# Patient Record
Sex: FEMALE | Race: WHITE | NOT HISPANIC OR LATINO | Employment: FULL TIME | URBAN - METROPOLITAN AREA
[De-identification: names, ages, dates, MRNs, and addresses within clinical notes are randomized per-mention and may not be internally consistent; named-entity substitution may affect disease eponyms.]

---

## 2017-01-30 ENCOUNTER — OFFICE VISIT (OUTPATIENT)
Dept: INTERNAL MEDICINE | Facility: CLINIC | Age: 37
End: 2017-01-30
Payer: COMMERCIAL

## 2017-01-30 VITALS
HEART RATE: 132 BPM | DIASTOLIC BLOOD PRESSURE: 74 MMHG | RESPIRATION RATE: 20 BRPM | SYSTOLIC BLOOD PRESSURE: 102 MMHG | HEIGHT: 62 IN | OXYGEN SATURATION: 99 % | BODY MASS INDEX: 18.22 KG/M2 | WEIGHT: 99 LBS

## 2017-01-30 DIAGNOSIS — G47.00 INSOMNIA, UNSPECIFIED TYPE: ICD-10-CM

## 2017-01-30 DIAGNOSIS — Z71.6 TOBACCO ABUSE COUNSELING: ICD-10-CM

## 2017-01-30 DIAGNOSIS — Q85.89 PEUTZ-JEGHERS SYNDROME: ICD-10-CM

## 2017-01-30 DIAGNOSIS — F41.1 GAD (GENERALIZED ANXIETY DISORDER): Primary | ICD-10-CM

## 2017-01-30 DIAGNOSIS — Z72.0 TOBACCO ABUSE: ICD-10-CM

## 2017-01-30 PROCEDURE — 99999 PR PBB SHADOW E&M-EST. PATIENT-LVL III: CPT | Mod: PBBFAC,,, | Performed by: NURSE PRACTITIONER

## 2017-01-30 PROCEDURE — 99214 OFFICE O/P EST MOD 30 MIN: CPT | Mod: S$GLB,,, | Performed by: NURSE PRACTITIONER

## 2017-01-30 RX ORDER — CLONAZEPAM 1 MG/1
1 TABLET ORAL 2 TIMES DAILY PRN
Qty: 60 TABLET | Refills: 5 | Status: SHIPPED | OUTPATIENT
Start: 2017-01-30 | End: 2017-08-07 | Stop reason: SDUPTHER

## 2017-01-30 RX ORDER — TRAZODONE HYDROCHLORIDE 100 MG/1
100 TABLET ORAL NIGHTLY
Qty: 30 TABLET | Refills: 5 | Status: SHIPPED | OUTPATIENT
Start: 2017-01-30 | End: 2017-03-13 | Stop reason: SDUPTHER

## 2017-01-30 RX ORDER — VENLAFAXINE HYDROCHLORIDE 75 MG/1
75 CAPSULE, EXTENDED RELEASE ORAL DAILY
COMMUNITY
End: 2017-01-30 | Stop reason: SDUPTHER

## 2017-01-30 RX ORDER — VENLAFAXINE HYDROCHLORIDE 75 MG/1
75 CAPSULE, EXTENDED RELEASE ORAL DAILY
Qty: 30 CAPSULE | Refills: 5 | Status: SHIPPED | OUTPATIENT
Start: 2017-01-30 | End: 2017-03-13 | Stop reason: SDUPTHER

## 2017-01-30 RX ORDER — NORETHINDRONE AND ETHINYL ESTRADIOL 1 MG-35MCG
1 KIT ORAL DAILY
Refills: 7 | COMMUNITY
Start: 2016-12-24 | End: 2017-04-18

## 2017-01-30 NOTE — PATIENT INSTRUCTIONS
Anxiety Reaction  Anxiety is the feeling we all get when we think something bad might happen. It is a normal response to stress and usually causes only a mild reaction. When anxiety becomes more severe, it can interfere with daily life. In some cases, you may not even be aware of what it is youre anxious about. There may also be a genetic link or it may be a learned behavior in the home.  Both psychological and physical triggers cause stress reaction. It's often a response to fear or emotional stress, real or imagined. This stress may come from home, family, work, or social relationships.  During an anxiety reaction, you may feel:  · Helpless  · Nervous  · Depressed  · Irritable  Your body may show signs of anxiety in many ways. You may experience:  · Dry mouth  · Shakiness  · Dizziness  · Weakness  · Trouble breathing  · Breathing fast (hyperventilating)  · Chest pressure  · Sweating  · Headache  · Nausea  · Diarrhea  · Tiredness  · Inability to sleep  · Sexual problems  Home care  · Try to locate the sources of stress in your life. They may not be obvious. These may include:  ¨ Daily hassles of life (traffic jams, missed appointments, car troubles, etc.)  ¨ Major life changes, both good (new baby, job promotion) and bad (loss of job, loss of loved one)  ¨ Overload: feeling that you have too many responsibilities and can't take care of all of them at once  ¨ Feeling helpless, feeling that your problems are beyond what youre able to solve  · Notice how your body reacts to stress. Learn to listen to your body signals. This will help you take action before the stress becomes severe.  · When you can, do something about the source of your stress. (Avoid hassles, limit the amount of change that happens in your life at one time and take a break when you feel overloaded).  · Unfortunately, many stressful situations can't be avoided. It is necessary to learn how to better manage stress. There are many proven methods  that will reduce your anxiety. These include simple things like exercise, good nutrition and adequate rest. Also, there are certain techniques that are helpful:  ¨ Relaxation  ¨ Breathing exercises  ¨ Visualization  ¨ Biofeedback  ¨ Meditation  For more information about this, consult your doctor or go to a local bookstore and review the many books and tapes available on this subject.  Follow-up care  If you feel that your anxiety is not responding to self-help measures, contact your doctor or make an appointment with a counselor. You may need short-term psychological counseling and temporary medicine to help you manage stress.  Call 911  Call your healthcare provider right away if any of these occur:  · Trouble breathing  · Confusion  · Drowsiness or trouble wakening  · Fainting or loss of consciousness  · Rapid heart rate  · Seizure  · New chest pain that becomes more severe, lasts longer, or spreads into your shoulder, arm, neck, jaw, or back  When to seek medical advice  Call your healthcare provider right away if any of these occur:  · Your symptoms get worse  · Severe headache not relieved by rest and mild pain reliever  © 8335-9795 [employer]. [address] [place] [address], [place] [address]. All rights reserved. This information is not intended as a substitute for professional medical care. Always follow your healthcare professional's instructions.

## 2017-01-30 NOTE — MR AVS SNAPSHOT
[facility]  [address]  [facility] [address]  Phone: [phone]  Fax: [phone]                  Staci Hodge   2017 8:00 AM   Office Visit    Description:  Female : 1980   Provider:  Shilpi Clayton NP   Department:  [facility]           Reason for Visit     Follow-up           Diagnoses this Visit        Comments    MARY LOU (generalized anxiety disorder)    -  Primary     Insomnia, unspecified type         Peutz-Jeghers syndrome         Tobacco abuse         Tobacco abuse counseling                To Do List           Goals (5 Years of Data)     None      Follow-Up and Disposition     Return in about 6 months (around 2017).       These Medications        Disp Refills Start End    venlafaxine (EFFEXOR-XR) 75 MG 24 hr capsule 30 capsule 5 2017     Take 1 capsule (75 mg total) by mouth once daily. - Oral    Pharmacy: [facility]/pharmacy #[id number] - [place], [place] - [address] Ph #: [phone]       trazodone (DESYREL) 100 MG tablet 30 tablet 5 2017    Take 1 tablet (100 mg total) by mouth every evening. - Oral    Pharmacy: [facility]/pharmacy #[id number] - [place], [place] - [address] Ph #: [phone]       clonazePAM (KLONOPIN) 1 MG tablet 60 tablet 5 2017    Take 1 tablet (1 mg total) by mouth 2 (two) times daily as needed for Anxiety. - Oral    Pharmacy: [facility]/pharmacy #[id number] - [place], [place] - [address] Ph #: [phone]         [facility]     [facility] -  Assistance  Registered nurses in the [facility] provide clinical advisement, health education, appointment booking, and other advisory services.  Call for this free service at [phone].             Medications           Message regarding Medications     Verify the changes and/or additions to your medication regime listed below are the same as discussed with your clinician today.  If any of these changes or  "additions are incorrect, please notify your healthcare provider.        START taking these NEW medications        Refills    venlafaxine (EFFEXOR-XR) 75 MG 24 hr capsule 5    Sig: Take 1 capsule (75 mg total) by mouth once daily.    Class: Normal    Route: Oral    trazodone (DESYREL) 100 MG tablet 5    Sig: Take 1 tablet (100 mg total) by mouth every evening.    Class: Normal    Route: Oral      STOP taking these medications     hydrOXYzine pamoate (VISTARIL) 25 MG Cap TAKE 1 CAPSULE (25 MG TOTAL) BY MOUTH NIGHTLY AS NEEDED (INSOMNIA).    quetiapine (SEROQUEL) 400 MG tablet Take 1 tablet (400 mg total) by mouth every evening.    fluvoxaMINE (LUVOX) 100 MG tablet Take 1 tablet (100 mg total) by mouth every evening.           Verify that the below list of medications is an accurate representation of the medications you are currently taking.  If none reported, the list may be blank. If incorrect, please contact your healthcare provider. Carry this list with you in case of emergency.           Current Medications     clonazePAM (KLONOPIN) 1 MG tablet Take 1 tablet (1 mg total) by mouth 2 (two) times daily as needed for Anxiety.    CYCLAFEM 1/35, 28, 1-35 mg-mcg per tablet Take 1 tablet by mouth once daily.    venlafaxine (EFFEXOR-XR) 75 MG 24 hr capsule Take 1 capsule (75 mg total) by mouth once daily.    trazodone (DESYREL) 100 MG tablet Take 1 tablet (100 mg total) by mouth every evening.           Clinical Reference Information           Vital Signs - Last Recorded  Most recent update: 1/30/2017  8:25 AM by Hiral Pride MA    BP Pulse Resp Ht Wt LMP    102/74 (!) 132 20 5' 2" (1.575 m) 44.9 kg (99 lb) 01/23/2017    SpO2 BMI             99% 18.11 kg/m2         Blood Pressure          Most Recent Value    BP  102/74      Allergies as of 1/30/2017     No Known Allergies      Immunizations Administered on Date of Encounter - 1/30/2017     None      Instructions      Anxiety Reaction  Anxiety is the feeling we all " get when we think something bad might happen. It is a normal response to stress and usually causes only a mild reaction. When anxiety becomes more severe, it can interfere with daily life. In some cases, you may not even be aware of what it is youre anxious about. There may also be a genetic link or it may be a learned behavior in the home.  Both psychological and physical triggers cause stress reaction. It's often a response to fear or emotional stress, real or imagined. This stress may come from home, family, work, or social relationships.  During an anxiety reaction, you may feel:  · Helpless  · Nervous  · Depressed  · Irritable  Your body may show signs of anxiety in many ways. You may experience:  · Dry mouth  · Shakiness  · Dizziness  · Weakness  · Trouble breathing  · Breathing fast (hyperventilating)  · Chest pressure  · Sweating  · Headache  · Nausea  · Diarrhea  · Tiredness  · Inability to sleep  · Sexual problems  Home care  · Try to locate the sources of stress in your life. They may not be obvious. These may include:  ¨ Daily hassles of life (traffic jams, missed appointments, car troubles, etc.)  ¨ Major life changes, both good (new baby, job promotion) and bad (loss of job, loss of loved one)  ¨ Overload: feeling that you have too many responsibilities and can't take care of all of them at once  ¨ Feeling helpless, feeling that your problems are beyond what youre able to solve  · Notice how your body reacts to stress. Learn to listen to your body signals. This will help you take action before the stress becomes severe.  · When you can, do something about the source of your stress. (Avoid hassles, limit the amount of change that happens in your life at one time and take a break when you feel overloaded).  · Unfortunately, many stressful situations can't be avoided. It is necessary to learn how to better manage stress. There are many proven methods that will reduce your anxiety. These include simple  things like exercise, good nutrition and adequate rest. Also, there are certain techniques that are helpful:  ¨ Relaxation  ¨ Breathing exercises  ¨ Visualization  ¨ Biofeedback  ¨ Meditation  For more information about this, consult your doctor or go to a local bookstore and review the many books and tapes available on this subject.  Follow-up care  If you feel that your anxiety is not responding to self-help measures, contact your doctor or make an appointment with a counselor. You may need short-term psychological counseling and temporary medicine to help you manage stress.  Call 911  Call your healthcare provider right away if any of these occur:  · Trouble breathing  · Confusion  · Drowsiness or trouble wakening  · Fainting or loss of consciousness  · Rapid heart rate  · Seizure  · New chest pain that becomes more severe, lasts longer, or spreads into your shoulder, arm, neck, jaw, or back  When to seek medical advice  Call your healthcare provider right away if any of these occur:  · Your symptoms get worse  · Severe headache not relieved by rest and mild pain reliever  © 8471-8429 [employer]. [address] [place] [address]. All rights reserved. This information is not intended as a substitute for professional medical care. Always follow your healthcare professional's instructions.             Smoking Cessation     If you would like to quit smoking:   You may be eligible for free services if you are a [place] resident and started smoking cigarettes before September 1, 1988.  Call the Smoking Cessation Trust (SCT) toll free at (930) 206-5529 or [phone] 018-9233.   Call 1-800-QUIT-NOW if you do not meet the above criteria.

## 2017-01-30 NOTE — PROGRESS NOTES
Subjective:       Patient ID: Staci Hodge is a 36 y.o. female.    Chief Complaint: Follow-up (check up with refills)    Patient is known, to me and presents with   Chief Complaint   Patient presents with    Follow-up     check up with refills   .  Denies chest pain and shortness of breath.  Patient presents with check up and refills. She was seeing mental health but became too expensive. She is stable right now with anxiety and has no suicidal or homicidal ideations  HPI  Review of Systems   Constitutional: Negative for activity change, appetite change, fatigue, fever and unexpected weight change.   HENT: Negative for congestion, ear discharge, ear pain, hearing loss, postnasal drip and tinnitus.    Eyes: Negative for photophobia, pain and visual disturbance.   Respiratory: Negative for cough, shortness of breath, wheezing and stridor.    Cardiovascular: Negative for chest pain, palpitations and leg swelling.   Gastrointestinal: Negative for abdominal distention.   Genitourinary: Negative for difficulty urinating, dysuria, frequency, hematuria and urgency.   Musculoskeletal: Negative for arthralgias, back pain, gait problem, joint swelling and neck pain.   Skin: Negative.    Neurological: Negative for dizziness, seizures, syncope, weakness, light-headedness, numbness and headaches.   Hematological: Negative for adenopathy. Does not bruise/bleed easily.   Psychiatric/Behavioral: Negative for behavioral problems, confusion, hallucinations, sleep disturbance and suicidal ideas. The patient is not nervous/anxious.        Objective:      Physical Exam   Constitutional: She is oriented to person, place, and time. She appears well-developed and well-nourished. No distress.   HENT:   Head: Normocephalic and atraumatic.   Right Ear: External ear normal.   Left Ear: External ear normal.   Mouth/Throat: Oropharynx is clear and moist. No oropharyngeal exudate.   Eyes: Conjunctivae and EOM are normal. Pupils are  "equal, round, and reactive to light. Right eye exhibits no discharge. Left eye exhibits no discharge.   Neck: Normal range of motion. Neck supple. No JVD present. No thyromegaly present.   Cardiovascular: Normal rate, regular rhythm, normal heart sounds and intact distal pulses.  Exam reveals no gallop and no friction rub.    No murmur heard.  Pulmonary/Chest: Effort normal and breath sounds normal. No stridor. No respiratory distress. She has no wheezes. She has no rales. She exhibits no tenderness.   Abdominal: Soft. Bowel sounds are normal. She exhibits no distension and no mass. There is no tenderness. There is no rebound.   Musculoskeletal: Normal range of motion. She exhibits no edema or tenderness.   Lymphadenopathy:     She has no cervical adenopathy.   Neurological: She is alert and oriented to person, place, and time. She has normal reflexes. She displays normal reflexes. No cranial nerve deficit. She exhibits normal muscle tone. Coordination normal.   Skin: Skin is warm and dry. No rash noted. No erythema. No pallor.   Psychiatric: She has a normal mood and affect. Her behavior is normal. Judgment and thought content normal.   Negative suicidal or homicidal ideation       Assessment:       1. MARY LOU (generalized anxiety disorder)    2. Insomnia, unspecified type    3. Peutz-Jeghers syndrome    4. Tobacco abuse    5. Tobacco abuse counseling        Plan:   Staci was seen today for follow-up.    Diagnoses and all orders for this visit:    MARY LOU (generalized anxiety disorder)  -     venlafaxine (EFFEXOR-XR) 75 MG 24 hr capsule; Take 1 capsule (75 mg total) by mouth once daily.  -     clonazePAM (KLONOPIN) 1 MG tablet; Take 1 tablet (1 mg total) by mouth 2 (two) times daily as needed for Anxiety.    Insomnia, unspecified type  -     trazodone (DESYREL) 100 MG tablet; Take 1 tablet (100 mg total) by mouth every evening.    Peutz-Jeghers syndrome    Tobacco abuse    Tobacco abuse counseling    "This note will not " "be shared with the patient."  Continue with plan of care  Not ready to quit smoking   RTC as scheduled  "

## 2017-01-31 ENCOUNTER — PATIENT MESSAGE (OUTPATIENT)
Dept: INTERNAL MEDICINE | Facility: CLINIC | Age: 37
End: 2017-01-31

## 2017-01-31 ENCOUNTER — TELEPHONE (OUTPATIENT)
Dept: INTERNAL MEDICINE | Facility: CLINIC | Age: 37
End: 2017-01-31

## 2017-01-31 DIAGNOSIS — G44.309 HEADACHES DUE TO OLD HEAD INJURY: ICD-10-CM

## 2017-01-31 DIAGNOSIS — R53.83 FATIGUE, UNSPECIFIED TYPE: Primary | ICD-10-CM

## 2017-01-31 DIAGNOSIS — S09.90XS HEADACHES DUE TO OLD HEAD INJURY: ICD-10-CM

## 2017-01-31 DIAGNOSIS — R51.9 WORSENING HEADACHES: ICD-10-CM

## 2017-01-31 DIAGNOSIS — Z13.220 SCREENING FOR LIPOID DISORDERS: ICD-10-CM

## 2017-02-01 ENCOUNTER — OFFICE VISIT (OUTPATIENT)
Dept: NEUROLOGY | Facility: CLINIC | Age: 37
End: 2017-02-01
Payer: COMMERCIAL

## 2017-02-01 VITALS
SYSTOLIC BLOOD PRESSURE: 110 MMHG | HEIGHT: 62 IN | HEART RATE: 116 BPM | DIASTOLIC BLOOD PRESSURE: 78 MMHG | RESPIRATION RATE: 14 BRPM | WEIGHT: 109.56 LBS | BODY MASS INDEX: 20.16 KG/M2

## 2017-02-01 DIAGNOSIS — R00.2 PALPITATIONS: ICD-10-CM

## 2017-02-01 DIAGNOSIS — R42 DIZZINESS: ICD-10-CM

## 2017-02-01 DIAGNOSIS — G44.85 STABBING HEADACHE: ICD-10-CM

## 2017-02-01 DIAGNOSIS — R20.0 BILATERAL HAND NUMBNESS: ICD-10-CM

## 2017-02-01 DIAGNOSIS — R00.0 TACHYCARDIA: ICD-10-CM

## 2017-02-01 PROCEDURE — 99204 OFFICE O/P NEW MOD 45 MIN: CPT | Mod: S$GLB,,, | Performed by: NURSE PRACTITIONER

## 2017-02-01 PROCEDURE — 99999 PR PBB SHADOW E&M-EST. PATIENT-LVL III: CPT | Mod: PBBFAC,,, | Performed by: NURSE PRACTITIONER

## 2017-02-01 NOTE — MR AVS SNAPSHOT
[facility]  [address] [facility] [address]  Phone: [phone]  Fax: [phone]                  Staci Hodge   2017 2:20 PM   Office Visit    Description:  Female : 1980   Provider:  Camille Emerson NP   Department:  [facility]           Reason for Visit     Dizziness     Numbness           Diagnoses this Visit        Comments    Dizziness         Stabbing headache         Palpitations         Tachycardia         Bilateral hand numbness                To Do List           Future Appointments        Provider Department Dept Phone    2017 8:15 AM Shilpi Clayton NP [facility] [phone]      Goals (5 Years of Data)     None      [facility]     [facility] -  Assistance  Registered nurses in the [facility] Center provide clinical advisement, health education, appointment booking, and other advisory services.  Call for this free service at [phone].             Medications           Message regarding Medications     Verify the changes and/or additions to your medication regime listed below are the same as discussed with your clinician today.  If any of these changes or additions are incorrect, please notify your healthcare provider.             Verify that the below list of medications is an accurate representation of the medications you are currently taking.  If none reported, the list may be blank. If incorrect, please contact your healthcare provider. Carry this list with you in case of emergency.           Current Medications     clonazePAM (KLONOPIN) 1 MG tablet Take 1 tablet (1 mg total) by mouth 2 (two) times daily as needed for Anxiety.    CYCLAFEM 1/35, 28, 1-35 mg-mcg per tablet Take 1 tablet by mouth once daily.    trazodone (DESYREL) 100 MG tablet Take 1 tablet (100 mg total) by mouth every evening.    venlafaxine (EFFEXOR-XR) 75 MG 24 hr capsule Take 1 capsule  "(75 mg total) by mouth once daily.           Clinical Reference Information           Vital Signs - Last Recorded  Most recent update: 2/1/2017  2:28 PM by Pam Syed MA    BP Pulse Resp Ht Wt LMP    110/78 (BP Location: Left arm, Patient Position: Sitting, BP Method: Manual) (!) 116 14 5' 2" (1.575 m) 49.7 kg (109 lb 9.1 oz) 01/23/2017    BMI                20.04 kg/m2          Blood Pressure          Most Recent Value    BP  110/78      Allergies as of 2/1/2017     No Known Allergies      Immunizations Administered on Date of Encounter - 2/1/2017     None      Orders Placed During Today's Visit     Future Labs/Procedures Expected by Expires    MRI Brain W WO Contrast  2/1/2017 2/1/2018    EMG - 2 Extremities  As directed 2/1/2018    Holter monitor - 24 hour  As directed 2/1/2018    Nerve conduction test  As directed 2/1/2018      Smoking Cessation     If you would like to quit smoking:   You may be eligible for free services if you are a [place] resident and started smoking cigarettes before September 1, 1988.  Call the Smoking Cessation Trust (SCT) toll free at (285) 989-7857 or [phone].   Call 2-776-QUIT-NOW if you do not meet the above criteria.            "

## 2017-02-01 NOTE — PROGRESS NOTES
Subjective:      Chief Complaint:  Dizziness and numbness    History of Present Illness  Staci Hodge is a 36 y.o. female, who presents for evaluation of dizziness and paresthesias to her hands bilaterally. She has a history of PTSD, panic disorder with agoraphobia, insomnia, colon resection, due to small bowel obstruction, and tobacco us.     He dizziness began in 5-6 months ago. This begins with a shocking pain to her head, followed by dizziness. It initially resolved after sitting down. It occurs once per week.     She began to experience bilateral hand numbness last week, which has progressed in severity. She has been dropping items.     She experienced three episodes of sleep paralysis this past week, but denies having this happen before. She is tearful when discussing this. She is awake, but is unable to move or speak.     She is under a great deal of stress lately. She does admit to having palpitations. She last saw Dr. Christiano Emerson in 10/2016. She was placed on Seroquel, which she couldn't tolerate. She then went to [facility], who prescribed Effexor and Trazodone; however, this has been ineffective for her anxiety/panic issues as well.     I have reviewed all of this patient's past medical and surgical histories as well as family and social histories and active allergies and medications as documented in the electronic medical record.    Review of Systems  Review of Systems   Constitutional: Positive for fatigue. Negative for appetite change.   HENT: Positive for tinnitus.    Eyes: Negative for photophobia and visual disturbance.   Respiratory: Negative for shortness of breath.    Cardiovascular: Positive for palpitations.   Gastrointestinal: Positive for nausea.   Neurological: Positive for dizziness, tremors, numbness and headaches. Negative for speech difficulty.   Hematological: Does not bruise/bleed easily.   Psychiatric/Behavioral: Positive for agitation, dysphoric mood and  sleep disturbance. Negative for suicidal ideas. The patient is nervous/anxious.        Objective:       Vitals:    02/01/17 1425   BP: 110/78   Pulse: (!) 116   Resp: 14     Exam:  Gen Appearance, well developed/nourished in no apparent distress  CV: 2+ distal pulses with no edema or swelling  Neuro:  MS: Awake, alert, oriented to place, person, time, situation. Sustains attention. Recent/remote memory intact, Language is full to spontaneous speech/repetition/naming/comprehension. Fund of Knowledge is full  CN: Optic discs are flat with normal vasculature, PERRL, right nystamus. Normal facial sensation and strength, Hearing symmetric, Tongue and Palate are midline and strong. Shoulder Shrug symmetric and strong.  Motor: Normal bulk, tone, no abnormal movements. 5/5 strength bilateral upper/lower extremities with 2+ reflexes and bilateral plantar response  Sensory: symmetric to light touch, pain, temp, and vibration/proprioception. Romberg negative; + left Tinel's and Phalen's sign.   Cerebellar: Finger-nose,Heal-shin, Rapid alternating movements intact  Gait: Normal stance, no ataxia    Imaging: no prior imaging of brain  Labs: CBC, CMP, Lipid Panel, TSH, B12, Vitamin D ordered per PCP; she will complete tomorrow.     Assessment:   Staci Hodge is a 36 y.o. female, who presents for evaluation of dizziness and paresthesias to her hands bilaterally. She has a history of PTSD, panic disorder with agoraphobia, insomnia, colon resection, due to small bowel obstruction, and tobacco us.     She has multiple complaints, which may be related to her severe anxiety; however, I will rule out other causes first.   Plan:   I recommend:     1. MRI Brain with and without to assess for structural abnormalities.   2. Review labs per PCP once complete, specifically her TSH.   3. EMG/NCS BUE to assess for neuropathic complaints.   4. 24 hour Holter monitor to further assess ongoing tachycardia, noted at last several office  visits within [facility].   5. Consider sleep study to assess for sleep disorders, after workup complete.     Follow up in 3 weeks.

## 2017-02-03 ENCOUNTER — HOSPITAL ENCOUNTER (OUTPATIENT)
Dept: RADIOLOGY | Facility: HOSPITAL | Age: 37
Discharge: HOME | End: 2017-02-03
Attending: NURSE PRACTITIONER
Payer: COMMERCIAL

## 2017-02-03 DIAGNOSIS — G44.85 STABBING HEADACHE: ICD-10-CM

## 2017-02-03 DIAGNOSIS — R20.0 BILATERAL HAND NUMBNESS: ICD-10-CM

## 2017-02-03 DIAGNOSIS — R42 DIZZINESS: ICD-10-CM

## 2017-02-03 PROCEDURE — A9585 GADOBUTROL INJECTION: HCPCS | Performed by: NURSE PRACTITIONER

## 2017-02-03 PROCEDURE — 70553 MRI BRAIN STEM W/O & W/DYE: CPT | Mod: TC

## 2017-02-03 PROCEDURE — 25500020 PHARM REV CODE 255: Performed by: NURSE PRACTITIONER

## 2017-02-03 PROCEDURE — 70553 MRI BRAIN STEM W/O & W/DYE: CPT | Mod: 26,,, | Performed by: RADIOLOGY

## 2017-02-03 RX ORDER — GADOBUTROL 604.72 MG/ML
4 INJECTION INTRAVENOUS
Status: COMPLETED | OUTPATIENT
Start: 2017-02-03 | End: 2017-02-03

## 2017-02-03 RX ADMIN — GADOBUTROL 4 ML: 604.72 INJECTION INTRAVENOUS at 11:02

## 2017-02-06 ENCOUNTER — PATIENT MESSAGE (OUTPATIENT)
Dept: INTERNAL MEDICINE | Facility: CLINIC | Age: 37
End: 2017-02-06

## 2017-02-07 NOTE — TELEPHONE ENCOUNTER
I have a question about CBC W/ AUTO DIFFERENTIAL resulted on 2/3/17 at 11:05 AM.   Just curious about the results saying the Gran# and Gran% being out of range.  Is there something I need to do to improve this?  Thanks                  I have a question about VITAMIN D resulted on 2/3/17 at 6:01 PM.     Just curious about the results saying vitamin D is 16 and it showed vitamin D insufficiency.  Is there something I need to do to improve this?  Thanks

## 2017-02-09 ENCOUNTER — TELEPHONE (OUTPATIENT)
Dept: INTERNAL MEDICINE | Facility: CLINIC | Age: 37
End: 2017-02-09

## 2017-02-09 DIAGNOSIS — E55.9 VITAMIN D DEFICIENCY: Primary | ICD-10-CM

## 2017-02-09 RX ORDER — ERGOCALCIFEROL 1.25 MG/1
50000 CAPSULE ORAL
Qty: 4 CAPSULE | Refills: 2 | Status: SHIPPED | OUTPATIENT
Start: 2017-02-09 | End: 2017-03-13 | Stop reason: SDUPTHER

## 2017-02-10 ENCOUNTER — PROCEDURE VISIT (OUTPATIENT)
Dept: NEUROLOGY | Facility: CLINIC | Age: 37
End: 2017-02-10
Payer: COMMERCIAL

## 2017-02-10 DIAGNOSIS — G56.03 BILATERAL CARPAL TUNNEL SYNDROME: Primary | ICD-10-CM

## 2017-02-10 DIAGNOSIS — R20.0 BILATERAL HAND NUMBNESS: ICD-10-CM

## 2017-02-10 PROCEDURE — 95911 NRV CNDJ TEST 9-10 STUDIES: CPT | Mod: S$GLB,,, | Performed by: PSYCHIATRY & NEUROLOGY

## 2017-02-10 PROCEDURE — 95886 MUSC TEST DONE W/N TEST COMP: CPT | Mod: S$GLB,,, | Performed by: PSYCHIATRY & NEUROLOGY

## 2017-02-10 NOTE — PROCEDURES
EMG - 2 Extremities  Date/Time: 2/10/2017 3:50 PM  Performed by: ROSALINDA OROURKE  Authorized by: STARR EMERSON     Nerve conduction test  Date/Time: 2/10/2017 3:50 PM  Performed by: ROSALINDA OROURKE  Authorized by: STARR EMERSON       REPORT OF EMG and NERVE CONDUCTION STUDY    Name: Staci Hodge  Date of Study:  02/10/17  Referring Provider: HELENA Emerson  Test Performed by:  MD Sharad  Full Values Attached  Informed Consent Scanned.   No anesthesia used.   Amount of Blood Loss: none. The patient tolerated this procedure well.       Informed consent was obtained prior to performing this study. Two patient identifiers were confirmed with the patient prior to performing this study. A time out to determine correct patient and and agreement on procedure performed was conducted prior to the concentric needle examination.    Reason for the study:  Numb hands      Findings:   Nerve conduction studies of the bilateral median (motor and sensory)nerves, bilateral ulnar (motor and sensory) nerves, bilateral radial sensory nerveswere performed.  Median motor distal latencies were borderline. Median palm to wrist distal latencies were prolonged to 2.9ms on the right and 3.1ms on the left.   Otherwise, amplitudes, distal latencies, conduction velocities, and F-waves were normal.  EMG of selected muscles of the bilateral arms and  bilateral cervical paraspinal muscles were performed as indicated on the attached sheets.  Insertional activity was normal without fasciculation or fibrillation. There was normal size, durastion, recruitment,  and phasia of motor units.      Impression:  Abnormal Study secondary to the Presence of:    Mild Bilateral Carpal Tunnel Syndrome.     Rosalinda Orourke M.D. [facility].     A copy of this EMG/NCS will be sent to Starr Emerson. The patient plans to try nightly wrist brace for CTS and follow up with you as scheduled.

## 2017-03-02 ENCOUNTER — OFFICE VISIT (OUTPATIENT)
Dept: NEUROLOGY | Facility: CLINIC | Age: 37
End: 2017-03-02
Payer: COMMERCIAL

## 2017-03-02 VITALS
HEIGHT: 62 IN | HEART RATE: 126 BPM | DIASTOLIC BLOOD PRESSURE: 70 MMHG | BODY MASS INDEX: 19.1 KG/M2 | SYSTOLIC BLOOD PRESSURE: 118 MMHG | WEIGHT: 103.81 LBS | RESPIRATION RATE: 14 BRPM

## 2017-03-02 DIAGNOSIS — R42 DIZZINESS: ICD-10-CM

## 2017-03-02 DIAGNOSIS — F40.10 SOCIAL ANXIETY DISORDER: ICD-10-CM

## 2017-03-02 DIAGNOSIS — G44.209 TENSION HEADACHE: Primary | ICD-10-CM

## 2017-03-02 DIAGNOSIS — R00.0 TACHYCARDIA: ICD-10-CM

## 2017-03-02 DIAGNOSIS — G47.00 INSOMNIA, UNSPECIFIED TYPE: ICD-10-CM

## 2017-03-02 DIAGNOSIS — G56.01 CARPAL TUNNEL SYNDROME ON RIGHT: ICD-10-CM

## 2017-03-02 PROCEDURE — 99214 OFFICE O/P EST MOD 30 MIN: CPT | Mod: S$GLB,,, | Performed by: NURSE PRACTITIONER

## 2017-03-02 PROCEDURE — 99999 PR PBB SHADOW E&M-EST. PATIENT-LVL III: CPT | Mod: PBBFAC,,, | Performed by: NURSE PRACTITIONER

## 2017-03-02 RX ORDER — PROPRANOLOL HYDROCHLORIDE 10 MG/1
10 TABLET ORAL 2 TIMES DAILY
Qty: 60 TABLET | Refills: 3 | Status: SHIPPED | OUTPATIENT
Start: 2017-03-02 | End: 2017-03-30

## 2017-03-02 RX ORDER — PROCHLORPERAZINE MALEATE 10 MG
10 TABLET ORAL 2 TIMES DAILY PRN
Qty: 20 TABLET | Refills: 0 | Status: SHIPPED | OUTPATIENT
Start: 2017-03-02 | End: 2017-04-17

## 2017-03-02 NOTE — PROGRESS NOTES
Subjective:      Chief Complaint:  Dizziness and numbness    History of Present Illness  Staci Hodge is a 36 y.o. female, who presents for evaluation of dizziness and paresthesias to her hands bilaterally. She has a history of PTSD, panic disorder with agoraphobia, insomnia, colon resection, due to small bowel obstruction, and tobacco us.     She completed an MRI Brain, labs, and EMG BUE since her last visit.     She presents today with new complaints of daily headaches located to the frontal area, which began one month ago. Her headaches are mild to moderate in nature. Her headaches appear to be triggered by anxiety. She has tried Ibuprofen, which was ineffective. She denies any visual disturbances, photophobia, phonophobia, autonomic complaints, focal weakness, paresthesias, neck pain, or nausea.      She denies any further episodes of dizziness, which were preceded by a shocking pain to her head.     She continues with numbness to her hands, R>L, and is dropping items.     She denies any further episodes of sleep paralysis. She does not sleep well at night, secondary to anxiety.     She has not yet returned to see Dr. Christiano Emerson, who she last saw in 10/2016.  She was previously placed on Seroquel, which she couldn't tolerate. She then went to [facility], who prescribed Effexor and Trazodone; however, this has been ineffective for her anxiety/panic issues as well.     I have reviewed all of this patient's past medical and surgical histories as well as family and social histories and active allergies and medications as documented in the electronic medical record.    Review of Systems  Review of Systems   Constitutional: Positive for fatigue. Negative for appetite change.   HENT: Negative for tinnitus.    Eyes: Negative for photophobia and visual disturbance.   Respiratory: Negative for shortness of breath.    Cardiovascular: Positive for palpitations.   Gastrointestinal: Negative for  nausea.   Neurological: Positive for tremors, numbness and headaches. Negative for dizziness and speech difficulty.   Hematological: Does not bruise/bleed easily.   Psychiatric/Behavioral: Positive for sleep disturbance. Negative for agitation, dysphoric mood and suicidal ideas. The patient is nervous/anxious.        Objective:       Vitals:    03/02/17 0913   BP: 118/70   Pulse: (!) 126   Resp: 14     Exam:  Gen Appearance, well developed/nourished in no apparent distress  CV: 2+ distal pulses with no edema or swelling  Neuro:  MS: Awake, alert, oriented to place, person, time, situation. Sustains attention. Recent/remote memory intact, Language is full to spontaneous speech/repetition/naming/comprehension. Fund of Knowledge is full  CN: Optic discs are flat with normal vasculature, PERRL, right nystamus. Normal facial sensation and strength, Hearing symmetric, Tongue and Palate are midline and strong. Shoulder Shrug symmetric and strong.  Motor: Normal bulk, tone, no abnormal movements. 5/5 strength bilateral upper/lower extremities with 2+ reflexes and bilateral plantar response  Sensory: symmetric to light touch, pain, temp, and vibration/proprioception. Romberg negative; + left Tinel's and Phalen's sign.   Cerebellar: Finger-nose,Heal-shin, Rapid alternating movements intact  Gait: Normal stance, no ataxia    Imaging:   MRI Brain with and without 2/3/2017: normal    24 hour Holter monitor: not completed    EMG BUE 2/10/2017:   Impression: Abnormal Study secondary to the Presence of:   Mild Bilateral Carpal Tunnel Syndrome.     Labs: CBC, CMP, Lipid Panel, TSH, B12, Vitamin D ordered per PCP; Vitamin D low; labs also show fasting hyperglycemia.    Assessment:   Staci Hodge is a 36 y.o. female, who presents for evaluation of dizziness and paresthesias to her hands bilaterally. She has a history of PTSD, panic disorder with agoraphobia, insomnia, colon resection, due to small bowel obstruction, and  tobacco us.     Her MRI Brain was unrevealing for structural lesions, and her episodes of dizziness have resolved. She presents today with daily headaches, which seem to be tension related, as well as ongoing tachycardia, brought on by anxiety.  Her fasting hyperglycemia could be related to increased anxiety, and her low Vitamin D level could explain her fatigue.   Plan:   I recommend:    1. Order a right CT injection, given her ongoing right CTS complaints.   2. Start Propranolol 10 mg bid for headache prevention, as well as her tachycardia. This may also have a positive effect on her social anxiety, which is likely contributing to the majority of her complaints.   3. Start prn Compazine to abort headaches.   4. Re-assess insomnia at next visit.     Follow up in 4 weeks.

## 2017-03-02 NOTE — PATIENT INSTRUCTIONS
Propranolol is prescribed to PREVENT headaches. To be taken daily.   Prochlorperazine is prescribed to get rid of headaches. Take only as needed.

## 2017-03-02 NOTE — MR AVS SNAPSHOT
[facility]  [address] [facility] [address]  Phone: [phone]  Fax: [phone]                  Staci Hodge   3/2/2017 9:00 AM   Office Visit    Description:  Female : 1980   Provider:  Camille Emerson NP   Department:  [facility]           Reason for Visit     Neurologic Problem           Diagnoses this Visit        Comments    Tachycardia    -  Primary     Tension headache         Carpal tunnel syndrome on right                To Do List           Future Appointments        Provider Department Dept Phone    2017 8:15 AM Shilpi Clayton NP [facility] [phone]      Goals (5 Years of Data)     None      Follow-Up and Disposition     Return in about 4 weeks (around 3/30/2017).       These Medications        Disp Refills Start End    propranolol (INDERAL) 10 MG tablet 60 tablet 3 3/2/2017 3/2/2018    Take 1 tablet (10 mg total) by mouth 2 (two) times daily. Take 1 each night x 1 week, then increase to twice daily afterwards. - Oral    Pharmacy: [facility]/pharmacy #[id number] - [place], [place] - [address] [address] Ph #: [phone]       prochlorperazine (COMPAZINE) 10 MG tablet 20 tablet 0 3/2/2017     Take 1 tablet (10 mg total) by mouth 2 (two) times daily as needed (take as needed to get rid of headache). - Oral    Pharmacy: [facility]/pharmacy #[id number] - [place], [place] - [address] [address] Ph #: [phone]         [facility]     [facility] On Call Nurse Care Line -  Assistance  Registered nurses in the [facility] provide clinical advisement, health education, appointment booking, and other advisory services.  Call for this free service at [phone].             Medications           Message regarding Medications     Verify the changes and/or additions to your medication regime listed below are the same as discussed with your clinician today.  If any of these changes or additions are incorrect, please notify your  healthcare provider.        START taking these NEW medications        Refills    propranolol (INDERAL) 10 MG tablet 3    Sig: Take 1 tablet (10 mg total) by mouth 2 (two) times daily. Take 1 each night x 1 week, then increase to twice daily afterwards.    Class: Normal    Route: Oral    prochlorperazine (COMPAZINE) 10 MG tablet 0    Sig: Take 1 tablet (10 mg total) by mouth 2 (two) times daily as needed (take as needed to get rid of headache).    Class: Normal    Route: Oral           Verify that the below list of medications is an accurate representation of the medications you are currently taking.  If none reported, the list may be blank. If incorrect, please contact your healthcare provider. Carry this list with you in case of emergency.           Current Medications     clonazePAM (KLONOPIN) 1 MG tablet Take 1 tablet (1 mg total) by mouth 2 (two) times daily as needed for Anxiety.    CYCLAFEM 1/35, 28, 1-35 mg-mcg per tablet Take 1 tablet by mouth once daily.    ergocalciferol (ERGOCALCIFEROL) 50,000 unit Cap Take 1 capsule (50,000 Units total) by mouth every 7 days.    trazodone (DESYREL) 100 MG tablet Take 1 tablet (100 mg total) by mouth every evening.    venlafaxine (EFFEXOR-XR) 75 MG 24 hr capsule Take 1 capsule (75 mg total) by mouth once daily.    prochlorperazine (COMPAZINE) 10 MG tablet Take 1 tablet (10 mg total) by mouth 2 (two) times daily as needed (take as needed to get rid of headache).    propranolol (INDERAL) 10 MG tablet Take 1 tablet (10 mg total) by mouth 2 (two) times daily. Take 1 each night x 1 week, then increase to twice daily afterwards.           Clinical Reference Information           Your Vitals Were     BP                   118/70 (BP Location: Left arm, Patient Position: Sitting, BP Method: Manual)           Blood Pressure          Most Recent Value    BP  118/70      Allergies as of 3/2/2017     No Known Allergies      Immunizations Administered on Date of Encounter - 3/2/2017      None      Orders Placed During Today's Visit     Future Labs/Procedures Expected by Expires    Carpal Tunnel  As directed 3/2/2018      Instructions    Propranolol is prescribed to PREVENT headaches. To be taken daily.   Prochlorperazine is prescribed to get rid of headaches. Take only as needed.        Smoking Cessation     If you would like to quit smoking:   You may be eligible for free services if you are a [place] resident and started smoking cigarettes before September 1, 1988.  Call the Smoking [employer]essation [employer] ([facility]) toll free at [phone] or [phone].   Call 6-663-QUIT-NOW if you do not meet the above criteria.            Language Assistance Services     ATTENTION: Language assistance services are available, free of charge. Please call [phone].      ATENCIÓN: Si sara fernandes, tiene a weaver disposición servicios gratuitos de asistencia lingüística. Llame al [phone].     CHÚ Ý: N?u b?n nói Ti?ng Vi?t, có các d?ch v? h? tr? ngôn ng? mi?n phí dành cho b?n. G?i s? [phone].         [facility] complies with applicable Federal civil rights laws and does not discriminate on the basis of race, color, national origin, age, disability, or sex.

## 2017-03-07 RX ORDER — NORETHINDRONE AND ETHINYL ESTRADIOL 1 MG-35MCG
KIT ORAL
Qty: 28 TABLET | Refills: 5 | OUTPATIENT
Start: 2017-03-07

## 2017-03-09 ENCOUNTER — PROCEDURE VISIT (OUTPATIENT)
Dept: NEUROLOGY | Facility: CLINIC | Age: 37
End: 2017-03-09
Payer: COMMERCIAL

## 2017-03-09 DIAGNOSIS — G56.01 CARPAL TUNNEL SYNDROME ON RIGHT: ICD-10-CM

## 2017-03-09 PROCEDURE — 20526 THER INJECTION CARP TUNNEL: CPT | Mod: S$GLB,,, | Performed by: NURSE PRACTITIONER

## 2017-03-09 RX ORDER — BETAMETHASONE SODIUM PHOSPHATE AND BETAMETHASONE ACETATE 3; 3 MG/ML; MG/ML
1.5 INJECTION, SUSPENSION INTRA-ARTICULAR; INTRALESIONAL; INTRAMUSCULAR; SOFT TISSUE
Status: DISCONTINUED | OUTPATIENT
Start: 2017-03-09 | End: 2018-07-24 | Stop reason: HOSPADM

## 2017-03-09 NOTE — PROCEDURES
Procedures Carpal Tunnel Injection    Informed consent was obtained from the patient. The volar aspect of the right wrist was prepared with Chloraprep.  The palmaris longus tendon was identified and marked and so was the distal wrist crease.  A 27 gauge ½ inch needle was used to enter the carpal tunnel, approximately 1/2 inch proximal to the distal wrist crease and ulnar to the palmaris longus tendon.  Paresthesias were elicited in the median nerve distribution. The needle was withdrawn and reinserted slightly laterally without elicitation of further paresthesias.  The needle was advanced through the carpal tunnel without any difficulty. 0.25 mL of betamethasone was injected after aspiration. The medications flowed freely without any difficulty.     After the procedure, the patient clenched and unclenched the fingers of both hands for a period of two minutes to distribute the medication. There were no complications throughout the procedure     POST PROCEDURE INSTRUCTIONS: The patient has been asked to report to us any redness, swelling, inflammation, or fevers. The patient has been asked to restrict the use of the * extremity for the next 24 hours.

## 2017-03-13 DIAGNOSIS — E55.9 VITAMIN D DEFICIENCY: ICD-10-CM

## 2017-03-13 DIAGNOSIS — G47.00 INSOMNIA, UNSPECIFIED TYPE: ICD-10-CM

## 2017-03-13 DIAGNOSIS — F41.1 GAD (GENERALIZED ANXIETY DISORDER): ICD-10-CM

## 2017-03-13 RX ORDER — ERGOCALCIFEROL 1.25 MG/1
50000 CAPSULE ORAL
Qty: 12 CAPSULE | Refills: 1 | Status: SHIPPED | OUTPATIENT
Start: 2017-03-13 | End: 2017-08-07

## 2017-03-13 RX ORDER — TRAZODONE HYDROCHLORIDE 100 MG/1
100 TABLET ORAL NIGHTLY
Qty: 90 TABLET | Refills: 1 | Status: SHIPPED | OUTPATIENT
Start: 2017-03-13 | End: 2017-11-07 | Stop reason: SDUPTHER

## 2017-03-13 RX ORDER — VENLAFAXINE HYDROCHLORIDE 75 MG/1
75 CAPSULE, EXTENDED RELEASE ORAL DAILY
Qty: 90 CAPSULE | Refills: 1 | Status: SHIPPED | OUTPATIENT
Start: 2017-03-13 | End: 2017-08-07

## 2017-03-13 NOTE — TELEPHONE ENCOUNTER
Requested Prescriptions     Pending Prescriptions Disp Refills    ergocalciferol (ERGOCALCIFEROL) 50,000 unit Cap 12 capsule 1     Sig: Take 1 capsule (50,000 Units total) by mouth every 7 days.    venlafaxine (EFFEXOR-XR) 75 MG 24 hr capsule 90 capsule 1     Sig: Take 1 capsule (75 mg total) by mouth once daily.    trazodone (DESYREL) 100 MG tablet 90 tablet 1     Sig: Take 1 tablet (100 mg total) by mouth every evening.     [facility]

## 2017-03-14 RX ORDER — NORETHINDRONE AND ETHINYL ESTRADIOL 1 MG-35MCG
KIT ORAL
Qty: 28 TABLET | Refills: 5 | OUTPATIENT
Start: 2017-03-14

## 2017-03-30 ENCOUNTER — OFFICE VISIT (OUTPATIENT)
Dept: NEUROLOGY | Facility: CLINIC | Age: 37
End: 2017-03-30
Payer: COMMERCIAL

## 2017-03-30 VITALS
DIASTOLIC BLOOD PRESSURE: 84 MMHG | HEART RATE: 124 BPM | BODY MASS INDEX: 18.58 KG/M2 | SYSTOLIC BLOOD PRESSURE: 112 MMHG | WEIGHT: 101 LBS | HEIGHT: 62 IN | RESPIRATION RATE: 18 BRPM

## 2017-03-30 DIAGNOSIS — F40.10 SOCIAL ANXIETY DISORDER: ICD-10-CM

## 2017-03-30 DIAGNOSIS — G56.01 RIGHT CARPAL TUNNEL SYNDROME: ICD-10-CM

## 2017-03-30 DIAGNOSIS — G43.009 MIGRAINE WITHOUT AURA AND WITHOUT STATUS MIGRAINOSUS, NOT INTRACTABLE: Primary | ICD-10-CM

## 2017-03-30 PROCEDURE — 99999 PR PBB SHADOW E&M-EST. PATIENT-LVL III: CPT | Mod: PBBFAC,,, | Performed by: NURSE PRACTITIONER

## 2017-03-30 PROCEDURE — 99214 OFFICE O/P EST MOD 30 MIN: CPT | Mod: S$GLB,,, | Performed by: NURSE PRACTITIONER

## 2017-03-30 RX ORDER — GABAPENTIN 100 MG/1
100 CAPSULE ORAL 3 TIMES DAILY
Qty: 90 CAPSULE | Refills: 5 | Status: SHIPPED | OUTPATIENT
Start: 2017-03-30 | End: 2017-08-07

## 2017-03-30 RX ORDER — DICLOFENAC SODIUM 50 MG/1
50 TABLET, DELAYED RELEASE ORAL 2 TIMES DAILY PRN
Qty: 20 TABLET | Refills: 0 | Status: SHIPPED | OUTPATIENT
Start: 2017-03-30 | End: 2017-08-07

## 2017-03-30 RX ORDER — DICLOFENAC SODIUM 50 MG/1
50 TABLET, DELAYED RELEASE ORAL 2 TIMES DAILY
Qty: 20 TABLET | Refills: 0 | Status: SHIPPED | OUTPATIENT
Start: 2017-03-30 | End: 2017-03-30 | Stop reason: SDUPTHER

## 2017-03-30 NOTE — PROGRESS NOTES
Subjective:      Chief Complaint:  Dizziness and numbness    History of Present Illness  Staci Hodge is a 36 y.o. female, who presents for evaluation of dizziness and paresthesias to her hands bilaterally. She has a history of PTSD, panic disorder with agoraphobia, insomnia, colon resection, due to small bowel obstruction, and tobacco use.     She had a right carpal tunnel injection since her last visit, which was overall ineffective for her right CTS. She continues to drop items with her right hand.    She was also placed on Propranolol at her last visit for headache prevention. She continues with frequent frontal headaches, which were not aborted with Compazine. Her headaches are mild to moderate in nature. Her headaches appear to be triggered by anxiety. She has also tried Ibuprofen, which was ineffective. She denies any visual disturbances, photophobia, phonophobia, autonomic complaints, focal weakness, paresthesias, neck pain, or nausea.      She continues to deny any further episodes of dizziness, which were preceded by a shocking pain to her head.     She continues to deny any further episodes of sleep paralysis. She does not sleep well at night, secondary to anxiety.     She has not yet returned to see Dr. Christiano Emerson, who she last saw in 10/2016.  She was previously placed on Seroquel, which she couldn't tolerate. She then went to [facility], who prescribed Effexor and Trazodone; however, this has been ineffective for her anxiety/panic issues as well.     I have reviewed all of this patient's past medical and surgical histories as well as family and social histories and active allergies and medications as documented in the electronic medical record.    Review of Systems  Review of Systems   Constitutional: Positive for fatigue. Negative for appetite change.   HENT: Negative for tinnitus.    Eyes: Negative for photophobia and visual disturbance.   Respiratory: Negative for  shortness of breath.    Cardiovascular: Positive for palpitations.   Gastrointestinal: Negative for nausea.   Neurological: Positive for tremors, numbness and headaches. Negative for dizziness and speech difficulty.   Hematological: Does not bruise/bleed easily.   Psychiatric/Behavioral: Positive for sleep disturbance. Negative for agitation, dysphoric mood and suicidal ideas. The patient is nervous/anxious.        Objective:       Vitals:    03/30/17 1447   BP: 112/84   Pulse: (!) 124   Resp: 18     Exam:  Gen Appearance, well developed/nourished in no apparent distress  CV: 2+ distal pulses with no edema or swelling  Neuro:  MS: Awake, alert, oriented to place, person, time, situation. Sustains attention. Recent/remote memory intact, Language is full to spontaneous speech/repetition/naming/comprehension. Fund of Knowledge is full  CN: Optic discs are flat with normal vasculature, PERRL, right nystamus. Normal facial sensation and strength, Hearing symmetric, Tongue and Palate are midline and strong. Shoulder Shrug symmetric and strong.  Motor: Normal bulk, tone, no abnormal movements. 5/5 strength bilateral upper/lower extremities with 2+ reflexes and bilateral plantar response  Sensory: symmetric to light touch, pain, temp, and vibration/proprioception. Romberg negative; + left Tinel's and Phalen's sign.   Cerebellar: Finger-nose,Heal-shin, Rapid alternating movements intact  Gait: Normal stance, no ataxia    Imaging:   MRI Brain with and without 2/3/2017: normal    24 hour Holter monitor: not completed    EMG BUE 2/10/2017:   Impression: Abnormal Study secondary to the Presence of:   Mild Bilateral Carpal Tunnel Syndrome.     Labs: CBC, CMP, Lipid Panel, TSH, B12, Vitamin D ordered per PCP; Vitamin D low; labs also show fasting hyperglycemia.    Assessment:   Staci Hodge is a 36 y.o. female, who presents for evaluation of dizziness and paresthesias to her hands bilaterally. She has a history of PTSD,  panic disorder with agoraphobia, insomnia, colon resection, due to small bowel obstruction, and tobacco us.     She continues with frequent headaches, which are likely related to her severe anxiety.   Plan:   I recommend:    1. Refer to Dr. Larry Patel for consideration of a right CTR, as she has been unresponsive to CT injection and bracing.   2. Wean Propranolol, as it has been overall ineffective for her headaches, as well as her tachycardia, and social anxiety.   3. Start Gabapentin 100 mg tid for headache prevention. This may have a positive effect on her anxiety. Will titrate as tolerated if needed.   4. Start Diclofenac 50 mg to abort headaches.   5. Encouraged to follow back up with Dr. Christiano Emerson for her ongoing anxiety.     Instructed to call with effects of Gabapentin in 2 weeks.     Follow up in 4 months.

## 2017-03-30 NOTE — PATIENT INSTRUCTIONS
Wean Propranolol by taking 1 at night x 1 week, then stop.   Gabapentin was prescribed to prevent headaches.   Stop Compazine, as it is ineffective, and use Diclofenac to abort headaches; take only as needed.   Please call or send message on patient portal in two weeks to update on effects of Gabapentin.

## 2017-03-30 NOTE — MR AVS SNAPSHOT
[facility]  [address] [facility] [address]  Phone: [phone]  Fax: [phone]                  Staci Hodge   3/30/2017 2:40 PM   Office Visit    Description:  Female : 1980   Provider:  Camille Emerson NP   Department:  [facility]           Reason for Visit     Headache           Diagnoses this Visit        Comments    Migraine without aura and without status migrainosus, not intractable    -  Primary     Right carpal tunnel syndrome                To Do List           Future Appointments        Provider Department Dept Phone    2017 8:15 AM Shilpi Clayton NP [facility] [phone]      Goals (5 Years of Data)     None      Follow-Up and Disposition     Return in about 4 months (around 2017).       These Medications        Disp Refills Start End    gabapentin (NEURONTIN) 100 MG capsule 90 capsule 5 3/30/2017 3/30/2018    Take 1 capsule (100 mg total) by mouth 3 (three) times daily. - Oral    Pharmacy: [facility]/pharmacy #[id number] - [place], [place] - [address] [facility] Ph #: [phone]       diclofenac (VOLTAREN) 50 MG EC tablet 20 tablet 0 3/30/2017     Take 1 tablet (50 mg total) by mouth 2 (two) times daily as needed (take as needed for headache). - Oral    Pharmacy: [facility]/pharmacy #[id number] - [place], [place] - [address] [facility] Ph #: [phone]         [facility]     [facility] -  Assistance  Unless otherwise directed by your provider, please contact [facility], our nurse care line that is available for  assistance.     Registered nurses in the [facility] provide: appointment scheduling, clinical advisement, health education, and other advisory services.  Call: [phone] (toll free)               Medications           Message regarding Medications     Verify the changes and/or additions to your medication regime listed below are the same as discussed with your  clinician today.  If any of these changes or additions are incorrect, please notify your healthcare provider.        START taking these NEW medications        Refills    gabapentin (NEURONTIN) 100 MG capsule 5    Sig: Take 1 capsule (100 mg total) by mouth 3 (three) times daily.    Class: Normal    Route: Oral    diclofenac (VOLTAREN) 50 MG EC tablet 0    Sig: Take 1 tablet (50 mg total) by mouth 2 (two) times daily as needed (take as needed for headache).    Class: Normal    Route: Oral      STOP taking these medications     propranolol (INDERAL) 10 MG tablet Take 1 tablet (10 mg total) by mouth 2 (two) times daily. Take 1 each night x 1 week, then increase to twice daily afterwards.           Verify that the below list of medications is an accurate representation of the medications you are currently taking.  If none reported, the list may be blank. If incorrect, please contact your healthcare provider. Carry this list with you in case of emergency.           Current Medications     clonazePAM (KLONOPIN) 1 MG tablet Take 1 tablet (1 mg total) by mouth 2 (two) times daily as needed for Anxiety.    CYCLAFEM 1/35, 28, 1-35 mg-mcg per tablet Take 1 tablet by mouth once daily.    ergocalciferol (ERGOCALCIFEROL) 50,000 unit Cap Take 1 capsule (50,000 Units total) by mouth every 7 days.    prochlorperazine (COMPAZINE) 10 MG tablet Take 1 tablet (10 mg total) by mouth 2 (two) times daily as needed (take as needed to get rid of headache).    trazodone (DESYREL) 100 MG tablet Take 1 tablet (100 mg total) by mouth every evening.    venlafaxine (EFFEXOR-XR) 75 MG 24 hr capsule Take 1 capsule (75 mg total) by mouth once daily.    diclofenac (VOLTAREN) 50 MG EC tablet Take 1 tablet (50 mg total) by mouth 2 (two) times daily as needed (take as needed for headache).    gabapentin (NEURONTIN) 100 MG capsule Take 1 capsule (100 mg total) by mouth 3 (three) times daily.           Clinical Reference Information           Your Vitals  "Were     BP Pulse Resp Height Weight BMI    112/84 (BP Location: Left arm, Patient Position: Sitting, BP Method: Manual) 124 18 5' 2" (1.575 m) 45.8 kg (100 lb 15.5 oz) 18.47 kg/m2      Blood Pressure          Most Recent Value    BP  112/84      Allergies as of 3/30/2017     No Known Allergies      Immunizations Administered on Date of Encounter - 3/30/2017     None      Orders Placed During Today's Visit      Normal Orders This Visit    Ambulatory consult to Orthopedics       Instructions    Wean Propranolol by taking 1 at night x 1 week, then stop.   Gabapentin was prescribed to prevent headaches.   Stop Compazine, as it is ineffective, and use Diclofenac to abort headaches; take only as needed.   Please call or send message on patient portal in two weeks to update on effects of Gabapentin.        Smoking Cessation     If you would like to quit smoking:   You may be eligible for free services if you are a [place] resident and started smoking cigarettes before September 1, 1988.  Call the Smoking Cessation Trust ([facility]) toll free at (589) 554-3420 or (412) 488-1384.   Call 1-800-QUIT-NOW if you do not meet the above criteria.   Contact us via email: [email or web address]   View our website for more information: [email or web address]        Language Assistance Services     ATTENTION: Language assistance services are available, free of charge. Please call [phone].      ATENCIÓN: Si habla español, tiene a weaver disposición servicios gratuitos de asistencia lingüística. Llame al [phone].     CHÚ Ý: N?u b?n nói Ti?ng Vi?t, có các d?ch v? h? tr? ngôn ng? mi?n phí dành cho b?n. G?i s? [phone].         [facility] complies with applicable Federal civil rights laws and does not discriminate on the basis of race, color, national origin, age, disability, or sex.        "

## 2017-04-12 ENCOUNTER — HOSPITAL ENCOUNTER (OUTPATIENT)
Dept: RADIOLOGY | Facility: HOSPITAL | Age: 37
Discharge: HOME | End: 2017-04-12
Attending: ORTHOPAEDIC SURGERY
Payer: COMMERCIAL

## 2017-04-12 DIAGNOSIS — M25.541 PAIN IN JOINT OF RIGHT HAND: ICD-10-CM

## 2017-04-12 PROCEDURE — 73130 X-RAY EXAM OF HAND: CPT | Mod: 26,RT,, | Performed by: RADIOLOGY

## 2017-04-12 PROCEDURE — 73130 X-RAY EXAM OF HAND: CPT | Mod: TC,RT

## 2017-04-13 ENCOUNTER — ANESTHESIA EVENT (OUTPATIENT)
Dept: SURGERY | Facility: HOSPITAL | Age: 37
End: 2017-04-13
Payer: COMMERCIAL

## 2017-04-17 ENCOUNTER — HOSPITAL ENCOUNTER (OUTPATIENT)
Dept: PULMONOLOGY | Facility: HOSPITAL | Age: 37
Discharge: HOME | End: 2017-04-17
Attending: ORTHOPAEDIC SURGERY
Payer: COMMERCIAL

## 2017-04-17 ENCOUNTER — HOSPITAL ENCOUNTER (OUTPATIENT)
Dept: PREADMISSION TESTING | Facility: HOSPITAL | Age: 37
Discharge: HOME | End: 2017-04-17
Attending: ORTHOPAEDIC SURGERY
Payer: COMMERCIAL

## 2017-04-17 ENCOUNTER — HOSPITAL ENCOUNTER (OUTPATIENT)
Dept: RADIOLOGY | Facility: HOSPITAL | Age: 37
Discharge: HOME | End: 2017-04-17
Attending: ORTHOPAEDIC SURGERY
Payer: COMMERCIAL

## 2017-04-17 VITALS — HEIGHT: 62 IN | WEIGHT: 100 LBS | BODY MASS INDEX: 18.4 KG/M2

## 2017-04-17 DIAGNOSIS — Z01.811 PRE-OP CHEST EXAM: ICD-10-CM

## 2017-04-17 DIAGNOSIS — Z01.818 PRE-OP TESTING: ICD-10-CM

## 2017-04-17 DIAGNOSIS — G56.01 RIGHT CARPAL TUNNEL SYNDROME: Primary | ICD-10-CM

## 2017-04-17 PROCEDURE — 93010 ELECTROCARDIOGRAM REPORT: CPT | Mod: ,,, | Performed by: INTERNAL MEDICINE

## 2017-04-17 PROCEDURE — 93005 ELECTROCARDIOGRAM TRACING: CPT

## 2017-04-17 PROCEDURE — 71010 XR CHEST 1 VIEW PRE-OP: CPT | Mod: TC

## 2017-04-17 PROCEDURE — 71010 XR CHEST 1 VIEW PRE-OP: CPT | Mod: 26,,, | Performed by: RADIOLOGY

## 2017-04-17 RX ORDER — SODIUM CHLORIDE, SODIUM LACTATE, POTASSIUM CHLORIDE, CALCIUM CHLORIDE 600; 310; 30; 20 MG/100ML; MG/100ML; MG/100ML; MG/100ML
INJECTION, SOLUTION INTRAVENOUS CONTINUOUS
Status: CANCELLED | OUTPATIENT
Start: 2017-04-17

## 2017-04-17 NOTE — DISCHARGE INSTRUCTIONS
[facility]  Pre Admit Instructions    Day and Date of Procedure: Wednesday 4-      · Call your doctor if you become ill before your surgery  · Someone will call you between 1 p.m. And 5 p.m.the workday before the procedure to give you an arrival time       - Before 7 a.m. Enter through Emergency Room       - 7 a.m. To 5 p.m. Enter through Patient Registration Main Lobby  · You must have a responsible  to bring you home    Do NOT eat or drink anything   past midnight before your procedure day    Please    · Do not wear makeup, jewelry, nail polish or body piercings  · Bring containers/solution for contacts, dentures, bridges - these and hearing aids will be removed before your procedure  · Do not bring cash, jewelry or valuables the day of your procedure   · No smoking at least 24 hours before your procedure  · Wear clothing that is comfortable and easy to take off and put on  · Do NOT shave for at least 5 days before your surgery    Review skin preparation handout before using.                 Information about your stay (Please Review)    Before Surgery  1. Cafeteria Meals: 7am to 10am; 11am to 1:30 pm; Dinner/Supper must may be ordered between 11:00 am and 4 pm from the [facility] Cafe After [employer] Menu. Food will be available to  between 5 pm and 6 pm. The kitchen phone extension is [phone].  2. Your doctor may order and review labs, x-rays, ECG or other tests as a pre-surgery workup and will call you if there is need for follow up.  3. No smoking inside or outside the hospital on hospital grounds.  4. Wear clothing that is easy to take off and put on.  The hospital will provide you with a gown.  5. You may bring robe, slippers, nightwear, and toiletries (toothbrush, toothpaste, makeup).  6. If your doctor orders a Fleets Enema or other prep, follow package and/or doctors orders.  7. Brush your teeth and rinse your mouth the morning of surgery, but dont swallow the water.  8. The nurse  will ask questions and check your condition.  The doctor may cleo your surgical site.  9. Compression boots may be put on your calves to reduce the risk of blood clots.  10. The doctor may order medicine to help you relax before surgery.  After Surgery  1. The nurse will check your temperature, breathing, blood pressure, heart rate, IV site, and surgery site.  2. A diet will be ordered-most start with ice chips and then advance slowly to other foods.  3. If you have IV fluids the IV pump will beep to let the nurse know that she needs to check it.  4. You may have a urinary catheter and staff may measure your oral intake and urine output.  5. Pain medication may be ordered by the doctor after surgery.  If you have a pain management device tell your caretakers not to press the button because of OVERDOSE RISK.  6. When the nurse or doctor tells you it is okay to get out of bed, ask for help until you are stable.  7. The nurse may ask you to turn, cough, and deep breathe to prevent lung problems.  You can use a pillow to hold your incision when you deep breathe or cough to reduce pain.  8. The nurse will give you discharge instructions--incision care, symptoms to report to your doctor, and your follow-up appointment when you are discharged.  You cannot drive yourself home.  Goal for Discharge from One Day Surgery  · Control pain with an oral medication  · Walk without feeling dizzy or weak  · Tolerate liquids well  · Urinate without difficulty    Things you can do to  Reduce the Risk of Infections or Complications  Wash Hands and use Waterless Hand Sanitizers  · Wash hands frequently with soap and warm water for at least 15 seconds.   · Use hand sanitizers (alcohol based) often at home and in public if hands are not visibly soiled  Take Antibiotic Exactly as Prescribed  · Do not stop antibiotics too soon; you risk developing infection resistant to antibiotics  · Take your antibiotic even if you are feeling better and  even if they upset your stomach  · Call the doctor if you cant tolerate the antibiotic or you have an allergic reaction  Stay Healthy  · Take medicines as prescribed by your doctor  · Keep your diabetes under control - diet and medication  · Get enough rest, exercise and eat a healthy diet  Keep the Wound Clean and Dry  · Wash hands before and after taking care of the incision (cut)  · Wash hands when you remove a dressing, before you touch/apply a new dressing  · Shower and clean incision with antibacterial soap and rinse well if the doctor approves  · Allow the cut to dry completely before putting on a clean dressing  · Do not touch the part of the bandage that will cover the incision  · Do not use ointments unless your doctor tells you to-can promote bacterial growth  · If ordered, put ointment directly on the dressing-do not touch the end of the tube  · Do not scrub, remove scabs, or leave a damp dressing on the incision  · Do not use peroxide or alcohol to clean the incision unless the doctor tells you to   · Do not let children, pets or anyone else contaminate the incision  Stop Smoking To Prevent Infection  · Stop smoking-Centers for Disease Control recommends 30 days before surgery  · Smokers get more infections after surgery-studies have shown 6 times the risk  · Smokers have more scarring and heal slower-open wounds get infected easier  Prevent Respiratory complications  · Stop smoking  · Turn, cough, and deep breathe even if you have some pain when you do so.  · Splint your incision with a pillow when you cough/deep breath, to help control pain.  · Do not lie in one position for long periods of time.   Prevent Blood Clots  · When you wake move your legs, flex your feet, rotate your ankles, wiggle your toes  · Get up when the doctor says its ok.  Dangle your feet from the side of the bed  · Report symptoms-leg pain, redness/swelling, warm to touch; fever; shortness of breath, chest pain, severe upper  back pain.

## 2017-04-17 NOTE — IP AVS SNAPSHOT
[address] [address]  Phone: [phone]           Patient Discharge Instructions   Our goal is to set you up for success. This packet includes information on your condition, medications, and your home care.  It will help you care for yourself to prevent having to return to the hospital.     Please ask your nurse if you have any questions.      There are many details to remember when preparing to leave the hospital. Here is what you will need to do:    1. Take your medicine. If you are prescribed medications, review your Medication List on the following pages. You may have new medications to  at the pharmacy and others that you'll need to stop taking. Review the instructions for how and when to take your medications. Talk with your doctor or nurses if you are unsure of what to do.     2. Go to your follow-up appointments. Specific follow-up information is listed in the following pages. Your may be contacted by a nurse or clinical provider about future appointments. Be sure we have all of the phone numbers to reach you. Please contact your provider's office if you are unable to make an appointment.     3. Watch for warning signs. Your doctor or nurse will give you detailed warning signs to watch for and when to call for assistance. These instructions may also include educational information about your condition. If you experience any of warning signs to your health, call your doctor.               ** Verify the list of medication(s) below is accurate and up to date. Carry this with you in case of emergency. If your medications have changed, please notify your healthcare provider.             Medication List      TAKE these medications        Additional Info                      clonazePAM 1 MG tablet   Commonly known as:  KLONOPIN   Quantity:  60 tablet   Refills:  5   Dose:  1 mg    Instructions:  Take 1 tablet (1 mg total) by mouth 2 (two) times daily as  needed for Anxiety.     Begin Date    AM    Noon    PM    Bedtime       CYCLAFEM 1/35 (28) 1-35 mg-mcg per tablet   Refills:  7   Dose:  1 tablet   Generic drug:  norethindrone-ethinyl estradiol    Instructions:  Take 1 tablet by mouth once daily.     Begin Date    AM    Noon    PM    Bedtime       diclofenac 50 MG EC tablet   Commonly known as:  VOLTAREN   Quantity:  20 tablet   Refills:  0   Dose:  50 mg    Instructions:  Take 1 tablet (50 mg total) by mouth 2 (two) times daily as needed (take as needed for headache).     Begin Date    AM    Noon    PM    Bedtime       ergocalciferol 50,000 unit Cap   Commonly known as:  ERGOCALCIFEROL   Quantity:  12 capsule   Refills:  1   Dose:  23054 Units    Instructions:  Take 1 capsule (50,000 Units total) by mouth every 7 days.     Begin Date    AM    Noon    PM    Bedtime       gabapentin 100 MG capsule   Commonly known as:  NEURONTIN   Quantity:  90 capsule   Refills:  5   Dose:  100 mg    Instructions:  Take 1 capsule (100 mg total) by mouth 3 (three) times daily.     Begin Date    AM    Noon    PM    Bedtime       trazodone 100 MG tablet   Commonly known as:  DESYREL   Quantity:  90 tablet   Refills:  1   Dose:  100 mg    Instructions:  Take 1 tablet (100 mg total) by mouth every evening.     Begin Date    AM    Noon    PM    Bedtime       venlafaxine 75 MG 24 hr capsule   Commonly known as:  EFFEXOR-XR   Quantity:  90 capsule   Refills:  1   Dose:  75 mg    Instructions:  Take 1 capsule (75 mg total) by mouth once daily.     Begin Date    AM    Noon    PM    Bedtime                  Please bring to all follow up appointments:    1. A copy of your discharge instructions.  2. All medicines you are currently taking in their original bottles.  3. Identification and insurance card.    Please arrive 15 minutes ahead of scheduled appointment time.    Please call 24 hours in advance if you must reschedule your appointment and/or time.        Your Scheduled Appointments      Apr 18, 2017  9:15 AM CDT   Established Patient Visit with Simeon Peterson MD   [place] [facility] ([facility])    [address] [facility] [address]   [phone]            Jul 31, 2017  8:15 AM CDT   Established Patient Visit with Shilpi Clayton NP   [place] - [facility] Medicine ([facility])    [address]  [facility] [address]   [phone]            Aug 01, 2017  3:20 PM CDT   Established Patient Visit with Camille Emerson NP   [place] [facility] ([facility])    [address] [facility] [address]   [phone]              Your Future Surgeries/Procedures     Apr 19, 2017   Surgery with Larry Patel MD   [facility] ([facility])    [address] [place] [address]   [phone]                  Discharge Instructions       [facility]  Pre Admit Instructions    Day and Date of Procedure: Wednesday 4-      · Call your doctor if you become ill before your surgery  · Someone will call you between 1 p.m. And 5 p.m.the workday before the procedure to give you an arrival time       - Before 7 a.m. Enter through Emergency Room       - 7 a.m. To 5 p.m. Enter through Patient Registration Main Lobby  · You must have a responsible  to bring you home    Do NOT eat or drink anything   past midnight before your procedure day    Please    · Do not wear makeup, jewelry, nail polish or body piercings  · Bring containers/solution for contacts, dentures, bridges - these and hearing aids will be removed before your procedure  · Do not bring cash, jewelry or valuables the day of your procedure   · No smoking at least 24 hours before your procedure  · Wear clothing that is comfortable and easy to take off and put on  · Do NOT shave for at least 5 days before your surgery    Review skin preparation handout before using.                 Information about your stay (Please  Review)    Before Surgery  1. Cafeteria Meals: 7am to 10am; 11am to 1:30 pm; Dinner/Supper must may be ordered between 11:00 am and 4 pm from the [facility] After [facility] Menu. Food will be available to  between 5 pm and 6 pm. The kitchen phone extension is [phone].  2. Your doctor may order and review labs, x-rays, ECG or other tests as a pre-surgery workup and will call you if there is need for follow up.  3. No smoking inside or outside the hospital on hospital grounds.  4. Wear clothing that is easy to take off and put on.  The hospital will provide you with a gown.  5. You may bring robe, slippers, nightwear, and toiletries (toothbrush, toothpaste, makeup).  6. If your doctor orders a Fleets Enema or other prep, follow package and/or doctors orders.  7. Brush your teeth and rinse your mouth the morning of surgery, but dont swallow the water.  8. The nurse will ask questions and check your condition.  The doctor may cleo your surgical site.  9. Compression boots may be put on your calves to reduce the risk of blood clots.  10. The doctor may order medicine to help you relax before surgery.  After Surgery  1. The nurse will check your temperature, breathing, blood pressure, heart rate, IV site, and surgery site.  2. A diet will be ordered-most start with ice chips and then advance slowly to other foods.  3. If you have IV fluids the IV pump will beep to let the nurse know that she needs to check it.  4. You may have a urinary catheter and staff may measure your oral intake and urine output.  5. Pain medication may be ordered by the doctor after surgery.  If you have a pain management device tell your caretakers not to press the button because of OVERDOSE RISK.  6. When the nurse or doctor tells you it is okay to get out of bed, ask for help until you are stable.  7. The nurse may ask you to turn, cough, and deep breathe to prevent lung problems.  You can use a pillow to hold your incision when you deep breathe  or cough to reduce pain.  8. The nurse will give you discharge instructions--incision care, symptoms to report to your doctor, and your follow-up appointment when you are discharged.  You cannot drive yourself home.  Goal for Discharge from One Day Surgery  · Control pain with an oral medication  · Walk without feeling dizzy or weak  · Tolerate liquids well  · Urinate without difficulty    Things you can do to  Reduce the Risk of Infections or Complications  Wash Hands and use Waterless Hand Sanitizers  · Wash hands frequently with soap and warm water for at least 15 seconds.   · Use hand sanitizers (alcohol based) often at home and in public if hands are not visibly soiled  Take Antibiotic Exactly as Prescribed  · Do not stop antibiotics too soon; you risk developing infection resistant to antibiotics  · Take your antibiotic even if you are feeling better and even if they upset your stomach  · Call the doctor if you cant tolerate the antibiotic or you have an allergic reaction  Stay Healthy  · Take medicines as prescribed by your doctor  · Keep your diabetes under control - diet and medication  · Get enough rest, exercise and eat a healthy diet  Keep the Wound Clean and Dry  · Wash hands before and after taking care of the incision (cut)  · Wash hands when you remove a dressing, before you touch/apply a new dressing  · Shower and clean incision with antibacterial soap and rinse well if the doctor approves  · Allow the cut to dry completely before putting on a clean dressing  · Do not touch the part of the bandage that will cover the incision  · Do not use ointments unless your doctor tells you to-can promote bacterial growth  · If ordered, put ointment directly on the dressing-do not touch the end of the tube  · Do not scrub, remove scabs, or leave a damp dressing on the incision  · Do not use peroxide or alcohol to clean the incision unless the doctor tells you to   · Do not let children, pets or anyone else  contaminate the incision  Stop Smoking To Prevent Infection  · Stop smoking-Centers for Disease Control recommends 30 days before surgery  · Smokers get more infections after surgery-studies have shown 6 times the risk  · Smokers have more scarring and heal slower-open wounds get infected easier  Prevent Respiratory complications  · Stop smoking  · Turn, cough, and deep breathe even if you have some pain when you do so.  · Splint your incision with a pillow when you cough/deep breath, to help control pain.  · Do not lie in one position for long periods of time.   Prevent Blood Clots  · When you wake move your legs, flex your feet, rotate your ankles, wiggle your toes  · Get up when the doctor says its ok.  Dangle your feet from the side of the bed  · Report symptoms-leg pain, redness/swelling, warm to touch; fever; shortness of breath, chest pain, severe upper back pain.        Admission Information     Date & Time Provider Department CSN    4/17/2017  1:00 PM Larry Patel MD [facility] [id number]      Care Providers     Provider Role Specialty Primary office phone    Larry Patel MD Attending Provider Orthopedic Surgery [phone]      Your Vitals Were     Last Period                   04/03/2017 (Exact Date)           Recent Lab Values     No lab values to display.      Allergies as of 4/17/2017     No Known Allergies      [facility]     [facility] Nurse Care Line - 24/7 Assistance  Unless otherwise directed by your provider, please contact [facility], our nurse care line that is available for 24/7 assistance.     Registered nurses in the [facility] provide clinical advisement, health education, appointment booking, and other advisory services.  Call for this free service at [phone].        Advance Directives     An advance directive is a document which, in the event you are no longer able to make decisions for yourself, tells your healthcare team  what kind of treatment you do or do not want to receive, or who you would like to make those decisions for you.  If you do not currently have an advance directive, [facility] encourages you to create one.  For more information call:  [phone] ([phone]),  or log on to [email or web address].        Smoking Cessation     If you would like to quit smoking:   You may be eligible for free services if you are a [place] resident and started smoking cigarettes before September 1, 1988.  Call the Smoking Cessation Trust (SCT) toll free at (344) 085-1214 or (407) 319-1695.   Call 4-808-QUIT-NOW if you do not meet the above criteria.   Contact us via email: [email or web address]   View our website for more information: [email or web address]        Language Assistance Services     ATTENTION: Language assistance services are available, free of charge. Please call [phone].      ATENCIÓN: Si habla español, tiene a weaver disposición servicios gratuitos de asistencia lingüística. Llame al [phone].     CHÚ Ý: N?u b?n nói Ti?ng Vi?t, có các d?ch v? h? tr? ngôn ng? mi?n phí dành cho b?n. G?i s? [phone].         [facility] complies with applicable Federal civil rights laws and does not discriminate on the basis of race, color, national origin, age, disability, or sex.

## 2017-04-18 ENCOUNTER — OFFICE VISIT (OUTPATIENT)
Dept: OBSTETRICS AND GYNECOLOGY | Facility: CLINIC | Age: 37
End: 2017-04-18
Payer: COMMERCIAL

## 2017-04-18 VITALS
BODY MASS INDEX: 18.03 KG/M2 | SYSTOLIC BLOOD PRESSURE: 100 MMHG | DIASTOLIC BLOOD PRESSURE: 70 MMHG | HEART RATE: 90 BPM | WEIGHT: 98 LBS | RESPIRATION RATE: 17 BRPM | HEIGHT: 62 IN

## 2017-04-18 DIAGNOSIS — Z01.411 ENCOUNTER FOR GYNECOLOGICAL EXAMINATION WITH ABNORMAL FINDING: Primary | ICD-10-CM

## 2017-04-18 DIAGNOSIS — N92.1 BREAKTHROUGH BLEEDING: ICD-10-CM

## 2017-04-18 PROCEDURE — 99395 PREV VISIT EST AGE 18-39: CPT | Mod: S$GLB,,, | Performed by: OBSTETRICS & GYNECOLOGY

## 2017-04-18 PROCEDURE — 99999 PR PBB SHADOW E&M-EST. PATIENT-LVL III: CPT | Mod: PBBFAC,,, | Performed by: OBSTETRICS & GYNECOLOGY

## 2017-04-18 NOTE — PROGRESS NOTES
Subjective:       Patient ID: Staci Hodge is a 36 y.o. female.    Chief Complaint:  Well Woman      History of Present Illness  Patient presents for annual exam.  She is also complaining of breakthrough bleeding on her current birth control pills.  Patient states that for the last 6 months she will sometimes have a menstrual cycle but last 2 weeks.  Patient states she takes her OCPs as directed.  Patient was counseled on treatment options and she is considering.    Menstrual History:  OB History      Para Term  AB TAB SAB Ectopic Multiple Living    1    1  1            Menarche age:   Patient's last menstrual period was 2017 (exact date).         Review of Systems  Review of Systems   Constitutional: Negative for activity change, appetite change, chills, diaphoresis, fatigue, fever and unexpected weight change.   HENT: Negative for congestion, dental problem, drooling, ear discharge, ear pain, facial swelling, hearing loss, mouth sores, nosebleeds, postnasal drip, rhinorrhea, sinus pressure, sneezing, sore throat, tinnitus, trouble swallowing and voice change.    Eyes: Negative for photophobia, pain, discharge, redness, itching and visual disturbance.   Respiratory: Negative for apnea, cough, choking, chest tightness, shortness of breath, wheezing and stridor.    Cardiovascular: Negative for chest pain, palpitations and leg swelling.   Gastrointestinal: Negative for abdominal distention, abdominal pain, anal bleeding, blood in stool, constipation, diarrhea, nausea, rectal pain and vomiting.   Endocrine: Negative for cold intolerance, heat intolerance, polydipsia, polyphagia and polyuria.   Genitourinary: Negative for decreased urine volume, difficulty urinating, dyspareunia, dysuria, enuresis, flank pain, frequency, genital sores, hematuria, menstrual problem, pelvic pain, urgency, vaginal bleeding, vaginal discharge and vaginal pain.   Musculoskeletal: Negative for arthralgias, back  pain, gait problem, joint swelling, myalgias, neck pain and neck stiffness.   Skin: Negative for color change, pallor, rash and wound.   Allergic/Immunologic: Negative for environmental allergies, food allergies and immunocompromised state.   Neurological: Positive for dizziness and headaches. Negative for tremors, seizures, syncope, facial asymmetry, speech difficulty, weakness, light-headedness and numbness.   Hematological: Negative for adenopathy. Does not bruise/bleed easily.   Psychiatric/Behavioral: Positive for sleep disturbance. Negative for agitation, behavioral problems, confusion, decreased concentration, dysphoric mood, hallucinations, self-injury and suicidal ideas. The patient is nervous/anxious. The patient is not hyperactive.            Objective:    Physical Exam   Constitutional: She is oriented to person, place, and time. She appears well-developed and well-nourished.   Neck: No thyromegaly present.   Cardiovascular: Normal rate and regular rhythm.    Pulmonary/Chest: Effort normal and breath sounds normal. Right breast exhibits no inverted nipple, no mass, no nipple discharge, no skin change and no tenderness. Left breast exhibits no inverted nipple, no mass, no nipple discharge, no skin change and no tenderness. Breasts are symmetrical.   Abdominal: Soft. Bowel sounds are normal. She exhibits no mass. There is no tenderness. Hernia confirmed negative in the right inguinal area and confirmed negative in the left inguinal area.   Genitourinary: Vagina normal and uterus normal. Rectal exam shows no external hemorrhoid. No breast tenderness or discharge. Uterus is not enlarged and not tender. Cervix exhibits no motion tenderness, no discharge and no friability. Right adnexum displays no mass, no tenderness and no fullness. Left adnexum displays no mass, no tenderness and no fullness. No tenderness in the vagina. No foreign body in the vagina. No vaginal discharge found.   Musculoskeletal: Normal  range of motion.   Lymphadenopathy:        Right: No inguinal adenopathy present.        Left: No inguinal adenopathy present.   Neurological: She is alert and oriented to person, place, and time. She has normal reflexes.   Skin: Skin is dry.   Psychiatric: She has a normal mood and affect. Her behavior is normal. Judgment and thought content normal.   Nursing note and vitals reviewed.        Assessment:      No diagnosis found.           Plan:        There are no diagnoses linked to this encounter.

## 2017-04-18 NOTE — MR AVS SNAPSHOT
[place] - [facility]/ [facility]  [address] [facility] [address]  Phone: [phone]                  Staci Hodge   2017 9:45 AM   Office Visit    Description:  Female : 1980   Provider:  Simeon Peterson MD   Department:  [place] [facility]           Reason for Visit     Well Woman           Diagnoses this Visit        Comments    Encounter for gynecological examination with abnormal finding    -  Primary     Breakthrough bleeding                To Do List           Future Appointments        Provider Department Dept Phone    2017 8:15 AM Shilpi Clayton NP [place] - Internal Medicine [phone]    2017 3:20 PM Camille Emerson NP [place] [facility] - Neurology [phone]      Your Future Surgeries/Procedures     2017   Surgery with Larry Patel MD   [facility] ([facility])    [address] [address] [phone]              Goals (5 Years of Data)     None      Follow-Up and Disposition     Return in about 1 year (around 2018).       These Medications        Disp Refills Start End    norethindrone-mestranol (NECON 1/50, 28,) 1-50 mg-mcg Tab 28 each 12 2017     Take 1 tablet by mouth once daily. - Oral    Pharmacy: [facility]/pharmacy #[id number] - [place], [place] - [address] [facility] Ph #: [phone]         [facility]     [facility] Nurse Care Line -  Assistance  Unless otherwise directed by your provider, please contact [facility], our nurse care line that is available for  assistance.     Registered nurses in the [facility] provide: appointment scheduling, clinical advisement, health education, and other advisory services.  Call: [phone] (toll free)               Medications           Message regarding Medications     Verify the changes and/or additions to your medication regime listed below are the same as discussed with your clinician today.  If any of  "these changes or additions are incorrect, please notify your healthcare provider.        START taking these NEW medications        Refills    norethindrone-mestranol (NECON 1/50, 28,) 1-50 mg-mcg Tab 12    Sig: Take 1 tablet by mouth once daily.    Class: Normal    Route: Oral      STOP taking these medications     CYCLAFEM 1/35, 28, 1-35 mg-mcg per tablet Take 1 tablet by mouth once daily.           Verify that the below list of medications is an accurate representation of the medications you are currently taking.  If none reported, the list may be blank. If incorrect, please contact your healthcare provider. Carry this list with you in case of emergency.           Current Medications     clonazePAM (KLONOPIN) 1 MG tablet Take 1 tablet (1 mg total) by mouth 2 (two) times daily as needed for Anxiety.    diclofenac (VOLTAREN) 50 MG EC tablet Take 1 tablet (50 mg total) by mouth 2 (two) times daily as needed (take as needed for headache).    ergocalciferol (ERGOCALCIFEROL) 50,000 unit Cap Take 1 capsule (50,000 Units total) by mouth every 7 days.    gabapentin (NEURONTIN) 100 MG capsule Take 1 capsule (100 mg total) by mouth 3 (three) times daily.    norethindrone-mestranol (NECON 1/50, 28,) 1-50 mg-mcg Tab Take 1 tablet by mouth once daily.    trazodone (DESYREL) 100 MG tablet Take 1 tablet (100 mg total) by mouth every evening.    venlafaxine (EFFEXOR-XR) 75 MG 24 hr capsule Take 1 capsule (75 mg total) by mouth once daily.           Clinical Reference Information           Your Vitals Were     BP Pulse Resp Height Weight Last Period    100/70 90 17 5' 2" (1.575 m) 44.5 kg (98 lb) 04/03/2017 (Exact Date)    BMI                17.92 kg/m2          Blood Pressure          Most Recent Value    BP  100/70      Allergies as of 4/18/2017     No Known Allergies      Immunizations Administered on Date of Encounter - 4/18/2017     None      Smoking Cessation     If you would like to quit smoking:   You may be eligible for " free services if you are a [place] resident and started smoking cigarettes before September 1, 1988.  Call the Smoking Cessation Trust ([facility]) toll free at (855) 184-4843 or (849) 327-3543.   Call 1-800-QUIT-NOW if you do not meet the above criteria.   Contact us via email: [email or web address]   View our website for more information: [email or web address]        Language Assistance Services     ATTENTION: Language assistance services are available, free of charge. Please call [phone].      ATENCIÓN: Si habla español, tiene a weaver disposición servicios gratuitos de asistencia lingüística. Llame al [phone].     CHÚ Ý: N?u b?n nói Ti?ng Vi?t, có các d?ch v? h? tr? ngôn ng? mi?n phí dành cho b?n. G?i s? [phone].         [place] [facility] complies with applicable Federal civil rights laws and does not discriminate on the basis of race, color, national origin, age, disability, or sex.

## 2017-04-19 ENCOUNTER — HOSPITAL ENCOUNTER (OUTPATIENT)
Facility: HOSPITAL | Age: 37
Discharge: HOME | End: 2017-04-19
Attending: ORTHOPAEDIC SURGERY | Admitting: ORTHOPAEDIC SURGERY
Payer: COMMERCIAL

## 2017-04-19 ENCOUNTER — ANESTHESIA (OUTPATIENT)
Dept: SURGERY | Facility: HOSPITAL | Age: 37
End: 2017-04-19
Payer: COMMERCIAL

## 2017-04-19 DIAGNOSIS — G56.01 RIGHT CARPAL TUNNEL SYNDROME: ICD-10-CM

## 2017-04-19 LAB — B-HCG UR QL: NEGATIVE

## 2017-04-19 PROCEDURE — 27200120 HC KIT IV START (RUSH ONLY): Performed by: NURSE ANESTHETIST, CERTIFIED REGISTERED

## 2017-04-19 PROCEDURE — 25000003 PHARM REV CODE 250

## 2017-04-19 PROCEDURE — 37000008 HC ANESTHESIA 1ST 15 MINUTES: Performed by: ORTHOPAEDIC SURGERY

## 2017-04-19 PROCEDURE — 36000706: Performed by: ORTHOPAEDIC SURGERY

## 2017-04-19 PROCEDURE — 63600175 PHARM REV CODE 636 W HCPCS: Performed by: NURSE ANESTHETIST, CERTIFIED REGISTERED

## 2017-04-19 PROCEDURE — 71000033 HC RECOVERY, INTIAL HOUR: Performed by: ORTHOPAEDIC SURGERY

## 2017-04-19 PROCEDURE — 01710 ANES PX NRV MUSC UPR A&E NOS: CPT | Mod: P2,,QZ | Performed by: NURSE ANESTHETIST, CERTIFIED REGISTERED

## 2017-04-19 PROCEDURE — 37000009 HC ANESTHESIA EA ADD 15 MINS: Performed by: ORTHOPAEDIC SURGERY

## 2017-04-19 PROCEDURE — 25000003 PHARM REV CODE 250: Performed by: NURSE ANESTHETIST, CERTIFIED REGISTERED

## 2017-04-19 PROCEDURE — 36000707: Performed by: ORTHOPAEDIC SURGERY

## 2017-04-19 PROCEDURE — 25000003 PHARM REV CODE 250: Performed by: ORTHOPAEDIC SURGERY

## 2017-04-19 PROCEDURE — 81025 URINE PREGNANCY TEST: CPT

## 2017-04-19 PROCEDURE — 27000494 *HC OXYGEN SET UP (STAH ONLY): Performed by: NURSE ANESTHETIST, CERTIFIED REGISTERED

## 2017-04-19 PROCEDURE — 27000495 *HC ANESTHESIA START UP SUPPLY KIT (STAH ONLY): Performed by: NURSE ANESTHETIST, CERTIFIED REGISTERED

## 2017-04-19 RX ORDER — ONDANSETRON 8 MG/1
8 TABLET, ORALLY DISINTEGRATING ORAL EVERY 8 HOURS PRN
Status: DISCONTINUED | OUTPATIENT
Start: 2017-04-19 | End: 2017-04-19 | Stop reason: HOSPADM

## 2017-04-19 RX ORDER — PHENYLEPHRINE HYDROCHLORIDE 10 MG/ML
INJECTION INTRAVENOUS
Status: DISCONTINUED | OUTPATIENT
Start: 2017-04-19 | End: 2017-04-19

## 2017-04-19 RX ORDER — SODIUM CHLORIDE, SODIUM LACTATE, POTASSIUM CHLORIDE, CALCIUM CHLORIDE 600; 310; 30; 20 MG/100ML; MG/100ML; MG/100ML; MG/100ML
INJECTION, SOLUTION INTRAVENOUS CONTINUOUS
Status: DISCONTINUED | OUTPATIENT
Start: 2017-04-19 | End: 2017-04-19 | Stop reason: HOSPADM

## 2017-04-19 RX ORDER — HYDROCODONE BITARTRATE AND ACETAMINOPHEN 5; 325 MG/1; MG/1
1 TABLET ORAL EVERY 4 HOURS PRN
Status: DISCONTINUED | OUTPATIENT
Start: 2017-04-19 | End: 2017-04-19 | Stop reason: HOSPADM

## 2017-04-19 RX ORDER — FENTANYL CITRATE 50 UG/ML
INJECTION, SOLUTION INTRAMUSCULAR; INTRAVENOUS
Status: DISCONTINUED | OUTPATIENT
Start: 2017-04-19 | End: 2017-04-19

## 2017-04-19 RX ORDER — MIDAZOLAM HYDROCHLORIDE 1 MG/ML
INJECTION INTRAMUSCULAR; INTRAVENOUS
Status: DISCONTINUED | OUTPATIENT
Start: 2017-04-19 | End: 2017-04-19

## 2017-04-19 RX ORDER — CLINDAMYCIN PHOSPHATE 900 MG/50ML
900 INJECTION, SOLUTION INTRAVENOUS ONCE
Status: DISCONTINUED | OUTPATIENT
Start: 2017-04-19 | End: 2017-04-19 | Stop reason: HOSPADM

## 2017-04-19 RX ORDER — LIDOCAINE HYDROCHLORIDE 20 MG/ML
INJECTION, SOLUTION EPIDURAL; INFILTRATION; INTRACAUDAL; PERINEURAL
Status: DISCONTINUED | OUTPATIENT
Start: 2017-04-19 | End: 2017-04-19

## 2017-04-19 RX ORDER — PROPOFOL 10 MG/ML
VIAL (ML) INTRAVENOUS
Status: DISCONTINUED | OUTPATIENT
Start: 2017-04-19 | End: 2017-04-19

## 2017-04-19 RX ORDER — ONDANSETRON HYDROCHLORIDE 2 MG/ML
INJECTION, SOLUTION INTRAMUSCULAR; INTRAVENOUS
Status: DISCONTINUED | OUTPATIENT
Start: 2017-04-19 | End: 2017-04-19

## 2017-04-19 RX ADMIN — HYDROCODONE BITARTRATE AND ACETAMINOPHEN 1 TABLET: 5; 325 TABLET ORAL at 04:04

## 2017-04-19 RX ADMIN — LIDOCAINE HYDROCHLORIDE 60 MG: 20 INJECTION, SOLUTION EPIDURAL; INFILTRATION; INTRACAUDAL; PERINEURAL at 01:04

## 2017-04-19 RX ADMIN — FENTANYL CITRATE 50 MCG: 50 INJECTION, SOLUTION INTRAMUSCULAR; INTRAVENOUS at 02:04

## 2017-04-19 RX ADMIN — FENTANYL CITRATE 100 MCG: 50 INJECTION, SOLUTION INTRAMUSCULAR; INTRAVENOUS at 01:04

## 2017-04-19 RX ADMIN — ONDANSETRON 8 MG: 8 TABLET, ORALLY DISINTEGRATING ORAL at 05:04

## 2017-04-19 RX ADMIN — ONDANSETRON 4 MG: 2 INJECTION, SOLUTION INTRAMUSCULAR; INTRAVENOUS at 01:04

## 2017-04-19 RX ADMIN — SODIUM CHLORIDE, SODIUM LACTATE, POTASSIUM CHLORIDE, AND CALCIUM CHLORIDE: .6; .31; .03; .02 INJECTION, SOLUTION INTRAVENOUS at 01:04

## 2017-04-19 RX ADMIN — PHENYLEPHRINE HYDROCHLORIDE 100 MCG: 10 INJECTION INTRAVENOUS at 01:04

## 2017-04-19 RX ADMIN — PROPOFOL 120 MG: 10 INJECTION, EMULSION INTRAVENOUS at 01:04

## 2017-04-19 RX ADMIN — PHENYLEPHRINE HYDROCHLORIDE 100 MCG: 10 INJECTION INTRAVENOUS at 02:04

## 2017-04-19 RX ADMIN — MIDAZOLAM HYDROCHLORIDE 2 MG: 1 INJECTION, SOLUTION INTRAMUSCULAR; INTRAVENOUS at 01:04

## 2017-04-19 NOTE — TRANSFER OF CARE
"Anesthesia Transfer of Care Note    Patient: Staci Hodge    Procedure(s) Performed: Procedure(s) (LRB):  RELEASE-CARPAL TUNNEL (Right)  RELEASE-NERVE-ULNAR (Right)    Patient location: PACU    Anesthesia Type: general    Transport from OR: Transported from OR on room air with adequate spontaneous ventilation    Post pain: adequate analgesia    Post assessment: no apparent anesthetic complications and tolerated procedure well    Post vital signs: stable    Level of consciousness: sedated    Nausea/Vomiting: no nausea/vomiting    Complications: none          Last vitals:   Visit Vitals    /62 (BP Location: Left arm, Patient Position: Lying, BP Method: Automatic)    Pulse 97    Temp 36 °C (96.8 °F) (Tympanic)    Resp 16    Ht 5' 2" (1.575 m)    Wt 45.4 kg (100 lb)    LMP 04/03/2017 (Exact Date)    SpO2 98%    Breastfeeding No    BMI 18.29 kg/m2     "

## 2017-04-19 NOTE — IP AVS SNAPSHOT
[address] [address]  Phone: [phone]           Patient Discharge Instructions   Our goal is to set you up for success. This packet includes information on your condition, medications, and your home care.  It will help you care for yourself to prevent having to return to the hospital.     Please ask your nurse if you have any questions.      There are many details to remember when preparing to leave the hospital. Here is what you will need to do:    1. Take your medicine. If you are prescribed medications, review your Medication List on the following pages. You may have new medications to  at the pharmacy and others that you'll need to stop taking. Review the instructions for how and when to take your medications. Talk with your doctor or nurses if you are unsure of what to do.     2. Go to your follow-up appointments. Specific follow-up information is listed in the following pages. Your may be contacted by a nurse or clinical provider about future appointments. Be sure we have all of the phone numbers to reach you. Please contact your provider's office if you are unable to make an appointment.     3. Watch for warning signs. Your doctor or nurse will give you detailed warning signs to watch for and when to call for assistance. These instructions may also include educational information about your condition. If you experience any of warning signs to your health, call your doctor.               ** Verify the list of medication(s) below is accurate and up to date. Carry this with you in case of emergency. If your medications have changed, please notify your healthcare provider.             Medication List      CHANGE how you take these medications        Additional Info                      trazodone 100 MG tablet   Commonly known as:  DESYREL   Quantity:  90 tablet   Refills:  1   Dose:  100 mg   What changed:    - how much to take  - additional instructions     Instructions:  Take 1 tablet (100 mg total) by mouth every evening.     Begin Date    AM    Noon    PM    Bedtime         CONTINUE taking these medications        Additional Info                      clonazePAM 1 MG tablet   Commonly known as:  KLONOPIN   Quantity:  60 tablet   Refills:  5   Dose:  1 mg    Instructions:  Take 1 tablet (1 mg total) by mouth 2 (two) times daily as needed for Anxiety.     Begin Date    AM    Noon    PM    Bedtime       diclofenac 50 MG EC tablet   Commonly known as:  VOLTAREN   Quantity:  20 tablet   Refills:  0   Dose:  50 mg    Instructions:  Take 1 tablet (50 mg total) by mouth 2 (two) times daily as needed (take as needed for headache).     Begin Date    AM    Noon    PM    Bedtime       ergocalciferol 50,000 unit Cap   Commonly known as:  ERGOCALCIFEROL   Quantity:  12 capsule   Refills:  1   Dose:  40724 Units    Instructions:  Take 1 capsule (50,000 Units total) by mouth every 7 days.     Begin Date    AM    Noon    PM    Bedtime       gabapentin 100 MG capsule   Commonly known as:  NEURONTIN   Quantity:  90 capsule   Refills:  5   Dose:  100 mg    Instructions:  Take 1 capsule (100 mg total) by mouth 3 (three) times daily.     Begin Date    AM    Noon    PM    Bedtime       norethindrone-mestranol 1-50 mg-mcg Tab   Commonly known as:  NECON 1/50 (28)   Quantity:  28 each   Refills:  12   Dose:  1 tablet    Instructions:  Take 1 tablet by mouth once daily.     Begin Date    AM    Noon    PM    Bedtime       venlafaxine 75 MG 24 hr capsule   Commonly known as:  EFFEXOR-XR   Quantity:  90 capsule   Refills:  1   Dose:  75 mg    Instructions:  Take 1 capsule (75 mg total) by mouth once daily.     Begin Date    AM    Noon    PM    Bedtime                  Please bring to all follow up appointments:    1. A copy of your discharge instructions.  2. All medicines you are currently taking in their original bottles.  3. Identification and insurance card.    Please arrive 15 minutes  ahead of scheduled appointment time.    Please call 24 hours in advance if you must reschedule your appointment and/or time.        Your Scheduled Appointments     Jul 31, 2017  8:15 AM CDT   Established Patient Visit with Shilpi Clayton NP   [place] - Internal Medicine ([facility])    [address]  [facility] [address]   [phone]            Aug 01, 2017  3:20 PM CDT   Established Patient Visit with Camille Emerson NP   [place] [facility] - Neurology ([facility])    [address] [facility] [id number] [phone]              Follow-up Information     Follow up with Larry Patel MD On 4/26/2017.    Specialty:  Orthopedic Surgery    Why:  For suture removal at 3:40 PM    Contact information:    [address] [place] [address] ORTHOPEDICS  [facility] [address]  [phone]          Discharge Instructions     Future Orders    Call MD for:  difficulty breathing, headache or visual disturbances     Call MD for:  hives     Call MD for:  persistent dizziness or light-headedness     Call MD for:  persistent nausea and vomiting     Call MD for:  redness, tenderness, or signs of infection (pain, swelling, redness, odor or green/yellow discharge around incision site)     Call MD for:  severe uncontrolled pain     Call MD for:  temperature >100.4     Diet general     Questions:    Total calories:      Fat restriction, if any:      Protein restriction, if any:      Na restriction, if any:      Fluid restriction:      Additional restrictions:      Leave dressing on - Keep it clean, dry, and intact until clinic visit     Non weight bearing       Discharge References/Attachments     CARPAL TUNNEL SYNDROME, UNDERSTANDING (ENGLISH)        Admission Information     Date & Time Provider Department CSN    4/19/2017 12:41 PM Larry Patel MD [facility] [id number]      Care Providers     Provider Role Specialty Primary office phone    Larry Patel MD Attending Provider  "Orthopedic Surgery [phone]    Larry Patel MD Surgeon  Orthopedic Surgery [phone]      Your Vitals Were     BP Pulse Temp Resp Height Weight    101/68 (BP Location: Left arm, Patient Position: Lying, BP Method: Automatic) 104 96.9 °F (36.1 °C) (Tympanic) 16 5' 2" (1.575 m) 45.4 kg (100 lb)    Last Period SpO2 BMI          04/03/2017 (Exact Date) 99% 18.29 kg/m2        Recent Lab Values     No lab values to display.      Allergies as of 4/19/2017     No Known Allergies      [facility]     [facility] Nurse Care Line - 24/7 Assistance  Unless otherwise directed by your provider, please contact [facility], our nurse care line that is available for 24/7 assistance.     Registered nurses in the [facility] provide clinical advisement, health education, appointment booking, and other advisory services.  Call for this free service at [phone].        Advance Directives     An advance directive is a document which, in the event you are no longer able to make decisions for yourself, tells your healthcare team what kind of treatment you do or do not want to receive, or who you would like to make those decisions for you.  If you do not currently have an advance directive, [facility] encourages you to create one.  For more information call:  [phone] ([phone]), [phone] ([phone]),  or log on to [email or web address].        Smoking Cessation     If you would like to quit smoking:   You may be eligible for free services if you are a [place] resident and started smoking cigarettes before September 1, 1988.  Call the Smoking Cessation Trust (SCT) toll free at (225) 419-6543 or [phone] 069-4895.   Call 8-698-QUIT-NOW if you do not meet the above criteria.   Contact us via email: [email or web address]   View our website for more information: [email or web address]        Language Assistance Services     ATTENTION: Language assistance services are available, " free of charge. Please call [phone].      ATENCIÓN: Si habla español, tiene a weaver disposición servicios gratuitos de asistencia lingüística. Llame al [phone].     CHÚ Ý: N?u b?n nói Ti?ng Vi?t, có các d?ch v? h? tr? ngôn ng? mi?n phí dành cho b?n. G?i s? [phone].         [facility] complies with applicable Federal civil rights laws and does not discriminate on the basis of race, color, national origin, age, disability, or sex.

## 2017-04-19 NOTE — ANESTHESIA POSTPROCEDURE EVALUATION
"Anesthesia Post Evaluation    Patient: Staci Hodge    Procedure(s) Performed: Procedure(s) (LRB):  RELEASE-CARPAL TUNNEL (Right)  RELEASE-NERVE-ULNAR (Right)    Final Anesthesia Type: general  Patient location during evaluation: PACU  Patient participation: Yes- Able to Participate  Level of consciousness: awake and alert, oriented and awake  Post-procedure vital signs: reviewed and stable  Pain management: adequate  Airway patency: patent  PONV status at discharge: No PONV  Anesthetic complications: no      Cardiovascular status: blood pressure returned to baseline  Respiratory status: unassisted, spontaneous ventilation and room air  Hydration status: euvolemic  Follow-up not needed.  Comments: [facility]        Visit Vitals    /79 (BP Location: Left arm, Patient Position: Lying, BP Method: Automatic)    Pulse (!) 112    Temp 36 °C (96.8 °F) (Tympanic)    Resp 16    Ht 5' 2" (1.575 m)    Wt 45.4 kg (100 lb)    LMP 04/03/2017 (Exact Date)    SpO2 98%    Breastfeeding No    BMI 18.29 kg/m2       Pain/Bran Score: Pain Assessment Performed: Yes (4/19/2017  2:45 PM)  Presence of Pain: denies (4/19/2017  2:45 PM)  Bran Score: 10 (4/19/2017  2:45 PM)      "

## 2017-04-19 NOTE — PLAN OF CARE
Problem: Patient Care Overview  Goal: Plan of Care Review  Outcome: Ongoing (interventions implemented as appropriate)  Vitals stable, afebrile, no signs of distress. Pt complains of slight pain to right arm but states she does not need any medication for it at this time. Pt has eaten and drank fluids without any nausea. Pt has ambulated, and just waiting for pt to void and she will then meet criteria to be discharged. Pt given discharge instructions and is aware of follow up appointments. Ace wraps to right arm with and no drainage noted. Pt agrees with plan of care, questions answered.

## 2017-04-19 NOTE — ANESTHESIA PREPROCEDURE EVALUATION
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 female     Patient Active Problem List   Diagnosis    Small bowel obstruction    History of abnormal Pap smear    LLQ pain    Peutz-Jeghers syndrome    Weight loss    Leukocytosis    Tobacco abuse    Insomnia    Mood disorder    MARY LOU (generalized anxiety disorder)    Panic disorder with agoraphobia    PTSD (post-traumatic stress disorder)    Social anxiety disorder    Palpitations    Tachycardia    Dizziness    Bilateral hand numbness    Stabbing headache    Tension headache    Right carpal tunnel syndrome    Migraine without aura       Past Surgical History:   Procedure Laterality Date    ADENOIDECTOMY      APPENDECTOMY      BOWEL RESECTION  10/17/14    COLONOSCOPY      intestinal polpy      SMALL INTESTINE SURGERY      3 surgeries     TONSILLECTOMY      UPPER GASTROINTESTINAL ENDOSCOPY         Social History     Social History    Marital status: Single     Spouse name: N/A    Number of children: 0    Years of education: N/A     Occupational History    Not on file.     Social History Main Topics    Smoking status: Current Every Day Smoker     Packs/day: 1.00     Years: 17.00     Types: Cigarettes     Start date: 4/17/2000    Smokeless tobacco: Never Used      Comment: told about smoking cessation clinic and give brochure    Alcohol use 3.5 oz/week     7 Standard drinks or equivalent per week      Comment: Socially-once a twice a month at the most-2 ot 3 drinks    Drug use: No    Sexual activity: Yes     Partners: Male     Birth control/ protection: None      Comment: single-in a  [id number]-     Other Topics Concern    Patient Feels They Ought To Cut Down On Drinking/Drug Use No    Patient Annoyed By Others Criticizing Their Drinking/Drug Use No    Patient Has Felt Bad Or Guilty About Drinking/Drug Use No    Patient Has Had A Drink/Used Drugs As An Eye Opener In The Am No     Social History Narrative    Had one previous miscarriage in 2005    Never     currently in a relationship         Vital Signs Range (Last 24H):        CBC:   Recent Labs      04/17/17   1537   WBC  8.85   RBC  4.79   HGB  14.2   HCT  41.5   PLT  343   MCV  87   MCH  29.6   MCHC  34.2       CMP:   Recent Labs      04/17/17   1537   NA  142   K  3.5   CL  102   CO2  30*   BUN  7   CREATININE  0.8   GLU  77   CALCIUM  9.6   ALBUMIN  4.4   PROT  7.2   ALKPHOS  106   ALT  19   AST  16   BILITOT  0.5         Diagnostic Studies:    CT abd, pelvis:   Technique: 5 mm CT images of the abdomen and pelvis were obtained with contrast.    Results: The lung bases are clear. The base of the heart appears normal. The aorta is of normal caliber and tapers appropriately. No radiopaque gallstones are seen. No intrahepatic or extrahepatic biliary dilatation is identified. The liver, spleen,  pancreas, adrenal glands and kidneys appeared normal. The urinary bladder is well distended and within normal limits.    The uterus is retroverted. A vaginal tampon is noted. Nabothian cysts are seen. The adnexa appear normal.    The stomach is partially distended with oral contrast material and there is gas and oral contrast noted throughout the small bowel as well as gas within the distal esophagus. 3.7-cm dilated central small bowel is noted with a small bowel feces sign to   the level of the anastomosis in the left abdomen. Zone of transition is noted at the left bowel anastomosis right and periumbilical anastomosis of small bowel show nonspecific dilation without definitive obstruction. Mild fecal material and gas are   noted in  the colon. No free air or free fluid is seen.    The skeletal structures are intact.    Imaging findings suggestive for a high-grade small bowel obstruction of the left anastomosis. More than one stricture and be present on the left both at the anastomotic site and at the adjacent small bowel    Partial closed-loop character of bowel obstruction.    Additional periumbilical and right surgical anastomosis without definitive strictures in this location.      EKG: None      2D Echo: None       Anesthesia Evaluation    I have reviewed the Patient Summary Reports.    I have reviewed the Nursing Notes.   I have reviewed the Medications.     Review of Systems  Anesthesia Hx:  History of prior surgery of interest to airway management or planning: Previous anesthesia: General Denies Family Hx of Anesthesia complications.   Denies Personal Hx of Anesthesia complications.   Social:  Smoker, Alcohol Use Smokes 1ppd  Occasional alcohol use     Hematology/Oncology:     Oncology Normal     Cardiovascular:   Exercise tolerance: good Hypertension, well controlled    Pulmonary:  Pulmonary Normal    Renal/:  Renal/ Normal     Hepatic/GI:   Chronic SBO, previous abdominal laparotomy X2 for this problem per patient.   Neurological:   Neuromuscular Disease, Headaches    Endocrine:  Endocrine Normal    Psych:   Psychiatric History          Physical Exam  General:  Well nourished    Airway/Jaw/Neck:  Airway Findings: Mouth Opening: Normal Tongue: Normal  General Airway Assessment: Adult  Mallampati: I  TM Distance: 4 - 6 cm  Jaw/Neck Findings:     Neck ROM: Normal ROM      Dental:  Dental Findings: In tact   Chest/Lungs:  Chest/Lungs Findings: Clear to auscultation, Normal Respiratory Rate     Heart/Vascular:  Heart Findings: Rate: Normal  Rhythm: Regular Rhythm  Sounds: Normal     Abdomen:  Abdomen Findings:  Tenderness     Musculoskeletal:  Musculoskeletal Findings: Normal   Skin:  Skin Findings: Normal    Mental Status:  Mental  Status Findings:  Cooperative, Alert and Oriented         Anesthesia Plan  Type of Anesthesia, risks & benefits discussed:  Anesthesia Type:  general  Patient's Preference:   Intra-op Monitoring Plan:   Intra-op Monitoring Plan Comments:   Post Op Pain Control Plan:   Post Op Pain Control Plan Comments:   Induction:   IV  Beta Blocker:  Patient is not currently on a Beta-Blocker (No further documentation required).       Informed Consent: Patient understands risks and agrees with Anesthesia plan.  Questions answered. Anesthesia consent signed with patient.  ASA Score: 2     Day of Surgery Review of History & Physical: I have interviewed and examined the patient. I have reviewed the patient's H&P dated: 4/19/17. There are no significant changes.  H&P update referred to the surgeon.         Ready For Surgery From Anesthesia Perspective.

## 2017-04-19 NOTE — PROGRESS NOTES
Spoke with Dr. Styles the on call Dr. Barbie Skinner, and he states that there is no reason that the pt should not be able to go home even though she did not urinate. Nausea has resolved and pt is ready to be discharged. Dr. Styles gives the ok for discharge. Discharge instructions given. Pt states agreement.

## 2017-04-19 NOTE — NURSING
Pt transferred from OR. Vitals stable. ACE bandage to right arm; neuro checks wnl. Pt denies pain. Bedside report received from Aldo HER RN. Call laura within reach. Family at bedside.

## 2017-04-19 NOTE — BRIEF OP NOTE
[facility]  Brief Operative Note     SUMMARY     Surgery Date: 4/19/2017     Surgeon(s) and Role:     * Larry Patel MD - Primary      Assisting Surgeon: Link Callaway MD    Pre-op Diagnosis:  Carpal tunnel syndrome, right [G56.01]  Cubital tunnel syndrome on right [G56.21]    Post-op Diagnosis:  Post-Op Diagnosis Codes:     * Carpal tunnel syndrome, right [G56.01]     * Cubital tunnel syndrome on right [G56.21]    Procedure(s) (LRB):  RELEASE-CARPAL TUNNEL (Right)  Cubital tunnel release (Right)    Anesthesia: General/MAC    Description of the findings of the procedure: see dictation    Findings/Key Components: see dictation    Estimated Blood Loss: 5 mL         Specimens:   Specimen     None          Discharge Note    SUMMARY     Admit Date: 4/19/2017    Discharge Date and Time:  04/19/2017 2:23 PM    Hospital Course (synopsis of major diagnoses, care, treatment, and services provided during the course of the hospital stay): uneventful    Final Diagnosis: Post-Op Diagnosis Codes:     * Carpal tunnel syndrome, right [G56.01]     * Cubital tunnel syndrome on right [G56.21]    Disposition: Home or Self Care    Follow Up/Patient Instructions:     Medications:  Reconciled Home Medications:   Current Discharge Medication List      CONTINUE these medications which have NOT CHANGED    Details   clonazePAM (KLONOPIN) 1 MG tablet Take 1 tablet (1 mg total) by mouth 2 (two) times daily as needed for Anxiety.  Qty: 60 tablet, Refills: 5    Associated Diagnoses: MARY LOU (generalized anxiety disorder)      ergocalciferol (ERGOCALCIFEROL) 50,000 unit Cap Take 1 capsule (50,000 Units total) by mouth every 7 days.  Qty: 12 capsule, Refills: 1    Associated Diagnoses: Vitamin D deficiency      gabapentin (NEURONTIN) 100 MG capsule Take 1 capsule (100 mg total) by mouth 3 (three) times daily.  Qty: 90 capsule, Refills: 5    Associated Diagnoses: Migraine without aura and without status migrainosus, not  intractable      trazodone (DESYREL) 100 MG tablet Take 1 tablet (100 mg total) by mouth every evening.  Qty: 90 tablet, Refills: 1    Associated Diagnoses: Insomnia, unspecified type      venlafaxine (EFFEXOR-XR) 75 MG 24 hr capsule Take 1 capsule (75 mg total) by mouth once daily.  Qty: 90 capsule, Refills: 1    Associated Diagnoses: MARY LOU (generalized anxiety disorder)      diclofenac (VOLTAREN) 50 MG EC tablet Take 1 tablet (50 mg total) by mouth 2 (two) times daily as needed (take as needed for headache).  Qty: 20 tablet, Refills: 0    Associated Diagnoses: Migraine without aura and without status migrainosus, not intractable      norethindrone-mestranol (NECON 1/50, 28,) 1-50 mg-mcg Tab Take 1 tablet by mouth once daily.  Qty: 28 each, Refills: 12             Discharge Procedure Orders  Diet general     Call MD for:  temperature >100.4     Call MD for:  persistent nausea and vomiting     Call MD for:  severe uncontrolled pain     Call MD for:  difficulty breathing, headache or visual disturbances     Call MD for:  redness, tenderness, or signs of infection (pain, swelling, redness, odor or green/yellow discharge around incision site)     Call MD for:  hives     Call MD for:  persistent dizziness or light-headedness     Non weight bearing     Leave dressing on - Keep it clean, dry, and intact until clinic visit       Follow-up Information     Follow up with Larry Patel MD In 1 week.    Specialty:  Orthopedic Surgery    Why:  For suture removal    Contact information:    [address] [place] [address] ORTHOPEDICS  [facility] [address] [phone]

## 2017-04-19 NOTE — PROGRESS NOTES
Spoke with ZAHIDA Ang to let him know that UPT is in Lab but not resulted and that the surgery consent is not signed by pt or DR. They state they will take care of it.

## 2017-04-21 VITALS
OXYGEN SATURATION: 100 % | WEIGHT: 100 LBS | RESPIRATION RATE: 18 BRPM | SYSTOLIC BLOOD PRESSURE: 101 MMHG | HEIGHT: 62 IN | HEART RATE: 85 BPM | TEMPERATURE: 97 F | BODY MASS INDEX: 18.4 KG/M2 | DIASTOLIC BLOOD PRESSURE: 63 MMHG

## 2017-08-07 ENCOUNTER — OFFICE VISIT (OUTPATIENT)
Dept: PSYCHIATRY | Facility: CLINIC | Age: 37
End: 2017-08-07
Payer: COMMERCIAL

## 2017-08-07 VITALS
HEART RATE: 80 BPM | DIASTOLIC BLOOD PRESSURE: 78 MMHG | BODY MASS INDEX: 18.64 KG/M2 | SYSTOLIC BLOOD PRESSURE: 122 MMHG | HEIGHT: 61 IN | RESPIRATION RATE: 18 BRPM | WEIGHT: 98.75 LBS

## 2017-08-07 DIAGNOSIS — F40.01 PANIC DISORDER WITH AGORAPHOBIA: ICD-10-CM

## 2017-08-07 DIAGNOSIS — F43.10 PTSD (POST-TRAUMATIC STRESS DISORDER): ICD-10-CM

## 2017-08-07 DIAGNOSIS — F39 MOOD DISORDER: Primary | ICD-10-CM

## 2017-08-07 DIAGNOSIS — F40.10 SOCIAL ANXIETY DISORDER: ICD-10-CM

## 2017-08-07 DIAGNOSIS — F41.1 GAD (GENERALIZED ANXIETY DISORDER): ICD-10-CM

## 2017-08-07 PROCEDURE — 90833 PSYTX W PT W E/M 30 MIN: CPT | Mod: S$GLB,,, | Performed by: PSYCHIATRY & NEUROLOGY

## 2017-08-07 PROCEDURE — 3008F BODY MASS INDEX DOCD: CPT | Mod: S$GLB,,, | Performed by: PSYCHIATRY & NEUROLOGY

## 2017-08-07 PROCEDURE — 99214 OFFICE O/P EST MOD 30 MIN: CPT | Mod: S$GLB,,, | Performed by: PSYCHIATRY & NEUROLOGY

## 2017-08-07 PROCEDURE — 99999 PR PBB SHADOW E&M-EST. PATIENT-LVL III: CPT | Mod: PBBFAC,,, | Performed by: PSYCHIATRY & NEUROLOGY

## 2017-08-07 RX ORDER — OLANZAPINE 10 MG/1
10 TABLET ORAL NIGHTLY
Qty: 30 TABLET | Refills: 1 | Status: SHIPPED | OUTPATIENT
Start: 2017-08-07 | End: 2017-10-01 | Stop reason: SDUPTHER

## 2017-08-07 RX ORDER — CLONAZEPAM 1 MG/1
1 TABLET ORAL 2 TIMES DAILY PRN
Qty: 60 TABLET | Refills: 1 | Status: SHIPPED | OUTPATIENT
Start: 2017-08-07 | End: 2017-09-01 | Stop reason: SDUPTHER
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
## 2017-08-11 ENCOUNTER — PATIENT MESSAGE (OUTPATIENT)
Dept: OBSTETRICS AND GYNECOLOGY | Facility: CLINIC | Age: 37
End: 2017-08-11

## 2017-08-28 ENCOUNTER — OFFICE VISIT (OUTPATIENT)
Dept: PSYCHIATRY | Facility: CLINIC | Age: 37
End: 2017-08-28
Payer: COMMERCIAL

## 2017-08-28 VITALS
SYSTOLIC BLOOD PRESSURE: 114 MMHG | DIASTOLIC BLOOD PRESSURE: 88 MMHG | WEIGHT: 105.25 LBS | BODY MASS INDEX: 19.37 KG/M2 | HEIGHT: 62 IN | RESPIRATION RATE: 22 BRPM | HEART RATE: 104 BPM

## 2017-08-28 DIAGNOSIS — F40.01 PANIC DISORDER WITH AGORAPHOBIA: ICD-10-CM

## 2017-08-28 DIAGNOSIS — F43.10 PTSD (POST-TRAUMATIC STRESS DISORDER): ICD-10-CM

## 2017-08-28 DIAGNOSIS — F40.10 SOCIAL ANXIETY DISORDER: ICD-10-CM

## 2017-08-28 DIAGNOSIS — F39 MOOD DISORDER: Primary | ICD-10-CM

## 2017-08-28 DIAGNOSIS — F41.1 GAD (GENERALIZED ANXIETY DISORDER): ICD-10-CM

## 2017-08-28 PROCEDURE — 90833 PSYTX W PT W E/M 30 MIN: CPT | Mod: S$GLB,,, | Performed by: PSYCHIATRY & NEUROLOGY

## 2017-08-28 PROCEDURE — 99999 PR PBB SHADOW E&M-EST. PATIENT-LVL III: CPT | Mod: PBBFAC,,, | Performed by: PSYCHIATRY & NEUROLOGY

## 2017-08-28 PROCEDURE — 99214 OFFICE O/P EST MOD 30 MIN: CPT | Mod: S$GLB,,, | Performed by: PSYCHIATRY & NEUROLOGY

## 2017-08-28 PROCEDURE — 3008F BODY MASS INDEX DOCD: CPT | Mod: S$GLB,,, | Performed by: PSYCHIATRY & NEUROLOGY
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
## 2017-09-01 DIAGNOSIS — F41.1 GAD (GENERALIZED ANXIETY DISORDER): ICD-10-CM

## 2017-09-01 RX ORDER — CLONAZEPAM 1 MG/1
TABLET ORAL
Qty: 60 TABLET | Refills: 2 | Status: SHIPPED | OUTPATIENT
Start: 2017-09-01 | End: 2017-09-11 | Stop reason: SDUPTHER

## 2017-09-08 ENCOUNTER — OFFICE VISIT (OUTPATIENT)
Dept: INTERNAL MEDICINE | Facility: CLINIC | Age: 37
End: 2017-09-08
Payer: COMMERCIAL

## 2017-09-08 VITALS
OXYGEN SATURATION: 98 % | WEIGHT: 102.5 LBS | DIASTOLIC BLOOD PRESSURE: 80 MMHG | BODY MASS INDEX: 18.86 KG/M2 | HEART RATE: 121 BPM | SYSTOLIC BLOOD PRESSURE: 110 MMHG | RESPIRATION RATE: 14 BRPM | HEIGHT: 62 IN

## 2017-09-08 DIAGNOSIS — N12 PYELONEPHRITIS: Primary | ICD-10-CM

## 2017-09-08 LAB
BILIRUB SERPL-MCNC: NORMAL MG/DL
BLOOD URINE, POC: NORMAL
COLOR, POC UA: YELLOW
GLUCOSE UR QL STRIP: NORMAL
KETONES UR QL STRIP: NORMAL
LEUKOCYTE ESTERASE URINE, POC: NORMAL
NITRITE, POC UA: NORMAL
PH, POC UA: 5
PROTEIN, POC: NORMAL
SPECIFIC GRAVITY, POC UA: 1.01
UROBILINOGEN, POC UA: NORMAL

## 2017-09-08 PROCEDURE — 99999 PR PBB SHADOW E&M-EST. PATIENT-LVL III: CPT | Mod: PBBFAC,,, | Performed by: INTERNAL MEDICINE

## 2017-09-08 PROCEDURE — 3008F BODY MASS INDEX DOCD: CPT | Mod: S$GLB,,, | Performed by: INTERNAL MEDICINE

## 2017-09-08 PROCEDURE — 81002 URINALYSIS NONAUTO W/O SCOPE: CPT | Mod: S$GLB,,, | Performed by: INTERNAL MEDICINE

## 2017-09-08 PROCEDURE — 99213 OFFICE O/P EST LOW 20 MIN: CPT | Mod: 25,S$GLB,, | Performed by: INTERNAL MEDICINE

## 2017-09-08 NOTE — PROGRESS NOTES
Subjective:       Patient ID: Staci Hodge is a 37 y.o. female.    Chief Complaint: Abdominal Pain (ER FOLLOW UP)    Staci Hodge is a 37 y.o. female  Here for a recent visit to ER  At [facility] .  She was sent home with pain meds; she is taking cipro.    WBC 17 K     CT scan : suggestive of pyelonephritis     She reports no fevers ; no chills.  She reports no frequency of urination ; no dysuria     Pain in abdomen is better        Abdominal Pain   Pertinent negatives include no dysuria, fever, frequency or hematuria.     Review of Systems   Constitutional: Negative for chills and fever.   Cardiovascular: Positive for palpitations.   Gastrointestinal: Positive for abdominal pain.        Pain is better    Genitourinary: Positive for flank pain and urgency. Negative for difficulty urinating, dyspareunia, dysuria, enuresis, frequency, hematuria, menstrual problem and pelvic pain.   Psychiatric/Behavioral: The patient is nervous/anxious.        Objective:      Physical Exam   Constitutional: She is oriented to person, place, and time. She appears well-developed and well-nourished.   HENT:   Head: Normocephalic and atraumatic.   Cardiovascular: Normal rate, regular rhythm and normal heart sounds.    Pulmonary/Chest: Effort normal and breath sounds normal.   Abdominal: Soft. Normal appearance and bowel sounds are normal. There is no tenderness.   Musculoskeletal: She exhibits no edema.   Neurological: She is alert and oriented to person, place, and time.   Skin: Skin is warm and dry.   Psychiatric: She has a normal mood and affect. Her behavior is normal. Judgment and thought content normal.   Nursing note and vitals reviewed.      Assessment:       1. Pyelonephritis        Plan:   Staci was seen today for abdominal pain.    Diagnoses and all orders for this visit:    Pyelonephritis  -     POCT urinalysis, dipstick or tablet reag  -     CBC auto differential; Future  -     Urinalysis;  Future    responding well with cipro  Check labs on Monday .    See JUS jeffries on Tuesday for follow up

## 2017-09-11 DIAGNOSIS — F41.1 GAD (GENERALIZED ANXIETY DISORDER): ICD-10-CM

## 2017-09-12 RX ORDER — CLONAZEPAM 1 MG/1
TABLET ORAL
Qty: 60 TABLET | Refills: 2 | Status: SHIPPED | OUTPATIENT
Start: 2017-09-12 | End: 2017-11-07 | Stop reason: SDUPTHER

## 2017-09-21 ENCOUNTER — OFFICE VISIT (OUTPATIENT)
Dept: PSYCHIATRY | Facility: CLINIC | Age: 37
End: 2017-09-21
Payer: COMMERCIAL

## 2017-09-21 VITALS
DIASTOLIC BLOOD PRESSURE: 78 MMHG | SYSTOLIC BLOOD PRESSURE: 121 MMHG | RESPIRATION RATE: 18 BRPM | HEART RATE: 79 BPM | HEIGHT: 62 IN | WEIGHT: 102.38 LBS | BODY MASS INDEX: 18.84 KG/M2

## 2017-09-21 DIAGNOSIS — F40.01 PANIC DISORDER WITH AGORAPHOBIA: ICD-10-CM

## 2017-09-21 DIAGNOSIS — F40.10 SOCIAL ANXIETY DISORDER: ICD-10-CM

## 2017-09-21 DIAGNOSIS — F41.1 GAD (GENERALIZED ANXIETY DISORDER): ICD-10-CM

## 2017-09-21 DIAGNOSIS — F39 MOOD DISORDER: Primary | ICD-10-CM

## 2017-09-21 DIAGNOSIS — F43.10 PTSD (POST-TRAUMATIC STRESS DISORDER): ICD-10-CM

## 2017-09-21 PROCEDURE — 3008F BODY MASS INDEX DOCD: CPT | Mod: S$GLB,,, | Performed by: PSYCHIATRY & NEUROLOGY

## 2017-09-21 PROCEDURE — 99214 OFFICE O/P EST MOD 30 MIN: CPT | Mod: S$GLB,,, | Performed by: PSYCHIATRY & NEUROLOGY

## 2017-09-21 PROCEDURE — 90833 PSYTX W PT W E/M 30 MIN: CPT | Mod: S$GLB,,, | Performed by: PSYCHIATRY & NEUROLOGY

## 2017-09-21 PROCEDURE — 99999 PR PBB SHADOW E&M-EST. PATIENT-LVL III: CPT | Mod: PBBFAC,,, | Performed by: PSYCHIATRY & NEUROLOGY

## 2017-09-21 RX ORDER — FLUOXETINE 10 MG/1
10 CAPSULE ORAL DAILY
Qty: 30 CAPSULE | Refills: 1 | Status: SHIPPED | OUTPATIENT
Start: 2017-09-21 | End: 2017-11-07 | Stop reason: SDUPTHER
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
## 2017-10-02 RX ORDER — OLANZAPINE 10 MG/1
10 TABLET ORAL NIGHTLY
Qty: 30 TABLET | Refills: 1 | Status: SHIPPED | OUTPATIENT
Start: 2017-10-02 | End: 2017-11-07 | Stop reason: SDUPTHER

## 2017-10-17 ENCOUNTER — LAB VISIT (OUTPATIENT)
Dept: LAB | Facility: HOSPITAL | Age: 37
End: 2017-10-17
Attending: NURSE PRACTITIONER
Payer: COMMERCIAL

## 2017-10-17 DIAGNOSIS — E55.9 VITAMIN D DEFICIENCY: ICD-10-CM

## 2017-10-17 DIAGNOSIS — N12 PYELONEPHRITIS: ICD-10-CM

## 2017-10-17 LAB
BACTERIA #/AREA URNS HPF: NORMAL /HPF
BILIRUB UR QL STRIP: NEGATIVE
CLARITY UR: CLEAR
COLOR UR: YELLOW
GLUCOSE UR QL STRIP: ABNORMAL
HGB UR QL STRIP: NEGATIVE
KETONES UR QL STRIP: NEGATIVE
LEUKOCYTE ESTERASE UR QL STRIP: NEGATIVE
MICROSCOPIC COMMENT: NORMAL
NITRITE UR QL STRIP: NEGATIVE
PH UR STRIP: 7 [PH] (ref 5–8)
PROT UR QL STRIP: NEGATIVE
SP GR UR STRIP: 1.02 (ref 1–1.03)
SQUAMOUS #/AREA URNS HPF: 12 /HPF
URN SPEC COLLECT METH UR: ABNORMAL
UROBILINOGEN UR STRIP-ACNC: NEGATIVE EU/DL
WBC #/AREA URNS HPF: 2 /HPF (ref 0–5)
YEAST URNS QL MICRO: NORMAL

## 2017-10-17 PROCEDURE — 81000 URINALYSIS NONAUTO W/SCOPE: CPT

## 2017-10-17 PROCEDURE — 82306 VITAMIN D 25 HYDROXY: CPT

## 2017-10-17 PROCEDURE — 36415 COLL VENOUS BLD VENIPUNCTURE: CPT

## 2017-10-18 LAB — 25(OH)D3+25(OH)D2 SERPL-MCNC: 25 NG/ML

## 2017-10-20 ENCOUNTER — TELEPHONE (OUTPATIENT)
Dept: INTERNAL MEDICINE | Facility: CLINIC | Age: 37
End: 2017-10-20

## 2017-10-20 NOTE — TELEPHONE ENCOUNTER
Patient states that she would like to follow up with Shilpi. She will make an appt for next week. Please review lab results. Thanks.

## 2017-10-20 NOTE — TELEPHONE ENCOUNTER
"Reviewed U/A results with patient. She is concerned about the presence of "Squam Epithel" in her urine and also 3+ glucose. Please review and advise. Thanks.   "

## 2017-10-20 NOTE — TELEPHONE ENCOUNTER
----- Message from Rylie Ayala sent at 10/20/2017 12:12 PM CDT -----  Contact: Pt  Staci Hodge  MRN: [id number]  : 1980  PCP: Shilpi Clayton  Home Phone      [phone]  Work Phone      Not on file.  Mobile          [phone]      MESSAGE:  Says she got her urinalysis results online but they're out of the normal ranges and is a little concerned. Wants a nurse to call to explain the results this afternoon if possible. Please advise.    PHONE:  [phone] ([phone])

## 2017-10-23 NOTE — TELEPHONE ENCOUNTER
NP urine shows  3 + glucose; needs to get checked for diabetes with blood tests;   Ask her to see JUS Clayton and follow up on these labs; and get blood work

## 2017-10-24 ENCOUNTER — OFFICE VISIT (OUTPATIENT)
Dept: INTERNAL MEDICINE | Facility: CLINIC | Age: 37
End: 2017-10-24
Payer: COMMERCIAL

## 2017-10-24 VITALS
HEIGHT: 62 IN | SYSTOLIC BLOOD PRESSURE: 104 MMHG | HEART RATE: 112 BPM | WEIGHT: 102 LBS | RESPIRATION RATE: 18 BRPM | OXYGEN SATURATION: 99 % | BODY MASS INDEX: 18.77 KG/M2 | DIASTOLIC BLOOD PRESSURE: 64 MMHG

## 2017-10-24 DIAGNOSIS — Z83.3 FAMILY HISTORY OF DIABETES MELLITUS: ICD-10-CM

## 2017-10-24 DIAGNOSIS — R81 GLUCOSURIA: Primary | ICD-10-CM

## 2017-10-24 DIAGNOSIS — M25.512 ACUTE PAIN OF LEFT SHOULDER: ICD-10-CM

## 2017-10-24 PROCEDURE — 99999 PR PBB SHADOW E&M-EST. PATIENT-LVL III: CPT | Mod: PBBFAC,,, | Performed by: NURSE PRACTITIONER

## 2017-10-24 PROCEDURE — 99214 OFFICE O/P EST MOD 30 MIN: CPT | Mod: S$GLB,,, | Performed by: NURSE PRACTITIONER

## 2017-10-24 PROCEDURE — 96372 THER/PROPH/DIAG INJ SC/IM: CPT | Mod: S$GLB,,, | Performed by: NURSE PRACTITIONER

## 2017-10-24 RX ORDER — CYCLOBENZAPRINE HCL 10 MG
10 TABLET ORAL NIGHTLY PRN
Qty: 30 TABLET | Refills: 0 | Status: SHIPPED | OUTPATIENT
Start: 2017-10-24 | End: 2017-12-21 | Stop reason: SDUPTHER

## 2017-10-24 RX ORDER — PROPRANOLOL HYDROCHLORIDE 10 MG/1
TABLET ORAL
Refills: 1 | COMMUNITY
Start: 2017-10-02 | End: 2017-11-05 | Stop reason: SDUPTHER

## 2017-10-24 RX ORDER — KETOROLAC TROMETHAMINE 30 MG/ML
60 INJECTION, SOLUTION INTRAMUSCULAR; INTRAVENOUS
Status: COMPLETED | OUTPATIENT
Start: 2017-10-24 | End: 2017-10-24

## 2017-10-24 RX ORDER — NAPROXEN SODIUM 550 MG/1
550 TABLET ORAL 2 TIMES DAILY WITH MEALS
Qty: 60 TABLET | Refills: 0 | Status: SHIPPED | OUTPATIENT
Start: 2017-10-24 | End: 2017-11-16 | Stop reason: SDUPTHER

## 2017-10-24 RX ADMIN — KETOROLAC TROMETHAMINE 60 MG: 30 INJECTION, SOLUTION INTRAMUSCULAR; INTRAVENOUS at 04:10

## 2017-10-24 NOTE — PATIENT INSTRUCTIONS
Shoulder Pain with Uncertain Cause  Shoulder pain can have many causes. Pain often comes from the structures that surround the shoulder joint. These are the joint capsule, ligaments, tendons, muscles, and bursa. Pain can also come from cartilage in the joint. Cartilage can become worn out or injured. Its important to know whats causing your pain so the healthcare provider can use the correct treatment. But sometimes its difficult to find the exact cause of shoulder pain. You may need to see a specialist (orthopedist). You may also need special tests such as a CT scan or MRI. The provider may need to use special tools to look inside the joint (arthroscopy).  Shoulder pain can be treated with a sling or a device that keeps your shoulder from moving. You can take an anti-inflammatory medicine such as ibuprofen to ease pain. You may need to do special shoulder exercises. Follow up with a specialist if the pain is severe or doesnt go away after a few weeks.  Home care  Follow these tips when caring for yourself at home:  · If a sling was given to you, leave it in place for the time advised by your healthcare provider. If you arent sure how long to wear it, ask for advice. If the sling becomes loose, adjust it so that your forearm is level with the ground. Your shoulder should feel well supported.  · Put an ice pack on the injured area for 20 minutes every 1 to 2 hours the first day. You can make your own ice pack by putting ice cubes in a plastic bag. Wrap the bag in a thin towel. Continue with ice packs 3 to 4 times a day for the next 2 days. Then use the pack as needed to ease pain and swelling.  · You may use acetaminophen or ibuprofen to control pain, unless another pain medicine was prescribed. If you have chronic liver or kidney disease, talk with your healthcare provider before using these medicines. Also talk with your provider if youve ever had a stomach ulcer or GI bleeding.  · Shoulder pain may seem  worse at night, when there is less to distract you from the pain. If you sleep on your side, try to keep weight off your painful shoulder. Propping pillows behind you may stop you from rolling over onto that shoulder during sleep.   · Shoulder and elbow joints can become stiff if left in a sling for too long. You should start range of motion exercises about 7 to 10 days after the injury. Talk with your provider to find out what type of exercises to do and how soon to start.  · You can take the sling off to shower or bathe.  Follow-up care  Follow up with your healthcare provider if you dont start to get better in the next 5 days.  When to seek medical advice  Call your healthcare provider right away if any of these occur:  · Pain or swelling gets worse or continues for more than a few days  · Your hand or fingers become cold, blue, numb, or tingly  · Large amount of bruising on your shoulder or upper arm  · Difficulty moving your hand or fingers  · Weakness in your hand or fingers  · Your shoulder becomes stiff  · It feels like your shoulder is popping out  · You are less able to do your daily activities  Date Last Reviewed: 10/1/2016  © 2727-8569 [employer]. [address] [place] [address], [place] [address]. All rights reserved. This information is not intended as a substitute for professional medical care. Always follow your healthcare professional's instructions.

## 2017-10-24 NOTE — PROGRESS NOTES
Subjective:       Patient ID: Staci Hodge is a 37 y.o. female.    Chief Complaint: Follow-up (discuss urinalysis - glucose)    Patient is known, to me and presents with   Chief Complaint   Patient presents with    Follow-up     discuss urinalysis - glucose   .  Denies chest pain and shortness of breath.  Patient presents with follow up to discuss glucose in urine. Had uti a month ago and was treated with cipro. She does have a family history of diabetes. Also having left shoulder pain with some limitations in rom Taking motrin with no relief     HPI  Review of Systems   Constitutional: Negative for activity change, appetite change, fatigue, fever and unexpected weight change.   HENT: Negative for congestion, ear discharge, ear pain, hearing loss, postnasal drip and tinnitus.    Eyes: Negative for photophobia, pain and visual disturbance.   Respiratory: Negative for cough, shortness of breath, wheezing and stridor.    Cardiovascular: Negative for chest pain, palpitations and leg swelling.   Gastrointestinal: Negative for abdominal distention.   Genitourinary: Negative for difficulty urinating, dysuria, frequency, hematuria and urgency.   Musculoskeletal: Positive for neck pain. Negative for arthralgias, back pain, gait problem and joint swelling.   Skin: Negative.    Neurological: Negative for dizziness, seizures, syncope, weakness, light-headedness, numbness and headaches.   Hematological: Negative for adenopathy. Does not bruise/bleed easily.   Psychiatric/Behavioral: Negative for behavioral problems, confusion, hallucinations, sleep disturbance and suicidal ideas. The patient is not nervous/anxious.        Objective:      Physical Exam   Constitutional: She is oriented to person, place, and time. She appears well-developed and well-nourished. No distress.   HENT:   Head: Normocephalic and atraumatic.   Right Ear: External ear normal.   Left Ear: External ear normal.   Mouth/Throat: Oropharynx is clear  and moist. No oropharyngeal exudate.   Eyes: Conjunctivae and EOM are normal. Pupils are equal, round, and reactive to light. Right eye exhibits no discharge. Left eye exhibits no discharge.   Neck: Normal range of motion. Neck supple. No JVD present. No thyromegaly present.   Cardiovascular: Normal rate, regular rhythm, normal heart sounds and intact distal pulses.  Exam reveals no gallop and no friction rub.    No murmur heard.  Pulmonary/Chest: Effort normal and breath sounds normal. No stridor. No respiratory distress. She has no wheezes. She has no rales. She exhibits no tenderness.   Abdominal: Soft. Bowel sounds are normal. She exhibits no distension and no mass. There is no tenderness. There is no rebound.   Musculoskeletal: She exhibits tenderness. She exhibits no edema.        Left shoulder: She exhibits decreased range of motion, tenderness and spasm.        Arms:  Tenderness palpated to left shoulder region with limitations to cervical spine region   Lymphadenopathy:     She has no cervical adenopathy.   Neurological: She is alert and oriented to person, place, and time. She has normal reflexes. She displays normal reflexes. No cranial nerve deficit. She exhibits normal muscle tone. Coordination normal.   Skin: Skin is warm and dry. Capillary refill takes less than 2 seconds. No rash noted. No erythema. No pallor.   Psychiatric: She has a normal mood and affect. Her behavior is normal. Judgment and thought content normal.       Assessment:       1. Glucosuria    2. Family history of diabetes mellitus    3. Acute pain of left shoulder        Plan:   Staci was seen today for follow-up and shoulder pain.    Diagnoses and all orders for this visit:    Glucosuria  -     Microalbumin/creatinine urine ratio; Future  -     Hemoglobin A1c; Future  -     Comprehensive metabolic panel; Future    Family history of diabetes mellitus  -     Microalbumin/creatinine urine ratio; Future  -     Hemoglobin A1c; Future  -  "    Comprehensive metabolic panel; Future    Acute pain of left shoulder  -     cyclobenzaprine (FLEXERIL) 10 MG tablet; Take 1 tablet (10 mg total) by mouth nightly as needed.  -     naproxen sodium (ANAPROX) 550 MG tablet; Take 1 tablet (550 mg total) by mouth 2 (two) times daily with meals.  -     ketorolac injection 60 mg; Inject 60 mg into the muscle one time.    "This note will not be shared with the patient."  Will run further testing for diabetes  Also will treat for pain to left shoulder  RTC in one week  "

## 2017-10-27 ENCOUNTER — LAB VISIT (OUTPATIENT)
Dept: LAB | Facility: HOSPITAL | Age: 37
End: 2017-10-27
Attending: NURSE PRACTITIONER
Payer: COMMERCIAL

## 2017-10-27 DIAGNOSIS — R81 GLUCOSURIA: ICD-10-CM

## 2017-10-27 DIAGNOSIS — Z83.3 FAMILY HISTORY OF DIABETES MELLITUS: ICD-10-CM

## 2017-10-27 LAB
ALBUMIN SERPL BCP-MCNC: 3.7 G/DL
ALP SERPL-CCNC: 74 U/L
ALT SERPL W/O P-5'-P-CCNC: 14 U/L
ANION GAP SERPL CALC-SCNC: 8 MMOL/L
AST SERPL-CCNC: 16 U/L
BILIRUB SERPL-MCNC: 0.6 MG/DL
BUN SERPL-MCNC: 11 MG/DL
CALCIUM SERPL-MCNC: 8.9 MG/DL
CHLORIDE SERPL-SCNC: 106 MMOL/L
CO2 SERPL-SCNC: 27 MMOL/L
CREAT SERPL-MCNC: 0.8 MG/DL
CREAT UR-MCNC: 308 MG/DL
EST. GFR  (AFRICAN AMERICAN): >60 ML/MIN/1.73 M^2
EST. GFR  (NON AFRICAN AMERICAN): >60 ML/MIN/1.73 M^2
ESTIMATED AVG GLUCOSE: 94 MG/DL
GLUCOSE SERPL-MCNC: 82 MG/DL
HBA1C MFR BLD HPLC: 4.9 %
MICROALBUMIN UR DL<=1MG/L-MCNC: 288 UG/ML
MICROALBUMIN/CREATININE RATIO: 93.5 UG/MG
POTASSIUM SERPL-SCNC: 3.7 MMOL/L
PROT SERPL-MCNC: 7 G/DL
SODIUM SERPL-SCNC: 141 MMOL/L

## 2017-10-27 PROCEDURE — 80053 COMPREHEN METABOLIC PANEL: CPT

## 2017-10-27 PROCEDURE — 83036 HEMOGLOBIN GLYCOSYLATED A1C: CPT

## 2017-10-27 PROCEDURE — 36415 COLL VENOUS BLD VENIPUNCTURE: CPT

## 2017-10-27 PROCEDURE — 82570 ASSAY OF URINE CREATININE: CPT

## 2017-10-30 ENCOUNTER — OFFICE VISIT (OUTPATIENT)
Dept: INTERNAL MEDICINE | Facility: CLINIC | Age: 37
End: 2017-10-30
Payer: COMMERCIAL

## 2017-10-30 ENCOUNTER — HOSPITAL ENCOUNTER (OUTPATIENT)
Dept: RADIOLOGY | Facility: HOSPITAL | Age: 37
Discharge: HOME | End: 2017-10-30
Attending: NURSE PRACTITIONER
Payer: COMMERCIAL

## 2017-10-30 VITALS
HEART RATE: 118 BPM | BODY MASS INDEX: 18.25 KG/M2 | WEIGHT: 99.19 LBS | HEIGHT: 62 IN | DIASTOLIC BLOOD PRESSURE: 84 MMHG | OXYGEN SATURATION: 98 % | SYSTOLIC BLOOD PRESSURE: 118 MMHG | RESPIRATION RATE: 18 BRPM

## 2017-10-30 DIAGNOSIS — R80.9 MICROALBUMINURIA: Primary | ICD-10-CM

## 2017-10-30 DIAGNOSIS — R80.9 MICROALBUMINURIA: ICD-10-CM

## 2017-10-30 PROCEDURE — 99214 OFFICE O/P EST MOD 30 MIN: CPT | Mod: S$GLB,,, | Performed by: NURSE PRACTITIONER

## 2017-10-30 PROCEDURE — 76770 US EXAM ABDO BACK WALL COMP: CPT | Mod: 26,,, | Performed by: RADIOLOGY

## 2017-10-30 PROCEDURE — 99999 PR PBB SHADOW E&M-EST. PATIENT-LVL IV: CPT | Mod: PBBFAC,,, | Performed by: NURSE PRACTITIONER

## 2017-10-30 PROCEDURE — 76770 US EXAM ABDO BACK WALL COMP: CPT | Mod: TC

## 2017-10-30 NOTE — PROGRESS NOTES
Subjective:       Patient ID: Staci Hodge is a 37 y.o. female.    Chief Complaint: Follow-up (x 1 wk- results)    Patient is known, to me and presents with   Chief Complaint   Patient presents with    Follow-up     x 1 wk- results   .  Denies chest pain and shortness of breath.  Patient with lab results today  HPI  Review of Systems   Constitutional: Negative for activity change, appetite change, fatigue, fever and unexpected weight change.   HENT: Negative for congestion, ear discharge, ear pain, hearing loss, postnasal drip and tinnitus.    Eyes: Negative for photophobia, pain and visual disturbance.   Respiratory: Negative for cough, shortness of breath, wheezing and stridor.    Cardiovascular: Negative for chest pain, palpitations and leg swelling.   Gastrointestinal: Negative for abdominal distention.   Genitourinary: Negative for difficulty urinating, dysuria, frequency, hematuria and urgency.   Musculoskeletal: Negative for arthralgias, back pain, gait problem, joint swelling and neck pain.   Skin: Negative.    Neurological: Negative for dizziness, seizures, syncope, weakness, light-headedness, numbness and headaches.   Hematological: Negative for adenopathy. Does not bruise/bleed easily.   Psychiatric/Behavioral: Negative for behavioral problems, confusion, hallucinations, sleep disturbance and suicidal ideas. The patient is not nervous/anxious.        Objective:      Physical Exam   Constitutional: She is oriented to person, place, and time. She appears well-developed and well-nourished. No distress.   HENT:   Head: Normocephalic and atraumatic.   Right Ear: External ear normal.   Left Ear: External ear normal.   Mouth/Throat: Oropharynx is clear and moist. No oropharyngeal exudate.   Eyes: Conjunctivae and EOM are normal. Pupils are equal, round, and reactive to light. Right eye exhibits no discharge. Left eye exhibits no discharge.   Neck: Normal range of motion. Neck supple. No JVD present. No  "thyromegaly present.   Cardiovascular: Normal rate, regular rhythm, normal heart sounds and intact distal pulses.  Exam reveals no gallop and no friction rub.    No murmur heard.  Pulmonary/Chest: Effort normal and breath sounds normal. No stridor. No respiratory distress. She has no wheezes. She has no rales. She exhibits no tenderness.   Abdominal: Soft. Bowel sounds are normal. She exhibits no distension and no mass. There is no tenderness. There is no rebound.   Musculoskeletal: Normal range of motion. She exhibits no edema or tenderness.   Lymphadenopathy:     She has no cervical adenopathy.   Neurological: She is alert and oriented to person, place, and time. She has normal reflexes. She displays normal reflexes. No cranial nerve deficit. She exhibits normal muscle tone. Coordination normal.   Skin: Skin is warm and dry. Capillary refill takes less than 2 seconds. No rash noted. No erythema. No pallor.   Psychiatric: She has a normal mood and affect. Her behavior is normal. Judgment and thought content normal.       Assessment:       1. Microalbuminuria        Plan:   Staci was seen today for follow-up.    Diagnoses and all orders for this visit:    Microalbuminuria  -     US Kidney Only; Future    "This note will not be shared with the patient."  Due to hx of protein in urine and microalbinuria will do ultrasound  Otherwise her kidney functions are normal  RTC as scheduled   "

## 2017-10-30 NOTE — PATIENT INSTRUCTIONS
Protein in the Urine (Proteinuria)  The kidney filters waste products and unwanted substances from the blood. These waste products end up in the urine. Protein is an important part of the blood and is not filtered out. Normally, there is no protein in the urine.  Proteinuria occurs when some of the normal protein in the bloodstream ends up in the urine. Protein in the urine will show up on a standard urine test.   Protein in the urine can be a sign of serious disease or a harmless temporary condition. For example, heavy exercise or fever can cause temporary proteinuria. This is normal and will go away if the urine test is repeated.  If urine tests continue to show protein in the urine, chronic kidney disease may be present. Common causes of kidney disease include diabetes, high blood pressure, and conditions that cause inflammation in the kidneys.  Protein in the blood helps keep fluids from leaking out of the blood vessels and into the surrounding tissues. Mild or temporary proteinuria doesn't cause any symptoms. However, if too much protein is lost from the body, there may be swelling as fluid leaks from the blood vessels. Swelling may appear in legs, feet, and ankles or elsewhere such as lower back, face and eyelids.  If proteinuria persists on repeat urine testing, more tests will be needed to figure out the cause. If the cause is still unclear, you may be told to see a kidney specialist.  Home care  No special home care is needed until the cause of your proteinuria is known.  Follow-up care  Follow up with your doctor, or as advised.  Call 911   Call 911 or get immediate medical care if any of the following occur:   · Severe weakness, dizziness, fainting, drowsiness, or confusion  · Chest pain or shortness of breath   When to seek medical advice  Call your healthcare provider right away if any of these occur:   · Nausea or vomiting  · Unexpected weight gain or swelling in the legs, ankles, or around the  eyes  · Dark colored urine  · Decreased or absent urine output  Date Last Reviewed: 6/1/2016  © 2283-1070 [employer]. [address] [place] [address], [place] [address]. All rights reserved. This information is not intended as a substitute for professional medical care. Always follow your healthcare professional's instructions.

## 2017-10-31 ENCOUNTER — PATIENT MESSAGE (OUTPATIENT)
Dept: INTERNAL MEDICINE | Facility: CLINIC | Age: 37
End: 2017-10-31

## 2017-10-31 DIAGNOSIS — R80.9 MICROALBUMINURIA: Primary | ICD-10-CM

## 2017-11-01 NOTE — TELEPHONE ENCOUNTER
Pt notified stating unsure of any at [facility] checked with specialty clinic and no known dr so will call [facility] to get set up

## 2017-11-07 ENCOUNTER — OFFICE VISIT (OUTPATIENT)
Dept: PSYCHIATRY | Facility: CLINIC | Age: 37
End: 2017-11-07
Payer: COMMERCIAL

## 2017-11-07 VITALS
DIASTOLIC BLOOD PRESSURE: 71 MMHG | HEART RATE: 79 BPM | BODY MASS INDEX: 18.97 KG/M2 | WEIGHT: 103.06 LBS | SYSTOLIC BLOOD PRESSURE: 125 MMHG | HEIGHT: 62 IN | RESPIRATION RATE: 18 BRPM

## 2017-11-07 DIAGNOSIS — F40.01 PANIC DISORDER WITH AGORAPHOBIA: ICD-10-CM

## 2017-11-07 DIAGNOSIS — F41.1 GAD (GENERALIZED ANXIETY DISORDER): ICD-10-CM

## 2017-11-07 DIAGNOSIS — F40.10 SOCIAL ANXIETY DISORDER: ICD-10-CM

## 2017-11-07 DIAGNOSIS — F39 MOOD DISORDER: Primary | ICD-10-CM

## 2017-11-07 DIAGNOSIS — G47.00 INSOMNIA, UNSPECIFIED TYPE: ICD-10-CM

## 2017-11-07 DIAGNOSIS — F43.10 PTSD (POST-TRAUMATIC STRESS DISORDER): ICD-10-CM

## 2017-11-07 PROCEDURE — 90836 PSYTX W PT W E/M 45 MIN: CPT | Mod: S$GLB,,, | Performed by: PSYCHIATRY & NEUROLOGY

## 2017-11-07 PROCEDURE — 99999 PR PBB SHADOW E&M-EST. PATIENT-LVL III: CPT | Mod: PBBFAC,,, | Performed by: PSYCHIATRY & NEUROLOGY

## 2017-11-07 PROCEDURE — 99214 OFFICE O/P EST MOD 30 MIN: CPT | Mod: S$GLB,,, | Performed by: PSYCHIATRY & NEUROLOGY

## 2017-11-07 RX ORDER — CLONAZEPAM 1 MG/1
1 TABLET ORAL 2 TIMES DAILY
Qty: 60 TABLET | Refills: 1 | Status: SHIPPED | OUTPATIENT
Start: 2017-11-07 | End: 2017-12-27 | Stop reason: SDUPTHER

## 2017-11-07 RX ORDER — FLUOXETINE HYDROCHLORIDE 20 MG/1
20 CAPSULE ORAL DAILY
Qty: 30 CAPSULE | Refills: 1 | Status: SHIPPED | OUTPATIENT
Start: 2017-11-07 | End: 2017-11-09 | Stop reason: SDUPTHER

## 2017-11-07 RX ORDER — OLANZAPINE 20 MG/1
20 TABLET ORAL NIGHTLY
Qty: 30 TABLET | Refills: 1 | Status: SHIPPED | OUTPATIENT
Start: 2017-11-07 | End: 2018-01-11 | Stop reason: SDUPTHER

## 2017-11-07 RX ORDER — CYCLOBENZAPRINE HCL 10 MG
10 TABLET ORAL NIGHTLY PRN
Refills: 0 | COMMUNITY
Start: 2017-10-24 | End: 2017-11-22 | Stop reason: ALTCHOICE

## 2017-11-07 RX ORDER — PROPRANOLOL HYDROCHLORIDE 10 MG/1
TABLET ORAL
Qty: 60 TABLET | Refills: 1 | Status: SHIPPED | OUTPATIENT
Start: 2017-11-07 | End: 2017-11-15 | Stop reason: SDUPTHER

## 2017-11-07 RX ORDER — TRAZODONE HYDROCHLORIDE 100 MG/1
100 TABLET ORAL NIGHTLY
Qty: 90 TABLET | Refills: 1 | Status: SHIPPED | OUTPATIENT
Start: 2017-11-07 | End: 2018-01-11 | Stop reason: SDUPTHER
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
## 2017-11-09 ENCOUNTER — TELEPHONE (OUTPATIENT)
Dept: INTERNAL MEDICINE | Facility: CLINIC | Age: 37
End: 2017-11-09

## 2017-11-09 DIAGNOSIS — M25.512 ACUTE PAIN OF LEFT SHOULDER: ICD-10-CM

## 2017-11-09 RX ORDER — FLUOXETINE HYDROCHLORIDE 20 MG/1
20 CAPSULE ORAL DAILY
Qty: 30 CAPSULE | Refills: 1 | Status: SHIPPED | OUTPATIENT
Start: 2017-11-09 | End: 2017-11-10 | Stop reason: SDUPTHER

## 2017-11-10 RX ORDER — FLUOXETINE HYDROCHLORIDE 20 MG/1
20 CAPSULE ORAL DAILY
Qty: 90 CAPSULE | Refills: 0 | Status: SHIPPED | OUTPATIENT
Start: 2017-11-10 | End: 2017-12-11 | Stop reason: SDUPTHER

## 2017-11-15 ENCOUNTER — OFFICE VISIT (OUTPATIENT)
Dept: NEUROLOGY | Facility: CLINIC | Age: 37
End: 2017-11-15
Payer: COMMERCIAL

## 2017-11-15 ENCOUNTER — TELEPHONE (OUTPATIENT)
Dept: INTERNAL MEDICINE | Facility: CLINIC | Age: 37
End: 2017-11-15

## 2017-11-15 VITALS
WEIGHT: 101.06 LBS | BODY MASS INDEX: 18.6 KG/M2 | HEIGHT: 62 IN | HEART RATE: 100 BPM | SYSTOLIC BLOOD PRESSURE: 132 MMHG | RESPIRATION RATE: 18 BRPM | DIASTOLIC BLOOD PRESSURE: 88 MMHG

## 2017-11-15 DIAGNOSIS — G43.009 MIGRAINE WITHOUT AURA AND WITHOUT STATUS MIGRAINOSUS, NOT INTRACTABLE: Primary | ICD-10-CM

## 2017-11-15 DIAGNOSIS — R00.0 TACHYCARDIA: ICD-10-CM

## 2017-11-15 DIAGNOSIS — R20.0 RIGHT ARM NUMBNESS: ICD-10-CM

## 2017-11-15 DIAGNOSIS — M54.2 NECK PAIN: ICD-10-CM

## 2017-11-15 DIAGNOSIS — G56.02 LEFT CARPAL TUNNEL SYNDROME: ICD-10-CM

## 2017-11-15 DIAGNOSIS — R20.0 ARM NUMBNESS LEFT: ICD-10-CM

## 2017-11-15 PROBLEM — R42 DIZZINESS: Status: RESOLVED | Noted: 2017-02-01 | Resolved: 2017-11-15

## 2017-11-15 PROCEDURE — 99999 PR PBB SHADOW E&M-EST. PATIENT-LVL III: CPT | Mod: PBBFAC,,, | Performed by: NURSE PRACTITIONER

## 2017-11-15 PROCEDURE — 99214 OFFICE O/P EST MOD 30 MIN: CPT | Mod: S$GLB,,, | Performed by: NURSE PRACTITIONER

## 2017-11-15 RX ORDER — TOPIRAMATE 25 MG/1
50 TABLET ORAL 2 TIMES DAILY
Qty: 120 TABLET | Refills: 11 | Status: SHIPPED | OUTPATIENT
Start: 2017-11-15 | End: 2017-12-11

## 2017-11-15 RX ORDER — DICLOFENAC SODIUM 50 MG/1
50 TABLET, DELAYED RELEASE ORAL 2 TIMES DAILY PRN
Qty: 20 TABLET | Refills: 5 | Status: SHIPPED | OUTPATIENT
Start: 2017-11-15 | End: 2017-12-13

## 2017-11-15 RX ORDER — PROPRANOLOL HYDROCHLORIDE 10 MG/1
10 TABLET ORAL 2 TIMES DAILY
Qty: 60 TABLET | Refills: 1 | Status: SHIPPED | OUTPATIENT
Start: 2017-11-15 | End: 2018-02-21 | Stop reason: SDUPTHER

## 2017-11-15 RX ORDER — DICLOFENAC SODIUM 50 MG/1
50 TABLET, DELAYED RELEASE ORAL 2 TIMES DAILY
Qty: 20 TABLET | Refills: 5 | Status: SHIPPED | OUTPATIENT
Start: 2017-11-15 | End: 2017-11-15 | Stop reason: CLARIF

## 2017-11-15 NOTE — LETTER
November 15, 2017      Colby Peterson MD  [address] [place] [address] [address] [place] [id number]           [facility]  [address] [facility] [address]  Phone: [phone]  Fax: [phone]          Patient: Staci Hodge   MR Number: [id number]   YOB: 1980   Date of Visit: 11/15/2017       Dear Dr. Colby Peterson:    Thank you for referring Staci Hodge to me for evaluation. Attached you will find relevant portions of my assessment and plan of care.    If you have questions, please do not hesitate to call me. I look forward to following Staci Hodge along with you.    Sincerely,    Camille Emerson, NP    Enclosure  CC:  No Recipients    If you would like to receive this communication electronically, please contact [email or web address] or [phone] to request more information on [employer] access.    For providers and/or their staff who would like to refer a patient to [facility], please contact us through our one-stop-shop provider referral line, [facility] , at [phone].    If you feel you have received this communication in error or would no longer like to receive these types of communications, please e-mail [email or web address]

## 2017-11-15 NOTE — PROGRESS NOTES
Subjective:      Chief Complaint:  Dizziness and numbness    History of Present Illness  Staci Hodge is a 37 y.o. female, with chronic headaches and bilateral CTS, and prior complaints of dizziness. She has a history of PTSD, panic disorder with agoraphobia, insomnia, colon resection, due to small bowel obstruction, and tobacco use.     She presents today to reestablish care. She was lost to follow up, due to multiple personal stressors/events this year.     She is S/P right CTR; however, she continues to drop items, and experience numbness and pain to both her right and left hand. She also complains of neck pain, which radiates into her BUE.     Gabapentin was initiated for headache prevention at her last visit, which was ineffective. She continued on Propranolol, given it's positive effect on her tachycardia. She continues with frequent frontal headaches. Her headaches are mild to moderate in nature. Her headaches appear to be triggered by anxiety. She has also tried Ibuprofen, which was ineffective. She denies any visual disturbances, photophobia, phonophobia, autonomic complaints, focal weakness, paresthesias, neck pain, or nausea.     She continues to see Dr. Emerson for her anxiety/panic disorder, and has seen some improvement with adjustments to her medications recently.     She denies any episodes of dizziness at this time, which were previously preceded by a shocking pain to her head.     I have reviewed all of this patient's past medical and surgical histories as well as family and social histories and active allergies and medications as documented in the electronic medical record.    Review of Systems  Review of Systems   Constitutional: Positive for fatigue. Negative for appetite change.   HENT: Negative for tinnitus.    Eyes: Negative for photophobia and visual disturbance.   Respiratory: Negative for shortness of breath.    Cardiovascular: Positive for palpitations.   Gastrointestinal:  Negative for nausea.   Musculoskeletal: Positive for neck pain.   Neurological: Positive for numbness and headaches. Negative for dizziness, tremors and speech difficulty.   Hematological: Does not bruise/bleed easily.   Psychiatric/Behavioral: Negative for agitation, dysphoric mood, sleep disturbance and suicidal ideas. The patient is nervous/anxious.        Objective:       Vitals:    11/15/17 0908   BP: 132/88   Pulse: 100   Resp: 18     Exam:  Gen Appearance, well developed/nourished in no apparent distress  CV: 2+ distal pulses with no edema or swelling  Neuro:  MS: Awake, alert, oriented to place, person, time, situation. Sustains attention. Recent/remote memory intact, Language is full to spontaneous speech/repetition/naming/comprehension. Fund of Knowledge is full  CN: Optic discs are flat with normal vasculature, PERRL, right nystamus. Normal facial sensation and strength, Hearing symmetric, Tongue and Palate are midline and strong. Shoulder Shrug symmetric and strong.  Motor: Normal bulk, tone, no abnormal movements. 5/5 strength bilateral upper/lower extremities with 2+ reflexes and bilateral plantar response  Sensory: symmetric to light touch, pain, temp, and vibration/proprioception. Romberg negative; + left Tinel's and Phalen's sign.   Cerebellar: Finger-nose,Heal-shin, Rapid alternating movements intact  Gait: Normal stance, no ataxia    Imaging:   MRI Brain with and without 2/3/2017: normal    24 hour Holter monitor: not completed    EMG BUE 2/10/2017:   Impression: Abnormal Study secondary to the Presence of:   Mild Bilateral Carpal Tunnel Syndrome.     Labs: CBC, CMP, Lipid Panel, TSH, B12, Vitamin D ordered per PCP; Vitamin D low; labs also show fasting hyperglycemia.    Assessment:   Staci Hodge is a 37 y.o. female, with chronic headaches and bilateral CTS, and prior complaints of dizziness. She has a history of PTSD, panic disorder with agoraphobia, insomnia, colon resection, due to  small bowel obstruction, and tobacco use.   Plan:   I recommend:  1. MRI C-spine to evaluate for structural abnormalities,given her ongoing right hand complaints after CTR, as well as her LUE complaints. Prior EMG was unrevealing for cervical radicular disease; however, she did not have cervical complaints at the time.   2. Left CT injection for left CTS complaints; however, her paresthesias could be cervical in nature.   3. Continue Propranolol for headaches and for tachycardia. Gabapentin was ineffective.   4. Start Topamax 25 mg bid for headache prevention, which may have a positive effect on her anxiety.   5. Restart prn Diclofenac to abort headaches.     FU 4 weeks.

## 2017-11-16 ENCOUNTER — PATIENT MESSAGE (OUTPATIENT)
Dept: NEUROLOGY | Facility: CLINIC | Age: 37
End: 2017-11-16

## 2017-11-16 RX ORDER — NAPROXEN SODIUM 550 MG/1
550 TABLET ORAL 2 TIMES DAILY WITH MEALS
Qty: 60 TABLET | Refills: 5 | Status: SHIPPED | OUTPATIENT
Start: 2017-11-16 | End: 2017-11-29

## 2017-11-20 ENCOUNTER — PATIENT MESSAGE (OUTPATIENT)
Dept: NEUROLOGY | Facility: CLINIC | Age: 37
End: 2017-11-20

## 2017-11-20 RX ORDER — FLUOXETINE 10 MG/1
10 CAPSULE ORAL DAILY
Qty: 30 CAPSULE | Refills: 1 | Status: SHIPPED | OUTPATIENT
Start: 2017-11-20 | End: 2017-11-29 | Stop reason: DRUGHIGH

## 2017-11-21 ENCOUNTER — PATIENT MESSAGE (OUTPATIENT)
Dept: NEUROLOGY | Facility: CLINIC | Age: 37
End: 2017-11-21

## 2017-11-22 ENCOUNTER — HOSPITAL ENCOUNTER (OUTPATIENT)
Dept: RADIOLOGY | Facility: HOSPITAL | Age: 37
Discharge: HOME | End: 2017-11-22
Attending: NURSE PRACTITIONER
Payer: COMMERCIAL

## 2017-11-22 ENCOUNTER — PROCEDURE VISIT (OUTPATIENT)
Dept: NEUROLOGY | Facility: CLINIC | Age: 37
End: 2017-11-22
Payer: COMMERCIAL

## 2017-11-22 DIAGNOSIS — M54.2 NECK PAIN: ICD-10-CM

## 2017-11-22 DIAGNOSIS — G56.02 LEFT CARPAL TUNNEL SYNDROME: Primary | ICD-10-CM

## 2017-11-22 DIAGNOSIS — R20.0 RIGHT ARM NUMBNESS: ICD-10-CM

## 2017-11-22 DIAGNOSIS — R20.0 ARM NUMBNESS LEFT: ICD-10-CM

## 2017-11-22 DIAGNOSIS — M62.838 CERVICAL PARASPINAL MUSCLE SPASM: ICD-10-CM

## 2017-11-22 PROCEDURE — 72141 MRI NECK SPINE W/O DYE: CPT | Mod: TC

## 2017-11-22 PROCEDURE — 72141 MRI NECK SPINE W/O DYE: CPT | Mod: 26,,, | Performed by: RADIOLOGY

## 2017-11-22 PROCEDURE — 20526 THER INJECTION CARP TUNNEL: CPT | Mod: S$GLB,,, | Performed by: NURSE PRACTITIONER

## 2017-11-22 RX ORDER — BETAMETHASONE SODIUM PHOSPHATE AND BETAMETHASONE ACETATE 3; 3 MG/ML; MG/ML
1.5 INJECTION, SUSPENSION INTRA-ARTICULAR; INTRALESIONAL; INTRAMUSCULAR; SOFT TISSUE
Status: DISCONTINUED | OUTPATIENT
Start: 2017-11-22 | End: 2018-07-24 | Stop reason: HOSPADM

## 2017-11-22 RX ORDER — TIZANIDINE 2 MG/1
4 TABLET ORAL EVERY 6 HOURS PRN
Qty: 60 TABLET | Refills: 3 | Status: SHIPPED | OUTPATIENT
Start: 2017-11-22 | End: 2018-01-27 | Stop reason: SDUPTHER

## 2017-11-22 NOTE — PROCEDURES
Procedures Carpal Tunnel Injection    Informed consent was obtained from the patient. The volar aspect of the left wrist was prepared with Chloraprep.  The palmaris longus tendon was identified and marked and so was the distal wrist crease.  A 27 gauge ½ inch needle was used to enter the carpal tunnel, approximately 1/2 inch proximal to the distal wrist crease and ulnar to the palmaris longus tendon.  Paresthesias were elicited in the median nerve distribution. The needle was withdrawn and reinserted slightly laterally without elicitation of further paresthesias.  The needle was advanced through the carpal tunnel without any difficulty. 0.25 mL of betamethasone was injected after aspiration. The medications flowed freely without any difficulty.     After the procedure, the patient clenched and unclenched the fingers of both hands for a period of two minutes to distribute the medication. There were no complications throughout the procedure     POST PROCEDURE INSTRUCTIONS: The patient has been asked to report to us any redness, swelling, inflammation, or fevers. The patient has been asked to restrict the use of the * extremity for the next 24 hours.       Patient reported that Flexeril was ineffective for her cervical complaints. MRI results were reviewed, which indicated cervical spasm. Flexeril was changed to Tizanidine.

## 2017-11-29 ENCOUNTER — OFFICE VISIT (OUTPATIENT)
Dept: NEPHROLOGY | Facility: CLINIC | Age: 37
End: 2017-11-29
Payer: COMMERCIAL

## 2017-11-29 VITALS
HEIGHT: 62 IN | SYSTOLIC BLOOD PRESSURE: 112 MMHG | BODY MASS INDEX: 18.01 KG/M2 | DIASTOLIC BLOOD PRESSURE: 82 MMHG | WEIGHT: 97.88 LBS

## 2017-11-29 DIAGNOSIS — Z87.448 HISTORY OF PYELONEPHRITIS: ICD-10-CM

## 2017-11-29 DIAGNOSIS — R80.9 MICROALBUMINURIA: Primary | ICD-10-CM

## 2017-11-29 DIAGNOSIS — R81 GLUCOSURIA WITH NORMAL SERUM GLUCOSE: ICD-10-CM

## 2017-11-29 PROCEDURE — 99999 PR PBB SHADOW E&M-EST. PATIENT-LVL IV: CPT | Mod: PBBFAC,,, | Performed by: INTERNAL MEDICINE

## 2017-11-29 PROCEDURE — 99204 OFFICE O/P NEW MOD 45 MIN: CPT | Mod: S$GLB,,, | Performed by: INTERNAL MEDICINE

## 2017-11-29 NOTE — PROGRESS NOTES
Subjective:       Patient ID: Staci Hodge is a 37 y.o. White female who presents for new evaluation of renal function.  HPI   37 y.o. female, with chronic headaches and bilateral CTS, and prior complaints of dizziness. She has a history of PTSD, panic disorder with agoraphobia, insomnia,   with peutz- jeghers syndrome diagnosed at age 12. She has had 3 small bowel sx with resection due to SBO.last ,  +tobacco use  2017 bilateral pyelonephritis seen at [facility] ED and treated as outpatient. She had been having  flank pain for about a month.   She has not been seen by a urologist  Past h/o tachycardia and obesity with significant wt loss unexplained several years ago.   She has elevated wbc 11-14 K  Seen in past by hematology 2015   10/2017 microalbuminuria and glucosuria noted after pyelonephritis with serum glucose of 82 and HbgA1c 4.9  11/15/2017 On topiramax for headaches 50 mg bid    No h/o  nephrolithasis  + NSAIDs at most 1-2 times a week but not in the last year. She has not taken diclofenac prescribed.    FH father Dm  M and Sister niko delacruz, Mother  colon and breat cancer     Serum crt 0.8, electrolytes wnl 10/2017 UA +3 glucose and U microalbumin 288 U microalb/crt 98 U pH 7.0 occassional bacteria, no rbc or blood, 2 wbc onmicroscopioc 10/2017 historically she has had trace protein in 2017 ct abd/pelvis: Patchy areas of diminished enhancement in the parenchyma of the kidneys bilaterally suggestive of pyelonephritis.  10/2017 renal US:  Right kidney: The right kidney measures 9.1 x 3.6 x 4.9 cm.  No evidence of cortical thinning.  No loss of corticomedullary distinction.  There is adequate perfusion.  The resistive index is 0.60.  No evidence of mass.  No evidence of hydronephrosis.  Left kidney: The left kidney measures 11.2 x 4.2 x 4.5 cm.  No evidence of cortical thinning.  No loss of corticomedullary distinction.  There is adequate perfusion.  The resistive index is  "0.59.  No evidence of mass.  No evidence of hydronephrosis.    Review of Systems   Constitutional: Negative for appetite change, chills, fatigue, fever and unexpected weight change.   HENT: Negative for congestion, facial swelling, hearing loss, nosebleeds and trouble swallowing.    Eyes: Negative for pain, discharge, redness and visual disturbance.   Respiratory: Negative for cough, chest tightness, shortness of breath and wheezing.    Cardiovascular: Negative for chest pain, palpitations and leg swelling.   Gastrointestinal: Negative for abdominal pain, constipation, diarrhea, nausea and vomiting.   Endocrine: Negative for cold intolerance, heat intolerance and polydipsia.   Genitourinary: Negative for decreased urine volume, difficulty urinating, dysuria, flank pain, hematuria and urgency.   Musculoskeletal: Negative for arthralgias, back pain, joint swelling and myalgias.   Skin: Negative for color change, pallor, rash and wound.   Neurological: Negative for dizziness, tremors, seizures, syncope, speech difficulty, weakness and headaches.   Hematological: Negative for adenopathy. Does not bruise/bleed easily.   Psychiatric/Behavioral: Negative for agitation, behavioral problems, dysphoric mood, self-injury and sleep disturbance.       Objective:     Blood pressure 112/82, height 5' 2" (1.575 m), weight 44.4 kg (97 lb 14.2 oz).      Physical Exam   Constitutional: She is oriented to person, place, and time. She appears well-developed and well-nourished. No distress.   HENT:   Head: Normocephalic and atraumatic.   Mouth/Throat: No oropharyngeal exudate.   Eyes: Conjunctivae and EOM are normal. Pupils are equal, round, and reactive to light. Right eye exhibits no discharge. Left eye exhibits no discharge. No scleral icterus.   Neck: Normal range of motion. Neck supple. No JVD present. No tracheal deviation present. No thyromegaly present.   Cardiovascular: Normal rate, regular rhythm and intact distal pulses.  Exam " reveals no gallop.    No murmur heard.  Pulmonary/Chest: Effort normal and breath sounds normal. No stridor. No respiratory distress. She has no wheezes. She has no rales. She exhibits no tenderness.   Abdominal: Soft. Bowel sounds are normal. She exhibits no distension and no mass. There is no tenderness. There is no rebound and no guarding. No hernia.   Musculoskeletal: She exhibits no edema or tenderness.   Lymphadenopathy:     She has no cervical adenopathy.   Neurological: She is alert and oriented to person, place, and time. She exhibits normal muscle tone.   Skin: Skin is warm and dry. No rash noted. She is not diaphoretic. No erythema.   Psychiatric: She has a normal mood and affect. Judgment and thought content normal.   Nursing note and vitals reviewed.      Assessment:       1. Microalbuminuria    2. Glucosuria with normal serum glucose    3. History of pyelonephritis        Plan:     37 y.o. female, with chronic headaches and bilateral CTS, and prior complaints of dizziness. She has a history of PTSD, panic disorder with agoraphobia, insomnia,   with peutz- jeghers syndrome diagnosed at age 12. She has had 3 small bowel sx with resection due to SBO.last 2014,  +tobacco use  9/2017 bilateral pyelonephritis seen at [facility] ED and treated as outpatient. She had been having  flank pain for about a month.   She has not been seen by a urologist yet.  One month after acute pyelonephritis patient was noted to have glucosuria and microalbuminuria  assymetric kidney size now on renal us R 9.1 cm and L 11.2 cm    Pyelonephritis: asymetric kidneys unclear if this is congenital, related to scarring from infection, and or to some extent technique dependent reading.   Considering the severity of infection would suggest urology evaluation to further assess for urologic abnormality.  To my knowledge there is no association of Peutz-jeghers with renal abnormalities or cancer, but I will need to complete a more extensive  literature search.    Proteinuria and glucosuria most likely residual of pyelonephritis and inflammation of renal tubules. Will continue to evaluate urine serially for resolution. If not will then consider further evaluation.  Topriamax was initiated after the abnormal urines.   Topiramax is known to act on the proximal tubules of the kidney and can cause a Fanconi's like syndrome with proteinuria, glycosuria and phosphaturia and RTA   Will monitor electrolytes and urine studies serially. If it appears that the topiramax is contributing to urinary findings, may need to consider other medication in the future

## 2017-11-29 NOTE — LETTER
December 4, 2017      Shilpi Clayton, MARGARETTE  [address]  [facility] [id number] [place] [address] Spec. - Nephrology  [address] [facility] [place] [address]  Phone: [phone]          Patient: Staci Hodge   MR Number: [id number]   YOB: 1980   Date of Visit: 11/29/2017       Dear Shilpi Clayton:    Thank you for referring Staci Hodge to me for evaluation. Attached you will find relevant portions of my assessment and plan of care.    If you have questions, please do not hesitate to call me. I look forward to following Staci Hodge along with you.    Sincerely,    Libby Roberts MD    Enclosure  CC:  No Recipients    If you would like to receive this communication electronically, please contact [email or web address] or [phone] to request more information on [facility] access.    For providers and/or their staff who would like to refer a patient to [facility], please contact us through our one-stop-shop provider referral line, [facility], at [phone].    If you feel you have received this communication in error or would no longer like to receive these types of communications, please e-mail [email or web address]

## 2017-11-30 ENCOUNTER — PATIENT MESSAGE (OUTPATIENT)
Dept: NEUROLOGY | Facility: CLINIC | Age: 37
End: 2017-11-30

## 2017-12-04 RX ORDER — OLANZAPINE 10 MG/1
10 TABLET ORAL NIGHTLY
Qty: 30 TABLET | Refills: 1 | Status: SHIPPED | OUTPATIENT
Start: 2017-12-04 | End: 2017-12-11

## 2017-12-05 ENCOUNTER — TELEPHONE (OUTPATIENT)
Dept: NEUROLOGY | Facility: CLINIC | Age: 37
End: 2017-12-05

## 2017-12-05 ENCOUNTER — TELEPHONE (OUTPATIENT)
Dept: PAIN MEDICINE | Facility: CLINIC | Age: 37
End: 2017-12-05

## 2017-12-05 DIAGNOSIS — M54.2 NECK PAIN: Primary | ICD-10-CM

## 2017-12-06 ENCOUNTER — PATIENT MESSAGE (OUTPATIENT)
Dept: INTERNAL MEDICINE | Facility: CLINIC | Age: 37
End: 2017-12-06

## 2017-12-07 ENCOUNTER — PATIENT MESSAGE (OUTPATIENT)
Dept: INTERNAL MEDICINE | Facility: CLINIC | Age: 37
End: 2017-12-07

## 2017-12-07 RX ORDER — PREGABALIN 75 MG/1
75 CAPSULE ORAL 2 TIMES DAILY
Qty: 60 CAPSULE | Refills: 2 | Status: SHIPPED | OUTPATIENT
Start: 2017-12-07 | End: 2017-12-13 | Stop reason: SDUPTHER

## 2017-12-11 ENCOUNTER — OFFICE VISIT (OUTPATIENT)
Dept: PSYCHIATRY | Facility: CLINIC | Age: 37
End: 2017-12-11
Payer: COMMERCIAL

## 2017-12-11 VITALS
SYSTOLIC BLOOD PRESSURE: 122 MMHG | DIASTOLIC BLOOD PRESSURE: 82 MMHG | WEIGHT: 101.44 LBS | BODY MASS INDEX: 18.67 KG/M2 | HEART RATE: 101 BPM | HEIGHT: 62 IN | RESPIRATION RATE: 19 BRPM

## 2017-12-11 DIAGNOSIS — F40.01 PANIC DISORDER WITH AGORAPHOBIA: ICD-10-CM

## 2017-12-11 DIAGNOSIS — F39 MOOD DISORDER: Primary | ICD-10-CM

## 2017-12-11 DIAGNOSIS — F41.1 GAD (GENERALIZED ANXIETY DISORDER): ICD-10-CM

## 2017-12-11 DIAGNOSIS — F43.10 PTSD (POST-TRAUMATIC STRESS DISORDER): ICD-10-CM

## 2017-12-11 DIAGNOSIS — F40.10 SOCIAL ANXIETY DISORDER: ICD-10-CM

## 2017-12-11 PROCEDURE — 99999 PR PBB SHADOW E&M-EST. PATIENT-LVL III: CPT | Mod: PBBFAC,,, | Performed by: PSYCHIATRY & NEUROLOGY

## 2017-12-11 PROCEDURE — 99213 OFFICE O/P EST LOW 20 MIN: CPT | Mod: S$GLB,,, | Performed by: PSYCHIATRY & NEUROLOGY

## 2017-12-11 PROCEDURE — 90838 PSYTX W PT W E/M 60 MIN: CPT | Mod: S$GLB,,, | Performed by: PSYCHIATRY & NEUROLOGY

## 2017-12-11 RX ORDER — FLUOXETINE HYDROCHLORIDE 40 MG/1
40 CAPSULE ORAL DAILY
Qty: 90 CAPSULE | Refills: 0 | Status: SHIPPED | OUTPATIENT
Start: 2017-12-11 | End: 2018-01-11 | Stop reason: SDUPTHER
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
## 2017-12-13 ENCOUNTER — OFFICE VISIT (OUTPATIENT)
Dept: NEUROLOGY | Facility: CLINIC | Age: 37
End: 2017-12-13
Payer: COMMERCIAL

## 2017-12-13 VITALS
HEIGHT: 62 IN | BODY MASS INDEX: 18.41 KG/M2 | DIASTOLIC BLOOD PRESSURE: 86 MMHG | HEART RATE: 120 BPM | SYSTOLIC BLOOD PRESSURE: 104 MMHG | WEIGHT: 100.06 LBS | RESPIRATION RATE: 18 BRPM

## 2017-12-13 DIAGNOSIS — R20.0 LEFT ARM NUMBNESS: ICD-10-CM

## 2017-12-13 DIAGNOSIS — G43.009 MIGRAINE WITHOUT AURA AND WITHOUT STATUS MIGRAINOSUS, NOT INTRACTABLE: Primary | ICD-10-CM

## 2017-12-13 DIAGNOSIS — M54.2 NECK PAIN: ICD-10-CM

## 2017-12-13 DIAGNOSIS — F41.1 GAD (GENERALIZED ANXIETY DISORDER): ICD-10-CM

## 2017-12-13 DIAGNOSIS — R00.0 TACHYCARDIA: ICD-10-CM

## 2017-12-13 DIAGNOSIS — M62.838 CERVICAL PARASPINAL MUSCLE SPASM: ICD-10-CM

## 2017-12-13 DIAGNOSIS — G56.02 LEFT CARPAL TUNNEL SYNDROME: ICD-10-CM

## 2017-12-13 PROCEDURE — 99999 PR PBB SHADOW E&M-EST. PATIENT-LVL IV: CPT | Mod: PBBFAC,,, | Performed by: NURSE PRACTITIONER

## 2017-12-13 PROCEDURE — 99214 OFFICE O/P EST MOD 30 MIN: CPT | Mod: S$GLB,,, | Performed by: NURSE PRACTITIONER

## 2017-12-13 RX ORDER — TOPIRAMATE 25 MG/1
50 TABLET ORAL 2 TIMES DAILY
Qty: 120 TABLET | Refills: 11
Start: 2017-12-13 | End: 2018-01-02 | Stop reason: SDUPTHER

## 2017-12-13 RX ORDER — PREGABALIN 100 MG/1
100 CAPSULE ORAL 2 TIMES DAILY
Qty: 60 CAPSULE | Refills: 2 | Status: SHIPPED | OUTPATIENT
Start: 2017-12-13 | End: 2018-02-12

## 2017-12-13 NOTE — PROGRESS NOTES
Subjective:      Chief Complaint:  Dizziness and numbness    History of Present Illness  Staci Hodge is a 37 y.o. female, with chronic headaches and bilateral CTS, and prior complaints of dizziness. She has a history of PTSD, panic disorder with agoraphobia, insomnia, colon resection, due to small bowel obstruction, and tobacco use.     She presents today for a follow up visit. She completed an MRI C-spine and had a left CT injection since her last visit. Her MRI C-spine demonstrated mild degenerative changes with foraminal narrowing at C5/6 and C6/7.     Her left CT injection was ineffective. She continues with numbness to her left hand. She also continues with paresthesias to her right hand after her right CTR per Dr. Patel. She continue to drop items with both hands.     She was referred to Dr. Chapin for her ongoing neck pain, and has an appointment in January. She continues with burning pain to her left trapezius, which radiates into her LUE. She has difficulty holding a phone at work, and is trying to get approval to use a headset at work to help with this. Her PCP initiated Lyrica 75 mg bid last week, which has been ineffective thus far.     She was placed on Topamax at her last visit, which was helpful at reducing her headaches, which now occur once per week, and are milder in nature; it has also had a modest effect on her anxiety. She also continues on Propranolol for her tachycardia. Diclofenac was discontinued per Nephrology, as she has proteinuria, which is being followed.     She continues to see Dr. Emerson for her anxiety/panic disorder.    She denies any episodes of dizziness at this time, which were previously preceded by a shocking pain to her head.     I have reviewed all of this patient's past medical and surgical histories as well as family and social histories and active allergies and medications as documented in the electronic medical record.    Review of Systems  Review of Systems    Constitutional: Positive for fatigue. Negative for appetite change.   HENT: Negative for tinnitus.    Eyes: Negative for photophobia and visual disturbance.   Respiratory: Negative for shortness of breath.    Cardiovascular: Positive for palpitations.   Gastrointestinal: Negative for nausea.   Musculoskeletal: Positive for neck pain.   Neurological: Positive for numbness and headaches. Negative for dizziness, tremors and speech difficulty.   Hematological: Does not bruise/bleed easily.   Psychiatric/Behavioral: Negative for agitation, dysphoric mood, sleep disturbance and suicidal ideas. The patient is nervous/anxious.        Objective:       Vitals:    12/13/17 0836   BP: 104/86   Pulse: (!) 120   Resp: 18     Exam:  Gen Appearance, well developed/nourished in no apparent distress  CV: 2+ distal pulses with no edema or swelling  Neuro:  MS: Awake, alert, oriented to place, person, time, situation. Sustains attention. Recent/remote memory intact, Language is full to spontaneous speech/repetition/naming/comprehension. Fund of Knowledge is full  CN: Optic discs are flat with normal vasculature, PERRL, right nystamus. Normal facial sensation and strength, Hearing symmetric, Tongue and Palate are midline and strong. Shoulder Shrug symmetric and strong.  Motor: Normal bulk, tone, no abnormal movements. 5/5 strength bilateral upper/lower extremities with 2+ reflexes and bilateral plantar response  Sensory: symmetric to light touch, pain, temp, and vibration/proprioception. Romberg negative; + left Tinel's and Phalen's sign.   Cerebellar: Finger-nose,Heal-shin, Rapid alternating movements intact  Gait: Normal stance, no ataxia    Imaging:   MRI C-spine 12/2017:  Mild degenerative changes of the cervical spine; neural foraminal narrowing at the C5-6 and C6-7 levels.  No central canal stenosis.    MRI Brain with and without 2/3/2017: normal    24 hour Holter monitor: not completed    EMG BUE 2/10/2017:   Impression:  Abnormal Study secondary to the Presence of:   Mild Bilateral Carpal Tunnel Syndrome.     Labs: CBC, CMP, Lipid Panel, TSH, B12, Vitamin D ordered per PCP; Vitamin D low; labs also show fasting hyperglycemia.    Assessment:   Staci Hodge is a 37 y.o. female, with chronic headaches and bilateral CTS, and prior complaints of dizziness. She has a history of PTSD, panic disorder with agoraphobia, insomnia, colon resection, due to small bowel obstruction, and tobacco use.   Plan:   I recommend:  1. Continue Topamax for headache prevention, as well as Propranolol, which helps headaches somewhat, in addition to her tachycardia.   2. EMG BUE repeat to differentiate cervical radiculopathy from ongoing or increased neuropathy affecting the LUE, as well as the RUE. Refer back to Dr. Patel if appropriate. Left CT injection was ineffective.   3. She was referred to Dr. Chapin; I will reschedule this appointment to occur after her repeat EMG.   4. Diclofenac, prescribed to abort headaches, was discontinued per Nephrology, given her proteinuria.   5. Increase Lyrica to 100 mg bid for her cervical and LUE complaints.   6. She may continue to work; however, given her cervical and neuropathic type complaints, she is unable to lift, push/pull more than 10 pounds until her next follow up visit. Form filled out for work.      2 months.

## 2017-12-21 DIAGNOSIS — M25.512 ACUTE PAIN OF LEFT SHOULDER: ICD-10-CM

## 2017-12-21 RX ORDER — CYCLOBENZAPRINE HCL 10 MG
10 TABLET ORAL NIGHTLY PRN
Qty: 30 TABLET | Refills: 0 | Status: SHIPPED | OUTPATIENT
Start: 2017-12-21 | End: 2017-12-31

## 2017-12-27 DIAGNOSIS — F41.1 GAD (GENERALIZED ANXIETY DISORDER): ICD-10-CM

## 2017-12-28 RX ORDER — CLONAZEPAM 1 MG/1
TABLET ORAL
Qty: 60 TABLET | Refills: 1 | Status: SHIPPED | OUTPATIENT
Start: 2017-12-28 | End: 2018-03-14 | Stop reason: SDUPTHER

## 2017-12-29 ENCOUNTER — PATIENT MESSAGE (OUTPATIENT)
Dept: INTERNAL MEDICINE | Facility: CLINIC | Age: 37
End: 2017-12-29

## 2017-12-29 ENCOUNTER — PATIENT MESSAGE (OUTPATIENT)
Dept: NEUROLOGY | Facility: CLINIC | Age: 37
End: 2017-12-29

## 2017-12-29 NOTE — TELEPHONE ENCOUNTER
Spoke to Staci. She is having severe burning pain. She is using the cream and new meds but is very miserable with the pain.

## 2018-01-02 ENCOUNTER — PATIENT MESSAGE (OUTPATIENT)
Dept: NEUROLOGY | Facility: CLINIC | Age: 38
End: 2018-01-02

## 2018-01-02 ENCOUNTER — PATIENT MESSAGE (OUTPATIENT)
Dept: INTERNAL MEDICINE | Facility: CLINIC | Age: 38
End: 2018-01-02

## 2018-01-02 DIAGNOSIS — G43.009 MIGRAINE WITHOUT AURA AND WITHOUT STATUS MIGRAINOSUS, NOT INTRACTABLE: ICD-10-CM

## 2018-01-02 RX ORDER — CLONAZEPAM 1 MG/1
TABLET ORAL
Qty: 60 TABLET | Refills: 2 | Status: SHIPPED | OUTPATIENT
Start: 2018-01-02 | End: 2018-02-12 | Stop reason: SDUPTHER

## 2018-01-02 RX ORDER — METHYLPREDNISOLONE 4 MG/1
TABLET ORAL
Qty: 1 PACKAGE | Refills: 0 | Status: SHIPPED | OUTPATIENT
Start: 2018-01-02 | End: 2018-01-23

## 2018-01-03 ENCOUNTER — TELEPHONE (OUTPATIENT)
Dept: NEUROLOGY | Facility: CLINIC | Age: 38
End: 2018-01-03

## 2018-01-03 ENCOUNTER — PATIENT MESSAGE (OUTPATIENT)
Dept: INTERNAL MEDICINE | Facility: CLINIC | Age: 38
End: 2018-01-03

## 2018-01-03 RX ORDER — TOPIRAMATE 25 MG/1
50 TABLET ORAL 2 TIMES DAILY
Qty: 120 TABLET | Refills: 11
Start: 2018-01-03 | End: 2018-12-03 | Stop reason: SDUPTHER

## 2018-01-08 ENCOUNTER — OFFICE VISIT (OUTPATIENT)
Dept: PAIN MEDICINE | Facility: CLINIC | Age: 38
End: 2018-01-08
Payer: COMMERCIAL

## 2018-01-08 ENCOUNTER — PATIENT MESSAGE (OUTPATIENT)
Dept: NEUROLOGY | Facility: CLINIC | Age: 38
End: 2018-01-08

## 2018-01-08 VITALS
BODY MASS INDEX: 18.67 KG/M2 | HEART RATE: 123 BPM | SYSTOLIC BLOOD PRESSURE: 136 MMHG | DIASTOLIC BLOOD PRESSURE: 94 MMHG | WEIGHT: 102.06 LBS

## 2018-01-08 DIAGNOSIS — M79.18 MYOFASCIAL MUSCLE PAIN: ICD-10-CM

## 2018-01-08 DIAGNOSIS — M54.12 LEFT CERVICAL RADICULOPATHY: Primary | ICD-10-CM

## 2018-01-08 PROCEDURE — 99999 PR PBB SHADOW E&M-EST. PATIENT-LVL IV: CPT | Mod: PBBFAC,,, | Performed by: ANESTHESIOLOGY

## 2018-01-08 PROCEDURE — 99204 OFFICE O/P NEW MOD 45 MIN: CPT | Mod: S$GLB,,, | Performed by: ANESTHESIOLOGY

## 2018-01-08 NOTE — PROGRESS NOTES
Chronic Pain - New Consult    Referring Physician: Camille Emerson NP    Chief Complaint:   Chief Complaint   Patient presents with    Neck Pain        SUBJECTIVE:    Staci Hodge presents to the clinic for the evaluation of neck pain pain. The pain started beginning of October ago and symptoms have been worsening.The pain is located in the neck area and radiates to the left arm.  She has some numbness over left arm . The pain is described as numbing and shooting and is rated as 3/10. The pain is rated with a score of  3/10 on the BEST day and a score of 8/10 on the WORST day.  Symptoms interfere with daily activity, sleeping and work. The pain is exacerbated by Touching and typing.  The pain is mitigated by nothing. She reports spending 8 hours per day reclining. The patient reports 1 hour of uninterrupted sleep per night.    She has psychiatric history of anxiety, bipolar disorder and under psychiatry care   She has CTS on right side as well as right  ulnar nerve transposition     EMG UEs: 2/2017:     Impression:  Abnormal Study secondary to the Presence of:    Mild Bilateral Carpal Tunnel Syndrome  She is scheduled to have another EMG this month     Patient denies night fever/night sweats, urinary incontinence, bowel incontinence, significant weight loss, significant motor weakness and loss of sensations.    Physical Therapy/Home Exercise: no      Pain Disability Index Review:  Last 3 PDI Scores 1/8/2018   Pain Disability Index (PDI) 52       Pain Medications:    - Opioids: none   - Adjuvant Medications: Lyrica ( Pregabalin) and Topamax (Topiramate)  - Anti-Coagulants: none   - Others: See Medication List      report:  Reviewed and consistent with medication use as prescribed.    Pain Procedures: None     Imaging:   MRI Cervical Spine Without Contrast   Technique: Sagittal T1, sagittal T2, sagittal STIR, axial gradient and axial T2 images of the cervical spine were obtained without  contrast.    Results: The incidentally visualized soft tissues structures of the neck are within normal limits.    The craniocervical junction is intact.  There is no evidence for a Chiari mount formation.  The spinal cord is normal in signal without cord edema or myelomalacia.    There is straightening of the normal cervical lordosis.  Vertebral body heights are maintained.  There is disc desiccation throughout the cervical spine with mild disc space narrowing.  The marrow signal is normal without evidence for a marrow replacement process, infection or tumor.    At C2-3, no disk herniation, central canal stenosis or neural foraminal narrowing.    At C3-4, there is a posterior disc osteophyte complex and bilateral uncovertebral spurring without central canal stenosis or neural foraminal narrowing.    At C4-5, there is a posterior disc osteophyte complex without central canal stenosis or neural foraminal narrowing.    At C5-6, there is a posterior disc osteophyte complex with bilateral uncovertebral spurring and mild facet arthropathy contribute to mild bilateral neural foraminal narrowing.    At C6-7, there is a posterior disc osteophyte complex and bilateral uncovertebral spurring and facet arthropathy contributing to mild bilateral neural foraminal narrowing.    At C7-T1, there is bilateral uncovertebral spurring without central canal stenosis or neural foraminal narrowing.    Incidentally noted on the left at T1-2 is a left perineural cyst.   Impression         Mild degenerative changes of the cervical spine intravenous tube neural foraminal narrowing at the C5-6 and C6-7 levels.  No central canal stenosis.      Electronically signed by: Radha Mancini MD  Date: 11/22/17  Time: 13:28          Past Medical History:   Diagnosis Date    Abnormal Pap smear     Anxiety     Colon polyp     Depression     Headache     History of psychiatric hospitalization     age 20 for SI    Hx of psychiatric care      Hypertension     Peutz-Jeghers syndrome     Psychiatric problem     Right carpal tunnel syndrome 3/9/2017    Self-harming behavior     Suicide attempt     Therapy      Past Surgical History:   Procedure Laterality Date    ADENOIDECTOMY      APPENDECTOMY      BOWEL RESECTION  10/17/14    CARPAL TUNNEL RELEASE Bilateral     COLONOSCOPY      intestinal polpy      SMALL INTESTINE SURGERY      3 surgeries     TONSILLECTOMY      UPPER GASTROINTESTINAL ENDOSCOPY       Social History     Social History    Marital status: Single     Spouse name: N/A    Number of children: 0    Years of education: N/A     Occupational History    Not on file.     Social History Main Topics    Smoking status: Current Every Day Smoker     Packs/day: 1.00     Years: 17.00     Types: Cigarettes     Start date: 4/17/2000    Smokeless tobacco: Never Used      Comment: told about smoking cessation clinic and give brochure    Alcohol use 3.5 oz/week     7 Standard drinks or equivalent per week      Comment: Socially-once a twice a month at the most-2 ot 3 drinks    Drug use: No    Sexual activity: Yes     Partners: Male     Birth control/ protection: None      Comment: single-in a [id number]-     Other Topics Concern    Patient Feels They Ought To Cut Down On Drinking/Drug Use No    Patient Annoyed By Others Criticizing Their Drinking/Drug Use No    Patient Has Felt Bad Or Guilty About Drinking/Drug Use No    Patient Has Had A Drink/Used Drugs As An Eye Opener In The [place] No     Social History Narrative    Had one previous miscarriage in 2005    Never     currently in a relationship     Family History   Problem Relation Age of Onset    Breast cancer Mother     Colon cancer Mother      peutz-jeghers syndrome    Hypertension Father     Colon polyps Sister      peutz-jeghers syndrome    Ovarian cancer Neg Hx     Suicide Neg Hx     ADD / ADHD Neg Hx     Alcohol abuse Neg Hx     Anxiety disorder Neg Hx      Bipolar disorder Neg Hx     Dementia Neg Hx     Depression Neg Hx     OCD Neg Hx     Drug abuse Neg Hx     Paranoid behavior Neg Hx     Physical abuse Neg Hx     Schizophrenia Neg Hx     Seizures Neg Hx     Self injury Neg Hx     Sexual abuse Neg Hx        Review of patient's allergies indicates:  No Known Allergies    Current Outpatient Prescriptions   Medication Sig    clonazePAM (KLONOPIN) 1 MG tablet TAKE 1 TABLET TWICE DAILY AS NEEDED FOR ANXIETY    clonazePAM (KLONOPIN) 1 MG tablet TAKE 1 TABLET BY MOUTH TWICE A DAY AS NEEDED FOR ANXIETY    FLUoxetine (PROZAC) 40 MG capsule Take 1 capsule (40 mg total) by mouth once daily.    methylPREDNISolone (MEDROL DOSEPACK) 4 mg tablet use as directed    norethindrone-mestranol (NECON 1/50, 28,) 1-50 mg-mcg Tab Take 1 tablet by mouth once daily.    OLANZapine (ZYPREXA) 20 MG tablet Take 1 tablet (20 mg total) by mouth every evening.    pregabalin (LYRICA) 100 MG capsule Take 1 capsule (100 mg total) by mouth 2 (two) times daily.    propranolol (INDERAL) 10 MG tablet Take 1 tablet (10 mg total) by mouth 2 (two) times daily.    topiramate (TOPAMAX) 25 MG tablet Take 2 tablets (50 mg total) by mouth 2 (two) times daily.    traZODone (DESYREL) 100 MG tablet Take 1 tablet (100 mg total) by mouth every evening.     Current Facility-Administered Medications   Medication    betamethasone acetate-betamethasone sodium phosphate injection 1.5 mg    betamethasone acetate-betamethasone sodium phosphate injection 1.5 mg       REVIEW OF SYSTEMS:    GENERAL:  No weight loss, malaise or fevers.  HEENT:  +headaches.  NECK:  + neck pain, see HPI   RESPIRATORY:  Negative for cough, wheezing or shortness of breath.  CARDIOVASCULAR:  Negative for chest pain, leg swelling or palpitations.  GI:  Negative for abdominal discomfort, blood in stools or black stools or change in bowel habits.  MUSCULOSKELETAL:  See HPI.  SKIN:  Negative for lesions, rash, and  itching.  PSYCH:  + anxiety + bipolar disorder , denies SI  HEMATOLOGY/LYMPHOLOGY:  Negative for prolonged bleeding, bruising easily or swollen nodes.  NEURO:  See HPI, s, syncope, paralysis, seizures or tremors.  All other reviewed and negative other than HPI.    OBJECTIVE:    BP (!) 136/94   Pulse (!) 123   Wt 46.3 kg (102 lb 1.2 oz)   BMI 18.67 kg/m²     PHYSICAL EXAMINATION:    General appearance: Well appearing, in no acute distress, alert and oriented x3.  Psych:  Mood and affect appropriate.  Skin: Skin color, texture, turgor normal, no rashes or lesions, in both upper and lower body.  Head/face:  Normocephalic, atraumatic. No palpable lymph nodes.  Neck: +  pain to palpation over the left cervical paraspinous muscles and C spine . +  pain with neck  Extension  Cor: RRR  Pulm: CTA  Back: Straight leg raising in the sitting and supine positions is negative to radicular pain. No pain to palpation over the spine or costovertebral angles. Normal range of motion without pain reproduction.  Extremities: Peripheral joint ROM is full and pain free without obvious instability or laxity in all four extremities. No deformities, edema, or skin discoloration. Good capillary refill.  Musculoskeletal: Shoulder, hip, sacroiliac and knee provocative maneuvers are negative. Bilateral upper and lower extremity strength is normal and symmetric.  No atrophy or tone abnormalities are noted.  Neuro: Bilateral upper and lower extremity coordination and muscle stretch reflexes are physiologic and symmetric.  Plantar response are downgoing. No loss of sensation is noted.  Gait: normal.    ASSESSMENT: 37 y.o. year old female with left sided neck and left arm pain , consistent with possible let cervical radiculopathy. She has    She has psychiatric history of anxiety, bipolar disorder and under psychiatry care   She has CTS on right side as well as right  ulnar nerve transposition     EMG UEs: 2/2017:     Impression:  Abnormal Study  secondary to the Presence of:    Mild Bilateral Carpal Tunnel Syndrome  She is scheduled to have another EMG this month     1. Left cervical radiculopathy    2. Myofascial muscle pain          PLAN:     - I have stressed the importance of physical activity and a home exercise plan to help with pain and improve health.  - Referral to Physical therapy for PT  -SF C7-T1 BOY to help with left cervical radicular pain   - RTC 3 weeks after LENA  - Counseled patient regarding the importance of constant sleeping habits and physical therapy.    The above plan and management options were discussed at length with patient. Patient is in agreement with the above and verbalized understanding. It will be communicated with the referring physician via electronic record, fax, or mail.    John Chapin  01/08/2018

## 2018-01-08 NOTE — LETTER
January 8, 2018      Camille Emerson, NP  [address] [facility] [address]           [facility]  [address]  [facility] [address]  Phone: [phone]          Patient: Staci Hodge   MR Number: [id number]   YOB: 1980   Date of Visit: 1/8/2018       Dear Camille Emerson:    Thank you for referring Staci Hodge to me for evaluation. Attached you will find relevant portions of my assessment and plan of care.    If you have questions, please do not hesitate to call me. I look forward to following Staci Hodge along with you.    Sincerely,    John Chapin MD    Enclosure  CC:  No Recipients    If you would like to receive this communication electronically, please contact [email or web address] or [phone] to request more information on [employer] access.    For providers and/or their staff who would like to refer a patient to [facility], please contact us through our one-stop-shop provider referral line, [facility], at [phone].    If you feel you have received this communication in error or would no longer like to receive these types of communications, please e-mail [email or web address]

## 2018-01-09 ENCOUNTER — PATIENT MESSAGE (OUTPATIENT)
Dept: PAIN MEDICINE | Facility: CLINIC | Age: 38
End: 2018-01-09

## 2018-01-10 ENCOUNTER — TELEPHONE (OUTPATIENT)
Dept: NEUROLOGY | Facility: CLINIC | Age: 38
End: 2018-01-10

## 2018-01-10 ENCOUNTER — TELEPHONE (OUTPATIENT)
Dept: PAIN MEDICINE | Facility: CLINIC | Age: 38
End: 2018-01-10

## 2018-01-10 ENCOUNTER — PATIENT MESSAGE (OUTPATIENT)
Dept: NEUROLOGY | Facility: CLINIC | Age: 38
End: 2018-01-10

## 2018-01-10 DIAGNOSIS — G43.009 MIGRAINE WITHOUT AURA AND WITHOUT STATUS MIGRAINOSUS, NOT INTRACTABLE: Primary | ICD-10-CM

## 2018-01-10 DIAGNOSIS — M54.12 CERVICAL RADICULOPATHY: Primary | ICD-10-CM

## 2018-01-10 RX ORDER — BUTALBITAL, ACETAMINOPHEN AND CAFFEINE 50; 325; 40 MG/1; MG/1; MG/1
1 TABLET ORAL EVERY 6 HOURS PRN
Qty: 20 TABLET | Refills: 1 | Status: SHIPPED | OUTPATIENT
Start: 2018-01-10 | End: 2018-01-27 | Stop reason: SDUPTHER

## 2018-01-10 NOTE — TELEPHONE ENCOUNTER
Please let patient know that I sent in a prescription of Fioricet to abort her headaches; however, she must limit use to 2 days per week to prevent medication rebound headaches.

## 2018-01-10 NOTE — TELEPHONE ENCOUNTER
Received fax from Tammy stating pt would like to have her procedure done at [facility].  Left message for pt to call back

## 2018-01-10 NOTE — TELEPHONE ENCOUNTER
Pt given instructions prior to procedure. Pt is not taking any blood thinner at this time. Pt verbalized understanding. F/u appt schedule with Yelitza 3 weeks after procedure.   Pt will pick instructions and appt letter up on her break    Case request is order and scheduled

## 2018-01-11 ENCOUNTER — OFFICE VISIT (OUTPATIENT)
Dept: PSYCHIATRY | Facility: CLINIC | Age: 38
End: 2018-01-11
Payer: COMMERCIAL

## 2018-01-11 VITALS
RESPIRATION RATE: 21 BRPM | DIASTOLIC BLOOD PRESSURE: 86 MMHG | WEIGHT: 99.63 LBS | HEIGHT: 62 IN | BODY MASS INDEX: 18.33 KG/M2 | HEART RATE: 105 BPM | SYSTOLIC BLOOD PRESSURE: 123 MMHG

## 2018-01-11 DIAGNOSIS — R00.0 TACHYCARDIA: ICD-10-CM

## 2018-01-11 DIAGNOSIS — G43.009 MIGRAINE WITHOUT AURA AND WITHOUT STATUS MIGRAINOSUS, NOT INTRACTABLE: ICD-10-CM

## 2018-01-11 DIAGNOSIS — F39 MOOD DISORDER: Primary | ICD-10-CM

## 2018-01-11 DIAGNOSIS — F40.10 SOCIAL ANXIETY DISORDER: ICD-10-CM

## 2018-01-11 DIAGNOSIS — F40.01 PANIC DISORDER WITH AGORAPHOBIA: ICD-10-CM

## 2018-01-11 DIAGNOSIS — G47.00 INSOMNIA, UNSPECIFIED TYPE: ICD-10-CM

## 2018-01-11 DIAGNOSIS — F43.10 PTSD (POST-TRAUMATIC STRESS DISORDER): ICD-10-CM

## 2018-01-11 DIAGNOSIS — F41.1 GAD (GENERALIZED ANXIETY DISORDER): ICD-10-CM

## 2018-01-11 PROCEDURE — 90836 PSYTX W PT W E/M 45 MIN: CPT | Mod: S$GLB,,, | Performed by: PSYCHIATRY & NEUROLOGY

## 2018-01-11 PROCEDURE — 99999 PR PBB SHADOW E&M-EST. PATIENT-LVL III: CPT | Mod: PBBFAC,,, | Performed by: PSYCHIATRY & NEUROLOGY

## 2018-01-11 PROCEDURE — 99214 OFFICE O/P EST MOD 30 MIN: CPT | Mod: S$GLB,,, | Performed by: PSYCHIATRY & NEUROLOGY

## 2018-01-11 RX ORDER — FLUOXETINE HYDROCHLORIDE 20 MG/1
CAPSULE ORAL
Qty: 30 CAPSULE | Refills: 0 | Status: SHIPPED | OUTPATIENT
Start: 2018-01-11 | End: 2018-01-18 | Stop reason: CLARIF

## 2018-01-11 RX ORDER — OLANZAPINE 20 MG/1
20 TABLET ORAL NIGHTLY
Qty: 90 TABLET | Refills: 0 | Status: SHIPPED | OUTPATIENT
Start: 2018-01-11 | End: 2018-04-10 | Stop reason: SDUPTHER

## 2018-01-11 RX ORDER — FLUOXETINE HYDROCHLORIDE 40 MG/1
CAPSULE ORAL
Qty: 90 CAPSULE | Refills: 0 | Status: SHIPPED | OUTPATIENT
Start: 2018-01-11 | End: 2018-02-12 | Stop reason: SDUPTHER

## 2018-01-11 RX ORDER — TRAZODONE HYDROCHLORIDE 100 MG/1
100 TABLET ORAL NIGHTLY
Qty: 90 TABLET | Refills: 0 | Status: SHIPPED | OUTPATIENT
Start: 2018-01-11 | End: 2018-03-14 | Stop reason: SDUPTHER
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
## 2018-01-18 ENCOUNTER — PROCEDURE VISIT (OUTPATIENT)
Dept: NEUROLOGY | Facility: CLINIC | Age: 38
End: 2018-01-18
Payer: COMMERCIAL

## 2018-01-18 DIAGNOSIS — M54.2 NECK PAIN: ICD-10-CM

## 2018-01-18 DIAGNOSIS — G56.03 BILATERAL CARPAL TUNNEL SYNDROME: ICD-10-CM

## 2018-01-18 DIAGNOSIS — M62.838 CERVICAL PARASPINAL MUSCLE SPASM: ICD-10-CM

## 2018-01-18 DIAGNOSIS — M79.10 MYALGIA: ICD-10-CM

## 2018-01-18 DIAGNOSIS — M54.12 CERVICAL RADICULAR PAIN: ICD-10-CM

## 2018-01-18 DIAGNOSIS — M25.541 ARTHRALGIA OF BOTH HANDS: Primary | ICD-10-CM

## 2018-01-18 DIAGNOSIS — R20.0 LEFT ARM NUMBNESS: ICD-10-CM

## 2018-01-18 DIAGNOSIS — M25.542 ARTHRALGIA OF BOTH HANDS: Primary | ICD-10-CM

## 2018-01-18 PROCEDURE — 95911 NRV CNDJ TEST 9-10 STUDIES: CPT | Mod: S$GLB,,, | Performed by: PSYCHIATRY & NEUROLOGY

## 2018-01-18 PROCEDURE — 95886 MUSC TEST DONE W/N TEST COMP: CPT | Mod: S$GLB,,, | Performed by: PSYCHIATRY & NEUROLOGY

## 2018-01-18 NOTE — PROCEDURES
EMG W/ ULTRASOUND AND NERVE CONDUCTION TEST 2 Extremities  Date/Time: 1/18/2018 10:50 AM  Performed by: ROSALINDA CONTE  Authorized by: STARR GHOSH       REPORT OF EMG and NERVE CONDUCTION STUDY     Name: Staci Hodge  Date of Study:  1/18/18  Referring Provider: HELENA Ghosh  Test Performed by:  MD Sharad  Full Values Attached  Informed Consent Scanned.   No anesthesia used.   Amount of Blood Loss: none. The patient tolerated this procedure well.         Informed consent was obtained prior to performing this study. Two patient identifiers were confirmed with the patient prior to performing this study. A time out to determine correct patient and and agreement on procedure performed was conducted prior to the concentric needle examination.     Reason for the study:  Numb hands, pain in the neck and arms, swelling of the hands.  Patient is s/p Right CTR and right ulnar nerve release in 4/2017.         Findings:  This study is compared to the 02/10/17 EMG/NCS.  Nerve conduction studies of the bilateral median (motor and sensory)nerves, bilateral ulnar (motor and sensory) nerves, and bilateral radial sensory nerves were performed.  Median motor distal latencies was prolonged to 4.7ms on the left, but was normal on the right.  Median palm to wrist distal latencies were prolonged to 2.6ms on the right (improved) and 3.3ms on the left.   Otherwise, amplitudes, distal latencies, conduction velocities, and F-waves were normal.  EMG of selected muscles of the bilateral arms and  bilateral cervical paraspinal muscles were performed as indicated on the attached sheets.  Insertional activity was normal without fasciculation or fibrillation. There was normal size, durastion, recruitment,  and phasia of motor units.       Impression:  Abnormal Study secondary to the Presence of (when compared to 02/10/17 EMG/NCS):    1. Improved Right Carpal Tunnel Syndrome findings post recent right CTR.   2. Slight worsening  of the Left Carpal Tunnel Syndrome findings  3. No electrophysiological evidence of Cervical Radiculopathy found     Aniabl Orourke M.D. [facility].     Recommendations (discussed at this visit):  1. Patient reports pain in the joints, multiple focal myalgia, and swelling of the hands. Check ESR, RA factor, CK, and ALEX today. No swelling noted on today's exam. Refer to rheumatology for another opinion about her pain (may fit a myofacial pain syndrome)  2. She has injection with Dr Chapin tomorrow to try to help her cervical radicular pain otherwise.    RTC with HELENA Emerson as planned  CC: HELENA Emerson.

## 2018-01-19 ENCOUNTER — HOSPITAL ENCOUNTER (OUTPATIENT)
Facility: HOSPITAL | Age: 38
Discharge: HOME | End: 2018-01-19
Attending: ANESTHESIOLOGY | Admitting: ANESTHESIOLOGY
Payer: COMMERCIAL

## 2018-01-19 ENCOUNTER — SURGERY (OUTPATIENT)
Age: 38
End: 2018-01-19

## 2018-01-19 ENCOUNTER — HOSPITAL ENCOUNTER (OUTPATIENT)
Dept: RADIOLOGY | Facility: HOSPITAL | Age: 38
Discharge: HOME | End: 2018-01-19
Attending: ANESTHESIOLOGY | Admitting: ANESTHESIOLOGY
Payer: COMMERCIAL

## 2018-01-19 DIAGNOSIS — M54.12 CERVICAL RADICULOPATHY: ICD-10-CM

## 2018-01-19 LAB — B-HCG UR QL: NEGATIVE

## 2018-01-19 PROCEDURE — 25000003 PHARM REV CODE 250: Performed by: ANESTHESIOLOGY

## 2018-01-19 PROCEDURE — 25500020 PHARM REV CODE 255: Performed by: ANESTHESIOLOGY

## 2018-01-19 PROCEDURE — 99152 MOD SED SAME PHYS/QHP 5/>YRS: CPT | Mod: ,,, | Performed by: ANESTHESIOLOGY

## 2018-01-19 PROCEDURE — 62321 NJX INTERLAMINAR CRV/THRC: CPT | Mod: ,,, | Performed by: ANESTHESIOLOGY

## 2018-01-19 PROCEDURE — 62321 NJX INTERLAMINAR CRV/THRC: CPT | Performed by: ANESTHESIOLOGY

## 2018-01-19 PROCEDURE — 81025 URINE PREGNANCY TEST: CPT

## 2018-01-19 PROCEDURE — 63600175 PHARM REV CODE 636 W HCPCS: Performed by: ANESTHESIOLOGY

## 2018-01-19 RX ORDER — MIDAZOLAM HYDROCHLORIDE 1 MG/ML
INJECTION INTRAMUSCULAR; INTRAVENOUS
Status: DISCONTINUED | OUTPATIENT
Start: 2018-01-19 | End: 2018-01-19 | Stop reason: HOSPADM

## 2018-01-19 RX ORDER — LIDOCAINE HYDROCHLORIDE 5 MG/ML
INJECTION, SOLUTION INFILTRATION; INTRAVENOUS
Status: DISCONTINUED | OUTPATIENT
Start: 2018-01-19 | End: 2018-01-19 | Stop reason: HOSPADM

## 2018-01-19 RX ORDER — LIDOCAINE HYDROCHLORIDE 10 MG/ML
INJECTION, SOLUTION EPIDURAL; INFILTRATION; INTRACAUDAL; PERINEURAL
Status: DISCONTINUED | OUTPATIENT
Start: 2018-01-19 | End: 2018-01-19 | Stop reason: HOSPADM

## 2018-01-19 RX ORDER — DEXAMETHASONE SODIUM PHOSPHATE 4 MG/ML
INJECTION, SOLUTION INTRA-ARTICULAR; INTRALESIONAL; INTRAMUSCULAR; INTRAVENOUS; SOFT TISSUE
Status: DISCONTINUED | OUTPATIENT
Start: 2018-01-19 | End: 2018-01-19 | Stop reason: HOSPADM

## 2018-01-19 RX ORDER — SODIUM CHLORIDE, SODIUM LACTATE, POTASSIUM CHLORIDE, CALCIUM CHLORIDE 600; 310; 30; 20 MG/100ML; MG/100ML; MG/100ML; MG/100ML
INJECTION, SOLUTION INTRAVENOUS CONTINUOUS
Status: DISCONTINUED | OUTPATIENT
Start: 2018-01-19 | End: 2018-01-19 | Stop reason: HOSPADM

## 2018-01-19 RX ORDER — FENTANYL CITRATE 50 UG/ML
INJECTION, SOLUTION INTRAMUSCULAR; INTRAVENOUS
Status: DISCONTINUED | OUTPATIENT
Start: 2018-01-19 | End: 2018-01-19 | Stop reason: HOSPADM

## 2018-01-19 RX ADMIN — MIDAZOLAM HYDROCHLORIDE 2 MG: 1 INJECTION, SOLUTION INTRAMUSCULAR; INTRAVENOUS at 11:01

## 2018-01-19 RX ADMIN — FENTANYL CITRATE 100 MCG: 50 INJECTION, SOLUTION INTRAMUSCULAR; INTRAVENOUS at 11:01

## 2018-01-19 RX ADMIN — LIDOCAINE HYDROCHLORIDE 5 ML: 5 INJECTION, SOLUTION INFILTRATION; INTRAVENOUS at 11:01

## 2018-01-19 RX ADMIN — IOHEXOL 5 ML: 300 INJECTION, SOLUTION INTRAVENOUS at 11:01

## 2018-01-19 RX ADMIN — DEXAMETHASONE SODIUM PHOSPHATE 10 MG: 4 INJECTION, SOLUTION INTRAMUSCULAR; INTRAVENOUS at 11:01

## 2018-01-19 RX ADMIN — SODIUM CHLORIDE, SODIUM LACTATE, POTASSIUM CHLORIDE, AND CALCIUM CHLORIDE: .6; .31; .03; .02 INJECTION, SOLUTION INTRAVENOUS at 11:01

## 2018-01-19 RX ADMIN — LIDOCAINE HYDROCHLORIDE 5 ML: 10 INJECTION, SOLUTION EPIDURAL; INFILTRATION; INTRACAUDAL; PERINEURAL at 11:01

## 2018-01-19 NOTE — DISCHARGE INSTRUCTIONS
DIET: You may resume your normal diet today.    BATHING: You may resume your normal bathing.          You may shower, no hot water directly on site for 24 hours.    DRESSING: You may remove your bandage today.    ACTIVITY LEVEL: You may resume your normal activities 24 hours after your  procedure.    If you have received sedation or an anesthetic, you may feel sleepy                                                                          for several hours. Rest until you are more awake. Gradually resume your normal activities tomorrow.    If you have received sedation or an anesthetic, do not drive or operate heavy machinery for at least 24 hours.    MEDICATION: You may resume your normal medications today.    You will receive instructions for any pain prescriptions. Pain medications should be taken only as directed.    SPECIAL INSTRUCTIONS: No heat to the injection site for 24 hours including: bath or shower, heating pad, moist heat, hot tubs.    Use ice pack to injection site for any pain or discomfort. Apply ice pack to 20 minutes then remove for 20 minutes before re-applying to site.    WHEN TO CALL DOCTOR: Redness or swelling around injection site    Fever of 101F    Drainage (pus) from the injection site    For any continuous bleeding (some dried blood over the incision is normal).    FOLLOW UP: Follow up phone call will be made by office.    FOR EMERGENCIES: If any unusual problems or difficulties occur during clinic hours, call [phone] or [phone].

## 2018-01-19 NOTE — INTERVAL H&P NOTE
The patient has been examined and the H&P has been reviewed:        I concur with the findings and no changes have occurred since H&P was written.        Patient cleared for Anesthesia: Conscious Sedation        Anesthesia/Surgery risks, benefits and alternative options discussed and understood by patient/family.      Active Hospital Problems    Diagnosis  POA    Cervical radiculopathy [M54.12]  Yes      Resolved Hospital Problems    Diagnosis Date Resolved POA   No resolved problems to display.

## 2018-01-19 NOTE — OP NOTE
"Patient Name: Staci Hodge  MRN: [id number]    INFORMED CONSENT: The procedure, risks, benefits and options were discussed with patient. There are no contraindications to the procedure. The patient expressed understanding and agreed to proceed. The personnel performing the procedure was discussed. I verify that I personally obtained Staci's consent prior to the start of the procedure and the signed consent can be found on the patient's chart.    PROCEDURE: C7-T1 CERVICAL EPIDURAL STEROID INJECTION    Procedure Date: 1/19/2018  Anesthesia:  systemic  Pre Procedure diagnosis:   Cervical radiculopathy   Post-Procedure diagnosis: Same    Sedation: Yes - Fentanyl 100 mcg and Midazolam 2 mg    DESCRIPTION OF PROCEDURE: The patient was brought to the procedure room. IV access was obtained prior to the procedure. The patient was positioned prone on the fluoroscopy table. Continuous hemodynamic monitoring was initiated including blood pressure, EKG, and pulse oximetry. The area of the cervical spine was prepped with chlorhexidine three times and draped in a sterile fashion. Skin anesthesia was achieved using 5 mL of Lidocaine 1% over the respective injection site. The C7-T1 interspace was visualized under fluoroscopic imaging. An 18 gauge 3 1/2" Tuohy needle was slowly inserted and advanced using loss of resistance to saline with AP, oblique and lateral fluoroscopic imaging for needle guidance. Negative aspiration for blood or CSF was confirmed. Epidural contrast spread was confirmed using 5mL of Omnipaque 300 contrast. Spread of the contrast in the cervical epidural space was noted . 5 mL of lidocaine 0.5% and 10 mg Decadron was injected. The needle was removed and bleeding was nil. A sterile dressing was applied. No specimens collected. The patient was taken back to the recovery room for further observation.     Blood Loss: Nill  Specimen: None    Pre Procedure Pain Level: 8/10  Post-Procedure Pain Level: " 0/10        Discharge Diagnosis: Same as Pre and Post Procedure  Condition on Discharge: Stable.  Diet on Discharge: Same as before.  Activity: as per instruction sheet.  Discharge to: Home with a responsible adult.  Follow up: as per Discharge instructions

## 2018-01-19 NOTE — H&P (VIEW-ONLY)
Chronic Pain - New Consult    Referring Physician: Camille Emerson NP    Chief Complaint:   Chief Complaint   Patient presents with    Neck Pain        SUBJECTIVE:    Staci Hodge presents to the clinic for the evaluation of neck pain pain. The pain started beginning of October ago and symptoms have been worsening.The pain is located in the neck area and radiates to the left arm.  She has some numbness over left arm . The pain is described as numbing and shooting and is rated as 3/10. The pain is rated with a score of  3/10 on the BEST day and a score of 8/10 on the WORST day.  Symptoms interfere with daily activity, sleeping and work. The pain is exacerbated by Touching and typing.  The pain is mitigated by nothing. She reports spending 8 hours per day reclining. The patient reports 1 hour of uninterrupted sleep per night.    She has psychiatric history of anxiety, bipolar disorder and under psychiatry care   She has CTS on right side as well as right  ulnar nerve transposition     EMG UEs: 2/2017:     Impression:  Abnormal Study secondary to the Presence of:    Mild Bilateral Carpal Tunnel Syndrome  She is scheduled to have another EMG this month     Patient denies night fever/night sweats, urinary incontinence, bowel incontinence, significant weight loss, significant motor weakness and loss of sensations.    Physical Therapy/Home Exercise: no      Pain Disability Index Review:  Last 3 PDI Scores 1/8/2018   Pain Disability Index (PDI) 52       Pain Medications:    - Opioids: none   - Adjuvant Medications: Lyrica ( Pregabalin) and Topamax (Topiramate)  - Anti-Coagulants: none   - Others: See Medication List      report:  Reviewed and consistent with medication use as prescribed.    Pain Procedures: None     Imaging:   MRI Cervical Spine Without Contrast   Technique: Sagittal T1, sagittal T2, sagittal STIR, axial gradient and axial T2 images of the cervical spine were obtained without  contrast.    Results: The incidentally visualized soft tissues structures of the neck are within normal limits.    The craniocervical junction is intact.  There is no evidence for a Chiari mount formation.  The spinal cord is normal in signal without cord edema or myelomalacia.    There is straightening of the normal cervical lordosis.  Vertebral body heights are maintained.  There is disc desiccation throughout the cervical spine with mild disc space narrowing.  The marrow signal is normal without evidence for a marrow replacement process, infection or tumor.    At C2-3, no disk herniation, central canal stenosis or neural foraminal narrowing.    At C3-4, there is a posterior disc osteophyte complex and bilateral uncovertebral spurring without central canal stenosis or neural foraminal narrowing.    At C4-5, there is a posterior disc osteophyte complex without central canal stenosis or neural foraminal narrowing.    At C5-6, there is a posterior disc osteophyte complex with bilateral uncovertebral spurring and mild facet arthropathy contribute to mild bilateral neural foraminal narrowing.    At C6-7, there is a posterior disc osteophyte complex and bilateral uncovertebral spurring and facet arthropathy contributing to mild bilateral neural foraminal narrowing.    At C7-T1, there is bilateral uncovertebral spurring without central canal stenosis or neural foraminal narrowing.    Incidentally noted on the left at T1-2 is a left perineural cyst.   Impression         Mild degenerative changes of the cervical spine intravenous tube neural foraminal narrowing at the C5-6 and C6-7 levels.  No central canal stenosis.      Electronically signed by: Radha Mancini MD  Date: 11/22/17  Time: 13:28          Past Medical History:   Diagnosis Date    Abnormal Pap smear     Anxiety     Colon polyp     Depression     Headache     History of psychiatric hospitalization     age 20 for SI    Hx of psychiatric care      Hypertension     Peutz-Jeghers syndrome     Psychiatric problem     Right carpal tunnel syndrome 3/9/2017    Self-harming behavior     Suicide attempt     Therapy      Past Surgical History:   Procedure Laterality Date    ADENOIDECTOMY      APPENDECTOMY      BOWEL RESECTION  10/17/14    CARPAL TUNNEL RELEASE Bilateral     COLONOSCOPY      intestinal polpy      SMALL INTESTINE SURGERY      3 surgeries     TONSILLECTOMY      UPPER GASTROINTESTINAL ENDOSCOPY       Social History     Social History    Marital status: Single     Spouse name: N/A    Number of children: 0    Years of education: N/A     Occupational History    Not on file.     Social History Main Topics    Smoking status: Current Every Day Smoker     Packs/day: 1.00     Years: 17.00     Types: Cigarettes     Start date: 4/17/2000    Smokeless tobacco: Never Used      Comment: told about smoking cessation clinic and give brochure    Alcohol use 3.5 oz/week     7 Standard drinks or equivalent per week      Comment: Socially-once a twice a month at the most-2 ot 3 drinks    Drug use: No    Sexual activity: Yes     Partners: Male     Birth control/ protection: None      Comment: single-in a yyicozvfpegn-     Other Topics Concern    Patient Feels They Ought To Cut Down On Drinking/Drug Use No    Patient Annoyed By Others Criticizing Their Drinking/Drug Use No    Patient Has Felt Bad Or Guilty About Drinking/Drug Use No    Patient Has Had A Drink/Used Drugs As An Eye Opener In The [place] No     Social History Narrative    Had one previous miscarriage in 2005    Never     currently in a relationship     Family History   Problem Relation Age of Onset    Breast cancer Mother     Colon cancer Mother      peutz-jeghers syndrome    Hypertension Father     Colon polyps Sister      peutz-jeghers syndrome    Ovarian cancer Neg Hx     Suicide Neg Hx     ADD / ADHD Neg Hx     Alcohol abuse Neg Hx     Anxiety disorder Neg Hx      Bipolar disorder Neg Hx     Dementia Neg Hx     Depression Neg Hx     OCD Neg Hx     Drug abuse Neg Hx     Paranoid behavior Neg Hx     Physical abuse Neg Hx     Schizophrenia Neg Hx     Seizures Neg Hx     Self injury Neg Hx     Sexual abuse Neg Hx        Review of patient's allergies indicates:  No Known Allergies    Current Outpatient Prescriptions   Medication Sig    clonazePAM (KLONOPIN) 1 MG tablet TAKE 1 TABLET TWICE DAILY AS NEEDED FOR ANXIETY    clonazePAM (KLONOPIN) 1 MG tablet TAKE 1 TABLET BY MOUTH TWICE A DAY AS NEEDED FOR ANXIETY    FLUoxetine (PROZAC) 40 MG capsule Take 1 capsule (40 mg total) by mouth once daily.    methylPREDNISolone (MEDROL DOSEPACK) 4 mg tablet use as directed    norethindrone-mestranol (NECON 1/50, 28,) 1-50 mg-mcg Tab Take 1 tablet by mouth once daily.    OLANZapine (ZYPREXA) 20 MG tablet Take 1 tablet (20 mg total) by mouth every evening.    pregabalin (LYRICA) 100 MG capsule Take 1 capsule (100 mg total) by mouth 2 (two) times daily.    propranolol (INDERAL) 10 MG tablet Take 1 tablet (10 mg total) by mouth 2 (two) times daily.    topiramate (TOPAMAX) 25 MG tablet Take 2 tablets (50 mg total) by mouth 2 (two) times daily.    traZODone (DESYREL) 100 MG tablet Take 1 tablet (100 mg total) by mouth every evening.     Current Facility-Administered Medications   Medication    betamethasone acetate-betamethasone sodium phosphate injection 1.5 mg    betamethasone acetate-betamethasone sodium phosphate injection 1.5 mg       REVIEW OF SYSTEMS:    GENERAL:  No weight loss, malaise or fevers.  HEENT:  +headaches.  NECK:  + neck pain, see HPI   RESPIRATORY:  Negative for cough, wheezing or shortness of breath.  CARDIOVASCULAR:  Negative for chest pain, leg swelling or palpitations.  GI:  Negative for abdominal discomfort, blood in stools or black stools or change in bowel habits.  MUSCULOSKELETAL:  See HPI.  SKIN:  Negative for lesions, rash, and  itching.  PSYCH:  + anxiety + bipolar disorder , denies SI  HEMATOLOGY/LYMPHOLOGY:  Negative for prolonged bleeding, bruising easily or swollen nodes.  NEURO:  See HPI, s, syncope, paralysis, seizures or tremors.  All other reviewed and negative other than HPI.    OBJECTIVE:    BP (!) 136/94   Pulse (!) 123   Wt 46.3 kg (102 lb 1.2 oz)   BMI 18.67 kg/m²     PHYSICAL EXAMINATION:    General appearance: Well appearing, in no acute distress, alert and oriented x3.  Psych:  Mood and affect appropriate.  Skin: Skin color, texture, turgor normal, no rashes or lesions, in both upper and lower body.  Head/face:  Normocephalic, atraumatic. No palpable lymph nodes.  Neck: +  pain to palpation over the left cervical paraspinous muscles and C spine . +  pain with neck  Extension  Cor: RRR  Pulm: CTA  Back: Straight leg raising in the sitting and supine positions is negative to radicular pain. No pain to palpation over the spine or costovertebral angles. Normal range of motion without pain reproduction.  Extremities: Peripheral joint ROM is full and pain free without obvious instability or laxity in all four extremities. No deformities, edema, or skin discoloration. Good capillary refill.  Musculoskeletal: Shoulder, hip, sacroiliac and knee provocative maneuvers are negative. Bilateral upper and lower extremity strength is normal and symmetric.  No atrophy or tone abnormalities are noted.  Neuro: Bilateral upper and lower extremity coordination and muscle stretch reflexes are physiologic and symmetric.  Plantar response are downgoing. No loss of sensation is noted.  Gait: normal.    ASSESSMENT: 37 y.o. year old female with left sided neck and left arm pain , consistent with possible let cervical radiculopathy. She has    She has psychiatric history of anxiety, bipolar disorder and under psychiatry care   She has CTS on right side as well as right  ulnar nerve transposition     EMG UEs: 2/2017:     Impression:  Abnormal Study  secondary to the Presence of:    Mild Bilateral Carpal Tunnel Syndrome  She is scheduled to have another EMG this month     1. Left cervical radiculopathy    2. Myofascial muscle pain          PLAN:     - I have stressed the importance of physical activity and a home exercise plan to help with pain and improve health.  - Referral to Physical therapy for PT  -SF C7-T1 BOY to help with left cervical radicular pain   - RTC 3 weeks after LENA  - Counseled patient regarding the importance of constant sleeping habits and physical therapy.    The above plan and management options were discussed at length with patient. Patient is in agreement with the above and verbalized understanding. It will be communicated with the referring physician via electronic record, fax, or mail.    John Chapin  01/08/2018

## 2018-01-21 ENCOUNTER — PATIENT MESSAGE (OUTPATIENT)
Dept: OBSTETRICS AND GYNECOLOGY | Facility: CLINIC | Age: 38
End: 2018-01-21

## 2018-01-22 ENCOUNTER — TELEPHONE (OUTPATIENT)
Dept: OBSTETRICS AND GYNECOLOGY | Facility: CLINIC | Age: 38
End: 2018-01-22

## 2018-01-22 NOTE — TELEPHONE ENCOUNTER
Patient states Necon is discontinued per [facility] pharmacy. Patient requesting similar OCP to be sent to her pharmacy. Please advise. Last annual with Dr. Peterson 4/17.

## 2018-01-22 NOTE — TELEPHONE ENCOUNTER
----- Message from Latrice Jonas MA sent at 1/22/2018 12:39 PM CST -----  Contact: self  Staci Hodge  MRN: [id number]  Home Phone      [phone]  Work Phone      Not on file.  Mobile          [phone]    Patient Care Team:  Shilpi Clayton NP as PCP - General (Family Medicine)  OB? No  What phone number can you be reached at?  [phone] / [phone]  Message:   Stated current birth control she is taking is discontinued and the pharmacies can no longer get.  Would like to know if something else can be called in.    Pharmacy:  [facility] [place]

## 2018-01-23 ENCOUNTER — LAB VISIT (OUTPATIENT)
Dept: LAB | Facility: HOSPITAL | Age: 38
End: 2018-01-23
Attending: INTERNAL MEDICINE
Payer: COMMERCIAL

## 2018-01-23 DIAGNOSIS — R81 GLUCOSURIA WITH NORMAL SERUM GLUCOSE: ICD-10-CM

## 2018-01-23 DIAGNOSIS — M25.542 ARTHRALGIA OF BOTH HANDS: ICD-10-CM

## 2018-01-23 DIAGNOSIS — R80.9 MICROALBUMINURIA: ICD-10-CM

## 2018-01-23 DIAGNOSIS — Z87.448 HISTORY OF PYELONEPHRITIS: ICD-10-CM

## 2018-01-23 DIAGNOSIS — M79.10 MYALGIA: ICD-10-CM

## 2018-01-23 DIAGNOSIS — M25.541 ARTHRALGIA OF BOTH HANDS: ICD-10-CM

## 2018-01-23 LAB
ALBUMIN SERPL BCP-MCNC: 4.7 G/DL
ALP SERPL-CCNC: 94 U/L
ALT SERPL W/O P-5'-P-CCNC: 21 U/L
ANION GAP SERPL CALC-SCNC: 11 MMOL/L
AST SERPL-CCNC: 19 U/L
BILIRUB SERPL-MCNC: 0.5 MG/DL
BILIRUB UR QL STRIP: NEGATIVE
BUN SERPL-MCNC: 9 MG/DL
CALCIUM SERPL-MCNC: 9.5 MG/DL
CHLORIDE SERPL-SCNC: 100 MMOL/L
CK SERPL-CCNC: 81 U/L
CLARITY UR: ABNORMAL
CO2 SERPL-SCNC: 29 MMOL/L
COLOR UR: YELLOW
CREAT SERPL-MCNC: 0.8 MG/DL
CREAT UR-MCNC: 153.6 MG/DL
CREAT UR-MCNC: 153.6 MG/DL
ERYTHROCYTE [SEDIMENTATION RATE] IN BLOOD BY WESTERGREN METHOD: 0 MM/HR
EST. GFR  (AFRICAN AMERICAN): >60 ML/MIN/1.73 M^2
EST. GFR  (NON AFRICAN AMERICAN): >60 ML/MIN/1.73 M^2
GLUCOSE SERPL-MCNC: 94 MG/DL
GLUCOSE UR QL STRIP: NEGATIVE
HGB UR QL STRIP: NEGATIVE
KETONES UR QL STRIP: NEGATIVE
LEUKOCYTE ESTERASE UR QL STRIP: NEGATIVE
MICROALBUMIN UR DL<=1MG/L-MCNC: 20 UG/ML
MICROALBUMIN/CREATININE RATIO: 13 UG/MG
MICROSCOPIC COMMENT: NORMAL
NITRITE UR QL STRIP: NEGATIVE
PH UR STRIP: 7 [PH] (ref 5–8)
PHOSPHATE SERPL-MCNC: 3.3 MG/DL
POTASSIUM SERPL-SCNC: 3.8 MMOL/L
PROT SERPL-MCNC: 7.7 G/DL
PROT UR QL STRIP: NEGATIVE
PROT UR-MCNC: 13 MG/DL
PROT/CREAT RATIO, UR: 0.08
SODIUM SERPL-SCNC: 140 MMOL/L
SP GR UR STRIP: 1.02 (ref 1–1.03)
URN SPEC COLLECT METH UR: ABNORMAL
UROBILINOGEN UR STRIP-ACNC: NEGATIVE EU/DL

## 2018-01-23 PROCEDURE — 86431 RHEUMATOID FACTOR QUANT: CPT

## 2018-01-23 PROCEDURE — 81000 URINALYSIS NONAUTO W/SCOPE: CPT

## 2018-01-23 PROCEDURE — 86038 ANTINUCLEAR ANTIBODIES: CPT

## 2018-01-23 PROCEDURE — 82550 ASSAY OF CK (CPK): CPT

## 2018-01-23 PROCEDURE — 83520 IMMUNOASSAY QUANT NOS NONAB: CPT | Mod: 59

## 2018-01-23 PROCEDURE — 82570 ASSAY OF URINE CREATININE: CPT

## 2018-01-23 PROCEDURE — 84100 ASSAY OF PHOSPHORUS: CPT

## 2018-01-23 PROCEDURE — 80053 COMPREHEN METABOLIC PANEL: CPT

## 2018-01-23 PROCEDURE — 85651 RBC SED RATE NONAUTOMATED: CPT

## 2018-01-23 PROCEDURE — 36415 COLL VENOUS BLD VENIPUNCTURE: CPT

## 2018-01-23 PROCEDURE — 82043 UR ALBUMIN QUANTITATIVE: CPT

## 2018-01-23 NOTE — TELEPHONE ENCOUNTER
Please notify patient that Rx for Ogestrol sent to her pharmacy.  This is the most equivalent to the Necon that she had been prescribed.

## 2018-01-24 ENCOUNTER — TELEPHONE (OUTPATIENT)
Dept: NEPHROLOGY | Facility: CLINIC | Age: 38
End: 2018-01-24

## 2018-01-24 LAB
ANA SER QL IF: NORMAL
KAPPA LC SER QL IA: 1.21 MG/DL
KAPPA LC/LAMBDA SER IA: 0.91
LAMBDA LC SER QL IA: 1.33 MG/DL
RHEUMATOID FACT SERPL-ACNC: <10 IU/ML

## 2018-01-24 NOTE — TELEPHONE ENCOUNTER
Called patient to notify her that her labs look good and there were no abnormalities no answer / left message .

## 2018-01-26 ENCOUNTER — PATIENT MESSAGE (OUTPATIENT)
Dept: PAIN MEDICINE | Facility: CLINIC | Age: 38
End: 2018-01-26

## 2018-01-26 ENCOUNTER — INITIAL CONSULT (OUTPATIENT)
Dept: RHEUMATOLOGY | Facility: CLINIC | Age: 38
End: 2018-01-26
Payer: COMMERCIAL

## 2018-01-26 VITALS
BODY MASS INDEX: 18.35 KG/M2 | DIASTOLIC BLOOD PRESSURE: 87 MMHG | HEART RATE: 110 BPM | SYSTOLIC BLOOD PRESSURE: 123 MMHG | WEIGHT: 99.69 LBS | HEIGHT: 62 IN

## 2018-01-26 DIAGNOSIS — M79.7 FIBROMYALGIA: Primary | ICD-10-CM

## 2018-01-26 PROCEDURE — 99999 PR PBB SHADOW E&M-EST. PATIENT-LVL IV: CPT | Mod: PBBFAC,,, | Performed by: INTERNAL MEDICINE

## 2018-01-26 PROCEDURE — 99204 OFFICE O/P NEW MOD 45 MIN: CPT | Mod: S$GLB,,, | Performed by: INTERNAL MEDICINE

## 2018-01-26 RX ORDER — NAPROXEN SODIUM 550 MG/1
TABLET ORAL
Refills: 0 | COMMUNITY
Start: 2017-10-24 | End: 2018-02-12

## 2018-01-26 RX ORDER — PREGABALIN 50 MG/1
50 CAPSULE ORAL 3 TIMES DAILY
Qty: 90 CAPSULE | Refills: 6 | Status: SHIPPED | OUTPATIENT
Start: 2018-01-26 | End: 2018-05-21

## 2018-01-26 RX ORDER — TIZANIDINE 2 MG/1
TABLET ORAL
Refills: 3 | COMMUNITY
Start: 2017-11-22 | End: 2018-07-18 | Stop reason: SDUPTHER

## 2018-01-26 RX ORDER — DICLOFENAC SODIUM 50 MG/1
TABLET, DELAYED RELEASE ORAL
Refills: 5 | COMMUNITY
Start: 2017-11-29 | End: 2018-02-14

## 2018-01-26 RX ORDER — METHYLPREDNISOLONE 4 MG/1
TABLET ORAL
Refills: 0 | COMMUNITY
Start: 2018-01-02 | End: 2018-02-12

## 2018-01-26 NOTE — PROGRESS NOTES
Chief Complaint   Patient presents with    Disease Management       Patient was referred by Dr.Jamie Orourke    History of presenting illness    Patient is a pleasant 37 year old female who comes with the following presentation    Feb 2017 she noticed that she had b/l paresthesias and pain in her fingers,she was diagnosed to have carpal tunnel syndrome,she had the surgery done and she had no relief on the right,she didn't go for the surgery on her left hand.  She then went back to have a repeat EMG,to see if the surgery had helped her,EMG reads that there is an improvement on the right hand and worsening of the left hand  She is not sure why her symptoms have not changed  She had right arm ulnar nerve release as well    Neck hurts and swells lately  She cant bend  Pain goes down the arms and she cannot hold up her neck at times  She drops things easily,she feels weak in her arms and hands     She had C spine MRI : only shows mild degenerative arthritis with no stenosis or compression  She saw pain management,got injections and that didn't help  EMG doesn't say she has cervical radiculopathy    Aches and pains in the back/legs but the rest of her joints are fine    Medications : she takes prn tizanidine,trazodone  She takes klonipin,prozaac,olanzapine for her psychiatric issues    Past history : Peutz-jegher's syndrome needing multiple bowel surgeries,weight loss,anxiety and depression and bipolar and PTSD    Family history : no autoimmune problems    Social history : smokes 3 to 4 cigarettes daily,drinks rarely    Review of Systems   Constitutional: Negative for activity change, appetite change, chills, diaphoresis, fatigue, fever and unexpected weight change.   HENT: Negative for congestion, dental problem, drooling, ear discharge, ear pain, facial swelling, hearing loss, mouth sores, nosebleeds, postnasal drip, rhinorrhea, sinus pain, sinus pressure, sneezing, sore throat, tinnitus, trouble swallowing and  voice change.    Eyes: Negative for photophobia, pain, discharge, redness, itching and visual disturbance.   Respiratory: Negative for apnea, cough, choking, chest tightness, shortness of breath, wheezing and stridor.    Cardiovascular: Negative for chest pain, palpitations and leg swelling.   Gastrointestinal: Negative for abdominal distention, abdominal pain, anal bleeding, blood in stool, constipation, diarrhea, nausea, rectal pain and vomiting.   Endocrine: Negative for cold intolerance, heat intolerance, polydipsia, polyphagia and polyuria.   Genitourinary: Negative for decreased urine volume, difficulty urinating, dysuria, enuresis, flank pain, frequency, genital sores, hematuria and urgency.   Musculoskeletal: Positive for arthralgias. Negative for back pain, gait problem, joint swelling, myalgias, neck pain and neck stiffness.   Skin: Negative for color change, pallor, rash and wound.   Allergic/Immunologic: Negative for environmental allergies, food allergies and immunocompromised state.   Neurological: Positive for weakness and numbness. Negative for dizziness, tremors, seizures, syncope, facial asymmetry, speech difficulty, light-headedness and headaches.   Hematological: Negative for adenopathy. Does not bruise/bleed easily.   Psychiatric/Behavioral: Negative for agitation, behavioral problems, confusion, decreased concentration, dysphoric mood, hallucinations, self-injury, sleep disturbance and suicidal ideas. The patient is not nervous/anxious and is not hyperactive.         Physical Exam     DAVIS-28 tender joint count: 0  DAVIS-28 swollen joint count: 0     She is hyperalgesic  Multiple tender spots    Physical Exam   Constitutional: She is oriented to person, place, and time and well-developed, well-nourished, and in no distress. No distress.   HENT:   Head: Normocephalic.   Mouth/Throat: Oropharynx is clear and moist.   Eyes: Conjunctivae are normal. Pupils are equal, round, and reactive to light.  Right eye exhibits no discharge. Left eye exhibits no discharge. No scleral icterus.   Neck: Normal range of motion. No thyromegaly present.   Cardiovascular: Normal rate, regular rhythm, normal heart sounds and intact distal pulses.    Pulmonary/Chest: Effort normal and breath sounds normal. No stridor.   Abdominal: Soft. Bowel sounds are normal.   Lymphadenopathy:     She has no cervical adenopathy.   Neurological: She is alert and oriented to person, place, and time.   Skin: Skin is warm. No rash noted. She is not diaphoretic.     Psychiatric: Affect and judgment normal.   Musculoskeletal: Normal range of motion.       Labs and imaging : normal ck,RF,ALEX,ESR,CP,UA,phos,CMP  Vit d deficiency being replaced  Normal thyroid     Assessment     37 year old female comes in with the chief complaint of neck pain,arm pain,hand pain with paresthesias in the hands    On examining her,I feel that her pain is out of proportion to what we see objectively on the MRI C spine and EMG/NCS,she is very tender in multiple sites  She has anxiety/depression,she has migraine headaches,has significant stress etc    Would suspect a pain syndrome exaggerating her symptoms      1. Fibromyalgia        Reviewed labs/xrays  Reviewed medications    New problem     Plan    -suggested better pain management : she is willing to try lyrica again,may be 50 mg tid and then titrate if needed  Told her we can work with her and titrate slowly  -tizanidine can be taken more frequently to see if that helps with the neck pain  She didn't like gabapentin or flexeril  -water therapy    Would continue to follow with pain management     Staci was seen today for disease management.    Diagnoses and all orders for this visit:    Fibromyalgia  -     Ambulatory Referral to Physical/Occupational Therapy    Other orders  -     pregabalin (LYRICA) 50 MG capsule; Take 1 capsule (50 mg total) by mouth 3 (three) times daily.      rtc in 3 months

## 2018-01-26 NOTE — LETTER
January 26, 2018      Anibal Orourke MD  [address] [address]           [facility] - Rheumatology  [address]  [place] [address]  Phone: [phone]  Fax: [phone]          Patient: Staci Hodge   MR Number: [id number]   YOB: 1980   Date of Visit: 1/26/2018       Dear Dr. Anibal Orourke:    Thank you for referring Staci Hodge to me for evaluation. Attached you will find relevant portions of my assessment and plan of care.    If you have questions, please do not hesitate to call me. I look forward to following Staci Hodge along with you.    Sincerely,    Brian Guzman MD    Enclosure  CC:  No Recipients    If you would like to receive this communication electronically, please contact [email or web address] or [phone] to request more information on [employer] access.    For providers and/or their staff who would like to refer a patient to [facility], please contact us through our one-stop-shop provider referral line, [facility], at [phone].    If you feel you have received this communication in error or would no longer like to receive these types of communications, please e-mail [email or web address]

## 2018-01-27 DIAGNOSIS — G43.009 MIGRAINE WITHOUT AURA AND WITHOUT STATUS MIGRAINOSUS, NOT INTRACTABLE: ICD-10-CM

## 2018-01-27 DIAGNOSIS — M62.838 CERVICAL PARASPINAL MUSCLE SPASM: ICD-10-CM

## 2018-01-29 VITALS
TEMPERATURE: 97 F | RESPIRATION RATE: 18 BRPM | WEIGHT: 98 LBS | OXYGEN SATURATION: 100 % | DIASTOLIC BLOOD PRESSURE: 68 MMHG | BODY MASS INDEX: 17.92 KG/M2 | HEART RATE: 80 BPM | SYSTOLIC BLOOD PRESSURE: 116 MMHG

## 2018-01-29 NOTE — TELEPHONE ENCOUNTER
Appt made with Grey Man NP  to discuss worsening pain. C7-T1 CERVICAL EPIDURAL STEROID INJECTION performed on 1/19/18 with zero percent relief. Pt voiced understanding.

## 2018-01-30 RX ORDER — BUTALBITAL, ACETAMINOPHEN AND CAFFEINE 50; 325; 40 MG/1; MG/1; MG/1
TABLET ORAL
Qty: 20 TABLET | Refills: 1 | Status: SHIPPED | OUTPATIENT
Start: 2018-01-30 | End: 2018-03-24 | Stop reason: SDUPTHER

## 2018-01-30 RX ORDER — TIZANIDINE 2 MG/1
4 TABLET ORAL EVERY 6 HOURS PRN
Qty: 60 TABLET | Refills: 3 | Status: SHIPPED | OUTPATIENT
Start: 2018-01-30 | End: 2018-03-24 | Stop reason: SDUPTHER

## 2018-02-01 NOTE — PROGRESS NOTES
Chronic patient Established Note (Follow up visit)      SUBJECTIVE:    Staci Hodge presents to the clinic for a 2 wk follow-up appointment for neck pain and left arm pain.  She is S/P C7-T1 CERVICAL EPIDURAL STEROID INJECTION on 1/19/18 with 0% relief. She states she visited rheumatology/ Dr. Moser Friday, she was dx with fibromyalgia,  she was started on Lyrica 50 mg TID, she was switched from flexeril to Zanaflex she is taking it twice a day which is an increase. She states she can't afford physical therapy, so she has been doing exercises at home. MRI does show mild facet arthropathy contribute to mild bilateral neural foraminal narrowing at multiple levels. She presents in a lot of pain,and crying. Recent EMG reviewed and discussed, I will s/f bilateral Cervical MBB @ C 3,4,5 to help with neck pain. Discussed in detail that ijection is diagnostic and that it may not help her pain, pt stated she understood.    Since the last visit, Staci Hodge states the pain has been worsening. Current pain intensity is 10/10.          She has psychiatric history of anxiety, bipolar disorder and under psychiatry care         She has CTS on right side as well as right  ulnar nerve transposition      EMG UEs: 2/2017:     Impression:  Abnormal Study secondary to the Presence of:    Mild Bilateral Carpal Tunnel Syndrome  Latest  EMG was done on 1/18/18 day before her injections.         Pain Disability Index Review:  Last 3 PDI Scores 2/2/2018 1/8/2018   Pain Disability Index (PDI) 59 52       Pain Medications:     - Opioids: none   - Adjuvant Medications: Lyrica ( Pregabalin) and Topamax (Topiramate)  - Anti-Coagulants: none   - Others: See Medication List     Opioid Contract: no     report:  Reviewed and consistent with medication use as prescribed.    Pain Procedures: 1/19/18 C7-T1 CERVICAL EPIDURAL STEROID INJECTION    Physical Therapy/Home Exercise: yes    Imaging: MRI Cervical Spine Without  Contrast   Technique: Sagittal T1, sagittal T2, sagittal STIR, axial gradient and axial T2 images of the cervical spine were obtained without contrast.    Results: The incidentally visualized soft tissues structures of the neck are within normal limits.    The craniocervical junction is intact.  There is no evidence for a Chiari mount formation.  The spinal cord is normal in signal without cord edema or myelomalacia.    There is straightening of the normal cervical lordosis.  Vertebral body heights are maintained.  There is disc desiccation throughout the cervical spine with mild disc space narrowing.  The marrow signal is normal without evidence for a marrow replacement process, infection or tumor.    At C2-3, no disk herniation, central canal stenosis or neural foraminal narrowing.    At C3-4, there is a posterior disc osteophyte complex and bilateral uncovertebral spurring without central canal stenosis or neural foraminal narrowing.    At C4-5, there is a posterior disc osteophyte complex without central canal stenosis or neural foraminal narrowing.    At C5-6, there is a posterior disc osteophyte complex with bilateral uncovertebral spurring and mild facet arthropathy contribute to mild bilateral neural foraminal narrowing.    At C6-7, there is a posterior disc osteophyte complex and bilateral uncovertebral spurring and facet arthropathy contributing to mild bilateral neural foraminal narrowing.    At C7-T1, there is bilateral uncovertebral spurring without central canal stenosis or neural foraminal narrowing.    Incidentally noted on the left at T1-2 is a left perineural cyst.   Impression          Mild degenerative changes of the cervical spine intravenous tube neural foraminal narrowing at the C5-6 and C6-7 levels.  No central canal stenosis.      Electronically signed by: Radha Mancini MD  Date: 11/22/17  Time: 13:28           Allergies: Review of patient's allergies indicates:  No Known  Allergies    Current Medications:   Current Outpatient Prescriptions   Medication Sig Dispense Refill    butalbital-acetaminophen-caffeine -40 mg (FIORICET, ESGIC) -40 mg per tablet TAKE 1 TABLET BY MOUTH EVERY 6 HOURS AS NEEDED FOR HEADACHES LIMIT USE TO 2 DAYS PER WEEK 20 tablet 1    clonazePAM (KLONOPIN) 1 MG tablet TAKE 1 TABLET TWICE DAILY AS NEEDED FOR ANXIETY 60 tablet 1    clonazePAM (KLONOPIN) 1 MG tablet TAKE 1 TABLET BY MOUTH TWICE A DAY AS NEEDED FOR ANXIETY 60 tablet 2    diclofenac (VOLTAREN) 50 MG EC tablet TAKE 1 TABLET (50 MG TOTAL) BY MOUTH 2 (TWO) TIMES DAILY.  5    FLUoxetine (PROZAC) 40 MG capsule Take one 20 mg cap with one 40 mg cap for a total of 60 mg daily 90 capsule 0    methylPREDNISolone (MEDROL DOSEPACK) 4 mg tablet TAKE 6 TABLETS ON DAY 1 AS DIRECTED ON PACKAGE AND DECREASE BY 1 TAB EACH DAY FOR A TOTAL OF 6 DAYS  0    NECON 1/50, 28, 1-50 mg-mcg Tab Take 1 tablet by mouth once daily.  5    norgestrel-ethinyl estradiol (OGESTROL) 0.5-50 mg-mcg per tablet Take 1 tablet by mouth once daily. 28 tablet 2    OLANZapine (ZYPREXA) 20 MG tablet Take 1 tablet (20 mg total) by mouth every evening. 90 tablet 0    pregabalin (LYRICA) 100 MG capsule Take 1 capsule (100 mg total) by mouth 2 (two) times daily. 60 capsule 2    pregabalin (LYRICA) 50 MG capsule Take 1 capsule (50 mg total) by mouth 3 (three) times daily. 90 capsule 6    propranolol (INDERAL) 10 MG tablet Take 1 tablet (10 mg total) by mouth 2 (two) times daily. 60 tablet 1    tiZANidine (ZANAFLEX) 2 MG tablet TAKE 2 TABLETS (4 MG TOTAL) BY MOUTH EVERY 6 (SIX) HOURS AS NEEDED (NECK TENSION).  3    tiZANidine (ZANAFLEX) 2 MG tablet TAKE 2 TABLETS (4 MG TOTAL) BY MOUTH EVERY 6 (SIX) HOURS AS NEEDED (NECK TENSION). 60 tablet 3    topiramate (TOPAMAX) 25 MG tablet Take 2 tablets (50 mg total) by mouth 2 (two) times daily. 120 tablet 11    traZODone (DESYREL) 100 MG tablet Take 1 tablet (100 mg total) by mouth  every evening. 90 tablet 0    naproxen sodium (ANAPROX) 550 MG tablet TAKE 1 TABLET (550 MG TOTAL) BY MOUTH 2 (TWO) TIMES DAILY WITH MEALS.  0     Current Facility-Administered Medications   Medication Dose Route Frequency Provider Last Rate Last Dose    betamethasone acetate-betamethasone sodium phosphate injection 1.5 mg  1.5 mg Intra-articular 1 time in Clinic/HOD Camille Emerson NP        betamethasone acetate-betamethasone sodium phosphate injection 1.5 mg  1.5 mg Intra Carpal Tunnel 1 time in Clinic/HOD Camille Emerson NP           REVIEW OF SYSTEMS:    GENERAL:  No weight loss, malaise or fevers.  HEENT:  +headaches.  NECK:  + neck pain, see HPI   RESPIRATORY:  Negative for cough, wheezing or shortness of breath.  CARDIOVASCULAR:  Negative for chest pain, leg swelling or palpitations.  GI:  Negative for abdominal discomfort, blood in stools or black stools or change in bowel habits.  MUSCULOSKELETAL:  See HPI.  SKIN:  Negative for lesions, rash, and itching.  PSYCH:  + anxiety + bipolar disorder , denies SI  HEMATOLOGY/LYMPHOLOGY:  Negative for prolonged bleeding, bruising easily or swollen nodes.  NEURO:  See HPI, s, syncope, paralysis, seizures or tremors.  All other reviewed and negative other than HPI.    Past Medical History:  Past Medical History:   Diagnosis Date    Abnormal Pap smear     Anxiety     Colon polyp     Depression     Headache     History of psychiatric hospitalization     age 20 for SI    Hx of psychiatric care     Hypertension     Peutz-Jeghers syndrome     Psychiatric problem     Right carpal tunnel syndrome 3/9/2017    Self-harming behavior     Suicide attempt     Therapy        Past Surgical History:  Past Surgical History:   Procedure Laterality Date    ADENOIDECTOMY      APPENDECTOMY      BOWEL RESECTION  10/17/14    CARPAL TUNNEL RELEASE Bilateral     COLONOSCOPY      intestinal polpy      SMALL INTESTINE SURGERY      3 surgeries     TONSILLECTOMY       UPPER GASTROINTESTINAL ENDOSCOPY         Family History:  Family History   Problem Relation Age of Onset    Breast cancer Mother     Colon cancer Mother      peutz-jeghers syndrome    Hypertension Father     Colon polyps Sister      peutz-jeghers syndrome    Ovarian cancer Neg Hx     Suicide Neg Hx     ADD / ADHD Neg Hx     Alcohol abuse Neg Hx     Anxiety disorder Neg Hx     Bipolar disorder Neg Hx     Dementia Neg Hx     Depression Neg Hx     OCD Neg Hx     Drug abuse Neg Hx     Paranoid behavior Neg Hx     Physical abuse Neg Hx     Schizophrenia Neg Hx     Seizures Neg Hx     Self injury Neg Hx     Sexual abuse Neg Hx        Social History:  Social History     Social History    Marital status: Single     Spouse name: N/A    Number of children: 0    Years of education: N/A     Social History Main Topics    Smoking status: Current Every Day Smoker     Packs/day: 1.00     Years: 17.00     Types: Cigarettes     Start date: 4/17/2000    Smokeless tobacco: Never Used      Comment: told about smoking cessation clinic and give brochure    Alcohol use 3.5 oz/week     7 Standard drinks or equivalent per week      Comment: Socially-once a twice a month at the most-2 ot 3 drinks    Drug use: No    Sexual activity: Yes     Partners: Male     Birth control/ protection: None      Comment: single-in a [id number]-     Other Topics Concern    Patient Feels They Ought To Cut Down On Drinking/Drug Use No    Patient Annoyed By Others Criticizing Their Drinking/Drug Use No    Patient Has Felt Bad Or Guilty About Drinking/Drug Use No    Patient Has Had A Drink/Used Drugs As An Eye Opener In The [place] No     Social History Narrative    Had one previous miscarriage in 2005    Never     currently in a relationship       OBJECTIVE:    /80   Pulse 76   Wt 44.4 kg (97 lb 14.2 oz)   BMI 17.90 kg/m²     PHYSICAL EXAMINATION:    General appearance: Well appearing, in no acute distress,  alert and oriented x3.  Psych:  Mood and affect appropriate.  Skin: Skin color, texture, turgor normal, no rashes or lesions, in both upper and lower body.  Head/face:  Normocephalic, atraumatic. No palpable lymph nodes.  Neck: +  pain to palpation over the left cervical paraspinous muscles and C spine . +  pain with neck  Extension  Cor: RRR  Pulm: CTA  Back: Straight leg raising in the sitting and supine positions is negative to radicular pain. No pain to palpation over the spine or costovertebral angles. Normal range of motion without pain reproduction.  Extremities: Peripheral joint ROM is full and pain free without obvious instability or laxity in all four extremities. No deformities, edema, or skin discoloration. Good capillary refill.  Musculoskeletal: Shoulder, hip, sacroiliac and knee provocative maneuvers are negative. Bilateral upper and lower extremity strength is normal and symmetric.  No atrophy or tone abnormalities are noted.  Neuro: Bilateral upper and lower extremity coordination and muscle stretch reflexes are physiologic and symmetric.  Plantar response are downgoing. No loss of sensation is noted.  Gait: normal.      ASSESSMENT: 37 y.o. year old female with left sided neck and left arm pain , consistent with possible let cervical radiculopathy. She has    She has psychiatric history of anxiety, bipolar disorder and under psychiatry care   She has CTS on right side as well as right  ulnar nerve transposition      Diagnosis:    1. Facet hypertrophy of cervical region     2. Facet arthropathy, cervical     3. Left cervical radiculopathy     4. Myofascial muscle pain     5. Fibromyalgia           PLAN:     - I have stressed the importance of physical activity and a home exercise plan to help with pain and improve health.  - Referral to Physical therapy for Lumbar stabilization, core strengthening, and a home exercise program.  - Patient can continue with medications for now since they are providing  benefits, using them appropriately, and without side effects.  - Previous imaging was reviewed and discussed with the patient today.   - Schedule for a Diagnostic/Therapeutic Medial branch block Bilaterally @  C3,4, and 5 to help with neck pain and progress with a home exercise program.  - I have explained the risks, benefits, and alternatives of the procedure in detail. The patient voices understanding and all questions have been answered. The patient agrees to proceed as planned. Written Consent obtained.   - Counseled patient regarding the importance of activity modification, constant sleeping habits and physical therapy.  -RTC 2-3 weeks following procedure.  -The above plan and management options were discussed at length with patient. Patient is in agreement with the above and verbalized understanding. Dr. Chapin   was consulted on this patient  and agrees with this plan.        ANTONELLA Lai    02/02/2018

## 2018-02-02 ENCOUNTER — OFFICE VISIT (OUTPATIENT)
Dept: PAIN MEDICINE | Facility: CLINIC | Age: 38
End: 2018-02-02
Payer: COMMERCIAL

## 2018-02-02 VITALS
HEART RATE: 76 BPM | WEIGHT: 97.88 LBS | DIASTOLIC BLOOD PRESSURE: 80 MMHG | SYSTOLIC BLOOD PRESSURE: 114 MMHG | BODY MASS INDEX: 17.9 KG/M2

## 2018-02-02 DIAGNOSIS — M79.18 MYOFASCIAL MUSCLE PAIN: ICD-10-CM

## 2018-02-02 DIAGNOSIS — M47.812 FACET HYPERTROPHY OF CERVICAL REGION: Primary | ICD-10-CM

## 2018-02-02 DIAGNOSIS — M47.812 FACET ARTHROPATHY, CERVICAL: ICD-10-CM

## 2018-02-02 DIAGNOSIS — M54.12 LEFT CERVICAL RADICULOPATHY: ICD-10-CM

## 2018-02-02 DIAGNOSIS — M79.7 FIBROMYALGIA: ICD-10-CM

## 2018-02-02 PROCEDURE — 3008F BODY MASS INDEX DOCD: CPT | Mod: S$GLB,,, | Performed by: NURSE PRACTITIONER

## 2018-02-02 PROCEDURE — 99999 PR PBB SHADOW E&M-EST. PATIENT-LVL III: CPT | Mod: PBBFAC,,, | Performed by: NURSE PRACTITIONER

## 2018-02-02 PROCEDURE — 99213 OFFICE O/P EST LOW 20 MIN: CPT | Mod: S$GLB,,, | Performed by: NURSE PRACTITIONER

## 2018-02-09 ENCOUNTER — HOSPITAL ENCOUNTER (OUTPATIENT)
Facility: HOSPITAL | Age: 38
Discharge: HOME | End: 2018-02-09
Attending: ANESTHESIOLOGY | Admitting: ANESTHESIOLOGY
Payer: COMMERCIAL

## 2018-02-09 ENCOUNTER — SURGERY (OUTPATIENT)
Age: 38
End: 2018-02-09

## 2018-02-09 ENCOUNTER — HOSPITAL ENCOUNTER (OUTPATIENT)
Dept: RADIOLOGY | Facility: HOSPITAL | Age: 38
Discharge: HOME | End: 2018-02-09
Attending: NURSE PRACTITIONER | Admitting: ANESTHESIOLOGY
Payer: COMMERCIAL

## 2018-02-09 VITALS
DIASTOLIC BLOOD PRESSURE: 62 MMHG | RESPIRATION RATE: 18 BRPM | OXYGEN SATURATION: 100 % | SYSTOLIC BLOOD PRESSURE: 120 MMHG | HEART RATE: 85 BPM | TEMPERATURE: 97 F

## 2018-02-09 DIAGNOSIS — M47.812 FACET ARTHROPATHY, CERVICAL: ICD-10-CM

## 2018-02-09 DIAGNOSIS — M47.812 CERVICAL SPONDYLOSIS: ICD-10-CM

## 2018-02-09 PROCEDURE — 64491 INJ PARAVERT F JNT C/T 2 LEV: CPT | Mod: 50 | Performed by: ANESTHESIOLOGY

## 2018-02-09 PROCEDURE — 64490 INJ PARAVERT F JNT C/T 1 LEV: CPT | Mod: 50 | Performed by: ANESTHESIOLOGY

## 2018-02-09 PROCEDURE — 25000003 PHARM REV CODE 250: Performed by: ANESTHESIOLOGY

## 2018-02-09 PROCEDURE — 64490 INJ PARAVERT F JNT C/T 1 LEV: CPT | Mod: 50,,, | Performed by: ANESTHESIOLOGY

## 2018-02-09 PROCEDURE — 63600175 PHARM REV CODE 636 W HCPCS: Performed by: ANESTHESIOLOGY

## 2018-02-09 PROCEDURE — 64491 INJ PARAVERT F JNT C/T 2 LEV: CPT | Mod: 50,,, | Performed by: ANESTHESIOLOGY

## 2018-02-09 PROCEDURE — 99152 MOD SED SAME PHYS/QHP 5/>YRS: CPT | Mod: ,,, | Performed by: ANESTHESIOLOGY

## 2018-02-09 RX ORDER — MIDAZOLAM HYDROCHLORIDE 1 MG/ML
INJECTION INTRAMUSCULAR; INTRAVENOUS
Status: DISCONTINUED | OUTPATIENT
Start: 2018-02-09 | End: 2018-02-09 | Stop reason: HOSPADM

## 2018-02-09 RX ORDER — TRIAMCINOLONE ACETONIDE 40 MG/ML
INJECTION, SUSPENSION INTRA-ARTICULAR; INTRAMUSCULAR
Status: DISCONTINUED | OUTPATIENT
Start: 2018-02-09 | End: 2018-02-09 | Stop reason: HOSPADM

## 2018-02-09 RX ORDER — LIDOCAINE HYDROCHLORIDE 10 MG/ML
INJECTION, SOLUTION EPIDURAL; INFILTRATION; INTRACAUDAL; PERINEURAL
Status: DISCONTINUED | OUTPATIENT
Start: 2018-02-09 | End: 2018-02-09 | Stop reason: HOSPADM

## 2018-02-09 RX ORDER — BUPIVACAINE HYDROCHLORIDE 2.5 MG/ML
INJECTION, SOLUTION EPIDURAL; INFILTRATION; INTRACAUDAL
Status: DISCONTINUED | OUTPATIENT
Start: 2018-02-09 | End: 2018-02-09 | Stop reason: HOSPADM

## 2018-02-09 RX ORDER — SODIUM CHLORIDE, SODIUM LACTATE, POTASSIUM CHLORIDE, CALCIUM CHLORIDE 600; 310; 30; 20 MG/100ML; MG/100ML; MG/100ML; MG/100ML
INJECTION, SOLUTION INTRAVENOUS CONTINUOUS
Status: DISCONTINUED | OUTPATIENT
Start: 2018-02-09 | End: 2018-02-09 | Stop reason: HOSPADM

## 2018-02-09 RX ORDER — FENTANYL CITRATE 50 UG/ML
INJECTION, SOLUTION INTRAMUSCULAR; INTRAVENOUS
Status: DISCONTINUED | OUTPATIENT
Start: 2018-02-09 | End: 2018-02-09 | Stop reason: HOSPADM

## 2018-02-09 RX ADMIN — TRIAMCINOLONE ACETONIDE 40 MG: 40 INJECTION, SUSPENSION INTRA-ARTICULAR; INTRAMUSCULAR at 11:02

## 2018-02-09 RX ADMIN — MIDAZOLAM HYDROCHLORIDE 2 MG: 1 INJECTION, SOLUTION INTRAMUSCULAR; INTRAVENOUS at 11:02

## 2018-02-09 RX ADMIN — LIDOCAINE HYDROCHLORIDE 5 ML: 10 INJECTION, SOLUTION EPIDURAL; INFILTRATION; INTRACAUDAL; PERINEURAL at 11:02

## 2018-02-09 RX ADMIN — FENTANYL CITRATE 100 MCG: 50 INJECTION, SOLUTION INTRAMUSCULAR; INTRAVENOUS at 11:02

## 2018-02-09 RX ADMIN — BUPIVACAINE HYDROCHLORIDE 5 ML: 2.5 INJECTION, SOLUTION EPIDURAL; INFILTRATION; INTRACAUDAL; PERINEURAL at 11:02

## 2018-02-09 NOTE — INTERVAL H&P NOTE
The patient has been examined and the H&P has been reviewed:        I concur with the findings and no changes have occurred since H&P was written.        Patient cleared for Anesthesia: Conscious Sedation        Anesthesia/Surgery risks, benefits and alternative options discussed and understood by patient/family.      Active Hospital Problems    Diagnosis  POA    Cervical spondylosis [M47.812]  Yes      Resolved Hospital Problems    Diagnosis Date Resolved POA   No resolved problems to display.

## 2018-02-09 NOTE — DISCHARGE INSTRUCTIONS
DIET: You may resume your normal diet today.    BATHING: You may resume your normal bathing.          You may shower, no hot water directly on site for 24 hours.    DRESSING: You may remove your bandage today.    ACTIVITY LEVEL: You may resume your normal activities 24 hours after your  procedure.    If you have received sedation or an anesthetic, you may feel sleepy                                                                          for several hours. Rest until you are more awake. Gradually resume your normal activities tomorrow.    If you have received sedation or an anesthetic, do not drive or operate heavy machinery for at least 24 hours.    MEDICATION: You may resume your normal medications today.    You will receive instructions for any pain prescriptions. Pain medications should be taken only as directed.    SPECIAL INSTRUCTIONS: No heat to the injection site for 24 hours including: bath or shower, heating pad, moist heat, hot tubs.    Use ice pack to injection site for any pain or discomfort. Apply ice pack to 20 minutes then remove for 20 minutes before re-applying to site.    WHEN TO CALL DOCTOR: Redness or swelling around injection site    Fever of 101F    Drainage (pus) from the injection site    For any continuous bleeding (some dried blood over the incision is normal).    FOLLOW UP: Follow up phone call will be made by office.    FOR EMERGENCIES: If any unusual problems or difficulties occur during clinic hours, call [phone] or [phone].

## 2018-02-09 NOTE — OP NOTE
"Patient Name: Staci Hodge  MRN: [id number]    INFORMED CONSENT: The procedure, risks, benefits and options were discussed with patient. There are no contraindications to the procedure. The patient expressed understanding and agreed to proceed. The personnel performing the procedure was discussed. I verify that I personally obtained Staci's consent prior to the start of the procedure and the signed consent can be found on the patient's chart.    PROCEDURE: BILATERAL C3,4,5  CERVICAL FACET MEDIAL BRANCH NERVE BLOCK (Prone)    Procedure Date: 2/9/2018  Anesthesia:  systemic  Pre Procedure diagnosis: cervical spondylosis  Post-Procedure diagnosis: Same    Sedation: Yes - Fentanyl 100 mcg and Midazolam 2 mg    DESCRIPTION OF PROCEDURE: The patient was brought to the procedure room.  IV access was obtained prior to the procedure.  The patient was positioned prone on the fluoroscopy table.  Continuous hemodynamic monitoring was initiated including blood pressure, EKG, and pulse oximetry.  The skin overlying the cervical spine was prepped with chlorhexidine three times and draped into a sterile field.  Fluoroscopy was used to identify the location of the   C3 to C5 medial branch nerves.  Skin anesthesia was achieved using 5 cc of Lidocaine 1% over the injection sites. A 22 gauge, 3 1/2" spinal needle was slowly inserted at each level using AP, lateral and oblique fluoroscopic imaging. Final needle position was at the midpoint of the waist of the articular pillars. Negative aspiration for blood or CSF was confirmed. A combination of 5ml bupivacaine 0.25%and 40 mg of Kenalog was injected.  The needles were removed and bleeding was nil. A sterile dressing was applied.No specimens collected. The patient was brought to recovery in stable condition. Staci was taken back to the recovery room for further observation.     Blood Loss: Nill  Specimen: None    Pre Procedure Pain Level: 10/10  Post-Procedure Pain Level: " 5/10        Discharge Diagnosis: Same as Pre and Post Procedure  Condition on Discharge: Stable.  Diet on Discharge: Same as before.  Activity: as per instruction sheet.  Discharge to: Home with a responsible adult.  Follow up: as per Discharge instructions

## 2018-02-09 NOTE — H&P (VIEW-ONLY)
Chronic patient Established Note (Follow up visit)      SUBJECTIVE:    Staci Hodge presents to the clinic for a 2 wk follow-up appointment for neck pain and left arm pain.  She is S/P C7-T1 CERVICAL EPIDURAL STEROID INJECTION on 1/19/18 with 0% relief. She states she visited rheumatology/ Dr. Moser Friday, she was dx with fibromyalgia,  she was started on Lyrica 50 mg TID, she was switched from flexeril to Zanaflex she is taking it twice a day which is an increase. She states she can't afford physical therapy, so she has been doing exercises at home. MRI does show mild facet arthropathy contribute to mild bilateral neural foraminal narrowing at multiple levels. She presents in a lot of pain,and crying. Recent EMG reviewed and discussed, I will s/f bilateral Cervical MBB @ C 3,4,5 to help with neck pain. Discussed in detail that ijection is diagnostic and that it may not help her pain, pt stated she understood.    Since the last visit, Staci Hodge states the pain has been worsening. Current pain intensity is 10/10.          She has psychiatric history of anxiety, bipolar disorder and under psychiatry care         She has CTS on right side as well as right  ulnar nerve transposition      EMG UEs: 2/2017:     Impression:  Abnormal Study secondary to the Presence of:    Mild Bilateral Carpal Tunnel Syndrome  Latest  EMG was done on 1/18/18 day before her injections.         Pain Disability Index Review:  Last 3 PDI Scores 2/2/2018 1/8/2018   Pain Disability Index (PDI) 59 52       Pain Medications:     - Opioids: none   - Adjuvant Medications: Lyrica ( Pregabalin) and Topamax (Topiramate)  - Anti-Coagulants: none   - Others: See Medication List     Opioid Contract: no     report:  Reviewed and consistent with medication use as prescribed.    Pain Procedures: 1/19/18 C7-T1 CERVICAL EPIDURAL STEROID INJECTION    Physical Therapy/Home Exercise: yes    Imaging: MRI Cervical Spine Without  Contrast   Technique: Sagittal T1, sagittal T2, sagittal STIR, axial gradient and axial T2 images of the cervical spine were obtained without contrast.    Results: The incidentally visualized soft tissues structures of the neck are within normal limits.    The craniocervical junction is intact.  There is no evidence for a Chiari mount formation.  The spinal cord is normal in signal without cord edema or myelomalacia.    There is straightening of the normal cervical lordosis.  Vertebral body heights are maintained.  There is disc desiccation throughout the cervical spine with mild disc space narrowing.  The marrow signal is normal without evidence for a marrow replacement process, infection or tumor.    At C2-3, no disk herniation, central canal stenosis or neural foraminal narrowing.    At C3-4, there is a posterior disc osteophyte complex and bilateral uncovertebral spurring without central canal stenosis or neural foraminal narrowing.    At C4-5, there is a posterior disc osteophyte complex without central canal stenosis or neural foraminal narrowing.    At C5-6, there is a posterior disc osteophyte complex with bilateral uncovertebral spurring and mild facet arthropathy contribute to mild bilateral neural foraminal narrowing.    At C6-7, there is a posterior disc osteophyte complex and bilateral uncovertebral spurring and facet arthropathy contributing to mild bilateral neural foraminal narrowing.    At C7-T1, there is bilateral uncovertebral spurring without central canal stenosis or neural foraminal narrowing.    Incidentally noted on the left at T1-2 is a left perineural cyst.   Impression          Mild degenerative changes of the cervical spine intravenous tube neural foraminal narrowing at the C5-6 and C6-7 levels.  No central canal stenosis.      Electronically signed by: Radha Mancini MD  Date: 11/22/17  Time: 13:28           Allergies: Review of patient's allergies indicates:  No Known  Allergies    Current Medications:   Current Outpatient Prescriptions   Medication Sig Dispense Refill    butalbital-acetaminophen-caffeine -40 mg (FIORICET, ESGIC) -40 mg per tablet TAKE 1 TABLET BY MOUTH EVERY 6 HOURS AS NEEDED FOR HEADACHES LIMIT USE TO 2 DAYS PER WEEK 20 tablet 1    clonazePAM (KLONOPIN) 1 MG tablet TAKE 1 TABLET TWICE DAILY AS NEEDED FOR ANXIETY 60 tablet 1    clonazePAM (KLONOPIN) 1 MG tablet TAKE 1 TABLET BY MOUTH TWICE A DAY AS NEEDED FOR ANXIETY 60 tablet 2    diclofenac (VOLTAREN) 50 MG EC tablet TAKE 1 TABLET (50 MG TOTAL) BY MOUTH 2 (TWO) TIMES DAILY.  5    FLUoxetine (PROZAC) 40 MG capsule Take one 20 mg cap with one 40 mg cap for a total of 60 mg daily 90 capsule 0    methylPREDNISolone (MEDROL DOSEPACK) 4 mg tablet TAKE 6 TABLETS ON DAY 1 AS DIRECTED ON PACKAGE AND DECREASE BY 1 TAB EACH DAY FOR A TOTAL OF 6 DAYS  0    NECON 1/50, 28, 1-50 mg-mcg Tab Take 1 tablet by mouth once daily.  5    norgestrel-ethinyl estradiol (OGESTROL) 0.5-50 mg-mcg per tablet Take 1 tablet by mouth once daily. 28 tablet 2    OLANZapine (ZYPREXA) 20 MG tablet Take 1 tablet (20 mg total) by mouth every evening. 90 tablet 0    pregabalin (LYRICA) 100 MG capsule Take 1 capsule (100 mg total) by mouth 2 (two) times daily. 60 capsule 2    pregabalin (LYRICA) 50 MG capsule Take 1 capsule (50 mg total) by mouth 3 (three) times daily. 90 capsule 6    propranolol (INDERAL) 10 MG tablet Take 1 tablet (10 mg total) by mouth 2 (two) times daily. 60 tablet 1    tiZANidine (ZANAFLEX) 2 MG tablet TAKE 2 TABLETS (4 MG TOTAL) BY MOUTH EVERY 6 (SIX) HOURS AS NEEDED (NECK TENSION).  3    tiZANidine (ZANAFLEX) 2 MG tablet TAKE 2 TABLETS (4 MG TOTAL) BY MOUTH EVERY 6 (SIX) HOURS AS NEEDED (NECK TENSION). 60 tablet 3    topiramate (TOPAMAX) 25 MG tablet Take 2 tablets (50 mg total) by mouth 2 (two) times daily. 120 tablet 11    traZODone (DESYREL) 100 MG tablet Take 1 tablet (100 mg total) by mouth  every evening. 90 tablet 0    naproxen sodium (ANAPROX) 550 MG tablet TAKE 1 TABLET (550 MG TOTAL) BY MOUTH 2 (TWO) TIMES DAILY WITH MEALS.  0     Current Facility-Administered Medications   Medication Dose Route Frequency Provider Last Rate Last Dose    betamethasone acetate-betamethasone sodium phosphate injection 1.5 mg  1.5 mg Intra-articular 1 time in Clinic/HOD Camille Emerson NP        betamethasone acetate-betamethasone sodium phosphate injection 1.5 mg  1.5 mg Intra Carpal Tunnel 1 time in Clinic/HOD Camille Emerson NP           REVIEW OF SYSTEMS:    GENERAL:  No weight loss, malaise or fevers.  HEENT:  +headaches.  NECK:  + neck pain, see HPI   RESPIRATORY:  Negative for cough, wheezing or shortness of breath.  CARDIOVASCULAR:  Negative for chest pain, leg swelling or palpitations.  GI:  Negative for abdominal discomfort, blood in stools or black stools or change in bowel habits.  MUSCULOSKELETAL:  See HPI.  SKIN:  Negative for lesions, rash, and itching.  PSYCH:  + anxiety + bipolar disorder , denies SI  HEMATOLOGY/LYMPHOLOGY:  Negative for prolonged bleeding, bruising easily or swollen nodes.  NEURO:  See HPI, s, syncope, paralysis, seizures or tremors.  All other reviewed and negative other than HPI.    Past Medical History:  Past Medical History:   Diagnosis Date    Abnormal Pap smear     Anxiety     Colon polyp     Depression     Headache     History of psychiatric hospitalization     age 20 for SI    Hx of psychiatric care     Hypertension     Peutz-Jeghers syndrome     Psychiatric problem     Right carpal tunnel syndrome 3/9/2017    Self-harming behavior     Suicide attempt     Therapy        Past Surgical History:  Past Surgical History:   Procedure Laterality Date    ADENOIDECTOMY      APPENDECTOMY      BOWEL RESECTION  10/17/14    CARPAL TUNNEL RELEASE Bilateral     COLONOSCOPY      intestinal polpy      SMALL INTESTINE SURGERY      3 surgeries     TONSILLECTOMY       UPPER GASTROINTESTINAL ENDOSCOPY         Family History:  Family History   Problem Relation Age of Onset    Breast cancer Mother     Colon cancer Mother      peutz-jeghers syndrome    Hypertension Father     Colon polyps Sister      peutz-jeghers syndrome    Ovarian cancer Neg Hx     Suicide Neg Hx     ADD / ADHD Neg Hx     Alcohol abuse Neg Hx     Anxiety disorder Neg Hx     Bipolar disorder Neg Hx     Dementia Neg Hx     Depression Neg Hx     OCD Neg Hx     Drug abuse Neg Hx     Paranoid behavior Neg Hx     Physical abuse Neg Hx     Schizophrenia Neg Hx     Seizures Neg Hx     Self injury Neg Hx     Sexual abuse Neg Hx        Social History:  Social History     Social History    Marital status: Single     Spouse name: N/A    Number of children: 0    Years of education: N/A     Social History Main Topics    Smoking status: Current Every Day Smoker     Packs/day: 1.00     Years: 17.00     Types: Cigarettes     Start date: 4/17/2000    Smokeless tobacco: Never Used      Comment: told about smoking cessation clinic and give brochure    Alcohol use 3.5 oz/week     7 Standard drinks or equivalent per week      Comment: Socially-once a twice a month at the most-2 ot 3 drinks    Drug use: No    Sexual activity: Yes     Partners: Male     Birth control/ protection: None      Comment: single-in a pcmvhcpdhyod-     Other Topics Concern    Patient Feels They Ought To Cut Down On Drinking/Drug Use No    Patient Annoyed By Others Criticizing Their Drinking/Drug Use No    Patient Has Felt Bad Or Guilty About Drinking/Drug Use No    Patient Has Had A Drink/Used Drugs As An Eye Opener In The [place] No     Social History Narrative    Had one previous miscarriage in 2005    Never     currently in a relationship       OBJECTIVE:    /80   Pulse 76   Wt 44.4 kg (97 lb 14.2 oz)   BMI 17.90 kg/m²     PHYSICAL EXAMINATION:    General appearance: Well appearing, in no acute distress,  alert and oriented x3.  Psych:  Mood and affect appropriate.  Skin: Skin color, texture, turgor normal, no rashes or lesions, in both upper and lower body.  Head/face:  Normocephalic, atraumatic. No palpable lymph nodes.  Neck: +  pain to palpation over the left cervical paraspinous muscles and C spine . +  pain with neck  Extension  Cor: RRR  Pulm: CTA  Back: Straight leg raising in the sitting and supine positions is negative to radicular pain. No pain to palpation over the spine or costovertebral angles. Normal range of motion without pain reproduction.  Extremities: Peripheral joint ROM is full and pain free without obvious instability or laxity in all four extremities. No deformities, edema, or skin discoloration. Good capillary refill.  Musculoskeletal: Shoulder, hip, sacroiliac and knee provocative maneuvers are negative. Bilateral upper and lower extremity strength is normal and symmetric.  No atrophy or tone abnormalities are noted.  Neuro: Bilateral upper and lower extremity coordination and muscle stretch reflexes are physiologic and symmetric.  Plantar response are downgoing. No loss of sensation is noted.  Gait: normal.      ASSESSMENT: 37 y.o. year old female with left sided neck and left arm pain , consistent with possible let cervical radiculopathy. She has    She has psychiatric history of anxiety, bipolar disorder and under psychiatry care   She has CTS on right side as well as right  ulnar nerve transposition      Diagnosis:    1. Facet hypertrophy of cervical region     2. Facet arthropathy, cervical     3. Left cervical radiculopathy     4. Myofascial muscle pain     5. Fibromyalgia           PLAN:     - I have stressed the importance of physical activity and a home exercise plan to help with pain and improve health.  - Referral to Physical therapy for Lumbar stabilization, core strengthening, and a home exercise program.  - Patient can continue with medications for now since they are providing  benefits, using them appropriately, and without side effects.  - Previous imaging was reviewed and discussed with the patient today.   - Schedule for a Diagnostic/Therapeutic Medial branch block Bilaterally @  C3,4, and 5 to help with neck pain and progress with a home exercise program.  - I have explained the risks, benefits, and alternatives of the procedure in detail. The patient voices understanding and all questions have been answered. The patient agrees to proceed as planned. Written Consent obtained.   - Counseled patient regarding the importance of activity modification, constant sleeping habits and physical therapy.  -RTC 2-3 weeks following procedure.  -The above plan and management options were discussed at length with patient. Patient is in agreement with the above and verbalized understanding. Dr. Chapin   was consulted on this patient  and agrees with this plan.        ANTONELLA Lai    02/02/2018

## 2018-02-12 ENCOUNTER — OFFICE VISIT (OUTPATIENT)
Dept: PSYCHIATRY | Facility: CLINIC | Age: 38
End: 2018-02-12
Payer: COMMERCIAL

## 2018-02-12 VITALS
HEART RATE: 79 BPM | HEIGHT: 62 IN | WEIGHT: 95.81 LBS | BODY MASS INDEX: 17.63 KG/M2 | DIASTOLIC BLOOD PRESSURE: 78 MMHG | RESPIRATION RATE: 18 BRPM | SYSTOLIC BLOOD PRESSURE: 102 MMHG

## 2018-02-12 DIAGNOSIS — F40.10 SOCIAL ANXIETY DISORDER: ICD-10-CM

## 2018-02-12 DIAGNOSIS — F40.01 PANIC DISORDER WITH AGORAPHOBIA: ICD-10-CM

## 2018-02-12 DIAGNOSIS — F42.8 OTHER OBSESSIVE-COMPULSIVE DISORDERS: ICD-10-CM

## 2018-02-12 DIAGNOSIS — F41.1 GAD (GENERALIZED ANXIETY DISORDER): ICD-10-CM

## 2018-02-12 DIAGNOSIS — F43.10 PTSD (POST-TRAUMATIC STRESS DISORDER): ICD-10-CM

## 2018-02-12 DIAGNOSIS — F39 MOOD DISORDER: Primary | ICD-10-CM

## 2018-02-12 PROBLEM — F42.9 OCD (OBSESSIVE COMPULSIVE DISORDER): Status: ACTIVE | Noted: 2018-02-12

## 2018-02-12 PROCEDURE — 3008F BODY MASS INDEX DOCD: CPT | Mod: S$GLB,,, | Performed by: PSYCHIATRY & NEUROLOGY

## 2018-02-12 PROCEDURE — 99999 PR PBB SHADOW E&M-EST. PATIENT-LVL III: CPT | Mod: PBBFAC,,, | Performed by: PSYCHIATRY & NEUROLOGY

## 2018-02-12 PROCEDURE — 90833 PSYTX W PT W E/M 30 MIN: CPT | Mod: S$GLB,,, | Performed by: PSYCHIATRY & NEUROLOGY

## 2018-02-12 PROCEDURE — 99214 OFFICE O/P EST MOD 30 MIN: CPT | Mod: S$GLB,,, | Performed by: PSYCHIATRY & NEUROLOGY

## 2018-02-12 RX ORDER — FLUOXETINE HYDROCHLORIDE 40 MG/1
80 CAPSULE ORAL DAILY
Qty: 180 CAPSULE | Refills: 0 | Status: SHIPPED | OUTPATIENT
Start: 2018-02-12 | End: 2018-04-10 | Stop reason: SDUPTHER
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
## 2018-02-14 ENCOUNTER — OFFICE VISIT (OUTPATIENT)
Dept: NEUROLOGY | Facility: CLINIC | Age: 38
End: 2018-02-14
Payer: COMMERCIAL

## 2018-02-14 VITALS
HEIGHT: 62 IN | HEART RATE: 122 BPM | SYSTOLIC BLOOD PRESSURE: 112 MMHG | BODY MASS INDEX: 17.4 KG/M2 | DIASTOLIC BLOOD PRESSURE: 84 MMHG | RESPIRATION RATE: 14 BRPM | WEIGHT: 94.56 LBS

## 2018-02-14 DIAGNOSIS — M54.2 NECK PAIN: ICD-10-CM

## 2018-02-14 DIAGNOSIS — G43.009 MIGRAINE WITHOUT AURA AND WITHOUT STATUS MIGRAINOSUS, NOT INTRACTABLE: ICD-10-CM

## 2018-02-14 DIAGNOSIS — G56.02 LEFT CARPAL TUNNEL SYNDROME: ICD-10-CM

## 2018-02-14 DIAGNOSIS — R00.0 TACHYCARDIA: ICD-10-CM

## 2018-02-14 DIAGNOSIS — F41.1 GAD (GENERALIZED ANXIETY DISORDER): ICD-10-CM

## 2018-02-14 DIAGNOSIS — M47.812 OSTEOARTHRITIS OF CERVICAL SPINE, UNSPECIFIED SPINAL OSTEOARTHRITIS COMPLICATION STATUS: ICD-10-CM

## 2018-02-14 DIAGNOSIS — R00.2 PALPITATIONS: Primary | ICD-10-CM

## 2018-02-14 PROBLEM — M54.12 LEFT CERVICAL RADICULOPATHY: Status: RESOLVED | Noted: 2018-01-08 | Resolved: 2018-02-14

## 2018-02-14 PROBLEM — M54.12 CERVICAL RADICULOPATHY: Status: RESOLVED | Noted: 2018-01-19 | Resolved: 2018-02-14

## 2018-02-14 PROCEDURE — 99999 PR PBB SHADOW E&M-EST. PATIENT-LVL IV: CPT | Mod: PBBFAC,,, | Performed by: NURSE PRACTITIONER

## 2018-02-14 PROCEDURE — 99214 OFFICE O/P EST MOD 30 MIN: CPT | Mod: S$GLB,,, | Performed by: NURSE PRACTITIONER

## 2018-02-14 PROCEDURE — 3008F BODY MASS INDEX DOCD: CPT | Mod: S$GLB,,, | Performed by: NURSE PRACTITIONER

## 2018-02-14 NOTE — PROGRESS NOTES
Subjective:      Chief Complaint:  Dizziness and numbness    History of Present Illness  Staci Hodge is a 37 y.o. female, with chronic headaches and bilateral CTS, and prior complaints of dizziness. She has a history of PTSD, panic disorder with agoraphobia, insomnia, colon resection, due to small bowel obstruction, and tobacco use.     She presents today for a follow up visit. She recently had cervical injections with Dr. Chapin, which were overall ineffective.     Headaches are overall managed with Topamax; however, she continues with episode headaches, which occur with increased stress. She was prescribed Fioricet, which is helpful.     She was diagnosed with Fibromyalgia per Rheumatology. Her Lyrica was changed from 100 mg bid to 50 mg tid, which has not had an effect on her.     She continues with bilateral hand pain.    She continues to see Dr. Emerson for her anxiety/panic disorder.    She denies any episodes of dizziness at this time, which were previously preceded by a shocking pain to her head.     I have reviewed all of this patient's past medical and surgical histories as well as family and social histories and active allergies and medications as documented in the electronic medical record.    Review of Systems  Review of Systems   Constitutional: Positive for fatigue. Negative for appetite change.   HENT: Negative for tinnitus.    Eyes: Negative for photophobia and visual disturbance.   Respiratory: Negative for shortness of breath.    Cardiovascular: Positive for palpitations.   Gastrointestinal: Negative for nausea.   Musculoskeletal: Positive for neck pain.   Neurological: Positive for numbness and headaches. Negative for dizziness, tremors and speech difficulty.   Hematological: Does not bruise/bleed easily.   Psychiatric/Behavioral: Negative for agitation, dysphoric mood, sleep disturbance and suicidal ideas. The patient is nervous/anxious.        Objective:       Vitals:    02/14/18  0935   BP: 112/84   Pulse: (!) 122   Resp: 14     Exam:  Gen Appearance, well developed/nourished in no apparent distress  CV: 2+ distal pulses with no edema or swelling  Neuro:  MS: Awake, alert, oriented to place, person, time, situation. Sustains attention. Recent/remote memory intact, Language is full to spontaneous speech/repetition/naming/comprehension. Fund of Knowledge is full  CN: Optic discs are flat with normal vasculature, PERRL, right nystamus. Normal facial sensation and strength, Hearing symmetric, Tongue and Palate are midline and strong. Shoulder Shrug symmetric and strong.  Motor: Normal bulk, tone, no abnormal movements. 5/5 strength bilateral upper/lower extremities with 2+ reflexes and bilateral plantar response  Sensory: symmetric to light touch, pain, temp, and vibration/proprioception. Romberg negative; + left Tinel's and Phalen's sign.   Cerebellar: Finger-nose,Heal-shin, Rapid alternating movements intact  Gait: Normal stance, no ataxia    Imaging:   MRI C-spine 12/2017:  Mild degenerative changes of the cervical spine; neural foraminal narrowing at the C5-6 and C6-7 levels.  No central canal stenosis.    MRI Brain with and without 2/3/2017: normal    24 hour Holter monitor: not completed    EMG BUE 2/10/2017:   Impression: Abnormal Study secondary to the Presence of:   Mild Bilateral Carpal Tunnel Syndrome.     EMG BUE 1/18/2018:  1. Improved right CTS findings post right CTR.   2. Slight worsening of the CTS findings.   3. No EP evidence of cervical radiculopathy found.     Labs: CBC, CMP, Lipid Panel, TSH, B12, Vitamin D ordered per PCP; Vitamin D low; labs also show fasting hyperglycemia.    Assessment:   Staci Hodge is a 37 y.o. female, with neck pain without radiculopathy, chronic headaches and bilateral CTS, and prior complaints of dizziness. She has a history of PTSD, panic disorder with agoraphobia, insomnia, colon resection, due to small bowel obstruction, and tobacco  use.   Plan:   I recommend:  1. Continue Topamax for headache prevention and Fioricet as an abortive agent. Diclofenac, prescribed to abort headaches, was discontinued per Nephrology, given her proteinuria.   2. She was originally prescribed Propranolol for headache prevention, which was ineffective; however, this was continued for her tachycardia, which was believed to be anxiety related; however, tachycardia is persistent.   3. Refer to Cardiology for further evaluation of tachycardia, as she continues with tachycardia in the 120's.   4. She may follow up with Dr. Chapin regarding her cervical complaints, as she failed to respond to two different types of injections, and did not have cervical radiculopathy on EMG; however, her complaints are most consistent with fibromyalgia at this point. Unresponsive to Lyrica thus far, which was recently adjusted per Rheumatology, who diagnosed her with FM.   5. She may continue to work; however, given her cervical and neuropathic type complaints, she is unable to lift, push/pull more than 10 pounds unless she proceeds with a left CTR, given the worsening of her left CTS.     FU 4 months

## 2018-02-14 NOTE — LETTER
February 14, 2018    Staci Hodge  [address] [place] [id number]             [facility]  [address] [facility] [address]  Phone: [phone]  Fax: [phone] To Whom it May Concern,     Staci Hodge completed a repeat EMG of the upper extremities, which demonstrated worsening of her left carpal tunnel syndrome. She is not to lift greater than 10 pounds or push greater than 10 pounds unless this is surgically revised; therefore these restrictions are to remain in place from this point forward.     Sincerely,        Camille Emerson, NP

## 2018-02-21 DIAGNOSIS — R00.0 TACHYCARDIA: ICD-10-CM

## 2018-02-21 DIAGNOSIS — G43.009 MIGRAINE WITHOUT AURA AND WITHOUT STATUS MIGRAINOSUS, NOT INTRACTABLE: ICD-10-CM

## 2018-02-22 ENCOUNTER — TELEPHONE (OUTPATIENT)
Dept: OBSTETRICS AND GYNECOLOGY | Facility: CLINIC | Age: 38
End: 2018-02-22

## 2018-02-22 RX ORDER — PROPRANOLOL HYDROCHLORIDE 10 MG/1
10 TABLET ORAL 2 TIMES DAILY
Qty: 60 TABLET | Refills: 1 | Status: SHIPPED | OUTPATIENT
Start: 2018-02-22 | End: 2018-04-15 | Stop reason: SDUPTHER

## 2018-02-22 NOTE — PROGRESS NOTES
Chronic patient Established Note (Follow up visit)      SUBJECTIVE:    Staci Hodge presents to the clinic for a 2 wk follow-up appointment for neck and left arm pain. She is S/P BILATERAL C3,4,5 CERVICAL FACET MEDIAL BRANCH NERVE BLOCK (Prone) on 2/9/18 with 0% relief. Discussed that she will need to start physical therapy however she states she can't afford it at this time. She is s/p C7-T1 Cervical LENA with no relief.  She take Lyrica and muscle relaxer that give her minimal relief.  Since the last visit, Staci Hodge states the pain has been persistant. Current pain intensity is 5/10. Discussed that I will refer to neurosurgery for further assessment considering she has had x2 injections with no relief.  She is visibly crying and does so every time I see her, she is very talkative and anxious about loosing her job with [employer] whom she has been working for 14 years. She states she is depressed with no suicidal ideations.  She has psychiatric history of anxiety, bipolar disorder and under psychiatry care   Pain Disability Index Review:  Last 3 PDI Scores 2/23/2018 2/2/2018 1/8/2018   Pain Disability Index (PDI) 50 59 52       Pain Medications:     - Opioids: none   - Adjuvant Medications: Lyrica ( Pregabalin) and Topamax (Topiramate)  - Anti-Coagulants: none   - Others: See Medication List      Opioid Contract: no      report:  Reviewed and consistent with medication use as prescribed.     Pain Procedures: 2/9/18 BILATERAL C3,4,5 CERVICAL FACET MEDIAL BRANCH NERVE BLOCK (Prone) 0% relief  1/19/18 C7-T1 CERVICAL EPIDURAL STEROID INJECTION     Physical Therapy/Home Exercise: yes     Imaging: MRI Cervical Spine Without Contrast   Technique: Sagittal T1, sagittal T2, sagittal STIR, axial gradient and axial T2 images of the cervical spine were obtained without contrast.    Results: The incidentally visualized soft tissues structures of the neck are within normal limits.    The craniocervical  junction is intact.  There is no evidence for a Chiari mount formation.  The spinal cord is normal in signal without cord edema or myelomalacia.    There is straightening of the normal cervical lordosis.  Vertebral body heights are maintained.  There is disc desiccation throughout the cervical spine with mild disc space narrowing.  The marrow signal is normal without evidence for a marrow replacement process, infection or tumor.    At C2-3, no disk herniation, central canal stenosis or neural foraminal narrowing.    At C3-4, there is a posterior disc osteophyte complex and bilateral uncovertebral spurring without central canal stenosis or neural foraminal narrowing.    At C4-5, there is a posterior disc osteophyte complex without central canal stenosis or neural foraminal narrowing.    At C5-6, there is a posterior disc osteophyte complex with bilateral uncovertebral spurring and mild facet arthropathy contribute to mild bilateral neural foraminal narrowing.    At C6-7, there is a posterior disc osteophyte complex and bilateral uncovertebral spurring and facet arthropathy contributing to mild bilateral neural foraminal narrowing.    At C7-T1, there is bilateral uncovertebral spurring without central canal stenosis or neural foraminal narrowing.    Incidentally noted on the left at T1-2 is a left perineural cyst.   Impression          Mild degenerative changes of the cervical spine intravenous tube neural foraminal narrowing at the C5-6 and C6-7 levels.  No central canal stenosis.      Electronically signed by: Radha Mancini MD  Date: 11/22/17  Time: 13:28        Allergies: Review of patient's allergies indicates:  No Known Allergies    Current Medications:   Current Outpatient Prescriptions   Medication Sig Dispense Refill    butalbital-acetaminophen-caffeine -40 mg (FIORICET, ESGIC) -40 mg per tablet TAKE 1 TABLET BY MOUTH EVERY 6 HOURS AS NEEDED FOR HEADACHES LIMIT USE TO 2 DAYS PER WEEK 20 tablet 1     clonazePAM (KLONOPIN) 1 MG tablet TAKE 1 TABLET TWICE DAILY AS NEEDED FOR ANXIETY 60 tablet 1    FLUoxetine (PROZAC) 40 MG capsule Take 2 capsules (80 mg total) by mouth once daily. 180 capsule 0    norgestrel-ethinyl estradiol (OGESTROL) 0.5-50 mg-mcg per tablet Take 1 tablet by mouth once daily. 28 tablet 2    OLANZapine (ZYPREXA) 20 MG tablet Take 1 tablet (20 mg total) by mouth every evening. 90 tablet 0    pregabalin (LYRICA) 50 MG capsule Take 1 capsule (50 mg total) by mouth 3 (three) times daily. 90 capsule 6    propranolol (INDERAL) 10 MG tablet TAKE 1 TABLET (10 MG TOTAL) BY MOUTH 2 (TWO) TIMES DAILY. 60 tablet 1    tiZANidine (ZANAFLEX) 2 MG tablet TAKE 2 TABLETS (4 MG TOTAL) BY MOUTH EVERY 6 (SIX) HOURS AS NEEDED (NECK TENSION).  3    topiramate (TOPAMAX) 25 MG tablet Take 2 tablets (50 mg total) by mouth 2 (two) times daily. 120 tablet 11    traZODone (DESYREL) 100 MG tablet Take 1 tablet (100 mg total) by mouth every evening. 90 tablet 0     Current Facility-Administered Medications   Medication Dose Route Frequency Provider Last Rate Last Dose    betamethasone acetate-betamethasone sodium phosphate injection 1.5 mg  1.5 mg Intra-articular 1 time in Clinic/SOLA Emerson NP        betamethasone acetate-betamethasone sodium phosphate injection 1.5 mg  1.5 mg Intra Carpal Tunnel 1 time in Clinic/SOLA Emerson NP           REVIEW OF SYSTEMS:    GENERAL:  No weight loss, malaise or fevers.  HEENT:  +headaches.  NECK:  + neck pain, see HPI   RESPIRATORY:  Negative for cough, wheezing or shortness of breath.  CARDIOVASCULAR:  Negative for chest pain, leg swelling or palpitations.  GI:  Negative for abdominal discomfort, blood in stools or black stools or change in bowel habits.  MUSCULOSKELETAL:  See HPI.  SKIN:  Negative for lesions, rash, and itching.  PSYCH:  + anxiety + bipolar disorder , denies SI  HEMATOLOGY/LYMPHOLOGY:  Negative for prolonged bleeding, bruising easily  or swollen nodes.  NEURO:  See HPI, s, syncope, paralysis, seizures or tremors.  All other reviewed and negative other than HPI.     Past Medical History:  Past Medical History:   Diagnosis Date    Abnormal Pap smear     Anxiety     Cervical spondylosis 2/9/2018    Colon polyp     Depression     Headache     History of psychiatric hospitalization     age 20 for SI    Hx of psychiatric care     Hypertension     Peutz-Jeghers syndrome     Psychiatric problem     Right carpal tunnel syndrome 3/9/2017    Self-harming behavior     Suicide attempt     Therapy        Past Surgical History:  Past Surgical History:   Procedure Laterality Date    ADENOIDECTOMY      APPENDECTOMY      BOWEL RESECTION  10/17/14    CARPAL TUNNEL RELEASE Bilateral     COLONOSCOPY      intestinal polpy      SMALL INTESTINE SURGERY      3 surgeries     TONSILLECTOMY      UPPER GASTROINTESTINAL ENDOSCOPY         Family History:  Family History   Problem Relation Age of Onset    Breast cancer Mother     Colon cancer Mother      peutz-jeghers syndrome    Hypertension Father     Colon polyps Sister      peutz-jeghers syndrome    Ovarian cancer Neg Hx     Suicide Neg Hx     ADD / ADHD Neg Hx     Alcohol abuse Neg Hx     Anxiety disorder Neg Hx     Bipolar disorder Neg Hx     Dementia Neg Hx     Depression Neg Hx     OCD Neg Hx     Drug abuse Neg Hx     Paranoid behavior Neg Hx     Physical abuse Neg Hx     Schizophrenia Neg Hx     Seizures Neg Hx     Self injury Neg Hx     Sexual abuse Neg Hx        Social History:  Social History     Social History    Marital status: Single     Spouse name: N/A    Number of children: 0    Years of education: N/A     Social History Main Topics    Smoking status: Current Every Day Smoker     Packs/day: 1.00     Years: 17.00     Types: Cigarettes     Start date: 4/17/2000    Smokeless tobacco: Never Used      Comment: told about smoking cessation clinic and give brochure     Alcohol use 3.5 oz/week     7 Standard drinks or equivalent per week      Comment: Socially-once a twice a month at the most-2 ot 3 drinks    Drug use: No    Sexual activity: Yes     Partners: Male     Birth control/ protection: None      Comment: single-in a [id number]-     Other Topics Concern    Patient Feels They Ought To Cut Down On Drinking/Drug Use No    Patient Annoyed By Others Criticizing Their Drinking/Drug Use No    Patient Has Felt Bad Or Guilty About Drinking/Drug Use No    Patient Has Had A Drink/Used Drugs As An Eye Opener In The [place] No     Social History Narrative    Had one previous miscarriage in 2005    Never     currently in a relationship       OBJECTIVE:    /74   Pulse 76   Wt 42.5 kg (93 lb 11.1 oz)   BMI 17.14 kg/m²     PHYSICAL EXAMINATION:    General appearance: Well appearing, in no acute distress, alert and oriented x3.  Psych:  Mood and affect appropriate.  Skin: Skin color, texture, turgor normal, no rashes or lesions, in both upper and lower body.  Head/face:  Normocephalic, atraumatic. No palpable lymph nodes.  Neck: +  pain to palpation over the left cervical paraspinous muscles and C spine . +  pain with neck  Extension  Cor: RRR  Pulm: CTA  Back: Straight leg raising in the sitting and supine positions is negative to radicular pain. No pain to palpation over the spine or costovertebral angles. Normal range of motion without pain reproduction.  Extremities: Peripheral joint ROM is full and pain free without obvious instability or laxity in all four extremities. No deformities, edema, or skin discoloration. Good capillary refill.  Musculoskeletal: Shoulder, hip, sacroiliac and knee provocative maneuvers are negative. Bilateral upper and lower extremity strength is normal and symmetric.  No atrophy or tone abnormalities are noted.  Neuro: Bilateral upper and lower extremity coordination and muscle stretch reflexes are physiologic and symmetric.  Plantar  response are downgoing. No loss of sensation is noted.  Gait: normal.      ASSESSMENT: 37 y.o. year old female with left sided neck and left arm pain , consistent with possible let cervical radiculopathy. She has    She has psychiatric history of anxiety, bipolar disorder and under psychiatry care   She has CTS on right side as well as right  ulnar nerve transposition       Diagnosis:    1. Cervical radiculopathy  Ambulatory Referral to Neurosurgery   2. Facet hypertrophy of cervical region     3. Facet arthropathy, cervical     4. Left cervical radiculopathy  Ambulatory Referral to Neurosurgery   5. Myofascial muscle pain     6. Fibromyalgia           PLAN:     - I have counseled the patient on importance of smoking cessation and specifically poor outcomes related to spine and spine surgery.  - I have stressed the importance of physical activity and a home exercise plan to help with pain and improve health.  - Referral to Physical therapy for Lumbar stabilization, core strengthening, and a home exercise program.  - Patient can continue with medications for now since they are providing benefits, using them appropriately, and without side effects.  - Counseled patient regarding the importance of activity modification, constant sleeping habits and physical therapy.  -Discussed that I would refer to neurosurgery  -RTC as needed for returning or new pain  -The above plan and management options were discussed at length with patient. Patient is in agreement with the above and verbalized understanding. Dr. Chapin   was consulted on this patient  and agrees with this plan.       ANTONELLA Lai      02/23/2018

## 2018-02-22 NOTE — TELEPHONE ENCOUNTER
----- Message from Snow Lyle sent at 2/22/2018 11:50 AM CST -----  Pt would like to discuss a private matter with the nurse. Pt can be called at the hospital in xray dept [phone].

## 2018-02-23 ENCOUNTER — OFFICE VISIT (OUTPATIENT)
Dept: PAIN MEDICINE | Facility: CLINIC | Age: 38
End: 2018-02-23
Payer: COMMERCIAL

## 2018-02-23 VITALS
BODY MASS INDEX: 17.14 KG/M2 | HEART RATE: 76 BPM | DIASTOLIC BLOOD PRESSURE: 74 MMHG | SYSTOLIC BLOOD PRESSURE: 108 MMHG | WEIGHT: 93.69 LBS

## 2018-02-23 DIAGNOSIS — M54.12 LEFT CERVICAL RADICULOPATHY: ICD-10-CM

## 2018-02-23 DIAGNOSIS — M47.812 FACET ARTHROPATHY, CERVICAL: ICD-10-CM

## 2018-02-23 DIAGNOSIS — M47.812 FACET HYPERTROPHY OF CERVICAL REGION: ICD-10-CM

## 2018-02-23 DIAGNOSIS — M79.7 FIBROMYALGIA: ICD-10-CM

## 2018-02-23 DIAGNOSIS — M79.18 MYOFASCIAL MUSCLE PAIN: ICD-10-CM

## 2018-02-23 DIAGNOSIS — M54.12 CERVICAL RADICULOPATHY: Primary | ICD-10-CM

## 2018-02-23 PROCEDURE — 3008F BODY MASS INDEX DOCD: CPT | Mod: S$GLB,,, | Performed by: NURSE PRACTITIONER

## 2018-02-23 PROCEDURE — 99999 PR PBB SHADOW E&M-EST. PATIENT-LVL III: CPT | Mod: PBBFAC,,, | Performed by: NURSE PRACTITIONER

## 2018-02-23 PROCEDURE — 99213 OFFICE O/P EST LOW 20 MIN: CPT | Mod: S$GLB,,, | Performed by: NURSE PRACTITIONER

## 2018-02-26 ENCOUNTER — TELEPHONE (OUTPATIENT)
Dept: OBSTETRICS AND GYNECOLOGY | Facility: CLINIC | Age: 38
End: 2018-02-26

## 2018-02-26 ENCOUNTER — OFFICE VISIT (OUTPATIENT)
Dept: OBSTETRICS AND GYNECOLOGY | Facility: CLINIC | Age: 38
End: 2018-02-26
Payer: COMMERCIAL

## 2018-02-26 VITALS
BODY MASS INDEX: 17.48 KG/M2 | WEIGHT: 95 LBS | DIASTOLIC BLOOD PRESSURE: 67 MMHG | HEIGHT: 62 IN | RESPIRATION RATE: 13 BRPM | HEART RATE: 80 BPM | SYSTOLIC BLOOD PRESSURE: 118 MMHG

## 2018-02-26 DIAGNOSIS — N92.0 MENORRHAGIA WITH REGULAR CYCLE: Primary | ICD-10-CM

## 2018-02-26 PROCEDURE — 99999 PR PBB SHADOW E&M-EST. PATIENT-LVL IV: CPT | Mod: PBBFAC,,, | Performed by: OBSTETRICS & GYNECOLOGY

## 2018-02-26 PROCEDURE — 99213 OFFICE O/P EST LOW 20 MIN: CPT | Mod: S$GLB,,, | Performed by: OBSTETRICS & GYNECOLOGY

## 2018-02-26 PROCEDURE — 3008F BODY MASS INDEX DOCD: CPT | Mod: S$GLB,,, | Performed by: OBSTETRICS & GYNECOLOGY

## 2018-02-26 RX ORDER — DICLOFENAC SODIUM 10 MG/G
1 GEL TOPICAL DAILY
COMMUNITY
Start: 2017-12-13 | End: 2018-06-05

## 2018-02-26 RX ORDER — DICLOFENAC SODIUM 50 MG/1
1 TABLET, DELAYED RELEASE ORAL DAILY
COMMUNITY
Start: 2018-02-21 | End: 2018-06-05

## 2018-02-26 RX ORDER — LIDOCAINE AND PRILOCAINE 25; 25 MG/G; MG/G
1 CREAM TOPICAL DAILY
COMMUNITY
Start: 2017-12-13 | End: 2018-07-03

## 2018-02-26 NOTE — PROGRESS NOTES
Subjective:       Patient ID: Staci Hodge is a 37 y.o. female.    Chief Complaint:  Menorrhagia; Metrorrhagia; and Dysmenorrhea      History of Present Illness  Patient presents complaining of continued irregular bleeding.  Patient is been on birth control for irregular menstrual cycles and recently had an increasing her dose.  Patient states she continues to have irregular bleeding and this cycle has lasted for 2 weeks.  This problem is been going on for 6-8 months at least.  Patient would consider pregnancy in the future.  Therefore when discussing options patient would like to do a D&C only.    Menstrual History:  OB History      Para Term  AB Living    1       1      SAB TAB Ectopic Multiple Live Births    1                 Menarche age:   Patient's last menstrual period was 2018 (approximate).         Review of Systems  Review of Systems   Constitutional: Positive for appetite change, fatigue and unexpected weight change. Negative for activity change, chills, diaphoresis and fever.   HENT: Negative for congestion, dental problem, drooling, ear discharge, ear pain, facial swelling, hearing loss, mouth sores, nosebleeds, postnasal drip, rhinorrhea, sinus pressure, sneezing, sore throat, tinnitus, trouble swallowing and voice change.    Eyes: Negative for photophobia, pain, discharge, redness, itching and visual disturbance.   Respiratory: Negative for apnea, cough, choking, chest tightness, shortness of breath, wheezing and stridor.    Cardiovascular: Negative for chest pain, palpitations and leg swelling.   Gastrointestinal: Negative for abdominal distention, abdominal pain, anal bleeding, blood in stool, constipation, diarrhea, nausea, rectal pain and vomiting.   Endocrine: Positive for polydipsia and polyphagia. Negative for cold intolerance, heat intolerance and polyuria.   Genitourinary: Positive for menstrual problem, pelvic pain and vaginal bleeding. Negative for decreased  urine volume, difficulty urinating, dyspareunia, dysuria, enuresis, flank pain, frequency, genital sores, hematuria, urgency, vaginal discharge and vaginal pain.   Musculoskeletal: Positive for arthralgias, myalgias, neck pain and neck stiffness. Negative for back pain, gait problem and joint swelling.   Skin: Negative for color change, pallor, rash and wound.   Allergic/Immunologic: Negative for environmental allergies, food allergies and immunocompromised state.   Neurological: Positive for tremors, weakness, light-headedness, numbness and headaches. Negative for dizziness, seizures, syncope, facial asymmetry and speech difficulty.   Hematological: Negative for adenopathy. Does not bruise/bleed easily.   Psychiatric/Behavioral: Positive for agitation, decreased concentration and sleep disturbance. Negative for behavioral problems, confusion, dysphoric mood, hallucinations, self-injury and suicidal ideas. The patient is nervous/anxious. The patient is not hyperactive.            Objective:    Physical Exam   Genitourinary: Rectal exam shows no external hemorrhoid. No labial fusion. There is no rash, tenderness, lesion or injury on the right labia. There is no rash, tenderness, lesion or injury on the left labia. Uterus is not deviated, not enlarged, not fixed and not tender. Cervix exhibits no motion tenderness, no discharge and no friability. Right adnexum displays no mass, no tenderness and no fullness. Left adnexum displays no mass, no tenderness and no fullness. No erythema, tenderness or bleeding in the vagina. No foreign body in the vagina. No signs of injury around the vagina. No vaginal discharge found.   Nursing note and vitals reviewed.        Assessment:        1. Menorrhagia with regular cycle               Plan:        Staci was seen today for menorrhagia, metrorrhagia and dysmenorrhea.    Diagnoses and all orders for this visit:    Menorrhagia with regular cycle  -     Case Request Operating Room:  DILATION-CURETTAGE OF UTERUS (D AND C)

## 2018-03-02 ENCOUNTER — HOSPITAL ENCOUNTER (OUTPATIENT)
Dept: PREADMISSION TESTING | Facility: HOSPITAL | Age: 38
Discharge: HOME | End: 2018-03-02
Attending: OBSTETRICS & GYNECOLOGY
Payer: COMMERCIAL

## 2018-03-02 ENCOUNTER — OFFICE VISIT (OUTPATIENT)
Dept: OBSTETRICS AND GYNECOLOGY | Facility: CLINIC | Age: 38
End: 2018-03-02
Payer: COMMERCIAL

## 2018-03-02 ENCOUNTER — ANESTHESIA EVENT (OUTPATIENT)
Dept: SURGERY | Facility: HOSPITAL | Age: 38
End: 2018-03-02
Payer: COMMERCIAL

## 2018-03-02 VITALS
BODY MASS INDEX: 17.3 KG/M2 | RESPIRATION RATE: 15 BRPM | SYSTOLIC BLOOD PRESSURE: 116 MMHG | DIASTOLIC BLOOD PRESSURE: 68 MMHG | HEIGHT: 62 IN | WEIGHT: 94 LBS | HEART RATE: 73 BPM

## 2018-03-02 DIAGNOSIS — Z01.818 PREOP TESTING: ICD-10-CM

## 2018-03-02 DIAGNOSIS — N92.0 MENORRHAGIA WITH REGULAR CYCLE: Primary | ICD-10-CM

## 2018-03-02 PROCEDURE — 99999 PR PBB SHADOW E&M-EST. PATIENT-LVL III: CPT | Mod: PBBFAC,,, | Performed by: OBSTETRICS & GYNECOLOGY

## 2018-03-02 PROCEDURE — 99499 UNLISTED E&M SERVICE: CPT | Mod: S$GLB,,, | Performed by: OBSTETRICS & GYNECOLOGY

## 2018-03-02 RX ORDER — SODIUM CHLORIDE, SODIUM LACTATE, POTASSIUM CHLORIDE, CALCIUM CHLORIDE 600; 310; 30; 20 MG/100ML; MG/100ML; MG/100ML; MG/100ML
INJECTION, SOLUTION INTRAVENOUS CONTINUOUS
Status: CANCELLED | OUTPATIENT
Start: 2018-03-02

## 2018-03-02 NOTE — PROGRESS NOTES
Subjective:       Patient ID: Staci Hodge is a 37 y.o. female.    Chief Complaint:  Pre-op Exam      History of Present Illness  Patient presents for preop for D&C.Patient  complaining of continued irregular bleeding.  Patient is been on birth control for irregular menstrual cycles and recently had an increasing her dose.  Patient states she continues to have irregular bleeding and this cycle has lasted for 2 weeks.  This problem is been going on for 6-8 months at least.  Patient would consider pregnancy in the future.   patient would like to do a D&C only.  Informed consents obtained       Menstrual History:  OB History      Para Term  AB Living    1       1      SAB TAB Ectopic Multiple Live Births    1                 Menarche age:   Patient's last menstrual period was 2018 (approximate).         Review of Systems  Review of Systems   Constitutional: Negative for activity change, appetite change, chills, diaphoresis, fatigue, fever and unexpected weight change.   HENT: Negative for congestion, dental problem, drooling, ear discharge, ear pain, facial swelling, hearing loss, mouth sores, nosebleeds, postnasal drip, rhinorrhea, sinus pressure, sneezing, sore throat, tinnitus, trouble swallowing and voice change.    Eyes: Negative for photophobia, pain, discharge, redness, itching and visual disturbance.   Respiratory: Negative for apnea, cough, choking, chest tightness, shortness of breath, wheezing and stridor.    Cardiovascular: Negative for chest pain, palpitations and leg swelling.   Gastrointestinal: Negative for abdominal distention, abdominal pain, anal bleeding, blood in stool, constipation, diarrhea, nausea, rectal pain and vomiting.   Endocrine: Negative for cold intolerance, heat intolerance, polydipsia, polyphagia and polyuria.   Genitourinary: Positive for menstrual problem. Negative for decreased urine volume, difficulty urinating, dyspareunia, dysuria, enuresis, flank  pain, frequency, genital sores, hematuria, pelvic pain, urgency, vaginal bleeding, vaginal discharge and vaginal pain.   Musculoskeletal: Negative for arthralgias, back pain, gait problem, joint swelling, myalgias, neck pain and neck stiffness.   Skin: Negative for color change, pallor, rash and wound.   Allergic/Immunologic: Negative for environmental allergies, food allergies and immunocompromised state.   Neurological: Negative for dizziness, tremors, seizures, syncope, facial asymmetry, speech difficulty, weakness, light-headedness, numbness and headaches.   Hematological: Negative for adenopathy. Does not bruise/bleed easily.   Psychiatric/Behavioral: Positive for agitation, confusion, decreased concentration, dysphoric mood and sleep disturbance. Negative for behavioral problems, hallucinations, self-injury and suicidal ideas. The patient is nervous/anxious and is hyperactive.            Objective:    Physical Exam   Constitutional: She is oriented to person, place, and time. She appears well-developed and well-nourished.   Neck: No thyromegaly present.   Cardiovascular: Normal rate and regular rhythm.    Pulmonary/Chest: Effort normal and breath sounds normal.   Abdominal: Soft. Bowel sounds are normal. She exhibits no mass. There is no tenderness. Hernia confirmed negative in the right inguinal area and confirmed negative in the left inguinal area.   Genitourinary: Vagina normal and uterus normal. Rectal exam shows no external hemorrhoid. No breast tenderness or discharge. Uterus is not enlarged and not tender. Cervix exhibits no motion tenderness, no discharge and no friability. Right adnexum displays no mass, no tenderness and no fullness. Left adnexum displays no mass, no tenderness and no fullness. No tenderness in the vagina. No foreign body in the vagina. No vaginal discharge found.   Musculoskeletal: Normal range of motion.   Lymphadenopathy:        Right: No inguinal adenopathy present.        Left:  No inguinal adenopathy present.   Neurological: She is alert and oriented to person, place, and time. She has normal reflexes.   Skin: Skin is dry.   Psychiatric: She has a normal mood and affect. Her behavior is normal. Judgment and thought content normal.   Nursing note and vitals reviewed.        Assessment:        1. Menorrhagia with regular cycle    2. Preop testing               Plan:     proceed with D&C on March 5, 2018    Staci was seen today for pre-op exam.    Diagnoses and all orders for this visit:    Menorrhagia with regular cycle  -     CBC auto differential; Future  -     Pregnancy, urine rapid; Future  -     Type & Screen; Future    Preop testing  -     CBC auto differential; Future  -     Pregnancy, urine rapid; Future  -     Type & Screen; Future

## 2018-03-02 NOTE — DISCHARGE INSTRUCTIONS
[facility]  Pre Admit Instructions    Day and Date of Procedure: Monday, 03/05/2018. Please arrive at 1:30pm      · Call your doctor if you become ill before your surgery  · Someone will call you between 1 p.m. And 5 p.m.the workday before the procedure to give you an arrival time       - Before 7 a.m. Enter through Emergency Room       - 7 a.m. To 5 p.m. Enter through Patient Registration Main Lobby  · You must have a responsible  to bring you home    Do NOT eat or drink anything   past midnight before your procedure day    Please    · Do not wear makeup, jewelry, nail polish or body piercings  · Bring containers/solution for contacts, dentures, bridges - these and hearing aids will be removed before your procedure  · Do not bring cash, jewelry or valuables the day of your procedure   · No smoking at least 24 hours before your procedure  · Wear clothing that is comfortable and easy to take off and put on  · Do NOT shave for at least 5 days before your surgery    Review skin preparation handout before using. Shower with Hibiclens the Night before the procedure. Bring remaining Hibiclens with you the morning of surgery.                Information about your stay (Please Review)    Before Surgery  1. Cafeteria Meals: 7am to 10am; 11am to 1:30 pm; Dinner/Supper must may be ordered between 11:00 am and 4 pm from the [facility] [place] [facility] [employer]. Food will be available to  between 5 pm and 6 pm. The kitchen phone extension is [phone].  2. Your doctor may order and review labs, x-rays, ECG or other tests as a pre-surgery workup and will call you if there is need for follow up.  3. No smoking inside or outside the hospital on hospital grounds.  4. Wear clothing that is easy to take off and put on.  The hospital will provide you with a gown.  5. You may bring robe, slippers, nightwear, and toiletries (toothbrush, toothpaste, makeup).  6. If your doctor orders a Fleets Enema or other prep, follow  package and/or doctors orders.  7. Brush your teeth and rinse your mouth the morning of surgery, but dont swallow the water.  8. The nurse will ask questions and check your condition.  The doctor may cleo your surgical site.  9. Compression boots may be put on your calves to reduce the risk of blood clots.  10. The doctor may order medicine to help you relax before surgery.  After Surgery  1. The nurse will check your temperature, breathing, blood pressure, heart rate, IV site, and surgery site.  2. A diet will be ordered-most start with ice chips and then advance slowly to other foods.  3. If you have IV fluids the IV pump will beep to let the nurse know that she needs to check it.  4. You may have a urinary catheter and staff may measure your oral intake and urine output.  5. Pain medication may be ordered by the doctor after surgery.  If you have a pain management device tell your caretakers not to press the button because of OVERDOSE RISK.  6. When the nurse or doctor tells you it is okay to get out of bed, ask for help until you are stable.  7. The nurse may ask you to turn, cough, and deep breathe to prevent lung problems.  You can use a pillow to hold your incision when you deep breathe or cough to reduce pain.  8. The nurse will give you discharge instructions--incision care, symptoms to report to your doctor, and your follow-up appointment when you are discharged.  You cannot drive yourself home.  Goal for Discharge from One Day Surgery  · Control pain with an oral medication  · Walk without feeling dizzy or weak  · Tolerate liquids well  · Urinate without difficulty    Things you can do to  Reduce the Risk of Infections or Complications  Wash Hands and use Waterless Hand Sanitizers  · Wash hands frequently with soap and warm water for at least 15 seconds.   · Use hand sanitizers (alcohol based) often at home and in public if hands are not visibly soiled  Take Antibiotic Exactly as Prescribed  · Do not  stop antibiotics too soon; you risk developing infection resistant to antibiotics  · Take your antibiotic even if you are feeling better and even if they upset your stomach  · Call the doctor if you cant tolerate the antibiotic or you have an allergic reaction  Stay Healthy  · Take medicines as prescribed by your doctor  · Keep your diabetes under control - diet and medication  · Get enough rest, exercise and eat a healthy diet  Keep the Wound Clean and Dry  · Wash hands before and after taking care of the incision (cut)  · Wash hands when you remove a dressing, before you touch/apply a new dressing  · Shower and clean incision with antibacterial soap and rinse well if the doctor approves  · Allow the cut to dry completely before putting on a clean dressing  · Do not touch the part of the bandage that will cover the incision  · Do not use ointments unless your doctor tells you to-can promote bacterial growth  · If ordered, put ointment directly on the dressing-do not touch the end of the tube  · Do not scrub, remove scabs, or leave a damp dressing on the incision  · Do not use peroxide or alcohol to clean the incision unless the doctor tells you to   · Do not let children, pets or anyone else contaminate the incision  Stop Smoking To Prevent Infection  · Stop smoking-Centers for Disease Control recommends 30 days before surgery  · Smokers get more infections after surgery-studies have shown 6 times the risk  · Smokers have more scarring and heal slower-open wounds get infected easier  Prevent Respiratory complications  · Stop smoking  · Turn, cough, and deep breathe even if you have some pain when you do so.  · Splint your incision with a pillow when you cough/deep breath, to help control pain.  · Do not lie in one position for long periods of time.   Prevent Blood Clots  · When you wake move your legs, flex your feet, rotate your ankles, wiggle your toes  · Get up when the doctor says its ok.  Dangle your feet  from the side of the bed  · Report symptoms-leg pain, redness/swelling, warm to touch; fever; shortness of breath, chest pain, severe upper back pain.

## 2018-03-05 ENCOUNTER — HOSPITAL ENCOUNTER (OUTPATIENT)
Facility: HOSPITAL | Age: 38
Discharge: HOME | End: 2018-03-05
Attending: OBSTETRICS & GYNECOLOGY | Admitting: OBSTETRICS & GYNECOLOGY
Payer: COMMERCIAL

## 2018-03-05 ENCOUNTER — SURGERY (OUTPATIENT)
Age: 38
End: 2018-03-05

## 2018-03-05 ENCOUNTER — ANESTHESIA (OUTPATIENT)
Dept: SURGERY | Facility: HOSPITAL | Age: 38
End: 2018-03-05
Payer: COMMERCIAL

## 2018-03-05 DIAGNOSIS — N92.0 MENORRHAGIA WITH REGULAR CYCLE: Primary | ICD-10-CM

## 2018-03-05 LAB — POCT GLUCOSE: 76 MG/DL (ref 70–110)

## 2018-03-05 PROCEDURE — 25000003 PHARM REV CODE 250: Performed by: OBSTETRICS & GYNECOLOGY

## 2018-03-05 PROCEDURE — 37000009 HC ANESTHESIA EA ADD 15 MINS: Performed by: OBSTETRICS & GYNECOLOGY

## 2018-03-05 PROCEDURE — 37000008 HC ANESTHESIA 1ST 15 MINUTES: Performed by: OBSTETRICS & GYNECOLOGY

## 2018-03-05 PROCEDURE — 63600175 PHARM REV CODE 636 W HCPCS: Performed by: NURSE ANESTHETIST, CERTIFIED REGISTERED

## 2018-03-05 PROCEDURE — 25000003 PHARM REV CODE 250: Performed by: NURSE ANESTHETIST, CERTIFIED REGISTERED

## 2018-03-05 PROCEDURE — 71000033 HC RECOVERY, INTIAL HOUR: Performed by: OBSTETRICS & GYNECOLOGY

## 2018-03-05 PROCEDURE — 00940 ANES VAGINAL PX NOS: CPT | Mod: P2,QZ | Performed by: NURSE ANESTHETIST, CERTIFIED REGISTERED

## 2018-03-05 PROCEDURE — 88305 TISSUE EXAM BY PATHOLOGIST: CPT | Performed by: PATHOLOGY

## 2018-03-05 PROCEDURE — 88305 TISSUE EXAM BY PATHOLOGIST: CPT | Mod: 26,,, | Performed by: PATHOLOGY

## 2018-03-05 PROCEDURE — 58120 DILATION AND CURETTAGE: CPT | Mod: ,,, | Performed by: OBSTETRICS & GYNECOLOGY

## 2018-03-05 PROCEDURE — 36000705 HC OR TIME LEV I EA ADD 15 MIN: Performed by: OBSTETRICS & GYNECOLOGY

## 2018-03-05 PROCEDURE — 36000704 HC OR TIME LEV I 1ST 15 MIN: Performed by: OBSTETRICS & GYNECOLOGY

## 2018-03-05 RX ORDER — ONDANSETRON HYDROCHLORIDE 2 MG/ML
INJECTION, SOLUTION INTRAMUSCULAR; INTRAVENOUS
Status: DISCONTINUED | OUTPATIENT
Start: 2018-03-05 | End: 2018-03-05

## 2018-03-05 RX ORDER — SODIUM CHLORIDE 0.9 % (FLUSH) 0.9 %
3 SYRINGE (ML) INJECTION EVERY 8 HOURS
Status: DISCONTINUED | OUTPATIENT
Start: 2018-03-05 | End: 2018-03-05 | Stop reason: HOSPADM

## 2018-03-05 RX ORDER — FENTANYL CITRATE 50 UG/ML
INJECTION, SOLUTION INTRAMUSCULAR; INTRAVENOUS
Status: DISCONTINUED | OUTPATIENT
Start: 2018-03-05 | End: 2018-03-05

## 2018-03-05 RX ORDER — OXYCODONE AND ACETAMINOPHEN 5; 325 MG/1; MG/1
1 TABLET ORAL EVERY 4 HOURS PRN
Status: DISCONTINUED | OUTPATIENT
Start: 2018-03-05 | End: 2018-03-05 | Stop reason: HOSPADM

## 2018-03-05 RX ORDER — PROPOFOL 10 MG/ML
VIAL (ML) INTRAVENOUS
Status: DISCONTINUED | OUTPATIENT
Start: 2018-03-05 | End: 2018-03-05

## 2018-03-05 RX ORDER — DIPHENHYDRAMINE HYDROCHLORIDE 50 MG/ML
25 INJECTION INTRAMUSCULAR; INTRAVENOUS EVERY 6 HOURS PRN
Status: DISCONTINUED | OUTPATIENT
Start: 2018-03-05 | End: 2018-03-05 | Stop reason: HOSPADM

## 2018-03-05 RX ORDER — ONDANSETRON 2 MG/ML
4 INJECTION INTRAMUSCULAR; INTRAVENOUS DAILY PRN
Status: DISCONTINUED | OUTPATIENT
Start: 2018-03-05 | End: 2018-03-05 | Stop reason: HOSPADM

## 2018-03-05 RX ORDER — MIDAZOLAM HYDROCHLORIDE 1 MG/ML
INJECTION INTRAMUSCULAR; INTRAVENOUS
Status: DISCONTINUED | OUTPATIENT
Start: 2018-03-05 | End: 2018-03-05

## 2018-03-05 RX ORDER — LIDOCAINE HYDROCHLORIDE 20 MG/ML
INJECTION, SOLUTION EPIDURAL; INFILTRATION; INTRACAUDAL; PERINEURAL
Status: DISCONTINUED | OUTPATIENT
Start: 2018-03-05 | End: 2018-03-05

## 2018-03-05 RX ORDER — ACETAMINOPHEN 10 MG/ML
INJECTION, SOLUTION INTRAVENOUS
Status: DISCONTINUED | OUTPATIENT
Start: 2018-03-05 | End: 2018-03-05

## 2018-03-05 RX ORDER — SODIUM CHLORIDE, SODIUM LACTATE, POTASSIUM CHLORIDE, CALCIUM CHLORIDE 600; 310; 30; 20 MG/100ML; MG/100ML; MG/100ML; MG/100ML
INJECTION, SOLUTION INTRAVENOUS CONTINUOUS
Status: DISCONTINUED | OUTPATIENT
Start: 2018-03-05 | End: 2018-03-05 | Stop reason: HOSPADM

## 2018-03-05 RX ORDER — KETOROLAC TROMETHAMINE 15 MG/ML
15 INJECTION, SOLUTION INTRAMUSCULAR; INTRAVENOUS EVERY 8 HOURS PRN
Status: DISCONTINUED | OUTPATIENT
Start: 2018-03-06 | End: 2018-03-05 | Stop reason: HOSPADM

## 2018-03-05 RX ORDER — OXYCODONE AND ACETAMINOPHEN 5; 325 MG/1; MG/1
1 TABLET ORAL EVERY 4 HOURS PRN
Qty: 10 TABLET | Refills: 0 | Status: SHIPPED | OUTPATIENT
Start: 2018-03-05 | End: 2018-03-16

## 2018-03-05 RX ORDER — SODIUM CHLORIDE 0.9 % (FLUSH) 0.9 %
3 SYRINGE (ML) INJECTION
Status: DISCONTINUED | OUTPATIENT
Start: 2018-03-05 | End: 2018-03-05 | Stop reason: HOSPADM

## 2018-03-05 RX ORDER — SODIUM CHLORIDE, SODIUM LACTATE, POTASSIUM CHLORIDE, CALCIUM CHLORIDE 600; 310; 30; 20 MG/100ML; MG/100ML; MG/100ML; MG/100ML
INJECTION, SOLUTION INTRAVENOUS CONTINUOUS PRN
Status: DISCONTINUED | OUTPATIENT
Start: 2018-03-05 | End: 2018-03-05

## 2018-03-05 RX ORDER — KETOROLAC TROMETHAMINE 30 MG/ML
INJECTION, SOLUTION INTRAMUSCULAR; INTRAVENOUS
Status: DISCONTINUED | OUTPATIENT
Start: 2018-03-05 | End: 2018-03-05

## 2018-03-05 RX ADMIN — OXYCODONE AND ACETAMINOPHEN 1 TABLET: 5; 325 TABLET ORAL at 06:03

## 2018-03-05 RX ADMIN — ONDANSETRON 4 MG: 2 INJECTION, SOLUTION INTRAMUSCULAR; INTRAVENOUS at 03:03

## 2018-03-05 RX ADMIN — FENTANYL CITRATE 50 MCG: 50 INJECTION, SOLUTION INTRAMUSCULAR; INTRAVENOUS at 03:03

## 2018-03-05 RX ADMIN — ACETAMINOPHEN 1000 MG: 10 INJECTION, SOLUTION INTRAVENOUS at 03:03

## 2018-03-05 RX ADMIN — PROPOFOL 30 MG: 10 INJECTION, EMULSION INTRAVENOUS at 04:03

## 2018-03-05 RX ADMIN — MIDAZOLAM HYDROCHLORIDE 4 MG: 1 INJECTION, SOLUTION INTRAMUSCULAR; INTRAVENOUS at 03:03

## 2018-03-05 RX ADMIN — MIDAZOLAM HYDROCHLORIDE 2 MG: 1 INJECTION, SOLUTION INTRAMUSCULAR; INTRAVENOUS at 03:03

## 2018-03-05 RX ADMIN — KETOROLAC TROMETHAMINE 30 MG: 30 INJECTION, SOLUTION INTRAMUSCULAR; INTRAVENOUS at 04:03

## 2018-03-05 RX ADMIN — PROPOFOL 50 MG: 10 INJECTION, EMULSION INTRAVENOUS at 03:03

## 2018-03-05 RX ADMIN — LIDOCAINE HYDROCHLORIDE 80 MG: 20 INJECTION, SOLUTION EPIDURAL; INFILTRATION; INTRACAUDAL; PERINEURAL at 03:03

## 2018-03-05 RX ADMIN — SODIUM CHLORIDE, SODIUM LACTATE, POTASSIUM CHLORIDE, AND CALCIUM CHLORIDE: .6; .31; .03; .02 INJECTION, SOLUTION INTRAVENOUS at 03:03

## 2018-03-05 NOTE — NURSING TRANSFER
Nursing Transfer Note      3/5/2018     Transfer To: OR    Transfer via bed    Transported by [employer]    Chart send with patient: Yes

## 2018-03-05 NOTE — ANESTHESIA POSTPROCEDURE EVALUATION
"Anesthesia Post Evaluation    Patient: Staci Hodge    Procedure(s) Performed: Procedure(s) (LRB):  DILATION-CURETTAGE OF UTERUS (D AND C) (N/A)    Final Anesthesia Type: general  Patient location during evaluation: PACU  Patient participation: Yes- Able to Participate  Level of consciousness: awake and alert, oriented and awake  Post-procedure vital signs: reviewed and stable  Pain management: adequate  Airway patency: patent  PONV status at discharge: No PONV  Anesthetic complications: no      Cardiovascular status: blood pressure returned to baseline  Respiratory status: unassisted, spontaneous ventilation and room air  Hydration status: euvolemic  Follow-up not needed.  Comments: [facility]        Visit Vitals  /71 (BP Location: Left arm, Patient Position: Lying)   Pulse 93   Temp 35.8 °C (96.4 °F) (Tympanic)   Resp 18   Ht 5' 2" (1.575 m)   Wt 42.6 kg (94 lb)   LMP 02/12/2018 (Approximate)   SpO2 97%   Breastfeeding? No   BMI 17.19 kg/m²       Pain/Bran Score: Pain Assessment Performed: Yes (3/5/2018  4:30 PM)  Presence of Pain: complains of pain/discomfort (3/5/2018  4:30 PM)  Bran Score: 10 (3/5/2018  4:30 PM)      "

## 2018-03-05 NOTE — OP NOTE
Preop diagnosis  Menorrhagia with regular cycle [N92.0]    Postop diagnosis Same    Procedure D&C     Surgeon Simeon Peterson M.D.    Asst. None    Estimated blood loss 5cc    Anesthesia MAC    Date 3/5/2018    Procedure in detail    After the appropriate informed consents were obtained and lab work found to be within normal limits the patient was taken to the operating room.  She was placed under MAC sedation and prepped and draped in the usual sterile fashion.  She was placed in to the universal stirrups.  A graves speculum was inserted inside the vagina for visualization of the cervix which was grasped in the anterior portion.  Next the uterus was sounded to 7 Centimeters . Next   a sharp curet was passed into the uterus and all surfaces of the uterus were scraped.  A specimen was obtained and sent to pathology.     Once the procedure was completed all instruments were removed from the vagina.  The patient was taken out of the universal stirrups.  She was awakened from anesthesia.  She was sent to recovery room in stable condition.    Discharge summary    Once patient has met criteria she will go to [facility] where her recovery will continue.  When she is fully awake, urinating on own and tolerating liquids she be allowed to be discharged home.  She'll be instructed to take Aleve for pain to call with any problems and to follow up in our office in  Week.    Discharge Note      SUMMARY     Admit Date: 3/5/2018    Attending Physician: Simeon Peterson MD     Discharge Physician: Simeon Peterson MD    Discharge Date: 3/5/2018     Reason for Admission: D&C    Final Diagnoses:   Principal Problem: Menorrhagia with regular cycle   Secondary Diagnoses:   Active Hospital Problems    Diagnosis  POA    *Menorrhagia with regular cycle [N92.0]  Yes      Resolved Hospital Problems    Diagnosis Date Resolved POA   No resolved problems to display.       Disposition: Home or Self Care    Procedures Performed: Procedure(s)  (LRB):  DILATION-CURETTAGE OF UTERUS (D AND C) (N/A)    Hospital Course: Patient presented and underwent D&C which wasn't complicated.  Once patient is fully awake and urinating on her own and tolerating liquids she will be discharged home.    Consults: none    Discharged Condition: good    Patient Instructions:   Current Discharge Medication List      CONTINUE these medications which have NOT CHANGED    Details   butalbital-acetaminophen-caffeine -40 mg (FIORICET, ESGIC) -40 mg per tablet TAKE 1 TABLET BY MOUTH EVERY 6 HOURS AS NEEDED FOR HEADACHES LIMIT USE TO 2 DAYS PER WEEK  Qty: 20 tablet, Refills: 1    Associated Diagnoses: Migraine without aura and without status migrainosus, not intractable      clonazePAM (KLONOPIN) 1 MG tablet TAKE 1 TABLET TWICE DAILY AS NEEDED FOR ANXIETY  Qty: 60 tablet, Refills: 1    Comments: Not to exceed 4 additional fills before 02/03/2018  Associated Diagnoses: MARY LOU (generalized anxiety disorder)      diclofenac (VOLTAREN) 50 MG EC tablet Take 1 tablet by mouth once daily.      diclofenac sodium 1 % Gel 1 g once daily.      FLUoxetine (PROZAC) 40 MG capsule Take 2 capsules (80 mg total) by mouth once daily.  Qty: 180 capsule, Refills: 0      lidocaine-prilocaine (EMLA) cream 1 application once daily.      norgestrel-ethinyl estradiol (OGESTROL) 0.5-50 mg-mcg per tablet Take 1 tablet by mouth once daily.  Qty: 28 tablet, Refills: 2      OLANZapine (ZYPREXA) 20 MG tablet Take 1 tablet (20 mg total) by mouth every evening.  Qty: 90 tablet, Refills: 0      pregabalin (LYRICA) 50 MG capsule Take 1 capsule (50 mg total) by mouth 3 (three) times daily.  Qty: 90 capsule, Refills: 6      propranolol (INDERAL) 10 MG tablet TAKE 1 TABLET (10 MG TOTAL) BY MOUTH 2 (TWO) TIMES DAILY.  Qty: 60 tablet, Refills: 1    Associated Diagnoses: Migraine without aura and without status migrainosus, not intractable; Tachycardia      tiZANidine (ZANAFLEX) 2 MG tablet TAKE 2 TABLETS (4 MG TOTAL)  BY MOUTH EVERY 6 (SIX) HOURS AS NEEDED (NECK TENSION).  Refills: 3      topiramate (TOPAMAX) 25 MG tablet Take 2 tablets (50 mg total) by mouth 2 (two) times daily.  Qty: 120 tablet, Refills: 11    Associated Diagnoses: Migraine without aura and without status migrainosus, not intractable      traZODone (DESYREL) 100 MG tablet Take 1 tablet (100 mg total) by mouth every evening.  Qty: 90 tablet, Refills: 0    Associated Diagnoses: Insomnia, unspecified type             Medications:  Reconciled Home Medications:   Current Discharge Medication List      CONTINUE these medications which have NOT CHANGED    Details   butalbital-acetaminophen-caffeine -40 mg (FIORICET, ESGIC) -40 mg per tablet TAKE 1 TABLET BY MOUTH EVERY 6 HOURS AS NEEDED FOR HEADACHES LIMIT USE TO 2 DAYS PER WEEK  Qty: 20 tablet, Refills: 1    Associated Diagnoses: Migraine without aura and without status migrainosus, not intractable      clonazePAM (KLONOPIN) 1 MG tablet TAKE 1 TABLET TWICE DAILY AS NEEDED FOR ANXIETY  Qty: 60 tablet, Refills: 1    Comments: Not to exceed 4 additional fills before 02/03/2018  Associated Diagnoses: MARY LOU (generalized anxiety disorder)      diclofenac (VOLTAREN) 50 MG EC tablet Take 1 tablet by mouth once daily.      diclofenac sodium 1 % Gel 1 g once daily.      FLUoxetine (PROZAC) 40 MG capsule Take 2 capsules (80 mg total) by mouth once daily.  Qty: 180 capsule, Refills: 0      lidocaine-prilocaine (EMLA) cream 1 application once daily.      norgestrel-ethinyl estradiol (OGESTROL) 0.5-50 mg-mcg per tablet Take 1 tablet by mouth once daily.  Qty: 28 tablet, Refills: 2      OLANZapine (ZYPREXA) 20 MG tablet Take 1 tablet (20 mg total) by mouth every evening.  Qty: 90 tablet, Refills: 0      pregabalin (LYRICA) 50 MG capsule Take 1 capsule (50 mg total) by mouth 3 (three) times daily.  Qty: 90 capsule, Refills: 6      propranolol (INDERAL) 10 MG tablet TAKE 1 TABLET (10 MG TOTAL) BY MOUTH 2 (TWO) TIMES  DAILY.  Qty: 60 tablet, Refills: 1    Associated Diagnoses: Migraine without aura and without status migrainosus, not intractable; Tachycardia      tiZANidine (ZANAFLEX) 2 MG tablet TAKE 2 TABLETS (4 MG TOTAL) BY MOUTH EVERY 6 (SIX) HOURS AS NEEDED (NECK TENSION).  Refills: 3      topiramate (TOPAMAX) 25 MG tablet Take 2 tablets (50 mg total) by mouth 2 (two) times daily.  Qty: 120 tablet, Refills: 11    Associated Diagnoses: Migraine without aura and without status migrainosus, not intractable      traZODone (DESYREL) 100 MG tablet Take 1 tablet (100 mg total) by mouth every evening.  Qty: 90 tablet, Refills: 0    Associated Diagnoses: Insomnia, unspecified type             Discharge Procedure Orders (must include Diet, Follow-up, Activity)  No discharge procedures on file.     Follow-up Information     Shilpi Clayton NP.    Specialty:  Family Medicine  Why:  outpatient services  Contact information:  [address] [facility] [address] [phone]             Simeon Peterson MD In 2 weeks.    Specialties:  Obstetrics, Obstetrics and Gynecology  Why:  post op follow up  Contact information:  [address] [facility] [address] [phone]

## 2018-03-05 NOTE — ANESTHESIA PREPROCEDURE EVALUATION
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 female     Patient Active Problem List   Diagnosis    Small bowel obstruction    History of abnormal Pap smear    LLQ pain    Peutz-Jeghers syndrome    Weight loss    Leukocytosis    Tobacco abuse    Insomnia    Mood disorder    MARY LOU (generalized anxiety disorder)    Panic disorder with agoraphobia    PTSD (post-traumatic stress disorder)    Social anxiety disorder    Palpitations    Tachycardia    Arm numbness left    Stabbing headache    Tension headache    Right carpal tunnel syndrome    Migraine without aura    Microalbuminuria    Neck pain    Right arm numbness    Left carpal tunnel syndrome    Cervical paraspinal muscle spasm    Glucosuria with normal serum glucose    History of pyelonephritis    Left arm numbness    Myofascial muscle pain    Cervical spondylosis    OCD (obsessive compulsive disorder)    Menorrhagia with regular cycle       Past Surgical History:   Procedure Laterality Date    ADENOIDECTOMY      APPENDECTOMY      BOWEL RESECTION  10/17/14    CARPAL TUNNEL RELEASE Bilateral     CARPAL TUNNEL RELEASE      COLONOSCOPY      intestinal polpy      SMALL INTESTINE SURGERY      3 surgeries     TONSILLECTOMY      UPPER GASTROINTESTINAL ENDOSCOPY         Social History     Social History    Marital status: Single     Spouse name: N/A    Number of children: 0    Years of education: N/A     Occupational History    Not on file.     Social History Main Topics    Smoking status: Current Every Day Smoker     Packs/day: 0.50     Years: 17.00     Types: Cigarettes     Start date:  4/17/2000    Smokeless tobacco: Never Used      Comment: told about smoking cessation clinic and give brochure    Alcohol use No    Drug use: No    Sexual activity: Not Currently     Partners: Male     Birth control/ protection: None      Comment: single     Other Topics Concern    Patient Feels They Ought To Cut Down On Drinking/Drug Use No    Patient Annoyed By Others Criticizing Their Drinking/Drug Use No    Patient Has Felt Bad Or Guilty About Drinking/Drug Use No    Patient Has Had A Drink/Used Drugs As An Eye Opener In The [place] No     Social History Narrative    Had one previous miscarriage in 2005    Never     currently in a relationship         Vital Signs Range (Last 24H):        CBC:   No results for input(s): WBC, RBC, HGB, HCT, PLT, MCV, MCH, MCHC in the last 72 hours.    CMP:   No results for input(s): NA, K, CL, CO2, BUN, CREATININE, GLU, MG, PHOS, CALCIUM, ALBUMIN, PROT, ALKPHOS, ALT, AST, BILITOT in the last 72 hours.      Diagnostic Studies:    CT abd, pelvis:   Technique: 5 mm CT images of the abdomen and pelvis were obtained with contrast.    Results: The lung bases are clear. The base of the heart appears normal. The aorta is of normal caliber and tapers appropriately. No radiopaque gallstones are seen. No intrahepatic or extrahepatic biliary dilatation is identified. The liver, spleen,  pancreas, adrenal glands and kidneys appeared normal. The urinary bladder is well distended and within normal limits.    The uterus is retroverted. A vaginal tampon is noted. Nabothian cysts are seen. The adnexa appear normal.    The stomach is partially distended with oral contrast material and there is gas and oral contrast noted throughout the small bowel as well as gas within the distal esophagus. 3.7-cm dilated central small bowel is noted with a small bowel feces sign to   the level of the anastomosis in the left abdomen. Zone of transition is noted at the left bowel anastomosis right and  periumbilical anastomosis of small bowel show nonspecific dilation without definitive obstruction. Mild fecal material and gas are   noted in the colon. No free air or free fluid is seen.    The skeletal structures are intact.    Imaging findings suggestive for a high-grade small bowel obstruction of the left anastomosis. More than one stricture and be present on the left both at the anastomotic site and at the adjacent small bowel    Partial closed-loop character of bowel obstruction.    Additional periumbilical and right surgical anastomosis without definitive strictures in this location.      EKG: None      2D Echo: None       Pre-op Assessment    I have reviewed the Patient Summary Reports.     I have reviewed the Nursing Notes.   I have reviewed the Medications.     Review of Systems  Anesthesia Hx:  History of prior surgery of interest to airway management or planning: Previous anesthesia: General Denies Family Hx of Anesthesia complications.   Denies Personal Hx of Anesthesia complications.   Social:  Smoker, Alcohol Use Smokes 1ppd  Occasional alcohol use     Hematology/Oncology:     Oncology Normal     Cardiovascular:   Exercise tolerance: good Hypertension, well controlled    Pulmonary:  Pulmonary Normal    Renal/:  Renal/ Normal     Hepatic/GI:   Chronic SBO, previous abdominal laparotomy X2 for this problem per patient.   Neurological:   Neuromuscular Disease, Headaches    Endocrine:  Endocrine Normal    Psych:   Psychiatric History          Physical Exam  General:  Well nourished    Airway/Jaw/Neck:  Airway Findings: Mouth Opening: Normal Tongue: Normal  General Airway Assessment: Adult  Mallampati: I  TM Distance: 4 - 6 cm  Jaw/Neck Findings:     Neck ROM: Normal ROM      Dental:  Dental Findings: In tact   Chest/Lungs:  Chest/Lungs Findings: Clear to auscultation, Normal Respiratory Rate     Heart/Vascular:  Heart Findings: Rate: Normal  Rhythm: Regular Rhythm  Sounds: Normal     Abdomen:  Abdomen  Findings:  Tenderness     Musculoskeletal:  Musculoskeletal Findings: Normal   Skin:  Skin Findings: Normal    Mental Status:  Mental Status Findings:  Cooperative, Alert and Oriented         Anesthesia Plan  Type of Anesthesia, risks & benefits discussed:  Anesthesia Type:  general  Patient's Preference:   Intra-op Monitoring Plan:   Intra-op Monitoring Plan Comments:   Post Op Pain Control Plan:   Post Op Pain Control Plan Comments:   Induction:   IV  Beta Blocker:  Patient is not currently on a Beta-Blocker (No further documentation required).       Informed Consent: Patient understands risks and agrees with Anesthesia plan.  Questions answered. Anesthesia consent signed with patient.  ASA Score: 2     Day of Surgery Review of History & Physical: I have interviewed and examined the patient. I have reviewed the patient's H&P dated: 3/5/18. There are no significant changes.  H&P update referred to the surgeon.         Ready For Surgery From Anesthesia Perspective.

## 2018-03-05 NOTE — TRANSFER OF CARE
"Anesthesia Transfer of Care Note    Patient: Staci Hodge    Procedure(s) Performed: Procedure(s) (LRB):  DILATION-CURETTAGE OF UTERUS (D AND C) (N/A)    Patient location: PACU    Anesthesia Type: general    Transport from OR: Transported from OR on room air with adequate spontaneous ventilation    Post pain: adequate analgesia    Post assessment: no apparent anesthetic complications and tolerated procedure well    Post vital signs: stable    Level of consciousness: awake, alert and oriented    Nausea/Vomiting: no nausea/vomiting    Complications: none          Last vitals:   Visit Vitals  /71 (BP Location: Left arm, Patient Position: Lying)   Pulse 93   Temp 35.8 °C (96.4 °F) (Tympanic)   Resp 18   Ht 5' 2" (1.575 m)   Wt 42.6 kg (94 lb)   LMP 02/12/2018 (Approximate)   SpO2 97%   Breastfeeding? No   BMI 17.19 kg/m²     "

## 2018-03-05 NOTE — INTERVAL H&P NOTE
The patient has been examined and the H&P has been reviewed:        I concur with the findings and no changes have occurred since H&P was written.        Patient cleared for Anesthesia: Choice     Past Medical History:  Past Medical History:   Diagnosis Date    Abnormal Pap smear of cervix age 25    no treatement    Anxiety     Cervical spondylosis 2/9/2018    Colon polyp     Depression     Headache     History of psychiatric hospitalization     age 20 for SI    Hx of psychiatric care     Hypertension     Peutz-Jeghers syndrome     Psychiatric problem     Right carpal tunnel syndrome 3/9/2017    Self-harming behavior     Suicide attempt     Therapy        Past Surgical History:   Procedure Laterality Date    ADENOIDECTOMY      APPENDECTOMY      BOWEL RESECTION  10/17/14    CARPAL TUNNEL RELEASE Bilateral     CARPAL TUNNEL RELEASE      COLONOSCOPY      intestinal polpy      SMALL INTESTINE SURGERY      3 surgeries     TONSILLECTOMY      UPPER GASTROINTESTINAL ENDOSCOPY         Social History   Substance Use Topics    Smoking status: Current Every Day Smoker     Packs/day: 0.50     Years: 17.00     Types: Cigarettes     Start date: 4/17/2000    Smokeless tobacco: Never Used      Comment: told about smoking cessation clinic and give brochure    Alcohol use No       Family History   Problem Relation Age of Onset    Colon cancer Mother 33     peutz-jeghers syndrome    Hypertension Father     Colon polyps Sister      peutz-jeghers syndrome    Ovarian cancer Neg Hx     Breast cancer Neg Hx        Current Facility-Administered Medications for the 3/5/18 encounter (Hospital Encounter)   Medication Dose Route Frequency Provider Last Rate Last Dose    betamethasone acetate-betamethasone sodium phosphate injection 1.5 mg  1.5 mg Intra-articular 1 time in Clinic/HOD Camille Emerson, MARGARETTE        betamethasone acetate-betamethasone sodium phosphate injection 1.5 mg  1.5 mg Intra Carpal Tunnel 1  time in Clinic/HOD Camille Emerson NP         No outpatient prescriptions have been marked as taking for the 3/5/18 encounter (Hospital Encounter).       Review of patient's allergies indicates:  No Known Allergies              Anesthesia/Surgery risks, benefits and alternative options discussed and understood by patient/family.      Active Hospital Problems    Diagnosis  POA    Menorrhagia with regular cycle [N92.0]  Yes      Resolved Hospital Problems    Diagnosis Date Resolved POA   No resolved problems to display.

## 2018-03-06 ENCOUNTER — TELEPHONE (OUTPATIENT)
Dept: OBSTETRICS AND GYNECOLOGY | Facility: CLINIC | Age: 38
End: 2018-03-06

## 2018-03-06 NOTE — PLAN OF CARE
Problem: Patient Care Overview  Goal: Plan of Care Review  Outcome: Ongoing (interventions implemented as appropriate)  Pt ambulated with assist to void without difficulty. Tolerated regular diet. Tolerated PO pain meds. Pt & family aware of plan of care. No questions or concerns at this time. Ok to d/c per MD order.

## 2018-03-06 NOTE — TELEPHONE ENCOUNTER
Post op D&C requesting to know when she can return to work. Patient states she does not feel up to going into to work tomorrow. Patient feels this is too soon. Please advise. Denies complaints at present.

## 2018-03-06 NOTE — TELEPHONE ENCOUNTER
----- Message from Latrice Jonas MA sent at 3/6/2018  2:29 PM CST -----  Contact: SELF  Staci Hodge  MRN: [id number]  Home Phone      [phone]  Work Phone      Not on file.  Mobile          [phone]    Patient Care Team:  Shilpi Clayton NP as PCP - General (Family Medicine)  OB? No  What phone number can you be reached at?  [phone]  Message:   Has questions regarding D&C that was done yesterday.

## 2018-03-07 ENCOUNTER — PATIENT MESSAGE (OUTPATIENT)
Dept: OBSTETRICS AND GYNECOLOGY | Facility: CLINIC | Age: 38
End: 2018-03-07

## 2018-03-13 NOTE — ADDENDUM NOTE
Addendum  created 03/13/18 0821 by Gunnar Allan CRNA    Visit Navigator SmartForm Flowsheet section accepted

## 2018-03-14 ENCOUNTER — OFFICE VISIT (OUTPATIENT)
Dept: PSYCHIATRY | Facility: CLINIC | Age: 38
End: 2018-03-14
Payer: COMMERCIAL

## 2018-03-14 ENCOUNTER — PATIENT MESSAGE (OUTPATIENT)
Dept: OBSTETRICS AND GYNECOLOGY | Facility: CLINIC | Age: 38
End: 2018-03-14

## 2018-03-14 VITALS
TEMPERATURE: 96 F | WEIGHT: 94 LBS | DIASTOLIC BLOOD PRESSURE: 76 MMHG | OXYGEN SATURATION: 97 % | RESPIRATION RATE: 18 BRPM | BODY MASS INDEX: 17.3 KG/M2 | HEIGHT: 62 IN | HEART RATE: 90 BPM | SYSTOLIC BLOOD PRESSURE: 108 MMHG

## 2018-03-14 VITALS
BODY MASS INDEX: 17.3 KG/M2 | RESPIRATION RATE: 17 BRPM | HEIGHT: 62 IN | HEART RATE: 83 BPM | SYSTOLIC BLOOD PRESSURE: 127 MMHG | WEIGHT: 94 LBS | DIASTOLIC BLOOD PRESSURE: 77 MMHG

## 2018-03-14 DIAGNOSIS — F41.1 GAD (GENERALIZED ANXIETY DISORDER): ICD-10-CM

## 2018-03-14 DIAGNOSIS — F39 MOOD DISORDER: Primary | ICD-10-CM

## 2018-03-14 DIAGNOSIS — F40.10 SOCIAL ANXIETY DISORDER: ICD-10-CM

## 2018-03-14 DIAGNOSIS — F43.10 PTSD (POST-TRAUMATIC STRESS DISORDER): ICD-10-CM

## 2018-03-14 DIAGNOSIS — G47.00 INSOMNIA, UNSPECIFIED TYPE: ICD-10-CM

## 2018-03-14 DIAGNOSIS — F40.01 PANIC DISORDER WITH AGORAPHOBIA: ICD-10-CM

## 2018-03-14 DIAGNOSIS — F42.8 OTHER OBSESSIVE-COMPULSIVE DISORDERS: ICD-10-CM

## 2018-03-14 PROCEDURE — 90833 PSYTX W PT W E/M 30 MIN: CPT | Mod: S$GLB,,, | Performed by: PSYCHIATRY & NEUROLOGY

## 2018-03-14 PROCEDURE — 99213 OFFICE O/P EST LOW 20 MIN: CPT | Mod: S$GLB,,, | Performed by: PSYCHIATRY & NEUROLOGY

## 2018-03-14 PROCEDURE — 99999 PR PBB SHADOW E&M-EST. PATIENT-LVL III: CPT | Mod: PBBFAC,,, | Performed by: PSYCHIATRY & NEUROLOGY

## 2018-03-14 RX ORDER — TRAZODONE HYDROCHLORIDE 100 MG/1
150 TABLET ORAL NIGHTLY
Qty: 45 TABLET | Refills: 0 | Status: SHIPPED | OUTPATIENT
Start: 2018-03-14 | End: 2018-04-10 | Stop reason: SDUPTHER

## 2018-03-14 RX ORDER — CLONAZEPAM 1 MG/1
1 TABLET ORAL 3 TIMES DAILY
Qty: 90 TABLET | Refills: 0 | Status: SHIPPED | OUTPATIENT
Start: 2018-03-14 | End: 2018-04-10 | Stop reason: SDUPTHER
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
## 2018-03-16 RX ORDER — HYDROCODONE BITARTRATE AND ACETAMINOPHEN 5; 325 MG/1; MG/1
1 TABLET ORAL EVERY 6 HOURS PRN
Qty: 20 TABLET | Refills: 0 | Status: SHIPPED | OUTPATIENT
Start: 2018-03-16 | End: 2018-04-04

## 2018-03-19 ENCOUNTER — OFFICE VISIT (OUTPATIENT)
Dept: OBSTETRICS AND GYNECOLOGY | Facility: CLINIC | Age: 38
End: 2018-03-19
Payer: COMMERCIAL

## 2018-03-19 VITALS
HEART RATE: 70 BPM | WEIGHT: 98 LBS | DIASTOLIC BLOOD PRESSURE: 64 MMHG | SYSTOLIC BLOOD PRESSURE: 96 MMHG | RESPIRATION RATE: 13 BRPM | BODY MASS INDEX: 17.92 KG/M2

## 2018-03-19 DIAGNOSIS — R10.32 LEFT LOWER QUADRANT PAIN: ICD-10-CM

## 2018-03-19 DIAGNOSIS — Z09 POSTOPERATIVE FOLLOW-UP: Primary | ICD-10-CM

## 2018-03-19 DIAGNOSIS — N93.9 VAGINAL BLEEDING: ICD-10-CM

## 2018-03-19 PROCEDURE — 99024 POSTOP FOLLOW-UP VISIT: CPT | Mod: S$GLB,,, | Performed by: OBSTETRICS & GYNECOLOGY

## 2018-03-19 PROCEDURE — 99999 PR PBB SHADOW E&M-EST. PATIENT-LVL III: CPT | Mod: PBBFAC,,, | Performed by: OBSTETRICS & GYNECOLOGY

## 2018-03-19 NOTE — PROGRESS NOTES
Subjective:       Patient ID: Staci Hodge is a 37 y.o. female.    Chief Complaint:  Post-op Evaluation (2w po)      History of Present Illness  Patient presents for postoperative follow-up.  Patient had D&C 2 weeks ago.  Patient is still having vaginal bleeding along with some pelvic pain.  Patient states that her pain is bilateral but appears to be more on the left than right.  Pathology report was negative.    Menstrual History:  OB History      Para Term  AB Living    1       1      SAB TAB Ectopic Multiple Live Births    1                 Menarche age:   No LMP recorded. Patient is not currently having periods (Reason: Other).         Review of Systems  Review of Systems   Constitutional: Positive for appetite change and fatigue. Negative for activity change, chills, diaphoresis, fever and unexpected weight change.   HENT: Negative for congestion, dental problem, drooling, ear discharge, ear pain, facial swelling, hearing loss, mouth sores, nosebleeds, postnasal drip, rhinorrhea, sinus pressure, sneezing, sore throat, tinnitus, trouble swallowing and voice change.    Eyes: Positive for photophobia. Negative for pain, discharge, redness, itching and visual disturbance.   Respiratory: Negative for apnea, cough, choking, chest tightness, shortness of breath, wheezing and stridor.    Cardiovascular: Negative for chest pain, palpitations and leg swelling.   Gastrointestinal: Negative for abdominal distention, abdominal pain, anal bleeding, blood in stool, constipation, diarrhea, nausea, rectal pain and vomiting.   Endocrine: Negative for cold intolerance, heat intolerance, polydipsia, polyphagia and polyuria.   Genitourinary: Positive for frequency, menstrual problem, pelvic pain, urgency, vaginal bleeding and vaginal pain. Negative for decreased urine volume, difficulty urinating, dyspareunia, dysuria, enuresis, flank pain, genital sores, hematuria and vaginal discharge.   Musculoskeletal:  Positive for myalgias and neck pain. Negative for arthralgias, back pain, gait problem, joint swelling and neck stiffness.   Skin: Negative for color change, pallor, rash and wound.   Allergic/Immunologic: Negative for environmental allergies, food allergies and immunocompromised state.   Neurological: Positive for headaches. Negative for dizziness, tremors, seizures, syncope, facial asymmetry, speech difficulty, weakness, light-headedness and numbness.   Hematological: Negative for adenopathy. Does not bruise/bleed easily.   Psychiatric/Behavioral: Positive for agitation. Negative for behavioral problems, confusion, decreased concentration, dysphoric mood, hallucinations, self-injury, sleep disturbance and suicidal ideas. The patient is nervous/anxious and is hyperactive.            Objective:    Physical Exam   Genitourinary: Rectal exam shows no external hemorrhoid. No labial fusion. There is no rash, tenderness, lesion or injury on the right labia. There is no rash, tenderness, lesion or injury on the left labia. Uterus is not deviated, not enlarged, not fixed and not tender. Cervix exhibits no motion tenderness, no discharge and no friability. Right adnexum displays no mass, no tenderness and no fullness. Left adnexum displays tenderness. Left adnexum displays no mass and no fullness. No erythema or tenderness in the vagina. Bleeding:  Mild to moderate. No foreign body in the vagina. No signs of injury around the vagina. No vaginal discharge found.   Nursing note and vitals reviewed.        Assessment:        1. Postoperative follow-up    2. Vaginal bleeding    3. Left lower quadrant pain               Plan:        Staci was seen today for post-op evaluation.    Diagnoses and all orders for this visit:    Postoperative follow-up    Vaginal bleeding  -     norgestrel-ethinyl estradiol (OGESTROL) 0.5-50 mg-mcg per tablet; 3 day for 3 days and then 2 a day for 3 days and then 1 a day until the pack is  gone    Left lower quadrant pain  -     US OB/GYN Procedure ([facility]) - Extended List; Future

## 2018-03-22 ENCOUNTER — PROCEDURE VISIT (OUTPATIENT)
Dept: OBSTETRICS AND GYNECOLOGY | Facility: CLINIC | Age: 38
End: 2018-03-22
Payer: COMMERCIAL

## 2018-03-22 ENCOUNTER — HOSPITAL ENCOUNTER (EMERGENCY)
Facility: HOSPITAL | Age: 38
Discharge: HOME | End: 2018-03-22
Payer: COMMERCIAL

## 2018-03-22 VITALS
RESPIRATION RATE: 17 BRPM | HEART RATE: 101 BPM | TEMPERATURE: 98 F | SYSTOLIC BLOOD PRESSURE: 114 MMHG | OXYGEN SATURATION: 100 % | DIASTOLIC BLOOD PRESSURE: 71 MMHG

## 2018-03-22 DIAGNOSIS — R10.9 ABDOMINAL PAIN: ICD-10-CM

## 2018-03-22 DIAGNOSIS — K52.9 GASTROENTERITIS: ICD-10-CM

## 2018-03-22 DIAGNOSIS — R11.2 NON-INTRACTABLE VOMITING WITH NAUSEA, UNSPECIFIED VOMITING TYPE: Primary | ICD-10-CM

## 2018-03-22 DIAGNOSIS — R10.32 LEFT LOWER QUADRANT PAIN: ICD-10-CM

## 2018-03-22 DIAGNOSIS — R42 DIZZINESS: ICD-10-CM

## 2018-03-22 LAB
ALBUMIN SERPL BCP-MCNC: 4.5 G/DL
ALP SERPL-CCNC: 86 U/L
ALT SERPL W/O P-5'-P-CCNC: 29 U/L
ANION GAP SERPL CALC-SCNC: 9 MMOL/L
AST SERPL-CCNC: 22 U/L
BASOPHILS # BLD AUTO: 0.04 K/UL
BASOPHILS NFR BLD: 0.5 %
BILIRUB SERPL-MCNC: 0.4 MG/DL
BILIRUB UR QL STRIP: NEGATIVE
BUN SERPL-MCNC: 11 MG/DL
CALCIUM SERPL-MCNC: 9.7 MG/DL
CHLORIDE SERPL-SCNC: 103 MMOL/L
CLARITY UR: ABNORMAL
CO2 SERPL-SCNC: 28 MMOL/L
COLOR UR: YELLOW
CREAT SERPL-MCNC: 0.8 MG/DL
DIFFERENTIAL METHOD: NORMAL
EOSINOPHIL # BLD AUTO: 0.2 K/UL
EOSINOPHIL NFR BLD: 2.4 %
ERYTHROCYTE [DISTWIDTH] IN BLOOD BY AUTOMATED COUNT: 12 %
EST. GFR  (AFRICAN AMERICAN): >60 ML/MIN/1.73 M^2
EST. GFR  (NON AFRICAN AMERICAN): >60 ML/MIN/1.73 M^2
GLUCOSE SERPL-MCNC: 54 MG/DL
GLUCOSE UR QL STRIP: ABNORMAL
HCT VFR BLD AUTO: 39.1 %
HGB BLD-MCNC: 13.3 G/DL
HGB UR QL STRIP: NEGATIVE
KETONES UR QL STRIP: NEGATIVE
LEUKOCYTE ESTERASE UR QL STRIP: NEGATIVE
LIPASE SERPL-CCNC: 22 U/L
LYMPHOCYTES # BLD AUTO: 2.4 K/UL
LYMPHOCYTES NFR BLD: 32.8 %
MCH RBC QN AUTO: 29.6 PG
MCHC RBC AUTO-ENTMCNC: 34 G/DL
MCV RBC AUTO: 87 FL
MICROSCOPIC COMMENT: NORMAL
MONOCYTES # BLD AUTO: 0.6 K/UL
MONOCYTES NFR BLD: 8 %
NEUTROPHILS # BLD AUTO: 4.2 K/UL
NEUTROPHILS NFR BLD: 56.3 %
NITRITE UR QL STRIP: NEGATIVE
PH UR STRIP: 8 [PH] (ref 5–8)
PLATELET # BLD AUTO: 194 K/UL
PMV BLD AUTO: 10.9 FL
POCT GLUCOSE: 162 MG/DL (ref 70–110)
POTASSIUM SERPL-SCNC: 3.3 MMOL/L
PROT SERPL-MCNC: 7 G/DL
PROT UR QL STRIP: ABNORMAL
RBC # BLD AUTO: 4.5 M/UL
SODIUM SERPL-SCNC: 140 MMOL/L
SP GR UR STRIP: 1.02 (ref 1–1.03)
URN SPEC COLLECT METH UR: ABNORMAL
UROBILINOGEN UR STRIP-ACNC: NEGATIVE EU/DL
WBC # BLD AUTO: 7.41 K/UL

## 2018-03-22 PROCEDURE — 36415 COLL VENOUS BLD VENIPUNCTURE: CPT

## 2018-03-22 PROCEDURE — 83690 ASSAY OF LIPASE: CPT

## 2018-03-22 PROCEDURE — 85025 COMPLETE CBC W/AUTO DIFF WBC: CPT

## 2018-03-22 PROCEDURE — 63600175 PHARM REV CODE 636 W HCPCS: Performed by: NURSE PRACTITIONER

## 2018-03-22 PROCEDURE — 99284 EMERGENCY DEPT VISIT MOD MDM: CPT | Mod: 25

## 2018-03-22 PROCEDURE — 25000003 PHARM REV CODE 250: Performed by: NURSE PRACTITIONER

## 2018-03-22 PROCEDURE — 81000 URINALYSIS NONAUTO W/SCOPE: CPT

## 2018-03-22 PROCEDURE — 76830 TRANSVAGINAL US NON-OB: CPT | Mod: S$GLB,,, | Performed by: OBSTETRICS & GYNECOLOGY

## 2018-03-22 PROCEDURE — 96366 THER/PROPH/DIAG IV INF ADDON: CPT

## 2018-03-22 PROCEDURE — 96365 THER/PROPH/DIAG IV INF INIT: CPT

## 2018-03-22 PROCEDURE — 96367 TX/PROPH/DG ADDL SEQ IV INF: CPT

## 2018-03-22 PROCEDURE — 80053 COMPREHEN METABOLIC PANEL: CPT

## 2018-03-22 PROCEDURE — 82962 GLUCOSE BLOOD TEST: CPT

## 2018-03-22 RX ORDER — ONDANSETRON 4 MG/1
4 TABLET, ORALLY DISINTEGRATING ORAL EVERY 8 HOURS PRN
Qty: 12 TABLET | Refills: 0 | Status: SHIPPED | OUTPATIENT
Start: 2018-03-22 | End: 2018-03-25

## 2018-03-22 RX ORDER — POTASSIUM CHLORIDE 7.45 MG/ML
10 INJECTION INTRAVENOUS
Status: COMPLETED | OUTPATIENT
Start: 2018-03-22 | End: 2018-03-22

## 2018-03-22 RX ADMIN — SODIUM CHLORIDE 1000 ML: 0.9 INJECTION, SOLUTION INTRAVENOUS at 11:03

## 2018-03-22 RX ADMIN — PROMETHAZINE HYDROCHLORIDE 12.5 MG: 25 INJECTION INTRAMUSCULAR; INTRAVENOUS at 11:03

## 2018-03-22 RX ADMIN — POTASSIUM CHLORIDE 10 MEQ: 10 INJECTION, SOLUTION INTRAVENOUS at 01:03

## 2018-03-22 RX ADMIN — DEXTROSE MONOHYDRATE 12.5 G: 25 INJECTION, SOLUTION INTRAVENOUS at 11:03

## 2018-03-22 RX ADMIN — POTASSIUM CHLORIDE 10 MEQ: 10 INJECTION, SOLUTION INTRAVENOUS at 11:03

## 2018-03-22 NOTE — ED NOTES
Discharge instruction/escript instruction given.   Patient verbalized understanding.  Discharge home in stable condition.  Ambulated out of ER with steady gait.  No acute distress.   Workexcuse given.

## 2018-03-22 NOTE — ED PROVIDER NOTES
Encounter Date: 3/22/2018       History     Chief Complaint   Patient presents with    Nausea    Emesis     Patient 37 y.o/f report vomiting onset 09:00 AM.     Post-op Problem     Patient had D&C two weeks ago vaginal bleeding onset after procedure.      The history is provided by the patient.   GI Problem   Illness onset: Abdominal cramping since D/C 2 weeks ago with intermittent N/V. Vomiting againg today with symptoms of dehydration/weakness. The problem has been gradually worsening. The abdominal pain is generalized. The abdominal pain does not radiate. The severity of the abdominal pain is 8/10 (constant). The abdominal pain is relieved by nothing.   The other symptoms of the illness include nausea, vomiting and vaginal bleeding. The other symptoms of the illness do not include fever, fatigue, shortness of breath, diarrhea or dysuria.   Onset: intermittent over the last 2 weeks s/p d/c but worse today after eating breakfast.   Onset: over the last 2 weeks s/p d/c. Vaginal bleeding is improving (bleeding has stopped today) since it began.   Onset: Had a few episodes over the last 2 weeks, but more significant today (2-5 times today in the last 2 hours) The emesis contains bilious material.   Symptoms associated with the illness do not include chills, constipation, urgency, hematuria, frequency or back pain.   Medical history significant for peutz-jeghers syndrome and bowel resection x3.      Review of patient's allergies indicates:  No Known Allergies  Past Medical History:   Diagnosis Date    Abnormal Pap smear of cervix age 25    no treatement    Anxiety     Cervical spondylosis 2/9/2018    Colon polyp     Depression     Headache     History of psychiatric hospitalization     age 20 for SI    Hx of psychiatric care     Hypertension     Peutz-Jeghers syndrome     Psychiatric problem     Right carpal tunnel syndrome 3/9/2017    Self-harming behavior     Suicide attempt     Therapy      Past  Surgical History:   Procedure Laterality Date    ADENOIDECTOMY      APPENDECTOMY      BOWEL RESECTION  10/17/14    CARPAL TUNNEL RELEASE Bilateral     CARPAL TUNNEL RELEASE      COLONOSCOPY      DILATION AND CURETTAGE OF UTERUS      intestinal polpy      SMALL INTESTINE SURGERY      3 surgeries     TONSILLECTOMY      UPPER GASTROINTESTINAL ENDOSCOPY       Family History   Problem Relation Age of Onset    Colon cancer Mother 33     peutz-jeghers syndrome    Hypertension Father     Colon polyps Sister      peutz-jeghers syndrome    Ovarian cancer Neg Hx     Breast cancer Neg Hx      Social History   Substance Use Topics    Smoking status: Current Every Day Smoker     Packs/day: 0.50     Years: 17.00     Types: Cigarettes     Start date: 4/17/2000    Smokeless tobacco: Never Used      Comment: told about smoking cessation clinic and give brochure    Alcohol use No     Review of Systems   Constitutional: Negative for chills, fatigue and fever.   HENT: Negative for congestion, dental problem, ear pain, rhinorrhea, sore throat and trouble swallowing.    Eyes: Negative for pain, discharge, redness and visual disturbance.   Respiratory: Negative for cough, chest tightness, shortness of breath and wheezing.    Cardiovascular: Negative for chest pain, palpitations and leg swelling.   Gastrointestinal: Positive for abdominal pain, nausea and vomiting. Negative for constipation and diarrhea.   Genitourinary: Positive for vaginal bleeding. Negative for difficulty urinating, dysuria, flank pain, frequency, hematuria and urgency.   Musculoskeletal: Negative for arthralgias, back pain and myalgias.   Skin: Negative for color change, pallor and rash.   Neurological: Positive for dizziness (new onset today, intermittent) and weakness (new onset today). Negative for seizures and headaches.   Psychiatric/Behavioral: Negative.        Physical Exam     Initial Vitals [03/22/18 1029]   BP Pulse Resp Temp SpO2   110/80  (!) 111 18 96.8 °F (36 °C) --      MAP       90         Physical Exam    Nursing note and vitals reviewed.  Constitutional: No distress.   HENT:   Head: Normocephalic and atraumatic.   Right Ear: External ear normal.   Left Ear: External ear normal.   Nose: Nose normal.   Mouth/Throat: Oropharynx is clear and moist.   Eyes: Conjunctivae, EOM and lids are normal. Pupils are equal, round, and reactive to light.   Neck: Normal range of motion. Neck supple.   Cardiovascular: Regular rhythm, S1 normal, S2 normal and intact distal pulses. Tachycardia present.    Pulmonary/Chest: Effort normal and breath sounds normal. No respiratory distress. She has no wheezes. She has no rhonchi.   Abdominal: Soft. Bowel sounds are normal. She exhibits no distension. There is generalized tenderness. There is no rigidity.   Musculoskeletal: Normal range of motion.   Lymphadenopathy:     She has no cervical adenopathy.   Neurological: She is alert and oriented to person, place, and time. She has normal strength.   Skin: Skin is warm and dry. Capillary refill takes less than 2 seconds. No rash noted.   Psychiatric: She has a normal mood and affect. Her speech is normal and behavior is normal.         ED Course   Procedures  Labs Reviewed   URINALYSIS - Abnormal; Notable for the following:        Result Value    Appearance, UA Cloudy (*)     Protein, UA Trace (*)     Glucose, UA 2+ (*)     All other components within normal limits   COMPREHENSIVE METABOLIC PANEL - Abnormal; Notable for the following:     Potassium 3.3 (*)     Glucose 54 (*)     All other components within normal limits   POCT GLUCOSE - Abnormal; Notable for the following:     POCT Glucose 162 (*)     All other components within normal limits   CBC W/ AUTO DIFFERENTIAL   LIPASE   URINALYSIS MICROSCOPIC          X-Rays:   Independently Interpreted Readings:   Other Readings:  X-ray abdomen reviewed with MD. Moderate colonic stool retention on the left.  There is no evidence  for bowel structure and free air surgical material is seen in the pelvis.  No abnormal masses or calcifications.  There is a levoscoliotic curvature of the lumbar spine.  The skeletal structures are intact.     Medications   sodium chloride 0.9% bolus 1,000 mL (0 mLs Intravenous Stopped 3/22/18 1205)   promethazine (PHENERGAN) 12.5 mg in dextrose 5 % 50 mL IVPB (0 mg Intravenous Stopped 3/22/18 1121)   dextrose 50% injection 12.5 g (12.5 g Intravenous Given 3/22/18 1125)   potassium chloride 10 mEq in 100 mL IVPB (0 mEq Intravenous Stopped 3/22/18 1406)   potassium chloride 10 mEq in 100 mL IVPB (0 mEq Intravenous Stopped 3/22/18 1230)        OB/GYN clinic called for US results from this AM. US tech reports that there were no significant findings. Patient with stable H/H and vaginal bleeding stopped this AM.      Patient able to eat and drink without nausea and vomiting here in ER. Reports that nausea has improved and that abdominal cramping has improved.      Plan of care discussed extensively with Dr. Todd.              Clinical Impression:   The primary encounter diagnosis was Non-intractable vomiting with nausea, unspecified vomiting type. Diagnoses of Abdominal pain, Dizziness, and Gastroenteritis were also pertinent to this visit.    Disposition:   Disposition: Discharged  Condition: Stable     The patient acknowledges that close follow up with medical provider is required. Instructed to follow up with PCP and GYN within 2 days. Patient was given specific return precautions. The patient agrees to comply with all instruction and directions given in the ER.     New Prescriptions    ONDANSETRON (ZOFRAN-ODT) 4 MG TBDL    Take 1 tablet (4 mg total) by mouth every 8 (eight) hours as needed (nausea).                           Renee Adams NP  03/22/18 8348

## 2018-03-23 ENCOUNTER — PATIENT MESSAGE (OUTPATIENT)
Dept: OBSTETRICS AND GYNECOLOGY | Facility: CLINIC | Age: 38
End: 2018-03-23

## 2018-03-23 ENCOUNTER — PATIENT MESSAGE (OUTPATIENT)
Dept: INTERNAL MEDICINE | Facility: CLINIC | Age: 38
End: 2018-03-23

## 2018-03-24 DIAGNOSIS — G43.009 MIGRAINE WITHOUT AURA AND WITHOUT STATUS MIGRAINOSUS, NOT INTRACTABLE: ICD-10-CM

## 2018-03-24 DIAGNOSIS — M62.838 CERVICAL PARASPINAL MUSCLE SPASM: ICD-10-CM

## 2018-03-26 ENCOUNTER — PATIENT MESSAGE (OUTPATIENT)
Dept: INTERNAL MEDICINE | Facility: CLINIC | Age: 38
End: 2018-03-26

## 2018-03-27 ENCOUNTER — PATIENT MESSAGE (OUTPATIENT)
Dept: INTERNAL MEDICINE | Facility: CLINIC | Age: 38
End: 2018-03-27

## 2018-03-27 ENCOUNTER — TELEPHONE (OUTPATIENT)
Dept: INTERNAL MEDICINE | Facility: CLINIC | Age: 38
End: 2018-03-27

## 2018-03-27 ENCOUNTER — OFFICE VISIT (OUTPATIENT)
Dept: FAMILY MEDICINE | Facility: CLINIC | Age: 38
End: 2018-03-27
Payer: COMMERCIAL

## 2018-03-27 VITALS
WEIGHT: 95.19 LBS | SYSTOLIC BLOOD PRESSURE: 110 MMHG | HEIGHT: 62 IN | BODY MASS INDEX: 17.52 KG/M2 | DIASTOLIC BLOOD PRESSURE: 80 MMHG | HEART RATE: 100 BPM

## 2018-03-27 DIAGNOSIS — R73.01 ELEVATED FASTING GLUCOSE: Primary | ICD-10-CM

## 2018-03-27 LAB
ESTIMATED AVG GLUCOSE: 97 MG/DL
GLUCOSE SERPL-MCNC: 63 MG/DL (ref 70–110)
HBA1C MFR BLD HPLC: 5 %

## 2018-03-27 PROCEDURE — 87077 CULTURE AEROBIC IDENTIFY: CPT

## 2018-03-27 PROCEDURE — 87186 SC STD MICRODIL/AGAR DIL: CPT

## 2018-03-27 PROCEDURE — 87086 URINE CULTURE/COLONY COUNT: CPT

## 2018-03-27 PROCEDURE — 83036 HEMOGLOBIN GLYCOSYLATED A1C: CPT

## 2018-03-27 PROCEDURE — 87088 URINE BACTERIA CULTURE: CPT

## 2018-03-27 PROCEDURE — 84681 ASSAY OF C-PEPTIDE: CPT

## 2018-03-27 PROCEDURE — 82948 REAGENT STRIP/BLOOD GLUCOSE: CPT | Mod: S$GLB,,, | Performed by: FAMILY MEDICINE

## 2018-03-27 PROCEDURE — 99214 OFFICE O/P EST MOD 30 MIN: CPT | Mod: S$GLB,,, | Performed by: FAMILY MEDICINE

## 2018-03-27 PROCEDURE — 99999 PR PBB SHADOW E&M-EST. PATIENT-LVL III: CPT | Mod: PBBFAC,,, | Performed by: FAMILY MEDICINE

## 2018-03-27 PROCEDURE — 36415 COLL VENOUS BLD VENIPUNCTURE: CPT | Mod: S$GLB,,, | Performed by: FAMILY MEDICINE

## 2018-03-27 RX ORDER — BUTALBITAL, ACETAMINOPHEN AND CAFFEINE 50; 325; 40 MG/1; MG/1; MG/1
TABLET ORAL
Qty: 20 TABLET | Refills: 1 | Status: SHIPPED | OUTPATIENT
Start: 2018-03-27 | End: 2018-04-10 | Stop reason: SDUPTHER

## 2018-03-27 RX ORDER — TIZANIDINE 2 MG/1
4 TABLET ORAL EVERY 6 HOURS PRN
Qty: 60 TABLET | Refills: 3 | Status: SHIPPED | OUTPATIENT
Start: 2018-03-27 | End: 2018-04-04 | Stop reason: SDUPTHER

## 2018-03-27 NOTE — TELEPHONE ENCOUNTER
Pt is currently at the hospital doing the health screening through work and a nurse called to report her BS was 256 stating she hasn't been feeling well and in and out the ER so reported this to Monserrat who reported since she is at the hospital and we are booked with no appts for today to get checked out there through ER to further run BW and will F/U with her next week

## 2018-03-27 NOTE — PROGRESS NOTES
Subjective:       Patient ID: Staci Hodge is a 37 y.o. female.    Chief Complaint: Hyperglycemia    HPI  37 year old female comes in for evaluation of hyperglycemia that was noted on her physical this morning. She says that she had an episode of hypoglycemia last week after vomiting. She had a blood sugar of 54 in the ER. Now, today, she was found to have a blood sugar > 250 on screening exam. She says she has a hx of peutz-jeghers and is concerned about her pancreas. She also notes a hx of proteinuria that she is concerned about. She is very thin but denies any dramatic weight loss. She also c/o increased urinary frequency but no other changes.    PMH, PSH, ALLERGIES, SH, FH reviewed in nurse's notes above  Medications reconciled in the nurse's notes      Review of Systems   Constitutional: Positive for fatigue. Negative for chills and fever.   HENT: Negative for congestion, ear pain, postnasal drip, rhinorrhea, sore throat and trouble swallowing.    Eyes: Negative for redness and itching.   Respiratory: Negative for cough, shortness of breath and wheezing.    Cardiovascular: Negative for chest pain and palpitations.   Gastrointestinal: Negative for abdominal pain, diarrhea, nausea and vomiting.   Endocrine: Positive for polydipsia and polyuria.   Genitourinary: Positive for frequency. Negative for dysuria.   Skin: Negative for rash.   Neurological: Negative for weakness and headaches.       Objective:      Physical Exam   Constitutional: She is oriented to person, place, and time. She appears well-developed. No distress.   Very thin, emaciated appearing   HENT:   Head: Normocephalic and atraumatic.   Eyes: Conjunctivae are normal. Pupils are equal, round, and reactive to light.   Neck: Normal range of motion. Neck supple. No thyromegaly present.   Cardiovascular: Normal rate, regular rhythm, normal heart sounds and intact distal pulses.    Pulmonary/Chest: Effort normal and breath sounds normal. No  respiratory distress. She has no wheezes.   Abdominal: Soft. Bowel sounds are normal. There is no tenderness.   Musculoskeletal: Normal range of motion. She exhibits no edema.   Lymphadenopathy:     She has no cervical adenopathy.   Neurological: She is alert and oriented to person, place, and time.   Skin: Skin is warm and dry. No rash noted.   Psychiatric: She has a normal mood and affect. Her behavior is normal.   Nursing note and vitals reviewed.       Assessment/Plan:       Problem List Items Addressed This Visit     None      Visit Diagnoses     Elevated fasting glucose    -  Primary    Relevant Orders    POCT Glucose (Completed)    Hemoglobin A1c (Completed)    C-peptide (Completed)    Urine culture      Will confirm labs and check for dm.    Concern for pancreatic malfunction. Needs to return to gi and possibly endocrine.    Urine okay.    Will send for culture.    RTC if condition acutely worsens or any other concerns, otherwise RTC as scheduled

## 2018-03-28 LAB — C PEPTIDE SERPL-MCNC: 2.34 NG/ML

## 2018-03-31 LAB — BACTERIA UR CULT: NORMAL

## 2018-04-02 ENCOUNTER — PATIENT MESSAGE (OUTPATIENT)
Dept: INTERNAL MEDICINE | Facility: CLINIC | Age: 38
End: 2018-04-02

## 2018-04-03 ENCOUNTER — TELEPHONE (OUTPATIENT)
Dept: FAMILY MEDICINE | Facility: CLINIC | Age: 38
End: 2018-04-03

## 2018-04-03 RX ORDER — NITROFURANTOIN 25; 75 MG/1; MG/1
100 CAPSULE ORAL 2 TIMES DAILY
Qty: 14 CAPSULE | Refills: 0 | Status: SHIPPED | OUTPATIENT
Start: 2018-04-03 | End: 2018-05-21

## 2018-04-03 NOTE — TELEPHONE ENCOUNTER
Please let ashish know her urine culture did come back positive.  I sent in abx for her.  She should take and complete tehse.  mh

## 2018-04-04 ENCOUNTER — OFFICE VISIT (OUTPATIENT)
Dept: OBSTETRICS AND GYNECOLOGY | Facility: CLINIC | Age: 38
End: 2018-04-04
Payer: COMMERCIAL

## 2018-04-04 VITALS
WEIGHT: 95 LBS | BODY MASS INDEX: 17.38 KG/M2 | HEART RATE: 70 BPM | SYSTOLIC BLOOD PRESSURE: 118 MMHG | RESPIRATION RATE: 13 BRPM | DIASTOLIC BLOOD PRESSURE: 67 MMHG

## 2018-04-04 DIAGNOSIS — R10.32 LEFT LOWER QUADRANT PAIN: Primary | ICD-10-CM

## 2018-04-04 PROCEDURE — 99213 OFFICE O/P EST LOW 20 MIN: CPT | Mod: S$GLB,,, | Performed by: OBSTETRICS & GYNECOLOGY

## 2018-04-04 PROCEDURE — 99999 PR PBB SHADOW E&M-EST. PATIENT-LVL III: CPT | Mod: PBBFAC,,, | Performed by: OBSTETRICS & GYNECOLOGY

## 2018-04-04 NOTE — TELEPHONE ENCOUNTER
Our attempts to reach you by telephone have been unsuccessful. Please call our office at  [phone] to review your most recent lab and/or test results.LVM

## 2018-04-04 NOTE — PROGRESS NOTES
Subjective:       Patient ID: Staci Hodge is a 37 y.o. female.    Chief Complaint:  Post-op Evaluation (1 month d&C)      History of Present Illness  Patient presents for follow-up after having had a D&C she had increased vaginal bleeding along with pelvic pain.  Patient had an ultrasound performed which was noncontributory.  Patient was asked to follow-up to evaluate her pain and bleeding.  She was recently diagnosed with urinary tract infection and started on antibiotics.  Patient states that pain is much better.  She was given high-dose birth control pills.  Patient states she is no longer bleeding.    Menstrual History:  OB History      Para Term  AB Living    1       1      SAB TAB Ectopic Multiple Live Births    1                 Menarche age:   No LMP recorded. Patient is not currently having periods (Reason: Other).         Review of Systems  Review of Systems   Constitutional: Negative for activity change, appetite change, chills, diaphoresis, fatigue, fever and unexpected weight change.   HENT: Negative for congestion, dental problem, drooling, ear discharge, ear pain, facial swelling, hearing loss, mouth sores, nosebleeds, postnasal drip, rhinorrhea, sinus pressure, sneezing, sore throat, tinnitus, trouble swallowing and voice change.    Eyes: Negative for photophobia, pain, discharge, redness, itching and visual disturbance.   Respiratory: Negative for apnea, cough, choking, chest tightness, shortness of breath, wheezing and stridor.    Cardiovascular: Negative for chest pain, palpitations and leg swelling.   Gastrointestinal: Negative for abdominal distention, abdominal pain, anal bleeding, blood in stool, constipation, diarrhea, nausea, rectal pain and vomiting.   Endocrine: Negative for cold intolerance, heat intolerance, polydipsia, polyphagia and polyuria.   Genitourinary: Positive for frequency, menstrual problem, pelvic pain and urgency. Negative for decreased urine volume,  difficulty urinating, dyspareunia, dysuria, enuresis, flank pain, genital sores, hematuria, vaginal bleeding, vaginal discharge and vaginal pain.   Musculoskeletal: Positive for neck pain. Negative for arthralgias, back pain, gait problem, joint swelling, myalgias and neck stiffness.   Skin: Negative for color change, pallor, rash and wound.   Allergic/Immunologic: Negative for environmental allergies, food allergies and immunocompromised state.   Neurological: Positive for tremors, numbness and headaches. Negative for dizziness, seizures, syncope, facial asymmetry, speech difficulty, weakness and light-headedness.   Hematological: Negative for adenopathy. Does not bruise/bleed easily.   Psychiatric/Behavioral: Negative for agitation, behavioral problems, confusion, decreased concentration, dysphoric mood, hallucinations, self-injury, sleep disturbance and suicidal ideas. The patient is not nervous/anxious and is not hyperactive.            Objective:    Physical Exam   Nursing note and vitals reviewed.        Assessment:        1. Left lower quadrant pain               Plan:        Staci was seen today for post-op evaluation.    Diagnoses and all orders for this visit:    Left lower quadrant pain

## 2018-04-05 ENCOUNTER — TELEPHONE (OUTPATIENT)
Dept: FAMILY MEDICINE | Facility: CLINIC | Age: 38
End: 2018-04-05

## 2018-04-05 NOTE — TELEPHONE ENCOUNTER
. Informed pt of recommendations. Verbalized understanding. Informed pt that  Rx was sent to pharmacy. Verbalized Understanding.

## 2018-04-09 ENCOUNTER — OFFICE VISIT (OUTPATIENT)
Dept: NEPHROLOGY | Facility: CLINIC | Age: 38
End: 2018-04-09
Payer: COMMERCIAL

## 2018-04-09 VITALS
BODY MASS INDEX: 17.85 KG/M2 | HEIGHT: 62 IN | OXYGEN SATURATION: 99 % | SYSTOLIC BLOOD PRESSURE: 120 MMHG | WEIGHT: 97 LBS | HEART RATE: 125 BPM | DIASTOLIC BLOOD PRESSURE: 88 MMHG

## 2018-04-09 DIAGNOSIS — Z87.448 HISTORY OF PYELONEPHRITIS: ICD-10-CM

## 2018-04-09 DIAGNOSIS — R80.9 MICROALBUMINURIA: ICD-10-CM

## 2018-04-09 DIAGNOSIS — R81 GLUCOSURIA WITH NORMAL SERUM GLUCOSE: Primary | ICD-10-CM

## 2018-04-09 DIAGNOSIS — E87.6 HYPOKALEMIA: ICD-10-CM

## 2018-04-09 PROCEDURE — 99999 PR PBB SHADOW E&M-EST. PATIENT-LVL III: CPT | Mod: PBBFAC,,, | Performed by: INTERNAL MEDICINE

## 2018-04-09 PROCEDURE — 99213 OFFICE O/P EST LOW 20 MIN: CPT | Mod: S$GLB,,, | Performed by: INTERNAL MEDICINE

## 2018-04-09 NOTE — PATIENT INSTRUCTIONS
Labs and mandi in 2 weeks after completing antibiotic     urology referral in [place] Dr. Schulte    She may see me in [place]

## 2018-04-09 NOTE — PROGRESS NOTES
Subjective:       Patient ID: Staci Hodge is a 37 y.o. White female who presents for new evaluation of renal function.  Interval Hx:   never saw urologist   currently being treated fro UTI enterococcus, she was seen in ED 3/22 for vomiting clean UA at that time, + hypokalemia, hypoglycemia, and glucosuria   She feels as if she does not completely empty   HPI   37 y.o. female, with chronic headaches and bilateral CTS, and prior complaints of dizziness. She has a history of PTSD, panic disorder with agoraphobia, insomnia,   with peutz- jeghers syndrome diagnosed at age 12. She has had 3 small bowel sx with resection due to SBO.last ,  +tobacco use  2017 bilateral pyelonephritis seen at [facility] ED and treated as outpatient. She had been having  flank pain for about a month.   She has not been seen by a urologist  Past h/o tachycardia and obesity with significant wt loss unexplained several years ago.   She has elevated wbc 11-14 K  Seen in past by hematology 2015   10/2017 microalbuminuria and glucosuria noted after pyelonephritis with serum glucose of 82 and HbgA1c 4.9  11/15/2017 On topiramax for headaches 50 mg bid    No h/o  nephrolithasis  + NSAIDs at most 1-2 times a week but not in the last year. She has not taken diclofenac prescribed.    FH father Dm  M and Sister niko delacruz, Mother  colon and breat cancer     Serum crt 0.8, electrolytes wnl 10/2017 UA +3 glucose and U microalbumin 288 U microalb/crt 98 U pH 7.0 occassional bacteria, no rbc or blood, 2 wbc onmicroscopioc 10/2017 historically she has had trace protein in 2017 ct abd/pelvis: Patchy areas of diminished enhancement in the parenchyma of the kidneys bilaterally suggestive of pyelonephritis.  10/2017 renal US:  Right kidney: The right kidney measures 9.1 x 3.6 x 4.9 cm.  No evidence of cortical thinning.  No loss of corticomedullary distinction.  There is adequate perfusion.  The resistive index is 0.60.  No  evidence of mass.  No evidence of hydronephrosis.  Left kidney: The left kidney measures 11.2 x 4.2 x 4.5 cm.  No evidence of cortical thinning.  No loss of corticomedullary distinction.  There is adequate perfusion.  The resistive index is 0.59.  No evidence of mass.  No evidence of hydronephrosis.    Review of Systems   Constitutional: Negative for appetite change, chills, fatigue, fever and unexpected weight change.   HENT: Negative for congestion, facial swelling, hearing loss, nosebleeds and trouble swallowing.    Eyes: Negative for pain, discharge, redness and visual disturbance.   Respiratory: Negative for cough, chest tightness, shortness of breath and wheezing.    Cardiovascular: Negative for chest pain, palpitations and leg swelling.   Gastrointestinal: Negative for abdominal pain, constipation, diarrhea, nausea and vomiting.   Endocrine: Negative for cold intolerance, heat intolerance and polydipsia.   Genitourinary: Negative for decreased urine volume, difficulty urinating, dysuria, flank pain, hematuria and urgency.   Musculoskeletal: Negative for arthralgias, back pain, joint swelling and myalgias.   Skin: Negative for color change, pallor, rash and wound.   Neurological: Negative for dizziness, tremors, seizures, syncope, speech difficulty, weakness and headaches.   Hematological: Negative for adenopathy. Does not bruise/bleed easily.   Psychiatric/Behavioral: Negative for agitation, behavioral problems, dysphoric mood, self-injury and sleep disturbance.       Objective:     There were no vitals taken for this visit.      Physical Exam   Constitutional: She is oriented to person, place, and time. She appears well-developed and well-nourished. No distress.   HENT:   Head: Normocephalic and atraumatic.   Mouth/Throat: No oropharyngeal exudate.   Eyes: Conjunctivae and EOM are normal. Pupils are equal, round, and reactive to light. Right eye exhibits no discharge. Left eye exhibits no discharge. No  scleral icterus.   Neck: Normal range of motion. Neck supple. No JVD present. No tracheal deviation present. No thyromegaly present.   Cardiovascular: Normal rate, regular rhythm and intact distal pulses.  Exam reveals no gallop.    No murmur heard.  Pulmonary/Chest: Effort normal and breath sounds normal. No stridor. No respiratory distress. She has no wheezes. She has no rales. She exhibits no tenderness.   Abdominal: Soft. Bowel sounds are normal. She exhibits no distension and no mass. There is no tenderness. There is no rebound and no guarding. No hernia.   Musculoskeletal: She exhibits no edema or tenderness.   Lymphadenopathy:     She has no cervical adenopathy.   Neurological: She is alert and oriented to person, place, and time. She exhibits normal muscle tone.   Skin: Skin is warm and dry. No rash noted. She is not diaphoretic. No erythema.   Psychiatric: She has a normal mood and affect. Judgment and thought content normal.   Nursing note and vitals reviewed.      Assessment:       1. Glucosuria with normal serum glucose    2. History of pyelonephritis    3. Hypokalemia    4. Microalbuminuria        Plan:     37 y.o. female, with chronic headaches and bilateral CTS, and prior complaints of dizziness. She has a history of PTSD, panic disorder with agoraphobia, insomnia,   with peutz- jeghers syndrome diagnosed at age 12. She has had 3 small bowel sx with resection due to SBO.last 2014,  +tobacco use  9/2017 bilateral pyelonephritis seen at [facility] ED and treated as outpatient. She had been having  flank pain for about a month.   She has not been seen by a urologist yet.  One month after acute pyelonephritis patient was noted to have glucosuria and microalbuminuria  assymetric kidney size now on renal us R 9.1 cm and L 11.2 cm    UTI: enterococcus now on macrobid  Pyelonephritis: asymetric kidneys unclear if this is congenital, related to scarring from infection, and or to some extent technique dependent  reading.   Considering the severity of infection would suggest urology evaluation to further assess for urologic abnormality.  To my knowledge there is no association of Peutz-jeghers with renal abnormalities or cancer, but I will need to complete a more extensive literature search.  I have stressed that she needs to see a urologist    glucosuria , hypokalemia with elevated urine pH suggestive of proximal RTA as may be seen with topamax,   given that labs were completed at time of vomiting would repeat in 1-2 weeks after treating for uti  And add a   Serum phosphorous    Topriamax was initiated after the abnormal urines. But given the above urine abnormalities and hypokalemia,  And if abnormalities persist on repeat labs today, at this time would suggest stopping topamax and considering other therapies for HA   Topiramax is known to act on the proximal tubules of the kidney and can cause a Fanconi's like syndrome with proteinuria, glycosuria and phosphaturia and RTA

## 2018-04-10 ENCOUNTER — PATIENT MESSAGE (OUTPATIENT)
Dept: PSYCHIATRY | Facility: CLINIC | Age: 38
End: 2018-04-10

## 2018-04-10 DIAGNOSIS — G47.00 INSOMNIA, UNSPECIFIED TYPE: ICD-10-CM

## 2018-04-10 DIAGNOSIS — F41.1 GAD (GENERALIZED ANXIETY DISORDER): ICD-10-CM

## 2018-04-10 DIAGNOSIS — G43.009 MIGRAINE WITHOUT AURA AND WITHOUT STATUS MIGRAINOSUS, NOT INTRACTABLE: ICD-10-CM

## 2018-04-10 RX ORDER — OLANZAPINE 20 MG/1
20 TABLET ORAL NIGHTLY
Qty: 90 TABLET | Refills: 0 | Status: SHIPPED | OUTPATIENT
Start: 2018-04-10 | End: 2018-05-21 | Stop reason: SDUPTHER

## 2018-04-10 RX ORDER — FLUOXETINE HYDROCHLORIDE 40 MG/1
80 CAPSULE ORAL DAILY
Qty: 180 CAPSULE | Refills: 0 | Status: SHIPPED | OUTPATIENT
Start: 2018-04-10 | End: 2018-05-21 | Stop reason: SDUPTHER

## 2018-04-10 RX ORDER — BUTALBITAL, ACETAMINOPHEN AND CAFFEINE 50; 325; 40 MG/1; MG/1; MG/1
TABLET ORAL
Qty: 20 TABLET | Refills: 1 | Status: SHIPPED | OUTPATIENT
Start: 2018-04-10 | End: 2018-06-11 | Stop reason: SDUPTHER

## 2018-04-10 RX ORDER — CLONAZEPAM 1 MG/1
1 TABLET ORAL 3 TIMES DAILY
Qty: 90 TABLET | Refills: 2 | Status: SHIPPED | OUTPATIENT
Start: 2018-04-10 | End: 2018-05-21 | Stop reason: SDUPTHER

## 2018-04-10 RX ORDER — TRAZODONE HYDROCHLORIDE 100 MG/1
150 TABLET ORAL NIGHTLY
Qty: 45 TABLET | Refills: 2 | Status: SHIPPED | OUTPATIENT
Start: 2018-04-10 | End: 2018-05-21 | Stop reason: SDUPTHER

## 2018-04-12 DIAGNOSIS — N93.9 VAGINAL BLEEDING: ICD-10-CM

## 2018-04-15 DIAGNOSIS — R00.0 TACHYCARDIA: ICD-10-CM

## 2018-04-15 DIAGNOSIS — G43.009 MIGRAINE WITHOUT AURA AND WITHOUT STATUS MIGRAINOSUS, NOT INTRACTABLE: ICD-10-CM

## 2018-04-17 RX ORDER — PROPRANOLOL HYDROCHLORIDE 10 MG/1
10 TABLET ORAL 2 TIMES DAILY
Qty: 60 TABLET | Refills: 1 | Status: SHIPPED | OUTPATIENT
Start: 2018-04-17 | End: 2018-06-11 | Stop reason: SDUPTHER

## 2018-04-18 ENCOUNTER — PATIENT MESSAGE (OUTPATIENT)
Dept: OBSTETRICS AND GYNECOLOGY | Facility: CLINIC | Age: 38
End: 2018-04-18

## 2018-04-18 DIAGNOSIS — N93.9 VAGINAL BLEEDING: ICD-10-CM

## 2018-04-18 NOTE — TELEPHONE ENCOUNTER
Staci desire refill of OCP. Annual exam past due. Patient states she has no insurance currently. Please advise.   Patient Active Problem List   Diagnosis    Small bowel obstruction    History of abnormal Pap smear    LLQ pain    Peutz-Jeghers syndrome    Weight loss    Leukocytosis    Tobacco abuse    Insomnia    Mood disorder    MARY LOU (generalized anxiety disorder)    Panic disorder with agoraphobia    PTSD (post-traumatic stress disorder)    Social anxiety disorder    Palpitations    Tachycardia    Arm numbness left    Stabbing headache    Tension headache    Right carpal tunnel syndrome    Migraine without aura    Microalbuminuria    Neck pain    Right arm numbness    Left carpal tunnel syndrome    Cervical paraspinal muscle spasm    Glucosuria with normal serum glucose    History of pyelonephritis    Left arm numbness    Myofascial muscle pain    Cervical spondylosis    OCD (obsessive compulsive disorder)    Menorrhagia with regular cycle    Hypokalemia     Prior to Admission medications    Medication Sig Start Date End Date Taking? Authorizing Provider   butalbital-acetaminophen-caffeine -40 mg (FIORICET, ESGIC) -40 mg per tablet TAKE 1 TABLET BY MOUTH EVERY 6 HOURS AS NEEDED FOR HEADACHES LIMIT USE TO 2 DAYS PER WEEK 4/10/18   Camille Emerson, NP   clonazePAM (KLONOPIN) 1 MG tablet Take 1 tablet (1 mg total) by mouth 3 (three) times daily. 4/10/18   Christiano Emerson MD   diclofenac (VOLTAREN) 50 MG EC tablet Take 1 tablet by mouth once daily. 2/21/18   Historical Provider, MD   diclofenac sodium 1 % Gel 1 g once daily. 12/13/17   Historical Provider, MD   FLUoxetine (PROZAC) 40 MG capsule Take 2 capsules (80 mg total) by mouth once daily. 4/10/18   Christiano Emerson MD   lidocaine-prilocaine (EMLA) cream 1 application once daily. 12/13/17   Historical Provider, MD   nitrofurantoin, macrocrystal-monohydrate, (MACROBID) 100 MG capsule Take 1 capsule (100  mg total) by mouth 2 (two) times daily. 4/3/18   Carmen Stewart MD   norgestrel-ethinyl estradiol (OGESTROL) 0.5-50 mg-mcg per tablet 3 day for 3 days and then 2 a day for 3 days and then 1 a day until the pack is gone 3/19/18   Simeon Peterson MD   OLANZapine (ZYPREXA) 20 MG tablet Take 1 tablet (20 mg total) by mouth every evening. 4/10/18 4/10/19  Christiano Emerson MD   pregabalin (LYRICA) 50 MG capsule Take 1 capsule (50 mg total) by mouth 3 (three) times daily. 1/26/18 3/27/18  Brian Guzman MD   propranolol (INDERAL) 10 MG tablet TAKE 1 TABLET (10 MG TOTAL) BY MOUTH 2 (TWO) TIMES DAILY. 4/17/18   Camille Emerson NP   tiZANidine (ZANAFLEX) 2 MG tablet TAKE 2 TABLETS (4 MG TOTAL) BY MOUTH EVERY 6 (SIX) HOURS AS NEEDED (NECK TENSION). 11/22/17   Historical Provider, MD   topiramate (TOPAMAX) 25 MG tablet Take 2 tablets (50 mg total) by mouth 2 (two) times daily. 1/3/18 1/3/19  Camille Emerson NP   traZODone (DESYREL) 100 MG tablet Take 1.5 tablets (150 mg total) by mouth every evening. 4/10/18 4/10/19  Christiano Emerson MD

## 2018-04-19 NOTE — TELEPHONE ENCOUNTER
Spoke with pharmacist Gurdeep OCP changed to take one pill once daily with 2 refills per v/o from Dr. Peterson. This will allow patient time to schedule annual exam with [facility]. Left message for patient to contact office.

## 2018-05-03 ENCOUNTER — PATIENT MESSAGE (OUTPATIENT)
Dept: PSYCHIATRY | Facility: CLINIC | Age: 38
End: 2018-05-03

## 2018-05-21 ENCOUNTER — OFFICE VISIT (OUTPATIENT)
Dept: PSYCHIATRY | Facility: CLINIC | Age: 38
End: 2018-05-21
Payer: MEDICAID

## 2018-05-21 VITALS
HEIGHT: 62 IN | WEIGHT: 97.75 LBS | DIASTOLIC BLOOD PRESSURE: 91 MMHG | SYSTOLIC BLOOD PRESSURE: 126 MMHG | RESPIRATION RATE: 22 BRPM | BODY MASS INDEX: 17.99 KG/M2 | HEART RATE: 126 BPM

## 2018-05-21 DIAGNOSIS — F41.1 GAD (GENERALIZED ANXIETY DISORDER): ICD-10-CM

## 2018-05-21 DIAGNOSIS — F40.10 SOCIAL ANXIETY DISORDER: ICD-10-CM

## 2018-05-21 DIAGNOSIS — G47.00 INSOMNIA, UNSPECIFIED TYPE: ICD-10-CM

## 2018-05-21 DIAGNOSIS — F39 MOOD DISORDER: Primary | ICD-10-CM

## 2018-05-21 DIAGNOSIS — F43.10 PTSD (POST-TRAUMATIC STRESS DISORDER): ICD-10-CM

## 2018-05-21 DIAGNOSIS — F42.8 OTHER OBSESSIVE-COMPULSIVE DISORDERS: ICD-10-CM

## 2018-05-21 DIAGNOSIS — F40.01 PANIC DISORDER WITH AGORAPHOBIA: ICD-10-CM

## 2018-05-21 PROCEDURE — 90836 PSYTX W PT W E/M 45 MIN: CPT | Mod: PBBFAC | Performed by: PSYCHIATRY & NEUROLOGY

## 2018-05-21 PROCEDURE — 90836 PSYTX W PT W E/M 45 MIN: CPT | Mod: AF,HB,S$PBB, | Performed by: PSYCHIATRY & NEUROLOGY

## 2018-05-21 PROCEDURE — 99213 OFFICE O/P EST LOW 20 MIN: CPT | Mod: AF,HB,S$PBB, | Performed by: PSYCHIATRY & NEUROLOGY

## 2018-05-21 PROCEDURE — 99999 PR PBB SHADOW E&M-EST. PATIENT-LVL III: CPT | Mod: PBBFAC,,, | Performed by: PSYCHIATRY & NEUROLOGY

## 2018-05-21 PROCEDURE — 99213 OFFICE O/P EST LOW 20 MIN: CPT | Mod: PBBFAC | Performed by: PSYCHIATRY & NEUROLOGY

## 2018-05-21 RX ORDER — TRAZODONE HYDROCHLORIDE 100 MG/1
200 TABLET ORAL NIGHTLY
Qty: 180 TABLET | Refills: 0 | Status: SHIPPED | OUTPATIENT
Start: 2018-05-21 | End: 2018-06-21 | Stop reason: SDUPTHER

## 2018-05-21 RX ORDER — CLONAZEPAM 1 MG/1
1 TABLET ORAL 3 TIMES DAILY
Qty: 90 TABLET | Refills: 2 | Status: SHIPPED | OUTPATIENT
Start: 2018-05-21 | End: 2018-06-21 | Stop reason: SDUPTHER

## 2018-05-21 RX ORDER — OLANZAPINE 20 MG/1
20 TABLET ORAL NIGHTLY
Qty: 90 TABLET | Refills: 0 | Status: SHIPPED | OUTPATIENT
Start: 2018-05-21 | End: 2018-06-21 | Stop reason: SDUPTHER

## 2018-05-21 RX ORDER — FLUOXETINE HYDROCHLORIDE 40 MG/1
80 CAPSULE ORAL DAILY
Qty: 180 CAPSULE | Refills: 0 | Status: SHIPPED | OUTPATIENT
Start: 2018-05-21 | End: 2018-06-21 | Stop reason: SDUPTHER
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
## 2018-05-25 ENCOUNTER — PATIENT MESSAGE (OUTPATIENT)
Dept: INTERNAL MEDICINE | Facility: CLINIC | Age: 38
End: 2018-05-25

## 2018-06-05 PROBLEM — R59.0 INGUINAL LYMPHADENOPATHY: Status: ACTIVE | Noted: 2018-06-05

## 2018-06-11 DIAGNOSIS — G43.009 MIGRAINE WITHOUT AURA AND WITHOUT STATUS MIGRAINOSUS, NOT INTRACTABLE: ICD-10-CM

## 2018-06-11 DIAGNOSIS — R00.0 TACHYCARDIA: ICD-10-CM

## 2018-06-11 PROBLEM — D05.12 DUCTAL CARCINOMA IN SITU (DCIS) OF LEFT BREAST: Status: ACTIVE | Noted: 2018-06-11

## 2018-06-12 RX ORDER — PROPRANOLOL HYDROCHLORIDE 10 MG/1
10 TABLET ORAL 2 TIMES DAILY
Qty: 60 TABLET | Refills: 5 | Status: SHIPPED | OUTPATIENT
Start: 2018-06-12 | End: 2018-12-03 | Stop reason: SDUPTHER

## 2018-06-12 RX ORDER — BUTALBITAL, ACETAMINOPHEN AND CAFFEINE 50; 325; 40 MG/1; MG/1; MG/1
1 TABLET ORAL EVERY 6 HOURS PRN
Qty: 10 TABLET | Refills: 4 | Status: SHIPPED | OUTPATIENT
Start: 2018-06-12 | End: 2018-10-04 | Stop reason: SDUPTHER

## 2018-06-18 RX ORDER — FLUOXETINE HYDROCHLORIDE 40 MG/1
80 CAPSULE ORAL DAILY
Qty: 180 CAPSULE | Refills: 0 | Status: SHIPPED | OUTPATIENT
Start: 2018-06-18 | End: 2018-06-21 | Stop reason: SDUPTHER

## 2018-06-20 ENCOUNTER — OFFICE VISIT (OUTPATIENT)
Dept: PLASTIC SURGERY | Facility: CLINIC | Age: 38
End: 2018-06-20
Payer: MEDICAID

## 2018-06-20 ENCOUNTER — HOSPITAL ENCOUNTER (OUTPATIENT)
Dept: RADIOLOGY | Facility: HOSPITAL | Age: 38
Discharge: HOME | End: 2018-06-20
Attending: STUDENT IN AN ORGANIZED HEALTH CARE EDUCATION/TRAINING PROGRAM
Payer: MEDICAID

## 2018-06-20 VITALS
TEMPERATURE: 99 F | HEART RATE: 119 BPM | WEIGHT: 104.06 LBS | SYSTOLIC BLOOD PRESSURE: 120 MMHG | DIASTOLIC BLOOD PRESSURE: 80 MMHG | HEIGHT: 62 IN | BODY MASS INDEX: 19.15 KG/M2

## 2018-06-20 DIAGNOSIS — Z98.890 S/P BREAST RECONSTRUCTION: Primary | ICD-10-CM

## 2018-06-20 DIAGNOSIS — G43.009 MIGRAINE WITHOUT AURA AND WITHOUT STATUS MIGRAINOSUS, NOT INTRACTABLE: ICD-10-CM

## 2018-06-20 DIAGNOSIS — D05.12 DUCTAL CARCINOMA IN SITU (DCIS) OF LEFT BREAST: ICD-10-CM

## 2018-06-20 DIAGNOSIS — Z85.3 HISTORY OF LEFT BREAST CANCER: ICD-10-CM

## 2018-06-20 PROCEDURE — 25500020 PHARM REV CODE 255: Performed by: STUDENT IN AN ORGANIZED HEALTH CARE EDUCATION/TRAINING PROGRAM

## 2018-06-20 PROCEDURE — 99999 PR PBB SHADOW E&M-EST. PATIENT-LVL III: CPT | Mod: PBBFAC,,, | Performed by: SURGERY

## 2018-06-20 PROCEDURE — 0159T MRI BREAST BILATERAL W W/O CONTRAST: CPT | Mod: 26,,, | Performed by: RADIOLOGY

## 2018-06-20 PROCEDURE — 99213 OFFICE O/P EST LOW 20 MIN: CPT | Mod: PBBFAC,PO | Performed by: SURGERY

## 2018-06-20 PROCEDURE — A9577 INJ MULTIHANCE: HCPCS | Performed by: STUDENT IN AN ORGANIZED HEALTH CARE EDUCATION/TRAINING PROGRAM

## 2018-06-20 PROCEDURE — 0159T MRI BREAST BILATERAL W W/O CONTRAST: CPT | Mod: TC

## 2018-06-20 PROCEDURE — 99203 OFFICE O/P NEW LOW 30 MIN: CPT | Mod: S$PBB,,, | Performed by: SURGERY

## 2018-06-20 PROCEDURE — 77059 MRI BREAST BILATERAL W W/O CONTRAST: CPT | Mod: 26,,, | Performed by: RADIOLOGY

## 2018-06-20 RX ORDER — PREGABALIN 50 MG/1
50 CAPSULE ORAL 3 TIMES DAILY
COMMUNITY
Start: 2018-06-19 | End: 2018-07-03 | Stop reason: DRUGHIGH

## 2018-06-20 RX ADMIN — GADOBENATE DIMEGLUMINE 10 ML: 529 INJECTION, SOLUTION INTRAVENOUS at 06:06

## 2018-06-20 NOTE — LETTER
[facility]  [address]  [facility] [address]  Phone: [phone]  Fax: [phone] June 25, 2018      Sofia Kendrick MD  [address]  [facility] [address]    Patient: Staci Hodge   MR Number: [id number]   YOB: 1980   Date of Visit: 6/20/2018     Dear Ms. Kendrick:    Thank you for referring Staci Hodge to me for evaluation. Below are the relevant portions of my assessment and plan of care.    HPI: Ms. Hodge is a 37-year-old woman who presents in consultation for breast reconstruction.  She was recently diagnosed with L breast DCIS and has seen breast surgery to discuss surgical options.  Because of her small size, a lumpectomy is most likely not a possibility.  She has not decided if she wants a bilateral mastectomy or unilateral.  Of note, she admits to being a very anxious person at baseline and is extremely nervous upon our examination.     A/P: S/P above  We had a lengthy discussion with her about implant-based reconstruction, both TE and DTI as well as using Alloderm.  She is not a candidate for autologous tissue reconstruction given her frame. We explained that there is a 40% chance of needing another operation even with DTI reconstruction. We explained that we would make this decision intra-op based on her mastectomy flaps. She stated that she understood, and we would be happy to speak with her again prior to surgery should she wish.    If you have questions, please do not hesitate to call me. I look forward to following Staci along with you.    Sincerely,    Duane Bello MD  Section Of Plastic Surgery  [facility]    NATY/regino  CC  Shilpi Clayton, MARGARETTE

## 2018-06-20 NOTE — PROGRESS NOTES
CC: breast reconstruction    HPI: Ms. Hodge is a 38yo woman who presents in consultation for breast reconstruction.  She was recently diagnosed with L breast DCIS and has seen breast surgery to discuss surgical options.  Because of her small size, a lumpectomy is most likely not a possibility.  She has not decided if she wants a bilateral mastectomy or unilateral.  Of note, she admits to being a very anxious person at baseline and is extremely nervous upon our examination.      Past Medical History:   Diagnosis Date    Abnormal Pap smear of cervix age 25    no treatement    Anxiety     Cervical spondylosis 2/9/2018    Colon polyp     Depression     Headache     History of psychiatric hospitalization     age 20 for SI    Hx of psychiatric care     Hypertension     Peutz-Jeghers syndrome     Psychiatric problem     Right carpal tunnel syndrome 3/9/2017    Self-harming behavior     Suicide attempt     Therapy      Meds:   fioricet  Klonopin  prozac  zanaflex  zyprexa  lyrica    PSHx:   Multiple bowel surgeries/resections    Allergies: NKDA    Social Hx: smokes about 1ppd, denies illicit drugs, occasional EtOH    ROS: negative except per HPI    Vitals:    06/20/18 1437   BP: 120/80   Pulse: (!) 119   Temp: 98.7 °F (37.1 °C)     NAD, appears anxious  Non-labored breathing  L breast no palpable masses, grade 1 ptosis  R breast no palpable masses, grade 1 ptosis    A/P: s/p above  We had a lengthy discussion with her about implant-based reconstruction, both TE and DTI as well as using alloderm.  She is not a candidate for autologous tissue reconstruction given her frame.  We explained that there is a 40% chance of needing another operation even with DTI reconstruction  We explained that we would make this decision intra-op based on her mastectomy flaps  She stated that she understood, and we would be happy to speak with her again prior to surgery should she wish

## 2018-06-21 ENCOUNTER — OFFICE VISIT (OUTPATIENT)
Dept: PSYCHIATRY | Facility: CLINIC | Age: 38
End: 2018-06-21
Payer: MEDICAID

## 2018-06-21 VITALS
BODY MASS INDEX: 19.01 KG/M2 | SYSTOLIC BLOOD PRESSURE: 111 MMHG | RESPIRATION RATE: 24 BRPM | HEIGHT: 62 IN | WEIGHT: 103.31 LBS | DIASTOLIC BLOOD PRESSURE: 88 MMHG | HEART RATE: 121 BPM

## 2018-06-21 DIAGNOSIS — F43.10 PTSD (POST-TRAUMATIC STRESS DISORDER): ICD-10-CM

## 2018-06-21 DIAGNOSIS — F42.8 OTHER OBSESSIVE-COMPULSIVE DISORDERS: ICD-10-CM

## 2018-06-21 DIAGNOSIS — F40.01 PANIC DISORDER WITH AGORAPHOBIA: ICD-10-CM

## 2018-06-21 DIAGNOSIS — G47.00 INSOMNIA, UNSPECIFIED TYPE: ICD-10-CM

## 2018-06-21 DIAGNOSIS — F41.1 GAD (GENERALIZED ANXIETY DISORDER): ICD-10-CM

## 2018-06-21 DIAGNOSIS — F40.10 SOCIAL ANXIETY DISORDER: ICD-10-CM

## 2018-06-21 DIAGNOSIS — F39 MOOD DISORDER: Primary | ICD-10-CM

## 2018-06-21 PROCEDURE — 99213 OFFICE O/P EST LOW 20 MIN: CPT | Mod: AF,HB,S$PBB, | Performed by: PSYCHIATRY & NEUROLOGY

## 2018-06-21 PROCEDURE — 90836 PSYTX W PT W E/M 45 MIN: CPT | Mod: AF,HB,S$PBB, | Performed by: PSYCHIATRY & NEUROLOGY

## 2018-06-21 PROCEDURE — 99213 OFFICE O/P EST LOW 20 MIN: CPT | Mod: PBBFAC | Performed by: PSYCHIATRY & NEUROLOGY

## 2018-06-21 PROCEDURE — 90836 PSYTX W PT W E/M 45 MIN: CPT | Mod: PBBFAC | Performed by: PSYCHIATRY & NEUROLOGY

## 2018-06-21 PROCEDURE — 99999 PR PBB SHADOW E&M-EST. PATIENT-LVL III: CPT | Mod: PBBFAC,,, | Performed by: PSYCHIATRY & NEUROLOGY

## 2018-06-21 RX ORDER — TRAZODONE HYDROCHLORIDE 100 MG/1
300 TABLET ORAL NIGHTLY
Qty: 270 TABLET | Refills: 0 | Status: SHIPPED | OUTPATIENT
Start: 2018-06-21 | End: 2018-07-18 | Stop reason: SDUPTHER

## 2018-06-21 RX ORDER — BUTALBITAL, ACETAMINOPHEN AND CAFFEINE 50; 325; 40 MG/1; MG/1; MG/1
TABLET ORAL
Qty: 20 TABLET | Refills: 1 | OUTPATIENT
Start: 2018-06-21

## 2018-06-21 RX ORDER — CLONAZEPAM 1 MG/1
1 TABLET ORAL 3 TIMES DAILY
Qty: 90 TABLET | Refills: 2 | Status: SHIPPED | OUTPATIENT
Start: 2018-06-21 | End: 2018-07-14 | Stop reason: SDUPTHER

## 2018-06-21 RX ORDER — OLANZAPINE 20 MG/1
20 TABLET ORAL NIGHTLY
Qty: 90 TABLET | Refills: 0 | Status: SHIPPED | OUTPATIENT
Start: 2018-06-21 | End: 2018-10-23 | Stop reason: SDUPTHER

## 2018-06-21 RX ORDER — FLUOXETINE HYDROCHLORIDE 40 MG/1
80 CAPSULE ORAL DAILY
Qty: 180 CAPSULE | Refills: 0 | Status: SHIPPED | OUTPATIENT
Start: 2018-06-21 | End: 2018-09-26
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
## 2018-06-25 ENCOUNTER — PATIENT MESSAGE (OUTPATIENT)
Dept: RADIOLOGY | Facility: HOSPITAL | Age: 38
End: 2018-06-25

## 2018-06-25 ENCOUNTER — TELEPHONE (OUTPATIENT)
Dept: RADIOLOGY | Facility: HOSPITAL | Age: 38
End: 2018-06-25

## 2018-06-25 NOTE — TELEPHONE ENCOUNTER
Spoke with patient. Reviewed breast biopsy procedure and reviewed instructions for breast biopsy. Patient expressed understanding and all questions were answered. Provided patient with my phone number to call for any further concerns or questions.   Patient scheduled breast biopsy at the [facility] on 6/28/18.

## 2018-06-25 NOTE — TELEPHONE ENCOUNTER
Called patient in reference to her mammogram and scheduling a breast biopsy at the breast center . No answer. Left the patient a message with my direct phone number.

## 2018-06-28 ENCOUNTER — HOSPITAL ENCOUNTER (OUTPATIENT)
Dept: RADIOLOGY | Facility: HOSPITAL | Age: 38
Discharge: HOME | End: 2018-06-28
Attending: SURGERY
Payer: MEDICAID

## 2018-06-28 DIAGNOSIS — G43.009 MIGRAINE WITHOUT AURA AND WITHOUT STATUS MIGRAINOSUS, NOT INTRACTABLE: ICD-10-CM

## 2018-06-28 DIAGNOSIS — R92.8 ABNORMAL MAMMOGRAM: ICD-10-CM

## 2018-06-28 PROCEDURE — 77065 DX MAMMO INCL CAD UNI: CPT | Mod: 26,,, | Performed by: RADIOLOGY

## 2018-06-28 PROCEDURE — 77065 DX MAMMO INCL CAD UNI: CPT | Mod: TC,PO,RT

## 2018-06-28 PROCEDURE — 77061 BREAST TOMOSYNTHESIS UNI: CPT | Mod: 26,,, | Performed by: RADIOLOGY

## 2018-06-28 PROCEDURE — 88305 TISSUE EXAM BY PATHOLOGIST: CPT | Mod: 26,,, | Performed by: PATHOLOGY

## 2018-06-28 PROCEDURE — 27201044 MAMMO BREAST STEREOTACTIC BREAST BIOPSY RIGHT: Mod: PO

## 2018-06-28 PROCEDURE — 77061 BREAST TOMOSYNTHESIS UNI: CPT | Mod: TC,PO

## 2018-06-28 PROCEDURE — 88305 TISSUE EXAM BY PATHOLOGIST: CPT | Performed by: PATHOLOGY

## 2018-06-28 PROCEDURE — 19081 BX BREAST 1ST LESION STRTCTC: CPT | Mod: TC,PO,RT

## 2018-06-28 PROCEDURE — 77065 DX MAMMO INCL CAD UNI: CPT | Mod: TC,PO

## 2018-06-28 PROCEDURE — 19081 BX BREAST 1ST LESION STRTCTC: CPT | Mod: RT,,, | Performed by: RADIOLOGY

## 2018-07-02 ENCOUNTER — TELEPHONE (OUTPATIENT)
Dept: SURGERY | Facility: CLINIC | Age: 38
End: 2018-07-02

## 2018-07-02 NOTE — TELEPHONE ENCOUNTER
Called patient back, explained that we can set her up with Dr. Kendrick at her [facility] clinic next Monday. Patient is agreeable with this. Scheduled for 2:45, reviewed location and appt information. Patient verbalized understanding.

## 2018-07-02 NOTE — TELEPHONE ENCOUNTER
Patient returned my call, we discussed her biopsy results. Explained that biopsy showed fibrocystic changes,  no atypia/benign, all questions answered, pt advised to follow up in 6 months with repeat imaging per core biopsy protocol, advised case will be reviewed in the weekly core conference and pt will be notified if there are any changes. Patient verbalized understanding of all information.    Patient would like to know the next steps for the known cancer in her contralateral breast. She states that she saw Dr. Kendrick at [facility] initially, due to her lack of insurance. However, she is now insured and is willing to come to [place] for appts if that means getting in sooner to discuss. Told patient that I would reach out to Dr. Kendrick and see what she would like to do next. Told patient I would call her later today with an update.

## 2018-07-02 NOTE — TELEPHONE ENCOUNTER
Called patient with the results of her breast biopsy from 6/28. Left voicemail with my callback number requesting return call at earliest convenience

## 2018-07-03 ENCOUNTER — HOSPITAL ENCOUNTER (OUTPATIENT)
Dept: RADIOLOGY | Facility: HOSPITAL | Age: 38
Discharge: HOME | End: 2018-07-03
Attending: STUDENT IN AN ORGANIZED HEALTH CARE EDUCATION/TRAINING PROGRAM
Payer: MEDICAID

## 2018-07-03 ENCOUNTER — OFFICE VISIT (OUTPATIENT)
Dept: NEUROLOGY | Facility: CLINIC | Age: 38
End: 2018-07-03
Payer: MEDICAID

## 2018-07-03 VITALS
RESPIRATION RATE: 14 BRPM | HEART RATE: 102 BPM | HEIGHT: 62 IN | SYSTOLIC BLOOD PRESSURE: 102 MMHG | WEIGHT: 95.44 LBS | BODY MASS INDEX: 17.56 KG/M2 | DIASTOLIC BLOOD PRESSURE: 74 MMHG

## 2018-07-03 DIAGNOSIS — F41.1 GAD (GENERALIZED ANXIETY DISORDER): ICD-10-CM

## 2018-07-03 DIAGNOSIS — M79.18 MYOFASCIAL MUSCLE PAIN: Primary | ICD-10-CM

## 2018-07-03 DIAGNOSIS — G44.209 TENSION HEADACHE: ICD-10-CM

## 2018-07-03 DIAGNOSIS — M54.2 NECK PAIN: ICD-10-CM

## 2018-07-03 DIAGNOSIS — R00.0 TACHYCARDIA: ICD-10-CM

## 2018-07-03 DIAGNOSIS — G43.009 MIGRAINE WITHOUT AURA AND WITHOUT STATUS MIGRAINOSUS, NOT INTRACTABLE: ICD-10-CM

## 2018-07-03 DIAGNOSIS — R59.0 INGUINAL LYMPHADENOPATHY: ICD-10-CM

## 2018-07-03 PROCEDURE — 99999 PR PBB SHADOW E&M-EST. PATIENT-LVL IV: CPT | Mod: PBBFAC,,, | Performed by: NURSE PRACTITIONER

## 2018-07-03 PROCEDURE — 99214 OFFICE O/P EST MOD 30 MIN: CPT | Mod: S$PBB,,, | Performed by: NURSE PRACTITIONER

## 2018-07-03 PROCEDURE — 99214 OFFICE O/P EST MOD 30 MIN: CPT | Mod: PBBFAC,25 | Performed by: NURSE PRACTITIONER

## 2018-07-03 PROCEDURE — 76882 US LMTD JT/FCL EVL NVASC XTR: CPT | Mod: TC

## 2018-07-03 PROCEDURE — 76857 US EXAM PELVIC LIMITED: CPT | Mod: 26,,, | Performed by: RADIOLOGY

## 2018-07-03 RX ORDER — BUTALBITAL, ACETAMINOPHEN AND CAFFEINE 50; 325; 40 MG/1; MG/1; MG/1
TABLET ORAL
Qty: 20 TABLET | Refills: 1 | Status: SHIPPED | OUTPATIENT
Start: 2018-07-03 | End: 2018-07-03 | Stop reason: SDUPTHER

## 2018-07-03 RX ORDER — PREGABALIN 100 MG/1
100 CAPSULE ORAL 2 TIMES DAILY
Qty: 60 CAPSULE | Refills: 6 | Status: SHIPPED | OUTPATIENT
Start: 2018-07-03 | End: 2019-06-24

## 2018-07-03 NOTE — PROGRESS NOTES
Subjective:      Chief Complaint:  Dizziness and numbness    History of Present Illness  Staci Hodge is a 37 y.o. female, with chronic headaches and bilateral CTS, and prior complaints of dizziness. She has a history of PTSD, panic disorder with agoraphobia, insomnia, colon resection, due to small bowel obstruction, and tobacco use.     She presents today for a follow up visit. She was diagnosed with breast cancer since her last visit with me. She is planning a bilateral mastectomy.     HR much improved today. She was not able to see Cardiology for her tachycardia; however, anxiety is less with increased Klonopin Rx per Dr. Emerson.     Neck pain is worse lately. She saw pain management again for this, who offered to refer her to Neurosurgery, which she did not proceed with as she lost her insurance at the time. She continues with diffuse musculoskeletal complaints.     She restarted Lyrica from another provider, which has been overall ineffective. She took Lyrica 100 mg bid prior, but does not recall effect of this.     Headaches are worse lately. She continues on Topamax. She takes Fioricet to abort her headaches, which is helpful. She limits this to once per week.     She continues with bilateral hand pain and CT complaints.     She continues to see Dr. Emerson for her anxiety/panic disorder.    I have reviewed all of this patient's past medical and surgical histories as well as family and social histories and active allergies and medications as documented in the electronic medical record.    Review of Systems  Review of Systems   Constitutional: Positive for fatigue. Negative for appetite change.   HENT: Negative for tinnitus.    Eyes: Negative for photophobia and visual disturbance.   Respiratory: Negative for shortness of breath.    Cardiovascular: Positive for palpitations.   Gastrointestinal: Negative for nausea.   Musculoskeletal: Positive for arthralgias, back pain, myalgias and neck pain.  Negative for neck stiffness.   Neurological: Positive for numbness and headaches. Negative for dizziness, tremors, facial asymmetry, speech difficulty, weakness and light-headedness.   Hematological: Does not bruise/bleed easily.   Psychiatric/Behavioral: Negative for agitation, dysphoric mood, sleep disturbance and suicidal ideas. The patient is nervous/anxious.        Objective:       Vitals:    07/03/18 1335   BP: 102/74   Pulse: 102   Resp: 14     Exam:  Gen Appearance, well developed/nourished in no apparent distress  CV: 2+ distal pulses with no edema or swelling  Neuro:  MS: Awake, alert, oriented to place, person, time, situation. Sustains attention. Recent/remote memory intact, Language is full to spontaneous speech/repetition/naming/comprehension. Fund of Knowledge is full  CN: Optic discs are flat with normal vasculature, PERRL, right nystamus. Normal facial sensation and strength, Hearing symmetric, Tongue and Palate are midline and strong. Shoulder Shrug symmetric and strong.  Motor: Normal bulk, tone, no abnormal movements. 5/5 strength bilateral upper/lower extremities with 2+ reflexes and bilateral plantar response  Sensory: symmetric to light touch, pain, temp, and vibration/proprioception. Romberg negative; + left Tinel's and Phalen's sign.   Cerebellar: Finger-nose,Heal-shin, Rapid alternating movements intact  Gait: Normal stance, no ataxia    Imaging:   MRI C-spine 12/2017:  Mild degenerative changes of the cervical spine; neural foraminal narrowing at the C5-6 and C6-7 levels.  No central canal stenosis.    MRI Brain with and without 2/3/2017: normal    24 hour Holter monitor: not completed    EMG BUE 2/10/2017:   Impression: Abnormal Study secondary to the Presence of:   Mild Bilateral Carpal Tunnel Syndrome.     EMG BUE 1/18/2018:  1. Improved right CTS findings post right CTR.   2. Slight worsening of the CTS findings.   3. No EP evidence of cervical radiculopathy found.     Labs: CBC, CMP,  "Lipid Panel, TSH, B12, Vitamin D ordered per PCP; Vitamin D low; labs also show fasting hyperglycemia.    Assessment:   Staci Hodge is a 37 y.o. female, with neck pain without radiculopathy, chronic headaches and bilateral CTS, and prior complaints of dizziness. She has a history of PTSD, panic disorder with agoraphobia, insomnia, colon resection, due to small bowel obstruction, and tobacco use.   Plan:   I recommend:  1. Increase Lyrica back to 100 mg bid for fibromyalgia complaints. This may also help her headaches, which are worse lately, likely related to stress, from the recent loss of her job and recent diagnosis of breast cancer.   2. Overall, her MSK complaints and ongoing paresthesias most likely represent fibromyalgia. Complaints are historically and presently worse with stress.   3. Neurosurgery per pain management not needed. Nothing surgical on her MRI C-spine-mild changes only. She failed to respond to two different types of injections, and did not have cervical radiculopathy on EMG.  4. Continue Topamax for headache prevention and Fioricet as an abortive agent. Diclofenac, prescribed to abort headaches, was discontinued per Nephrology, given her proteinuria. 32. She was originally prescribed Propranolol for headache prevention, which was ineffective; however, this was continued for her tachycardia, which was believed to be anxiety related; however, tachycardia is improved. Hold on referral to Cardiology for tachycardia, as this is improved.   5. Lost her job, due to her inability to lift, push, or pull, given her BUE complaints and neck pain.   6. Mood issues followed by Dr. ANGELICA Emerson.     FU 6 weeks.     "This note will not be shared with the patient".                   "

## 2018-07-11 ENCOUNTER — OFFICE VISIT (OUTPATIENT)
Dept: SURGERY | Facility: CLINIC | Age: 38
End: 2018-07-11
Payer: MEDICAID

## 2018-07-11 VITALS
HEART RATE: 94 BPM | TEMPERATURE: 98 F | DIASTOLIC BLOOD PRESSURE: 71 MMHG | WEIGHT: 97 LBS | HEIGHT: 62 IN | SYSTOLIC BLOOD PRESSURE: 103 MMHG | BODY MASS INDEX: 17.85 KG/M2

## 2018-07-11 DIAGNOSIS — D05.12 DUCTAL CARCINOMA IN SITU (DCIS) OF LEFT BREAST: Primary | ICD-10-CM

## 2018-07-11 DIAGNOSIS — D05.12 DUCTAL CARCINOMA IN SITU OF LEFT BREAST: Primary | ICD-10-CM

## 2018-07-11 PROCEDURE — 99205 OFFICE O/P NEW HI 60 MIN: CPT | Mod: S$PBB,,, | Performed by: SURGERY

## 2018-07-11 PROCEDURE — 99999 PR PBB SHADOW E&M-EST. PATIENT-LVL III: CPT | Mod: PBBFAC,,, | Performed by: SURGERY

## 2018-07-11 PROCEDURE — 99213 OFFICE O/P EST LOW 20 MIN: CPT | Mod: PBBFAC,PO | Performed by: SURGERY

## 2018-07-11 NOTE — PROGRESS NOTES
New Breast Cancer  History and Physical  Department of Surgery - [facility]    REFERRING PROVIDER: [facility]    CHIEF COMPLAINT: left breast cancer    Subjective:     Interval History:  Ms. Hodge returns to discuss treatment options. There has been no changes since her last visit.     HPI:   Staci Hodge is a 37 y.o. premenopausal female with multiple co-morbidities including Peutz-Jeghers syndrome, multiple bowel resections for obstructions, cervical spondylosis, OCD/PTSD/anxiety, migraines referred for evaluation of recently diagnosed carcinoma of the left breast. The patient was initially referred for surgical evaluation of a breast lump on self exam. Follow-up dMMG on 5/24/18 showed calcifications in the upper outer quadrant of the left breast (BIRADS-4) along with a 7mm cyst (BIRADS-3). Ultrasound done on 5/24/18 showed 11 mm x 4 mm x 14 mm heterogeneous calcifications in a mass seen in the upper outer quadrant of the left breast.   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     Patient does routinely do self breast exams.  Patient has noted a change on breast exam.  Patient denies nipple discharge. Patient denies to previous breast biopsy. Patient denies a personal history of breast cancer.    Findings at that time were the following:   Tumor size:  1.4cm   Tumor grade: II   Estrogen Receptor: N/A  Progesterone Receptor: N/A  Her-2 zuleika: N/A  Lymph node status: Clinically negative    Interval history:  Patient presents back to clinic today to follow up biopsy results from most recent right breast biopsy which was benign and to go over further surgical planning. She also had ultrasound of inguinal lymphadenopathy which showed multiple enlarged lymph nodes without abnormality. She states she is feeling well, had some questions regarding recovery with surgery. Has seen Dr. Bello and is planning on bilateral mastectomy with implant  reconstruction. She has genetic counseling set up for 18.    GYN History:  Age of menarche was 12.  Patient denies hormonal therapy. Patient is .     FAMILY History:  Paternal Aunt - breast in 40s  Mother - esophageal ca    Past Medical History:   Diagnosis Date    Abnormal Pap smear of cervix age 25    no treatement    Anxiety     Cancer 2018    breast cancer    Cervical spondylosis 2018    Colon polyp     Depression     Headache     History of psychiatric hospitalization     age 20 for SI    Hx of psychiatric care     Hypertension     Peutz-Jeghers syndrome     Psychiatric problem     Right carpal tunnel syndrome 3/9/2017    Self-harming behavior     Suicide attempt     Therapy      Past Surgical History:   Procedure Laterality Date    ADENOIDECTOMY      APPENDECTOMY      BOWEL RESECTION  10/17/14    BREAST BIOPSY Left 2018    CARPAL TUNNEL RELEASE Bilateral     CARPAL TUNNEL RELEASE      COLONOSCOPY      DILATION AND CURETTAGE OF UTERUS      intestinal polpy      SMALL INTESTINE SURGERY      3 surgeries     TONSILLECTOMY      UPPER GASTROINTESTINAL ENDOSCOPY       Current Outpatient Prescriptions on File Prior to Visit   Medication Sig Dispense Refill    butalbital-acetaminophen-caffeine -40 mg (FIORICET, ESGIC) -40 mg per tablet Take 1 tablet by mouth every 6 (six) hours as needed for Headaches. Limit use to 2 days per week. 10 tablet 4    clonazePAM (KLONOPIN) 1 MG tablet Take 1 tablet (1 mg total) by mouth 3 (three) times daily. 90 tablet 2    FLUoxetine (PROZAC) 40 MG capsule Take 2 capsules (80 mg total) by mouth once daily. 180 capsule 0    norgestrel-ethinyl estradiol (OGESTROL) 0.5-50 mg-mcg per tablet 3 day for 3 days and then 2 a day for 3 days and then 1 a day until the pack is gone (Patient taking differently: Take 1 tablet by mouth once daily. ) 28 tablet 0    OLANZapine (ZYPREXA) 20 MG tablet Take 1 tablet (20 mg total) by mouth  every evening. 90 tablet 0    pregabalin (LYRICA) 100 MG capsule Take 1 capsule (100 mg total) by mouth 2 (two) times daily. 60 capsule 6    propranolol (INDERAL) 10 MG tablet Take 1 tablet (10 mg total) by mouth 2 (two) times daily. 60 tablet 5    tiZANidine (ZANAFLEX) 2 MG tablet TAKE 2 TABLETS (4 MG TOTAL) BY MOUTH EVERY 6 (SIX) HOURS AS NEEDED (NECK TENSION).  3    topiramate (TOPAMAX) 25 MG tablet Take 2 tablets (50 mg total) by mouth 2 (two) times daily. 120 tablet 11    traZODone (DESYREL) 100 MG tablet Take 3 tablets (300 mg total) by mouth every evening. 270 tablet 0     Current Facility-Administered Medications on File Prior to Visit   Medication Dose Route Frequency Provider Last Rate Last Dose    betamethasone acetate-betamethasone sodium phosphate injection 1.5 mg  1.5 mg Intra-articular 1 time in Clinic/[facility] Emerson NP        betamethasone acetate-betamethasone sodium phosphate injection 1.5 mg  1.5 mg Intra Carpal Tunnel 1 time in Clinic/[facility] Emerson NP         Social History     Social History    Marital status: Single     Spouse name: N/A    Number of children: 0    Years of education: N/A     Occupational History    Not on file.     Social History Main Topics    Smoking status: Current Every Day Smoker     Packs/day: 0.50     Years: 17.00     Types: Cigarettes     Start date: 4/17/2000    Smokeless tobacco: Never Used      Comment: told about smoking cessation clinic and give brochure    Alcohol use No    Drug use: No    Sexual activity: Not Currently     Partners: Male     Birth control/ protection: OCP      Comment: single     Other Topics Concern    Patient Feels They Ought To Cut Down On Drinking/Drug Use No    Patient Annoyed By Others Criticizing Their Drinking/Drug Use No    Patient Has Felt Bad Or Guilty About Drinking/Drug Use No    Patient Has Had A Drink/Used Drugs As An Eye Opener In [place] No     Social History Narrative    Had one previous  "miscarriage in 2005    Never     currently in a relationship     Family History   Problem Relation Age of Onset    Colon cancer Mother 33        peutz-jeghers syndrome    Hypertension Father     Colon polyps Sister         peutz-jeghers syndrome    Breast cancer Paternal Aunt     Breast cancer Paternal Grandmother     Ovarian cancer Neg Hx         Review of Systems  Review of Systems   Constitutional: Negative.    HENT: Negative.    Respiratory: Negative.    Cardiovascular: Negative.    Gastrointestinal: Negative.    Musculoskeletal: Negative.    Skin: Negative.    Neurological: Negative.    Psychiatric/Behavioral: Negative.         Objective:   PHYSICAL EXAM:  /71 (BP Location: Right arm, Patient Position: Sitting, BP Method: Small (Automatic))   Pulse 94   Temp 97.5 °F (36.4 °C) (Oral)   Ht 5' 2" (1.575 m)   Wt 44 kg (97 lb)   LMP 06/18/2018 (Approximate)   BMI 17.74 kg/m²     Physical Exam   Pulmonary/Chest: Right breast exhibits no inverted nipple, no mass, no nipple discharge, no skin change and no tenderness. Left breast exhibits mass. Left breast exhibits no inverted nipple, no nipple discharge, no skin change and no tenderness.         Radiology review: Images personally reviewed by me in the clinic.     dMMG & U/S 5/24/18:    Left  Mammo Digital Diagnostic Bilat with CAD  There are multiple similar dystrophic, coarse heterogeneous and fine pleomorphic calcifications in a regional distribution seen in the upper outer quadrant of the left breast.      US Breast Bilateral Limited  There are 11 mm x 4 mm x 14 mm heterogeneous calcifications in a mass seen in the upper outer quadrant of the left breast. The calcifications correlate with a palpable mass reported by the patient.      There is a 6 mm x 3 mm x 7 mm oval complicated cyst with circumscribed margins seen in the upper outer quadrant of the left breast.      Right  Mammo Digital Diagnostic Bilat with CAD  There are round and " coarse heterogeneous calcifications seen in the lower central region of the right breast in the middle depth.      There is an intramammary lymph node seen in the outer central region of the right breast in the middle depth.      US Breast Bilateral Limited  There is a 5 mm x 3 mm x 6 mm intramammary lymph node with circumscribed margins seen in the right breast at 9 o'clock. The lymph node correlates with a palpable mass reported by the patient.       Finding not seen on this modality: calcifications.      Impression:  Left  Calcifications: Left breast calcifications at the upper outer position (multiple findings). Assessment: 4 - Suspicious finding. Biopsy is recommended.   Cyst: Left breast 6 mm x 3 mm x 7 mm cyst at the upper outer position. Assessment: 3 - Probably benign.      Right  There is no mammographic or sonographic evidence of malignancy.     BI-RADS Category:   Overall: 4 - Suspicious    MRI:  Right  There is a 3 mm focus seen in the right breast at 7 o'clock, 6 cm from the nipple. This may correlate with small group of mammographic calcifications.      Left  There is a 161 mm x 58 mm x 48 mm clumped, non-mass enhancement in a segmental distribution seen in the upper outer quadrant of the left breast, 1.5 cm from the nipple and 0.5 cm from the chest wall. The non-mass enhancement correlates with a prior mammogram finding.       No suspicious axillary or internal mammary lymph nodes are visualized.      Impression:  1. Extensive area of clumped non mass enhancement in a segmental distribution involving the entire upper outer quadrant of the left breast and extending to approximately the 5 o'clock axis. This measures approximately 16 cm in greatest AP extent and corresponds to large area of abnormal calcifications seen on mammogram. It is consistent with known biopsy proven malignancy. If additional tissue sampling is clinically indicated to confirm extent of disease, additional biopsy could be  performed using stereotactic guidance in the anterior breast.      2. Focus of enhancement in the right breast at 7 o'clock may correspond to additional group of calcifications is seen in the right breast on recent diagnostic mammogram.  Although the MRI finding is not suspicious in isolation, due to the mammographic appearance of the calcifications, stereotactic biopsy is recommended.            BI-RADS Category:   Overall: 4 - Suspicious     Recommendation:  Biopsy is recommended.       PATH:    ER: Positive (strong, 90% of cells)  NH: Positive (strong, 80% of cells  Immunostains performed with appropriate positive and negative controls.    -Intermediate grade ductal carcinoma in-situ (Grade II/III), cribriform, apocrine, papillary  -Diffuse microcalcifications present  -Indefinite for invasion  -Breast biomarkers are pending.    Assessment:      Staci Hodge is a 37 y.o. premenopausal female with DCIS of the left breast.      Plan:   MRI showed expected extensive area of calcifications on the left and suspicious appearing area on the right, this right was biopsied and found to be benign. Ultrasound of inguinal lymphadenopathy was negative, no further intervention necessary. Plan will be for bilateral mastectomy with left sentinel lymph node biopsy and implant reconstruction with Dr. Bello.     The patient, in consultation with her family, has elected to proceed with bilateral total mastectomy and left sentinel lymph node biopsy. The operative risks of bleeding, infection, recurrence, scarring, and anesthetic complications and the possibility of requiring further surgery were all noted and informed consent obtained.    We went over all of the details of the surgery at length.  We had a very long discussion about her higher than average risk for complications due to recent smoking history and still sometimes smoking 1 cigarette per day and her thin body habitus worrying me for nutritional sustainability.  I  adamantly encouraged her to eat healthy and refrain completely from smoking from now until surgery and certainly in the immediate post-operative period.  I emphasized this importance to her family as well who agreed to be supportive in this.    Surgery scheduled for 7/23/18. Follow-up in clinic roughly 14 days after surgery.    60 minutes were spent on this encounter, 45 minutes of face to face counseling and 15 on chart review and coordination of care with Dr. Bello.

## 2018-07-12 DIAGNOSIS — D05.12 DUCTAL CARCINOMA IN SITU (DCIS) OF LEFT BREAST: Primary | ICD-10-CM

## 2018-07-14 DIAGNOSIS — M62.838 CERVICAL PARASPINAL MUSCLE SPASM: ICD-10-CM

## 2018-07-14 DIAGNOSIS — F41.1 GAD (GENERALIZED ANXIETY DISORDER): ICD-10-CM

## 2018-07-14 RX ORDER — NITROFURANTOIN 25; 75 MG/1; MG/1
100 CAPSULE ORAL 2 TIMES DAILY
Qty: 14 CAPSULE | Refills: 0 | OUTPATIENT
Start: 2018-07-14

## 2018-07-16 RX ORDER — CLONAZEPAM 1 MG/1
TABLET ORAL
Qty: 60 TABLET | Refills: 2 | Status: ON HOLD | OUTPATIENT
Start: 2018-07-16 | End: 2018-07-25 | Stop reason: HOSPADM

## 2018-07-17 RX ORDER — TIZANIDINE 2 MG/1
4 TABLET ORAL EVERY 6 HOURS PRN
Qty: 60 TABLET | Refills: 3 | Status: SHIPPED | OUTPATIENT
Start: 2018-07-17 | End: 2018-07-28

## 2018-07-18 ENCOUNTER — OFFICE VISIT (OUTPATIENT)
Dept: PSYCHIATRY | Facility: CLINIC | Age: 38
End: 2018-07-18
Payer: MEDICAID

## 2018-07-18 VITALS
BODY MASS INDEX: 17.79 KG/M2 | DIASTOLIC BLOOD PRESSURE: 60 MMHG | SYSTOLIC BLOOD PRESSURE: 103 MMHG | HEART RATE: 113 BPM | RESPIRATION RATE: 19 BRPM | HEIGHT: 62 IN | WEIGHT: 96.69 LBS

## 2018-07-18 DIAGNOSIS — F40.10 SOCIAL ANXIETY DISORDER: ICD-10-CM

## 2018-07-18 DIAGNOSIS — Z72.0 TOBACCO ABUSE: Primary | ICD-10-CM

## 2018-07-18 DIAGNOSIS — G47.00 INSOMNIA, UNSPECIFIED TYPE: ICD-10-CM

## 2018-07-18 DIAGNOSIS — F43.10 PTSD (POST-TRAUMATIC STRESS DISORDER): ICD-10-CM

## 2018-07-18 DIAGNOSIS — F41.1 GAD (GENERALIZED ANXIETY DISORDER): ICD-10-CM

## 2018-07-18 DIAGNOSIS — D05.12 DUCTAL CARCINOMA IN SITU (DCIS) OF LEFT BREAST: Primary | ICD-10-CM

## 2018-07-18 DIAGNOSIS — F40.01 PANIC DISORDER WITH AGORAPHOBIA: ICD-10-CM

## 2018-07-18 DIAGNOSIS — F39 MOOD DISORDER: ICD-10-CM

## 2018-07-18 DIAGNOSIS — F42.8 OTHER OBSESSIVE-COMPULSIVE DISORDERS: ICD-10-CM

## 2018-07-18 PROCEDURE — 99214 OFFICE O/P EST MOD 30 MIN: CPT | Mod: AF,HB,S$PBB, | Performed by: PSYCHIATRY & NEUROLOGY

## 2018-07-18 PROCEDURE — 99999 PR PBB SHADOW E&M-EST. PATIENT-LVL III: CPT | Mod: PBBFAC,,, | Performed by: PSYCHIATRY & NEUROLOGY

## 2018-07-18 PROCEDURE — 99213 OFFICE O/P EST LOW 20 MIN: CPT | Mod: PBBFAC | Performed by: PSYCHIATRY & NEUROLOGY

## 2018-07-18 PROCEDURE — 90833 PSYTX W PT W E/M 30 MIN: CPT | Mod: AF,HB,S$PBB, | Performed by: PSYCHIATRY & NEUROLOGY

## 2018-07-18 PROCEDURE — 90833 PSYTX W PT W E/M 30 MIN: CPT | Mod: PBBFAC | Performed by: PSYCHIATRY & NEUROLOGY

## 2018-07-18 RX ORDER — TRAZODONE HYDROCHLORIDE 100 MG/1
400 TABLET ORAL NIGHTLY
Qty: 120 TABLET | Refills: 0 | Status: SHIPPED | OUTPATIENT
Start: 2018-07-18 | End: 2018-08-28 | Stop reason: ALTCHOICE
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
## 2018-07-20 ENCOUNTER — ANESTHESIA EVENT (OUTPATIENT)
Dept: SURGERY | Facility: HOSPITAL | Age: 38
End: 2018-07-20
Payer: MEDICAID

## 2018-07-20 ENCOUNTER — TELEPHONE (OUTPATIENT)
Dept: SURGERY | Facility: CLINIC | Age: 38
End: 2018-07-20

## 2018-07-23 ENCOUNTER — ANESTHESIA (OUTPATIENT)
Dept: SURGERY | Facility: HOSPITAL | Age: 38
End: 2018-07-23
Payer: MEDICAID

## 2018-07-23 ENCOUNTER — SURGERY (OUTPATIENT)
Age: 38
End: 2018-07-23

## 2018-07-23 ENCOUNTER — HOSPITAL ENCOUNTER (OUTPATIENT)
Dept: RADIOLOGY | Facility: HOSPITAL | Age: 38
Discharge: HOME | End: 2018-07-23
Attending: SURGERY | Admitting: SURGERY
Payer: MEDICAID

## 2018-07-23 ENCOUNTER — HOSPITAL ENCOUNTER (OUTPATIENT)
Facility: HOSPITAL | Age: 38
Discharge: HOME | End: 2018-07-25
Attending: SURGERY | Admitting: SURGERY
Payer: MEDICAID

## 2018-07-23 DIAGNOSIS — D05.12 DUCTAL CARCINOMA IN SITU (DCIS) OF LEFT BREAST: ICD-10-CM

## 2018-07-23 DIAGNOSIS — D05.10 DCIS (DUCTAL CARCINOMA IN SITU): Primary | ICD-10-CM

## 2018-07-23 LAB
B-HCG UR QL: NEGATIVE
CTP QC/QA: YES

## 2018-07-23 PROCEDURE — 88305 TISSUE EXAM BY PATHOLOGIST: CPT | Mod: 26,,, | Performed by: PATHOLOGY

## 2018-07-23 PROCEDURE — 27000221 HC OXYGEN, UP TO 24 HOURS

## 2018-07-23 PROCEDURE — 63600175 PHARM REV CODE 636 W HCPCS: Performed by: STUDENT IN AN ORGANIZED HEALTH CARE EDUCATION/TRAINING PROGRAM

## 2018-07-23 PROCEDURE — 88331 PATH CONSLTJ SURG 1 BLK 1SPC: CPT | Performed by: PATHOLOGY

## 2018-07-23 PROCEDURE — 64520 N BLOCK LUMBAR/THORACIC: CPT | Performed by: ANESTHESIOLOGY

## 2018-07-23 PROCEDURE — 64461 PVB THORACIC SINGLE INJ SITE: CPT | Mod: 50,59,, | Performed by: ANESTHESIOLOGY

## 2018-07-23 PROCEDURE — 88331 PATH CONSLTJ SURG 1 BLK 1SPC: CPT | Mod: 26,,, | Performed by: PATHOLOGY

## 2018-07-23 PROCEDURE — 63600175 PHARM REV CODE 636 W HCPCS: Performed by: SURGERY

## 2018-07-23 PROCEDURE — 63600175 PHARM REV CODE 636 W HCPCS: Performed by: ANESTHESIOLOGY

## 2018-07-23 PROCEDURE — S0077 INJECTION, CLINDAMYCIN PHOSP: HCPCS | Performed by: SURGERY

## 2018-07-23 PROCEDURE — 27201423 OPTIME MED/SURG SUP & DEVICES STERILE SUPPLY: Performed by: SURGERY

## 2018-07-23 PROCEDURE — 25000003 PHARM REV CODE 250: Performed by: STUDENT IN AN ORGANIZED HEALTH CARE EDUCATION/TRAINING PROGRAM

## 2018-07-23 PROCEDURE — 27100025 HC TUBING, SET FLUID WARMER: Performed by: NURSE ANESTHETIST, CERTIFIED REGISTERED

## 2018-07-23 PROCEDURE — 88342 IMHCHEM/IMCYTCHM 1ST ANTB: CPT | Mod: 26,,, | Performed by: PATHOLOGY

## 2018-07-23 PROCEDURE — D9220A PRA ANESTHESIA: Mod: CRNA,,, | Performed by: NURSE ANESTHETIST, CERTIFIED REGISTERED

## 2018-07-23 PROCEDURE — 63600175 PHARM REV CODE 636 W HCPCS: Performed by: NURSE ANESTHETIST, CERTIFIED REGISTERED

## 2018-07-23 PROCEDURE — D9220A PRA ANESTHESIA: Mod: ANES,,, | Performed by: ANESTHESIOLOGY

## 2018-07-23 PROCEDURE — C1729 CATH, DRAINAGE: HCPCS | Performed by: SURGERY

## 2018-07-23 PROCEDURE — C1789 PROSTHESIS, BREAST, IMP: HCPCS | Performed by: SURGERY

## 2018-07-23 PROCEDURE — 00400 ANES INTEGUMENTARY SYS NOS: CPT | Performed by: SURGERY

## 2018-07-23 PROCEDURE — 88307 TISSUE EXAM BY PATHOLOGIST: CPT | Mod: 26,,, | Performed by: PATHOLOGY

## 2018-07-23 PROCEDURE — 88341 IMHCHEM/IMCYTCHM EA ADD ANTB: CPT | Mod: 26,,, | Performed by: PATHOLOGY

## 2018-07-23 PROCEDURE — 25000003 PHARM REV CODE 250: Performed by: ANESTHESIOLOGY

## 2018-07-23 PROCEDURE — 37000009 HC ANESTHESIA EA ADD 15 MINS: Performed by: SURGERY

## 2018-07-23 PROCEDURE — 15777 ACELLULAR DERM MATRIX IMPLT: CPT | Mod: 50,,, | Performed by: SURGERY

## 2018-07-23 PROCEDURE — 88342 IMHCHEM/IMCYTCHM 1ST ANTB: CPT | Performed by: PATHOLOGY

## 2018-07-23 PROCEDURE — 19340 INSJ BREAST IMPLT SM D MAST: CPT | Mod: 50,,, | Performed by: SURGERY

## 2018-07-23 PROCEDURE — 36000706: Performed by: SURGERY

## 2018-07-23 PROCEDURE — A9520 TC99 TILMANOCEPT DIAG 0.5MCI: HCPCS

## 2018-07-23 PROCEDURE — S0028 INJECTION, FAMOTIDINE, 20 MG: HCPCS | Performed by: NURSE ANESTHETIST, CERTIFIED REGISTERED

## 2018-07-23 PROCEDURE — 37000008 HC ANESTHESIA 1ST 15 MINUTES: Performed by: SURGERY

## 2018-07-23 PROCEDURE — 19303 MAST SIMPLE COMPLETE: CPT | Mod: 50,,, | Performed by: SURGERY

## 2018-07-23 PROCEDURE — 71000039 HC RECOVERY, EACH ADD'L HOUR: Performed by: SURGERY

## 2018-07-23 PROCEDURE — 88305 TISSUE EXAM BY PATHOLOGIST: CPT | Performed by: PATHOLOGY

## 2018-07-23 PROCEDURE — 38525 BIOPSY/REMOVAL LYMPH NODES: CPT | Mod: 51,LT,, | Performed by: SURGERY

## 2018-07-23 PROCEDURE — 25000003 PHARM REV CODE 250: Performed by: SURGERY

## 2018-07-23 PROCEDURE — 71000033 HC RECOVERY, INTIAL HOUR: Performed by: SURGERY

## 2018-07-23 PROCEDURE — 25000003 PHARM REV CODE 250: Performed by: NURSE ANESTHETIST, CERTIFIED REGISTERED

## 2018-07-23 PROCEDURE — P9045 ALBUMIN (HUMAN), 5%, 250 ML: HCPCS | Mod: JG | Performed by: NURSE ANESTHETIST, CERTIFIED REGISTERED

## 2018-07-23 PROCEDURE — 36000707: Performed by: SURGERY

## 2018-07-23 DEVICE — IMPLANTABLE DEVICE: Type: IMPLANTABLE DEVICE | Site: BREAST | Status: FUNCTIONAL

## 2018-07-23 DEVICE — GRAFT PERF 9.6X19.3 THCK TISS: Type: IMPLANTABLE DEVICE | Site: BREAST | Status: FUNCTIONAL

## 2018-07-23 RX ORDER — SODIUM CHLORIDE 0.9 % (FLUSH) 0.9 %
3 SYRINGE (ML) INJECTION
Status: DISCONTINUED | OUTPATIENT
Start: 2018-07-23 | End: 2018-07-23

## 2018-07-23 RX ORDER — HYDROCODONE BITARTRATE AND ACETAMINOPHEN 5; 325 MG/1; MG/1
1 TABLET ORAL EVERY 4 HOURS PRN
Status: DISCONTINUED | OUTPATIENT
Start: 2018-07-23 | End: 2018-07-23

## 2018-07-23 RX ORDER — EPHEDRINE SULFATE 50 MG/ML
INJECTION, SOLUTION INTRAVENOUS
Status: DISCONTINUED | OUTPATIENT
Start: 2018-07-23 | End: 2018-07-23

## 2018-07-23 RX ORDER — ROCURONIUM BROMIDE 10 MG/ML
INJECTION, SOLUTION INTRAVENOUS
Status: DISCONTINUED | OUTPATIENT
Start: 2018-07-23 | End: 2018-07-23

## 2018-07-23 RX ORDER — BACITRACIN 50000 [IU]/1
INJECTION, POWDER, FOR SOLUTION INTRAMUSCULAR
Status: DISCONTINUED | OUTPATIENT
Start: 2018-07-23 | End: 2018-07-23

## 2018-07-23 RX ORDER — PHENYLEPHRINE HYDROCHLORIDE 10 MG/ML
INJECTION INTRAVENOUS
Status: DISCONTINUED | OUTPATIENT
Start: 2018-07-23 | End: 2018-07-23

## 2018-07-23 RX ORDER — FLUOXETINE HYDROCHLORIDE 20 MG/1
80 CAPSULE ORAL DAILY
Status: DISCONTINUED | OUTPATIENT
Start: 2018-07-23 | End: 2018-07-25 | Stop reason: HOSPADM

## 2018-07-23 RX ORDER — FAMOTIDINE 10 MG/ML
INJECTION INTRAVENOUS
Status: DISCONTINUED | OUTPATIENT
Start: 2018-07-23 | End: 2018-07-23

## 2018-07-23 RX ORDER — OLANZAPINE 10 MG/1
20 TABLET ORAL NIGHTLY
Status: DISCONTINUED | OUTPATIENT
Start: 2018-07-23 | End: 2018-07-25 | Stop reason: HOSPADM

## 2018-07-23 RX ORDER — TRAZODONE HYDROCHLORIDE 100 MG/1
400 TABLET ORAL NIGHTLY
Status: DISCONTINUED | OUTPATIENT
Start: 2018-07-23 | End: 2018-07-25

## 2018-07-23 RX ORDER — HEPARIN SODIUM 5000 [USP'U]/ML
5000 INJECTION, SOLUTION INTRAVENOUS; SUBCUTANEOUS ONCE
Status: COMPLETED | OUTPATIENT
Start: 2018-07-23 | End: 2018-07-23

## 2018-07-23 RX ORDER — FENTANYL CITRATE 50 UG/ML
25 INJECTION, SOLUTION INTRAMUSCULAR; INTRAVENOUS EVERY 5 MIN PRN
Status: COMPLETED | OUTPATIENT
Start: 2018-07-23 | End: 2018-07-23

## 2018-07-23 RX ORDER — ALBUMIN HUMAN 50 G/1000ML
SOLUTION INTRAVENOUS CONTINUOUS PRN
Status: DISCONTINUED | OUTPATIENT
Start: 2018-07-23 | End: 2018-07-23

## 2018-07-23 RX ORDER — DIPHENHYDRAMINE HYDROCHLORIDE 50 MG/ML
25 INJECTION INTRAMUSCULAR; INTRAVENOUS EVERY 4 HOURS PRN
Status: DISCONTINUED | OUTPATIENT
Start: 2018-07-23 | End: 2018-07-25

## 2018-07-23 RX ORDER — ONDANSETRON 8 MG/1
8 TABLET, ORALLY DISINTEGRATING ORAL EVERY 8 HOURS PRN
Status: DISCONTINUED | OUTPATIENT
Start: 2018-07-23 | End: 2018-07-25 | Stop reason: HOSPADM

## 2018-07-23 RX ORDER — IBUPROFEN 600 MG/1
600 TABLET ORAL EVERY 6 HOURS PRN
Status: DISCONTINUED | OUTPATIENT
Start: 2018-07-23 | End: 2018-07-23

## 2018-07-23 RX ORDER — ACETAMINOPHEN 325 MG/1
650 TABLET ORAL EVERY 8 HOURS PRN
Status: DISCONTINUED | OUTPATIENT
Start: 2018-07-23 | End: 2018-07-25 | Stop reason: HOSPADM

## 2018-07-23 RX ORDER — BUTALBITAL, ACETAMINOPHEN AND CAFFEINE 50; 325; 40 MG/1; MG/1; MG/1
1 TABLET ORAL EVERY 6 HOURS PRN
Status: DISCONTINUED | OUTPATIENT
Start: 2018-07-23 | End: 2018-07-25 | Stop reason: HOSPADM

## 2018-07-23 RX ORDER — GLYCOPYRROLATE 0.2 MG/ML
INJECTION INTRAMUSCULAR; INTRAVENOUS
Status: DISCONTINUED | OUTPATIENT
Start: 2018-07-23 | End: 2018-07-23

## 2018-07-23 RX ORDER — CYCLOBENZAPRINE HCL 5 MG
5 TABLET ORAL 3 TIMES DAILY PRN
Status: DISCONTINUED | OUTPATIENT
Start: 2018-07-23 | End: 2018-07-24

## 2018-07-23 RX ORDER — NEOSTIGMINE METHYLSULFATE 1 MG/ML
INJECTION, SOLUTION INTRAVENOUS
Status: DISCONTINUED | OUTPATIENT
Start: 2018-07-23 | End: 2018-07-23

## 2018-07-23 RX ORDER — PROPOFOL 10 MG/ML
VIAL (ML) INTRAVENOUS
Status: DISCONTINUED | OUTPATIENT
Start: 2018-07-23 | End: 2018-07-23

## 2018-07-23 RX ORDER — TOPIRAMATE 25 MG/1
50 TABLET ORAL 2 TIMES DAILY
Status: DISCONTINUED | OUTPATIENT
Start: 2018-07-23 | End: 2018-07-25 | Stop reason: HOSPADM

## 2018-07-23 RX ORDER — FENTANYL CITRATE 50 UG/ML
INJECTION, SOLUTION INTRAMUSCULAR; INTRAVENOUS
Status: DISCONTINUED | OUTPATIENT
Start: 2018-07-23 | End: 2018-07-23

## 2018-07-23 RX ORDER — DEXTROSE MONOHYDRATE, SODIUM CHLORIDE, AND POTASSIUM CHLORIDE 50; 1.49; 4.5 G/1000ML; G/1000ML; G/1000ML
INJECTION, SOLUTION INTRAVENOUS CONTINUOUS
Status: DISCONTINUED | OUTPATIENT
Start: 2018-07-23 | End: 2018-07-23

## 2018-07-23 RX ORDER — ACETAMINOPHEN 10 MG/ML
INJECTION, SOLUTION INTRAVENOUS
Status: DISCONTINUED | OUTPATIENT
Start: 2018-07-23 | End: 2018-07-23

## 2018-07-23 RX ORDER — ACETAMINOPHEN 10 MG/ML
1000 INJECTION, SOLUTION INTRAVENOUS EVERY 8 HOURS
Status: COMPLETED | OUTPATIENT
Start: 2018-07-23 | End: 2018-07-24

## 2018-07-23 RX ORDER — HYDROCODONE BITARTRATE AND ACETAMINOPHEN 10; 325 MG/1; MG/1
1 TABLET ORAL EVERY 4 HOURS PRN
Status: DISCONTINUED | OUTPATIENT
Start: 2018-07-23 | End: 2018-07-23

## 2018-07-23 RX ORDER — SODIUM CHLORIDE 9 MG/ML
INJECTION, SOLUTION INTRAVENOUS CONTINUOUS
Status: DISCONTINUED | OUTPATIENT
Start: 2018-07-23 | End: 2018-07-23

## 2018-07-23 RX ORDER — DEXAMETHASONE SODIUM PHOSPHATE 4 MG/ML
INJECTION, SOLUTION INTRA-ARTICULAR; INTRALESIONAL; INTRAMUSCULAR; INTRAVENOUS; SOFT TISSUE
Status: DISCONTINUED | OUTPATIENT
Start: 2018-07-23 | End: 2018-07-23

## 2018-07-23 RX ORDER — SODIUM CHLORIDE AND POTASSIUM CHLORIDE 150; 450 MG/100ML; MG/100ML
INJECTION, SOLUTION INTRAVENOUS CONTINUOUS
Status: DISCONTINUED | OUTPATIENT
Start: 2018-07-23 | End: 2018-07-24

## 2018-07-23 RX ORDER — RAMELTEON 8 MG/1
8 TABLET ORAL NIGHTLY PRN
Status: DISCONTINUED | OUTPATIENT
Start: 2018-07-23 | End: 2018-07-25

## 2018-07-23 RX ORDER — MIDAZOLAM HYDROCHLORIDE 1 MG/ML
0.5 INJECTION INTRAMUSCULAR; INTRAVENOUS
Status: DISCONTINUED | OUTPATIENT
Start: 2018-07-23 | End: 2018-07-23

## 2018-07-23 RX ORDER — PROMETHAZINE HYDROCHLORIDE 12.5 MG/1
12.5 TABLET ORAL EVERY 6 HOURS PRN
Status: DISCONTINUED | OUTPATIENT
Start: 2018-07-23 | End: 2018-07-25 | Stop reason: HOSPADM

## 2018-07-23 RX ORDER — LIDOCAINE HCL/PF 100 MG/5ML
SYRINGE (ML) INTRAVENOUS
Status: DISCONTINUED | OUTPATIENT
Start: 2018-07-23 | End: 2018-07-23

## 2018-07-23 RX ORDER — SODIUM CHLORIDE 0.9 % (FLUSH) 0.9 %
3 SYRINGE (ML) INJECTION
Status: DISCONTINUED | OUTPATIENT
Start: 2018-07-23 | End: 2018-07-25 | Stop reason: HOSPADM

## 2018-07-23 RX ORDER — CIPROFLOXACIN 2 MG/ML
INJECTION, SOLUTION INTRAVENOUS CONTINUOUS PRN
Status: DISCONTINUED | OUTPATIENT
Start: 2018-07-23 | End: 2018-07-23

## 2018-07-23 RX ORDER — OXYCODONE HYDROCHLORIDE 5 MG/1
10 TABLET ORAL EVERY 4 HOURS PRN
Status: DISCONTINUED | OUTPATIENT
Start: 2018-07-23 | End: 2018-07-24

## 2018-07-23 RX ORDER — FENTANYL CITRATE 50 UG/ML
25 INJECTION, SOLUTION INTRAMUSCULAR; INTRAVENOUS EVERY 5 MIN PRN
Status: DISCONTINUED | OUTPATIENT
Start: 2018-07-23 | End: 2018-07-23

## 2018-07-23 RX ORDER — ONDANSETRON 2 MG/ML
INJECTION INTRAMUSCULAR; INTRAVENOUS
Status: DISCONTINUED | OUTPATIENT
Start: 2018-07-23 | End: 2018-07-23

## 2018-07-23 RX ORDER — OXYCODONE HYDROCHLORIDE 5 MG/1
5 TABLET ORAL EVERY 4 HOURS PRN
Status: DISCONTINUED | OUTPATIENT
Start: 2018-07-23 | End: 2018-07-24

## 2018-07-23 RX ORDER — PREGABALIN 50 MG/1
100 CAPSULE ORAL 2 TIMES DAILY
Status: DISCONTINUED | OUTPATIENT
Start: 2018-07-23 | End: 2018-07-25 | Stop reason: HOSPADM

## 2018-07-23 RX ORDER — SODIUM CHLORIDE AND POTASSIUM CHLORIDE 150; 450 MG/100ML; MG/100ML
INJECTION, SOLUTION INTRAVENOUS CONTINUOUS
Status: DISCONTINUED | OUTPATIENT
Start: 2018-07-23 | End: 2018-07-23

## 2018-07-23 RX ORDER — HYDROMORPHONE HYDROCHLORIDE 1 MG/ML
0.2 INJECTION, SOLUTION INTRAMUSCULAR; INTRAVENOUS; SUBCUTANEOUS ONCE
Status: COMPLETED | OUTPATIENT
Start: 2018-07-23 | End: 2018-07-23

## 2018-07-23 RX ORDER — CEFAZOLIN SODIUM 1 G/3ML
2 INJECTION, POWDER, FOR SOLUTION INTRAMUSCULAR; INTRAVENOUS
Status: COMPLETED | OUTPATIENT
Start: 2018-07-23 | End: 2018-07-23

## 2018-07-23 RX ORDER — FENTANYL CITRATE 50 UG/ML
25 INJECTION, SOLUTION INTRAMUSCULAR; INTRAVENOUS
Status: DISCONTINUED | OUTPATIENT
Start: 2018-07-23 | End: 2018-07-23

## 2018-07-23 RX ORDER — PROMETHAZINE HYDROCHLORIDE 25 MG/ML
INJECTION, SOLUTION INTRAMUSCULAR; INTRAVENOUS
Status: DISPENSED
Start: 2018-07-23 | End: 2018-07-24

## 2018-07-23 RX ORDER — MIDAZOLAM HYDROCHLORIDE 1 MG/ML
INJECTION, SOLUTION INTRAMUSCULAR; INTRAVENOUS
Status: DISCONTINUED | OUTPATIENT
Start: 2018-07-23 | End: 2018-07-23

## 2018-07-23 RX ORDER — KETAMINE HYDROCHLORIDE 10 MG/ML
INJECTION, SOLUTION INTRAMUSCULAR; INTRAVENOUS
Status: DISCONTINUED | OUTPATIENT
Start: 2018-07-23 | End: 2018-07-23

## 2018-07-23 RX ADMIN — ROCURONIUM BROMIDE 10 MG: 10 INJECTION, SOLUTION INTRAVENOUS at 11:07

## 2018-07-23 RX ADMIN — PHENYLEPHRINE HYDROCHLORIDE 100 MCG: 10 INJECTION INTRAVENOUS at 08:07

## 2018-07-23 RX ADMIN — OXYCODONE HYDROCHLORIDE 10 MG: 5 TABLET ORAL at 09:07

## 2018-07-23 RX ADMIN — HYDROCODONE BITARTRATE AND ACETAMINOPHEN 1 TABLET: 10; 325 TABLET ORAL at 03:07

## 2018-07-23 RX ADMIN — ROCURONIUM BROMIDE 10 MG: 10 INJECTION, SOLUTION INTRAVENOUS at 09:07

## 2018-07-23 RX ADMIN — ONDANSETRON 8 MG: 8 TABLET, ORALLY DISINTEGRATING ORAL at 09:07

## 2018-07-23 RX ADMIN — POTASSIUM CHLORIDE AND SODIUM CHLORIDE: 450; 150 INJECTION, SOLUTION INTRAVENOUS at 03:07

## 2018-07-23 RX ADMIN — SODIUM CHLORIDE, SODIUM GLUCONATE, SODIUM ACETATE, POTASSIUM CHLORIDE, MAGNESIUM CHLORIDE, SODIUM PHOSPHATE, DIBASIC, AND POTASSIUM PHOSPHATE: .53; .5; .37; .037; .03; .012; .00082 INJECTION, SOLUTION INTRAVENOUS at 12:07

## 2018-07-23 RX ADMIN — MIDAZOLAM HYDROCHLORIDE 2 MG: 1 INJECTION, SOLUTION INTRAMUSCULAR; INTRAVENOUS at 07:07

## 2018-07-23 RX ADMIN — GLYCOPYRROLATE 0.6 MG: 0.2 INJECTION, SOLUTION INTRAMUSCULAR; INTRAVENOUS at 01:07

## 2018-07-23 RX ADMIN — ACETAMINOPHEN 660 MG: 10 INJECTION, SOLUTION INTRAVENOUS at 01:07

## 2018-07-23 RX ADMIN — TOPIRAMATE 50 MG: 25 TABLET, FILM COATED ORAL at 09:07

## 2018-07-23 RX ADMIN — ALBUMIN (HUMAN): 12.5 SOLUTION INTRAVENOUS at 08:07

## 2018-07-23 RX ADMIN — BACITRACIN 50000 UNITS: 50000 INJECTION, POWDER, LYOPHILIZED, FOR SOLUTION INTRAMUSCULAR at 08:07

## 2018-07-23 RX ADMIN — FENTANYL CITRATE 25 MCG: 50 INJECTION INTRAMUSCULAR; INTRAVENOUS at 04:07

## 2018-07-23 RX ADMIN — PHENYLEPHRINE HYDROCHLORIDE 100 MCG: 10 INJECTION INTRAVENOUS at 09:07

## 2018-07-23 RX ADMIN — LIDOCAINE HYDROCHLORIDE 75 MG: 20 INJECTION, SOLUTION INTRAVENOUS at 07:07

## 2018-07-23 RX ADMIN — PHENYLEPHRINE HYDROCHLORIDE 200 MCG: 10 INJECTION INTRAVENOUS at 09:07

## 2018-07-23 RX ADMIN — ROCURONIUM BROMIDE 40 MG: 10 INJECTION, SOLUTION INTRAVENOUS at 07:07

## 2018-07-23 RX ADMIN — PREGABALIN 100 MG: 50 CAPSULE ORAL at 09:07

## 2018-07-23 RX ADMIN — NEOSTIGMINE METHYLSULFATE 5 MG: 1 INJECTION INTRAVENOUS at 01:07

## 2018-07-23 RX ADMIN — EPHEDRINE SULFATE 10 MG: 50 INJECTION, SOLUTION INTRAMUSCULAR; INTRAVENOUS; SUBCUTANEOUS at 08:07

## 2018-07-23 RX ADMIN — EPHEDRINE SULFATE 10 MG: 50 INJECTION, SOLUTION INTRAMUSCULAR; INTRAVENOUS; SUBCUTANEOUS at 09:07

## 2018-07-23 RX ADMIN — FENTANYL CITRATE 50 MCG: 50 INJECTION INTRAMUSCULAR; INTRAVENOUS at 07:07

## 2018-07-23 RX ADMIN — PROMETHAZINE HYDROCHLORIDE 6.25 MG: 25 INJECTION INTRAMUSCULAR; INTRAVENOUS at 03:07

## 2018-07-23 RX ADMIN — CEFAZOLIN 2 G: 330 INJECTION, POWDER, FOR SOLUTION INTRAMUSCULAR; INTRAVENOUS at 08:07

## 2018-07-23 RX ADMIN — FENTANYL CITRATE 50 MCG: 50 INJECTION, SOLUTION INTRAMUSCULAR; INTRAVENOUS at 01:07

## 2018-07-23 RX ADMIN — CEFAZOLIN 2 G: 330 INJECTION, POWDER, FOR SOLUTION INTRAMUSCULAR; INTRAVENOUS at 12:07

## 2018-07-23 RX ADMIN — SODIUM CHLORIDE: 0.9 INJECTION, SOLUTION INTRAVENOUS at 06:07

## 2018-07-23 RX ADMIN — CLINDAMYCIN PHOSPHATE 900 MG: 150 INJECTION, SOLUTION, CONCENTRATE INTRAVENOUS at 08:07

## 2018-07-23 RX ADMIN — FENTANYL CITRATE 25 MCG: 50 INJECTION INTRAMUSCULAR; INTRAVENOUS at 03:07

## 2018-07-23 RX ADMIN — EPHEDRINE SULFATE 10 MG: 50 INJECTION, SOLUTION INTRAMUSCULAR; INTRAVENOUS; SUBCUTANEOUS at 10:07

## 2018-07-23 RX ADMIN — ROCURONIUM BROMIDE 10 MG: 10 INJECTION, SOLUTION INTRAVENOUS at 08:07

## 2018-07-23 RX ADMIN — SODIUM CHLORIDE 0.25 MCG/KG/MIN: 9 INJECTION, SOLUTION INTRAVENOUS at 10:07

## 2018-07-23 RX ADMIN — HYDROMORPHONE HYDROCHLORIDE 0.2 MG: 1 INJECTION, SOLUTION INTRAMUSCULAR; INTRAVENOUS; SUBCUTANEOUS at 05:07

## 2018-07-23 RX ADMIN — MIDAZOLAM HYDROCHLORIDE 1 MG: 1 INJECTION, SOLUTION INTRAMUSCULAR; INTRAVENOUS at 07:07

## 2018-07-23 RX ADMIN — FENTANYL CITRATE 50 MCG: 50 INJECTION, SOLUTION INTRAMUSCULAR; INTRAVENOUS at 11:07

## 2018-07-23 RX ADMIN — ACETAMINOPHEN 1000 MG: 10 INJECTION, SOLUTION INTRAVENOUS at 09:07

## 2018-07-23 RX ADMIN — SODIUM CHLORIDE, SODIUM GLUCONATE, SODIUM ACETATE, POTASSIUM CHLORIDE, MAGNESIUM CHLORIDE, SODIUM PHOSPHATE, DIBASIC, AND POTASSIUM PHOSPHATE: .53; .5; .37; .037; .03; .012; .00082 INJECTION, SOLUTION INTRAVENOUS at 08:07

## 2018-07-23 RX ADMIN — KETAMINE HYDROCHLORIDE 25 MG: 10 INJECTION, SOLUTION INTRAMUSCULAR; INTRAVENOUS at 08:07

## 2018-07-23 RX ADMIN — ONDANSETRON 4 MG: 2 INJECTION INTRAMUSCULAR; INTRAVENOUS at 08:07

## 2018-07-23 RX ADMIN — PROPOFOL 200 MG: 10 INJECTION, EMULSION INTRAVENOUS at 07:07

## 2018-07-23 RX ADMIN — FAMOTIDINE 20 MG: 10 INJECTION, SOLUTION INTRAVENOUS at 08:07

## 2018-07-23 RX ADMIN — ROCURONIUM BROMIDE 20 MG: 10 INJECTION, SOLUTION INTRAVENOUS at 11:07

## 2018-07-23 RX ADMIN — PHENYLEPHRINE HYDROCHLORIDE 200 MCG: 10 INJECTION INTRAVENOUS at 10:07

## 2018-07-23 RX ADMIN — ONDANSETRON 4 MG: 2 INJECTION INTRAMUSCULAR; INTRAVENOUS at 01:07

## 2018-07-23 RX ADMIN — FENTANYL CITRATE 50 MCG: 50 INJECTION, SOLUTION INTRAMUSCULAR; INTRAVENOUS at 02:07

## 2018-07-23 RX ADMIN — OLANZAPINE 20 MG: 10 TABLET, FILM COATED ORAL at 11:07

## 2018-07-23 RX ADMIN — CIPROFLOXACIN 400 MG: 2 INJECTION, SOLUTION INTRAVENOUS at 08:07

## 2018-07-23 RX ADMIN — HEPARIN SODIUM 5000 UNITS: 5000 INJECTION, SOLUTION INTRAVENOUS; SUBCUTANEOUS at 07:07

## 2018-07-23 RX ADMIN — SODIUM CHLORIDE, SODIUM GLUCONATE, SODIUM ACETATE, POTASSIUM CHLORIDE, MAGNESIUM CHLORIDE, SODIUM PHOSPHATE, DIBASIC, AND POTASSIUM PHOSPHATE: .53; .5; .37; .037; .03; .012; .00082 INJECTION, SOLUTION INTRAVENOUS at 10:07

## 2018-07-23 RX ADMIN — FENTANYL CITRATE 50 MCG: 50 INJECTION, SOLUTION INTRAMUSCULAR; INTRAVENOUS at 12:07

## 2018-07-23 RX ADMIN — DEXAMETHASONE SODIUM PHOSPHATE 8 MG: 4 INJECTION, SOLUTION INTRAMUSCULAR; INTRAVENOUS at 08:07

## 2018-07-23 NOTE — NURSING TRANSFER
Nursing Transfer Note      7/23/2018     Transfer To: 523a from pacu 19    Transfer via stretcher    Transfer with none    Transported by pacu pct    Medicines sent: need prozac po. Pharmacy did not send to pacu. 5th floor rn aware    Chart send with patient: Yes    Notified: father and boyfriend    Patient reassessed at: 7/23/18 1600    Upon arrival to floor: patient oriented to room, call bell in reach and bed in lowest position. Pt sent up with home wound vac boxes x2.

## 2018-07-23 NOTE — OP NOTE
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 Bello)    SURGEON: Surgeon(s) and Role:  Panel 1:         * Sofia Kendrick MD - Primary     * Antonino Jon MD - Resident - Assisting    Panel 2:     * Duane Bello MD - Primary    ANESTHESIA: General     OPERATIVE FINDINGS: Thin mastectomy flaps although no indication of ischemia at completion of mastectomy.  3 sentinel nodes sent for frozen with negative findings on pathology    INDICATION FOR PROCEDURE: This patient presents with a history of DCIS of the left breast    PROCEDURE IN DETAIL:  Staci Hodge is a 37 y.o. female brought to the operating room for definitive surgery of DCIS of the left breast.  The patient has elected to undergo bilateral simple mastectomy with sentinel lymph node biopsy for osiel assessment. The patient was informed of the possible risks and complications of the procedure, including but not limited to anesthetic risks, bleeding, infection, and need for additional surgery.  The patient concurred with the proposed plan, and has given informed consent.  The site of surgery was properly noted/marked in the preoperative holding area.     The patient was then brought to the operating room and placed in the supine position with both upper extremities extended.  regional and general anesthesia was administered. Perioperative antibiotics were administered consisting of Ancef and a time out was performed confirming the patient, site, and procedure.  The patient's left breast was injected with technetium to facilitate sentinel lymph node identification.  The bilateral chest and axilla was then prepped and draped in the usual sterile fashion.    We first turned our attention to the right prophylactic breast where a lateral radial snowcone incision was extended laterally to incorporate the nipple areolar complex.  The incision was made with a 10-blade and deepened through the subcutaneous tissues with Bovie electrocautery.  Skin flaps were raised to the clavicle superiorly, to the lateral border of the sternum medially, to the inframammary fold inferiorly, and to the anterior border of the latissimus dorsi muscle laterally. The breast tissue was sharply excised off the chest wall taking care to incorporate the pectoralis fascia while leaving the serratus fascia behind.  The resulting mastectomy specimen was marked using a short stitch superiorly and a long stitch laterally.  The breast was sent to pathology for permanent evaluation.   The operative field was irrigated with normal saline and all bleeding points were secured with Bovie electrocautery.    Lap was placed in the cavity to be addressed by the plastic surgery service.      We then turned our attention to the left breast where a lateral snowcone incision was fashioned to incorporate the nipple areolar complex.  The incision was made with a 10-blade and extended through the subcutaneous tissues with Bovie electrocautery.  Skin flaps were raised to the clavicle superiorly.  We then  turned our attention to the left axilla.   The gamma probe was used to identify an area of increased radioactivity within the lower axilla. The clavipectoral sheath was sharply incised to reveal the level I axillary lymph nodes. The probe was used to identify a single node with increased radioactivity.  This node was brought into the operative field and carefully dissected free of the surrounding lymphovascular structures.  The highest ex vivo count of the node was 3400.  The node was then sent to pathology for frozen section  evaluation, labeled as sentinel node #1.  A total of 3 axillary sentinel nodes and 0 axillary non-sentinel nodes were identified, excised and submitted to pathology.  Bed counts were obtained to confirm that the 10% rule had not been violated.   The wound was irrigated with normal saline, and all bleeding points were secured with Bovie electrocautery.     We then proceeded to raise the remainder of the flaps to the lateral border of the sternum medially, to the inframammary fold inferiorly, and to the anterior border of the latissimus dorsi muscle laterally. The breast tissue was sharply excised off the chest wall taking care to incorporate the pectoralis fascia while leaving the serratus fascia behind.  The resulting mastectomy specimen was marked using a short stitch superiorly and long stitch laterally.  The breast was sent to pathology for permanent evaluation.      Frozen section osiel evaluation revealed no evidence of metastatic disease.  Therefore, the operative field was irrigated with normal saline and all bleeding points were secured with Bovie electrocautery.  The patient was then turned over to be reconstructed and closed by the plastic surgery service.      Dermabond was applied. A post surgical bra was placed on the patient. At the end of the operation, all sponge, instrument, and needle counts x 2 were correct.    ESTIMATED BLOOD LOSS: less than 50 mL    COMPLICATIONS: none    DISPOSITION: PACU - hemodynamically stable.    ATTESTATION:   I was present and scrubbed for the entire procedure.

## 2018-07-23 NOTE — ANESTHESIA PREPROCEDURE EVALUATION
07/23/2018  Staci Hodge is a 37 y.o., female.    Anesthesia Evaluation    I have reviewed the Patient Summary Reports.     I have reviewed the Medications.     Review of Systems  Anesthesia Hx:  History of prior surgery of interest to airway management or planning:  Denies Personal Hx of Anesthesia complications.   Social:  Smoker    Cardiovascular:   Hypertension    Hepatic/GI:   Peutz Jeghers   Musculoskeletal:   Arthritis   Spine Disorders: cervical    Neurological:   Headaches    Psych:   Psychiatric History depression          Physical Exam  General:  Well nourished    Airway/Jaw/Neck:  Airway Findings: Mouth Opening: Normal Tongue: Normal  General Airway Assessment: Adult  Mallampati: III  Improves to II with phonation.  TM Distance: Normal, at least 6 cm  Jaw/Neck Findings:  Neck ROM: Normal ROM       Chest/Lungs:  Chest/Lungs Findings: Normal Respiratory Rate     Heart/Vascular:  Heart Findings: Rate: Normal        Mental Status:  Mental Status Findings:  Alert and Oriented         Anesthesia Plan  Type of Anesthesia, risks & benefits discussed:  Anesthesia Type:  general  Patient's Preference: General   Intra-op Monitoring Plan: standard ASA monitors  Intra-op Monitoring Plan Comments:   Post Op Pain Control Plan: IV/PO Opioids PRN, multimodal analgesia and peripheral nerve block  Post Op Pain Control Plan Comments:   Induction:    Beta Blocker:  Patient is not currently on a Beta-Blocker (No further documentation required).       Informed Consent: Patient understands risks and agrees with Anesthesia plan.  Questions answered. Anesthesia consent signed with patient.  ASA Score: 3     Day of Surgery Review of History & Physical:    H&P update referred to the surgeon.     Anesthesia Plan Notes: NPO confirmed.   History of high anesthetic requirements per patient.           Ready For Surgery From  Anesthesia Perspective.

## 2018-07-23 NOTE — ANESTHESIA POSTPROCEDURE EVALUATION
"Anesthesia Post Evaluation    Patient: Staci Hodge    Procedure(s) Performed: Procedure(s) (LRB):  MASTECTOMY 23 hr stay (Bilateral)  INJECTION, FOR SENTINEL NODE IDENTIFICATION (Left)  BIOPSY, LYMPH NODE, SENTINEL (Left)  INSERTION, TISSUE EXPANDER, BREAST (Bilateral)    Final Anesthesia Type: general  Patient location during evaluation: PACU  Patient participation: Yes- Able to Participate  Level of consciousness: awake and alert  Post-procedure vital signs: reviewed and stable  Pain management: adequate  Airway patency: patent  PONV status at discharge: nausea (controlled)  Anesthetic complications: no      Cardiovascular status: blood pressure returned to baseline  Respiratory status: unassisted  Hydration status: euvolemic  Follow-up not needed.        Visit Vitals  /78 (BP Location: Right arm, Patient Position: Lying)   Pulse 98   Temp 37.5 °C (99.5 °F) (Temporal)   Resp 19   Ht 5' 2" (1.575 m)   Wt 44 kg (97 lb)   LMP 07/16/2018   SpO2 98%   Breastfeeding? No   BMI 17.74 kg/m²       Pain/Bran Score: Pain Assessment Performed: Yes (7/23/2018  3:10 PM)  Presence of Pain: complains of pain/discomfort (7/23/2018  3:53 PM)  Pain Rating Prior to Med Admin: 7 (7/23/2018  3:53 PM)  Bran Score: 9 (7/23/2018  3:53 PM)      "

## 2018-07-23 NOTE — PLAN OF CARE
"Vss. sats 97% on room air.  Pt states "pain is tolerable."  Pt responds to verbal stimuli, answers questions appropriately.  Father and boyfriend updated by pacu rn and given new room number 523a.  samia breast with black foam wound vac drsg with trans.film intact and both y'd to one cannister at 125mmhg sxn wound vac.  Right breast with jpx2, to bulb sxn, sang drainage noted.  Left breast with jpx2 to bulb sxn, sang drainage noted. Bulb charged to aparna x4. ble scds on/teds.  Pt due to void, fernandez discontinued by OR rn prior to arrival in pacu.  [facility] admit 5th floor rn aware pt due to void.  1/2 ns with 20meqkcl infusing at 75ml/hr iv per md order. pacu rn clarified with resident about ivf.  See flowsheet for full assessment. Pt transferred with home wound vac boxes to 5th floor from pacu.   "

## 2018-07-23 NOTE — H&P (VIEW-ONLY)
New Breast Cancer  History and Physical  Department of Surgery - [facility]    REFERRING PROVIDER: [facility]    CHIEF COMPLAINT: left breast cancer    Subjective:     Interval History:  Ms. Hodge returns to discuss treatment options. There has been no changes since her last visit.     HPI:   Staci Hodge is a 37 y.o. premenopausal female with multiple co-morbidities including Peutz-Jeghers syndrome, multiple bowel resections for obstructions, cervical spondylosis, OCD/PTSD/anxiety, migraines referred for evaluation of recently diagnosed carcinoma of the left breast. The patient was initially referred for surgical evaluation of a breast lump on self exam. Follow-up dMMG on 5/24/18 showed calcifications in the upper outer quadrant of the left breast (BIRADS-4) along with a 7mm cyst (BIRADS-3). Ultrasound done on 5/24/18 showed 11 mm x 4 mm x 14 mm heterogeneous calcifications in a mass seen in the upper outer quadrant of the left breast.   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     Patient does routinely do self breast exams.  Patient has noted a change on breast exam.  Patient denies nipple discharge. Patient denies to previous breast biopsy. Patient denies a personal history of breast cancer.    Findings at that time were the following:   Tumor size:  1.4cm   Tumor grade: II   Estrogen Receptor: N/A  Progesterone Receptor: N/A  Her-2 zuleika: N/A  Lymph node status: Clinically negative    Interval history:  Patient presents back to clinic today to follow up biopsy results from most recent right breast biopsy which was benign and to go over further surgical planning. She also had ultrasound of inguinal lymphadenopathy which showed multiple enlarged lymph nodes without abnormality. She states she is feeling well, had some questions regarding recovery with surgery. Has seen Dr. Bello and is planning on bilateral mastectomy with implant  reconstruction. She has genetic counseling set up for 18.    GYN History:  Age of menarche was 12.  Patient denies hormonal therapy. Patient is .     FAMILY History:  Paternal Aunt - breast in 40s  Mother - esophageal ca    Past Medical History:   Diagnosis Date    Abnormal Pap smear of cervix age 25    no treatement    Anxiety     Cancer 2018    breast cancer    Cervical spondylosis 2018    Colon polyp     Depression     Headache     History of psychiatric hospitalization     age 20 for SI    Hx of psychiatric care     Hypertension     Peutz-Jeghers syndrome     Psychiatric problem     Right carpal tunnel syndrome 3/9/2017    Self-harming behavior     Suicide attempt     Therapy      Past Surgical History:   Procedure Laterality Date    ADENOIDECTOMY      APPENDECTOMY      BOWEL RESECTION  10/17/14    BREAST BIOPSY Left 2018    CARPAL TUNNEL RELEASE Bilateral     CARPAL TUNNEL RELEASE      COLONOSCOPY      DILATION AND CURETTAGE OF UTERUS      intestinal polpy      SMALL INTESTINE SURGERY      3 surgeries     TONSILLECTOMY      UPPER GASTROINTESTINAL ENDOSCOPY       Current Outpatient Prescriptions on File Prior to Visit   Medication Sig Dispense Refill    butalbital-acetaminophen-caffeine -40 mg (FIORICET, ESGIC) -40 mg per tablet Take 1 tablet by mouth every 6 (six) hours as needed for Headaches. Limit use to 2 days per week. 10 tablet 4    clonazePAM (KLONOPIN) 1 MG tablet Take 1 tablet (1 mg total) by mouth 3 (three) times daily. 90 tablet 2    FLUoxetine (PROZAC) 40 MG capsule Take 2 capsules (80 mg total) by mouth once daily. 180 capsule 0    norgestrel-ethinyl estradiol (OGESTROL) 0.5-50 mg-mcg per tablet 3 day for 3 days and then 2 a day for 3 days and then 1 a day until the pack is gone (Patient taking differently: Take 1 tablet by mouth once daily. ) 28 tablet 0    OLANZapine (ZYPREXA) 20 MG tablet Take 1 tablet (20 mg total) by mouth  every evening. 90 tablet 0    pregabalin (LYRICA) 100 MG capsule Take 1 capsule (100 mg total) by mouth 2 (two) times daily. 60 capsule 6    propranolol (INDERAL) 10 MG tablet Take 1 tablet (10 mg total) by mouth 2 (two) times daily. 60 tablet 5    tiZANidine (ZANAFLEX) 2 MG tablet TAKE 2 TABLETS (4 MG TOTAL) BY MOUTH EVERY 6 (SIX) HOURS AS NEEDED (NECK TENSION).  3    topiramate (TOPAMAX) 25 MG tablet Take 2 tablets (50 mg total) by mouth 2 (two) times daily. 120 tablet 11    traZODone (DESYREL) 100 MG tablet Take 3 tablets (300 mg total) by mouth every evening. 270 tablet 0     Current Facility-Administered Medications on File Prior to Visit   Medication Dose Route Frequency Provider Last Rate Last Dose    betamethasone acetate-betamethasone sodium phosphate injection 1.5 mg  1.5 mg Intra-articular 1 time in Clinic/SOLA Emerson NP        betamethasone acetate-betamethasone sodium phosphate injection 1.5 mg  1.5 mg Intra Carpal Tunnel 1 time in Clinic/[facility] Emerson NP         Social History     Social History    Marital status: Single     Spouse name: N/A    Number of children: 0    Years of education: N/A     Occupational History    Not on file.     Social History Main Topics    Smoking status: Current Every Day Smoker     Packs/day: 0.50     Years: 17.00     Types: Cigarettes     Start date: 4/17/2000    Smokeless tobacco: Never Used      Comment: told about smoking cessation clinic and give brochure    Alcohol use No    Drug use: No    Sexual activity: Not Currently     Partners: Male     Birth control/ protection: OCP      Comment: single     Other Topics Concern    Patient Feels They Ought To Cut Down On Drinking/Drug Use No    Patient Annoyed By Others Criticizing Their Drinking/Drug Use No    Patient Has Felt Bad Or Guilty About Drinking/Drug Use No    Patient Has Had A Drink/Used Drugs As An Eye Opener In [place] No     Social History Narrative    Had one previous  "miscarriage in 2005    Never     currently in a relationship     Family History   Problem Relation Age of Onset    Colon cancer Mother 33        peutz-jeghers syndrome    Hypertension Father     Colon polyps Sister         peutz-jeghers syndrome    Breast cancer Paternal Aunt     Breast cancer Paternal Grandmother     Ovarian cancer Neg Hx         Review of Systems  Review of Systems   Constitutional: Negative.    HENT: Negative.    Respiratory: Negative.    Cardiovascular: Negative.    Gastrointestinal: Negative.    Musculoskeletal: Negative.    Skin: Negative.    Neurological: Negative.    Psychiatric/Behavioral: Negative.         Objective:   PHYSICAL EXAM:  /71 (BP Location: Right arm, Patient Position: Sitting, BP Method: Small (Automatic))   Pulse 94   Temp 97.5 °F (36.4 °C) (Oral)   Ht 5' 2" (1.575 m)   Wt 44 kg (97 lb)   LMP 06/18/2018 (Approximate)   BMI 17.74 kg/m²     Physical Exam   Pulmonary/Chest: Right breast exhibits no inverted nipple, no mass, no nipple discharge, no skin change and no tenderness. Left breast exhibits mass. Left breast exhibits no inverted nipple, no nipple discharge, no skin change and no tenderness.         Radiology review: Images personally reviewed by me in the clinic.     dMMG & U/S 5/24/18:    Left  Mammo Digital Diagnostic Bilat with CAD  There are multiple similar dystrophic, coarse heterogeneous and fine pleomorphic calcifications in a regional distribution seen in the upper outer quadrant of the left breast.      US Breast Bilateral Limited  There are 11 mm x 4 mm x 14 mm heterogeneous calcifications in a mass seen in the upper outer quadrant of the left breast. The calcifications correlate with a palpable mass reported by the patient.      There is a 6 mm x 3 mm x 7 mm oval complicated cyst with circumscribed margins seen in the upper outer quadrant of the left breast.      Right  Mammo Digital Diagnostic Bilat with CAD  There are round and " coarse heterogeneous calcifications seen in the lower central region of the right breast in the middle depth.      There is an intramammary lymph node seen in the outer central region of the right breast in the middle depth.      US Breast Bilateral Limited  There is a 5 mm x 3 mm x 6 mm intramammary lymph node with circumscribed margins seen in the right breast at 9 o'clock. The lymph node correlates with a palpable mass reported by the patient.       Finding not seen on this modality: calcifications.      Impression:  Left  Calcifications: Left breast calcifications at the upper outer position (multiple findings). Assessment: 4 - Suspicious finding. Biopsy is recommended.   Cyst: Left breast 6 mm x 3 mm x 7 mm cyst at the upper outer position. Assessment: 3 - Probably benign.      Right  There is no mammographic or sonographic evidence of malignancy.     BI-RADS Category:   Overall: 4 - Suspicious    MRI:  Right  There is a 3 mm focus seen in the right breast at 7 o'clock, 6 cm from the nipple. This may correlate with small group of mammographic calcifications.      Left  There is a 161 mm x 58 mm x 48 mm clumped, non-mass enhancement in a segmental distribution seen in the upper outer quadrant of the left breast, 1.5 cm from the nipple and 0.5 cm from the chest wall. The non-mass enhancement correlates with a prior mammogram finding.       No suspicious axillary or internal mammary lymph nodes are visualized.      Impression:  1. Extensive area of clumped non mass enhancement in a segmental distribution involving the entire upper outer quadrant of the left breast and extending to approximately the 5 o'clock axis. This measures approximately 16 cm in greatest AP extent and corresponds to large area of abnormal calcifications seen on mammogram. It is consistent with known biopsy proven malignancy. If additional tissue sampling is clinically indicated to confirm extent of disease, additional biopsy could be  performed using stereotactic guidance in the anterior breast.      2. Focus of enhancement in the right breast at 7 o'clock may correspond to additional group of calcifications is seen in the right breast on recent diagnostic mammogram.  Although the MRI finding is not suspicious in isolation, due to the mammographic appearance of the calcifications, stereotactic biopsy is recommended.            BI-RADS Category:   Overall: 4 - Suspicious     Recommendation:  Biopsy is recommended.       PATH:    ER: Positive (strong, 90% of cells)  ND: Positive (strong, 80% of cells  Immunostains performed with appropriate positive and negative controls.    -Intermediate grade ductal carcinoma in-situ (Grade II/III), cribriform, apocrine, papillary  -Diffuse microcalcifications present  -Indefinite for invasion  -Breast biomarkers are pending.    Assessment:      Staci Hodge is a 37 y.o. premenopausal female with DCIS of the left breast.      Plan:   MRI showed expected extensive area of calcifications on the left and suspicious appearing area on the right, this right was biopsied and found to be benign. Ultrasound of inguinal lymphadenopathy was negative, no further intervention necessary. Plan will be for bilateral mastectomy with left sentinel lymph node biopsy and implant reconstruction with Dr. Bello.     The patient, in consultation with her family, has elected to proceed with bilateral total mastectomy and left sentinel lymph node biopsy. The operative risks of bleeding, infection, recurrence, scarring, and anesthetic complications and the possibility of requiring further surgery were all noted and informed consent obtained.    We went over all of the details of the surgery at length.  We had a very long discussion about her higher than average risk for complications due to recent smoking history and still sometimes smoking 1 cigarette per day and her thin body habitus worrying me for nutritional sustainability.  I  adamantly encouraged her to eat healthy and refrain completely from smoking from now until surgery and certainly in the immediate post-operative period.  I emphasized this importance to her family as well who agreed to be supportive in this.    Surgery scheduled for 7/23/18. Follow-up in clinic roughly 14 days after surgery.    60 minutes were spent on this encounter, 45 minutes of face to face counseling and 15 on chart review and coordination of care with Dr. Bello.

## 2018-07-23 NOTE — ANESTHESIA PROCEDURE NOTES
Paravertebral Single Injection Block(s)    Patient location during procedure: pre-op   Block not for primary anesthetic.  Reason for block: at surgeon's request and post-op pain management   Post-op Pain Location: bilateral chest pain  Start time: 7/23/2018 7:25 AM  Timeout: 7/23/2018 7:25 AM   End time: 7/23/2018 7:35 AM  Staffing  Anesthesiologist: GHAZALA HANNA  Resident/CRNA: JESSICA ESPINOSA  Performed: resident/CRNA   Preanesthetic Checklist  Completed: patient identified, site marked, surgical consent, pre-op evaluation, timeout performed, IV checked, risks and benefits discussed and monitors and equipment checked  Peripheral Block  Patient position: sitting  Prep: ChloraPrep  Patient monitoring: heart rate, cardiac monitor, continuous pulse ox, continuous capnometry and frequent blood pressure checks  Block type: paravertebral - thoracic  Laterality: bilateral  Injection technique: single shot  Needle  Needle type: Tuohy   Needle gauge: 17 G  Needle length: 3.5 in  Needle localization: anatomical landmarks     Assessment  Injection assessment: negative aspiration and negative parasthesia  Paresthesia pain: none  Heart rate change: no  Slow fractionated injection: yes  Medications:  Bolus administered: 30 mL of 0.5 bupivacaine  Epinephrine added: 3.75 mcg/mL (1/300,000)  Additional Notes  T3R os at 2.5 cm  T3L os at 2 cm  VSS.  [facility] RN monitoring vitals throughout procedure.  Patient tolerated procedure well.

## 2018-07-23 NOTE — BRIEF OP NOTE
Certification of Assistant at Surgery       Surgery Date: 7/23/2018     Participating Surgeons:  Surgeon(s) and Role:  Panel 1:     * Sofia Kendrick MD - Primary     * Sofia Kendrick MD - Primary     * Antonino Jon MD - Resident - Assisting    Panel 2:     * Duane Bello MD - Primary    Procedures:  Procedure(s) (LRB):  MASTECTOMY 23 hr stay (Bilateral)  INJECTION, FOR SENTINEL NODE IDENTIFICATION (Left)  BIOPSY, LYMPH NODE, SENTINEL (Left)  INSERTION, TISSUE EXPANDER, BREAST (Bilateral)    Assistant Surgeon's Certification of Necessity:  I understand that section 1842 (b) (6) (d) of the Social Security Act generally prohibits Medicare Part B reasonable charge payment for the services of assistants at surgery in teaching hospitals when qualified residents are available to furnish such services. I certify that the services for which payment is claimed were medically necessary, and that no qualified resident was available to perform the services. I further understand that these services are subject to post-payment review by the Medicare carrier.      LINDSAY Peralta    07/23/2018  1:58 PM

## 2018-07-23 NOTE — INTERVAL H&P NOTE
The patient has been examined and the H&P has been reviewed:    I concur with the findings and no changes have occurred since H&P was written.    Anesthesia/Surgery risks, benefits and alternative options discussed and understood by patient/family.          Active Hospital Problems    Diagnosis  POA    DCIS (ductal carcinoma in situ) [D05.10]  Yes      Resolved Hospital Problems    Diagnosis Date Resolved POA   No resolved problems to display.

## 2018-07-23 NOTE — BRIEF OP NOTE
[facility]  Brief Operative Note    SUMMARY     Surgery Date: 7/23/2018     Surgeon(s) and Role:  Panel 1:        * Sofia Kendrick MD - Primary     * Antonino Jon MD - Resident - Assisting    Panel 2:     * Duane Bello MD - Primary        Pre-op Diagnosis:  Ductal carcinoma in situ of left breast [D05.12]    Post-op Diagnosis:  Post-Op Diagnosis Codes:     * Ductal carcinoma in situ of left breast [D05.12]    Procedure(s) (LRB):  MASTECTOMY 23 hr stay (Bilateral)  INJECTION, FOR SENTINEL NODE IDENTIFICATION (Left)  BIOPSY, LYMPH NODE, SENTINEL (Left)  INSERTION, TISSUE EXPANDER, BREAST (Bilateral)    Anesthesia: General    Description of Procedure: Bilateral mastectomy with Left sentinel lymph node biopsy.  Reconstruction per Plastic Surgery Service    Description of the findings of the procedure: Bilateral mastectomy with three hot nodes sent for frozen sections and were benign.    Estimated Blood Loss: 15 ml         Specimens: Left breast.  Right breast.  3 Arnold lymph nodes.  One anterior Margin from Left Superior flap.  Specimen (12h ago through future)    None

## 2018-07-23 NOTE — TRANSFER OF CARE
"Anesthesia Transfer of Care Note    Patient: Staci Hodge    Procedure(s) Performed: Procedure(s) (LRB):  MASTECTOMY 23 hr stay (Bilateral)  INJECTION, FOR SENTINEL NODE IDENTIFICATION (Left)  BIOPSY, LYMPH NODE, SENTINEL (Left)  INSERTION, TISSUE EXPANDER, BREAST (Bilateral)    Patient location: PACU    Anesthesia Type: general    Transport from OR: Transported from OR on 6-10 L/min O2 by face mask with adequate spontaneous ventilation    Post assessment: no apparent anesthetic complications    Post vital signs: stable    Level of consciousness: sedated    Nausea/Vomiting: no nausea/vomiting    Complications: none    Transfer of care protocol was followed      Last vitals:   Visit Vitals  /71 (BP Location: Right arm, Patient Position: Lying)   Pulse 90   Temp 37.6 °C (99.7 °F) (Temporal)   Resp 14   Ht 5' 2" (1.575 m)   Wt 44 kg (97 lb)   LMP 07/16/2018   SpO2 100%   Breastfeeding? No   BMI 17.74 kg/m²     "

## 2018-07-24 PROCEDURE — 63600175 PHARM REV CODE 636 W HCPCS: Performed by: STUDENT IN AN ORGANIZED HEALTH CARE EDUCATION/TRAINING PROGRAM

## 2018-07-24 PROCEDURE — 25000003 PHARM REV CODE 250: Performed by: STUDENT IN AN ORGANIZED HEALTH CARE EDUCATION/TRAINING PROGRAM

## 2018-07-24 PROCEDURE — 25000003 PHARM REV CODE 250: Performed by: SURGERY

## 2018-07-24 RX ORDER — TIZANIDINE 4 MG/1
4 TABLET ORAL EVERY 6 HOURS PRN
Status: DISCONTINUED | OUTPATIENT
Start: 2018-07-24 | End: 2018-07-25 | Stop reason: HOSPADM

## 2018-07-24 RX ORDER — OXYCODONE HYDROCHLORIDE 5 MG/1
5 TABLET ORAL EVERY 4 HOURS PRN
Status: DISCONTINUED | OUTPATIENT
Start: 2018-07-24 | End: 2018-07-25

## 2018-07-24 RX ORDER — OXYCODONE HYDROCHLORIDE 5 MG/1
10 TABLET ORAL EVERY 4 HOURS PRN
Status: DISCONTINUED | OUTPATIENT
Start: 2018-07-24 | End: 2018-07-24

## 2018-07-24 RX ORDER — CEPHALEXIN 500 MG/1
500 CAPSULE ORAL EVERY 6 HOURS
Qty: 40 CAPSULE | Refills: 0 | Status: SHIPPED | OUTPATIENT
Start: 2018-07-24 | End: 2018-07-25

## 2018-07-24 RX ORDER — CLONAZEPAM 1 MG/1
1 TABLET ORAL 3 TIMES DAILY PRN
Status: DISCONTINUED | OUTPATIENT
Start: 2018-07-24 | End: 2018-07-25

## 2018-07-24 RX ORDER — OXYCODONE HYDROCHLORIDE 5 MG/1
10 TABLET ORAL EVERY 4 HOURS PRN
Status: DISCONTINUED | OUTPATIENT
Start: 2018-07-24 | End: 2018-07-25

## 2018-07-24 RX ORDER — AMOXICILLIN 250 MG
2 CAPSULE ORAL DAILY
COMMUNITY
Start: 2018-07-24 | End: 2018-08-28

## 2018-07-24 RX ORDER — CLONAZEPAM 1 MG/1
1 TABLET ORAL 3 TIMES DAILY
Status: DISCONTINUED | OUTPATIENT
Start: 2018-07-24 | End: 2018-07-24

## 2018-07-24 RX ORDER — OXYCODONE HYDROCHLORIDE 5 MG/1
15 TABLET ORAL EVERY 4 HOURS PRN
Status: DISCONTINUED | OUTPATIENT
Start: 2018-07-24 | End: 2018-07-24

## 2018-07-24 RX ORDER — CEPHALEXIN 500 MG/1
500 CAPSULE ORAL EVERY 6 HOURS
Status: DISCONTINUED | OUTPATIENT
Start: 2018-07-24 | End: 2018-07-25 | Stop reason: HOSPADM

## 2018-07-24 RX ORDER — OXYCODONE HYDROCHLORIDE 15 MG/1
15 TABLET ORAL EVERY 6 HOURS PRN
Qty: 40 TABLET | Refills: 0 | Status: SHIPPED | OUTPATIENT
Start: 2018-07-24 | End: 2018-07-25 | Stop reason: HOSPADM

## 2018-07-24 RX ADMIN — OXYCODONE HYDROCHLORIDE 10 MG: 5 TABLET ORAL at 10:07

## 2018-07-24 RX ADMIN — OXYCODONE HYDROCHLORIDE 15 MG: 5 TABLET ORAL at 12:07

## 2018-07-24 RX ADMIN — ACETAMINOPHEN 1000 MG: 10 INJECTION, SOLUTION INTRAVENOUS at 06:07

## 2018-07-24 RX ADMIN — POTASSIUM CHLORIDE AND SODIUM CHLORIDE: 450; 150 INJECTION, SOLUTION INTRAVENOUS at 08:07

## 2018-07-24 RX ADMIN — OLANZAPINE 20 MG: 10 TABLET, FILM COATED ORAL at 10:07

## 2018-07-24 RX ADMIN — FLUOXETINE 80 MG: 20 CAPSULE ORAL at 08:07

## 2018-07-24 RX ADMIN — TOPIRAMATE 50 MG: 25 TABLET, FILM COATED ORAL at 08:07

## 2018-07-24 RX ADMIN — OXYCODONE HYDROCHLORIDE 10 MG: 5 TABLET ORAL at 04:07

## 2018-07-24 RX ADMIN — OXYCODONE HYDROCHLORIDE 5 MG: 5 TABLET ORAL at 05:07

## 2018-07-24 RX ADMIN — OXYCODONE HYDROCHLORIDE 15 MG: 5 TABLET ORAL at 08:07

## 2018-07-24 RX ADMIN — TOPIRAMATE 50 MG: 25 TABLET, FILM COATED ORAL at 10:07

## 2018-07-24 RX ADMIN — CEPHALEXIN 500 MG: 500 CAPSULE ORAL at 05:07

## 2018-07-24 RX ADMIN — PREGABALIN 100 MG: 50 CAPSULE ORAL at 08:07

## 2018-07-24 RX ADMIN — PREGABALIN 100 MG: 50 CAPSULE ORAL at 10:07

## 2018-07-24 RX ADMIN — CEPHALEXIN 500 MG: 500 CAPSULE ORAL at 12:07

## 2018-07-24 RX ADMIN — CLONAZEPAM 1 MG: 1 TABLET ORAL at 07:07

## 2018-07-24 NOTE — ASSESSMENT & PLAN NOTE
38 y/o F s/p Bilateral Mastectomy POD 1  -Restart Klonopin  -If pt's pain is better controlled, she can go home today  -no signs of infxn/hematoma

## 2018-07-24 NOTE — OP NOTE
DATE OF PROCEDURE:  07/24/2018     PREOPERATIVE DIAGNOSIS:  Breast cancer.    POSTOPERATIVE DIAGNOSIS:  Breast cancer.    PROCEDURES PERFORMED:  1.  Immediate bilateral breast reconstruction using implants.  2.  Placement of AlloDerm, right breast.  3.  Placement of AlloDerm, left breast.    SURGEON:  Duane Bello M.D., FACS    ANESTHESIA:  General.    SPECIMENS:  None.    COMPLICATIONS:  None.    BLOOD LOSS:  Approximately 150 mL.    DESCRIPTION OF PROCEDURE:  After completion of the mastectomy, I entered the   room.  Due to the position of the tumor, the mastectomy flaps were very, very   thin.  Being that this is a smoking patient with thin mastectomy flaps, I   elected to place either a tissue expander or an implant up underneath the   muscle.  Prior to dividing the muscle, temporary sizers were placed and a 340 mL   implant would give a good cosmetic result without any tension on the skin.  The   pectoralis major was then divided from its insertion.  A subpectoral pocket was   then created AlloDerm was then sutured to the inframammary fold using   interrupted followed by a running 2-0 PDS suture.  The temporary sizer was then   placed and the AlloDerm was contoured around the implant, especially laterally   to ensure a very tight pocket.  Next, a similar procedure was performed on the   opposite side.  Drains were then placed, one under the implant and one in the   subcutaneous space.  A similar drain placement was performed on the opposite   side.  Next, the chest was then reprepped and redraped.  Gloves were changed.    The pockets were irrigated with triple antibiotic solution.  Two 340 mL smooth   SXC implants were opened on the back table.  They were soaked in antibiotic   solution, placed in the pockets.  The AlloDerm was then run to the muscle using   running 2-0 Vicryl and the skin was closed using interrupted 3-0 Monocryl   followed by running 4-0 Monocryl subcuticular sutures.  Similar  closures from   the opposite side.  There were no complications.      /nessa [id number] blank(s)        CRB/[facility]  dd: 07/24/2018 16:02:25 (CDT)  td: 07/24/2018 16:36:59 (CDT)  Doc ID   #[id number]  Job ID #[id number]    CC:

## 2018-07-24 NOTE — HOSPITAL COURSE
Ms. Hodge is a 38 y/o F with who presented to [facility] for elective bilateral mastectomy with left axillary lymph node biopsy for treatment of unilateral DCIS.  On POD 0 she complained of significant pain and her PRN narcotic medicine was increased.  On POD 1 she became more lethargic and had not ambulated.  Her PRN pain medication was decreased and her home medication Klonopin was held.  On POD2 am, she continued to be lethargic, three doses of flumazenil and two doses of narcan were given and patients' lethargy resolved.  She ambulated and reported decreased pain in both breasts.  Her drains produced small volume serosanginous fluid.  She was discharged POD2 pm.  Plastic Surgery is sending her home with drains.

## 2018-07-24 NOTE — SUBJECTIVE & OBJECTIVE
Interval History: Pt stated she was anxious overnight.  She also had trouble voiding after surgery per checkout, but current bladder scans show less than 50 ml post void residuals.    Medications:  Continuous Infusions:   0.45 % NaCl with KCl 20 mEq 75 mL/hr at 07/23/18 1536     Scheduled Meds:   clonazePAM  1 mg Oral TID    FLUoxetine  80 mg Oral Daily    OLANZapine  20 mg Oral QHS    pregabalin  100 mg Oral BID    topiramate  50 mg Oral BID    traZODone  400 mg Oral QHS     PRN Meds:acetaminophen, butalbital-acetaminophen-caffeine -40 mg, cyclobenzaprine, diphenhydrAMINE, ondansetron, oxyCODONE, oxyCODONE, promethazine, ramelteon, sodium chloride 0.9%, tiZANidine     Review of patient's allergies indicates:  No Known Allergies  Objective:     Vital Signs (Most Recent):  Temp: 96.1 °F (35.6 °C) (07/24/18 0555)  Pulse: 84 (07/24/18 0555)  Resp: 18 (07/24/18 0555)  BP: 122/75 (07/24/18 0555)  SpO2: 100 % (07/24/18 0555) Vital Signs (24h Range):  Temp:  [96.1 °F (35.6 °C)-100.2 °F (37.9 °C)] 96.1 °F (35.6 °C)  Pulse:  [] 84  Resp:  [14-20] 18  SpO2:  [97 %-100 %] 100 %  BP: (108-177)/(69-93) 122/75     Weight: 44 kg (97 lb)  Body mass index is 17.74 kg/m².    Intake/Output - Last 3 Shifts       07/22 0700 - 07/23 0659 07/23 0700 - 07/24 0659    P.O.  770    I.V. (mL/kg)  3725 (84.7)    Total Intake(mL/kg)  4495 (102.2)    Urine (mL/kg/hr)  840 (0.8)    Drains  250 (0.2)    Other  0 (0)    Total Output   1090    Net   +3405          Urine Occurrence  1 x          Physical Exam   Constitutional: She is oriented to person, place, and time. She appears well-nourished.   HENT:   Head: Normocephalic and atraumatic.   Neck: Normal range of motion. Neck supple.   Cardiovascular: Normal rate and regular rhythm.    Pulmonary/Chest: Effort normal.   Pt has implants placed in bilateral breasts with drains in place.  HELEN bulbs demonstrate serosanguinous material.   Abdominal: Soft. She exhibits no distension.  There is no tenderness.   Musculoskeletal: Normal range of motion.   Neurological: She is alert and oriented to person, place, and time.   Skin: Skin is warm and dry.   Psychiatric:   Extremely anxious       Significant Labs:none  Significant Diagnostics:  I have viewed all significant imaging.

## 2018-07-24 NOTE — PROGRESS NOTES
Progress Note  Plastic Surgery    Admit Date: 7/23/2018  Post-operative Day: 1 Day Post-Op  Hospital Day: 2    SUBJECTIVE:   POD1 from bilat mastectomies with subpectoral tissue expanders.   Having post operative pain, but otherwise no acute events    Follow-up For: Procedure(s) (LRB):  MASTECTOMY 23 hr stay (Bilateral)  INJECTION, FOR SENTINEL NODE IDENTIFICATION (Left)  BIOPSY, LYMPH NODE, SENTINEL (Left)  INSERTION, TISSUE EXPANDER, BREAST (Bilateral)    Scheduled Meds:   clonazePAM  1 mg Oral TID    FLUoxetine  80 mg Oral Daily    OLANZapine  20 mg Oral QHS    pregabalin  100 mg Oral BID    topiramate  50 mg Oral BID    traZODone  400 mg Oral QHS     Continuous Infusions:   0.45 % NaCl with KCl 20 mEq 75 mL/hr at 07/24/18 0854     PRN Meds:acetaminophen, butalbital-acetaminophen-caffeine -40 mg, cyclobenzaprine, diphenhydrAMINE, ondansetron, oxyCODONE, oxyCODONE, promethazine, ramelteon, sodium chloride 0.9%, tiZANidine    Review of patient's allergies indicates:  No Known Allergies    Review of Systems  Constitutional: no fever or chills  Integument/Breast: no rash or pruritis    OBJECTIVE:     Vital Signs (Most Recent)  Temp: 98.3 °F (36.8 °C) (07/24/18 1110)  Pulse: 96 (07/24/18 1110)  Resp: 16 (07/24/18 1110)  BP: 116/65 (07/24/18 1110)  SpO2: 98 % (07/24/18 1110)    Vital Signs Range (Last 24H):  Temp:  [96.1 °F (35.6 °C)-100.2 °F (37.9 °C)]   Pulse:  []   Resp:  [14-20]   BP: (108-126)/(64-83)   SpO2:  [97 %-100 %]     I & O (Last 24H):  Intake/Output Summary (Last 24 hours) at 07/24/18 1115  Last data filed at 07/24/18 0455   Gross per 24 hour   Intake             2245 ml   Output              475 ml   Net             1770 ml   L drains 215cc  R drains 125cc  Physical Exam:  NAD, arousable from sleeping  prevena incisional vac in place with good seal  Drains with SS fluid    ASSESSMENT/PLAN:   37F POD1 bilat mastectomies with immediate reconstruction with tissue expanders. Post op  pain  -home meds for fibromylagia: lyrica and tizanidine  -keflex, cont on DC  -ok DC from our standpoint  -drain care, needs instructions from nursing upon DC  -portable vacs are at bedside. We can change them from the hospital vac when she gets discharged, today or tomorrow. Please contact our team when DC'd.

## 2018-07-24 NOTE — PROGRESS NOTES
[facility]  General Surgery  Progress Note    Subjective:     History of Present Illness:  No notes on file    Post-Op Info:  Procedure(s) (LRB):  MASTECTOMY 23 hr stay (Bilateral)  INJECTION, FOR SENTINEL NODE IDENTIFICATION (Left)  BIOPSY, LYMPH NODE, SENTINEL (Left)  INSERTION, TISSUE EXPANDER, BREAST (Bilateral)   1 Day Post-Op     Interval History: Pt stated she was anxious overnight.  She also had trouble voiding after surgery per checkout, but current bladder scans show less than 50 ml post void residuals.    Medications:  Continuous Infusions:   0.45 % NaCl with KCl 20 mEq 75 mL/hr at 07/23/18 1536     Scheduled Meds:   clonazePAM  1 mg Oral TID    FLUoxetine  80 mg Oral Daily    OLANZapine  20 mg Oral QHS    pregabalin  100 mg Oral BID    topiramate  50 mg Oral BID    traZODone  400 mg Oral QHS     PRN Meds:acetaminophen, butalbital-acetaminophen-caffeine -40 mg, cyclobenzaprine, diphenhydrAMINE, ondansetron, oxyCODONE, oxyCODONE, promethazine, ramelteon, sodium chloride 0.9%, tiZANidine     Review of patient's allergies indicates:  No Known Allergies  Objective:     Vital Signs (Most Recent):  Temp: 96.1 °F (35.6 °C) (07/24/18 0555)  Pulse: 84 (07/24/18 0555)  Resp: 18 (07/24/18 0555)  BP: 122/75 (07/24/18 0555)  SpO2: 100 % (07/24/18 0555) Vital Signs (24h Range):  Temp:  [96.1 °F (35.6 °C)-100.2 °F (37.9 °C)] 96.1 °F (35.6 °C)  Pulse:  [] 84  Resp:  [14-20] 18  SpO2:  [97 %-100 %] 100 %  BP: (108-177)/(69-93) 122/75     Weight: 44 kg (97 lb)  Body mass index is 17.74 kg/m².    Intake/Output - Last 3 Shifts       07/22 0700 - 07/23 0659 07/23 0700 - 07/24 0659    P.O.  770    I.V. (mL/kg)  3725 (84.7)    Total Intake(mL/kg)  4495 (102.2)    Urine (mL/kg/hr)  840 (0.8)    Drains  250 (0.2)    Other  0 (0)    Total Output   1090    Net   +3405          Urine Occurrence  1 x          Physical Exam   Constitutional: She is oriented to person, place, and time. She appears  well-nourished.   HENT:   Head: Normocephalic and atraumatic.   Neck: Normal range of motion. Neck supple.   Cardiovascular: Normal rate and regular rhythm.    Pulmonary/Chest: Effort normal.   Pt has implants placed in bilateral breasts with drains in place.  HELEN bulbs demonstrate serosanguinous material.   Abdominal: Soft. She exhibits no distension. There is no tenderness.   Musculoskeletal: Normal range of motion.   Neurological: She is alert and oriented to person, place, and time.   Skin: Skin is warm and dry.   Psychiatric:   Extremely anxious       Significant Labs:none  Significant Diagnostics:  I have viewed all significant imaging.    Assessment/Plan:     * DCIS (ductal carcinoma in situ)    38 y/o F s/p Bilateral Mastectomy POD 1  -Restart Klonopin  -If pt's pain is better controlled, she can go home today  -no signs of infxn/hematoma              Antonino Jon MD  General Surgery  [facility]

## 2018-07-24 NOTE — PLAN OF CARE
Problem: Patient Care Overview  Goal: Plan of Care Review  Outcome: Ongoing (interventions implemented as appropriate)  Pt verbalizes understanding of ongoing care

## 2018-07-24 NOTE — NURSING
Pt in bed awake, resp even and unlabored. HELEN drains intact, will monitor output. Safety measures in place

## 2018-07-25 VITALS
TEMPERATURE: 98 F | HEIGHT: 62 IN | RESPIRATION RATE: 16 BRPM | OXYGEN SATURATION: 98 % | HEART RATE: 95 BPM | SYSTOLIC BLOOD PRESSURE: 129 MMHG | DIASTOLIC BLOOD PRESSURE: 69 MMHG | WEIGHT: 97 LBS | BODY MASS INDEX: 17.85 KG/M2

## 2018-07-25 LAB
ALLENS TEST: ABNORMAL
ANION GAP SERPL CALC-SCNC: 7 MMOL/L
ANION GAP SERPL CALC-SCNC: 9 MMOL/L
BASOPHILS # BLD AUTO: 0.05 K/UL
BASOPHILS NFR BLD: 0.4 %
BUN SERPL-MCNC: 7 MG/DL
BUN SERPL-MCNC: 7 MG/DL
CALCIUM SERPL-MCNC: 9.2 MG/DL
CALCIUM SERPL-MCNC: 9.3 MG/DL
CHLORIDE SERPL-SCNC: 109 MMOL/L
CHLORIDE SERPL-SCNC: 111 MMOL/L
CO2 SERPL-SCNC: 22 MMOL/L
CO2 SERPL-SCNC: 22 MMOL/L
CREAT SERPL-MCNC: 0.7 MG/DL
CREAT SERPL-MCNC: 0.7 MG/DL
DELSYS: ABNORMAL
DIFFERENTIAL METHOD: ABNORMAL
EOSINOPHIL # BLD AUTO: 0.1 K/UL
EOSINOPHIL NFR BLD: 1.1 %
ERYTHROCYTE [DISTWIDTH] IN BLOOD BY AUTOMATED COUNT: 12.1 %
EST. GFR  (AFRICAN AMERICAN): >60 ML/MIN/1.73 M^2
EST. GFR  (AFRICAN AMERICAN): >60 ML/MIN/1.73 M^2
EST. GFR  (NON AFRICAN AMERICAN): >60 ML/MIN/1.73 M^2
EST. GFR  (NON AFRICAN AMERICAN): >60 ML/MIN/1.73 M^2
GLUCOSE SERPL-MCNC: 96 MG/DL
GLUCOSE SERPL-MCNC: 98 MG/DL
HCO3 UR-SCNC: 23.8 MMOL/L (ref 24–28)
HCT VFR BLD AUTO: 40.8 %
HGB BLD-MCNC: 13.5 G/DL
IMM GRANULOCYTES # BLD AUTO: 0.05 K/UL
IMM GRANULOCYTES NFR BLD AUTO: 0.4 %
LYMPHOCYTES # BLD AUTO: 2.5 K/UL
LYMPHOCYTES NFR BLD: 19.3 %
MAGNESIUM SERPL-MCNC: 2.1 MG/DL
MCH RBC QN AUTO: 30.1 PG
MCHC RBC AUTO-ENTMCNC: 33.1 G/DL
MCV RBC AUTO: 91 FL
MODE: ABNORMAL
MONOCYTES # BLD AUTO: 1.1 K/UL
MONOCYTES NFR BLD: 8.2 %
NEUTROPHILS # BLD AUTO: 9.3 K/UL
NEUTROPHILS NFR BLD: 70.6 %
NRBC BLD-RTO: 0 /100 WBC
PCO2 BLDA: 39.5 MMHG (ref 35–45)
PH SMN: 7.39 [PH] (ref 7.35–7.45)
PHOSPHATE SERPL-MCNC: 2.8 MG/DL
PLATELET # BLD AUTO: 208 K/UL
PMV BLD AUTO: 10.3 FL
PO2 BLDA: 95 MMHG (ref 80–100)
POC BE: -1 MMOL/L
POC SATURATED O2: 97 % (ref 95–100)
POC TCO2: 25 MMOL/L (ref 23–27)
POTASSIUM SERPL-SCNC: 4 MMOL/L
POTASSIUM SERPL-SCNC: 4.3 MMOL/L
RBC # BLD AUTO: 4.48 M/UL
SAMPLE: ABNORMAL
SITE: ABNORMAL
SODIUM SERPL-SCNC: 140 MMOL/L
SODIUM SERPL-SCNC: 140 MMOL/L
WBC # BLD AUTO: 13.14 K/UL

## 2018-07-25 PROCEDURE — 84100 ASSAY OF PHOSPHORUS: CPT

## 2018-07-25 PROCEDURE — 85025 COMPLETE CBC W/AUTO DIFF WBC: CPT

## 2018-07-25 PROCEDURE — 83735 ASSAY OF MAGNESIUM: CPT

## 2018-07-25 PROCEDURE — 82803 BLOOD GASES ANY COMBINATION: CPT

## 2018-07-25 PROCEDURE — 25000003 PHARM REV CODE 250: Performed by: STUDENT IN AN ORGANIZED HEALTH CARE EDUCATION/TRAINING PROGRAM

## 2018-07-25 PROCEDURE — 36600 WITHDRAWAL OF ARTERIAL BLOOD: CPT

## 2018-07-25 PROCEDURE — 63600175 PHARM REV CODE 636 W HCPCS: Performed by: SURGERY

## 2018-07-25 PROCEDURE — 80048 BASIC METABOLIC PNL TOTAL CA: CPT

## 2018-07-25 PROCEDURE — 25000003 PHARM REV CODE 250: Performed by: SURGERY

## 2018-07-25 PROCEDURE — 80048 BASIC METABOLIC PNL TOTAL CA: CPT | Mod: 91

## 2018-07-25 PROCEDURE — 36415 COLL VENOUS BLD VENIPUNCTURE: CPT

## 2018-07-25 RX ORDER — CEPHALEXIN 500 MG/1
500 CAPSULE ORAL EVERY 6 HOURS
Qty: 40 CAPSULE | Refills: 0 | Status: SHIPPED | OUTPATIENT
Start: 2018-07-25 | End: 2018-08-04

## 2018-07-25 RX ORDER — NALOXONE HCL 0.4 MG/ML
0.4 VIAL (ML) INJECTION ONCE
Status: COMPLETED | OUTPATIENT
Start: 2018-07-25 | End: 2018-07-25

## 2018-07-25 RX ORDER — FLUMAZENIL 0.1 MG/ML
0.2 INJECTION INTRAVENOUS ONCE
Status: COMPLETED | OUTPATIENT
Start: 2018-07-25 | End: 2018-07-25

## 2018-07-25 RX ORDER — FLUMAZENIL 0.1 MG/ML
0.3 INJECTION INTRAVENOUS ONCE
Status: COMPLETED | OUTPATIENT
Start: 2018-07-25 | End: 2018-07-25

## 2018-07-25 RX ORDER — NALOXONE HCL 0.4 MG/ML
0.4 VIAL (ML) INJECTION
Status: DISCONTINUED | OUTPATIENT
Start: 2018-07-25 | End: 2018-07-25 | Stop reason: HOSPADM

## 2018-07-25 RX ORDER — FLUMAZENIL 0.1 MG/ML
INJECTION INTRAVENOUS
Status: DISCONTINUED
Start: 2018-07-25 | End: 2018-07-25 | Stop reason: HOSPADM

## 2018-07-25 RX ORDER — FLUMAZENIL 0.1 MG/ML
0.2 INJECTION INTRAVENOUS ONCE
Status: DISCONTINUED | OUTPATIENT
Start: 2018-07-25 | End: 2018-07-25 | Stop reason: HOSPADM

## 2018-07-25 RX ORDER — NALOXONE HCL 0.4 MG/ML
VIAL (ML) INJECTION
Status: DISCONTINUED
Start: 2018-07-25 | End: 2018-07-25 | Stop reason: HOSPADM

## 2018-07-25 RX ORDER — OXYCODONE AND ACETAMINOPHEN 5; 325 MG/1; MG/1
1 TABLET ORAL EVERY 4 HOURS PRN
Qty: 30 TABLET | Refills: 0 | Status: SHIPPED | OUTPATIENT
Start: 2018-07-25 | End: 2018-07-25

## 2018-07-25 RX ORDER — OXYCODONE AND ACETAMINOPHEN 5; 325 MG/1; MG/1
1 TABLET ORAL EVERY 4 HOURS PRN
Qty: 30 TABLET | Refills: 0 | Status: SHIPPED | OUTPATIENT
Start: 2018-07-25 | End: 2018-08-28

## 2018-07-25 RX ADMIN — NALOXONE HYDROCHLORIDE 0.4 MG: 0.4 INJECTION, SOLUTION INTRAMUSCULAR; INTRAVENOUS; SUBCUTANEOUS at 11:07

## 2018-07-25 RX ADMIN — CEPHALEXIN 500 MG: 500 CAPSULE ORAL at 12:07

## 2018-07-25 RX ADMIN — CEPHALEXIN 500 MG: 500 CAPSULE ORAL at 06:07

## 2018-07-25 RX ADMIN — FLUMAZENIL 0.3 MG: 0.1 INJECTION, SOLUTION INTRAVENOUS at 10:07

## 2018-07-25 RX ADMIN — CEPHALEXIN 500 MG: 500 CAPSULE ORAL at 05:07

## 2018-07-25 RX ADMIN — FLUMAZENIL 0.2 MG: 0.1 INJECTION, SOLUTION INTRAVENOUS at 09:07

## 2018-07-25 NOTE — PLAN OF CARE
Problem: Patient Care Overview  Goal: Plan of Care Review  Pt and family verbalize understanding of on going care

## 2018-07-25 NOTE — PROGRESS NOTES
Patient feeling much better this afternoon. She is awake, alert and oriented. Discussed not taking her home klonopin while she is on narcotic medication due to the interactions. She voiced understanding at this and stated that she didn't want to take it anymore. She was able to ambulate to the bathroom and back today without feeling dizzy. Tolerating PO intake. States she feels strong and well enough to go home.

## 2018-07-25 NOTE — NURSING
Pt in bed asleep, lethargic and hard to arouse. Dr Jon was at the bedside , orders were written to arouse pt. All po meds were held due to lethargy.

## 2018-07-25 NOTE — NURSING
Patient attempted to ambulate twice , only able to tolerate ambulating to the bathroom once due to unsteady gait and dizziness. P

## 2018-07-25 NOTE — SUBJECTIVE & OBJECTIVE
Interval History: No acute events overnight. States she got very dizzy when she stood up to go to the bathroom but vitals were stable during this. Still complaining of pain but drowsy. Oriented to person, place and time.    Medications:  Continuous Infusions:    Scheduled Meds:   cephALEXin  500 mg Oral Q6H    FLUoxetine  80 mg Oral Daily    OLANZapine  20 mg Oral QHS    pregabalin  100 mg Oral BID    topiramate  50 mg Oral BID    traZODone  400 mg Oral QHS     PRN Meds:acetaminophen, butalbital-acetaminophen-caffeine -40 mg, clonazePAM, diphenhydrAMINE, ondansetron, oxyCODONE, oxyCODONE, promethazine, ramelteon, sodium chloride 0.9%, tiZANidine     Review of patient's allergies indicates:  No Known Allergies  Objective:     Vital Signs (Most Recent):  Temp: 98.8 °F (37.1 °C) (07/25/18 0404)  Pulse: 80 (07/25/18 0404)  Resp: 16 (07/25/18 0404)  BP: (!) 112/53 (07/25/18 0404)  SpO2: 99 % (07/25/18 0404) Vital Signs (24h Range):  Temp:  [97.7 °F (36.5 °C)-98.9 °F (37.2 °C)] 98.8 °F (37.1 °C)  Pulse:  [] 80  Resp:  [16-18] 16  SpO2:  [94 %-100 %] 99 %  BP: (111-140)/(53-73) 112/53     Weight: 44 kg (97 lb)  Body mass index is 17.74 kg/m².    Intake/Output - Last 3 Shifts       07/23 0700 - 07/24 0659 07/24 0700 - 07/25 0659    P.O. 770 720    I.V. (mL/kg) 3725 (84.7) 541.2 (12.3)    Total Intake(mL/kg) 4495 (102.2) 1261.2 (28.7)    Urine (mL/kg/hr) 840 (0.8)     Drains 340 (0.3) 185 (0.2)    Other 0 (0) 0 (0)    Total Output 1180 185    Net +3315 +1076.2          Urine Occurrence 1 x 6 x          Physical Exam   Constitutional: She is oriented to person, place, and time. She appears well-nourished.   HENT:   Head: Normocephalic and atraumatic.   Neck: Normal range of motion. Neck supple.   Cardiovascular: Normal rate and regular rhythm.    Pulmonary/Chest: Effort normal.   Pt has implants placed in bilateral breasts with drains in place.  HELEN bulbs demonstrate serosanguinous material.   Abdominal:  Soft. She exhibits no distension. There is no tenderness.   Musculoskeletal: Normal range of motion.   Neurological: She is alert and oriented to person, place, and time.   Skin: Skin is warm and dry.   Psychiatric:   Extremely anxious       Significant Labs:none  Significant Diagnostics:  I have viewed all significant imaging.

## 2018-07-25 NOTE — NURSING
Pt ambulated in the [address], gait was steady . Pt stated that she felt fine just a little tired. No unsteady gait noted.

## 2018-07-25 NOTE — ASSESSMENT & PLAN NOTE
38 y/o F s/p Bilateral Mastectomy POD 2  - Home meds  - Will order labs this morning  - Must ambulate  - Decreased pain meds  - Likely home today

## 2018-07-25 NOTE — DISCHARGE SUMMARY
[facility]  General Surgery  Discharge Summary      Patient Name: Staci Hodge  MRN: [id number]  Admission Date: 7/23/2018  Hospital Length of Stay: 0 days  Discharge Date and Time:  07/25/2018 5:34 PM  Attending Physician: Sofia Kendrick MD   Discharging Provider: Antonino Jon MD  Primary Care Provider: Shilpi Clayton NP    HPI:   No notes on file    Procedure(s) (LRB):  MASTECTOMY 23 hr stay (Bilateral)  INJECTION, FOR SENTINEL NODE IDENTIFICATION (Left)  BIOPSY, LYMPH NODE, SENTINEL (Left)  INSERTION, TISSUE EXPANDER, BREAST (Bilateral)      Indwelling Lines/Drains at time of discharge:   Lines/Drains/Airways     Drain                 Closed/Suction Drain 07/23/18 1216 Right Breast Bulb 15 Fr. 2 days         Closed/Suction Drain 07/23/18 1217 Left Breast Bulb 15 Fr. 2 days         Closed/Suction Drain 07/23/18 1217 Left Breast Bulb 15 Fr. 2 days         Closed/Suction Drain 07/23/18 1217 Right Breast Bulb 15 Fr. 2 days          Pressure Ulcer                 Negative Pressure Wound Therapy  2 days         Negative Pressure Wound Therapy  2 days              Hospital Course: Ms. Hodge is a 38 y/o F with who presented to [facility] for elective bilateral mastectomy with left axillary lymph node biopsy for treatment of unilateral DCIS.  On POD 0 she complained of significant pain and her PRN narcotic medicine was increased.  On POD 1 she became more lethargic and had not ambulated.  Her PRN pain medication was decreased and her home medication Klonopin was held.  On POD2 am, she continued to be lethargic, three doses of flumazenil and two doses of narcan were given and patients' lethargy resolved.  She ambulated and reported decreased pain in both breasts.  Her drains produced small volume serosanginous fluid.  She was discharged POD2 pm.  Plastic Surgery is sending her home with drains.    Consults:     Significant Diagnostic Studies: Labs:   BMP:   Recent Labs  Lab 07/25/18  0637  07/25/18  1358   GLU 96 98    140   K 4.3 4.0    111*   CO2 22* 22*   BUN 7 7   CREATININE 0.7 0.7   CALCIUM 9.2 9.3   MG 2.1  --     and CBC   Recent Labs  Lab 07/25/18  0637   WBC 13.14*   HGB 13.5   HCT 40.8          Pending Diagnostic Studies:     None        Final Active Diagnoses:    Diagnosis Date Noted POA    PRINCIPAL PROBLEM:  DCIS (ductal carcinoma in situ) [D05.10] 07/23/2018 Yes      Problems Resolved During this Admission:    Diagnosis Date Noted Date Resolved POA      Discharged Condition: good    Disposition: Home or Self Care    Follow Up:  Follow-up Information     Sofia Kendrick MD In 2 weeks.    Specialties:  General Surgery, Breast Surgery  Why:  For wound re-check/Office to arrange & call you w/Appt.   Contact information:  [address]  [facility] [address]  [phone]             Duane Bello MD.    Specialty:  Plastic Surgery  Why:  For wound re-check/As directed/Office to call you w/Appt.   Contact information:  [address]  [facility] [address]  [phone]                 Patient Instructions:     Diet Adult Regular     Notify your health care provider if you experience any of the following:  temperature >100.4     Notify your health care provider if you experience any of the following:  persistent nausea and vomiting or diarrhea     Notify your health care provider if you experience any of the following:  redness, tenderness, or signs of infection (pain, swelling, redness, odor or green/yellow discharge around incision site)     Notify your health care provider if you experience any of the following:  severe persistent headache     Notify your health care provider if you experience any of the following:  persistent dizziness, light-headedness, or visual disturbances     Notify your health care provider if you experience any of the following:  increased confusion or weakness     Leave dressing on - Keep it clean, dry, and intact until clinic  visit     Activity as tolerated     Weight bearing restrictions (specify):   Order Comments: Don't lift more than 10 lbs until post-operative visit       Medications:  Reconciled Home Medications:      Medication List      START taking these medications    cephALEXin 500 MG capsule  Commonly known as:  KEFLEX  Take 1 capsule (500 mg total) by mouth every 6 (six) hours. for 10 days     oxyCODONE-acetaminophen 5-325 mg per tablet  Commonly known as:  PERCOCET  Take 1 tablet by mouth every 4 (four) hours as needed for Pain.     senna-docusate 8.6-50 mg 8.6-50 mg per tablet  Commonly known as:  SENNA WITH DOCUSATE SODIUM  Take 2 tablets by mouth once daily.        CHANGE how you take these medications    clonazePAM 1 MG tablet  Commonly known as:  KLONOPIN  TAKE 1 TABLET BY MOUTH 2 TIMES A DAY AS NEEDED FOR ANXIETY  What changed:  See the new instructions.        CONTINUE taking these medications    butalbital-acetaminophen-caffeine -40 mg -40 mg per tablet  Commonly known as:  FIORICET, ESGIC  Take 1 tablet by mouth every 6 (six) hours as needed for Headaches. Limit use to 2 days per week.     FLUoxetine 40 MG capsule  Commonly known as:  PROZAC  Take 2 capsules (80 mg total) by mouth once daily.     OLANZapine 20 MG tablet  Commonly known as:  ZyPREXA  Take 1 tablet (20 mg total) by mouth every evening.     pregabalin 100 MG capsule  Commonly known as:  LYRICA  Take 1 capsule (100 mg total) by mouth 2 (two) times daily.     propranolol 10 MG tablet  Commonly known as:  INDERAL  Take 1 tablet (10 mg total) by mouth 2 (two) times daily.     tiZANidine 2 MG tablet  Commonly known as:  ZANAFLEX  TAKE 2 TABLETS (4 MG TOTAL) BY MOUTH EVERY 6 (SIX) HOURS AS NEEDED (NECK TENSION).     topiramate 25 MG tablet  Commonly known as:  TOPAMAX  Take 2 tablets (50 mg total) by mouth 2 (two) times daily.     traZODone 100 MG tablet  Commonly known as:  DESYREL  Take 4 tablets (400 mg total) by mouth every evening.           Note, pt was advised to stop taking Klonopin by MD.    Time spent on the discharge of patient:   minutes    Antonino Jon MD  General Surgery  [facility]

## 2018-07-25 NOTE — CARE UPDATE
Called to patient's room by staff nurse and Dr. Jon. Narcan 0.4mg given for lethargy. Flumazenil 02mg given IV. Patient aroused almost immediately after flumazenil. Patient denies taking any of home medications. Addition 0.2mf Flumazenil ordered and given. Will continuue to monitor for s/s of distress.

## 2018-07-26 NOTE — NURSING
Telemetry called to inform them that the  Pt in [address] is being discharged and the telementry box was removed.

## 2018-07-26 NOTE — NURSING
Pt is being discharged home per md orders. Being discharged with 4 APARNA drains and portable wound vac to bilateral nipples.  Pt is being sent home with verbal and hand written instructions of the aparna drain . Written discharge instructions given to patient and family at  The bedside, verbalizing understanding. Also sent home with supplies to measure the output from her drains.

## 2018-07-28 ENCOUNTER — HOSPITAL ENCOUNTER (EMERGENCY)
Facility: HOSPITAL | Age: 38
Discharge: HOME | End: 2018-07-28
Attending: EMERGENCY MEDICINE
Payer: MEDICAID

## 2018-07-28 ENCOUNTER — NURSE TRIAGE (OUTPATIENT)
Dept: ADMINISTRATIVE | Facility: CLINIC | Age: 38
End: 2018-07-28

## 2018-07-28 VITALS
RESPIRATION RATE: 15 BRPM | BODY MASS INDEX: 17.85 KG/M2 | OXYGEN SATURATION: 99 % | HEART RATE: 90 BPM | TEMPERATURE: 98 F | WEIGHT: 97 LBS | HEIGHT: 62 IN | SYSTOLIC BLOOD PRESSURE: 128 MMHG | DIASTOLIC BLOOD PRESSURE: 68 MMHG

## 2018-07-28 DIAGNOSIS — G89.18 ACUTE POST-OPERATIVE PAIN: Primary | ICD-10-CM

## 2018-07-28 PROCEDURE — 99282 EMERGENCY DEPT VISIT SF MDM: CPT | Mod: ,,, | Performed by: PHYSICIAN ASSISTANT

## 2018-07-28 PROCEDURE — 99283 EMERGENCY DEPT VISIT LOW MDM: CPT

## 2018-07-28 NOTE — ED TRIAGE NOTES
Pt had samia mastectomy July 23,2018 , was discharged on July 25,2018 and went home with wound vac to both breasts.  Pt states she is concerned about the sound of the left  wound vac machine and feels like she had more pressure in right  breast than in left  breast

## 2018-07-28 NOTE — ED NOTES
Pt identifiers Staci Hodge were checked and are  correct  LOC: The patient is awake, alert, aware of environment with an appropriate affect. Oriented x3, speaking appropriately  APPEARANCE: Pt rates pain to right breast a 6/10 , in no acute distress, pt is clean and well groomed, clothing properly fastened  SKIN: Skin warm, dry and intact, normal skin turgor, moist mucus membranes JPdrain to right breast and to left breast compressed draining serosanguineous fluid  RESPIRATORY: Airway is open and patent, respirations are spontaneous, even and unlabored, normal effort and rate Breath sounds clear samia to all lung fields on auscultation  CARDIAC: Radial pulses strong and reg Tachycardia noted with heart rate 114 bpm, no peripheral edema noted, capillary refill < 3 seconds, bilateral radial pulses 2+  ABDOMEN: Soft, nontender, nondistended.  NEUROLOGIC: facial expression is symmetrical, patient moving all extremities spontaneously, normal sensation in all extremities when touched with a finger.  Follows all commands appropriately  MUSCULOSKELETAL: No obvious deformities.

## 2018-07-28 NOTE — ED PROVIDER NOTES
"Encounter Date: 7/28/2018       History     Chief Complaint   Patient presents with    Post-op Problem     bilateral mastectomy on 23rd. wound vac is "struggling really bad", seems clogged, pain around tube insertion site     37-year-old female the 4 days postop bilateral mastectomy and reconstruction with breast implants for ductal carcinoma in situ found in the left breast presents for evaluation of breast pain and concern for wound VAC dysfunction.  Patient reports pain and pressure to the breast R>L. Pt is concerned that one of her wound vac machines is now making a different sound. She has noticed less drainage from the Right side. Pt called [facility] on call and was told to present to the ED. Pt denies fevers, chills, or other complaits.           Review of patient's allergies indicates:  No Known Allergies  Past Medical History:   Diagnosis Date    Abnormal Pap smear of cervix age 25    no treatement    Anxiety     Cancer 07/11/2018    breast cancer    Cervical spondylosis 2/9/2018    Colon polyp     Depression     Headache     History of psychiatric hospitalization     age 20 for SI    Hx of psychiatric care     Hypertension     Peutz-Jeghers syndrome     Psychiatric problem     Right carpal tunnel syndrome 3/9/2017    Self-harming behavior     Suicide attempt     Therapy      Past Surgical History:   Procedure Laterality Date    ADENOIDECTOMY      APPENDECTOMY      BILATERAL MASTECTOMY Bilateral 7/23/2018    Procedure: MASTECTOMY 23 hr stay;  Surgeon: Sofia Kendrick MD;  Location: [facility] OR [address] [place] [address];  Service: General;  Laterality: Bilateral;    BOWEL RESECTION  10/17/14    BREAST BIOPSY Left 06/04/2018    CARPAL TUNNEL RELEASE Bilateral     CARPAL TUNNEL RELEASE      COLONOSCOPY      DILATION AND CURETTAGE OF UTERUS      INJECTION FOR SENTINEL NODE IDENTIFICATION Left 7/23/2018    Procedure: INJECTION, FOR SENTINEL NODE IDENTIFICATION;  Surgeon: Sofia Kendrick MD;  Location: [facility] OR " 2ND FLR;  Service: General;  Laterality: Left;    INSERTION OF BREAST TISSUE EXPANDER Bilateral 7/23/2018    Procedure: INSERTION, TISSUE EXPANDER, BREAST;  Surgeon: Duane Bello MD;  Location: [facility] OR 2ND FLR;  Service: Plastics;  Laterality: Bilateral;    intestinal polpy      SENTINEL LYMPH NODE BIOPSY Left 7/23/2018    Procedure: BIOPSY, LYMPH NODE, SENTINEL;  Surgeon: Sofia Kenrdick MD;  [facility];  Service: General;  Laterality: Left;    SMALL INTESTINE SURGERY      3 surgeries     TONSILLECTOMY      UPPER GASTROINTESTINAL ENDOSCOPY       Family History   Problem Relation Age of Onset    Colon cancer Mother 33        peutz-jeghers syndrome    Hypertension Father     Colon polyps Sister         peutz-jeghers syndrome    Breast cancer Paternal Aunt     Breast cancer Paternal Grandmother     Ovarian cancer Neg Hx      Social History   Substance Use Topics    Smoking status: Current Every Day Smoker     Packs/day: 0.50     Years: 17.00     Types: Cigarettes     Start date: 4/17/2000    Smokeless tobacco: Never Used      Comment: told about smoking cessation clinic and give brochure    Alcohol use No     Review of Systems   Constitutional: Negative for chills and fever.   HENT: Negative for sore throat.    Respiratory: Negative for shortness of breath.    Cardiovascular: Negative for chest pain.   Gastrointestinal: Negative for nausea.   Genitourinary: Negative for dysuria.   Musculoskeletal: Negative for back pain.        +breast pain R>L   Skin: Negative for rash.   Neurological: Negative for weakness.   Hematological: Does not bruise/bleed easily.       Physical Exam     Initial Vitals [07/28/18 1643]   BP Pulse Resp Temp SpO2   (!) 183/109 (!) 124 18 97.5 °F (36.4 °C) 99 %      MAP       --         Physical Exam    Nursing note and vitals reviewed.  Constitutional: She appears well-developed and well-nourished. She is not diaphoretic.  Non-toxic appearance. She does not  appear ill. No distress.   HENT:   Head: Normocephalic and atraumatic.   Neck: Neck supple.   Cardiovascular: Normal rate and regular rhythm. Exam reveals no gallop and no friction rub.    No murmur heard.  Pulmonary/Chest: Effort normal and breath sounds normal. No accessory muscle usage. No tachypnea. No respiratory distress. She has no decreased breath sounds. She has no wheezes. She has no rhonchi. She has no rales.   Wound VAC 2 bilateral breasts.  No significant swelling, ecchymosis, erythema.  Serosanguineous drainage noted into drainage grenade.    Abdominal: She exhibits no distension.   Musculoskeletal: Normal range of motion.   Neurological: She is alert.   Skin: Skin is warm and dry. No rash noted. No pallor.   Psychiatric: She has a normal mood and affect. Her behavior is normal.         ED Course   Procedures  Labs Reviewed - No data to display       Imaging Results    None          Medical Decision Making:   History:   Old Medical Records: I decided to obtain old medical records.  Differential Diagnosis:   My differential diagnosis includes but is not limited to:  Postoperative pain, wound VAC malfunction, postop infection        APC / Resident Notes:   37-year-old female 4 days post off bilateral mastectomy and breast reconstruction surgery presents with pain and concerns for improper wound drainage. Patient initially tachycardic and hypertensive.  She appears anxious, but in no acute distress. I discussed this patient with Plastic surgery.  They emergently evaluated patient in the ED.  No further workup recommended.  Patient is stable for discharge. Follow up as scheduled with Dr. Bello. Lena for discharge. I have reviewed the patient's records and discussed this case with my supervising physician.           Attending Attestation:     Physician Attestation Statement for NP/PA:   I discussed this assessment and plan of this patient with the NP/PA, but I did not personally examine the patient. The  face to face encounter was performed by the NP/PA.                     Clinical Impression:   The encounter diagnosis was Acute post-operative pain.      Disposition:   Disposition: Discharged  Condition: Stable                        Melissa Cobb PA-C  07/28/18 [id number]       Daryl Landa MD  08/01/18 0112

## 2018-07-28 NOTE — TELEPHONE ENCOUNTER
Right breast pressure  Clot noted in left tubing  Left sided suction not suctioning as much    Reason for Disposition   Sounds like a serious complication to the triager    Protocols used: ST POST-OP INCISION SYMPTOMS-A-AH    Pt calling with concerns of post op symptoms after double mastectomy.  Pt is having right breast pressure.  The wound vac tubing has a clot and the left side is not suctioning properly like before.  Called and discussed with On Call Snicki ORNELAS; MD recommended Pt go to the ED for further evaluation.  Pt called and notified.  Pt verbalized understanding.

## 2018-07-29 NOTE — CONSULTS
Consult Note  Plastic Surgery    Consult Requested By: ER  Reason for Consult: wound vac issue    SUBJECTIVE:     History of Present Illness:  Patient is a 37 y.o. female presents s/p bilateral mastectomy and immediate subpectoral implant reconstruction on 7-23-18.  She was subsequently discharged home from the hospital 2 days later.  She has been doing well at home without fevers/chills, but she noticed this evening that her left incisional wound vac sounded different than her other wound vac, so she came into the ER to get checked out.  She notes that her right breast is causing her more pain than her left breast, but she denies erythema.       Scheduled Meds:  Continuous Infusions:  PRN Meds:    Review of patient's allergies indicates:  No Known Allergies    Past Medical History:   Diagnosis Date    Abnormal Pap smear of cervix age 25    no treatement    Anxiety     Cancer 07/11/2018    breast cancer    Cervical spondylosis 2/9/2018    Colon polyp     Depression     Headache     History of psychiatric hospitalization     age 20 for SI    Hx of psychiatric care     Hypertension     Peutz-Jeghers syndrome     Psychiatric problem     Right carpal tunnel syndrome 3/9/2017    Self-harming behavior     Suicide attempt     Therapy      Past Surgical History:   Procedure Laterality Date    ADENOIDECTOMY      APPENDECTOMY      BILATERAL MASTECTOMY Bilateral 7/23/2018    Procedure: MASTECTOMY 23 hr stay;  Surgeon: Sofia Kendrick MD;  Location: [facility] OR [address] [place] [address];  Service: General;  Laterality: Bilateral;    BOWEL RESECTION  10/17/14    BREAST BIOPSY Left 06/04/2018    CARPAL TUNNEL RELEASE Bilateral     CARPAL TUNNEL RELEASE      COLONOSCOPY      DILATION AND CURETTAGE OF UTERUS      INJECTION FOR SENTINEL NODE IDENTIFICATION Left 7/23/2018    Procedure: INJECTION, FOR SENTINEL NODE IDENTIFICATION;  Surgeon: Sofia Kendrick MD;  Location: [facility] OR [address] [place] [address];  Service: General;  Laterality: Left;     INSERTION OF BREAST TISSUE EXPANDER Bilateral 7/23/2018    Procedure: INSERTION, TISSUE EXPANDER, BREAST;  Surgeon: Duane Bello MD;  [facility];  Service: Plastics;  Laterality: Bilateral;    intestinal polpy      SENTINEL LYMPH NODE BIOPSY Left 7/23/2018    Procedure: BIOPSY, LYMPH NODE, SENTINEL;  Surgeon: Sofia Kendrick MD;  Location: [facility] OR 2ND FLR;  Service: General;  Laterality: Left;    SMALL INTESTINE SURGERY      3 surgeries     TONSILLECTOMY      UPPER GASTROINTESTINAL ENDOSCOPY       Family History   Problem Relation Age of Onset    Colon cancer Mother 33        peutz-jeghers syndrome    Hypertension Father     Colon polyps Sister         peutz-jeghers syndrome    Breast cancer Paternal Aunt     Breast cancer Paternal Grandmother     Ovarian cancer Neg Hx      Social History   Substance Use Topics    Smoking status: Current Every Day Smoker     Packs/day: 0.50     Years: 17.00     Types: Cigarettes     Start date: 4/17/2000    Smokeless tobacco: Never Used      Comment: told about smoking cessation clinic and give brochure    Alcohol use No        Review of Systems:  Negative except per HPI    OBJECTIVE:     Vital Signs (Most Recent)  Temp: 98 °F (36.7 °C) (07/28/18 1749)  Pulse: 94 (07/28/18 1752)  Resp: 13 (07/28/18 1752)  BP: 123/68 (07/28/18 1749)  SpO2: 99 % (07/28/18 1752)    Vital Signs Range (Last 24H):  Temp:  [97.5 °F (36.4 °C)-98 °F (36.7 °C)]   Pulse:  []   Resp:  [13-19]   BP: (123-183)/()   SpO2:  [98 %-100 %]     Physical Exam:  NAD  Non-labored breathing  RRR  Right breast implant in place without erythema, incisional wound vac in place with good suction, no hematoma/seroma  left breast implant in place without erythema, incisional wound vac in place with good suction, no hematoma/seroma  No alarms on either wound vac machine      ASSESSMENT/PLAN:     36yo woman s/p bilateral mastectomies and immediate subpectoral implant reconstruction  with functional incisional wound vacs    I took time to explain how the wound vac functions and the alarm lights and signals that would alarm should there be an issue with the wound vac    She can f/u with Dr. Bello on wed as previously scheduled    Dagoberto Nicholson MD  Plastic Surgery Fellow

## 2018-07-29 NOTE — DISCHARGE INSTRUCTIONS
Future Appointments  Date Time Provider Department Center   8/1/2018 8:45 AM Duane Bello MD [facility] PLASTIC [facility]   8/8/2018 10:30 AM Sofia Kendrick MD [facility] BRSTSUR [facility]   8/28/2018 11:30 AM Christiano Emerson MD [facility] PSYCH [place] [facility]   8/28/2018 1:15 PM Camille Emerson NP [facility] NEURO [facility]

## 2018-08-01 ENCOUNTER — OFFICE VISIT (OUTPATIENT)
Dept: PLASTIC SURGERY | Facility: CLINIC | Age: 38
End: 2018-08-01
Payer: MEDICAID

## 2018-08-01 ENCOUNTER — OFFICE VISIT (OUTPATIENT)
Dept: SURGERY | Facility: CLINIC | Age: 38
End: 2018-08-01
Payer: MEDICAID

## 2018-08-01 VITALS
BODY MASS INDEX: 17.85 KG/M2 | DIASTOLIC BLOOD PRESSURE: 82 MMHG | WEIGHT: 97 LBS | HEIGHT: 62 IN | HEART RATE: 120 BPM | SYSTOLIC BLOOD PRESSURE: 130 MMHG | TEMPERATURE: 98 F

## 2018-08-01 DIAGNOSIS — D05.12 DUCTAL CARCINOMA IN SITU (DCIS) OF LEFT BREAST: Primary | ICD-10-CM

## 2018-08-01 DIAGNOSIS — Z09 SURGERY FOLLOW-UP EXAMINATION: Primary | ICD-10-CM

## 2018-08-01 PROCEDURE — 99024 POSTOP FOLLOW-UP VISIT: CPT | Mod: ,,, | Performed by: SURGERY

## 2018-08-01 PROCEDURE — 99211 OFF/OP EST MAY X REQ PHY/QHP: CPT | Mod: PBBFAC,27,PO | Performed by: SURGERY

## 2018-08-01 PROCEDURE — 99999 PR PBB SHADOW E&M-EST. PATIENT-LVL III: CPT | Mod: PBBFAC,,, | Performed by: SURGERY

## 2018-08-01 PROCEDURE — 99999 PR PBB SHADOW E&M-EST. PATIENT-LVL I: CPT | Mod: PBBFAC,,, | Performed by: SURGERY

## 2018-08-01 PROCEDURE — 99213 OFFICE O/P EST LOW 20 MIN: CPT | Mod: PBBFAC,PO | Performed by: SURGERY

## 2018-08-01 NOTE — PROGRESS NOTES
[facility]  General Surgery  History & Physical    Patient Name: Staci Hodge  MRN: [id number]  Admission Date: (Not on file)  Attending Physician:  Duane Bello MD  Primary Care Provider: Shilpi Clayton NP    Patient information was obtained from patient and past medical records.     Subjective:     History of Present Illness:  Patient is a 37 y.o. female with history of breast cancer, underwent bilateral mastectomy with tissue expander reconstruction on 7/23/18. Was seen in the ED 7/28/18 due to concerns for R breast pain > left, thought wound vac might not be working. Dr. Nicholson reassured patient that incisional vacs was working. Incisional vacs draining approximately 10-25 cc per vac.    Current Outpatient Prescriptions on File Prior to Visit   Medication Sig    butalbital-acetaminophen-caffeine -40 mg (FIORICET, ESGIC) -40 mg per tablet Take 1 tablet by mouth every 6 (six) hours as needed for Headaches. Limit use to 2 days per week.    cephALEXin (KEFLEX) 500 MG capsule Take 1 capsule (500 mg total) by mouth every 6 (six) hours. for 10 days    cephALEXin (KEFLEX) 500 MG capsule Take 1 capsule (500 mg total) by mouth every 6 (six) hours. for 10 days    FLUoxetine (PROZAC) 40 MG capsule Take 2 capsules (80 mg total) by mouth once daily.    OLANZapine (ZYPREXA) 20 MG tablet Take 1 tablet (20 mg total) by mouth every evening.    oxyCODONE-acetaminophen (PERCOCET) 5-325 mg per tablet Take 1 tablet by mouth every 4 (four) hours as needed for Pain.    pregabalin (LYRICA) 100 MG capsule Take 1 capsule (100 mg total) by mouth 2 (two) times daily.    propranolol (INDERAL) 10 MG tablet Take 1 tablet (10 mg total) by mouth 2 (two) times daily.    senna-docusate 8.6-50 mg (SENNA WITH DOCUSATE SODIUM) 8.6-50 mg per tablet Take 2 tablets by mouth once daily.    topiramate (TOPAMAX) 25 MG tablet Take 2 tablets (50 mg total) by mouth 2 (two) times daily.    traZODone  (DESYREL) 100 MG tablet Take 4 tablets (400 mg total) by mouth every evening.     No current facility-administered medications on file prior to visit.        Review of patient's allergies indicates:  No Known Allergies    Past Medical History:   Diagnosis Date    Abnormal Pap smear of cervix age 25    no treatement    Anxiety     Cancer 07/11/2018    breast cancer    Cervical spondylosis 2/9/2018    Colon polyp     Depression     Headache     History of psychiatric hospitalization     age 20 for SI    Hx of psychiatric care     Hypertension     Peutz-Jeghers syndrome     Psychiatric problem     Right carpal tunnel syndrome 3/9/2017    Self-harming behavior     Suicide attempt     Therapy      Past Surgical History:   Procedure Laterality Date    ADENOIDECTOMY      APPENDECTOMY      BILATERAL MASTECTOMY Bilateral 7/23/2018    Procedure: MASTECTOMY 23 hr stay;  Surgeon: Sofia Kendrick MD;  Location: [facility] OR [address] [place] [address];  Service: General;  Laterality: Bilateral;    BOWEL RESECTION  10/17/14    BREAST BIOPSY Left 06/04/2018    CARPAL TUNNEL RELEASE Bilateral     CARPAL TUNNEL RELEASE      COLONOSCOPY      DILATION AND CURETTAGE OF UTERUS      INJECTION FOR SENTINEL NODE IDENTIFICATION Left 7/23/2018    Procedure: INJECTION, FOR SENTINEL NODE IDENTIFICATION;  Surgeon: Sofia Kendrick MD;  Location: [address];  Service: General;  Laterality: Left;    INSERTION OF BREAST TISSUE EXPANDER Bilateral 7/23/2018    Procedure: INSERTION, TISSUE EXPANDER, BREAST;  Surgeon: Duane Bello MD;  Location: [address];  Service: Plastics;  Laterality: Bilateral;    intestinal polpy      SENTINEL LYMPH NODE BIOPSY Left 7/23/2018    Procedure: BIOPSY, LYMPH NODE, SENTINEL;  Surgeon: Sofia Kendrick MD;  Location: [address];  Service: General;  Laterality: Left;    SMALL INTESTINE SURGERY      3 surgeries     TONSILLECTOMY      UPPER GASTROINTESTINAL ENDOSCOPY       Family History      Problem Relation (Age of Onset)    Breast cancer Paternal Aunt, Paternal Grandmother    Colon cancer Mother (33)    Colon polyps Sister    Hypertension Father        Social History Main Topics    Smoking status: Current Every Day Smoker     Packs/day: 0.50     Years: 17.00     Types: Cigarettes     Start date: 4/17/2000    Smokeless tobacco: Never Used      Comment: told about smoking cessation clinic and give brochure    Alcohol use No    Drug use: No    Sexual activity: Not Currently     Partners: Male     Birth control/ protection: OCP      Comment: single     Review of Systems   See HPI; positive for pain R>L    Objective:     Vital Signs (Most Recent):  Temp: 98.4 °F (36.9 °C) (08/01/18 0901)  Pulse: (!) 120 (08/01/18 0901)  BP: 130/82 (08/01/18 0901) Vital Signs (24h Range):  [unfilled]     Weight: 44 kg (97 lb)  Body mass index is 17.74 kg/m².    Physical Exam  Incisional vacs bilaterally; HELEN drains (2 in each breast), serosangineous with some clots    Assessment/Plan:   36 yo F s/p bilateral mastectomy with tissue expanders on 7/23/18, healing well post-op.   - removed wound vac and superior drains in clinic  - patient should wear a support bra;   - RTC 1 week; likely remove remaining HELEN drains then  - restrict movement of arms around breasts    There are no hospital problems to display for this patient.    VTE Risk Mitigation     None          Floyd Kaur MD  General Surgery  [facility] [place]

## 2018-08-03 ENCOUNTER — PATIENT MESSAGE (OUTPATIENT)
Dept: SURGERY | Facility: CLINIC | Age: 38
End: 2018-08-03

## 2018-08-06 NOTE — PROGRESS NOTES
[facility]       Post-Op        REFERRING PHYSICIAN:  Shilpi Clayton NP    MEDICAL ONCOLOGIST:    pending  RADIATION ONCOLOGIST:   pending    DIAGNOSIS:    This is a 37 y.o. 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    TREATMENT SUMMARY:  The patient is status post bilateral skin sparing mastectomy and sentinel node biopsy on 7/23/2018 with recon by Dr. Bello.  Final pathology showed DCIS, grade 1 multicentric, largest area 1.7cm, 1mm from anterior margin    INTERVAL HISTORY:   Staci Hodge comes in for a post-op check.  She denies fever, chills, chest pain or shortness of breath.  Her pain is well controlled.      MEDICATIONS:  Current Outpatient Prescriptions   Medication Sig Dispense Refill    butalbital-acetaminophen-caffeine -40 mg (FIORICET, ESGIC) -40 mg per tablet Take 1 tablet by mouth every 6 (six) hours as needed for Headaches. Limit use to 2 days per week. 10 tablet 4    FLUoxetine (PROZAC) 40 MG capsule Take 2 capsules (80 mg total) by mouth once daily. 180 capsule 0    OLANZapine (ZYPREXA) 20 MG tablet Take 1 tablet (20 mg total) by mouth every evening. 90 tablet 0    oxyCODONE-acetaminophen (PERCOCET) 5-325 mg per tablet Take 1 tablet by mouth every 4 (four) hours as needed for Pain. 30 tablet 0    pregabalin (LYRICA) 100 MG capsule Take 1 capsule (100 mg total) by mouth 2 (two) times daily. 60 capsule 6    propranolol (INDERAL) 10 MG tablet Take 1 tablet (10 mg total) by mouth 2 (two) times daily. 60 tablet 5    senna-docusate 8.6-50 mg (SENNA WITH DOCUSATE SODIUM) 8.6-50 mg per tablet Take 2 tablets by mouth once daily.      topiramate (TOPAMAX) 25 MG tablet Take 2 tablets (50 mg total) by mouth 2 (two) times daily. 120 tablet 11    traZODone (DESYREL) 100 MG tablet Take 4 tablets (400 mg total) by mouth every evening. 120 tablet 0     No current facility-administered medications for this visit.        ALLERGIES:   Review  of patient's allergies indicates:  No Known Allergies    PHYSICAL EXAMINATION:   General:  This is a well appearing female with appropriate speech, affect and gait.     Breast:  Incisions clean, dry, and intact    PATH:  FINAL PATHOLOGIC DIAGNOSIS  1. BREAST (RIGHT MASTECTOMY): FIBROCYSTIC CHANGES; NO EVIDENCE OF ATYPIA OR MALIGNANCY  IN SECTIONS OF BREAST, SKIN, OR NIPPLE.  2. BREAST (LEFT MASTECTOMY): SMALL FOCI OF RESIDUAL LOW-GRADE DUCTAL CARCINOMA IN SITU  WITH MICROCALCIFICATIONS INVOLVING MULTIPLE AREAS OF BREAST INCLUDING UPPER OUTER  QUADRANT, NIPPLE, AND PREVIOUS BIOPSY SITE IN LOWER BREAST; ANTERIOR AND POSTERIOR  RESECTION MARGINS FREE OF MALIGNANCY.  Note: Hormone receptor assay results on previous needle biopsy tumor ([id number])) were reported by Dr. TISHA Lane as ER and PgR positive.  3. LYMPH NODE (LEFT SENTINEL LYMPH NODE, CLINICAL): NO EVIDENCE OF MALIGNANCY.  Note: All lymph node tissue was examined at three levels with routine (H&E) stains and with three epithelial  immunostains (AE1/AE3, Cam5.2, WSK). Positive and negative controls reacted appropriately.  4. LYMPH NODE (LEFT SENTINEL LYMPH NODE, CLINICAL): NO EVIDENCE OF MALIGNANCY.  Note: All lymph node tissue was examined at three levels with routine (H&E) stains and with three epithelial  immunostains (AE1/AE3, Cam5.2, WSK). Positive and negative controls reacted appropriately.  5. LYMPH NODE (LEFT SENTINEL LYMPH NODE, CLINICAL): NO EVIDENCE OF MALIGNANCY.  6. FIBROADIPOSE TISSUE: NO EVIDENCE OF MALIGNANCY.  SURGICAL PATHOLOGY CANCER CASE SUMMARY  DCIS OF BREAST  Procedure: complete mastectomy  Lymph node sampling: sentinel lymph nodes x 3  Specimen laterality: left  Tumor sites: inferior, UOQ, and nipple  Specimen size: 142g  Size of DCIS: largest area 1.7 cm UOQ  Histologic type: low-grade, cribriform, apocrine  Nuclear grade: low-grade  Necrosis: absent  Margins: 0.4 cm from posterior, 0.1 cm from anterior  Lymph nodes: 3  Stage: 0  [pTis pN0(i-)sn]    IMPRESSION:   The patient has had an uneventful postoperative course.    PLAN:   1. return in 4 months for a follow up office visit and breast exam  2. Will present at tumor board, but likely to see med onc at [facility]  3. The patient is advised in continued exam of the breast chest wall and to report to this office sooner should she note any areas of abnormality or concern.   4.  She has been instructed to meet with med onc for discussion of adjuvant treatment recommendations

## 2018-08-07 ENCOUNTER — TUMOR BOARD CONFERENCE (OUTPATIENT)
Dept: SURGERY | Facility: CLINIC | Age: 38
End: 2018-08-07

## 2018-08-07 ENCOUNTER — PATIENT MESSAGE (OUTPATIENT)
Dept: HEMATOLOGY/ONCOLOGY | Facility: CLINIC | Age: 38
End: 2018-08-07

## 2018-08-07 NOTE — PROGRESS NOTES
Interdisciplinary Breast Cancer Conference    Staci Hodge    Female    Date Presented to Tumor Board: 08/07/18    HOSPTIAL/CLINIC PRESENTING: [facility] [address]    TUMOR SITE: LEFT    TUMOR SITE: UOQ    Presenter: Yuliya Sears Kyle Hoffman (on behalf of Sofia Kendrick MD)    Reason For Consultation: Initial Presentation    Specialties Present: Medical Oncology;Radiation Oncology;Surgical Oncology;Pathology;Navigation;Research;Radiology    Patient Status: a current patient    Treatment to Date: Surgical Intervention(s) (bilateral mastectomy wtih implant reconstruction, SLNB)    Clinical Trial Eligibility: None available    Estrogen Receptor Status: Positive    Progesterone Status: Positive    Her2/WENDY Status: Not Performed (N/A - DCIS)    Cancer Staging  Ductal carcinoma in situ (DCIS) of left breast  Staging form: Breast, AJCC 8th Edition  - Pathologic stage from 8/5/2018: Stage 0 (pTis (DCIS)(3), pN0(sn), cM0, G1, ER: Positive, MI: Positive, HER2: Not Assessed) - Unsigned      RECOMMENDED PLAN: Additional Observation   Grade 1 DCIS, margin close to skin but still negative. Close observation for recurrence, but no need for radiation.     No need for hormone therapy per med/onc    UNSTAGEABLE: No         PRESENTATION AT CANCER CONFERENCE: [facility]

## 2018-08-08 ENCOUNTER — OFFICE VISIT (OUTPATIENT)
Dept: PLASTIC SURGERY | Facility: CLINIC | Age: 38
End: 2018-08-08
Payer: MEDICAID

## 2018-08-08 VITALS
BODY MASS INDEX: 19.88 KG/M2 | HEIGHT: 62 IN | HEART RATE: 104 BPM | TEMPERATURE: 98 F | WEIGHT: 108 LBS | SYSTOLIC BLOOD PRESSURE: 124 MMHG | DIASTOLIC BLOOD PRESSURE: 86 MMHG

## 2018-08-08 DIAGNOSIS — Z09 SURGERY FOLLOW-UP EXAMINATION: Primary | ICD-10-CM

## 2018-08-08 PROCEDURE — 99213 OFFICE O/P EST LOW 20 MIN: CPT | Mod: PBBFAC,PO | Performed by: SURGERY

## 2018-08-08 PROCEDURE — 99999 PR PBB SHADOW E&M-EST. PATIENT-LVL III: CPT | Mod: PBBFAC,,, | Performed by: SURGERY

## 2018-08-08 PROCEDURE — 99024 POSTOP FOLLOW-UP VISIT: CPT | Mod: ,,, | Performed by: SURGERY

## 2018-08-08 NOTE — PROGRESS NOTES
37f s/p direct to implant reconstruction on 7/23. She was last seen in clinic 1 week ago, when 1 drain was removed on each side.   In the last few days, the drains have produced aprox 15-20cc each on both sides.   She complains of pain that got worse 1 day after her clinic appt last week.     On exam, skin is viable, suture line intact, drains with serous fluid    -drains to remain in place  -f/u 1 week  -OTC for pain control

## 2018-08-11 DIAGNOSIS — G43.009 MIGRAINE WITHOUT AURA AND WITHOUT STATUS MIGRAINOSUS, NOT INTRACTABLE: ICD-10-CM

## 2018-08-13 RX ORDER — CLONAZEPAM 1 MG/1
TABLET ORAL
Qty: 90 TABLET | Refills: 2 | Status: SHIPPED | OUTPATIENT
Start: 2018-08-13 | End: 2018-10-08 | Stop reason: SDUPTHER

## 2018-08-14 RX ORDER — BUTALBITAL, ACETAMINOPHEN AND CAFFEINE 50; 325; 40 MG/1; MG/1; MG/1
TABLET ORAL
Qty: 20 TABLET | Refills: 1 | Status: SHIPPED | OUTPATIENT
Start: 2018-08-14 | End: 2018-08-28 | Stop reason: SDUPTHER

## 2018-08-15 ENCOUNTER — OFFICE VISIT (OUTPATIENT)
Dept: PLASTIC SURGERY | Facility: CLINIC | Age: 38
End: 2018-08-15
Payer: MEDICAID

## 2018-08-15 VITALS
TEMPERATURE: 98 F | HEIGHT: 62 IN | HEART RATE: 104 BPM | SYSTOLIC BLOOD PRESSURE: 119 MMHG | DIASTOLIC BLOOD PRESSURE: 76 MMHG | WEIGHT: 106.06 LBS | BODY MASS INDEX: 19.52 KG/M2

## 2018-08-15 DIAGNOSIS — Z09 SURGERY FOLLOW-UP EXAMINATION: Primary | ICD-10-CM

## 2018-08-15 PROCEDURE — 99024 POSTOP FOLLOW-UP VISIT: CPT | Mod: ,,, | Performed by: SURGERY

## 2018-08-15 PROCEDURE — 99999 PR PBB SHADOW E&M-EST. PATIENT-LVL III: CPT | Mod: PBBFAC,,, | Performed by: SURGERY

## 2018-08-15 PROCEDURE — 99213 OFFICE O/P EST LOW 20 MIN: CPT | Mod: PBBFAC,PO | Performed by: SURGERY

## 2018-08-15 NOTE — PROGRESS NOTES
37F s/p bilat direct to implant breast reconstruction, seen last week, but drains left in place. Her pain is much improved, and is doing well overall    On exam, drains have minimal serous fluid.   Drain records have 10-15cc's/day the last few days.   No evidence of seroma, incision is well healed    -f/u next week  -supportive bra  -OTC pain meds as needed  -drains removed today

## 2018-08-22 ENCOUNTER — OFFICE VISIT (OUTPATIENT)
Dept: PLASTIC SURGERY | Facility: CLINIC | Age: 38
End: 2018-08-22
Payer: MEDICAID

## 2018-08-22 VITALS
SYSTOLIC BLOOD PRESSURE: 120 MMHG | TEMPERATURE: 98 F | BODY MASS INDEX: 19.25 KG/M2 | RESPIRATION RATE: 12 BRPM | HEIGHT: 62 IN | DIASTOLIC BLOOD PRESSURE: 85 MMHG | WEIGHT: 104.63 LBS | HEART RATE: 122 BPM

## 2018-08-22 DIAGNOSIS — Z09 SURGERY FOLLOW-UP EXAMINATION: Primary | ICD-10-CM

## 2018-08-22 PROCEDURE — 99213 OFFICE O/P EST LOW 20 MIN: CPT | Mod: PBBFAC,PO | Performed by: SURGERY

## 2018-08-22 PROCEDURE — 99999 PR PBB SHADOW E&M-EST. PATIENT-LVL III: CPT | Mod: PBBFAC,,, | Performed by: SURGERY

## 2018-08-22 PROCEDURE — 99024 POSTOP FOLLOW-UP VISIT: CPT | Mod: ,,, | Performed by: SURGERY

## 2018-08-22 NOTE — PROGRESS NOTES
Staci Hodge presents to Plastic Surgery Clinic after having direct implant   reconstruction.  She has done well.  All drains are out.  No sign of infection.    She has been gaining weight, but she has absolutely no subcutaneous fat.  She   does need to have a left implant lowered approximately 1.5 cm.  We can easily do   this.  We will arrange that for last week in September or early October.      NATY/MARGARET  dd: 08/22/2018 16:45:47 (CDT)  td: 08/22/2018 23:25:52 (CDT)  Doc ID   #[id number]  Job ID #[id number]    CC:

## 2018-08-28 ENCOUNTER — OFFICE VISIT (OUTPATIENT)
Dept: PSYCHIATRY | Facility: CLINIC | Age: 38
End: 2018-08-28
Payer: MEDICAID

## 2018-08-28 VITALS
SYSTOLIC BLOOD PRESSURE: 130 MMHG | DIASTOLIC BLOOD PRESSURE: 80 MMHG | BODY MASS INDEX: 19.07 KG/M2 | WEIGHT: 103.63 LBS | HEART RATE: 94 BPM | HEIGHT: 62 IN | RESPIRATION RATE: 18 BRPM

## 2018-08-28 DIAGNOSIS — Z72.0 TOBACCO ABUSE: ICD-10-CM

## 2018-08-28 DIAGNOSIS — G47.00 INSOMNIA, UNSPECIFIED TYPE: ICD-10-CM

## 2018-08-28 DIAGNOSIS — F41.1 GAD (GENERALIZED ANXIETY DISORDER): ICD-10-CM

## 2018-08-28 DIAGNOSIS — F40.10 SOCIAL ANXIETY DISORDER: ICD-10-CM

## 2018-08-28 DIAGNOSIS — F40.01 PANIC DISORDER WITH AGORAPHOBIA: ICD-10-CM

## 2018-08-28 DIAGNOSIS — G43.009 MIGRAINE WITHOUT AURA AND WITHOUT STATUS MIGRAINOSUS, NOT INTRACTABLE: Primary | ICD-10-CM

## 2018-08-28 DIAGNOSIS — F39 MOOD DISORDER: ICD-10-CM

## 2018-08-28 DIAGNOSIS — F43.10 PTSD (POST-TRAUMATIC STRESS DISORDER): ICD-10-CM

## 2018-08-28 PROCEDURE — 99214 OFFICE O/P EST MOD 30 MIN: CPT | Mod: S$GLB,,, | Performed by: PSYCHIATRY & NEUROLOGY

## 2018-08-28 PROCEDURE — 99213 OFFICE O/P EST LOW 20 MIN: CPT | Mod: PBBFAC | Performed by: PSYCHIATRY & NEUROLOGY

## 2018-08-28 PROCEDURE — 90836 PSYTX W PT W E/M 45 MIN: CPT | Mod: S$GLB,,, | Performed by: PSYCHIATRY & NEUROLOGY

## 2018-08-28 PROCEDURE — 99999 PR PBB SHADOW E&M-EST. PATIENT-LVL III: CPT | Mod: PBBFAC,,, | Performed by: PSYCHIATRY & NEUROLOGY

## 2018-08-28 RX ORDER — AMITRIPTYLINE HYDROCHLORIDE 50 MG/1
TABLET, FILM COATED ORAL
Qty: 30 TABLET | Refills: 1 | Status: SHIPPED | OUTPATIENT
Start: 2018-08-28 | End: 2018-10-04 | Stop reason: SDUPTHER
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
## 2018-09-12 ENCOUNTER — OFFICE VISIT (OUTPATIENT)
Dept: PLASTIC SURGERY | Facility: CLINIC | Age: 38
End: 2018-09-12
Payer: MEDICAID

## 2018-09-12 VITALS
SYSTOLIC BLOOD PRESSURE: 116 MMHG | DIASTOLIC BLOOD PRESSURE: 80 MMHG | HEART RATE: 102 BPM | HEIGHT: 62 IN | WEIGHT: 103.5 LBS | BODY MASS INDEX: 19.05 KG/M2 | TEMPERATURE: 98 F

## 2018-09-12 DIAGNOSIS — Z09 SURGERY FOLLOW-UP EXAMINATION: Primary | ICD-10-CM

## 2018-09-12 PROCEDURE — 99999 PR PBB SHADOW E&M-EST. PATIENT-LVL III: CPT | Mod: PBBFAC,,, | Performed by: SURGERY

## 2018-09-12 PROCEDURE — 99024 POSTOP FOLLOW-UP VISIT: CPT | Mod: ,,, | Performed by: SURGERY

## 2018-09-12 PROCEDURE — 99213 OFFICE O/P EST LOW 20 MIN: CPT | Mod: PBBFAC,PO | Performed by: SURGERY

## 2018-09-12 NOTE — PROGRESS NOTES
"HPI:  Staci Hodge is a 37f s/p direct to implant reconstruction on 7/23. She was last seen in clinic on 8/23, at which time all drains were noted to be out and no signs of infection were noted. She noted she had been gaining weight, but on exam still had no subcutaneous fat. It was noted that she would need to have her left implant lowered approximately 1.5 cm.    Since last being seen, she notes nothing new has arisen. She would still like to discuss having her left implant lowered, but is unsure if she would like to proceed at this point in time as she does not understand the details of the procedure. She notes her breasts hurt all the time (3/10 intensity), and would be more interested in having her implant lowered if "it could help her pain". Otherwise no further complaints or concerns.    Physical Exam:  General: alert, oriented, in no apparent distress.  Breast: Bilateral incisions well healed without erythema or drainage. Edges well approximated.   Neuro: A&Ox4      Plan:  -Will plan to have left implant lowered (approximately 1.5 cm) and bilateral breast fat grafts in mid-November  -Plan to see in clinic in 6 weeks for follow-up, pre-operative surgery planning  -Discussed with patient importance of gaining weight for fat grafts over past few months      Isha Kaur MD  Plastic Surgery, PGYI [facility]      "

## 2018-09-13 ENCOUNTER — PATIENT MESSAGE (OUTPATIENT)
Dept: OBSTETRICS AND GYNECOLOGY | Facility: CLINIC | Age: 38
End: 2018-09-13

## 2018-09-19 RX ORDER — FLUOXETINE HYDROCHLORIDE 40 MG/1
80 CAPSULE ORAL DAILY
Qty: 180 CAPSULE | Refills: 0 | Status: SHIPPED | OUTPATIENT
Start: 2018-09-19 | End: 2019-01-30 | Stop reason: SDUPTHER

## 2018-09-26 PROBLEM — N92.1 MENORRHAGIA WITH IRREGULAR CYCLE: Status: ACTIVE | Noted: 2018-09-26

## 2018-10-03 ENCOUNTER — PATIENT MESSAGE (OUTPATIENT)
Dept: OBSTETRICS AND GYNECOLOGY | Facility: CLINIC | Age: 38
End: 2018-10-03

## 2018-10-03 ENCOUNTER — TELEPHONE (OUTPATIENT)
Dept: PLASTIC SURGERY | Facility: CLINIC | Age: 38
End: 2018-10-03

## 2018-10-03 ENCOUNTER — OFFICE VISIT (OUTPATIENT)
Dept: PLASTIC SURGERY | Facility: CLINIC | Age: 38
End: 2018-10-03
Payer: MEDICAID

## 2018-10-03 VITALS
HEART RATE: 119 BPM | BODY MASS INDEX: 20.24 KG/M2 | HEIGHT: 62 IN | SYSTOLIC BLOOD PRESSURE: 140 MMHG | TEMPERATURE: 98 F | RESPIRATION RATE: 16 BRPM | DIASTOLIC BLOOD PRESSURE: 87 MMHG | WEIGHT: 110 LBS

## 2018-10-03 DIAGNOSIS — Z09 SURGERY FOLLOW-UP EXAMINATION: Primary | ICD-10-CM

## 2018-10-03 PROCEDURE — 99213 OFFICE O/P EST LOW 20 MIN: CPT | Mod: PBBFAC | Performed by: PHYSICIAN ASSISTANT

## 2018-10-03 PROCEDURE — 99024 POSTOP FOLLOW-UP VISIT: CPT | Mod: ,,, | Performed by: PHYSICIAN ASSISTANT

## 2018-10-03 PROCEDURE — 99999 PR PBB SHADOW E&M-EST. PATIENT-LVL III: CPT | Mod: PBBFAC,,, | Performed by: PHYSICIAN ASSISTANT

## 2018-10-03 NOTE — TELEPHONE ENCOUNTER
MD notified, Called pt to inform she could be seen today at 2nd flr clinic at [facility] with verbal understanding. Understands there could be over 1 hr wait to be seen, travelling from [place], [place]. Connection lost, called back and LVM on phone to repeat info including contact number and location to be seen. DANIEL, MARIELLE

## 2018-10-04 DIAGNOSIS — G43.009 MIGRAINE WITHOUT AURA AND WITHOUT STATUS MIGRAINOSUS, NOT INTRACTABLE: ICD-10-CM

## 2018-10-04 RX ORDER — BUTALBITAL, ACETAMINOPHEN AND CAFFEINE 50; 325; 40 MG/1; MG/1; MG/1
1 TABLET ORAL EVERY 6 HOURS PRN
Qty: 10 TABLET | Refills: 4 | Status: SHIPPED | OUTPATIENT
Start: 2018-10-04 | End: 2020-05-21 | Stop reason: SDUPTHER

## 2018-10-04 RX ORDER — AMITRIPTYLINE HYDROCHLORIDE 50 MG/1
TABLET, FILM COATED ORAL
Qty: 30 TABLET | Refills: 1 | Status: SHIPPED | OUTPATIENT
Start: 2018-10-04 | End: 2019-01-30 | Stop reason: ALTCHOICE

## 2018-10-05 NOTE — PROGRESS NOTES
Staci Hodge presents to Plastic Surgery Clinic on 10/3/2018 for a follow up visit status post B breast reconstruction with direct implant placement on 07/23/2018. She is here today with complaint of B breast pain which she describes as intermittent. She was seen and evaluated by myself and Dr. Duane Bello       Review of patient's allergies indicates:  No Known Allergies  Current Outpatient Medications on File Prior to Visit   Medication Sig Dispense Refill    clonazePAM (KLONOPIN) 1 MG tablet TAKE 1 TABLET BY MOUTH THREE TIMES A DAY 90 tablet 2    FLUoxetine (PROZAC) 40 MG capsule TAKE 2 CAPSULES (80 MG TOTAL) BY MOUTH ONCE DAILY. 180 capsule 0    OGESTREL, 28, 0.5-50 mg-mcg per tablet TAKE 1 TABLET BY MOUTH EVERY DAY 28 tablet 2    OLANZapine (ZYPREXA) 20 MG tablet Take 1 tablet (20 mg total) by mouth every evening. 90 tablet 0    pregabalin (LYRICA) 100 MG capsule Take 1 capsule (100 mg total) by mouth 2 (two) times daily. 60 capsule 6    propranolol (INDERAL) 10 MG tablet Take 1 tablet (10 mg total) by mouth 2 (two) times daily. 60 tablet 5    topiramate (TOPAMAX) 25 MG tablet Take 2 tablets (50 mg total) by mouth 2 (two) times daily. 120 tablet 11    traZODone (DESYREL) 100 MG tablet TAKE THREE TABLETS BY MOUTH ONCE A DAY IN THE EVENING  0     No current facility-administered medications on file prior to visit.      Patient Active Problem List   Diagnosis    Small bowel obstruction    History of abnormal Pap smear    LLQ pain    Peutz-Jeghers syndrome    Weight loss    Leukocytosis    Tobacco abuse    Insomnia    Mood disorder    MRAY LOU (generalized anxiety disorder)    Panic disorder with agoraphobia    PTSD (post-traumatic stress disorder)    Social anxiety disorder    Palpitations    Tachycardia    Arm numbness left    Stabbing headache    Tension headache    Right carpal tunnel syndrome    Migraine without aura    Microalbuminuria    Neck pain    Right arm  numbness    Left carpal tunnel syndrome    Cervical paraspinal muscle spasm    Glucosuria with normal serum glucose    History of pyelonephritis    Left arm numbness    Myofascial muscle pain    Cervical spondylosis    OCD (obsessive compulsive disorder)    Menorrhagia with regular cycle    Hypokalemia    Inguinal lymphadenopathy    Ductal carcinoma in situ (DCIS) of left breast    DCIS (ductal carcinoma in situ)    Menorrhagia with irregular cycle     Past Surgical History:   Procedure Laterality Date    ADENOIDECTOMY      APPENDECTOMY      BILATERAL MASTECTOMY Bilateral 7/23/2018    Procedure: MASTECTOMY 23 hr stay;  Surgeon: Sofia Kendrick MD;  Location: [facility] OR [address] [place] [address];  Service: General;  Laterality: Bilateral;    BIOPSY, LYMPH NODE, SENTINEL Left 7/23/2018    Performed by Sofia Kendrick MD at [facility] OR [address] [place] [address]    BLOCK-NERVE-MEDIAL BRANCH-CERVICAL (C3,4,5) Bilateral 2/9/2018    Per[facility] at [facility] OR    BOWEL RESECTION  10/17/14    BREAST BIOPSY Left 06/04/2018    CARPAL TUNNEL RELEASE Bilateral     CARPAL TUNNEL RELEASE      COLONOSCOPY      DILATION AND CURETTAGE OF UTERUS      DILATION-CURETTAGE OF UTERUS (D AND C) N/A 3/5/2018    Performed by Simeon Peterson MD at [facility] OR    ENTEROSCOPY N/A 3/18/2015    Performed by Chandan Dillon MD at [facility] ENDO (2ND FLR)    INJECTION FOR SENTINEL NODE IDENTIFICATION Left 7/23/2018    Procedure: INJECTION, FOR SENTINEL NODE IDENTIFICATION;  Surgeon: Sofia Kendrick MD;  Location: [facility] OR [address] [place] [address];  Service: General;  Laterality: Left;    INJECTION, FOR SENTINEL NODE IDENTIFICATION Left 7/23/2018    [facility]    INJECTION-STEROID-EPIDURAL-CERVICAL (C7-T1) N/A 1/19/2018    Performed by John Chapin [facility] at [facility] OR    INSERTION OF BREAST TISSUE EXPANDER Bilateral 7/23/2018    Procedure: INSERTION, TISSUE EXPANDER, BREAST;  Surgeon: Duane Bello MD;  Location: [facility] OR [facility]  FLR;  Service: Plastics;  Laterality: Bilateral;    INSERTION, TISSUE EXPANDER, BREAST Bilateral 7/23/2018    Performed by [facility]ello [facility] at [facility] OR 2ND FLR    intestinal polpy      LAPAROTOMY-EXPLORATORY N/A 10/17/2014    Performed by Joshua Goldberg, MD at [facility] OR 2ND FLR    MASTECTOMY 23 hr stay Bilateral 7/23/2018    Performed by Sofia Kendrick MD at [facility] OR 2ND FLR    RELEASE-CARPAL TUNNEL Right 4/19/2017    [facility]    RELEASE-CUBITAL TUNNEL Right 4/19/2017    [facility] at [facility] OR    RESECTION - SMALL BOWEL N/A 10/17/2014    Performed by Joshua Goldberg, MD at [facility] OR 2ND FLR    SENTINEL LYMPH NODE BIOPSY Left 7/23/2018    Procedure: BIOPSY, LYMPH NODE, SENTINEL;  Surgeon: Sofia Kendrick MD;  Location: [facility] OR [address] [place] [address];  Service: General;  Laterality: Left;    SMALL INTESTINE SURGERY      3 surgeries     TONSILLECTOMY      ULTRASOUND-ENDOSCOPIC-UPPER N/A 3/18/2015    Performed by Chandan Dillon MD at [facility] ENDO (2ND FLR)    UPPER GASTROINTESTINAL ENDOSCOPY       PHYSICAL EXAMINATION  Vitals:    10/03/18 1628   BP: (!) 140/87   Pulse: (!) 119   Resp: 16   Temp: 98.3 °F (36.8 °C)     WD WN NAD  VSS  Normal resp effort  R breast - incision healed, no erythema/drainage  L breast - incision healed, no erythema/drainage    ASSESSMENT/PLAN  38 y.o. F s/p b breast recon  - Doing well, no acute issues  - Breast are normal on examination, no erythema.   - Recommend she alternate OTC Tylenol and NSAID  - RTC x 6 weeks    All questions were answered. The patient was advised to call the clinic with any questions or concerns prior to their next visit.

## 2018-10-08 RX ORDER — CLONAZEPAM 1 MG/1
1 TABLET ORAL 3 TIMES DAILY
Qty: 90 TABLET | Refills: 2 | Status: SHIPPED | OUTPATIENT
Start: 2018-10-08 | End: 2019-01-23 | Stop reason: SDUPTHER

## 2018-10-11 DIAGNOSIS — G43.009 MIGRAINE WITHOUT AURA AND WITHOUT STATUS MIGRAINOSUS, NOT INTRACTABLE: ICD-10-CM

## 2018-10-11 RX ORDER — BUTALBITAL, ACETAMINOPHEN AND CAFFEINE 50; 325; 40 MG/1; MG/1; MG/1
TABLET ORAL
Qty: 20 TABLET | Refills: 1 | OUTPATIENT
Start: 2018-10-11

## 2018-10-12 DIAGNOSIS — Z85.3 PERSONAL HISTORY OF BREAST CANCER: Primary | ICD-10-CM

## 2018-10-23 ENCOUNTER — OFFICE VISIT (OUTPATIENT)
Dept: INTERNAL MEDICINE | Facility: CLINIC | Age: 38
End: 2018-10-23
Payer: MEDICAID

## 2018-10-23 VITALS
SYSTOLIC BLOOD PRESSURE: 116 MMHG | RESPIRATION RATE: 20 BRPM | BODY MASS INDEX: 21.35 KG/M2 | HEART RATE: 101 BPM | OXYGEN SATURATION: 100 % | WEIGHT: 116 LBS | HEIGHT: 62 IN | DIASTOLIC BLOOD PRESSURE: 80 MMHG

## 2018-10-23 DIAGNOSIS — F39 MOOD DISORDER: ICD-10-CM

## 2018-10-23 DIAGNOSIS — Z23 NEEDS FLU SHOT: ICD-10-CM

## 2018-10-23 DIAGNOSIS — R31.9 URINARY TRACT INFECTION WITH HEMATURIA, SITE UNSPECIFIED: Primary | ICD-10-CM

## 2018-10-23 DIAGNOSIS — N39.0 URINARY TRACT INFECTION WITH HEMATURIA, SITE UNSPECIFIED: Primary | ICD-10-CM

## 2018-10-23 LAB
BILIRUB SERPL-MCNC: ABNORMAL MG/DL
BLOOD URINE, POC: ABNORMAL
COLOR, POC UA: CLEAR
GLUCOSE UR QL STRIP: NORMAL
KETONES UR QL STRIP: ABNORMAL
LEUKOCYTE ESTERASE URINE, POC: ABNORMAL
NITRITE, POC UA: ABNORMAL
PH, POC UA: 6
PROTEIN, POC: ABNORMAL
SPECIFIC GRAVITY, POC UA: 1.02
UROBILINOGEN, POC UA: NORMAL

## 2018-10-23 PROCEDURE — 99214 OFFICE O/P EST MOD 30 MIN: CPT | Mod: PBBFAC,PN,25 | Performed by: NURSE PRACTITIONER

## 2018-10-23 PROCEDURE — 81002 URINALYSIS NONAUTO W/O SCOPE: CPT | Mod: PBBFAC,PN | Performed by: NURSE PRACTITIONER

## 2018-10-23 PROCEDURE — 90471 IMMUNIZATION ADMIN: CPT | Mod: PBBFAC,PN

## 2018-10-23 PROCEDURE — 99999 PR PBB SHADOW E&M-EST. PATIENT-LVL IV: CPT | Mod: PBBFAC,,, | Performed by: NURSE PRACTITIONER

## 2018-10-23 PROCEDURE — 99214 OFFICE O/P EST MOD 30 MIN: CPT | Mod: S$PBB,,, | Performed by: NURSE PRACTITIONER

## 2018-10-23 PROCEDURE — 87086 URINE CULTURE/COLONY COUNT: CPT

## 2018-10-23 RX ORDER — OLANZAPINE 20 MG/1
20 TABLET ORAL NIGHTLY
Qty: 90 TABLET | Refills: 0 | Status: SHIPPED | OUTPATIENT
Start: 2018-10-23 | End: 2019-01-30 | Stop reason: ALTCHOICE

## 2018-10-23 RX ORDER — TIZANIDINE 2 MG/1
TABLET ORAL
Refills: 3 | COMMUNITY
Start: 2018-09-04 | End: 2019-01-30

## 2018-10-23 RX ORDER — PREGABALIN 50 MG/1
50 CAPSULE ORAL 3 TIMES DAILY
Refills: 5 | COMMUNITY
Start: 2018-08-11 | End: 2018-10-23 | Stop reason: SDUPTHER

## 2018-10-23 RX ORDER — CIPROFLOXACIN 500 MG/1
500 TABLET ORAL 2 TIMES DAILY
Qty: 10 TABLET | Refills: 0 | Status: SHIPPED | OUTPATIENT
Start: 2018-10-23 | End: 2018-10-28

## 2018-10-23 NOTE — PATIENT INSTRUCTIONS

## 2018-10-23 NOTE — PROGRESS NOTES
Subjective:       Patient ID: Staci Hodge is a 38 y.o. female.    Chief Complaint: Abdominal Pain (on R. side - few months ) and Flu Vaccine    Patient is known, to me and presents with   Chief Complaint   Patient presents with    Abdominal Pain     on R. side - few months     Flu Vaccine   .  Denies chest pain and shortness of breath.  Patient presents with possible UTI and also needs to have flu shot. Needs to have refills on zyprexa and mood is better.   HPI  Review of Systems   Constitutional: Negative for activity change, appetite change, fatigue, fever and unexpected weight change.   HENT: Negative for congestion, ear discharge, ear pain, hearing loss, postnasal drip and tinnitus.    Eyes: Negative for photophobia, pain and visual disturbance.   Respiratory: Negative for cough, shortness of breath, wheezing and stridor.    Cardiovascular: Negative for chest pain, palpitations and leg swelling.   Gastrointestinal: Positive for abdominal pain. Negative for abdominal distention, constipation, diarrhea, nausea and vomiting.   Genitourinary: Positive for frequency. Negative for difficulty urinating, dysuria, hematuria and urgency.   Musculoskeletal: Negative for arthralgias, back pain, gait problem, joint swelling, myalgias and neck pain.   Skin: Negative.    Neurological: Positive for headaches. Negative for dizziness, seizures, syncope, weakness, light-headedness and numbness.   Hematological: Negative for adenopathy. Does not bruise/bleed easily.   Psychiatric/Behavioral: Negative for behavioral problems, confusion, hallucinations, sleep disturbance and suicidal ideas. The patient is not nervous/anxious.        Objective:      Physical Exam   Constitutional: She is oriented to person, place, and time. She appears well-developed and well-nourished. No distress.   HENT:   Head: Normocephalic and atraumatic.   Right Ear: External ear normal.   Left Ear: External ear normal.   Mouth/Throat: Oropharynx is  clear and moist. No oropharyngeal exudate.   Eyes: Conjunctivae and EOM are normal. Pupils are equal, round, and reactive to light. Right eye exhibits no discharge. Left eye exhibits no discharge.   Neck: Normal range of motion. Neck supple. No JVD present. No thyromegaly present.   Cardiovascular: Normal rate, regular rhythm, normal heart sounds and intact distal pulses. Exam reveals no gallop and no friction rub.   No murmur heard.  Pulmonary/Chest: Effort normal and breath sounds normal. No stridor. No respiratory distress. She has no wheezes. She has no rales. She exhibits no tenderness.   Abdominal: Soft. Bowel sounds are normal. She exhibits no distension and no mass. There is no tenderness. There is no rebound.   Genitourinary:   Genitourinary Comments: See dip   Musculoskeletal: Normal range of motion. She exhibits no edema or tenderness.   Lymphadenopathy:     She has no cervical adenopathy.   Neurological: She is alert and oriented to person, place, and time. She has normal reflexes. She displays normal reflexes. No cranial nerve deficit. She exhibits normal muscle tone. Coordination normal.   Skin: Skin is warm and dry. Capillary refill takes less than 2 seconds. No rash noted. No erythema. No pallor.   Psychiatric: She has a normal mood and affect. Her behavior is normal. Judgment and thought content normal.   Negative SI/HI       Assessment:       1. Urinary tract infection with hematuria, site unspecified    2. Needs flu shot    3. Mood disorder        Plan:   Staci was seen today for abdominal pain and flu vaccine.    Diagnoses and all orders for this visit:    Urinary tract infection with hematuria, site unspecified  -     POCT URINE DIPSTICK WITHOUT MICROSCOPE  -     Urine culture; Future  -     ciprofloxacin HCl (CIPRO) 500 MG tablet; Take 1 tablet (500 mg total) by mouth 2 (two) times daily. for 5 days    Needs flu shot  -     Influenza - Quadrivalent (3 years & older) (PF)    Mood disorder  -    "  OLANZapine (ZYPREXA) 20 MG tablet; Take 1 tablet (20 mg total) by mouth every evening.    "This note will not be shared with the patient."  Refills given on zyprexa and will try to see her psy doc soon  Keep hydrated  rtc as scheduled   "

## 2018-10-24 LAB
BACTERIA UR CULT: NORMAL
BACTERIA UR CULT: NORMAL

## 2018-10-30 ENCOUNTER — TELEPHONE (OUTPATIENT)
Dept: INTERNAL MEDICINE | Facility: CLINIC | Age: 38
End: 2018-10-30

## 2018-10-30 NOTE — TELEPHONE ENCOUNTER
----- Message from Tamica Sánchez RN sent at 10/30/2018 10:59 AM CDT -----  Pt. Seen by you recently// she is scheduled for creation or revision, inframmary fold left breast.  Transfer, fat tissue bilateral breast by Dr. Bello 11/12/18  Request medical clearance.        TATUM Sánchez RN BC  Pre-op anesthesia

## 2018-10-31 ENCOUNTER — ANESTHESIA EVENT (OUTPATIENT)
Dept: SURGERY | Facility: HOSPITAL | Age: 38
End: 2018-10-31
Payer: MEDICAID

## 2018-10-31 ENCOUNTER — TELEPHONE (OUTPATIENT)
Dept: PREADMISSION TESTING | Facility: HOSPITAL | Age: 38
End: 2018-10-31

## 2018-10-31 DIAGNOSIS — Z01.818 PREOP TESTING: Primary | ICD-10-CM

## 2018-10-31 NOTE — ANESTHESIA PREPROCEDURE EVALUATION
Anesthesia Assessment: Preoperative EQUATION     Planned Procedure: Procedure(s) (LRB):  CREATION OR REVISION, INFRAMAMMARY FOLD LEFT BREAST (Left)  TRANSFER, FAT TISSUE BILATERAL BREAST (Bilateral)  Requested Anesthesia Type:General  Surgeon: Duane Bello MD  Service: Plastics  Known or anticipated Date of Surgery:11/12/2018     Surgeon notes: reviewed     Electronic QUestionnaire Assessment completed via nurse interview with patient.         No aq           Triage considerations:   Pt. States was difficult to wake after surgery in July, not reflected in note        Previous anesthesia records:GETA  7/23/18      REMOVED]      Airway - Non-Surgical 07/23/18 0753 Endotracheal Tube   Airway Present Prior to Hospital Arrival?: No Placement Date: 07/23/18 Placement Time: 0753 Method of Intubation: Direct laryngoscopy Inserted by: CRNA Airway Device: Endotracheal Tube Mask Ventilation: Easy Intubated: Postinduction Blade: Paula #2 Airway Device Size: 7.0 Style: Cuffed Cuff Inflation: Minimal occlusive pressure Placement Verified By: Auscultation;Capnometry;ETT Condensation Grade: Grade I Complicating Factors: None Findings Post-Intubation: Positive EtCO2;Atraumatic/Condition of teeth unchanged;Bilateral breath sounds Depth of Insertion (cm): 21 Secured at: Lips Complications: None Breath Sounds: Equal Bilateral Insertion attempts (enter comment if more than 2 attempts): 1 Removal Date: 07/23/18 Removal Time: 1353   Airway Placement Date: 07/23/18 Placement Time: 0753 Method of Intubation: Direct laryngoscopy Inserted by: CRNA Airway Device: Endotracheal Tube Airway Device Size: 7.0 Style: Cuffed Depth of Insertion (cm): 21 Placement Verified By: Auscultation;Capnometry;ETT Condensation Breath Sounds: Equal Bilateral Insertion attempts (enter comment if more than 2 attempts): 1 Removal Date: 07/23/18 Removal Time: 1353         Last PCP note: within 1 month , within [facility]     Subspecialty notes: Gastroenterology, Hematology/Oncology, Nephrology, Neurology, Rheumatology, Pain Management, plastic surgery     Other important co-morbidities:   Hx migraine headache  Cervical spondylosis  Mood disorder  MARY LOU  PTSD  OCD  Hx palpitations  Hx Tachycardia  Hx of breast ca.  Hx small bowel obstruction  Smoker   neuropathy        Tests already available:  No recent tests.                              EKG 12-lead   Order: [id number]   Status:  Edited Result - FINAL   Visible to patient:  Yes (Patient Portal) Next appt:  11/06/2018 at 01:20 PM in Hematology and Oncology (Yuan Macedo MD) Dx:  Tachycardia      Narrative   Performed by: GEMUSE   Test Reason : R00.0    Vent. Rate : 123 BPM     Atrial Rate : 123 BPM     P-R Int : 114 ms          QRS Dur : 060 ms      QT Int : 292 ms       P-R-T Axes : 057 -15 025 degrees     QTc Int : 418 ms    Sinus tachycardia  Leftward axis  When compared with ECG of 17-APR-2017 15:48,  No significant change was found  Confirmed by Ilir Moya MD ([id number]) on 9/7/2017 9:52:51 AM  Also confirmed by Ilir Moya MD ([id number])  on 9/7/2017 2:45:31 PM    Referred By: SARA STEELE           Confirmed By:Ilir Moya MD      Specimen Collected: 09/06/17 20:47 Last Resulted: 09/07/17 14:45             Instructions given. (See in Nurse's note)     Optimization:  Anesthesia Preop Clinic Assessment  Indicated    Medical Opinion Indicated                          Plan:    Testing:  Hemoglobin   Pre-anesthesia  visit                                        Visit focus: none                           Consultation:Patient's PCP for a statement of optimization    Navigation: Tests Scheduled.                         Consults scheduled.                        Results will be tracked by Preop Clinic                                                                                                              10/31/2018    Pre-operative evaluation for CREATION OR REVISION,  INFRAMAMMARY FOLD LEFT BREAST (Left), TRANSFER, FAT TISSUE BILATERAL BREAST (Bilateral)    Staci Hodge is a 38 y.o. female with a pmhx of breast cancer(s/p bilateral mastectomy), peutz heghers(s/p partial small bowl resection in 2014), and migraine. She is presenting for procedure noted above.     Patient Active Problem List   Diagnosis    Small bowel obstruction    History of abnormal Pap smear    LLQ pain    Peutz-Jeghers syndrome    Weight loss    Leukocytosis    Tobacco abuse    Insomnia    Mood disorder    AMRY LOU (generalized anxiety disorder)    Panic disorder with agoraphobia    PTSD (post-traumatic stress disorder)    Social anxiety disorder    Palpitations    Tachycardia    Arm numbness left    Stabbing headache    Tension headache    Right carpal tunnel syndrome    Migraine without aura    Microalbuminuria    Neck pain    Right arm numbness    Left carpal tunnel syndrome    Cervical paraspinal muscle spasm    Glucosuria with normal serum glucose    History of pyelonephritis    Left arm numbness    Myofascial muscle pain    Cervical spondylosis    OCD (obsessive compulsive disorder)    Menorrhagia with regular cycle    Hypokalemia    Inguinal lymphadenopathy    Ductal carcinoma in situ (DCIS) of left breast    DCIS (ductal carcinoma in situ)    Menorrhagia with irregular cycle    S/P breast reconstruction         Past Surgical History:   Procedure Laterality Date    ADENOIDECTOMY      APPENDECTOMY      BILATERAL MASTECTOMY Bilateral 7/23/2018    Procedure: MASTECTOMY 23 hr stay;  Surgeon: Sofia Kendrick MD;  Location: [facility] OR [address] [place] [address];  Service: General;  Laterality: Bilateral;    BIOPSY, LYMPH NODE, SENTINEL Left 7/23/2018    Performed by Sofia Kendrick MD at [facility] OR [address] [place] [address]    BLOCK-NERVE-MEDIAL BRANCH-CERVICAL (C3,4,5) Bilateral 2/9/2018    [facility] at [facility] OR    BOWEL RESECTION  10/17/14    BREAST BIOPSY Left 06/04/2018    CARPAL  TUNNEL RELEASE Bilateral     CARPAL TUNNEL RELEASE      COLONOSCOPY      DILATION AND CURETTAGE OF UTERUS      DILATION-CURETTAGE OF UTERUS (D AND C) N/A 3/5/2018    Performed by Simeon Peterson [facility] at [facility] OR    ENTEROSCOPY N/A 3/18/2015    Performed by Chandan Dillon MD at [facility] ENDO (2ND FLR)    INJECTION FOR SENTINEL NODE IDENTIFICATION Left 7/23/2018    Procedure: INJECTION, FOR SENTINEL NODE IDENTIFICATION;  Surgeon: Sofia Kendrick MD;  Location: [facility] OR [address] [place] [address];  Service: General;  Laterality: Left;    INJECTION, FOR SENTINEL NODE IDENTIFICATION Left 7/23/2018    Performed by Sofia Kendrick MD at [facility] OR 2ND FLR    INJECTION-STEROID-EPIDURAL-CERVICAL (C7-T1) N/A 1/19/2018    Performed by John Chapin MD at [facility] OR    INSERTION OF BREAST TISSUE EXPANDER Bilateral 7/23/2018    Procedure: INSERTION, TISSUE EXPANDER, BREAST;  Surgeon: Duane Bello MD;  Location: [facility] OR [address] [place] [address];  Service: Plastics;  Laterality: Bilateral;    INSERTION, TISSUE EXPANDER, BREAST Bilateral 7/23/2018    Performed by Duane Bello MD at [facility] OR 2ND FLR    intestinal polpy      LAPAROTOMY-EXPLORATORY N/A 10/17/2014    Performed by Joshua Goldberg, MD at [facility] OR 2ND FLR    MASTECTOMY 23 hr stay Bilateral 7/23/2018    Performed by Sofia Kendrick MD at [facility] OR 2ND FLR    RELEASE-CARPAL TUNNEL Right 4/19/2017    Performed by Larry Patel MD at [facility] OR    RELEASE-CUBITAL TUNNEL Right 4/19/2017    Performed by Larry Patel MD at [facility]    RESECTION - SMALL BOWEL N/A 10/17/2014    Performed by Joshua Goldberg, MD at [facility] OR 2ND FLR    SENTINEL LYMPH NODE BIOPSY Left 7/23/2018    Procedure: BIOPSY, LYMPH NODE, SENTINEL;  Surgeon: Sofia Kendrick MD;  Location: [facility] OR [address] [place] [address];  Service: General;  Laterality: Left;    SMALL INTESTINE SURGERY      3 surgeries     TONSILLECTOMY      ULTRASOUND-ENDOSCOPIC-UPPER N/A 3/18/2015    Performed by Chandan Dillon MD at [facility] ENDO (2ND FLR)     UPPER GASTROINTESTINAL ENDOSCOPY           Vital Signs Range (Last 24H):  Temp:  [37.2 °C (98.9 °F)]   Pulse:  [110]   Resp:  [16]   BP: (121)/(79)   SpO2:  [100 %]       CBC:     Recent Labs   Lab 11/01/18  1308   HGB 13.6       CMP: No results for input(s): NA, K, CL, CO2, BUN, CREATININE, GLU, MG, PHOS, CALCIUM, ALBUMIN, PROT, ALKPHOS, ALT, AST, BILITOT in the last 720 hours.    INR:  No results for input(s): PT, INR, PROTIME, APTT in the last 720 hours.      EKG:   Sinus tachycardia  Leftward axis  When compared with ECG of 17-APR-2017 15:48,  No significant change was found  Confirmed by Ilir Moya MD ([id number]) on 9/7/2017 9:52:51 AM  Also confirmed by Ilir Moya MD ([id number]) on 9/7/2017 2:45:31 PM    2D Echo:      Anesthesia Evaluation         Review of Systems  Anesthesia Hx:  States had difficulty waking up ( not reflected in note) History of prior surgery of interest to airway management or planning: Previous anesthesia: General samia mastectomy 7/23/18 with general anesthesia.   Social:  Former Smoker, Social Alcohol Use    Hematology/Oncology:  Hematology Normal      Oncology Comments: Breast cancer    EENT/Dental:EENT/Dental Normal   Cardiovascular:   Exercise tolerance: good Hypertension    Pulmonary:  Pulmonary Normal    Renal/:   Past hx pyelonephritis   Hepatic/GI:   Hx small bowel obstruction   Musculoskeletal:   Arthritis  Neck pain   Neurological:   Neuromuscular Disease, Headaches Hx migraines  Kx Cervical Spondylosis Dementia:   hx cervical spondylosis.    Endocrine:  Endocrine Normal    Dermatological:  Skin Normal    Psych:   Psychiatric History anxiety depression Mood disorder Generalized anxiety disorder  PTSD  Panic disorder  OCD         Physical Exam  General:  Well nourished    Airway/Jaw/Neck:  Airway Findings: Mouth Opening: Normal Tongue: Normal  General Airway Assessment: Adult  Mallampati: III  Improves to II with phonation.  TM Distance: Normal, at least 6 cm  Jaw/Neck Findings:   Neck ROM: Normal ROM       Chest/Lungs:  Chest/Lungs Findings: Normal Respiratory Rate     Heart/Vascular:  Heart Findings: Rate: Normal        Mental Status:  Mental Status Findings:  Alert and Oriented         Anesthesia Plan  Type of Anesthesia, risks & benefits discussed:  Anesthesia Type:  general  Patient's Preference: General  Intra-op Monitoring Plan: standard ASA monitors  Intra-op Monitoring Plan Comments:   Post Op Pain Control Plan:   Post Op Pain Control Plan Comments: Per primary service  Induction:   IV  Beta Blocker:  Patient is not currently on a Beta-Blocker (No further documentation required).       Informed Consent: Patient understands risks and agrees with Anesthesia plan.  Questions answered. Anesthesia consent signed with patient.  ASA Score: 2     Day of Surgery Review of History & Physical:    H&P update referred to the surgeon.         Ready For Surgery From Anesthesia Perspective.

## 2018-10-31 NOTE — PRE-PROCEDURE INSTRUCTIONS
Cleared for surgery             Electronically signed by Shilpi Clayton NP at 10/30/2018  3:40 PM  Electronically signed by Shilpi Clatyon NP at 10/30/2018  3:40 PM

## 2018-10-31 NOTE — PRE-PROCEDURE INSTRUCTIONS
Reviewed  meds with pt.// states had issue with difficulty waking after surgery in July believed r/t her routine meds

## 2018-10-31 NOTE — PRE ADMISSION SCREENING
Anesthesia Assessment: Preoperative EQUATION    Planned Procedure: Procedure(s) (LRB):  CREATION OR REVISION, INFRAMAMMARY FOLD LEFT BREAST (Left)  TRANSFER, FAT TISSUE BILATERAL BREAST (Bilateral)  Requested Anesthesia Type:General  Surgeon: Duane Bello MD  Service: Plastics  Known or anticipated Date of Surgery:11/12/2018    Surgeon notes: reviewed    Electronic QUestionnaire Assessment completed via nurse interview with patient.        No aq        Triage considerations:   Pt. States was difficult to wake after surgery in July, not reflected in note      Previous anesthesia records:GETA  7/23/18  REMOVED]      Airway - Non-Surgical 07/23/18 0753 Endotracheal Tube   Airway Present Prior to Hospital Arrival?: No Placement Date: 07/23/18 Placement Time: 0753 Method of Intubation: Direct laryngoscopy Inserted by: CRNA Airway Device: Endotracheal Tube Mask Ventilation: Easy Intubated: Postinduction Blade: Paula #2 Airway Device Size: 7.0 Style: Cuffed Cuff Inflation: Minimal occlusive pressure Placement Verified By: Auscultation;Capnometry;ETT Condensation Grade: Grade I Complicating Factors: None Findings Post-Intubation: Positive EtCO2;Atraumatic/Condition of teeth unchanged;Bilateral breath sounds Depth of Insertion (cm): 21 Secured at: Lips Complications: None Breath Sounds: Equal Bilateral Insertion attempts (enter comment if more than 2 attempts): 1 Removal Date: 07/23/18 Removal Time: 1353   Airway Placement Date: 07/23/18 Placement Time: 0753 Method of Intubation: Direct laryngoscopy Inserted by: CRNA Airway Device: Endotracheal Tube Airway Device Size: 7.0 Style: Cuffed Depth of Insertion (cm): 21 Placement Verified By: Auscultation;Capnometry;ETT Condensation Breath Sounds: Equal Bilateral Insertion attempts (enter comment if more than 2 attempts): 1 Removal Date: 07/23/18 Removal Time: 1353       Last PCP note: within 1 month , within [facility]    Subspecialty notes:  Gastroenterology, Hematology/Oncology, Nephrology, Neurology, Rheumatology, Pain Management, plastic surgery    Other important co-morbidities:   Hx migraine headache  Cervical spondylosis  Mood disorder  MARY LOU  PTSD  OCD  Hx palpitations  Hx Tachycardia  Hx of breast ca.  Hx small bowel obstruction  Smoker   neuropathy       Tests already available:  No recent tests.            Instructions given. (See in Nurse's note)    Optimization:  Anesthesia Preop Clinic Assessment  Indicated    Medical Opinion Indicated     Plan:    Testing:  Hemoglobin   Pre-anesthesia  visit       Visit focus: none     Consultation:Patient's PCP for a statement of optimization    Navigation: Tests Scheduled.              Consults scheduled.             Results will be tracked by Preop Clinic.

## 2018-11-01 ENCOUNTER — LAB VISIT (OUTPATIENT)
Dept: LAB | Facility: HOSPITAL | Age: 38
End: 2018-11-01
Attending: ANESTHESIOLOGY
Payer: MEDICAID

## 2018-11-01 DIAGNOSIS — Z01.818 PREOP TESTING: ICD-10-CM

## 2018-11-01 LAB — HGB BLD-MCNC: 13.6 G/DL

## 2018-11-01 PROCEDURE — 85018 HEMOGLOBIN: CPT

## 2018-11-01 PROCEDURE — 36415 COLL VENOUS BLD VENIPUNCTURE: CPT

## 2018-11-07 ENCOUNTER — OFFICE VISIT (OUTPATIENT)
Dept: PLASTIC SURGERY | Facility: CLINIC | Age: 38
End: 2018-11-07
Payer: MEDICAID

## 2018-11-07 ENCOUNTER — TELEPHONE (OUTPATIENT)
Dept: PLASTIC SURGERY | Facility: CLINIC | Age: 38
End: 2018-11-07

## 2018-11-07 VITALS
TEMPERATURE: 98 F | WEIGHT: 121.69 LBS | DIASTOLIC BLOOD PRESSURE: 79 MMHG | BODY MASS INDEX: 22.39 KG/M2 | SYSTOLIC BLOOD PRESSURE: 117 MMHG | HEIGHT: 62 IN | HEART RATE: 97 BPM

## 2018-11-07 DIAGNOSIS — Z41.1 ENCOUNTER FOR COSMETIC PROCEDURE: Primary | ICD-10-CM

## 2018-11-07 PROCEDURE — 99213 OFFICE O/P EST LOW 20 MIN: CPT | Mod: PBBFAC,PO | Performed by: SURGERY

## 2018-11-07 PROCEDURE — 99999 PR PBB SHADOW E&M-EST. PATIENT-LVL III: CPT | Mod: PBBFAC,,, | Performed by: SURGERY

## 2018-11-07 PROCEDURE — 99212 OFFICE O/P EST SF 10 MIN: CPT | Mod: S$PBB,,, | Performed by: SURGERY

## 2018-11-07 NOTE — TELEPHONE ENCOUNTER
Instructed caller to have pt request medical records from Medical Records Release at [facility] - fax number provided. MARIELLE SANCHEZ    ----- Message from Renate Carson sent at 11/7/2018  2:10 PM CST -----  Contact:  Social Security Disability [phone]  Needs Advice    Reason for call:  needs all Medical Records on this Pt.         Communication Preference: Social Security Disability [phone] or [phone] X [phone]    Additional Information:  Please fax medical records over to [phone]      Thank You

## 2018-11-07 NOTE — PROGRESS NOTES
History & Physical  Plastic Surgery Clinic    SUBJECTIVE:     Chief complaint: preop    History of Present Illness:  Patient is a 38 y.o. female presents for preop evaluation.  She is well known to us and underwent direct implant reconstruction on 7/23.  Since then she has noted that the left implant sits higher than the right and several areas of contour abnormalities.  When she was last seen it was noted that she had more intermittent pain on the left vs the right and continues to have this, however it is not debilitating and can maintain her normal life.      Denies fevers, chills, nausea or emesis.  Has not had adjuvant radiation      Past Medical History:  Past Medical History:   Diagnosis Date    Abnormal Pap smear of cervix age 25    no treatement    Anxiety     Breast cancer     Cancer 07/11/2018    breast cancer    Cervical spondylosis 2/9/2018    Colon polyp     Depression     General anesthetics causing adverse effect in therapeutic use     Headache     History of psychiatric hospitalization     age 20 for SI    Hx of psychiatric care     Hypertension     Peutz-Jeghers syndrome     Psychiatric problem     Right carpal tunnel syndrome 3/9/2017    Self-harming behavior     Suicide attempt     Therapy        Past Surgical History:  Past Surgical History:   Procedure Laterality Date    ADENOIDECTOMY      APPENDECTOMY      BILATERAL MASTECTOMY Bilateral 7/23/2018    Procedure: MASTECTOMY 23 hr stay;  Surgeon: Sofia Kendrick MD;  Location: [facility] OR [address] [place] [address];  Service: General;  Laterality: Bilateral;    BIOPSY, LYMPH NODE, SENTINEL Left 7/23/2018    Performed by Sofia Kendrick MD at [facility] OR [address] [place] [address]    BLOCK-NERVE-MEDIAL BRANCH-CERVICAL (C3,4,5) Bilateral 2/9/2018    [facility]formed by [facility] at [facility]    BOWEL RESECTION  10/17/14    BREAST BIOPSY Left 06/04/2018    CARPAL TUNNEL RELEASE Bilateral     CARPAL TUNNEL RELEASE      COLONOSCOPY      DILATION AND CURETTAGE OF UTERUS       DILATION-CURETTAGE OF UTERUS (D AND C) N/A 3/5/2018    Performed by [facility] at [facility] OR    ENTEROSCOPY N/A 3/18/2015    Performed by Chandan [facility] at [facility] (2ND FLR)    INJECTION FOR SENTINEL NODE IDENTIFICATION Left 7/23/2018    Procedure: INJECTION, FOR SENTINEL NODE IDENTIFICATION;  Surgeon: Sofia Kendrick MD;  Location: [facility] OR [address] [place] [address];  Service: General;  Laterality: Left;    INJECTION, FOR SENTINEL NODE IDENTIFICATION Left 7/23/2018    Performed by Sofia Kendrick MD at [facility] OR 2ND FLR    INJECTION-STEROID-EPIDURAL-CERVICAL (C7-T1) N/A 1/19/2018    Performed by John [facility] at [facility] OR    INSERTION OF BREAST TISSUE EXPANDER Bilateral 7/23/2018    Procedure: INSERTION, TISSUE EXPANDER, BREAST;  Surgeon: Duane Bello MD;  Location: [facility] OR [address] [place] [address];  Service: Plastics;  Laterality: Bilateral;    INSERTION, TISSUE EXPANDER, BREAST Bilateral 7/23/2018    Performed by Duane Bello MD at [facility] OR 2ND FLR    intestinal polpy      LAPAROTOMY-EXPLORATORY N/A 10/17/2014    [facility]    MASTECTOMY 23 hr stay Bilateral 7/23/2018    Per[facility]    RELEASE-CARPAL TUNNEL Right 4/19/2017    Performed by Larry Patel MD at [facility] OR    RELEASE-CUBITAL TUNNEL Right 4/19/2017    Performed by Larry Patel [facility] at [facility] OR    RESECTION - SMALL BOWEL N/A 10/17/2014    Performed by Joshua Goldberg[facility]    SENTINEL LYMPH NODE BIOPSY Left 7/23/2018    Procedure: BIOPSY, LYMPH NODE, SENTINEL;  Surgeon: Sofia Kendrick MD;  Location: [facility] OR [address] [place] [address];  Service: General;  Laterality: Left;    SMALL INTESTINE SURGERY      3 surgeries     TONSILLECTOMY      ULTRASOUND-ENDOSCOPIC-UPPER N/A 3/18/2015    Performed by Chandan Dillon MD at [facility] ENDO (2ND FLR)    UPPER GASTROINTESTINAL ENDOSCOPY         Family History:  Family History   Problem Relation Age of Onset     Colon cancer Mother 33        peutz-jeghers syndrome    Hypertension Father     Colon polyps Sister         peutz-jeghers syndrome    Breast cancer Paternal Aunt     Breast cancer Paternal Grandmother     Ovarian cancer Neg Hx        Social History:  Social History     Socioeconomic History    Marital status: Single     Spouse name: None    Number of children: 0    Years of education: None    Highest education level: None   Social Needs    Financial resource strain: None    Food insecurity - worry: None    Food insecurity - inability: None    Transportation needs - medical: None    Transportation needs - non-medical: None   Occupational History    None   Tobacco Use    Smoking status: Former Smoker     Packs/day: 0.50     Years: 17.00     Pack years: 8.50     Types: Cigarettes     Start date: 2000     Last attempt to quit: 2018     Years since quittin.2    Smokeless tobacco: Never Used    Tobacco comment: told about smoking cessation clinic and give brochure   Substance and Sexual Activity    Alcohol use: No     Alcohol/week: 3.5 oz     Types: 7 Standard drinks or equivalent per week    Drug use: No    Sexual activity: Not Currently     Partners: Male     Birth control/protection: OCP     Comment: single   Other Topics Concern    Patient feels they ought to cut down on drinking/drug use No    Patient annoyed by others criticizing their drinking/drug use No    Patient has felt bad or guilty about drinking/drug use No    Patient has had a drink/used drugs as an eye opener in the AM No   Social History Narrative    Had one previous miscarriage in     Never     currently in a relationship       Medications:  Current Outpatient Medications   Medication Sig Dispense Refill    amitriptyline (ELAVIL) 50 MG tablet Take 1/2 tab (25 mg) nightly for 2 weeks, then 1 tab (50 mg) nightly thereafter 30 tablet 1    butalbital-acetaminophen-caffeine -40 mg (FIORICET, ESGIC)  "-40 mg per tablet Take 1 tablet by mouth every 6 (six) hours as needed for Headaches. Limit use to 2 days per week. 10 tablet 4    clonazePAM (KLONOPIN) 1 MG tablet Take 1 tablet (1 mg total) by mouth 3 (three) times daily. 90 tablet 2    FLUoxetine (PROZAC) 40 MG capsule TAKE 2 CAPSULES (80 MG TOTAL) BY MOUTH ONCE DAILY. 180 capsule 0    OLANZapine (ZYPREXA) 20 MG tablet Take 1 tablet (20 mg total) by mouth every evening. 90 tablet 0    pregabalin (LYRICA) 100 MG capsule Take 1 capsule (100 mg total) by mouth 2 (two) times daily. 60 capsule 6    propranolol (INDERAL) 10 MG tablet Take 1 tablet (10 mg total) by mouth 2 (two) times daily. 60 tablet 5    tiZANidine (ZANAFLEX) 2 MG tablet TAKE 2 TABLETS (4 MG TOTAL) BY MOUTH EVERY 6 (SIX) HOURS AS NEEDED (NECK TENSION).  3    topiramate (TOPAMAX) 25 MG tablet Take 2 tablets (50 mg total) by mouth 2 (two) times daily. 120 tablet 11     No current facility-administered medications for this visit.        Allergies:  Review of patient's allergies indicates:  No Known Allergies    Review of Systems:  Negative except for HPI.      OBJECTIVE:     /79   Pulse 97   Temp 98.2 °F (36.8 °C)   Ht 5' 2" (1.575 m)   Wt 55.2 kg (121 lb 11.1 oz)   BMI 22.26 kg/m²     Physical Exam:  Constitutional: Oriented to person, place, and time. Appears well-developed and well-nourished.   Bilateral breast with implants in place and incisions are well healed.  Left breast IMF is apprx 1.5 cm higher than right.  There is also a noted contour abnormality on bilateral upper poles.          ASSESSMENT/PLAN:     38 y.o. with bilateral implant breast reconstruction.  She has gained enough weight to be able to proceed with fat gafting to bilateral upper poles of the breast.  Will also lower the left implant by apprx 1.5 cm with or without an ADM sling.  Risks benefits and alternatives of the procedure were explained to the patient. Specific risks including but not limited to " bleeding, infection, asymmetry, skin necrosis, skin loss, wound healing problems, seroma, hematoma, need for drain placement, scarring, pain, and PE/DVT. Patient understands these risks and wishes to proceed with surgery.     TISHA Carrillo MD, FACS  Plastic Surgery Fellow

## 2018-11-09 ENCOUNTER — TELEPHONE (OUTPATIENT)
Dept: PLASTIC SURGERY | Facility: CLINIC | Age: 38
End: 2018-11-09

## 2018-11-09 NOTE — TELEPHONE ENCOUNTER
Left VM pt regarding surgery, pt advised to arrive to [facility] at 0930 for 1130 surgery, pt verbalized understanding, pre-op education reinforced, all questions answered at this time, pt given reassurance

## 2018-11-09 NOTE — TELEPHONE ENCOUNTER
Spoke with pt regarding surgery, pt advised to arrive to [facility] at 0930 for 1130 surgery, pt verbalized understanding, pre-op education reinforced, all questions answered at this time, pt given reassurance

## 2018-11-12 ENCOUNTER — HOSPITAL ENCOUNTER (OUTPATIENT)
Facility: HOSPITAL | Age: 38
Discharge: HOME | End: 2018-11-12
Attending: SURGERY | Admitting: SURGERY
Payer: MEDICAID

## 2018-11-12 ENCOUNTER — ANESTHESIA (OUTPATIENT)
Dept: SURGERY | Facility: HOSPITAL | Age: 38
End: 2018-11-12
Payer: MEDICAID

## 2018-11-12 VITALS
TEMPERATURE: 98 F | BODY MASS INDEX: 21.53 KG/M2 | OXYGEN SATURATION: 100 % | RESPIRATION RATE: 15 BRPM | SYSTOLIC BLOOD PRESSURE: 124 MMHG | WEIGHT: 117 LBS | DIASTOLIC BLOOD PRESSURE: 87 MMHG | HEART RATE: 90 BPM | HEIGHT: 62 IN

## 2018-11-12 DIAGNOSIS — Z98.890 S/P BREAST RECONSTRUCTION: Primary | ICD-10-CM

## 2018-11-12 LAB
B-HCG UR QL: NEGATIVE
CTP QC/QA: YES

## 2018-11-12 PROCEDURE — 25000003 PHARM REV CODE 250: Performed by: SURGERY

## 2018-11-12 PROCEDURE — 71000015 HC POSTOP RECOV 1ST HR: Performed by: SURGERY

## 2018-11-12 PROCEDURE — 00402 ANES INTEG SYS RCNSTV BREAST: CPT | Performed by: SURGERY

## 2018-11-12 PROCEDURE — 71000039 HC RECOVERY, EACH ADD'L HOUR: Performed by: SURGERY

## 2018-11-12 PROCEDURE — 36000706: Performed by: SURGERY

## 2018-11-12 PROCEDURE — S0077 INJECTION, CLINDAMYCIN PHOSP: HCPCS | Performed by: SURGERY

## 2018-11-12 PROCEDURE — 81025 URINE PREGNANCY TEST: CPT | Performed by: SURGERY

## 2018-11-12 PROCEDURE — 63600175 PHARM REV CODE 636 W HCPCS: Performed by: NURSE ANESTHETIST, CERTIFIED REGISTERED

## 2018-11-12 PROCEDURE — D9220A PRA ANESTHESIA: Mod: ANES,,, | Performed by: ANESTHESIOLOGY

## 2018-11-12 PROCEDURE — 20926 PR REMV TISSUE FOR GRAFT OTHR: CPT | Mod: 51,RT,, | Performed by: SURGERY

## 2018-11-12 PROCEDURE — 63600175 PHARM REV CODE 636 W HCPCS: Performed by: STUDENT IN AN ORGANIZED HEALTH CARE EDUCATION/TRAINING PROGRAM

## 2018-11-12 PROCEDURE — 37000009 HC ANESTHESIA EA ADD 15 MINS: Performed by: SURGERY

## 2018-11-12 PROCEDURE — 71000033 HC RECOVERY, INTIAL HOUR: Performed by: SURGERY

## 2018-11-12 PROCEDURE — 27000221 HC OXYGEN, UP TO 24 HOURS

## 2018-11-12 PROCEDURE — 19380 REVJ RECONSTRUCTED BREAST: CPT | Mod: LT,,, | Performed by: SURGERY

## 2018-11-12 PROCEDURE — 25000003 PHARM REV CODE 250: Performed by: NURSE ANESTHETIST, CERTIFIED REGISTERED

## 2018-11-12 PROCEDURE — 63600175 PHARM REV CODE 636 W HCPCS: Performed by: ANESTHESIOLOGY

## 2018-11-12 PROCEDURE — 36000707: Performed by: SURGERY

## 2018-11-12 PROCEDURE — 25000003 PHARM REV CODE 250: Performed by: STUDENT IN AN ORGANIZED HEALTH CARE EDUCATION/TRAINING PROGRAM

## 2018-11-12 PROCEDURE — D9220A PRA ANESTHESIA: Mod: CRNA,,, | Performed by: NURSE ANESTHETIST, CERTIFIED REGISTERED

## 2018-11-12 PROCEDURE — 63600175 PHARM REV CODE 636 W HCPCS: Performed by: SURGERY

## 2018-11-12 PROCEDURE — 37000008 HC ANESTHESIA 1ST 15 MINUTES: Performed by: SURGERY

## 2018-11-12 RX ORDER — CEFAZOLIN SODIUM 1 G/3ML
2 INJECTION, POWDER, FOR SOLUTION INTRAMUSCULAR; INTRAVENOUS
Status: COMPLETED | OUTPATIENT
Start: 2018-11-12 | End: 2018-11-12

## 2018-11-12 RX ORDER — ONDANSETRON 2 MG/ML
4 INJECTION INTRAMUSCULAR; INTRAVENOUS ONCE
Status: COMPLETED | OUTPATIENT
Start: 2018-11-12 | End: 2018-11-12

## 2018-11-12 RX ORDER — LIDOCAINE HCL/PF 100 MG/5ML
SYRINGE (ML) INTRAVENOUS
Status: DISCONTINUED | OUTPATIENT
Start: 2018-11-12 | End: 2018-11-12

## 2018-11-12 RX ORDER — HYDROMORPHONE HYDROCHLORIDE 1 MG/ML
1 INJECTION, SOLUTION INTRAMUSCULAR; INTRAVENOUS; SUBCUTANEOUS EVERY 4 HOURS PRN
Status: DISCONTINUED | OUTPATIENT
Start: 2018-11-12 | End: 2018-11-12 | Stop reason: HOSPADM

## 2018-11-12 RX ORDER — GLYCOPYRROLATE 0.2 MG/ML
INJECTION INTRAMUSCULAR; INTRAVENOUS
Status: DISCONTINUED | OUTPATIENT
Start: 2018-11-12 | End: 2018-11-12

## 2018-11-12 RX ORDER — ONDANSETRON 2 MG/ML
INJECTION INTRAMUSCULAR; INTRAVENOUS
Status: DISCONTINUED
Start: 2018-11-12 | End: 2018-11-12 | Stop reason: HOSPADM

## 2018-11-12 RX ORDER — CLINDAMYCIN HYDROCHLORIDE 300 MG/1
300 CAPSULE ORAL EVERY 6 HOURS
Qty: 40 CAPSULE | Refills: 0 | Status: SHIPPED | OUTPATIENT
Start: 2018-11-12 | End: 2018-11-22

## 2018-11-12 RX ORDER — SODIUM CHLORIDE 0.9 % (FLUSH) 0.9 %
5 SYRINGE (ML) INJECTION
Status: DISCONTINUED | OUTPATIENT
Start: 2018-11-12 | End: 2018-11-12 | Stop reason: HOSPADM

## 2018-11-12 RX ORDER — SODIUM CHLORIDE 0.9 % (FLUSH) 0.9 %
3 SYRINGE (ML) INJECTION
Status: DISCONTINUED | OUTPATIENT
Start: 2018-11-12 | End: 2018-11-12 | Stop reason: HOSPADM

## 2018-11-12 RX ORDER — ONDANSETRON 2 MG/ML
INJECTION INTRAMUSCULAR; INTRAVENOUS
Status: DISCONTINUED | OUTPATIENT
Start: 2018-11-12 | End: 2018-11-12

## 2018-11-12 RX ORDER — HYDROCODONE BITARTRATE AND ACETAMINOPHEN 5; 325 MG/1; MG/1
TABLET ORAL
Status: DISCONTINUED
Start: 2018-11-12 | End: 2018-11-12 | Stop reason: HOSPADM

## 2018-11-12 RX ORDER — INDOMETHACIN 25 MG/1
CAPSULE ORAL
Status: DISCONTINUED | OUTPATIENT
Start: 2018-11-12 | End: 2018-11-12 | Stop reason: HOSPADM

## 2018-11-12 RX ORDER — CLINDAMYCIN PHOSPHATE 150 MG/ML
INJECTION, SOLUTION INTRAVENOUS
Status: DISCONTINUED | OUTPATIENT
Start: 2018-11-12 | End: 2018-11-12 | Stop reason: HOSPADM

## 2018-11-12 RX ORDER — MUPIROCIN 20 MG/G
OINTMENT TOPICAL
Status: DISCONTINUED | OUTPATIENT
Start: 2018-11-12 | End: 2018-11-12 | Stop reason: HOSPADM

## 2018-11-12 RX ORDER — PHENYLEPHRINE HYDROCHLORIDE 10 MG/ML
INJECTION INTRAVENOUS
Status: DISCONTINUED | OUTPATIENT
Start: 2018-11-12 | End: 2018-11-12

## 2018-11-12 RX ORDER — ROCURONIUM BROMIDE 10 MG/ML
INJECTION, SOLUTION INTRAVENOUS
Status: DISCONTINUED | OUTPATIENT
Start: 2018-11-12 | End: 2018-11-12

## 2018-11-12 RX ORDER — EPHEDRINE SULFATE 50 MG/ML
INJECTION, SOLUTION INTRAVENOUS
Status: DISCONTINUED | OUTPATIENT
Start: 2018-11-12 | End: 2018-11-12

## 2018-11-12 RX ORDER — HYDROMORPHONE HYDROCHLORIDE 1 MG/ML
0.2 INJECTION, SOLUTION INTRAMUSCULAR; INTRAVENOUS; SUBCUTANEOUS EVERY 5 MIN PRN
Status: DISCONTINUED | OUTPATIENT
Start: 2018-11-12 | End: 2018-11-12 | Stop reason: HOSPADM

## 2018-11-12 RX ORDER — SODIUM CHLORIDE 9 MG/ML
INJECTION, SOLUTION INTRAVENOUS CONTINUOUS PRN
Status: DISCONTINUED | OUTPATIENT
Start: 2018-11-12 | End: 2018-11-12

## 2018-11-12 RX ORDER — EPINEPHRINE 1 MG/ML
INJECTION INTRAMUSCULAR; INTRAVENOUS; SUBCUTANEOUS
Status: DISCONTINUED | OUTPATIENT
Start: 2018-11-12 | End: 2018-11-12 | Stop reason: HOSPADM

## 2018-11-12 RX ORDER — HYDROCODONE BITARTRATE AND ACETAMINOPHEN 5; 325 MG/1; MG/1
1 TABLET ORAL EVERY 6 HOURS PRN
Qty: 21 TABLET | Refills: 0 | Status: SHIPPED | OUTPATIENT
Start: 2018-11-12 | End: 2019-01-30

## 2018-11-12 RX ORDER — TRIAMCINOLONE ACETONIDE 40 MG/ML
INJECTION, SUSPENSION INTRA-ARTICULAR; INTRAMUSCULAR
Status: DISCONTINUED | OUTPATIENT
Start: 2018-11-12 | End: 2018-11-12 | Stop reason: HOSPADM

## 2018-11-12 RX ORDER — MIDAZOLAM HYDROCHLORIDE 1 MG/ML
INJECTION, SOLUTION INTRAMUSCULAR; INTRAVENOUS
Status: DISCONTINUED | OUTPATIENT
Start: 2018-11-12 | End: 2018-11-12

## 2018-11-12 RX ORDER — PROPOFOL 10 MG/ML
VIAL (ML) INTRAVENOUS
Status: DISCONTINUED | OUTPATIENT
Start: 2018-11-12 | End: 2018-11-12

## 2018-11-12 RX ORDER — ACETAMINOPHEN 10 MG/ML
INJECTION, SOLUTION INTRAVENOUS
Status: DISCONTINUED | OUTPATIENT
Start: 2018-11-12 | End: 2018-11-12

## 2018-11-12 RX ORDER — HYDROCODONE BITARTRATE AND ACETAMINOPHEN 5; 325 MG/1; MG/1
1 TABLET ORAL EVERY 4 HOURS PRN
Status: DISCONTINUED | OUTPATIENT
Start: 2018-11-12 | End: 2018-11-12 | Stop reason: HOSPADM

## 2018-11-12 RX ORDER — HEPARIN SODIUM 5000 [USP'U]/ML
5000 INJECTION, SOLUTION INTRAVENOUS; SUBCUTANEOUS ONCE
Status: COMPLETED | OUTPATIENT
Start: 2018-11-12 | End: 2018-11-12

## 2018-11-12 RX ORDER — ONDANSETRON 8 MG/1
8 TABLET, ORALLY DISINTEGRATING ORAL EVERY 8 HOURS PRN
Status: DISCONTINUED | OUTPATIENT
Start: 2018-11-12 | End: 2018-11-12 | Stop reason: HOSPADM

## 2018-11-12 RX ORDER — HYDROMORPHONE HYDROCHLORIDE 1 MG/ML
INJECTION, SOLUTION INTRAMUSCULAR; INTRAVENOUS; SUBCUTANEOUS
Status: DISCONTINUED
Start: 2018-11-12 | End: 2018-11-12 | Stop reason: HOSPADM

## 2018-11-12 RX ORDER — CIPROFLOXACIN 2 MG/ML
INJECTION, SOLUTION INTRAVENOUS CONTINUOUS PRN
Status: COMPLETED | OUTPATIENT
Start: 2018-11-12 | End: 2018-11-12

## 2018-11-12 RX ORDER — FENTANYL CITRATE 50 UG/ML
INJECTION, SOLUTION INTRAMUSCULAR; INTRAVENOUS
Status: DISCONTINUED | OUTPATIENT
Start: 2018-11-12 | End: 2018-11-12

## 2018-11-12 RX ORDER — LIDOCAINE HYDROCHLORIDE 10 MG/ML
1 INJECTION, SOLUTION EPIDURAL; INFILTRATION; INTRACAUDAL; PERINEURAL ONCE
Status: COMPLETED | OUTPATIENT
Start: 2018-11-12 | End: 2018-11-12

## 2018-11-12 RX ADMIN — PHENYLEPHRINE HYDROCHLORIDE 0.25 MCG/KG/MIN: 10 INJECTION INTRAVENOUS at 12:11

## 2018-11-12 RX ADMIN — FENTANYL CITRATE 50 MCG: 50 INJECTION, SOLUTION INTRAMUSCULAR; INTRAVENOUS at 10:11

## 2018-11-12 RX ADMIN — ROCURONIUM BROMIDE 10 MG: 10 INJECTION, SOLUTION INTRAVENOUS at 11:11

## 2018-11-12 RX ADMIN — GLYCOPYRROLATE 0.2 MG: 0.2 INJECTION, SOLUTION INTRAMUSCULAR; INTRAVENOUS at 01:11

## 2018-11-12 RX ADMIN — ROCURONIUM BROMIDE 30 MG: 10 INJECTION, SOLUTION INTRAVENOUS at 10:11

## 2018-11-12 RX ADMIN — PROPOFOL 100 MG: 10 INJECTION, EMULSION INTRAVENOUS at 10:11

## 2018-11-12 RX ADMIN — MIDAZOLAM HYDROCHLORIDE 1 MG: 1 INJECTION, SOLUTION INTRAMUSCULAR; INTRAVENOUS at 10:11

## 2018-11-12 RX ADMIN — PHENYLEPHRINE HYDROCHLORIDE 100 MCG: 10 INJECTION INTRAVENOUS at 11:11

## 2018-11-12 RX ADMIN — EPHEDRINE SULFATE 10 MG: 50 INJECTION, SOLUTION INTRAMUSCULAR; INTRAVENOUS; SUBCUTANEOUS at 11:11

## 2018-11-12 RX ADMIN — PROPOFOL 50 MG: 10 INJECTION, EMULSION INTRAVENOUS at 10:11

## 2018-11-12 RX ADMIN — PHENYLEPHRINE HYDROCHLORIDE 100 MCG: 10 INJECTION INTRAVENOUS at 12:11

## 2018-11-12 RX ADMIN — EPHEDRINE SULFATE 10 MG: 50 INJECTION, SOLUTION INTRAMUSCULAR; INTRAVENOUS; SUBCUTANEOUS at 12:11

## 2018-11-12 RX ADMIN — SUGAMMADEX 110 MG: 100 INJECTION, SOLUTION INTRAVENOUS at 01:11

## 2018-11-12 RX ADMIN — Medication 0.2 MG: at 02:11

## 2018-11-12 RX ADMIN — ONDANSETRON 4 MG: 2 INJECTION INTRAMUSCULAR; INTRAVENOUS at 12:11

## 2018-11-12 RX ADMIN — MUPIROCIN: 20 OINTMENT TOPICAL at 10:11

## 2018-11-12 RX ADMIN — HYDROCODONE BITARTRATE AND ACETAMINOPHEN 1 TABLET: 5; 325 TABLET ORAL at 01:11

## 2018-11-12 RX ADMIN — LIDOCAINE HYDROCHLORIDE 10 MG: 10 INJECTION, SOLUTION EPIDURAL; INFILTRATION; INTRACAUDAL; PERINEURAL at 10:11

## 2018-11-12 RX ADMIN — SODIUM CHLORIDE, SODIUM GLUCONATE, SODIUM ACETATE, POTASSIUM CHLORIDE, MAGNESIUM CHLORIDE, SODIUM PHOSPHATE, DIBASIC, AND POTASSIUM PHOSPHATE: .53; .5; .37; .037; .03; .012; .00082 INJECTION, SOLUTION INTRAVENOUS at 11:11

## 2018-11-12 RX ADMIN — HEPARIN SODIUM 5000 UNITS: 5000 INJECTION, SOLUTION INTRAVENOUS; SUBCUTANEOUS at 10:11

## 2018-11-12 RX ADMIN — SODIUM CHLORIDE: 0.9 INJECTION, SOLUTION INTRAVENOUS at 10:11

## 2018-11-12 RX ADMIN — CEFAZOLIN 2 G: 330 INJECTION, POWDER, FOR SOLUTION INTRAMUSCULAR; INTRAVENOUS at 10:11

## 2018-11-12 RX ADMIN — ROCURONIUM BROMIDE 20 MG: 10 INJECTION, SOLUTION INTRAVENOUS at 10:11

## 2018-11-12 RX ADMIN — FENTANYL CITRATE 50 MCG: 50 INJECTION, SOLUTION INTRAMUSCULAR; INTRAVENOUS at 11:11

## 2018-11-12 RX ADMIN — PHENYLEPHRINE HYDROCHLORIDE 100 MCG: 10 INJECTION INTRAVENOUS at 01:11

## 2018-11-12 RX ADMIN — ONDANSETRON 4 MG: 2 INJECTION INTRAMUSCULAR; INTRAVENOUS at 02:11

## 2018-11-12 RX ADMIN — ACETAMINOPHEN 1000 MG: 10 INJECTION, SOLUTION INTRAVENOUS at 11:11

## 2018-11-12 RX ADMIN — ROCURONIUM BROMIDE 10 MG: 10 INJECTION, SOLUTION INTRAVENOUS at 12:11

## 2018-11-12 RX ADMIN — MIDAZOLAM HYDROCHLORIDE 2 MG: 1 INJECTION, SOLUTION INTRAMUSCULAR; INTRAVENOUS at 10:11

## 2018-11-12 RX ADMIN — LIDOCAINE HYDROCHLORIDE 55 MG: 20 INJECTION, SOLUTION INTRAVENOUS at 10:11

## 2018-11-12 NOTE — PLAN OF CARE
Patient tolerated procedure/anesthesia well, vss, no distress noted or reported. Pain controlled with prn pain medicine, going home with script for oxycodone. Explained to patient when next dose is available. Nausea relieved, tolerates PO.  Discharge instructions reviewed with patient, verbalized understanding. Consents with chart

## 2018-11-12 NOTE — TRANSFER OF CARE
"Anesthesia Transfer of Care Note    Patient: Staci Hodge    Procedure(s) Performed: Procedure(s) (LRB):  CREATION OR REVISION, INFRAMAMMARY FOLD LEFT BREAST (Left)  TRANSFER, FAT TISSUE BILATERAL BREAST (Bilateral)    Patient location: PACU    Anesthesia Type: general    Transport from OR: Transported from OR on 6-10 L/min O2 by face mask with adequate spontaneous ventilation    Post pain: adequate analgesia    Post assessment: no apparent anesthetic complications and tolerated procedure well    Post vital signs: stable    Level of consciousness: sedated    Nausea/Vomiting: no nausea/vomiting    Complications: none    Transfer of care protocol was followed      Last vitals:   Visit Vitals  /63   Pulse 100   Temp 36.3 °C (97.3 °F) (Temporal)   Resp 18   Ht 5' 2" (1.575 m)   Wt 53.1 kg (117 lb)   LMP  (Within Weeks)   SpO2 100%   Breastfeeding? No   BMI 21.40 kg/m²     "

## 2018-11-12 NOTE — H&P
History & Physical  Plastic Surgery Clinic     SUBJECTIVE:      Chief complaint: preop     History of Present Illness:  Patient is a 38 y.o. female presents for preop evaluation.  She is well known to us and underwent direct implant reconstruction on 7/23.  Since then she has noted that the left implant sits higher than the right and several areas of contour abnormalities.  When she was last seen it was noted that she had more intermittent pain on the left vs the right and continues to have this, however it is not debilitating and can maintain her normal life.       Denies fevers, chills, nausea or emesis.  Has not had adjuvant radiation        Past Medical History:       Past Medical History:   Diagnosis Date    Abnormal Pap smear of cervix age 25     no treatement    Anxiety      Breast cancer      Cancer 07/11/2018     breast cancer    Cervical spondylosis 2/9/2018    Colon polyp      Depression      General anesthetics causing adverse effect in therapeutic use      Headache      History of psychiatric hospitalization       age 20 for SI    Hx of psychiatric care      Hypertension      Peutz-Jeghers syndrome      Psychiatric problem      Right carpal tunnel syndrome 3/9/2017    Self-harming behavior      Suicide attempt      Therapy           Past Surgical History:        Past Surgical History:   Procedure Laterality Date    ADENOIDECTOMY        APPENDECTOMY        BILATERAL MASTECTOMY Bilateral 7/23/2018     Procedure: MASTECTOMY 23 hr stay;  Surgeon: Sofia Kendrick MD;  Location: [facility] OR [address] [place] [address];  Service: General;  Laterality: Bilateral;    BIOPSY, LYMPH NODE, SENTINEL Left 7/23/2018     Performed by Sofia Kendrick MD at [facility] OR [address] [place] [address]    BLOCK-NERVE-MEDIAL BRANCH-CERVICAL (C3,4,5) Bilateral 2/9/2018     [facility]    BOWEL RESECTION   10/17/14    BREAST BIOPSY Left 06/04/2018    CARPAL TUNNEL RELEASE Bilateral      CARPAL TUNNEL RELEASE         COLONOSCOPY        DILATION AND CURETTAGE OF UTERUS        DILATION-CURETTAGE OF UTERUS (D AND C) N/A 3/5/2018     Performed by Simeon Peterson MD at [facility] OR    ENTEROSCOPY N/A 3/18/2015     Performed by Chandan Dillon MD at [facility] ENDO (2ND FLR)    INJECTION FOR SENTINEL NODE IDENTIFICATION Left 7/23/2018     Procedure: INJECTION, FOR SENTINEL NODE IDENTIFICATION;  Surgeon: Sofia Kendrick MD;  Location: [facility] OR [address] [place] [address];  Service: General;  Laterality: Left;    INJECTION, FOR SENTINEL NODE IDENTIFICATION Left 7/23/2018     Performed by Sofia Kendrick MD at [facility] OR 2ND FLR    INJECTION-STEROID-EPIDURAL-CERVICAL (C7-T1) N/A 1/19/2018     Performed by John Chapin MD at [facility] OR    INSERTION OF BREAST TISSUE EXPANDER Bilateral 7/23/2018     Procedure: INSERTION, TISSUE EXPANDER, BREAST;  Surgeon: Duane Bello MD;  Location: [facility] OR [address] [place] [address];  Service: Plastics;  Laterality: Bilateral;    INSERTION, TISSUE EXPANDER, BREAST Bilateral 7/23/2018     Performed by Duane Bello MD at [facility] OR 2ND FLR    intestinal polpy        LAPAROTOMY-EXPLORATORY N/A 10/17/2014     Performed by Joshua Goldberg, MD at [facility] OR 2ND FLR    MASTECTOMY 23 hr stay Bilateral 7/23/2018     Performed by Sofia Kendrick MD at [facility] OR 2ND FLR    RELEASE-CARPAL TUNNEL Right 4/19/2017     Performed by Larry Patel MD at [facility] OR    RELEASE-CUBITAL TUNNEL Right 4/19/2017     Performed by Larry Patel MD at [facility] OR    RESECTION - SMALL BOWEL N/A 10/17/2014     Performed by Joshua Goldberg, MD at [facility] OR 2ND FLR    SENTINEL LYMPH NODE BIOPSY Left 7/23/2018     Procedure: BIOPSY, LYMPH NODE, SENTINEL;  Surgeon: Sofia Kendrick MD;  Location: [facility] OR [address] [place] [address];  Service: General;  Laterality: Left;    SMALL INTESTINE SURGERY         3 surgeries     TONSILLECTOMY        ULTRASOUND-ENDOSCOPIC-UPPER N/A 3/18/2015     Performed by Chandan Dillon MD at [facility] END[facility] (2ND FLR)    UPPER GASTROINTESTINAL ENDOSCOPY              Family History:        Family History   Problem Relation Age of Onset    Colon cancer Mother 33         peutz-jeghers syndrome    Hypertension Father      Colon polyps Sister           peutz-jeghers syndrome    Breast cancer Paternal Aunt      Breast cancer Paternal Grandmother      Ovarian cancer Neg Hx           Social History:  Social History            Socioeconomic History    Marital status: Single       Spouse name: None    Number of children: 0    Years of education: None    Highest education level: None   Social Needs    Financial resource strain: None    Food insecurity - worry: None    Food insecurity - inability: None    Transportation needs - medical: None    Transportation needs - non-medical: None   Occupational History    None   Tobacco Use    Smoking status: Former Smoker       Packs/day: 0.50       Years: 17.00       Pack years: 8.50       Types: Cigarettes       Start date: 2000       Last attempt to quit: 2018       Years since quittin.2    Smokeless tobacco: Never Used    Tobacco comment: told about smoking cessation clinic and give brochure   Substance and Sexual Activity    Alcohol use: No       Alcohol/week: 3.5 oz       Types: 7 Standard drinks or equivalent per week    Drug use: No    Sexual activity: Not Currently       Partners: Male       Birth control/protection: OCP       Comment: single   Other Topics Concern    Patient feels they ought to cut down on drinking/drug use No    Patient annoyed by others criticizing their drinking/drug use No    Patient has felt bad or guilty about drinking/drug use No    Patient has had a drink/used drugs as an eye opener in the AM No   Social History Narrative     Had one previous miscarriage in      Never      currently in a relationship         Medications:  Current Medications          Current Outpatient Medications   Medication Sig Dispense Refill    amitriptyline (ELAVIL) 50 MG tablet  "Take 1/2 tab (25 mg) nightly for 2 weeks, then 1 tab (50 mg) nightly thereafter 30 tablet 1    butalbital-acetaminophen-caffeine -40 mg (FIORICET, ESGIC) -40 mg per tablet Take 1 tablet by mouth every 6 (six) hours as needed for Headaches. Limit use to 2 days per week. 10 tablet 4    clonazePAM (KLONOPIN) 1 MG tablet Take 1 tablet (1 mg total) by mouth 3 (three) times daily. 90 tablet 2    FLUoxetine (PROZAC) 40 MG capsule TAKE 2 CAPSULES (80 MG TOTAL) BY MOUTH ONCE DAILY. 180 capsule 0    OLANZapine (ZYPREXA) 20 MG tablet Take 1 tablet (20 mg total) by mouth every evening. 90 tablet 0    pregabalin (LYRICA) 100 MG capsule Take 1 capsule (100 mg total) by mouth 2 (two) times daily. 60 capsule 6    propranolol (INDERAL) 10 MG tablet Take 1 tablet (10 mg total) by mouth 2 (two) times daily. 60 tablet 5    tiZANidine (ZANAFLEX) 2 MG tablet TAKE 2 TABLETS (4 MG TOTAL) BY MOUTH EVERY 6 (SIX) HOURS AS NEEDED (NECK TENSION).   3    topiramate (TOPAMAX) 25 MG tablet Take 2 tablets (50 mg total) by mouth 2 (two) times daily. 120 tablet 11      No current facility-administered medications for this visit.             Allergies:  Review of patient's allergies indicates:  No Known Allergies     Review of Systems:  Negative except for HPI.        OBJECTIVE:      /79   Pulse 97   Temp 98.2 °F (36.8 °C)   Ht 5' 2" (1.575 m)   Wt 55.2 kg (121 lb 11.1 oz)   BMI 22.26 kg/m²      Physical Exam:  Constitutional: Oriented to person, place, and time. Appears well-developed and well-nourished.   Bilateral breast with implants in place and incisions are well healed.  Left breast IMF is apprx 1.5 cm higher than right.  There is also a noted contour abnormality on bilateral upper poles.             ASSESSMENT/PLAN:      38 y.o. with bilateral implant breast reconstruction.  She has gained enough weight to be able to proceed with fat gafting to bilateral upper poles of the breast.  Will also lower the left " implant by apprx 1.5 cm with or without an ADM sling.  Risks benefits and alternatives of the procedure were explained to the patient. Specific risks including but not limited to bleeding, infection, asymmetry, skin necrosis, skin loss, wound healing problems, seroma, hematoma, need for drain placement, scarring, pain, and PE/DVT. Patient understands these risks and wishes to proceed with surgery.      TISHA Carrillo MD, FACS  Plastic Surgery Fellow

## 2018-11-12 NOTE — ANESTHESIA POSTPROCEDURE EVALUATION
"Anesthesia Post Evaluation    Patient: Staci Hodge    Procedure(s) Performed: Procedure(s) (LRB):  CREATION OR REVISION, INFRAMAMMARY FOLD LEFT BREAST (Left)  TRANSFER, FAT TISSUE BILATERAL BREAST (Bilateral)    Final Anesthesia Type: general  Patient location during evaluation: PACU  Patient participation: Yes- Able to Participate  Level of consciousness: awake and alert  Post-procedure vital signs: reviewed and stable  Pain management: adequate  Airway patency: patent  PONV status at discharge: No PONV  Anesthetic complications: no      Cardiovascular status: blood pressure returned to baseline and hemodynamically stable  Respiratory status: unassisted, spontaneous ventilation and room air  Hydration status: euvolemic  Follow-up not needed.        Visit Vitals  /87   Pulse 90   Temp 36.4 °C (97.5 °F) (Temporal)   Resp 15   Ht 5' 2" (1.575 m)   Wt 53.1 kg (117 lb)   LMP  (Within Weeks)   SpO2 100%   Breastfeeding? No   BMI 21.40 kg/m²       Pain/Bran Score: Pain Assessment Performed: Yes (11/12/2018  2:32 PM)  Presence of Pain: denies (11/12/2018  2:32 PM)  Pain Rating Prior to Med Admin: 6 (11/12/2018  2:40 PM)  Pain Rating Post Med Admin: 4 (11/12/2018  3:00 PM)  Bran Score: 9 (11/12/2018  2:32 PM)        "

## 2018-11-12 NOTE — BRIEF OP NOTE
[facility]  Brief Operative Note     SUMMARY     Surgery Date: 11/12/2018     Surgeon(s) and Role:     * Duane Bello MD - Primary     * Kostas Carrillo MD - Resident - Assisting     * Joe Hurt MD - Resident - Assisting        Pre-op Diagnosis:  Personal history of breast cancer [Z85.3]    Post-op Diagnosis:  Post-Op Diagnosis Codes:     * Personal history of breast cancer [Z85.3]    Procedure(s) (LRB):  CREATION OR REVISION, INFRAMAMMARY FOLD LEFT BREAST (Left)  TRANSFER, FAT TISSUE BILATERAL BREAST (Bilateral)    Anesthesia: General    Description of the findings of the procedure: bilateral fat transfer and left inframammary fold reconstruction    Findings/Key Components: fat harvested from abdomen and bilateral thighs. Inframammary fold reconstructed.     Estimated Blood Loss: * No values recorded between 11/12/2018 11:11 AM and 11/12/2018  1:38 PM *         Specimens:   Specimen (12h ago, onward)    None          Discharge Note    SUMMARY     Admit Date: 11/12/2018    Discharge Date and Time:  11/12/2018 1:46 PM    Hospital Course (synopsis of major diagnoses, care, treatment, and services provided during the course of the hospital stay): patient tolerated the operation very well and had a benign operative course. She was advanced on a surgically progressive diet and was ultimately discharged home with 1 week follow up.      Final Diagnosis: Post-Op Diagnosis Codes:     * Personal history of breast cancer [Z85.3]    Disposition: Home or Self Care    Follow Up/Patient Instructions:     Medications:  Reconciled Home Medications:      Medication List      START taking these medications    clindamycin 300 MG capsule  Commonly known as:  CLEOCIN  Take 1 capsule (300 mg total) by mouth every 6 (six) hours. for 10 days     HYDROcodone-acetaminophen 5-325 mg per tablet  Commonly known as:  NORCO  Take 1 tablet by mouth every 6 (six) hours as needed for Pain.        CONTINUE taking these  medications    amitriptyline 50 MG tablet  Commonly known as:  ELAVIL  Take 1/2 tab (25 mg) nightly for 2 weeks, then 1 tab (50 mg) nightly thereafter     butalbital-acetaminophen-caffeine -40 mg -40 mg per tablet  Commonly known as:  FIORICET, ESGIC  Take 1 tablet by mouth every 6 (six) hours as needed for Headaches. Limit use to 2 days per week.     clonazePAM 1 MG tablet  Commonly known as:  KLONOPIN  Take 1 tablet (1 mg total) by mouth 3 (three) times daily.     FLUoxetine 40 MG capsule  Commonly known as:  PROZAC  TAKE 2 CAPSULES (80 MG TOTAL) BY MOUTH ONCE DAILY.     OLANZapine 20 MG tablet  Commonly known as:  ZyPREXA  Take 1 tablet (20 mg total) by mouth every evening.     pregabalin 100 MG capsule  Commonly known as:  LYRICA  Take 1 capsule (100 mg total) by mouth 2 (two) times daily.     propranolol 10 MG tablet  Commonly known as:  INDERAL  Take 1 tablet (10 mg total) by mouth 2 (two) times daily.     tiZANidine 2 MG tablet  Commonly known as:  ZANAFLEX  TAKE 2 TABLETS (4 MG TOTAL) BY MOUTH EVERY 6 (SIX) HOURS AS NEEDED (NECK TENSION).     topiramate 25 MG tablet  Commonly known as:  TOPAMAX  Take 2 tablets (50 mg total) by mouth 2 (two) times daily.          Discharge Procedure Orders   Diet general     Lifting restrictions   Order Comments: Do not lift more than 10 pounds for 2 weeks.     Call MD for:  extreme fatigue     Call MD for:  persistent dizziness or light-headedness     Call MD for:  hives     Call MD for:  redness, tenderness, or signs of infection (pain, swelling, redness, odor or green/yellow discharge around incision site)     Call MD for:  difficulty breathing, headache or visual disturbances     Call MD for:  severe uncontrolled pain     Call MD for:  persistent nausea and vomiting     Call MD for:  temperature >100.4     Wound care routine (specify)   Order Comments: Please try and wear your girdle as often as possible. This will help with swelling. You can have a shower in  48 hours. At that time, it is okay to let warm water with soap rinse over your wounds. You have tape over your wound on your left breast. This will fall off on its own. Please do not soak in any bathtubs, spas, or any large bodies of water.     Activity as tolerated

## 2018-11-12 NOTE — DISCHARGE INSTRUCTIONS
You can shower after 48 hours. You can let warm soapy water rinse over your wounds. Do not scrub your wounds. Pat dry. Do no soak in bathtubs or spas. Do not fully submerge your wounds underneath water. Try and wear your girdle as often as possible. Please take your abx as directed and follow up with Dr. Bello in 1 week.         Home care  · Take your medication exactly as directed.  · Keep an ice pack on your chest to relieve discomfort and to avoid extra swelling. Put the ice pack on for 20 minutes. Then leave it off for 20 minutes. Repeat as often as necessary.  · Shower as necessary, starting 2 days after surgery. Gently wash your incision site. Pat the incision dry. Dont apply lotions, oils, or creams.  · Do not submerge your incision in a tub bath until it is completely closed. Doing so may introduce bacteria and cause an infection.  · You will have a dressing over your incisions. Be sure to ask your healthcare provider how to care for your dressing. Your stitches may dissolve on their own. Or, they may be removed at a follow-up appointment. If you have small, white adhesive strips at your incision sites, do not remove them. They will come off on their own.  Activity  · Dont raise your arms above breast level until your health care provider says its OK.  · Dont lift, push, or pull anything heavier than 10 pounds for at least14 days.  · Sleep on your back. Use pillows to keep the upper part of your body elevated.  · Dont drive until your healthcare provider says its OK.  Follow-up  Make a follow-up appointment as directed by our staff.  When to call your healthcare provider  Call your healthcare provider right away if you have any of the following:  · Trouble breathing, sudden shortness of breath or gradual shortness of breath that gets worse  · Bleeding or drainage through the special bra or bandage  · Pain that is not relieved by medication  · More soreness, swelling, or bruising on 1 breast than the  other  · Redness in the breasts or warmth to the touch  · Any rapid swelling in 1 area or breast  · Sudden chest pain  · Fever of 100.4°F (38°C) or higher, or chills  · Increasing pain with or without activity   Date Last Reviewed: 4/20/2015  © 9825-0576 [employer]. [address] [place] [address]. All rights reserved. This information is not intended as a substitute for professional medical care. Always follow your healthcare professional's instructions.      Anesthesia: General Anesthesia     You are watched continuously during your procedure by your anesthesia provider.     Youre due to have surgery. During surgery, youll be given medicine called anesthesia or anesthetic. This will keep you comfortable and pain-free. Your anesthesia provider will use general anesthesia.  What is general anesthesia?  General anesthesia puts you into a state like deep sleep. It goes into the bloodstream (IV anesthetics), into the lungs (gas anesthetics), or both. You feel nothing during the procedure. You will not remember it. During the procedure, the anesthesia provider monitors you continuously. He or she checks your heart rate and rhythm, blood pressure, breathing, and blood oxygen.  · IV anesthetics. IV anesthetics are given through an IV line in your arm. Theyre often given first. This is so you are asleep before a gas anesthetic is started. Some kinds of IV anesthetics relieve pain. Others relax you. Your doctor will decide which kind is best in your case.  · Gas anesthetics. Gas anesthetics are breathed into the lungs. They are often used to keep you asleep. They can be given through a facemask or a tube placed in your larynx or trachea (breathing tube).  ¨ If you have a facemask, your anesthesia provider will most likely place it over your nose and mouth while youre still awake. Youll breathe oxygen through the mask as your IV anesthetic is started. Gas anesthetic may be added through the  mask.  ¨ If you have a tube in the larynx or trachea, it will be inserted into your throat after youre asleep.  Anesthesia tools and medicines  You will likely have:  · IV anesthetics. These are put into an IV line into your bloodstream.  · Gas anesthetics. You breathe these anesthetics into your lungs, where they pass into your bloodstream.  · Pulse oximeter. This is a small clip that is attached to the end of your finger. This measures your blood oxygen level.  · Electrocardiography leads (electrodes). These are small sticky pads that are placed on your chest. They record your heart rate and rhythm.  · Blood pressure cuff. This reads your blood pressure.  Risks and possible complications  General anesthesia has some risks. These include:  · Breathing problems  · Nausea and vomiting  · Sore throat or hoarseness (usually temporary)  · Allergic reaction to the anesthetic  · Irregular heartbeat (rare)  · Cardiac arrest (rare)   Anesthesia safety  · Follow all instructions you are given for how long not to eat or drink before your procedure.  · Be sure your doctor knows what medicines and drugs you take. This includes over-the-counter medicines, herbs, supplements, alcohol or other drugs. You will be asked when those were last taken.  · Have an adult family member or friend drive you home after the procedure.  · For the first 24 hours after your surgery:  ¨ Do not drive or use heavy equipment.  ¨ Do not make important decisions or sign legal documents. If important decisions or signing legal documents is necessary during the first 24 hours after surgery, have a trusted family member or spouse act on your behalf.  ¨ Avoid alcohol.  ¨ Have a responsible adult stay with you. He or she can watch for problems and help keep you safe.  Date Last Reviewed: 12/1/2016  © 2322-6365 [employer]. [address] [place] [address], [place] [address]. All rights reserved. This information is not intended as a substitute for  professional medical care. Always follow your healthcare professional's instructions.

## 2018-11-13 ENCOUNTER — PATIENT MESSAGE (OUTPATIENT)
Dept: INTERNAL MEDICINE | Facility: CLINIC | Age: 38
End: 2018-11-13

## 2018-11-14 NOTE — OP NOTE
DATE OF PROCEDURE:  11/12/2018    PREOPERATIVE DIAGNOSIS:  History of breast cancer.    POSTOPERATIVE DIAGNOSIS:  History of breast cancer.    OPERATIVE PROCEDURES PERFORMED:  1.  Extensive capsulotomy of the left breast.  2.  Re-creation of the left inframammary fold.  3.  Fat grafting to the right breast.  4.  Fat grafting to the left breast.    SURGEON:  Duane Bello M.D., [facility]    ANESTHESIA:  General.    DESCRIPTION OF PROCEDURE:  The patient was evaluated in the preoperative holding   area.  Markings for the procedure were made.  The inframammary fold on the left   side needed to come down approximately 1.5 cm.  The patient also needed to have   fat grafting in the superior pole of both breasts.  The patient was then taken   to the Operating Room, placed in the supine position after adequate general   endotracheal anesthesia, was prepped and draped in the normal sterile fashion.    On the left side, the previous mastectomy incision was then opened.  An   extracapsular dissection then proceeded for approximately 2 cm.  The capsule was   then opened.  An extensive open capsulotomy was performed using a crosshatch   type of  capsulotomy.  This lowered the inframammary fold to the proper level to   ensure no implant slippage.  The fold was then re-created using running 2-0 PDS   suture.  Next, the implant which was soaking in triple antibiotic solution, the   pocket was irrigated with triple antibiotic solution.  The implant was then   placed back in.  The capsule was closed using a running 2-0 Vicryl, the skin   using interrupted 3-0 Monocryl and running 4-0 Monocryl subcuticular suture.    Next, tumescent fluid was instilled into the abdomen and both medial thighs.    Liposuction then proceeded, which was connected to the [employer] wash system.  The   fat was then washed and loaded into syringes.  Small stab incisions were then   made.  The fat was then dispersed in the upper pole of both breasts.  Stab    incisions were closed using 6-0 nylon.  There were no complications.  Blood loss   was approximately 50 mL.  There were no specimens sent.  The patient was rolled   to Recovery Room in stable condition.      CRB/HN  dd: 11/13/2018 16:30:11 (CST)  td: 11/13/2018 18:13:33 (CST)  Doc ID   #[id number]  Job ID #[id number]    CC:

## 2018-11-21 ENCOUNTER — PATIENT MESSAGE (OUTPATIENT)
Dept: PLASTIC SURGERY | Facility: CLINIC | Age: 38
End: 2018-11-21

## 2018-11-21 ENCOUNTER — OFFICE VISIT (OUTPATIENT)
Dept: PLASTIC SURGERY | Facility: CLINIC | Age: 38
End: 2018-11-21
Payer: MEDICAID

## 2018-11-21 ENCOUNTER — TELEPHONE (OUTPATIENT)
Dept: PLASTIC SURGERY | Facility: CLINIC | Age: 38
End: 2018-11-21

## 2018-11-21 VITALS
WEIGHT: 120.56 LBS | BODY MASS INDEX: 22.76 KG/M2 | HEIGHT: 61 IN | TEMPERATURE: 98 F | HEART RATE: 118 BPM | DIASTOLIC BLOOD PRESSURE: 79 MMHG | SYSTOLIC BLOOD PRESSURE: 113 MMHG

## 2018-11-21 DIAGNOSIS — Z09 SURGERY FOLLOW-UP EXAMINATION: Primary | ICD-10-CM

## 2018-11-21 PROCEDURE — 99213 OFFICE O/P EST LOW 20 MIN: CPT | Mod: PBBFAC,PO | Performed by: SURGERY

## 2018-11-21 PROCEDURE — 99024 POSTOP FOLLOW-UP VISIT: CPT | Mod: ,,, | Performed by: SURGERY

## 2018-11-21 PROCEDURE — 99999 PR PBB SHADOW E&M-EST. PATIENT-LVL III: CPT | Mod: PBBFAC,,, | Performed by: SURGERY

## 2018-11-21 NOTE — PROGRESS NOTES
History & Physical  Plastic Surgery Clinic    SUBJECTIVE:     Chief complaint: 1 week follow up     History of Present Illness:  Patient is a 38 y.o. female 1 week s/p bilateral fat grafting to upper poles of breast reconstruction and lowering of the left inframammary fold.  Denies fevers, chills, nausea, vomiting.  Complains of bruising at liposuction sites.  Otherwise doing well.      Past Medical History:  Past Medical History:   Diagnosis Date    Abnormal Pap smear of cervix age 25    no treatement    Anxiety     Breast cancer     Cancer 07/11/2018    breast cancer    Cervical spondylosis 2/9/2018    Colon polyp     Depression     General anesthetics causing adverse effect in therapeutic use     Headache     History of psychiatric hospitalization     age 20 for SI    Hx of psychiatric care     Hypertension     Peutz-Jeghers syndrome     Psychiatric problem     Right carpal tunnel syndrome 3/9/2017    Self-harming behavior     Suicide attempt     Therapy        Past Surgical History:  Past Surgical History:   Procedure Laterality Date    ADENOIDECTOMY      APPENDECTOMY      BILATERAL MASTECTOMY Bilateral 7/23/2018    Procedure: MASTECTOMY 23 hr stay;  Surgeon: Sofia Kendrick MD;  Location: [facility] OR [address] [place] [address];  Service: General;  Laterality: Bilateral;    BIOPSY, LYMPH NODE, SENTINEL Left 7/23/2018    [facility]    BLOCK-NERVE-MEDIAL BRANCH-CERVICAL (C3,4,5) Bilateral 2/9/2018    [facility] by [facility] at [facility] OR    BOWEL RESECTION  10/17/14    BREAST BIOPSY Left 06/04/2018    CARPAL TUNNEL RELEASE Bilateral     CARPAL TUNNEL RELEASE      COLONOSCOPY      CREATION OR REVISION, INFRAMAMMARY FOLD LEFT BREAST Left 11/12/2018    Performed by Duane Bello [facility] at [facility]    DILATION AND CURETTAGE OF UTERUS      DILATION-CURETTAGE OF UTERUS (D AND C) N/A 3/5/2018    [facility] by Simeon Peterson [facility] at [facility] OR    ENTEROSCOPY  N/A 3/18/2015    Performed by Chandan Dillon MD at [facility] ENDO (2ND FLR)    FAT TRANSFER Bilateral 11/12/2018    Procedure: TRANSFER, FAT TISSUE BILATERAL BREAST;  Surgeon: Duane Bello MD;  Location: [facility] OR [address] [place] [address];  Service: Plastics;  Laterality: Bilateral;    INJECTION FOR SENTINEL NODE IDENTIFICATION Left 7/23/2018    Procedure: INJECTION, FOR SENTINEL NODE IDENTIFICATION;  Surgeon: Sofia Kendrick MD;  Location: [facility] OR [address] [place] [address];  Service: General;  Laterality: Left;    INJECTION, FOR SENTINEL NODE IDENTIFICATION Left 7/23/2018    Performed by Sofia Kendrick MD at [facility] OR 2ND FLR    INJECTION-STEROID-EPIDURAL-CERVICAL (C7-T1) N/A 1/19/2018    Performed by [facility] at [facility] OR    INSERTION OF BREAST TISSUE EXPANDER Bilateral 7/23/2018    Procedure: INSERTION, TISSUE EXPANDER, BREAST;  Surgeon: Duane Bello MD;  Location: [facility] OR [address] [place] [address];  Service: Plastics;  Laterality: Bilateral;    INSERTION, TISSUE EXPANDER, BREAST Bilateral 7/23/2018    Performed by Duane Bello MD at [facility] OR 2ND [facility]    intestinal polpy      LAPAROTOMY-EXPLORATORY N/A 10/17/2014    Performed by Joshua Goldberg, MD at [facility] OR 2ND FLR    MASTECTOMY 23 hr stay Bilateral 7/23/2018    Performed by Sofia [facility]rick MD at [facility] OR 2ND [facility]LR    RELEASE-CARPAL TUNNEL Right 4/19/2017    Performed by Larry Patel [facility] at [facility] OR    RELEASE-CUBITAL TUNNEL Right 4/19/2017    Performed by Larry Patel [facility] at [facility] OR    RESECTION - SMALL BOWEL N/A 10/17/2014    Performed by Joshua Goldberg, [facility] at [facility] OR 2ND [facility]LR    SENTINEL LYMPH NODE BIOPSY Left 7/23/2018    Procedure: BIOPSY, LYMPH NODE, SENTINEL;  Surgeon: Sofia Kendrick MD;  Location: [facility] OR [address] [place] [address];  Service: General;  Laterality: Left;    SMALL INTESTINE SURGERY      3 surgeries     TONSILLECTOMY      TRANSFER, FAT TISSUE BILATERAL BREAST Bilateral 11/12/2018    Performed by [facility]ane [facility]ello MD at [facility] OR [address] [place] [address]     ULTRASOUND-ENDOSCOPIC-UPPER N/A 3/18/2015    [facility] (2[facility])    UPPER GASTROINTESTINAL ENDOSCOPY         Family History:  Family History   Problem Relation Age of Onset    Colon cancer Mother 33        peutz-jeghers syndrome    Hypertension Father     Colon polyps Sister         peutz-jeghers syndrome    Breast cancer Paternal Aunt     Breast cancer Paternal Grandmother     Ovarian cancer Neg Hx        Social History:  Social History     Socioeconomic History    Marital status: Single     Spouse name: None    Number of children: 0    Years of education: None    Highest education level: None   Social Needs    Financial resource strain: None    Food insecurity - worry: None    Food insecurity - inability: None    Transportation needs - medical: None    Transportation needs - non-medical: None   Occupational History    None   Tobacco Use    Smoking status: Former Smoker     Packs/day: 0.50     Years: 17.00     Pack years: 8.50     Types: Cigarettes     Start date: 2000     Last attempt to quit: 2018     Years since quittin.3    Smokeless tobacco: Never Used    Tobacco comment: told about smoking cessation clinic and give brochure   Substance and Sexual Activity    Alcohol use: No     Alcohol/week: 3.5 oz     Types: 7 Standard drinks or equivalent per week    Drug use: No    Sexual activity: Not Currently     Partners: Male     Birth control/protection: OCP     Comment: single   Other Topics Concern    Patient feels they ought to cut down on drinking/drug use No    Patient annoyed by others criticizing their drinking/drug use No    Patient has felt bad or guilty about drinking/drug use No    Patient has had a drink/used drugs as an eye opener in the AM No   Social History Narrative    Had one previous miscarriage in     Never     currently in a relationship       Medications:  Current Outpatient Medications   Medication Sig  "Dispense Refill    amitriptyline (ELAVIL) 50 MG tablet Take 1/2 tab (25 mg) nightly for 2 weeks, then 1 tab (50 mg) nightly thereafter 30 tablet 1    butalbital-acetaminophen-caffeine -40 mg (FIORICET, ESGIC) -40 mg per tablet Take 1 tablet by mouth every 6 (six) hours as needed for Headaches. Limit use to 2 days per week. 10 tablet 4    clindamycin (CLEOCIN) 300 MG capsule Take 1 capsule (300 mg total) by mouth every 6 (six) hours. for 10 days 40 capsule 0    clonazePAM (KLONOPIN) 1 MG tablet Take 1 tablet (1 mg total) by mouth 3 (three) times daily. 90 tablet 2    FLUoxetine (PROZAC) 40 MG capsule TAKE 2 CAPSULES (80 MG TOTAL) BY MOUTH ONCE DAILY. 180 capsule 0    HYDROcodone-acetaminophen (NORCO) 5-325 mg per tablet Take 1 tablet by mouth every 6 (six) hours as needed for Pain. 21 tablet 0    OLANZapine (ZYPREXA) 20 MG tablet Take 1 tablet (20 mg total) by mouth every evening. 90 tablet 0    pregabalin (LYRICA) 100 MG capsule Take 1 capsule (100 mg total) by mouth 2 (two) times daily. 60 capsule 6    propranolol (INDERAL) 10 MG tablet Take 1 tablet (10 mg total) by mouth 2 (two) times daily. 60 tablet 5    tiZANidine (ZANAFLEX) 2 MG tablet TAKE 2 TABLETS (4 MG TOTAL) BY MOUTH EVERY 6 (SIX) HOURS AS NEEDED (NECK TENSION).  3    topiramate (TOPAMAX) 25 MG tablet Take 2 tablets (50 mg total) by mouth 2 (two) times daily. 120 tablet 11     No current facility-administered medications for this visit.        Allergies:  Review of patient's allergies indicates:  No Known Allergies    Review of Systems:  Negative except for HPI.      OBJECTIVE:     /79   Pulse (!) 118   Temp 97.8 °F (36.6 °C)   Ht 5' 1" (1.549 m)   Wt 54.7 kg (120 lb 9.5 oz)   BMI 22.79 kg/m²     Physical Exam:  Constitutional: Oriented to person, place, and time. Appears well-developed and well-nourished.   Right breast upper pole fat grafting with good slope no evidence of infection.  Left breast upper pole fat " grafting with good slope.  IMF at same level as right side. Skin memory relaxing.  No evidence of infection  Abdominal and thigh  Liposuction sites with appropriate ecchymosis and no evidence of infection    ASSESSMENT/PLAN:     38 y.o. female as above, overall doing well  --continue to wear garment.  -- sutures from liposuction sites removed.    --follow up in 2 weeks    TISHA Carrillo MD, [facility]  Plastic Surgery Fellow  .

## 2018-12-03 DIAGNOSIS — G43.009 MIGRAINE WITHOUT AURA AND WITHOUT STATUS MIGRAINOSUS, NOT INTRACTABLE: ICD-10-CM

## 2018-12-03 DIAGNOSIS — R00.0 TACHYCARDIA: ICD-10-CM

## 2018-12-03 RX ORDER — BUTALBITAL, ACETAMINOPHEN AND CAFFEINE 50; 325; 40 MG/1; MG/1; MG/1
1 TABLET ORAL EVERY 6 HOURS PRN
Qty: 10 TABLET | Refills: 0 | Status: CANCELLED | OUTPATIENT
Start: 2018-12-03

## 2018-12-05 ENCOUNTER — OFFICE VISIT (OUTPATIENT)
Dept: PLASTIC SURGERY | Facility: CLINIC | Age: 38
End: 2018-12-05
Payer: MEDICAID

## 2018-12-05 VITALS
WEIGHT: 128.75 LBS | BODY MASS INDEX: 24.31 KG/M2 | DIASTOLIC BLOOD PRESSURE: 80 MMHG | SYSTOLIC BLOOD PRESSURE: 106 MMHG | HEIGHT: 61 IN | HEART RATE: 108 BPM

## 2018-12-05 DIAGNOSIS — Z09 SURGERY FOLLOW-UP EXAMINATION: Primary | ICD-10-CM

## 2018-12-05 PROCEDURE — 99999 PR PBB SHADOW E&M-EST. PATIENT-LVL III: CPT | Mod: PBBFAC,,, | Performed by: PHYSICIAN ASSISTANT

## 2018-12-05 PROCEDURE — 99213 OFFICE O/P EST LOW 20 MIN: CPT | Mod: PBBFAC,PO | Performed by: PHYSICIAN ASSISTANT

## 2018-12-05 PROCEDURE — 99024 POSTOP FOLLOW-UP VISIT: CPT | Mod: ,,, | Performed by: PHYSICIAN ASSISTANT

## 2018-12-05 RX ORDER — PROPRANOLOL HYDROCHLORIDE 10 MG/1
10 TABLET ORAL 2 TIMES DAILY
Qty: 60 TABLET | Refills: 0 | Status: SHIPPED | OUTPATIENT
Start: 2018-12-05 | End: 2019-06-24

## 2018-12-05 RX ORDER — TOPIRAMATE 25 MG/1
50 TABLET ORAL 2 TIMES DAILY
Qty: 120 TABLET | Refills: 0 | Status: SHIPPED | OUTPATIENT
Start: 2018-12-05 | End: 2019-06-24

## 2018-12-05 NOTE — PROGRESS NOTES
Staci [facility] for a follow up visit s/p second stage breast reconstruction on 11/12/2018. She is doing well today with no issues since her last visit. She denies fever, chills, nausea, vomiting or other systemic signs of infection    Review of patient's allergies indicates:  No Known Allergies  Current Outpatient Medications on File Prior to Visit   Medication Sig Dispense Refill    amitriptyline (ELAVIL) 50 MG tablet Take 1/2 tab (25 mg) nightly for 2 weeks, then 1 tab (50 mg) nightly thereafter 30 tablet 1    butalbital-acetaminophen-caffeine -40 mg (FIORICET, ESGIC) -40 mg per tablet Take 1 tablet by mouth every 6 (six) hours as needed for Headaches. Limit use to 2 days per week. 10 tablet 4    clonazePAM (KLONOPIN) 1 MG tablet Take 1 tablet (1 mg total) by mouth 3 (three) times daily. 90 tablet 2    FLUoxetine (PROZAC) 40 MG capsule TAKE 2 CAPSULES (80 MG TOTAL) BY MOUTH ONCE DAILY. 180 capsule 0    HYDROcodone-acetaminophen (NORCO) 5-325 mg per tablet Take 1 tablet by mouth every 6 (six) hours as needed for Pain. 21 tablet 0    OLANZapine (ZYPREXA) 20 MG tablet Take 1 tablet (20 mg total) by mouth every evening. 90 tablet 0    pregabalin (LYRICA) 100 MG capsule Take 1 capsule (100 mg total) by mouth 2 (two) times daily. 60 capsule 6    propranolol (INDERAL) 10 MG tablet Take 1 tablet (10 mg total) by mouth 2 (two) times daily. 60 tablet 0    tiZANidine (ZANAFLEX) 2 MG tablet TAKE 2 TABLETS (4 MG TOTAL) BY MOUTH EVERY 6 (SIX) HOURS AS NEEDED (NECK TENSION).  3    topiramate (TOPAMAX) 25 MG tablet Take 2 tablets (50 mg total) by mouth 2 (two) times daily. 120 tablet 0     No current facility-administered medications on file prior to visit.      Patient Active Problem List   Diagnosis    Small bowel obstruction    History of abnormal Pap smear    LLQ pain    Peutz-Jeghers syndrome    Weight loss    Leukocytosis    Tobacco abuse     Insomnia    Mood disorder    MARY LOU (generalized anxiety disorder)    Panic disorder with agoraphobia    PTSD (post-traumatic stress disorder)    Social anxiety disorder    Palpitations    Tachycardia    Arm numbness left    Stabbing headache    Tension headache    Right carpal tunnel syndrome    Migraine without aura    Microalbuminuria    Neck pain    Right arm numbness    Left carpal tunnel syndrome    Cervical paraspinal muscle spasm    Glucosuria with normal serum glucose    History of pyelonephritis    Left arm numbness    Myofascial muscle pain    Cervical spondylosis    OCD (obsessive compulsive disorder)    Menorrhagia with regular cycle    Hypokalemia    Inguinal lymphadenopathy    Ductal carcinoma in situ (DCIS) of left breast    DCIS (ductal carcinoma in situ)    Menorrhagia with irregular cycle    S/P breast reconstruction     Past Surgical History:   Procedure Laterality Date    ADENOIDECTOMY      APPENDECTOMY      BILATERAL MASTECTOMY Bilateral 7/23/2018    Procedure: MASTECTOMY 23 hr stay;  Surgeon: Sofia Kendrick MD;  Location: [facility] OR [address] [place] [address];  Service: General;  Laterality: Bilateral;    BIOPSY, LYMPH NODE, SENTINEL Left 7/23/2018    Performed by Sofia Kendrick MD at [facility] OR 2ND FLR    BLOCK-NERVE-MEDIAL BRANCH-CERVICAL (C3,4,5) Bilateral 2/9/2018    Performed by John Chapin [facility] at [facility] OR    BOWEL RESECTION  10/17/14    BREAST BIOPSY Left 06/04/2018    CARPAL TUNNEL RELEASE Bilateral     CARPAL TUNNEL RELEASE      COLONOSCOPY      CREATION OR REVISION, INFRAMAMMARY FOLD LEFT BREAST Left 11/12/2018    Performed by Duane Bello MD at [facility] OR 2ND [facility]LR    DILATION AND CURETTAGE OF UTERUS      DILATION-CURETTAGE OF UTERUS (D AND C) N/A 3/5/2018    Performed by Simeon Peterson MD at [facility] OR    ENTEROSCOPY N/A 3/18/2015    Performed by Chandan Dillon MD at [facility] ENDO (2ND FLR)    FAT TRANSFER Bilateral 11/12/2018    Procedure: TRANSFER, FAT  TISSUE BILATERAL BREAST;  Surgeon: Duane Bello MD;  Location: [facility] OR [address] [place] [address];  Service: Plastics;  Laterality: Bilateral;    INJECTION FOR SENTINEL NODE IDENTIFICATION Left 7/23/2018    Procedure: INJECTION, FOR SENTINEL NODE IDENTIFICATION;  Surgeon: Sofia Kendrick MD;  Location: [facility] OR [address] [place] [address];  Service: General;  Laterality: Left;    INJECTION, FOR SENTINEL NODE IDENTIFICATION Left 7/23/2018    [facility]    INJECTION-STEROID-EPIDURAL-CERVICAL (C7-T1) N/A 1/19/2018    Performed by [facility] at [facility] OR    INSERTION OF BREAST TISSUE EXPANDER Bilateral 7/23/2018    Procedure: INSERTION, TISSUE EXPANDER, BREAST;  Surgeon: Duane Bello MD;  Location: [facility] OR [address] [place] [address];  Service: Plastics;  Laterality: Bilateral;    INSERTION, TISSUE EXPANDER, BREAST Bilateral 7/23/2018    Performed by [facility] Blelo [facility] at [facility]    intestinal polpy      LAPAROTOMY-EXPLORATORY N/A 10/17/2014    Performed by Joshua Goldberg, MD at [facility]    MASTECTOMY 23 hr stay Bilateral 7/23/2018    Performed by Sofai Kendrick MD at [facility]    RELEASE-CARPAL TUNNEL Right 4/19/2017    Performed by Larry [facility]el [facility] at [facility] OR    RELEASE-CUBITAL TUNNEL Right 4/19/2017    Performed by Larry [facility]el MD at [facility] OR    RESECTION - SMALL BOWEL N/A 10/17/2014    Performed by Joshua Goldberg, MD at [facility]    SENTINEL LYMPH NODE BIOPSY Left 7/23/2018    Procedure: BIOPSY, LYMPH NODE, SENTINEL;  Surgeon: Sofia Kendrick MD;  Location: [facility] OR [address] [place] [address];  Service: General;  Laterality: Left;    SMALL INTESTINE SURGERY      3 surgeries     TONSILLECTOMY      TRANSFER, FAT TISSUE BILATERAL BREAST Bilateral 11/12/2018    Performed by [facility]ello [facility] at [facility]    ULTRASOUND-ENDOSCOPIC-UPPER N/A 3/18/2015    Performed by Chandan Dillon MD at [facility] ENDO ([address] [place] [address])    UPPER GASTROINTESTINAL ENDOSCOPY       Social  History     Socioeconomic History    Marital status: Single     Spouse name: Not on file    Number of children: 0    Years of education: Not on file    Highest education level: Not on file   Social Needs    Financial resource strain: Not on file    Food insecurity - worry: Not on file    Food insecurity - inability: Not on file    Transportation needs - medical: Not on file    Transportation needs - non-medical: Not on file   Occupational History    Not on file   Tobacco Use    Smoking status: Former Smoker     Packs/day: 0.50     Years: 17.00     Pack years: 8.50     Types: Cigarettes     Start date: 2000     Last attempt to quit: 2018     Years since quittin.3    Smokeless tobacco: Never Used    Tobacco comment: told about smoking cessation clinic and give brochure   Substance and Sexual Activity    Alcohol use: Yes     Alcohol/week: 3.5 oz     Types: 7 Standard drinks or equivalent per week     Comment: OCCASIONAL     Drug use: No    Sexual activity: Yes     Partners: Male     Comment: single   Other Topics Concern    Patient feels they ought to cut down on drinking/drug use No    Patient annoyed by others criticizing their drinking/drug use No    Patient has felt bad or guilty about drinking/drug use No    Patient has had a drink/used drugs as an eye opener in the AM No   Social History Narrative    Had one previous miscarriage in     Never     currently in a relationship     DATE OF PROCEDURE:  2018     PREOPERATIVE DIAGNOSIS:  History of breast cancer.     POSTOPERATIVE DIAGNOSIS:  History of breast cancer.     OPERATIVE PROCEDURES PERFORMED:  1.  Extensive capsulotomy of the left breast.  2.  Re-creation of the left inframammary fold.  3.  Fat grafting to the right breast.  4.  Fat grafting to the left breast.     SURGEON:  Duane Bello M.D., FACS     ANESTHESIA:  General.      ROS - mild discomfort of L breast    PHYSICAL EXAMINATION  Vitals:     12/05/18 1147   BP: 106/80   Pulse: 108     WD WN NAD  VSS  R breast - incision healed, no erythema/draiange  L breast - incision CDI no erythema/draiange    ASSESSMENT/PLAN  38 y.o. F s/p L breast surgery  - Doing well, no issues.   - IMF symmetric, skin of L breast inferior pole is stretching and implant is settling nicely.   - All Monocryl sutures were removed  - RTC x 6 weeks, appointment scheduled    All questions were answered. The patient was advised to call the clinic with any questions or concerns prior to their next visit.

## 2018-12-30 ENCOUNTER — PATIENT MESSAGE (OUTPATIENT)
Dept: OBSTETRICS AND GYNECOLOGY | Facility: CLINIC | Age: 38
End: 2018-12-30

## 2019-01-02 ENCOUNTER — OFFICE VISIT (OUTPATIENT)
Dept: PLASTIC SURGERY | Facility: CLINIC | Age: 39
End: 2019-01-02
Payer: MEDICAID

## 2019-01-02 VITALS
DIASTOLIC BLOOD PRESSURE: 81 MMHG | SYSTOLIC BLOOD PRESSURE: 113 MMHG | WEIGHT: 121.81 LBS | BODY MASS INDEX: 23.01 KG/M2 | HEART RATE: 110 BPM | TEMPERATURE: 98 F

## 2019-01-02 DIAGNOSIS — Z09 SURGERY FOLLOW-UP EXAMINATION: Primary | ICD-10-CM

## 2019-01-02 PROCEDURE — 99999 PR PBB SHADOW E&M-EST. PATIENT-LVL III: ICD-10-PCS | Mod: PBBFAC,,, | Performed by: PHYSICIAN ASSISTANT

## 2019-01-02 PROCEDURE — 99213 OFFICE O/P EST LOW 20 MIN: CPT | Mod: PBBFAC,PO | Performed by: PHYSICIAN ASSISTANT

## 2019-01-02 PROCEDURE — 99999 PR PBB SHADOW E&M-EST. PATIENT-LVL III: CPT | Mod: PBBFAC,,, | Performed by: PHYSICIAN ASSISTANT

## 2019-01-02 PROCEDURE — 99024 POSTOP FOLLOW-UP VISIT: CPT | Mod: ,,, | Performed by: PHYSICIAN ASSISTANT

## 2019-01-02 PROCEDURE — 99024 PR POST-OP FOLLOW-UP VISIT: ICD-10-PCS | Mod: ,,, | Performed by: PHYSICIAN ASSISTANT

## 2019-01-02 NOTE — PROGRESS NOTES
Subjective:      Staci Hodge is a 38 y.o. year old female who [facility] on 01/02/2019 for follow up visit status post second stage breast reconstruction. Denies fever, chills, nausea, vomiting, or other systemic signs of infection. She was seen and evaluated by myself and Dr. Duane Bello      Vitals:    01/02/19 1141   BP: 113/81   Pulse: 110   Temp: 98 °F (36.7 °C)        Review of patient's allergies indicates:  No Known Allergies    Current Outpatient Medications on File Prior to Visit   Medication Sig Dispense Refill    amitriptyline (ELAVIL) 50 MG tablet Take 1/2 tab (25 mg) nightly for 2 weeks, then 1 tab (50 mg) nightly thereafter 30 tablet 1    butalbital-acetaminophen-caffeine -40 mg (FIORICET, ESGIC) -40 mg per tablet Take 1 tablet by mouth every 6 (six) hours as needed for Headaches. Limit use to 2 days per week. 10 tablet 4    clonazePAM (KLONOPIN) 1 MG tablet Take 1 tablet (1 mg total) by mouth 3 (three) times daily. 90 tablet 2    FLUoxetine (PROZAC) 40 MG capsule TAKE 2 CAPSULES (80 MG TOTAL) BY MOUTH ONCE DAILY. 180 capsule 0    OLANZapine (ZYPREXA) 20 MG tablet Take 1 tablet (20 mg total) by mouth every evening. 90 tablet 0    propranolol (INDERAL) 10 MG tablet Take 1 tablet (10 mg total) by mouth 2 (two) times daily. 60 tablet 0    tiZANidine (ZANAFLEX) 2 MG tablet TAKE 2 TABLETS (4 MG TOTAL) BY MOUTH EVERY 6 (SIX) HOURS AS NEEDED (NECK TENSION).  3    topiramate (TOPAMAX) 25 MG tablet Take 2 tablets (50 mg total) by mouth 2 (two) times daily. 120 tablet 0    HYDROcodone-acetaminophen (NORCO) 5-325 mg per tablet Take 1 tablet by mouth every 6 (six) hours as needed for Pain. 21 tablet 0    pregabalin (LYRICA) 100 MG capsule Take 1 capsule (100 mg total) by mouth 2 (two) times daily. 60 capsule 6     No current facility-administered medications on file prior to visit.        Patient Active Problem List   Diagnosis    Small bowel  obstruction    History of abnormal Pap smear    LLQ pain    Peutz-Jeghers syndrome    Weight loss    Leukocytosis    Tobacco abuse    Insomnia    Mood disorder    MARY LOU (generalized anxiety disorder)    Panic disorder with agoraphobia    PTSD (post-traumatic stress disorder)    Social anxiety disorder    Palpitations    Tachycardia    Arm numbness left    Stabbing headache    Tension headache    Right carpal tunnel syndrome    Migraine without aura    Microalbuminuria    Neck pain    Right arm numbness    Left carpal tunnel syndrome    Cervical paraspinal muscle spasm    Glucosuria with normal serum glucose    History of pyelonephritis    Left arm numbness    Myofascial muscle pain    Cervical spondylosis    OCD (obsessive compulsive disorder)    Menorrhagia with regular cycle    Hypokalemia    Inguinal lymphadenopathy    Ductal carcinoma in situ (DCIS) of left breast    DCIS (ductal carcinoma in situ)    Menorrhagia with irregular cycle    S/P breast reconstruction       [unfilled]    Social History     Socioeconomic History    Marital status: Single     Spouse name: Not on file    Number of children: 0    Years of education: Not on file    Highest education level: Not on file   Social Needs    Financial resource strain: Not on file    Food insecurity - worry: Not on file    Food insecurity - inability: Not on file    Transportation needs - medical: Not on file    Transportation needs - non-medical: Not on file   Occupational History    Not on file   Tobacco Use    Smoking status: Former Smoker     Packs/day: 0.50     Years: 17.00     Pack years: 8.50     Types: Cigarettes     Start date: 2000     Last attempt to quit: 2018     Years since quittin.4    Smokeless tobacco: Never Used    Tobacco comment: told about smoking cessation clinic and give brochure   Substance and Sexual Activity    Alcohol use: Yes     Alcohol/week: 3.5 oz     Types: 7 Standard  drinks or equivalent per week     Comment: OCCASIONAL     Drug use: No    Sexual activity: Yes     Partners: Male     Comment: single   Other Topics Concern    Patient feels they ought to cut down on drinking/drug use No    Patient annoyed by others criticizing their drinking/drug use No    Patient has felt bad or guilty about drinking/drug use No    Patient has had a drink/used drugs as an eye opener in the AM No   Social History Narrative    Had one previous miscarriage in 2005    Never     currently in a relationship           Review of Systems: negative    Objective:     DATE OF PROCEDURE:  11/12/2018     PREOPERATIVE DIAGNOSIS:  History of breast cancer.     POSTOPERATIVE DIAGNOSIS:  History of breast cancer.     OPERATIVE PROCEDURES PERFORMED:  1.  Extensive capsulotomy of the left breast.  2.  Re-creation of the left inframammary fold.  3.  Fat grafting to the right breast.  4.  Fat grafting to the left breast.     SURGEON:  Duane Bello M.D., FACS     ANESTHESIA:  General    Physical Exam:  Vitals:    01/02/19 1141   BP: 113/81   Pulse: 110   Temp: 98 °F (36.7 °C)       WD WN NAD  VSS  Normal resp effort  R breast - incision healed, no erythema/drainage, surgically absent nipple  L breast -  incision healed, no erythema/drainage, surgically absent nipple      Assessment:       1. Surgery follow-up examination        Plan:   38 y.o. female status post second stage breast reconstruction  - Doing well, no issues  - Breast are soft and symmetric. Small dent along L inferior breast improving, may need fat grafting in the future.   - Will RTC x 2 months.     All questions were answered. The patient was advised to call the clinic with any questions or concerns prior to their next visit.

## 2019-01-03 ENCOUNTER — PATIENT MESSAGE (OUTPATIENT)
Dept: SURGERY | Facility: CLINIC | Age: 39
End: 2019-01-03

## 2019-01-15 ENCOUNTER — PATIENT MESSAGE (OUTPATIENT)
Dept: OBSTETRICS AND GYNECOLOGY | Facility: CLINIC | Age: 39
End: 2019-01-15

## 2019-01-23 RX ORDER — CLONAZEPAM 1 MG/1
1 TABLET ORAL 3 TIMES DAILY
Qty: 90 TABLET | Refills: 0 | Status: SHIPPED | OUTPATIENT
Start: 2019-01-23 | End: 2019-01-25 | Stop reason: SDUPTHER

## 2019-01-28 ENCOUNTER — PATIENT MESSAGE (OUTPATIENT)
Dept: SURGERY | Facility: CLINIC | Age: 39
End: 2019-01-28

## 2019-01-28 ENCOUNTER — PATIENT MESSAGE (OUTPATIENT)
Dept: OBSTETRICS AND GYNECOLOGY | Facility: CLINIC | Age: 39
End: 2019-01-28

## 2019-01-28 RX ORDER — CLONAZEPAM 1 MG/1
TABLET ORAL
Qty: 90 TABLET | Refills: 0 | Status: SHIPPED | OUTPATIENT
Start: 2019-01-28 | End: 2019-01-30 | Stop reason: SDUPTHER

## 2019-01-30 ENCOUNTER — OFFICE VISIT (OUTPATIENT)
Dept: PSYCHIATRY | Facility: CLINIC | Age: 39
End: 2019-01-30
Payer: MEDICAID

## 2019-01-30 VITALS
HEART RATE: 74 BPM | RESPIRATION RATE: 18 BRPM | HEIGHT: 62 IN | DIASTOLIC BLOOD PRESSURE: 76 MMHG | BODY MASS INDEX: 22.76 KG/M2 | WEIGHT: 123.69 LBS | SYSTOLIC BLOOD PRESSURE: 122 MMHG

## 2019-01-30 DIAGNOSIS — F40.10 SOCIAL ANXIETY DISORDER: ICD-10-CM

## 2019-01-30 DIAGNOSIS — F40.01 PANIC DISORDER WITH AGORAPHOBIA: ICD-10-CM

## 2019-01-30 DIAGNOSIS — F43.10 PTSD (POST-TRAUMATIC STRESS DISORDER): ICD-10-CM

## 2019-01-30 DIAGNOSIS — F42.8 OTHER OBSESSIVE-COMPULSIVE DISORDERS: ICD-10-CM

## 2019-01-30 DIAGNOSIS — F39 MOOD DISORDER: Primary | ICD-10-CM

## 2019-01-30 DIAGNOSIS — F41.1 GAD (GENERALIZED ANXIETY DISORDER): ICD-10-CM

## 2019-01-30 PROCEDURE — 99214 OFFICE O/P EST MOD 30 MIN: CPT | Mod: AF,HB,S$PBB, | Performed by: PSYCHIATRY & NEUROLOGY

## 2019-01-30 PROCEDURE — 90833 PSYTX W PT W E/M 30 MIN: CPT | Mod: PBBFAC | Performed by: PSYCHIATRY & NEUROLOGY

## 2019-01-30 PROCEDURE — 90833 PSYTX W PT W E/M 30 MIN: CPT | Mod: AF,HB,S$PBB, | Performed by: PSYCHIATRY & NEUROLOGY

## 2019-01-30 PROCEDURE — 99213 OFFICE O/P EST LOW 20 MIN: CPT | Mod: PBBFAC | Performed by: PSYCHIATRY & NEUROLOGY

## 2019-01-30 PROCEDURE — 99999 PR PBB SHADOW E&M-EST. PATIENT-LVL III: CPT | Mod: PBBFAC,,, | Performed by: PSYCHIATRY & NEUROLOGY

## 2019-01-30 PROCEDURE — 90833 PR PSYCHOTHERAPY W/PATIENT W/E&M, 30 MIN (ADD ON): ICD-10-PCS | Mod: AF,HB,S$PBB, | Performed by: PSYCHIATRY & NEUROLOGY

## 2019-01-30 PROCEDURE — 99214 PR OFFICE/OUTPT VISIT, EST, LEVL IV, 30-39 MIN: ICD-10-PCS | Mod: AF,HB,S$PBB, | Performed by: PSYCHIATRY & NEUROLOGY

## 2019-01-30 PROCEDURE — 99999 PR PBB SHADOW E&M-EST. PATIENT-LVL III: ICD-10-PCS | Mod: PBBFAC,,, | Performed by: PSYCHIATRY & NEUROLOGY

## 2019-01-30 RX ORDER — FLUOXETINE HYDROCHLORIDE 40 MG/1
80 CAPSULE ORAL DAILY
Qty: 180 CAPSULE | Refills: 0 | Status: SHIPPED | OUTPATIENT
Start: 2019-01-30 | End: 2019-05-22 | Stop reason: SDUPTHER

## 2019-01-30 RX ORDER — CLONAZEPAM 1 MG/1
1 TABLET ORAL 3 TIMES DAILY
Qty: 90 TABLET | Refills: 2 | Status: SHIPPED | OUTPATIENT
Start: 2019-01-30 | End: 2019-05-20 | Stop reason: SDUPTHER
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
## 2019-02-07 PROBLEM — Z32.00 POSSIBLE PREGNANCY: Status: ACTIVE | Noted: 2019-02-07

## 2019-02-21 ENCOUNTER — HOSPITAL ENCOUNTER (OUTPATIENT)
Dept: RADIOLOGY | Facility: HOSPITAL | Age: 39
Discharge: HOME | End: 2019-02-21
Attending: STUDENT IN AN ORGANIZED HEALTH CARE EDUCATION/TRAINING PROGRAM
Payer: MEDICAID

## 2019-02-21 DIAGNOSIS — Z32.01 POSITIVE PREGNANCY TEST: ICD-10-CM

## 2019-02-21 PROCEDURE — 76830 TRANSVAGINAL US NON-OB: CPT | Mod: TC

## 2019-02-21 PROCEDURE — 76830 TRANSVAGINAL US NON-OB: CPT | Mod: 26,,, | Performed by: RADIOLOGY

## 2019-02-21 PROCEDURE — 76830 US TRANSVAGINAL NON OB: ICD-10-PCS | Mod: 26,,, | Performed by: RADIOLOGY

## 2019-02-22 PROBLEM — Z01.818 PRE-OP EVALUATION: Status: ACTIVE | Noted: 2019-02-22

## 2019-02-25 PROBLEM — Z98.890 S/P D&C (STATUS POST DILATION AND CURETTAGE): Status: ACTIVE | Noted: 2019-02-25

## 2019-02-26 PROBLEM — O00.90 ECTOPIC PREGNANCY: Status: ACTIVE | Noted: 2019-02-26

## 2019-03-01 ENCOUNTER — LAB VISIT (OUTPATIENT)
Dept: LAB | Facility: HOSPITAL | Age: 39
End: 2019-03-01
Attending: STUDENT IN AN ORGANIZED HEALTH CARE EDUCATION/TRAINING PROGRAM
Payer: MEDICAID

## 2019-03-01 DIAGNOSIS — O00.90 ECTOPIC PREGNANCY, UNSPECIFIED LOCATION, UNSPECIFIED WHETHER INTRAUTERINE PREGNANCY PRESENT: ICD-10-CM

## 2019-03-01 LAB — HCG INTACT+B SERPL-ACNC: 128 MIU/ML

## 2019-03-01 PROCEDURE — 84702 CHORIONIC GONADOTROPIN TEST: CPT

## 2019-03-01 PROCEDURE — 36415 COLL VENOUS BLD VENIPUNCTURE: CPT

## 2019-03-04 ENCOUNTER — LAB VISIT (OUTPATIENT)
Dept: LAB | Facility: HOSPITAL | Age: 39
End: 2019-03-04
Attending: STUDENT IN AN ORGANIZED HEALTH CARE EDUCATION/TRAINING PROGRAM
Payer: MEDICAID

## 2019-03-04 DIAGNOSIS — O00.90 ECTOPIC PREGNANCY, UNSPECIFIED LOCATION, UNSPECIFIED WHETHER INTRAUTERINE PREGNANCY PRESENT: ICD-10-CM

## 2019-03-04 LAB — HCG INTACT+B SERPL-ACNC: 163 MIU/ML

## 2019-03-04 PROCEDURE — 36415 COLL VENOUS BLD VENIPUNCTURE: CPT

## 2019-03-04 PROCEDURE — 84702 CHORIONIC GONADOTROPIN TEST: CPT

## 2019-03-09 ENCOUNTER — LAB VISIT (OUTPATIENT)
Dept: LAB | Facility: HOSPITAL | Age: 39
End: 2019-03-09
Attending: STUDENT IN AN ORGANIZED HEALTH CARE EDUCATION/TRAINING PROGRAM
Payer: MEDICAID

## 2019-03-09 DIAGNOSIS — O36.80X0 PREGNANCY OF UNKNOWN ANATOMIC LOCATION: ICD-10-CM

## 2019-03-09 LAB — HCG INTACT+B SERPL-ACNC: 77 MIU/ML

## 2019-03-09 PROCEDURE — 36415 COLL VENOUS BLD VENIPUNCTURE: CPT

## 2019-03-09 PROCEDURE — 84702 CHORIONIC GONADOTROPIN TEST: CPT

## 2019-03-12 ENCOUNTER — LAB VISIT (OUTPATIENT)
Dept: LAB | Facility: HOSPITAL | Age: 39
End: 2019-03-12
Attending: STUDENT IN AN ORGANIZED HEALTH CARE EDUCATION/TRAINING PROGRAM
Payer: MEDICAID

## 2019-03-12 DIAGNOSIS — O36.80X0 PREGNANCY OF UNKNOWN ANATOMIC LOCATION: ICD-10-CM

## 2019-03-12 LAB — HCG INTACT+B SERPL-ACNC: 32 MIU/ML

## 2019-03-12 PROCEDURE — 36415 COLL VENOUS BLD VENIPUNCTURE: CPT

## 2019-03-12 PROCEDURE — 84702 CHORIONIC GONADOTROPIN TEST: CPT

## 2019-03-13 ENCOUNTER — OFFICE VISIT (OUTPATIENT)
Dept: PLASTIC SURGERY | Facility: CLINIC | Age: 39
End: 2019-03-13
Payer: MEDICAID

## 2019-03-13 VITALS
DIASTOLIC BLOOD PRESSURE: 86 MMHG | SYSTOLIC BLOOD PRESSURE: 125 MMHG | BODY MASS INDEX: 23.96 KG/M2 | TEMPERATURE: 98 F | WEIGHT: 130.19 LBS | HEIGHT: 62 IN | HEART RATE: 82 BPM

## 2019-03-13 DIAGNOSIS — Z98.890 S/P BREAST RECONSTRUCTION: Primary | ICD-10-CM

## 2019-03-13 PROCEDURE — 99024 PR POST-OP FOLLOW-UP VISIT: ICD-10-PCS | Mod: ,,, | Performed by: PHYSICIAN ASSISTANT

## 2019-03-13 PROCEDURE — 99024 POSTOP FOLLOW-UP VISIT: CPT | Mod: ,,, | Performed by: PHYSICIAN ASSISTANT

## 2019-03-13 PROCEDURE — 99999 PR PBB SHADOW E&M-EST. PATIENT-LVL III: CPT | Mod: PBBFAC,,, | Performed by: PHYSICIAN ASSISTANT

## 2019-03-13 PROCEDURE — 99999 PR PBB SHADOW E&M-EST. PATIENT-LVL III: ICD-10-PCS | Mod: PBBFAC,,, | Performed by: PHYSICIAN ASSISTANT

## 2019-03-13 PROCEDURE — 99213 OFFICE O/P EST LOW 20 MIN: CPT | Mod: PBBFAC,PO | Performed by: PHYSICIAN ASSISTANT

## 2019-03-13 NOTE — PROGRESS NOTES
Subjective:      Staci Hodge is a 38 y.o. year old female who presents to [facility] on 03/13/2019 for follow up visit status post second stage breast reconstruction. Denies fever, chills, nausea, vomiting, or other systemic signs of infection. She was seen and evaluated by myself and Dr. Duane Bello      Vitals:    03/13/19 1145   BP: 125/86   Pulse: 82   Temp: 98.3 °F (36.8 °C)        Review of patient's allergies indicates:  No Known Allergies    Current Outpatient Medications on File Prior to Visit   Medication Sig Dispense Refill    butalbital-acetaminophen-caffeine -40 mg (FIORICET, ESGIC) -40 mg per tablet Take 1 tablet by mouth every 6 (six) hours as needed for Headaches. Limit use to 2 days per week. 10 tablet 4    clonazePAM (KLONOPIN) 1 MG tablet Take 1 tablet (1 mg total) by mouth 3 (three) times daily. 90 tablet 2    FLUoxetine 40 MG capsule Take 2 capsules (80 mg total) by mouth once daily. 180 capsule 0    ibuprofen (ADVIL,MOTRIN) 800 MG tablet Take 1 tablet (800 mg total) by mouth every 8 (eight) hours as needed for Pain. 30 tablet 0    propranolol (INDERAL) 10 MG tablet Take 1 tablet (10 mg total) by mouth 2 (two) times daily. 60 tablet 0    HYDROcodone-acetaminophen (NORCO) 5-325 mg per tablet Take 1 tablet by mouth every 4 (four) hours as needed for Pain. 14 tablet 0    pregabalin (LYRICA) 100 MG capsule Take 1 capsule (100 mg total) by mouth 2 (two) times daily. 60 capsule 6    topiramate (TOPAMAX) 25 MG tablet Take 2 tablets (50 mg total) by mouth 2 (two) times daily. 120 tablet 0     No current facility-administered medications on file prior to visit.        Patient Active Problem List   Diagnosis    History of abnormal Pap smear    LLQ pain    Peutz-Jeghers syndrome    Weight loss    Leukocytosis    Tobacco abuse    Insomnia    Mood disorder    MARY LOU (generalized anxiety disorder)    Panic disorder with agoraphobia    PTSD  (post-traumatic stress disorder)    Social anxiety disorder    Palpitations    Tachycardia    Arm numbness left    Stabbing headache    Tension headache    Right carpal tunnel syndrome    Migraine without aura    Microalbuminuria    Neck pain    Right arm numbness    Left carpal tunnel syndrome    Cervical paraspinal muscle spasm    Glucosuria with normal serum glucose    History of pyelonephritis    Left arm numbness    Myofascial muscle pain    Cervical spondylosis    OCD (obsessive compulsive disorder)    Menorrhagia with regular cycle    Hypokalemia    Inguinal lymphadenopathy    Ductal carcinoma in situ (DCIS) of left breast    DCIS (ductal carcinoma in situ)    Menorrhagia with irregular cycle    S/P breast reconstruction    Possible pregnancy    Pre-op evaluation    S/P D&C (status post dilation and curettage)    Ectopic pregnancy       [unfilled]    Social History     Socioeconomic History    Marital status: Single     Spouse name: Not on file    Number of children: 0    Years of education: Not on file    Highest education level: Not on file   Social Needs    Financial resource strain: Not on file    Food insecurity - worry: Not on file    Food insecurity - inability: Not on file    Transportation needs - medical: Not on file    Transportation needs - non-medical: Not on file   Occupational History    Not on file   Tobacco Use    Smoking status: Former Smoker     Packs/day: 0.50     Years: 17.00     Pack years: 8.50     Types: Cigarettes     Start date: 2000     Last attempt to quit: 2018     Years since quittin.6    Smokeless tobacco: Never Used    Tobacco comment: told about smoking cessation clinic and give brochure   Substance and Sexual Activity    Alcohol use: Yes     Alcohol/week: 3.5 oz     Types: 7 Standard drinks or equivalent per week     Comment: occasionally    Drug use: No    Sexual activity: Not Currently     Partners: Male   Other Topics  Concern    Patient feels they ought to cut down on drinking/drug use No    Patient annoyed by others criticizing their drinking/drug use No    Patient has felt bad or guilty about drinking/drug use No    Patient has had a drink/used drugs as an eye opener in the AM No   Social History Narrative    Had one previous miscarriage in 2005    Never     currently in a relationship           Review of Systems: negative    Objective:     DATE OF PROCEDURE:  11/12/2018     PREOPERATIVE DIAGNOSIS:  History of breast cancer.     POSTOPERATIVE DIAGNOSIS:  History of breast cancer.     OPERATIVE PROCEDURES PERFORMED:  1.  Extensive capsulotomy of the left breast.  2.  Re-creation of the left inframammary fold.  3.  Fat grafting to the right breast.  4.  Fat grafting to the left breast.     SURGEON:  Duane Bello M.D., [facility]     ANESTHESIA:  General    Physical Exam:  Vitals:    03/13/19 1145   BP: 125/86   Pulse: 82   Temp: 98.3 °F (36.8 °C)       WD WN NAD  VSS  Normal resp effort  R breast - incision healed, no erythema/drainage, surgically absent nipple  L breast -  incision healed, no erythema/drainage, surgically absent nipple, contracture of L medial breast      Assessment:       1. S/P breast reconstruction        Plan:   38 y.o. female status post second stage breast reconstruction  - Doing well, no issues  - Breast are soft and symmetric. Small dent along L inferior breast improving but will need open capsulotomy.  - Office staff to call and coordinate surgery date once insurance auth obtained.       All questions were answered. The patient was advised to call the clinic with any questions or concerns prior to their next visit.

## 2019-03-19 ENCOUNTER — LAB VISIT (OUTPATIENT)
Dept: LAB | Facility: HOSPITAL | Age: 39
End: 2019-03-19
Attending: STUDENT IN AN ORGANIZED HEALTH CARE EDUCATION/TRAINING PROGRAM
Payer: MEDICAID

## 2019-03-19 ENCOUNTER — PATIENT MESSAGE (OUTPATIENT)
Dept: OBSTETRICS AND GYNECOLOGY | Facility: CLINIC | Age: 39
End: 2019-03-19

## 2019-03-19 DIAGNOSIS — O00.90 ECTOPIC PREGNANCY, UNSPECIFIED LOCATION, UNSPECIFIED WHETHER INTRAUTERINE PREGNANCY PRESENT: ICD-10-CM

## 2019-03-19 LAB — HCG INTACT+B SERPL-ACNC: 1.3 MIU/ML

## 2019-03-19 PROCEDURE — 36415 COLL VENOUS BLD VENIPUNCTURE: CPT

## 2019-03-19 PROCEDURE — 84702 CHORIONIC GONADOTROPIN TEST: CPT

## 2019-03-22 DIAGNOSIS — Z85.3 PERSONAL HISTORY OF BREAST CANCER: Primary | ICD-10-CM

## 2019-04-09 ENCOUNTER — ANESTHESIA EVENT (OUTPATIENT)
Dept: SURGERY | Facility: HOSPITAL | Age: 39
End: 2019-04-09
Payer: MEDICAID

## 2019-04-09 NOTE — PRE ADMISSION SCREENING
Anesthesia Assessment: Preoperative EQUATION    Planned Procedure: Procedure(s) (LRB):  CAPSULOTOMY, BREAST LEFT (Left)  Requested Anesthesia Type:General  Surgeon: Duane Bello MD  Service: Plastics  Known or anticipated Date of Surgery:4/25/2019    Surgeon notes: reviewed    Electronic QUestionnaire Assessment completed via nurse interview with patient.        No AQ      Triage considerations:     The patient has no apparent active cardiac condition (No unstable coronary Syndrome such as severe unstable angina or recent [<1 month] myocardial infarction, decompensated CHF, severe valvular   disease or significant arrhythmia)    Previous anesthesia records:GETA, LMA General, MAC and Complications noted-HX of postop confusion day after mastectomy.  D & C:  02/25/19 Placement Time: 0716 Inserted by: CRNA Airway Device: LMA Mask Ventilation: Easy Intubated: Postinduction Style: Cuffed Cuff Inflation: Minimal occlusive pressure Placement Verified By: Auscultation;Capnometry Complicating Factors: None Findings Post-Intubation: Positive EtCO2;Bilateral breath sounds;Atraumatic/Condition of teeth unchanged Complications: None Breath Sounds: Equal Bilateral Insertion attempts (enter comment if more than 2 attempts): 1   Airway/Jaw/Neck:  Airway Findings: Mouth Opening: Normal Tongue: Normal  General Airway Assessment: Adult  Mallampati: II  TM Distance: Normal, at least 6 cm         Dental:  Dental Findings: In tact            Last PCP note: 3-6 months ago , within [facility]   Subspecialty notes: Psychiatry, GYN    Other important co-morbidities: HX breast cancer     Tests already available:  Available tests,  within 3 months . 3/6/19 CBC, CMP. 9/2017 EKG.            Instructions given. (See in Nurse's note)    Optimization:  Anesthesia Preop Clinic Assessment  Indicated-not required for this procedure      Plan:    Testing:  none     Patient  has previously scheduled Medical Appointment: 4/16  surgeon    Navigation:                Straight Line to surgery.               No tests, anesthesia preop clinic visit, or consult required.

## 2019-04-09 NOTE — ANESTHESIA PREPROCEDURE EVALUATION
Colleen Soriano, RN   Registered Nurse      Pre Admission Screening   Signed                             []Hide copied text    []Hover for details      Anesthesia Assessment: Preoperative EQUATION     Planned Procedure: Procedure(s) (LRB):  CAPSULOTOMY, BREAST LEFT (Left)  Requested Anesthesia Type:General  Surgeon: Duane Bello MD  Service: Plastics  Known or anticipated Date of Surgery:4/25/2019     Surgeon notes: reviewed     Electronic QUestionnaire Assessment completed via nurse interview with patient.         No AQ        Triage considerations:      The patient has no apparent active cardiac condition (No unstable coronary Syndrome such as severe unstable angina or recent [<1 month] myocardial infarction, decompensated CHF, severe valvular   disease or significant arrhythmia)     Previous anesthesia records:GETA, LMA General, MAC and Complications noted-HX of postop confusion day after mastectomy.  D & C:  02/25/19 Placement Time: 0716 Inserted by: CRNA Airway Device: LMA Mask Ventilation: Easy Intubated: Postinduction Style: Cuffed Cuff Inflation: Minimal occlusive pressure Placement Verified By: Auscultation;Capnometry Complicating Factors: None Findings Post-Intubation: Positive EtCO2;Bilateral breath sounds;Atraumatic/Condition of teeth unchanged Complications: None Breath Sounds: Equal Bilateral Insertion attempts (enter comment if more than 2 attempts): 1   Airway/Jaw/Neck:  Airway Findings: Mouth Opening: Normal Tongue: Normal  General Airway Assessment: Adult  Mallampati: II  TM Distance: Normal, at least 6 cm         Dental:  Dental Findings: In tact              Last PCP note: 3-6 months ago , within [facility]   Subspecialty notes: Psychiatry, GYN     Other important co-morbidities: HX breast cancer     Tests already available:  Available tests,  within 3 months . 3/6/19 CBC, CMP. 9/2017 EKG.                            Instructions given. (See in Nurse's note)     Optimization:   Anesthesia Preop Clinic Assessment  Indicated-not required for this procedure                Plan:    Testing:  none                           Patient  has previously scheduled Medical Appointment: 4/16 surgeon     Navigation:                          Straight Line to surgery.                          No tests, anesthesia preop clinic visit, or consult required.                                                          Electronically signed by Colleen Soriano RN at 4/9/2019  2:29 PM       Pre-admit on 4/25/2019            Detailed Report                                                                                                                     04/09/2019  Staci Hodge is a 38 y.o., female.    Anesthesia Evaluation         Review of Systems  Anesthesia Hx:  Hx of Anesthetic complications History of prior surgery of interest to airway management or planning: Previous anesthesia: General 2/25/19 D & C with general anesthesia.  Procedure performed at an [facility] Facility. Airway issues documented on chart review include laryngeal mask airway used  Denies Family Hx of Anesthesia complications.  Personal Hx of Anesthesia complications (postop confusion day after mastectomy)   Social:  Former Smoker    Hematology/Oncology:  Hematology Normal       -- Cancer in past history:  Breast surgery    EENT/Dental:EENT/Dental Normal   Cardiovascular:    Denies Angina. occ palpitations-takes propanolol if needed-has not used in months Functional Capacity good / => 4 METS   Denies Hypertension.    Pulmonary:  Pulmonary Normal  Denies Shortness of breath.  Denies Recent URI.    Renal/:  Renal/ Normal     Hepatic/GI:  Bowel Conditions: (Pentz-Jeghers syndrome)    Musculoskeletal:   Arthritis     Neurological:  Neurology Normal    Endocrine:  Endocrine Normal    Psych:   depression             Anesthesia Plan  Type of Anesthesia, risks & benefits discussed:  Anesthesia Type:  general  Patient's Preference:   Intra-op  Monitoring Plan: standard ASA monitors  Intra-op Monitoring Plan Comments:   Post Op Pain Control Plan: IV/PO Opioids PRN  Post Op Pain Control Plan Comments:   Induction:   IV  Beta Blocker:  Patient is not currently on a Beta-Blocker (No further documentation required).       Informed Consent: Patient understands risks and agrees with Anesthesia plan.  Questions answered. Anesthesia consent signed with patient.  ASA Score: 1     Day of Surgery Review of History & Physical:    H&P update referred to the surgeon.         Ready For Surgery From Anesthesia Perspective.

## 2019-04-11 ENCOUNTER — TELEPHONE (OUTPATIENT)
Dept: PLASTIC SURGERY | Facility: CLINIC | Age: 39
End: 2019-04-11

## 2019-04-11 NOTE — TELEPHONE ENCOUNTER
Pre-op instructions reviewed with pt. for surgery scheduled 4/25/2019. Questions answered. Pt verbalized understanding.

## 2019-04-15 NOTE — PROGRESS NOTES
[facility] [place]       Post-Op        REFERRING PHYSICIAN:  Shilpi Clayton NP    MEDICAL ONCOLOGIST:    Christopher  RADIATION ONCOLOGIST:   none    DIAGNOSIS:    This is a 38 y.o. 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    TREATMENT SUMMARY:  The patient is status post bilateral skin sparing mastectomy and 7/23/2018 with recon by Dr. Bello. Had further reconstruction with plastic surgery on 11/12/2018. As per plastics will eventually need open capsulotomy-awaiting insurance approval. Final pathology showed DCIS, grade 1 multicentric, largest area 1.7cm, 1mm from anterior marginsentinel node biopsy.    INTERVAL HISTORY:   Staci Hodge comes in for a  Surveillance check.  She denies fever, chills, chest pain or shortness of breath.  Her pain is well controlled.  No new breast concerns. Planning for upcoming revision.    MEDICATIONS:  Current Outpatient Medications   Medication Sig Dispense Refill    butalbital-acetaminophen-caffeine -40 mg (FIORICET, ESGIC) -40 mg per tablet Take 1 tablet by mouth every 6 (six) hours as needed for Headaches. Limit use to 2 days per week. 10 tablet 4    clonazePAM (KLONOPIN) 1 MG tablet Take 1 tablet (1 mg total) by mouth 3 (three) times daily. 90 tablet 2    FLUoxetine 40 MG capsule Take 2 capsules (80 mg total) by mouth once daily. 180 capsule 0    pregabalin (LYRICA) 100 MG capsule Take 1 capsule (100 mg total) by mouth 2 (two) times daily. 60 capsule 6    propranolol (INDERAL) 10 MG tablet Take 1 tablet (10 mg total) by mouth 2 (two) times daily. 60 tablet 0    topiramate (TOPAMAX) 25 MG tablet Take 2 tablets (50 mg total) by mouth 2 (two) times daily. 120 tablet 0     No current facility-administered medications for this visit.        ALLERGIES:   Review of patient's allergies indicates:  No Known Allergies    PHYSICAL EXAMINATION:   General:  This is a well appearing female with appropriate speech, affect and  gait.     Breast:  Incisions clean, dry, and intact.  No masses, well healed.  No LAD, asymmetry present.    PATH:  FINAL PATHOLOGIC DIAGNOSIS  1. BREAST (RIGHT MASTECTOMY): FIBROCYSTIC CHANGES; NO EVIDENCE OF ATYPIA OR MALIGNANCY  IN SECTIONS OF BREAST, SKIN, OR NIPPLE.  2. BREAST (LEFT MASTECTOMY): SMALL FOCI OF RESIDUAL LOW-GRADE DUCTAL CARCINOMA IN SITU  WITH MICROCALCIFICATIONS INVOLVING MULTIPLE AREAS OF BREAST INCLUDING UPPER OUTER  QUADRANT, NIPPLE, AND PREVIOUS BIOPSY SITE IN LOWER BREAST; ANTERIOR AND POSTERIOR  RESECTION MARGINS FREE OF MALIGNANCY.  Note: Hormone receptor assay results on previous needle biopsy tumor ([id number])) were reported by Dr. TISHA Lane as ER and PgR positive.  3. LYMPH NODE (LEFT SENTINEL LYMPH NODE, CLINICAL): NO EVIDENCE OF MALIGNANCY.  Note: All lymph node tissue was examined at three levels with routine (H&E) stains and with three epithelial  immunostains (AE1/AE3, Cam5.2, WSK). Positive and negative controls reacted appropriately.  4. LYMPH NODE (LEFT SENTINEL LYMPH NODE, CLINICAL): NO EVIDENCE OF MALIGNANCY.  Note: All lymph node tissue was examined at three levels with routine (H&E) stains and with three epithelial  immunostains (AE1/AE3, Cam5.2, WSK). Positive and negative controls reacted appropriately.  5. LYMPH NODE (LEFT SENTINEL LYMPH NODE, CLINICAL): NO EVIDENCE OF MALIGNANCY.  6. FIBROADIPOSE TISSUE: NO EVIDENCE OF MALIGNANCY.  SURGICAL PATHOLOGY CANCER CASE SUMMARY  DCIS OF BREAST  Procedure: complete mastectomy  Lymph node sampling: sentinel lymph nodes x 3  Specimen laterality: left  Tumor sites: inferior, UOQ, and nipple  Specimen size: 142g  Size of DCIS: largest area 1.7 cm UOQ  Histologic type: low-grade, cribriform, apocrine  Nuclear grade: low-grade  Necrosis: absent  Margins: 0.4 cm from posterior, 0.1 cm from anterior  Lymph nodes: 3  Stage: 0 [pTis pN0(i-)sn]    IMPRESSION:   37 y/o s/p bilateral mastectomy with implant recon for extensive  DCIS.    PLAN:   1. return in 6 months for a follow up office visit and breast exam  2. The patient is advised in continued exam of the breast chest wall and to report to this office sooner should she note any areas of abnormality or concern.   3.  She has been instructed to follow up with med onc

## 2019-04-16 ENCOUNTER — HOSPITAL ENCOUNTER (OUTPATIENT)
Dept: RADIOLOGY | Facility: HOSPITAL | Age: 39
Discharge: HOME | End: 2019-04-16
Attending: SURGERY
Payer: MEDICAID

## 2019-04-16 ENCOUNTER — OFFICE VISIT (OUTPATIENT)
Dept: SURGERY | Facility: CLINIC | Age: 39
End: 2019-04-16
Payer: MEDICAID

## 2019-04-16 VITALS
SYSTOLIC BLOOD PRESSURE: 130 MMHG | BODY MASS INDEX: 22.9 KG/M2 | DIASTOLIC BLOOD PRESSURE: 73 MMHG | WEIGHT: 124.44 LBS | TEMPERATURE: 98 F | HEIGHT: 62 IN | HEART RATE: 80 BPM

## 2019-04-16 VITALS — BODY MASS INDEX: 22.82 KG/M2 | WEIGHT: 124 LBS | HEIGHT: 62 IN

## 2019-04-16 DIAGNOSIS — D05.12 DUCTAL CARCINOMA IN SITU (DCIS) OF LEFT BREAST: ICD-10-CM

## 2019-04-16 DIAGNOSIS — N63.20 MASS OF LEFT BREAST: ICD-10-CM

## 2019-04-16 DIAGNOSIS — N63.20 MASS OF LEFT BREAST: Primary | ICD-10-CM

## 2019-04-16 PROCEDURE — 99213 PR OFFICE/OUTPT VISIT, EST, LEVL III, 20-29 MIN: ICD-10-PCS | Mod: S$PBB,,, | Performed by: SURGERY

## 2019-04-16 PROCEDURE — 99213 OFFICE O/P EST LOW 20 MIN: CPT | Mod: S$PBB,,, | Performed by: SURGERY

## 2019-04-16 PROCEDURE — 77061 BREAST TOMOSYNTHESIS UNI: CPT | Mod: 26,LT,, | Performed by: RADIOLOGY

## 2019-04-16 PROCEDURE — 76642 US BREAST LEFT LIMITED: ICD-10-PCS | Mod: 26,LT,, | Performed by: RADIOLOGY

## 2019-04-16 PROCEDURE — 99999 PR PBB SHADOW E&M-EST. PATIENT-LVL III: CPT | Mod: PBBFAC,,, | Performed by: SURGERY

## 2019-04-16 PROCEDURE — 76642 ULTRASOUND BREAST LIMITED: CPT | Mod: TC,PO,LT

## 2019-04-16 PROCEDURE — 99213 OFFICE O/P EST LOW 20 MIN: CPT | Mod: PBBFAC,25,PO | Performed by: SURGERY

## 2019-04-16 PROCEDURE — 77065 DX MAMMO INCL CAD UNI: CPT | Mod: TC,PO,LT

## 2019-04-16 PROCEDURE — 76642 ULTRASOUND BREAST LIMITED: CPT | Mod: 26,LT,, | Performed by: RADIOLOGY

## 2019-04-16 PROCEDURE — 99999 PR PBB SHADOW E&M-EST. PATIENT-LVL III: ICD-10-PCS | Mod: PBBFAC,,, | Performed by: SURGERY

## 2019-04-16 PROCEDURE — 77065 MAMMO DIGITAL DIAGNOSTIC LEFT WITH TOMOSYNTHESIS_CAD: ICD-10-PCS | Mod: 26,LT,, | Performed by: RADIOLOGY

## 2019-04-16 PROCEDURE — 77061 MAMMO DIGITAL DIAGNOSTIC LEFT WITH TOMOSYNTHESIS_CAD: ICD-10-PCS | Mod: 26,LT,, | Performed by: RADIOLOGY

## 2019-04-16 PROCEDURE — 77065 DX MAMMO INCL CAD UNI: CPT | Mod: 26,LT,, | Performed by: RADIOLOGY

## 2019-04-16 PROCEDURE — 77061 BREAST TOMOSYNTHESIS UNI: CPT | Mod: TC,PO,LT

## 2019-04-24 ENCOUNTER — TELEPHONE (OUTPATIENT)
Dept: PLASTIC SURGERY | Facility: CLINIC | Age: 39
End: 2019-04-24

## 2019-04-24 NOTE — TELEPHONE ENCOUNTER
Patient informed to arrive at 530 for surgery 4/25/2019 to the 2nd floor at the [facility]. Pt verbalized understanding.

## 2019-04-25 ENCOUNTER — ANESTHESIA (OUTPATIENT)
Dept: SURGERY | Facility: HOSPITAL | Age: 39
End: 2019-04-25
Payer: MEDICAID

## 2019-04-25 ENCOUNTER — HOSPITAL ENCOUNTER (OUTPATIENT)
Facility: HOSPITAL | Age: 39
Discharge: HOME | End: 2019-04-25
Attending: SURGERY | Admitting: SURGERY
Payer: MEDICAID

## 2019-04-25 VITALS
OXYGEN SATURATION: 100 % | RESPIRATION RATE: 16 BRPM | TEMPERATURE: 98 F | DIASTOLIC BLOOD PRESSURE: 77 MMHG | HEIGHT: 62 IN | BODY MASS INDEX: 23 KG/M2 | HEART RATE: 89 BPM | SYSTOLIC BLOOD PRESSURE: 121 MMHG | WEIGHT: 125 LBS

## 2019-04-25 DIAGNOSIS — Z98.890 S/P BREAST RECONSTRUCTION: Primary | ICD-10-CM

## 2019-04-25 LAB
B-HCG UR QL: NEGATIVE
CTP QC/QA: YES

## 2019-04-25 PROCEDURE — 00402 ANES INTEG SYS RCNSTV BREAST: CPT | Performed by: SURGERY

## 2019-04-25 PROCEDURE — 25000003 PHARM REV CODE 250: Performed by: STUDENT IN AN ORGANIZED HEALTH CARE EDUCATION/TRAINING PROGRAM

## 2019-04-25 PROCEDURE — S0077 INJECTION, CLINDAMYCIN PHOSP: HCPCS | Performed by: SURGERY

## 2019-04-25 PROCEDURE — 25000003 PHARM REV CODE 250: Performed by: NURSE ANESTHETIST, CERTIFIED REGISTERED

## 2019-04-25 PROCEDURE — 25000003 PHARM REV CODE 250: Performed by: SURGERY

## 2019-04-25 PROCEDURE — 71000015 HC POSTOP RECOV 1ST HR: Performed by: SURGERY

## 2019-04-25 PROCEDURE — D9220A PRA ANESTHESIA: ICD-10-PCS | Mod: CRNA,,, | Performed by: NURSE ANESTHETIST, CERTIFIED REGISTERED

## 2019-04-25 PROCEDURE — 63600175 PHARM REV CODE 636 W HCPCS: Performed by: STUDENT IN AN ORGANIZED HEALTH CARE EDUCATION/TRAINING PROGRAM

## 2019-04-25 PROCEDURE — 19370 PR SURGERY OF BREAST CAPSULE: ICD-10-PCS | Mod: LT,,, | Performed by: SURGERY

## 2019-04-25 PROCEDURE — 37000008 HC ANESTHESIA 1ST 15 MINUTES: Performed by: SURGERY

## 2019-04-25 PROCEDURE — 27000221 HC OXYGEN, UP TO 24 HOURS

## 2019-04-25 PROCEDURE — 37000009 HC ANESTHESIA EA ADD 15 MINS: Performed by: SURGERY

## 2019-04-25 PROCEDURE — D9220A PRA ANESTHESIA: Mod: CRNA,,, | Performed by: NURSE ANESTHETIST, CERTIFIED REGISTERED

## 2019-04-25 PROCEDURE — C1729 CATH, DRAINAGE: HCPCS | Performed by: SURGERY

## 2019-04-25 PROCEDURE — 81025 URINE PREGNANCY TEST: CPT | Performed by: SURGERY

## 2019-04-25 PROCEDURE — 19370 REVJ PERI-IMPLT CAPSULE BRST: CPT | Mod: LT,,, | Performed by: SURGERY

## 2019-04-25 PROCEDURE — 36000706: Performed by: SURGERY

## 2019-04-25 PROCEDURE — 63600175 PHARM REV CODE 636 W HCPCS: Performed by: NURSE ANESTHETIST, CERTIFIED REGISTERED

## 2019-04-25 PROCEDURE — 71000033 HC RECOVERY, INTIAL HOUR: Performed by: SURGERY

## 2019-04-25 PROCEDURE — 94761 N-INVAS EAR/PLS OXIMETRY MLT: CPT

## 2019-04-25 PROCEDURE — 36000707: Performed by: SURGERY

## 2019-04-25 PROCEDURE — D9220A PRA ANESTHESIA: ICD-10-PCS | Mod: ANES,,, | Performed by: ANESTHESIOLOGY

## 2019-04-25 PROCEDURE — D9220A PRA ANESTHESIA: Mod: ANES,,, | Performed by: ANESTHESIOLOGY

## 2019-04-25 PROCEDURE — 63600175 PHARM REV CODE 636 W HCPCS: Performed by: SURGERY

## 2019-04-25 RX ORDER — OXYCODONE AND ACETAMINOPHEN 5; 325 MG/1; MG/1
1 TABLET ORAL ONCE
Status: COMPLETED | OUTPATIENT
Start: 2019-04-25 | End: 2019-04-25

## 2019-04-25 RX ORDER — LIDOCAINE HCL/PF 100 MG/5ML
SYRINGE (ML) INTRAVENOUS
Status: DISCONTINUED | OUTPATIENT
Start: 2019-04-25 | End: 2019-04-25

## 2019-04-25 RX ORDER — CLINDAMYCIN HYDROCHLORIDE 150 MG/1
150 CAPSULE ORAL EVERY 6 HOURS
Qty: 28 CAPSULE | Refills: 0 | Status: SHIPPED | OUTPATIENT
Start: 2019-04-25 | End: 2019-05-02

## 2019-04-25 RX ORDER — NEOSTIGMINE METHYLSULFATE 1 MG/ML
INJECTION, SOLUTION INTRAVENOUS
Status: DISCONTINUED | OUTPATIENT
Start: 2019-04-25 | End: 2019-04-25

## 2019-04-25 RX ORDER — BACITRACIN 50000 [IU]/1
INJECTION, POWDER, FOR SOLUTION INTRAMUSCULAR
Status: DISCONTINUED | OUTPATIENT
Start: 2019-04-25 | End: 2019-04-25 | Stop reason: HOSPADM

## 2019-04-25 RX ORDER — LIDOCAINE HYDROCHLORIDE 10 MG/ML
1 INJECTION, SOLUTION EPIDURAL; INFILTRATION; INTRACAUDAL; PERINEURAL ONCE
Status: COMPLETED | OUTPATIENT
Start: 2019-04-25 | End: 2019-04-25

## 2019-04-25 RX ORDER — ONDANSETRON 2 MG/ML
INJECTION INTRAMUSCULAR; INTRAVENOUS
Status: DISCONTINUED | OUTPATIENT
Start: 2019-04-25 | End: 2019-04-25

## 2019-04-25 RX ORDER — ROCURONIUM BROMIDE 10 MG/ML
INJECTION, SOLUTION INTRAVENOUS
Status: DISCONTINUED | OUTPATIENT
Start: 2019-04-25 | End: 2019-04-25

## 2019-04-25 RX ORDER — OXYCODONE AND ACETAMINOPHEN 5; 325 MG/1; MG/1
1 TABLET ORAL EVERY 6 HOURS PRN
Qty: 20 TABLET | Refills: 0 | Status: SHIPPED | OUTPATIENT
Start: 2019-04-25 | End: 2019-06-24

## 2019-04-25 RX ORDER — HEPARIN SODIUM 5000 [USP'U]/ML
5000 INJECTION, SOLUTION INTRAVENOUS; SUBCUTANEOUS EVERY 8 HOURS
Status: DISCONTINUED | OUTPATIENT
Start: 2019-04-25 | End: 2019-04-25 | Stop reason: HOSPADM

## 2019-04-25 RX ORDER — FENTANYL CITRATE 50 UG/ML
INJECTION, SOLUTION INTRAMUSCULAR; INTRAVENOUS
Status: DISCONTINUED | OUTPATIENT
Start: 2019-04-25 | End: 2019-04-25

## 2019-04-25 RX ORDER — SODIUM CHLORIDE 9 MG/ML
INJECTION, SOLUTION INTRAVENOUS CONTINUOUS
Status: DISCONTINUED | OUTPATIENT
Start: 2019-04-25 | End: 2019-04-25 | Stop reason: HOSPADM

## 2019-04-25 RX ORDER — GLYCOPYRROLATE 0.2 MG/ML
INJECTION INTRAMUSCULAR; INTRAVENOUS
Status: DISCONTINUED | OUTPATIENT
Start: 2019-04-25 | End: 2019-04-25

## 2019-04-25 RX ORDER — MIDAZOLAM HYDROCHLORIDE 1 MG/ML
INJECTION, SOLUTION INTRAMUSCULAR; INTRAVENOUS
Status: DISCONTINUED | OUTPATIENT
Start: 2019-04-25 | End: 2019-04-25

## 2019-04-25 RX ORDER — PHENYLEPHRINE HYDROCHLORIDE 10 MG/ML
INJECTION INTRAVENOUS
Status: DISCONTINUED | OUTPATIENT
Start: 2019-04-25 | End: 2019-04-25

## 2019-04-25 RX ORDER — HEPARIN SODIUM 5000 [USP'U]/ML
5000 INJECTION, SOLUTION INTRAVENOUS; SUBCUTANEOUS EVERY 8 HOURS
Status: DISCONTINUED | OUTPATIENT
Start: 2019-04-25 | End: 2019-04-25

## 2019-04-25 RX ORDER — PROPOFOL 10 MG/ML
VIAL (ML) INTRAVENOUS
Status: DISCONTINUED | OUTPATIENT
Start: 2019-04-25 | End: 2019-04-25

## 2019-04-25 RX ORDER — CEFAZOLIN SODIUM 1 G/3ML
2 INJECTION, POWDER, FOR SOLUTION INTRAMUSCULAR; INTRAVENOUS
Status: COMPLETED | OUTPATIENT
Start: 2019-04-25 | End: 2019-04-25

## 2019-04-25 RX ORDER — CIPROFLOXACIN 2 MG/ML
INJECTION, SOLUTION INTRAVENOUS CONTINUOUS PRN
Status: COMPLETED | OUTPATIENT
Start: 2019-04-25 | End: 2019-04-25

## 2019-04-25 RX ADMIN — HEPARIN SODIUM 5000 UNITS: 5000 INJECTION, SOLUTION INTRAVENOUS; SUBCUTANEOUS at 06:04

## 2019-04-25 RX ADMIN — LIDOCAINE HYDROCHLORIDE 100 MG: 20 INJECTION, SOLUTION INTRAVENOUS at 07:04

## 2019-04-25 RX ADMIN — FENTANYL CITRATE 50 MCG: 50 INJECTION, SOLUTION INTRAMUSCULAR; INTRAVENOUS at 09:04

## 2019-04-25 RX ADMIN — PHENYLEPHRINE HYDROCHLORIDE 100 MCG: 10 INJECTION INTRAVENOUS at 08:04

## 2019-04-25 RX ADMIN — LIDOCAINE HYDROCHLORIDE 0.2 MG: 10 INJECTION, SOLUTION EPIDURAL; INFILTRATION; INTRACAUDAL; PERINEURAL at 06:04

## 2019-04-25 RX ADMIN — ONDANSETRON 4 MG: 2 INJECTION INTRAMUSCULAR; INTRAVENOUS at 08:04

## 2019-04-25 RX ADMIN — PROPOFOL 50 MG: 10 INJECTION, EMULSION INTRAVENOUS at 09:04

## 2019-04-25 RX ADMIN — SODIUM CHLORIDE 1000 ML: 0.9 INJECTION, SOLUTION INTRAVENOUS at 06:04

## 2019-04-25 RX ADMIN — ROCURONIUM BROMIDE 10 MG: 10 INJECTION, SOLUTION INTRAVENOUS at 08:04

## 2019-04-25 RX ADMIN — PHENYLEPHRINE HYDROCHLORIDE 100 MCG: 10 INJECTION INTRAVENOUS at 09:04

## 2019-04-25 RX ADMIN — ROCURONIUM BROMIDE 40 MG: 10 INJECTION, SOLUTION INTRAVENOUS at 07:04

## 2019-04-25 RX ADMIN — NEOSTIGMINE METHYLSULFATE 3 MG: 1 INJECTION INTRAVENOUS at 09:04

## 2019-04-25 RX ADMIN — PROPOFOL 30 MG: 10 INJECTION, EMULSION INTRAVENOUS at 08:04

## 2019-04-25 RX ADMIN — CEFAZOLIN 2 G: 330 INJECTION, POWDER, FOR SOLUTION INTRAMUSCULAR; INTRAVENOUS at 07:04

## 2019-04-25 RX ADMIN — OXYCODONE HYDROCHLORIDE AND ACETAMINOPHEN 1 TABLET: 5; 325 TABLET ORAL at 09:04

## 2019-04-25 RX ADMIN — FENTANYL CITRATE 50 MCG: 50 INJECTION, SOLUTION INTRAMUSCULAR; INTRAVENOUS at 07:04

## 2019-04-25 RX ADMIN — FENTANYL CITRATE 50 MCG: 50 INJECTION, SOLUTION INTRAMUSCULAR; INTRAVENOUS at 08:04

## 2019-04-25 RX ADMIN — PROPOFOL 30 MG: 10 INJECTION, EMULSION INTRAVENOUS at 09:04

## 2019-04-25 RX ADMIN — MIDAZOLAM HYDROCHLORIDE 2 MG: 1 INJECTION, SOLUTION INTRAMUSCULAR; INTRAVENOUS at 07:04

## 2019-04-25 RX ADMIN — GLYCOPYRROLATE 0.4 MG: 0.2 INJECTION, SOLUTION INTRAMUSCULAR; INTRAVENOUS at 09:04

## 2019-04-25 RX ADMIN — PROPOFOL 170 MG: 10 INJECTION, EMULSION INTRAVENOUS at 07:04

## 2019-04-25 RX ADMIN — SODIUM CHLORIDE, SODIUM GLUCONATE, SODIUM ACETATE, POTASSIUM CHLORIDE, MAGNESIUM CHLORIDE, SODIUM PHOSPHATE, DIBASIC, AND POTASSIUM PHOSPHATE: .53; .5; .37; .037; .03; .012; .00082 INJECTION, SOLUTION INTRAVENOUS at 09:04

## 2019-04-25 NOTE — ANESTHESIA POSTPROCEDURE EVALUATION
Anesthesia Post Evaluation    Patient: Staci Hodge    Procedure(s) Performed: Procedure(s) (LRB):  CAPSULOTOMY, BREAST LEFT (Left)    Final Anesthesia Type: general  Patient location during evaluation: PACU  Patient participation: Yes- Able to Participate  Level of consciousness: awake and alert  Post-procedure vital signs: reviewed and stable  Pain management: adequate  Airway patency: patent  PONV status at discharge: No PONV  Anesthetic complications: no      Cardiovascular status: blood pressure returned to baseline and hemodynamically stable  Respiratory status: unassisted and spontaneous ventilation  Hydration status: euvolemic  Follow-up not needed.          Vitals Value Taken Time   /81 4/25/2019 10:16 AM   Temp 36.7 °C (98.1 °F) 4/25/2019 10:15 AM   Pulse 75 4/25/2019 10:23 AM   Resp 30 4/25/2019 10:23 AM   SpO2 100 % 4/25/2019 10:23 AM   Vitals shown include unvalidated device data.      No case tracking events are documented in the log.      Pain/Bran Score: Pain Rating Prior to Med Admin: 3 (4/25/2019  9:48 AM)

## 2019-04-25 NOTE — H&P
[facility]  Plastic Surgery  History & Physical    Patient Name: Staci Hodge  MRN: [id number]  Admission Date: 4/25/2019  Attending Physician: Duane Bello, *  Primary Care Provider: Shilpi Clayton NP    Patient information was obtained from patient.     Subjective:     Chief Complaint/Reason for Admission: Adjustment to reconstructed left breast    History of Present Illness:  Staci Hodge is a 38 y.o. year old female who presents to the Plastic Surgery Clinic on 03/13/2019 for follow up visit status post second stage breast reconstruction. Denies fever, chills, nausea, vomiting, or other systemic signs of infection. She was seen and evaluated by myself and Dr. Duane Bello .     States that she underwent MMG last week, and her left breast has a different appearance, now flatter.       No current facility-administered medications on file prior to encounter.      Current Outpatient Medications on File Prior to Encounter   Medication Sig    butalbital-acetaminophen-caffeine -40 mg (FIORICET, ESGIC) -40 mg per tablet Take 1 tablet by mouth every 6 (six) hours as needed for Headaches. Limit use to 2 days per week.    clonazePAM (KLONOPIN) 1 MG tablet Take 1 tablet (1 mg total) by mouth 3 (three) times daily.    FLUoxetine 40 MG capsule Take 2 capsules (80 mg total) by mouth once daily.    topiramate (TOPAMAX) 25 MG tablet Take 2 tablets (50 mg total) by mouth 2 (two) times daily.    pregabalin (LYRICA) 100 MG capsule Take 1 capsule (100 mg total) by mouth 2 (two) times daily.    propranolol (INDERAL) 10 MG tablet Take 1 tablet (10 mg total) by mouth 2 (two) times daily.       Review of patient's allergies indicates:  No Known Allergies    Past Medical History:   Diagnosis Date    Abnormal Pap smear of cervix age 25    no treatement    Anxiety     Breast cancer     Cancer 07/11/2018    breast cancer    Cervical spondylosis 2/9/2018     Colon polyp     Depression     Ectopic pregnancy 2/26/2019    General anesthetics causing adverse effect in therapeutic use     Headache     History of psychiatric hospitalization     age 20 for SI    Hx of psychiatric care     Hypertension     Peutz-Jeghers syndrome     Psychiatric problem     Right carpal tunnel syndrome 3/9/2017    Self-harming behavior     Suicide attempt     Therapy      Past Surgical History:   Procedure Laterality Date    ADENOIDECTOMY      APPENDECTOMY      AUGMENTATION OF BREAST      BIOPSY, LYMPH NODE, SENTINEL Left 7/23/2018    Performed by [facility] at [facility] 2ND FLR    BLOCK-NERVE-MEDIAL BRANCH-CERVICAL (C3,4,5) Bilateral 2/9/2018    [facility]formed by [facility] at [facility] OR    BOWEL RESECTION  10/17/14    BREAST BIOPSY Left 06/04/2018    BREAST RECONSTRUCTION      BREAST SURGERY      Bilateral Mastectomy 07/23/2018    CARPAL TUNNEL RELEASE Bilateral     CARPAL TUNNEL RELEASE      COLONOSCOPY      CREATION OR REVISION, INFRAMAMMARY FOLD LEFT BREAST Left 11/12/2018    Performed by [facility] at [facility] OR 2ND FLR    DILATION AND CURETTAGE OF UTERUS      DILATION AND CURETTAGE, UTERUS, USING SUCTION  PATHOLOGY NEEDED N/A 2/25/2019    Per[facility] OR    DILATION-CURETTAGE OF UTERUS (D AND C) N/A 3/5/2018    Performed by Simeon Peterson [facility] at [facility] OR    ENTEROSCOPY N/A 3/18/2015    Performed by Chandan Dillon MD at [facility] ENDO (2ND FLR)    INJECTION, FOR SENTINEL NODE IDENTIFICATION Left 7/23/2018    Performed by Sofia [facility]rick [facility] at [facility] OR 2ND FLR    INJECTION-STEROID-EPIDURAL-CERVICAL (C7-T1) N/A 1/19/2018    Performed by John Chapin [facility] at [facility] OR    INSERTION, TISSUE EXPANDER, BREAST Bilateral 7/23/2018    Performed by [facility]ello [facility] at [facility] OR 2ND FLR    intestinal polpy      LAPAROTOMY-EXPLORATORY N/A 10/17/2014    Performed by Joshua Goldberg, MD at [facility] OR 2ND FLR    MASTECTOMY       MASTECTOMY 23 hr stay Bilateral 2018    Performed by [facility] at [facility] OR 2ND FLR    RELEASE-CARPAL TUNNEL Right 2017    [facility] by [facility] at [facility] OR    RELEASE-CUBITAL TUNNEL Right 2017    Performed by [facility]el [facility] at [facility] OR    RESECTION - SMALL BOWEL N/A 10/17/2014    Performed by Joshua Goldberg, [facility] at [facility] OR 2ND FLR    SMALL INTESTINE SURGERY      3 surgeries     TONSILLECTOMY      TRANSFER, FAT TISSUE BILATERAL BREAST Bilateral 2018    Performed by [facility]ello [facility] at [facility] OR 2ND FLR    ULTRASOUND-ENDOSCOPIC-UPPER N/A 3/18/2015    Performed by Chandan Dillon MD at [facility] ENDO (2ND FLR)    UPPER GASTROINTESTINAL ENDOSCOPY       Family History     Problem Relation (Age of Onset)    Breast cancer Paternal Aunt, Paternal Grandmother    Colon cancer Mother (33)    Colon polyps Sister    Hypertension Father        Tobacco Use    Smoking status: Former Smoker     Packs/day: 0.50     Years: 17.00     Pack years: 8.50     Types: Cigarettes     Start date: 2000     Last attempt to quit: 2018     Years since quittin.7    Smokeless tobacco: Never Used    Tobacco comment: told about smoking cessation clinic and give brochure   Substance and Sexual Activity    Alcohol use: Yes     Alcohol/week: 3.5 oz     Types: 7 Standard drinks or equivalent per week     Comment: occasionally    Drug use: No    Sexual activity: Not Currently     Partners: Male     Review of Systems   Constitutional: Negative for activity change, chills, fatigue and fever.   HENT: Negative for congestion and sore throat.    Respiratory: Negative for chest tightness and shortness of breath.    Cardiovascular: Negative for chest pain and palpitations.   Gastrointestinal: Negative for abdominal distention, abdominal pain, constipation, diarrhea, nausea and vomiting.   Endocrine: Negative.    Genitourinary: Negative.    Musculoskeletal: Negative.    Skin:  Negative.    Neurological: Negative for weakness and headaches.     Objective:     Vital Signs (Most Recent):  Temp: 98.2 °F (36.8 °C) (04/25/19 0550)  Pulse: 80 (04/25/19 0550)  Resp: 16 (04/25/19 0550)  BP: 126/84 (04/25/19 0550)  SpO2: 98 % (04/25/19 0550) Vital Signs (24h Range):  Temp:  [98.2 °F (36.8 °C)] 98.2 °F (36.8 °C)  Pulse:  [80] 80  Resp:  [16] 16  SpO2:  [98 %] 98 %  BP: (126)/(84) 126/84     Weight: 56.7 kg (125 lb)  Body mass index is 22.86 kg/m².    Physical Exam   Constitutional: She is oriented to person, place, and time. She appears well-developed.   Neck: No JVD present. No tracheal deviation present.   Cardiovascular: Normal rate and regular rhythm.   Pulmonary/Chest: Breath sounds normal. No respiratory distress.   Surgically absent nipples bilaterally  Medial contracture to left breast with Flattened appearance to anterior portion of left implant   Abdominal: Soft. She exhibits no distension and no mass. There is no tenderness. There is no rebound and no guarding.   Musculoskeletal: She exhibits no edema.   Neurological: She is alert and oriented to person, place, and time.   Skin: Skin is warm and dry.       Significant Labs:  none    Significant Diagnostics:  I have reviewed all pertinent imaging results/findings within the past 24 hours.    Assessment/Plan:     Active Diagnoses:    Diagnosis Date Noted POA    S/P breast reconstruction [Z98.82] 11/12/2018 Not Applicable      Problems Resolved During this Admission:     VTE Risk Mitigation (From admission, onward)        Ordered     heparin (porcine) injection 5,000 Units  Every 8 hours      04/25/19 0655     Place sequential compression device  Until discontinued      04/25/19 0639     IP VTE HIGH RISK PATIENT  Once      04/25/19 0639          A/P  38 y.o. female status post second stage breast reconstruction  - Small dent along L inferior breast with appearance of left implant having flipped s/p MMG last week  - To OR today for left  breast capsulotomy          Natalie Browning MD  Plastic Surgery  [facility]

## 2019-04-25 NOTE — ANESTHESIA RELEASE NOTE
"Anesthesia Release from PACU Note    Patient: Staci Hodge    Procedure(s) Performed: Procedure(s) (LRB):  CAPSULOTOMY, BREAST LEFT (Left)    Anesthesia type: general    Post pain: Adequate analgesia    Post assessment: no apparent anesthetic complications and tolerated procedure well    Last Vitals:   Visit Vitals  /81   Pulse 96   Temp 36.7 °C (98.1 °F) (Oral)   Resp 19   Ht 5' 2" (1.575 m)   Wt 56.7 kg (125 lb)   LMP 04/01/2019 (Approximate)   SpO2 100%   Breastfeeding? No   BMI 22.86 kg/m²       Post vital signs: stable    Level of consciousness: awake, alert  and oriented    Nausea/Vomiting: no nausea/no vomiting    Complications: none    Airway Patency: patent    Respiratory: unassisted, spontaneous ventilation, room air    Cardiovascular: stable and blood pressure at baseline    Hydration: euvolemic  "

## 2019-04-25 NOTE — TRANSFER OF CARE
"Anesthesia Transfer of Care Note    Patient: Staci Hodge    Procedure(s) Performed: Procedure(s) (LRB):  CAPSULOTOMY, BREAST LEFT (Left)    Patient location: PACU    Anesthesia Type: general    Transport from OR: Transported from OR on room air with adequate spontaneous ventilation    Post pain: adequate analgesia    Post assessment: no apparent anesthetic complications    Post vital signs: stable    Level of consciousness: awake    Nausea/Vomiting: no nausea/vomiting    Complications: none    Transfer of care protocol was followed      Last vitals:   Visit Vitals  /76   Pulse 84   Temp 36.7 °C (98 °F) (Oral)   Resp 15   Ht 5' 2" (1.575 m)   Wt 56.7 kg (125 lb)   LMP 04/01/2019 (Approximate)   SpO2 (!) 90%   Breastfeeding? No   BMI 22.86 kg/m²     "

## 2019-04-25 NOTE — BRIEF OP NOTE
Certification of Assistant at Surgery       Surgery Date: 4/25/2019     Participating Surgeons:  Surgeon(s) and Role:     * Duane Bello MD - Primary    Procedures:  Procedure(s) (LRB):  CAPSULOTOMY, BREAST LEFT (Left)    Assistant Surgeon's Certification of Necessity:  I understand that section 1842 (b) (6) (d) of the Social Security Act generally prohibits Medicare Part B reasonable charge payment for the services of assistants at surgery in teaching hospitals when qualified residents are available to furnish such services. I certify that the services for which payment is claimed were medically necessary, and that no qualified resident was available to perform the services. I further understand that these services are subject to post-payment review by the Medicare carrier.      LINDSAY Peralta    04/25/2019  10:11 AM

## 2019-04-25 NOTE — BRIEF OP NOTE
[facility]  Brief Operative Note     SUMMARY     Surgery Date: 4/25/2019     Surgeon(s) and Role:     * Duane Bello MD - Primary    Assisting Surgeon: Natalie Browning MD - Resident    Pre-op Diagnosis:  Personal history of breast cancer [Z85.3]    Post-op Diagnosis:  Post-Op Diagnosis Codes:     * Personal history of breast cancer [Z85.3]    Procedure(s) (LRB):  CAPSULOTOMY, BREAST LEFT (Left)    Anesthesia: General    Description of the findings of the procedure: Left breast implant flipped within capsule; turned into correct orientation prior to incision. Left capsulotomy performed on medial left breast.     Estimated Blood Loss: <5cc          Specimens:   Specimen (12h ago, onward)    None          Discharge Note    SUMMARY     Admit Date: 4/25/2019    Discharge Date and Time:  04/25/2019 9:36 AM    Hospital Course (synopsis of major diagnoses, care, treatment, and services provided during the course of the hospital stay): Patient was evaluated and consented preoperatively. She was taken back to the operating room where she underwent the above stated procedure. For full details, see operative note. She was  transported to the recovery room in hemodynamically stable condition. Postoperatively, she was tolerating PO intake and pain and nausea were well-controlled on PRN PO medications. Deemed fit for discharge home the day of surgery.         Final Diagnosis: Post-Op Diagnosis Codes:     * Personal history of breast cancer [Z85.3]    Disposition: Home or Self Care    Follow Up/Patient Instructions:     Medications:  Reconciled Home Medications:      Medication List      START taking these medications    clindamycin 150 MG capsule  Commonly known as:  CLEOCIN  Take 1 capsule (150 mg total) by mouth every 6 (six) hours. for 7 days     oxyCODONE-acetaminophen 5-325 mg per tablet  Commonly known as:  PERCOCET  Take 1 tablet by mouth every 6 (six) hours as needed for Pain.         CONTINUE taking these medications    butalbital-acetaminophen-caffeine -40 mg -40 mg per tablet  Commonly known as:  FIORICET, ESGIC  Take 1 tablet by mouth every 6 (six) hours as needed for Headaches. Limit use to 2 days per week.     clonazePAM 1 MG tablet  Commonly known as:  KLONOPIN  Take 1 tablet (1 mg total) by mouth 3 (three) times daily.     FLUoxetine 40 MG capsule  Take 2 capsules (80 mg total) by mouth once daily.     pregabalin 100 MG capsule  Commonly known as:  LYRICA  Take 1 capsule (100 mg total) by mouth 2 (two) times daily.     propranolol 10 MG tablet  Commonly known as:  INDERAL  Take 1 tablet (10 mg total) by mouth 2 (two) times daily.     topiramate 25 MG tablet  Commonly known as:  TOPAMAX  Take 2 tablets (50 mg total) by mouth 2 (two) times daily.          No discharge procedures on file.

## 2019-04-26 NOTE — OP NOTE
DATE OF PROCEDURE:  04/25/2019    PREOPERATIVE DIAGNOSIS:  History of breast cancer.    POSTOPERATIVE DIAGNOSIS:  History of breast cancer.    PROCEDURES PERFORMED:  Extensive medial capsulotomy of the left breast.    SURGEON:  Duane Bello M.D., [facility]    ANESTHESIA:  General.    DESCRIPTION OF PROCEDURE:  The patient was evaluated in the preoperative holding   area.  In the standing position, markings were made for the medial capsulotomy   as well as a lateral capsulorrhaphy.  The patient was taken to the Operating   Room, placed in the supine position after adequate general endotracheal   anesthesia, was prepped and draped in normal sterile fashion.  The lateral   breast mastectomy incision was opened.  Capsule was opened at a higher level.    Under direct vision using a lighted retractor, an extensive medial capsulotomy   was performed using a cross-hatched technique.  This allowed the implant to move   medially.  A slight anterior capsulotomy was also performed.  Due to this was   necessary to perform a lateral capsulorrhaphy using double layered 2-0 PDS   suture.  The implant was then replaced.  In upright position, good symmetry was   noted.  The capsule was closed using 2-0 Vicryl, the skin using 3-0 Monocryl   followed by running 4-0 Monocryl subcuticular suture.  There were no   complications.      CRB/IN  dd: 04/26/2019 11:56:26 (CDT)  td: 04/26/2019 12:40:43 (CDT)  Doc ID   #[id number]  Job ID #[id number]    CC:

## 2019-05-01 ENCOUNTER — OFFICE VISIT (OUTPATIENT)
Dept: PLASTIC SURGERY | Facility: CLINIC | Age: 39
End: 2019-05-01
Payer: MEDICAID

## 2019-05-01 VITALS
SYSTOLIC BLOOD PRESSURE: 110 MMHG | DIASTOLIC BLOOD PRESSURE: 73 MMHG | BODY MASS INDEX: 24.4 KG/M2 | WEIGHT: 133.38 LBS | HEART RATE: 83 BPM | TEMPERATURE: 98 F

## 2019-05-01 DIAGNOSIS — N64.89 BREAST EDEMA: Primary | ICD-10-CM

## 2019-05-01 PROCEDURE — 99024 POSTOP FOLLOW-UP VISIT: CPT | Mod: ,,, | Performed by: SURGERY

## 2019-05-01 PROCEDURE — 99999 PR PBB SHADOW E&M-EST. PATIENT-LVL III: ICD-10-PCS | Mod: PBBFAC,,, | Performed by: SURGERY

## 2019-05-01 PROCEDURE — 99999 PR PBB SHADOW E&M-EST. PATIENT-LVL III: CPT | Mod: PBBFAC,,, | Performed by: SURGERY

## 2019-05-01 PROCEDURE — 99213 OFFICE O/P EST LOW 20 MIN: CPT | Mod: PBBFAC,PO | Performed by: SURGERY

## 2019-05-01 PROCEDURE — 99024 PR POST-OP FOLLOW-UP VISIT: ICD-10-PCS | Mod: ,,, | Performed by: SURGERY

## 2019-05-01 RX ORDER — SULFAMETHOXAZOLE AND TRIMETHOPRIM 800; 160 MG/1; MG/1
1 TABLET ORAL 2 TIMES DAILY
Qty: 14 TABLET | Refills: 0 | Status: SHIPPED | OUTPATIENT
Start: 2019-05-01 | End: 2019-05-08

## 2019-05-01 NOTE — PROGRESS NOTES
Ms Hodge is one week post left breast implant repositioning and capsulorrhaphy.  She is happy with the result overall and has no major complaints.    On exam, there is evidence of more than expected left upper breast edema. This may be due to a seroma.  She also has a very mild erythema in the lower aspect of the breast.    Plan: we will order a left breast ultrasound to rule out seroma and will give her a script for Bactrim for another week

## 2019-05-02 ENCOUNTER — TELEPHONE (OUTPATIENT)
Dept: PLASTIC SURGERY | Facility: CLINIC | Age: 39
End: 2019-05-02

## 2019-05-02 ENCOUNTER — OFFICE VISIT (OUTPATIENT)
Dept: PLASTIC SURGERY | Facility: CLINIC | Age: 39
End: 2019-05-02
Payer: MEDICAID

## 2019-05-02 ENCOUNTER — HOSPITAL ENCOUNTER (OUTPATIENT)
Dept: RADIOLOGY | Facility: HOSPITAL | Age: 39
Discharge: HOME | End: 2019-05-02
Attending: SURGERY
Payer: MEDICAID

## 2019-05-02 ENCOUNTER — PATIENT MESSAGE (OUTPATIENT)
Dept: PLASTIC SURGERY | Facility: CLINIC | Age: 39
End: 2019-05-02

## 2019-05-02 DIAGNOSIS — Z09 SURGERY FOLLOW-UP EXAMINATION: Primary | ICD-10-CM

## 2019-05-02 DIAGNOSIS — N63.0 SWELLING OF BREAST: Primary | ICD-10-CM

## 2019-05-02 DIAGNOSIS — N64.89 BREAST EDEMA: ICD-10-CM

## 2019-05-02 PROCEDURE — 76642 US BREAST LEFT LIMITED: ICD-10-PCS | Mod: 26,LT,, | Performed by: RADIOLOGY

## 2019-05-02 PROCEDURE — 99211 OFF/OP EST MAY X REQ PHY/QHP: CPT | Mod: PBBFAC,25,PO | Performed by: SURGERY

## 2019-05-02 PROCEDURE — 76642 ULTRASOUND BREAST LIMITED: CPT | Mod: 26,LT,, | Performed by: RADIOLOGY

## 2019-05-02 PROCEDURE — 99024 POSTOP FOLLOW-UP VISIT: CPT | Mod: ,,, | Performed by: SURGERY

## 2019-05-02 PROCEDURE — 99999 PR PBB SHADOW E&M-EST. PATIENT-LVL I: ICD-10-PCS | Mod: PBBFAC,,, | Performed by: SURGERY

## 2019-05-02 PROCEDURE — 99024 PR POST-OP FOLLOW-UP VISIT: ICD-10-PCS | Mod: ,,, | Performed by: SURGERY

## 2019-05-02 PROCEDURE — 76642 ULTRASOUND BREAST LIMITED: CPT | Mod: TC,PO,LT

## 2019-05-02 PROCEDURE — 99999 PR PBB SHADOW E&M-EST. PATIENT-LVL I: CPT | Mod: PBBFAC,,, | Performed by: SURGERY

## 2019-05-02 NOTE — PROGRESS NOTES
I personally have examined the patient and agree with the resident's findings and plan of action.    Office staff to coordinate L breast U/S appt, patient to RTC following breast US.

## 2019-05-03 NOTE — PROGRESS NOTES
Ms. Hodge called the office and stated that she thought her breast was a   little bit more tender today and she stated that she noticed a little bit of   discoloration.  I asked her to come in to the office.  She had an ultrasound,   which showed a miniscule amount of fluid on the implant.  The implant actually   looks completely normal.  Since she does, however, have a fairly noticeable   discrepancy between the right and left breasts since the capsulorrhaphy was   performed on the left side.  On the table, in the upright position, after the   capsulorrhaphy, the breasts look symmetrical.  Today, it is clear that she will   need to have a capsulorrhaphy and possibly even a larger implant placed on the   right side.  She understands that.  We are going to see her back in clinic next   week.      NARESH  dd: 05/03/2019 09:30:18 (CDT)  td: 05/03/2019 15:22:23 (CDT)  Doc ID   #[id number]  Job ID #[id number]    CC:

## 2019-05-06 ENCOUNTER — TELEPHONE (OUTPATIENT)
Dept: PLASTIC SURGERY | Facility: CLINIC | Age: 39
End: 2019-05-06

## 2019-05-07 ENCOUNTER — OFFICE VISIT (OUTPATIENT)
Dept: PLASTIC SURGERY | Facility: CLINIC | Age: 39
End: 2019-05-07
Payer: MEDICAID

## 2019-05-07 VITALS
DIASTOLIC BLOOD PRESSURE: 88 MMHG | BODY MASS INDEX: 24.11 KG/M2 | HEART RATE: 87 BPM | TEMPERATURE: 98 F | WEIGHT: 131.81 LBS | SYSTOLIC BLOOD PRESSURE: 123 MMHG

## 2019-05-07 DIAGNOSIS — Z09 SURGERY FOLLOW-UP EXAMINATION: Primary | ICD-10-CM

## 2019-05-07 PROCEDURE — 99999 PR PBB SHADOW E&M-EST. PATIENT-LVL III: ICD-10-PCS | Mod: PBBFAC,,, | Performed by: PHYSICIAN ASSISTANT

## 2019-05-07 PROCEDURE — 99024 POSTOP FOLLOW-UP VISIT: CPT | Mod: ,,, | Performed by: PHYSICIAN ASSISTANT

## 2019-05-07 PROCEDURE — 99024 PR POST-OP FOLLOW-UP VISIT: ICD-10-PCS | Mod: ,,, | Performed by: PHYSICIAN ASSISTANT

## 2019-05-07 PROCEDURE — 99999 PR PBB SHADOW E&M-EST. PATIENT-LVL III: CPT | Mod: PBBFAC,,, | Performed by: PHYSICIAN ASSISTANT

## 2019-05-07 PROCEDURE — 99213 OFFICE O/P EST LOW 20 MIN: CPT | Mod: PBBFAC | Performed by: PHYSICIAN ASSISTANT

## 2019-05-07 NOTE — PROGRESS NOTES
Subjective:      Staci Hodge is a 38 y.o. year old female who [facility] on [facility]07/2019 for follow up visit status post second stage breast reconstruction on 04/25/2019 with Dr. Duane Bello. Denies fever, chills, nausea, vomiting, or other systemic signs of infection.    Vitals:    05/07/19 0943   BP: 123/88   Pulse: 87   Temp: 98 °F (36.7 °C)        Review of patient's allergies indicates:  No Known Allergies    Current Outpatient Medications on File Prior to Visit   Medication Sig Dispense Refill    butalbital-acetaminophen-caffeine -40 mg (FIORICET, ESGIC) -40 mg per tablet Take 1 tablet by mouth every 6 (six) hours as needed for Headaches. Limit use to 2 days per week. 10 tablet 4    clonazePAM (KLONOPIN) 1 MG tablet Take 1 tablet (1 mg total) by mouth 3 (three) times daily. 90 tablet 2    FLUoxetine 40 MG capsule Take 2 capsules (80 mg total) by mouth once daily. 180 capsule 0    oxyCODONE-acetaminophen (PERCOCET) 5-325 mg per tablet Take 1 tablet by mouth every 6 (six) hours as needed for Pain. 20 tablet 0    pregabalin (LYRICA) 100 MG capsule Take 1 capsule (100 mg total) by mouth 2 (two) times daily. 60 capsule 6    propranolol (INDERAL) 10 MG tablet Take 1 tablet (10 mg total) by mouth 2 (two) times daily. 60 tablet 0    sulfamethoxazole-trimethoprim 800-160mg (BACTRIM DS) 800-160 mg Tab Take 1 tablet by mouth 2 (two) times daily. for 7 days 14 tablet 0    topiramate (TOPAMAX) 25 MG tablet Take 2 tablets (50 mg total) by mouth 2 (two) times daily. 120 tablet 0     No current facility-administered medications on file prior to visit.        Patient Active Problem List   Diagnosis    History of abnormal Pap smear    LLQ pain    Peutz-Jeghers syndrome    Weight loss    Leukocytosis    Tobacco abuse    Insomnia    Mood disorder    MARY LOU (generalized anxiety disorder)    Panic disorder with agoraphobia    PTSD (post-traumatic stress  disorder)    Social anxiety disorder    Palpitations    Tachycardia    Arm numbness left    Stabbing headache    Tension headache    Right carpal tunnel syndrome    Migraine without aura    Microalbuminuria    Neck pain    Right arm numbness    Left carpal tunnel syndrome    Cervical paraspinal muscle spasm    Glucosuria with normal serum glucose    History of pyelonephritis    Left arm numbness    Myofascial muscle pain    Cervical spondylosis    OCD (obsessive compulsive disorder)    Menorrhagia with regular cycle    Hypokalemia    Inguinal lymphadenopathy    Ductal carcinoma in situ (DCIS) of left breast    DCIS (ductal carcinoma in situ)    Menorrhagia with irregular cycle    S/P breast reconstruction    Possible pregnancy    S/P D&C (status post dilation and curettage)    Ectopic pregnancy       Past Surgical History:   Procedure Laterality Date    ADENOIDECTOMY      APPENDECTOMY      AUGMENTATION OF BREAST      BIOPSY, LYMPH NODE, SENTINEL Left 7/23/2018    Performed by [facility] at [facility] OR 2ND FLR    BLOCK-NERVE-MEDIAL BRANCH-CERVICAL (C3,4,5) Bilateral 2/9/2018    [facility]    BOWEL RESECTION  10/17/14    BREAST BIOPSY Left 06/04/2018    BREAST RECONSTRUCTION      BREAST SURGERY      Bilateral Mastectomy 07/23/2018    CAPSULOTOMY, BREAST LEFT Left 4/25/2019    Performed by [facility]ello [facility] at [facility] OR 2ND [facility]LR    CARPAL TUNNEL RELEASE Bilateral     CARPAL TUNNEL RELEASE      COLONOSCOPY      CREATION OR REVISION, INFRAMAMMARY FOLD LEFT BREAST Left 11/12/2018    Performed by [facility] at [facility] OR 2ND [facility]LR    DILATION AND CURETTAGE OF UTERUS      DILATION AND CURETTAGE, UTERUS, USING SUCTION  PATHOLOGY NEEDED N/A 2/25/2019    [facility]    DILATION-CURETTAGE OF UTERUS (D AND C) N/A 3/5/2018    [facility]on [facility]    ENTEROSCOPY N/A 3/18/2015     Performed by Chandan Dillon MD at [facility] ENDO (2ND FLR)    INJECTION, FOR SENTINEL NODE IDENTIFICATION Left 2018    Performed by [facility]rick [facility] at [facility] OR 2ND FLR    INJECTION-STEROID-EPIDURAL-CERVICAL (C7-T1) N/A 2018    [facility]    INSERTION, TISSUE EXPANDER, BREAST Bilateral 2018    Performed by [facility]ello [facility] at [facility] OR 2ND FLR    intestinal polpy      LAPAROTOMY-EXPLORATORY N/A 10/17/2014    Performed by Joshua Goldberg, MD at [facility] OR 2ND FLR    MASTECTOMY      MASTECTOMY 23 hr stay Bilateral 2018    Performed by [facility]rick [facility] at [facility] OR 2ND FLR    RELEASE-CARPAL TUNNEL Right 2017    [facility] Larry [facility] at [facility] OR    RELEASE-CUBITAL TUNNEL Right 2017    [facility] at [facility]    RESECTION - SMALL BOWEL N/A 10/17/2014    Performed by Joshua Goldberg, MD at [facility] OR 2ND FLR    SMALL INTESTINE SURGERY      3 surgeries     TONSILLECTOMY      TRANSFER, FAT TISSUE BILATERAL BREAST Bilateral 2018    Performed by [facility]ello MD at [facility] OR 2ND FLR    ULTRASOUND-ENDOSCOPIC-UPPER N/A 3/18/2015    Performed by Chandan Dillon MD at [facility] ENDO (2ND FLR)    UPPER GASTROINTESTINAL ENDOSCOPY           Social History     Socioeconomic History    Marital status: Single     Spouse name: Not on file    Number of children: 0    Years of education: Not on file    Highest education level: Not on file   Occupational History    Not on file   Social Needs    Financial resource strain: Not on file    Food insecurity:     Worry: Not on file     Inability: Not on file    Transportation needs:     Medical: Not on file     Non-medical: Not on file   Tobacco Use    Smoking status: Former Smoker     Packs/day: 0.50     Years: 17.00     Pack years: 8.50     Types: Cigarettes     Start date: 2000     Last attempt to quit: 2018     Years since quittin.7    Smokeless  tobacco: Never Used    Tobacco comment: told about smoking cessation clinic and give brochure   Substance and Sexual Activity    Alcohol use: Yes     Alcohol/week: 3.5 oz     Types: 7 Standard drinks or equivalent per week     Comment: occasionally    Drug use: No    Sexual activity: Not Currently     Partners: Male   Lifestyle    Physical activity:     Days per week: Not on file     Minutes per session: Not on file    Stress: Not on file   Relationships    Social connections:     Talks on phone: Not on file     Gets together: Not on file     Attends [employer] service: Not on file     Active member of club or organization: Not on file     Attends meetings of clubs or organizations: Not on file     Relationship status: Not on file   Other Topics Concern    Patient feels they ought to cut down on drinking/drug use No    Patient annoyed by others criticizing their drinking/drug use No    Patient has felt bad or guilty about drinking/drug use No    Patient has had a drink/used drugs as an eye opener in the AM No   Social History Narrative    Had one previous miscarriage in 2005    Never     currently in a relationship     DATE OF PROCEDURE:  04/25/2019     PREOPERATIVE DIAGNOSIS:  History of breast cancer.     POSTOPERATIVE DIAGNOSIS:  History of breast cancer.     PROCEDURES PERFORMED:  Extensive medial capsulotomy of the left breast.     SURGEON:  Duane Bello M.D., FACS     ANESTHESIA:  General.    Left Breast U/S Findings - 05/02/2019 (Dr. Duane Bello present for /S)     Patient is status post left mastectomy with implant reconstruction and revision of the implant on 4/25/19.  There is a small amount of fluid at the periphery of the implant.  There are two small fluid collections in the axillary area - one measuring 2.1 x 0.6 cm and the other measuring 2.6 x 0.3 cm. The latter is very superficial and may be intradermal in location. There is mild diffuse skin edema.      Review of  Systems: post op visit    Objective:     Physical Exam:  Vitals:    05/07/19 0943   BP: 123/88   Pulse: 87   Temp: 98 °F (36.7 °C)       WD WN NAD  VSS  Normal resp effort  R breast - incision CDI, no erythema or drainage, no sign of fluid accumulation  L breast- incision healed, no erythema or draiange        Assessment:       1. Surgery follow-up examination        Plan:   38 y.o. female status post second stage breast reconstruction  - Doing well, no issues  - L breast is soft without sign of infection or fluid accumulation  - Mild asymmetry between L and R breast, patient understands this should improve as left breast softens and settles but may need revision in future.   - RTC x 3 weeks, appointment scheduled.     The patient was advised to call the clinic with any questions or concerns prior to their next visit.

## 2019-05-20 RX ORDER — CLONAZEPAM 1 MG/1
TABLET ORAL
Qty: 90 TABLET | Refills: 0 | Status: SHIPPED | OUTPATIENT
Start: 2019-05-20 | End: 2019-06-24 | Stop reason: SDUPTHER

## 2019-05-22 ENCOUNTER — PATIENT MESSAGE (OUTPATIENT)
Dept: PSYCHIATRY | Facility: CLINIC | Age: 39
End: 2019-05-22

## 2019-05-22 RX ORDER — FLUOXETINE HYDROCHLORIDE 40 MG/1
80 CAPSULE ORAL DAILY
Qty: 180 CAPSULE | Refills: 0 | Status: SHIPPED | OUTPATIENT
Start: 2019-05-22 | End: 2019-06-24 | Stop reason: SDUPTHER

## 2019-05-29 ENCOUNTER — OFFICE VISIT (OUTPATIENT)
Dept: PLASTIC SURGERY | Facility: CLINIC | Age: 39
End: 2019-05-29
Payer: MEDICAID

## 2019-05-29 ENCOUNTER — PATIENT MESSAGE (OUTPATIENT)
Dept: PSYCHIATRY | Facility: CLINIC | Age: 39
End: 2019-05-29

## 2019-05-29 VITALS
SYSTOLIC BLOOD PRESSURE: 114 MMHG | HEART RATE: 99 BPM | BODY MASS INDEX: 23.95 KG/M2 | HEIGHT: 62 IN | DIASTOLIC BLOOD PRESSURE: 81 MMHG | WEIGHT: 130.13 LBS

## 2019-05-29 DIAGNOSIS — Z09 SURGERY FOLLOW-UP EXAMINATION: Primary | ICD-10-CM

## 2019-05-29 PROCEDURE — 99024 PR POST-OP FOLLOW-UP VISIT: ICD-10-PCS | Mod: ,,, | Performed by: PHYSICIAN ASSISTANT

## 2019-05-29 PROCEDURE — 99999 PR PBB SHADOW E&M-EST. PATIENT-LVL III: ICD-10-PCS | Mod: PBBFAC,,, | Performed by: PHYSICIAN ASSISTANT

## 2019-05-29 PROCEDURE — 99213 OFFICE O/P EST LOW 20 MIN: CPT | Mod: PBBFAC,PO | Performed by: PHYSICIAN ASSISTANT

## 2019-05-29 PROCEDURE — 99024 POSTOP FOLLOW-UP VISIT: CPT | Mod: ,,, | Performed by: PHYSICIAN ASSISTANT

## 2019-05-29 PROCEDURE — 99999 PR PBB SHADOW E&M-EST. PATIENT-LVL III: CPT | Mod: PBBFAC,,, | Performed by: PHYSICIAN ASSISTANT

## 2019-05-30 ENCOUNTER — PATIENT MESSAGE (OUTPATIENT)
Dept: PSYCHIATRY | Facility: CLINIC | Age: 39
End: 2019-05-30

## 2019-06-03 NOTE — PROGRESS NOTES
Subjective:      Staci Hodge is a 38 y.o. year old female who presents to the [facility] on 06/03/2019 for follow up visit status post second stage breast reconstruction on 04/25/2019 with Dr. Duane Bello. Denies fever, chills, nausea, vomiting, or other systemic signs of infection. She was seen and evaluated by myself and Dr. Duane Bello      Vitals:    05/29/19 0920   BP: 114/81   Pulse: 99        Review of patient's allergies indicates:  No Known Allergies    Current Outpatient Medications on File Prior to Visit   Medication Sig Dispense Refill    butalbital-acetaminophen-caffeine -40 mg (FIORICET, ESGIC) -40 mg per tablet Take 1 tablet by mouth every 6 (six) hours as needed for Headaches. Limit use to 2 days per week. 10 tablet 4    clonazePAM (KLONOPIN) 1 MG tablet TAKE ONE TABLET BY MOUTH THREE TIMES DAILY 90 tablet 0    FLUoxetine 40 MG capsule Take 2 capsules (80 mg total) by mouth once daily. 180 capsule 0    oxyCODONE-acetaminophen (PERCOCET) 5-325 mg per tablet Take 1 tablet by mouth every 6 (six) hours as needed for Pain. 20 tablet 0    pregabalin (LYRICA) 100 MG capsule Take 1 capsule (100 mg total) by mouth 2 (two) times daily. 60 capsule 6    propranolol (INDERAL) 10 MG tablet Take 1 tablet (10 mg total) by mouth 2 (two) times daily. 60 tablet 0    topiramate (TOPAMAX) 25 MG tablet Take 2 tablets (50 mg total) by mouth 2 (two) times daily. 120 tablet 0     No current facility-administered medications on file prior to visit.        Patient Active Problem List   Diagnosis    History of abnormal Pap smear    LLQ pain    Peutz-Jeghers syndrome    Weight loss    Leukocytosis    Tobacco abuse    Insomnia    Mood disorder    MARY LOU (generalized anxiety disorder)    Panic disorder with agoraphobia    PTSD (post-traumatic stress disorder)    Social anxiety disorder    Palpitations    Tachycardia    Arm numbness left    Stabbing headache     Tension headache    Right carpal tunnel syndrome    Migraine without aura    Microalbuminuria    Neck pain    Right arm numbness    Left carpal tunnel syndrome    Cervical paraspinal muscle spasm    Glucosuria with normal serum glucose    History of pyelonephritis    Left arm numbness    Myofascial muscle pain    Cervical spondylosis    OCD (obsessive compulsive disorder)    Menorrhagia with regular cycle    Hypokalemia    Inguinal lymphadenopathy    Ductal carcinoma in situ (DCIS) of left breast    DCIS (ductal carcinoma in situ)    Menorrhagia with irregular cycle    S/P breast reconstruction    Possible pregnancy    S/P D&C (status post dilation and curettage)    Ectopic pregnancy       Past Surgical History:   Procedure Laterality Date    ADENOIDECTOMY      APPENDECTOMY      AUGMENTATION OF BREAST      BIOPSY, LYMPH NODE, SENTINEL Left 7/23/2018    [facility] 2ND FLR    BLOCK-NERVE-MEDIAL BRANCH-CERVICAL (C3,4,5) Bilateral 2/9/2018    Performed by John [facility]in [facility] at [facility] OR    BOWEL RESECTION  10/17/14    BREAST BIOPSY Left 06/04/2018    BREAST RECONSTRUCTION      BREAST SURGERY      Bilateral Mastectomy 07/23/2018    CAPSULOTOMY, BREAST LEFT Left 4/25/2019    Performed [facility] at [facility] 2ND FLR    CARPAL TUNNEL RELEASE Bilateral     CARPAL TUNNEL RELEASE      COLONOSCOPY      CREATION OR REVISION, INFRAMAMMARY FOLD LEFT BREAST Left 11/12/2018    Per[facility] at [facility] 2ND FLR    DILATION AND CURETTAGE OF UTERUS      DILATION AND CURETTAGE, UTERUS, USING SUCTION  PATHOLOGY NEEDED N/A 2/25/2019    Performed by [facility] at [facility] OR    DILATION-CURETTAGE OF UTERUS (D AND C) N/A 3/5/2018    [facility]formed by [facility]on [facility] at [facility] OR    ENTEROSCOPY N/A 3/18/2015    Performed by Chandan Dillon MD at [facility] ENDO (2ND FLR)    INJECTION, FOR SENTINEL NODE IDENTIFICATION Left  2018    Performed by [facility] at [facility] OR 2ND FLR    INJECTION-STEROID-EPIDURAL-CERVICAL (C7-T1) N/A 2018    [facility] at [facility] OR    INSERTION, TISSUE EXPANDER, BREAST Bilateral 2018    Performed by [facility]ello MD at [facility] OR 2ND FLR    intestinal polpy      LAPAROTOMY-EXPLORATORY N/A 10/17/2014    Performed by Joshua Goldberg, MD at [facility] OR 2ND FLR    MASTECTOMY      MASTECTOMY 23 hr stay Bilateral 2018    Performed by Sofia [facility]rick MD at [facility] OR 2ND FLR    RELEASE-CARPAL TUNNEL Right 2017    [facility]formed by [facility]el [facility] at [facility] OR    RELEASE-CUBITAL TUNNEL Right 2017    [facility] by [facility] at [facility] OR    RESECTION - SMALL BOWEL N/A 10/17/2014    Performed by Joshua Goldberg, MD at [facility] OR 2ND FLR    SMALL INTESTINE SURGERY      3 surgeries     TONSILLECTOMY      TRANSFER, FAT TISSUE BILATERAL BREAST Bilateral 2018    Performed by [facility]ello MD at [facility] OR 2ND FLR    ULTRASOUND-ENDOSCOPIC-UPPER N/A 3/18/2015    Performed by Chandan Dillon MD at [facility] ENDO (2ND FLR)    UPPER GASTROINTESTINAL ENDOSCOPY           Social History     Socioeconomic History    Marital status: Single     Spouse name: Not on file    Number of children: 0    Years of education: Not on file    Highest education level: Not on file   Occupational History    Not on file   Social Needs    Financial resource strain: Not on file    Food insecurity:     Worry: Not on file     Inability: Not on file    Transportation needs:     Medical: Not on file     Non-medical: Not on file   Tobacco Use    Smoking status: Former Smoker     Packs/day: 0.50     Years: 17.00     Pack years: 8.50     Types: Cigarettes     Start date: 2000     Last attempt to quit: 2018     Years since quittin.8    Smokeless tobacco: Never Used    Tobacco comment: told about smoking cessation clinic and give brochure   Substance and  Sexual Activity    Alcohol use: Yes     Alcohol/week: 3.5 oz     Types: 7 Standard drinks or equivalent per week     Comment: occasionally    Drug use: No    Sexual activity: Not Currently     Partners: Male   Lifestyle    Physical activity:     Days per week: Not on file     Minutes per session: Not on file    Stress: Not on file   Relationships    Social connections:     Talks on phone: Not on file     Gets together: Not on file     Attends Orthodox service: Not on file     Active member of club or organization: Not on file     Attends meetings of clubs or organizations: Not on file     Relationship status: Not on file   Other Topics Concern    Patient feels they ought to cut down on drinking/drug use No    Patient annoyed by others criticizing their drinking/drug use No    Patient has felt bad or guilty about drinking/drug use No    Patient has had a drink/used drugs as an eye opener in the AM No   Social History Narrative    Had one previous miscarriage in 2005    Never     currently in a relationship     DATE OF PROCEDURE:  04/25/2019     PREOPERATIVE DIAGNOSIS:  History of breast cancer.     POSTOPERATIVE DIAGNOSIS:  History of breast cancer.     PROCEDURES PERFORMED:  Extensive medial capsulotomy of the left breast.     SURGEON:  Duane Bello M.D., [facility]     ANESTHESIA:  General.    Left Breast U/S Findings - 05/02/2019 (Dr. Duane Bello present for /S)     Patient is status post left mastectomy with implant reconstruction and revision of the implant on 4/25/19.  There is a small amount of fluid at the periphery of the implant.  There are two small fluid collections in the axillary area - one measuring 2.1 x 0.6 cm and the other measuring 2.6 x 0.3 cm. The latter is very superficial and may be intradermal in location. There is mild diffuse skin edema.      Review of Systems: post op visit    Objective:     Physical Exam:  Vitals:    05/29/19 0920   BP: 114/81   Pulse: 99        WD WN NAD  VSS  Normal resp effort  R breast - incision CDI, no erythema or drainage  L breast- incision healed, no erythema or draiange        Assessment:       1. Surgery follow-up examination        Plan:   38 y.o. female status post second stage breast reconstruction  - Doing well, no issues.   - L breast has significantly softened and settled/dropped.   - Breast are soft and symmetric, good symmetry between IMFs  - Patient will need free fat transfer in the future but will wait several months  - RTC x 6 weeks, appointment scheduled.     The patient was advised to call the clinic with any questions or concerns prior to their next visit.

## 2019-06-24 ENCOUNTER — OFFICE VISIT (OUTPATIENT)
Dept: PSYCHIATRY | Facility: CLINIC | Age: 39
End: 2019-06-24
Payer: MEDICAID

## 2019-06-24 VITALS
HEIGHT: 62 IN | DIASTOLIC BLOOD PRESSURE: 88 MMHG | WEIGHT: 131.38 LBS | BODY MASS INDEX: 24.18 KG/M2 | RESPIRATION RATE: 16 BRPM | SYSTOLIC BLOOD PRESSURE: 130 MMHG

## 2019-06-24 DIAGNOSIS — G47.00 INSOMNIA, UNSPECIFIED TYPE: ICD-10-CM

## 2019-06-24 DIAGNOSIS — Z72.0 TOBACCO ABUSE: Primary | ICD-10-CM

## 2019-06-24 DIAGNOSIS — F39 MOOD DISORDER: ICD-10-CM

## 2019-06-24 DIAGNOSIS — F40.01 PANIC DISORDER WITH AGORAPHOBIA: ICD-10-CM

## 2019-06-24 DIAGNOSIS — F42.8 OTHER OBSESSIVE-COMPULSIVE DISORDERS: ICD-10-CM

## 2019-06-24 DIAGNOSIS — F41.1 GAD (GENERALIZED ANXIETY DISORDER): ICD-10-CM

## 2019-06-24 DIAGNOSIS — F40.10 SOCIAL ANXIETY DISORDER: ICD-10-CM

## 2019-06-24 DIAGNOSIS — F43.10 PTSD (POST-TRAUMATIC STRESS DISORDER): ICD-10-CM

## 2019-06-24 PROCEDURE — 99999 PR PBB SHADOW E&M-EST. PATIENT-LVL III: CPT | Mod: PBBFAC,,, | Performed by: PSYCHIATRY & NEUROLOGY

## 2019-06-24 PROCEDURE — 90833 PR PSYCHOTHERAPY W/PATIENT W/E&M, 30 MIN (ADD ON): ICD-10-PCS | Mod: AF,HB,S$PBB, | Performed by: PSYCHIATRY & NEUROLOGY

## 2019-06-24 PROCEDURE — 99213 OFFICE O/P EST LOW 20 MIN: CPT | Mod: PBBFAC | Performed by: PSYCHIATRY & NEUROLOGY

## 2019-06-24 PROCEDURE — 99214 OFFICE O/P EST MOD 30 MIN: CPT | Mod: AF,HB,S$PBB, | Performed by: PSYCHIATRY & NEUROLOGY

## 2019-06-24 PROCEDURE — 90833 PSYTX W PT W E/M 30 MIN: CPT | Mod: AF,HB,S$PBB, | Performed by: PSYCHIATRY & NEUROLOGY

## 2019-06-24 PROCEDURE — 99214 PR OFFICE/OUTPT VISIT, EST, LEVL IV, 30-39 MIN: ICD-10-PCS | Mod: AF,HB,S$PBB, | Performed by: PSYCHIATRY & NEUROLOGY

## 2019-06-24 PROCEDURE — 99999 PR PBB SHADOW E&M-EST. PATIENT-LVL III: ICD-10-PCS | Mod: PBBFAC,,, | Performed by: PSYCHIATRY & NEUROLOGY

## 2019-06-24 RX ORDER — FLUOXETINE HYDROCHLORIDE 40 MG/1
80 CAPSULE ORAL DAILY
Qty: 180 CAPSULE | Refills: 1 | Status: SHIPPED | OUTPATIENT
Start: 2019-06-24 | End: 2019-09-10 | Stop reason: SDUPTHER

## 2019-06-24 RX ORDER — CLONAZEPAM 1 MG/1
1 TABLET ORAL 3 TIMES DAILY
Qty: 90 TABLET | Refills: 2 | Status: SHIPPED | OUTPATIENT
Start: 2019-06-24 | End: 2019-09-10 | Stop reason: SDUPTHER
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
## 2019-06-25 ENCOUNTER — PATIENT MESSAGE (OUTPATIENT)
Dept: PSYCHIATRY | Facility: CLINIC | Age: 39
End: 2019-06-25

## 2019-07-22 ENCOUNTER — OFFICE VISIT (OUTPATIENT)
Dept: INTERNAL MEDICINE | Facility: CLINIC | Age: 39
End: 2019-07-22
Payer: MEDICAID

## 2019-07-22 VITALS
SYSTOLIC BLOOD PRESSURE: 112 MMHG | DIASTOLIC BLOOD PRESSURE: 82 MMHG | HEART RATE: 115 BPM | RESPIRATION RATE: 18 BRPM | OXYGEN SATURATION: 98 % | WEIGHT: 132 LBS | HEIGHT: 62 IN | BODY MASS INDEX: 24.29 KG/M2

## 2019-07-22 DIAGNOSIS — L50.9 URTICARIA: Primary | ICD-10-CM

## 2019-07-22 PROCEDURE — 99999 PR PBB SHADOW E&M-EST. PATIENT-LVL III: ICD-10-PCS | Mod: PBBFAC,,, | Performed by: NURSE PRACTITIONER

## 2019-07-22 PROCEDURE — 99214 PR OFFICE/OUTPT VISIT, EST, LEVL IV, 30-39 MIN: ICD-10-PCS | Mod: S$PBB,,, | Performed by: NURSE PRACTITIONER

## 2019-07-22 PROCEDURE — 96372 THER/PROPH/DIAG INJ SC/IM: CPT | Mod: PBBFAC,PN

## 2019-07-22 PROCEDURE — 99213 OFFICE O/P EST LOW 20 MIN: CPT | Mod: PBBFAC,PN | Performed by: NURSE PRACTITIONER

## 2019-07-22 PROCEDURE — 99214 OFFICE O/P EST MOD 30 MIN: CPT | Mod: S$PBB,,, | Performed by: NURSE PRACTITIONER

## 2019-07-22 PROCEDURE — 99999 PR PBB SHADOW E&M-EST. PATIENT-LVL III: CPT | Mod: PBBFAC,,, | Performed by: NURSE PRACTITIONER

## 2019-07-22 RX ORDER — METHYLPREDNISOLONE ACETATE 80 MG/ML
80 INJECTION, SUSPENSION INTRA-ARTICULAR; INTRALESIONAL; INTRAMUSCULAR; SOFT TISSUE
Status: COMPLETED | OUTPATIENT
Start: 2019-07-22 | End: 2019-07-22

## 2019-07-22 RX ORDER — METHYLPREDNISOLONE 4 MG/1
TABLET ORAL
Qty: 1 PACKAGE | Refills: 0 | Status: SHIPPED | OUTPATIENT
Start: 2019-07-22 | End: 2019-08-12

## 2019-07-22 RX ADMIN — METHYLPREDNISOLONE ACETATE 80 MG: 80 INJECTION, SUSPENSION INTRA-ARTICULAR; INTRALESIONAL; INTRAMUSCULAR; SOFT TISSUE at 02:07

## 2019-07-22 NOTE — PROGRESS NOTES
Subjective:       Patient ID: Staci Hodge is a 38 y.o. female.    Chief Complaint: Rash (hives- itching x saturday - allergic reaction)    Patient is known, to me and presents with   Chief Complaint   Patient presents with    Rash     hives- itching x saturday - allergic reaction   .  Denies chest pain and shortness of breath.  Patient presents with urticaria after eating boil crabs on Saturday. Has eaten it before and this is the first time she develops hives. Reports no trouble breathing or swallowing. Just itching all over and hives  Noted to face, legs, and arms. Some on chest region.  Taking benadryl with some relief  HPI  Review of Systems   Constitutional: Negative for activity change, appetite change, fatigue, fever and unexpected weight change.   HENT: Negative for congestion, ear discharge, ear pain, hearing loss, postnasal drip and tinnitus.    Eyes: Negative for photophobia, pain and visual disturbance.   Respiratory: Negative for cough, shortness of breath, wheezing and stridor.    Cardiovascular: Negative for chest pain, palpitations and leg swelling.   Gastrointestinal: Negative for abdominal distention.   Genitourinary: Negative for difficulty urinating, dysuria, frequency, hematuria and urgency.   Musculoskeletal: Negative for arthralgias, back pain, gait problem, joint swelling and neck pain.   Skin: Positive for rash. Negative for color change, pallor and wound.   Neurological: Negative for dizziness, seizures, syncope, weakness, light-headedness, numbness and headaches.   Hematological: Negative for adenopathy. Does not bruise/bleed easily.   Psychiatric/Behavioral: Negative for behavioral problems, confusion and hallucinations.       Objective:      Physical Exam   Constitutional: She is oriented to person, place, and time. She appears well-developed and well-nourished. No distress.   HENT:   Head: Normocephalic and atraumatic.   Right Ear: External ear normal.   Left Ear: External  "ear normal.   Mouth/Throat: Oropharynx is clear and moist. No oropharyngeal exudate.   Eyes: Pupils are equal, round, and reactive to light. Conjunctivae and EOM are normal. Right eye exhibits no discharge. Left eye exhibits no discharge.   Neck: Normal range of motion. Neck supple. No JVD present. No thyromegaly present.   Cardiovascular: Normal rate, regular rhythm, normal heart sounds and intact distal pulses. Exam reveals no gallop and no friction rub.   No murmur heard.  Pulmonary/Chest: Effort normal and breath sounds normal. No stridor. No respiratory distress. She has no wheezes. She has no rales. She exhibits no tenderness.   Abdominal: Soft. Bowel sounds are normal. She exhibits no distension and no mass. There is no tenderness. There is no rebound.   Musculoskeletal: Normal range of motion. She exhibits no edema or tenderness.   Lymphadenopathy:     She has no cervical adenopathy.   Neurological: She is alert and oriented to person, place, and time. She has normal reflexes. She displays normal reflexes. No cranial nerve deficit. She exhibits normal muscle tone. Coordination normal.   Skin: Skin is warm and dry. Capillary refill takes less than 2 seconds. Rash noted. Rash is urticarial. There is erythema. No pallor.        Psychiatric: She has a normal mood and affect. Her behavior is normal. Judgment and thought content normal.       Assessment:       1. Urticaria        Plan:   Staci was seen today for rash.    Diagnoses and all orders for this visit:    Urticaria  -     methylPREDNISolone acetate injection 80 mg  -     methylPREDNISolone (MEDROL DOSEPACK) 4 mg tablet; use as directed    "This note will not be shared with the patient."  Start with medrol dose pack tomorrow  Take benadryl as needed  rtc if no improvement   "

## 2019-07-22 NOTE — PATIENT INSTRUCTIONS
Hives (Adult)  Hives are pink or red bumps on the skin. These bumps are also known as wheals. The bumps can itch, burn, or sting. Hives can occur anywhere on the body. They vary in size and shape and can form in clusters. Individual hives can appear and go away quickly. New hives may develop as old ones fade. Hives are common and usually harmless. Occasionally hives are a sign of a serious allergy.  Hives are often caused by an allergic reaction. It may be an allergic reaction to foods such as fruit, shellfish, chocolate, nuts, or tomatoes. It may be a reaction to pollens, animal fur, or mold spores. Medicines, chemicals, and insect bites can also cause hives. And hives can be caused by hot sun or cold air. The cause of hives can be difficult to find.  You may be given medicines to relieve swelling and itching. Follow all instructions when using these medicines. The hives will usually fade in a few days, but can last up to 2 weeks.  Home care  Follow these tips:  · Try to find the cause of the hives and eliminate it. Discuss possible causes with your healthcare provider. Future reactions to the same allergen may be worse.  · Dont scratch the hives. Scratching will delay healing. To reduce itching, apply cool, wet compresses to the skin.  · Dress in soft, loose cotton clothing.  · Dont bathe in hot water. This can make the itching worse.  · Apply an ice pack or cool pack wrapped in a thin towel to your skin. This will help reduce redness and itching. But if your hives were caused by exposure to cold, then do not apply more cold to them.  · You may use over-the counter antihistamines to reduce itching. Some older antihistamines, such as diphenhydramine and chlorpheniramine, are inexpensive. But they need to be taken often and may make you sleepy. They are best used at bedtime. Dont use diphenhydramine if you have glaucoma or have trouble urinating because of an enlarged prostate. Newer antihistamines, such as  loratadine, cetirizine, and fexofenadine, are generally more expensive. But they tend to have fewer side effects, such as drowsiness. They can be taken less often.  · Another type of antihistamine is used to treat heartburn. This type includes ranitidine, nizatidine, famotidine, and cimetidine. These are sometimes used along with the above antihistamines if a single medicine is not working.  Follow-up care  Follow up with your healthcare provider if your symptoms don't get better in 2 days. Ask your provider about allergy testing if you have had a severe reaction, or have had several episodes of hives. He or she can use the allergy testing to find out what you are allergic to.  When to seek medical advice  Call your healthcare provider right away if any of these occur:  · Fever of 100.4°F (38.0°C) or higher, or as directed by your healthcare provider  · Redness, swelling, or pain  · Foul-smelling fluid coming from the rash  Call 911  Call 911 if any of the following occur:  · Swelling of the face, throat, or tongue  · Trouble breathing or swallowing  · Dizziness, weakness, or fainting  Date Last Reviewed: 9/1/2016  © 1461-9355 [employer]. [address] [place] [address], [place] [address]. All rights reserved. This information is not intended as a substitute for professional medical care. Always follow your healthcare professional's instructions.

## 2019-07-31 ENCOUNTER — OFFICE VISIT (OUTPATIENT)
Dept: PLASTIC SURGERY | Facility: CLINIC | Age: 39
End: 2019-07-31
Payer: MEDICAID

## 2019-07-31 VITALS
SYSTOLIC BLOOD PRESSURE: 120 MMHG | DIASTOLIC BLOOD PRESSURE: 79 MMHG | HEART RATE: 99 BPM | BODY MASS INDEX: 24.63 KG/M2 | WEIGHT: 133.81 LBS | HEIGHT: 62 IN

## 2019-07-31 DIAGNOSIS — Z09 SURGERY FOLLOW-UP EXAMINATION: Primary | ICD-10-CM

## 2019-07-31 PROCEDURE — 99024 PR POST-OP FOLLOW-UP VISIT: ICD-10-PCS | Mod: ,,, | Performed by: SURGERY

## 2019-07-31 PROCEDURE — 99213 OFFICE O/P EST LOW 20 MIN: CPT | Mod: PBBFAC | Performed by: SURGERY

## 2019-07-31 PROCEDURE — 99999 PR PBB SHADOW E&M-EST. PATIENT-LVL III: ICD-10-PCS | Mod: PBBFAC,,, | Performed by: SURGERY

## 2019-07-31 PROCEDURE — 99999 PR PBB SHADOW E&M-EST. PATIENT-LVL III: CPT | Mod: PBBFAC,,, | Performed by: SURGERY

## 2019-07-31 PROCEDURE — 99024 POSTOP FOLLOW-UP VISIT: CPT | Mod: ,,, | Performed by: SURGERY

## 2019-07-31 NOTE — PROGRESS NOTES
History & Physical  Plastic Surgery Clinic    SUBJECTIVE:     Chief complaint: follow-up second stage breast reconstruction    History of Present Illness:  Patient is a 38 y.o. female presents for follow-up second stage breast reconstruction on 4/25/2019. She is doing well today. The fluid collections in the left axillary region have since resolved. She is pain free today, incisions have healed well with no additional complaints. She is inquiring today about progressing with a free fat transfer procedure.    Past Medical History:  Past Medical History:   Diagnosis Date    Abnormal Pap smear of cervix age 25    no treatement    Anxiety     Breast cancer     Cancer 07/11/2018    breast cancer    Cervical spondylosis 2/9/2018    Colon polyp     Depression     Ectopic pregnancy 2/26/2019    General anesthetics causing adverse effect in therapeutic use     Headache     History of psychiatric hospitalization     age 20 for SI    Hx of psychiatric care     Hypertension     Peutz-Jeghers syndrome     Psychiatric problem     Right carpal tunnel syndrome 3/9/2017    Self-harming behavior     Suicide attempt     Therapy        Past Surgical History:  Past Surgical History:   Procedure Laterality Date    ADENOIDECTOMY      APPENDECTOMY      AUGMENTATION OF BREAST      BIOPSY, LYMPH NODE, SENTINEL Left 7/23/2018    Performed by [facility] at [facility] OR 2ND FLR    BLOCK-NERVE-MEDIAL BRANCH-CERVICAL (C3,4,5) Bilateral 2/9/2018    Performed by [facility]in [facility] at [facility] OR    BOWEL RESECTION  10/17/14    BREAST BIOPSY Left 06/04/2018    BREAST RECONSTRUCTION      BREAST SURGERY      Bilateral Mastectomy 07/23/2018    CAPSULOTOMY, BREAST LEFT Left 4/25/2019    Performed by Duane [facility]ello [facility] at [facility] OR 2ND FLR    CARPAL TUNNEL RELEASE Bilateral     CARPAL TUNNEL RELEASE      COLONOSCOPY      CREATION OR REVISION, INFRAMAMMARY FOLD LEFT BREAST Left 11/12/2018    Performed by Duane MEZA  MD Ace at [facility] OR 2ND FLR    DILATION AND CURETTAGE OF UTERUS      DILATION AND CURETTAGE, UTERUS, USING SUCTION  PATHOLOGY NEEDED N/A 2/25/2019    Performed by [facility] at [facility] OR    DILATION-CURETTAGE OF UTERUS (D AND C) N/A 3/5/2018    Performed by Simeon Peterson MD at [facility] OR    ENTEROSCOPY N/A 3/18/2015    Performed by Chandan Dillon MD at [facility] ENDO (2ND FLR)    INJECTION, FOR SENTINEL NODE IDENTIFICATION Left 7/23/2018    Performed by Sofia Kendrick MD at [facility] OR 2ND FLR    INJECTION-STEROID-EPIDURAL-CERVICAL (C7-T1) N/A 1/19/2018    Performed by John Chapin MD at [facility] OR    INSERTION, TISSUE EXPANDER, BREAST Bilateral 7/23/2018    Performed by Duane Bello MD at [facility] OR 2ND FLR    intestinal polpy      LAPAROTOMY-EXPLORATORY N/A 10/17/2014    Performed by Joshua Goldberg, MD at [facility] OR 2ND FLR    MASTECTOMY      MASTECTOMY 23 hr stay Bilateral 7/23/2018    Performed by Sofia Kendrick MD at [facility] OR 2ND FLR    RELEASE-CARPAL TUNNEL Right 4/19/2017    Performed by Larry Patel MD at [facility] OR    RELEASE-CUBITAL TUNNEL Right 4/19/2017    Performed by Larry Patel MD at [facility] OR    RESECTION - SMALL BOWEL N/A 10/17/2014    Performed by Joshua Goldberg, MD at [facility] OR 2ND FLR    SMALL INTESTINE SURGERY      3 surgeries     TONSILLECTOMY      TRANSFER, FAT TISSUE BILATERAL BREAST Bilateral 11/12/2018    Performed by Duane Bello MD at [facility] OR 2ND FLR    ULTRASOUND-ENDOSCOPIC-UPPER N/A 3/18/2015    Performed by Chandan Dillon MD at [facility] ENDO (2ND FLR)    UPPER GASTROINTESTINAL ENDOSCOPY         Family History:  Family History   Problem Relation Age of Onset    Colon cancer Mother 33        peutz-jeghers syndrome    Hypertension Father     Colon polyps Sister         peutz-jeghers syndrome    Breast cancer Paternal Aunt     Breast cancer Paternal Grandmother     Ovarian cancer Neg Hx        Social History:  Social History      Socioeconomic History    Marital status: Single     Spouse name: Not on file    Number of children: 0    Years of education: Not on file    Highest education level: Not on file   Occupational History    Not on file   Social Needs    Financial resource strain: Not on file    Food insecurity:     Worry: Not on file     Inability: Not on file    Transportation needs:     Medical: Not on file     Non-medical: Not on file   Tobacco Use    Smoking status: Former Smoker     Packs/day: 0.50     Years: 17.00     Pack years: 8.50     Types: Cigarettes     Start date: 2000     Last attempt to quit: 2018     Years since quittin.0    Smokeless tobacco: Never Used    Tobacco comment: told about smoking cessation clinic and give brochure   Substance and Sexual Activity    Alcohol use: Yes     Alcohol/week: 3.5 oz     Types: 7 Standard drinks or equivalent per week     Comment: occasionally    Drug use: No    Sexual activity: Not Currently     Partners: Male   Lifestyle    Physical activity:     Days per week: Not on file     Minutes per session: Not on file    Stress: Not on file   Relationships    Social connections:     Talks on phone: Not on file     Gets together: Not on file     Attends Zoroastrianism service: Not on file     Active member of club or organization: Not on file     Attends meetings of clubs or organizations: Not on file     Relationship status: Not on file   Other Topics Concern    Patient feels they ought to cut down on drinking/drug use No    Patient annoyed by others criticizing their drinking/drug use No    Patient has felt bad or guilty about drinking/drug use No    Patient has had a drink/used drugs as an eye opener in the AM No   Social History Narrative    Had one previous miscarriage in     Never     currently in a relationship       Medications:  Current Outpatient Medications   Medication Sig Dispense Refill    butalbital-acetaminophen-caffeine -40  "mg (FIORICET, ESGIC) -40 mg per tablet Take 1 tablet by mouth every 6 (six) hours as needed for Headaches. Limit use to 2 days per week. 10 tablet 4    clonazePAM (KLONOPIN) 1 MG tablet Take 1 tablet (1 mg total) by mouth 3 (three) times daily. 90 tablet 2    FLUoxetine 40 MG capsule Take 2 capsules (80 mg total) by mouth once daily. 180 capsule 1    methylPREDNISolone (MEDROL DOSEPACK) 4 mg tablet use as directed 1 Package 0     No current facility-administered medications for this visit.        Allergies:  Review of patient's allergies indicates:  No Known Allergies    Review of Systems:  Negative except for HPI.      OBJECTIVE:     /79   Pulse 99   Ht 5' 2" (1.575 m)   Wt 60.7 kg (133 lb 13.1 oz)   LMP 07/09/2019   BMI 24.48 kg/m²     Physical Exam   Constitutional: She is oriented to person, place, and time and well-developed, well-nourished, and in no distress.   HENT:   Head: Normocephalic and atraumatic.   Eyes: EOM are normal. No scleral icterus.   Neck: Neck supple. No JVD present. No tracheal deviation present.   Cardiovascular: Normal rate and intact distal pulses.   Pulmonary/Chest: Effort normal. No respiratory distress.   Breast: Bilateral breasts s/p mastectomy and breast reconstruction with implants. Asymmetry noted. Flattening of the R lateral breast. Moderate capsular contracture of the left breast. Nipples surgically absent. Incisions well healed, c/d/i. No fluctuance or accumulation along breasts or axillary region.   Abdominal: Soft. She exhibits no distension. There is no tenderness.   Musculoskeletal: She exhibits no edema or tenderness.   Neurological: She is alert and oriented to person, place, and time.   Skin: Skin is warm and dry. No rash noted.   Nursing note and vitals reviewed.      ASSESSMENT/PLAN:     38 y.o. female with follow-up s/p second stage breast reconstruction. She presents today for evaluation for free fat transfer of the breasts.    - Doing well " overall  - Prior surgical site and incisions well healed  - Plan for future free fat grafting of R breast especially the lateral side and open capsulotomy of L lateral breast

## 2019-09-10 ENCOUNTER — LAB VISIT (OUTPATIENT)
Dept: LAB | Facility: HOSPITAL | Age: 39
End: 2019-09-10
Attending: NURSE PRACTITIONER
Payer: COMMERCIAL

## 2019-09-10 ENCOUNTER — OFFICE VISIT (OUTPATIENT)
Dept: INTERNAL MEDICINE | Facility: CLINIC | Age: 39
End: 2019-09-10
Payer: COMMERCIAL

## 2019-09-10 VITALS
WEIGHT: 134 LBS | HEIGHT: 62 IN | OXYGEN SATURATION: 99 % | HEART RATE: 94 BPM | SYSTOLIC BLOOD PRESSURE: 114 MMHG | TEMPERATURE: 97 F | DIASTOLIC BLOOD PRESSURE: 84 MMHG | BODY MASS INDEX: 24.66 KG/M2 | RESPIRATION RATE: 16 BRPM

## 2019-09-10 DIAGNOSIS — N39.0 URINARY TRACT INFECTION WITH HEMATURIA, SITE UNSPECIFIED: Primary | ICD-10-CM

## 2019-09-10 DIAGNOSIS — R31.9 URINARY TRACT INFECTION WITH HEMATURIA, SITE UNSPECIFIED: ICD-10-CM

## 2019-09-10 DIAGNOSIS — F32.A DEPRESSION, UNSPECIFIED DEPRESSION TYPE: ICD-10-CM

## 2019-09-10 DIAGNOSIS — F41.9 ANXIETY: ICD-10-CM

## 2019-09-10 DIAGNOSIS — N39.0 URINARY TRACT INFECTION WITH HEMATURIA, SITE UNSPECIFIED: ICD-10-CM

## 2019-09-10 DIAGNOSIS — R31.9 URINARY TRACT INFECTION WITH HEMATURIA, SITE UNSPECIFIED: Primary | ICD-10-CM

## 2019-09-10 LAB
BILIRUB SERPL-MCNC: ABNORMAL MG/DL
BLOOD URINE, POC: ABNORMAL
COLOR, POC UA: CLEAR
GLUCOSE UR QL STRIP: NORMAL
KETONES UR QL STRIP: ABNORMAL
LEUKOCYTE ESTERASE URINE, POC: ABNORMAL
NITRITE, POC UA: ABNORMAL
PH, POC UA: 6
PROTEIN, POC: ABNORMAL
SPECIFIC GRAVITY, POC UA: 1.01
UROBILINOGEN, POC UA: NORMAL

## 2019-09-10 PROCEDURE — 3008F BODY MASS INDEX DOCD: CPT | Mod: CPTII,S$GLB,, | Performed by: NURSE PRACTITIONER

## 2019-09-10 PROCEDURE — 81002 POCT URINE DIPSTICK WITHOUT MICROSCOPE: ICD-10-PCS | Mod: S$GLB,,, | Performed by: NURSE PRACTITIONER

## 2019-09-10 PROCEDURE — 99999 PR PBB SHADOW E&M-EST. PATIENT-LVL III: ICD-10-PCS | Mod: PBBFAC,,, | Performed by: NURSE PRACTITIONER

## 2019-09-10 PROCEDURE — 99999 PR PBB SHADOW E&M-EST. PATIENT-LVL III: CPT | Mod: PBBFAC,,, | Performed by: NURSE PRACTITIONER

## 2019-09-10 PROCEDURE — 87086 URINE CULTURE/COLONY COUNT: CPT

## 2019-09-10 PROCEDURE — 99213 PR OFFICE/OUTPT VISIT, EST, LEVL III, 20-29 MIN: ICD-10-PCS | Mod: 25,S$GLB,, | Performed by: NURSE PRACTITIONER

## 2019-09-10 PROCEDURE — 3008F PR BODY MASS INDEX (BMI) DOCUMENTED: ICD-10-PCS | Mod: CPTII,S$GLB,, | Performed by: NURSE PRACTITIONER

## 2019-09-10 PROCEDURE — 99213 OFFICE O/P EST LOW 20 MIN: CPT | Mod: 25,S$GLB,, | Performed by: NURSE PRACTITIONER

## 2019-09-10 PROCEDURE — 81002 URINALYSIS NONAUTO W/O SCOPE: CPT | Mod: S$GLB,,, | Performed by: NURSE PRACTITIONER

## 2019-09-10 RX ORDER — NITROFURANTOIN (MACROCRYSTALS) 100 MG/1
100 CAPSULE ORAL EVERY 12 HOURS
Qty: 14 CAPSULE | Refills: 0 | Status: SHIPPED | OUTPATIENT
Start: 2019-09-10 | End: 2019-09-17

## 2019-09-10 RX ORDER — CLONAZEPAM 1 MG/1
1 TABLET ORAL 3 TIMES DAILY
Qty: 90 TABLET | Refills: 2 | Status: SHIPPED | OUTPATIENT
Start: 2019-09-10 | End: 2019-12-07 | Stop reason: SDUPTHER

## 2019-09-10 RX ORDER — FLUOXETINE HYDROCHLORIDE 40 MG/1
80 CAPSULE ORAL DAILY
Qty: 180 CAPSULE | Refills: 1 | Status: SHIPPED | OUTPATIENT
Start: 2019-09-10 | End: 2019-12-10 | Stop reason: SDUPTHER

## 2019-09-10 NOTE — PROGRESS NOTES
Subjective:       Patient ID: Staci Hodge is a 39 y.o. female.    Chief Complaint: Urinary Tract Infection (blood in urine - x few days with pressure and pain on L. side   PS:5)    Patient is known, to me and presents with   Chief Complaint   Patient presents with    Urinary Tract Infection     blood in urine - x few days with pressure and pain on L. side   PS:5   .  Denies chest pain and shortness of breath.  Patient presents with uti s/s. Also needs psy meds filled. She wants to come here now for that   HPI  Review of Systems   Constitutional: Negative.    Respiratory: Negative.    Cardiovascular: Negative.    Genitourinary: Positive for dysuria, frequency, hematuria and urgency.   Musculoskeletal: Negative.    Skin: Negative.    Psychiatric/Behavioral: Positive for dysphoric mood. Negative for sleep disturbance and suicidal ideas. The patient is nervous/anxious.        Objective:      Physical Exam   Constitutional: She appears well-developed and well-nourished. No distress.   Neck: Normal range of motion. Neck supple. No JVD present. No tracheal deviation present. No thyromegaly present.   Cardiovascular: Normal rate, regular rhythm and normal heart sounds.   No murmur heard.  Pulmonary/Chest: Effort normal and breath sounds normal. She has no wheezes.   Genitourinary:   Genitourinary Comments: See dip   Musculoskeletal: Normal range of motion. She exhibits no edema, tenderness or deformity.   Lymphadenopathy:     She has no cervical adenopathy.   Skin: Skin is warm and dry. Capillary refill takes less than 2 seconds. No rash noted. She is not diaphoretic. No erythema. No pallor.   Psychiatric: She has a normal mood and affect. Her behavior is normal. Judgment and thought content normal.   Negative SI/HI noted       Assessment:       1. Urinary tract infection with hematuria, site unspecified    2. Anxiety    3. Depression, unspecified depression type        Plan:   Staci was seen today for urinary  "tract infection.    Diagnoses and all orders for this visit:    Urinary tract infection with hematuria, site unspecified  -     POCT URINE DIPSTICK WITHOUT MICROSCOPE  -     Urine culture; Future  -     nitrofurantoin (MACRODANTIN) 100 MG capsule; Take 1 capsule (100 mg total) by mouth every 12 (twelve) hours. for 7 days    Anxiety  -     clonazePAM (KLONOPIN) 1 MG tablet; Take 1 tablet (1 mg total) by mouth 3 (three) times daily.  -     FLUoxetine 40 MG capsule; Take 2 capsules (80 mg total) by mouth once daily.    Depression, unspecified depression type  -     FLUoxetine 40 MG capsule; Take 2 capsules (80 mg total) by mouth once daily.    "This note will not be shared with the patient."  See above plan of care  rtc as scheduled  "

## 2019-09-12 LAB — BACTERIA UR CULT: NO GROWTH

## 2019-10-14 ENCOUNTER — TELEPHONE (OUTPATIENT)
Dept: PLASTIC SURGERY | Facility: CLINIC | Age: 39
End: 2019-10-14

## 2019-10-14 NOTE — TELEPHONE ENCOUNTER
I attempted to contact Ms Hodge regarding rescheduling an upcoming appointment.  Pt was not available at the time of the appointment.  I left a detailed VM asking her to contact PLS at her convenience.

## 2019-11-11 ENCOUNTER — TELEPHONE (OUTPATIENT)
Dept: PLASTIC SURGERY | Facility: CLINIC | Age: 39
End: 2019-11-11

## 2019-11-11 NOTE — TELEPHONE ENCOUNTER
I attempted to contact pt to re schedule a missed appointment.  Pt's phone wrung a couple times then went to a busy signal.  No message left.  An appointment was made and will be mailed to pt.

## 2019-12-04 ENCOUNTER — OFFICE VISIT (OUTPATIENT)
Dept: PLASTIC SURGERY | Facility: CLINIC | Age: 39
End: 2019-12-04
Payer: COMMERCIAL

## 2019-12-04 VITALS
WEIGHT: 143.31 LBS | HEART RATE: 110 BPM | DIASTOLIC BLOOD PRESSURE: 81 MMHG | SYSTOLIC BLOOD PRESSURE: 117 MMHG | BODY MASS INDEX: 26.21 KG/M2

## 2019-12-04 DIAGNOSIS — Z98.890 S/P BREAST RECONSTRUCTION: Primary | ICD-10-CM

## 2019-12-04 PROCEDURE — 99212 PR OFFICE/OUTPT VISIT, EST, LEVL II, 10-19 MIN: ICD-10-PCS | Mod: S$GLB,,, | Performed by: SURGERY

## 2019-12-04 PROCEDURE — 3008F PR BODY MASS INDEX (BMI) DOCUMENTED: ICD-10-PCS | Mod: CPTII,S$GLB,, | Performed by: SURGERY

## 2019-12-04 PROCEDURE — 3008F BODY MASS INDEX DOCD: CPT | Mod: CPTII,S$GLB,, | Performed by: SURGERY

## 2019-12-04 PROCEDURE — 99999 PR PBB SHADOW E&M-EST. PATIENT-LVL III: ICD-10-PCS | Mod: PBBFAC,,, | Performed by: SURGERY

## 2019-12-04 PROCEDURE — 99999 PR PBB SHADOW E&M-EST. PATIENT-LVL III: CPT | Mod: PBBFAC,,, | Performed by: SURGERY

## 2019-12-04 PROCEDURE — 99212 OFFICE O/P EST SF 10 MIN: CPT | Mod: S$GLB,,, | Performed by: SURGERY

## 2019-12-04 NOTE — PROGRESS NOTES
Dictation #1  MRN:[id number]  [facility]:[id number]  Staci Hodge presents Plastic surgery Clinic after having multiple surgeries for breast cancer.  Patient had reconstruction with tissue expanders followed by permanent implants.  She has had several revisions to correct left inframammary fold.  The patient's left breast  has been the problem breast.  There is little subcutaneous fat are multiple contoured deformities of the of that breast.  She also needs to have the inframammary fold on that side revised by excising some excess tissue along the inframammary fold.  The right breast has ample amount of fatty tissue. The breast however has drifted inferiorly and laterally.  The shape of the breast does not match the shape of the left breast.  I had a long discussion with the patient as well as her friend in the room.  That I would present her at Plastic surgery conference to see if anyone had any different opinions regarding her care.  She will require lateral capsulorrhaphy on the right side as well as inferior capsulorrhaphy in order to elevate the right breast.  On the opposite side she would need to have the excess soft tissue along the inframammary fold excised and then fat grafting for the skin is very thin.   We will present her at Plastic surgery  conference to get other opinions.

## 2019-12-07 DIAGNOSIS — F41.9 ANXIETY: ICD-10-CM

## 2019-12-07 RX ORDER — CLONAZEPAM 1 MG/1
TABLET ORAL
Qty: 90 TABLET | Refills: 2 | Status: SHIPPED | OUTPATIENT
Start: 2019-12-07 | End: 2019-12-10 | Stop reason: SDUPTHER

## 2019-12-10 ENCOUNTER — PATIENT MESSAGE (OUTPATIENT)
Dept: INTERNAL MEDICINE | Facility: CLINIC | Age: 39
End: 2019-12-10

## 2019-12-10 DIAGNOSIS — F32.A DEPRESSION, UNSPECIFIED DEPRESSION TYPE: ICD-10-CM

## 2019-12-10 DIAGNOSIS — F41.9 ANXIETY: ICD-10-CM

## 2019-12-10 RX ORDER — FLUOXETINE HYDROCHLORIDE 40 MG/1
80 CAPSULE ORAL DAILY
Qty: 180 CAPSULE | Refills: 1 | Status: SHIPPED | OUTPATIENT
Start: 2019-12-10 | End: 2020-03-03 | Stop reason: SDUPTHER

## 2019-12-10 RX ORDER — CLONAZEPAM 1 MG/1
1 TABLET ORAL 3 TIMES DAILY
Qty: 90 TABLET | Refills: 2 | Status: SHIPPED | OUTPATIENT
Start: 2019-12-10 | End: 2020-03-03 | Stop reason: SDUPTHER

## 2019-12-19 ENCOUNTER — PATIENT MESSAGE (OUTPATIENT)
Dept: PLASTIC SURGERY | Facility: CLINIC | Age: 39
End: 2019-12-19

## 2020-01-14 ENCOUNTER — PATIENT MESSAGE (OUTPATIENT)
Dept: PLASTIC SURGERY | Facility: CLINIC | Age: 40
End: 2020-01-14

## 2020-01-22 ENCOUNTER — TELEPHONE (OUTPATIENT)
Dept: PLASTIC SURGERY | Facility: CLINIC | Age: 40
End: 2020-01-22

## 2020-02-03 ENCOUNTER — PATIENT MESSAGE (OUTPATIENT)
Dept: PLASTIC SURGERY | Facility: CLINIC | Age: 40
End: 2020-02-03

## 2020-02-17 ENCOUNTER — PATIENT OUTREACH (OUTPATIENT)
Dept: ADMINISTRATIVE | Facility: OTHER | Age: 40
End: 2020-02-17

## 2020-02-19 ENCOUNTER — OFFICE VISIT (OUTPATIENT)
Dept: PLASTIC SURGERY | Facility: CLINIC | Age: 40
End: 2020-02-19
Payer: COMMERCIAL

## 2020-02-19 VITALS
HEART RATE: 119 BPM | WEIGHT: 148.38 LBS | BODY MASS INDEX: 27.3 KG/M2 | SYSTOLIC BLOOD PRESSURE: 135 MMHG | HEIGHT: 62 IN | DIASTOLIC BLOOD PRESSURE: 86 MMHG

## 2020-02-19 DIAGNOSIS — Z98.890 S/P BREAST RECONSTRUCTION: Primary | ICD-10-CM

## 2020-02-19 PROCEDURE — 3008F PR BODY MASS INDEX (BMI) DOCUMENTED: ICD-10-PCS | Mod: CPTII,S$GLB,, | Performed by: SURGERY

## 2020-02-19 PROCEDURE — 99212 OFFICE O/P EST SF 10 MIN: CPT | Mod: S$GLB,,, | Performed by: SURGERY

## 2020-02-19 PROCEDURE — 99212 PR OFFICE/OUTPT VISIT, EST, LEVL II, 10-19 MIN: ICD-10-PCS | Mod: S$GLB,,, | Performed by: SURGERY

## 2020-02-19 PROCEDURE — 99999 PR PBB SHADOW E&M-EST. PATIENT-LVL III: ICD-10-PCS | Mod: PBBFAC,,, | Performed by: SURGERY

## 2020-02-19 PROCEDURE — 3008F BODY MASS INDEX DOCD: CPT | Mod: CPTII,S$GLB,, | Performed by: SURGERY

## 2020-02-19 PROCEDURE — 99999 PR PBB SHADOW E&M-EST. PATIENT-LVL III: CPT | Mod: PBBFAC,,, | Performed by: SURGERY

## 2020-02-19 NOTE — PROGRESS NOTES
40 yo female well known to the plastic surgery clinic presents to discuss breast reconstruction revision options.  She was recently presented at our conference where the consensus was that she would benefit from implant removal/ capsulotomy and tissue expander placement.  Followed by expansion and placement of permanent implants.     We discussed that the first of two surgeries would be apprx one hour and we would place the tissue expander, expand for 6 weeks and then place new implants.    She desires to proceed with this plan and we will schedule surgery    TISHA Carrillo MD, FACS  Plastic Surgery Fellow

## 2020-02-20 ENCOUNTER — TELEPHONE (OUTPATIENT)
Dept: PLASTIC SURGERY | Facility: CLINIC | Age: 40
End: 2020-02-20

## 2020-02-20 DIAGNOSIS — Z85.3 PERSONAL HISTORY OF MALIGNANT NEOPLASM OF BREAST: Primary | ICD-10-CM

## 2020-02-27 ENCOUNTER — TELEPHONE (OUTPATIENT)
Dept: PLASTIC SURGERY | Facility: CLINIC | Age: 40
End: 2020-02-27

## 2020-02-27 NOTE — TELEPHONE ENCOUNTER
Pre-op instructions reviewed with pt. for surgery scheduled 3/10/2020. Questions answered. Pt verbalized understanding.

## 2020-03-03 DIAGNOSIS — F41.9 ANXIETY: ICD-10-CM

## 2020-03-03 DIAGNOSIS — F32.A DEPRESSION, UNSPECIFIED DEPRESSION TYPE: ICD-10-CM

## 2020-03-03 RX ORDER — FLUOXETINE HYDROCHLORIDE 40 MG/1
80 CAPSULE ORAL DAILY
Qty: 180 CAPSULE | Refills: 1 | Status: SHIPPED | OUTPATIENT
Start: 2020-03-03 | End: 2020-05-20 | Stop reason: SDUPTHER

## 2020-03-03 RX ORDER — CLONAZEPAM 1 MG/1
1 TABLET ORAL 3 TIMES DAILY
Qty: 90 TABLET | Refills: 2 | Status: SHIPPED | OUTPATIENT
Start: 2020-03-03 | End: 2020-05-18

## 2020-03-09 ENCOUNTER — TELEPHONE (OUTPATIENT)
Dept: PLASTIC SURGERY | Facility: CLINIC | Age: 40
End: 2020-03-09

## 2020-03-09 ENCOUNTER — ANESTHESIA EVENT (OUTPATIENT)
Dept: SURGERY | Facility: HOSPITAL | Age: 40
End: 2020-03-09
Payer: COMMERCIAL

## 2020-03-09 NOTE — TELEPHONE ENCOUNTER
Pt. Informed to arrive at 530 am on 2nd floor [facility] for surgery scheduled 3/10/2020. Pt. verbalized understanding.

## 2020-03-09 NOTE — PRE-PROCEDURE INSTRUCTIONS
Preop instructions:     NPO instructions: NO solids foods,non-clear liquids including milk, milk products, creamers, gum, mints, or hard candy after midnight the night prior to your surgery or 8 hours prior to your SX start time. 2300    We encourage a limited consumption of CLEAR LIQUIDS after midnight the night before your surgery up to 2 hrs prior to your SX start time. 0500     Acceptable Clear Liquids are listed below:    Water,  Gatorade/Powerade sports drinks, Apple juice (no pulp) Black coffee with without sugar/NO milk, cream or creamer or Jello.       If you have received specific instructions from your Surgeon/Surgeon's staff, please follow their instructions.     Showering instructions, directions, leave all valuables at home, medication instructions for PM prior & am of procedure explained. Patient stated an understanding.      Patient REPORTS H/O PONV                                                                                                                                    ARRIVAL TO [facility] [place] - 8455  PT'S COUSIN - JANY VAUGHN WILL BE PROVIDING TRANSPORTATION HOME UPON DISCHARGE.

## 2020-03-10 ENCOUNTER — HOSPITAL ENCOUNTER (OUTPATIENT)
Facility: HOSPITAL | Age: 40
Discharge: HOME | End: 2020-03-10
Attending: SURGERY | Admitting: SURGERY
Payer: COMMERCIAL

## 2020-03-10 ENCOUNTER — ANESTHESIA (OUTPATIENT)
Dept: SURGERY | Facility: HOSPITAL | Age: 40
End: 2020-03-10
Payer: COMMERCIAL

## 2020-03-10 VITALS
OXYGEN SATURATION: 97 % | HEART RATE: 101 BPM | RESPIRATION RATE: 16 BRPM | WEIGHT: 145 LBS | HEIGHT: 62 IN | DIASTOLIC BLOOD PRESSURE: 64 MMHG | TEMPERATURE: 98 F | SYSTOLIC BLOOD PRESSURE: 104 MMHG | BODY MASS INDEX: 26.68 KG/M2

## 2020-03-10 DIAGNOSIS — Z45.819 ENCOUNTER FOR ADJUSTMENT OR REMOVAL OF UNSPECIFIED BREAST IMPLANT: ICD-10-CM

## 2020-03-10 LAB
B-HCG UR QL: NEGATIVE
CTP QC/QA: YES

## 2020-03-10 PROCEDURE — 36000706: Performed by: SURGERY

## 2020-03-10 PROCEDURE — 19357 PR BREAST RECONSTRUC W TISS EXPANDR: ICD-10-PCS | Mod: 59,LT,, | Performed by: SURGERY

## 2020-03-10 PROCEDURE — 63600175 PHARM REV CODE 636 W HCPCS: Performed by: SURGERY

## 2020-03-10 PROCEDURE — 63600175 PHARM REV CODE 636 W HCPCS: Performed by: ANESTHESIOLOGY

## 2020-03-10 PROCEDURE — S0077 INJECTION, CLINDAMYCIN PHOSP: HCPCS | Performed by: SURGERY

## 2020-03-10 PROCEDURE — 19370 REVJ PERI-IMPLT CAPSULE BRST: CPT | Mod: 51,RT,, | Performed by: SURGERY

## 2020-03-10 PROCEDURE — 71000044 HC DOSC ROUTINE RECOVERY FIRST HOUR: Performed by: SURGERY

## 2020-03-10 PROCEDURE — 37000009 HC ANESTHESIA EA ADD 15 MINS: Performed by: SURGERY

## 2020-03-10 PROCEDURE — 63600175 PHARM REV CODE 636 W HCPCS: Performed by: STUDENT IN AN ORGANIZED HEALTH CARE EDUCATION/TRAINING PROGRAM

## 2020-03-10 PROCEDURE — 63600175 PHARM REV CODE 636 W HCPCS: Performed by: NURSE ANESTHETIST, CERTIFIED REGISTERED

## 2020-03-10 PROCEDURE — 27201423 OPTIME MED/SURG SUP & DEVICES STERILE SUPPLY: Performed by: SURGERY

## 2020-03-10 PROCEDURE — 19370 PR SURGERY OF BREAST CAPSULE: ICD-10-PCS | Mod: 59,LT,, | Performed by: SURGERY

## 2020-03-10 PROCEDURE — D9220A PRA ANESTHESIA: ICD-10-PCS | Mod: ANES,,, | Performed by: ANESTHESIOLOGY

## 2020-03-10 PROCEDURE — D9220A PRA ANESTHESIA: ICD-10-PCS | Mod: CRNA,,, | Performed by: NURSE ANESTHETIST, CERTIFIED REGISTERED

## 2020-03-10 PROCEDURE — C1789 PROSTHESIS, BREAST, IMP: HCPCS | Performed by: SURGERY

## 2020-03-10 PROCEDURE — 71000015 HC POSTOP RECOV 1ST HR: Performed by: SURGERY

## 2020-03-10 PROCEDURE — 25000003 PHARM REV CODE 250: Performed by: STUDENT IN AN ORGANIZED HEALTH CARE EDUCATION/TRAINING PROGRAM

## 2020-03-10 PROCEDURE — 37000008 HC ANESTHESIA 1ST 15 MINUTES: Performed by: SURGERY

## 2020-03-10 PROCEDURE — 81025 URINE PREGNANCY TEST: CPT | Performed by: ANESTHESIOLOGY

## 2020-03-10 PROCEDURE — 88300 SURGICAL PATH GROSS: CPT | Mod: 26,,, | Performed by: PATHOLOGY

## 2020-03-10 PROCEDURE — 71000016 HC POSTOP RECOV ADDL HR: Performed by: SURGERY

## 2020-03-10 PROCEDURE — 88300 SURGICAL PATH GROSS: CPT | Performed by: PATHOLOGY

## 2020-03-10 PROCEDURE — 88300 PR  SURG PATH,GROSS,LEVEL I: ICD-10-PCS | Mod: 26,,, | Performed by: PATHOLOGY

## 2020-03-10 PROCEDURE — 19357 TISS XPNDR PLMT BRST RCNSTJ: CPT | Mod: RT,,, | Performed by: SURGERY

## 2020-03-10 PROCEDURE — C1729 CATH, DRAINAGE: HCPCS | Performed by: SURGERY

## 2020-03-10 PROCEDURE — 36000707: Performed by: SURGERY

## 2020-03-10 PROCEDURE — 25000003 PHARM REV CODE 250: Performed by: NURSE ANESTHETIST, CERTIFIED REGISTERED

## 2020-03-10 PROCEDURE — S0028 INJECTION, FAMOTIDINE, 20 MG: HCPCS | Performed by: NURSE ANESTHETIST, CERTIFIED REGISTERED

## 2020-03-10 PROCEDURE — D9220A PRA ANESTHESIA: Mod: ANES,,, | Performed by: ANESTHESIOLOGY

## 2020-03-10 PROCEDURE — D9220A PRA ANESTHESIA: Mod: CRNA,,, | Performed by: NURSE ANESTHETIST, CERTIFIED REGISTERED

## 2020-03-10 PROCEDURE — 25000003 PHARM REV CODE 250: Performed by: SURGERY

## 2020-03-10 DEVICE — IMPLANTABLE DEVICE: Type: IMPLANTABLE DEVICE | Site: BREAST | Status: FUNCTIONAL

## 2020-03-10 RX ORDER — LIDOCAINE HYDROCHLORIDE 10 MG/ML
1 INJECTION, SOLUTION EPIDURAL; INFILTRATION; INTRACAUDAL; PERINEURAL ONCE
Status: DISCONTINUED | OUTPATIENT
Start: 2020-03-10 | End: 2020-03-10 | Stop reason: HOSPADM

## 2020-03-10 RX ORDER — SULFAMETHOXAZOLE AND TRIMETHOPRIM 800; 160 MG/1; MG/1
1 TABLET ORAL 2 TIMES DAILY
Qty: 20 TABLET | Refills: 0 | Status: SHIPPED | OUTPATIENT
Start: 2020-03-10 | End: 2020-03-20

## 2020-03-10 RX ORDER — OXYCODONE AND ACETAMINOPHEN 5; 325 MG/1; MG/1
1 TABLET ORAL EVERY 4 HOURS PRN
Qty: 21 TABLET | Refills: 0 | Status: SHIPPED | OUTPATIENT
Start: 2020-03-10 | End: 2020-03-17

## 2020-03-10 RX ORDER — PROPOFOL 10 MG/ML
VIAL (ML) INTRAVENOUS
Status: DISCONTINUED | OUTPATIENT
Start: 2020-03-10 | End: 2020-03-10

## 2020-03-10 RX ORDER — MIDAZOLAM HYDROCHLORIDE 1 MG/ML
INJECTION, SOLUTION INTRAMUSCULAR; INTRAVENOUS
Status: DISCONTINUED | OUTPATIENT
Start: 2020-03-10 | End: 2020-03-10

## 2020-03-10 RX ORDER — CEFAZOLIN SODIUM 1 G/3ML
2 INJECTION, POWDER, FOR SOLUTION INTRAMUSCULAR; INTRAVENOUS
Status: COMPLETED | OUTPATIENT
Start: 2020-03-10 | End: 2020-03-10

## 2020-03-10 RX ORDER — ACETAMINOPHEN 10 MG/ML
INJECTION, SOLUTION INTRAVENOUS
Status: DISCONTINUED | OUTPATIENT
Start: 2020-03-10 | End: 2020-03-10

## 2020-03-10 RX ORDER — OXYCODONE AND ACETAMINOPHEN 5; 325 MG/1; MG/1
1 TABLET ORAL ONCE
Status: COMPLETED | OUTPATIENT
Start: 2020-03-10 | End: 2020-03-10

## 2020-03-10 RX ORDER — NEOSTIGMINE METHYLSULFATE 0.5 MG/ML
INJECTION, SOLUTION INTRAVENOUS
Status: DISCONTINUED | OUTPATIENT
Start: 2020-03-10 | End: 2020-03-10

## 2020-03-10 RX ORDER — SODIUM CHLORIDE 9 MG/ML
INJECTION, SOLUTION INTRAVENOUS CONTINUOUS
Status: DISCONTINUED | OUTPATIENT
Start: 2020-03-10 | End: 2020-03-10 | Stop reason: HOSPADM

## 2020-03-10 RX ORDER — HEPARIN SODIUM 5000 [USP'U]/ML
5000 INJECTION, SOLUTION INTRAVENOUS; SUBCUTANEOUS EVERY 8 HOURS
Status: DISCONTINUED | OUTPATIENT
Start: 2020-03-10 | End: 2020-03-10 | Stop reason: HOSPADM

## 2020-03-10 RX ORDER — CIPROFLOXACIN 2 MG/ML
INJECTION, SOLUTION INTRAVENOUS CONTINUOUS PRN
Status: COMPLETED | OUTPATIENT
Start: 2020-03-10 | End: 2020-03-10

## 2020-03-10 RX ORDER — SUCCINYLCHOLINE CHLORIDE 20 MG/ML
INJECTION INTRAMUSCULAR; INTRAVENOUS
Status: DISCONTINUED | OUTPATIENT
Start: 2020-03-10 | End: 2020-03-10

## 2020-03-10 RX ORDER — FAMOTIDINE 10 MG/ML
INJECTION INTRAVENOUS
Status: DISCONTINUED | OUTPATIENT
Start: 2020-03-10 | End: 2020-03-10

## 2020-03-10 RX ORDER — FENTANYL CITRATE 50 UG/ML
INJECTION, SOLUTION INTRAMUSCULAR; INTRAVENOUS
Status: DISCONTINUED | OUTPATIENT
Start: 2020-03-10 | End: 2020-03-10

## 2020-03-10 RX ORDER — BACITRACIN 50000 [IU]/1
INJECTION, POWDER, FOR SOLUTION INTRAMUSCULAR
Status: DISCONTINUED | OUTPATIENT
Start: 2020-03-10 | End: 2020-03-10 | Stop reason: HOSPADM

## 2020-03-10 RX ORDER — EPHEDRINE SULFATE 50 MG/ML
INJECTION, SOLUTION INTRAVENOUS
Status: DISCONTINUED | OUTPATIENT
Start: 2020-03-10 | End: 2020-03-10

## 2020-03-10 RX ORDER — ONDANSETRON 2 MG/ML
4 INJECTION INTRAMUSCULAR; INTRAVENOUS EVERY 12 HOURS PRN
Status: DISCONTINUED | OUTPATIENT
Start: 2020-03-10 | End: 2020-03-10 | Stop reason: HOSPADM

## 2020-03-10 RX ORDER — ROCURONIUM BROMIDE 10 MG/ML
INJECTION, SOLUTION INTRAVENOUS
Status: DISCONTINUED | OUTPATIENT
Start: 2020-03-10 | End: 2020-03-10

## 2020-03-10 RX ORDER — PROCHLORPERAZINE EDISYLATE 5 MG/ML
2.5 INJECTION INTRAMUSCULAR; INTRAVENOUS ONCE AS NEEDED
Status: COMPLETED | OUTPATIENT
Start: 2020-03-10 | End: 2020-03-10

## 2020-03-10 RX ORDER — ONDANSETRON 2 MG/ML
INJECTION INTRAMUSCULAR; INTRAVENOUS
Status: DISCONTINUED | OUTPATIENT
Start: 2020-03-10 | End: 2020-03-10

## 2020-03-10 RX ORDER — DEXAMETHASONE SODIUM PHOSPHATE 4 MG/ML
INJECTION, SOLUTION INTRA-ARTICULAR; INTRALESIONAL; INTRAMUSCULAR; INTRAVENOUS; SOFT TISSUE
Status: DISCONTINUED | OUTPATIENT
Start: 2020-03-10 | End: 2020-03-10

## 2020-03-10 RX ORDER — FENTANYL CITRATE 50 UG/ML
25 INJECTION, SOLUTION INTRAMUSCULAR; INTRAVENOUS EVERY 5 MIN PRN
Status: COMPLETED | OUTPATIENT
Start: 2020-03-10 | End: 2020-03-10

## 2020-03-10 RX ORDER — PROMETHAZINE HYDROCHLORIDE 25 MG/ML
6.25 INJECTION, SOLUTION INTRAMUSCULAR; INTRAVENOUS ONCE
Status: DISCONTINUED | OUTPATIENT
Start: 2020-03-10 | End: 2020-03-10

## 2020-03-10 RX ORDER — GLYCOPYRROLATE 0.2 MG/ML
INJECTION INTRAMUSCULAR; INTRAVENOUS
Status: DISCONTINUED | OUTPATIENT
Start: 2020-03-10 | End: 2020-03-10

## 2020-03-10 RX ORDER — SODIUM CHLORIDE 9 MG/ML
INJECTION, SOLUTION INTRAVENOUS CONTINUOUS PRN
Status: DISCONTINUED | OUTPATIENT
Start: 2020-03-10 | End: 2020-03-10

## 2020-03-10 RX ADMIN — ROCURONIUM BROMIDE 30 MG: 10 INJECTION, SOLUTION INTRAVENOUS at 07:03

## 2020-03-10 RX ADMIN — PROMETHAZINE HYDROCHLORIDE 6.25 MG: 25 INJECTION INTRAMUSCULAR; INTRAVENOUS at 01:03

## 2020-03-10 RX ADMIN — FENTANYL CITRATE 50 MCG: 50 INJECTION, SOLUTION INTRAMUSCULAR; INTRAVENOUS at 07:03

## 2020-03-10 RX ADMIN — FENTANYL CITRATE 50 MCG: 50 INJECTION, SOLUTION INTRAMUSCULAR; INTRAVENOUS at 09:03

## 2020-03-10 RX ADMIN — EPHEDRINE SULFATE 5 MG: 50 INJECTION INTRAVENOUS at 09:03

## 2020-03-10 RX ADMIN — EPHEDRINE SULFATE 15 MG: 50 INJECTION INTRAVENOUS at 10:03

## 2020-03-10 RX ADMIN — OXYCODONE HYDROCHLORIDE AND ACETAMINOPHEN 1 TABLET: 5; 325 TABLET ORAL at 01:03

## 2020-03-10 RX ADMIN — EPHEDRINE SULFATE 10 MG: 50 INJECTION INTRAVENOUS at 10:03

## 2020-03-10 RX ADMIN — FENTANYL CITRATE 100 MCG: 50 INJECTION, SOLUTION INTRAMUSCULAR; INTRAVENOUS at 07:03

## 2020-03-10 RX ADMIN — SODIUM CHLORIDE: 0.9 INJECTION, SOLUTION INTRAVENOUS at 06:03

## 2020-03-10 RX ADMIN — FENTANYL CITRATE 50 MCG: 50 INJECTION, SOLUTION INTRAMUSCULAR; INTRAVENOUS at 08:03

## 2020-03-10 RX ADMIN — ROCURONIUM BROMIDE 5 MG: 10 INJECTION, SOLUTION INTRAVENOUS at 07:03

## 2020-03-10 RX ADMIN — ONDANSETRON 4 MG: 2 INJECTION INTRAMUSCULAR; INTRAVENOUS at 07:03

## 2020-03-10 RX ADMIN — FENTANYL CITRATE 25 MCG: 50 INJECTION INTRAMUSCULAR; INTRAVENOUS at 11:03

## 2020-03-10 RX ADMIN — SODIUM CHLORIDE, SODIUM GLUCONATE, SODIUM ACETATE, POTASSIUM CHLORIDE, MAGNESIUM CHLORIDE, SODIUM PHOSPHATE, DIBASIC, AND POTASSIUM PHOSPHATE: .53; .5; .37; .037; .03; .012; .00082 INJECTION, SOLUTION INTRAVENOUS at 08:03

## 2020-03-10 RX ADMIN — MIDAZOLAM HYDROCHLORIDE 2 MG: 1 INJECTION, SOLUTION INTRAMUSCULAR; INTRAVENOUS at 07:03

## 2020-03-10 RX ADMIN — PROPOFOL 50 MG: 10 INJECTION, EMULSION INTRAVENOUS at 07:03

## 2020-03-10 RX ADMIN — FENTANYL CITRATE 50 MCG: 50 INJECTION, SOLUTION INTRAMUSCULAR; INTRAVENOUS at 11:03

## 2020-03-10 RX ADMIN — EPHEDRINE SULFATE 10 MG: 50 INJECTION INTRAVENOUS at 11:03

## 2020-03-10 RX ADMIN — PROCHLORPERAZINE EDISYLATE 2.5 MG: 5 INJECTION INTRAMUSCULAR; INTRAVENOUS at 11:03

## 2020-03-10 RX ADMIN — SUCCINYLCHOLINE CHLORIDE 140 MG: 20 INJECTION, SOLUTION INTRAMUSCULAR; INTRAVENOUS at 07:03

## 2020-03-10 RX ADMIN — FENTANYL CITRATE 25 MCG: 50 INJECTION INTRAMUSCULAR; INTRAVENOUS at 12:03

## 2020-03-10 RX ADMIN — ACETAMINOPHEN 1000 MG: 10 INJECTION, SOLUTION INTRAVENOUS at 09:03

## 2020-03-10 RX ADMIN — NEOSTIGMINE METHYLSULFATE 2 MG: 0.5 INJECTION INTRAVENOUS at 10:03

## 2020-03-10 RX ADMIN — HEPARIN SODIUM 5000 UNITS: 5000 INJECTION, SOLUTION INTRAVENOUS; SUBCUTANEOUS at 06:03

## 2020-03-10 RX ADMIN — EPHEDRINE SULFATE 10 MG: 50 INJECTION INTRAVENOUS at 09:03

## 2020-03-10 RX ADMIN — PROPOFOL 50 MG: 10 INJECTION, EMULSION INTRAVENOUS at 09:03

## 2020-03-10 RX ADMIN — ROCURONIUM BROMIDE 10 MG: 10 INJECTION, SOLUTION INTRAVENOUS at 09:03

## 2020-03-10 RX ADMIN — PROPOFOL 200 MG: 10 INJECTION, EMULSION INTRAVENOUS at 07:03

## 2020-03-10 RX ADMIN — CEFAZOLIN 2 G: 330 INJECTION, POWDER, FOR SOLUTION INTRAMUSCULAR; INTRAVENOUS at 07:03

## 2020-03-10 RX ADMIN — SODIUM CHLORIDE: 0.9 INJECTION, SOLUTION INTRAVENOUS at 07:03

## 2020-03-10 RX ADMIN — GLYCOPYRROLATE 0.4 MG: 0.2 INJECTION, SOLUTION INTRAMUSCULAR; INTRAVENOUS at 10:03

## 2020-03-10 RX ADMIN — FAMOTIDINE 20 MG: 10 INJECTION, SOLUTION INTRAVENOUS at 07:03

## 2020-03-10 RX ADMIN — DEXAMETHASONE SODIUM PHOSPHATE 4 MG: 4 INJECTION, SOLUTION INTRAMUSCULAR; INTRAVENOUS at 07:03

## 2020-03-10 RX ADMIN — SODIUM CHLORIDE, SODIUM GLUCONATE, SODIUM ACETATE, POTASSIUM CHLORIDE, MAGNESIUM CHLORIDE, SODIUM PHOSPHATE, DIBASIC, AND POTASSIUM PHOSPHATE: .53; .5; .37; .037; .03; .012; .00082 INJECTION, SOLUTION INTRAVENOUS at 11:03

## 2020-03-10 RX ADMIN — ONDANSETRON 4 MG: 2 INJECTION INTRAMUSCULAR; INTRAVENOUS at 11:03

## 2020-03-10 NOTE — H&P
History & Physical    SUBJECTIVE:     History of Present Illness:  Patient is a 39 y.o. female presents with  reconstruction with tissue expanders followed by permanent implants.  She has had several revisions to correct left inframammary fold.  The patient's left breast  has been the problem breast.  There is little subcutaneous fat are multiple contoured deformities of the of that breast.  She also needs to have the inframammary fold on that side revised by excising some excess tissue along the inframammary fold.  The right breast has ample amount of fatty tissue. The breast however has drifted inferiorly and laterally.  The shape of the breast does not match the shape of the left breast.  I had a long discussion with the patient as well as her friend in the room.  That I would present her at Plastic surgery conference to see if anyone had any different opinions regarding her care.  She will require lateral capsulorrhaphy on the right side as well as inferior capsulorrhaphy in order to elevate the right breast.  On the opposite side she would need to have the excess soft tissue along the inframammary fold excised and then fat grafting for the skin is very thin.     No chief complaint on file.      Review of patient's allergies indicates:  No Known Allergies    Current Facility-Administered Medications   Medication Dose Route Frequency Provider Last Rate Last Dose    0.9%  NaCl infusion   Intravenous Continuous Brigido Calero MD 70 mL/hr at 03/10/20 0616      ceFAZolin injection 2 g  2 g Intravenous On Call Procedure Brigido Calero MD        heparin (porcine) injection 5,000 Units  5,000 Units Subcutaneous Q8H Brigido Calero MD   5,000 Units at 03/10/20 0625    lidocaine (PF) 10 mg/ml (1%) injection 10 mg  1 mL Intradermal Once Brigido Calero MD        lidocaine (PF) 10 mg/ml (1%) injection 10 mg  1 mL Intradermal Once Tima Alexander MD        ondansetron injection 4 mg  4 mg Intravenous Q12H  ARNAUD Calero MD           Past Medical History:   Diagnosis Date    Abnormal Pap smear of cervix age 25    no treatement    Anxiety     Breast cancer     Cancer 07/11/2018    breast cancer    Cervical spondylosis 2/9/2018    Colon polyp     Depression     Ectopic pregnancy 2/26/2019    General anesthetics causing adverse effect in therapeutic use     Headache     History of psychiatric hospitalization     age 20 for SI    Hx of psychiatric care     Hypertension     Peutz-Jeghers syndrome     Psychiatric problem     Right carpal tunnel syndrome 3/9/2017    Self-harming behavior     Suicide attempt     Therapy      Past Surgical History:   Procedure Laterality Date    ADENOIDECTOMY      APPENDECTOMY      AUGMENTATION OF BREAST      BILATERAL MASTECTOMY Bilateral 7/23/2018    Procedure: MASTECTOMY 23 hr stay;  Surgeon: Sofia Kendrick MD;  Location: [facility] OR [address] [place] [address];  Service: General;  Laterality: Bilateral;    BOWEL RESECTION  10/17/14    BREAST BIOPSY Left 06/04/2018    BREAST CAPSULOTOMY Left 4/25/2019    Procedure: CAPSULOTOMY, BREAST LEFT;  Surgeon: Duane Bello MD;  Location: [facility] OR [address] [place] [address];  Service: Plastics;  Laterality: Left;    BREAST RECONSTRUCTION      BREAST SURGERY      Bilateral Mastectomy 07/23/2018    CARPAL TUNNEL RELEASE Bilateral     CARPAL TUNNEL RELEASE      COLONOSCOPY      DILATION AND CURETTAGE OF UTERUS      DILATION AND CURETTAGE OF UTERUS USING SUCTION N/A 2/25/2019    Procedure: DILATION AND CURETTAGE, UTERUS, USING SUCTION  PATHOLOGY NEEDED;  Surgeon: Pam Sifuentes MD;  Location: [facility];  Service: OB/GYN;  Laterality: N/A;    FAT TRANSFER Bilateral 11/12/2018    Procedure: TRANSFER, FAT TISSUE BILATERAL BREAST;  Surgeon: Duane Bello MD;  Location: [address];  Service: Plastics;  Laterality: Bilateral;    INJECTION FOR SENTINEL NODE IDENTIFICATION Left 7/23/2018    Procedure: INJECTION, FOR SENTINEL NODE  IDENTIFICATION;  Surgeon: Sofia Kendrick MD;  Location: [address];  Service: General;  Laterality: Left;    INSERTION OF BREAST TISSUE EXPANDER Bilateral 2018    Procedure: INSERTION, TISSUE EXPANDER, BREAST;  Surgeon: Duane Bello MD;  Location: [facility] OR [address] [place] [address];  Service: Plastics;  Laterality: Bilateral;    intestinal polpy      MASTECTOMY      SENTINEL LYMPH NODE BIOPSY Left 2018    Procedure: BIOPSY, LYMPH NODE, SENTINEL;  Surgeon: Sofia Kendrick MD;  Location: [address];  Service: General;  Laterality: Left;    SMALL INTESTINE SURGERY      3 surgeries     TONSILLECTOMY      UPPER GASTROINTESTINAL ENDOSCOPY       Family History   Problem Relation Age of Onset    Colon cancer Mother 33        peutz-jeghers syndrome    Hypertension Father     Colon polyps Sister         peutz-jeghers syndrome    Breast cancer Paternal Aunt     Breast cancer Paternal Grandmother     Ovarian cancer Neg Hx      Social History     Tobacco Use    Smoking status: Former Smoker     Packs/day: 0.50     Years: 17.00     Pack years: 8.50     Types: Cigarettes     Start date: 2000     Last attempt to quit: 2018     Years since quittin.6    Smokeless tobacco: Never Used    Tobacco comment: told about smoking cessation clinic and give brochure   Substance Use Topics    Alcohol use: Yes     Alcohol/week: 5.8 standard drinks     Types: 7 Standard drinks or equivalent per week     Comment: occasionally    Drug use: No        Review of Systems:  Review of Systems   Constitutional: Negative for chills, fatigue, fever and unexpected weight change.   HENT: Negative for congestion.    Eyes: Negative for visual disturbance.   Respiratory: Negative for chest tightness and shortness of breath.    Cardiovascular: Negative for chest pain and palpitations.   Gastrointestinal: Negative for abdominal distention, abdominal pain, blood in stool, constipation, diarrhea, nausea and vomiting.  "  Genitourinary: Negative for dysuria and hematuria.   Musculoskeletal: Negative for arthralgias and back pain.   Skin: Negative for color change.   Neurological: Negative for dizziness, syncope and numbness.   Psychiatric/Behavioral: Negative for confusion.       OBJECTIVE:     Vital Signs (Most Recent)  Temp: 98.6 °F (37 °C) (03/10/20 0631)  Pulse: 105 (03/10/20 0631)  Resp: 16 (03/10/20 0631)  BP: 120/84 (03/10/20 0631)  SpO2: 99 % (03/10/20 0631)  5' 2" (1.575 m)  65.8 kg (145 lb)     Physical Exam:  Physical Exam   Constitutional: She is oriented to person, place, and time. She appears well-developed and well-nourished.   HENT:   Head: Normocephalic and atraumatic.   Eyes: EOM are normal.   Neck: Normal range of motion.   Cardiovascular: Normal rate, regular rhythm, normal heart sounds and intact distal pulses.   Pulmonary/Chest: Effort normal and breath sounds normal. No respiratory distress.   Abdominal: Soft. She exhibits no distension. There is no tenderness.   Musculoskeletal: Normal range of motion.   Neurological: She is alert and oriented to person, place, and time.   Skin: Skin is warm and dry.   Psychiatric: She has a normal mood and affect.       Laboratory  None    Diagnostic Results:  None    ASSESSMENT/PLAN:     To OR today for removal of implants, capsulotomy, and placement of expanders  Consent obtained  All questions and concerns addressed at this time  "

## 2020-03-10 NOTE — ANESTHESIA POSTPROCEDURE EVALUATION
Anesthesia Post Evaluation    Patient: Staci Hodge    Procedure(s) Performed: Procedure(s) (LRB):  REMOVAL, IMPLANT, BREAST, BILATERAL (Bilateral)  INSERTION, TISSUE EXPANDER, BREAST, BILATERAL (Bilateral)    Final Anesthesia Type: general    Patient location during evaluation: PACU  Patient participation: Yes- Able to Participate  Level of consciousness: awake and alert and oriented  Post-procedure vital signs: reviewed and stable  Pain management: adequate  Airway patency: patent    PONV status at discharge: No PONV  Anesthetic complications: no      Cardiovascular status: blood pressure returned to baseline and hemodynamically stable  Respiratory status: unassisted  Hydration status: euvolemic  Follow-up not needed.          Vitals Value Taken Time   /72 3/10/2020 11:47 AM   Temp 36.7 °C (98.1 °F) 3/10/2020 11:15 AM   Pulse 108 3/10/2020 11:58 AM   Resp 22 3/10/2020 11:58 AM   SpO2 96 % 3/10/2020 11:58 AM   Vitals shown include unvalidated device data.      No case tracking events are documented in the log.      Pain/Bran Score: Pain Rating Prior to Med Admin: 5 (3/10/2020 11:43 AM)  Bran Score: 8 (3/10/2020 11:45 AM)

## 2020-03-10 NOTE — BRIEF OP NOTE
brief  [facility]  Brief Operative Note    Surgery Date: 3/10/2020     Surgeon(s) and Role:     * Duane Bello MD - Primary     * Brigido Calero MD - Resident - Assisting     * Kostas Carrillo MD - Fellow        Pre-op Diagnosis:  Personal history of malignant neoplasm of breast [Z85.3]    Post-op Diagnosis:  Post-Op Diagnosis Codes:     * Personal history of malignant neoplasm of breast [Z85.3]    Procedure(s) (LRB):  REMOVAL, IMPLANT, BREAST, BILATERAL (Bilateral)  INSERTION, TISSUE EXPANDER, BREAST, BILATERAL (Bilateral)    Anesthesia: General    Description of the findings of the procedure(s): 550cc tissue expanders placed filled to 400 cc each    Estimated Blood Loss: * No values recorded between 3/10/2020  8:03 AM and 3/10/2020 11:14 AM *         Specimens:   Specimen (12h ago, onward)    None            Discharge Note    OUTCOME: Patient tolerated treatment/procedure well without complication and is now ready for discharge.    DISPOSITION: Home or Self Care    FINAL DIAGNOSIS:  Encounter for adjustment or removal of unspecified breast implant    FOLLOWUP: In clinic    DISCHARGE INSTRUCTIONS:    Discharge Procedure Orders   Diet Adult Regular     Remove dressing in 48 hours   Order Comments: You may remove dressings in 48 hours and shower like normal.  Wear bra at all times     Activity as tolerated   Order Comments: No heavy lifting or strenuous activity for 4 weeks

## 2020-03-10 NOTE — TRANSFER OF CARE
"Anesthesia Transfer of Care Note    Patient: Staci Hodge    Procedure(s) Performed: Procedure(s) (LRB):  REMOVAL, IMPLANT, BREAST, BILATERAL (Bilateral)  INSERTION, TISSUE EXPANDER, BREAST, BILATERAL (Bilateral)    Patient location: [facility]    Anesthesia Type: general    Transport from OR: Transported from OR on 6-10 L/min O2 by face mask with adequate spontaneous ventilation    Post pain: adequate analgesia    Post assessment: no apparent anesthetic complications and tolerated procedure well    Post vital signs: stable    Level of consciousness: awake, alert and oriented    Nausea/Vomiting: no nausea/vomiting    Complications: none    Transfer of care protocol was followed      Last vitals:   Visit Vitals  /84 (BP Location: Right arm, Patient Position: Lying)   Pulse 105   Temp 37 °C (98.6 °F) (Oral)   Resp 16   Ht 5' 2" (1.575 m)   Wt 65.8 kg (145 lb)   LMP 02/16/2020   SpO2 99%   Breastfeeding? No   BMI 26.52 kg/m²     "

## 2020-03-10 NOTE — PROGRESS NOTES
Patient ambulated to restroom with nurse assist, no complications, able to void without difficulties, IV removed, getting dressed with help from family, preparing for discharge.

## 2020-03-10 NOTE — PLAN OF CARE
Patient appears asleep, awakens easily, falls asleep easily, vss, no distress noted or reported. Pain and nausea better after zofran, compazine, phenergen, fentanyl and oxycodone given. Discharge instructions,, follow up and scripts reviewed with family, verbalized understanding. Consents with chart. Waiting for patient to be more alert to get dressed and discharge home.

## 2020-03-10 NOTE — ANESTHESIA PROCEDURE NOTES
Intubation  Performed by: Serafin Guzman CRNA  Authorized by: Tima Alexander MD     Intubation:     Induction:  Intravenous    Intubated:  Postinduction    Mask Ventilation:  Easy mask    Attempts:  1    Attempted By:  CRNA    Blade:  Mathew 3    Laryngeal View Grade: Grade I - full view of chords      Difficult Airway Encountered?: No      Complications:  None    Airway Device:  Oral endotracheal tube    Airway Device Size:  7.0    Style/Cuff Inflation:  Cuffed    Tube secured:  22    Secured at:  The lips    Placement Verified By:  Capnometry    Complicating Factors:  None    Findings Post-Intubation:  BS equal bilateral and atraumatic/condition of teeth unchanged

## 2020-03-10 NOTE — ANESTHESIA PREPROCEDURE EVALUATION
03/10/2020  Staci Hodge is a 39 y.o., female with PMH of HTN and breast cancer scheduled for bilateral breast implant removal and tissue expander insertion.     Anesthesia Evaluation    I have reviewed the Patient Summary Reports.    I have reviewed the Nursing Notes.      Review of Systems  Anesthesia Hx:  Hx of Anesthetic complications  History of prior surgery of interest to airway management or planning: Previous anesthesia: General 2/25/19 D & C with general anesthesia.  Procedure performed at an [facility]. Airway issues documented on chart review include laryngeal mask airway used  Denies Family Hx of Anesthesia complications.  Personal Hx of Anesthesia complications (postop confusion day after mastectomy)   Social:  Former Smoker    Hematology/Oncology:  Hematology Normal       -- Cancer in past history:  Breast surgery    EENT/Dental:EENT/Dental Normal   Cardiovascular:   Hypertension  Denies Angina. occ palpitations-takes propanolol if needed Functional Capacity good / => 4 METS   Denies Hypertension.    Pulmonary:  Pulmonary Normal  Denies Shortness of breath.  Denies Recent URI.    Renal/:  Renal/ Normal     Hepatic/GI:  Bowel Conditions: (Pentz-Jeghers syndrome)    Musculoskeletal:   Arthritis     Neurological:  Neurology Normal    Endocrine:  Endocrine Normal    Psych:   depression          Physical Exam  General:  Well nourished    Airway/Jaw/Neck:  Airway Findings: Mouth Opening: Normal Tongue: Normal  General Airway Assessment: Adult  Mallampati: III  Improves to II with phonation.  TM Distance: Normal, at least 6 cm  Jaw/Neck Findings:  Neck ROM: Normal ROM       Chest/Lungs:  Chest/Lungs Findings: Normal Respiratory Rate     Heart/Vascular:  Heart Findings: Rate: Normal        Mental Status:  Mental Status Findings:  Alert and Oriented         Anesthesia Plan  Type of  Anesthesia, risks & benefits discussed:  Anesthesia Type:  general  Patient's Preference:   Intra-op Monitoring Plan: standard ASA monitors  Intra-op Monitoring Plan Comments:   Post Op Pain Control Plan: multimodal analgesia  Post Op Pain Control Plan Comments:   Induction:   IV  Beta Blocker:  Patient is not currently on a Beta-Blocker (No further documentation required).       Informed Consent: Patient understands risks and agrees with Anesthesia plan.  Questions answered. Anesthesia consent signed with patient.  ASA Score: 2     Day of Surgery Review of History & Physical: I have interviewed and examined the patient. I have reviewed the patient's H&P dated:  There are no significant changes.          Ready For Surgery From Anesthesia Perspective.

## 2020-03-10 NOTE — DISCHARGE INSTRUCTIONS
You have tissue expanders in place.  You may remove the outer dressing and bra to shower in 48 hours.  Wear bra at all times    Follow up in clinic in 2 weeks

## 2020-03-11 NOTE — OP NOTE
Date of surgery 03/10/2020 preoperative diagnosis:  1.  History of breast cancer  2.  Bilateral capsular contracture  Postoperative diagnosis:  The same  Procedure performed:  Bilateral implant exchange  2.  Extensive bilateral capsulotomy is  Surgeon:  Ace  Anesthesia:  General  Complications:  None  Blood loss:  50 cc     Patient was evaluated in the preoperative holding area in the standing position markings were made for the procedure.  Patient had a bilateral capsular contracture and she need to have open capsulotomy was laterally and superiorly.  We had also planned at that time to place bilateral tissue expanders.  This was after presenting her case which was a very difficult 1 at the [facility] Plastic surgery Conference.  The risks and possible complications of the procedure including but not limited to infection, bleeding, scarring, breast asymmetry, breast deformity, open wounds, and need for further surgery were all explained to the patient.  Was also explained she could lose the implants secondary to infection or exposure.  Patient signed informed consent.     Patient was taken to the operative room placed in supine position after adequate general endotracheal anesthesia was prepped and draped in normal sterile fashion previous mastectomy incisions were opened.  The breast capsules were opened at a higher level. Bilateral silicone breast implants were removed. Next using a lighted retractor extensive capsulotomy was performed superiorly as well as laterally.  Hemostasis was achieved using the Bovie.  500 cc sizers were then placed.  These were noted to give good projection and fill out the breast very well.  At that point I thought that there was much better result and that we might not need to go the tissue expander route.  I went outside to talk to the family and they were vehemently against me placing permanent implants at that time.  The family stated that the patient wanted the tissue expanders.  I  stated them that I would go ahead and comply with her wishes.  Next, the pockets were irrigated with Betadine, and triple antibiotic solution gloves were changed and the chest wall was then re-prepped.  Two 500 cc Magna Site tissue expanders which were smooth were placed and then sutured in the prepectoral position using Prolene sutures.  The incisions were then closed using interrupted 3-0 Monocryl followed by running 4-0 Monocryl subcuticular suture.  400 cc of air were placed in each tissue expander.  There was a slight difference in the tightness of the 2 expanders and was thought that could there could be possibly a leak in the right expander.  Thus, the sutures removed the implant was checked and was noted not to have any leakage.  Breast was then reclosed using interrupted 3-0 Monocryl followed by running 4-0 Monocryl subcuticular suture

## 2020-03-17 ENCOUNTER — TELEPHONE (OUTPATIENT)
Dept: PLASTIC SURGERY | Facility: CLINIC | Age: 40
End: 2020-03-17

## 2020-03-17 NOTE — TELEPHONE ENCOUNTER
Left message for pt regarding clinic restrictions that will be in place tomorrow , allowing patients to come in alone and the only welcomed visitor will be if there is a medical necssity. I left my direct office number.

## 2020-03-18 ENCOUNTER — OFFICE VISIT (OUTPATIENT)
Dept: PLASTIC SURGERY | Facility: CLINIC | Age: 40
End: 2020-03-18
Payer: COMMERCIAL

## 2020-03-18 ENCOUNTER — PATIENT MESSAGE (OUTPATIENT)
Dept: PLASTIC SURGERY | Facility: CLINIC | Age: 40
End: 2020-03-18

## 2020-03-18 VITALS
DIASTOLIC BLOOD PRESSURE: 84 MMHG | HEART RATE: 105 BPM | WEIGHT: 145 LBS | SYSTOLIC BLOOD PRESSURE: 118 MMHG | BODY MASS INDEX: 26.52 KG/M2

## 2020-03-18 DIAGNOSIS — Z09 SURGERY FOLLOW-UP EXAMINATION: Primary | ICD-10-CM

## 2020-03-18 PROCEDURE — 99999 PR PBB SHADOW E&M-EST. PATIENT-LVL III: ICD-10-PCS | Mod: PBBFAC,,, | Performed by: SURGERY

## 2020-03-18 PROCEDURE — 99999 PR PBB SHADOW E&M-EST. PATIENT-LVL III: CPT | Mod: PBBFAC,,, | Performed by: SURGERY

## 2020-03-18 PROCEDURE — 99024 POSTOP FOLLOW-UP VISIT: CPT | Mod: S$GLB,,, | Performed by: SURGERY

## 2020-03-18 PROCEDURE — 99024 PR POST-OP FOLLOW-UP VISIT: ICD-10-PCS | Mod: S$GLB,,, | Performed by: SURGERY

## 2020-03-18 NOTE — PROGRESS NOTES
38 yo female POD 8 s/p implant removal, capsulotomies and TE placement for reconstruction revision presents for routine follow up.  She is doing well.  Is happy with the result.  Denies pain, drainage from incisions or redness.    On exam incisions are healing well.  450cc TE in place filled to 400cc of air bilaterally. No tension on the skin.  No erythema or induration.      --follow up via telamedicine in 2 weeks  --patient to still decide if she wants to be slightly bigger or is happy with this size.   --will plan for implant exchange in 2 months.    TISHA Carrillo MD, FACS  Plastic Surgery Fellow

## 2020-03-19 LAB
FINAL PATHOLOGIC DIAGNOSIS: NORMAL
GROSS: NORMAL

## 2020-03-20 ENCOUNTER — PATIENT MESSAGE (OUTPATIENT)
Dept: PLASTIC SURGERY | Facility: CLINIC | Age: 40
End: 2020-03-20

## 2020-03-27 ENCOUNTER — PATIENT MESSAGE (OUTPATIENT)
Dept: PLASTIC SURGERY | Facility: CLINIC | Age: 40
End: 2020-03-27

## 2020-04-02 ENCOUNTER — PATIENT MESSAGE (OUTPATIENT)
Dept: INTERNAL MEDICINE | Facility: CLINIC | Age: 40
End: 2020-04-02

## 2020-04-09 ENCOUNTER — PATIENT MESSAGE (OUTPATIENT)
Dept: PLASTIC SURGERY | Facility: CLINIC | Age: 40
End: 2020-04-09

## 2020-04-15 ENCOUNTER — OFFICE VISIT (OUTPATIENT)
Dept: PLASTIC SURGERY | Facility: CLINIC | Age: 40
End: 2020-04-15
Payer: COMMERCIAL

## 2020-04-15 VITALS
HEART RATE: 125 BPM | BODY MASS INDEX: 26.69 KG/M2 | DIASTOLIC BLOOD PRESSURE: 85 MMHG | WEIGHT: 145.06 LBS | HEIGHT: 62 IN | SYSTOLIC BLOOD PRESSURE: 137 MMHG

## 2020-04-15 DIAGNOSIS — Z09 SURGERY FOLLOW-UP EXAMINATION: Primary | ICD-10-CM

## 2020-04-15 PROCEDURE — 99999 PR PBB SHADOW E&M-EST. PATIENT-LVL III: CPT | Mod: PBBFAC,,, | Performed by: SURGERY

## 2020-04-15 PROCEDURE — 99999 PR PBB SHADOW E&M-EST. PATIENT-LVL III: ICD-10-PCS | Mod: PBBFAC,,, | Performed by: SURGERY

## 2020-04-15 PROCEDURE — 99024 PR POST-OP FOLLOW-UP VISIT: ICD-10-PCS | Mod: S$GLB,,, | Performed by: SURGERY

## 2020-04-15 PROCEDURE — 99024 POSTOP FOLLOW-UP VISIT: CPT | Mod: S$GLB,,, | Performed by: SURGERY

## 2020-04-29 ENCOUNTER — OFFICE VISIT (OUTPATIENT)
Dept: PLASTIC SURGERY | Facility: CLINIC | Age: 40
End: 2020-04-29
Payer: COMMERCIAL

## 2020-04-29 VITALS
HEIGHT: 62 IN | BODY MASS INDEX: 26.69 KG/M2 | HEART RATE: 77 BPM | WEIGHT: 145.06 LBS | DIASTOLIC BLOOD PRESSURE: 86 MMHG | SYSTOLIC BLOOD PRESSURE: 152 MMHG

## 2020-04-29 DIAGNOSIS — Z09 SURGERY FOLLOW-UP EXAMINATION: Primary | ICD-10-CM

## 2020-04-29 PROCEDURE — 99024 PR POST-OP FOLLOW-UP VISIT: ICD-10-PCS | Mod: S$GLB,,, | Performed by: SURGERY

## 2020-04-29 PROCEDURE — 99999 PR PBB SHADOW E&M-EST. PATIENT-LVL III: CPT | Mod: PBBFAC,,, | Performed by: SURGERY

## 2020-04-29 PROCEDURE — 99999 PR PBB SHADOW E&M-EST. PATIENT-LVL III: ICD-10-PCS | Mod: PBBFAC,,, | Performed by: SURGERY

## 2020-04-29 PROCEDURE — 99024 POSTOP FOLLOW-UP VISIT: CPT | Mod: S$GLB,,, | Performed by: SURGERY

## 2020-04-29 NOTE — PROGRESS NOTES
Patient presents to the Plastic surgery Clinic after undergoing bilateral breast reconstruction after mastectomy.  Patient has had 3 or 4 surgeries.  She initially had a fairly good result.  The only problem was that the left inframammary fold was approximately 1 cm higher than was the right side.  The patient complained of this.  We went ahead and took her to surgery to lower the inframammary fold and after which could never get rid of decreased from the previous inframammary fold.  She had I believe another surgery where she had some fat grafting performed as well as a capsulotomy to tried a get rid of the crease.  The crease itself became much more subtle.  Nonetheless, it was still there.  She also had a mild capsular contracture on the left side laterally.  She was presented at the Plastic surgery conference were multiple plastic surgeons stated that to obtain the best result we should consider placing bilateral tissue expanders try to expand her..  This we have done.  She looks very very good.  She does have a mild capsular contracture on the left but the symmetry is actually pretty good.  The patient presents today stating she wants everything out.  I discussed with her that she is very very down and to take everything out at this time I think would be a mistake.  Patient states that she is having intermittent pain and aesthetically the breast are slightly different.  Looking at her breast however this look very good.  I discussed with her that she could have a bilateral DIP flaps since she is now put on approximately 40 lb.  She has ample tissue for that.  The patient is not interested in this.  I have encouraged her to please do not removed the tissue expanders and have no reconstruction.  I told her that she would be very very unhappy.  She states that she discuss this with her family and her family's behind her which ever way she should go.  I even suggested to her that she leave the tissue expanders in  for a while and then make a decision.  Nonetheless the patient seems dead set on having the expanders removed and I will go ahead and set her up for this in a month her to unless she changes her mind.  Again I have counseled her not to remove the expanders she does have a good result she will not be perfectly symmetrical but then again no woman is perfectly symmetrical.

## 2020-05-18 ENCOUNTER — PATIENT MESSAGE (OUTPATIENT)
Dept: PLASTIC SURGERY | Facility: CLINIC | Age: 40
End: 2020-05-18

## 2020-05-18 DIAGNOSIS — F41.9 ANXIETY: ICD-10-CM

## 2020-05-18 RX ORDER — CLONAZEPAM 1 MG/1
TABLET ORAL
Qty: 90 TABLET | Refills: 2 | Status: SHIPPED | OUTPATIENT
Start: 2020-05-18 | End: 2020-05-20 | Stop reason: SDUPTHER

## 2020-05-19 ENCOUNTER — OFFICE VISIT (OUTPATIENT)
Dept: PLASTIC SURGERY | Facility: CLINIC | Age: 40
End: 2020-05-19
Payer: COMMERCIAL

## 2020-05-19 DIAGNOSIS — Z09 SURGERY FOLLOW-UP EXAMINATION: Primary | ICD-10-CM

## 2020-05-19 PROCEDURE — 99499 NO LOS: ICD-10-PCS | Mod: 95,,, | Performed by: SURGERY

## 2020-05-19 PROCEDURE — 99499 UNLISTED E&M SERVICE: CPT | Mod: 95,,, | Performed by: SURGERY

## 2020-05-19 NOTE — PROGRESS NOTES
Established Patient - Audio Only Telehealth Visit     The patient location is: work  The chief complaint leading to consultation is: history of breast cancer  Visit type: Virtual visit with audio only (telephone)  Total time spent with patient: 10 minutes       The reason for the audio only service rather than synchronous audio and video virtual visit was related to technical difficulties or patient preference/necessity.     Each patient to whom I provide medical services by telemedicine is:  (1) informed of the relationship between the physician and patient and the respective role of any other health care provider with respect to management of the patient; and (2) notified that they may decline to receive medical services by telemedicine and may withdraw from such care at any time. Patient verbally consented to receive this service via voice-only telephone call.       HPI: history of breast cancer     Assessment and plan:    I again discussed with miss Hodge about her upcoming surgery.  The patient had bilateral breast reconstruction with direct implant in the past.  She has had several revisions.  She was presented at Plastic surgery conference several months ago was advised to place tissue expanders.  She has a very nice expansion and will look good with an implant exchange.  The patient however is insistent on having her implants totally removed.  I discussed with her once again that she is only 39 years old and I think that it is a mistake to have no reconstruction. She will be completely flat chested.  I discussed with her that I would be more than happy to arrange consultation for a bilateral ISIS flap.  The patient absolutely declined this..  I once again asked her if she has discussed this with her family because it is such a big step for her to remove her implants when she has a nice result.  Even when her permanent implants  were in place she only had a small amount of asymmetry (IMF).  Patient stated  that she discuss this in detail with her family and they are all behind her to have the implants removed.  I discussed with her we will go ahead and move forward in schedule this.                        This service was not originating from a related E/M service provided within the previous 7 days nor will  to an E/M service or procedure within the next 24 hours or my soonest available appointment.  Prevailing standard of care was able to be met in this audio-only visit.

## 2020-05-20 ENCOUNTER — PATIENT MESSAGE (OUTPATIENT)
Dept: INTERNAL MEDICINE | Facility: CLINIC | Age: 40
End: 2020-05-20

## 2020-05-20 DIAGNOSIS — G43.009 MIGRAINE WITHOUT AURA AND WITHOUT STATUS MIGRAINOSUS, NOT INTRACTABLE: ICD-10-CM

## 2020-05-20 DIAGNOSIS — F32.A DEPRESSION, UNSPECIFIED DEPRESSION TYPE: ICD-10-CM

## 2020-05-20 DIAGNOSIS — F41.9 ANXIETY: ICD-10-CM

## 2020-05-21 RX ORDER — FLUOXETINE HYDROCHLORIDE 40 MG/1
80 CAPSULE ORAL DAILY
Qty: 180 CAPSULE | Refills: 1 | Status: SHIPPED | OUTPATIENT
Start: 2020-05-21 | End: 2021-01-28

## 2020-05-21 RX ORDER — BUTALBITAL, ACETAMINOPHEN AND CAFFEINE 50; 325; 40 MG/1; MG/1; MG/1
1 TABLET ORAL EVERY 6 HOURS PRN
Qty: 10 TABLET | Refills: 4 | Status: SHIPPED | OUTPATIENT
Start: 2020-05-21 | End: 2020-07-28

## 2020-05-21 RX ORDER — BUTALBITAL, ACETAMINOPHEN AND CAFFEINE 50; 325; 40 MG/1; MG/1; MG/1
1 TABLET ORAL EVERY 6 HOURS PRN
Qty: 10 TABLET | Refills: 4 | Status: CANCELLED | OUTPATIENT
Start: 2020-05-21

## 2020-05-21 RX ORDER — CLONAZEPAM 1 MG/1
1 TABLET ORAL 3 TIMES DAILY
Qty: 90 TABLET | Refills: 2 | Status: SHIPPED | OUTPATIENT
Start: 2020-05-21 | End: 2020-08-11

## 2020-06-09 DIAGNOSIS — Z85.3 PERSONAL HISTORY OF BREAST CANCER: Primary | ICD-10-CM

## 2020-06-18 DIAGNOSIS — Z01.818 PRE-OP TESTING: Primary | ICD-10-CM

## 2020-07-02 NOTE — ANESTHESIA PAT ROS NOTE
07/01/2020  Staci Hodge is a 39 y.o., female.      Pre-op Assessment          Review of Systems  Anesthesia Hx:  Hx of Anesthetic complications Post-op confusion & N/V day after Mastectomy. Denies Family Hx of Anesthesia complications.  Personal Hx of Anesthesia complications   Social:  Former Smoker, Social Alcohol Use    Hematology/Oncology:         -- Cancer in past history: Breast surgery    Cardiovascular:   Exercise tolerance: good Hypertension History/ Walking daily/Houseworks   Hepatic/GI:   Colon zzcpeg-Uxhhe-Wjlbljg syndrome   Musculoskeletal:   Arthritis  Cervical spondylosis   Neurological:   Neuromuscular Disease, Headaches CTS-Bilateral/Migraine Headaches-severe/stabbing   Psych:   anxiety depression PTSD/ OCD/Mood Disorder/ Panic Disorder with agoraphobia/Social anxiety disorder/Depression/ Psychiatric problem     Saima Ortiz RN 7/5/20       Anesthesia Assessment: Preoperative EQUATION    Planned Procedure: Procedure(s) (LRB):  REMOVAL, IMPLANT, BREAST BILATERAL (Bilateral)  Requested Anesthesia Type:General  Surgeon: Duane Bello MD  Service: Plastics  Known or anticipated Date of Surgery:7/7/2020      Surgeon notes:Personal of breast cancer       Previous anesthesia records: 3/10/20-Removal, Implant, Breast, Bilateral Insertion, Tissue Expander, Breast, Bilateral-General    Last PCP note: CLEMENCIA Wilson-last visit on 9/10/2019-focused  Subspecialties: Hem Onc / GYN/ Breast Surgery visit/ Nephrology/Neurology/ OB & GYN/  Pain Medicine/Psychiatry/ Rheumatology    Other important co-morbidities: none      1. Urinary tract infection with hematuria, site unspecified    2. Anxiety    3. Depression, unspecified depression type        Tests already available:  Results have been reviewed. EKG-9/6/17      Plan:  Phone pending    Testing:  Hemoglobin       Patient  has  previously scheduled Medical Appointment:Appointment on 7/4/20, prior to the surgery date.    Navigation:Phone Completed                        Tests Scheduled. Lab-Hgb on 7/6/20 @ 3:10p-pending             Consults scheduled.None needed at this time.    Review with anesthesia-liberty of post-op confusion day after previous surgery-S/P Mastectomy.             Results will be tracked by Preop Clinic.                 Saima Ortiz RN  7/5/20    Addendum:Lab -HGB done on 7/6/20, & result reviewed. Saima Ortiz RN  7/6/20

## 2020-07-04 ENCOUNTER — LAB VISIT (OUTPATIENT)
Dept: INTERNAL MEDICINE | Facility: CLINIC | Age: 40
End: 2020-07-04
Payer: COMMERCIAL

## 2020-07-04 DIAGNOSIS — Z01.818 PRE-OP TESTING: ICD-10-CM

## 2020-07-04 PROCEDURE — U0003 INFECTIOUS AGENT DETECTION BY NUCLEIC ACID (DNA OR RNA); SEVERE ACUTE RESPIRATORY SYNDROME CORONAVIRUS 2 (SARS-COV-2) (CORONAVIRUS DISEASE [COVID-19]), AMPLIFIED PROBE TECHNIQUE, MAKING USE OF HIGH THROUGHPUT TECHNOLOGIES AS DESCRIBED BY CMS-2020-01-R: HCPCS

## 2020-07-06 ENCOUNTER — LAB VISIT (OUTPATIENT)
Dept: LAB | Facility: HOSPITAL | Age: 40
End: 2020-07-06
Attending: ANESTHESIOLOGY
Payer: COMMERCIAL

## 2020-07-06 ENCOUNTER — TELEPHONE (OUTPATIENT)
Dept: PLASTIC SURGERY | Facility: CLINIC | Age: 40
End: 2020-07-06

## 2020-07-06 ENCOUNTER — ANESTHESIA EVENT (OUTPATIENT)
Dept: SURGERY | Facility: HOSPITAL | Age: 40
End: 2020-07-06
Payer: COMMERCIAL

## 2020-07-06 ENCOUNTER — TELEPHONE (OUTPATIENT)
Dept: PREADMISSION TESTING | Facility: HOSPITAL | Age: 40
End: 2020-07-06

## 2020-07-06 DIAGNOSIS — Z01.818 PREOP TESTING: ICD-10-CM

## 2020-07-06 LAB
HGB BLD-MCNC: 13.3 G/DL (ref 12–16)
SARS-COV-2 RNA RESP QL NAA+PROBE: NOT DETECTED

## 2020-07-06 PROCEDURE — 36415 COLL VENOUS BLD VENIPUNCTURE: CPT

## 2020-07-06 PROCEDURE — 85018 HEMOGLOBIN: CPT

## 2020-07-06 NOTE — TELEPHONE ENCOUNTER
Called pt to confirm surgery arrival time for Tuesday, July7.  Pt is set to arrive on the 2nd floor, [facility] for 5:30a.  Pt's questions and concerns were addressed.  Pt advised of visitor policy and that COVID test was complete.

## 2020-07-06 NOTE — TELEPHONE ENCOUNTER
----- Message from Saima Ortiz RN sent at 7/5/2020  4:59 PM CDT -----  Regarding: preop appt.  Sx date:7/7-Need: Lab-HGB(ordered), prior to surgery date. Thank you. SALLIE

## 2020-07-07 ENCOUNTER — ANESTHESIA (OUTPATIENT)
Dept: SURGERY | Facility: HOSPITAL | Age: 40
End: 2020-07-07
Payer: COMMERCIAL

## 2020-07-07 ENCOUNTER — HOSPITAL ENCOUNTER (OUTPATIENT)
Facility: HOSPITAL | Age: 40
Discharge: HOME | End: 2020-07-07
Attending: SURGERY | Admitting: SURGERY
Payer: COMMERCIAL

## 2020-07-07 VITALS
DIASTOLIC BLOOD PRESSURE: 88 MMHG | WEIGHT: 145.06 LBS | HEIGHT: 62 IN | OXYGEN SATURATION: 100 % | SYSTOLIC BLOOD PRESSURE: 136 MMHG | TEMPERATURE: 98 F | BODY MASS INDEX: 26.69 KG/M2 | RESPIRATION RATE: 17 BRPM | HEART RATE: 88 BPM

## 2020-07-07 DIAGNOSIS — Z85.3 PERSONAL HISTORY OF BREAST CANCER: Primary | ICD-10-CM

## 2020-07-07 LAB
B-HCG UR QL: NEGATIVE
CTP QC/QA: YES

## 2020-07-07 PROCEDURE — 88300 PR  SURG PATH,GROSS,LEVEL I: ICD-10-PCS | Mod: 26,,, | Performed by: PATHOLOGY

## 2020-07-07 PROCEDURE — D9220A PRA ANESTHESIA: ICD-10-PCS | Mod: ,,, | Performed by: ANESTHESIOLOGY

## 2020-07-07 PROCEDURE — 63600175 PHARM REV CODE 636 W HCPCS: Performed by: ANESTHESIOLOGY

## 2020-07-07 PROCEDURE — 94761 N-INVAS EAR/PLS OXIMETRY MLT: CPT

## 2020-07-07 PROCEDURE — 71000044 HC DOSC ROUTINE RECOVERY FIRST HOUR: Performed by: SURGERY

## 2020-07-07 PROCEDURE — 25000003 PHARM REV CODE 250: Performed by: STUDENT IN AN ORGANIZED HEALTH CARE EDUCATION/TRAINING PROGRAM

## 2020-07-07 PROCEDURE — 36000706: Performed by: SURGERY

## 2020-07-07 PROCEDURE — 25000003 PHARM REV CODE 250: Performed by: ANESTHESIOLOGY

## 2020-07-07 PROCEDURE — 71000045 HC DOSC ROUTINE RECOVERY EA ADD'L HR: Performed by: SURGERY

## 2020-07-07 PROCEDURE — 88300 SURGICAL PATH GROSS: CPT | Mod: 26,,, | Performed by: PATHOLOGY

## 2020-07-07 PROCEDURE — 11971 PR REMOVE TISSUE EXPANDER(S): ICD-10-PCS | Mod: RT,,, | Performed by: SURGERY

## 2020-07-07 PROCEDURE — 37000009 HC ANESTHESIA EA ADD 15 MINS: Performed by: SURGERY

## 2020-07-07 PROCEDURE — 11971 RMVL TIS XPNDR WO INSJ IMPLT: CPT | Mod: RT,,, | Performed by: SURGERY

## 2020-07-07 PROCEDURE — 25000003 PHARM REV CODE 250: Performed by: PHYSICIAN ASSISTANT

## 2020-07-07 PROCEDURE — 37000008 HC ANESTHESIA 1ST 15 MINUTES: Performed by: SURGERY

## 2020-07-07 PROCEDURE — 88300 SURGICAL PATH GROSS: CPT | Performed by: PATHOLOGY

## 2020-07-07 PROCEDURE — D9220A PRA ANESTHESIA: Mod: ,,, | Performed by: ANESTHESIOLOGY

## 2020-07-07 PROCEDURE — 63600175 PHARM REV CODE 636 W HCPCS: Performed by: STUDENT IN AN ORGANIZED HEALTH CARE EDUCATION/TRAINING PROGRAM

## 2020-07-07 PROCEDURE — 27201423 OPTIME MED/SURG SUP & DEVICES STERILE SUPPLY: Performed by: SURGERY

## 2020-07-07 PROCEDURE — 36000707: Performed by: SURGERY

## 2020-07-07 PROCEDURE — 71000015 HC POSTOP RECOV 1ST HR: Performed by: SURGERY

## 2020-07-07 PROCEDURE — 81025 URINE PREGNANCY TEST: CPT | Performed by: PHYSICIAN ASSISTANT

## 2020-07-07 RX ORDER — OXYCODONE AND ACETAMINOPHEN 5; 325 MG/1; MG/1
TABLET ORAL
Status: DISCONTINUED
Start: 2020-07-07 | End: 2020-07-07 | Stop reason: WASHOUT

## 2020-07-07 RX ORDER — SODIUM CHLORIDE 0.9 % (FLUSH) 0.9 %
10 SYRINGE (ML) INJECTION
Status: DISCONTINUED | OUTPATIENT
Start: 2020-07-07 | End: 2020-07-07 | Stop reason: HOSPADM

## 2020-07-07 RX ORDER — LIDOCAINE HYDROCHLORIDE 10 MG/ML
1 INJECTION, SOLUTION EPIDURAL; INFILTRATION; INTRACAUDAL; PERINEURAL ONCE
Status: DISCONTINUED | OUTPATIENT
Start: 2020-07-07 | End: 2020-07-07 | Stop reason: HOSPADM

## 2020-07-07 RX ORDER — ROCURONIUM BROMIDE 10 MG/ML
INJECTION, SOLUTION INTRAVENOUS
Status: DISCONTINUED | OUTPATIENT
Start: 2020-07-07 | End: 2020-07-07

## 2020-07-07 RX ORDER — HYDROMORPHONE HYDROCHLORIDE 1 MG/ML
0.2 INJECTION, SOLUTION INTRAMUSCULAR; INTRAVENOUS; SUBCUTANEOUS EVERY 5 MIN PRN
Status: DISCONTINUED | OUTPATIENT
Start: 2020-07-07 | End: 2020-07-07 | Stop reason: HOSPADM

## 2020-07-07 RX ORDER — LIDOCAINE HCL/PF 100 MG/5ML
SYRINGE (ML) INTRAVENOUS
Status: DISCONTINUED | OUTPATIENT
Start: 2020-07-07 | End: 2020-07-07

## 2020-07-07 RX ORDER — OXYCODONE AND ACETAMINOPHEN 5; 325 MG/1; MG/1
1 TABLET ORAL ONCE
Status: COMPLETED | OUTPATIENT
Start: 2020-07-07 | End: 2020-07-07

## 2020-07-07 RX ORDER — CLINDAMYCIN PHOSPHATE 300 MG/50ML
900 INJECTION INTRAVENOUS
Status: DISCONTINUED | OUTPATIENT
Start: 2020-07-07 | End: 2020-07-07 | Stop reason: HOSPADM

## 2020-07-07 RX ORDER — FENTANYL CITRATE 50 UG/ML
INJECTION, SOLUTION INTRAMUSCULAR; INTRAVENOUS
Status: DISCONTINUED | OUTPATIENT
Start: 2020-07-07 | End: 2020-07-07

## 2020-07-07 RX ORDER — PROPOFOL 10 MG/ML
VIAL (ML) INTRAVENOUS
Status: DISCONTINUED | OUTPATIENT
Start: 2020-07-07 | End: 2020-07-07

## 2020-07-07 RX ORDER — ONDANSETRON 2 MG/ML
INJECTION INTRAMUSCULAR; INTRAVENOUS
Status: DISCONTINUED | OUTPATIENT
Start: 2020-07-07 | End: 2020-07-07

## 2020-07-07 RX ORDER — ONDANSETRON 2 MG/ML
4 INJECTION INTRAMUSCULAR; INTRAVENOUS DAILY PRN
Status: DISCONTINUED | OUTPATIENT
Start: 2020-07-07 | End: 2020-07-07 | Stop reason: HOSPADM

## 2020-07-07 RX ORDER — MUPIROCIN 20 MG/G
OINTMENT TOPICAL 2 TIMES DAILY
Status: DISCONTINUED | OUTPATIENT
Start: 2020-07-07 | End: 2020-07-07 | Stop reason: HOSPADM

## 2020-07-07 RX ORDER — CEPHALEXIN 500 MG/1
500 CAPSULE ORAL EVERY 12 HOURS
Qty: 20 CAPSULE | Refills: 0 | Status: SHIPPED | OUTPATIENT
Start: 2020-07-07 | End: 2020-07-17

## 2020-07-07 RX ORDER — HALOPERIDOL 5 MG/ML
INJECTION INTRAMUSCULAR
Status: DISCONTINUED
Start: 2020-07-07 | End: 2020-07-07 | Stop reason: HOSPADM

## 2020-07-07 RX ORDER — OXYCODONE AND ACETAMINOPHEN 5; 325 MG/1; MG/1
TABLET ORAL
Status: DISCONTINUED
Start: 2020-07-07 | End: 2020-07-07 | Stop reason: HOSPADM

## 2020-07-07 RX ORDER — PHENYLEPHRINE HYDROCHLORIDE 10 MG/ML
INJECTION INTRAVENOUS
Status: DISCONTINUED | OUTPATIENT
Start: 2020-07-07 | End: 2020-07-07

## 2020-07-07 RX ORDER — CEFAZOLIN SODIUM 1 G/3ML
INJECTION, POWDER, FOR SOLUTION INTRAMUSCULAR; INTRAVENOUS
Status: DISCONTINUED | OUTPATIENT
Start: 2020-07-07 | End: 2020-07-07

## 2020-07-07 RX ORDER — ONDANSETRON 2 MG/ML
4 INJECTION INTRAMUSCULAR; INTRAVENOUS EVERY 12 HOURS PRN
Status: DISCONTINUED | OUTPATIENT
Start: 2020-07-07 | End: 2020-07-07 | Stop reason: HOSPADM

## 2020-07-07 RX ORDER — SCOLOPAMINE TRANSDERMAL SYSTEM 1 MG/1
PATCH, EXTENDED RELEASE TRANSDERMAL
Status: DISCONTINUED | OUTPATIENT
Start: 2020-07-07 | End: 2020-07-07

## 2020-07-07 RX ORDER — HALOPERIDOL 5 MG/ML
0.5 INJECTION INTRAMUSCULAR ONCE AS NEEDED
Status: COMPLETED | OUTPATIENT
Start: 2020-07-07 | End: 2020-07-07

## 2020-07-07 RX ORDER — MIDAZOLAM HYDROCHLORIDE 1 MG/ML
INJECTION, SOLUTION INTRAMUSCULAR; INTRAVENOUS
Status: DISCONTINUED | OUTPATIENT
Start: 2020-07-07 | End: 2020-07-07

## 2020-07-07 RX ORDER — MUPIROCIN 20 MG/G
OINTMENT TOPICAL
Status: DISCONTINUED | OUTPATIENT
Start: 2020-07-07 | End: 2020-07-07 | Stop reason: HOSPADM

## 2020-07-07 RX ORDER — DEXAMETHASONE SODIUM PHOSPHATE 4 MG/ML
INJECTION, SOLUTION INTRA-ARTICULAR; INTRALESIONAL; INTRAMUSCULAR; INTRAVENOUS; SOFT TISSUE
Status: DISCONTINUED | OUTPATIENT
Start: 2020-07-07 | End: 2020-07-07

## 2020-07-07 RX ORDER — OXYCODONE AND ACETAMINOPHEN 5; 325 MG/1; MG/1
1 TABLET ORAL EVERY 4 HOURS PRN
Qty: 30 TABLET | Refills: 0 | Status: SHIPPED | OUTPATIENT
Start: 2020-07-07 | End: 2020-07-12

## 2020-07-07 RX ADMIN — PROPOFOL 200 MG: 10 INJECTION, EMULSION INTRAVENOUS at 07:07

## 2020-07-07 RX ADMIN — FENTANYL CITRATE 100 MCG: 50 INJECTION, SOLUTION INTRAMUSCULAR; INTRAVENOUS at 07:07

## 2020-07-07 RX ADMIN — HALOPERIDOL LACTATE 0.5 MG: 5 INJECTION INTRAMUSCULAR at 09:07

## 2020-07-07 RX ADMIN — LIDOCAINE HYDROCHLORIDE 100 MG: 20 INJECTION, SOLUTION INTRAVENOUS at 07:07

## 2020-07-07 RX ADMIN — ROCURONIUM BROMIDE 30 MG: 10 INJECTION, SOLUTION INTRAVENOUS at 07:07

## 2020-07-07 RX ADMIN — OXYCODONE HYDROCHLORIDE AND ACETAMINOPHEN 1 TABLET: 5; 325 TABLET ORAL at 10:07

## 2020-07-07 RX ADMIN — SCOPALAMINE 1 PATCH: 1 PATCH, EXTENDED RELEASE TRANSDERMAL at 07:07

## 2020-07-07 RX ADMIN — ONDANSETRON 4 MG: 2 INJECTION, SOLUTION INTRAMUSCULAR; INTRAVENOUS at 08:07

## 2020-07-07 RX ADMIN — MIDAZOLAM HYDROCHLORIDE 2 MG: 1 INJECTION, SOLUTION INTRAMUSCULAR; INTRAVENOUS at 07:07

## 2020-07-07 RX ADMIN — ONDANSETRON 4 MG: 2 INJECTION INTRAMUSCULAR; INTRAVENOUS at 09:07

## 2020-07-07 RX ADMIN — SCOPALAMINE 1 PATCH: 1 PATCH, EXTENDED RELEASE TRANSDERMAL at 06:07

## 2020-07-07 RX ADMIN — PHENYLEPHRINE HYDROCHLORIDE 100 MCG: 10 INJECTION INTRAVENOUS at 08:07

## 2020-07-07 RX ADMIN — MUPIROCIN: 20 OINTMENT TOPICAL at 06:07

## 2020-07-07 RX ADMIN — DEXAMETHASONE SODIUM PHOSPHATE 8 MG: 4 INJECTION, SOLUTION INTRAMUSCULAR; INTRAVENOUS at 07:07

## 2020-07-07 RX ADMIN — CEFAZOLIN 2 G: 330 INJECTION, POWDER, FOR SOLUTION INTRAMUSCULAR; INTRAVENOUS at 07:07

## 2020-07-07 NOTE — BRIEF OP NOTE
[facility]  Brief Operative Note    Surgery Date: 7/7/2020     Surgeon(s) and Role:     * Duane Bello MD - Primary     * Rick Alejandra MD - Fellow    Assisting Surgeon: None    Pre-op Diagnosis:  Personal history of breast cancer [Z85.3]    Post-op Diagnosis:  Post-Op Diagnosis Codes:     * Personal history of breast cancer [Z85.3]    Procedure(s) (LRB):  REMOVAL, IMPLANT, BREAST BILATERAL (Bilateral)    Anesthesia: General    Description of the findings of the procedure(s): uncomplicated  Please see op note    Estimated Blood Loss: * No values recorded between 7/7/2020  7:56 AM and 7/7/2020  8:54 AM *         Specimens:   Specimen (12h ago, onward)    None            Discharge Note    OUTCOME: Patient tolerated treatment/procedure well without complication and is now ready for discharge.    DISPOSITION: Home or Self Care    FINAL DIAGNOSIS:  Personal history of breast cancer    FOLLOWUP: In clinic    DISCHARGE INSTRUCTIONS:    Discharge Procedure Orders   Diet general     Wound care routine (specify)   Order Comments: Wound care routine:    Keep bra on at all times  Can shower in 48 hours  F/u in clinic in 1 week

## 2020-07-07 NOTE — OP NOTE
Dictation #1  MRN:[id number]  [facility]:[id number]  Date of surgery 07/07/2020  Preoperative diagnosis history of breast cancer  Postoperative diagnosis is the same  Procedure performed:  Removal of bilateral breast tissue expanders  Surgeon:  Ace  Anesthesia:  General  Drains:  X2  Complications:  None  Blood loss:  Minimal    Patient was evaluated in preoperative holding area again in front of her boyfriend I had a long discussion with her that I believe that she  should reconsider removing her tissue expanders.  I discussed with the patient that she would be completely flat chested.  I discussed with the patient she may have wrinkled skin.  I discussed again with the patient that she is very young, 39 years old, and really should consider using either implants or a flap.  The patient desired to proceed with the surgery.  The risks and possible complications including but not limited to infection, bleeding, chest wall deformity, open wounds requiring months to heal, chronic seroma in the 1st further surgery worst discussed.  Patient signed informed consent.    Patient was placed in supine position after adequate general endotracheal anesthesia was prepped and draped in a normal sterile fashion previous mastectomy incisions were opened.  The tissue expanders which were sutured to the chest wall using Prolene were removed.  Similar procedure was performed on the opposite side 2 10.  Pashto Edouard drains were placed incisions were closed over the drains using interrupted 3-0 Monocryl followed by running 4-0 Monocryl subcuticular suture.  Glue and skin tape were used.  There were no complications

## 2020-07-07 NOTE — ANESTHESIA PREPROCEDURE EVALUATION
[facility]  Anesthesia Pre-Operative Evaluation         Patient Name: Staci Hodge  YOB: 1980  MRN: [id number]    SUBJECTIVE:     Pre-operative evaluation for Procedure(s) (LRB):  REMOVAL, IMPLANT, BREAST BILATERAL (Bilateral)     07/07/2020    Staci Hodge is a 39 y.o. female w/ a significant PMHx of ductal carcinoma s/p mastectomy and breast revisions.    Patient now presents for the above procedure(s).      LDA: None documented.       Peripheral IV - Single Lumen 03/10/20 0615 20 G Right Forearm (Active)   Number of days: 119            Closed/Suction Drain 07/23/18 1216 Right Breast Bulb 15 Fr. (Active)   Number of days: 714            Closed/Suction Drain 07/23/18 1217 Right Breast Bulb 15 Fr. (Active)   Number of days: 714            Closed/Suction Drain 07/23/18 1217 Left Breast Bulb 15 Fr. (Active)   Number of days: 714            Closed/Suction Drain 07/23/18 1217 Left Breast Bulb 15 Fr. (Active)   Number of days: 714       Prev airway: None documented.    Drips: None documented.      Patient Active Problem List   Diagnosis    History of abnormal Pap smear    LLQ pain    Peutz-Jeghers syndrome    Weight loss    Leukocytosis    Tobacco abuse    Insomnia    Mood disorder    MARY LOU (generalized anxiety disorder)    Panic disorder with agoraphobia    PTSD (post-traumatic stress disorder)    Social anxiety disorder    Palpitations    Tachycardia    Arm numbness left    Stabbing headache    Tension headache    Right carpal tunnel syndrome    Migraine without aura    Microalbuminuria    Neck pain    Right arm numbness    Left carpal tunnel syndrome    Cervical paraspinal muscle spasm    Glucosuria with normal serum glucose    History of pyelonephritis    Left arm numbness    Myofascial muscle pain    Cervical spondylosis    OCD (obsessive compulsive disorder)    Menorrhagia with regular cycle    Hypokalemia    Inguinal lymphadenopathy     Ductal carcinoma in situ (DCIS) of left breast    DCIS (ductal carcinoma in situ)    Menorrhagia with irregular cycle    S/P breast reconstruction    Possible pregnancy    S/P D&C (status post dilation and curettage)    Ectopic pregnancy    Encounter for adjustment or removal of unspecified breast implant    Personal history of breast cancer       Review of patient's allergies indicates:  No Known Allergies    Current Inpatient Medications:   lidocaine (PF) 10 mg/ml (1%)  1 mL Intradermal Once       No current facility-administered medications on file prior to encounter.      Current Outpatient Medications on File Prior to Encounter   Medication Sig Dispense Refill    butalbital-acetaminophen-caffeine -40 mg (FIORICET, ESGIC) -40 mg per tablet Take 1 tablet by mouth every 6 (six) hours as needed for Headaches. Limit use to 2 days per week. 10 tablet 4    clonazePAM (KLONOPIN) 1 MG tablet Take 1 tablet (1 mg total) by mouth 3 (three) times daily. 90 tablet 2    FLUoxetine 40 MG capsule Take 2 capsules (80 mg total) by mouth once daily. 180 capsule 1       Past Surgical History:   Procedure Laterality Date    ADENOIDECTOMY      APPENDECTOMY      AUGMENTATION OF BREAST      BILATERAL MASTECTOMY Bilateral 7/23/2018    Procedure: MASTECTOMY 23 hr stay;  Surgeon: Sofia Kendrick MD;  Location: [address];  Service: General;  Laterality: Bilateral;    BOWEL RESECTION  10/17/14    BREAST BIOPSY Left 06/04/2018    BREAST CAPSULOTOMY Left 4/25/2019    Procedure: CAPSULOTOMY, BREAST LEFT;  Surgeon: Duane Bello MD;  Location: [facility] OR [address] [place] [address];  Service: Plastics;  Laterality: Left;    BREAST RECONSTRUCTION      BREAST SURGERY      Bilateral Mastectomy 07/23/2018    CARPAL TUNNEL RELEASE Bilateral     CARPAL TUNNEL RELEASE      COLONOSCOPY      DILATION AND CURETTAGE OF UTERUS      DILATION AND CURETTAGE OF UTERUS USING SUCTION N/A 2/25/2019    Procedure: DILATION AND  CURETTAGE, UTERUS, USING SUCTION  PATHOLOGY NEEDED;  Surgeon: Pam Sifuentes MD;  Location: [facility];  Service: OB/GYN;  Laterality: N/A;    FAT TRANSFER Bilateral 11/12/2018    Procedure: TRANSFER, FAT TISSUE BILATERAL BREAST;  Surgeon: Duane Bello MD;  Location: [facility] OR [address] [place] [address];  Service: Plastics;  Laterality: Bilateral;    INJECTION FOR SENTINEL NODE IDENTIFICATION Left 7/23/2018    Procedure: INJECTION, FOR SENTINEL NODE IDENTIFICATION;  Surgeon: Sofia Kendrick MD;  Location: [facility] OR [address] [place] [address];  Service: General;  Laterality: Left;    INSERTION OF BREAST TISSUE EXPANDER Bilateral 7/23/2018    Procedure: INSERTION, TISSUE EXPANDER, BREAST;  Surgeon: Duane Bello MD;  Location: [facility] OR [address] [place] [address];  Service: Plastics;  Laterality: Bilateral;    INSERTION OF BREAST TISSUE EXPANDER Bilateral 3/10/2020    Procedure: INSERTION, TISSUE EXPANDER, BREAST, BILATERAL;  Surgeon: Duane Bello MD;  Location: [facility] OR [address] [place] [address];  Service: Plastics;  Laterality: Bilateral;    intestinal polpy      MASTECTOMY      REMOVAL OF BREAST IMPLANT Bilateral 3/10/2020    Procedure: REMOVAL, IMPLANT, BREAST, BILATERAL;  Surgeon: Duane Bello MD;  Location: [facility] OR [address] [place] [address];  Service: Plastics;  Laterality: Bilateral;    SENTINEL LYMPH NODE BIOPSY Left 7/23/2018    Procedure: BIOPSY, LYMPH NODE, SENTINEL;  Surgeon: Sofia Kendrick MD;  Location: [address];  Service: General;  Laterality: Left;    SMALL INTESTINE SURGERY      3 surgeries     TONSILLECTOMY      UPPER GASTROINTESTINAL ENDOSCOPY         Social History     Socioeconomic History    Marital status: Single     Spouse name: Not on file    Number of children: 0    Years of education: Not on file    Highest education level: Not on file   Occupational History    Not on file   Social Needs    Financial resource strain: Not on file    Food insecurity     Worry: Not on file     Inability: Not on file    Transportation needs      Medical: Not on file     Non-medical: Not on file   Tobacco Use    Smoking status: Former Smoker     Packs/day: 0.50     Years: 17.00     Pack years: 8.50     Types: Cigarettes     Start date: 2000     Quit date: 2018     Years since quittin.9    Smokeless tobacco: Never Used    Tobacco comment: sometimes dry cough per patient   Substance and Sexual Activity    Alcohol use: Yes     Alcohol/week: 5.8 standard drinks     Types: 7 Standard drinks or equivalent per week     Comment: occasionally    Drug use: No    Sexual activity: Not Currently     Partners: Male   Lifestyle    Physical activity     Days per week: Not on file     Minutes per session: Not on file    Stress: Not on file   Relationships    Social connections     Talks on phone: Not on file     Gets together: Not on file     Attends Yarsani service: Not on file     Active member of club or organization: Not on file     Attends meetings of clubs or organizations: Not on file     Relationship status: Not on file   Other Topics Concern    Patient feels they ought to cut down on drinking/drug use No    Patient annoyed by others criticizing their drinking/drug use No    Patient has felt bad or guilty about drinking/drug use No    Patient has had a drink/used drugs as an eye opener in the AM No   Social History Narrative    Had one previous miscarriage in     Never     currently in a relationship       OBJECTIVE:     Vital Signs Range (Last 24H):  Temp:  [37.1 °C (98.7 °F)]   Pulse:  [97]   Resp:  [18]   BP: (119)/(78)   SpO2:  [97 %]       Significant Labs:  Lab Results   Component Value Date    WBC 11.62 2019    HGB 13.3 2020    HCT 38.4 2019     2019    CHOL 108 (L) 2015    TRIG 94 2015    HDL 43 2015    ALT 13 2019    AST 17 2019     2019    K 4.5 2019     2019    CREATININE 0.7 2019    BUN 13 2019    CO2 28  03/06/2019    TSH 0.864 05/15/2018    HGBA1C 5.0 03/27/2018       Diagnostic Studies: No relevant studies.    EKG:   Results for orders placed or performed during the hospital encounter of 09/06/17   EKG 12-lead    Collection Time: 09/06/17  8:47 PM    Narrative    Test Reason : R00.0  Blood Pressure : - mmHG  Vent. Rate : 123 BPM     Atrial Rate : 123 BPM     P-R Int : 114 ms          QRS Dur : 060 ms      QT Int : 292 ms       P-R-T Axes : 057 -15 025 degrees     QTc Int : 418 ms    Sinus tachycardia  Leftward axis  When compared with ECG of 17-APR-2017 15:48,  No significant change was found  Confirmed by Ilir Moya MD ([id number]) on 9/7/2017 9:52:51 AM  Also confirmed by Ilir Moya MD ([id number])  on 9/7/2017 2:45:31 PM    Referred By: SARA STEELE           Confirmed By:Ilir Moya MD       2D ECHO:  TTE:  No results found for this or any previous visit.    INNA:  No results found for this or any previous visit.    ASSESSMENT/PLAN:         Anesthesia Evaluation    I have reviewed the Patient Summary Reports.      I have reviewed the Medications.     Review of Systems  Anesthesia Hx:  Hx of Anesthetic complications PONV despite the usual ondansetron treatment History of prior surgery of interest to airway management or planning: Personal Hx of Anesthesia complications, Post-Operative Nausea/Vomiting, with every anesthetic, despite treatment   Social:  Non-Smoker    Hematology/Oncology:         -- Cancer in past history: Breast surgery    Cardiovascular:   Hypertension    Pulmonary:  Pulmonary Normal    Renal/:  Renal/ Normal     Hepatic/GI:  Hepatic/GI Normal    Musculoskeletal:   Arthritis     Neurological:   Headaches    Endocrine:  Endocrine Normal    Psych:   Psychiatric History          Physical Exam  General:  Well nourished    Airway/Jaw/Neck:  Airway Findings: Mouth Opening: Normal Tongue: Normal  General Airway Assessment: Adult, Average  Mallampati: II  TM Distance: Normal, at least 6 cm         Eyes/Ears/Nose:  EYES/EARS/NOSE FINDINGS: Normal   Dental:  Dental Findings: In tact   Chest/Lungs:  Chest/Lungs Clear    Heart/Vascular:  Heart Findings: Normal    Abdomen:  Abdomen Findings: Normal    Musculoskeletal:  Musculoskeletal Findings: Normal   Skin:  Skin Findings: Normal    Mental Status:  Mental Status Findings: Normal        Anesthesia Plan  Type of Anesthesia, risks & benefits discussed:  Anesthesia Type:  general  Patient's Preference:   Intra-op Monitoring Plan: standard ASA monitors  Intra-op Monitoring Plan Comments:   Post Op Pain Control Plan: IV/PO Opioids PRN and per primary service following discharge from PACU  Post Op Pain Control Plan Comments:   Induction:   IV  Beta Blocker:  Patient is not currently on a Beta-Blocker (No further documentation required).       Informed Consent: Patient understands risks and agrees with Anesthesia plan.  Questions answered. Anesthesia consent signed with patient.  ASA Score: 2     Day of Surgery Review of History & Physical:    H&P update referred to the surgeon.         Ready For Surgery From Anesthesia Perspective.

## 2020-07-07 NOTE — PLAN OF CARE
Pt awake and alert.  Pain tolerable and procedure/surgery site stable.  Discharge instructions reviewed and handout given.  Pt states ready to go home.

## 2020-07-07 NOTE — H&P
[facility]  Plastic Surgery  History & Physical    Patient Name: Staci Hodge  MRN: [id number]  Admission Date: 7/7/2020  Attending Physician: Duane Bello, *  Primary Care Provider: Shilpi Clayton NP    Patient information was obtained from past medical records.     Subjective:       History of Present Illness:  Patient is a 39 y.o. female presents with  reconstruction with tissue expanders followed by permanent implants.  She has had several revisions to correct left inframammary fold.  The patient's left breast  has been the problem breast.  There is little subcutaneous fat are multiple contoured deformities of the of that breast.  She also needs to have the inframammary fold on that side revised by excising some excess tissue along the inframammary fold.  The right breast has ample amount of fatty tissue. The breast however has drifted inferiorly and laterally.  The shape of the breast does not match the shape of the left breast.  I had a long discussion with the patient as well as her friend in the room.  That I would present her at Plastic surgery conference to see if anyone had any different opinions regarding her care.  She will require lateral capsulorrhaphy on the right side as well as inferior capsulorrhaphy in order to elevate the right breast.  On the opposite side she would need to have the excess soft tissue along the inframammary fold excised and then fat grafting for the skin is very thin.     No current facility-administered medications on file prior to encounter.      Current Outpatient Medications on File Prior to Encounter   Medication Sig    butalbital-acetaminophen-caffeine -40 mg (FIORICET, ESGIC) -40 mg per tablet Take 1 tablet by mouth every 6 (six) hours as needed for Headaches. Limit use to 2 days per week.    clonazePAM (KLONOPIN) 1 MG tablet Take 1 tablet (1 mg total) by mouth 3 (three) times daily.    FLUoxetine 40 MG capsule Take  2 capsules (80 mg total) by mouth once daily.       Review of patient's allergies indicates:  No Known Allergies    Past Medical History:   Diagnosis Date    Abnormal Pap smear of cervix age 25    no treatement    Anxiety     Breast cancer     Cancer 07/11/2018    breast cancer    Cervical spondylosis 2/9/2018    Colon polyp     Depression     Ectopic pregnancy 2/26/2019    General anesthetics causing adverse effect in therapeutic use     Slow to awaken/ Memory problems-post-op to day 3 after Mastectomy    Headache     History of psychiatric hospitalization     age 20 for SI    Hx of psychiatric care     Hypertension     Peutz-Jeghers syndrome     PONV (postoperative nausea and vomiting)     Psychiatric problem     Right carpal tunnel syndrome 3/9/2017    Self-harming behavior     Suicide attempt     Therapy      Past Surgical History:   Procedure Laterality Date    ADENOIDECTOMY      APPENDECTOMY      AUGMENTATION OF BREAST      BILATERAL MASTECTOMY Bilateral 7/23/2018    Procedure: MASTECTOMY 23 hr stay;  Surgeon: Sofia Kendrick MD;  Location: [address];  Service: General;  Laterality: Bilateral;    BOWEL RESECTION  10/17/14    BREAST BIOPSY Left 06/04/2018    BREAST CAPSULOTOMY Left 4/25/2019    Procedure: CAPSULOTOMY, BREAST LEFT;  Surgeon: Duane Bello MD;  Location: [address];  Service: Plastics;  Laterality: Left;    BREAST RECONSTRUCTION      BREAST SURGERY      Bilateral Mastectomy 07/23/2018    CARPAL TUNNEL RELEASE Bilateral     CARPAL TUNNEL RELEASE      COLONOSCOPY      DILATION AND CURETTAGE OF UTERUS      DILATION AND CURETTAGE OF UTERUS USING SUCTION N/A 2/25/2019    Procedure: DILATION AND CURETTAGE, UTERUS, USING SUCTION  PATHOLOGY NEEDED;  Surgeon: Pam Sifuentes MD;  Location[facility];  Service: OB/GYN;  Laterality: N/A;    FAT TRANSFER Bilateral 11/12/2018    Procedure: TRANSFER, FAT TISSUE BILATERAL BREAST;  Surgeon: Duane MEZA  MD Ace;  [address];  Service: Plastics;  Laterality: Bilateral;    INJECTION FOR SENTINEL NODE IDENTIFICATION Left 2018    Procedure: INJECTION, FOR SENTINEL NODE IDENTIFICATION;  Surgeon: Sofia Kendrick MD;  Location: [facility] OR [address] [place] [address];  Service: General;  Laterality: Left;    INSERTION OF BREAST TISSUE EXPANDER Bilateral 2018    Procedure: INSERTION, TISSUE EXPANDER, BREAST;  Surgeon: Duane Bello MD;  Location: [facility] OR [address] [place] [address];  Service: Plastics;  Laterality: Bilateral;    INSERTION OF BREAST TISSUE EXPANDER Bilateral 3/10/2020    Procedure: INSERTION, TISSUE EXPANDER, BREAST, BILATERAL;  Surgeon: Duane Bello MD;  Location: [facility] OR [address] [place] [address];  Service: Plastics;  Laterality: Bilateral;    intestinal polpy      MASTECTOMY      REMOVAL OF BREAST IMPLANT Bilateral 3/10/2020    Procedure: REMOVAL, IMPLANT, BREAST, BILATERAL;  Surgeon: Duane Bello MD;  Location: [facility] OR [address] [place] [address];  Service: Plastics;  Laterality: Bilateral;    SENTINEL LYMPH NODE BIOPSY Left 2018    Procedure: BIOPSY, LYMPH NODE, SENTINEL;  Surgeon: Sofia Kendrick MD;  Location: [facility] OR [address] [place] [address];  Service: General;  Laterality: Left;    SMALL INTESTINE SURGERY      3 surgeries     TONSILLECTOMY      UPPER GASTROINTESTINAL ENDOSCOPY       Family History     Problem Relation (Age of Onset)    Breast cancer Paternal Aunt, Paternal Grandmother    Colon cancer Mother (33)    Colon polyps Sister    Hypertension Father        Tobacco Use    Smoking status: Former Smoker     Packs/day: 0.50     Years: 17.00     Pack years: 8.50     Types: Cigarettes     Start date: 2000     Quit date: 2018     Years since quittin.9    Smokeless tobacco: Never Used    Tobacco comment: sometimes dry cough per patient   Substance and Sexual Activity    Alcohol use: Yes     Alcohol/week: 5.8 standard drinks     Types: 7 Standard drinks or equivalent per week     Comment: occasionally     Drug use: No    Sexual activity: Not Currently     Partners: Male     Review of Systems  Objective:     Vital Signs (Most Recent):  Temp: 98.7 °F (37.1 °C) (07/07/20 0550)  Pulse: 97 (07/07/20 0550)  Resp: 18 (07/07/20 0550)  BP: 119/78 (07/07/20 0550)  SpO2: 97 % (07/07/20 0550) Vital Signs (24h Range):  Temp:  [98.7 °F (37.1 °C)] 98.7 °F (37.1 °C)  Pulse:  [97] 97  Resp:  [18] 18  SpO2:  [97 %] 97 %  BP: (119)/(78) 119/78     Weight: 65.8 kg (145 lb 1 oz)  Body mass index is 26.53 kg/m².    Physical Exam    Significant Labs:  UPT negative    Significant Diagnostics:  I have reviewed all pertinent imaging results/findings within the past 24 hours.    Assessment/Plan:     I again discussed with miss Hodge about her upcoming surgery.  The patient had bilateral breast reconstruction with direct implant in the past.  She has had several revisions.  She was presented at Plastic surgery conference several months ago was advised to place tissue expanders.  She has a very nice expansion and will look good with an implant exchange.  The patient however is insistent on having her implants totally removed.  I discussed with her once again that she is only 39 years old and I think that it is a mistake to have no reconstruction. She will be completely flat chested.  I discussed with her that I would be more than happy to arrange consultation for a bilateral ISIS flap.  The patient absolutely declined this..  I once again asked her if she has discussed this with her family because it is such a big step for her to remove her implants when she has a nice result.  Even when her permanent implants  were in place she only had a small amount of asymmetry (IMF).  Patient stated that she discuss this in detail with her family and they are all behind her to have the implants removed.  I discussed with her we will go ahead and move forward in schedule this.    Active Diagnoses:    Diagnosis Date Noted POA    Personal history of  breast cancer [Z85.3] 07/07/2020 Not Applicable      Problems Resolved During this Admission:     VTE Risk Mitigation (From admission, onward)         Ordered     Place LAURIE hose  Until discontinued      07/07/20 0547     IP VTE LOW RISK PATIENT  Once      07/07/20 0547     Place LAURIE hose  Until discontinued      07/07/20 0547     Place sequential compression device  Until discontinued      07/07/20 0547                Cory Marie MD  Plastic Surgery  [facility]

## 2020-07-07 NOTE — TRANSFER OF CARE
"Anesthesia Transfer of Care Note    Patient: Staci Hodge    Procedure(s) Performed: Procedure(s) (LRB):  REMOVAL, IMPLANT, BREAST BILATERAL (Bilateral)    Patient location: PACU    Anesthesia Type: general    Transport from OR: Transported from OR on 6-10 L/min O2 by face mask with adequate spontaneous ventilation    Post pain: adequate analgesia    Post assessment: no apparent anesthetic complications    Post vital signs: stable    Level of consciousness: awake    Nausea/Vomiting: no nausea/vomiting    Complications: none    Transfer of care protocol was followed      Last vitals:   Visit Vitals  /78 (BP Location: Right arm, Patient Position: Lying)   Pulse 97   Temp 37.1 °C (98.7 °F) (Oral)   Resp 18   Ht 5' 2" (1.575 m)   Wt 65.8 kg (145 lb 1 oz)   SpO2 97%   Breastfeeding No   BMI 26.53 kg/m²     "

## 2020-07-07 NOTE — DISCHARGE INSTRUCTIONS
Activity: Remember to:   Turn, cough and deep breathe every 2-3 hrs while awake to expand your lungs.   Use a breathing device every 1-2 hrs while awake to expand your lungs.   Wear your LAURIE hose daily until you are moving about freely.   Use a pillow to splint your incisions while coughing.   Point your toes to the ceiling and push your heels downward frequently in order to prevent blood clots.   Keep your chest elevated on 2-3 pillows for 2 weeks or until all the swelling has diminished. Do not lie  on either side for 6 weeks.   You may begin to do leg and lower body exercises but nothing using the upper torso for 4-6 weeks  after surgery. Check with your doctor before resuming any exercise program.   Do not engage in sexual activity for at least 2 weeks after surgery.   Avoid bending down and picking up heavy objects. Do not  anything over 10 lbs for the first 4  weeks. At 4-6 weeks you may increase weight up to 25 lbs. After 6 weeks you are free to lift anything.   Do not operate a motor vehicle until you can handle the steering wheel without causing any discomfort  or within 24 hrs of taking prescribed pain medication.  Dressings:   Keep your dressings dry. You may shower in 48 hours at which time you may change your dressings.   Do not remove your steristrips. They will fall off in 5-7 days or be removed by your doctor on your  postoperative visit.   Wear the surgical bra at all times for the first 2-3 weeks. You may remove it to shower or to clean it.  Check with your doctor to see when you can switch to a good support bra. Additional bras may be  purchased from the department at an additional cost.  If you have any drains, you will need to:   Strip the drain tubings   Empty the drain bulbs   Compress the bulbs, every 3-4 hrs unless they are more than ½ full  The nurse should demonstrate this before your discharge from the surgery unit. The drains will be removed  when they put out less  than 25 cc in 24 hrs. You should record the amount of drainage on a sheet of paper for  each drain for every 24 hour period. Call those totals to your doctors nurse daily.  You may see little pimple-like blisters along your incision site. They may appear as tiny abscesses, wounds that  wont heal, or as pimples. Theyre sutures that have not completely dissolved.  Home Care Instructions  Breast Implant/Explant  Dr Bello  Page 1 of 2  Rev 3/07  Medications:  Take your prescribed medications as ordered. Take your antibiotics until theyre all gone. You will need to be  on antibiotics as long as you have a drain tube in. Stay away from aspirin, ibuprofen, and Vitamin E products  for 2 weeks after your surgery. You may take Tylenol if experiencing pain, if it is not an ingredient in your pain  medication. Pain medication, anesthesia, and a lack of exercise can cause you to have bowel problems. If you  have not had a bowel movement in 3-5 days after surgery call the office and an over the counter remedy will  be recommended.  Diet:  At home, continue with liquids, and if there is no nausea, you may eat a soft bland diet. Gradually resume your  normal diet. Avoid foods, such as Mexican, Chinese, seafood, or salty soups, for 1 week after surgery.  Other:   Do not smoke or be with smokers for 2 weeks after surgery.   Avoid direct sunlight to your incision. Apply a good sun block frequently, (a spf 20 or greater) to your  incision site if you plan to expose them to sunlight.   Do not be alarmed if your breasts appear too large or too high. The swelling and shape will change the  further away from surgery you get.  Call the office immediately if you have:   A temperature of 101 or greater.   Redness thats beginning to spread away from the incision site.   Any unusually painful swelling.   Any active bleeding saturating more than a 4x4 gauze.   Any purulent drainage coming from the incision site.   Pain that is  not relieved by your pain medication.   If you notice any asymmetry or flatness of your chest. (This may mean your implant is leaking or  has a hole in it.)  RETURN APPOINTMENT:________________________________________________________________  FOR EMERGENCIES:  If any unusual problems or difficulties occur, contact Dr. Bello or the resident at [phone] (page  ) or at the Clinic office, [phone].        HELEN drain instructions    OF THE DRAIN:   Wash hands before starting!   Change the bandages daily, keep drain site clean and dry.   Check the drain every 4 hours for the first 24 hours and empty the drain when half full of liquid. After  the first day, empty when half full or every 8 to 12 hours. DO NOT LET THE DRAIN FILL MORE  THAN HALF WAY.   After the drain is emptied, the bulb needs to be squeezed between the palm and the fingers until they  meet and then recap the plug. (See picture.) This is to keep the suction continuous.  HOW TO EMPTY THE DRAIN:   Wash hands before starting!   Remove plug from top and pour fluid into measuring cup.   DO NOT TOUCH OPEN PORT.   Squeeze bulb tightly while plug is still off.   While squeezing bulb replace plug to seal the bulb.   Measure the amount of fluid that was removed from the  bulb and record the amount of the fluid for the doctor  with the date and time it was collected.   Flush the fluid down toilet.  HOW TO PREVENT PROBLEMS:   Always keep the bulb lower than the wound. This will stop the fluid from going back into your body.   Do not pull on the tubing. This can loosen the stitches holding the drain to your skin, causing the drain  to fall out.  WHEN TO CALL THE DOCTOR OR SEEK IMMEDIATE CARE IF:   The fluid removed by the HELEN drain is cloudy, yellow or foul-smelling OR suddenly stops draining into  the bulb of your HELEN drain.   Swelling or redness where the drain enters your skin.   Feel more pain in the drain area.   See holes or  cracks in the tubing or bulb of the drain, or if the drain leaks, the drain stitches come loose  or break off, OR the HELEN DRAIN COMES OUT.   The HELEN drain starts filling up very quickly with bright red blood.   You develop fever above 101ºF (38.4ºC).   Your bandages are soaked with blood.  FOR EMERGENCIES:  If any unusual problems or difficulties occur, contact Dr. Brito________________ at [phone] (page  ) or at the Clinic office, [phone]

## 2020-07-07 NOTE — ANESTHESIA PROCEDURE NOTES
Intubation  Performed by: Ayesha Peterson MD  Authorized by: Mike Milian MD     Intubation:     Induction:  Intravenous    Intubated:  Postinduction    Mask Ventilation:  Easy mask    Attempts:  1    Attempted By:  Resident anesthesiologist    Method of Intubation:  Direct    Blade:  Mathew 3    Laryngeal View Grade: Grade I - full view of chords      Difficult Airway Encountered?: No      Complications:  None    Airway Device:  Oral endotracheal tube    Airway Device Size:  7.0    Style/Cuff Inflation:  Cuffed    Tube secured:  22    Secured at:  The lips    Placement Verified By:  Capnometry    Complicating Factors:  None    Findings Post-Intubation:  BS equal bilateral

## 2020-07-07 NOTE — ANESTHESIA POSTPROCEDURE EVALUATION
Anesthesia Post Evaluation    Patient: Staci Hodge    Procedure(s) Performed: Procedure(s) (LRB):  REMOVAL, IMPLANT, BREAST BILATERAL (Bilateral)    Final Anesthesia Type: general    Patient location during evaluation: PACU  Patient participation: Yes- Able to Participate  Level of consciousness: awake and alert and oriented  Post-procedure vital signs: reviewed and stable  Pain management: adequate  Airway patency: patent    PONV status at discharge: nausea (controlled)  Anesthetic complications: no      Cardiovascular status: blood pressure returned to baseline and hemodynamically stable  Respiratory status: unassisted, spontaneous ventilation and room air  Hydration status: euvolemic  Follow-up not needed.          Vitals Value Taken Time   /86 07/07/20 1051   Temp 36.6 °C (97.8 °F) 07/07/20 1045   Pulse 87 07/07/20 1100   Resp 16 07/07/20 1045   SpO2 100 % 07/07/20 1100   Vitals shown include unvalidated device data.      No case tracking events are documented in the log.      Pain/Bran Score: Pain Rating Prior to Med Admin: 6 (7/7/2020 10:40 AM)  Bran Score: 9 (7/7/2020  9:44 AM)

## 2020-07-15 ENCOUNTER — OFFICE VISIT (OUTPATIENT)
Dept: PLASTIC SURGERY | Facility: CLINIC | Age: 40
End: 2020-07-15
Payer: COMMERCIAL

## 2020-07-15 VITALS
DIASTOLIC BLOOD PRESSURE: 82 MMHG | SYSTOLIC BLOOD PRESSURE: 122 MMHG | HEIGHT: 62 IN | WEIGHT: 145.06 LBS | BODY MASS INDEX: 26.69 KG/M2 | HEART RATE: 104 BPM

## 2020-07-15 DIAGNOSIS — Z09 SURGERY FOLLOW-UP EXAMINATION: Primary | ICD-10-CM

## 2020-07-15 LAB
FINAL PATHOLOGIC DIAGNOSIS: NORMAL
GROSS: NORMAL

## 2020-07-15 PROCEDURE — 99999 PR PBB SHADOW E&M-EST. PATIENT-LVL III: CPT | Mod: PBBFAC,,, | Performed by: SURGERY

## 2020-07-15 PROCEDURE — 99024 PR POST-OP FOLLOW-UP VISIT: ICD-10-PCS | Mod: S$GLB,,, | Performed by: SURGERY

## 2020-07-15 PROCEDURE — 99024 POSTOP FOLLOW-UP VISIT: CPT | Mod: S$GLB,,, | Performed by: SURGERY

## 2020-07-15 PROCEDURE — 99999 PR PBB SHADOW E&M-EST. PATIENT-LVL III: ICD-10-PCS | Mod: PBBFAC,,, | Performed by: SURGERY

## 2020-07-15 NOTE — PROGRESS NOTES
Subjective:      Staci Hodge is a 39 y.o. year old female who [facility] on 07/15/2020 for follow up visit status post TE expanders removal 7/8/20. Denies fever, chills, nausea, vomiting, or other systemic signs of infection. Pt says she has some pain near drain site on right side but otherwise has no complaints. Pt says left HELEN drained 5-15cc/day, and right HELEN is not draining anything.                     Vitals:     07/15/20 0935   BP: 122/82   Pulse: 104         Review of patient's allergies indicates:  No Known Allergies                 Current Outpatient Medications on File Prior to Visit   Medication Sig Dispense Refill    butalbital-acetaminophen-caffeine -40 mg (FIORICET, ESGIC) -40 mg per tablet Take 1 tablet by mouth every 6 (six) hours as needed for Headaches. Limit use to 2 days per week. 10 tablet 4    cephALEXin (KEFLEX) 500 MG capsule Take 1 capsule (500 mg total) by mouth every 12 (twelve) hours. for 10 days 20 capsule 0    clonazePAM (KLONOPIN) 1 MG tablet Take 1 tablet (1 mg total) by mouth 3 (three) times daily. 90 tablet 2    FLUoxetine 40 MG capsule Take 2 capsules (80 mg total) by mouth once daily. 180 capsule 1      No current facility-administered medications on file prior to visit.                Patient Active Problem List   Diagnosis    History of abnormal Pap smear    LLQ pain    Peutz-Jeghers syndrome    Weight loss    Leukocytosis    Tobacco abuse    Insomnia    Mood disorder    MARY LOU (generalized anxiety disorder)    Panic disorder with agoraphobia    PTSD (post-traumatic stress disorder)    Social anxiety disorder    Palpitations    Tachycardia    Arm numbness left    Stabbing headache    Tension headache    Right carpal tunnel syndrome    Migraine without aura    Microalbuminuria    Neck pain    Right arm numbness    Left carpal tunnel syndrome    Cervical paraspinal muscle spasm    Glucosuria with normal  serum glucose    History of pyelonephritis    Left arm numbness    Myofascial muscle pain    Cervical spondylosis    OCD (obsessive compulsive disorder)    Menorrhagia with regular cycle    Hypokalemia    Inguinal lymphadenopathy    Ductal carcinoma in situ (DCIS) of left breast    DCIS (ductal carcinoma in situ)    Menorrhagia with irregular cycle    S/P breast reconstruction    Possible pregnancy    S/P D&C (status post dilation and curettage)    Ectopic pregnancy    Encounter for adjustment or removal of unspecified breast implant    Personal history of breast cancer                   Past Surgical History:   Procedure Laterality Date    ADENOIDECTOMY        APPENDECTOMY        AUGMENTATION OF BREAST        BILATERAL MASTECTOMY Bilateral 7/23/2018     Procedure: MASTECTOMY 23 hr stay;  Surgeon: Sofia Kendrick MD;  Location: [address];  Service: General;  Laterality: Bilateral;    BOWEL RESECTION   10/17/14    BREAST BIOPSY Left 06/04/2018    BREAST CAPSULOTOMY Left 4/25/2019     Procedure: CAPSULOTOMY, BREAST LEFT;  Surgeon: Duane Bello MD;  Location: [address];  Service: Plastics;  Laterality: Left;    BREAST RECONSTRUCTION        BREAST SURGERY         Bilateral Mastectomy 07/23/2018    CARPAL TUNNEL RELEASE Bilateral      CARPAL TUNNEL RELEASE        COLONOSCOPY        DILATION AND CURETTAGE OF UTERUS        DILATION AND CURETTAGE OF UTERUS USING SUCTION N/A 2/25/2019     Procedure: DILATION AND CURETTAGE, UTERUS, USING SUCTION  PATHOLOGY NEEDED;  Surgeon: Pam Sifuentes MD;  Location: [facility];  Service: OB/GYN;  Laterality: N/A;    FAT TRANSFER Bilateral 11/12/2018     Procedure: TRANSFER, FAT TISSUE BILATERAL BREAST;  Surgeon: Duane Bello MD;  Location: [address];  Service: Plastics;  Laterality: Bilateral;    INJECTION FOR SENTINEL NODE IDENTIFICATION Left 7/23/2018     Procedure: INJECTION, FOR SENTINEL NODE IDENTIFICATION;   Surgeon: Sofia Kendrick MD;  [facility];  Service: General;  Laterality: Left;    INSERTION OF BREAST TISSUE EXPANDER Bilateral 7/23/2018     Procedure: INSERTION, TISSUE EXPANDER, BREAST;  Surgeon: Duane Bello MD;  [facility];  Service: Plastics;  Laterality: Bilateral;    INSERTION OF BREAST TISSUE EXPANDER Bilateral 3/10/2020     Procedure: INSERTION, TISSUE EXPANDER, BREAST, BILATERAL;  Surgeon: Duane Bello MD;  [facility];  Service: Plastics;  Laterality: Bilateral;    intestinal polpy        MASTECTOMY        REMOVAL OF BREAST IMPLANT Bilateral 3/10/2020     Procedure: REMOVAL, IMPLANT, BREAST, BILATERAL;  Surgeon: Duane Bello MD;  [facility];  Service: Plastics;  Laterality: Bilateral;    REMOVAL OF BREAST IMPLANT Bilateral 7/7/2020     Procedure: REMOVAL, IMPLANT, BREAST BILATERAL;  Surgeon: Duane Bello MD;  Location: [facility] OR [address] [place] [address];  Service: Plastics;  Laterality: Bilateral;    SENTINEL LYMPH NODE BIOPSY Left 7/23/2018     Procedure: BIOPSY, LYMPH NODE, SENTINEL;  Surgeon: Sofia Kendrick MD;  [facility];  Service: General;  Laterality: Left;    SMALL INTESTINE SURGERY         3 surgeries     TONSILLECTOMY        UPPER GASTROINTESTINAL ENDOSCOPY             Social History                   Socioeconomic History    Marital status: Single       Spouse name: Not on file    Number of children: 0    Years of education: Not on file    Highest education level: Not on file   Occupational History    Not on file   Social Needs    Financial resource strain: Not on file    Food insecurity       Worry: Not on file       Inability: Not on file    Transportation needs       Medical: Not on file       Non-medical: Not on file   Tobacco Use    Smoking status: Former Smoker       Packs/day: 0.50       Years: 17.00       Pack years: 8.50       Types: Cigarettes       Start date:  2000       Quit date: 2018       Years since quittin.9    Smokeless tobacco: Never Used    Tobacco comment: sometimes dry cough per patient   Substance and Sexual Activity    Alcohol use: Yes       Alcohol/week: 5.8 standard drinks       Types: 7 Standard drinks or equivalent per week       Comment: occasionally    Drug use: No    Sexual activity: Not Currently       Partners: Male   Lifestyle    Physical activity       Days per week: Not on file       Minutes per session: Not on file    Stress: Not on file   Relationships    Social connections       Talks on phone: Not on file       Gets together: Not on file       Attends Yazidism service: Not on file       Active member of club or organization: Not on file       Attends meetings of clubs or organizations: Not on file       Relationship status: Not on file   Other Topics Concern    Patient feels they ought to cut down on drinking/drug use No    Patient annoyed by others criticizing their drinking/drug use No    Patient has felt bad or guilty about drinking/drug use No    Patient has had a drink/used drugs as an eye opener in the AM No   Social History Narrative     Had one previous miscarriage in      Never      currently in a relationship                  Review of Systems: n/a     Objective:      Physical Exam:        Vitals:     07/15/20 0935   BP: 122/82   Pulse: 104         WD WN NAD  VSS  Normal resp effort  Rt Breast: wound c/d/i, right drain present on suction without any drainage, no cellulitis or erythema noted  Lt Breast: wound c/d/i, left drain present on suction with very minimal serosanguinous drainage, no cellulitis or erythema noted           Assessment:       No diagnosis found.     Plan:   39 y.o. female status post TE removal 1 week ago, here for f/u visit, doing well.  - Doing well, no issues  - both drains removed, pt tolerated well  - Return to clinic in 1 week         All questions were answered. The  patient was advised to call the clinic with any questions or concerns prior to their next visit.

## 2020-07-15 NOTE — PROGRESS NOTES
Subjective:      Staci Hodge is a 39 y.o. year old female who [facility] 07[facility]15/2020 for follow up visit status post TE expanders removal 7/8/20. Denies fever, chills, nausea, vomiting, or other systemic signs of infection. Pt says she has some pain near drain site on right side but otherwise has no complaints. Pt says left HELEN drained 5-15cc/day, and right HELEN is not draining anything.           Vitals:    07/15/20 0935   BP: 122/82   Pulse: 104        Review of patient's allergies indicates:  No Known Allergies    Current Outpatient Medications on File Prior to Visit   Medication Sig Dispense Refill    butalbital-acetaminophen-caffeine -40 mg (FIORICET, ESGIC) -40 mg per tablet Take 1 tablet by mouth every 6 (six) hours as needed for Headaches. Limit use to 2 days per week. 10 tablet 4    cephALEXin (KEFLEX) 500 MG capsule Take 1 capsule (500 mg total) by mouth every 12 (twelve) hours. for 10 days 20 capsule 0    clonazePAM (KLONOPIN) 1 MG tablet Take 1 tablet (1 mg total) by mouth 3 (three) times daily. 90 tablet 2    FLUoxetine 40 MG capsule Take 2 capsules (80 mg total) by mouth once daily. 180 capsule 1     No current facility-administered medications on file prior to visit.        Patient Active Problem List   Diagnosis    History of abnormal Pap smear    LLQ pain    Peutz-Jeghers syndrome    Weight loss    Leukocytosis    Tobacco abuse    Insomnia    Mood disorder    MARY LOU (generalized anxiety disorder)    Panic disorder with agoraphobia    PTSD (post-traumatic stress disorder)    Social anxiety disorder    Palpitations    Tachycardia    Arm numbness left    Stabbing headache    Tension headache    Right carpal tunnel syndrome    Migraine without aura    Microalbuminuria    Neck pain    Right arm numbness    Left carpal tunnel syndrome    Cervical paraspinal muscle spasm    Glucosuria with normal serum glucose    History of  pyelonephritis    Left arm numbness    Myofascial muscle pain    Cervical spondylosis    OCD (obsessive compulsive disorder)    Menorrhagia with regular cycle    Hypokalemia    Inguinal lymphadenopathy    Ductal carcinoma in situ (DCIS) of left breast    DCIS (ductal carcinoma in situ)    Menorrhagia with irregular cycle    S/P breast reconstruction    Possible pregnancy    S/P D&C (status post dilation and curettage)    Ectopic pregnancy    Encounter for adjustment or removal of unspecified breast implant    Personal history of breast cancer       Past Surgical History:   Procedure Laterality Date    ADENOIDECTOMY      APPENDECTOMY      AUGMENTATION OF BREAST      BILATERAL MASTECTOMY Bilateral 7/23/2018    Procedure: MASTECTOMY 23 hr stay;  Surgeon: Sofia Kendrick MD;  Location: [address];  Service: General;  Laterality: Bilateral;    BOWEL RESECTION  10/17/14    BREAST BIOPSY Left 06/04/2018    BREAST CAPSULOTOMY Left 4/25/2019    Procedure: CAPSULOTOMY, BREAST LEFT;  Surgeon: Duane Bello MD;  Location: [address];  Service: Plastics;  Laterality: Left;    BREAST RECONSTRUCTION      BREAST SURGERY      Bilateral Mastectomy 07/23/2018    CARPAL TUNNEL RELEASE Bilateral     CARPAL TUNNEL RELEASE      COLONOSCOPY      DILATION AND CURETTAGE OF UTERUS      DILATION AND CURETTAGE OF UTERUS USING SUCTION N/A 2/25/2019    Procedure: DILATION AND CURETTAGE, UTERUS, USING SUCTION  PATHOLOGY NEEDED;  Surgeon: Pam Sifuentes MD;  Location: [facility];  Service: OB/GYN;  Laterality: N/A;    FAT TRANSFER Bilateral 11/12/2018    Procedure: TRANSFER, FAT TISSUE BILATERAL BREAST;  Surgeon: Duane Bello MD;  Location: [address];  Service: Plastics;  Laterality: Bilateral;    INJECTION FOR SENTINEL NODE IDENTIFICATION Left 7/23/2018    Procedure: INJECTION, FOR SENTINEL NODE IDENTIFICATION;  Surgeon: Sofia Kendrick MD;  Location: [address];  Service:  General;  Laterality: Left;    INSERTION OF BREAST TISSUE EXPANDER Bilateral 2018    Procedure: INSERTION, TISSUE EXPANDER, BREAST;  Surgeon: Duane Bello MD;  Location: [facility] OR 2ND FLR;  Service: Plastics;  Laterality: Bilateral;    INSERTION OF BREAST TISSUE EXPANDER Bilateral 3/10/2020    Procedure: INSERTION, TISSUE EXPANDER, BREAST, BILATERAL;  Surgeon: Duane Bello MD;  [facility];  Service: Plastics;  Laterality: Bilateral;    intestinal polpy      MASTECTOMY      REMOVAL OF BREAST IMPLANT Bilateral 3/10/2020    Procedure: REMOVAL, IMPLANT, BREAST, BILATERAL;  Surgeon: Duane Bello MD;  Location: [facility] OR 2ND FLR;  Service: Plastics;  Laterality: Bilateral;    REMOVAL OF BREAST IMPLANT Bilateral 2020    Procedure: REMOVAL, IMPLANT, BREAST BILATERAL;  Surgeon: Duane Bello MD;  [facility];  Service: Plastics;  Laterality: Bilateral;    SENTINEL LYMPH NODE BIOPSY Left 2018    Procedure: BIOPSY, LYMPH NODE, SENTINEL;  Surgeon: Sofia Kendrick MD;  Location: [facility] OR 2ND FLR;  Service: General;  Laterality: Left;    SMALL INTESTINE SURGERY      3 surgeries     TONSILLECTOMY      UPPER GASTROINTESTINAL ENDOSCOPY         Social History     Socioeconomic History    Marital status: Single     Spouse name: Not on file    Number of children: 0    Years of education: Not on file    Highest education level: Not on file   Occupational History    Not on file   Social Needs    Financial resource strain: Not on file    Food insecurity     Worry: Not on file     Inability: Not on file    Transportation needs     Medical: Not on file     Non-medical: Not on file   Tobacco Use    Smoking status: Former Smoker     Packs/day: 0.50     Years: 17.00     Pack years: 8.50     Types: Cigarettes     Start date: 2000     Quit date: 2018     Years since quittin.9    Smokeless tobacco: Never Used    Tobacco comment:  sometimes dry cough per patient   Substance and Sexual Activity    Alcohol use: Yes     Alcohol/week: 5.8 standard drinks     Types: 7 Standard drinks or equivalent per week     Comment: occasionally    Drug use: No    Sexual activity: Not Currently     Partners: Male   Lifestyle    Physical activity     Days per week: Not on file     Minutes per session: Not on file    Stress: Not on file   Relationships    Social connections     Talks on phone: Not on file     Gets together: Not on file     Attends [employer] service: Not on file     Active member of club or organization: Not on file     Attends meetings of clubs or organizations: Not on file     Relationship status: Not on file   Other Topics Concern    Patient feels they ought to cut down on drinking/drug use No    Patient annoyed by others criticizing their drinking/drug use No    Patient has felt bad or guilty about drinking/drug use No    Patient has had a drink/used drugs as an eye opener in the AM No   Social History Narrative    Had one previous miscarriage in 2005    Never     currently in a relationship           Review of Systems: n/a    Objective:     Physical Exam:  Vitals:    07/15/20 0935   BP: 122/82   Pulse: 104       WD WN NAD  VSS  Normal resp effort  Rt Breast: wound c/d/i, right drain present on suction without any drainage, no cellulitis or erythema noted  Lt Breast: wound c/d/i, left drain present on suction with very minimal serosanguinous drainage, no cellulitis or erythema noted        Assessment:       No diagnosis found.    Plan:   39 y.o. female status post TE removal 1 week ago, here for f/u visit, doing well.  - Doing well, no issues  - both drains removed, pt tolerated well  - Return to clinic in 1 week       All questions were answered. The patient was advised to call the clinic with any questions or concerns prior to their next visit.

## 2020-07-15 NOTE — PROGRESS NOTES
Subjective:      Staci Hodge is a 39 y.o. year old female who presents to the [facility] on 07/15/2020 for follow up visit status post TE expanders removal 7/8/20. Denies fever, chills, nausea, vomiting, or other systemic signs of infection. Pt says she has some pain near drain site on right side but otherwise has no complaints. Pt says left HELEN drained 5-15cc/day, and right HELEN is not draining anything.                     Vitals:     07/15/20 0935   BP: 122/82   Pulse: 104         Review of patient's allergies indicates:  No Known Allergies                 Current Outpatient Medications on File Prior to Visit   Medication Sig Dispense Refill    butalbital-acetaminophen-caffeine -40 mg (FIORICET, ESGIC) -40 mg per tablet Take 1 tablet by mouth every 6 (six) hours as needed for Headaches. Limit use to 2 days per week. 10 tablet 4    cephALEXin (KEFLEX) 500 MG capsule Take 1 capsule (500 mg total) by mouth every 12 (twelve) hours. for 10 days 20 capsule 0    clonazePAM (KLONOPIN) 1 MG tablet Take 1 tablet (1 mg total) by mouth 3 (three) times daily. 90 tablet 2    FLUoxetine 40 MG capsule Take 2 capsules (80 mg total) by mouth once daily. 180 capsule 1      No current facility-administered medications on file prior to visit.                Patient Active Problem List   Diagnosis    History of abnormal Pap smear    LLQ pain    Peutz-Jeghers syndrome    Weight loss    Leukocytosis    Tobacco abuse    Insomnia    Mood disorder    MARY LOU (generalized anxiety disorder)    Panic disorder with agoraphobia    PTSD (post-traumatic stress disorder)    Social anxiety disorder    Palpitations    Tachycardia    Arm numbness left    Stabbing headache    Tension headache    Right carpal tunnel syndrome    Migraine without aura    Microalbuminuria    Neck pain    Right arm numbness    Left carpal tunnel syndrome    Cervical paraspinal muscle spasm    Glucosuria with normal  serum glucose    History of pyelonephritis    Left arm numbness    Myofascial muscle pain    Cervical spondylosis    OCD (obsessive compulsive disorder)    Menorrhagia with regular cycle    Hypokalemia    Inguinal lymphadenopathy    Ductal carcinoma in situ (DCIS) of left breast    DCIS (ductal carcinoma in situ)    Menorrhagia with irregular cycle    S/P breast reconstruction    Possible pregnancy    S/P D&C (status post dilation and curettage)    Ectopic pregnancy    Encounter for adjustment or removal of unspecified breast implant    Personal history of breast cancer                   Past Surgical History:   Procedure Laterality Date    ADENOIDECTOMY        APPENDECTOMY        AUGMENTATION OF BREAST        BILATERAL MASTECTOMY Bilateral 7/23/2018     Procedure: MASTECTOMY 23 hr stay;  Surgeon: Sofia Kendrick MD;  Location: [address];  Service: General;  Laterality: Bilateral;    BOWEL RESECTION   10/17/14    BREAST BIOPSY Left 06/04/2018    BREAST CAPSULOTOMY Left 4/25/2019     Procedure: CAPSULOTOMY, BREAST LEFT;  Surgeon: Duane Bello MD;  Location: [address];  Service: Plastics;  Laterality: Left;    BREAST RECONSTRUCTION        BREAST SURGERY         Bilateral Mastectomy 07/23/2018    CARPAL TUNNEL RELEASE Bilateral      CARPAL TUNNEL RELEASE        COLONOSCOPY        DILATION AND CURETTAGE OF UTERUS        DILATION AND CURETTAGE OF UTERUS USING SUCTION N/A 2/25/2019     Procedure: DILATION AND CURETTAGE, UTERUS, USING SUCTION  PATHOLOGY NEEDED;  Surgeon: Pam Sifuentes MD;  Location: [facility];  Service: OB/GYN;  Laterality: N/A;    FAT TRANSFER Bilateral 11/12/2018     Procedure: TRANSFER, FAT TISSUE BILATERAL BREAST;  Surgeon: Duane Bello MD;  Location: [address];  Service: Plastics;  Laterality: Bilateral;    INJECTION FOR SENTINEL NODE IDENTIFICATION Left 7/23/2018     Procedure: INJECTION, FOR SENTINEL NODE IDENTIFICATION;   Surgeon: Sofia Kendrick MD;  [facility];  Service: General;  Laterality: Left;    INSERTION OF BREAST TISSUE EXPANDER Bilateral 7/23/2018     Procedure: INSERTION, TISSUE EXPANDER, BREAST;  Surgeon: Duane Bello MD;  [facility];  Service: Plastics;  Laterality: Bilateral;    INSERTION OF BREAST TISSUE EXPANDER Bilateral 3/10/2020     Procedure: INSERTION, TISSUE EXPANDER, BREAST, BILATERAL;  Surgeon: Duane Bello MD;  [facility];  Service: Plastics;  Laterality: Bilateral;    intestinal polpy        MASTECTOMY        REMOVAL OF BREAST IMPLANT Bilateral 3/10/2020     Procedure: REMOVAL, IMPLANT, BREAST, BILATERAL;  Surgeon: Duane Bello MD;  [facility];  Service: Plastics;  Laterality: Bilateral;    REMOVAL OF BREAST IMPLANT Bilateral 7/7/2020     Procedure: REMOVAL, IMPLANT, BREAST BILATERAL;  Surgeon: Duane Bello MD;  Location: [facility] OR [address] [place] [address];  Service: Plastics;  Laterality: Bilateral;    SENTINEL LYMPH NODE BIOPSY Left 7/23/2018     Procedure: BIOPSY, LYMPH NODE, SENTINEL;  Surgeon: Sofia Kendrick MD;  [facility];  Service: General;  Laterality: Left;    SMALL INTESTINE SURGERY         3 surgeries     TONSILLECTOMY        UPPER GASTROINTESTINAL ENDOSCOPY             Social History                   Socioeconomic History    Marital status: Single       Spouse name: Not on file    Number of children: 0    Years of education: Not on file    Highest education level: Not on file   Occupational History    Not on file   Social Needs    Financial resource strain: Not on file    Food insecurity       Worry: Not on file       Inability: Not on file    Transportation needs       Medical: Not on file       Non-medical: Not on file   Tobacco Use    Smoking status: Former Smoker       Packs/day: 0.50       Years: 17.00       Pack years: 8.50       Types: Cigarettes       Start date:  2000       Quit date: 2018       Years since quittin.9    Smokeless tobacco: Never Used    Tobacco comment: sometimes dry cough per patient   Substance and Sexual Activity    Alcohol use: Yes       Alcohol/week: 5.8 standard drinks       Types: 7 Standard drinks or equivalent per week       Comment: occasionally    Drug use: No    Sexual activity: Not Currently       Partners: Male   Lifestyle    Physical activity       Days per week: Not on file       Minutes per session: Not on file    Stress: Not on file   Relationships    Social connections       Talks on phone: Not on file       Gets together: Not on file       Attends [employer] service: Not on file       Active member of club or organization: Not on file       Attends meetings of clubs or organizations: Not on file       Relationship status: Not on file   Other Topics Concern    Patient feels they ought to cut down on drinking/drug use No    Patient annoyed by others criticizing their drinking/drug use No    Patient has felt bad or guilty about drinking/drug use No    Patient has had a drink/used drugs as an eye opener in the AM No   Social History Narrative     Had one previous miscarriage in      Never      currently in a relationship                  Review of Systems: n/a     Objective:      Physical Exam:        Vitals:     07/15/20 0935   BP: 122/82   Pulse: 104         WD WN NAD  VSS  Normal resp effort  Rt Breast: wound c/d/i, right drain present on suction without any drainage, no cellulitis or erythema noted  Lt Breast: wound c/d/i, left drain present on suction with very minimal serosanguinous drainage, no cellulitis or erythema noted           Assessment:       No diagnosis found.     Plan:   39 y.o. female status post TE removal 1 week ago, here for f/u visit, doing well.  - Doing well, no issues  - both drains removed, pt tolerated well  - Return to clinic in 1 week         All questions were answered. The  patient was advised to call the clinic with any questions or concerns prior to their next visit.

## 2020-07-15 NOTE — PROGRESS NOTES
I personally have examined the patient and agree with the resident's findings and plan of action.

## 2020-07-15 NOTE — H&P
Subjective:      Staci Hodge is a 39 y.o. year old female who presents to the [facility] on 07/15/2020 for follow up visit status post TE expanders removal 7/8/20. Denies fever, chills, nausea, vomiting, or other systemic signs of infection. Pt says she has some pain near drain site on right side but otherwise has no complaints. Pt says left HELEN drained 5-15cc/day, and right HELEN is not draining anything.                   Vitals:     07/15/20 0935   BP: 122/82   Pulse: 104         Review of patient's allergies indicates:  No Known Allergies            Current Outpatient Medications on File Prior to Visit   Medication Sig Dispense Refill    butalbital-acetaminophen-caffeine -40 mg (FIORICET, ESGIC) -40 mg per tablet Take 1 tablet by mouth every 6 (six) hours as needed for Headaches. Limit use to 2 days per week. 10 tablet 4    cephALEXin (KEFLEX) 500 MG capsule Take 1 capsule (500 mg total) by mouth every 12 (twelve) hours. for 10 days 20 capsule 0    clonazePAM (KLONOPIN) 1 MG tablet Take 1 tablet (1 mg total) by mouth 3 (three) times daily. 90 tablet 2    FLUoxetine 40 MG capsule Take 2 capsules (80 mg total) by mouth once daily. 180 capsule 1      No current facility-administered medications on file prior to visit.             Patient Active Problem List   Diagnosis    History of abnormal Pap smear    LLQ pain    Peutz-Jeghers syndrome    Weight loss    Leukocytosis    Tobacco abuse    Insomnia    Mood disorder    MARY LOU (generalized anxiety disorder)    Panic disorder with agoraphobia    PTSD (post-traumatic stress disorder)    Social anxiety disorder    Palpitations    Tachycardia    Arm numbness left    Stabbing headache    Tension headache    Right carpal tunnel syndrome    Migraine without aura    Microalbuminuria    Neck pain    Right arm numbness    Left carpal tunnel syndrome    Cervical paraspinal muscle spasm    Glucosuria with normal serum  glucose    History of pyelonephritis    Left arm numbness    Myofascial muscle pain    Cervical spondylosis    OCD (obsessive compulsive disorder)    Menorrhagia with regular cycle    Hypokalemia    Inguinal lymphadenopathy    Ductal carcinoma in situ (DCIS) of left breast    DCIS (ductal carcinoma in situ)    Menorrhagia with irregular cycle    S/P breast reconstruction    Possible pregnancy    S/P D&C (status post dilation and curettage)    Ectopic pregnancy    Encounter for adjustment or removal of unspecified breast implant    Personal history of breast cancer              Past Surgical History:   Procedure Laterality Date    ADENOIDECTOMY        APPENDECTOMY        AUGMENTATION OF BREAST        BILATERAL MASTECTOMY Bilateral 7/23/2018     Procedure: MASTECTOMY 23 hr stay;  Surgeon: Sofia Kendrick MD;  Location: [address];  Service: General;  Laterality: Bilateral;    BOWEL RESECTION   10/17/14    BREAST BIOPSY Left 06/04/2018    BREAST CAPSULOTOMY Left 4/25/2019     Procedure: CAPSULOTOMY, BREAST LEFT;  Surgeon: Duane Bello MD;  Location: [address];  Service: Plastics;  Laterality: Left;    BREAST RECONSTRUCTION        BREAST SURGERY         Bilateral Mastectomy 07/23/2018    CARPAL TUNNEL RELEASE Bilateral      CARPAL TUNNEL RELEASE        COLONOSCOPY        DILATION AND CURETTAGE OF UTERUS        DILATION AND CURETTAGE OF UTERUS USING SUCTION N/A 2/25/2019     Procedure: DILATION AND CURETTAGE, UTERUS, USING SUCTION  PATHOLOGY NEEDED;  Surgeon: Pam Sifuentes MD;  Location: [facility];  Service: OB/GYN;  Laterality: N/A;    FAT TRANSFER Bilateral 11/12/2018     Procedure: TRANSFER, FAT TISSUE BILATERAL BREAST;  Surgeon: Duane Bello MD;  Location: [address];  Service: Plastics;  Laterality: Bilateral;    INJECTION FOR SENTINEL NODE IDENTIFICATION Left 7/23/2018     Procedure: INJECTION, FOR SENTINEL NODE IDENTIFICATION;  Surgeon: Sofia HUERTA  MD Aroldo;  [facility];  Service: General;  Laterality: Left;    INSERTION OF BREAST TISSUE EXPANDER Bilateral 7/23/2018     Procedure: INSERTION, TISSUE EXPANDER, BREAST;  Surgeon: Duane Bello MD;  [facility];  Service: Plastics;  Laterality: Bilateral;    INSERTION OF BREAST TISSUE EXPANDER Bilateral 3/10/2020     Procedure: INSERTION, TISSUE EXPANDER, BREAST, BILATERAL;  Surgeon: Duane Bello MD;  [facility];  Service: Plastics;  Laterality: Bilateral;    intestinal polpy        MASTECTOMY        REMOVAL OF BREAST IMPLANT Bilateral 3/10/2020     Procedure: REMOVAL, IMPLANT, BREAST, BILATERAL;  Surgeon: Duane Bello MD;  [facility];  Service: Plastics;  Laterality: Bilateral;    REMOVAL OF BREAST IMPLANT Bilateral 7/7/2020     Procedure: REMOVAL, IMPLANT, BREAST BILATERAL;  Surgeon: Duane Bello MD;  Location: [facility] OR [address] [place] [address];  Service: Plastics;  Laterality: Bilateral;    SENTINEL LYMPH NODE BIOPSY Left 7/23/2018     Procedure: BIOPSY, LYMPH NODE, SENTINEL;  Surgeon: Sofia Kendrick MD;  [facility];  Service: General;  Laterality: Left;    SMALL INTESTINE SURGERY         3 surgeries     TONSILLECTOMY        UPPER GASTROINTESTINAL ENDOSCOPY            Social History               Socioeconomic History    Marital status: Single       Spouse name: Not on file    Number of children: 0    Years of education: Not on file    Highest education level: Not on file   Occupational History    Not on file   Social Needs    Financial resource strain: Not on file    Food insecurity       Worry: Not on file       Inability: Not on file    Transportation needs       Medical: Not on file       Non-medical: Not on file   Tobacco Use    Smoking status: Former Smoker       Packs/day: 0.50       Years: 17.00       Pack years: 8.50       Types: Cigarettes       Start date: 4/17/2000       Quit date:  2018       Years since quittin.9    Smokeless tobacco: Never Used    Tobacco comment: sometimes dry cough per patient   Substance and Sexual Activity    Alcohol use: Yes       Alcohol/week: 5.8 standard drinks       Types: 7 Standard drinks or equivalent per week       Comment: occasionally    Drug use: No    Sexual activity: Not Currently       Partners: Male   Lifestyle    Physical activity       Days per week: Not on file       Minutes per session: Not on file    Stress: Not on file   Relationships    Social connections       Talks on phone: Not on file       Gets together: Not on file       Attends [employer] service: Not on file       Active member of club or organization: Not on file       Attends meetings of clubs or organizations: Not on file       Relationship status: Not on file   Other Topics Concern    Patient feels they ought to cut down on drinking/drug use No    Patient annoyed by others criticizing their drinking/drug use No    Patient has felt bad or guilty about drinking/drug use No    Patient has had a drink/used drugs as an eye opener in the AM No   Social History Narrative     Had one previous miscarriage in      Never      currently in a relationship                 Review of Systems: n/a     Objective:      Physical Exam:      Vitals:     07/15/20 0935   BP: 122/82   Pulse: 104        WD WN NAD  VSS  Normal resp effort  Rt Breast: wound c/d/i, right drain present on suction without any drainage, no cellulitis or erythema noted  Lt Breast: wound c/d/i, left drain present on suction with very minimal serosanguinous drainage, no cellulitis or erythema noted           Assessment:       No diagnosis found.     Plan:   39 y.o. female status post TE removal 1 week ago, here for f/u visit, doing well.  - Doing well, no issues  - both drains removed, pt tolerated well  - Return to clinic in 1 week         All questions were answered. The patient was advised to call the  clinic with any questions or concerns prior to their next visit.

## 2020-07-22 ENCOUNTER — OFFICE VISIT (OUTPATIENT)
Dept: PLASTIC SURGERY | Facility: CLINIC | Age: 40
End: 2020-07-22
Payer: COMMERCIAL

## 2020-07-22 VITALS
DIASTOLIC BLOOD PRESSURE: 90 MMHG | BODY MASS INDEX: 27.8 KG/M2 | SYSTOLIC BLOOD PRESSURE: 127 MMHG | HEART RATE: 124 BPM | WEIGHT: 152 LBS

## 2020-07-22 DIAGNOSIS — Z09 SURGERY FOLLOW-UP EXAMINATION: Primary | ICD-10-CM

## 2020-07-22 PROCEDURE — 99024 POSTOP FOLLOW-UP VISIT: CPT | Mod: S$GLB,,, | Performed by: SURGERY

## 2020-07-22 PROCEDURE — 99999 PR PBB SHADOW E&M-EST. PATIENT-LVL III: ICD-10-PCS | Mod: PBBFAC,,, | Performed by: SURGERY

## 2020-07-22 PROCEDURE — 99999 PR PBB SHADOW E&M-EST. PATIENT-LVL III: CPT | Mod: PBBFAC,,, | Performed by: SURGERY

## 2020-07-22 PROCEDURE — 99024 PR POST-OP FOLLOW-UP VISIT: ICD-10-PCS | Mod: S$GLB,,, | Performed by: SURGERY

## 2020-07-22 NOTE — PROGRESS NOTES
Patient presents to Plastic surgery Clinic after having bilateral tissue expanders removed and no further reconstruction.  She presents today right breast is larger than the left.  Aspiration of the right chest wall skin her revealed approximately 75 cc of serous fluid.  Patient will return to clinic next week.  I discussed again in detail with the patient that she should be evaluated by micro surgeon for a free flap.  She understands this.

## 2020-07-28 DIAGNOSIS — G43.009 MIGRAINE WITHOUT AURA AND WITHOUT STATUS MIGRAINOSUS, NOT INTRACTABLE: ICD-10-CM

## 2020-07-28 RX ORDER — BUTALBITAL, ACETAMINOPHEN AND CAFFEINE 50; 325; 40 MG/1; MG/1; MG/1
TABLET ORAL
Qty: 10 TABLET | Refills: 4 | Status: SHIPPED | OUTPATIENT
Start: 2020-07-28 | End: 2020-09-24

## 2020-07-29 ENCOUNTER — OFFICE VISIT (OUTPATIENT)
Dept: PLASTIC SURGERY | Facility: CLINIC | Age: 40
End: 2020-07-29
Payer: COMMERCIAL

## 2020-07-29 VITALS
WEIGHT: 151.88 LBS | HEART RATE: 125 BPM | HEIGHT: 62 IN | BODY MASS INDEX: 27.95 KG/M2 | DIASTOLIC BLOOD PRESSURE: 85 MMHG | SYSTOLIC BLOOD PRESSURE: 141 MMHG

## 2020-07-29 DIAGNOSIS — Z09 SURGERY FOLLOW-UP EXAMINATION: Primary | ICD-10-CM

## 2020-07-29 PROCEDURE — 99999 PR PBB SHADOW E&M-EST. PATIENT-LVL III: ICD-10-PCS | Mod: PBBFAC,,, | Performed by: SURGERY

## 2020-07-29 PROCEDURE — 99999 PR PBB SHADOW E&M-EST. PATIENT-LVL III: CPT | Mod: PBBFAC,,, | Performed by: SURGERY

## 2020-07-29 PROCEDURE — 99024 PR POST-OP FOLLOW-UP VISIT: ICD-10-PCS | Mod: S$GLB,,, | Performed by: SURGERY

## 2020-07-29 PROCEDURE — 99024 POSTOP FOLLOW-UP VISIT: CPT | Mod: S$GLB,,, | Performed by: SURGERY

## 2020-07-29 NOTE — PROGRESS NOTES
Subjective:      Staci Hodge is a 39 y.o. year old female who presents to the Plastic Surgery Clinic on 07/29/2020 for follow up visit status post TE removal 7/8/20.   Patient was seen 1 week ago and right breast had 75cc of serous fluid aspirated. Pt continues to c/o right sided breast swelling and tenderness which is slightly improved but remains.  Denies fever, chills, nausea, vomiting, or other systemic signs of infection.    Vitals:    07/29/20 0853   BP: (!) 141/85   Pulse: (!) 125        Review of patient's allergies indicates:  No Known Allergies    Current Outpatient Medications on File Prior to Visit   Medication Sig Dispense Refill    butalbital-acetaminophen-caffeine -40 mg (FIORICET, ESGIC) -40 mg per tablet TAKE ONE TABLET BY MOUTH EVERY 6 HOURS AS NEEDED FOR HEADACHE. LIMIT USE TO TWO DAYS PER WEEK 10 tablet 4    clonazePAM (KLONOPIN) 1 MG tablet Take 1 tablet (1 mg total) by mouth 3 (three) times daily. 90 tablet 2    FLUoxetine 40 MG capsule Take 2 capsules (80 mg total) by mouth once daily. 180 capsule 1     No current facility-administered medications on file prior to visit.        Patient Active Problem List   Diagnosis    History of abnormal Pap smear    LLQ pain    Peutz-Jeghers syndrome    Weight loss    Leukocytosis    Tobacco abuse    Insomnia    Mood disorder    MARY LOU (generalized anxiety disorder)    Panic disorder with agoraphobia    PTSD (post-traumatic stress disorder)    Social anxiety disorder    Palpitations    Tachycardia    Arm numbness left    Stabbing headache    Tension headache    Right carpal tunnel syndrome    Migraine without aura    Microalbuminuria    Neck pain    Right arm numbness    Left carpal tunnel syndrome    Cervical paraspinal muscle spasm    Glucosuria with normal serum glucose    History of pyelonephritis    Left arm numbness    Myofascial muscle pain    Cervical spondylosis    OCD (obsessive compulsive  disorder)    Menorrhagia with regular cycle    Hypokalemia    Inguinal lymphadenopathy    Ductal carcinoma in situ (DCIS) of left breast    DCIS (ductal carcinoma in situ)    Menorrhagia with irregular cycle    S/P breast reconstruction    Possible pregnancy    S/P D&C (status post dilation and curettage)    Ectopic pregnancy    Encounter for adjustment or removal of unspecified breast implant    Personal history of breast cancer       Past Surgical History:   Procedure Laterality Date    ADENOIDECTOMY      APPENDECTOMY      AUGMENTATION OF BREAST      BILATERAL MASTECTOMY Bilateral 7/23/2018    Procedure: MASTECTOMY 23 hr stay;  Surgeon: Sofia Kendrick MD;  Location: [address];  Service: General;  Laterality: Bilateral;    BOWEL RESECTION  10/17/14    BREAST BIOPSY Left 06/04/2018    BREAST CAPSULOTOMY Left 4/25/2019    Procedure: CAPSULOTOMY, BREAST LEFT;  Surgeon: Duane Bello MD;  Location: [address];  Service: Plastics;  Laterality: Left;    BREAST RECONSTRUCTION      BREAST SURGERY      Bilateral Mastectomy 07/23/2018    CARPAL TUNNEL RELEASE Bilateral     CARPAL TUNNEL RELEASE      COLONOSCOPY      DILATION AND CURETTAGE OF UTERUS      DILATION AND CURETTAGE OF UTERUS USING SUCTION N/A 2/25/2019    Procedure: DILATION AND CURETTAGE, UTERUS, USING SUCTION  PATHOLOGY NEEDED;  Surgeon: Pam Sifuentes MD;  [facility];  Service: OB/GYN;  Laterality: N/A;    FAT TRANSFER Bilateral 11/12/2018    Procedure: TRANSFER, FAT TISSUE BILATERAL BREAST;  Surgeon: Duane Bello MD;  Location: [address];  Service: Plastics;  Laterality: Bilateral;    INJECTION FOR SENTINEL NODE IDENTIFICATION Left 7/23/2018    Procedure: INJECTION, FOR SENTINEL NODE IDENTIFICATION;  Surgeon: Sofia Kendrick MD;  Location: [address];  Service: General;  Laterality: Left;    INSERTION OF BREAST TISSUE EXPANDER Bilateral 7/23/2018    Procedure: INSERTION, TISSUE  EXPANDER, BREAST;  Surgeon: Duane Bello MD;  [facility];  Service: Plastics;  Laterality: Bilateral;    INSERTION OF BREAST TISSUE EXPANDER Bilateral 3/10/2020    Procedure: INSERTION, TISSUE EXPANDER, BREAST, BILATERAL;  Surgeon: Duane Bello MD;  [facility];  Service: Plastics;  Laterality: Bilateral;    intestinal polpy      MASTECTOMY      REMOVAL OF BREAST IMPLANT Bilateral 3/10/2020    Procedure: REMOVAL, IMPLANT, BREAST, BILATERAL;  Surgeon: Duane Bello MD;  [facility];  Service: Plastics;  Laterality: Bilateral;    REMOVAL OF BREAST IMPLANT Bilateral 2020    Procedure: REMOVAL, IMPLANT, BREAST BILATERAL;  Surgeon: Duane Bello MD;  Location: [facility] OR [address] [place] [address];  Service: Plastics;  Laterality: Bilateral;    SENTINEL LYMPH NODE BIOPSY Left 2018    Procedure: BIOPSY, LYMPH NODE, SENTINEL;  Surgeon: Sofia Kendrick MD;  Location: [address];  Service: General;  Laterality: Left;    SMALL INTESTINE SURGERY      3 surgeries     TONSILLECTOMY      UPPER GASTROINTESTINAL ENDOSCOPY         Social History     Socioeconomic History    Marital status: Single     Spouse name: Not on file    Number of children: 0    Years of education: Not on file    Highest education level: Not on file   Occupational History    Not on file   Social Needs    Financial resource strain: Not on file    Food insecurity     Worry: Not on file     Inability: Not on file    Transportation needs     Medical: Not on file     Non-medical: Not on file   Tobacco Use    Smoking status: Former Smoker     Packs/day: 0.50     Years: 17.00     Pack years: 8.50     Types: Cigarettes     Start date: 2000     Quit date: 2018     Years since quittin.0    Smokeless tobacco: Never Used    Tobacco comment: sometimes dry cough per patient   Substance and Sexual Activity    Alcohol use: Yes     Alcohol/week: 5.8 standard drinks      Types: 7 Standard drinks or equivalent per week     Comment: occasionally    Drug use: No    Sexual activity: Not Currently     Partners: Male   Lifestyle    Physical activity     Days per week: Not on file     Minutes per session: Not on file    Stress: Not on file   Relationships    Social connections     Talks on phone: Not on file     Gets together: Not on file     Attends Sabianist service: Not on file     Active member of club or organization: Not on file     Attends meetings of clubs or organizations: Not on file     Relationship status: Not on file   Other Topics Concern    Patient feels they ought to cut down on drinking/drug use No    Patient annoyed by others criticizing their drinking/drug use No    Patient has felt bad or guilty about drinking/drug use No    Patient has had a drink/used drugs as an eye opener in the AM No   Social History Narrative    Had one previous miscarriage in 2005    Never     currently in a relationship           Review of Systems: n/a    Objective:     Physical Exam:  Vitals:    07/29/20 0853   BP: (!) 141/85   Pulse: (!) 125       WD WN NAD  VSS  Normal resp effort  Lt Breast: no swelling or abnormalities noted  Rt Breast: +swelling, slight tenderness to palpation        Assessment:       No diagnosis found.    Plan:   39 y.o. female status post TE removal and subsequent aspiration of serous fluid from right breast 1 week ago. Pt continues to experience swelling and intermittent tenderness of right breast.    - right sided breast drain placed, pt tolerated well. Serous fluid drained, and breast instantly much more flat. Patient will follow drain output and f/u with us           All questions were answered. The patient was advised to call the clinic with any questions or concerns prior to their next visit.

## 2020-08-05 ENCOUNTER — OFFICE VISIT (OUTPATIENT)
Dept: PLASTIC SURGERY | Facility: CLINIC | Age: 40
End: 2020-08-05
Payer: COMMERCIAL

## 2020-08-05 VITALS
SYSTOLIC BLOOD PRESSURE: 123 MMHG | WEIGHT: 151.88 LBS | HEART RATE: 129 BPM | DIASTOLIC BLOOD PRESSURE: 85 MMHG | HEIGHT: 62 IN | BODY MASS INDEX: 27.95 KG/M2

## 2020-08-05 DIAGNOSIS — Z09 SURGERY FOLLOW-UP EXAMINATION: Primary | ICD-10-CM

## 2020-08-05 PROCEDURE — 99024 PR POST-OP FOLLOW-UP VISIT: ICD-10-PCS | Mod: S$GLB,,, | Performed by: SURGERY

## 2020-08-05 PROCEDURE — 99024 POSTOP FOLLOW-UP VISIT: CPT | Mod: S$GLB,,, | Performed by: SURGERY

## 2020-08-05 PROCEDURE — 99999 PR PBB SHADOW E&M-EST. PATIENT-LVL III: ICD-10-PCS | Mod: PBBFAC,,, | Performed by: SURGERY

## 2020-08-05 PROCEDURE — 99999 PR PBB SHADOW E&M-EST. PATIENT-LVL III: CPT | Mod: PBBFAC,,, | Performed by: SURGERY

## 2020-08-05 NOTE — PROGRESS NOTES
Patient presents to Plastic surgery Clinic after having bilateral breast reconstructiont implants  removed.  Patient underwent bilateral breast reconstruction and had some minor asymmetries and was not happy.  After attempting to talk to her about not removing the implants or doing bilateral ISIS flaps, because of her young young age, she refused all therapies and just wanted them removed.  Recently, she has had a seroma in the right breast after removal we had to place a drain.  She now draining about 25 cc per day.  I discussed with her that she if someone in her hometown is a nurse her physician they can always take the drain out.  On she will see if her nurse practitioner can remove it but only after discussing the amount of drainage with me.  If she can not remove the drains in her hometown she will come back here in 1-2 weeks.

## 2020-08-11 DIAGNOSIS — F41.9 ANXIETY: ICD-10-CM

## 2020-08-11 RX ORDER — CLONAZEPAM 1 MG/1
TABLET ORAL
Qty: 90 TABLET | Refills: 2 | Status: SHIPPED | OUTPATIENT
Start: 2020-08-11 | End: 2020-12-12 | Stop reason: SDUPTHER

## 2020-08-12 ENCOUNTER — OFFICE VISIT (OUTPATIENT)
Dept: PLASTIC SURGERY | Facility: CLINIC | Age: 40
End: 2020-08-12
Payer: COMMERCIAL

## 2020-08-12 VITALS
WEIGHT: 151.88 LBS | HEIGHT: 62 IN | DIASTOLIC BLOOD PRESSURE: 88 MMHG | SYSTOLIC BLOOD PRESSURE: 112 MMHG | HEART RATE: 77 BPM | BODY MASS INDEX: 27.95 KG/M2

## 2020-08-12 DIAGNOSIS — Z09 SURGERY FOLLOW-UP EXAMINATION: Primary | ICD-10-CM

## 2020-08-12 PROCEDURE — 99999 PR PBB SHADOW E&M-EST. PATIENT-LVL III: ICD-10-PCS | Mod: PBBFAC,,, | Performed by: PHYSICIAN ASSISTANT

## 2020-08-12 PROCEDURE — 99999 PR PBB SHADOW E&M-EST. PATIENT-LVL III: CPT | Mod: PBBFAC,,, | Performed by: PHYSICIAN ASSISTANT

## 2020-08-12 PROCEDURE — 99024 POSTOP FOLLOW-UP VISIT: CPT | Mod: S$GLB,,, | Performed by: PHYSICIAN ASSISTANT

## 2020-08-12 PROCEDURE — 99024 PR POST-OP FOLLOW-UP VISIT: ICD-10-PCS | Mod: S$GLB,,, | Performed by: PHYSICIAN ASSISTANT

## 2020-08-12 NOTE — PROGRESS NOTES
Subjective:      Staci Hodge is a 39 y.o. year old female who presents to [facility] on 08/12/2020 for follow up visit status post removal of B breast implants by Dr. Duane Bello on 07/07/2020. Denies fever, chills, nausea, vomiting, or other systemic signs of infection.    Vitals:    08/12/20 1446   BP: 112/88   Pulse: 77        Review of patient's allergies indicates:  No Known Allergies    Current Outpatient Medications on File Prior to Visit   Medication Sig Dispense Refill    butalbital-acetaminophen-caffeine -40 mg (FIORICET, ESGIC) -40 mg per tablet TAKE ONE TABLET BY MOUTH EVERY 6 HOURS AS NEEDED FOR HEADACHE. LIMIT USE TO TWO DAYS PER WEEK 10 tablet 4    clonazePAM (KLONOPIN) 1 MG tablet TAKE ONE TABLET BY MOUTH THREE TIMES DAILY 90 tablet 2    FLUoxetine 40 MG capsule Take 2 capsules (80 mg total) by mouth once daily. 180 capsule 1     No current facility-administered medications on file prior to visit.        Patient Active Problem List   Diagnosis    History of abnormal Pap smear    LLQ pain    Peutz-Jeghers syndrome    Weight loss    Leukocytosis    Tobacco abuse    Insomnia    Mood disorder    MARY LOU (generalized anxiety disorder)    Panic disorder with agoraphobia    PTSD (post-traumatic stress disorder)    Social anxiety disorder    Palpitations    Tachycardia    Arm numbness left    Stabbing headache    Tension headache    Right carpal tunnel syndrome    Migraine without aura    Microalbuminuria    Neck pain    Right arm numbness    Left carpal tunnel syndrome    Cervical paraspinal muscle spasm    Glucosuria with normal serum glucose    History of pyelonephritis    Left arm numbness    Myofascial muscle pain    Cervical spondylosis    OCD (obsessive compulsive disorder)    Menorrhagia with regular cycle    Hypokalemia    Inguinal lymphadenopathy    Ductal carcinoma in situ (DCIS) of left breast    DCIS (ductal  carcinoma in situ)    Menorrhagia with irregular cycle    S/P breast reconstruction    Possible pregnancy    S/P D&C (status post dilation and curettage)    Ectopic pregnancy    Encounter for adjustment or removal of unspecified breast implant    Personal history of breast cancer       Past Surgical History:   Procedure Laterality Date    ADENOIDECTOMY      APPENDECTOMY      AUGMENTATION OF BREAST      BILATERAL MASTECTOMY Bilateral 7/23/2018    Procedure: MASTECTOMY 23 hr stay;  Surgeon: Sofia Kendrick MD;  Location: [address];  Service: General;  Laterality: Bilateral;    BOWEL RESECTION  10/17/14    BREAST BIOPSY Left 06/04/2018    BREAST CAPSULOTOMY Left 4/25/2019    Procedure: CAPSULOTOMY, BREAST LEFT;  Surgeon: Duane Bello MD;  Location: [facility] OR [address] [place] [address];  Service: Plastics;  Laterality: Left;    BREAST RECONSTRUCTION      BREAST SURGERY      Bilateral Mastectomy 07/23/2018    CARPAL TUNNEL RELEASE Bilateral     CARPAL TUNNEL RELEASE      COLONOSCOPY      DILATION AND CURETTAGE OF UTERUS      DILATION AND CURETTAGE OF UTERUS USING SUCTION N/A 2/25/2019    Procedure: DILATION AND CURETTAGE, UTERUS, USING SUCTION  PATHOLOGY NEEDED;  Surgeon: Pam Sifuentes MD;  Location: [facility];  Service: OB/GYN;  Laterality: N/A;    FAT TRANSFER Bilateral 11/12/2018    Procedure: TRANSFER, FAT TISSUE BILATERAL BREAST;  Surgeon: Duane Bello MD;  Location: [address];  Service: Plastics;  Laterality: Bilateral;    INJECTION FOR SENTINEL NODE IDENTIFICATION Left 7/23/2018    Procedure: INJECTION, FOR SENTINEL NODE IDENTIFICATION;  Surgeon: Sofia Kendrick MD;  Location: [address];  Service: General;  Laterality: Left;    INSERTION OF BREAST TISSUE EXPANDER Bilateral 7/23/2018    Procedure: INSERTION, TISSUE EXPANDER, BREAST;  Surgeon: Duane Bello MD;  Location: [address];  Service: Plastics;  Laterality: Bilateral;    INSERTION OF BREAST TISSUE  EXPANDER Bilateral 3/10/2020    Procedure: INSERTION, TISSUE EXPANDER, BREAST, BILATERAL;  Surgeon: Duane Bello MD;  Location: [facility] OR [address] [place] [address];  Service: Plastics;  Laterality: Bilateral;    intestinal polpy      MASTECTOMY      REMOVAL OF BREAST IMPLANT Bilateral 3/10/2020    Procedure: REMOVAL, IMPLANT, BREAST, BILATERAL;  Surgeon: Duane eBllo MD;  [facility];  Service: Plastics;  Laterality: Bilateral;    REMOVAL OF BREAST IMPLANT Bilateral 2020    Procedure: REMOVAL, IMPLANT, BREAST BILATERAL;  Surgeon: Duane Bello MD;  [facility];  Service: Plastics;  Laterality: Bilateral;    SENTINEL LYMPH NODE BIOPSY Left 2018    Procedure: BIOPSY, LYMPH NODE, SENTINEL;  Surgeon: Sofia Kendrick MD;  Location: [facility] OR [address] [place] [address];  Service: General;  Laterality: Left;    SMALL INTESTINE SURGERY      3 surgeries     TONSILLECTOMY      UPPER GASTROINTESTINAL ENDOSCOPY         Social History     Socioeconomic History    Marital status: Single     Spouse name: Not on file    Number of children: 0    Years of education: Not on file    Highest education level: Not on file   Occupational History    Not on file   Social Needs    Financial resource strain: Not on file    Food insecurity     Worry: Not on file     Inability: Not on file    Transportation needs     Medical: Not on file     Non-medical: Not on file   Tobacco Use    Smoking status: Former Smoker     Packs/day: 0.50     Years: 17.00     Pack years: 8.50     Types: Cigarettes     Start date: 2000     Quit date: 2018     Years since quittin.0    Smokeless tobacco: Never Used    Tobacco comment: sometimes dry cough per patient   Substance and Sexual Activity    Alcohol use: Yes     Alcohol/week: 5.8 standard drinks     Types: 7 Standard drinks or equivalent per week     Comment: occasionally    Drug use: No    Sexual activity: Not Currently     Partners: Male   Lifestyle  "   Physical activity     Days per week: Not on file     Minutes per session: Not on file    Stress: Not on file   Relationships    Social connections     Talks on phone: Not on file     Gets together: Not on file     Attends [employer] service: Not on file     Active member of club or organization: Not on file     Attends meetings of clubs or organizations: Not on file     Relationship status: Not on file   Other Topics Concern    Patient feels they ought to cut down on drinking/drug use No    Patient annoyed by others criticizing their drinking/drug use No    Patient has felt bad or guilty about drinking/drug use No    Patient has had a drink/used drugs as an eye opener in the AM No   Social History Narrative    Had one previous miscarriage in 2005    Never     currently in a relationship       MRN:[id number]  CSN:[id number]  Date of surgery 07/07/2020  Preoperative diagnosis history of breast cancer  Postoperative diagnosis is the same  Procedure performed:  Removal of bilateral breast tissue expanders  Surgeon:  Ace  Anesthesia:  General  Drains:  X2  Complications:  None  Blood loss:  Minimal    Review of Systems: negative    Objective:     Physical Exam:  Vitals:    08/12/20 1446   BP: 112/88   Pulse: 77       WD WN NAD  VSS  Normal resp effort  R breast - incision CDI, no erythema or drainage, surgically absent nipple, no seroma, suture intact  L breast - incision CDI, no erythema or drainage, surgically absent nipple, no seroma        Assessment:       1. Surgery follow-up examination        Plan:   39 y.o. female status post removal of B  - Doing well, no issues  - Patient seen and evaluated by myself and Dr. Duane eBllo    - R breast drain removed as she reports <20cc/24 hours, serous output noted in bulb.  - Patient called with concern that "stitch was out", on examination the drain stitch was pulled out and no longer attached but incisional sutures were intact. Patient also concerned " of foul odor from breast but there was no foul odor noted today.   - Return to clinic in 2 weeks, appt scheduled.       All questions were answered. The patient was advised to call the clinic with any questions or concerns prior to their next visit.           Audrey Aguilera PA-C  Plastic and Reconstruction Surgery Department  [phone] office

## 2020-08-14 ENCOUNTER — PATIENT MESSAGE (OUTPATIENT)
Dept: PLASTIC SURGERY | Facility: CLINIC | Age: 40
End: 2020-08-14

## 2020-08-14 RX ORDER — SULFAMETHOXAZOLE AND TRIMETHOPRIM 800; 160 MG/1; MG/1
1 TABLET ORAL 2 TIMES DAILY
Qty: 14 TABLET | Refills: 0 | Status: SHIPPED | OUTPATIENT
Start: 2020-08-14 | End: 2020-08-21

## 2020-08-26 ENCOUNTER — OFFICE VISIT (OUTPATIENT)
Dept: PLASTIC SURGERY | Facility: CLINIC | Age: 40
End: 2020-08-26
Payer: COMMERCIAL

## 2020-08-26 VITALS
SYSTOLIC BLOOD PRESSURE: 122 MMHG | HEIGHT: 62 IN | HEART RATE: 77 BPM | DIASTOLIC BLOOD PRESSURE: 80 MMHG | WEIGHT: 151.88 LBS | BODY MASS INDEX: 27.95 KG/M2

## 2020-08-26 DIAGNOSIS — Z09 SURGERY FOLLOW-UP EXAMINATION: Primary | ICD-10-CM

## 2020-08-26 PROCEDURE — 99024 POSTOP FOLLOW-UP VISIT: CPT | Mod: S$GLB,,, | Performed by: PHYSICIAN ASSISTANT

## 2020-08-26 PROCEDURE — 99024 PR POST-OP FOLLOW-UP VISIT: ICD-10-PCS | Mod: S$GLB,,, | Performed by: PHYSICIAN ASSISTANT

## 2020-08-26 PROCEDURE — 99999 PR PBB SHADOW E&M-EST. PATIENT-LVL III: ICD-10-PCS | Mod: PBBFAC,,, | Performed by: PHYSICIAN ASSISTANT

## 2020-08-26 PROCEDURE — 99999 PR PBB SHADOW E&M-EST. PATIENT-LVL III: CPT | Mod: PBBFAC,,, | Performed by: PHYSICIAN ASSISTANT

## 2020-08-26 NOTE — PROGRESS NOTES
Subjective:      Staci Hodge is a 39 y.o. year old female who presents to the [facility] Surgery Clinic on 08/26/2020 for follow up visit status post removal of B breast implants by Dr. Duane Bello on 07/07/2020. She is doing well today with no issues since her last visit. Denies fever, chills, nausea, vomiting, or other systemic signs of infection.    Vitals:    08/26/20 1425   BP: 122/80   Pulse: 77        Review of patient's allergies indicates:  No Known Allergies    Current Outpatient Medications on File Prior to Visit   Medication Sig Dispense Refill    butalbital-acetaminophen-caffeine -40 mg (FIORICET, ESGIC) -40 mg per tablet TAKE ONE TABLET BY MOUTH EVERY 6 HOURS AS NEEDED FOR HEADACHE. LIMIT USE TO TWO DAYS PER WEEK 10 tablet 4    clonazePAM (KLONOPIN) 1 MG tablet TAKE ONE TABLET BY MOUTH THREE TIMES DAILY 90 tablet 2    FLUoxetine 40 MG capsule Take 2 capsules (80 mg total) by mouth once daily. 180 capsule 1     No current facility-administered medications on file prior to visit.        Patient Active Problem List   Diagnosis    History of abnormal Pap smear    LLQ pain    Peutz-Jeghers syndrome    Weight loss    Leukocytosis    Tobacco abuse    Insomnia    Mood disorder    MARY LOU (generalized anxiety disorder)    Panic disorder with agoraphobia    PTSD (post-traumatic stress disorder)    Social anxiety disorder    Palpitations    Tachycardia    Arm numbness left    Stabbing headache    Tension headache    Right carpal tunnel syndrome    Migraine without aura    Microalbuminuria    Neck pain    Right arm numbness    Left carpal tunnel syndrome    Cervical paraspinal muscle spasm    Glucosuria with normal serum glucose    History of pyelonephritis    Left arm numbness    Myofascial muscle pain    Cervical spondylosis    OCD (obsessive compulsive disorder)    Menorrhagia with regular cycle    Hypokalemia    Inguinal lymphadenopathy    Ductal  carcinoma in situ (DCIS) of left breast    DCIS (ductal carcinoma in situ)    Menorrhagia with irregular cycle    S/P breast reconstruction    Possible pregnancy    S/P D&C (status post dilation and curettage)    Ectopic pregnancy    Encounter for adjustment or removal of unspecified breast implant    Personal history of breast cancer       Past Surgical History:   Procedure Laterality Date    ADENOIDECTOMY      APPENDECTOMY      AUGMENTATION OF BREAST      BILATERAL MASTECTOMY Bilateral 7/23/2018    Procedure: MASTECTOMY 23 hr stay;  Surgeon: Sofia Kendrick MD;  Location: [address];  Service: General;  Laterality: Bilateral;    BOWEL RESECTION  10/17/14    BREAST BIOPSY Left 06/04/2018    BREAST CAPSULOTOMY Left 4/25/2019    Procedure: CAPSULOTOMY, BREAST LEFT;  Surgeon: Duane Bello MD;  Location: [address];  Service: Plastics;  Laterality: Left;    BREAST RECONSTRUCTION      BREAST SURGERY      Bilateral Mastectomy 07/23/2018    CARPAL TUNNEL RELEASE Bilateral     CARPAL TUNNEL RELEASE      COLONOSCOPY      DILATION AND CURETTAGE OF UTERUS      DILATION AND CURETTAGE OF UTERUS USING SUCTION N/A 2/25/2019    Procedure: DILATION AND CURETTAGE, UTERUS, USING SUCTION  PATHOLOGY NEEDED;  Surgeon: Pam Sifuentes MD;  Location: [facility];  Service: OB/GYN;  Laterality: N/A;    FAT TRANSFER Bilateral 11/12/2018    Procedure: TRANSFER, FAT TISSUE BILATERAL BREAST;  Surgeon: Duane Bello MD;  Location: [address];  Service: Plastics;  Laterality: Bilateral;    INJECTION FOR SENTINEL NODE IDENTIFICATION Left 7/23/2018    Procedure: INJECTION, FOR SENTINEL NODE IDENTIFICATION;  Surgeon: Sofia Kendrick MD;  Location: [address];  Service: General;  Laterality: Left;    INSERTION OF BREAST TISSUE EXPANDER Bilateral 7/23/2018    Procedure: INSERTION, TISSUE EXPANDER, BREAST;  Surgeon: Duane Bello MD;  Location: [address];  Service: Plastics;   Laterality: Bilateral;    INSERTION OF BREAST TISSUE EXPANDER Bilateral 3/10/2020    Procedure: INSERTION, TISSUE EXPANDER, BREAST, BILATERAL;  Surgeon: Duane Bello MD;  [facility];  Service: Plastics;  Laterality: Bilateral;    intestinal polpy      MASTECTOMY      REMOVAL OF BREAST IMPLANT Bilateral 3/10/2020    Procedure: REMOVAL, IMPLANT, BREAST, BILATERAL;  Surgeon: Duane Bello MD;  Location: [facility] OR [address] [place] [address];  Service: Plastics;  Laterality: Bilateral;    REMOVAL OF BREAST IMPLANT Bilateral 2020    Procedure: REMOVAL, IMPLANT, BREAST BILATERAL;  Surgeon: Duane Bello MD;  [facility];  Service: Plastics;  Laterality: Bilateral;    SENTINEL LYMPH NODE BIOPSY Left 2018    Procedure: BIOPSY, LYMPH NODE, SENTINEL;  Surgeon: Sofia Kendrick MD;  Location: [facility] OR [address] [place] [address];  Service: General;  Laterality: Left;    SMALL INTESTINE SURGERY      3 surgeries     TONSILLECTOMY      UPPER GASTROINTESTINAL ENDOSCOPY         Social History     Socioeconomic History    Marital status: Single     Spouse name: Not on file    Number of children: 0    Years of education: Not on file    Highest education level: Not on file   Occupational History    Not on file   Social Needs    Financial resource strain: Not on file    Food insecurity     Worry: Not on file     Inability: Not on file    Transportation needs     Medical: Not on file     Non-medical: Not on file   Tobacco Use    Smoking status: Former Smoker     Packs/day: 0.50     Years: 17.00     Pack years: 8.50     Types: Cigarettes     Start date: 2000     Quit date: 2018     Years since quittin.0    Smokeless tobacco: Never Used    Tobacco comment: sometimes dry cough per patient   Substance and Sexual Activity    Alcohol use: Yes     Alcohol/week: 5.8 standard drinks     Types: 7 Standard drinks or equivalent per week     Comment: occasionally    Drug use: No    Sexual  activity: Not Currently     Partners: Male   Lifestyle    Physical activity     Days per week: Not on file     Minutes per session: Not on file    Stress: Not on file   Relationships    Social connections     Talks on phone: Not on file     Gets together: Not on file     Attends Nondenominational service: Not on file     Active member of club or organization: Not on file     Attends meetings of clubs or organizations: Not on file     Relationship status: Not on file   Other Topics Concern    Patient feels they ought to cut down on drinking/drug use No    Patient annoyed by others criticizing their drinking/drug use No    Patient has felt bad or guilty about drinking/drug use No    Patient has had a drink/used drugs as an eye opener in the AM No   Social History Narrative    Had one previous miscarriage in 2005    Never     currently in a relationship       MRN:[id number]  CSN:[id number]  Date of surgery 07/07/2020  Preoperative diagnosis history of breast cancer  Postoperative diagnosis is the same  Procedure performed:  Removal of bilateral breast tissue expanders  Surgeon:  Ace  Anesthesia:  General  Drains:  X2  Complications:  None  Blood loss:  Minimal    Review of Systems: negative    Objective:     Physical Exam:  Vitals:    08/26/20 1425   BP: 122/80   Pulse: 77       WD WN NAD  VSS  Normal resp effort  R breast - incision CDI, no erythema or drainage, surgically absent nipple, no seroma  L breast - incision CDI, no erythema or drainage, surgically absent nipple, no seroma        Assessment:       1. Surgery follow-up examination        Plan:   39 y.o. female status post removal of B  - Doing well, no issues  - No seroma or signs of infection  - Can resume normal daily activity  - Return to clinic in 2 months, appt scheduled.       All questions were answered. The patient was advised to call the clinic with any questions or concerns prior to their next visit.           Audrey Aguilera PA-C  Plastic  and Reconstruction Surgery Department  [phone] office

## 2020-09-23 DIAGNOSIS — G43.009 MIGRAINE WITHOUT AURA AND WITHOUT STATUS MIGRAINOSUS, NOT INTRACTABLE: ICD-10-CM

## 2020-09-24 RX ORDER — BUTALBITAL, ACETAMINOPHEN AND CAFFEINE 50; 325; 40 MG/1; MG/1; MG/1
TABLET ORAL
Qty: 10 TABLET | Refills: 4 | Status: SHIPPED | OUTPATIENT
Start: 2020-09-24 | End: 2020-11-19

## 2020-09-29 ENCOUNTER — OFFICE VISIT (OUTPATIENT)
Dept: INTERNAL MEDICINE | Facility: CLINIC | Age: 40
End: 2020-09-29
Payer: COMMERCIAL

## 2020-09-29 VITALS
HEART RATE: 112 BPM | TEMPERATURE: 98 F | WEIGHT: 156.31 LBS | SYSTOLIC BLOOD PRESSURE: 112 MMHG | DIASTOLIC BLOOD PRESSURE: 86 MMHG | HEIGHT: 62 IN | RESPIRATION RATE: 18 BRPM | BODY MASS INDEX: 28.76 KG/M2 | OXYGEN SATURATION: 98 %

## 2020-09-29 DIAGNOSIS — M46.92 UNSPECIFIED INFLAMMATORY SPONDYLOPATHY, CERVICAL REGION: ICD-10-CM

## 2020-09-29 DIAGNOSIS — Q85.89 PEUTZ-JEGHERS SYNDROME: ICD-10-CM

## 2020-09-29 DIAGNOSIS — M94.0 COSTOCHONDRITIS: Primary | ICD-10-CM

## 2020-09-29 DIAGNOSIS — F39 MOOD DISORDER: ICD-10-CM

## 2020-09-29 PROCEDURE — 96372 PR INJECTION,THERAP/PROPH/DIAG2ST, IM OR SUBCUT: ICD-10-PCS | Mod: S$GLB,,, | Performed by: NURSE PRACTITIONER

## 2020-09-29 PROCEDURE — 99999 PR PBB SHADOW E&M-EST. PATIENT-LVL IV: CPT | Mod: PBBFAC,,, | Performed by: NURSE PRACTITIONER

## 2020-09-29 PROCEDURE — 3008F BODY MASS INDEX DOCD: CPT | Mod: CPTII,S$GLB,, | Performed by: NURSE PRACTITIONER

## 2020-09-29 PROCEDURE — 99999 PR PBB SHADOW E&M-EST. PATIENT-LVL IV: ICD-10-PCS | Mod: PBBFAC,,, | Performed by: NURSE PRACTITIONER

## 2020-09-29 PROCEDURE — 96372 THER/PROPH/DIAG INJ SC/IM: CPT | Mod: S$GLB,,, | Performed by: NURSE PRACTITIONER

## 2020-09-29 PROCEDURE — 99214 OFFICE O/P EST MOD 30 MIN: CPT | Mod: S$GLB,25,, | Performed by: NURSE PRACTITIONER

## 2020-09-29 PROCEDURE — 3008F PR BODY MASS INDEX (BMI) DOCUMENTED: ICD-10-PCS | Mod: CPTII,S$GLB,, | Performed by: NURSE PRACTITIONER

## 2020-09-29 PROCEDURE — 99214 PR OFFICE/OUTPT VISIT, EST, LEVL IV, 30-39 MIN: ICD-10-PCS | Mod: S$GLB,25,, | Performed by: NURSE PRACTITIONER

## 2020-09-29 RX ORDER — METHYLPREDNISOLONE ACETATE 80 MG/ML
80 INJECTION, SUSPENSION INTRA-ARTICULAR; INTRALESIONAL; INTRAMUSCULAR; SOFT TISSUE
Status: COMPLETED | OUTPATIENT
Start: 2020-09-29 | End: 2020-09-29

## 2020-09-29 RX ORDER — PHENTERMINE HYDROCHLORIDE 37.5 MG/1
18.75 TABLET ORAL 2 TIMES DAILY
Status: ON HOLD | COMMUNITY
Start: 2020-09-17 | End: 2021-01-15 | Stop reason: CLARIF

## 2020-09-29 RX ORDER — IBUPROFEN 800 MG/1
800 TABLET ORAL 3 TIMES DAILY PRN
Qty: 90 TABLET | Refills: 0 | Status: SHIPPED | OUTPATIENT
Start: 2020-09-29 | End: 2021-03-01

## 2020-09-29 RX ORDER — KETOROLAC TROMETHAMINE 30 MG/ML
60 INJECTION, SOLUTION INTRAMUSCULAR; INTRAVENOUS
Status: COMPLETED | OUTPATIENT
Start: 2020-09-29 | End: 2020-09-29

## 2020-09-29 RX ADMIN — METHYLPREDNISOLONE ACETATE 80 MG: 80 INJECTION, SUSPENSION INTRA-ARTICULAR; INTRALESIONAL; INTRAMUSCULAR; SOFT TISSUE at 09:09

## 2020-09-29 RX ADMIN — KETOROLAC TROMETHAMINE 60 MG: 30 INJECTION, SOLUTION INTRAMUSCULAR; INTRAVENOUS at 09:09

## 2020-09-29 NOTE — PROGRESS NOTES
Subjective:       Patient ID: Staci Hodge is a 40 y.o. female.    Chief Complaint: Chest Pain (x few days- comes and goes )    Patient is known, to me and presents with   Chief Complaint   Patient presents with    Chest Pain     x few days- comes and goes    .   Patient presents chest wall pain that began a couple of days ago. Not associated with any sob or dyspnea nor fever or chills. States that she quit smoking long time ago. Otherwise began with this pain to sternum area. No nausea or vomiting and no diaphoresis. No radiation of pain     HPI  Review of Systems   Constitutional: Negative for activity change, appetite change, fatigue, fever and unexpected weight change.   HENT: Negative for congestion, ear discharge, ear pain, hearing loss, postnasal drip and tinnitus.    Eyes: Negative for photophobia, pain and visual disturbance.   Respiratory: Negative for cough, shortness of breath, wheezing and stridor.    Cardiovascular: Positive for chest pain. Negative for palpitations and leg swelling.   Gastrointestinal: Negative for abdominal distention.   Genitourinary: Negative for difficulty urinating, dysuria, frequency, hematuria and urgency.   Musculoskeletal: Negative for arthralgias, back pain, gait problem, joint swelling and neck pain.   Skin: Negative.    Neurological: Negative for dizziness, seizures, syncope, weakness, light-headedness, numbness and headaches.   Hematological: Negative for adenopathy. Does not bruise/bleed easily.   Psychiatric/Behavioral: Negative for behavioral problems, confusion and hallucinations.       Objective:      Physical Exam  Constitutional:       General: She is not in acute distress.     Appearance: She is well-developed.   HENT:      Head: Normocephalic and atraumatic.      Right Ear: External ear normal.      Left Ear: External ear normal.      Mouth/Throat:      Pharynx: No oropharyngeal exudate.   Eyes:      General:         Right eye: No discharge.          Left eye: No discharge.      Conjunctiva/sclera: Conjunctivae normal.      Pupils: Pupils are equal, round, and reactive to light.   Neck:      Musculoskeletal: Normal range of motion and neck supple.      Thyroid: No thyromegaly.      Vascular: No JVD.   Cardiovascular:      Rate and Rhythm: Normal rate and regular rhythm.      Heart sounds: Normal heart sounds. No murmur. No friction rub. No gallop.    Pulmonary:      Effort: Pulmonary effort is normal. No respiratory distress.      Breath sounds: Normal breath sounds. No stridor. No wheezing or rales.      Comments: Upon palpation of chest wall region particularly to left side had pain and winced. No rashes noted  Chest:      Chest wall: Tenderness present.       Abdominal:      General: Bowel sounds are normal. There is no distension.      Palpations: Abdomen is soft. There is no mass.      Tenderness: There is no abdominal tenderness. There is no rebound.   Musculoskeletal: Normal range of motion.         General: No swelling, tenderness, deformity or signs of injury.      Right lower leg: No edema.      Left lower leg: No edema.   Lymphadenopathy:      Cervical: No cervical adenopathy.   Skin:     General: Skin is warm and dry.      Capillary Refill: Capillary refill takes less than 2 seconds.      Coloration: Skin is not pale.      Findings: No erythema or rash.   Neurological:      Mental Status: She is alert and oriented to person, place, and time.      Cranial Nerves: No cranial nerve deficit.      Motor: No abnormal muscle tone.      Coordination: Coordination normal.      Deep Tendon Reflexes: Reflexes are normal and symmetric. Reflexes normal.   Psychiatric:         Mood and Affect: Mood normal.         Behavior: Behavior normal.         Thought Content: Thought content normal.         Judgment: Judgment normal.         Assessment:       1. Costochondritis    2. Peutz-Jeghers syndrome    3. Unspecified inflammatory spondylopathy, cervical region    4.  "Mood disorder        Plan:   Staci was seen today for chest pain.    Diagnoses and all orders for this visit:    Costochondritis  -     methylPREDNISolone acetate injection 80 mg  -     ketorolac injection 60 mg  -     ibuprofen (ADVIL,MOTRIN) 800 MG tablet; Take 1 tablet (800 mg total) by mouth 3 (three) times daily as needed.  -     EKG 12-lead; Future    Peutz-Jeghers syndrome    Unspecified inflammatory spondylopathy, cervical region    Mood disorder    "This note will not be shared with the patient."  Explained to her the above dx and will need to take nsaid's for a couple of days  If chest pain worsens and is accompanied by other s/s go to ER  For now will treat as above  ekg nsr with no ischemia  rtc as scheduled       "

## 2020-09-29 NOTE — PATIENT INSTRUCTIONS
Costochondritis    Costochondritis is inflammation of a rib or the cartilage that connects a rib to your breastbone (sternum). It causes tenderness, and sometimes chest pain may be sharp or aching, or it may feel like pressure. Pain may get worse with deep breathing, movement, or exercise. In some cases, the pain is mistaken for a heart attack. Despite this, the condition is not serious. Read on to learn more about the condition and how it can be treated.  What causes costochondritis?  The cause of costochondritis is not completely clear, but it may happen after a chest injury, chest infection, or coughing episode. Some physical activities can sometimes lead to costochondritis. Large-breasted women may be more likely to have the condition. Often, the reason for the inflammation is unknown.  Diagnosing costochondritis  There is no test for costochondritis. The condition is diagnosed by the symptoms you have. Your healthcare provider will perform a physical exam. He or she will ask you about your symptoms and examine your chest for tenderness. In some cases, tests are done to rule out more serious problems. These tests may include imaging tests such as chest X-ray, CT scan, or an ECG.  Treating costochondritis  If an underlying cause is found, treatment for that will likely relieve the problem. Costochondritis often goes away on its own. The course of the condition varies from person to person. It usually lasts from weeks to months. In some cases, mild symptoms continue for months to years. To ease symptoms:  · Take medicine as directed. These relieve pain and swelling. Ibuprofen or other NSAIDs are often recommended. In some cases, you may be given prescription medicine, such as muscle relaxants.  · Avoid activities that put stress on the chest or spine.  · Apply a heating pad (set to warm, not too high, heat) to the breastbone several times a day.  · Perform stretching exercises as directed.  Call the healthcare  provider right away if you have any of the following:  · Pain that is not relieved by medicine  · Shortness of breath  · Lightheadedness, dizziness, or fainting  · Feeling of irregular heartbeat or fast pulse  Anyone with chest pain should see a healthcare provider, especially those who are older and may be at risk for heart disease.   Date Last Reviewed: 10/1/2016  © 5001-0561 [employer]. [address] [place] [address], [place] [address]. All rights reserved. This information is not intended as a substitute for professional medical care. Always follow your healthcare professional's instructions.        Chest Wall Pain: Costochondritis    The chest pain that you have had today is caused by costochondritis. This condition is caused by an inflammation of the cartilage joining your ribs to your breastbone. It is not caused by heart or lung problems. Your healthcare team has made sure that the chest pain you feel is not from a life threatening cause of chest pain such as heart attack, collapsed lung, blood clot in the lung, tear in the aorta, or esophageal rupture. The inflammation may have been brought on by a blow to the chest, lifting heavy objects, intense exercise, or an illness that made you cough and sneeze a lot. It often occurs during times of emotional stress. It can be painful, but it is not dangerous. It usually goes away in 1 to 2 weeks. But it may happen again. Rarely, a more serious condition may cause symptoms similar to costochondritis. Thats why its important to watch for the warning signs listed below.  Home care  Follow these guidelines when caring for yourself at home:  · If you feel that emotional stress is a cause of your condition, try to figure out the sources of that stress. It may not be obvious. Learn ways to deal with the stress in your life. This can include regular exercise, muscle relaxation, meditation, or simply taking time out for yourself.  · You may use acetaminophen,  ibuprofen, or naproxen to control pain, unless another pain medicine was prescribed. If you have liver or kidney disease or ever had a stomach ulcer, talk with your healthcare provider before using these medicines.  · You can also help ease pain by using a hot, wet compress or heating pad. Use this with or without a medicated skin cream that helps relieves pain.  · Do stretching exercise as advised by your provider.  · Take any prescribed medicines as directed.  Follow-up care  Follow up with your healthcare provider, or as advised, if you do not start to get better in the next 2 days.  When to seek medical advice  Call your healthcare provider right away if any of these occur:  · A change in the type of pain. Call if it feels different, becomes more serious, lasts longer, or spreads into your shoulder, arm, neck, jaw, or back.  · Shortness of breath or pain gets worse when you breathe  · Weakness, dizziness, or fainting  · Cough with dark-colored sputum (phlegm) or blood  · Abdominal pain  · Dark red or black stools  · Fever of 100.4ºF (38ºC) or higher, or as directed by your healthcare provider  Date Last Reviewed: 12/1/2016  © 2169-8744 [employer]. [address] [place] [address], [place] [address]. All rights reserved. This information is not intended as a substitute for professional medical care. Always follow your healthcare professional's instructions.

## 2020-10-20 ENCOUNTER — PATIENT OUTREACH (OUTPATIENT)
Dept: ADMINISTRATIVE | Facility: OTHER | Age: 40
End: 2020-10-20

## 2020-10-20 VITALS — SYSTOLIC BLOOD PRESSURE: 114 MMHG | DIASTOLIC BLOOD PRESSURE: 80 MMHG

## 2020-11-12 ENCOUNTER — HOSPITAL ENCOUNTER (EMERGENCY)
Facility: HOSPITAL | Age: 40
Discharge: HOME | End: 2020-11-12
Attending: SURGERY
Payer: COMMERCIAL

## 2020-11-12 VITALS
HEART RATE: 112 BPM | OXYGEN SATURATION: 99 % | SYSTOLIC BLOOD PRESSURE: 161 MMHG | DIASTOLIC BLOOD PRESSURE: 107 MMHG | BODY MASS INDEX: 30.12 KG/M2 | TEMPERATURE: 97 F | WEIGHT: 164.69 LBS | RESPIRATION RATE: 16 BRPM

## 2020-11-12 DIAGNOSIS — R10.9 ABDOMINAL PAIN: ICD-10-CM

## 2020-11-12 LAB
ALBUMIN SERPL BCP-MCNC: 4.1 G/DL (ref 3.5–5.2)
ALP SERPL-CCNC: 111 U/L (ref 55–135)
ALT SERPL W/O P-5'-P-CCNC: 20 U/L (ref 10–44)
AMPHET+METHAMPHET UR QL: NEGATIVE
ANION GAP SERPL CALC-SCNC: 7 MMOL/L (ref 8–16)
AST SERPL-CCNC: 17 U/L (ref 10–40)
B-HCG UR QL: NEGATIVE
BARBITURATES UR QL SCN>200 NG/ML: NORMAL
BASOPHILS # BLD AUTO: 0.06 K/UL (ref 0–0.2)
BASOPHILS NFR BLD: 0.8 % (ref 0–1.9)
BENZODIAZ UR QL SCN>200 NG/ML: NEGATIVE
BILIRUB SERPL-MCNC: 0.4 MG/DL (ref 0.1–1)
BILIRUB UR QL STRIP: NEGATIVE
BUN SERPL-MCNC: 9 MG/DL (ref 6–20)
BZE UR QL SCN: NEGATIVE
CALCIUM SERPL-MCNC: 8.7 MG/DL (ref 8.7–10.5)
CANNABINOIDS UR QL SCN: NEGATIVE
CHLORIDE SERPL-SCNC: 105 MMOL/L (ref 95–110)
CK MB SERPL-MCNC: 0.7 NG/ML (ref 0.1–6.5)
CK MB SERPL-RTO: 1.1 % (ref 0–5)
CK SERPL-CCNC: 66 U/L (ref 20–180)
CK SERPL-CCNC: 66 U/L (ref 20–180)
CLARITY UR: CLEAR
CO2 SERPL-SCNC: 27 MMOL/L (ref 23–29)
COLOR UR: YELLOW
CREAT SERPL-MCNC: 0.8 MG/DL (ref 0.5–1.4)
CREAT UR-MCNC: 116.1 MG/DL (ref 15–325)
DIFFERENTIAL METHOD: NORMAL
EOSINOPHIL # BLD AUTO: 0.3 K/UL (ref 0–0.5)
EOSINOPHIL NFR BLD: 3.7 % (ref 0–8)
ERYTHROCYTE [DISTWIDTH] IN BLOOD BY AUTOMATED COUNT: 12.8 % (ref 11.5–14.5)
EST. GFR  (AFRICAN AMERICAN): >60 ML/MIN/1.73 M^2
EST. GFR  (NON AFRICAN AMERICAN): >60 ML/MIN/1.73 M^2
GLUCOSE SERPL-MCNC: 93 MG/DL (ref 70–110)
GLUCOSE UR QL STRIP: NEGATIVE
HCT VFR BLD AUTO: 41.1 % (ref 37–48.5)
HGB BLD-MCNC: 13.6 G/DL (ref 12–16)
HGB UR QL STRIP: NEGATIVE
IMM GRANULOCYTES # BLD AUTO: 0.02 K/UL (ref 0–0.04)
IMM GRANULOCYTES NFR BLD AUTO: 0.3 % (ref 0–0.5)
KETONES UR QL STRIP: NEGATIVE
LEUKOCYTE ESTERASE UR QL STRIP: NEGATIVE
LIPASE SERPL-CCNC: 20 U/L (ref 4–60)
LYMPHOCYTES # BLD AUTO: 1.7 K/UL (ref 1–4.8)
LYMPHOCYTES NFR BLD: 21.8 % (ref 18–48)
MCH RBC QN AUTO: 29.7 PG (ref 27–31)
MCHC RBC AUTO-ENTMCNC: 33.1 G/DL (ref 32–36)
MCV RBC AUTO: 90 FL (ref 82–98)
METHADONE UR QL SCN>300 NG/ML: NEGATIVE
MONOCYTES # BLD AUTO: 0.7 K/UL (ref 0.3–1)
MONOCYTES NFR BLD: 9.1 % (ref 4–15)
NEUTROPHILS # BLD AUTO: 4.9 K/UL (ref 1.8–7.7)
NEUTROPHILS NFR BLD: 64.3 % (ref 38–73)
NITRITE UR QL STRIP: NEGATIVE
NRBC BLD-RTO: 0 /100 WBC
OPIATES UR QL SCN: NEGATIVE
PCP UR QL SCN>25 NG/ML: NEGATIVE
PH UR STRIP: 8 [PH] (ref 5–8)
PLATELET # BLD AUTO: 302 K/UL (ref 150–350)
PMV BLD AUTO: 9.8 FL (ref 9.2–12.9)
POTASSIUM SERPL-SCNC: 4.2 MMOL/L (ref 3.5–5.1)
PROT SERPL-MCNC: 6.9 G/DL (ref 6–8.4)
PROT UR QL STRIP: NEGATIVE
RBC # BLD AUTO: 4.58 M/UL (ref 4–5.4)
SODIUM SERPL-SCNC: 139 MMOL/L (ref 136–145)
SP GR UR STRIP: 1.02 (ref 1–1.03)
TOXICOLOGY INFORMATION: NORMAL
TROPONIN I SERPL DL<=0.01 NG/ML-MCNC: 0.01 NG/ML (ref 0–0.03)
URN SPEC COLLECT METH UR: NORMAL
UROBILINOGEN UR STRIP-ACNC: NEGATIVE EU/DL
WBC # BLD AUTO: 7.61 K/UL (ref 3.9–12.7)

## 2020-11-12 PROCEDURE — 83690 ASSAY OF LIPASE: CPT

## 2020-11-12 PROCEDURE — 63600175 PHARM REV CODE 636 W HCPCS: Performed by: SURGERY

## 2020-11-12 PROCEDURE — 93010 ELECTROCARDIOGRAM REPORT: CPT | Mod: ,,, | Performed by: INTERNAL MEDICINE

## 2020-11-12 PROCEDURE — 36415 COLL VENOUS BLD VENIPUNCTURE: CPT

## 2020-11-12 PROCEDURE — 96372 THER/PROPH/DIAG INJ SC/IM: CPT

## 2020-11-12 PROCEDURE — 99285 EMERGENCY DEPT VISIT HI MDM: CPT | Mod: 25

## 2020-11-12 PROCEDURE — 81003 URINALYSIS AUTO W/O SCOPE: CPT | Mod: 59

## 2020-11-12 PROCEDURE — 85025 COMPLETE CBC W/AUTO DIFF WBC: CPT

## 2020-11-12 PROCEDURE — 80307 DRUG TEST PRSMV CHEM ANLYZR: CPT

## 2020-11-12 PROCEDURE — 25000003 PHARM REV CODE 250: Performed by: SURGERY

## 2020-11-12 PROCEDURE — 84484 ASSAY OF TROPONIN QUANT: CPT

## 2020-11-12 PROCEDURE — 80053 COMPREHEN METABOLIC PANEL: CPT

## 2020-11-12 PROCEDURE — 82550 ASSAY OF CK (CPK): CPT

## 2020-11-12 PROCEDURE — 93010 EKG 12-LEAD: ICD-10-PCS | Mod: ,,, | Performed by: INTERNAL MEDICINE

## 2020-11-12 PROCEDURE — 82553 CREATINE MB FRACTION: CPT

## 2020-11-12 PROCEDURE — 93005 ELECTROCARDIOGRAM TRACING: CPT

## 2020-11-12 PROCEDURE — 81025 URINE PREGNANCY TEST: CPT

## 2020-11-12 RX ORDER — DICYCLOMINE HYDROCHLORIDE 10 MG/ML
20 INJECTION INTRAMUSCULAR
Status: COMPLETED | OUTPATIENT
Start: 2020-11-12 | End: 2020-11-12

## 2020-11-12 RX ORDER — ONDANSETRON 2 MG/ML
4 INJECTION INTRAMUSCULAR; INTRAVENOUS
Status: COMPLETED | OUTPATIENT
Start: 2020-11-12 | End: 2020-11-12

## 2020-11-12 RX ORDER — SUCRALFATE 1 G/1
1 TABLET ORAL 4 TIMES DAILY
Qty: 120 TABLET | Refills: 0 | Status: SHIPPED | OUTPATIENT
Start: 2020-11-12 | End: 2020-12-12

## 2020-11-12 RX ORDER — PANTOPRAZOLE SODIUM 40 MG/1
40 TABLET, DELAYED RELEASE ORAL DAILY
Qty: 30 TABLET | Refills: 0 | Status: SHIPPED | OUTPATIENT
Start: 2020-11-12 | End: 2020-12-08 | Stop reason: SDUPTHER

## 2020-11-12 RX ORDER — PROMETHAZINE HYDROCHLORIDE 25 MG/1
25 TABLET ORAL EVERY 6 HOURS PRN
Qty: 15 TABLET | Refills: 0 | Status: SHIPPED | OUTPATIENT
Start: 2020-11-12 | End: 2021-03-01

## 2020-11-12 RX ORDER — DICYCLOMINE HYDROCHLORIDE 20 MG/1
20 TABLET ORAL 4 TIMES DAILY PRN
Qty: 15 TABLET | Refills: 0 | Status: SHIPPED | OUTPATIENT
Start: 2020-11-12 | End: 2020-12-12

## 2020-11-12 RX ADMIN — LIDOCAINE HYDROCHLORIDE 50 ML: 20 SOLUTION ORAL; TOPICAL at 08:11

## 2020-11-12 RX ADMIN — DICYCLOMINE HYDROCHLORIDE 20 MG: 20 INJECTION, SOLUTION INTRAMUSCULAR at 09:11

## 2020-11-12 RX ADMIN — ONDANSETRON 4 MG: 2 INJECTION INTRAMUSCULAR; INTRAVENOUS at 09:11

## 2020-11-12 NOTE — Clinical Note
"Issac Russo accompanied Staci "Staci" Naveen to the emergency department on 11/12/2020. They may return to work on 11/13/2020.      If you have any questions or concerns, please don't hesitate to call.      TATUM Garcia RN"

## 2020-11-12 NOTE — Clinical Note
"Staci Alfonso" Naveen was seen and treated in our emergency department on 11/12/2020.  She may return to work on 11/16/2020.       If you have any questions or concerns, please don't hesitate to call.       RN    "

## 2020-11-12 NOTE — ED PROVIDER NOTES
[facility] Emergency Room                                                 Chief Complaint  40 y.o. female with Abdominal Cramps    History of Present Illness  Staci Hodge presents to the emergency room with abdominal cramps  Patient with abdominal cramps this week, worse last night at home prior to arrival  She has a long history of abdominal pain with a history of Peutz-Jeghers syndrome  Patient states she has epigastric pain today, was lost to GI follow-up years ago  Soft abdominal exam with no signs of peritonitis or rebound noted on evaluation  Pt has had normal bowel movements this week, denies any emesis with cramps    The history is provided by the patient   device was not used during this ER visit    Past Medical History   -- Abnormal Pap smear of cervix    -- Anxiety    -- Breast cancer    -- Cancer    -- Cervical spondylosis    -- Colon polyp    -- Depression    -- Ectopic pregnancy    -- General anesthetics causing adverse effect in therapeutic use    -- Headache    -- History of psychiatric hospitalization    -- Hx of psychiatric care    -- Hypertension    -- Peutz-Jeghers syndrome    -- PONV (postoperative nausea and vomiting)    -- Psychiatric problem    -- Right carpal tunnel syndrome    -- Self-harming behavior    -- Suicide attempt    -- Therapy      Past Surgical History   -- ADENOIDECTOMY     -- APPENDECTOMY     -- AUGMENTATION OF BREAST     -- BILATERAL MASTECTOMY     -- BOWEL RESECTION     -- BREAST BIOPSY     -- BREAST CAPSULOTOMY     -- BREAST RECONSTRUCTION     -- BREAST SURGERY     -- CARPAL TUNNEL RELEASE     -- CARPAL TUNNEL RELEASE     -- COLONOSCOPY     -- DILATION AND CURETTAGE OF UTERUS     -- FAT TRANSFER     -- INJECTION FOR SENTINEL NODE IDENTIFICATION     -- INSERTION OF BREAST TISSUE EXPANDER     -- INSERTION OF BREAST TISSUE EXPANDER     -- intestinal polpy     -- MASTECTOMY     -- REMOVAL OF BREAST IMPLANT     -- REMOVAL OF BREAST IMPLANT      -- SENTINEL LYMPH NODE BIOPSY     -- SMALL INTESTINE SURGERY     -- TONSILLECTOMY     -- UPPER GASTROINTESTINAL ENDOSCOPY        No Known Allergies     I have reviewed all of this patient's past medical, surgical, family, and social   histories as well as active allergies and medications documented in the  electronic medical record    Review of Systems and Physical Exam      Review of Systems  -- Constitution - no fever, denies fatigue, no weakness, no chills  -- Eyes - no tearing or redness, no visual disturbance  -- Ear, Nose - no tinnitus or earache, no nasal congestion or discharge  -- Mouth,Throat - no sore throat, no toothache, normal voice, normal swallowing  -- Respiratory - denies cough and congestion, no shortness of breath, no ANGLIN  -- Cardiovascular - denies chest pain, no palpitations, denies claudication  -- Gastrointestinal - abdominal cramps with no nausea vomiting  -- Genitourinary - no dysuria, denies flank pain, no hematuria, no STD risk  -- Musculoskeletal - denies back pain, negative for trauma or injury  -- Neurological - no headache, denies weakness or seizure; no LOC  -- Skin - denies pallor, rash, or changes in skin. no hives or welts noted    Vital Signs  Her oral temperature is 96.8 °F (36 °C).   Her blood pressure is 161/107 and her pulse is 112   Her respiration is 16 and oxygen saturation is 99%.     Physical Exam  -- Nursing note and vitals reviewed  -- Constitutional: Appears well-developed and well-nourished  -- Head: Atraumatic. Normocephalic. No obvious abnormality  -- Eyes: Pupils are equal and reactive to light. Normal conjunctiva and lids  -- Cardiac: Normal rate, regular rhythm and normal heart sounds  -- Pulmonary: Normal respiratory effort, breath sounds clear to auscultation  -- Abdominal: Soft, no tenderness. Normal bowel sounds. Normal liver edge  -- Musculoskeletal: Normal range of motion, no effusions. Joints stable   -- Neurological: No focal deficits. Showed good  interaction with staff  -- Vascular: Posterior tibial, dorsalis pedis and radial pulses 2+ bilaterally      Emergency Room Course      Lab Results     K 4.2      CO2 27   BUN 9   CREATININE 0.8   GLU 93   ALKPHOS 111   AST 17   ALT 20   BILITOT 0.4   ALBUMIN 4.1   PROT 6.9   WBC 7.61   HGB 13.6   HCT 41.1      CPK 66   CPK 66   CPKMB 0.7   TROPONINI 0.013     Urinalysis  -- Urinalysis performed during this ER visit showed no signs of infection    -- The urine pregnancy test today was negative; patient informed of the results     EKG  -- The EKG findings today were without concerning findings from baseline     Radiology  -- Flat and erect abdominal x-ray performed in the ER today was within normal limits  -- The US of the gallbladder performed in the ER today was negative     Medications Given  dicyclomine injection 20 mg (has no administration in time range)   ondansetron injection 4 mg (has no administration in time range)   GI cocktail (mylanta 30 mL, lidocaine 2 % viscous 10 mL, dicyclomine 10 mL) 50 mL      ED Physician Management  -- Diagnosis management comments: 40 y.o. female with abdominal pain  -- chronic abdominal pain with Peutz-Jeghers syndrome over years now  -- patient does not have a gastroenterology appointment for another month  -- patient with nonspecific exam without normal x-ray an ultrasound today  -- patient with no signs of peritonitis or rebound today in the ER  -- GI cocktail helped, patient we placed on GI meds on discharge  -- return to the ER with any concerning symptoms after discharge today  -- follow-up with PCP in the next 48 hrs, I will contact GI MD via [employer] for close her point    Diagnosis  [R10.9] Abdominal pain    Disposition and Plan  -- Disposition: home  -- Condition: stable  -- Follow-up: Patient to follow up with Shilpi Clayton NP in 1-2 days.  -- I advised the patient that we have found no life threatening condition today  -- At this time, I believe  the patient is clinically stable for discharge.   -- The patient acknowledges that close follow up with a MD is required   -- Patient agrees to comply with all instruction and direction given in the ER    This note is dictated on M*Modal word recognition program.  There are word recognition mistakes that are occasionally missed on review.         Joe Love MD  11/12/20 0900

## 2020-11-13 NOTE — PROGRESS NOTES
Kept in house for pain and non-mobility. Very drowsy and still having pain with certain movements.      Afeb, vitals stable    On exam, vac has a small leak, which isn't an issue with the hospital vac, but portable prevena vac having difficulty keeping up with the leaks, as evidenced by the motor constantly running  Drains with SS fluid    R sided drain 70cc  L sided drain 115cc    36F POD1 bilat mastectomies, TE expander placement.  -we will come by this early afternoon to revise the prevena dressing and replace the vac  -otherwise ok to go home from our standpoint afterwards  -cont keflex on DC   Vermilion Border Text: The closure involved the vermilion border.

## 2020-11-15 ENCOUNTER — PATIENT MESSAGE (OUTPATIENT)
Dept: GASTROENTEROLOGY | Facility: CLINIC | Age: 40
End: 2020-11-15

## 2020-11-16 ENCOUNTER — PATIENT MESSAGE (OUTPATIENT)
Dept: GASTROENTEROLOGY | Facility: CLINIC | Age: 40
End: 2020-11-16

## 2020-11-19 DIAGNOSIS — G43.009 MIGRAINE WITHOUT AURA AND WITHOUT STATUS MIGRAINOSUS, NOT INTRACTABLE: ICD-10-CM

## 2020-11-19 RX ORDER — BUTALBITAL, ACETAMINOPHEN AND CAFFEINE 50; 325; 40 MG/1; MG/1; MG/1
TABLET ORAL
Qty: 10 TABLET | Refills: 4 | Status: SHIPPED | OUTPATIENT
Start: 2020-11-19 | End: 2021-01-19 | Stop reason: SDUPTHER

## 2020-12-08 ENCOUNTER — OFFICE VISIT (OUTPATIENT)
Dept: GASTROENTEROLOGY | Facility: CLINIC | Age: 40
End: 2020-12-08
Payer: COMMERCIAL

## 2020-12-08 DIAGNOSIS — Q85.89 PEUTZ-JEGHERS POLYPS OF SMALL BOWEL: ICD-10-CM

## 2020-12-08 DIAGNOSIS — Q85.89 PEUTZ-JEGHERS SYNDROME: Primary | ICD-10-CM

## 2020-12-08 DIAGNOSIS — R10.10 UPPER ABDOMINAL PAIN: ICD-10-CM

## 2020-12-08 DIAGNOSIS — R19.4 CHANGE IN BOWEL HABITS: ICD-10-CM

## 2020-12-08 DIAGNOSIS — K21.9 GASTROESOPHAGEAL REFLUX DISEASE, UNSPECIFIED WHETHER ESOPHAGITIS PRESENT: ICD-10-CM

## 2020-12-08 DIAGNOSIS — R11.0 NAUSEA: ICD-10-CM

## 2020-12-08 DIAGNOSIS — Z90.49 HISTORY OF RESECTION OF SMALL BOWEL: ICD-10-CM

## 2020-12-08 DIAGNOSIS — Z87.19 HISTORY OF SMALL BOWEL OBSTRUCTION: ICD-10-CM

## 2020-12-08 PROCEDURE — 99204 OFFICE O/P NEW MOD 45 MIN: CPT | Mod: 95,,, | Performed by: INTERNAL MEDICINE

## 2020-12-08 PROCEDURE — 1126F PR PAIN SEVERITY QUANTIFIED, NO PAIN PRESENT: ICD-10-PCS | Mod: ,,, | Performed by: INTERNAL MEDICINE

## 2020-12-08 PROCEDURE — 99204 PR OFFICE/OUTPT VISIT, NEW, LEVL IV, 45-59 MIN: ICD-10-PCS | Mod: 95,,, | Performed by: INTERNAL MEDICINE

## 2020-12-08 PROCEDURE — 1126F AMNT PAIN NOTED NONE PRSNT: CPT | Mod: ,,, | Performed by: INTERNAL MEDICINE

## 2020-12-08 RX ORDER — PANTOPRAZOLE SODIUM 40 MG/1
40 TABLET, DELAYED RELEASE ORAL DAILY
Qty: 30 TABLET | Refills: 2 | Status: SHIPPED | OUTPATIENT
Start: 2020-12-08 | End: 2021-03-15 | Stop reason: SDUPTHER

## 2020-12-08 NOTE — PROGRESS NOTES
[facility] Consultation Note    Reason for Consult:    Chief Complaint   Patient presents with    [facility]    EGD    Colonoscopy    Abdominal Pain    Heartburn       PCP:   Shilpi Clayton    Referring MD:  No referring provider defined for this encounter.      Gastroenterology Telemedicine Virtual Visit    The patient location is:  Patient Home  The chief complaint leading to consultation is:  Peutz-Jegher's syndrome; abdominal pain and changes in bowel habits  Visit type: Virtual visit with synchronous audio and video      HPI:  Staci Hodge is a 40 y.o. female here for evaluation of upper abdominal pain and changes in bowel habits as well as history of Peutz-Jeghers syndrome with small-bowel polyps.  She has not seen and when our department in about 5 and half years.  Last seen by Dr. Dillon 03/18/2015 for upper endoscopy due to history of small bowel polyps.  An EUS of the pancreas was done at that time with normal results.  Previously had a large hamartomatous polyp removed from the duodenum 04/17/2012.  She has not seen a gastroenterologist since that time and has not had any endoscopic procedures since that time.  She is followed closely by gynecologic Oncology.    She has been having intermittent upper abdominal pain that has necessitated a visit to the emergency department on 1 occasion.  She is also experiencing heartburn for the last 3 weeks.  Heartburn is almost nightly.  She did not previously have problems with this.  Symptoms are not related to eating.  She has mild nausea with the pain, and has vomited on 1 occasion.  She vomited up bile material.  Her bowel movements are not as regular.  She has been having intermittent diarrhea, which is unusual for her.  She denies blood in stool.  She denies NSAID use.  She was given Protonix, which she has been taking daily.  She has continued to have symptoms, although not as severe.  Bentyl did not  help.    Records reviewed.  History of small-bowel obstruction in October 2014 secondary to anastomotic stricture, adhesions, and small bowel polyp.  She has had multiple abdominal surgeries in the past with resections of portions of the small bowel.    Her last colonoscopy in our system was 07/02/2009.  This was done by Dr. Brown.  The exam reach the cecum and the bowel preparation was good.  Five, 30-40 mm polyps were removed from the transverse colon and hepatic flexure.  No pathology report available in our system.        ROS:  Constitutional: No fevers, chills, No weight loss, normal appetite  ENT: No congestion, rhinorrhea, or chronic sinus problems  CV: No chest pain or palpitations  Pulm: No cough, No shortness of breath  Ophtho: No vision changes or pain  GI: see HPI        Medical History:  has a past medical history of Abnormal Pap smear of cervix (age 25), Anxiety, Breast cancer, Cancer (07/11/2018), Cervical spondylosis (2/9/2018), Colon polyp, Depression, Ectopic pregnancy (2/26/2019), General anesthetics causing adverse effect in therapeutic use, Headache, History of psychiatric hospitalization, psychiatric care, Hypertension, Peutz-Jeghers syndrome, PONV (postoperative nausea and vomiting), Psychiatric problem, Right carpal tunnel syndrome (3/9/2017), Self-harming behavior, Suicide attempt, and Therapy.    Surgical History:  has a past surgical history that includes Appendectomy; Adenoidectomy; Tonsillectomy; intestinal polpy; Colonoscopy; Upper gastrointestinal endoscopy; Small intestine surgery; Bowel resection (10/17/14); Carpal tunnel release (Bilateral); Carpal tunnel release; Dilation and curettage of uterus; Breast biopsy (Left, 06/04/2018); Bilateral mastectomy (Bilateral, 7/23/2018); Injection for sentinel node identification (Left, 7/23/2018); Jackson lymph node biopsy (Left, 7/23/2018); Insertion of breast tissue expander (Bilateral, 7/23/2018); Fat transfer (Bilateral, 11/12/2018); Breast  surgery; Dilation and curettage of uterus using suction (N/A, 2/25/2019); Breast reconstruction; Mastectomy; Augmentation of breast; Breast capsulotomy (Left, 4/25/2019); Removal of breast implant (Bilateral, 3/10/2020); Insertion of breast tissue expander (Bilateral, 3/10/2020); and Removal of breast implant (Bilateral, 7/7/2020).    Family History: family history includes Breast cancer in her paternal aunt and paternal grandmother; Colon cancer (age of onset: 33) in her mother; Colon polyps in her sister; Hypertension in her father.    Social History:  reports that she quit smoking about 2 years ago. Her smoking use included cigarettes. She started smoking about 20 years ago. She has a 8.50 pack-year smoking history. She has never used smokeless tobacco. She reports current alcohol use of about 5.8 standard drinks of alcohol per week. She reports that she does not use drugs.    Review of patient's allergies indicates:  No Known Allergies    Prior to Admission medications    Medication Sig Start Date End Date Taking? Authorizing Provider   butalbital-acetaminophen-caffeine -40 mg (FIORICET, ESGIC) -40 mg per tablet TAKE ONE TABLET BY MOUTH EVERY 6 HOURS AS NEEDED FOR HEADACHE. LIMIT USE TO TWO DAYS PER WEEK 11/19/20  Yes Shilpi Clayton NP   clonazePAM (KLONOPIN) 1 MG tablet TAKE ONE TABLET BY MOUTH THREE TIMES DAILY 8/11/20  Yes Shilpi Clayton NP   dicyclomine (BENTYL) 20 mg tablet Take 1 tablet (20 mg total) by mouth 4 (four) times daily as needed (CRAMPS). 11/12/20 12/12/20 Yes Joe Love MD   FLUoxetine 40 MG capsule Take 2 capsules (80 mg total) by mouth once daily. 5/21/20  Yes Shilpi Clayton NP   ibuprofen (ADVIL,MOTRIN) 800 MG tablet Take 1 tablet (800 mg total) by mouth 3 (three) times daily as needed. 9/29/20  Yes Shilpi Clayton NP   pantoprazole (PROTONIX) 40 MG tablet Take 1 tablet (40 mg total) by mouth once daily. 12/8/20 3/8/21 Yes Vincenzo Herbert MD    promethazine (PHENERGAN) 25 MG tablet Take 1 tablet (25 mg total) by mouth every 6 (six) hours as needed for Nausea. 11/12/20  Yes Joe Love MD   sucralfate (CARAFATE) 1 gram tablet Take 1 tablet (1 g total) by mouth 4 (four) times daily. 11/12/20 12/12/20 Yes Joe Love MD   pantoprazole (PROTONIX) 40 MG tablet Take 1 tablet (40 mg total) by mouth once daily. 11/12/20 12/8/20 Yes Joe Love MD   phentermine (ADIPEX-P) 37.5 mg tablet Take 18.75 mg by mouth 2 (two) times daily. 9/17/20   Historical Provider       Objective Findings:  Vital Signs:  There were no vitals taken for this visit.  There is no height or weight on file to calculate BMI.    Physical Exam:  General Appearance:  Well appearing in no acute distress, appears stated age  Head:  Normocephalic, atraumatic  Eyes:  No scleral icterus or pallor, EOMI        Labs:  Lab Results   Component Value Date    WBC 7.61 11/12/2020    HGB 13.6 11/12/2020    HCT 41.1 11/12/2020    MCV 90 11/12/2020    RDW 12.8 11/12/2020     11/12/2020    GRAN 4.9 11/12/2020    GRAN 64.3 11/12/2020    LYMPH 1.7 11/12/2020    LYMPH 21.8 11/12/2020    MONO 0.7 11/12/2020    MONO 9.1 11/12/2020    EOS 0.3 11/12/2020    BASO 0.06 11/12/2020     Lab Results   Component Value Date     11/12/2020    K 4.2 11/12/2020     11/12/2020    CO2 27 11/12/2020    GLU 93 11/12/2020    BUN 9 11/12/2020    CREATININE 0.8 11/12/2020    CALCIUM 8.7 11/12/2020    PROT 6.9 11/12/2020    ALBUMIN 4.1 11/12/2020    BILITOT 0.4 11/12/2020    ALKPHOS 111 11/12/2020    AST 17 11/12/2020    ALT 20 11/12/2020             Imaging:  Ultrasound of the abdomen 11/12/2020:  FINDINGS:  Liver: Normal in size. The liver demonstrates homogenous echotexture. no focal hepatic lesions are seen.  Biliary system: The gallbladder demonstrates no evidence of calculi. No gallbladder wall thickening.  No sonographic Hanks sign. No pericholecystic fluid. The common duct is not dilated,  measuring 0.35 cm. No intrahepatic ductal dilatation.  Pancreas: The visualized portions of pancreas appear normal  Right kidney: Normal in size measuring 10.6 cmwith no hydronephrosis, mass, or calculi.  Vascular: The portions of the aorta, vena cava, and portal vein appear free of acute abnormality.  Impression:  1.  No acute abnormality identified.              Assessment:  Staci Hodge is a 40 y.o. female with:  1. Peutz-Jeghers syndrome    2. Peutz-Jeghers polyps of small bowel    3. Upper abdominal pain    4. Gastroesophageal reflux disease, unspecified whether esophagitis present    5. Nausea    6. Change in bowel habits    7. History of resection of small bowel    8. History of small bowel obstruction      Complex case.  Recent onset of upper abdominal pain, GERD, dyspepsia with nausea.  Changes in bowel habits as well with diarrhea.  She has history of small-bowel hamartomatous polyps and multiple resections due to history of obstructions.  Symptoms could indicate partial obstruction of the GI tract by a polyp, or transient intussusception.  No recent abdominal imaging.  She is long overdue for endoscopic surveillance and follow-up of her polyposis syndrome.  Unfortunately, I think wireless PillCam endoscopy is not a good option for her.  She is also overdue for pancreatic cancer screening.    She is followed closely by Gynecologic Oncology.      Recommendations/Plan:  1.  I will arrange for an MR enterography to assess her small bowel and better detail.  2.  I will request EGD with push enteroscopy and EUS of the pancreas by our [facility].  3.  Arrange for colonoscopy with me.  4.  Continue Protonix daily.  Refilled today.    Follow-up in       Order summary:  Orders Placed This Encounter    MRI ENTEROGRAPHY    pantoprazole (PROTONIX) 40 MG tablet    Case Request Endoscopy: EGD (ESOPHAGOGASTRODUODENOSCOPY), ULTRASOUND, UPPER GI TRACT, ENDOSCOPIC           Total time spent with  patient:  45 minutes that included face-to-face time as well as records review.        Each patient to whom he or she provides medical services by telemedicine is:  (1) informed of the relationship between the physician and patient and the respective role of any other health care provider with respect to management of the patient; and (2) notified that he or she may decline to receive medical services by telemedicine and may withdraw from such care at any time.      Thank you so much for allowing me to participate in the care of Staci Herbert MD

## 2020-12-10 ENCOUNTER — TELEPHONE (OUTPATIENT)
Dept: ENDOSCOPY | Facility: HOSPITAL | Age: 40
End: 2020-12-10

## 2020-12-10 NOTE — TELEPHONE ENCOUNTER
----- Message from Chandan Dillon MD sent at 12/10/2020 10:32 AM CST -----  Ok R  ----- Message -----  From: Mary Shaw MA  Sent: 12/10/2020   8:18 AM CST  To: Chandan Dillon MD      ----- Message -----  From: Vincenzo Herbert MD  Sent: 12/8/2020   4:28 PM CST  To: Mary Shaw MA    Patient previously known to Dr. Dillon.  History of Peutz-Jegher's syndrome with small bowel polyps.  Needs EGD with push enteroscopy and also EUS of the pancreas (Dr. Dillon has done this in the past for her).  I would like Santana to do these upper procedures for her.  I have separately ordered a colonoscopy to be done with me.

## 2020-12-12 DIAGNOSIS — F41.9 ANXIETY: ICD-10-CM

## 2020-12-14 ENCOUNTER — TELEPHONE (OUTPATIENT)
Dept: ENDOSCOPY | Facility: HOSPITAL | Age: 40
End: 2020-12-14

## 2020-12-14 ENCOUNTER — PATIENT MESSAGE (OUTPATIENT)
Dept: ENDOSCOPY | Facility: HOSPITAL | Age: 40
End: 2020-12-14

## 2020-12-14 ENCOUNTER — HOSPITAL ENCOUNTER (OUTPATIENT)
Dept: RADIOLOGY | Facility: HOSPITAL | Age: 40
Discharge: HOME | End: 2020-12-14
Attending: INTERNAL MEDICINE
Payer: COMMERCIAL

## 2020-12-14 DIAGNOSIS — R11.0 NAUSEA: ICD-10-CM

## 2020-12-14 DIAGNOSIS — Z90.49 HISTORY OF RESECTION OF SMALL BOWEL: ICD-10-CM

## 2020-12-14 DIAGNOSIS — R19.4 CHANGE IN BOWEL HABITS: ICD-10-CM

## 2020-12-14 DIAGNOSIS — Z87.19 HISTORY OF SMALL BOWEL OBSTRUCTION: ICD-10-CM

## 2020-12-14 DIAGNOSIS — Q85.89 PEUTZ-JEGHERS SYNDROME: ICD-10-CM

## 2020-12-14 DIAGNOSIS — Z01.812 PRE-PROCEDURE LAB EXAM: Primary | ICD-10-CM

## 2020-12-14 DIAGNOSIS — Q85.89 PEUTZ-JEGHERS POLYPS OF SMALL BOWEL: ICD-10-CM

## 2020-12-14 DIAGNOSIS — R10.10 UPPER ABDOMINAL PAIN: ICD-10-CM

## 2020-12-14 DIAGNOSIS — K21.9 GASTROESOPHAGEAL REFLUX DISEASE, UNSPECIFIED WHETHER ESOPHAGITIS PRESENT: ICD-10-CM

## 2020-12-14 PROCEDURE — 72197 MRI PELVIS W/O & W/DYE: CPT | Mod: TC

## 2020-12-14 PROCEDURE — 72197 MRI PELVIS W/O & W/DYE: CPT | Mod: 26,,, | Performed by: RADIOLOGY

## 2020-12-14 PROCEDURE — 72197 MRI ENTEROGRAPHY: ICD-10-PCS | Mod: 26,,, | Performed by: RADIOLOGY

## 2020-12-14 PROCEDURE — 74183 MRI ABD W/O CNTR FLWD CNTR: CPT | Mod: 26,,, | Performed by: RADIOLOGY

## 2020-12-14 PROCEDURE — A9585 GADOBUTROL INJECTION: HCPCS | Performed by: INTERNAL MEDICINE

## 2020-12-14 PROCEDURE — 25500020 PHARM REV CODE 255: Performed by: INTERNAL MEDICINE

## 2020-12-14 PROCEDURE — 74183 MRI ENTEROGRAPHY: ICD-10-PCS | Mod: 26,,, | Performed by: RADIOLOGY

## 2020-12-14 RX ORDER — GADOBUTROL 604.72 MG/ML
10 INJECTION INTRAVENOUS
Status: COMPLETED | OUTPATIENT
Start: 2020-12-14 | End: 2020-12-14

## 2020-12-14 RX ORDER — CLONAZEPAM 1 MG/1
1 TABLET ORAL 3 TIMES DAILY
Qty: 90 TABLET | Refills: 2 | Status: SHIPPED | OUTPATIENT
Start: 2020-12-14 | End: 2020-12-17 | Stop reason: SDUPTHER

## 2020-12-14 RX ADMIN — GADOBUTROL 10 ML: 604.72 INJECTION INTRAVENOUS at 02:12

## 2020-12-17 ENCOUNTER — TELEPHONE (OUTPATIENT)
Dept: GASTROENTEROLOGY | Facility: CLINIC | Age: 40
End: 2020-12-17

## 2020-12-17 ENCOUNTER — TELEPHONE (OUTPATIENT)
Dept: INTERNAL MEDICINE | Facility: CLINIC | Age: 40
End: 2020-12-17

## 2020-12-17 DIAGNOSIS — F41.9 ANXIETY: ICD-10-CM

## 2020-12-17 RX ORDER — CLONAZEPAM 1 MG/1
1 TABLET ORAL 3 TIMES DAILY
Qty: 90 TABLET | Refills: 2 | Status: SHIPPED | OUTPATIENT
Start: 2020-12-17 | End: 2021-05-27 | Stop reason: SDUPTHER

## 2020-12-17 NOTE — TELEPHONE ENCOUNTER
----- Message from Nova Adames sent at 12/17/2020  8:55 AM CST -----  Contact: pt  Pt would like to know if she can get a letter excusing her from work on 12/14, pt had a MRI done ordered by Dr. Herbert       Please send letter via [email or web address]

## 2020-12-17 NOTE — LETTER
December 17, 2020      [facility] [address]  [address]  [facility] [address]  Phone: [phone]  Fax: [phone]       Patient: Staci Hodge   YOB: 1980  Date of Visit: 12/14/2020    To Whom It May Concern:    Staci Hodge  was at [facility] on 12/14/2020. If you have any questions or concerns, or if I can be of further assistance, please do not hesitate to contact me.    Sincerely,    Shruti Kurtz MA

## 2020-12-21 ENCOUNTER — PATIENT MESSAGE (OUTPATIENT)
Dept: GASTROENTEROLOGY | Facility: CLINIC | Age: 40
End: 2020-12-21

## 2020-12-22 ENCOUNTER — PATIENT MESSAGE (OUTPATIENT)
Dept: GASTROENTEROLOGY | Facility: CLINIC | Age: 40
End: 2020-12-22

## 2021-01-12 ENCOUNTER — HOSPITAL ENCOUNTER (OUTPATIENT)
Dept: PREADMISSION TESTING | Facility: HOSPITAL | Age: 41
Discharge: HOME | End: 2021-01-12
Attending: INTERNAL MEDICINE
Payer: COMMERCIAL

## 2021-01-12 DIAGNOSIS — Z01.812 PRE-PROCEDURE LAB EXAM: ICD-10-CM

## 2021-01-12 PROCEDURE — U0003 INFECTIOUS AGENT DETECTION BY NUCLEIC ACID (DNA OR RNA); SEVERE ACUTE RESPIRATORY SYNDROME CORONAVIRUS 2 (SARS-COV-2) (CORONAVIRUS DISEASE [COVID-19]), AMPLIFIED PROBE TECHNIQUE, MAKING USE OF HIGH THROUGHPUT TECHNOLOGIES AS DESCRIBED BY CMS-2020-01-R: HCPCS

## 2021-01-13 ENCOUNTER — TELEPHONE (OUTPATIENT)
Dept: ENDOSCOPY | Facility: HOSPITAL | Age: 41
End: 2021-01-13

## 2021-01-13 LAB — SARS-COV-2 RNA RESP QL NAA+PROBE: NOT DETECTED

## 2021-01-15 ENCOUNTER — ANESTHESIA (OUTPATIENT)
Dept: ENDOSCOPY | Facility: HOSPITAL | Age: 41
End: 2021-01-15
Payer: COMMERCIAL

## 2021-01-15 ENCOUNTER — ANESTHESIA EVENT (OUTPATIENT)
Dept: ENDOSCOPY | Facility: HOSPITAL | Age: 41
End: 2021-01-15
Payer: COMMERCIAL

## 2021-01-15 ENCOUNTER — HOSPITAL ENCOUNTER (OUTPATIENT)
Facility: HOSPITAL | Age: 41
Discharge: HOME | End: 2021-01-15
Attending: INTERNAL MEDICINE | Admitting: INTERNAL MEDICINE
Payer: COMMERCIAL

## 2021-01-15 VITALS
BODY MASS INDEX: 29.44 KG/M2 | RESPIRATION RATE: 21 BRPM | SYSTOLIC BLOOD PRESSURE: 128 MMHG | HEIGHT: 62 IN | HEART RATE: 81 BPM | TEMPERATURE: 98 F | OXYGEN SATURATION: 100 % | WEIGHT: 160 LBS | DIASTOLIC BLOOD PRESSURE: 77 MMHG

## 2021-01-15 DIAGNOSIS — Q85.89 PEUTZ-JEGHERS SYNDROME: Primary | ICD-10-CM

## 2021-01-15 DIAGNOSIS — K21.9 GERD (GASTROESOPHAGEAL REFLUX DISEASE): ICD-10-CM

## 2021-01-15 LAB
B-HCG UR QL: NEGATIVE
CTP QC/QA: YES

## 2021-01-15 PROCEDURE — 25000003 PHARM REV CODE 250: Performed by: NURSE ANESTHETIST, CERTIFIED REGISTERED

## 2021-01-15 PROCEDURE — 44361 SMALL BOWEL ENDOSCOPY/BIOPSY: CPT | Mod: 59,,, | Performed by: INTERNAL MEDICINE

## 2021-01-15 PROCEDURE — 44361 PR SMALL BOWEL ENDOSCOPY,BIOPSY: ICD-10-PCS | Mod: 59,,, | Performed by: INTERNAL MEDICINE

## 2021-01-15 PROCEDURE — 25000003 PHARM REV CODE 250: Performed by: INTERNAL MEDICINE

## 2021-01-15 PROCEDURE — 63600175 PHARM REV CODE 636 W HCPCS: Performed by: NURSE ANESTHETIST, CERTIFIED REGISTERED

## 2021-01-15 PROCEDURE — 81025 URINE PREGNANCY TEST: CPT | Performed by: INTERNAL MEDICINE

## 2021-01-15 PROCEDURE — 43259 EGD US EXAM DUODENUM/JEJUNUM: CPT | Mod: ,,, | Performed by: INTERNAL MEDICINE

## 2021-01-15 PROCEDURE — 44361 SMALL BOWEL ENDOSCOPY/BIOPSY: CPT | Performed by: INTERNAL MEDICINE

## 2021-01-15 PROCEDURE — 27201012 HC FORCEPS, HOT/COLD, DISP: Performed by: INTERNAL MEDICINE

## 2021-01-15 PROCEDURE — 37000008 HC ANESTHESIA 1ST 15 MINUTES: Performed by: INTERNAL MEDICINE

## 2021-01-15 PROCEDURE — 88305 TISSUE EXAM BY PATHOLOGIST: CPT | Performed by: PATHOLOGY

## 2021-01-15 PROCEDURE — 88305 TISSUE EXAM BY PATHOLOGIST: ICD-10-PCS | Mod: 26,,, | Performed by: PATHOLOGY

## 2021-01-15 PROCEDURE — 43259 EGD US EXAM DUODENUM/JEJUNUM: CPT | Performed by: INTERNAL MEDICINE

## 2021-01-15 PROCEDURE — 43259 PR ENDOSCOPIC ULTRASOUND EXAM: ICD-10-PCS | Mod: ,,, | Performed by: INTERNAL MEDICINE

## 2021-01-15 PROCEDURE — 37000009 HC ANESTHESIA EA ADD 15 MINS: Performed by: INTERNAL MEDICINE

## 2021-01-15 PROCEDURE — 88305 TISSUE EXAM BY PATHOLOGIST: CPT | Mod: 26,,, | Performed by: PATHOLOGY

## 2021-01-15 RX ORDER — PROPOFOL 10 MG/ML
VIAL (ML) INTRAVENOUS CONTINUOUS PRN
Status: DISCONTINUED | OUTPATIENT
Start: 2021-01-15 | End: 2021-01-15

## 2021-01-15 RX ORDER — PROPOFOL 10 MG/ML
VIAL (ML) INTRAVENOUS
Status: DISCONTINUED | OUTPATIENT
Start: 2021-01-15 | End: 2021-01-15

## 2021-01-15 RX ORDER — SODIUM CHLORIDE 0.9 % (FLUSH) 0.9 %
10 SYRINGE (ML) INJECTION
Status: DISCONTINUED | OUTPATIENT
Start: 2021-01-15 | End: 2021-01-15 | Stop reason: HOSPADM

## 2021-01-15 RX ORDER — DEXTROMETHORPHAN/PSEUDOEPHED 2.5-7.5/.8
DROPS ORAL
Status: COMPLETED | OUTPATIENT
Start: 2021-01-15 | End: 2021-01-15

## 2021-01-15 RX ORDER — ONDANSETRON 2 MG/ML
INJECTION INTRAMUSCULAR; INTRAVENOUS
Status: DISCONTINUED | OUTPATIENT
Start: 2021-01-15 | End: 2021-01-15

## 2021-01-15 RX ORDER — SODIUM CHLORIDE 9 MG/ML
INJECTION, SOLUTION INTRAVENOUS CONTINUOUS
Status: DISCONTINUED | OUTPATIENT
Start: 2021-01-15 | End: 2021-01-15 | Stop reason: HOSPADM

## 2021-01-15 RX ORDER — LIDOCAINE HYDROCHLORIDE 20 MG/ML
INJECTION INTRAVENOUS
Status: DISCONTINUED | OUTPATIENT
Start: 2021-01-15 | End: 2021-01-15

## 2021-01-15 RX ORDER — MIDAZOLAM HYDROCHLORIDE 1 MG/ML
INJECTION, SOLUTION INTRAMUSCULAR; INTRAVENOUS
Status: DISCONTINUED | OUTPATIENT
Start: 2021-01-15 | End: 2021-01-15

## 2021-01-15 RX ADMIN — PROPOFOL 100 MG: 10 INJECTION, EMULSION INTRAVENOUS at 10:01

## 2021-01-15 RX ADMIN — SIMETHICONE 66.7 MG: 20 SUSPENSION/ DROPS ORAL at 10:01

## 2021-01-15 RX ADMIN — LIDOCAINE HYDROCHLORIDE 60 MG: 20 INJECTION, SOLUTION INTRAVENOUS at 10:01

## 2021-01-15 RX ADMIN — PROPOFOL 50 MG: 10 INJECTION, EMULSION INTRAVENOUS at 10:01

## 2021-01-15 RX ADMIN — PROPOFOL 150 MG: 10 INJECTION, EMULSION INTRAVENOUS at 10:01

## 2021-01-15 RX ADMIN — MIDAZOLAM 2 MG: 1 INJECTION INTRAMUSCULAR; INTRAVENOUS at 09:01

## 2021-01-15 RX ADMIN — SODIUM CHLORIDE 20 ML/HR: 0.9 INJECTION, SOLUTION INTRAVENOUS at 09:01

## 2021-01-15 RX ADMIN — ONDANSETRON 4 MG: 2 INJECTION, SOLUTION INTRAMUSCULAR; INTRAVENOUS at 10:01

## 2021-01-15 RX ADMIN — PROPOFOL 150 MCG/KG/MIN: 10 INJECTION, EMULSION INTRAVENOUS at 10:01

## 2021-01-19 DIAGNOSIS — G43.009 MIGRAINE WITHOUT AURA AND WITHOUT STATUS MIGRAINOSUS, NOT INTRACTABLE: ICD-10-CM

## 2021-01-19 RX ORDER — BUTALBITAL, ACETAMINOPHEN AND CAFFEINE 50; 325; 40 MG/1; MG/1; MG/1
1 TABLET ORAL EVERY 6 HOURS PRN
Qty: 10 TABLET | Refills: 4 | Status: SHIPPED | OUTPATIENT
Start: 2021-01-19 | End: 2021-03-10 | Stop reason: SDUPTHER

## 2021-01-21 ENCOUNTER — TELEPHONE (OUTPATIENT)
Dept: GASTROENTEROLOGY | Facility: CLINIC | Age: 41
End: 2021-01-21

## 2021-01-25 ENCOUNTER — PATIENT MESSAGE (OUTPATIENT)
Dept: INTERNAL MEDICINE | Facility: CLINIC | Age: 41
End: 2021-01-25

## 2021-01-25 ENCOUNTER — PATIENT MESSAGE (OUTPATIENT)
Dept: PLASTIC SURGERY | Facility: CLINIC | Age: 41
End: 2021-01-25

## 2021-01-26 ENCOUNTER — PATIENT MESSAGE (OUTPATIENT)
Dept: GASTROENTEROLOGY | Facility: CLINIC | Age: 41
End: 2021-01-26

## 2021-01-27 ENCOUNTER — TELEPHONE (OUTPATIENT)
Dept: ENDOSCOPY | Facility: HOSPITAL | Age: 41
End: 2021-01-27

## 2021-01-27 LAB
FINAL PATHOLOGIC DIAGNOSIS: NORMAL
GROSS: NORMAL
Lab: NORMAL

## 2021-01-28 DIAGNOSIS — F32.A DEPRESSION, UNSPECIFIED DEPRESSION TYPE: ICD-10-CM

## 2021-01-28 DIAGNOSIS — F41.9 ANXIETY: ICD-10-CM

## 2021-01-28 RX ORDER — FLUOXETINE HYDROCHLORIDE 40 MG/1
CAPSULE ORAL
Qty: 180 CAPSULE | Refills: 1 | Status: SHIPPED | OUTPATIENT
Start: 2021-01-28 | End: 2021-08-16

## 2021-02-02 ENCOUNTER — PATIENT MESSAGE (OUTPATIENT)
Dept: GASTROENTEROLOGY | Facility: CLINIC | Age: 41
End: 2021-02-02

## 2021-02-08 ENCOUNTER — TELEPHONE (OUTPATIENT)
Dept: GASTROENTEROLOGY | Facility: CLINIC | Age: 41
End: 2021-02-08

## 2021-02-10 ENCOUNTER — PATIENT MESSAGE (OUTPATIENT)
Dept: ENDOSCOPY | Facility: HOSPITAL | Age: 41
End: 2021-02-10

## 2021-02-10 DIAGNOSIS — Z12.11 SCREEN FOR COLON CANCER: Primary | ICD-10-CM

## 2021-02-10 DIAGNOSIS — Z01.818 PRE-OP TESTING: Primary | ICD-10-CM

## 2021-02-10 RX ORDER — SODIUM, POTASSIUM,MAG SULFATES 17.5-3.13G
SOLUTION, RECONSTITUTED, ORAL ORAL
Qty: 1 KIT | Refills: 0 | Status: ON HOLD | OUTPATIENT
Start: 2021-02-10 | End: 2021-02-25 | Stop reason: HOSPADM

## 2021-02-22 ENCOUNTER — TELEPHONE (OUTPATIENT)
Dept: GASTROENTEROLOGY | Facility: CLINIC | Age: 41
End: 2021-02-22

## 2021-02-22 ENCOUNTER — HOSPITAL ENCOUNTER (OUTPATIENT)
Dept: PREADMISSION TESTING | Facility: HOSPITAL | Age: 41
Discharge: HOME | End: 2021-02-22
Attending: INTERNAL MEDICINE
Payer: COMMERCIAL

## 2021-02-22 DIAGNOSIS — Z01.818 PRE-OP TESTING: ICD-10-CM

## 2021-02-22 DIAGNOSIS — Z12.11 SPECIAL SCREENING FOR MALIGNANT NEOPLASMS, COLON: Primary | ICD-10-CM

## 2021-02-22 PROCEDURE — U0003 INFECTIOUS AGENT DETECTION BY NUCLEIC ACID (DNA OR RNA); SEVERE ACUTE RESPIRATORY SYNDROME CORONAVIRUS 2 (SARS-COV-2) (CORONAVIRUS DISEASE [COVID-19]), AMPLIFIED PROBE TECHNIQUE, MAKING USE OF HIGH THROUGHPUT TECHNOLOGIES AS DESCRIBED BY CMS-2020-01-R: HCPCS

## 2021-02-22 PROCEDURE — U0005 INFEC AGEN DETEC AMPLI PROBE: HCPCS

## 2021-02-22 RX ORDER — SODIUM, POTASSIUM,MAG SULFATES 17.5-3.13G
1 SOLUTION, RECONSTITUTED, ORAL ORAL DAILY
Qty: 1 KIT | Refills: 0 | Status: ON HOLD | OUTPATIENT
Start: 2021-02-22 | End: 2021-02-25 | Stop reason: HOSPADM

## 2021-02-23 LAB — SARS-COV-2 RNA RESP QL NAA+PROBE: NOT DETECTED

## 2021-02-25 ENCOUNTER — ANESTHESIA EVENT (OUTPATIENT)
Dept: ENDOSCOPY | Facility: HOSPITAL | Age: 41
End: 2021-02-25
Payer: COMMERCIAL

## 2021-02-25 ENCOUNTER — ANESTHESIA (OUTPATIENT)
Dept: ENDOSCOPY | Facility: HOSPITAL | Age: 41
End: 2021-02-25
Payer: COMMERCIAL

## 2021-02-25 ENCOUNTER — HOSPITAL ENCOUNTER (OUTPATIENT)
Facility: HOSPITAL | Age: 41
Discharge: HOME | End: 2021-02-25
Attending: INTERNAL MEDICINE | Admitting: INTERNAL MEDICINE
Payer: COMMERCIAL

## 2021-02-25 VITALS
RESPIRATION RATE: 20 BRPM | HEIGHT: 62 IN | WEIGHT: 168 LBS | TEMPERATURE: 98 F | DIASTOLIC BLOOD PRESSURE: 67 MMHG | SYSTOLIC BLOOD PRESSURE: 106 MMHG | HEART RATE: 75 BPM | BODY MASS INDEX: 30.91 KG/M2 | OXYGEN SATURATION: 98 %

## 2021-02-25 DIAGNOSIS — K63.89 JEJUNAL POLYP: ICD-10-CM

## 2021-02-25 DIAGNOSIS — Q85.89 PEUTZ-JEGHERS SYNDROME: Primary | ICD-10-CM

## 2021-02-25 LAB
B-HCG UR QL: NEGATIVE
CTP QC/QA: YES

## 2021-02-25 PROCEDURE — 37000008 HC ANESTHESIA 1ST 15 MINUTES: Performed by: INTERNAL MEDICINE

## 2021-02-25 PROCEDURE — 88342 CHG IMMUNOCYTOCHEMISTRY: ICD-10-PCS | Mod: 26,,, | Performed by: PATHOLOGY

## 2021-02-25 PROCEDURE — 88342 IMHCHEM/IMCYTCHM 1ST ANTB: CPT | Performed by: PATHOLOGY

## 2021-02-25 PROCEDURE — 88305 TISSUE EXAM BY PATHOLOGIST: CPT | Performed by: PATHOLOGY

## 2021-02-25 PROCEDURE — D9220A PRA ANESTHESIA: ICD-10-PCS | Mod: 33,ANES,, | Performed by: ANESTHESIOLOGY

## 2021-02-25 PROCEDURE — 88341 PR IHC OR ICC EACH ADD'L SINGLE ANTIBODY  STAINPR: ICD-10-PCS | Mod: 26,,, | Performed by: PATHOLOGY

## 2021-02-25 PROCEDURE — 45385 PR COLONOSCOPY,REMV LESN,SNARE: ICD-10-PCS | Mod: 33,,, | Performed by: INTERNAL MEDICINE

## 2021-02-25 PROCEDURE — 88342 IMHCHEM/IMCYTCHM 1ST ANTB: CPT | Mod: 26,,, | Performed by: PATHOLOGY

## 2021-02-25 PROCEDURE — 25000003 PHARM REV CODE 250: Performed by: NURSE ANESTHETIST, CERTIFIED REGISTERED

## 2021-02-25 PROCEDURE — 45385 COLONOSCOPY W/LESION REMOVAL: CPT | Performed by: INTERNAL MEDICINE

## 2021-02-25 PROCEDURE — D9220A PRA ANESTHESIA: Mod: 33,CRNA,, | Performed by: NURSE ANESTHETIST, CERTIFIED REGISTERED

## 2021-02-25 PROCEDURE — 81025 URINE PREGNANCY TEST: CPT | Performed by: INTERNAL MEDICINE

## 2021-02-25 PROCEDURE — 88341 IMHCHEM/IMCYTCHM EA ADD ANTB: CPT | Mod: 26,,, | Performed by: PATHOLOGY

## 2021-02-25 PROCEDURE — D9220A PRA ANESTHESIA: ICD-10-PCS | Mod: 33,CRNA,, | Performed by: NURSE ANESTHETIST, CERTIFIED REGISTERED

## 2021-02-25 PROCEDURE — 37000009 HC ANESTHESIA EA ADD 15 MINS: Performed by: INTERNAL MEDICINE

## 2021-02-25 PROCEDURE — 45385 COLONOSCOPY W/LESION REMOVAL: CPT | Mod: 33,,, | Performed by: INTERNAL MEDICINE

## 2021-02-25 PROCEDURE — D9220A PRA ANESTHESIA: Mod: 33,ANES,, | Performed by: ANESTHESIOLOGY

## 2021-02-25 PROCEDURE — 88305 TISSUE EXAM BY PATHOLOGIST: ICD-10-PCS | Mod: 26,,, | Performed by: PATHOLOGY

## 2021-02-25 PROCEDURE — 88305 TISSUE EXAM BY PATHOLOGIST: CPT | Mod: 26,,, | Performed by: PATHOLOGY

## 2021-02-25 PROCEDURE — 63600175 PHARM REV CODE 636 W HCPCS: Performed by: NURSE ANESTHETIST, CERTIFIED REGISTERED

## 2021-02-25 PROCEDURE — 88341 IMHCHEM/IMCYTCHM EA ADD ANTB: CPT | Performed by: PATHOLOGY

## 2021-02-25 PROCEDURE — 27201089 HC SNARE, DISP (ANY): Performed by: INTERNAL MEDICINE

## 2021-02-25 PROCEDURE — 25000003 PHARM REV CODE 250: Performed by: INTERNAL MEDICINE

## 2021-02-25 RX ORDER — FENTANYL CITRATE 50 UG/ML
INJECTION, SOLUTION INTRAMUSCULAR; INTRAVENOUS
Status: DISCONTINUED | OUTPATIENT
Start: 2021-02-25 | End: 2021-02-25

## 2021-02-25 RX ORDER — ONDANSETRON 2 MG/ML
INJECTION INTRAMUSCULAR; INTRAVENOUS
Status: DISCONTINUED | OUTPATIENT
Start: 2021-02-25 | End: 2021-02-25

## 2021-02-25 RX ORDER — DEXMEDETOMIDINE HYDROCHLORIDE 100 UG/ML
INJECTION, SOLUTION INTRAVENOUS
Status: DISCONTINUED | OUTPATIENT
Start: 2021-02-25 | End: 2021-02-25

## 2021-02-25 RX ORDER — MIDAZOLAM HYDROCHLORIDE 1 MG/ML
INJECTION, SOLUTION INTRAMUSCULAR; INTRAVENOUS
Status: DISCONTINUED | OUTPATIENT
Start: 2021-02-25 | End: 2021-02-25

## 2021-02-25 RX ORDER — SODIUM CHLORIDE 9 MG/ML
INJECTION, SOLUTION INTRAVENOUS CONTINUOUS
Status: DISCONTINUED | OUTPATIENT
Start: 2021-02-25 | End: 2021-02-25 | Stop reason: HOSPADM

## 2021-02-25 RX ORDER — SODIUM CHLORIDE 0.9 % (FLUSH) 0.9 %
10 SYRINGE (ML) INJECTION
Status: DISCONTINUED | OUTPATIENT
Start: 2021-02-25 | End: 2021-02-25 | Stop reason: HOSPADM

## 2021-02-25 RX ORDER — PROPOFOL 10 MG/ML
VIAL (ML) INTRAVENOUS
Status: DISCONTINUED | OUTPATIENT
Start: 2021-02-25 | End: 2021-02-25

## 2021-02-25 RX ORDER — PROPOFOL 10 MG/ML
VIAL (ML) INTRAVENOUS CONTINUOUS PRN
Status: DISCONTINUED | OUTPATIENT
Start: 2021-02-25 | End: 2021-02-25

## 2021-02-25 RX ORDER — LIDOCAINE HYDROCHLORIDE 20 MG/ML
INJECTION INTRAVENOUS
Status: DISCONTINUED | OUTPATIENT
Start: 2021-02-25 | End: 2021-02-25

## 2021-02-25 RX ADMIN — LIDOCAINE HYDROCHLORIDE 100 MG: 20 INJECTION, SOLUTION INTRAVENOUS at 01:02

## 2021-02-25 RX ADMIN — SODIUM CHLORIDE: 0.9 INJECTION, SOLUTION INTRAVENOUS at 12:02

## 2021-02-25 RX ADMIN — MIDAZOLAM HYDROCHLORIDE 2 MG: 1 INJECTION, SOLUTION INTRAMUSCULAR; INTRAVENOUS at 01:02

## 2021-02-25 RX ADMIN — FENTANYL CITRATE 25 MCG: 50 INJECTION, SOLUTION INTRAMUSCULAR; INTRAVENOUS at 01:02

## 2021-02-25 RX ADMIN — PROPOFOL 200 MCG/KG/MIN: 10 INJECTION, EMULSION INTRAVENOUS at 01:02

## 2021-02-25 RX ADMIN — PROPOFOL 100 MG: 10 INJECTION, EMULSION INTRAVENOUS at 01:02

## 2021-02-25 RX ADMIN — PROPOFOL 50 MG: 10 INJECTION, EMULSION INTRAVENOUS at 01:02

## 2021-02-25 RX ADMIN — ONDANSETRON 4 MG: 2 INJECTION, SOLUTION INTRAMUSCULAR; INTRAVENOUS at 01:02

## 2021-02-25 RX ADMIN — DEXMEDETOMIDINE HYDROCHLORIDE 20 MCG: 100 INJECTION, SOLUTION, CONCENTRATE INTRAVENOUS at 01:02

## 2021-03-02 LAB
FINAL PATHOLOGIC DIAGNOSIS: NORMAL
GROSS: NORMAL
Lab: NORMAL

## 2021-03-04 PROBLEM — Z90.79 S/P TAH-BSO: Status: ACTIVE | Noted: 2021-03-04

## 2021-03-04 PROBLEM — Z90.710 S/P TAH-BSO: Status: ACTIVE | Noted: 2021-03-04

## 2021-03-04 PROBLEM — Z90.722 S/P TAH-BSO: Status: ACTIVE | Noted: 2021-03-04

## 2021-03-08 ENCOUNTER — PATIENT MESSAGE (OUTPATIENT)
Dept: GYNECOLOGIC ONCOLOGY | Facility: CLINIC | Age: 41
End: 2021-03-08

## 2021-03-10 DIAGNOSIS — G43.009 MIGRAINE WITHOUT AURA AND WITHOUT STATUS MIGRAINOSUS, NOT INTRACTABLE: ICD-10-CM

## 2021-03-11 RX ORDER — BUTALBITAL, ACETAMINOPHEN AND CAFFEINE 50; 325; 40 MG/1; MG/1; MG/1
1 TABLET ORAL EVERY 6 HOURS PRN
Qty: 10 TABLET | Refills: 4 | Status: SHIPPED | OUTPATIENT
Start: 2021-03-11 | End: 2021-04-28 | Stop reason: SDUPTHER

## 2021-03-16 RX ORDER — PANTOPRAZOLE SODIUM 40 MG/1
40 TABLET, DELAYED RELEASE ORAL DAILY
Qty: 30 TABLET | Refills: 2 | Status: SHIPPED | OUTPATIENT
Start: 2021-03-16 | End: 2021-06-16

## 2021-03-30 ENCOUNTER — PATIENT MESSAGE (OUTPATIENT)
Dept: GASTROENTEROLOGY | Facility: CLINIC | Age: 41
End: 2021-03-30

## 2021-04-06 ENCOUNTER — TELEPHONE (OUTPATIENT)
Dept: ENDOSCOPY | Facility: HOSPITAL | Age: 41
End: 2021-04-06

## 2021-04-06 DIAGNOSIS — Z01.818 PRE-OP TESTING: ICD-10-CM

## 2021-04-07 ENCOUNTER — TELEPHONE (OUTPATIENT)
Dept: ENDOSCOPY | Facility: HOSPITAL | Age: 41
End: 2021-04-07

## 2021-04-07 ENCOUNTER — PATIENT MESSAGE (OUTPATIENT)
Dept: ENDOSCOPY | Facility: HOSPITAL | Age: 41
End: 2021-04-07

## 2021-04-16 ENCOUNTER — HOSPITAL ENCOUNTER (OUTPATIENT)
Dept: PREADMISSION TESTING | Facility: HOSPITAL | Age: 41
Discharge: HOME | End: 2021-04-16
Attending: INTERNAL MEDICINE
Payer: COMMERCIAL

## 2021-04-16 DIAGNOSIS — Z01.818 PRE-OP TESTING: ICD-10-CM

## 2021-04-16 PROCEDURE — U0005 INFEC AGEN DETEC AMPLI PROBE: HCPCS | Performed by: FAMILY MEDICINE

## 2021-04-16 PROCEDURE — U0003 INFECTIOUS AGENT DETECTION BY NUCLEIC ACID (DNA OR RNA); SEVERE ACUTE RESPIRATORY SYNDROME CORONAVIRUS 2 (SARS-COV-2) (CORONAVIRUS DISEASE [COVID-19]), AMPLIFIED PROBE TECHNIQUE, MAKING USE OF HIGH THROUGHPUT TECHNOLOGIES AS DESCRIBED BY CMS-2020-01-R: HCPCS | Performed by: FAMILY MEDICINE

## 2021-04-17 LAB — SARS-COV-2 RNA RESP QL NAA+PROBE: NOT DETECTED

## 2021-04-19 ENCOUNTER — HOSPITAL ENCOUNTER (OUTPATIENT)
Facility: HOSPITAL | Age: 41
Discharge: HOME | End: 2021-04-19
Attending: INTERNAL MEDICINE | Admitting: INTERNAL MEDICINE
Payer: COMMERCIAL

## 2021-04-19 ENCOUNTER — ANESTHESIA EVENT (OUTPATIENT)
Dept: ENDOSCOPY | Facility: HOSPITAL | Age: 41
End: 2021-04-19
Payer: COMMERCIAL

## 2021-04-19 ENCOUNTER — ANESTHESIA (OUTPATIENT)
Dept: ENDOSCOPY | Facility: HOSPITAL | Age: 41
End: 2021-04-19
Payer: COMMERCIAL

## 2021-04-19 VITALS
SYSTOLIC BLOOD PRESSURE: 128 MMHG | TEMPERATURE: 98 F | WEIGHT: 170 LBS | BODY MASS INDEX: 31.28 KG/M2 | OXYGEN SATURATION: 97 % | HEART RATE: 91 BPM | DIASTOLIC BLOOD PRESSURE: 78 MMHG | RESPIRATION RATE: 20 BRPM | HEIGHT: 62 IN

## 2021-04-19 DIAGNOSIS — K63.89 SMALL BOWEL POLYP: ICD-10-CM

## 2021-04-19 DIAGNOSIS — Q85.89 PEUTZ-JEGHERS SYNDROME: Primary | ICD-10-CM

## 2021-04-19 PROCEDURE — 25000003 PHARM REV CODE 250: Performed by: NURSE ANESTHETIST, CERTIFIED REGISTERED

## 2021-04-19 PROCEDURE — 27201036 HC POLYLOOP LIGATING DEVICE: Performed by: INTERNAL MEDICINE

## 2021-04-19 PROCEDURE — D9220A PRA ANESTHESIA: ICD-10-PCS | Mod: CRNA,,, | Performed by: NURSE ANESTHETIST, CERTIFIED REGISTERED

## 2021-04-19 PROCEDURE — D9220A PRA ANESTHESIA: Mod: ANES,,, | Performed by: ANESTHESIOLOGY

## 2021-04-19 PROCEDURE — 44364 SMALL BOWEL ENDOSCOPY: CPT | Mod: ,,, | Performed by: INTERNAL MEDICINE

## 2021-04-19 PROCEDURE — 88305 TISSUE EXAM BY PATHOLOGIST: ICD-10-PCS | Mod: 26,,, | Performed by: PATHOLOGY

## 2021-04-19 PROCEDURE — 88305 TISSUE EXAM BY PATHOLOGIST: CPT | Performed by: PATHOLOGY

## 2021-04-19 PROCEDURE — PATHNN PATH DEFICIENCY: Mod: ,,, | Performed by: INTERNAL MEDICINE

## 2021-04-19 PROCEDURE — 88305 TISSUE EXAM BY PATHOLOGIST: CPT | Mod: 26,,, | Performed by: PATHOLOGY

## 2021-04-19 PROCEDURE — 37000008 HC ANESTHESIA 1ST 15 MINUTES: Performed by: INTERNAL MEDICINE

## 2021-04-19 PROCEDURE — PATHNN PATH DEFICIENCY: ICD-10-PCS | Mod: ,,, | Performed by: INTERNAL MEDICINE

## 2021-04-19 PROCEDURE — 27201089 HC SNARE, DISP (ANY): Performed by: INTERNAL MEDICINE

## 2021-04-19 PROCEDURE — 27201030 HC OVERTUBE: Performed by: INTERNAL MEDICINE

## 2021-04-19 PROCEDURE — D9220A PRA ANESTHESIA: Mod: CRNA,,, | Performed by: NURSE ANESTHETIST, CERTIFIED REGISTERED

## 2021-04-19 PROCEDURE — 25000003 PHARM REV CODE 250: Performed by: ANESTHESIOLOGY

## 2021-04-19 PROCEDURE — 37000009 HC ANESTHESIA EA ADD 15 MINS: Performed by: INTERNAL MEDICINE

## 2021-04-19 PROCEDURE — 44364 PR SB ENDOSCOPY,RMVL LESN,SNARE: ICD-10-PCS | Mod: ,,, | Performed by: INTERNAL MEDICINE

## 2021-04-19 PROCEDURE — 63600175 PHARM REV CODE 636 W HCPCS: Mod: JG | Performed by: NURSE ANESTHETIST, CERTIFIED REGISTERED

## 2021-04-19 PROCEDURE — 44364 SMALL BOWEL ENDOSCOPY: CPT | Performed by: INTERNAL MEDICINE

## 2021-04-19 PROCEDURE — D9220A PRA ANESTHESIA: ICD-10-PCS | Mod: ANES,,, | Performed by: ANESTHESIOLOGY

## 2021-04-19 RX ORDER — PROPOFOL 10 MG/ML
VIAL (ML) INTRAVENOUS
Status: DISCONTINUED | OUTPATIENT
Start: 2021-04-19 | End: 2021-04-19

## 2021-04-19 RX ORDER — SCOLOPAMINE TRANSDERMAL SYSTEM 1 MG/1
PATCH, EXTENDED RELEASE TRANSDERMAL
Status: DISCONTINUED | OUTPATIENT
Start: 2021-04-19 | End: 2021-04-19

## 2021-04-19 RX ORDER — FENTANYL CITRATE 50 UG/ML
INJECTION, SOLUTION INTRAMUSCULAR; INTRAVENOUS
Status: DISCONTINUED | OUTPATIENT
Start: 2021-04-19 | End: 2021-04-19

## 2021-04-19 RX ORDER — SUCCINYLCHOLINE CHLORIDE 20 MG/ML
INJECTION INTRAMUSCULAR; INTRAVENOUS
Status: DISCONTINUED | OUTPATIENT
Start: 2021-04-19 | End: 2021-04-19

## 2021-04-19 RX ORDER — LIDOCAINE HYDROCHLORIDE 20 MG/ML
INJECTION, SOLUTION EPIDURAL; INFILTRATION; INTRACAUDAL; PERINEURAL
Status: DISCONTINUED | OUTPATIENT
Start: 2021-04-19 | End: 2021-04-19

## 2021-04-19 RX ORDER — ROCURONIUM BROMIDE 10 MG/ML
INJECTION, SOLUTION INTRAVENOUS
Status: DISCONTINUED | OUTPATIENT
Start: 2021-04-19 | End: 2021-04-19

## 2021-04-19 RX ORDER — DEXAMETHASONE SODIUM PHOSPHATE 4 MG/ML
INJECTION, SOLUTION INTRA-ARTICULAR; INTRALESIONAL; INTRAMUSCULAR; INTRAVENOUS; SOFT TISSUE
Status: DISCONTINUED | OUTPATIENT
Start: 2021-04-19 | End: 2021-04-19

## 2021-04-19 RX ORDER — PHENYLEPHRINE HCL IN 0.9% NACL 1 MG/10 ML
SYRINGE (ML) INTRAVENOUS
Status: DISCONTINUED | OUTPATIENT
Start: 2021-04-19 | End: 2021-04-19

## 2021-04-19 RX ORDER — MIDAZOLAM HYDROCHLORIDE 1 MG/ML
INJECTION, SOLUTION INTRAMUSCULAR; INTRAVENOUS
Status: DISCONTINUED | OUTPATIENT
Start: 2021-04-19 | End: 2021-04-19

## 2021-04-19 RX ORDER — ONDANSETRON 2 MG/ML
INJECTION INTRAMUSCULAR; INTRAVENOUS
Status: DISCONTINUED | OUTPATIENT
Start: 2021-04-19 | End: 2021-04-19

## 2021-04-19 RX ADMIN — PROPOFOL 50 MG: 10 INJECTION, EMULSION INTRAVENOUS at 08:04

## 2021-04-19 RX ADMIN — PROPOFOL 150 MG: 10 INJECTION, EMULSION INTRAVENOUS at 08:04

## 2021-04-19 RX ADMIN — SUCCINYLCHOLINE CHLORIDE 140 MG: 20 INJECTION, SOLUTION INTRAMUSCULAR; INTRAVENOUS; PARENTERAL at 08:04

## 2021-04-19 RX ADMIN — FENTANYL CITRATE 100 MCG: 50 INJECTION, SOLUTION INTRAMUSCULAR; INTRAVENOUS at 08:04

## 2021-04-19 RX ADMIN — LIDOCAINE HYDROCHLORIDE 100 MG: 20 INJECTION, SOLUTION EPIDURAL; INFILTRATION; INTRACAUDAL at 08:04

## 2021-04-19 RX ADMIN — ROCURONIUM BROMIDE 5 MG: 10 INJECTION, SOLUTION INTRAVENOUS at 08:04

## 2021-04-19 RX ADMIN — MIDAZOLAM 2 MG: 1 INJECTION INTRAMUSCULAR; INTRAVENOUS at 08:04

## 2021-04-19 RX ADMIN — Medication 100 MCG: at 09:04

## 2021-04-19 RX ADMIN — SCOPOLAMINE 1 PATCH: 1 PATCH, EXTENDED RELEASE TRANSDERMAL at 07:04

## 2021-04-19 RX ADMIN — DEXAMETHASONE SODIUM PHOSPHATE 4 MG: 4 INJECTION INTRA-ARTICULAR; INTRALESIONAL; INTRAMUSCULAR; INTRAVENOUS; SOFT TISSUE at 08:04

## 2021-04-19 RX ADMIN — ONDANSETRON 4 MG: 2 INJECTION INTRAMUSCULAR; INTRAVENOUS at 09:04

## 2021-04-19 RX ADMIN — GLUCAGON HYDROCHLORIDE 0.5 MG: 1 INJECTION, POWDER, FOR SOLUTION INTRAMUSCULAR; INTRAVENOUS; SUBCUTANEOUS at 08:04

## 2021-04-19 RX ADMIN — SODIUM CHLORIDE: 0.9 INJECTION, SOLUTION INTRAVENOUS at 07:04

## 2021-04-20 ENCOUNTER — TELEPHONE (OUTPATIENT)
Dept: SURGERY | Facility: CLINIC | Age: 41
End: 2021-04-20

## 2021-04-20 DIAGNOSIS — Q85.89 PEUTZ-JEGHERS SYNDROME: Primary | ICD-10-CM

## 2021-04-23 LAB
FINAL PATHOLOGIC DIAGNOSIS: NORMAL
GROSS: NORMAL
Lab: NORMAL

## 2021-04-27 ENCOUNTER — HOSPITAL ENCOUNTER (OUTPATIENT)
Dept: RADIOLOGY | Facility: HOSPITAL | Age: 41
Discharge: HOME | End: 2021-04-27
Attending: SURGERY
Payer: COMMERCIAL

## 2021-04-27 DIAGNOSIS — Q85.89 PEUTZ-JEGHERS SYNDROME: ICD-10-CM

## 2021-04-27 PROCEDURE — 74177 CT ABD & PELVIS W/CONTRAST: CPT | Mod: 26,,, | Performed by: RADIOLOGY

## 2021-04-27 PROCEDURE — 74177 CT ABD & PELVIS W/CONTRAST: CPT | Mod: TC

## 2021-04-27 PROCEDURE — 74177 CT ABDOMEN PELVIS WITH CONTRAST: ICD-10-PCS | Mod: 26,,, | Performed by: RADIOLOGY

## 2021-04-27 PROCEDURE — 25500020 PHARM REV CODE 255: Performed by: SURGERY

## 2021-04-27 RX ADMIN — IOHEXOL 75 ML: 350 INJECTION, SOLUTION INTRAVENOUS at 09:04

## 2021-04-27 RX ADMIN — IOHEXOL 30 ML: 350 INJECTION, SOLUTION INTRAVENOUS at 09:04

## 2021-04-29 ENCOUNTER — OFFICE VISIT (OUTPATIENT)
Dept: SURGERY | Facility: CLINIC | Age: 41
End: 2021-04-29
Payer: COMMERCIAL

## 2021-04-29 ENCOUNTER — DOCUMENTATION ONLY (OUTPATIENT)
Dept: CARDIOTHORACIC SURGERY | Facility: CLINIC | Age: 41
End: 2021-04-29

## 2021-04-29 VITALS
BODY MASS INDEX: 33.51 KG/M2 | DIASTOLIC BLOOD PRESSURE: 90 MMHG | OXYGEN SATURATION: 98 % | HEART RATE: 132 BPM | HEIGHT: 62 IN | SYSTOLIC BLOOD PRESSURE: 172 MMHG | RESPIRATION RATE: 20 BRPM | WEIGHT: 182.13 LBS

## 2021-04-29 DIAGNOSIS — Q85.89 PEUTZ-JEGHERS SYNDROME: Primary | ICD-10-CM

## 2021-04-29 DIAGNOSIS — K63.89 SMALL BOWEL POLYP: ICD-10-CM

## 2021-04-29 DIAGNOSIS — D13.2 ADENOMATOUS DUODENAL POLYP: Primary | ICD-10-CM

## 2021-04-29 PROCEDURE — 99999 PR PBB SHADOW E&M-EST. PATIENT-LVL IV: ICD-10-PCS | Mod: PBBFAC,,, | Performed by: SURGERY

## 2021-04-29 PROCEDURE — 3008F PR BODY MASS INDEX (BMI) DOCUMENTED: ICD-10-PCS | Mod: CPTII,S$GLB,, | Performed by: SURGERY

## 2021-04-29 PROCEDURE — 99204 PR OFFICE/OUTPT VISIT, NEW, LEVL IV, 45-59 MIN: ICD-10-PCS | Mod: S$GLB,,, | Performed by: SURGERY

## 2021-04-29 PROCEDURE — 1126F PR PAIN SEVERITY QUANTIFIED, NO PAIN PRESENT: ICD-10-PCS | Mod: S$GLB,,, | Performed by: SURGERY

## 2021-04-29 PROCEDURE — 99999 PR PBB SHADOW E&M-EST. PATIENT-LVL IV: CPT | Mod: PBBFAC,,, | Performed by: SURGERY

## 2021-04-29 PROCEDURE — 1126F AMNT PAIN NOTED NONE PRSNT: CPT | Mod: S$GLB,,, | Performed by: SURGERY

## 2021-04-29 PROCEDURE — 99204 OFFICE O/P NEW MOD 45 MIN: CPT | Mod: S$GLB,,, | Performed by: SURGERY

## 2021-04-29 PROCEDURE — 3008F BODY MASS INDEX DOCD: CPT | Mod: CPTII,S$GLB,, | Performed by: SURGERY

## 2021-05-04 ENCOUNTER — TELEPHONE (OUTPATIENT)
Dept: ENDOSCOPY | Facility: HOSPITAL | Age: 41
End: 2021-05-04

## 2021-05-04 DIAGNOSIS — K31.7 GASTRIC POLYP: Primary | ICD-10-CM

## 2021-05-07 ENCOUNTER — TELEPHONE (OUTPATIENT)
Dept: SURGERY | Facility: CLINIC | Age: 41
End: 2021-05-07

## 2021-05-07 ENCOUNTER — PATIENT MESSAGE (OUTPATIENT)
Dept: SURGERY | Facility: HOSPITAL | Age: 41
End: 2021-05-07

## 2021-05-10 DIAGNOSIS — Z01.818 PRE-OP TESTING: ICD-10-CM

## 2021-05-11 ENCOUNTER — TELEPHONE (OUTPATIENT)
Dept: ENDOSCOPY | Facility: HOSPITAL | Age: 41
End: 2021-05-11

## 2021-05-11 ENCOUNTER — PATIENT MESSAGE (OUTPATIENT)
Dept: SURGERY | Facility: HOSPITAL | Age: 41
End: 2021-05-11

## 2021-05-13 ENCOUNTER — TELEPHONE (OUTPATIENT)
Dept: ENDOSCOPY | Facility: HOSPITAL | Age: 41
End: 2021-05-13

## 2021-05-13 ENCOUNTER — PATIENT MESSAGE (OUTPATIENT)
Dept: ENDOSCOPY | Facility: HOSPITAL | Age: 41
End: 2021-05-13

## 2021-05-14 DIAGNOSIS — Q85.89 PEUTZ-JEGHERS SYNDROME: Primary | ICD-10-CM

## 2021-05-14 RX ORDER — SODIUM CHLORIDE 9 MG/ML
INJECTION, SOLUTION INTRAVENOUS CONTINUOUS
Status: CANCELLED | OUTPATIENT
Start: 2021-05-14

## 2021-05-14 RX ORDER — LIDOCAINE HYDROCHLORIDE 10 MG/ML
1 INJECTION, SOLUTION EPIDURAL; INFILTRATION; INTRACAUDAL; PERINEURAL ONCE
Status: CANCELLED | OUTPATIENT
Start: 2021-05-14 | End: 2021-05-14

## 2021-05-14 RX ORDER — METRONIDAZOLE 500 MG/100ML
500 INJECTION, SOLUTION INTRAVENOUS
Status: CANCELLED | OUTPATIENT
Start: 2021-05-18

## 2021-05-17 ENCOUNTER — HOSPITAL ENCOUNTER (OUTPATIENT)
Facility: HOSPITAL | Age: 41
Discharge: HOME | End: 2021-05-17
Attending: INTERNAL MEDICINE | Admitting: SURGERY
Payer: COMMERCIAL

## 2021-05-17 ENCOUNTER — ANESTHESIA EVENT (OUTPATIENT)
Dept: ENDOSCOPY | Facility: HOSPITAL | Age: 41
End: 2021-05-17
Payer: COMMERCIAL

## 2021-05-17 ENCOUNTER — ANESTHESIA (OUTPATIENT)
Dept: ENDOSCOPY | Facility: HOSPITAL | Age: 41
End: 2021-05-17
Payer: COMMERCIAL

## 2021-05-17 VITALS
RESPIRATION RATE: 16 BRPM | SYSTOLIC BLOOD PRESSURE: 121 MMHG | BODY MASS INDEX: 30.36 KG/M2 | HEIGHT: 62 IN | OXYGEN SATURATION: 96 % | TEMPERATURE: 98 F | HEART RATE: 89 BPM | DIASTOLIC BLOOD PRESSURE: 74 MMHG | WEIGHT: 165 LBS

## 2021-05-17 DIAGNOSIS — K63.89 SMALL BOWEL POLYP: Primary | ICD-10-CM

## 2021-05-17 DIAGNOSIS — Q85.89 PEUTZ-JEGHERS POLYPS OF SMALL BOWEL: ICD-10-CM

## 2021-05-17 DIAGNOSIS — Q85.89 PEUTZ-JEGHERS SYNDROME: ICD-10-CM

## 2021-05-17 PROCEDURE — D9220A PRA ANESTHESIA: Mod: CRNA,,, | Performed by: NURSE ANESTHETIST, CERTIFIED REGISTERED

## 2021-05-17 PROCEDURE — 63600175 PHARM REV CODE 636 W HCPCS: Performed by: NURSE ANESTHETIST, CERTIFIED REGISTERED

## 2021-05-17 PROCEDURE — 43254 EGD ENDO MUCOSAL RESECTION: CPT | Performed by: INTERNAL MEDICINE

## 2021-05-17 PROCEDURE — 94761 N-INVAS EAR/PLS OXIMETRY MLT: CPT

## 2021-05-17 PROCEDURE — 88305 TISSUE EXAM BY PATHOLOGIST: CPT | Performed by: PATHOLOGY

## 2021-05-17 PROCEDURE — 25000003 PHARM REV CODE 250: Performed by: INTERNAL MEDICINE

## 2021-05-17 PROCEDURE — 27201089 HC SNARE, DISP (ANY): Performed by: INTERNAL MEDICINE

## 2021-05-17 PROCEDURE — 88307 TISSUE EXAM BY PATHOLOGIST: CPT | Performed by: PATHOLOGY

## 2021-05-17 PROCEDURE — 43254 PR EGD, FLEX, W/MUCOSAL RESECTION: ICD-10-PCS | Mod: ,,, | Performed by: INTERNAL MEDICINE

## 2021-05-17 PROCEDURE — 43251 EGD REMOVE LESION SNARE: CPT

## 2021-05-17 PROCEDURE — 27200997: Performed by: INTERNAL MEDICINE

## 2021-05-17 PROCEDURE — 43254 EGD ENDO MUCOSAL RESECTION: CPT | Mod: ,,, | Performed by: INTERNAL MEDICINE

## 2021-05-17 PROCEDURE — 25000003 PHARM REV CODE 250: Performed by: NURSE ANESTHETIST, CERTIFIED REGISTERED

## 2021-05-17 PROCEDURE — 37000008 HC ANESTHESIA 1ST 15 MINUTES: Performed by: INTERNAL MEDICINE

## 2021-05-17 PROCEDURE — 88305 TISSUE EXAM BY PATHOLOGIST: CPT | Mod: 26,,, | Performed by: PATHOLOGY

## 2021-05-17 PROCEDURE — D9220A PRA ANESTHESIA: Mod: ANES,,, | Performed by: ANESTHESIOLOGY

## 2021-05-17 PROCEDURE — 43236 UPPR GI SCOPE W/SUBMUC INJ: CPT | Performed by: INTERNAL MEDICINE

## 2021-05-17 PROCEDURE — D9220A PRA ANESTHESIA: ICD-10-PCS | Mod: ANES,,, | Performed by: ANESTHESIOLOGY

## 2021-05-17 PROCEDURE — D9220A PRA ANESTHESIA: ICD-10-PCS | Mod: CRNA,,, | Performed by: NURSE ANESTHETIST, CERTIFIED REGISTERED

## 2021-05-17 PROCEDURE — 88305 TISSUE EXAM BY PATHOLOGIST: ICD-10-PCS | Mod: 26,,, | Performed by: PATHOLOGY

## 2021-05-17 PROCEDURE — 99900035 HC TECH TIME PER 15 MIN (STAT)

## 2021-05-17 PROCEDURE — 37000009 HC ANESTHESIA EA ADD 15 MINS: Performed by: INTERNAL MEDICINE

## 2021-05-17 RX ORDER — SCOLOPAMINE TRANSDERMAL SYSTEM 1 MG/1
PATCH, EXTENDED RELEASE TRANSDERMAL
Status: DISCONTINUED | OUTPATIENT
Start: 2021-05-17 | End: 2021-05-17

## 2021-05-17 RX ORDER — LIDOCAINE HYDROCHLORIDE 10 MG/ML
1 INJECTION, SOLUTION EPIDURAL; INFILTRATION; INTRACAUDAL; PERINEURAL ONCE
Status: DISCONTINUED | OUTPATIENT
Start: 2021-05-17 | End: 2021-05-17 | Stop reason: HOSPADM

## 2021-05-17 RX ORDER — PROPOFOL 10 MG/ML
VIAL (ML) INTRAVENOUS CONTINUOUS PRN
Status: DISCONTINUED | OUTPATIENT
Start: 2021-05-17 | End: 2021-05-17

## 2021-05-17 RX ORDER — SUCCINYLCHOLINE CHLORIDE 20 MG/ML
INJECTION INTRAMUSCULAR; INTRAVENOUS
Status: DISCONTINUED | OUTPATIENT
Start: 2021-05-17 | End: 2021-05-17

## 2021-05-17 RX ORDER — PROPOFOL 10 MG/ML
VIAL (ML) INTRAVENOUS
Status: DISCONTINUED | OUTPATIENT
Start: 2021-05-17 | End: 2021-05-17

## 2021-05-17 RX ORDER — ROCURONIUM BROMIDE 10 MG/ML
INJECTION, SOLUTION INTRAVENOUS
Status: DISCONTINUED | OUTPATIENT
Start: 2021-05-17 | End: 2021-05-17

## 2021-05-17 RX ORDER — LIDOCAINE HYDROCHLORIDE 20 MG/ML
INJECTION, SOLUTION EPIDURAL; INFILTRATION; INTRACAUDAL; PERINEURAL
Status: DISCONTINUED | OUTPATIENT
Start: 2021-05-17 | End: 2021-05-17

## 2021-05-17 RX ORDER — MIDAZOLAM HYDROCHLORIDE 1 MG/ML
INJECTION, SOLUTION INTRAMUSCULAR; INTRAVENOUS
Status: DISCONTINUED | OUTPATIENT
Start: 2021-05-17 | End: 2021-05-17

## 2021-05-17 RX ORDER — SODIUM CHLORIDE 9 MG/ML
INJECTION, SOLUTION INTRAVENOUS CONTINUOUS
Status: DISCONTINUED | OUTPATIENT
Start: 2021-05-17 | End: 2021-05-17 | Stop reason: HOSPADM

## 2021-05-17 RX ORDER — METRONIDAZOLE 500 MG/100ML
500 INJECTION, SOLUTION INTRAVENOUS
Status: DISCONTINUED | OUTPATIENT
Start: 2021-05-18 | End: 2021-05-17 | Stop reason: HOSPADM

## 2021-05-17 RX ORDER — FENTANYL CITRATE 50 UG/ML
INJECTION, SOLUTION INTRAMUSCULAR; INTRAVENOUS
Status: DISCONTINUED | OUTPATIENT
Start: 2021-05-17 | End: 2021-05-17

## 2021-05-17 RX ORDER — PROCHLORPERAZINE EDISYLATE 5 MG/ML
5 INJECTION INTRAMUSCULAR; INTRAVENOUS EVERY 30 MIN PRN
Status: DISCONTINUED | OUTPATIENT
Start: 2021-05-17 | End: 2021-05-17 | Stop reason: HOSPADM

## 2021-05-17 RX ORDER — ONDANSETRON 2 MG/ML
INJECTION INTRAMUSCULAR; INTRAVENOUS
Status: DISCONTINUED | OUTPATIENT
Start: 2021-05-17 | End: 2021-05-17

## 2021-05-17 RX ORDER — SODIUM CHLORIDE 0.9 % (FLUSH) 0.9 %
10 SYRINGE (ML) INJECTION
Status: DISCONTINUED | OUTPATIENT
Start: 2021-05-17 | End: 2021-05-17 | Stop reason: HOSPADM

## 2021-05-17 RX ORDER — DEXAMETHASONE SODIUM PHOSPHATE 4 MG/ML
INJECTION, SOLUTION INTRA-ARTICULAR; INTRALESIONAL; INTRAMUSCULAR; INTRAVENOUS; SOFT TISSUE
Status: DISCONTINUED | OUTPATIENT
Start: 2021-05-17 | End: 2021-05-17

## 2021-05-17 RX ADMIN — ROCURONIUM BROMIDE 10 MG: 10 INJECTION, SOLUTION INTRAVENOUS at 11:05

## 2021-05-17 RX ADMIN — SUGAMMADEX 200 MG: 100 INJECTION, SOLUTION INTRAVENOUS at 11:05

## 2021-05-17 RX ADMIN — DEXAMETHASONE SODIUM PHOSPHATE 4 MG: 4 INJECTION INTRA-ARTICULAR; INTRALESIONAL; INTRAMUSCULAR; INTRAVENOUS; SOFT TISSUE at 09:05

## 2021-05-17 RX ADMIN — PROPOFOL 250 MG: 10 INJECTION, EMULSION INTRAVENOUS at 09:05

## 2021-05-17 RX ADMIN — ROCURONIUM BROMIDE 10 MG: 10 INJECTION, SOLUTION INTRAVENOUS at 09:05

## 2021-05-17 RX ADMIN — SCOPOLAMINE 1 PATCH: 1 PATCH, EXTENDED RELEASE TRANSDERMAL at 09:05

## 2021-05-17 RX ADMIN — GLUCAGON HYDROCHLORIDE 0.5 MG: 1 INJECTION, POWDER, FOR SOLUTION INTRAMUSCULAR; INTRAVENOUS; SUBCUTANEOUS at 10:05

## 2021-05-17 RX ADMIN — FENTANYL CITRATE 50 MCG: 50 INJECTION, SOLUTION INTRAMUSCULAR; INTRAVENOUS at 09:05

## 2021-05-17 RX ADMIN — ONDANSETRON 4 MG: 2 INJECTION INTRAMUSCULAR; INTRAVENOUS at 09:05

## 2021-05-17 RX ADMIN — GLUCAGON HYDROCHLORIDE 0.5 MG: 1 INJECTION, POWDER, FOR SOLUTION INTRAMUSCULAR; INTRAVENOUS; SUBCUTANEOUS at 11:05

## 2021-05-17 RX ADMIN — ROCURONIUM BROMIDE 30 MG: 10 INJECTION, SOLUTION INTRAVENOUS at 09:05

## 2021-05-17 RX ADMIN — Medication 200 MCG/KG/MIN: at 09:05

## 2021-05-17 RX ADMIN — LIDOCAINE HYDROCHLORIDE 80 MG: 20 INJECTION, SOLUTION EPIDURAL; INFILTRATION; INTRACAUDAL at 09:05

## 2021-05-17 RX ADMIN — SODIUM CHLORIDE: 0.9 INJECTION, SOLUTION INTRAVENOUS at 09:05

## 2021-05-17 RX ADMIN — MIDAZOLAM 2 MG: 1 INJECTION INTRAMUSCULAR; INTRAVENOUS at 09:05

## 2021-05-17 RX ADMIN — SUCCINYLCHOLINE CHLORIDE 120 MG: 20 INJECTION, SOLUTION INTRAMUSCULAR; INTRAVENOUS; PARENTERAL at 09:05

## 2021-05-17 RX ADMIN — PROPOFOL 50 MG: 10 INJECTION, EMULSION INTRAVENOUS at 09:05

## 2021-05-17 RX ADMIN — ROCURONIUM BROMIDE 10 MG: 10 INJECTION, SOLUTION INTRAVENOUS at 10:05

## 2021-05-17 RX ADMIN — ROCURONIUM BROMIDE 20 MG: 10 INJECTION, SOLUTION INTRAVENOUS at 10:05

## 2021-06-01 LAB
FINAL PATHOLOGIC DIAGNOSIS: NORMAL
GROSS: NORMAL
Lab: NORMAL

## 2021-06-02 ENCOUNTER — TELEPHONE (OUTPATIENT)
Dept: ENDOSCOPY | Facility: HOSPITAL | Age: 41
End: 2021-06-02

## 2021-06-08 ENCOUNTER — OFFICE VISIT (OUTPATIENT)
Dept: INTERNAL MEDICINE | Facility: CLINIC | Age: 41
End: 2021-06-08
Payer: COMMERCIAL

## 2021-06-08 VITALS
HEIGHT: 62 IN | RESPIRATION RATE: 18 BRPM | SYSTOLIC BLOOD PRESSURE: 130 MMHG | BODY MASS INDEX: 34.72 KG/M2 | TEMPERATURE: 97 F | DIASTOLIC BLOOD PRESSURE: 86 MMHG | OXYGEN SATURATION: 98 % | HEART RATE: 121 BPM | WEIGHT: 188.69 LBS

## 2021-06-08 DIAGNOSIS — R00.0 TACHYCARDIA: Primary | ICD-10-CM

## 2021-06-08 DIAGNOSIS — B97.89 VIRAL RESPIRATORY INFECTION: ICD-10-CM

## 2021-06-08 DIAGNOSIS — F39 MOOD DISORDER: ICD-10-CM

## 2021-06-08 DIAGNOSIS — M46.92 UNSPECIFIED INFLAMMATORY SPONDYLOPATHY, CERVICAL REGION: ICD-10-CM

## 2021-06-08 DIAGNOSIS — J98.8 VIRAL RESPIRATORY INFECTION: ICD-10-CM

## 2021-06-08 PROCEDURE — 3008F BODY MASS INDEX DOCD: CPT | Mod: CPTII,S$GLB,, | Performed by: NURSE PRACTITIONER

## 2021-06-08 PROCEDURE — 1126F AMNT PAIN NOTED NONE PRSNT: CPT | Mod: S$GLB,,, | Performed by: NURSE PRACTITIONER

## 2021-06-08 PROCEDURE — 99214 PR OFFICE/OUTPT VISIT, EST, LEVL IV, 30-39 MIN: ICD-10-PCS | Mod: 25,S$GLB,, | Performed by: NURSE PRACTITIONER

## 2021-06-08 PROCEDURE — 3008F PR BODY MASS INDEX (BMI) DOCUMENTED: ICD-10-PCS | Mod: CPTII,S$GLB,, | Performed by: NURSE PRACTITIONER

## 2021-06-08 PROCEDURE — 99999 PR PBB SHADOW E&M-EST. PATIENT-LVL IV: CPT | Mod: PBBFAC,,, | Performed by: NURSE PRACTITIONER

## 2021-06-08 PROCEDURE — 96372 THER/PROPH/DIAG INJ SC/IM: CPT | Mod: S$GLB,,, | Performed by: NURSE PRACTITIONER

## 2021-06-08 PROCEDURE — 1126F PR PAIN SEVERITY QUANTIFIED, NO PAIN PRESENT: ICD-10-PCS | Mod: S$GLB,,, | Performed by: NURSE PRACTITIONER

## 2021-06-08 PROCEDURE — 99999 PR PBB SHADOW E&M-EST. PATIENT-LVL IV: ICD-10-PCS | Mod: PBBFAC,,, | Performed by: NURSE PRACTITIONER

## 2021-06-08 PROCEDURE — 96372 PR INJECTION,THERAP/PROPH/DIAG2ST, IM OR SUBCUT: ICD-10-PCS | Mod: S$GLB,,, | Performed by: NURSE PRACTITIONER

## 2021-06-08 PROCEDURE — 99214 OFFICE O/P EST MOD 30 MIN: CPT | Mod: 25,S$GLB,, | Performed by: NURSE PRACTITIONER

## 2021-06-08 RX ORDER — METOPROLOL SUCCINATE 25 MG/1
25 TABLET, EXTENDED RELEASE ORAL DAILY
Qty: 30 TABLET | Refills: 2 | Status: SHIPPED | OUTPATIENT
Start: 2021-06-08 | End: 2021-06-08

## 2021-06-08 RX ORDER — METHYLPREDNISOLONE ACETATE 80 MG/ML
80 INJECTION, SUSPENSION INTRA-ARTICULAR; INTRALESIONAL; INTRAMUSCULAR; SOFT TISSUE
Status: COMPLETED | OUTPATIENT
Start: 2021-06-08 | End: 2021-06-08

## 2021-06-08 RX ORDER — METOPROLOL SUCCINATE 25 MG/1
25 TABLET, EXTENDED RELEASE ORAL DAILY
Qty: 30 TABLET | Refills: 2
Start: 2021-06-08 | End: 2021-09-05 | Stop reason: SDUPTHER

## 2021-06-08 RX ORDER — PROMETHAZINE HYDROCHLORIDE AND DEXTROMETHORPHAN HYDROBROMIDE 6.25; 15 MG/5ML; MG/5ML
5 SYRUP ORAL EVERY 6 HOURS PRN
Qty: 120 ML | Refills: 0 | Status: SHIPPED | OUTPATIENT
Start: 2021-06-08 | End: 2021-06-15

## 2021-06-08 RX ADMIN — METHYLPREDNISOLONE ACETATE 80 MG: 80 INJECTION, SUSPENSION INTRA-ARTICULAR; INTRALESIONAL; INTRAMUSCULAR; SOFT TISSUE at 02:06

## 2021-06-14 DIAGNOSIS — G43.009 MIGRAINE WITHOUT AURA AND WITHOUT STATUS MIGRAINOSUS, NOT INTRACTABLE: ICD-10-CM

## 2021-06-14 RX ORDER — BUTALBITAL, ACETAMINOPHEN AND CAFFEINE 50; 325; 40 MG/1; MG/1; MG/1
TABLET ORAL
Qty: 10 TABLET | Refills: 4 | Status: SHIPPED | OUTPATIENT
Start: 2021-06-14 | End: 2021-07-19

## 2021-06-24 ENCOUNTER — PATIENT MESSAGE (OUTPATIENT)
Dept: GYNECOLOGIC ONCOLOGY | Facility: CLINIC | Age: 41
End: 2021-06-24

## 2021-06-24 ENCOUNTER — PATIENT MESSAGE (OUTPATIENT)
Dept: INTERNAL MEDICINE | Facility: CLINIC | Age: 41
End: 2021-06-24

## 2021-06-24 DIAGNOSIS — Z85.3 HISTORY OF BREAST CANCER: Primary | ICD-10-CM

## 2021-06-24 DIAGNOSIS — N95.1 MENOPAUSAL SYMPTOMS: ICD-10-CM

## 2021-06-24 DIAGNOSIS — R00.0 TACHYCARDIA: Primary | ICD-10-CM

## 2021-06-25 ENCOUNTER — PATIENT MESSAGE (OUTPATIENT)
Dept: GYNECOLOGIC ONCOLOGY | Facility: CLINIC | Age: 41
End: 2021-06-25

## 2021-06-25 ENCOUNTER — TELEPHONE (OUTPATIENT)
Dept: OBSTETRICS AND GYNECOLOGY | Facility: CLINIC | Age: 41
End: 2021-06-25

## 2021-07-19 DIAGNOSIS — G43.009 MIGRAINE WITHOUT AURA AND WITHOUT STATUS MIGRAINOSUS, NOT INTRACTABLE: ICD-10-CM

## 2021-07-19 RX ORDER — BUTALBITAL, ACETAMINOPHEN AND CAFFEINE 50; 325; 40 MG/1; MG/1; MG/1
TABLET ORAL
Qty: 10 TABLET | Refills: 4 | Status: SHIPPED | OUTPATIENT
Start: 2021-07-19 | End: 2021-08-23

## 2021-08-11 ENCOUNTER — PATIENT MESSAGE (OUTPATIENT)
Dept: INTERNAL MEDICINE | Facility: CLINIC | Age: 41
End: 2021-08-11

## 2021-08-11 DIAGNOSIS — R23.2 HOT FLASHES: Primary | ICD-10-CM

## 2021-08-16 RX ORDER — PAROXETINE HYDROCHLORIDE 20 MG/1
20 TABLET, FILM COATED ORAL EVERY MORNING
Qty: 30 TABLET | Refills: 5 | Status: SHIPPED | OUTPATIENT
Start: 2021-08-16 | End: 2022-01-24

## 2021-08-19 DIAGNOSIS — F41.9 ANXIETY: ICD-10-CM

## 2021-08-19 RX ORDER — CLONAZEPAM 1 MG/1
TABLET ORAL
Qty: 90 TABLET | Refills: 2 | Status: SHIPPED | OUTPATIENT
Start: 2021-08-19 | End: 2021-09-23

## 2021-08-23 DIAGNOSIS — G43.009 MIGRAINE WITHOUT AURA AND WITHOUT STATUS MIGRAINOSUS, NOT INTRACTABLE: ICD-10-CM

## 2021-08-23 RX ORDER — BUTALBITAL, ACETAMINOPHEN AND CAFFEINE 50; 325; 40 MG/1; MG/1; MG/1
TABLET ORAL
Qty: 10 TABLET | Refills: 4 | Status: SHIPPED | OUTPATIENT
Start: 2021-08-23 | End: 2021-09-07 | Stop reason: SDUPTHER

## 2021-09-07 ENCOUNTER — PATIENT MESSAGE (OUTPATIENT)
Dept: INTERNAL MEDICINE | Facility: CLINIC | Age: 41
End: 2021-09-07

## 2021-09-07 DIAGNOSIS — G43.009 MIGRAINE WITHOUT AURA AND WITHOUT STATUS MIGRAINOSUS, NOT INTRACTABLE: ICD-10-CM

## 2021-09-07 DIAGNOSIS — R00.0 TACHYCARDIA: ICD-10-CM

## 2021-09-08 RX ORDER — BUTALBITAL, ACETAMINOPHEN AND CAFFEINE 50; 325; 40 MG/1; MG/1; MG/1
1 TABLET ORAL EVERY 6 HOURS PRN
Qty: 10 TABLET | Refills: 0 | Status: SHIPPED | OUTPATIENT
Start: 2021-09-08 | End: 2021-09-23

## 2021-09-08 RX ORDER — METOPROLOL SUCCINATE 25 MG/1
25 TABLET, EXTENDED RELEASE ORAL DAILY
Qty: 30 TABLET | Refills: 0 | Status: SHIPPED | OUTPATIENT
Start: 2021-09-08 | End: 2021-09-23

## 2021-09-10 ENCOUNTER — TELEPHONE (OUTPATIENT)
Dept: ENDOSCOPY | Facility: HOSPITAL | Age: 41
End: 2021-09-10

## 2021-09-10 DIAGNOSIS — Q85.89 PEUTZ-JEGHERS SYNDROME: Primary | ICD-10-CM

## 2021-09-22 ENCOUNTER — TELEPHONE (OUTPATIENT)
Dept: ENDOSCOPY | Facility: HOSPITAL | Age: 41
End: 2021-09-22

## 2021-09-23 ENCOUNTER — CLINICAL SUPPORT (OUTPATIENT)
Dept: FAMILY MEDICINE | Facility: CLINIC | Age: 41
End: 2021-09-23
Payer: COMMERCIAL

## 2021-09-23 ENCOUNTER — OFFICE VISIT (OUTPATIENT)
Dept: FAMILY MEDICINE | Facility: CLINIC | Age: 41
End: 2021-09-23
Payer: COMMERCIAL

## 2021-09-23 VITALS
RESPIRATION RATE: 20 BRPM | OXYGEN SATURATION: 98 % | DIASTOLIC BLOOD PRESSURE: 86 MMHG | TEMPERATURE: 96 F | WEIGHT: 188.81 LBS | BODY MASS INDEX: 34.74 KG/M2 | SYSTOLIC BLOOD PRESSURE: 126 MMHG | HEART RATE: 108 BPM | HEIGHT: 62 IN

## 2021-09-23 DIAGNOSIS — R05.9 COUGH: Primary | ICD-10-CM

## 2021-09-23 DIAGNOSIS — B97.89 VIRAL RESPIRATORY INFECTION: ICD-10-CM

## 2021-09-23 DIAGNOSIS — J98.8 VIRAL RESPIRATORY INFECTION: ICD-10-CM

## 2021-09-23 LAB
CTP QC/QA: YES
SARS-COV-2 RDRP RESP QL NAA+PROBE: NEGATIVE

## 2021-09-23 PROCEDURE — 99999 PR PBB SHADOW E&M-EST. PATIENT-LVL IV: CPT | Mod: PBBFAC,,, | Performed by: NURSE PRACTITIONER

## 2021-09-23 PROCEDURE — 3074F PR MOST RECENT SYSTOLIC BLOOD PRESSURE < 130 MM HG: ICD-10-PCS | Mod: CPTII,S$GLB,, | Performed by: NURSE PRACTITIONER

## 2021-09-23 PROCEDURE — 1160F PR REVIEW ALL MEDS BY PRESCRIBER/CLIN PHARMACIST DOCUMENTED: ICD-10-PCS | Mod: CPTII,S$GLB,, | Performed by: NURSE PRACTITIONER

## 2021-09-23 PROCEDURE — 99213 PR OFFICE/OUTPT VISIT, EST, LEVL III, 20-29 MIN: ICD-10-PCS | Mod: 25,S$GLB,, | Performed by: NURSE PRACTITIONER

## 2021-09-23 PROCEDURE — 1159F PR MEDICATION LIST DOCUMENTED IN MEDICAL RECORD: ICD-10-PCS | Mod: CPTII,S$GLB,, | Performed by: NURSE PRACTITIONER

## 2021-09-23 PROCEDURE — U0002 COVID-19 LAB TEST NON-CDC: HCPCS | Mod: QW,S$GLB,, | Performed by: NURSE PRACTITIONER

## 2021-09-23 PROCEDURE — 3008F PR BODY MASS INDEX (BMI) DOCUMENTED: ICD-10-PCS | Mod: CPTII,S$GLB,, | Performed by: NURSE PRACTITIONER

## 2021-09-23 PROCEDURE — 3074F SYST BP LT 130 MM HG: CPT | Mod: CPTII,S$GLB,, | Performed by: NURSE PRACTITIONER

## 2021-09-23 PROCEDURE — U0002: ICD-10-PCS | Mod: QW,S$GLB,, | Performed by: NURSE PRACTITIONER

## 2021-09-23 PROCEDURE — 1159F MED LIST DOCD IN RCRD: CPT | Mod: CPTII,S$GLB,, | Performed by: NURSE PRACTITIONER

## 2021-09-23 PROCEDURE — 3008F BODY MASS INDEX DOCD: CPT | Mod: CPTII,S$GLB,, | Performed by: NURSE PRACTITIONER

## 2021-09-23 PROCEDURE — 1160F RVW MEDS BY RX/DR IN RCRD: CPT | Mod: CPTII,S$GLB,, | Performed by: NURSE PRACTITIONER

## 2021-09-23 PROCEDURE — 96372 PR INJECTION,THERAP/PROPH/DIAG2ST, IM OR SUBCUT: ICD-10-PCS | Mod: S$GLB,,, | Performed by: NURSE PRACTITIONER

## 2021-09-23 PROCEDURE — 3079F PR MOST RECENT DIASTOLIC BLOOD PRESSURE 80-89 MM HG: ICD-10-PCS | Mod: CPTII,S$GLB,, | Performed by: NURSE PRACTITIONER

## 2021-09-23 PROCEDURE — 96372 THER/PROPH/DIAG INJ SC/IM: CPT | Mod: S$GLB,,, | Performed by: NURSE PRACTITIONER

## 2021-09-23 PROCEDURE — 3079F DIAST BP 80-89 MM HG: CPT | Mod: CPTII,S$GLB,, | Performed by: NURSE PRACTITIONER

## 2021-09-23 PROCEDURE — 99999 PR PBB SHADOW E&M-EST. PATIENT-LVL IV: ICD-10-PCS | Mod: PBBFAC,,, | Performed by: NURSE PRACTITIONER

## 2021-09-23 PROCEDURE — 99213 OFFICE O/P EST LOW 20 MIN: CPT | Mod: 25,S$GLB,, | Performed by: NURSE PRACTITIONER

## 2021-09-23 RX ORDER — TRIAMCINOLONE ACETONIDE 40 MG/ML
40 INJECTION, SUSPENSION INTRA-ARTICULAR; INTRAMUSCULAR
Status: COMPLETED | OUTPATIENT
Start: 2021-09-23 | End: 2021-09-23

## 2021-09-23 RX ORDER — PROMETHAZINE HYDROCHLORIDE AND DEXTROMETHORPHAN HYDROBROMIDE 6.25; 15 MG/5ML; MG/5ML
5 SYRUP ORAL EVERY 6 HOURS PRN
Qty: 120 ML | Refills: 0 | Status: SHIPPED | OUTPATIENT
Start: 2021-09-23 | End: 2021-09-28

## 2021-09-23 RX ADMIN — TRIAMCINOLONE ACETONIDE 40 MG: 40 INJECTION, SUSPENSION INTRA-ARTICULAR; INTRAMUSCULAR at 10:09

## 2021-09-27 ENCOUNTER — PATIENT MESSAGE (OUTPATIENT)
Dept: FAMILY MEDICINE | Facility: CLINIC | Age: 41
End: 2021-09-27

## 2021-09-28 ENCOUNTER — OFFICE VISIT (OUTPATIENT)
Dept: NEUROLOGY | Facility: CLINIC | Age: 41
End: 2021-09-28
Payer: COMMERCIAL

## 2021-09-28 ENCOUNTER — LAB VISIT (OUTPATIENT)
Dept: LAB | Facility: HOSPITAL | Age: 41
End: 2021-09-28
Attending: NURSE PRACTITIONER
Payer: COMMERCIAL

## 2021-09-28 VITALS
OXYGEN SATURATION: 96 % | SYSTOLIC BLOOD PRESSURE: 130 MMHG | BODY MASS INDEX: 35.11 KG/M2 | WEIGHT: 190.81 LBS | DIASTOLIC BLOOD PRESSURE: 94 MMHG | HEIGHT: 62 IN | RESPIRATION RATE: 18 BRPM | HEART RATE: 91 BPM

## 2021-09-28 DIAGNOSIS — R20.0 NUMBNESS: ICD-10-CM

## 2021-09-28 DIAGNOSIS — R42 DIZZINESS: Primary | ICD-10-CM

## 2021-09-28 DIAGNOSIS — R00.2 PALPITATIONS: ICD-10-CM

## 2021-09-28 DIAGNOSIS — R42 DIZZINESS: ICD-10-CM

## 2021-09-28 LAB
ALBUMIN SERPL BCP-MCNC: 4.5 G/DL (ref 3.5–5.2)
ALP SERPL-CCNC: 205 U/L (ref 55–135)
ALT SERPL W/O P-5'-P-CCNC: 30 U/L (ref 10–44)
ANION GAP SERPL CALC-SCNC: 12 MMOL/L (ref 8–16)
AST SERPL-CCNC: 16 U/L (ref 10–40)
BASOPHILS # BLD AUTO: 0.1 K/UL (ref 0–0.2)
BASOPHILS NFR BLD: 0.8 % (ref 0–1.9)
BILIRUB SERPL-MCNC: 0.3 MG/DL (ref 0.1–1)
BUN SERPL-MCNC: 10 MG/DL (ref 6–20)
CALCIUM SERPL-MCNC: 9.9 MG/DL (ref 8.7–10.5)
CHLORIDE SERPL-SCNC: 102 MMOL/L (ref 95–110)
CO2 SERPL-SCNC: 27 MMOL/L (ref 23–29)
CREAT SERPL-MCNC: 1 MG/DL (ref 0.5–1.4)
DIFFERENTIAL METHOD: ABNORMAL
EOSINOPHIL # BLD AUTO: 0.2 K/UL (ref 0–0.5)
EOSINOPHIL NFR BLD: 1.9 % (ref 0–8)
ERYTHROCYTE [DISTWIDTH] IN BLOOD BY AUTOMATED COUNT: 14.7 % (ref 11.5–14.5)
EST. GFR  (AFRICAN AMERICAN): >60 ML/MIN/1.73 M^2
EST. GFR  (NON AFRICAN AMERICAN): >60 ML/MIN/1.73 M^2
GLUCOSE SERPL-MCNC: 92 MG/DL (ref 70–110)
HCT VFR BLD AUTO: 41.7 % (ref 37–48.5)
HGB BLD-MCNC: 13.7 G/DL (ref 12–16)
IMM GRANULOCYTES # BLD AUTO: 0.12 K/UL (ref 0–0.04)
IMM GRANULOCYTES NFR BLD AUTO: 1 % (ref 0–0.5)
LYMPHOCYTES # BLD AUTO: 2.5 K/UL (ref 1–4.8)
LYMPHOCYTES NFR BLD: 20 % (ref 18–48)
MCH RBC QN AUTO: 28.2 PG (ref 27–31)
MCHC RBC AUTO-ENTMCNC: 32.9 G/DL (ref 32–36)
MCV RBC AUTO: 86 FL (ref 82–98)
MONOCYTES # BLD AUTO: 0.8 K/UL (ref 0.3–1)
MONOCYTES NFR BLD: 6.7 % (ref 4–15)
NEUTROPHILS # BLD AUTO: 8.7 K/UL (ref 1.8–7.7)
NEUTROPHILS NFR BLD: 69.6 % (ref 38–73)
NRBC BLD-RTO: 0 /100 WBC
PLATELET # BLD AUTO: 369 K/UL (ref 150–450)
PMV BLD AUTO: 10.2 FL (ref 9.2–12.9)
POTASSIUM SERPL-SCNC: 4 MMOL/L (ref 3.5–5.1)
PROT SERPL-MCNC: 8 G/DL (ref 6–8.4)
RBC # BLD AUTO: 4.86 M/UL (ref 4–5.4)
SODIUM SERPL-SCNC: 141 MMOL/L (ref 136–145)
T4 SERPL-MCNC: 7.9 UG/DL (ref 4.5–11.5)
TSH SERPL DL<=0.005 MIU/L-ACNC: 1.67 UIU/ML (ref 0.4–4)
VIT B12 SERPL-MCNC: 541 PG/ML (ref 210–950)
WBC # BLD AUTO: 12.54 K/UL (ref 3.9–12.7)

## 2021-09-28 PROCEDURE — 3080F PR MOST RECENT DIASTOLIC BLOOD PRESSURE >= 90 MM HG: ICD-10-PCS | Mod: CPTII,S$GLB,, | Performed by: NURSE PRACTITIONER

## 2021-09-28 PROCEDURE — 85025 COMPLETE CBC W/AUTO DIFF WBC: CPT | Performed by: NURSE PRACTITIONER

## 2021-09-28 PROCEDURE — 36415 COLL VENOUS BLD VENIPUNCTURE: CPT | Performed by: NURSE PRACTITIONER

## 2021-09-28 PROCEDURE — 99999 PR PBB SHADOW E&M-EST. PATIENT-LVL IV: CPT | Mod: PBBFAC,,, | Performed by: NURSE PRACTITIONER

## 2021-09-28 PROCEDURE — 99214 OFFICE O/P EST MOD 30 MIN: CPT | Mod: S$GLB,,, | Performed by: NURSE PRACTITIONER

## 2021-09-28 PROCEDURE — 3008F PR BODY MASS INDEX (BMI) DOCUMENTED: ICD-10-PCS | Mod: CPTII,S$GLB,, | Performed by: NURSE PRACTITIONER

## 2021-09-28 PROCEDURE — 3008F BODY MASS INDEX DOCD: CPT | Mod: CPTII,S$GLB,, | Performed by: NURSE PRACTITIONER

## 2021-09-28 PROCEDURE — 1160F RVW MEDS BY RX/DR IN RCRD: CPT | Mod: CPTII,S$GLB,, | Performed by: NURSE PRACTITIONER

## 2021-09-28 PROCEDURE — 1159F MED LIST DOCD IN RCRD: CPT | Mod: CPTII,S$GLB,, | Performed by: NURSE PRACTITIONER

## 2021-09-28 PROCEDURE — 84436 ASSAY OF TOTAL THYROXINE: CPT | Performed by: NURSE PRACTITIONER

## 2021-09-28 PROCEDURE — 3075F SYST BP GE 130 - 139MM HG: CPT | Mod: CPTII,S$GLB,, | Performed by: NURSE PRACTITIONER

## 2021-09-28 PROCEDURE — 3080F DIAST BP >= 90 MM HG: CPT | Mod: CPTII,S$GLB,, | Performed by: NURSE PRACTITIONER

## 2021-09-28 PROCEDURE — 99214 PR OFFICE/OUTPT VISIT, EST, LEVL IV, 30-39 MIN: ICD-10-PCS | Mod: S$GLB,,, | Performed by: NURSE PRACTITIONER

## 2021-09-28 PROCEDURE — 1160F PR REVIEW ALL MEDS BY PRESCRIBER/CLIN PHARMACIST DOCUMENTED: ICD-10-PCS | Mod: CPTII,S$GLB,, | Performed by: NURSE PRACTITIONER

## 2021-09-28 PROCEDURE — 82607 VITAMIN B-12: CPT | Performed by: NURSE PRACTITIONER

## 2021-09-28 PROCEDURE — 80053 COMPREHEN METABOLIC PANEL: CPT | Performed by: NURSE PRACTITIONER

## 2021-09-28 PROCEDURE — 84443 ASSAY THYROID STIM HORMONE: CPT | Performed by: NURSE PRACTITIONER

## 2021-09-28 PROCEDURE — 3075F PR MOST RECENT SYSTOLIC BLOOD PRESS GE 130-139MM HG: ICD-10-PCS | Mod: CPTII,S$GLB,, | Performed by: NURSE PRACTITIONER

## 2021-09-28 PROCEDURE — 1159F PR MEDICATION LIST DOCUMENTED IN MEDICAL RECORD: ICD-10-PCS | Mod: CPTII,S$GLB,, | Performed by: NURSE PRACTITIONER

## 2021-09-28 PROCEDURE — 99999 PR PBB SHADOW E&M-EST. PATIENT-LVL IV: ICD-10-PCS | Mod: PBBFAC,,, | Performed by: NURSE PRACTITIONER

## 2021-09-28 RX ORDER — METOPROLOL SUCCINATE 25 MG/1
25 TABLET, EXTENDED RELEASE ORAL NIGHTLY
Status: ON HOLD | COMMUNITY
End: 2022-03-24 | Stop reason: HOSPADM

## 2021-09-29 ENCOUNTER — PATIENT MESSAGE (OUTPATIENT)
Dept: NEUROLOGY | Facility: CLINIC | Age: 41
End: 2021-09-29

## 2021-09-29 ENCOUNTER — HOSPITAL ENCOUNTER (EMERGENCY)
Facility: HOSPITAL | Age: 41
Discharge: HOME | End: 2021-09-29
Attending: STUDENT IN AN ORGANIZED HEALTH CARE EDUCATION/TRAINING PROGRAM
Payer: COMMERCIAL

## 2021-09-29 VITALS
SYSTOLIC BLOOD PRESSURE: 130 MMHG | BODY MASS INDEX: 34.75 KG/M2 | WEIGHT: 190 LBS | RESPIRATION RATE: 18 BRPM | OXYGEN SATURATION: 96 % | DIASTOLIC BLOOD PRESSURE: 87 MMHG | TEMPERATURE: 96 F | HEART RATE: 95 BPM

## 2021-09-29 DIAGNOSIS — R20.2 TINGLING: Primary | ICD-10-CM

## 2021-09-29 DIAGNOSIS — R00.2 PALPITATIONS: ICD-10-CM

## 2021-09-29 LAB
ALBUMIN SERPL BCP-MCNC: 4.1 G/DL (ref 3.5–5.2)
ALP SERPL-CCNC: 189 U/L (ref 55–135)
ALT SERPL W/O P-5'-P-CCNC: 27 U/L (ref 10–44)
ANION GAP SERPL CALC-SCNC: 9 MMOL/L (ref 8–16)
AST SERPL-CCNC: 14 U/L (ref 10–40)
BASOPHILS # BLD AUTO: 0.07 K/UL (ref 0–0.2)
BASOPHILS NFR BLD: 0.6 % (ref 0–1.9)
BILIRUB SERPL-MCNC: 0.2 MG/DL (ref 0.1–1)
BUN SERPL-MCNC: 14 MG/DL (ref 6–20)
CALCIUM SERPL-MCNC: 9.6 MG/DL (ref 8.7–10.5)
CHLORIDE SERPL-SCNC: 104 MMOL/L (ref 95–110)
CO2 SERPL-SCNC: 27 MMOL/L (ref 23–29)
CREAT SERPL-MCNC: 0.8 MG/DL (ref 0.5–1.4)
DIFFERENTIAL METHOD: ABNORMAL
EOSINOPHIL # BLD AUTO: 0.3 K/UL (ref 0–0.5)
EOSINOPHIL NFR BLD: 2.3 % (ref 0–8)
ERYTHROCYTE [DISTWIDTH] IN BLOOD BY AUTOMATED COUNT: 14.6 % (ref 11.5–14.5)
EST. GFR  (AFRICAN AMERICAN): >60 ML/MIN/1.73 M^2
EST. GFR  (NON AFRICAN AMERICAN): >60 ML/MIN/1.73 M^2
GLUCOSE SERPL-MCNC: 119 MG/DL (ref 70–110)
HCT VFR BLD AUTO: 38.9 % (ref 37–48.5)
HGB BLD-MCNC: 13 G/DL (ref 12–16)
IMM GRANULOCYTES # BLD AUTO: 0.15 K/UL (ref 0–0.04)
IMM GRANULOCYTES NFR BLD AUTO: 1.4 % (ref 0–0.5)
LYMPHOCYTES # BLD AUTO: 2.4 K/UL (ref 1–4.8)
LYMPHOCYTES NFR BLD: 22 % (ref 18–48)
MAGNESIUM SERPL-MCNC: 1.9 MG/DL (ref 1.6–2.6)
MCH RBC QN AUTO: 28.5 PG (ref 27–31)
MCHC RBC AUTO-ENTMCNC: 33.4 G/DL (ref 32–36)
MCV RBC AUTO: 85 FL (ref 82–98)
MONOCYTES # BLD AUTO: 0.8 K/UL (ref 0.3–1)
MONOCYTES NFR BLD: 7.5 % (ref 4–15)
NEUTROPHILS # BLD AUTO: 7.3 K/UL (ref 1.8–7.7)
NEUTROPHILS NFR BLD: 66.2 % (ref 38–73)
NRBC BLD-RTO: 0 /100 WBC
PLATELET # BLD AUTO: 358 K/UL (ref 150–450)
PMV BLD AUTO: 10.1 FL (ref 9.2–12.9)
POTASSIUM SERPL-SCNC: 3.7 MMOL/L (ref 3.5–5.1)
PROT SERPL-MCNC: 7.3 G/DL (ref 6–8.4)
RBC # BLD AUTO: 4.56 M/UL (ref 4–5.4)
SODIUM SERPL-SCNC: 140 MMOL/L (ref 136–145)
T4 FREE SERPL-MCNC: 0.98 NG/DL (ref 0.71–1.51)
TSH SERPL DL<=0.005 MIU/L-ACNC: 0.96 UIU/ML (ref 0.4–4)
WBC # BLD AUTO: 11.02 K/UL (ref 3.9–12.7)

## 2021-09-29 PROCEDURE — 80053 COMPREHEN METABOLIC PANEL: CPT | Performed by: STUDENT IN AN ORGANIZED HEALTH CARE EDUCATION/TRAINING PROGRAM

## 2021-09-29 PROCEDURE — 85025 COMPLETE CBC W/AUTO DIFF WBC: CPT | Performed by: STUDENT IN AN ORGANIZED HEALTH CARE EDUCATION/TRAINING PROGRAM

## 2021-09-29 PROCEDURE — 99284 EMERGENCY DEPT VISIT MOD MDM: CPT | Mod: 25

## 2021-09-29 PROCEDURE — 93010 EKG 12-LEAD: ICD-10-PCS | Mod: ,,, | Performed by: INTERNAL MEDICINE

## 2021-09-29 PROCEDURE — 83735 ASSAY OF MAGNESIUM: CPT | Performed by: STUDENT IN AN ORGANIZED HEALTH CARE EDUCATION/TRAINING PROGRAM

## 2021-09-29 PROCEDURE — 84443 ASSAY THYROID STIM HORMONE: CPT | Performed by: STUDENT IN AN ORGANIZED HEALTH CARE EDUCATION/TRAINING PROGRAM

## 2021-09-29 PROCEDURE — 84439 ASSAY OF FREE THYROXINE: CPT | Performed by: STUDENT IN AN ORGANIZED HEALTH CARE EDUCATION/TRAINING PROGRAM

## 2021-09-29 PROCEDURE — 36415 COLL VENOUS BLD VENIPUNCTURE: CPT | Performed by: STUDENT IN AN ORGANIZED HEALTH CARE EDUCATION/TRAINING PROGRAM

## 2021-09-29 PROCEDURE — 93010 ELECTROCARDIOGRAM REPORT: CPT | Mod: ,,, | Performed by: INTERNAL MEDICINE

## 2021-09-29 PROCEDURE — 82607 VITAMIN B-12: CPT | Performed by: STUDENT IN AN ORGANIZED HEALTH CARE EDUCATION/TRAINING PROGRAM

## 2021-09-29 PROCEDURE — 93005 ELECTROCARDIOGRAM TRACING: CPT

## 2021-09-29 RX ORDER — SODIUM CHLORIDE 0.9 % (FLUSH) 0.9 %
10 SYRINGE (ML) INJECTION
Status: DISCONTINUED | OUTPATIENT
Start: 2021-09-29 | End: 2021-09-29 | Stop reason: HOSPADM

## 2021-09-30 LAB — VIT B12 SERPL-MCNC: 539 PG/ML (ref 210–950)

## 2021-10-05 ENCOUNTER — HOSPITAL ENCOUNTER (OUTPATIENT)
Dept: RADIOLOGY | Facility: HOSPITAL | Age: 41
Discharge: HOME | End: 2021-10-05
Attending: NURSE PRACTITIONER
Payer: COMMERCIAL

## 2021-10-05 ENCOUNTER — HOSPITAL ENCOUNTER (OUTPATIENT)
Dept: PULMONOLOGY | Facility: HOSPITAL | Age: 41
Discharge: HOME | End: 2021-10-05
Attending: NURSE PRACTITIONER
Payer: COMMERCIAL

## 2021-10-05 ENCOUNTER — PATIENT MESSAGE (OUTPATIENT)
Dept: NEUROLOGY | Facility: CLINIC | Age: 41
End: 2021-10-05

## 2021-10-05 DIAGNOSIS — R00.2 PALPITATIONS: ICD-10-CM

## 2021-10-05 DIAGNOSIS — R42 DIZZINESS: ICD-10-CM

## 2021-10-05 DIAGNOSIS — R20.0 NUMBNESS: ICD-10-CM

## 2021-10-05 PROCEDURE — 70551 MRI BRAIN STEM W/O DYE: CPT | Mod: 26,,, | Performed by: RADIOLOGY

## 2021-10-05 PROCEDURE — 93227 HOLTER MONITOR - 48 HOUR (CUPID ONLY): ICD-10-PCS | Mod: ,,, | Performed by: INTERNAL MEDICINE

## 2021-10-05 PROCEDURE — 93227 XTRNL ECG REC<48 HR R&I: CPT | Mod: ,,, | Performed by: INTERNAL MEDICINE

## 2021-10-05 PROCEDURE — 70551 MRI BRAIN WITHOUT CONTRAST: ICD-10-PCS | Mod: 26,,, | Performed by: RADIOLOGY

## 2021-10-05 PROCEDURE — 70551 MRI BRAIN STEM W/O DYE: CPT | Mod: TC

## 2021-10-05 PROCEDURE — 93226 XTRNL ECG REC<48 HR SCAN A/R: CPT

## 2021-10-07 DIAGNOSIS — G43.009 MIGRAINE WITHOUT AURA AND WITHOUT STATUS MIGRAINOSUS, NOT INTRACTABLE: ICD-10-CM

## 2021-10-08 LAB
OHS CV EVENT MONITOR DAY: 0
OHS CV HOLTER LENGTH DECIMAL HOURS: 48
OHS CV HOLTER LENGTH HOURS: 48
OHS CV HOLTER LENGTH MINUTES: 0
OHS CV HOLTER SINUS AVERAGE HR: 93
OHS CV HOLTER SINUS MAX HR: 158
OHS CV HOLTER SINUS MIN HR: 58

## 2021-10-08 RX ORDER — BUTALBITAL, ACETAMINOPHEN AND CAFFEINE 50; 325; 40 MG/1; MG/1; MG/1
TABLET ORAL
Qty: 10 TABLET | Refills: 4 | Status: SHIPPED | OUTPATIENT
Start: 2021-10-08 | End: 2021-11-04

## 2021-10-12 ENCOUNTER — TELEPHONE (OUTPATIENT)
Dept: NEUROLOGY | Facility: CLINIC | Age: 41
End: 2021-10-12

## 2021-10-12 DIAGNOSIS — R42 DIZZINESS: Primary | ICD-10-CM

## 2021-10-21 ENCOUNTER — HOSPITAL ENCOUNTER (OUTPATIENT)
Dept: NEUROLOGY | Facility: HOSPITAL | Age: 41
Discharge: HOME | End: 2021-10-21
Attending: NURSE PRACTITIONER
Payer: COMMERCIAL

## 2021-10-21 DIAGNOSIS — R42 DIZZINESS AND GIDDINESS: ICD-10-CM

## 2021-10-21 PROCEDURE — 95816 EEG AWAKE AND DROWSY: CPT

## 2021-10-28 ENCOUNTER — TELEPHONE (OUTPATIENT)
Dept: NEUROLOGY | Facility: CLINIC | Age: 41
End: 2021-10-28
Payer: COMMERCIAL

## 2021-11-04 DIAGNOSIS — G43.009 MIGRAINE WITHOUT AURA AND WITHOUT STATUS MIGRAINOSUS, NOT INTRACTABLE: ICD-10-CM

## 2021-11-04 RX ORDER — BUTALBITAL, ACETAMINOPHEN AND CAFFEINE 50; 325; 40 MG/1; MG/1; MG/1
TABLET ORAL
Qty: 10 TABLET | Refills: 4 | Status: SHIPPED | OUTPATIENT
Start: 2021-11-04 | End: 2021-12-06

## 2021-11-08 ENCOUNTER — PATIENT MESSAGE (OUTPATIENT)
Dept: NEUROLOGY | Facility: CLINIC | Age: 41
End: 2021-11-08
Payer: COMMERCIAL

## 2021-11-11 DIAGNOSIS — F41.9 ANXIETY: ICD-10-CM

## 2021-11-14 RX ORDER — CLONAZEPAM 1 MG/1
TABLET ORAL
Qty: 90 TABLET | Refills: 2 | Status: SHIPPED | OUTPATIENT
Start: 2021-11-14 | End: 2022-02-03

## 2021-11-22 ENCOUNTER — PATIENT OUTREACH (OUTPATIENT)
Dept: ADMINISTRATIVE | Facility: OTHER | Age: 41
End: 2021-11-22
Payer: COMMERCIAL

## 2021-11-23 ENCOUNTER — OFFICE VISIT (OUTPATIENT)
Dept: NEUROLOGY | Facility: CLINIC | Age: 41
End: 2021-11-23
Payer: COMMERCIAL

## 2021-11-23 VITALS
HEART RATE: 74 BPM | BODY MASS INDEX: 35.66 KG/M2 | SYSTOLIC BLOOD PRESSURE: 122 MMHG | HEIGHT: 62 IN | RESPIRATION RATE: 20 BRPM | WEIGHT: 193.81 LBS | DIASTOLIC BLOOD PRESSURE: 80 MMHG

## 2021-11-23 DIAGNOSIS — M50.90 CERVICAL DISC DISEASE: ICD-10-CM

## 2021-11-23 DIAGNOSIS — R00.2 PALPITATIONS: ICD-10-CM

## 2021-11-23 DIAGNOSIS — R42 DIZZINESS: Primary | ICD-10-CM

## 2021-11-23 DIAGNOSIS — R20.0 FACIAL NUMBNESS: ICD-10-CM

## 2021-11-23 PROCEDURE — 99999 PR PBB SHADOW E&M-EST. PATIENT-LVL IV: ICD-10-PCS | Mod: PBBFAC,,, | Performed by: NURSE PRACTITIONER

## 2021-11-23 PROCEDURE — 99214 PR OFFICE/OUTPT VISIT, EST, LEVL IV, 30-39 MIN: ICD-10-PCS | Mod: S$GLB,,, | Performed by: NURSE PRACTITIONER

## 2021-11-23 PROCEDURE — 99999 PR PBB SHADOW E&M-EST. PATIENT-LVL IV: CPT | Mod: PBBFAC,,, | Performed by: NURSE PRACTITIONER

## 2021-11-23 PROCEDURE — 99214 OFFICE O/P EST MOD 30 MIN: CPT | Mod: S$GLB,,, | Performed by: NURSE PRACTITIONER

## 2021-11-23 RX ORDER — DM/P-EPHED/ACETAMINOPH/DOXYLAM 30-7.5/3
LIQUID (ML) ORAL
Status: ON HOLD | COMMUNITY
Start: 2021-11-09 | End: 2022-03-15

## 2021-11-23 RX ORDER — ATORVASTATIN CALCIUM 20 MG/1
20 TABLET, FILM COATED ORAL NIGHTLY
COMMUNITY
Start: 2021-11-08 | End: 2022-05-03

## 2021-11-23 RX ORDER — BUPROPION HYDROCHLORIDE 150 MG/1
TABLET, EXTENDED RELEASE ORAL
Status: ON HOLD | COMMUNITY
Start: 2021-11-08 | End: 2022-03-15

## 2021-12-06 ENCOUNTER — TELEPHONE (OUTPATIENT)
Dept: ENDOSCOPY | Facility: HOSPITAL | Age: 41
End: 2021-12-06
Payer: COMMERCIAL

## 2021-12-06 ENCOUNTER — PATIENT MESSAGE (OUTPATIENT)
Dept: ENDOSCOPY | Facility: HOSPITAL | Age: 41
End: 2021-12-06
Payer: COMMERCIAL

## 2021-12-06 DIAGNOSIS — G43.009 MIGRAINE WITHOUT AURA AND WITHOUT STATUS MIGRAINOSUS, NOT INTRACTABLE: ICD-10-CM

## 2021-12-06 RX ORDER — BUTALBITAL, ACETAMINOPHEN AND CAFFEINE 50; 325; 40 MG/1; MG/1; MG/1
TABLET ORAL
Qty: 10 TABLET | Refills: 4 | Status: SHIPPED | OUTPATIENT
Start: 2021-12-06 | End: 2022-01-13

## 2021-12-08 ENCOUNTER — HOSPITAL ENCOUNTER (OUTPATIENT)
Dept: RADIOLOGY | Facility: HOSPITAL | Age: 41
Discharge: HOME | End: 2021-12-08
Attending: NURSE PRACTITIONER
Payer: COMMERCIAL

## 2021-12-08 DIAGNOSIS — M50.90 CERVICAL DISC DISEASE: ICD-10-CM

## 2021-12-08 DIAGNOSIS — R20.0 FACIAL NUMBNESS: ICD-10-CM

## 2021-12-08 DIAGNOSIS — M62.838 MUSCLE SPASMS OF NECK: Primary | ICD-10-CM

## 2021-12-08 DIAGNOSIS — R42 DIZZINESS: ICD-10-CM

## 2021-12-08 PROCEDURE — 70544 MRA BRAIN WITHOUT CONTRAST: ICD-10-PCS | Mod: 26,,, | Performed by: RADIOLOGY

## 2021-12-08 PROCEDURE — 70544 MR ANGIOGRAPHY HEAD W/O DYE: CPT | Mod: 26,,, | Performed by: RADIOLOGY

## 2021-12-08 PROCEDURE — 70544 MR ANGIOGRAPHY HEAD W/O DYE: CPT | Mod: TC

## 2021-12-08 PROCEDURE — 72141 MRI NECK SPINE W/O DYE: CPT | Mod: TC

## 2021-12-08 PROCEDURE — 72141 MRI NECK SPINE W/O DYE: CPT | Mod: 26,,, | Performed by: RADIOLOGY

## 2021-12-08 PROCEDURE — 72141 MRI CERVICAL SPINE WITHOUT CONTRAST: ICD-10-PCS | Mod: 26,,, | Performed by: RADIOLOGY

## 2021-12-09 ENCOUNTER — PATIENT MESSAGE (OUTPATIENT)
Dept: NEUROLOGY | Facility: CLINIC | Age: 41
End: 2021-12-09
Payer: COMMERCIAL

## 2021-12-09 ENCOUNTER — TELEPHONE (OUTPATIENT)
Dept: NEUROLOGY | Facility: CLINIC | Age: 41
End: 2021-12-09
Payer: COMMERCIAL

## 2021-12-10 ENCOUNTER — TELEPHONE (OUTPATIENT)
Dept: ENDOSCOPY | Facility: HOSPITAL | Age: 41
End: 2021-12-10
Payer: COMMERCIAL

## 2021-12-14 ENCOUNTER — PATIENT MESSAGE (OUTPATIENT)
Dept: GASTROENTEROLOGY | Facility: CLINIC | Age: 41
End: 2021-12-14
Payer: COMMERCIAL

## 2022-01-05 ENCOUNTER — TELEPHONE (OUTPATIENT)
Dept: ENDOSCOPY | Facility: HOSPITAL | Age: 42
End: 2022-01-05
Payer: COMMERCIAL

## 2022-01-05 ENCOUNTER — PATIENT MESSAGE (OUTPATIENT)
Dept: ENDOSCOPY | Facility: HOSPITAL | Age: 42
End: 2022-01-05
Payer: COMMERCIAL

## 2022-01-13 DIAGNOSIS — G43.009 MIGRAINE WITHOUT AURA AND WITHOUT STATUS MIGRAINOSUS, NOT INTRACTABLE: ICD-10-CM

## 2022-01-13 RX ORDER — BUTALBITAL, ACETAMINOPHEN AND CAFFEINE 50; 325; 40 MG/1; MG/1; MG/1
TABLET ORAL
Qty: 10 TABLET | Refills: 4 | Status: SHIPPED | OUTPATIENT
Start: 2022-01-13 | End: 2022-02-07

## 2022-01-19 ENCOUNTER — PATIENT MESSAGE (OUTPATIENT)
Dept: ENDOSCOPY | Facility: HOSPITAL | Age: 42
End: 2022-01-19
Payer: COMMERCIAL

## 2022-01-19 ENCOUNTER — TELEPHONE (OUTPATIENT)
Dept: ENDOSCOPY | Facility: HOSPITAL | Age: 42
End: 2022-01-19
Payer: COMMERCIAL

## 2022-01-19 DIAGNOSIS — Z01.812 PRE-PROCEDURE LAB EXAM: Primary | ICD-10-CM

## 2022-01-19 NOTE — TELEPHONE ENCOUNTER
Received request to schedule patient for EGD on 1/25/22 at 8:30 am. Spoke with patient. Reviewed medical history and medications. Instructed on procedure and prep. Patient verbalized understanding of instructions. Copy of instructions sent via pt portal.

## 2022-01-22 ENCOUNTER — HOSPITAL ENCOUNTER (OUTPATIENT)
Dept: PREADMISSION TESTING | Facility: HOSPITAL | Age: 42
Discharge: HOME | End: 2022-01-22
Attending: INTERNAL MEDICINE
Payer: COMMERCIAL

## 2022-01-22 DIAGNOSIS — Z01.812 PRE-PROCEDURE LAB EXAM: ICD-10-CM

## 2022-01-22 PROCEDURE — U0003 INFECTIOUS AGENT DETECTION BY NUCLEIC ACID (DNA OR RNA); SEVERE ACUTE RESPIRATORY SYNDROME CORONAVIRUS 2 (SARS-COV-2) (CORONAVIRUS DISEASE [COVID-19]), AMPLIFIED PROBE TECHNIQUE, MAKING USE OF HIGH THROUGHPUT TECHNOLOGIES AS DESCRIBED BY CMS-2020-01-R: HCPCS | Performed by: FAMILY MEDICINE

## 2022-01-22 PROCEDURE — U0005 INFEC AGEN DETEC AMPLI PROBE: HCPCS | Performed by: FAMILY MEDICINE

## 2022-01-23 DIAGNOSIS — R23.2 HOT FLASHES: ICD-10-CM

## 2022-01-23 LAB
SARS-COV-2 RNA RESP QL NAA+PROBE: NOT DETECTED
SARS-COV-2- CYCLE NUMBER: NORMAL

## 2022-01-24 RX ORDER — PAROXETINE HYDROCHLORIDE 20 MG/1
TABLET, FILM COATED ORAL
Qty: 30 TABLET | Refills: 5 | Status: SHIPPED | OUTPATIENT
Start: 2022-01-24 | End: 2022-03-24

## 2022-01-25 ENCOUNTER — HOSPITAL ENCOUNTER (OUTPATIENT)
Facility: HOSPITAL | Age: 42
Discharge: HOME | End: 2022-01-25
Attending: INTERNAL MEDICINE | Admitting: INTERNAL MEDICINE
Payer: COMMERCIAL

## 2022-01-25 ENCOUNTER — ANESTHESIA EVENT (OUTPATIENT)
Dept: ENDOSCOPY | Facility: HOSPITAL | Age: 42
End: 2022-01-25
Payer: COMMERCIAL

## 2022-01-25 ENCOUNTER — ANESTHESIA (OUTPATIENT)
Dept: ENDOSCOPY | Facility: HOSPITAL | Age: 42
End: 2022-01-25
Payer: COMMERCIAL

## 2022-01-25 VITALS
TEMPERATURE: 98 F | OXYGEN SATURATION: 97 % | SYSTOLIC BLOOD PRESSURE: 122 MMHG | RESPIRATION RATE: 16 BRPM | HEIGHT: 62 IN | WEIGHT: 180 LBS | DIASTOLIC BLOOD PRESSURE: 70 MMHG | HEART RATE: 70 BPM | BODY MASS INDEX: 33.13 KG/M2

## 2022-01-25 DIAGNOSIS — Q85.89 PEUTZ-JEGHERS POLYPS OF SMALL BOWEL: Primary | ICD-10-CM

## 2022-01-25 PROCEDURE — 43235 EGD DIAGNOSTIC BRUSH WASH: CPT | Performed by: INTERNAL MEDICINE

## 2022-01-25 PROCEDURE — 43235 PR EGD, FLEX, DIAGNOSTIC: ICD-10-PCS | Mod: ,,, | Performed by: INTERNAL MEDICINE

## 2022-01-25 PROCEDURE — 63600175 PHARM REV CODE 636 W HCPCS: Performed by: NURSE ANESTHETIST, CERTIFIED REGISTERED

## 2022-01-25 PROCEDURE — D9220A PRA ANESTHESIA: Mod: ANES,,, | Performed by: SURGERY

## 2022-01-25 PROCEDURE — D9220A PRA ANESTHESIA: Mod: CRNA,,, | Performed by: NURSE ANESTHETIST, CERTIFIED REGISTERED

## 2022-01-25 PROCEDURE — 25000003 PHARM REV CODE 250: Performed by: INTERNAL MEDICINE

## 2022-01-25 PROCEDURE — 25000003 PHARM REV CODE 250: Performed by: SURGERY

## 2022-01-25 PROCEDURE — 25000003 PHARM REV CODE 250: Performed by: NURSE ANESTHETIST, CERTIFIED REGISTERED

## 2022-01-25 PROCEDURE — 37000009 HC ANESTHESIA EA ADD 15 MINS: Performed by: INTERNAL MEDICINE

## 2022-01-25 PROCEDURE — D9220A PRA ANESTHESIA: ICD-10-PCS | Mod: ANES,,, | Performed by: SURGERY

## 2022-01-25 PROCEDURE — D9220A PRA ANESTHESIA: ICD-10-PCS | Mod: CRNA,,, | Performed by: NURSE ANESTHETIST, CERTIFIED REGISTERED

## 2022-01-25 PROCEDURE — 43235 EGD DIAGNOSTIC BRUSH WASH: CPT | Mod: ,,, | Performed by: INTERNAL MEDICINE

## 2022-01-25 PROCEDURE — 37000008 HC ANESTHESIA 1ST 15 MINUTES: Performed by: INTERNAL MEDICINE

## 2022-01-25 RX ORDER — PROPOFOL 10 MG/ML
VIAL (ML) INTRAVENOUS
Status: DISCONTINUED | OUTPATIENT
Start: 2022-01-25 | End: 2022-01-25

## 2022-01-25 RX ORDER — LIDOCAINE HYDROCHLORIDE 20 MG/ML
INJECTION, SOLUTION EPIDURAL; INFILTRATION; INTRACAUDAL; PERINEURAL
Status: DISCONTINUED | OUTPATIENT
Start: 2022-01-25 | End: 2022-01-25

## 2022-01-25 RX ORDER — SCOLOPAMINE TRANSDERMAL SYSTEM 1 MG/1
1 PATCH, EXTENDED RELEASE TRANSDERMAL ONCE
Status: DISCONTINUED | OUTPATIENT
Start: 2022-01-25 | End: 2022-01-25 | Stop reason: HOSPADM

## 2022-01-25 RX ORDER — FENTANYL CITRATE 50 UG/ML
INJECTION, SOLUTION INTRAMUSCULAR; INTRAVENOUS
Status: DISCONTINUED | OUTPATIENT
Start: 2022-01-25 | End: 2022-01-25

## 2022-01-25 RX ORDER — FENTANYL CITRATE 50 UG/ML
25 INJECTION, SOLUTION INTRAMUSCULAR; INTRAVENOUS EVERY 5 MIN PRN
Status: DISCONTINUED | OUTPATIENT
Start: 2022-01-25 | End: 2022-01-25 | Stop reason: HOSPADM

## 2022-01-25 RX ORDER — SODIUM CHLORIDE 0.9 % (FLUSH) 0.9 %
10 SYRINGE (ML) INJECTION
Status: ACTIVE | OUTPATIENT
Start: 2022-01-25

## 2022-01-25 RX ORDER — ONDANSETRON 2 MG/ML
4 INJECTION INTRAMUSCULAR; INTRAVENOUS DAILY PRN
Status: DISCONTINUED | OUTPATIENT
Start: 2022-01-25 | End: 2022-01-25 | Stop reason: HOSPADM

## 2022-01-25 RX ORDER — SODIUM CHLORIDE 9 MG/ML
INJECTION, SOLUTION INTRAVENOUS CONTINUOUS
Status: ACTIVE | OUTPATIENT
Start: 2022-01-25

## 2022-01-25 RX ORDER — SODIUM CHLORIDE 0.9 % (FLUSH) 0.9 %
10 SYRINGE (ML) INJECTION
Status: DISCONTINUED | OUTPATIENT
Start: 2022-01-25 | End: 2022-01-25 | Stop reason: HOSPADM

## 2022-01-25 RX ADMIN — LIDOCAINE HYDROCHLORIDE 50 MG: 20 INJECTION, SOLUTION EPIDURAL; INFILTRATION; INTRACAUDAL at 09:01

## 2022-01-25 RX ADMIN — PROPOFOL 100 MG: 10 INJECTION, EMULSION INTRAVENOUS at 09:01

## 2022-01-25 RX ADMIN — FENTANYL CITRATE 50 MCG: 50 INJECTION INTRAMUSCULAR; INTRAVENOUS at 09:01

## 2022-01-25 RX ADMIN — SODIUM CHLORIDE: 0.9 INJECTION, SOLUTION INTRAVENOUS at 08:01

## 2022-01-25 RX ADMIN — SCOPALAMINE 1 PATCH: 1 PATCH, EXTENDED RELEASE TRANSDERMAL at 08:01

## 2022-01-25 RX ADMIN — PROPOFOL 50 MG: 10 INJECTION, EMULSION INTRAVENOUS at 09:01

## 2022-01-25 NOTE — H&P
History & Physical - Short Stay  Gastroenterology      SUBJECTIVE:     Procedure: EGD    Chief Complaint/Indication for Procedure: Duodenal polyps    History of Present Illness:  Patient is a 41 y.o. female presents with Peutz-Jeghers syndrome with recent EMR of duodenal polyps here for follow up. Stated heartburn despite Protonix.    PTA Medications   Medication Sig    atorvastatin (LIPITOR) 20 MG tablet Take 20 mg by mouth every evening.    buPROPion (WELLBUTRIN SR) 150 MG TBSR 12 hr tablet TAKE ONE TABLET BY MOUTH ONCE A DAY FOR 3 DAYS, THEN INCREASE TAKE ONE TABLET BY MOUTH TWICE DAILY THEREAFTER    butalbital-acetaminophen-caffeine -40 mg (FIORICET, ESGIC) -40 mg per tablet TAKE ONE TABLET BY MOUTH EVERY 6 HOURS AS NEEDED FOR PAIN    clonazePAM (KLONOPIN) 1 MG tablet TAKE ONE TABLET BY MOUTH THREE TIMES DAILY    gabapentin (NEURONTIN) 600 MG tablet Take 1 tablet (600 mg total) by mouth every evening.    metoprolol succinate (TOPROL-XL) 25 MG 24 hr tablet Take 25 mg by mouth every evening.    pantoprazole (PROTONIX) 40 MG tablet TAKE ONE TABLET BY MOUTH ONCE A DAY    paroxetine (PAXIL) 20 MG tablet TAKE ONE TABLET BY MOUTH ONCE A DAY IN THE MORNING    nicotine polacrilex 2 MG Lozg BUCCAL ONE LOZENGE EVERY 1-2 HOURS FOR 6 WEEKS, THEN EVERY 2-4 HOURS FOR 3 WEEKS, THEN EVERY 4-8 HOURS    sars-cov-2, covid-19, (MODERNA COVID-19) 100 mcg/0.5 ml injection        Review of patient's allergies indicates:  No Known Allergies     Past Medical History:   Diagnosis Date    Abnormal Pap smear of cervix age 25    no treatement    Anxiety     Breast cancer     Cancer 07/11/2018    breast cancer    Cervical spondylosis 2/9/2018    Colon polyp     Depression     Ectopic pregnancy 2/26/2019    General anesthetics causing adverse effect in therapeutic use     Slow to awaken/ Memory problems-post-op to day 3 after Mastectomy    Headache     History of psychiatric hospitalization     age 20 for SI     Hx of psychiatric care     Hypertension     Peutz-Jeghers syndrome     PONV (postoperative nausea and vomiting)     Psychiatric problem     Right carpal tunnel syndrome 3/9/2017    Self-harming behavior     Suicide attempt     Therapy      Past Surgical History:   Procedure Laterality Date    ADENOIDECTOMY      ANTEGRADE SINGLE BALLOON ENTEROSCOPY N/A 4/19/2021    Procedure: ENTEROSCOPY, SINGLE BALLOON, ANTEGRADE;  Surgeon: Vincenzo Herbert MD;  Location: [facility] (2ND FLR);  Service: Endoscopy;  Laterality: N/A;  removal of large polyp in proximal jejunum; please schedule during a time when Dr. Dillon is available in case I need assistance with removal or 2nd opinion prior to removal    COVID at [place] 4[place] - ttr    APPENDECTOMY      AUGMENTATION OF BREAST      BILATERAL MASTECTOMY Bilateral 7/23/2018    Procedure: MASTECTOMY 23 hr stay;  Surgeon: Sofia Kendrick MD;  Location: [address];  Service: General;  Laterality: Bilateral;    BILATERAL SALPINGO-OOPHORECTOMY (BSO) Bilateral 3/4/2021    Procedure: SALPINGO-OOPHORECTOMY, BILATERAL;  Surgeon: Clayton Vilchis MD;  Location: [facility];  Service: OB/GYN;  Laterality: Bilateral;    BOWEL RESECTION  10/17/14    BREAST BIOPSY Left 06/04/2018    BREAST CAPSULOTOMY Left 4/25/2019    Procedure: CAPSULOTOMY, BREAST LEFT;  Surgeon: Duane Bello MD;  Location: [address];  Service: Plastics;  Laterality: Left;    BREAST RECONSTRUCTION      BREAST SURGERY      Bilateral Mastectomy 07/23/2018    CARPAL TUNNEL RELEASE Bilateral     CARPAL TUNNEL RELEASE      COLONOSCOPY      COLONOSCOPY N/A 2/25/2021    Procedure: COLONOSCOPY;  Surgeon: Vincenzo Herbert MD;  Location: [facility] (2ND FLR);  Service: Endoscopy;  Laterality: N/A;  previous anesthesia complications  okay for this date/time per Dr. Herbert and liliana with [facility] - thomas  COVID test [facility] 2/22/21 [facility]    DILATION AND CURETTAGE OF UTERUS      DILATION AND  CURETTAGE OF UTERUS USING SUCTION N/A 2/25/2019    Procedure: DILATION AND CURETTAGE, UTERUS, USING SUCTION  PATHOLOGY NEEDED;  Surgeon: Pam Sifuentes MD;  Location: [facility];  Service: OB/GYN;  Laterality: N/A;    ENDOSCOPIC ULTRASOUND OF UPPER GASTROINTESTINAL TRACT Left 1/15/2021    Procedure: ULTRASOUND, UPPER GI TRACT, ENDOSCOPIC;  Surgeon: Chandan Dillon MD;  Location: [facility];  Service: Endoscopy;  Laterality: Left;  for pancreatic screening    ESOPHAGOGASTRODUODENOSCOPY N/A 1/15/2021    Procedure: EGD (ESOPHAGOGASTRODUODENOSCOPY);  Surgeon: Chandan Dillon MD;  Location: [facility];  Service: Endoscopy;  Laterality: N/A;  with push enteroscopy    ESOPHAGOGASTRODUODENOSCOPY N/A 5/17/2021    Procedure: EGD (ESOPHAGOGASTRODUODENOSCOPY);  Surgeon: Chandan Dillon MD;  Location: [facility] (2ND FLR);  Service: Endoscopy;  Laterality: N/A;  Please schedule this duodenal polypectomy. Need to be a day that Dr Jose Antonio Stark is in the OR. 90 minutes.   Chandan Dillon MD     COVID at [place] 5/15 (scheduled in the OR for 5/18 - using same COVID results for both procedures) ttr    FAT TRANSFER Bilateral 11/12/2018    Procedure: TRANSFER, FAT TISSUE BILATERAL BREAST;  Surgeon: Duane Bello MD;  Location: [address];  Service: Plastics;  Laterality: Bilateral;    INJECTION FOR SENTINEL NODE IDENTIFICATION Left 7/23/2018    Procedure: INJECTION, FOR SENTINEL NODE IDENTIFICATION;  Surgeon: Sofia Kendrick MD;  Location: [address];  Service: General;  Laterality: Left;    INSERTION OF BREAST TISSUE EXPANDER Bilateral 7/23/2018    Procedure: INSERTION, TISSUE EXPANDER, BREAST;  Surgeon: Duane Bello MD;  Location: [address];  Service: Plastics;  Laterality: Bilateral;    INSERTION OF BREAST TISSUE EXPANDER Bilateral 3/10/2020    Procedure: INSERTION, TISSUE EXPANDER, BREAST, BILATERAL;  Surgeon: Duane Bello MD;  Location: [address];  Service:  Plastics;  Laterality: Bilateral;    intestinal polpy      LYSIS OF ADHESIONS N/A 3/4/2021    Procedure: LYSIS, ADHESIONS;  Surgeon: Clayton Vilchis MD;  [facility];  Service: OB/GYN;  Laterality: N/A;  Omental adhesions    MASTECTOMY      REMOVAL OF BREAST IMPLANT Bilateral 3/10/2020    Procedure: REMOVAL, IMPLANT, BREAST, BILATERAL;  Surgeon: Duane Bello MD;  Location: [facility];  Service: Plastics;  Laterality: Bilateral;    REMOVAL OF BREAST IMPLANT Bilateral 7/7/2020    Procedure: REMOVAL, IMPLANT, BREAST BILATERAL;  Surgeon: Duane Bello MD;  Location: [facility] OR 2ND FLR;  Service: Plastics;  Laterality: Bilateral;    SENTINEL LYMPH NODE BIOPSY Left 7/23/2018    Procedure: BIOPSY, LYMPH NODE, SENTINEL;  Surgeon: Sofia Kendrick MD;  [facility];  Service: General;  Laterality: Left;    SMALL INTESTINE SURGERY      3 surgeries     TONSILLECTOMY      TOTAL ABDOMINAL HYSTERECTOMY N/A 3/4/2021    Procedure: HYSTERECTOMY, TOTAL, ABDOMINAL;  Surgeon: Clayton Vilchis MD;  Location: [facility];  Service: OB/GYN;  Laterality: N/A;    UPPER GASTROINTESTINAL ENDOSCOPY       Family History   Problem Relation Age of Onset    Colon cancer Mother 33        peutz-jeghers syndrome    Cancer Mother         colon cancer    Hypertension Father     Colon polyps Sister         peutz-jeghers syndrome    Breast cancer Paternal Aunt     Breast cancer Paternal Grandmother     Ovarian cancer Neg Hx     Anesthesia problems Neg Hx      Social History     Tobacco Use    Smoking status: Former Smoker     Packs/day: 0.50     Years: 17.00     Pack years: 8.50     Types: Cigarettes     Start date: 4/17/2000     Quit date: 7/23/2018     Years since quitting: 3.5    Smokeless tobacco: Never Used    Tobacco comment: sometimes dry cough per patient   Substance Use Topics    Alcohol use: Yes     Alcohol/week: 5.8 standard drinks     Types: 7 Standard drinks or equivalent per week      Comment: occasionally    Drug use: No       Review of Systems:  Constitutional: no fever or chills  Respiratory: no cough or shortness of breath  Cardiovascular: no chest pain or palpitations    OBJECTIVE:     Vital Signs (Most Recent)  Temp: 97.9 °F (36.6 °C) (01/25/22 0839)  Pulse: 90 (01/25/22 0839)  Resp: 18 (01/25/22 0839)  BP: (!) 147/86 (01/25/22 0839)  SpO2: 100 % (01/25/22 0839)    Physical Exam:  General: well developed, well nourished  Lungs:  normal respiratory effort  Heart: regular rate, S1, S2 normal    Laboratory  CBC: No results for input(s): WBC, RBC, HGB, HCT, PLT, MCV, MCH, MCHC in the last 168 hours.  CMP: No results for input(s): GLU, CALCIUM, ALBUMIN, PROT, NA, K, CO2, CL, BUN, CREATININE, ALKPHOS, ALT, AST, BILITOT in the last 168 hours.  Coagulation: No results for input(s): LABPROT, INR, APTT in the last 168 hours.      Diagnostic Results:      ASSESSMENT/PLAN:     Peutz-Jeghers syndrome    Plan: EGD    Anesthesia Plan: MAC    ASA Grade: ASA 2 - Patient with mild systemic disease with no functional limitations     The impression and plan was discussed in detail with the patient. All questions have been answered and the patient voices understanding of our plan at this point. The risk of the procedure was discussed in detail which includes but not limited to bleeding, infection, perforation in some cases requiring surgery with its spectrum of complications.

## 2022-01-25 NOTE — ANESTHESIA PREPROCEDURE EVALUATION
01/25/2022  Staci Hodge is a 41 y.o., female. Scheduled for EGD with possible EMR. History of peutz merly syndrome, HLD, HTN    COVID negative    DL x 1    Anesthesia Evaluation    I have reviewed the Patient Summary Reports.    I have reviewed the Nursing Notes. I have reviewed the NPO Status.   I have reviewed the Medications.     Review of Systems  Anesthesia Hx:   Denies Personal Hx of Anesthesia complications.   Cardiovascular:   Hypertension Denies MI.  Denies CAD.    Denies Dysrhythmias.  hyperlipidemia    Pulmonary:   Denies COPD.  Denies Asthma.  Denies Shortness of breath.  Denies Sleep Apnea.    Renal/:   Denies Chronic Renal Disease.     Hepatic/GI:   GERD Denies Liver Disease.    Neurological:   Denies TIA. Denies CVA. Headaches Denies Seizures.    Endocrine:   Denies Diabetes. Denies Hypothyroidism. Denies Hyperthyroidism.    Psych:   Psychiatric History          Physical Exam  General:  Well nourished    Airway/Jaw/Neck:  Airway Findings: Mouth Opening: Normal Tongue: Normal  General Airway Assessment: Adult  Mallampati: II  TM Distance: Normal, at least 6 cm     Eyes/Ears/Nose:  EYES/EARS/NOSE FINDINGS: Normal   Dental:  Dental Findings: In tact        Mental Status:  Mental Status Findings: Normal        Anesthesia Plan  Type of Anesthesia, risks & benefits discussed:  Anesthesia Type:  general    Patient's Preference:   Plan Factors:          Intra-op Monitoring Plan: standard ASA monitors  Intra-op Monitoring Plan Comments:   Post Op Pain Control Plan: multimodal analgesia and IV/PO Opioids PRN  Post Op Pain Control Plan Comments:     Induction:   IV  Beta Blocker:         Informed Consent: Patient understands risks and agrees with Anesthesia plan.  Questions answered. Anesthesia consent signed with patient.  ASA Score: 2     Day of Surgery Review of History & Physical:             Ready For Surgery From Anesthesia Perspective.

## 2022-01-25 NOTE — BRIEF OP NOTE
Discharge Summary/Instructions after an Endoscopic Procedure    Patient Name: Staci Hodge  Patient MRN: [id number]  Patient YOB: 1980 Tuesday, January 25, 2022  Chandan Dillon MD    Dear patient,  As a result of recent federal legislation (The Federal Cures Act), you may receive lab or pathology results from your procedure in your [facility] account before your physician is able to contact you. Your physician or their representative will relay the results to you with their recommendations at their soonest availability.  Thank you,    RESTRICTIONS:  During your procedure today, you received medications for sedation.  These medications may affect your judgment, balance and coordination.  Therefore, for 24 hours, you have the following restrictions:     - DO NOT drive a car, operate machinery, make legal/financial decisions, sign important papers or drink alcohol.      ACTIVITY:  Today: no heavy lifting, straining or running due to procedural sedation/anesthesia.  The following day: return to full activity including work.    DIET:  Eat and drink normally unless instructed otherwise.     TREATMENT FOR COMMON SIDE EFFECTS:  - Mild abdominal pain, nausea, belching, bloating or excessive gas:  rest, eat lightly and use a heating pad.  - Sore Throat: treat with throat lozenges and/or gargle with warm salt water.  - Because air was used during the procedure, expelling large amounts of air from your rectum or belching is normal.  - If a bowel prep was taken, you may not have a bowel movement for 1-3 days.  This is normal.      SYMPTOMS TO WATCH FOR AND REPORT TO YOUR PHYSICIAN:  1. Abdominal pain or bloating, other than gas cramps.  2. Chest pain.  3. Back pain.  4. Signs of infection such as: chills or fever occurring within 24 hours after the procedure.  5. Rectal bleeding, which would show as bright red, maroon, or black stools. (A tablespoon of blood from the rectum is not serious, especially if hemorrhoids  are present.)  6. Vomiting.  7. Weakness or dizziness.      GO DIRECTLY TO THE NEAREST EMERGENCY ROOM IF YOU HAVE ANY OF THE FOLLOWING:     Difficulty breathing              Chills and/or fever over 101 F   Persistent vomiting and/or vomiting blood   Severe abdominal pain   Severe chest pain   Black, tarry stools   Bleeding- more than one tablespoon   Any other symptom or condition that you feel may need urgent attention    Your doctor recommends these additional instructions:  If any biopsies were taken, your doctors clinic will contact you in 1 to 2 weeks with any results.    - Discharge patient to home (ambulatory).   - Repeat upper endoscopy in 1 year for surveillance.   - Return to endoscopist at appointment to be scheduled.    For questions, problems or results please call your physician - Chandan Dillon MD at Work:  [phone].    [facility], EMERGENCY ROOM PHONE NUMBER: [phone]    IF A COMPLICATION OR EMERGENCY SITUATION ARISES AND YOU ARE UNABLE TO REACH YOUR PHYSICIAN - GO DIRECTLY TO THE EMERGENCY ROOM.

## 2022-01-25 NOTE — [employer] PATIENT INSTRUCTIONS
Discharge Summary/Instructions after an Endoscopic Procedure  Patient Name: Staci Hodge  Patient MRN: [id number]  Patient YOB: 1980 Tuesday, January 25, 2022  Chandan Dillon MD  Dear patient,  As a result of recent federal legislation (The Federal Cures Act), you may   receive lab or pathology results from your procedure in your [facility]   account before your physician is able to contact you. Your physician or   their representative will relay the results to you with their   recommendations at their soonest availability.  Thank you,  RESTRICTIONS:  During your procedure today, you received medications for sedation.  These   medications may affect your judgment, balance and coordination.  Therefore,   for 24 hours, you have the following restrictions:   - DO NOT drive a car, operate machinery, make legal/financial decisions,   sign important papers or drink alcohol.    ACTIVITY:  Today: no heavy lifting, straining or running due to procedural   sedation/anesthesia.  The following day: return to full activity including work.  DIET:  Eat and drink normally unless instructed otherwise.     TREATMENT FOR COMMON SIDE EFFECTS:  - Mild abdominal pain, nausea, belching, bloating or excessive gas:  rest,   eat lightly and use a heating pad.  - Sore Throat: treat with throat lozenges and/or gargle with warm salt   water.  - Because air was used during the procedure, expelling large amounts of air   from your rectum or belching is normal.  - If a bowel prep was taken, you may not have a bowel movement for 1-3 days.    This is normal.  SYMPTOMS TO WATCH FOR AND REPORT TO YOUR PHYSICIAN:  1. Abdominal pain or bloating, other than gas cramps.  2. Chest pain.  3. Back pain.  4. Signs of infection such as: chills or fever occurring within 24 hours   after the procedure.  5. Rectal bleeding, which would show as bright red, maroon, or black stools.   (A tablespoon of blood from the rectum is not serious, especially  if   hemorrhoids are present.)  6. Vomiting.  7. Weakness or dizziness.  GO DIRECTLY TO THE NEAREST EMERGENCY ROOM IF YOU HAVE ANY OF THE FOLLOWING:      Difficulty breathing              Chills and/or fever over 101 F   Persistent vomiting and/or vomiting blood   Severe abdominal pain   Severe chest pain   Black, tarry stools   Bleeding- more than one tablespoon   Any other symptom or condition that you feel may need urgent attention  Your doctor recommends these additional instructions:  If any biopsies were taken, your doctors clinic will contact you in 1 to 2   weeks with any results.  - Discharge patient to home (ambulatory).   - Repeat upper endoscopy in 1 year for surveillance.   - Return to endoscopist at appointment to be scheduled.  For questions, problems or results please call your physician - Chandan Dillon MD at Work:  [phone].  [facility], EMERGENCY ROOM PHONE NUMBER: [phone]  IF A COMPLICATION OR EMERGENCY SITUATION ARISES AND YOU ARE UNABLE TO REACH   YOUR PHYSICIAN - GO DIRECTLY TO THE EMERGENCY ROOM.  Chandan Dillon MD  1/25/2022 9:23:21 AM  This report has been verified and signed electronically.  Dear patient,  As a result of recent federal legislation (The Federal Cures Act), you may   receive lab or pathology results from your procedure in your [facility]   account before your physician is able to contact you. Your physician or   their representative will relay the results to you with their   recommendations at their soonest availability.  Thank you,  [employer]

## 2022-01-25 NOTE — TRANSFER OF CARE
"Anesthesia Transfer of Care Note    Patient: Staci Hodge    Procedure(s) Performed: Procedure(s) (LRB):  EGD (ESOPHAGOGASTRODUODENOSCOPY) (N/A)    Patient location: PACU    Anesthesia Type: general    Transport from OR: Transported from OR on room air with adequate spontaneous ventilation    Post pain: adequate analgesia    Post assessment: no apparent anesthetic complications and tolerated procedure well    Post vital signs: stable    Level of consciousness: awake and alert    Nausea/Vomiting: no nausea/vomiting    Complications: none    Transfer of care protocol was followed      Last vitals:   Visit Vitals  BP (!) 137/59   Pulse 84   Temp 36.7 °C (98.1 °F) (Temporal)   Resp 16   Ht 5' 2" (1.575 m)   Wt 81.6 kg (180 lb)   LMP 03/01/2021   SpO2 96%   Breastfeeding No   BMI 32.92 kg/m²     "

## 2022-01-25 NOTE — ANESTHESIA POSTPROCEDURE EVALUATION
Anesthesia Post Evaluation    Patient: Staci Hodge    Procedure(s) Performed: Procedure(s) (LRB):  EGD (ESOPHAGOGASTRODUODENOSCOPY) (N/A)    Final Anesthesia Type: general      Patient location during evaluation: PACU  Patient participation: Yes- Able to Participate  Level of consciousness: awake and alert  Post-procedure vital signs: reviewed and stable  Pain management: adequate  Airway patency: patent    PONV status at discharge: No PONV  Anesthetic complications: no      Cardiovascular status: blood pressure returned to baseline  Respiratory status: unassisted and spontaneous ventilation  Hydration status: euvolemic  Follow-up not needed.          Vitals Value Taken Time   /70 01/25/22 1015   Temp 36.6 °C (97.8 °F) 01/25/22 1010   Pulse 70 01/25/22 1015   Resp 16 01/25/22 1015   SpO2 97 % 01/25/22 1015         No case tracking events are documented in the log.      Pain/Bran Score: Bran Score: 10 (1/25/2022 10:34 AM)

## 2022-01-25 NOTE — DISCHARGE INSTRUCTIONS
Patient Education       Upper GI Endoscopy Discharge Instructions   About this topic   This procedure is done to view your upper gastrointestinal (GI) tract. This includes your throat and food pipe (esophagus). It also includes your stomach and the first part of the small bowel. Some people have this test for problems like coughing or throwing up blood. Other people may be having bad belly pain or blood in their stool. You may be having trouble swallowing or problems with acid reflux.  Doctors often use this test to look for problems like:  · Ulcers  · Cancer or tumor growths  · Internal bleeding  · Swelling  · Inflammation  · Infection  · Sorto's esophagus  · Gastroesophageal reflux disease or GERD  · Swallowing problems     What care is needed at home?   · Ask your doctor what you need to do when you go home. Make sure you ask questions if you do not understand what the doctor says. This way you will know what you need to do.  · Your throat may feel sore. Your belly may also feel bloated. Your doctor may give you drugs to help with pain.  · Talk to your doctor about when it is safe for you to go back to eating your normal diet and taking your drugs. You can drink fluid once the numbing drugs in your throat wear off.  What follow-up care is needed?   · Your doctor may ask you to make visits to the office to check on your progress. Be sure to keep these visits.  · The results of this test may help your doctor understand what kind of problem you have with your upper GI tract. Together you can make a plan for more care.  What drugs may be needed?   The doctor may order drugs to:  · Help with pain  · Decrease the acid in your stomach  Will physical activity be limited?   You may need to limit your activity for the rest of the day. After that, you will likely be able to go back to your normal activities.  What changes to diet are needed?   Soft foods like pudding or soups may be easier to eat at first. Ask your doctor  if you need to make any changes to your diet.  What problems could happen?   · Painful swallowing  · Upset stomach  · Injury to food pipe   · Throwing up  · Tear in the esophagus  When do I need to call the doctor?   · Signs of infection. These include a fever of 100.4°F (38°C) or higher, chills.  · Very bad belly pain  · Throwing up blood  · Your belly is hard and swollen  · Trouble swallowing or breathing  · Upset stomach and throwing up  · Bloody or black tarry stools  · Cough, shortness of breath, or chest pain  · A crunching feeling under the skin in your neck  · You are not feeling better in 2 to 3 days or you are feeling worse  Teach Back: Helping You Understand   The Teach Back Method helps you understand the information we are giving you. After you talk with the staff, tell them in your own words what you learned. This helps to make sure the staff has described each thing clearly. It also helps to explain things that may have been confusing. Before going home, make sure you can do these:  · I can tell you about my procedure.  · I can tell you what changes I need to make with my diet or drugs.  · I can tell you what I will do if I have very bad belly pain, throwing up blood, or my belly is hard and swollen.  Where can I learn more?   [employer]  https://www.gastro.org/practice-guidance/gi-patient-center/topic/upper-gi-endoscopy   Last Reviewed Date   2021-10-05  Consumer Information Use and Disclaimer   This information is not specific medical advice and does not replace information you receive from your health care provider. This is only a brief summary of general information. It does NOT include all information about conditions, illnesses, injuries, tests, procedures, treatments, therapies, discharge instructions or life-style choices that may apply to you. You must talk with your health care provider for complete information about your health and treatment options. This  information should not be used to decide whether or not to accept your health care providers advice, instructions or recommendations. Only your health care provider has the knowledge and training to provide advice that is right for you.  Copyright   Copyright © 2021 [employer], [employer] and its affiliates and/or licensors. All rights reserved.

## 2022-01-28 ENCOUNTER — LAB VISIT (OUTPATIENT)
Dept: FAMILY MEDICINE | Facility: CLINIC | Age: 42
End: 2022-01-28
Payer: COMMERCIAL

## 2022-01-28 DIAGNOSIS — Z11.52 ENCOUNTER FOR SCREENING FOR COVID-19: Primary | ICD-10-CM

## 2022-01-28 LAB
CTP QC/QA: YES
SARS-COV-2 AG RESP QL IA.RAPID: POSITIVE

## 2022-01-28 PROCEDURE — 87811 SARS CORONAVIRUS 2 ANTIGEN POCT, MANUAL READ: ICD-10-PCS | Mod: S$GLB,,, | Performed by: INTERNAL MEDICINE

## 2022-01-28 PROCEDURE — 87811 SARS-COV-2 COVID19 W/OPTIC: CPT | Mod: S$GLB,,, | Performed by: INTERNAL MEDICINE

## 2022-01-28 NOTE — PROGRESS NOTES
Instructions for Patients with Confirmed or Suspected COVID-19    If you are awaiting your test result, you will either be called or it will be released to the patient portal.  If you have any questions about your test, please visit [email or web address] or call our COVID-19 information line at [phone].      Please isolate yourself at home.  You may leave home and/or return to work once the following conditions are met:    If you have symptoms and tested positive:   More than 5 days since symptoms first appeared AND   More than 24 hours fever free without medications AND       symptoms have improved   · For five days after ending isolation, masks are required.    If you had no symptoms but tested positive:   More than 5 days since the date of the first positive test. If you develop symptoms, then use the guidelines above  · For five days after ending isolation, masks are required.      Testing is not recommended if you are symptom free after completing isolation.

## 2022-02-03 DIAGNOSIS — F41.9 ANXIETY: ICD-10-CM

## 2022-02-03 RX ORDER — CLONAZEPAM 1 MG/1
TABLET ORAL
Qty: 90 TABLET | Refills: 2 | Status: ON HOLD | OUTPATIENT
Start: 2022-02-03 | End: 2022-03-24 | Stop reason: HOSPADM

## 2022-02-07 DIAGNOSIS — G43.009 MIGRAINE WITHOUT AURA AND WITHOUT STATUS MIGRAINOSUS, NOT INTRACTABLE: ICD-10-CM

## 2022-02-07 RX ORDER — BUTALBITAL, ACETAMINOPHEN AND CAFFEINE 50; 325; 40 MG/1; MG/1; MG/1
TABLET ORAL
Qty: 10 TABLET | Refills: 4 | Status: SHIPPED | OUTPATIENT
Start: 2022-02-07 | End: 2022-03-28

## 2022-02-21 ENCOUNTER — OFFICE VISIT (OUTPATIENT)
Dept: INTERNAL MEDICINE | Facility: CLINIC | Age: 42
End: 2022-02-21
Payer: COMMERCIAL

## 2022-02-21 VITALS
RESPIRATION RATE: 18 BRPM | HEIGHT: 62 IN | SYSTOLIC BLOOD PRESSURE: 118 MMHG | BODY MASS INDEX: 35.17 KG/M2 | DIASTOLIC BLOOD PRESSURE: 82 MMHG | WEIGHT: 191.13 LBS | OXYGEN SATURATION: 99 % | HEART RATE: 80 BPM | TEMPERATURE: 98 F

## 2022-02-21 DIAGNOSIS — K51.40 PSEUDOPOLYPOSIS OF COLON WITHOUT COMPLICATION, UNSPECIFIED PART OF COLON: ICD-10-CM

## 2022-02-21 DIAGNOSIS — U09.9 POST-COVID CHRONIC COUGH: ICD-10-CM

## 2022-02-21 DIAGNOSIS — F39 MOOD DISORDER: ICD-10-CM

## 2022-02-21 DIAGNOSIS — M46.92 UNSPECIFIED INFLAMMATORY SPONDYLOPATHY, CERVICAL REGION: ICD-10-CM

## 2022-02-21 DIAGNOSIS — R05.3 POST-COVID CHRONIC COUGH: ICD-10-CM

## 2022-02-21 PROCEDURE — 96372 PR INJECTION,THERAP/PROPH/DIAG2ST, IM OR SUBCUT: ICD-10-PCS | Mod: S$GLB,,, | Performed by: NURSE PRACTITIONER

## 2022-02-21 PROCEDURE — 3008F BODY MASS INDEX DOCD: CPT | Mod: CPTII,S$GLB,, | Performed by: NURSE PRACTITIONER

## 2022-02-21 PROCEDURE — 3008F PR BODY MASS INDEX (BMI) DOCUMENTED: ICD-10-PCS | Mod: CPTII,S$GLB,, | Performed by: NURSE PRACTITIONER

## 2022-02-21 PROCEDURE — 96372 THER/PROPH/DIAG INJ SC/IM: CPT | Mod: S$GLB,,, | Performed by: NURSE PRACTITIONER

## 2022-02-21 PROCEDURE — 3074F SYST BP LT 130 MM HG: CPT | Mod: CPTII,S$GLB,, | Performed by: NURSE PRACTITIONER

## 2022-02-21 PROCEDURE — 99213 OFFICE O/P EST LOW 20 MIN: CPT | Mod: 25,S$GLB,, | Performed by: NURSE PRACTITIONER

## 2022-02-21 PROCEDURE — 3079F PR MOST RECENT DIASTOLIC BLOOD PRESSURE 80-89 MM HG: ICD-10-PCS | Mod: CPTII,S$GLB,, | Performed by: NURSE PRACTITIONER

## 2022-02-21 PROCEDURE — 3074F PR MOST RECENT SYSTOLIC BLOOD PRESSURE < 130 MM HG: ICD-10-PCS | Mod: CPTII,S$GLB,, | Performed by: NURSE PRACTITIONER

## 2022-02-21 PROCEDURE — 99999 PR PBB SHADOW E&M-EST. PATIENT-LVL III: CPT | Mod: PBBFAC,,, | Performed by: NURSE PRACTITIONER

## 2022-02-21 PROCEDURE — 99213 PR OFFICE/OUTPT VISIT, EST, LEVL III, 20-29 MIN: ICD-10-PCS | Mod: 25,S$GLB,, | Performed by: NURSE PRACTITIONER

## 2022-02-21 PROCEDURE — 99999 PR PBB SHADOW E&M-EST. PATIENT-LVL III: ICD-10-PCS | Mod: PBBFAC,,, | Performed by: NURSE PRACTITIONER

## 2022-02-21 PROCEDURE — 3079F DIAST BP 80-89 MM HG: CPT | Mod: CPTII,S$GLB,, | Performed by: NURSE PRACTITIONER

## 2022-02-21 RX ORDER — TRIAMCINOLONE ACETONIDE 40 MG/ML
40 INJECTION, SUSPENSION INTRA-ARTICULAR; INTRAMUSCULAR
Status: COMPLETED | OUTPATIENT
Start: 2022-02-21 | End: 2022-02-21

## 2022-02-21 RX ORDER — ALBUTEROL SULFATE 1.25 MG/3ML
1.25 SOLUTION RESPIRATORY (INHALATION) EVERY 6 HOURS PRN
Qty: 75 ML | Refills: 0 | Status: SHIPPED | OUTPATIENT
Start: 2022-02-21 | End: 2022-03-24

## 2022-02-21 RX ORDER — CODEINE PHOSPHATE AND GUAIFENESIN 10; 100 MG/5ML; MG/5ML
5 SOLUTION ORAL EVERY 6 HOURS PRN
Qty: 240 ML | Refills: 0 | Status: SHIPPED | OUTPATIENT
Start: 2022-02-21 | End: 2022-03-03

## 2022-02-21 RX ADMIN — TRIAMCINOLONE ACETONIDE 40 MG: 40 INJECTION, SUSPENSION INTRA-ARTICULAR; INTRAMUSCULAR at 04:02

## 2022-02-21 NOTE — PROGRESS NOTES
Subjective:       Patient ID: Staci Hodge is a 41 y.o. female.    Chief Complaint: Cough (X Friday ) and Nasal Congestion    Patient is known, to me and presents with   Chief Complaint   Patient presents with    Cough     X Friday     Nasal Congestion   .  Denies chest pain and shortness of breath.  Patient presents with post covid cough.  No fever or chills. No sob just constant coughing and she is not sleeping    HPI  Review of Systems   Constitutional: Negative.    HENT: Negative.    Respiratory: Positive for cough and wheezing. Negative for shortness of breath.    Cardiovascular: Negative.    Skin: Negative.    Hematological: Negative.        Objective:      Physical Exam  Constitutional:       General: She is not in acute distress.     Appearance: Normal appearance. She is obese. She is not ill-appearing, toxic-appearing or diaphoretic.   HENT:      Head: Normocephalic and atraumatic.      Right Ear: Tympanic membrane normal.      Left Ear: Tympanic membrane normal.      Nose: Nose normal.      Mouth/Throat:      Mouth: Mucous membranes are moist.      Pharynx: Oropharynx is clear. No posterior oropharyngeal erythema.   Eyes:      General:         Right eye: No discharge.         Left eye: No discharge.      Conjunctiva/sclera: Conjunctivae normal.   Neck:      Vascular: No carotid bruit.   Cardiovascular:      Rate and Rhythm: Regular rhythm.      Heart sounds: Normal heart sounds. No murmur heard.  Pulmonary:      Effort: Pulmonary effort is normal.      Breath sounds: Wheezing present. No rhonchi or rales.      Comments: Mild wheezing to upper posterior fields   Musculoskeletal:      Cervical back: Normal range of motion. No rigidity or tenderness.   Lymphadenopathy:      Cervical: No cervical adenopathy.   Skin:     General: Skin is warm and dry.      Capillary Refill: Capillary refill takes less than 2 seconds.      Coloration: Skin is not jaundiced or pale.      Findings: No bruising,  "erythema, lesion or rash.   Neurological:      Mental Status: She is alert.         Assessment:       1. Post-COVID chronic cough    2. Pseudopolyposis of colon without complication, unspecified part of colon    3. Unspecified inflammatory spondylopathy, cervical region    4. Mood disorder        Plan:   Staci was seen today for cough and nasal congestion.    Diagnoses and all orders for this visit:    Post-COVID chronic cough  -     NEBULIZER FOR HOME USE  -     albuterol (ACCUNEB) 1.25 mg/3 mL Nebu; Take 3 mLs (1.25 mg total) by nebulization every 6 (six) hours as needed (wheezing). Rescue  -     guaiFENesin-codeine 100-10 mg/5 ml (TUSSI-ORGANIDIN NR)  mg/5 mL syrup; Take 5 mLs by mouth every 6 (six) hours as needed.  -     triamcinolone acetonide injection 40 mg    Pseudopolyposis of colon without complication, unspecified part of colon    Unspecified inflammatory spondylopathy, cervical region    Mood disorder    "This note will not be shared with the patient."  Above captured dx stable   Do breathing tx qid and if no improvement will let me know  rtc as scheduled  "

## 2022-03-15 ENCOUNTER — HOSPITAL ENCOUNTER (EMERGENCY)
Facility: HOSPITAL | Age: 42
Discharge: PSYCHIATRIC OR SUBSTANCE USE FACILITY | End: 2022-03-15
Attending: SURGERY
Payer: COMMERCIAL

## 2022-03-15 ENCOUNTER — HOSPITAL ENCOUNTER (INPATIENT)
Facility: HOSPITAL | Age: 42
LOS: 9 days | Discharge: HOME | DRG: 885 | End: 2022-03-24
Attending: PSYCHIATRY & NEUROLOGY | Admitting: PSYCHIATRY & NEUROLOGY
Payer: COMMERCIAL

## 2022-03-15 VITALS
BODY MASS INDEX: 35.15 KG/M2 | DIASTOLIC BLOOD PRESSURE: 56 MMHG | HEART RATE: 69 BPM | SYSTOLIC BLOOD PRESSURE: 120 MMHG | HEIGHT: 62 IN | TEMPERATURE: 98 F | WEIGHT: 191 LBS | RESPIRATION RATE: 22 BRPM | OXYGEN SATURATION: 96 %

## 2022-03-15 DIAGNOSIS — T50.902A INTENTIONAL DRUG OVERDOSE, INITIAL ENCOUNTER: Primary | ICD-10-CM

## 2022-03-15 DIAGNOSIS — R45.851 DEPRESSION WITH SUICIDAL IDEATION: Primary | ICD-10-CM

## 2022-03-15 DIAGNOSIS — R45.851 SUICIDAL IDEATION: ICD-10-CM

## 2022-03-15 DIAGNOSIS — F32.A DEPRESSION WITH SUICIDAL IDEATION: Primary | ICD-10-CM

## 2022-03-15 LAB
25(OH)D3+25(OH)D2 SERPL-MCNC: 13 NG/ML (ref 30–96)
ALBUMIN SERPL BCP-MCNC: 3.8 G/DL (ref 3.5–5.2)
ALP SERPL-CCNC: 131 U/L (ref 55–135)
ALT SERPL W/O P-5'-P-CCNC: 67 U/L (ref 10–44)
AMPHET+METHAMPHET UR QL: NEGATIVE
ANION GAP SERPL CALC-SCNC: 13 MMOL/L (ref 8–16)
APAP SERPL-MCNC: <3 UG/ML (ref 10–20)
AST SERPL-CCNC: 47 U/L (ref 10–40)
B-HCG UR QL: NEGATIVE
BARBITURATES UR QL SCN>200 NG/ML: ABNORMAL
BASOPHILS # BLD AUTO: 0.1 K/UL (ref 0–0.2)
BASOPHILS NFR BLD: 0.9 % (ref 0–1.9)
BENZODIAZ UR QL SCN>200 NG/ML: ABNORMAL
BILIRUB SERPL-MCNC: 0.3 MG/DL (ref 0.1–1)
BILIRUB UR QL STRIP: NEGATIVE
BUN SERPL-MCNC: 15 MG/DL (ref 6–20)
BZE UR QL SCN: NEGATIVE
CALCIUM SERPL-MCNC: 9.3 MG/DL (ref 8.7–10.5)
CANNABINOIDS UR QL SCN: NEGATIVE
CHLORIDE SERPL-SCNC: 105 MMOL/L (ref 95–110)
CK MB SERPL-MCNC: 1.1 NG/ML (ref 0.1–6.5)
CK MB SERPL-RTO: 0.1 % (ref 0–5)
CK SERPL-CCNC: 1023 U/L (ref 20–180)
CK SERPL-CCNC: 1322 U/L (ref 20–180)
CK SERPL-CCNC: 1322 U/L (ref 20–180)
CLARITY UR: CLEAR
CO2 SERPL-SCNC: 22 MMOL/L (ref 23–29)
COLOR UR: YELLOW
CREAT SERPL-MCNC: 0.8 MG/DL (ref 0.5–1.4)
CREAT UR-MCNC: 78.9 MG/DL (ref 15–325)
DIFFERENTIAL METHOD: ABNORMAL
EOSINOPHIL # BLD AUTO: 0.4 K/UL (ref 0–0.5)
EOSINOPHIL NFR BLD: 3.5 % (ref 0–8)
ERYTHROCYTE [DISTWIDTH] IN BLOOD BY AUTOMATED COUNT: 14.7 % (ref 11.5–14.5)
EST. GFR  (AFRICAN AMERICAN): >60 ML/MIN/1.73 M^2
EST. GFR  (NON AFRICAN AMERICAN): >60 ML/MIN/1.73 M^2
ETHANOL SERPL-MCNC: <10 MG/DL
FOLATE SERPL-MCNC: 7.7 NG/ML (ref 4–24)
GLUCOSE SERPL-MCNC: 120 MG/DL (ref 70–110)
GLUCOSE UR QL STRIP: NEGATIVE
HCT VFR BLD AUTO: 39.6 % (ref 37–48.5)
HGB BLD-MCNC: 12.8 G/DL (ref 12–16)
HGB UR QL STRIP: NEGATIVE
IMM GRANULOCYTES # BLD AUTO: 0.04 K/UL (ref 0–0.04)
IMM GRANULOCYTES NFR BLD AUTO: 0.3 % (ref 0–0.5)
KETONES UR QL STRIP: NEGATIVE
LEUKOCYTE ESTERASE UR QL STRIP: NEGATIVE
LYMPHOCYTES # BLD AUTO: 3.1 K/UL (ref 1–4.8)
LYMPHOCYTES NFR BLD: 27.1 % (ref 18–48)
MCH RBC QN AUTO: 28.3 PG (ref 27–31)
MCHC RBC AUTO-ENTMCNC: 32.3 G/DL (ref 32–36)
MCV RBC AUTO: 88 FL (ref 82–98)
METHADONE UR QL SCN>300 NG/ML: NEGATIVE
MONOCYTES # BLD AUTO: 0.8 K/UL (ref 0.3–1)
MONOCYTES NFR BLD: 6.8 % (ref 4–15)
NEUTROPHILS # BLD AUTO: 7 K/UL (ref 1.8–7.7)
NEUTROPHILS NFR BLD: 61.4 % (ref 38–73)
NITRITE UR QL STRIP: NEGATIVE
NRBC BLD-RTO: 0 /100 WBC
OPIATES UR QL SCN: NEGATIVE
PCP UR QL SCN>25 NG/ML: NEGATIVE
PH UR STRIP: 6 [PH] (ref 5–8)
PLATELET # BLD AUTO: 342 K/UL (ref 150–450)
PMV BLD AUTO: 10.6 FL (ref 9.2–12.9)
POTASSIUM SERPL-SCNC: 4.5 MMOL/L (ref 3.5–5.1)
PROT SERPL-MCNC: 6.7 G/DL (ref 6–8.4)
PROT UR QL STRIP: NEGATIVE
RBC # BLD AUTO: 4.52 M/UL (ref 4–5.4)
SALICYLATES SERPL-MCNC: <5 MG/DL (ref 15–30)
SARS-COV-2 RDRP RESP QL NAA+PROBE: NEGATIVE
SODIUM SERPL-SCNC: 140 MMOL/L (ref 136–145)
SP GR UR STRIP: 1.02 (ref 1–1.03)
T4 FREE SERPL-MCNC: 0.87 NG/DL (ref 0.71–1.51)
TOXICOLOGY INFORMATION: ABNORMAL
TROPONIN I SERPL DL<=0.01 NG/ML-MCNC: 0.01 NG/ML (ref 0–0.03)
TSH SERPL DL<=0.005 MIU/L-ACNC: 2.16 UIU/ML (ref 0.4–4)
URN SPEC COLLECT METH UR: NORMAL
UROBILINOGEN UR STRIP-ACNC: NEGATIVE EU/DL
VIT B12 SERPL-MCNC: 576 PG/ML (ref 210–950)
WBC # BLD AUTO: 11.45 K/UL (ref 3.9–12.7)

## 2022-03-15 PROCEDURE — 36415 COLL VENOUS BLD VENIPUNCTURE: CPT | Performed by: SURGERY

## 2022-03-15 PROCEDURE — 80053 COMPREHEN METABOLIC PANEL: CPT | Performed by: SURGERY

## 2022-03-15 PROCEDURE — 82550 ASSAY OF CK (CPK): CPT | Mod: 91 | Performed by: STUDENT IN AN ORGANIZED HEALTH CARE EDUCATION/TRAINING PROGRAM

## 2022-03-15 PROCEDURE — 25000003 PHARM REV CODE 250: Performed by: SURGERY

## 2022-03-15 PROCEDURE — 99285 EMERGENCY DEPT VISIT HI MDM: CPT | Mod: 25

## 2022-03-15 PROCEDURE — 81003 URINALYSIS AUTO W/O SCOPE: CPT | Mod: 59 | Performed by: SURGERY

## 2022-03-15 PROCEDURE — 36415 COLL VENOUS BLD VENIPUNCTURE: CPT | Performed by: STUDENT IN AN ORGANIZED HEALTH CARE EDUCATION/TRAINING PROGRAM

## 2022-03-15 PROCEDURE — 84439 ASSAY OF FREE THYROXINE: CPT | Performed by: SURGERY

## 2022-03-15 PROCEDURE — 90833 PR PSYCHOTHERAPY W/PATIENT W/E&M, 30 MIN (ADD ON): ICD-10-PCS | Mod: ,,, | Performed by: PSYCHIATRY & NEUROLOGY

## 2022-03-15 PROCEDURE — 96361 HYDRATE IV INFUSION ADD-ON: CPT

## 2022-03-15 PROCEDURE — 82746 ASSAY OF FOLIC ACID SERUM: CPT | Performed by: SURGERY

## 2022-03-15 PROCEDURE — 93005 ELECTROCARDIOGRAM TRACING: CPT

## 2022-03-15 PROCEDURE — 82306 VITAMIN D 25 HYDROXY: CPT | Performed by: SURGERY

## 2022-03-15 PROCEDURE — 84443 ASSAY THYROID STIM HORMONE: CPT | Performed by: SURGERY

## 2022-03-15 PROCEDURE — U0002 COVID-19 LAB TEST NON-CDC: HCPCS | Performed by: SURGERY

## 2022-03-15 PROCEDURE — 93010 EKG 12-LEAD: ICD-10-PCS | Mod: ,,, | Performed by: INTERNAL MEDICINE

## 2022-03-15 PROCEDURE — 93010 ELECTROCARDIOGRAM REPORT: CPT | Mod: ,,, | Performed by: INTERNAL MEDICINE

## 2022-03-15 PROCEDURE — 81025 URINE PREGNANCY TEST: CPT | Performed by: SURGERY

## 2022-03-15 PROCEDURE — 80307 DRUG TEST PRSMV CHEM ANLYZR: CPT | Performed by: SURGERY

## 2022-03-15 PROCEDURE — 84484 ASSAY OF TROPONIN QUANT: CPT | Performed by: SURGERY

## 2022-03-15 PROCEDURE — 82553 CREATINE MB FRACTION: CPT | Performed by: SURGERY

## 2022-03-15 PROCEDURE — 80179 DRUG ASSAY SALICYLATE: CPT | Performed by: SURGERY

## 2022-03-15 PROCEDURE — 82607 VITAMIN B-12: CPT | Performed by: SURGERY

## 2022-03-15 PROCEDURE — 85025 COMPLETE CBC W/AUTO DIFF WBC: CPT | Performed by: SURGERY

## 2022-03-15 PROCEDURE — 11400000 HC PSYCH PRIVATE ROOM

## 2022-03-15 PROCEDURE — 90833 PSYTX W PT W E/M 30 MIN: CPT | Mod: ,,, | Performed by: PSYCHIATRY & NEUROLOGY

## 2022-03-15 PROCEDURE — 99223 1ST HOSP IP/OBS HIGH 75: CPT | Mod: ,,, | Performed by: PSYCHIATRY & NEUROLOGY

## 2022-03-15 PROCEDURE — 99223 PR INITIAL HOSPITAL CARE,LEVL III: ICD-10-PCS | Mod: ,,, | Performed by: PSYCHIATRY & NEUROLOGY

## 2022-03-15 PROCEDURE — 80143 DRUG ASSAY ACETAMINOPHEN: CPT | Performed by: SURGERY

## 2022-03-15 PROCEDURE — 96360 HYDRATION IV INFUSION INIT: CPT

## 2022-03-15 PROCEDURE — 82077 ASSAY SPEC XCP UR&BREATH IA: CPT | Performed by: SURGERY

## 2022-03-15 RX ORDER — OLANZAPINE 10 MG/2ML
10 INJECTION, POWDER, FOR SOLUTION INTRAMUSCULAR EVERY 4 HOURS PRN
Status: DISCONTINUED | OUTPATIENT
Start: 2022-03-15 | End: 2022-03-24 | Stop reason: HOSPADM

## 2022-03-15 RX ORDER — LOPERAMIDE HYDROCHLORIDE 2 MG/1
2 CAPSULE ORAL
Status: DISCONTINUED | OUTPATIENT
Start: 2022-03-15 | End: 2022-03-24 | Stop reason: HOSPADM

## 2022-03-15 RX ORDER — ACETAMINOPHEN 325 MG/1
650 TABLET ORAL EVERY 6 HOURS PRN
Status: DISCONTINUED | OUTPATIENT
Start: 2022-03-15 | End: 2022-03-24 | Stop reason: HOSPADM

## 2022-03-15 RX ORDER — DOCUSATE SODIUM 100 MG/1
100 CAPSULE, LIQUID FILLED ORAL DAILY PRN
Status: DISCONTINUED | OUTPATIENT
Start: 2022-03-15 | End: 2022-03-24 | Stop reason: HOSPADM

## 2022-03-15 RX ORDER — OLANZAPINE 10 MG/1
10 TABLET ORAL EVERY 4 HOURS PRN
Status: DISCONTINUED | OUTPATIENT
Start: 2022-03-15 | End: 2022-03-24 | Stop reason: HOSPADM

## 2022-03-15 RX ORDER — SODIUM CHLORIDE 9 MG/ML
1000 INJECTION, SOLUTION INTRAVENOUS CONTINUOUS
Status: ACTIVE | OUTPATIENT
Start: 2022-03-15 | End: 2022-03-15

## 2022-03-15 RX ORDER — MAG HYDROX/ALUMINUM HYD/SIMETH 200-200-20
30 SUSPENSION, ORAL (FINAL DOSE FORM) ORAL EVERY 6 HOURS PRN
Status: DISCONTINUED | OUTPATIENT
Start: 2022-03-15 | End: 2022-03-24 | Stop reason: HOSPADM

## 2022-03-15 RX ORDER — FOLIC ACID 1 MG/1
1 TABLET ORAL DAILY
Status: DISCONTINUED | OUTPATIENT
Start: 2022-03-16 | End: 2022-03-16

## 2022-03-15 RX ORDER — SODIUM CHLORIDE 9 MG/ML
1000 INJECTION, SOLUTION INTRAVENOUS
Status: COMPLETED | OUTPATIENT
Start: 2022-03-15 | End: 2022-03-15

## 2022-03-15 RX ORDER — IBUPROFEN 200 MG
1 TABLET ORAL DAILY PRN
Status: DISCONTINUED | OUTPATIENT
Start: 2022-03-15 | End: 2022-03-16

## 2022-03-15 RX ORDER — HYDROXYZINE PAMOATE 50 MG/1
50 CAPSULE ORAL EVERY 6 HOURS PRN
Status: DISCONTINUED | OUTPATIENT
Start: 2022-03-15 | End: 2022-03-24 | Stop reason: HOSPADM

## 2022-03-15 RX ORDER — PAROXETINE HYDROCHLORIDE 20 MG/1
20 TABLET, FILM COATED ORAL DAILY
Status: DISCONTINUED | OUTPATIENT
Start: 2022-03-16 | End: 2022-03-16

## 2022-03-15 RX ADMIN — SODIUM CHLORIDE 1000 ML: 0.9 INJECTION, SOLUTION INTRAVENOUS at 06:03

## 2022-03-15 RX ADMIN — SODIUM CHLORIDE 1000 ML: 0.9 INJECTION, SOLUTION INTRAVENOUS at 05:03

## 2022-03-15 NOTE — ED NOTES
Patient discharged to [facility] for transport to [facility] with all belongings and appropriate paperwork. Security at side. Notified Juan at [facility] that [facility] was here to  patient

## 2022-03-15 NOTE — ED TRIAGE NOTES
"41 y.o. female presents to ER ED 05/ED 05   Chief Complaint   Patient presents with    Drug Overdose     Pt presents to ED in POV after pt states she took approximately "70, 1 mg klonopin and 60, 25 mg metoprolol a couple of hours ago." /80  and HR 65 in triage. Pt endorses SI. Pt denies HI, AVH.     "

## 2022-03-15 NOTE — ED NOTES
Spoke to Jaimee at Poison Control. Monitor BP, HR, obtain EKG - If she becomes symptomatic administer fluids. Give Atropine for hypotension and bradycardia. High dose insulin if that doesn't work. Monitor patient for 8 hours from time of ingestion. General tox screen and blood work.

## 2022-03-15 NOTE — ED PROVIDER NOTES
"Encounter Date: 3/15/2022       History     Chief Complaint   Patient presents with    Drug Overdose     Pt presents to ED in POV after pt states she took approximately "70, 1 mg klonopin and 60, 25 mg metoprolol a couple of hours ago." /80  and HR 65 in triage. Pt endorses SI. Pt denies HI, AVH.     Staci Hodge is a 41 y.o. female presents after intentional drug overdose  Patient took unknown amount of metoprolol and Klonopin per ER interview today  Patient is incredibly depressed and admits to suicidal ideation this evening PTA  Patient has had a previous history of self-harming behavior and suicidal ideation  Patient is depressed and anxious, answering all questions, appropriate on triage        Review of patient's allergies indicates:  No Known Allergies  Past Medical History:   Diagnosis Date    Abnormal Pap smear of cervix age 25    no treatement    Anxiety     Breast cancer     Cancer 07/11/2018    breast cancer    Cervical spondylosis 2/9/2018    Colon polyp     Depression     Ectopic pregnancy 2/26/2019    General anesthetics causing adverse effect in therapeutic use     Slow to awaken/ Memory problems-post-op to day 3 after Mastectomy    Headache     History of psychiatric hospitalization     age 20 for SI    Hx of psychiatric care     Hypertension     Peutz-Jeghers syndrome     PONV (postoperative nausea and vomiting)     Psychiatric problem     Right carpal tunnel syndrome 3/9/2017    Self-harming behavior     Suicide attempt     Therapy      Past Surgical History:   Procedure Laterality Date    ADENOIDECTOMY      ANTEGRADE SINGLE BALLOON ENTEROSCOPY N/A 4/19/2021    Procedure: ENTEROSCOPY, SINGLE BALLOON, ANTEGRADE;  Surgeon: Vincenzo Herbert MD;  Location: [facility] ([address] [place] [address]);  Service: Endoscopy;  Laterality: N/A;  removal of large polyp in proximal jejunum; please schedule during a time when Dr. Dillon is available in case I need assistance with removal or 2nd " opinion prior to removal    COVID at [place] 4/16 - ttr    APPENDECTOMY      AUGMENTATION OF BREAST      BILATERAL MASTECTOMY Bilateral 7/23/2018    Procedure: MASTECTOMY 23 hr stay;  Surgeon: Sofia Kendrick MD;  Location: [address];  Service: General;  Laterality: Bilateral;    BILATERAL SALPINGO-OOPHORECTOMY (BSO) Bilateral 3/4/2021    Procedure: SALPINGO-OOPHORECTOMY, BILATERAL;  Surgeon: Clayton Vilchis MD;  Location: [facility];  Service: OB/GYN;  Laterality: Bilateral;    BOWEL RESECTION  10/17/14    BREAST BIOPSY Left 06/04/2018    BREAST CAPSULOTOMY Left 4/25/2019    Procedure: CAPSULOTOMY, BREAST LEFT;  Surgeon: Duane Bello MD;  Location: [address];  Service: Plastics;  Laterality: Left;    BREAST RECONSTRUCTION      BREAST SURGERY      Bilateral Mastectomy 07/23/2018    CARPAL TUNNEL RELEASE Bilateral     CARPAL TUNNEL RELEASE      COLONOSCOPY      COLONOSCOPY N/A 2/25/2021    Procedure: COLONOSCOPY;  Surgeon: Vincenzo Herbert MD;  Location: [facility] ([address] [place] [address]);  Service: Endoscopy;  Laterality: N/A;  previous anesthesia complications  okay for this date/time per Dr. Herbert and liliana with Miranda,  - sm  COVID test on 2/22/21 at [facility]    DILATION AND CURETTAGE OF UTERUS      DILATION AND CURETTAGE OF UTERUS USING SUCTION N/A 2/25/2019    Procedure: DILATION AND CURETTAGE, UTERUS, USING SUCTION  PATHOLOGY NEEDED;  Surgeon: Pam Sifuentes MD;  Location: [facility];  Service: OB/GYN;  Laterality: N/A;    ENDOSCOPIC ULTRASOUND OF UPPER GASTROINTESTINAL TRACT Left 1/15/2021    Procedure: ULTRASOUND, UPPER GI TRACT, ENDOSCOPIC;  Surgeon: Chandan Dillon MD;  Location: [facility];  Service: Endoscopy;  Laterality: Left;  for pancreatic screening    ESOPHAGOGASTRODUODENOSCOPY N/A 1/15/2021    Procedure: EGD (ESOPHAGOGASTRODUODENOSCOPY);  Surgeon: Chandan Dillon MD;  Location: [facility];  Service: Endoscopy;  Laterality: N/A;  with push enteroscopy     ESOPHAGOGASTRODUODENOSCOPY N/A 5/17/2021    Procedure: EGD (ESOPHAGOGASTRODUODENOSCOPY);  Surgeon: Chandan Dillon MD;  Location: [facility] ENDO (2ND FLR);  Service: Endoscopy;  Laterality: N/A;  Please schedule this duodenal polypectomy. Need to be a day that Dr Jose Antonio Stark is in the OR. 90 minutes.   Chandan Dillon MD     COVID at [place] 5/15 (scheduled in the OR for 5/18 - using same COVID results for both procedures) ttr    ESOPHAGOGASTRODUODENOSCOPY N/A 1/25/2022    Procedure: EGD (ESOPHAGOGASTRODUODENOSCOPY);  Surgeon: Chandan Dillon MD;  Location: [facility] ENDO (2ND FLR);  Service: Endoscopy;  Laterality: N/A;  [facility] emr  covid-1/22/22-STAH-BB  instructions sent via portal-BB    FAT TRANSFER Bilateral 11/12/2018    Procedure: TRANSFER, FAT TISSUE BILATERAL BREAST;  Surgeon: Duane Bello MD;  [address];  Service: Plastics;  Laterality: Bilateral;    INJECTION FOR SENTINEL NODE IDENTIFICATION Left 7/23/2018    Procedure: INJECTION, FOR SENTINEL NODE IDENTIFICATION;  Surgeon: Sofia Kendrick MD;  Location: [address];  Service: General;  Laterality: Left;    INSERTION OF BREAST TISSUE EXPANDER Bilateral 7/23/2018    Procedure: INSERTION, TISSUE EXPANDER, BREAST;  Surgeon: Duane Bello MD;  [address];  Service: Plastics;  Laterality: Bilateral;    INSERTION OF BREAST TISSUE EXPANDER Bilateral 3/10/2020    Procedure: INSERTION, TISSUE EXPANDER, BREAST, BILATERAL;  Surgeon: Duane Bello MD;  [address];  Service: Plastics;  Laterality: Bilateral;    intestinal polpy      LYSIS OF ADHESIONS N/A 3/4/2021    Procedure: LYSIS, ADHESIONS;  Surgeon: Clayton Vilchis MD;  Location: [facility];  Service: OB/GYN;  Laterality: N/A;  Omental adhesions    MASTECTOMY      REMOVAL OF BREAST IMPLANT Bilateral 3/10/2020    Procedure: REMOVAL, IMPLANT, BREAST, BILATERAL;  Surgeon: Duane Bello MD;  Location: [address];   Service: Plastics;  Laterality: Bilateral;    REMOVAL OF BREAST IMPLANT Bilateral 7/7/2020    Procedure: REMOVAL, IMPLANT, BREAST BILATERAL;  Surgeon: Duane Bello MD;  Location: [facility] OR [address] [place] [address];  Service: Plastics;  Laterality: Bilateral;    SENTINEL LYMPH NODE BIOPSY Left 7/23/2018    Procedure: BIOPSY, LYMPH NODE, SENTINEL;  Surgeon: Sofia Kendrick MD;  Location: [facility] OR [address] [place] [address];  Service: General;  Laterality: Left;    SMALL INTESTINE SURGERY      3 surgeries     TONSILLECTOMY      TOTAL ABDOMINAL HYSTERECTOMY N/A 3/4/2021    Procedure: HYSTERECTOMY, TOTAL, ABDOMINAL;  Surgeon: Clayton Vilchis MD;  Location: [facility];  Service: OB/GYN;  Laterality: N/A;    UPPER GASTROINTESTINAL ENDOSCOPY       Family History   Problem Relation Age of Onset    Colon cancer Mother 33        peutz-jeghers syndrome    Cancer Mother         colon cancer    Hypertension Father     Colon polyps Sister         peutz-jeghers syndrome    Breast cancer Paternal Aunt     Breast cancer Paternal Grandmother     Ovarian cancer Neg Hx     Anesthesia problems Neg Hx      Social History     Tobacco Use    Smoking status: Former Smoker     Packs/day: 0.50     Years: 17.00     Pack years: 8.50     Types: Cigarettes     Start date: 4/17/2000     Quit date: 7/23/2018     Years since quitting: 3.6    Smokeless tobacco: Never Used    Tobacco comment: sometimes dry cough per patient   Substance Use Topics    Alcohol use: Yes     Alcohol/week: 5.8 standard drinks     Types: 7 Standard drinks or equivalent per week     Comment: occasionally    Drug use: No     Review of Systems   Constitutional: Negative.    HENT: Negative.    Eyes: Negative.    Respiratory: Negative.    Cardiovascular: Negative.    Gastrointestinal: Negative.    Genitourinary: Negative.    Musculoskeletal: Negative.    Skin: Negative.    Neurological: Negative.    Psychiatric/Behavioral: Positive for dysphoric mood, sleep disturbance and suicidal ideas.        Physical Exam     Initial Vitals [03/15/22 0501]   BP Pulse Resp Temp SpO2   128/80 63 20 98 °F (36.7 °C) 95 %      MAP       --         Physical Exam    Constitutional: She appears well-developed.   HENT:   Head: Normocephalic and atraumatic.   Right Ear: External ear normal.   Left Ear: External ear normal.   Nose: Nose normal.   Mouth/Throat: Oropharynx is clear and moist.   Eyes: Conjunctivae and EOM are normal. Pupils are equal, round, and reactive to light.   Neck: Neck supple. No JVD present.   Normal range of motion.  Cardiovascular: Normal rate and regular rhythm.   Pulmonary/Chest: No respiratory distress. She has no wheezes. She has no rhonchi. She has no rales. She exhibits no tenderness.   Abdominal: Abdomen is soft. Bowel sounds are normal. She exhibits no distension. There is no abdominal tenderness. There is no rebound.   Musculoskeletal:         General: Normal range of motion.      Cervical back: Normal range of motion and neck supple.     Neurological: She is alert and oriented to person, place, and time. She has normal strength and normal reflexes.   Skin: Skin is warm and dry.         ED Course   Procedures  Labs Reviewed   CK-MB - Abnormal; Notable for the following components:       Result Value    CPK 1322 (*)     All other components within normal limits   CK - Abnormal; Notable for the following components:    CPK 1322 (*)     All other components within normal limits   COMPREHENSIVE METABOLIC PANEL - Abnormal; Notable for the following components:    CO2 22 (*)     Glucose 120 (*)     AST 47 (*)     ALT 67 (*)     All other components within normal limits   CBC W/ AUTO DIFFERENTIAL - Abnormal; Notable for the following components:    RDW 14.7 (*)     All other components within normal limits   ACETAMINOPHEN LEVEL - Abnormal; Notable for the following components:    Acetaminophen (Tylenol), Serum <3.0 (*)     All other components within normal limits   DRUG SCREEN PANEL, URINE  EMERGENCY - Abnormal; Notable for the following components:    Benzodiazepines Presumptive Positive (*)     Barbiturate Screen, Ur Presumptive Positive (*)     All other components within normal limits    Narrative:     Specimen Source->Urine   VITAMIN D - Abnormal; Notable for the following components:    Vit D, 25-Hydroxy 13 (*)     All other components within normal limits   SALICYLATE LEVEL - Abnormal; Notable for the following components:    Salicylate Lvl <5.0 (*)     All other components within normal limits   CK - Abnormal; Notable for the following components:    CPK 1023 (*)     All other components within normal limits   TROPONIN I   SARS-COV-2 RNA AMPLIFICATION, QUAL   URINALYSIS, REFLEX TO URINE CULTURE    Narrative:     Specimen Source->Urine   TSH   FOLATE   T4, FREE   VITAMIN B12   ALCOHOL,MEDICAL (ETHANOL)   PREGNANCY TEST, URINE RAPID    Narrative:     Specimen Source->Urine        ECG Results          EKG 12-lead (Final result)  Result time 03/15/22 11:11:24    Final result by Interface, Lab In [facility] (03/15/22 11:11:24)                 Narrative:    Test Reason : T50.901A    Vent. Rate : 069 BPM     Atrial Rate : 069 BPM     P-R Int : 128 ms          QRS Dur : 076 ms      QT Int : 424 ms       P-R-T Axes : 059 -31 017 degrees     QTc Int : 454 ms    Normal sinus rhythm  Left axis deviation  Nonspecific T wave abnormality  Abnormal ECG  When compared with ECG of 29-SEP-2021 14:03,  Nonspecific T wave abnormality, improved in Anterior-lateral leads  Confirmed by OSORIO NUNEZ MD ([id number]) on 3/15/2022 11:11:04 AM    Referred By: [email or web address]   SELF           Confirmed By:OSORIO NUNEZ MD                            Imaging Results    None          Medications   0.9%  NaCl infusion (0 mLs Intravenous Stopped 3/15/22 1315)   0.9%  NaCl infusion (0 mLs Intravenous Stopped 3/15/22 0628)   sodium chloride 0.9% bolus 1,000 mL (0 mLs Intravenous Stopped 3/15/22 0659)   sodium chloride 0.9% bolus 1,000 mL (0  mLs Intravenous Stopped 3/15/22 0721)     Medical Decision Making:   Initial Assessment:   Suicidal ideation and depression with intentional metoprolol/Klonopin overdose    Differential Diagnosis:   Differential diagnosis includes suicidal ideation with intentional overdose    Clinical Tests:   Lab Tests: Ordered and Reviewed  Radiological Study: Reviewed and Ordered  Medical Tests: Ordered and Reviewed    ED Management:  This patient was discussed with poison Control at length today  Medical workup, IV fluids, pec and subsequent [facility] admission                      Clinical Impression:   Final diagnoses:  [R45.851] Suicidal ideation  [T50.902A] Intentional drug overdose, initial encounter (Primary)          ED Disposition Condition    Transfer to [facility]         ED Prescriptions     None        Follow-up Information    None          Jeo Love MD  03/17/22 2467

## 2022-03-15 NOTE — H&P
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  MARY LOU  Panic Disorder with agoraphobia  Social Anxiety Disorder  PTSD  OCD     Nicotine Dependence     Borderline Personality Disorder     Breast Cancer s/p mastectomy and reconstructive surgery  Peutz-Jeghers syndrome  Right Carpal Tunnel syndrome  Cervical spondylosis  Chronic pain    PROBLEM LIST AND MANAGEMENT PLANS    Mood: pt counseled  -resume paxil at home dose pf 20 mg po q day  -consider trial of adjunctive neuroleptic after further detox from overdose    Overdose: pt counseld  -hold meds today; resume paxil tomorrow  -hold klonopin and reassess resumption tomorrow  -recheck CPK in the AM    Insomnia: pt counseled  -vistaril prn    Anxiety/PTSD: pt counseled  paxil as above    Nicotine Dependence: pt counseld  -start nicotine 14 mg patch dermal q day     Borderline Personality Disorder: pt counseled  -meds off label as above      PRESCRIPTION DRUG MANAGEMENT  Compliance: yes  Side Effects: no  Regimen Adjustments: see above    Discussed diagnosis, risks and benefits of proposed treatment vs alternative  treatments vs no treatment, potential side effects of these treatments and the inherent unpredictability of treatment. The patient expresses understanding of the above and displays the capacity to agree with this treatment given said understanding. Patient also agrees that, currently, the benefits outweigh the risks and would like to pursue/continue treatment at this time.      DIAGNOSTIC TESTING  Labs reviewed with patient; follow up pending labs    Disposition:  -Will attempt to obtain outside psychiatric records if available  -SW to assist with aftercare planning and collateral  -Once stable discharge home with outpatient follow up care and/or rehab  -Continue inpatient treatment under a [facility] and/or [facility] for danger to self/ danger to others/grave disability as evident by danger to self and suicidal ideation        Christiano Emerson MD  Psychiatry

## 2022-03-15 NOTE — ED NOTES
Spoke with Flip with Poison control. Updated on patients status and patient being medically cleared for psych placement. He will close out her chart.

## 2022-03-15 NOTE — PLAN OF CARE
"Staci Hodge admitted to [facility] [place] under the care of Dr Christiano Emerson with diagnosis of suicide attempt. [facility] Patient is a 40 yo white female who presented to [facility] ER.    Patient withdrawn, depressed, calm, cooperative, quiet, anxious, sleeping, and drowsy. Endorses Suicidal Ideation. Denies Homicidal Ideation, Auditory Hallucinations, Visual Hallucinations, Tactile Hallucinations, Gustatory Hallucinations, and Delusions. Patient was brought to the ER by her boyfriend (Issac) after the patient took 60 - 25mg  of Metoprolol and 70- 1 mg of Klonopin in a suicide attempt. Patient has been depressed for several years but the past 2 weeks her depression had become so overwhelming. Patient has issues with breast cancer and had a bi lat mastectomy. Patient's UDS was positive for Benzos and Barbiturates. Patient CPK was 1300s upon admit but has been trending down. Patient has a history of Suicide Attempt in the past. Patient's HR has been in the 50s and 60s. Patient is unsteady on her feet at the upon admit. Patient didn't want to elaborate on her depression. When asked why she took all the pills, "I don't know." Patient is very cooperative. Patient rated her depression as 9/10 and her anxiety as 10/10.     Patient assessed and oriented to room unit and routine. Patient denies pain or discomfort at present and remains injury-free.     GABY CHRISTENSEN RN    "

## 2022-03-16 LAB
CHOLEST SERPL-MCNC: 135 MG/DL (ref 120–199)
CHOLEST SERPL-MCNC: 135 MG/DL (ref 120–199)
CHOLEST/HDLC SERPL: 3 {RATIO} (ref 2–5)
CHOLEST/HDLC SERPL: 3 {RATIO} (ref 2–5)
CK SERPL-CCNC: 609 U/L (ref 20–180)
ESTIMATED AVG GLUCOSE: 111 MG/DL (ref 68–131)
HBA1C MFR BLD: 5.5 % (ref 4–5.6)
HDLC SERPL-MCNC: 45 MG/DL (ref 40–75)
HDLC SERPL-MCNC: 45 MG/DL (ref 40–75)
HDLC SERPL: 33.3 % (ref 20–50)
HDLC SERPL: 33.3 % (ref 20–50)
LDLC SERPL CALC-MCNC: 54.6 MG/DL (ref 63–159)
LDLC SERPL CALC-MCNC: 54.6 MG/DL (ref 63–159)
NONHDLC SERPL-MCNC: 90 MG/DL
NONHDLC SERPL-MCNC: 90 MG/DL
TRIGL SERPL-MCNC: 177 MG/DL (ref 30–150)
TRIGL SERPL-MCNC: 177 MG/DL (ref 30–150)

## 2022-03-16 PROCEDURE — 82550 ASSAY OF CK (CPK): CPT | Performed by: PSYCHIATRY & NEUROLOGY

## 2022-03-16 PROCEDURE — 83036 HEMOGLOBIN GLYCOSYLATED A1C: CPT | Performed by: PSYCHIATRY & NEUROLOGY

## 2022-03-16 PROCEDURE — 25000003 PHARM REV CODE 250: Performed by: PSYCHIATRY & NEUROLOGY

## 2022-03-16 PROCEDURE — 99233 PR SUBSEQUENT HOSPITAL CARE,LEVL III: ICD-10-PCS | Mod: ,,, | Performed by: PSYCHIATRY & NEUROLOGY

## 2022-03-16 PROCEDURE — 90833 PR PSYCHOTHERAPY W/PATIENT W/E&M, 30 MIN (ADD ON): ICD-10-PCS | Mod: ,,, | Performed by: PSYCHIATRY & NEUROLOGY

## 2022-03-16 PROCEDURE — 25000003 PHARM REV CODE 250: Performed by: INTERNAL MEDICINE

## 2022-03-16 PROCEDURE — 90833 PSYTX W PT W E/M 30 MIN: CPT | Mod: ,,, | Performed by: PSYCHIATRY & NEUROLOGY

## 2022-03-16 PROCEDURE — 11400000 HC PSYCH PRIVATE ROOM

## 2022-03-16 PROCEDURE — 99233 SBSQ HOSP IP/OBS HIGH 50: CPT | Mod: ,,, | Performed by: PSYCHIATRY & NEUROLOGY

## 2022-03-16 PROCEDURE — 80061 LIPID PANEL: CPT | Performed by: PSYCHIATRY & NEUROLOGY

## 2022-03-16 PROCEDURE — S4991 NICOTINE PATCH NONLEGEND: HCPCS | Performed by: PSYCHIATRY & NEUROLOGY

## 2022-03-16 PROCEDURE — 36415 COLL VENOUS BLD VENIPUNCTURE: CPT | Performed by: PSYCHIATRY & NEUROLOGY

## 2022-03-16 RX ORDER — DULOXETIN HYDROCHLORIDE 30 MG/1
30 CAPSULE, DELAYED RELEASE ORAL DAILY
Status: DISCONTINUED | OUTPATIENT
Start: 2022-03-17 | End: 2022-03-20

## 2022-03-16 RX ORDER — IBUPROFEN 200 MG
1 TABLET ORAL DAILY
Status: DISCONTINUED | OUTPATIENT
Start: 2022-03-16 | End: 2022-03-24 | Stop reason: HOSPADM

## 2022-03-16 RX ADMIN — ACETAMINOPHEN 650 MG: 325 TABLET ORAL at 08:03

## 2022-03-16 RX ADMIN — NICOTINE 1 PATCH: 14 PATCH, EXTENDED RELEASE TRANSDERMAL at 10:03

## 2022-03-16 RX ADMIN — PAROXETINE 20 MG: 20 TABLET, FILM COATED ORAL at 08:03

## 2022-03-16 RX ADMIN — HYDROXYZINE PAMOATE 50 MG: 50 CAPSULE ORAL at 08:03

## 2022-03-16 RX ADMIN — FOLIC ACID 1 MG: 1 TABLET ORAL at 08:03

## 2022-03-16 RX ADMIN — THERA TABS 1 TABLET: TAB at 08:03

## 2022-03-16 NOTE — PLAN OF CARE
Patient is alert and oriented, quiet, depressed, isolative to self, and cooperative. Appetite is good for meals and medication compliant of vitamins this am. Dr. Maharaj visited this am medical H&P completed. Dr. Emerson visited this am per telemed with new order to D/C Paxil, start Cymbalta 30 mg po daily, D/C Folic Acid, Nicoderm 14 mg patch daily. Patient stayed out of her room today minimm peer interaction observed, and attended group activity session.  visited this evening and tolerated well. Patient is in her room at this time. Close observations continued and safe environment maintained.

## 2022-03-16 NOTE — PLAN OF CARE
POC reviewed this shift and is on going.  Pt isolates in her room and sleeping most of the shift. Came out on unit as needed. Refused snack. Reports having some SI. Denies A/V hallucinations. Will continue to monitor for changes and safety.

## 2022-03-16 NOTE — ASSESSMENT & PLAN NOTE
With SI and attempt.    To be admitted to our inpatient psychiatric unit for further evaluation and management.

## 2022-03-16 NOTE — SUBJECTIVE & OBJECTIVE
Past Medical History:   Diagnosis Date    Abnormal Pap smear of cervix age 25    no treatement    Anxiety     Breast cancer     Cancer 07/11/2018    breast cancer    Cervical spondylosis 2/9/2018    Colon polyp     Depression     Ectopic pregnancy 2/26/2019    General anesthetics causing adverse effect in therapeutic use     Slow to awaken/ Memory problems-post-op to day 3 after Mastectomy    Headache     History of psychiatric hospitalization     age 20 for SI    Hx of psychiatric care     Hypertension     Peutz-Jeghers syndrome     PONV (postoperative nausea and vomiting)     Psychiatric problem     Right carpal tunnel syndrome 3/9/2017    Self-harming behavior     Suicide attempt     Therapy        Past Surgical History:   Procedure Laterality Date    ADENOIDECTOMY      ANTEGRADE SINGLE BALLOON ENTEROSCOPY N/A 4/19/2021    Procedure: ENTEROSCOPY, SINGLE BALLOON, ANTEGRADE;  Surgeon: Vincenzo Herbert MD;  Location: [facility] ([address] [place] [address]);  Service: Endoscopy;  Laterality: N/A;  removal of large polyp in proximal jejunum; please schedule during a time when Dr. Dillon is available in case I need assistance with removal or 2nd opinion prior to removal    COVID at [place] 4/16 - ttr    APPENDECTOMY      AUGMENTATION OF BREAST      BILATERAL MASTECTOMY Bilateral 7/23/2018    Procedure: MASTECTOMY 23 hr stay;  Surgeon: Sofia Kendrick MD;  Location: [address];  Service: General;  Laterality: Bilateral;    BILATERAL SALPINGO-OOPHORECTOMY (BSO) Bilateral 3/4/2021    Procedure: SALPINGO-OOPHORECTOMY, BILATERAL;  Surgeon: Clayton Vilchis MD;  Location: [facility];  Service: OB/GYN;  Laterality: Bilateral;    BOWEL RESECTION  10/17/14    BREAST BIOPSY Left 06/04/2018    BREAST CAPSULOTOMY Left 4/25/2019    Procedure: CAPSULOTOMY, BREAST LEFT;  Surgeon: Duane Bello MD;  Location: [address];  Service: Plastics;  Laterality: Left;    BREAST RECONSTRUCTION      BREAST SURGERY      Bilateral Mastectomy  07/23/2018    CARPAL TUNNEL RELEASE Bilateral     CARPAL TUNNEL RELEASE      COLONOSCOPY      COLONOSCOPY N/A 2/25/2021    Procedure: COLONOSCOPY;  Surgeon: Vincenzo Herbert MD;  Location: [facility] (2ND FLR);  Service: Endoscopy;  Laterality: N/A;  previous anesthesia complications  okay for this date/time per Dr. Herbert and liliana with Miranda, OC - sm  COVID test on 2/22/21 at [facility]    DILATION AND CURETTAGE OF UTERUS      DILATION AND CURETTAGE OF UTERUS USING SUCTION N/A 2/25/2019    Procedure: DILATION AND CURETTAGE, UTERUS, USING SUCTION  PATHOLOGY NEEDED;  Surgeon: Pam Sifuentes MD;  Location: [facility] OR;  Service: OB/GYN;  Laterality: N/A;    ENDOSCOPIC ULTRASOUND OF UPPER GASTROINTESTINAL TRACT Left 1/15/2021    Procedure: ULTRASOUND, UPPER GI TRACT, ENDOSCOPIC;  Surgeon: Chandan Dillon MD;  Location: [facility];  Service: Endoscopy;  Laterality: Left;  for pancreatic screening    ESOPHAGOGASTRODUODENOSCOPY N/A 1/15/2021    Procedure: EGD (ESOPHAGOGASTRODUODENOSCOPY);  Surgeon: Chandan Dillon MD;  Location: [facility];  Service: Endoscopy;  Laterality: N/A;  with push enteroscopy    ESOPHAGOGASTRODUODENOSCOPY N/A 5/17/2021    Procedure: EGD (ESOPHAGOGASTRODUODENOSCOPY);  Surgeon: Chandan Dillon MD;  Location: [facility] (2ND FLR);  Service: Endoscopy;  Laterality: N/A;  Please schedule this duodenal polypectomy. Need to be a day that Dr Jose Antonio Stark is in the OR. 90 minutes.   Chandan Dillon MD     COVID at [place] 5/15 (scheduled in the OR for 5/18 - using same COVID results for both procedures) ttr    ESOPHAGOGASTRODUODENOSCOPY N/A 1/25/2022    Procedure: EGD (ESOPHAGOGASTRODUODENOSCOPY);  Surgeon: Chandan Dillon MD;  Location: [facility] (2ND FLR);  Service: Endoscopy;  Laterality: N/A;  poss emr  covid-1/22/22-ST[facility]-BB  instructions sent via portal-BB    FAT TRANSFER Bilateral 11/12/2018    Procedure: TRANSFER, FAT TISSUE BILATERAL BREAST;  Surgeon: [place] Bello MD;  Location:  [facility] 2ND FLR;  Service: Plastics;  Laterality: Bilateral;    INJECTION FOR SENTINEL NODE IDENTIFICATION Left 7/23/2018    Procedure: INJECTION, FOR SENTINEL NODE IDENTIFICATION;  Surgeon: Sofia Kendrick MD;  [facility];  Service: General;  Laterality: Left;    INSERTION OF BREAST TISSUE EXPANDER Bilateral 7/23/2018    Procedure: INSERTION, TISSUE EXPANDER, BREAST;  Surgeon: [place]ane Bello MD;  [facility];  Service: Plastics;  Laterality: Bilateral;    INSERTION OF BREAST TISSUE EXPANDER Bilateral 3/10/2020    Procedure: INSERTION, TISSUE EXPANDER, BREAST, BILATERAL;  Surgeon: [place] Bello MD;  [facility];  Service: Plastics;  Laterality: Bilateral;    intestinal polpy      LYSIS OF ADHESIONS N/A 3/4/2021    Procedure: LYSIS, ADHESIONS;  Surgeon: Clayton Vilchis MD;  Location: [facility];  Service: OB/GYN;  Laterality: N/A;  Omental adhesions    MASTECTOMY      REMOVAL OF BREAST IMPLANT Bilateral 3/10/2020    Procedure: REMOVAL, IMPLANT, BREAST, BILATERAL;  Surgeon: [place] Bello MD;  Location: [address];  Service: Plastics;  Laterality: Bilateral;    REMOVAL OF BREAST IMPLANT Bilateral 7/7/2020    Procedure: REMOVAL, IMPLANT, BREAST BILATERAL;  Surgeon: Duane Bello MD;  [facility];  Service: Plastics;  Laterality: Bilateral;    SENTINEL LYMPH NODE BIOPSY Left 7/23/2018    Procedure: BIOPSY, LYMPH NODE, SENTINEL;  Surgeon: Sofia Kendrick MD;  Location: [address];  Service: General;  Laterality: Left;    SMALL INTESTINE SURGERY      3 surgeries     TONSILLECTOMY      TOTAL ABDOMINAL HYSTERECTOMY N/A 3/4/2021    Procedure: HYSTERECTOMY, TOTAL, ABDOMINAL;  Surgeon: Clayton Vilchis MD;  Location: [facility];  Service: OB/GYN;  Laterality: N/A;    UPPER GASTROINTESTINAL ENDOSCOPY         Review of patient's allergies indicates:  No Known Allergies    Current Facility-Administered Medications on File Prior to  Encounter   Medication    0.9%  NaCl infusion    [] 0.9%  NaCl infusion    sodium chloride 0.9% flush 10 mL     Current Outpatient Medications on File Prior to Encounter   Medication Sig    butalbital-acetaminophen-caffeine -40 mg (FIORICET, ESGIC) -40 mg per tablet TAKE ONE TABLET BY MOUTH EVERY 6 HOURS AS NEEDED FOR PAIN    clonazePAM (KLONOPIN) 1 MG tablet TAKE ONE TABLET BY MOUTH THREE TIMES DAILY    paroxetine (PAXIL) 20 MG tablet TAKE ONE TABLET BY MOUTH ONCE A DAY IN THE MORNING    albuterol (ACCUNEB) 1.25 mg/3 mL Nebu Take 3 mLs (1.25 mg total) by nebulization every 6 (six) hours as needed (wheezing). Rescue    atorvastatin (LIPITOR) 20 MG tablet Take 20 mg by mouth every evening.    gabapentin (NEURONTIN) 600 MG tablet Take 1 tablet (600 mg total) by mouth every evening.    metoprolol succinate (TOPROL-XL) 25 MG 24 hr tablet Take 25 mg by mouth every evening.    pantoprazole (PROTONIX) 40 MG tablet TAKE ONE TABLET BY MOUTH ONCE A DAY     Family History       Problem Relation (Age of Onset)    Breast cancer Paternal Aunt, Paternal Grandmother    Cancer Mother    Colon cancer Mother (33)    Colon polyps Sister    Hypertension Father          Tobacco Use    Smoking status: Former Smoker     Packs/day: 0.50     Years: 17.00     Pack years: 8.50     Types: Cigarettes     Start date: 2000     Quit date: 2018     Years since quitting: 3.6    Smokeless tobacco: Never Used    Tobacco comment: sometimes dry cough per patient   Substance and Sexual Activity    Alcohol use: Yes     Alcohol/week: 5.8 standard drinks     Types: 7 Standard drinks or equivalent per week     Comment: occasionally    Drug use: No    Sexual activity: Yes     Partners: Male     Review of Systems   Constitutional:  Negative for fatigue and fever.   HENT:  Negative for congestion, ear pain and sore throat.    Eyes:  Negative for pain and discharge.   Respiratory:  Negative for cough, shortness of breath and wheezing.     Gastrointestinal:  Negative for abdominal pain, constipation, diarrhea, nausea and vomiting.   Endocrine: Negative for cold intolerance and heat intolerance.   Genitourinary:  Negative for difficulty urinating, dysuria and frequency.   Musculoskeletal:  Negative for arthralgias.   Allergic/Immunologic: Negative for environmental allergies.   Neurological:  Negative for dizziness, tremors and seizures.   Psychiatric/Behavioral:  Positive for behavioral problems, dysphoric mood, sleep disturbance and suicidal ideas.    All other systems reviewed and are negative.  Objective:     Vital Signs (Most Recent):  Temp: 97.5 °F (36.4 °C) (03/16/22 0751)  Pulse: (!) 52 (03/16/22 0751)  Resp: 18 (03/16/22 0751)  BP: 114/66 (03/16/22 0751)  SpO2: 98 % (03/16/22 0751)   Vital Signs (24h Range):  Temp:  [97.5 °F (36.4 °C)-97.8 °F (36.6 °C)] 97.5 °F (36.4 °C)  Pulse:  [50-69] 52  Resp:  [16-63] 18  SpO2:  [96 %-98 %] 98 %  BP: ()/(56-75) 114/66     Weight: 86.2 kg (190 lb)  Body mass index is 34.75 kg/m².    Physical Exam  Vitals and nursing note reviewed.   Constitutional:       Appearance: Normal appearance.   HENT:      Head: Normocephalic and atraumatic.      Nose: Nose normal.      Mouth/Throat:      Mouth: Mucous membranes are moist.      Pharynx: Oropharynx is clear.   Eyes:      Extraocular Movements: Extraocular movements intact.      Conjunctiva/sclera: Conjunctivae normal.      Pupils: Pupils are equal, round, and reactive to light.   Cardiovascular:      Rate and Rhythm: Normal rate and regular rhythm.      Pulses: Normal pulses.      Heart sounds: Normal heart sounds.   Pulmonary:      Effort: Pulmonary effort is normal.      Breath sounds: Normal breath sounds.   Abdominal:      General: Abdomen is flat. Bowel sounds are normal.      Palpations: Abdomen is soft.   Musculoskeletal:         General: Normal range of motion.      Cervical back: Normal range of motion and neck supple.   Skin:     General: Skin is  warm and dry.      Capillary Refill: Capillary refill takes less than 2 seconds.      Comments: No rashes on limited skin exam.   Neurological:      General: No focal deficit present.      Mental Status: She is alert and oriented to person, place, and time.      Cranial Nerves: No cranial nerve deficit.      Comments: I Olfactory:  Sense of smell intact    II Optic:  Pupils equal round react to light.  Vision intact.    III, IV, VI, Ocular motor, Trochlear, Abducens:  Extraocular movements intact    V Trigeminal:  Facial sensation intact facial sensation intact,, muscles of mastication intact muscles of mastication intact, corneal reflex intact, corneal reflex intact    VII Facial:  Muscles of facial expression intact     VIII Vestibular cochlear: Hearing intact vestibular cochlear: Hearing intact    IX Glossopharyngeal:  Gag reflex intact.  Tasting intact.     X Vagus:  Gag reflex intact.    XI Spinal Accessory:  Shoulder shrug intact.  Head rotation intact.    XII Hypoglossal:  Tongue movements intact.     Psychiatric:      Comments: Patient appears depressed         CRANIAL NERVES     CN III, IV, VI   Pupils are equal, round, and reactive to light.     Significant Labs: All pertinent labs within the past 24 hours have been reviewed.    Significant Imaging: I have reviewed all pertinent imaging results/findings within the past 24 hours.

## 2022-03-16 NOTE — ASSESSMENT & PLAN NOTE
Insomnia: Sleep hygiene discussed:  Ensure at least 7 hours of sleep per night.  Turn off all light sources in the bedroom.  Make sure the room temperature is comfortable.  Do not drink caffeinated beverages after 12 o'clock (noon).  Participate in at least 30 minutes of cardiovascular exercise daily.  Attempt to rise and go to sleep at the same times each day.

## 2022-03-16 NOTE — H&P
[facility]) Hospital Medicine  History & Physical    Patient Name: Staci Hodge  MRN: [id number]  Patient Class: IP- Psych  Admission Date: 3/15/2022  Attending Physician: Christiano Emerson MD   Primary Care Provider: Shilpi Clayton NP         Patient information was obtained from ER records.     Subjective:     Principal Problem:<principal problem not specified>    Chief Complaint: No chief complaint on file.       HPI:           Bob Muñoz MD  03/15/22 1314             Patient ultimately medically cleared and admitted to inpatient psych for further management.       Past Medical History:   Diagnosis Date    Abnormal Pap smear of cervix age 25    no treatement    Anxiety     Breast cancer     Cancer 07/11/2018    breast cancer    Cervical spondylosis 2/9/2018    Colon polyp     Depression     Ectopic pregnancy 2/26/2019    General anesthetics causing adverse effect in therapeutic use     Slow to awaken/ Memory problems-post-op to day 3 after Mastectomy    Headache     History of psychiatric hospitalization     age 20 for SI    Hx of psychiatric care     Hypertension     Peutz-Jeghers syndrome     PONV (postoperative nausea and vomiting)     Psychiatric problem     Right carpal tunnel syndrome 3/9/2017    Self-harming behavior     Suicide attempt     Therapy        Past Surgical History:   Procedure Laterality Date    ADENOIDECTOMY      ANTEGRADE SINGLE BALLOON ENTEROSCOPY N/A 4/19/2021    Procedure: ENTEROSCOPY, SINGLE BALLOON, ANTEGRADE;  Surgeon: Vincenzo Herbert MD;  Location: [facility] ([address] [place] [address]);  Service: Endoscopy;  Laterality: N/A;  removal of large polyp in proximal jejunum; please schedule during a time when Dr. Dillon is available in case I need assistance with removal or 2nd opinion prior to removal    COVID at [place] 4/16 - ttr    APPENDECTOMY      AUGMENTATION OF BREAST      BILATERAL MASTECTOMY Bilateral 7/23/2018     Procedure: MASTECTOMY 23 hr stay;  Surgeon: Sofia Kendrick MD;  Location: [address];  Service: General;  Laterality: Bilateral;    BILATERAL SALPINGO-OOPHORECTOMY (BSO) Bilateral 3/4/2021    Procedure: SALPINGO-OOPHORECTOMY, BILATERAL;  Surgeon: Clayton Vilchis MD;  Location: [facility];  Service: OB/GYN;  Laterality: Bilateral;    BOWEL RESECTION  10/17/14    BREAST BIOPSY Left 06/04/2018    BREAST CAPSULOTOMY Left 4/25/2019    Procedure: CAPSULOTOMY, BREAST LEFT;  Surgeon: Duane Bello MD;  Location: [address];  Service: Plastics;  Laterality: Left;    BREAST RECONSTRUCTION      BREAST SURGERY      Bilateral Mastectomy 07/23/2018    CARPAL TUNNEL RELEASE Bilateral     CARPAL TUNNEL RELEASE      COLONOSCOPY      COLONOSCOPY N/A 2/25/2021    Procedure: COLONOSCOPY;  Surgeon: Vincenzo Herbert MD;  Location: [address]);  Service: Endoscopy;  Laterality: N/A;  previous anesthesia complications  okay for this date/time per Dr. Herbert and liliana with Miranda,  - sm  COVID test on 2/22/21 at [facility]    DILATION AND CURETTAGE OF UTERUS      DILATION AND CURETTAGE OF UTERUS USING SUCTION N/A 2/25/2019    Procedure: DILATION AND CURETTAGE, UTERUS, USING SUCTION  PATHOLOGY NEEDED;  Surgeon: Pam Sifuentes MD;  Location: [facility];  Service: OB/GYN;  Laterality: N/A;    ENDOSCOPIC ULTRASOUND OF UPPER GASTROINTESTINAL TRACT Left 1/15/2021    Procedure: ULTRASOUND, UPPER GI TRACT, ENDOSCOPIC;  Surgeon: Chandan Dillon MD;  Location: [facility];  Service: Endoscopy;  Laterality: Left;  for pancreatic screening    ESOPHAGOGASTRODUODENOSCOPY N/A 1/15/2021    Procedure: EGD (ESOPHAGOGASTRODUODENOSCOPY);  Surgeon: Chandan Dillon MD;  Location: [facility];  Service: Endoscopy;  Laterality: N/A;  with push enteroscopy    ESOPHAGOGASTRODUODENOSCOPY N/A 5/17/2021    Procedure: EGD (ESOPHAGOGASTRODUODENOSCOPY);  Surgeon: Chandan Dillon MD;  [facility] ([address] [place] [address]);  Service:  Endoscopy;  Laterality: N/A;  Please schedule this duodenal polypectomy. Need to be a day that Dr Jose Antonio Stark is in the OR. 90 minutes.   Chandan Dillon MD     COVID at [place] 5/15 (scheduled in the OR for 5/18 - using same COVID results for both procedures) ttr    ESOPHAGOGASTRODUODENOSCOPY N/A 1/25/2022    Procedure: EGD (ESOPHAGOGASTRODUODENOSCOPY);  Surgeon: Chandan Dillon MD;  Location: [facility] (2ND FLR);  Service: Endoscopy;  Laterality: N/A;  poss emr  covid-1/22/22-STAH-BB  instructions sent via portal-BB    FAT TRANSFER Bilateral 11/12/2018    Procedure: TRANSFER, FAT TISSUE BILATERAL BREAST;  Surgeon: Duane Bello MD;  [facility];  Service: Plastics;  Laterality: Bilateral;    INJECTION FOR SENTINEL NODE IDENTIFICATION Left 7/23/2018    Procedure: INJECTION, FOR SENTINEL NODE IDENTIFICATION;  Surgeon: Sofia Kendrick MD;  [facility];  Service: General;  Laterality: Left;    INSERTION OF BREAST TISSUE EXPANDER Bilateral 7/23/2018    Procedure: INSERTION, TISSUE EXPANDER, BREAST;  Surgeon: Duane Bello MD;  [facility];  Service: Plastics;  Laterality: Bilateral;    INSERTION OF BREAST TISSUE EXPANDER Bilateral 3/10/2020    Procedure: INSERTION, TISSUE EXPANDER, BREAST, BILATERAL;  Surgeon: Duane Bello MD;  [facility];  Service: Plastics;  Laterality: Bilateral;    intestinal polpy      LYSIS OF ADHESIONS N/A 3/4/2021    Procedure: LYSIS, ADHESIONS;  Surgeon: [place] Vilchis MD;  Location: [facility];  Service: OB/GYN;  Laterality: N/A;  Omental adhesions    MASTECTOMY      REMOVAL OF BREAST IMPLANT Bilateral 3/10/2020    Procedure: REMOVAL, IMPLANT, BREAST, BILATERAL;  Surgeon: Duane Bello MD;  [address];  Service: Plastics;  Laterality: Bilateral;    REMOVAL OF BREAST IMPLANT Bilateral 7/7/2020    Procedure: REMOVAL, IMPLANT, BREAST BILATERAL;  Surgeon: Duane MEZA  MD Ace;  Location: [address];  Service: Plastics;  Laterality: Bilateral;    SENTINEL LYMPH NODE BIOPSY Left 2018    Procedure: BIOPSY, LYMPH NODE, SENTINEL;  Surgeon: Sofia Kendrick MD;  Location: [address];  Service: General;  Laterality: Left;    SMALL INTESTINE SURGERY      3 surgeries     TONSILLECTOMY      TOTAL ABDOMINAL HYSTERECTOMY N/A 3/4/2021    Procedure: HYSTERECTOMY, TOTAL, ABDOMINAL;  Surgeon: [place] Vilchis MD;  Location: [facility];  Service: OB/GYN;  Laterality: N/A;    UPPER GASTROINTESTINAL ENDOSCOPY         Review of patient's allergies indicates:  No Known Allergies    Current Facility-Administered Medications on File Prior to Encounter   Medication    0.9%  NaCl infusion    [] 0.9%  NaCl infusion    sodium chloride 0.9% flush 10 mL     Current Outpatient Medications on File Prior to Encounter   Medication Sig    butalbital-acetaminophen-caffeine -40 mg (FIORICET, ESGIC) -40 mg per tablet TAKE ONE TABLET BY MOUTH EVERY 6 HOURS AS NEEDED FOR PAIN    clonazePAM (KLONOPIN) 1 MG tablet TAKE ONE TABLET BY MOUTH THREE TIMES DAILY    paroxetine (PAXIL) 20 MG tablet TAKE ONE TABLET BY MOUTH ONCE A DAY IN THE MORNING    albuterol (ACCUNEB) 1.25 mg/3 mL Nebu Take 3 mLs (1.25 mg total) by nebulization every 6 (six) hours as needed (wheezing). Rescue    atorvastatin (LIPITOR) 20 MG tablet Take 20 mg by mouth every evening.    gabapentin (NEURONTIN) 600 MG tablet Take 1 tablet (600 mg total) by mouth every evening.    metoprolol succinate (TOPROL-XL) 25 MG 24 hr tablet Take 25 mg by mouth every evening.    pantoprazole (PROTONIX) 40 MG tablet TAKE ONE TABLET BY MOUTH ONCE A DAY     Family History       Problem Relation (Age of Onset)    Breast cancer Paternal Aunt, Paternal Grandmother    Cancer Mother    Colon cancer Mother (33)    Colon polyps Sister    Hypertension Father          Tobacco Use    Smoking status: Former Smoker     Packs/day: 0.50      Years: 17.00     Pack years: 8.50     Types: Cigarettes     Start date: 4/17/2000     Quit date: 7/23/2018     Years since quitting: 3.6    Smokeless tobacco: Never Used    Tobacco comment: sometimes dry cough per patient   Substance and Sexual Activity    Alcohol use: Yes     Alcohol/week: 5.8 standard drinks     Types: 7 Standard drinks or equivalent per week     Comment: occasionally    Drug use: No    Sexual activity: Yes     Partners: Male     Review of Systems   Constitutional:  Negative for fatigue and fever.   HENT:  Negative for congestion, ear pain and sore throat.    Eyes:  Negative for pain and discharge.   Respiratory:  Negative for cough, shortness of breath and wheezing.    Gastrointestinal:  Negative for abdominal pain, constipation, diarrhea, nausea and vomiting.   Endocrine: Negative for cold intolerance and heat intolerance.   Genitourinary:  Negative for difficulty urinating, dysuria and frequency.   Musculoskeletal:  Negative for arthralgias.   Allergic/Immunologic: Negative for environmental allergies.   Neurological:  Negative for dizziness, tremors and seizures.   Psychiatric/Behavioral:  Positive for behavioral problems, dysphoric mood, sleep disturbance and suicidal ideas.    All other systems reviewed and are negative.  Objective:     Vital Signs (Most Recent):  Temp: 97.5 °F (36.4 °C) (03/16/22 0751)  Pulse: (!) 52 (03/16/22 0751)  Resp: 18 (03/16/22 0751)  BP: 114/66 (03/16/22 0751)  SpO2: 98 % (03/16/22 0751)   Vital Signs (24h Range):  Temp:  [97.5 °F (36.4 °C)-97.8 °F (36.6 °C)] 97.5 °F (36.4 °C)  Pulse:  [50-69] 52  Resp:  [16-63] 18  SpO2:  [96 %-98 %] 98 %  BP: ()/(56-75) 114/66     Weight: 86.2 kg (190 lb)  Body mass index is 34.75 kg/m².    Physical Exam  Vitals and nursing note reviewed.   Constitutional:       Appearance: Normal appearance.   HENT:      Head: Normocephalic and atraumatic.      Nose: Nose normal.      Mouth/Throat:      Mouth: Mucous membranes are  moist.      Pharynx: Oropharynx is clear.   Eyes:      Extraocular Movements: Extraocular movements intact.      Conjunctiva/sclera: Conjunctivae normal.      Pupils: Pupils are equal, round, and reactive to light.   Cardiovascular:      Rate and Rhythm: Normal rate and regular rhythm.      Pulses: Normal pulses.      Heart sounds: Normal heart sounds.   Pulmonary:      Effort: Pulmonary effort is normal.      Breath sounds: Normal breath sounds.   Abdominal:      General: Abdomen is flat. Bowel sounds are normal.      Palpations: Abdomen is soft.   Musculoskeletal:         General: Normal range of motion.      Cervical back: Normal range of motion and neck supple.   Skin:     General: Skin is warm and dry.      Capillary Refill: Capillary refill takes less than 2 seconds.      Comments: No rashes on limited skin exam.   Neurological:      General: No focal deficit present.      Mental Status: She is alert and oriented to person, place, and time.      Cranial Nerves: No cranial nerve deficit.      Comments: I Olfactory:  Sense of smell intact    II Optic:  Pupils equal round react to light.  Vision intact.    III, IV, VI, Ocular motor, Trochlear, Abducens:  Extraocular movements intact    V Trigeminal:  Facial sensation intact facial sensation intact,, muscles of mastication intact muscles of mastication intact, corneal reflex intact, corneal reflex intact    VII Facial:  Muscles of facial expression intact     VIII Vestibular cochlear: Hearing intact vestibular cochlear: Hearing intact    IX Glossopharyngeal:  Gag reflex intact.  Tasting intact.     X Vagus:  Gag reflex intact.    XI Spinal Accessory:  Shoulder shrug intact.  Head rotation intact.    XII Hypoglossal:  Tongue movements intact.     Psychiatric:      Comments: Patient appears depressed         CRANIAL NERVES     CN III, IV, VI   Pupils are equal, round, and reactive to light.     Significant Labs: All pertinent labs within the past 24 hours have been  reviewed.    Significant Imaging: I have reviewed all pertinent imaging results/findings within the past 24 hours.    Assessment/Plan:     PTSD (post-traumatic stress disorder)  With SI and attempt.    To be admitted to our inpatient psychiatric unit for further evaluation and management.        Panic disorder with agoraphobia  To be admitted to our inpatient psychiatric unit for further evaluation and management.        MARY LOU (generalized anxiety disorder)  To be admitted to our inpatient psychiatric unit for further evaluation and management.        Insomnia  Insomnia: Sleep hygiene discussed:  Ensure at least 7 hours of sleep per night.  Turn off all light sources in the bedroom.  Make sure the room temperature is comfortable.  Do not drink caffeinated beverages after 12 o'clock (noon).  Participate in at least 30 minutes of cardiovascular exercise daily.  Attempt to rise and go to sleep at the same times each day.          VTE Risk Mitigation (From admission, onward)    None             Alex Maharaj Jr, MD  Department of Hospital Medicine   [facility] ([facility])

## 2022-03-16 NOTE — PROGRESS NOTES
"PSYCHIATRY DAILY INPATIENT PROGRESS NOTE  SUBSEQUENT HOSPITAL VISIT    ENCOUNTER DATE: 3/16/2022  [facility]    DATE OF ADMISSION: 3/15/2022  4:51 PM  LENGTH OF STAY: 1 days      CHIEF COMPLAINT   Staci Hodge is a 41 y.o. female, seen during daily garcia rounds on the inpatient unit.  Staci Hodge presented with the chief complaint of depression s/p SA via overdose, "I don't know why I did it."      The patient was seen and examined. The chart was reviewed.     Reviewed notes from Rns and MD and labs from the last 24 hours.    The patient's case was discussed with the treatment team/care providers today including Rns, CTRS and NP    Staff reports no behavioral or management issues.     The patient has been compliant with treatment.      Subjective 03/16/2022       Today the patient reports that she still feels very tired from the overdose. She reports continued symptoms as documented below which remain unchanged since admission.     She continues to lack insight into her SA and cannot identify any precipitating factors. "I had just been thinking about it and then did it."      The patient denies any side effects to medications.            Psychiatric ROS (observed, reported, or endorsed/denied):  Depressed mood - Continuing  Interest/pleasure/anhedonia: Continuing  Guilt/hopelessness/worthlessness - Continuing  Changes in Sleep - Continuing  Changes in Appetite - Continuing  Changes in Concentration - Continuing  Changes in Energy - Continuing  PMA/R- denies  Suicidal- active/passive ideations - Continuing  Homicidal ideations: active/passive ideations - denies    Hallucinations - denies  Delusions - denies  Disorganized behavior - denies  Disorganized speech - denies  Negative symptoms - denies    Elevated mood - denies  Decreased need for sleep - denies  Grandiosity - denies  Racing thoughts - denies  Impulsivity - denies  Irritability- denies  Increased energy - " denies  Distractibility - denies  Increase in goal-directed activity or PMA- denies    Symptoms of MARY LOU - Continuing  Symptoms of Panic Disorder- stable  Symptoms of PTSD - variable        Overall progress: Patient is showing no improvement on the Unit to date        Psychotherapy:  · Target symptoms: depression, anxiety   · Why chosen therapy is appropriate versus another modality: relevant to diagnosis, patient responds to this modality, evidence based practice  · Outcome monitoring methods: self-report, observation  · Therapeutic intervention type: insight oriented psychotherapy, behavior modifying psychotherapy, supportive psychotherapy, interactive psychotherapy  · Topics discussed/themes: building skills sets for symptom management, symptom recognition  · The patient's response to the intervention is accepting. The patient's progress toward treatment goals is limited, poor.   · Duration of intervention: 16 minutes.        Medical ROS   General ROS: negative  Ophthalmic ROS: negative  ENT ROS: negative  Allergy and Immunology ROS: negative  Hematological and Lymphatic ROS: negative  Endocrine ROS: negative  Respiratory ROS: no cough, shortness of breath, or wheezing  Cardiovascular ROS: no chest pain or dyspnea on exertion  Gastrointestinal ROS: no abdominal pain, change in bowel habits, or black or bloody stools  Genito-Urinary ROS: no dysuria, trouble voiding, or hematuria  Musculoskeletal ROS: negative  Neurological ROS: no TIA or stroke symptoms  Dermatological ROS: negative    PAST MEDICAL HISTORY   Past Medical History:   Diagnosis Date    Abnormal Pap smear of cervix age 25    no treatement    Anxiety     Breast cancer     Cancer 07/11/2018    breast cancer    Cervical spondylosis 2/9/2018    Colon polyp     Depression     Ectopic pregnancy 2/26/2019    General anesthetics causing adverse effect in therapeutic use     Slow to awaken/ Memory problems-post-op to day 3 after Mastectomy    Headache   "   History of psychiatric hospitalization     age 20 for SI    Hx of psychiatric care     Hypertension     Peutz-Jeghers syndrome     PONV (postoperative nausea and vomiting)     Psychiatric problem     Right carpal tunnel syndrome 3/9/2017    Self-harming behavior     Suicide attempt     Therapy            PSYCHOTROPIC MEDICATIONS   Scheduled Meds:   folic acid  1 mg Oral Daily    multivitamin  1 tablet Oral Daily    paroxetine  20 mg Oral Daily     Continuous Infusions:  PRN Meds:.acetaminophen, aluminum-magnesium hydroxide-simethicone, docusate sodium, hydrOXYzine pamoate, loperamide, nicotine, OLANZapine **AND** OLANZapine        EXAMINATION    VITALS   Vitals:    03/15/22 1739 03/15/22 1740 03/15/22 1922 03/16/22 0751   BP: 117/63 131/73 109/75 114/66   BP Location: Left arm Left arm Right arm Right arm   Patient Position: Lying Standing Sitting Sitting   Pulse: (!) 50 63 (!) 57 (!) 52   Resp: 20 (!) 63 16 18   Temp: 97.8 °F (36.6 °C) 97.8 °F (36.6 °C) 97.6 °F (36.4 °C) 97.5 °F (36.4 °C)   TempSrc: Oral Oral Oral Oral   SpO2: 96% 96% 98% 98%   Weight: 86.2 kg (190 lb)      Height: 5' 2" (1.575 m)          Body mass index is 34.75 kg/m².      CONSTITUTIONAL  General Appearance: unremarkable, age appropriate, well nourished, obese     MUSCULOSKELETAL  Muscle Strength and Tone:no dyskinesia, no dystonia, no tremor, no tic  Abnormal Involuntary Movements: No  Gait and Station: non-ataxic     PSYCHIATRIC   Level of Consciousness: awake and alert   Orientation: person, place, time and situation  Grooming: Disheveled and Hospital garb  Psychomotor Behavior: normal, friendly and cooperative, eye contact normal, no PMA/R  Speech: normal tone, normal rate, normal pitch, normal volume, spontaneous  Language: grossly intact, able to name, able to repeat  Mood: anxious and dysphoric, "tired:  Affect: Anxious and Constricted  Thought Process: linear, logical  Associations: intact   Thought Content: +SI, denies " HI and no delusions  Perceptions: denies AH and denies  VH  Memory: Able to recall past events, Remote intact and Recent intact  Attention:Normal and Attends to interview without distraction  Fund of Knowledge: Aware of current events and Vocabulary appropriate   Estimate if Intelligence:  Average based on work/education history, vocabulary and mental status exam  Insight: intact, has awareness of illness- fair  Judgment: impaired due to depression; poor- s/p SA          DIAGNOSTIC TESTING   Laboratory Results  Recent Results (from the past 24 hour(s))   Lipid panel    Collection Time: 03/16/22  5:45 AM   Result Value Ref Range    Cholesterol 135 120 - 199 mg/dL    Triglycerides 177 (H) 30 - 150 mg/dL    HDL 45 40 - 75 mg/dL    LDL Cholesterol 54.6 (L) 63.0 - 159.0 mg/dL    HDL/Cholesterol Ratio 33.3 20.0 - 50.0 %    Total Cholesterol/HDL Ratio 3.0 2.0 - 5.0    Non-HDL Cholesterol 90 mg/dL   Hemoglobin A1c    Collection Time: 03/16/22  5:45 AM   Result Value Ref Range    Hemoglobin A1C 5.5 4.0 - 5.6 %    Estimated Avg Glucose 111 68 - 131 mg/dL   Lipid Panel    Collection Time: 03/16/22  5:45 AM   Result Value Ref Range    Cholesterol 135 120 - 199 mg/dL    Triglycerides 177 (H) 30 - 150 mg/dL    HDL 45 40 - 75 mg/dL    LDL Cholesterol 54.6 (L) 63.0 - 159.0 mg/dL    HDL/Cholesterol Ratio 33.3 20.0 - 50.0 %    Total Cholesterol/HDL Ratio 3.0 2.0 - 5.0    Non-HDL Cholesterol 90 mg/dL   CK    Collection Time: 03/16/22  5:45 AM   Result Value Ref Range     (H) 20 - 180 U/L             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  MARY LOU  Panic Disorder with agoraphobia  Social Anxiety Disorder  PTSD  OCD     Nicotine Dependence     Borderline Personality Disorder     Breast Cancer s/p mastectomy and reconstructive surgery  Peutz-Jeghers syndrome  Right Carpal Tunnel  syndrome  Cervical spondylosis  Chronic pain     PROBLEM LIST AND MANAGEMENT PLANS     Mood: pt counseled  -resumed paxil at home dose of 20 mg po q day- then stop (pt feels it has been unhelpful)  -start trial of Cymbalta at 30 mg po q day  -consider trial of adjunctive neuroleptic after further detox from overdose     Overdose: pt counseld  -hold meds today;   -hold klonopin and reassess resumption tomorrow  -recheck CPK in the AM     Insomnia: pt counseled  -vistaril prn     Anxiety/PTSD: pt counseled  paxil as above     Nicotine Dependence: pt counseld  -start nicotine 14 mg patch dermal q day     Borderline Personality Disorder: pt counseled  -meds off label as above       PRESCRIPTION DRUG MANAGEMENT  Compliance: yes  Side Effects: no  Regimen Adjustments: see above     Discussed diagnosis, risks and benefits of proposed treatment vs alternative treatments vs no treatment, potential side effects of these treatments and the inherent unpredictability of treatment. The patient expresses understanding of the above and displays the capacity to agree with this treatment given said understanding. Patient also agrees that, currently, the benefits outweigh the risks and would like to pursue/continue treatment at this time.        DIAGNOSTIC TESTING  Labs reviewed with patient; follow up pending labs     Disposition:  -Will attempt to obtain outside psychiatric records if available  -SW to assist with aftercare planning and collateral  -Once stable discharge home with outpatient follow up care and/or rehab  -Continue inpatient treatment under a PEC and/or CEC for danger to self/ danger to others/grave disability as evident by danger to self and suicidal ideation    DISCHARGE PLANNING  Expected Disposition Plan: Home or Self Care with IOP      NEED FOR CONTINUED HOSPITALIZATION  Psychiatric illness continues to pose a potential threat to life or bodily function, of self or others, thereby requiring the need for continued  inpatient psychiatric hospitalization: Yes, due to: danger to self and suicidal ideation, as evidenced by:  Ongoing concerns with SI.    Protective inpatient pyschiatric hospitalization required while a safe disposition plan is enacted: Yes    Patient stabilized and ready for discharge from inpatient psychiatric unit: No        STAFF:   Christiano Emerson MD  Psychiatry

## 2022-03-16 NOTE — HPI
Bob Muñoz MD  03/15/22 1314             Patient ultimately medically cleared and admitted to inpatient psych for further management.

## 2022-03-17 PROCEDURE — 25000003 PHARM REV CODE 250: Performed by: PSYCHIATRY & NEUROLOGY

## 2022-03-17 PROCEDURE — 90833 PR PSYCHOTHERAPY W/PATIENT W/E&M, 30 MIN (ADD ON): ICD-10-PCS | Mod: ,,, | Performed by: PSYCHIATRY & NEUROLOGY

## 2022-03-17 PROCEDURE — 99233 SBSQ HOSP IP/OBS HIGH 50: CPT | Mod: ,,, | Performed by: PSYCHIATRY & NEUROLOGY

## 2022-03-17 PROCEDURE — 25000003 PHARM REV CODE 250: Performed by: INTERNAL MEDICINE

## 2022-03-17 PROCEDURE — S4991 NICOTINE PATCH NONLEGEND: HCPCS | Performed by: INTERNAL MEDICINE

## 2022-03-17 PROCEDURE — 99233 PR SUBSEQUENT HOSPITAL CARE,LEVL III: ICD-10-PCS | Mod: ,,, | Performed by: PSYCHIATRY & NEUROLOGY

## 2022-03-17 PROCEDURE — 90833 PSYTX W PT W E/M 30 MIN: CPT | Mod: ,,, | Performed by: PSYCHIATRY & NEUROLOGY

## 2022-03-17 PROCEDURE — 11400000 HC PSYCH PRIVATE ROOM

## 2022-03-17 RX ORDER — CLONAZEPAM 1 MG/1
1 TABLET ORAL NIGHTLY
Status: DISCONTINUED | OUTPATIENT
Start: 2022-03-17 | End: 2022-03-24 | Stop reason: HOSPADM

## 2022-03-17 RX ADMIN — THERA TABS 1 TABLET: TAB at 08:03

## 2022-03-17 RX ADMIN — NICOTINE 1 PATCH: 14 PATCH, EXTENDED RELEASE TRANSDERMAL at 09:03

## 2022-03-17 RX ADMIN — HYDROXYZINE PAMOATE 50 MG: 50 CAPSULE ORAL at 08:03

## 2022-03-17 RX ADMIN — DULOXETINE 30 MG: 30 CAPSULE, DELAYED RELEASE ORAL at 08:03

## 2022-03-17 RX ADMIN — CLONAZEPAM 1 MG: 1 TABLET ORAL at 08:03

## 2022-03-17 RX ADMIN — ACETAMINOPHEN 650 MG: 325 TABLET ORAL at 08:03

## 2022-03-17 NOTE — PROGRESS NOTES
"PSYCHIATRY DAILY INPATIENT PROGRESS NOTE  SUBSEQUENT HOSPITAL VISIT    ENCOUNTER DATE: 3/17/2022  [facility]    DATE OF ADMISSION: 3/15/2022  4:51 PM  LENGTH OF STAY: 2 days      CHIEF COMPLAINT   Staci Hodge is a 41 y.o. female, seen during daily garcia rounds on the inpatient unit.  Staci Hodge presented with the chief complaint of depression s/p SA via overdose, "I don't know why I did it."      The patient was seen and examined. The chart was reviewed.     Reviewed notes from Rns and MD and labs from the last 24 hours.    The patient's case was discussed with the treatment team/care providers today including Rns, CTRS and NP    Staff reports no behavioral or management issues.     The patient has been compliant with treatment.      Subjective 03/17/2022       Today the patient reports that she feels less tired from the overdose. She reports continued symptoms as documented below which remain mostly unchanged since admission.     She continues to lack insight into her SA but is able identify some precipitating factors. She feels that "not being a real woman anymore" (referenincing having a double masectomy and full hysterectomy and not being able to have children) as a primary  of depressive symptoms and contributed to the SA.  -feelings of worthlessness/guilt contributed to the attempt       The patient denies any side effects to medications.        Psychiatric ROS (observed, reported, or endorsed/denied):  Depressed mood - Continuing  Interest/pleasure/anhedonia: Continuing  Guilt/hopelessness/worthlessness - Continuing  Changes in Sleep - Continuing  Changes in Appetite - Continuing  Changes in Concentration - Continuing  Changes in Energy - Continuing  PMA/R- denies  Suicidal- active/passive ideations - Continuing  Homicidal ideations: active/passive ideations - denies    Hallucinations - denies  Delusions - denies  Disorganized behavior - denies  Disorganized speech - " denies  Negative symptoms - denies    Elevated mood - denies  Decreased need for sleep - denies  Grandiosity - denies  Racing thoughts - denies  Impulsivity - denies  Irritability- denies  Increased energy - denies  Distractibility - denies  Increase in goal-directed activity or PMA- denies    Symptoms of MARY LOU - Continuing  Symptoms of Panic Disorder- stable  Symptoms of PTSD - variable        Overall progress: Patient is showing no improvement on the Unit to date        Psychotherapy:  · Target symptoms: depression, anxiety   · Why chosen therapy is appropriate versus another modality: relevant to diagnosis, patient responds to this modality, evidence based practice  · Outcome monitoring methods: self-report, observation  · Therapeutic intervention type: insight oriented psychotherapy, behavior modifying psychotherapy, supportive psychotherapy, interactive psychotherapy  · Topics discussed/themes: building skills sets for symptom management, symptom recognition  · The patient's response to the intervention is accepting. The patient's progress toward treatment goals is limited, poor.   · Duration of intervention: 16 minutes.        Medical ROS   General ROS: negative  Ophthalmic ROS: negative  ENT ROS: negative  Allergy and Immunology ROS: negative  Hematological and Lymphatic ROS: negative  Endocrine ROS: negative  Respiratory ROS: no cough, shortness of breath, or wheezing  Cardiovascular ROS: no chest pain or dyspnea on exertion  Gastrointestinal ROS: no abdominal pain, change in bowel habits, or black or bloody stools  Genito-Urinary ROS: no dysuria, trouble voiding, or hematuria  Musculoskeletal ROS: negative  Neurological ROS: no TIA or stroke symptoms  Dermatological ROS: negative    PAST MEDICAL HISTORY   Past Medical History:   Diagnosis Date    Abnormal Pap smear of cervix age 25    no treatement    Anxiety     Breast cancer     Cancer 07/11/2018    breast cancer    Cervical spondylosis 2/9/2018     Colon polyp     Depression     Ectopic pregnancy 2/26/2019    General anesthetics causing adverse effect in therapeutic use     Slow to awaken/ Memory problems-post-op to day 3 after Mastectomy    Headache     History of psychiatric hospitalization     age 20 for SI    Hx of psychiatric care     Hypertension     Peutz-Jeghers syndrome     PONV (postoperative nausea and vomiting)     Psychiatric problem     Right carpal tunnel syndrome 3/9/2017    Self-harming behavior     Suicide attempt     Therapy            PSYCHOTROPIC MEDICATIONS   Scheduled Meds:   DULoxetine  30 mg Oral Daily    multivitamin  1 tablet Oral Daily    nicotine  1 patch Transdermal Daily     Continuous Infusions:  PRN Meds:.acetaminophen, aluminum-magnesium hydroxide-simethicone, docusate sodium, hydrOXYzine pamoate, loperamide, OLANZapine **AND** OLANZapine        EXAMINATION    VITALS   Vitals:    03/15/22 1922 03/16/22 0751 03/16/22 1919 03/17/22 0800   BP: 109/75 114/66 134/84 131/76   BP Location: Right arm Right arm Right arm Right arm   Patient Position: Sitting Sitting Sitting Sitting   Pulse: (!) 57 (!) 52 (!) 53 (!) 58   Resp: 16 18 16 18   Temp: 97.6 °F (36.4 °C) 97.5 °F (36.4 °C) 97.7 °F (36.5 °C) 98 °F (36.7 °C)   TempSrc: Oral Oral Oral Oral   SpO2: 98% 98% 95% 98%   Weight:       Height:           Body mass index is 34.75 kg/m².      CONSTITUTIONAL  General Appearance: unremarkable, age appropriate, well nourished, obese     MUSCULOSKELETAL  Muscle Strength and Tone:no dyskinesia, no dystonia, no tremor, no tic  Abnormal Involuntary Movements: No  Gait and Station: non-ataxic     PSYCHIATRIC   Level of Consciousness: awake and alert   Orientation: person, place, time and situation  Grooming: Disheveled and Hospital garb  Psychomotor Behavior: normal, friendly and cooperative, eye contact normal, no PMA/R  Speech: normal tone, normal rate, normal pitch, normal volume, spontaneous  Language: grossly intact, able to  "name, able to repeat  Mood: anxious and dysphoric, "tired:  Affect: Anxious and Constricted  Thought Process: linear, logical  Associations: intact   Thought Content: +SI, denies HI and no delusions  Perceptions: denies AH and denies  VH  Memory: Able to recall past events, Remote intact and Recent intact  Attention:Normal and Attends to interview without distraction  Fund of Knowledge: Aware of current events and Vocabulary appropriate   Estimate if Intelligence:  Average based on work/education history, vocabulary and mental status exam  Insight: intact, has awareness of illness- fair  Judgment: impaired due to depression; poor- s/p SA          DIAGNOSTIC TESTING   Laboratory Results  No results found for this or any previous visit (from the past 24 hour(s)).          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  MARY LOU  Panic Disorder with agoraphobia  Social Anxiety Disorder  PTSD  OCD     Nicotine Dependence     Borderline Personality Disorder     Breast Cancer s/p mastectomy and reconstructive surgery  Peutz-Jeghers syndrome  Right Carpal Tunnel syndrome  Cervical spondylosis  Chronic pain     PROBLEM LIST AND MANAGEMENT PLANS     Mood: pt counseled  -resumed paxil at home dose of 20 mg po q day- then stop (pt feels it has been unhelpful)  -started trial of Cymbalta at 30 mg po q day; will optimize as indicated  -consider trial of adjunctive neuroleptic after further detox from overdose     Overdose: pt counseld  -hold meds today;   -recheck CPK in the AM- improving     Insomnia: pt counseled  -vistaril prn     Anxiety/PTSD: pt counseled  -cymbalata as above  -resume klonopin at 1 mg po q HS     Nicotine Dependence: pt counseld  -start nicotine 14 mg patch dermal q day     Borderline Personality Disorder: pt counseled  -meds off label as above       PRESCRIPTION DRUG " MANAGEMENT  Compliance: yes  Side Effects: no  Regimen Adjustments: see above     Discussed diagnosis, risks and benefits of proposed treatment vs alternative treatments vs no treatment, potential side effects of these treatments and the inherent unpredictability of treatment. The patient expresses understanding of the above and displays the capacity to agree with this treatment given said understanding. Patient also agrees that, currently, the benefits outweigh the risks and would like to pursue/continue treatment at this time.        DIAGNOSTIC TESTING  Labs reviewed with patient; follow up pending labs     Disposition:  -Will attempt to obtain outside psychiatric records if available  -SW to assist with aftercare planning and collateral  -Once stable discharge home with outpatient follow up care and/or rehab  -Continue inpatient treatment under a PEC and/or CEC for danger to self/ danger to others/grave disability as evident by danger to self and suicidal ideation    DISCHARGE PLANNING  Expected Disposition Plan: Home or Self Care with IOP      NEED FOR CONTINUED HOSPITALIZATION  Psychiatric illness continues to pose a potential threat to life or bodily function, of self or others, thereby requiring the need for continued inpatient psychiatric hospitalization: Yes, due to: danger to self and suicidal ideation, as evidenced by:  Ongoing concerns with SI.    Protective inpatient pyschiatric hospitalization required while a safe disposition plan is enacted: Yes    Patient stabilized and ready for discharge from inpatient psychiatric unit: No        STAFF:   Christiano Emerson MD  Psychiatry

## 2022-03-17 NOTE — PLAN OF CARE
Pt. Is awake, alert and oriented x 4. Pt. Denied any c/o auditory or visual hallucinations. Pt. Also denied any c/o suicidal or homicidal ideations. Pt. Stated that she does feel anxious and depressed at times, and noted be a 7 on a scale of 1 to 10 for both. Pt. Sit in dayroom and talked to others while watching TV on this shift. Will cont. To monitor Pt.

## 2022-03-17 NOTE — PLAN OF CARE
Patient is sitting in the dining room eating supper meal at this time. Alert and oriented, pleasant, and cooperative. Accepting meals with good appetite and compliant with medications without side effects noted. Patient endorses depression and anxiety 6/10, and difficulty sleeping. Dr. Emerson visited per telemed with new order for Clonazepam 1 mg po nightly. Patient out of room today interacting with peers ad boris, watching tv, and participated in group. Close observations continued and safe environment maintained.

## 2022-03-18 PROCEDURE — 90833 PSYTX W PT W E/M 30 MIN: CPT | Mod: ,,, | Performed by: PSYCHIATRY & NEUROLOGY

## 2022-03-18 PROCEDURE — 99233 SBSQ HOSP IP/OBS HIGH 50: CPT | Mod: ,,, | Performed by: PSYCHIATRY & NEUROLOGY

## 2022-03-18 PROCEDURE — S4991 NICOTINE PATCH NONLEGEND: HCPCS | Performed by: INTERNAL MEDICINE

## 2022-03-18 PROCEDURE — 90833 PR PSYCHOTHERAPY W/PATIENT W/E&M, 30 MIN (ADD ON): ICD-10-PCS | Mod: ,,, | Performed by: PSYCHIATRY & NEUROLOGY

## 2022-03-18 PROCEDURE — 25000003 PHARM REV CODE 250: Performed by: PSYCHIATRY & NEUROLOGY

## 2022-03-18 PROCEDURE — 11400000 HC PSYCH PRIVATE ROOM

## 2022-03-18 PROCEDURE — 99233 PR SUBSEQUENT HOSPITAL CARE,LEVL III: ICD-10-PCS | Mod: ,,, | Performed by: PSYCHIATRY & NEUROLOGY

## 2022-03-18 PROCEDURE — 25000003 PHARM REV CODE 250: Performed by: INTERNAL MEDICINE

## 2022-03-18 RX ADMIN — DULOXETINE 30 MG: 30 CAPSULE, DELAYED RELEASE ORAL at 08:03

## 2022-03-18 RX ADMIN — NICOTINE 1 PATCH: 14 PATCH, EXTENDED RELEASE TRANSDERMAL at 08:03

## 2022-03-18 RX ADMIN — HYDROXYZINE PAMOATE 50 MG: 50 CAPSULE ORAL at 08:03

## 2022-03-18 RX ADMIN — CLONAZEPAM 1 MG: 1 TABLET ORAL at 08:03

## 2022-03-18 RX ADMIN — THERA TABS 1 TABLET: TAB at 08:03

## 2022-03-18 NOTE — PROGRESS NOTES
"The patient location is: [facility]    Visit type: audiovisual    Face to Face time with patient: Approximately 25 minutes  Approximately 40 minutes of total time spent on the encounter, which includes face to face time and non-face to face time preparing to see the patient (eg, review of tests), Obtaining and/or reviewing separately obtained history, Documenting clinical information in the electronic or other health record, Independently interpreting results (not separately reported) and communicating results to the patient/family/caregiver, or Care coordination (not separately reported).         Each patient to whom he or she provides medical services by telemedicine is:  (1) informed of the relationship between the physician and patient and the respective role of any other health care provider with respect to management of the patient; and (2) notified that he or she may decline to receive medical services by telemedicine and may withdraw from such care at any time.    Notes:       PSYCHIATRY DAILY INPATIENT PROGRESS NOTE  SUBSEQUENT HOSPITAL VISIT    ENCOUNTER DATE: 3/18/2022  SITE: [facility]    DATE OF ADMISSION: 3/15/2022  4:51 PM  LENGTH OF STAY: 3 days      CHIEF COMPLAINT   Staci Hodge is a 41 y.o. female, seen during daily garcia rounds on the inpatient unit.  Staci Hodge presented with the chief complaint of depression s/p SA via overdose, "I don't know why I did it."      The patient was seen and examined. The chart was reviewed.     Reviewed notes from Rns and LPN and labs from the last 24 hours.    The patient's case was discussed with the treatment team/care providers today including Rns, CTRS and NP    Staff reports no behavioral or management issues.     The patient has been compliant with treatment.      Subjective 03/18/2022       Today the patient reports that she feels much less tired from the overdose. She reports continued symptoms as documented below which persists " "with mild changes since admission.     She is gaining insight into her SA but is better able to identify some precipitating factors. She feels that "not being a real woman anymore" (referenincing having a double masectomy and full hysterectomy and not being able to have children) as a primary  of depressive symptoms and contributed to the SA.  -feelings of worthlessness/guilt contributed to the attempt  -she reports that she did not talk with her partner about her SI secondary to feeling that her issues would not be taking seriously; she reports that he has been very supportive since her admission       The patient denies any side effects to medications.        Psychiatric ROS (observed, reported, or endorsed/denied):  Depressed mood - Continuing  Interest/pleasure/anhedonia: Continuing  Guilt/hopelessness/worthlessness - Continuing  Changes in Sleep - variable  Changes in Appetite - variable  Changes in Concentration - variable  Changes in Energy - variable  PMA/R- denies  Suicidal- active/passive ideations - Continuing  Homicidal ideations: active/passive ideations - denies    Hallucinations - denies  Delusions - denies  Disorganized behavior - denies  Disorganized speech - denies  Negative symptoms - denies    Elevated mood - denies  Decreased need for sleep - denies  Grandiosity - denies  Racing thoughts - denies  Impulsivity - denies  Irritability- denies  Increased energy - denies  Distractibility - denies  Increase in goal-directed activity or PMA- denies    Symptoms of MARY LOU - Continuing  Symptoms of Panic Disorder- stable  Symptoms of PTSD - variable        Overall progress: Patient is showing mild improvement         Psychotherapy:  · Target symptoms: depression, anxiety   · Why chosen therapy is appropriate versus another modality: relevant to diagnosis, patient responds to this modality, evidence based practice  · Outcome monitoring methods: self-report, observation  · Therapeutic intervention type: " insight oriented psychotherapy, behavior modifying psychotherapy, supportive psychotherapy, interactive psychotherapy  · Topics discussed/themes: building skills sets for symptom management, symptom recognition   · Insight oriented techniques regarding her SA  · The patient's response to the intervention is accepting. The patient's progress toward treatment goals is limited, poor.   · Duration of intervention: 20 minutes.        Medical ROS   General ROS: negative  Ophthalmic ROS: negative  ENT ROS: negative  Allergy and Immunology ROS: negative  Hematological and Lymphatic ROS: negative  Endocrine ROS: negative  Respiratory ROS: no cough, shortness of breath, or wheezing  Cardiovascular ROS: no chest pain or dyspnea on exertion  Gastrointestinal ROS: no abdominal pain, change in bowel habits, or black or bloody stools  Genito-Urinary ROS: no dysuria, trouble voiding, or hematuria  Musculoskeletal ROS: negative  Neurological ROS: no TIA or stroke symptoms  Dermatological ROS: negative    PAST MEDICAL HISTORY   Past Medical History:   Diagnosis Date    Abnormal Pap smear of cervix age 25    no treatement    Anxiety     Breast cancer     Cancer 07/11/2018    breast cancer    Cervical spondylosis 2/9/2018    Colon polyp     Depression     Ectopic pregnancy 2/26/2019    General anesthetics causing adverse effect in therapeutic use     Slow to awaken/ Memory problems-post-op to day 3 after Mastectomy    Headache     History of psychiatric hospitalization     age 20 for SI    Hx of psychiatric care     Hypertension     Peutz-Jeghers syndrome     PONV (postoperative nausea and vomiting)     Psychiatric problem     Right carpal tunnel syndrome 3/9/2017    Self-harming behavior     Suicide attempt     Therapy            PSYCHOTROPIC MEDICATIONS   Scheduled Meds:   clonazePAM  1 mg Oral QHS    DULoxetine  30 mg Oral Daily    multivitamin  1 tablet Oral Daily    nicotine  1 patch Transdermal Daily  "    Continuous Infusions:  PRN Meds:.acetaminophen, aluminum-magnesium hydroxide-simethicone, docusate sodium, hydrOXYzine pamoate, loperamide, OLANZapine **AND** OLANZapine        EXAMINATION    VITALS   Vitals:    03/16/22 1919 03/17/22 0800 03/17/22 1933 03/18/22 0750   BP: 134/84 131/76 120/80 99/70   BP Location: Right arm Right arm Right arm Left arm   Patient Position: Sitting Sitting Sitting Sitting   Pulse: (!) 53 (!) 58 (!) 54 67   Resp: 16 18 20 20   Temp: 97.7 °F (36.5 °C) 98 °F (36.7 °C) 98.3 °F (36.8 °C) 98.8 °F (37.1 °C)   TempSrc: Oral Oral Oral Oral   SpO2: 95% 98% 96% 97%   Weight:       Height:           Body mass index is 34.75 kg/m².      CONSTITUTIONAL  General Appearance: unremarkable, age appropriate, well nourished, obese     MUSCULOSKELETAL  Muscle Strength and Tone:no dyskinesia, no dystonia, no tremor, no tic  Abnormal Involuntary Movements: No  Gait and Station: non-ataxic     PSYCHIATRIC   Level of Consciousness: awake and alert   Orientation: person, place, time and situation  Grooming: Disheveled and Hospital garb  Psychomotor Behavior: normal, friendly and cooperative, eye contact normal, no PMA/R  Speech: normal tone, normal rate, normal pitch, normal volume, spontaneous  Language: grossly intact, able to name, able to repeat  Mood: anxious and dysphoric, "ok.. thinking about being home."  Affect: Anxious and Constricted  Thought Process: linear, logical, goal directed  Associations: intact   Thought Content: less SI, denies HI and no delusions  Perceptions: denies AH and denies  VH  Memory: Able to recall past events, Remote intact and Recent intact  Attention:Normal and Attends to interview without distraction  Fund of Knowledge: Aware of current events and Vocabulary appropriate   Estimate if Intelligence:  Average based on work/education history, vocabulary and mental status exam  Insight: intact, has awareness of illness- fair  Judgment: impaired due to depression; poor- s/p " SA           DIAGNOSTIC TESTING   Laboratory Results  No results found for this or any previous visit (from the past 24 hour(s)).          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  MARY LOU  Panic Disorder with agoraphobia  Social Anxiety Disorder  PTSD  OCD     Nicotine Dependence     Borderline Personality Disorder     Breast Cancer s/p mastectomy and reconstructive surgery  Peutz-Jeghers syndrome  Right Carpal Tunnel syndrome  Cervical spondylosis  Chronic pain     PROBLEM LIST AND MANAGEMENT PLANS     Mood: pt counseled  -resumed paxil at home dose of 20 mg po q day- then stop (pt feels it has been unhelpful)  -started trial of Cymbalta at 30 mg po q day; on day 2;  will plan optimize as indicated (plan to increase to 60 mg prior to discharge)  -consider trial of adjunctive neuroleptic after further detox from overdose     Overdose: pt counseld  -hold meds today;   -recheck CPK in the AM- improving     Insomnia: pt counseled  -vistaril prn     Anxiety/PTSD: pt counseled  -cymbalata as above  -resumed/continue klonopin at 1 mg po q HS     Nicotine Dependence: pt counseld  -started/continue at nicotine 14 mg patch dermal q day     Borderline Personality Disorder: pt counseled  -meds off label as above       PRESCRIPTION DRUG MANAGEMENT  Compliance: yes  Side Effects: no  Regimen Adjustments: see above     Discussed diagnosis, risks and benefits of proposed treatment vs alternative treatments vs no treatment, potential side effects of these treatments and the inherent unpredictability of treatment. The patient expresses understanding of the above and displays the capacity to agree with this treatment given said understanding. Patient also agrees that, currently, the benefits outweigh the risks and would like to pursue/continue treatment at this time.        DIAGNOSTIC  TESTING  Labs reviewed with patient; follow up pending labs     Disposition:  -Will attempt to obtain outside psychiatric records if available  -SW to assist with aftercare planning and collateral  -Once stable discharge home with outpatient follow up care and/or rehab  -Continue inpatient treatment under a PEC and/or CEC for danger to self/ danger to others/grave disability as evident by danger to self and suicidal ideation    DISCHARGE PLANNING  Expected Disposition Plan: Home or Self Care with IOP      NEED FOR CONTINUED HOSPITALIZATION  Psychiatric illness continues to pose a potential threat to life or bodily function, of self or others, thereby requiring the need for continued inpatient psychiatric hospitalization: Yes, due to: danger to self and suicidal ideation, as evidenced by:  Ongoing concerns with SI.    Protective inpatient pyschiatric hospitalization required while a safe disposition plan is enacted: Yes    Patient stabilized and ready for discharge from inpatient psychiatric unit: No        STAFF:   Christiano Emerson MD  Psychiatry

## 2022-03-18 NOTE — PLAN OF CARE
POC reviewed this shift and is on going. Patient is depressed, calm, cooperative, quiet, anxious, and sleeping. Denies Suicidal Ideation, Homicidal Ideation, Auditory Hallucinations, Visual Hallucinations, Tactile Hallucinations, Gustatory Hallucinations, and Delusions. Patient has been staying to herself and reading a book. Patient stated she lost her woman fountain. Patient continues with her depression. Pt continues to be medication compliant and staff will continue to monitor pt closely while on the unit.

## 2022-03-18 NOTE — PLAN OF CARE
POC reviewed.    Pt quiet, cooperative, pleasant, but pensive.  Denies S/HI or A/VH.  Had c/o genaralized HA.  PRN Tylenol 650mg given po.  Tolerating meals/snacks.   Selectively associates with peers.  Will interact with staff when prompted.  Will maintain close observation.

## 2022-03-19 PROCEDURE — 11400000 HC PSYCH PRIVATE ROOM

## 2022-03-19 PROCEDURE — 25000003 PHARM REV CODE 250: Performed by: PSYCHIATRY & NEUROLOGY

## 2022-03-19 PROCEDURE — 25000003 PHARM REV CODE 250: Performed by: INTERNAL MEDICINE

## 2022-03-19 PROCEDURE — 99231 PR SUBSEQUENT HOSPITAL CARE,LEVL I: ICD-10-PCS | Mod: ,,, | Performed by: PSYCHIATRY & NEUROLOGY

## 2022-03-19 PROCEDURE — S4991 NICOTINE PATCH NONLEGEND: HCPCS | Performed by: INTERNAL MEDICINE

## 2022-03-19 PROCEDURE — 99231 SBSQ HOSP IP/OBS SF/LOW 25: CPT | Mod: ,,, | Performed by: PSYCHIATRY & NEUROLOGY

## 2022-03-19 RX ADMIN — NICOTINE 1 PATCH: 14 PATCH, EXTENDED RELEASE TRANSDERMAL at 08:03

## 2022-03-19 RX ADMIN — THERA TABS 1 TABLET: TAB at 08:03

## 2022-03-19 RX ADMIN — HYDROXYZINE PAMOATE 50 MG: 50 CAPSULE ORAL at 08:03

## 2022-03-19 RX ADMIN — CLONAZEPAM 1 MG: 1 TABLET ORAL at 08:03

## 2022-03-19 RX ADMIN — DULOXETINE 30 MG: 30 CAPSULE, DELAYED RELEASE ORAL at 08:03

## 2022-03-19 NOTE — PLAN OF CARE
POC reviewed and ongoing. States that she is doing better. Just not sleeping well at night because she is taking naps during the day. She says that she is trying to stay up more during the day now. Remains with some depression. Denies SI/HI/AVH. No c/o voiced. Will continue to monitor for safety and any changes.

## 2022-03-19 NOTE — PLAN OF CARE
POC reviewed this shift and is on going. Patient is alert and orientated, Endorses depression,however denies h/s ideation, Denies a/v hallucinations,preoccupied and withdrawn reading,minimal interacting   Pt continues to be medication compliant and staff will continue to monitor pt closely while on the unit.

## 2022-03-20 PROCEDURE — 99232 SBSQ HOSP IP/OBS MODERATE 35: CPT | Mod: ,,, | Performed by: PSYCHIATRY & NEUROLOGY

## 2022-03-20 PROCEDURE — 25000003 PHARM REV CODE 250: Performed by: PSYCHIATRY & NEUROLOGY

## 2022-03-20 PROCEDURE — 99232 PR SUBSEQUENT HOSPITAL CARE,LEVL II: ICD-10-PCS | Mod: ,,, | Performed by: PSYCHIATRY & NEUROLOGY

## 2022-03-20 PROCEDURE — 11400000 HC PSYCH PRIVATE ROOM

## 2022-03-20 PROCEDURE — S4991 NICOTINE PATCH NONLEGEND: HCPCS | Performed by: INTERNAL MEDICINE

## 2022-03-20 PROCEDURE — 25000003 PHARM REV CODE 250: Performed by: INTERNAL MEDICINE

## 2022-03-20 RX ORDER — DULOXETIN HYDROCHLORIDE 30 MG/1
30 CAPSULE, DELAYED RELEASE ORAL 2 TIMES DAILY
Status: DISCONTINUED | OUTPATIENT
Start: 2022-03-20 | End: 2022-03-24 | Stop reason: HOSPADM

## 2022-03-20 RX ADMIN — CLONAZEPAM 1 MG: 1 TABLET ORAL at 08:03

## 2022-03-20 RX ADMIN — DULOXETINE 30 MG: 30 CAPSULE, DELAYED RELEASE ORAL at 08:03

## 2022-03-20 RX ADMIN — THERA TABS 1 TABLET: TAB at 08:03

## 2022-03-20 RX ADMIN — HYDROXYZINE PAMOATE 50 MG: 50 CAPSULE ORAL at 08:03

## 2022-03-20 RX ADMIN — NICOTINE 1 PATCH: 14 PATCH, EXTENDED RELEASE TRANSDERMAL at 08:03

## 2022-03-20 NOTE — PLAN OF CARE
POC reviewed and ongoing. Pt states she is feeling better. Slept better last night. Denies SI/HI/AVH. No depression this shift, but still has some anxiety. No other issues voiced. Rested well. Will continue to monitor for safety and changes.

## 2022-03-20 NOTE — PLAN OF CARE
POC reviewed this shift and is on going. Patient is alert and orientated,does not  Endorses suicidal thoughts,  Pt continues to be medication compliant and staff will continue to monitor pt closely while on the unit. Provide care as per treatment plan.

## 2022-03-20 NOTE — PROGRESS NOTES
"The patient location is: [facility]    Visit type: audiovisual      Each patient to whom he or she provides medical services by telemedicine is:  (1) informed of the relationship between the physician and patient and the respective role of any other health care provider with respect to management of the patient; and (2) notified that he or she may decline to receive medical services by telemedicine and may withdraw from such care at any time.    Notes:       PSYCHIATRY DAILY INPATIENT PROGRESS NOTE  SUBSEQUENT HOSPITAL VISIT    ENCOUNTER DATE: 3/19/2022  SITE: [facility]    DATE OF ADMISSION: 3/15/2022  4:51 PM  LENGTH OF STAY: 4 days      CHIEF COMPLAINT   Staci Hodge is a 41 y.o. female, seen during daily garcia rounds on the inpatient unit.  Staci Hodge presented with the chief complaint of depression s/p SA via overdose, "I don't know why I did it."      The patient was seen and examined. The chart was reviewed.     Reviewed notes from Rns and LPN and labs from the last 24 hours.    The patient's case was discussed with the treatment team/care providers today including Rns    Staff reports no behavioral or management issues.     The patient has been compliant with treatment.      Subjective 03/19/2022       Today the patient reports that her mood is "surprisingly ok". States she is "still a little anxious, a little sad" but much better than she expected.  She states that she intended to die when she took the overdose but is very glad she didn't. States she realized how much support she has and that means a lot to her. She reports doing well on the unit, and staff agree. She reports fair sleep and appetite. Denies SI/HI/AVH. .      The patient denies any side effects to medications.        Psychiatric ROS (observed, reported, or endorsed/denied):  Depressed mood - Continuing  Interest/pleasure/anhedonia: Continuing  Guilt/hopelessness/worthlessness - Continuing  Changes in Sleep - " variable  Changes in Appetite - variable  Changes in Concentration - variable  Changes in Energy - variable  PMA/R- denies  Suicidal- active/passive ideations - Continuing  Homicidal ideations: active/passive ideations - denies    Hallucinations - denies  Delusions - denies  Disorganized behavior - denies  Disorganized speech - denies  Negative symptoms - denies    Elevated mood - denies  Decreased need for sleep - denies  Grandiosity - denies  Racing thoughts - denies  Impulsivity - denies  Irritability- denies  Increased energy - denies  Distractibility - denies  Increase in goal-directed activity or PMA- denies    Symptoms of MARY LOU - Continuing  Symptoms of Panic Disorder- stable  Symptoms of PTSD - variable        Overall progress: Patient is showing mild improvement       Medical ROS   General ROS: negative  Ophthalmic ROS: negative  ENT ROS: negative  Allergy and Immunology ROS: negative  Hematological and Lymphatic ROS: negative  Endocrine ROS: negative  Respiratory ROS: no cough, shortness of breath, or wheezing  Cardiovascular ROS: no chest pain or dyspnea on exertion  Gastrointestinal ROS: no abdominal pain, change in bowel habits, or black or bloody stools  Genito-Urinary ROS: no dysuria, trouble voiding, or hematuria  Musculoskeletal ROS: negative  Neurological ROS: no TIA or stroke symptoms  Dermatological ROS: negative    PAST MEDICAL HISTORY   Past Medical History:   Diagnosis Date    Abnormal Pap smear of cervix age 25    no treatement    Anxiety     Breast cancer     Cancer 07/11/2018    breast cancer    Cervical spondylosis 2/9/2018    Colon polyp     Depression     Ectopic pregnancy 2/26/2019    General anesthetics causing adverse effect in therapeutic use     Slow to awaken/ Memory problems-post-op to day 3 after Mastectomy    Headache     History of psychiatric hospitalization     age 20 for SI    Hx of psychiatric care     Hypertension     Peutz-Jeghers syndrome     PONV  "(postoperative nausea and vomiting)     Psychiatric problem     Right carpal tunnel syndrome 3/9/2017    Self-harming behavior     Suicide attempt     Therapy            PSYCHOTROPIC MEDICATIONS   Scheduled Meds:   clonazePAM  1 mg Oral QHS    DULoxetine  30 mg Oral Daily    multivitamin  1 tablet Oral Daily    nicotine  1 patch Transdermal Daily     Continuous Infusions:  PRN Meds:.acetaminophen, aluminum-magnesium hydroxide-simethicone, docusate sodium, hydrOXYzine pamoate, loperamide, OLANZapine **AND** OLANZapine        EXAMINATION    VITALS   Vitals:    03/18/22 0750 03/18/22 1906 03/19/22 0729 03/19/22 1917   BP: 99/70 107/68 107/75 109/70   BP Location: Left arm Right arm Right arm Right arm   Patient Position: Sitting Sitting Sitting Sitting   Pulse: 67 80 73 100   Resp: 20 20 20 20   Temp: 98.8 °F (37.1 °C) 97.7 °F (36.5 °C) 97.1 °F (36.2 °C) 99 °F (37.2 °C)   TempSrc: Oral Oral Oral Oral   SpO2: 97% 97% 99% 99%   Weight:       Height:           Body mass index is 34.75 kg/m².      CONSTITUTIONAL  General Appearance: unremarkable, age appropriate, well nourished, obese     MUSCULOSKELETAL  Muscle Strength and Tone:no dyskinesia, no dystonia, no tremor, no tic  Abnormal Involuntary Movements: No  Gait and Station: non-ataxic     PSYCHIATRIC   Level of Consciousness: awake and alert   Orientation: person, place, time and situation  Grooming: Disheveled and Hospital garb  Psychomotor Behavior: normal, friendly and cooperative, eye contact normal, no PMA/R  Speech: normal tone, normal rate, normal pitch, normal volume, spontaneous  Language: grossly intact, able to name, able to repeat  Mood: anxious and dysphoric, "but better than I thought it erma be"  Affect: Anxious and Constricted  Thought Process: linear, logical, goal directed  Associations: intact   Thought Content: less SI, denies HI and no delusions  Perceptions: denies AH and denies  VH  Memory: Able to recall past events, Remote intact and " Recent intact  Attention:Normal and Attends to interview without distraction  Fund of Knowledge: Aware of current events and Vocabulary appropriate   Estimate if Intelligence:  Average based on work/education history, vocabulary and mental status exam  Insight: intact, has awareness of illness- fair  Judgment: impaired due to depression; poor- s/p SA           DIAGNOSTIC TESTING   Laboratory Results  No results found for this or any previous visit (from the past 24 hour(s)).          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  MARY LOU  Panic Disorder with agoraphobia  Social Anxiety Disorder  PTSD  OCD     Nicotine Dependence     Borderline Personality Disorder     Breast Cancer s/p mastectomy and reconstructive surgery  Peutz-Jeghers syndrome  Right Carpal Tunnel syndrome  Cervical spondylosis  Chronic pain     PROBLEM LIST AND MANAGEMENT PLANS     Mood: pt counseled  -resumed paxil at home dose of 20 mg po q day- then stop (pt feels it has been unhelpful)  -started trial of Cymbalta at 30 mg po q day; on day 2;  will plan optimize as indicated (plan to increase to 60 mg prior to discharge)  -consider trial of adjunctive neuroleptic after further detox from overdose     Overdose: pt counseld       Insomnia: pt counseled  -vistaril prn     Anxiety/PTSD: pt counseled  -cymbalata as above  -resumed/continue klonopin at 1 mg po q HS     Nicotine Dependence: pt counseld  -started/continue at nicotine 14 mg patch dermal q day     Borderline Personality Disorder: pt counseled  -meds off label as above       PRESCRIPTION DRUG MANAGEMENT  Compliance: yes  Side Effects: no  Regimen Adjustments: see above     Discussed diagnosis, risks and benefits of proposed treatment vs alternative treatments vs no treatment, potential side effects of these treatments and the inherent unpredictability  of treatment. The patient expresses understanding of the above and displays the capacity to agree with this treatment given said understanding. Patient also agrees that, currently, the benefits outweigh the risks and would like to pursue/continue treatment at this time.        DIAGNOSTIC TESTING  Labs reviewed with patient; follow up pending labs     Disposition:  -Will attempt to obtain outside psychiatric records if available  -SW to assist with aftercare planning and collateral  -Once stable discharge home with outpatient follow up care and/or rehab  -Continue inpatient treatment under a PEC and/or CEC for danger to self/ danger to others/grave disability as evident by danger to self and suicidal ideation    DISCHARGE PLANNING  Expected Disposition Plan: Home or Self Care with IOP      NEED FOR CONTINUED HOSPITALIZATION  Psychiatric illness continues to pose a potential threat to life or bodily function, of self or others, thereby requiring the need for continued inpatient psychiatric hospitalization: Yes, due to: danger to self and suicidal ideation, as evidenced by:  Ongoing concerns with SI.    Protective inpatient pyschiatric hospitalization required while a safe disposition plan is enacted: Yes    Patient stabilized and ready for discharge from inpatient psychiatric unit: No    Time 15min    STAFF:   Omkar Zamorano MD  Psychiatry

## 2022-03-21 PROCEDURE — 25000003 PHARM REV CODE 250: Performed by: PSYCHIATRY & NEUROLOGY

## 2022-03-21 PROCEDURE — 90833 PSYTX W PT W E/M 30 MIN: CPT | Mod: ,,, | Performed by: PSYCHIATRY & NEUROLOGY

## 2022-03-21 PROCEDURE — 11400000 HC PSYCH PRIVATE ROOM

## 2022-03-21 PROCEDURE — 25000003 PHARM REV CODE 250: Performed by: INTERNAL MEDICINE

## 2022-03-21 PROCEDURE — 99232 PR SUBSEQUENT HOSPITAL CARE,LEVL II: ICD-10-PCS | Mod: ,,, | Performed by: PSYCHIATRY & NEUROLOGY

## 2022-03-21 PROCEDURE — 90833 PR PSYCHOTHERAPY W/PATIENT W/E&M, 30 MIN (ADD ON): ICD-10-PCS | Mod: ,,, | Performed by: PSYCHIATRY & NEUROLOGY

## 2022-03-21 PROCEDURE — S4991 NICOTINE PATCH NONLEGEND: HCPCS | Performed by: INTERNAL MEDICINE

## 2022-03-21 PROCEDURE — 99232 SBSQ HOSP IP/OBS MODERATE 35: CPT | Mod: ,,, | Performed by: PSYCHIATRY & NEUROLOGY

## 2022-03-21 RX ADMIN — THERA TABS 1 TABLET: TAB at 08:03

## 2022-03-21 RX ADMIN — HYDROXYZINE PAMOATE 50 MG: 50 CAPSULE ORAL at 08:03

## 2022-03-21 RX ADMIN — NICOTINE 1 PATCH: 14 PATCH, EXTENDED RELEASE TRANSDERMAL at 08:03

## 2022-03-21 RX ADMIN — DULOXETINE 30 MG: 30 CAPSULE, DELAYED RELEASE ORAL at 08:03

## 2022-03-21 RX ADMIN — CLONAZEPAM 1 MG: 1 TABLET ORAL at 08:03

## 2022-03-21 NOTE — PLAN OF CARE
Patient is alert and oriented, pleasant, calm, and cooperative. Appetite is good and medication compliant. Patient endorses that depression has improved and denies S/I. Endorses mild anxiety. Denies A/VH. No negative behaviors noted. Patient is focused on discharge. Patient spent most of the day sitting at table in dining area reading her book also attended afternoon group.   Clayton Cazares NP visited per telemed with no new orders noted. Patient is sitting in the dining room at this time visiting with jasonance smiling , laughing, and having good visit. Close observations continued and safe environment maintained.

## 2022-03-21 NOTE — PROGRESS NOTES
"The patient location is: [facility]    Visit type: audiovisual      Each patient to whom he or she provides medical services by telemedicine is:  (1) informed of the relationship between the physician and patient and the respective role of any other health care provider with respect to management of the patient; and (2) notified that he or she may decline to receive medical services by telemedicine and may withdraw from such care at any time.    Notes:       PSYCHIATRY DAILY INPATIENT PROGRESS NOTE  SUBSEQUENT HOSPITAL VISIT    ENCOUNTER DATE: 3/20/2022  SITE: [facility]    DATE OF ADMISSION: 3/15/2022  4:51 PM  LENGTH OF STAY: 5 days      CHIEF COMPLAINT   Staci Hodge is a 41 y.o. female, seen during daily garcia rounds on the inpatient unit.  Staci Hodge presented with the chief complaint of depression s/p SA via overdose, "I don't know why I did it."      The patient was seen and examined. The chart was reviewed.     Reviewed notes from Rns and LPN and labs from the last 24 hours.    The patient's case was discussed with the treatment team/care providers today including Rns    Staff reports no behavioral or management issues.     The patient has been compliant with treatment.      Subjective 03/20/2022       Today the patient reports that her mood is "anxious and a little depressed". States she is not sure why but she is especially anxious today. She states there is not much to do on the weekend but think and that may be making her more nervous and "a little tired". She states that she hopes the plan is to increase cymbalta today as previously discussed.She reports doing well on the unit, and staff agree. She reports good sleep and appetite. Denies SI/HI/AVH. .      The patient denies any side effects to medications.        Psychiatric ROS (observed, reported, or endorsed/denied):  Depressed mood - Continuing  Interest/pleasure/anhedonia: Continuing  Guilt/hopelessness/worthlessness - " Continuing  Changes in Sleep - variable  Changes in Appetite - variable  Changes in Concentration - variable  Changes in Energy - variable  PMA/R- denies  Suicidal- active/passive ideations - Continuing  Homicidal ideations: active/passive ideations - denies    Hallucinations - denies  Delusions - denies  Disorganized behavior - denies  Disorganized speech - denies  Negative symptoms - denies    Elevated mood - denies  Decreased need for sleep - denies  Grandiosity - denies  Racing thoughts - denies  Impulsivity - denies  Irritability- denies  Increased energy - denies  Distractibility - denies  Increase in goal-directed activity or PMA- denies    Symptoms of MARY LOU - Continuing  Symptoms of Panic Disorder- stable  Symptoms of PTSD - variable        Overall progress: Patient is showing mild improvement       Medical ROS   General ROS: negative  Ophthalmic ROS: negative  ENT ROS: negative  Allergy and Immunology ROS: negative  Hematological and Lymphatic ROS: negative  Endocrine ROS: negative  Respiratory ROS: no cough, shortness of breath, or wheezing  Cardiovascular ROS: no chest pain or dyspnea on exertion  Gastrointestinal ROS: no abdominal pain, change in bowel habits, or black or bloody stools  Genito-Urinary ROS: no dysuria, trouble voiding, or hematuria  Musculoskeletal ROS: negative  Neurological ROS: no TIA or stroke symptoms  Dermatological ROS: negative    PAST MEDICAL HISTORY   Past Medical History:   Diagnosis Date    Abnormal Pap smear of cervix age 25    no treatement    Anxiety     Breast cancer     Cancer 07/11/2018    breast cancer    Cervical spondylosis 2/9/2018    Colon polyp     Depression     Ectopic pregnancy 2/26/2019    General anesthetics causing adverse effect in therapeutic use     Slow to awaken/ Memory problems-post-op to day 3 after Mastectomy    Headache     History of psychiatric hospitalization     age 20 for SI    Hx of psychiatric care     Hypertension      Peutz-Jeghers syndrome     PONV (postoperative nausea and vomiting)     Psychiatric problem     Right carpal tunnel syndrome 3/9/2017    Self-harming behavior     Suicide attempt     Therapy            PSYCHOTROPIC MEDICATIONS   Scheduled Meds:   clonazePAM  1 mg Oral QHS    DULoxetine  30 mg Oral BID    multivitamin  1 tablet Oral Daily    nicotine  1 patch Transdermal Daily     Continuous Infusions:  PRN Meds:.acetaminophen, aluminum-magnesium hydroxide-simethicone, docusate sodium, hydrOXYzine pamoate, loperamide, OLANZapine **AND** OLANZapine        EXAMINATION    VITALS   Vitals:    03/19/22 0729 03/19/22 1917 03/20/22 0754 03/20/22 1906   BP: 107/75 109/70 119/81 (!) 98/55   BP Location: Right arm Right arm Right arm Right arm   Patient Position: Sitting Sitting Sitting Sitting   Pulse: 73 100 99 91   Resp: 20 20 20 20   Temp: 97.1 °F (36.2 °C) 99 °F (37.2 °C) 97.5 °F (36.4 °C) 97.9 °F (36.6 °C)   TempSrc: Oral Oral Oral Oral   SpO2: 99% 99% 100% 97%   Weight:       Height:           Body mass index is 34.75 kg/m².      CONSTITUTIONAL  General Appearance: unremarkable, age appropriate, well nourished, obese     MUSCULOSKELETAL  Muscle Strength and Tone:no dyskinesia, no dystonia, no tremor, no tic  Abnormal Involuntary Movements: No  Gait and Station: non-ataxic     PSYCHIATRIC   Level of Consciousness: awake and alert   Orientation: person, place, time and situation  Grooming: Disheveled and Hospital garb  Psychomotor Behavior: normal, friendly and cooperative, eye contact normal, no PMA/R  Speech: normal tone, normal rate, normal pitch, normal volume, spontaneous  Language: grossly intact, able to name, able to repeat  Mood: anxious and dysphoric  Affect: Anxious and Constricted  Thought Process: linear, logical, goal directed  Associations: intact   Thought Content: less SI, denies HI and no delusions  Perceptions: denies AH and denies  VH  Memory: Able to recall past events, Remote intact and  Recent intact  Attention:Normal and Attends to interview without distraction  Fund of Knowledge: Aware of current events and Vocabulary appropriate   Estimate if Intelligence:  Average based on work/education history, vocabulary and mental status exam  Insight: intact, has awareness of illness- fair  Judgment: impaired due to depression; poor- s/p SA           DIAGNOSTIC TESTING   Laboratory Results  No results found for this or any previous visit (from the past 24 hour(s)).          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  -consider trial of adjunctive neuroleptic after further detox from overdose     Overdose: pt counseld       Insomnia: pt counseled  -vistaril prn     Anxiety/PTSD: pt counseled  -cymbalata as above  -resumed/continue klonopin at 1 mg po q HS     Nicotine Dependence: pt counseld  -started/continue at nicotine 14 mg patch dermal q day     Borderline Personality Disorder: pt counseled  -meds off label as above       PRESCRIPTION DRUG MANAGEMENT  Compliance: yes  Side Effects: no  Regimen Adjustments: see above     Discussed diagnosis, risks and benefits of proposed treatment vs alternative treatments vs no treatment, potential side effects of these treatments and the inherent unpredictability of treatment. The patient expresses understanding of the above and displays the capacity to agree  with this treatment given said understanding. Patient also agrees that, currently, the benefits outweigh the risks and would like to pursue/continue treatment at this time.        DIAGNOSTIC TESTING  Labs reviewed with patient; follow up pending labs     Disposition:  -Will attempt to obtain outside psychiatric records if available  -SW to assist with aftercare planning and collateral  -Once stable discharge home with outpatient follow up care and/or rehab  -Continue inpatient treatment under a PEC and/or CEC for danger to self/ danger to others/grave disability as evident by danger to self and suicidal ideation    DISCHARGE PLANNING  Expected Disposition Plan: Home or Self Care with IOP      NEED FOR CONTINUED HOSPITALIZATION  Psychiatric illness continues to pose a potential threat to life or bodily function, of self or others, thereby requiring the need for continued inpatient psychiatric hospitalization: Yes, due to: danger to self and suicidal ideation, as evidenced by:  Ongoing concerns with SI.    Protective inpatient pyschiatric hospitalization required while a safe disposition plan is enacted: Yes    Patient stabilized and ready for discharge from inpatient psychiatric unit: No    Time 25min    STAFF:   Omkar Zamorano MD  Psychiatry

## 2022-03-21 NOTE — PROGRESS NOTES
"PSYCHIATRY DAILY INPATIENT PROGRESS NOTE  SUBSEQUENT HOSPITAL VISIT    ENCOUNTER DATE: 3/21/2022  SITE: [facility]    DATE OF ADMISSION: 3/15/2022  4:51 PM  LENGTH OF STAY: 6 days    CHIEF COMPLAINT   Staci Hodge is a 41 y.o. female, seen during daily garcia rounds on the inpatient unit.  Staci Hodge presented with the chief complaint of suicide attempt/overdose    The patient was seen and examined. The chart was reviewed.     Reviewed notes from Rns and MD and labs from the last 24 hours.    The patient's case was discussed with the treatment team/care providers today including Rns and MD    Staff reports no behavioral or management issues.     The patient has been compliant with treatment.    Subjective 03/21/2022    Patient reports she is experiencing some moderate anxiety today, states she feels this is because she is "anxious to go home", and also because of "having to face people" after leaving the hospital.  Became tearful during assessment when discussing suicide attempt, states "I really don't even know why I did that. I don't want to do that to the people I care about and I don't want to do it to myself."      Patient mentioned possibility of attending an IOP after discharge as recommended by Dr. Emerson, however states she is unsure if she would be able to manage this having a full time job. Does express interest in some sort of outpatient therapy.     The patient denies any side effects to medications.    Psychiatric ROS (observed, reported, or endorsed/denied):  Depressed mood - less  Interest/pleasure/anhedonia: less  Guilt/hopelessness/worthlessness - less  Changes in Sleep - less  Changes in Appetite - less  Changes in Concentration - less  Changes in Energy - less  PMA/R- less  Suicidal- active/passive ideations - less  Homicidal ideations: active/passive ideations - No    Hallucinations - denies  Delusions - denies  Disorganized behavior - denies  Disorganized speech - " denies  Negative symptoms - denies    Elevated mood - denies  Decreased need for sleep - denies  Grandiosity - denies  Racing thoughts - denies  Impulsivity - denies  Irritability- denies  Increased energy - denies  Distractibility - denies  Increase in goal-directed activity or PMA- denies    Symptoms of MARY LOU - Yes  Symptoms of Panic Disorder- denies  Symptoms of PTSD - denies    Overall progress: Patient is showing mild improvement     Psychotherapy:  · Target symptoms: depression, anxiety   · Why chosen therapy is appropriate versus another modality: relevant to diagnosis  · Outcome monitoring methods: self-report, observation  · Therapeutic intervention type: insight oriented psychotherapy  · Topics discussed/themes: building skills sets for symptom management, symptom recognition  · The patient's response to the intervention is accepting. The patient's progress toward treatment goals is fair.   · Duration of intervention: 16 minutes.    Medical ROS  Review of Systems   Constitutional: Negative.    HENT: Negative.    Eyes: Negative.    Respiratory: Negative.    Cardiovascular: Negative.    Gastrointestinal: Negative.    Genitourinary: Negative.    Musculoskeletal: Negative.    Skin: Negative.    Neurological: Negative.    Endo/Heme/Allergies: Negative.    Psychiatric/Behavioral:        As noted       PAST MEDICAL HISTORY   Past Medical History:   Diagnosis Date    Abnormal Pap smear of cervix age 25    no treatement    Anxiety     Breast cancer     Cancer 07/11/2018    breast cancer    Cervical spondylosis 2/9/2018    Colon polyp     Depression     Ectopic pregnancy 2/26/2019    General anesthetics causing adverse effect in therapeutic use     Slow to awaken/ Memory problems-post-op to day 3 after Mastectomy    Headache     History of psychiatric hospitalization     age 20 for SI    Hx of psychiatric care     Hypertension     Peutz-Jeghers syndrome     PONV (postoperative nausea and vomiting)      "Psychiatric problem     Right carpal tunnel syndrome 3/9/2017    Self-harming behavior     Suicide attempt     Therapy        PSYCHOTROPIC MEDICATIONS   Scheduled Meds:   clonazePAM  1 mg Oral QHS    DULoxetine  30 mg Oral BID    multivitamin  1 tablet Oral Daily    nicotine  1 patch Transdermal Daily     Continuous Infusions:  PRN Meds:.acetaminophen, aluminum-magnesium hydroxide-simethicone, docusate sodium, hydrOXYzine pamoate, loperamide, OLANZapine **AND** OLANZapine    EXAMINATION    VITALS   Vitals:    03/19/22 1917 03/20/22 0754 03/20/22 1906 03/21/22 0804   BP: 109/70 119/81 (!) 98/55 116/67   BP Location: Right arm Right arm Right arm Right arm   Patient Position: Sitting Sitting Sitting Sitting   Pulse: 100 99 91 87   Resp: 20 20 20 18   Temp: 99 °F (37.2 °C) 97.5 °F (36.4 °C) 97.9 °F (36.6 °C) 98.7 °F (37.1 °C)   TempSrc: Oral Oral Oral Oral   SpO2: 99% 100% 97% 99%   Weight:       Height:           Body mass index is 34.75 kg/m².    CONSTITUTIONAL  General Appearance: unremarkable, age appropriate    MUSCULOSKELETAL  Muscle Strength and Tone:no tremor, no tic  Abnormal Involuntary Movements: No  Gait and Station: non-ataxic    PSYCHIATRIC   Level of Consciousness: awake, alert  and oriented  Orientation: person, place and situation  Grooming: Casually dressed and Well groomed  Psychomotor Behavior: normal, cooperative  Speech: normal tone, normal rate, normal pitch, normal volume  Language: grossly intact  Mood: "Ok, kind of anxious"  Affect: Anxious and intermittently tearful  Thought Process: linear, logical  Associations: intact   Thought Content: DENIES suicidal ideation, DENIES homicidal ideation and no delusions  Perceptions: denies hallucinations  Memory: Able to recall past events, Remote intact and Recent intact  Attention:Attends to interview without distraction  Fund of Knowledge: Aware of current events and Vocabulary appropriate   Estimate if Intelligence:  Average based on " work/education history, vocabulary and mental status exam  Insight: has awareness of illness  Judgment: behavior is adequate to circumstances    DIAGNOSTIC TESTING   Laboratory Results  No results found for this or any previous visit (from the past 24 hour(s)).    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  MARY LOU  Panic Disorder with agoraphobia  Social Anxiety Disorder  PTSD  OCD     Nicotine Dependence     Borderline Personality Disorder     Breast Cancer s/p mastectomy and reconstructive surgery  Peutz-Jeghers syndrome  Right Carpal Tunnel syndrome  Cervical spondylosis  Chronic pain     PROBLEM LIST AND MANAGEMENT PLANS     Mood: pt counseled  -Continue cymbalta 30 mg BID  -Outpatient therapy-Consider IOP     Overdose: pt counseld        Insomnia: pt counseled  -vistaril prn  -Encourage sleep hygiene including attempting not to sleep during the day     Anxiety/PTSD: pt counseled  -cymbalata as above  -Continue klonopin at 1 mg po q HS     Nicotine Dependence: pt counseld  -Continue at nicotine 14 mg patch dermal q day     Borderline Personality Disorder: pt counseled  -meds off label as above       PRESCRIPTION DRUG MANAGEMENT  Compliance: yes  Side Effects: no  Regimen Adjustments: see above     Discussed diagnosis, risks and benefits of proposed treatment vs alternative treatments vs no treatment, potential side effects of these treatments and the inherent unpredictability of treatment. The patient expresses understanding of the above and displays the capacity to agree with this treatment given said understanding. Patient also agrees that, currently, the benefits outweigh the risks and would like to pursue/continue treatment at this time.        DIAGNOSTIC TESTING  Labs reviewed with patient; follow up pending labs     Disposition:  -Will attempt to obtain outside psychiatric records  if available  -SW to assist with aftercare planning and collateral  -Once stable discharge home with outpatient follow up care and/or rehab  -Continue inpatient treatment under a PEC and/or CEC for danger to self/ danger to others/grave disability as evident by danger to self and suicidal ideation     DISCHARGE PLANNING  Expected Disposition Plan: Home or Self Care with IOP        NEED FOR CONTINUED HOSPITALIZATION  Psychiatric illness continues to pose a potential threat to life or bodily function, of self or others, thereby requiring the need for continued inpatient psychiatric hospitalization: Yes, due to: danger to self and suicidal ideation, as evidenced by:  Ongoing concerns with SI.     Protective inpatient pyschiatric hospitalization required while a safe disposition plan is enacted: Yes     Patient stabilized and ready for discharge from inpatient psychiatric unit: No    STAFF:   Clayton Cazares NP  Psychiatry

## 2022-03-21 NOTE — PLAN OF CARE
POC reviewed and ongoing. Pt doing well. No c/o voiced. Denies SI/HI/AVH. Decreased depression, some anxiety, but better. Will continue to monitor for safety and any changes.

## 2022-03-22 PROCEDURE — 25000003 PHARM REV CODE 250: Performed by: PSYCHIATRY & NEUROLOGY

## 2022-03-22 PROCEDURE — 25000003 PHARM REV CODE 250: Performed by: INTERNAL MEDICINE

## 2022-03-22 PROCEDURE — 99232 SBSQ HOSP IP/OBS MODERATE 35: CPT | Mod: ,,, | Performed by: PSYCHIATRY & NEUROLOGY

## 2022-03-22 PROCEDURE — 90833 PR PSYCHOTHERAPY W/PATIENT W/E&M, 30 MIN (ADD ON): ICD-10-PCS | Mod: ,,, | Performed by: PSYCHIATRY & NEUROLOGY

## 2022-03-22 PROCEDURE — S4991 NICOTINE PATCH NONLEGEND: HCPCS | Performed by: INTERNAL MEDICINE

## 2022-03-22 PROCEDURE — 11400000 HC PSYCH PRIVATE ROOM

## 2022-03-22 PROCEDURE — 99232 PR SUBSEQUENT HOSPITAL CARE,LEVL II: ICD-10-PCS | Mod: ,,, | Performed by: PSYCHIATRY & NEUROLOGY

## 2022-03-22 PROCEDURE — 90833 PSYTX W PT W E/M 30 MIN: CPT | Mod: ,,, | Performed by: PSYCHIATRY & NEUROLOGY

## 2022-03-22 RX ADMIN — DULOXETINE 30 MG: 30 CAPSULE, DELAYED RELEASE ORAL at 08:03

## 2022-03-22 RX ADMIN — CLONAZEPAM 1 MG: 1 TABLET ORAL at 08:03

## 2022-03-22 RX ADMIN — NICOTINE 1 PATCH: 14 PATCH, EXTENDED RELEASE TRANSDERMAL at 08:03

## 2022-03-22 RX ADMIN — HYDROXYZINE PAMOATE 50 MG: 50 CAPSULE ORAL at 08:03

## 2022-03-22 RX ADMIN — THERA TABS 1 TABLET: TAB at 08:03

## 2022-03-22 NOTE — PLAN OF CARE
POC reviewed this shift and is on going. Patient is calm, cooperative, quiet, and anxious. Denies Suicidal Ideation, Homicidal Ideation, Auditory Hallucinations, Visual Hallucinations, Tactile Hallucinations, Gustatory Hallucinations, and Delusions. Patient is feeling anxious because patient is ready for discharge. Patient looks like she is feeling better. Patient is staying out on the floor all day reading a book. Patient will answer questions if presented with one.  Pt continues to be medication compliant and staff will continue to monitor pt closely while on the unit.

## 2022-03-22 NOTE — PROGRESS NOTES
"PSYCHIATRY DAILY INPATIENT PROGRESS NOTE  SUBSEQUENT HOSPITAL VISIT    ENCOUNTER DATE: 3/22/2022  SITE: [facility]    DATE OF ADMISSION: 3/15/2022  4:51 PM  LENGTH OF STAY: 7 days    CHIEF COMPLAINT   Staci Hodge is a 41 y.o. female, seen during daily garcia rounds on the inpatient unit.  Staci Hodge presented with the chief complaint of suicide attempt/overdose    The patient was seen and examined. The chart was reviewed.     Reviewed notes from Rns and labs from the last 24 hours.    The patient's case was discussed with the treatment team/care providers today including Rns and MD    Staff reports no behavioral or management issues.     The patient has been compliant with treatment.    Subjective 03/22/2022    Patient reports mood is "ok" today. Affect somewhat blunted, patient continues to appear somewhat anxious. Reports that she is experiencing anxiety "because I want to go home." Patient reports she is doing "ok" on her current klonopin dose (states normally takes 1 mg TID at home). When asked if she feels that continuing on decreased dose will be feasible long-term, patient replies "I don't know, I've been on 3 mg forever."    Discussed again prospect of IOP after discharge, however pt states that this will not be feasible given her full time job.     The patient denies any side effects to medications.    Psychiatric ROS (observed, reported, or endorsed/denied):  Depressed mood - less  Interest/pleasure/anhedonia: less  Guilt/hopelessness/worthlessness - less  Changes in Sleep - less  Changes in Appetite - denies  Changes in Concentration - denies  Changes in Energy - less  PMA/R- denies  Suicidal- active/passive ideations - less  Homicidal ideations: active/passive ideations - denies    Hallucinations - denies  Delusions - denies  Disorganized behavior - denies  Disorganized speech - denies  Negative symptoms - less    Elevated mood - denies  Decreased need for sleep - " denies  Grandiosity - denies  Racing thoughts - denies  Impulsivity - denies  Irritability- denies  Increased energy - denies  Distractibility - denies  Increase in goal-directed activity or PMA- denies    Symptoms of MARY LOU - variable  Symptoms of Panic Disorder- None  Symptoms of PTSD - denies    Overall progress: Patient is showing mild improvement     Psychotherapy:  · Target symptoms: recurrent depression, anxiety   · Why chosen therapy is appropriate versus another modality: relevant to diagnosis  · Outcome monitoring methods: self-report, observation  · Therapeutic intervention type: insight oriented psychotherapy  · Topics discussed/themes: building skills sets for symptom management, symptom recognition  · The patient's response to the intervention is accepting. The patient's progress toward treatment goals is fair.   · Duration of intervention: 16 minutes.    Medical ROS  Review of Systems   Constitutional: Negative.    HENT: Negative.    Eyes: Negative.    Respiratory: Negative.    Cardiovascular: Negative.    Gastrointestinal: Negative.    Genitourinary: Negative.    Musculoskeletal: Negative.    Skin: Negative.    Neurological: Negative.    Endo/Heme/Allergies: Negative.    Psychiatric/Behavioral:        As noted       PAST MEDICAL HISTORY   Past Medical History:   Diagnosis Date    Abnormal Pap smear of cervix age 25    no treatement    Anxiety     Breast cancer     Cancer 07/11/2018    breast cancer    Cervical spondylosis 2/9/2018    Colon polyp     Depression     Ectopic pregnancy 2/26/2019    General anesthetics causing adverse effect in therapeutic use     Slow to awaken/ Memory problems-post-op to day 3 after Mastectomy    Headache     History of psychiatric hospitalization     age 20 for SI    Hx of psychiatric care     Hypertension     Peutz-Jeghers syndrome     PONV (postoperative nausea and vomiting)     Psychiatric problem     Right carpal tunnel syndrome 3/9/2017     "Self-harming behavior     Suicide attempt     Therapy        PSYCHOTROPIC MEDICATIONS   Scheduled Meds:   clonazePAM  1 mg Oral QHS    DULoxetine  30 mg Oral BID    multivitamin  1 tablet Oral Daily    nicotine  1 patch Transdermal Daily     Continuous Infusions:  PRN Meds:.acetaminophen, aluminum-magnesium hydroxide-simethicone, docusate sodium, hydrOXYzine pamoate, loperamide, OLANZapine **AND** OLANZapine    EXAMINATION    VITALS   Vitals:    03/20/22 1906 03/21/22 0804 03/21/22 1916 03/22/22 0757   BP: (!) 98/55 116/67 125/67 134/75   BP Location: Right arm Right arm Right arm Right arm   Patient Position: Sitting Sitting Sitting Sitting   Pulse: 91 87 98 77   Resp: 20 18 20 18   Temp: 97.9 °F (36.6 °C) 98.7 °F (37.1 °C) 98.3 °F (36.8 °C) 97.9 °F (36.6 °C)   TempSrc: Oral Oral Oral Oral   SpO2: 97% 99% 96% 99%   Weight:       Height:           Body mass index is 34.75 kg/m².    CONSTITUTIONAL  General Appearance: unremarkable, age appropriate    MUSCULOSKELETAL  Muscle Strength and Tone:no tremor, no tic  Abnormal Involuntary Movements: No  Gait and Station: non-ataxic    PSYCHIATRIC   Level of Consciousness: awake and alert   Orientation: person, place and situation  Grooming: Casually dressed and Well groomed  Psychomotor Behavior: normal, cooperative, friendly and cooperative  Speech: normal tone, normal rate, normal pitch, normal volume  Language: grossly intact  Mood: "Ok"  Affect: Anxious and Blunted  Thought Process: linear, logical  Associations: intact   Thought Content: DENIES suicidal ideation, DENIES homicidal ideation and no delusions  Perceptions: denies hallucinations  Memory: Able to recall past events, Remote intact and Recent intact  Attention:Attends to interview without distraction  Fund of Knowledge: Aware of current events and Vocabulary appropriate   Estimate if Intelligence:  Average based on work/education history, vocabulary and mental status exam  Insight: has awareness of " illness  Judgment: behavior is adequate to circumstances    DIAGNOSTIC TESTING   Laboratory Results  No results found for this or any previous visit (from the past 24 hour(s)).    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  MARY LOU  Panic Disorder with agoraphobia  Social Anxiety Disorder  PTSD  OCD     Nicotine Dependence     Borderline Personality Disorder     Breast Cancer s/p mastectomy and reconstructive surgery  Peutz-Jeghers syndrome  Right Carpal Tunnel syndrome  Cervical spondylosis  Chronic pain     PROBLEM LIST AND MANAGEMENT PLANS     Mood: pt counseled  -Continue cymbalta 30 mg BID  -Outpatient therapy-Consider IOP-Patient stated today that IOP will not be possible d/t full time employment     Overdose: pt counseld      Insomnia: pt counseled  -vistaril prn  -Encourage sleep hygiene including attempting not to sleep during the day     Anxiety/PTSD: pt counseled  -cymbalata as above  -Continue klonopin at 1 mg po q HS     Nicotine Dependence: pt counseld  -Continue at nicotine 14 mg patch dermal q day     Borderline Personality Disorder: pt counseled  -meds off label as above       PRESCRIPTION DRUG MANAGEMENT  Compliance: yes  Side Effects: no  Regimen Adjustments: see above     Discussed diagnosis, risks and benefits of proposed treatment vs alternative treatments vs no treatment, potential side effects of these treatments and the inherent unpredictability of treatment. The patient expresses understanding of the above and displays the capacity to agree with this treatment given said understanding. Patient also agrees that, currently, the benefits outweigh the risks and would like to pursue/continue treatment at this time.        DIAGNOSTIC TESTING  Labs reviewed with patient; follow up pending labs     Disposition:  -Will attempt to obtain outside psychiatric  records if available  -SW to assist with aftercare planning and collateral  -Once stable discharge home with outpatient follow up care and/or rehab  -Continue inpatient treatment under a PEC and/or CEC for danger to self/ danger to others/grave disability as evident by danger to self and suicidal ideation     DISCHARGE PLANNING  Expected Disposition Plan: Home or Self Care with IOP        NEED FOR CONTINUED HOSPITALIZATION  Psychiatric illness continues to pose a potential threat to life or bodily function, of self or others, thereby requiring the need for continued inpatient psychiatric hospitalization: Yes, due to: danger to self and suicidal ideation, as evidenced by:  Ongoing concerns with SI.     Protective inpatient pyschiatric hospitalization required while a safe disposition plan is enacted: Yes     Patient stabilized and ready for discharge from inpatient psychiatric unit: No    STAFF:   Clayton Cazares NP  Psychiatry

## 2022-03-22 NOTE — PLAN OF CARE
Pt. Denied any c/o auditory or visual hallucinations. She also denied any c/o suicidal or homicidal ideations at this time. Pt. Is compliant with medication regimen and stated that she is not feeling depress or anxious at this time. With comt. To monitor Pt.

## 2022-03-23 PROCEDURE — 90833 PR PSYCHOTHERAPY W/PATIENT W/E&M, 30 MIN (ADD ON): ICD-10-PCS | Mod: ,,, | Performed by: PSYCHIATRY & NEUROLOGY

## 2022-03-23 PROCEDURE — 25000003 PHARM REV CODE 250: Performed by: PSYCHIATRY & NEUROLOGY

## 2022-03-23 PROCEDURE — 25000003 PHARM REV CODE 250: Performed by: INTERNAL MEDICINE

## 2022-03-23 PROCEDURE — S4991 NICOTINE PATCH NONLEGEND: HCPCS | Performed by: INTERNAL MEDICINE

## 2022-03-23 PROCEDURE — 99232 SBSQ HOSP IP/OBS MODERATE 35: CPT | Mod: ,,, | Performed by: PSYCHIATRY & NEUROLOGY

## 2022-03-23 PROCEDURE — 11400000 HC PSYCH PRIVATE ROOM

## 2022-03-23 PROCEDURE — 90833 PSYTX W PT W E/M 30 MIN: CPT | Mod: ,,, | Performed by: PSYCHIATRY & NEUROLOGY

## 2022-03-23 PROCEDURE — 99232 PR SUBSEQUENT HOSPITAL CARE,LEVL II: ICD-10-PCS | Mod: ,,, | Performed by: PSYCHIATRY & NEUROLOGY

## 2022-03-23 RX ADMIN — THERA TABS 1 TABLET: TAB at 08:03

## 2022-03-23 RX ADMIN — DULOXETINE 30 MG: 30 CAPSULE, DELAYED RELEASE ORAL at 08:03

## 2022-03-23 RX ADMIN — NICOTINE 1 PATCH: 14 PATCH, EXTENDED RELEASE TRANSDERMAL at 08:03

## 2022-03-23 RX ADMIN — CLONAZEPAM 1 MG: 1 TABLET ORAL at 08:03

## 2022-03-23 NOTE — PLAN OF CARE
Pt. Denied any c/o auditory or visual hallucinations. Pt. Also denied any c/o suicidal or homicidal ideations. Pt. Took medications without dif.. Will cont. To monitor Pt.

## 2022-03-23 NOTE — PROGRESS NOTES
"PSYCHIATRY DAILY INPATIENT PROGRESS NOTE  SUBSEQUENT HOSPITAL VISIT    ENCOUNTER DATE: 3/23/2022  SITE: [facility]    DATE OF ADMISSION: 3/15/2022  4:51 PM  LENGTH OF STAY: 8 days    CHIEF COMPLAINT   Staci Hodge is a 41 y.o. female, seen during daily garcia rounds on the inpatient unit.  Staci Hodge presented with the chief complaint of suicide attempt/overdose    The patient was seen and examined. The chart was reviewed.     Reviewed notes from Rns and MD and labs from the last 24 hours.    The patient's case was discussed with the treatment team/care providers today including Rns and MD    Staff reports no behavioral or management issues.     The patient has been compliant with treatment.    Subjective 03/23/2022    Patient continues to deny any current suicidal ideations, reports that through this hospitalization "I realized I don't want to die." Patient does express some concern regarding clonopin rx outpatient, stating "I've just been on 3 mg per day for years, I worry about my anxiety when I get back in the real world." Discussed with patient non pharmacological approaches to anxiety management, especially in the context of PTSD, particularly psychotherapy. Patient verbalized interest and understanding.     Reports slept "pretty well" last night, noting that sleep has michelle gradually improving over course of hospital stay.     The patient denies any side effects to medications.    Psychiatric ROS (observed, reported, or endorsed/denied):  Depressed mood - less  Interest/pleasure/anhedonia: less  Guilt/hopelessness/worthlessness - less  Changes in Sleep - less  Changes in Appetite - denies  Changes in Concentration - less  Changes in Energy - denies  PMA/R- denies  Suicidal- active/passive ideations - less  Homicidal ideations: active/passive ideations - denies    Hallucinations - denies  Delusions - denies  Disorganized behavior - denies  Disorganized speech - denies  Negative symptoms - " improving steadily    Elevated mood - denies  Decreased need for sleep - denies  Grandiosity - denies  Racing thoughts - denies  Impulsivity - denies  Irritability- denies  Increased energy - denies  Distractibility - denies  Increase in goal-directed activity or PMA- None    Symptoms of MARY LOU - improving steadily  Symptoms of Panic Disorder- improving steadily  Symptoms of PTSD - denies    Overall progress: Patient is showing moderate improvement    Psychotherapy:  · Target symptoms: depression, anxiety   · Why chosen therapy is appropriate versus another modality: relevant to diagnosis  · Outcome monitoring methods: self-report, observation  · Therapeutic intervention type: insight oriented psychotherapy, supportive psychotherapy  · Topics discussed/themes: difficulty managing affect in interpersonal relationships, building skills sets for symptom management, symptom recognition  · The patient's response to the intervention is accepting. The patient's progress toward treatment goals is fair.   · Duration of intervention: 16 minutes.    Medical ROS  Review of Systems   Constitutional: Negative.    HENT: Negative.    Eyes: Negative.    Respiratory: Negative.    Cardiovascular: Negative.    Gastrointestinal: Negative.    Genitourinary: Negative.    Musculoskeletal: Negative.    Skin: Negative.    Neurological: Negative.    Endo/Heme/Allergies: Negative.    Psychiatric/Behavioral:        As noted       PAST MEDICAL HISTORY   Past Medical History:   Diagnosis Date    Abnormal Pap smear of cervix age 25    no treatement    Anxiety     Breast cancer     Cancer 07/11/2018    breast cancer    Cervical spondylosis 2/9/2018    Colon polyp     Depression     Ectopic pregnancy 2/26/2019    General anesthetics causing adverse effect in therapeutic use     Slow to awaken/ Memory problems-post-op to day 3 after Mastectomy    Headache     History of psychiatric hospitalization     age 20 for SI    Hx of psychiatric care   "   Hypertension     Peutz-Jeghers syndrome     PONV (postoperative nausea and vomiting)     Psychiatric problem     Right carpal tunnel syndrome 3/9/2017    Self-harming behavior     Suicide attempt     Therapy        PSYCHOTROPIC MEDICATIONS   Scheduled Meds:   clonazePAM  1 mg Oral QHS    DULoxetine  30 mg Oral BID    multivitamin  1 tablet Oral Daily    nicotine  1 patch Transdermal Daily     Continuous Infusions:  PRN Meds:.acetaminophen, aluminum-magnesium hydroxide-simethicone, docusate sodium, hydrOXYzine pamoate, loperamide, OLANZapine **AND** OLANZapine    EXAMINATION    VITALS   Vitals:    03/21/22 1916 03/22/22 0757 03/22/22 1930 03/23/22 0801   BP: 125/67 134/75 126/83 125/86   BP Location: Right arm Right arm Right arm Left arm   Patient Position: Sitting Sitting Sitting Sitting   Pulse: 98 77 93 86   Resp: 20 18 16 20   Temp: 98.3 °F (36.8 °C) 97.9 °F (36.6 °C) 98.8 °F (37.1 °C) 97.3 °F (36.3 °C)   TempSrc: Oral Oral Oral Oral   SpO2: 96% 99% 100% 100%   Weight:       Height:           Body mass index is 34.75 kg/m².    CONSTITUTIONAL  General Appearance: unremarkable, age appropriate    MUSCULOSKELETAL  Muscle Strength and Tone:no tremor, no tic  Abnormal Involuntary Movements: No  Gait and Station: non-ataxic    PSYCHIATRIC   Level of Consciousness: awake, alert  and oriented  Orientation: person, place and situation  Grooming: Casually dressed and Well groomed  Psychomotor Behavior: normal, cooperative  Speech: normal tone, normal rate, normal pitch, normal volume  Language: grossly intact  Mood: "Pretty good"  Affect: Consistent with mood and Congruent with thought  Thought Process: linear, logical  Associations: intact   Thought Content: DENIES suicidal ideation, DENIES homicidal ideation and no delusions  Perceptions: denies hallucinations  Memory: Able to recall past events, Remote intact and Recent intact  Attention:Attends to interview without distraction  Fund of Knowledge: Aware " of current events and Vocabulary appropriate   Estimate if Intelligence:  Average based on work/education history, vocabulary and mental status exam  Insight: has awareness of illness  Judgment: behavior is adequate to circumstances    DIAGNOSTIC TESTING   Laboratory Results  No results found for this or any previous visit (from the past 24 hour(s)).    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  MARY LOU  Panic Disorder with agoraphobia  Social Anxiety Disorder  PTSD  OCD     Nicotine Dependence     Borderline Personality Disorder     Breast Cancer s/p mastectomy and reconstructive surgery  Peutz-Jeghers syndrome  Right Carpal Tunnel syndrome  Cervical spondylosis  Chronic pain     PROBLEM LIST AND MANAGEMENT PLANS     Mood: pt counseled  -Continue cymbalta 30 mg BID  -Outpatient therapy-Consider IOP  -Patient stated that IOP will not be possible d/t full time employment   -Discussed seeking therapy specific to PTSD including EMDR    Overdose: pt counseld      Insomnia: pt counseled  -vistaril prn  -Encourage sleep hygiene including attempting not to sleep during the day     Anxiety/PTSD: pt counseled  -cymbalata as above  -Continue klonopin at 1 mg po q HS     Nicotine Dependence: pt counseld  -Continue at nicotine 14 mg patch dermal q day     Borderline Personality Disorder: pt counseled  -meds off label as above       PRESCRIPTION DRUG MANAGEMENT  Compliance: yes  Side Effects: no  Regimen Adjustments: see above     Discussed diagnosis, risks and benefits of proposed treatment vs alternative treatments vs no treatment, potential side effects of these treatments and the inherent unpredictability of treatment. The patient expresses understanding of the above and displays the capacity to agree with this treatment given said understanding. Patient also agrees that, currently,  the benefits outweigh the risks and would like to pursue/continue treatment at this time.        DIAGNOSTIC TESTING  Labs reviewed with patient; follow up pending labs     Disposition:  -Will attempt to obtain outside psychiatric records if available  -SW to assist with aftercare planning and collateral  -Once stable discharge home with outpatient follow up care and/or rehab  -Continue inpatient treatment under a [facility] and/or [facility] for danger to self/ danger to others/grave disability as evident by danger to self and suicidal ideation     DISCHARGE PLANNING  Expected Disposition Plan: Home or Self Care with IOP        NEED FOR CONTINUED HOSPITALIZATION  Psychiatric illness continues to pose a potential threat to life or bodily function, of self or others, thereby requiring the need for continued inpatient psychiatric hospitalization: Yes, due to: danger to self and suicidal ideation, as evidenced by:  Ongoing concerns with SI.     Protective inpatient pyschiatric hospitalization required while a safe disposition plan is enacted: Yes     Patient stabilized and ready for discharge from inpatient psychiatric unit: No        STAFF:   Clayton Cazares NP  Psychiatry

## 2022-03-23 NOTE — PLAN OF CARE
POC reviewed this shift and is on going. Patient is calm, cooperative, quiet, and anxious. Denies Suicidal Ideation, Homicidal Ideation, Auditory Hallucinations, Visual Hallucinations, Tactile Hallucinations, Gustatory Hallucinations, and Delusions. Patient reported she feels better today than yesterday. Patient stated she is anxious about going home. Patient is aware of talking to someone when things are not going her way and gets overwhelmed. Patient's Boyfriend is much more understanding of her situation now than before.  Pt continues to be medication compliant and staff will continue to monitor pt closely while on the unit.

## 2022-03-24 VITALS
TEMPERATURE: 98 F | BODY MASS INDEX: 34.98 KG/M2 | DIASTOLIC BLOOD PRESSURE: 73 MMHG | HEIGHT: 62 IN | OXYGEN SATURATION: 99 % | RESPIRATION RATE: 20 BRPM | SYSTOLIC BLOOD PRESSURE: 124 MMHG | WEIGHT: 190.06 LBS | HEART RATE: 97 BPM

## 2022-03-24 PROCEDURE — 25000003 PHARM REV CODE 250: Performed by: INTERNAL MEDICINE

## 2022-03-24 PROCEDURE — 25000003 PHARM REV CODE 250: Performed by: PSYCHIATRY & NEUROLOGY

## 2022-03-24 PROCEDURE — 90833 PR PSYCHOTHERAPY W/PATIENT W/E&M, 30 MIN (ADD ON): ICD-10-PCS | Mod: ,,, | Performed by: STUDENT IN AN ORGANIZED HEALTH CARE EDUCATION/TRAINING PROGRAM

## 2022-03-24 PROCEDURE — 99239 PR HOSPITAL DISCHARGE DAY,>30 MIN: ICD-10-PCS | Mod: ,,, | Performed by: STUDENT IN AN ORGANIZED HEALTH CARE EDUCATION/TRAINING PROGRAM

## 2022-03-24 PROCEDURE — 99239 HOSP IP/OBS DSCHRG MGMT >30: CPT | Mod: ,,, | Performed by: STUDENT IN AN ORGANIZED HEALTH CARE EDUCATION/TRAINING PROGRAM

## 2022-03-24 PROCEDURE — 90833 PSYTX W PT W E/M 30 MIN: CPT | Mod: ,,, | Performed by: STUDENT IN AN ORGANIZED HEALTH CARE EDUCATION/TRAINING PROGRAM

## 2022-03-24 PROCEDURE — S4991 NICOTINE PATCH NONLEGEND: HCPCS | Performed by: INTERNAL MEDICINE

## 2022-03-24 RX ORDER — HYDROXYZINE PAMOATE 50 MG/1
50 CAPSULE ORAL EVERY 6 HOURS PRN
Qty: 120 CAPSULE | Refills: 0 | Status: SHIPPED | OUTPATIENT
Start: 2022-03-24 | End: 2022-03-24 | Stop reason: SDUPTHER

## 2022-03-24 RX ORDER — HYDROXYZINE PAMOATE 50 MG/1
50 CAPSULE ORAL EVERY 6 HOURS PRN
Qty: 120 CAPSULE | Refills: 0 | Status: ON HOLD | OUTPATIENT
Start: 2022-03-24 | End: 2022-04-25 | Stop reason: SDUPTHER

## 2022-03-24 RX ORDER — DULOXETIN HYDROCHLORIDE 30 MG/1
30 CAPSULE, DELAYED RELEASE ORAL 2 TIMES DAILY
Qty: 60 CAPSULE | Refills: 0 | Status: SHIPPED | OUTPATIENT
Start: 2022-03-24 | End: 2022-04-25

## 2022-03-24 RX ORDER — CLONAZEPAM 1 MG/1
1 TABLET ORAL NIGHTLY
Qty: 30 TABLET | Refills: 0 | Status: SHIPPED | OUTPATIENT
Start: 2022-03-24 | End: 2022-03-24 | Stop reason: SDUPTHER

## 2022-03-24 RX ORDER — DULOXETIN HYDROCHLORIDE 30 MG/1
30 CAPSULE, DELAYED RELEASE ORAL 2 TIMES DAILY
Qty: 60 CAPSULE | Refills: 0 | Status: SHIPPED | OUTPATIENT
Start: 2022-03-24 | End: 2022-03-24 | Stop reason: SDUPTHER

## 2022-03-24 RX ORDER — CLONAZEPAM 1 MG/1
1 TABLET ORAL NIGHTLY
Qty: 30 TABLET | Refills: 0 | Status: ON HOLD | OUTPATIENT
Start: 2022-03-24 | End: 2022-04-25 | Stop reason: HOSPADM

## 2022-03-24 RX ADMIN — NICOTINE 1 PATCH: 14 PATCH, EXTENDED RELEASE TRANSDERMAL at 08:03

## 2022-03-24 RX ADMIN — DULOXETINE 30 MG: 30 CAPSULE, DELAYED RELEASE ORAL at 08:03

## 2022-03-24 RX ADMIN — THERA TABS 1 TABLET: TAB at 08:03

## 2022-03-24 NOTE — PROGRESS NOTES
Psychotherapy:  · Target symptoms: depression, anxiety   · Why chosen therapy is appropriate versus another modality: relevant to diagnosis  · Outcome monitoring methods: self-report  · Therapeutic intervention type: supportive psychotherapy  · Topics discussed/themes: educational, safety plan, motivational, building skills sets for symptom management, symptom recognition  · The patient's response to the intervention is accepting. The patient's progress toward treatment goals is good.   · Duration of intervention: 18 minutes.

## 2022-03-24 NOTE — DISCHARGE INSTRUCTIONS
Pt meets discharge criteria. AVS given and explained to patient. AVS faxed to follow up provider upon discharge. Outpatient smoking cession set up. Addiction counseling will be discussed at follow up appointment. (Smoking/ Substance abuse medication ordered for patient upon discharge.) Pt stable upon discharge.

## 2022-03-24 NOTE — NURSING
Patient have been discharged to her care in stable condition,denies s/i,unsure of taking a overdose ,stated she does not remember any thing,review of meds and aftercare discussed,all personal belongings returned,acknowledged understanding to d/c instructions,escorted per staff without incident.

## 2022-03-24 NOTE — PLAN OF CARE
POC reviewed this shift and is on going.  Pt denies SI, HI, A/V hallucination. Interacts with peers. Took meds with no adverse reaction. Will continues to monitor for changes.
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
## 2022-03-28 DIAGNOSIS — G43.009 MIGRAINE WITHOUT AURA AND WITHOUT STATUS MIGRAINOSUS, NOT INTRACTABLE: ICD-10-CM

## 2022-03-28 RX ORDER — BUTALBITAL, ACETAMINOPHEN AND CAFFEINE 50; 325; 40 MG/1; MG/1; MG/1
TABLET ORAL
Qty: 10 TABLET | Refills: 4 | Status: ON HOLD | OUTPATIENT
Start: 2022-03-28 | End: 2022-04-25 | Stop reason: HOSPADM

## 2022-04-10 ENCOUNTER — PATIENT MESSAGE (OUTPATIENT)
Dept: INTERNAL MEDICINE | Facility: CLINIC | Age: 42
End: 2022-04-10
Payer: COMMERCIAL

## 2022-04-10 DIAGNOSIS — E55.9 VITAMIN D DEFICIENCY: Primary | ICD-10-CM

## 2022-04-11 RX ORDER — PANTOPRAZOLE SODIUM 40 MG/1
40 TABLET, DELAYED RELEASE ORAL DAILY
Qty: 30 TABLET | Refills: 2 | Status: ON HOLD | OUTPATIENT
Start: 2022-04-11 | End: 2022-04-25 | Stop reason: HOSPADM

## 2022-04-11 RX ORDER — ERGOCALCIFEROL 1.25 MG/1
50000 CAPSULE ORAL
Qty: 12 CAPSULE | Refills: 0 | Status: ON HOLD | OUTPATIENT
Start: 2022-04-11 | End: 2022-04-25 | Stop reason: HOSPADM

## 2022-04-18 ENCOUNTER — HOSPITAL ENCOUNTER (INPATIENT)
Facility: HOSPITAL | Age: 42
LOS: 7 days | Discharge: HOME | DRG: 885 | End: 2022-04-25
Attending: STUDENT IN AN ORGANIZED HEALTH CARE EDUCATION/TRAINING PROGRAM | Admitting: STUDENT IN AN ORGANIZED HEALTH CARE EDUCATION/TRAINING PROGRAM
Payer: COMMERCIAL

## 2022-04-18 ENCOUNTER — HOSPITAL ENCOUNTER (EMERGENCY)
Facility: HOSPITAL | Age: 42
Discharge: PSYCHIATRIC OR SUBSTANCE USE FACILITY | End: 2022-04-18
Attending: SURGERY
Payer: COMMERCIAL

## 2022-04-18 VITALS
SYSTOLIC BLOOD PRESSURE: 120 MMHG | BODY MASS INDEX: 33.43 KG/M2 | DIASTOLIC BLOOD PRESSURE: 74 MMHG | RESPIRATION RATE: 12 BRPM | WEIGHT: 182.75 LBS | OXYGEN SATURATION: 100 % | HEART RATE: 68 BPM | TEMPERATURE: 97 F

## 2022-04-18 DIAGNOSIS — R45.851 SUICIDAL IDEATIONS: ICD-10-CM

## 2022-04-18 DIAGNOSIS — F31.81 BIPOLAR 2 DISORDER, MAJOR DEPRESSIVE EPISODE: Primary | ICD-10-CM

## 2022-04-18 DIAGNOSIS — F32.A DEPRESSION WITH SUICIDAL IDEATION: Primary | ICD-10-CM

## 2022-04-18 DIAGNOSIS — R45.851 DEPRESSION WITH SUICIDAL IDEATION: Primary | ICD-10-CM

## 2022-04-18 LAB
25(OH)D3+25(OH)D2 SERPL-MCNC: 18 NG/ML (ref 30–96)
ALBUMIN SERPL BCP-MCNC: 4.4 G/DL (ref 3.5–5.2)
ALP SERPL-CCNC: 180 U/L (ref 55–135)
ALT SERPL W/O P-5'-P-CCNC: 37 U/L (ref 10–44)
AMPHET+METHAMPHET UR QL: NEGATIVE
ANION GAP SERPL CALC-SCNC: 10 MMOL/L (ref 8–16)
APAP SERPL-MCNC: <3 UG/ML (ref 10–20)
AST SERPL-CCNC: 22 U/L (ref 10–40)
B-HCG UR QL: NEGATIVE
BARBITURATES UR QL SCN>200 NG/ML: ABNORMAL
BASOPHILS # BLD AUTO: 0.1 K/UL (ref 0–0.2)
BASOPHILS NFR BLD: 1 % (ref 0–1.9)
BENZODIAZ UR QL SCN>200 NG/ML: ABNORMAL
BILIRUB SERPL-MCNC: 0.3 MG/DL (ref 0.1–1)
BILIRUB UR QL STRIP: NEGATIVE
BUN SERPL-MCNC: 12 MG/DL (ref 6–20)
BZE UR QL SCN: NEGATIVE
CALCIUM SERPL-MCNC: 9.6 MG/DL (ref 8.7–10.5)
CANNABINOIDS UR QL SCN: NEGATIVE
CHLORIDE SERPL-SCNC: 102 MMOL/L (ref 95–110)
CLARITY UR: CLEAR
CO2 SERPL-SCNC: 26 MMOL/L (ref 23–29)
COLOR UR: YELLOW
CREAT SERPL-MCNC: 0.8 MG/DL (ref 0.5–1.4)
CREAT UR-MCNC: 81.5 MG/DL (ref 15–325)
DIFFERENTIAL METHOD: ABNORMAL
EOSINOPHIL # BLD AUTO: 0.7 K/UL (ref 0–0.5)
EOSINOPHIL NFR BLD: 6.5 % (ref 0–8)
ERYTHROCYTE [DISTWIDTH] IN BLOOD BY AUTOMATED COUNT: 13.6 % (ref 11.5–14.5)
EST. GFR  (AFRICAN AMERICAN): >60 ML/MIN/1.73 M^2
EST. GFR  (NON AFRICAN AMERICAN): >60 ML/MIN/1.73 M^2
ESTIMATED AVG GLUCOSE: 105 MG/DL (ref 68–131)
ETHANOL SERPL-MCNC: <10 MG/DL
FOLATE SERPL-MCNC: 8.1 NG/ML (ref 4–24)
GLUCOSE SERPL-MCNC: 102 MG/DL (ref 70–110)
GLUCOSE UR QL STRIP: NEGATIVE
HBA1C MFR BLD: 5.3 % (ref 4–5.6)
HCT VFR BLD AUTO: 44.9 % (ref 37–48.5)
HGB BLD-MCNC: 14.7 G/DL (ref 12–16)
HGB UR QL STRIP: NEGATIVE
IMM GRANULOCYTES # BLD AUTO: 0.04 K/UL (ref 0–0.04)
IMM GRANULOCYTES NFR BLD AUTO: 0.4 % (ref 0–0.5)
KETONES UR QL STRIP: NEGATIVE
LEUKOCYTE ESTERASE UR QL STRIP: NEGATIVE
LYMPHOCYTES # BLD AUTO: 2.3 K/UL (ref 1–4.8)
LYMPHOCYTES NFR BLD: 22.6 % (ref 18–48)
MCH RBC QN AUTO: 28.2 PG (ref 27–31)
MCHC RBC AUTO-ENTMCNC: 32.7 G/DL (ref 32–36)
MCV RBC AUTO: 86 FL (ref 82–98)
METHADONE UR QL SCN>300 NG/ML: NEGATIVE
MONOCYTES # BLD AUTO: 0.7 K/UL (ref 0.3–1)
MONOCYTES NFR BLD: 6.5 % (ref 4–15)
NEUTROPHILS # BLD AUTO: 6.3 K/UL (ref 1.8–7.7)
NEUTROPHILS NFR BLD: 63 % (ref 38–73)
NITRITE UR QL STRIP: NEGATIVE
NRBC BLD-RTO: 0 /100 WBC
OPIATES UR QL SCN: NEGATIVE
PCP UR QL SCN>25 NG/ML: NEGATIVE
PH UR STRIP: 7 [PH] (ref 5–8)
PLATELET # BLD AUTO: 389 K/UL (ref 150–450)
PMV BLD AUTO: 10.1 FL (ref 9.2–12.9)
POTASSIUM SERPL-SCNC: 4.4 MMOL/L (ref 3.5–5.1)
PROT SERPL-MCNC: 7.6 G/DL (ref 6–8.4)
PROT UR QL STRIP: NEGATIVE
RBC # BLD AUTO: 5.21 M/UL (ref 4–5.4)
SALICYLATES SERPL-MCNC: <5 MG/DL (ref 15–30)
SARS-COV-2 RDRP RESP QL NAA+PROBE: NEGATIVE
SODIUM SERPL-SCNC: 138 MMOL/L (ref 136–145)
SP GR UR STRIP: 1.02 (ref 1–1.03)
T4 FREE SERPL-MCNC: 0.9 NG/DL (ref 0.71–1.51)
TOXICOLOGY INFORMATION: ABNORMAL
TSH SERPL DL<=0.005 MIU/L-ACNC: 2.55 UIU/ML (ref 0.4–4)
URN SPEC COLLECT METH UR: NORMAL
UROBILINOGEN UR STRIP-ACNC: NEGATIVE EU/DL
VIT B12 SERPL-MCNC: 636 PG/ML (ref 210–950)
WBC # BLD AUTO: 10.05 K/UL (ref 3.9–12.7)

## 2022-04-18 PROCEDURE — 80179 DRUG ASSAY SALICYLATE: CPT | Performed by: SURGERY

## 2022-04-18 PROCEDURE — 84443 ASSAY THYROID STIM HORMONE: CPT | Performed by: SURGERY

## 2022-04-18 PROCEDURE — U0002 COVID-19 LAB TEST NON-CDC: HCPCS | Performed by: SURGERY

## 2022-04-18 PROCEDURE — 25000003 PHARM REV CODE 250: Performed by: STUDENT IN AN ORGANIZED HEALTH CARE EDUCATION/TRAINING PROGRAM

## 2022-04-18 PROCEDURE — 82077 ASSAY SPEC XCP UR&BREATH IA: CPT | Performed by: SURGERY

## 2022-04-18 PROCEDURE — 80143 DRUG ASSAY ACETAMINOPHEN: CPT | Performed by: SURGERY

## 2022-04-18 PROCEDURE — 82306 VITAMIN D 25 HYDROXY: CPT | Performed by: SURGERY

## 2022-04-18 PROCEDURE — 36415 COLL VENOUS BLD VENIPUNCTURE: CPT | Performed by: STUDENT IN AN ORGANIZED HEALTH CARE EDUCATION/TRAINING PROGRAM

## 2022-04-18 PROCEDURE — S4991 NICOTINE PATCH NONLEGEND: HCPCS | Performed by: STUDENT IN AN ORGANIZED HEALTH CARE EDUCATION/TRAINING PROGRAM

## 2022-04-18 PROCEDURE — 11400000 HC PSYCH PRIVATE ROOM

## 2022-04-18 PROCEDURE — 99285 EMERGENCY DEPT VISIT HI MDM: CPT | Mod: 25

## 2022-04-18 PROCEDURE — 81025 URINE PREGNANCY TEST: CPT | Performed by: SURGERY

## 2022-04-18 PROCEDURE — 82607 VITAMIN B-12: CPT | Performed by: SURGERY

## 2022-04-18 PROCEDURE — 83036 HEMOGLOBIN GLYCOSYLATED A1C: CPT | Performed by: STUDENT IN AN ORGANIZED HEALTH CARE EDUCATION/TRAINING PROGRAM

## 2022-04-18 PROCEDURE — 82746 ASSAY OF FOLIC ACID SERUM: CPT | Performed by: SURGERY

## 2022-04-18 PROCEDURE — 80053 COMPREHEN METABOLIC PANEL: CPT | Performed by: SURGERY

## 2022-04-18 PROCEDURE — 36415 COLL VENOUS BLD VENIPUNCTURE: CPT | Performed by: SURGERY

## 2022-04-18 PROCEDURE — 84439 ASSAY OF FREE THYROXINE: CPT | Performed by: SURGERY

## 2022-04-18 PROCEDURE — 80307 DRUG TEST PRSMV CHEM ANLYZR: CPT | Performed by: SURGERY

## 2022-04-18 PROCEDURE — 85025 COMPLETE CBC W/AUTO DIFF WBC: CPT | Performed by: SURGERY

## 2022-04-18 PROCEDURE — 81003 URINALYSIS AUTO W/O SCOPE: CPT | Mod: 59 | Performed by: SURGERY

## 2022-04-18 RX ORDER — HALOPERIDOL 5 MG/ML
5 INJECTION INTRAMUSCULAR EVERY 4 HOURS PRN
Status: DISCONTINUED | OUTPATIENT
Start: 2022-04-18 | End: 2022-04-18 | Stop reason: HOSPADM

## 2022-04-18 RX ORDER — HYDROXYZINE HYDROCHLORIDE 25 MG/1
50 TABLET, FILM COATED ORAL NIGHTLY PRN
Status: DISCONTINUED | OUTPATIENT
Start: 2022-04-18 | End: 2022-04-22

## 2022-04-18 RX ORDER — IBUPROFEN 200 MG
1 TABLET ORAL DAILY PRN
Status: DISCONTINUED | OUTPATIENT
Start: 2022-04-18 | End: 2022-04-18

## 2022-04-18 RX ORDER — DIPHENHYDRAMINE HYDROCHLORIDE 50 MG/ML
50 INJECTION INTRAMUSCULAR; INTRAVENOUS EVERY 4 HOURS PRN
Status: DISCONTINUED | OUTPATIENT
Start: 2022-04-18 | End: 2022-04-18 | Stop reason: HOSPADM

## 2022-04-18 RX ORDER — LORAZEPAM 2 MG/ML
2 INJECTION INTRAMUSCULAR EVERY 4 HOURS PRN
Status: DISCONTINUED | OUTPATIENT
Start: 2022-04-18 | End: 2022-04-18 | Stop reason: HOSPADM

## 2022-04-18 RX ORDER — IBUPROFEN 200 MG
1 TABLET ORAL DAILY
Status: DISCONTINUED | OUTPATIENT
Start: 2022-04-18 | End: 2022-04-25 | Stop reason: HOSPADM

## 2022-04-18 RX ORDER — HYDROXYZINE HYDROCHLORIDE 25 MG/1
50 TABLET, FILM COATED ORAL NIGHTLY PRN
Status: DISCONTINUED | OUTPATIENT
Start: 2022-04-18 | End: 2022-04-18

## 2022-04-18 RX ADMIN — NICOTINE 1 PATCH: 21 PATCH, EXTENDED RELEASE TRANSDERMAL at 03:04

## 2022-04-18 RX ADMIN — HYDROXYZINE HYDROCHLORIDE 50 MG: 25 TABLET, FILM COATED ORAL at 08:04

## 2022-04-18 NOTE — ED TRIAGE NOTES
"Patient to ED with PEC in hand from [facility]. Patient with c/o depression and increased thoughts of SI. Patient denies HI. Patient reports took "a bunch of Klonopin 1 mg and Metoprolol 25 mg last night. Patient awake and alert in triage.  "

## 2022-04-18 NOTE — ED PROVIDER NOTES
Encounter Date: 4/18/2022       History     Chief Complaint   Patient presents with    Psychiatric Evaluation     Staci Hodge is a 41 y.o. female presents with suicidal ideation today  Suicidal ideation and depression issues, several social stressors noted today  Patient has been admitted to the hospital for for suicidal ideation and depression  Last [facility]al [facility] Unit admission was March 2022 with identical presentation  Patient denies any illegal drug use but severe anxiety depression almost daily now        Review of patient's allergies indicates:  No Known Allergies  Past Medical History:   Diagnosis Date    Abnormal Pap smear of cervix age 25    no treatement    Anxiety     Breast cancer     Cancer 07/11/2018    breast cancer    Cervical spondylosis 2/9/2018    Colon polyp     Depression     Ectopic pregnancy 2/26/2019    General anesthetics causing adverse effect in therapeutic use     Slow to awaken/ Memory problems-post-op to day 3 after Mastectomy    Headache     History of psychiatric hospitalization     age 20 for SI    Hx of psychiatric care     Hypertension     Peutz-Jeghers syndrome     PONV (postoperative nausea and vomiting)     Psychiatric problem     Right carpal tunnel syndrome 3/9/2017    Self-harming behavior     Suicide attempt     Therapy      Past Surgical History:   Procedure Laterality Date    ADENOIDECTOMY      ANTEGRADE SINGLE BALLOON ENTEROSCOPY N/A 4/19/2021    Procedure: ENTEROSCOPY, SINGLE BALLOON, ANTEGRADE;  Surgeon: Vincenzo Herbert MD;  [facility] ([address] [place] [address]);  Service: Endoscopy;  Laterality: N/A;  removal of large polyp in proximal jejunum; please schedule during a time when Dr. Dillon is available in case I need assistance with removal or 2nd opinion prior to removal    COVID at [facility] 4/16 - ttr    APPENDECTOMY      AUGMENTATION OF BREAST      BILATERAL MASTECTOMY Bilateral 7/23/2018    Procedure: MASTECTOMY 23 hr stay;   Surgeon: Sofia Kendrick MD;  [address]  Service: General;  Laterality: Bilateral;    BILATERAL SALPINGO-OOPHORECTOMY (BSO) Bilateral 3/4/2021    Procedure: SALPINGO-OOPHORECTOMY, BILATERAL;  Surgeon: Clayton Vilchis MD;  Location: [facility];  Service: OB/GYN;  Laterality: Bilateral;    BOWEL RESECTION  10/17/14    BREAST BIOPSY Left 06/04/2018    BREAST CAPSULOTOMY Left 4/25/2019    Procedure: CAPSULOTOMY, BREAST LEFT;  Surgeon: Duane Bello MD;  [address];  Service: Plastics;  Laterality: Left;    BREAST RECONSTRUCTION      BREAST SURGERY      Bilateral Mastectomy 07/23/2018    CARPAL TUNNEL RELEASE Bilateral     CARPAL TUNNEL RELEASE      COLONOSCOPY      COLONOSCOPY N/A 2/25/2021    Procedure: COLONOSCOPY;  Surgeon: Vincenzo Herbert MD;  [address]  Service: Endoscopy;  Laterality: N/A;  previous anesthesia complications  okay for this date/time per Dr. Herbert and liliana with Miranda,  - sm  COVID test on 2/22/21 at [facility]    DILATION AND CURETTAGE OF UTERUS      DILATION AND CURETTAGE OF UTERUS USING SUCTION N/A 2/25/2019    Procedure: DILATION AND CURETTAGE, UTERUS, USING SUCTION  PATHOLOGY NEEDED;  Surgeon: Pam Sifuentes MD;  Location: [facility];  Service: OB/GYN;  Laterality: N/A;    ENDOSCOPIC ULTRASOUND OF UPPER GASTROINTESTINAL TRACT Left 1/15/2021    Procedure: ULTRASOUND, UPPER GI TRACT, ENDOSCOPIC;  Surgeon: Chandan Dillon MD;  Location: [facility];  Service: Endoscopy;  Laterality: Left;  for pancreatic screening    ESOPHAGOGASTRODUODENOSCOPY N/A 1/15/2021    Procedure: EGD (ESOPHAGOGASTRODUODENOSCOPY);  Surgeon: Chandan Dillon MD;  Location: [facility];  Service: Endoscopy;  Laterality: N/A;  with push enteroscopy    ESOPHAGOGASTRODUODENOSCOPY N/A 5/17/2021    Procedure: EGD (ESOPHAGOGASTRODUODENOSCOPY);  Surgeon: Chandan Dillon MD;  Location: [facility] ([address] [place] [address]);  Service: Endoscopy;  Laterality: N/A;  Please  schedule this duodenal polypectomy. Need to be a day that Dr Jose Antonio Stark is in the OR. 90 minutes.   Chandan Dillon MD     COVID at [facility] 5/15 (scheduled in the OR for 5/18 - using same COVID results for both procedures) ttr    ESOPHAGOGASTRODUODENOSCOPY N/A 1/25/2022    Procedure: EGD (ESOPHAGOGASTRODUODENOSCOPY);  Surgeon: Chandan Dillon MD;  [facility]  Service: Endoscopy;  Laterality: N/A;  poss emr  covid-1/22/22-ST[facility]-BB  instructions sent via portal-BB    FAT TRANSFER Bilateral 11/12/2018    Procedure: TRANSFER, FAT TISSUE BILATERAL BREAST;  Surgeon: Duane Bello MD;  Location: [address];  Service: Plastics;  Laterality: Bilateral;    INJECTION FOR SENTINEL NODE IDENTIFICATION Left 7/23/2018    Procedure: INJECTION, FOR SENTINEL NODE IDENTIFICATION;  Surgeon: Sofia Kendrick MD;  [facility];  Service: General;  Laterality: Left;    INSERTION OF BREAST TISSUE EXPANDER Bilateral 7/23/2018    Procedure: INSERTION, TISSUE EXPANDER, BREAST;  Surgeon: Duane Bello MD;  [facility];  Service: Plastics;  Laterality: Bilateral;    INSERTION OF BREAST TISSUE EXPANDER Bilateral 3/10/2020    Procedure: INSERTION, TISSUE EXPANDER, BREAST, BILATERAL;  Surgeon: Duane Bello MD;  [facility];  Service: Plastics;  Laterality: Bilateral;    intestinal polpy      LYSIS OF ADHESIONS N/A 3/4/2021    Procedure: LYSIS, ADHESIONS;  Surgeon: Clayton Vilchis MD;  Location: [facility];  Service: OB/GYN;  Laterality: N/A;  Omental adhesions    MASTECTOMY      REMOVAL OF BREAST IMPLANT Bilateral 3/10/2020    Procedure: REMOVAL, IMPLANT, BREAST, BILATERAL;  Surgeon: Duane Bello MD;  Location: [address];  Service: Plastics;  Laterality: Bilateral;    REMOVAL OF BREAST IMPLANT Bilateral 7/7/2020    Procedure: REMOVAL, IMPLANT, BREAST BILATERAL;  Surgeon: Duane Bello MD;  Location: [address]  [facility];  Service: Plastics;  Laterality: Bilateral;    SENTINEL LYMPH NODE BIOPSY Left 7/23/2018    Procedure: BIOPSY, LYMPH NODE, SENTINEL;  Surgeon: Sofia Kendrick MD;  Location: [address] [facility];  Service: General;  Laterality: Left;    SMALL INTESTINE SURGERY      3 surgeries     TONSILLECTOMY      TOTAL ABDOMINAL HYSTERECTOMY N/A 3/4/2021    Procedure: HYSTERECTOMY, TOTAL, ABDOMINAL;  Surgeon: Clayton Vilchis MD;  Location: [facility];  Service: OB/GYN;  Laterality: N/A;    UPPER GASTROINTESTINAL ENDOSCOPY       Family History   Problem Relation Age of Onset    Colon cancer Mother 33        peutz-jeghers syndrome    Cancer Mother         colon cancer    Hypertension Father     Colon polyps Sister         peutz-jeghers syndrome    Breast cancer Paternal Aunt     Breast cancer Paternal Grandmother     Ovarian cancer Neg Hx     Anesthesia problems Neg Hx      Social History     Tobacco Use    Smoking status: Current Every Day Smoker     Packs/day: 0.50     Years: 17.00     Pack years: 8.50     Types: Cigarettes     Start date: 4/17/2000     Last attempt to quit: 7/23/2018     Years since quitting: 3.7    Smokeless tobacco: Never Used    Tobacco comment: sometimes dry cough per patient   Substance Use Topics    Alcohol use: Yes     Alcohol/week: 5.8 standard drinks     Types: 7 Standard drinks or equivalent per week     Comment: occasionally    Drug use: No     Review of Systems   Constitutional: Negative.    HENT: Negative.    Eyes: Negative.    Respiratory: Negative.    Cardiovascular: Negative.    Gastrointestinal: Negative.    Genitourinary: Negative.    Musculoskeletal: Negative.    Skin: Negative.    Neurological: Negative.    Psychiatric/Behavioral: Positive for dysphoric mood and suicidal ideas. Negative for behavioral problems. The patient is nervous/anxious.        Physical Exam     Initial Vitals [04/18/22 1322]   BP Pulse Resp Temp SpO2   120/74 68 12 97.3 °F (36.3 °C) 100 %      MAP        --         Physical Exam    Constitutional: She appears well-developed.   HENT:   Head: Normocephalic.   Right Ear: External ear normal.   Left Ear: External ear normal.   Nose: Nose normal.   Mouth/Throat: Oropharynx is clear and moist.   Eyes: EOM are normal. Pupils are equal, round, and reactive to light.   Neck:   Normal range of motion.  Cardiovascular: Normal rate, regular rhythm, normal heart sounds and intact distal pulses.   Pulmonary/Chest: Breath sounds normal.   Abdominal: Abdomen is soft.   Musculoskeletal:         General: Normal range of motion.      Cervical back: Normal range of motion.     Neurological: She is alert and oriented to person, place, and time. She has normal reflexes.   Skin: Skin is warm. Capillary refill takes less than 2 seconds.         ED Course   Procedures  Labs Reviewed   COMPREHENSIVE METABOLIC PANEL - Abnormal; Notable for the following components:       Result Value    Alkaline Phosphatase 180 (*)     All other components within normal limits   CBC W/ AUTO DIFFERENTIAL - Abnormal; Notable for the following components:    Eos # 0.7 (*)     All other components within normal limits   ACETAMINOPHEN LEVEL - Abnormal; Notable for the following components:    Acetaminophen (Tylenol), Serum <3.0 (*)     All other components within normal limits   SALICYLATE LEVEL - Abnormal; Notable for the following components:    Salicylate Lvl <5.0 (*)     All other components within normal limits   DRUG SCREEN PANEL, URINE EMERGENCY - Abnormal; Notable for the following components:    Benzodiazepines Presumptive Positive (*)     Barbiturate Screen, Ur Presumptive Positive (*)     All other components within normal limits    Narrative:     Specimen Source->Urine   SARS-COV-2 RNA AMPLIFICATION, QUAL   URINALYSIS, REFLEX TO URINE CULTURE    Narrative:     Specimen Source->Urine   ALCOHOL,MEDICAL (ETHANOL)   PREGNANCY TEST, URINE RAPID    Narrative:     Specimen Source->Urine   TSH   VITAMIN D    T4, FREE   VITAMIN B12   FOLATE          Imaging Results    None          Medications   haloperidol lactate injection 5 mg (has no administration in time range)   lorazepam injection 2 mg (has no administration in time range)   diphenhydrAMINE injection 50 mg (has no administration in time range)     Medical Decision Making:   Initial Assessment:   Suicidal ideation and depression, placed under pec    Differential Diagnosis:   Depression, anxiety, suicidal ideation, homicidal ideation, psychosis    Clinical Tests:   Lab Tests: Ordered and Reviewed    ED Management:  Patient with suicidal ideation and depression  Was placed under pec by her psychiatrist today  Is recommending psychiatric evaluation as an inpatient                 Medically cleared for psychiatry placement: 4/18/2022  1:26 PM    Clinical Impression:   Final diagnoses:  [F32.A, R45.851] Depression with suicidal ideation (Primary)          ED Disposition Condition    Transfer to Psych Facility         ED Prescriptions     None        Follow-up Information    None          Joe Love MD  04/18/22 [id number]

## 2022-04-18 NOTE — PLAN OF CARE
"ADMIT NOTE:    Patient calm and cooperative-answers all questions without hesitation; tearful at times; reports depression 9/10 and anxiety 10/10. Patient states, she has been battling depression since the age of 20 and cannot recall any type of antidepressants that have worked for her. Five weeks ago she was started on cymbalta during a U admit.  At that time she had again attempted suicide by taking all her pills. She states, "she took all her metoprolol and klonopin last night and did not come to the emergency room. States I just drank a bunch of water and begged my boyfriend not to bring me.  Continues to express thoughts of suicide--contracts for safety at this time for duration of [facility] stay; denies intentions to harm others at this time. Denies auditory and/or visual hallucinations.  Affect and emotion tearful, depressed, and hopeless.  Thoughts of hopelessness and helplessness.  Interacts appropriately with peers and staff. Accepts meals and medications.  She reports her boyfriend Issac Russo as her support system; phone number [phone].    Discussed home medications and plan of care.  Reports home medications: Cymbalta 30mg bid, klonopin 1mg bid, vistaril, and protonix.    Patient reports a medical history of Peutz Jaghers syndrome which has caused the patient to have multiple bowel obstructions and resections, hysterectomy-no children, breast cancer with bilateral mastectomy reconstructive procedure-wears padded bras.    Smokes 1-1 1/2 ppd cigarettes; 21 mg nicotine patch ordered.    Oriented to room/unit environment and unit routine; patient voiced understanding.  "

## 2022-04-19 PROCEDURE — 90833 PR PSYCHOTHERAPY W/PATIENT W/E&M, 30 MIN (ADD ON): ICD-10-PCS | Mod: ,,, | Performed by: STUDENT IN AN ORGANIZED HEALTH CARE EDUCATION/TRAINING PROGRAM

## 2022-04-19 PROCEDURE — 99223 PR INITIAL HOSPITAL CARE,LEVL III: ICD-10-PCS | Mod: ,,, | Performed by: STUDENT IN AN ORGANIZED HEALTH CARE EDUCATION/TRAINING PROGRAM

## 2022-04-19 PROCEDURE — 25000003 PHARM REV CODE 250: Performed by: STUDENT IN AN ORGANIZED HEALTH CARE EDUCATION/TRAINING PROGRAM

## 2022-04-19 PROCEDURE — 99221 PR INITIAL HOSPITAL CARE,LEVL I: ICD-10-PCS | Mod: ,,, | Performed by: NURSE PRACTITIONER

## 2022-04-19 PROCEDURE — 99223 1ST HOSP IP/OBS HIGH 75: CPT | Mod: ,,, | Performed by: STUDENT IN AN ORGANIZED HEALTH CARE EDUCATION/TRAINING PROGRAM

## 2022-04-19 PROCEDURE — 11400000 HC PSYCH PRIVATE ROOM

## 2022-04-19 PROCEDURE — 99221 1ST HOSP IP/OBS SF/LOW 40: CPT | Mod: ,,, | Performed by: NURSE PRACTITIONER

## 2022-04-19 PROCEDURE — S4991 NICOTINE PATCH NONLEGEND: HCPCS | Performed by: STUDENT IN AN ORGANIZED HEALTH CARE EDUCATION/TRAINING PROGRAM

## 2022-04-19 PROCEDURE — 25000003 PHARM REV CODE 250: Performed by: NURSE PRACTITIONER

## 2022-04-19 PROCEDURE — 90833 PSYTX W PT W E/M 30 MIN: CPT | Mod: ,,, | Performed by: STUDENT IN AN ORGANIZED HEALTH CARE EDUCATION/TRAINING PROGRAM

## 2022-04-19 RX ORDER — FAMOTIDINE 20 MG/1
20 TABLET, FILM COATED ORAL 2 TIMES DAILY
Status: DISCONTINUED | OUTPATIENT
Start: 2022-04-19 | End: 2022-04-19

## 2022-04-19 RX ORDER — LITHIUM CARBONATE 300 MG/1
300 TABLET, FILM COATED, EXTENDED RELEASE ORAL NIGHTLY
Status: DISCONTINUED | OUTPATIENT
Start: 2022-04-19 | End: 2022-04-20

## 2022-04-19 RX ORDER — BUTALBITAL, ACETAMINOPHEN AND CAFFEINE 50; 325; 40 MG/1; MG/1; MG/1
1 TABLET ORAL EVERY 6 HOURS PRN
Status: DISCONTINUED | OUTPATIENT
Start: 2022-04-19 | End: 2022-04-19

## 2022-04-19 RX ORDER — ARIPIPRAZOLE 5 MG/1
5 TABLET ORAL DAILY
Status: DISCONTINUED | OUTPATIENT
Start: 2022-04-20 | End: 2022-04-20

## 2022-04-19 RX ORDER — CLONAZEPAM 0.5 MG/1
0.5 TABLET ORAL NIGHTLY
Status: DISCONTINUED | OUTPATIENT
Start: 2022-04-19 | End: 2022-04-23

## 2022-04-19 RX ORDER — ACETAMINOPHEN 500 MG
1000 TABLET ORAL EVERY 6 HOURS PRN
Status: DISCONTINUED | OUTPATIENT
Start: 2022-04-19 | End: 2022-04-25 | Stop reason: HOSPADM

## 2022-04-19 RX ADMIN — FAMOTIDINE 20 MG: 20 TABLET ORAL at 11:04

## 2022-04-19 RX ADMIN — LITHIUM CARBONATE 300 MG: 300 TABLET, FILM COATED, EXTENDED RELEASE ORAL at 08:04

## 2022-04-19 RX ADMIN — CLONAZEPAM 0.5 MG: 0.5 TABLET ORAL at 08:04

## 2022-04-19 RX ADMIN — NICOTINE 1 PATCH: 21 PATCH, EXTENDED RELEASE TRANSDERMAL at 08:04

## 2022-04-19 NOTE — PLAN OF CARE
Pt calm and cooperative, denies SI at this time, appetite fair, denies A?V hallucinations, blunt affect, safety precautions maintained, will continue to monitor

## 2022-04-19 NOTE — HPI
40 yo female patient presented to ER with c/o depression and suicidal ideations. She was admitted to U and medicine was consulted for H.P. VSS/afebrile. Labs reviewed. + UDs benzo and barbiturates

## 2022-04-19 NOTE — SUBJECTIVE & OBJECTIVE
Past Medical History:   Diagnosis Date    Abnormal Pap smear of cervix age 25    no treatement    Anxiety     Breast cancer     Cancer 07/11/2018    breast cancer    Cervical spondylosis 2/9/2018    Colon polyp     Depression     Ectopic pregnancy 2/26/2019    General anesthetics causing adverse effect in therapeutic use     Slow to awaken/ Memory problems-post-op to day 3 after Mastectomy    Headache     History of psychiatric hospitalization     age 20 for SI    Hx of psychiatric care     Hypertension     Peutz-Jeghers syndrome     PONV (postoperative nausea and vomiting)     Psychiatric problem     Right carpal tunnel syndrome 3/9/2017    Self-harming behavior     Suicide attempt     Therapy        Past Surgical History:   Procedure Laterality Date    ADENOIDECTOMY      ANTEGRADE SINGLE BALLOON ENTEROSCOPY N/A 4/19/2021    Procedure: ENTEROSCOPY, SINGLE BALLOON, ANTEGRADE;  Surgeon: Vincenzo Herbert MD;  Location: [facility] ([address] [place] [address]);  Service: Endoscopy;  Laterality: N/A;  removal of large polyp in proximal jejunum; please schedule during a time when Dr. Dillon is available in case I need assistance with removal or 2nd opinion prior to removal    COVID at [place] 4/16 - ttr    APPENDECTOMY      AUGMENTATION OF BREAST      BILATERAL MASTECTOMY Bilateral 7/23/2018    Procedure: MASTECTOMY 23 hr stay;  Surgeon: Sofia Kendrick MD;  Location: [address];  Service: General;  Laterality: Bilateral;    BILATERAL SALPINGO-OOPHORECTOMY (BSO) Bilateral 3/4/2021    Procedure: SALPINGO-OOPHORECTOMY, BILATERAL;  Surgeon: Clayton Vilchis MD;  Location: [facility];  Service: OB/GYN;  Laterality: Bilateral;    BOWEL RESECTION  10/17/14    BREAST BIOPSY Left 06/04/2018    BREAST CAPSULOTOMY Left 4/25/2019    Procedure: CAPSULOTOMY, BREAST LEFT;  Surgeon: Duane Bello MD;  Location: [address];  Service: Plastics;  Laterality: Left;    BREAST RECONSTRUCTION      BREAST SURGERY      Bilateral Mastectomy  07/23/2018    CARPAL TUNNEL RELEASE Bilateral     CARPAL TUNNEL RELEASE      COLONOSCOPY      COLONOSCOPY N/A 2/25/2021    Procedure: COLONOSCOPY;  Surgeon: Vincenzo Herbert MD;  Location: [facility] (2ND FLR);  Service: Endoscopy;  Laterality: N/A;  previous anesthesia complications  okay for this date/time per Dr. Herbert and liliana with Miranda, OC - sm  COVID test on 2/22/21 at [facility]    DILATION AND CURETTAGE OF UTERUS      DILATION AND CURETTAGE OF UTERUS USING SUCTION N/A 2/25/2019    Procedure: DILATION AND CURETTAGE, UTERUS, USING SUCTION  PATHOLOGY NEEDED;  Surgeon: Pam Sifuentes MD;  Location: [facility] OR;  Service: OB/GYN;  Laterality: N/A;    ENDOSCOPIC ULTRASOUND OF UPPER GASTROINTESTINAL TRACT Left 1/15/2021    Procedure: ULTRASOUND, UPPER GI TRACT, ENDOSCOPIC;  Surgeon: Chandan Dillon MD;  Location: [facility];  Service: Endoscopy;  Laterality: Left;  for pancreatic screening    ESOPHAGOGASTRODUODENOSCOPY N/A 1/15/2021    Procedure: EGD (ESOPHAGOGASTRODUODENOSCOPY);  Surgeon: Chandan Dillon MD;  Location: [facility];  Service: Endoscopy;  Laterality: N/A;  with push enteroscopy    ESOPHAGOGASTRODUODENOSCOPY N/A 5/17/2021    Procedure: EGD (ESOPHAGOGASTRODUODENOSCOPY);  Surgeon: Chandan Dillon MD;  Location: [facility] (2ND FLR);  Service: Endoscopy;  Laterality: N/A;  Please schedule this duodenal polypectomy. Need to be a day that Dr Jose Antonio Stark is in the OR. 90 minutes.   Chandan Dillon MD     COVID at [facility] 5/15 (scheduled in the OR for 5/18 - using same COVID results for both procedures) ttr    ESOPHAGOGASTRODUODENOSCOPY N/A 1/25/2022    Procedure: EGD (ESOPHAGOGASTRODUODENOSCOPY);  Surgeon: Chandan Dillon MD;  Location: [facility] (2ND FLR);  Service: Endoscopy;  Laterality: N/A;  poss emr  covid-1/22/[id number]  instructions sent via portal-BB    FAT TRANSFER Bilateral 11/12/2018    Procedure: TRANSFER, FAT TISSUE BILATERAL BREAST;  Surgeon: Duane Bello MD;  Location:  [facility] OR 2ND FLR;  Service: Plastics;  Laterality: Bilateral;    INJECTION FOR SENTINEL NODE IDENTIFICATION Left 7/23/2018    Procedure: INJECTION, FOR SENTINEL NODE IDENTIFICATION;  Surgeon: Sofia Kendrick MD;  [facility];  Service: General;  Laterality: Left;    INSERTION OF BREAST TISSUE EXPANDER Bilateral 7/23/2018    Procedure: INSERTION, TISSUE EXPANDER, BREAST;  Surgeon: Duane Bello MD;  [facility];  Service: Plastics;  Laterality: Bilateral;    INSERTION OF BREAST TISSUE EXPANDER Bilateral 3/10/2020    Procedure: INSERTION, TISSUE EXPANDER, BREAST, BILATERAL;  Surgeon: Duane Bello MD;  [facility];  Service: Plastics;  Laterality: Bilateral;    intestinal polpy      LYSIS OF ADHESIONS N/A 3/4/2021    Procedure: LYSIS, ADHESIONS;  Surgeon: Clayton Vilchis MD;  Location: [facility];  Service: OB/GYN;  Laterality: N/A;  Omental adhesions    MASTECTOMY      REMOVAL OF BREAST IMPLANT Bilateral 3/10/2020    Procedure: REMOVAL, IMPLANT, BREAST, BILATERAL;  Surgeon: Duane Bello MD;  Location: [address];  Service: Plastics;  Laterality: Bilateral;    REMOVAL OF BREAST IMPLANT Bilateral 7/7/2020    Procedure: REMOVAL, IMPLANT, BREAST BILATERAL;  Surgeon: Duane Bello MD;  Location: [facility];  Service: Plastics;  Laterality: Bilateral;    SENTINEL LYMPH NODE BIOPSY Left 7/23/2018    Procedure: BIOPSY, LYMPH NODE, SENTINEL;  Surgeon: Sofia Kendrick MD;  Location: [address];  Service: General;  Laterality: Left;    SMALL INTESTINE SURGERY      3 surgeries     TONSILLECTOMY      TOTAL ABDOMINAL HYSTERECTOMY N/A 3/4/2021    Procedure: HYSTERECTOMY, TOTAL, ABDOMINAL;  Surgeon: Clayton Vilchis MD;  Location: [facility];  Service: OB/GYN;  Laterality: N/A;    UPPER GASTROINTESTINAL ENDOSCOPY         Review of patient's allergies indicates:  No Known Allergies    Current Facility-Administered Medications on File Prior to  Encounter   Medication    0.9%  NaCl infusion    sodium chloride 0.9% flush 10 mL    [DISCONTINUED] diphenhydrAMINE injection 50 mg    [DISCONTINUED] haloperidol lactate injection 5 mg    [DISCONTINUED] lorazepam injection 2 mg     Current Outpatient Medications on File Prior to Encounter   Medication Sig    atorvastatin (LIPITOR) 20 MG tablet Take 20 mg by mouth every evening.    butalbital-acetaminophen-caffeine -40 mg (FIORICET, ESGIC) -40 mg per tablet TAKE ONE TABLET BY MOUTH EVERY 6 HOURS AS NEEDED FOR PAIN    clonazePAM (KLONOPIN) 1 MG tablet Take 1 tablet (1 mg total) by mouth every evening.    DULoxetine (CYMBALTA) 30 MG capsule Take 1 capsule (30 mg total) by mouth 2 (two) times daily.    ergocalciferol (ERGOCALCIFEROL) 50,000 unit Cap Take 1 capsule (50,000 Units total) by mouth every 7 days.    hydrOXYzine pamoate (VISTARIL) 50 MG Cap Take 1 capsule (50 mg total) by mouth every 6 (six) hours as needed (Insomnia or anxiety).    pantoprazole (PROTONIX) 40 MG tablet Take 1 tablet (40 mg total) by mouth once daily.    [DISCONTINUED] albuterol (ACCUNEB) 1.25 mg/3 mL Nebu Take 3 mLs (1.25 mg total) by nebulization every 6 (six) hours as needed (wheezing). Rescue    [DISCONTINUED] gabapentin (NEURONTIN) 600 MG tablet Take 1 tablet (600 mg total) by mouth every evening.    [DISCONTINUED] paroxetine (PAXIL) 20 MG tablet TAKE ONE TABLET BY MOUTH ONCE A DAY IN THE MORNING     Family History       Problem Relation (Age of Onset)    Breast cancer Paternal Aunt, Paternal Grandmother    Cancer Mother    Colon cancer Mother (33)    Colon polyps Sister    Hypertension Father          Tobacco Use    Smoking status: Current Every Day Smoker     Packs/day: 0.50     Years: 17.00     Pack years: 8.50     Types: Cigarettes     Start date: 4/17/2000     Last attempt to quit: 7/23/2018     Years since quitting: 3.7    Smokeless tobacco: Never Used    Tobacco comment: sometimes dry cough per patient   Substance and  Sexual Activity    Alcohol use: Yes     Alcohol/week: 5.8 standard drinks     Types: 7 Standard drinks or equivalent per week     Comment: occasionally    Drug use: No    Sexual activity: Yes     Partners: Male     Review of Systems   Constitutional:  Negative for chills, fatigue, fever and unexpected weight change.   HENT:  Negative for congestion, ear pain, sore throat and trouble swallowing.    Eyes:  Negative for pain and visual disturbance.   Respiratory:  Negative for cough, chest tightness and shortness of breath.    Cardiovascular:  Negative for chest pain, palpitations and leg swelling.   Gastrointestinal:  Negative for abdominal distention, abdominal pain, constipation, diarrhea and vomiting.   Genitourinary:  Negative for difficulty urinating, dysuria, flank pain, frequency and hematuria.   Musculoskeletal:  Negative for back pain, gait problem, joint swelling, neck pain and neck stiffness.   Skin:  Negative for rash and wound.   Neurological:  Negative for dizziness, seizures, speech difficulty, light-headedness and headaches.   Objective:     Vital Signs (Most Recent):  Temp: 97.2 °F (36.2 °C) (04/19/22 0729)  Pulse: 71 (04/19/22 0729)  Resp: 18 (04/19/22 0729)  BP: 125/73 (04/19/22 0729) Vital Signs (24h Range):  Temp:  [96.8 °F (36 °C)-97.5 °F (36.4 °C)] 97.2 °F (36.2 °C)  Pulse:  [68-75] 71  Resp:  [12-18] 18  SpO2:  [100 %] 100 %  BP: (110-125)/(73-76) 125/73     Weight: 82.3 kg (181 lb 7 oz)  Body mass index is 33.19 kg/m².    Physical Exam  Vitals reviewed.   Constitutional:       Appearance: She is well-developed.   HENT:      Head: Normocephalic and atraumatic.   Neck:      Thyroid: No thyromegaly.   Cardiovascular:      Rate and Rhythm: Normal rate and regular rhythm.      Heart sounds: Normal heart sounds. No murmur heard.  Pulmonary:      Effort: Pulmonary effort is normal. No respiratory distress.      Breath sounds: Normal breath sounds. No wheezing or rales.   Abdominal:      General:  Bowel sounds are normal. There is no distension.      Palpations: Abdomen is soft. There is no mass.      Tenderness: There is no abdominal tenderness.   Musculoskeletal:         General: Normal range of motion.   Lymphadenopathy:      Cervical: No cervical adenopathy.   Skin:     General: Skin is warm and dry.      Findings: No erythema.   Neurological:      Mental Status: She is alert and oriented to person, place, and time.      Comments:   Neuro: Cranial nerves:  CN II Visual fields full to confrontation.   CN III, IV, VI Pupils are equal, round, and reactive to light.  CN III: no palsy  Nystagmus: none   Diplopia: none  Ophthalmoparesis: none  CN V Facial sensation intact.   CN VII Facial expression full, symmetric.   CN VIII normal.   CN IX normal.   CN X normal.   CN XI normal.   CN XII normal.             Significant Labs: UPT negative     U/A negative     UDS  Results for orders placed or performed during the hospital encounter of 04/18/22   Drug screen panel, in-house   Result Value Ref Range    Benzodiazepines Presumptive Positive (A) Negative    Methadone metabolites Negative Negative    Cocaine (Metab.) Negative Negative    Opiate Scrn, Ur Negative Negative    Barbiturate Screen, Ur Presumptive Positive (A) Negative    Amphetamine Screen, Ur Negative Negative    THC Negative Negative    Phencyclidine Negative Negative    Creatinine, Urine 81.5 15.0 - 325.0 mg/dL    Toxicology Information SEE COMMENT      CBC  Results for orders placed or performed during the hospital encounter of 04/18/22   CBC Auto Differential   Result Value Ref Range    WBC 10.05 3.90 - 12.70 K/uL    RBC 5.21 4.00 - 5.40 M/uL    Hemoglobin 14.7 12.0 - 16.0 g/dL    Hematocrit 44.9 37.0 - 48.5 %    MCV 86 82 - 98 fL    MCH 28.2 27.0 - 31.0 pg    MCHC 32.7 32.0 - 36.0 g/dL    RDW 13.6 11.5 - 14.5 %    Platelets 389 150 - 450 K/uL    MPV 10.1 9.2 - 12.9 fL    Immature Granulocytes 0.4 0.0 - 0.5 %    Gran # (ANC) 6.3 1.8 - 7.7 K/uL     Immature Grans (Abs) 0.04 0.00 - 0.04 K/uL    Lymph # 2.3 1.0 - 4.8 K/uL    Mono # 0.7 0.3 - 1.0 K/uL    Eos # 0.7 (H) 0.0 - 0.5 K/uL    Baso # 0.10 0.00 - 0.20 K/uL    nRBC 0 0 /100 WBC    Gran % 63.0 38.0 - 73.0 %    Lymph % 22.6 18.0 - 48.0 %    Mono % 6.5 4.0 - 15.0 %    Eosinophil % 6.5 0.0 - 8.0 %    Basophil % 1.0 0.0 - 1.9 %    Differential Method Automated      CMP  Results for orders placed or performed during the hospital encounter of 04/18/22   Comprehensive Metabolic Panel   Result Value Ref Range    Sodium 138 136 - 145 mmol/L    Potassium 4.4 3.5 - 5.1 mmol/L    Chloride 102 95 - 110 mmol/L    CO2 26 23 - 29 mmol/L    Glucose 102 70 - 110 mg/dL    BUN 12 6 - 20 mg/dL    Creatinine 0.8 0.5 - 1.4 mg/dL    Calcium 9.6 8.7 - 10.5 mg/dL    Total Protein 7.6 6.0 - 8.4 g/dL    Albumin 4.4 3.5 - 5.2 g/dL    Total Bilirubin 0.3 0.1 - 1.0 mg/dL    Alkaline Phosphatase 180 (H) 55 - 135 U/L    AST 22 10 - 40 U/L    ALT 37 10 - 44 U/L    Anion Gap 10 8 - 16 mmol/L    eGFR if African American >60 >60 mL/min/1.73 m^2    eGFR if non African American >60 >60 mL/min/1.73 m^2     TSH  Results for orders placed or performed during the hospital encounter of 04/18/22   TSH   Result Value Ref Range    TSH 2.548 0.400 - 4.000 uIU/mL     ETOH  Results for orders placed or performed during the hospital encounter of 04/18/22   Ethanol   Result Value Ref Range    Alcohol, Serum <10 <10 mg/dL     Salicylate  Results for orders placed or performed during the hospital encounter of 04/18/22   Salicylate Level   Result Value Ref Range    Salicylate Lvl <5.0 (L) 15.0 - 30.0 mg/dL     Acetaminophen  Results for orders placed or performed during the hospital encounter of 04/18/22   Acetaminophen Level   Result Value Ref Range    Acetaminophen (Tylenol), Serum <3.0 (L) 10.0 - 20.0 ug/mL

## 2022-04-19 NOTE — PROGRESS NOTES
"   04/19/22 1110   [facility] Group Therapy   Group Name Therapeutic Recreation   Specific Interventions Cognitive Stimulation Training   Participation Level Appropriate;Attentive   Participation Quality Cooperative   Insight/Motivation Good   Affect/Mood Display Appropriate   Cognition Alert   Psychomotor WNL   Patient reports "fine" mood, reports she feels like people are judging her looks, how she sits, and moves, states her self-esteem is low and she doesn't like the way she look or act sometimes. Patient states "If I'm around upbeat people it helps me to feel more comfortable." Patient shows interest in the activity and initial ability to comprehend directions.  "
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
## 2022-04-19 NOTE — NURSING
No falls, resting most of the night sleeping about 7 hours, no distress during the night or acute episodes. Safety maintained.

## 2022-04-19 NOTE — CONSULTS
[facility] Medicine  Consult Note    Patient Name: Staci Hodge  MRN: [id number]  Admission Date: 4/18/2022  Hospital Length of Stay: 1 days  Attending Physician: Adeel Garcia III, MD   Primary Care Provider: Shilpi Clayton NP           Patient information was obtained from patient and ER records.     Inpatient consult to [facility] for History and Physical  Consult performed by: Ute Johns NP  Consult ordered by: Adeel Garcia III, MD        Subjective:     Principal Problem: <principal problem not specified>    Chief Complaint: No chief complaint on file.       HPI: 42 yo female patient presented to ER with c/o depression and suicidal ideations. She was admitted to U and medicine was consulted for H.P. VSS/afebrile. Labs reviewed. + UDs benzo and barbiturates       Past Medical History:   Diagnosis Date    Abnormal Pap smear of cervix age 25    no treatement    Anxiety     Breast cancer     Cancer 07/11/2018    breast cancer    Cervical spondylosis 2/9/2018    Colon polyp     Depression     Ectopic pregnancy 2/26/2019    General anesthetics causing adverse effect in therapeutic use     Slow to awaken/ Memory problems-post-op to day 3 after Mastectomy    Headache     History of psychiatric hospitalization     age 20 for SI    Hx of psychiatric care     Hypertension     Peutz-Jeghers syndrome     PONV (postoperative nausea and vomiting)     Psychiatric problem     Right carpal tunnel syndrome 3/9/2017    Self-harming behavior     Suicide attempt     Therapy        Past Surgical History:   Procedure Laterality Date    ADENOIDECTOMY      ANTEGRADE SINGLE BALLOON ENTEROSCOPY N/A 4/19/2021    Procedure: ENTEROSCOPY, SINGLE BALLOON, ANTEGRADE;  Surgeon: Vincenzo Herbert MD;  Location: [facility] ([address] [place] [address]);  Service: Endoscopy;  Laterality: N/A;  removal of large polyp in proximal jejunum; please schedule during a time when Dr. Dillon is  available in case I need assistance with removal or 2nd opinion prior to removal    COVID at [place] 4/16 - ttr    APPENDECTOMY      AUGMENTATION OF BREAST      BILATERAL MASTECTOMY Bilateral 7/23/2018    Procedure: MASTECTOMY 23 hr stay;  Surgeon: Sofia Kendrick MD;  Location: [address];  Service: General;  Laterality: Bilateral;    BILATERAL SALPINGO-OOPHORECTOMY (BSO) Bilateral 3/4/2021    Procedure: SALPINGO-OOPHORECTOMY, BILATERAL;  Surgeon: Clayton Vilchis MD;  Location: [facility];  Service: OB/GYN;  Laterality: Bilateral;    BOWEL RESECTION  10/17/14    BREAST BIOPSY Left 06/04/2018    BREAST CAPSULOTOMY Left 4/25/2019    Procedure: CAPSULOTOMY, BREAST LEFT;  Surgeon: Duane Bello MD;  Location: [address];  Service: Plastics;  Laterality: Left;    BREAST RECONSTRUCTION      BREAST SURGERY      Bilateral Mastectomy 07/23/2018    CARPAL TUNNEL RELEASE Bilateral     CARPAL TUNNEL RELEASE      COLONOSCOPY      COLONOSCOPY N/A 2/25/2021    Procedure: COLONOSCOPY;  Surgeon: Vincenzo Herbert MD;  [facility]  Service: Endoscopy;  Laterality: N/A;  previous anesthesia complications  okay for this date/time per Dr. Herbert and liliana with Miranda,  - sm  COVID [facility] on 2/22/21 at [facility]    DILATION AND CURETTAGE OF UTERUS      DILATION AND CURETTAGE OF UTERUS USING SUCTION N/A 2/25/2019    Procedure: DILATION AND CURETTAGE, UTERUS, USING SUCTION  PATHOLOGY NEEDED;  Surgeon: Pam Sifuentes MD;  Location: [facility];  Service: OB/GYN;  Laterality: N/A;    ENDOSCOPIC ULTRASOUND OF UPPER GASTROINTESTINAL TRACT Left 1/15/2021    Procedure: ULTRASOUND, UPPER GI TRACT, ENDOSCOPIC;  Surgeon: Chandan Dillon MD;  Location: [facility];  Service: Endoscopy;  Laterality: Left;  for pancreatic screening    ESOPHAGOGASTRODUODENOSCOPY N/A 1/15/2021    Procedure: EGD (ESOPHAGOGASTRODUODENOSCOPY);  Surgeon: Chandan Dillon MD;  Location: [facility];  Service: Endoscopy;   Laterality: N/A;  with push enteroscopy    ESOPHAGOGASTRODUODENOSCOPY N/A 5/17/2021    Procedure: EGD (ESOPHAGOGASTRODUODENOSCOPY);  Surgeon: Chandan Dillon MD;  Location: [facility] ENDO (2ND FLR);  Service: Endoscopy;  Laterality: N/A;  Please schedule this duodenal polypectomy. Need to be a day that Dr Jose Antonio Stark is in the OR. 90 minutes.   Chandan Dillon MD     COVID at [place] 5/15 (scheduled in the OR for 5/18 - using same COVID results for both procedures) ttr    ESOPHAGOGASTRODUODENOSCOPY N/A 1/25/2022    Procedure: EGD (ESOPHAGOGASTRODUODENOSCOPY);  Surgeon: Chandan Dillon MD;  Location: [facility] ENDO (2ND FLR);  Service: Endoscopy;  Laterality: N/A;  poss emr  covid-1/22/22-STAH-BB  instructions sent via portal-BB    FAT TRANSFER Bilateral 11/12/2018    Procedure: TRANSFER, FAT TISSUE BILATERAL BREAST;  Surgeon: Duane Bello MD;  [address]  Service: Plastics;  Laterality: Bilateral;    INJECTION FOR SENTINEL NODE IDENTIFICATION Left 7/23/2018    Procedure: INJECTION, FOR SENTINEL NODE IDENTIFICATION;  Surgeon: Sofia Kendrick MD;  Location: [address];  Service: General;  Laterality: Left;    INSERTION OF BREAST TISSUE EXPANDER Bilateral 7/23/2018    Procedure: INSERTION, TISSUE EXPANDER, BREAST;  Surgeon: Duane Bello MD;  [address];  Service: Plastics;  Laterality: Bilateral;    INSERTION OF BREAST TISSUE EXPANDER Bilateral 3/10/2020    Procedure: INSERTION, TISSUE EXPANDER, BREAST, BILATERAL;  Surgeon: Duane Bello MD;  [address];  Service: Plastics;  Laterality: Bilateral;    intestinal polpy      LYSIS OF ADHESIONS N/A 3/4/2021    Procedure: LYSIS, ADHESIONS;  Surgeon: Clayton Vilchis MD;  Location[facility];  Service: OB/GYN;  Laterality: N/A;  Omental adhesions    MASTECTOMY      REMOVAL OF BREAST IMPLANT Bilateral 3/10/2020    Procedure: REMOVAL, IMPLANT, BREAST, BILATERAL;  Surgeon: Duane MEZA  MD Ace;  [address];  Service: Plastics;  Laterality: Bilateral;    REMOVAL OF BREAST IMPLANT Bilateral 7/7/2020    Procedure: REMOVAL, IMPLANT, BREAST BILATERAL;  Surgeon: Duane Bello MD;  Location: [facility] OR [address] [place] [address];  Service: Plastics;  Laterality: Bilateral;    SENTINEL LYMPH NODE BIOPSY Left 7/23/2018    Procedure: BIOPSY, LYMPH NODE, SENTINEL;  Surgeon: Sofia Kendrick MD;  Location: [address];  Service: General;  Laterality: Left;    SMALL INTESTINE SURGERY      3 surgeries     TONSILLECTOMY      TOTAL ABDOMINAL HYSTERECTOMY N/A 3/4/2021    Procedure: HYSTERECTOMY, TOTAL, ABDOMINAL;  Surgeon: Clayton Vilchis MD;  [facility];  Service: OB/GYN;  Laterality: N/A;    UPPER GASTROINTESTINAL ENDOSCOPY         Review of patient's allergies indicates:  No Known Allergies    Current Facility-Administered Medications on File Prior to Encounter   Medication    0.9%  NaCl infusion    sodium chloride 0.9% flush 10 mL    [DISCONTINUED] diphenhydrAMINE injection 50 mg    [DISCONTINUED] haloperidol lactate injection 5 mg    [DISCONTINUED] lorazepam injection 2 mg     Current Outpatient Medications on File Prior to Encounter   Medication Sig    atorvastatin (LIPITOR) 20 MG tablet Take 20 mg by mouth every evening.    butalbital-acetaminophen-caffeine -40 mg (FIORICET, ESGIC) -40 mg per tablet TAKE ONE TABLET BY MOUTH EVERY 6 HOURS AS NEEDED FOR PAIN    clonazePAM (KLONOPIN) 1 MG tablet Take 1 tablet (1 mg total) by mouth every evening.    DULoxetine (CYMBALTA) 30 MG capsule Take 1 capsule (30 mg total) by mouth 2 (two) times daily.    ergocalciferol (ERGOCALCIFEROL) 50,000 unit Cap Take 1 capsule (50,000 Units total) by mouth every 7 days.    hydrOXYzine pamoate (VISTARIL) 50 MG Cap Take 1 capsule (50 mg total) by mouth every 6 (six) hours as needed (Insomnia or anxiety).    pantoprazole (PROTONIX) 40 MG tablet Take 1 tablet (40 mg total) by mouth once  daily.    [DISCONTINUED] albuterol (ACCUNEB) 1.25 mg/3 mL Nebu Take 3 mLs (1.25 mg total) by nebulization every 6 (six) hours as needed (wheezing). Rescue    [DISCONTINUED] gabapentin (NEURONTIN) 600 MG tablet Take 1 tablet (600 mg total) by mouth every evening.    [DISCONTINUED] paroxetine (PAXIL) 20 MG tablet TAKE ONE TABLET BY MOUTH ONCE A DAY IN THE MORNING     Family History       Problem Relation (Age of Onset)    Breast cancer Paternal Aunt, Paternal Grandmother    Cancer Mother    Colon cancer Mother (33)    Colon polyps Sister    Hypertension Father          Tobacco Use    Smoking status: Current Every Day Smoker     Packs/day: 0.50     Years: 17.00     Pack years: 8.50     Types: Cigarettes     Start date: 4/17/2000     Last attempt to quit: 7/23/2018     Years since quitting: 3.7    Smokeless tobacco: Never Used    Tobacco comment: sometimes dry cough per patient   Substance and Sexual Activity    Alcohol use: Yes     Alcohol/week: 5.8 standard drinks     Types: 7 Standard drinks or equivalent per week     Comment: occasionally    Drug use: No    Sexual activity: Yes     Partners: Male     Review of Systems   Constitutional:  Negative for chills, fatigue, fever and unexpected weight change.   HENT:  Negative for congestion, ear pain, sore throat and trouble swallowing.    Eyes:  Negative for pain and visual disturbance.   Respiratory:  Negative for cough, chest tightness and shortness of breath.    Cardiovascular:  Negative for chest pain, palpitations and leg swelling.   Gastrointestinal:  Negative for abdominal distention, abdominal pain, constipation, diarrhea and vomiting.   Genitourinary:  Negative for difficulty urinating, dysuria, flank pain, frequency and hematuria.   Musculoskeletal:  Negative for back pain, gait problem, joint swelling, neck pain and neck stiffness.   Skin:  Negative for rash and wound.   Neurological:  Negative for dizziness, seizures, speech difficulty,  light-headedness and headaches.   Objective:     Vital Signs (Most Recent):  Temp: 97.2 °F (36.2 °C) (04/19/22 0729)  Pulse: 71 (04/19/22 0729)  Resp: 18 (04/19/22 0729)  BP: 125/73 (04/19/22 0729) Vital Signs (24h Range):  Temp:  [96.8 °F (36 °C)-97.5 °F (36.4 °C)] 97.2 °F (36.2 °C)  Pulse:  [68-75] 71  Resp:  [12-18] 18  SpO2:  [100 %] 100 %  BP: (110-125)/(73-76) 125/73     Weight: 82.3 kg (181 lb 7 oz)  Body mass index is 33.19 kg/m².    Physical Exam  Vitals reviewed.   Constitutional:       Appearance: She is well-developed.   HENT:      Head: Normocephalic and atraumatic.   Neck:      Thyroid: No thyromegaly.   Cardiovascular:      Rate and Rhythm: Normal rate and regular rhythm.      Heart sounds: Normal heart sounds. No murmur heard.  Pulmonary:      Effort: Pulmonary effort is normal. No respiratory distress.      Breath sounds: Normal breath sounds. No wheezing or rales.   Abdominal:      General: Bowel sounds are normal. There is no distension.      Palpations: Abdomen is soft. There is no mass.      Tenderness: There is no abdominal tenderness.   Musculoskeletal:         General: Normal range of motion.   Lymphadenopathy:      Cervical: No cervical adenopathy.   Skin:     General: Skin is warm and dry.      Findings: No erythema.   Neurological:      Mental Status: She is alert and oriented to person, place, and time.      Comments:   Neuro: Cranial nerves:  CN II Visual fields full to confrontation.   CN III, IV, VI Pupils are equal, round, and reactive to light.  CN III: no palsy  Nystagmus: none   Diplopia: none  Ophthalmoparesis: none  CN V Facial sensation intact.   CN VII Facial expression full, symmetric.   CN VIII normal.   CN IX normal.   CN X normal.   CN XI normal.   CN XII normal.             Significant Labs: UPT negative     U/A negative     UDS  Results for orders placed or performed during the hospital encounter of 04/18/22   Drug screen panel, in-house   Result Value Ref Range     Benzodiazepines Presumptive Positive (A) Negative    Methadone metabolites Negative Negative    Cocaine (Metab.) Negative Negative    Opiate Scrn, Ur Negative Negative    Barbiturate Screen, Ur Presumptive Positive (A) Negative    Amphetamine Screen, Ur Negative Negative    THC Negative Negative    Phencyclidine Negative Negative    Creatinine, Urine 81.5 15.0 - 325.0 mg/dL    Toxicology Information SEE COMMENT      CBC  Results for orders placed or performed during the hospital encounter of 04/18/22   CBC Auto Differential   Result Value Ref Range    WBC 10.05 3.90 - 12.70 K/uL    RBC 5.21 4.00 - 5.40 M/uL    Hemoglobin 14.7 12.0 - 16.0 g/dL    Hematocrit 44.9 37.0 - 48.5 %    MCV 86 82 - 98 fL    MCH 28.2 27.0 - 31.0 pg    MCHC 32.7 32.0 - 36.0 g/dL    RDW 13.6 11.5 - 14.5 %    Platelets 389 150 - 450 K/uL    MPV 10.1 9.2 - 12.9 fL    Immature Granulocytes 0.4 0.0 - 0.5 %    Gran # (ANC) 6.3 1.8 - 7.7 K/uL    Immature Grans (Abs) 0.04 0.00 - 0.04 K/uL    Lymph # 2.3 1.0 - 4.8 K/uL    Mono # 0.7 0.3 - 1.0 K/uL    Eos # 0.7 (H) 0.0 - 0.5 K/uL    Baso # 0.10 0.00 - 0.20 K/uL    nRBC 0 0 /100 WBC    Gran % 63.0 38.0 - 73.0 %    Lymph % 22.6 18.0 - 48.0 %    Mono % 6.5 4.0 - 15.0 %    Eosinophil % 6.5 0.0 - 8.0 %    Basophil % 1.0 0.0 - 1.9 %    Differential Method Automated      CMP  Results for orders placed or performed during the hospital encounter of 04/18/22   Comprehensive Metabolic Panel   Result Value Ref Range    Sodium 138 136 - 145 mmol/L    Potassium 4.4 3.5 - 5.1 mmol/L    Chloride 102 95 - 110 mmol/L    CO2 26 23 - 29 mmol/L    Glucose 102 70 - 110 mg/dL    BUN 12 6 - 20 mg/dL    Creatinine 0.8 0.5 - 1.4 mg/dL    Calcium 9.6 8.7 - 10.5 mg/dL    Total Protein 7.6 6.0 - 8.4 g/dL    Albumin 4.4 3.5 - 5.2 g/dL    Total Bilirubin 0.3 0.1 - 1.0 mg/dL    Alkaline Phosphatase 180 (H) 55 - 135 U/L    AST 22 10 - 40 U/L    ALT 37 10 - 44 U/L    Anion Gap 10 8 - 16 mmol/L    eGFR if African American >60 >60  mL/min/1.73 m^2    eGFR if non African American >60 >60 mL/min/1.73 m^2     TSH  Results for orders placed or performed during the hospital encounter of 04/18/22   TSH   Result Value Ref Range    TSH 2.548 0.400 - 4.000 uIU/mL     ETOH  Results for orders placed or performed during the hospital encounter of 04/18/22   Ethanol   Result Value Ref Range    Alcohol, Serum <10 <10 mg/dL     Salicylate  Results for orders placed or performed during the hospital encounter of 04/18/22   Salicylate Level   Result Value Ref Range    Salicylate Lvl <5.0 (L) 15.0 - 30.0 mg/dL     Acetaminophen  Results for orders placed or performed during the hospital encounter of 04/18/22   Acetaminophen Level   Result Value Ref Range    Acetaminophen (Tylenol), Serum <3.0 (L) 10.0 - 20.0 ug/mL             Assessment/Plan:     Suicidal ideations  Further orders per psych        VTE Risk Mitigation (From admission, onward)    None              Thank you for your consult. I will sign off. Please contact us if you have any additional questions.    Ute Johns NP  Department of Hospital Medicine   [facility]

## 2022-04-19 NOTE — PLAN OF CARE
"  Problem: Adult Behavioral Health Plan of Care  Goal: Optimized Coping Skills in Response to Life Stressors  Outcome: Ongoing, Progressing  Intervention: Promote Effective Coping Strategies  Flowsheets (Taken 4/19/2022 1601)  Supportive Measures:   active listening utilized   counseling provided   decision-making supported   guided imagery facilitated   Group Note:      Behavior    Patient attended group psychotherapy today. Pt affect appropriate with dysphoric mood.      Intervention      Group psychotherapy focused on Guided Imagery of the Window. Patients stood in front of a window and focused on different objects they saw. Patients practiced how different objects brings positive feelings about one-self which gives the patient courage and strength and a sense of relaxation. The guided Imagery provide direction and gave meaning to optimize different situations. Patients explored and discussed their positive thinking of how to distract their negative thoughts into positive thoughts.        Response     Patient stated  I see the wind blowing in the trees and the roof top that is white. This will help me when I go home to help me cope with different situations".   .      Plan     Patient will be encouraged to continue to attend group psychotherapy.  "

## 2022-04-19 NOTE — H&P
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  - pt counseled     Nicotine Dependence: pt counseld  -Continue at nicotine 14 mg patch dermal q day     Borderline Personality Disorder: pt counseled  -meds off label as above             PRESCRIPTION DRUG MANAGEMENT  Compliance: yes  Side Effects: no  Regimen Adjustments: see above    Discussed diagnosis, risks and benefits of proposed treatment vs alternative treatments vs no treatment, potential side effects of these treatments and the inherent unpredictability of treatment. The patient expresses understanding of the above and displays the capacity to agree with this treatment given said understanding. Patient also agrees that, currently, the benefits outweigh the risks and would like to pursue/continue treatment at this time.      DIAGNOSTIC TESTING  Labs reviewed with patient; follow up pending labs    Disposition:  -Will attempt to obtain outside psychiatric records if available  -SW to assist with aftercare planning and collateral  -Once stable discharge home with outpatient follow up care and/or rehab  -Continue inpatient treatment under a P[facility] and/or [facility] for danger to self/ danger to  others/grave disability as evident by danger to self        Adeel Garcia III, MD  Psychiatry

## 2022-04-19 NOTE — PLAN OF CARE
Pt is calm and cooperative.  Denies any S/I or H/I at this time.  Pt behavior is appropriate on unit.  Mood still depressed.  No acute distress apparent at this time, will continue to monitor.

## 2022-04-20 PROCEDURE — 25000003 PHARM REV CODE 250: Performed by: STUDENT IN AN ORGANIZED HEALTH CARE EDUCATION/TRAINING PROGRAM

## 2022-04-20 PROCEDURE — 11400000 HC PSYCH PRIVATE ROOM

## 2022-04-20 PROCEDURE — S4991 NICOTINE PATCH NONLEGEND: HCPCS | Performed by: STUDENT IN AN ORGANIZED HEALTH CARE EDUCATION/TRAINING PROGRAM

## 2022-04-20 PROCEDURE — 99233 PR SUBSEQUENT HOSPITAL CARE,LEVL III: ICD-10-PCS | Mod: ,,, | Performed by: STUDENT IN AN ORGANIZED HEALTH CARE EDUCATION/TRAINING PROGRAM

## 2022-04-20 PROCEDURE — 99233 SBSQ HOSP IP/OBS HIGH 50: CPT | Mod: ,,, | Performed by: STUDENT IN AN ORGANIZED HEALTH CARE EDUCATION/TRAINING PROGRAM

## 2022-04-20 RX ORDER — LITHIUM CARBONATE 450 MG/1
450 TABLET ORAL NIGHTLY
Status: DISCONTINUED | OUTPATIENT
Start: 2022-04-20 | End: 2022-04-23

## 2022-04-20 RX ADMIN — HYDROXYZINE HYDROCHLORIDE 50 MG: 25 TABLET, FILM COATED ORAL at 10:04

## 2022-04-20 RX ADMIN — CLONAZEPAM 0.5 MG: 0.5 TABLET ORAL at 08:04

## 2022-04-20 RX ADMIN — ARIPIPRAZOLE 5 MG: 5 TABLET ORAL at 09:04

## 2022-04-20 RX ADMIN — LITHIUM CARBONATE 450 MG: 450 TABLET, EXTENDED RELEASE ORAL at 08:04

## 2022-04-20 RX ADMIN — NICOTINE 1 PATCH: 21 PATCH, EXTENDED RELEASE TRANSDERMAL at 09:04

## 2022-04-20 NOTE — PROGRESS NOTES
"PSYCHIATRY DAILY INPATIENT PROGRESS NOTE  SUBSEQUENT HOSPITAL VISIT    ENCOUNTER DATE: 4/20/2022  [facility]    DATE OF ADMISSION: 4/18/2022  3:13 PM  LENGTH OF STAY: 2 days      CHIEF COMPLAINT   Staci Hodge is a 41 y.o. female, seen during daily garcia rounds on the inpatient unit.  Staci Hodge presented with the chief complaint of depression and suicide attempt      The patient was seen and examined. The chart was reviewed.     Reviewed notes from Rns, CTRS and L[facility] and labs from the last 24 hours.    The patient's case was discussed with the treatment team/care providers today including Rns, CTRS and [facility]    Staff reports no behavioral or management issues.     The patient has been compliant with treatment.      Subjective 04/20/2022       Today the patient reports "I had a panic attack today... so I'm on edge."    The patient denies any side effects to medications.        Interim/overnight events per report/notes:  Patient reports an "okay, anxious at times" mood, shared talking to peers with some of the same problems helps her to feel included and that she is not alone. Patient reports she was getting nervous because she couldn't focus good, directions repeated as needed.        Psychiatric ROS (observed, reported, or endorsed/denied):  Depressed mood - Continuing  Interest/pleasure/anhedonia: Continuing  Guilt/hopelessness/worthlessness - Continuing  Changes in Sleep - Continuing  Changes in Appetite - Continuing  Changes in Concentration - Continuing  Changes in Energy - Continuing  PMA/R- Continuing  Suicidal- active/passive ideations - Continuing  Homicidal ideations: active/passive ideations - denies    Hallucinations - denies  Delusions - denies  Disorganized behavior - None  Disorganized speech - None  Negative symptoms - None    Elevated mood - None  Decreased need for sleep - None  Grandiosity - None  Racing thoughts - None  Impulsivity - None  Irritability- None  Increased " energy - None  Distractibility - None  Increase in goal-directed activity or PMA- None    Symptoms of MARY LOU - Continuing  Symptoms of Panic Disorder- Continuing  Symptoms of PTSD - Continuing        Overall progress: Patient is showing no improvement on the Unit to date          Medical ROS  Review of Systems   Constitutional: Negative for chills and fever.   HENT: Negative for hearing loss.    Eyes: Negative for blurred vision and double vision.   Respiratory: Negative for shortness of breath.    Cardiovascular: Negative for chest pain and palpitations.   Gastrointestinal: Negative for constipation, diarrhea, nausea and vomiting.   Genitourinary: Negative for dysuria.   Musculoskeletal: Negative for neck pain.   Skin: Negative for rash.   Neurological: Negative for dizziness and headaches.   Endo/Heme/Allergies: Negative for environmental allergies.         PAST MEDICAL HISTORY   Past Medical History:   Diagnosis Date    Abnormal Pap smear of cervix age 25    no treatement    Anxiety     Breast cancer     Cancer 07/11/2018    breast cancer    Cervical spondylosis 2/9/2018    Colon polyp     Depression     Ectopic pregnancy 2/26/2019    General anesthetics causing adverse effect in therapeutic use     Slow to awaken/ Memory problems-post-op to day 3 after Mastectomy    Headache     History of psychiatric hospitalization     age 20 for SI    Hx of psychiatric care     Hypertension     Peutz-Jeghers syndrome     PONV (postoperative nausea and vomiting)     Psychiatric problem     Right carpal tunnel syndrome 3/9/2017    Self-harming behavior     Suicide attempt     Therapy            PSYCHOTROPIC MEDICATIONS   Scheduled Meds:   ARIPiprazole  5 mg Oral Daily    clonazePAM  0.5 mg Oral QHS    lithium  300 mg Oral QHS    nicotine  1 patch Transdermal Daily     Continuous Infusions:  PRN Meds:.acetaminophen, hydrOXYzine HCL        EXAMINATION    VITALS   Vitals:    04/18/22 2000 04/19/22 0729  04/19/22 2100 04/20/22 0800   BP: 110/76 125/73 131/77 115/79   BP Location: Right arm Right arm Right arm Left arm   Patient Position: Sitting Sitting Sitting Sitting   Pulse: 75 71 95 91   Resp: 18 18 20 18   Temp: 97.5 °F (36.4 °C) 97.2 °F (36.2 °C) 98 °F (36.7 °C) 97.1 °F (36.2 °C)   TempSrc: Temporal Temporal Temporal Temporal   Weight:    81.4 kg (179 lb 7.3 oz)   Height:           Body mass index is 32.82 kg/m².        CONSTITUTIONAL  General Appearance: unremarkable, age appropriate    MUSCULOSKELETAL  Muscle Strength and Tone:no tremor, no tic  Abnormal Involuntary Movements: No  Gait and Station: non-ataxic    PSYCHIATRIC   Level of Consciousness: awake and alert   Orientation: person, place and situation  Grooming: Casually dressed and Well groomed  Psychomotor Behavior: normal, cooperative  Speech: normal tone, normal rate, normal pitch, normal volume  Language: grossly intact  Mood: depressed  Affect: Consistent with mood  Thought Process: linear, logical  Associations: intact   Thought Content: +SI, denies HI and no delusions  Perceptions: denies AH and denies  VH  Memory: Able to recall past events, Remote intact and Recent intact  Attention:Attends to interview without distraction  Fund of Knowledge: Aware of current events and Vocabulary appropriate   Estimate if Intelligence:  Average based on work/education history, vocabulary and mental status exam  Insight: has awareness of illness  Judgment: behavior is adequate to circumstances        DIAGNOSTIC TESTING   Laboratory Results  No results found for this or any previous visit (from the past 24 hour(s)).           MEDICAL DECISION MAKING          ASSESSMENT         Bipolar II mre depressed, severe, without psychotic features  Suicide attempt by overdose  MARY LOU  Panic Disorder  PTSD  OCD     Nicotine Dependence     Borderline Personality Disorder              PROBLEM LIST AND MANAGEMENT PLANS          Mood: pt counseled  - stopped cymbalta, not  effective for this patient/symptoms not improving  - resumed abilify 5 mg PO qd  -increase to 7 mg PO qd tomorrow  - started lithium 300 mg PO qhs -increase to 450 mg PO qhs, will check level  - follow up with outpatient mental health provider after discharge  - pt counseled     Suicidal ideations/Suicide attempt via overdose  - continue psychiatric hospitalization  - provide psychotherapeutic interventions and medication management  - monitor      Insomnia: pt counseled  - resumed klonopin at 0.5 mg PO qhs, will plan to taper off given repeated overdoses and alcohol abuse  - vistaril prn  - Encourage sleep hygiene including attempting not to sleep during the day     Anxiety/PTSD: pt counseled  - started/continue hydroxyzine 50 mg PO q 6 hours PRN  - pt counseled     Nicotine Dependence: pt counseld  -Continue at nicotine 14 mg patch dermal q day     Borderline Personality Disorder: pt counseled  -meds off label as above               Discussed diagnosis, risks and benefits of proposed treatment vs alternative treatments vs no treatment, potential side effects of these treatments and the inherent unpredictability of treatment. The patient expresses understanding of the above and displays the capacity to agree with this treatment given said understanding. Patient also agrees that, currently, the benefits outweigh the risks and would like to pursue/continue treatment at this time.      DISCHARGE PLANNING  Expected Disposition Plan: Home or Self Care      NEED FOR CONTINUED HOSPITALIZATION  Psychiatric illness continues to pose a potential threat to life or bodily function, of self or others, thereby requiring the need for continued inpatient psychiatric hospitalization: Yes, due to: danger to self, as evidenced by:  Ongoing concerns with SI.    Protective inpatient pyschiatric hospitalization required while a safe disposition plan is enacted: Yes    Patient stabilized and ready for discharge from inpatient psychiatric  unit: No        STAFF:   Adeel Garcia III, MD  Psychiatry

## 2022-04-20 NOTE — PLAN OF CARE
Patient blunted affect with depressed mood. Patient tearful at times. Patient denies SI/HI no self harming behavior displayed, no aggressive behavior displayed towards others. Patient contracted safety with staff and unit.  Discussed healthy coping skills and techniques.  Educated, reviewed  and discussed plan of care and medication regiment with this patient. Patient voiced understanding of all teachings.
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
## 2022-04-20 NOTE — PLAN OF CARE
Problem: Adult Behavioral Health Plan of Care  Goal: Optimized Coping Skills in Response to Life Stressors  Outcome: Ongoing, Progressing  Intervention: Promote Effective Coping Strategies  Flowsheets (Taken 4/20/2022 1615)  Supportive Measures:   active listening utilized   counseling provided   verbalization of feelings encouraged        Group Note:  Pt did not attend group today. Pt was meeting with doctor at the time of group. [facility] will meet with pt individually at a different time and date.

## 2022-04-20 NOTE — PLAN OF CARE
Pt calm and cooperative, out on unit reading book, denies SI at this time, med compliant, appetite fair, safety precautions maintained, will continue to monitor

## 2022-04-20 NOTE — PROGRESS NOTES
"   04/20/22 1035   [facility] Group Therapy   Group Name Therapeutic Recreation   Specific Interventions Coping Skills Training   Participation Level Appropriate   Participation Quality Cooperative;Social   Insight/Motivation Applies New Skills;Good   Affect/Mood Display Anxious   Cognition Alert   Psychomotor WNL   Patient reports an "okay, anxious at times" mood, shared talking to peers with some of the same problems helps her to feel included and that she is not alone. Patient reports she was getting nervous because she couldn't focus good, directions repeated as needed. Patient learned new leisure activity and was receptive to resource information.  "

## 2022-04-20 NOTE — PLAN OF CARE
Lying quietly in bed, eyes closed, respirations even, unlabored. Apparently asleep. Slept 7.5 hours thus far without interruption. Safety precautions maintained. Rounds done every 15 minutes. Bed is fixed in low position and room is uncluttered and pathways are clear.
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
## 2022-04-21 PROCEDURE — 11400000 HC PSYCH PRIVATE ROOM

## 2022-04-21 PROCEDURE — S4991 NICOTINE PATCH NONLEGEND: HCPCS | Performed by: STUDENT IN AN ORGANIZED HEALTH CARE EDUCATION/TRAINING PROGRAM

## 2022-04-21 PROCEDURE — 99233 PR SUBSEQUENT HOSPITAL CARE,LEVL III: ICD-10-PCS | Mod: ,,, | Performed by: STUDENT IN AN ORGANIZED HEALTH CARE EDUCATION/TRAINING PROGRAM

## 2022-04-21 PROCEDURE — 99233 SBSQ HOSP IP/OBS HIGH 50: CPT | Mod: ,,, | Performed by: STUDENT IN AN ORGANIZED HEALTH CARE EDUCATION/TRAINING PROGRAM

## 2022-04-21 PROCEDURE — 25000003 PHARM REV CODE 250: Performed by: STUDENT IN AN ORGANIZED HEALTH CARE EDUCATION/TRAINING PROGRAM

## 2022-04-21 RX ORDER — ASPIRIN 325 MG
650 TABLET ORAL EVERY 6 HOURS PRN
Status: DISCONTINUED | OUTPATIENT
Start: 2022-04-21 | End: 2022-04-25 | Stop reason: HOSPADM

## 2022-04-21 RX ORDER — TRAZODONE HYDROCHLORIDE 50 MG/1
50 TABLET ORAL NIGHTLY
Status: DISCONTINUED | OUTPATIENT
Start: 2022-04-21 | End: 2022-04-22

## 2022-04-21 RX ORDER — ARIPIPRAZOLE 10 MG/1
10 TABLET ORAL DAILY
Status: DISCONTINUED | OUTPATIENT
Start: 2022-04-22 | End: 2022-04-25 | Stop reason: HOSPADM

## 2022-04-21 RX ADMIN — TRAZODONE HYDROCHLORIDE 50 MG: 50 TABLET ORAL at 08:04

## 2022-04-21 RX ADMIN — ACETAMINOPHEN 1000 MG: 500 TABLET ORAL at 03:04

## 2022-04-21 RX ADMIN — CLONAZEPAM 0.5 MG: 0.5 TABLET ORAL at 08:04

## 2022-04-21 RX ADMIN — ARIPIPRAZOLE 7 MG: 5 TABLET ORAL at 08:04

## 2022-04-21 RX ADMIN — LITHIUM CARBONATE 450 MG: 450 TABLET, EXTENDED RELEASE ORAL at 08:04

## 2022-04-21 RX ADMIN — NICOTINE 1 PATCH: 21 PATCH, EXTENDED RELEASE TRANSDERMAL at 08:04

## 2022-04-21 RX ADMIN — HYDROXYZINE HYDROCHLORIDE 50 MG: 25 TABLET, FILM COATED ORAL at 08:04

## 2022-04-21 NOTE — PLAN OF CARE
"Group Note:     Behavior  Patient attended group psychotherapy for 5 minutes due to meeting with doctor. Patient verbally participated in group. Pt was sitting very quietly watching T.V. with stable affect.    Intervention   Group psychotherapy today focused on attempting to identify to ground themselves in the present. [facility] presented meditation exercises from you tube. Patients were encouraged to identify their emotions and feelings when stressed.     Response      Pt stated " I  really did not like the mediation with the flute. I like the one that had the voice in it and it was really good. I really can not pay attention to this one. ".     Plan  Patient will be encouraged to continue to attend group psychotherapy.   "

## 2022-04-21 NOTE — PROGRESS NOTES
"PSYCHIATRY DAILY INPATIENT PROGRESS NOTE  SUBSEQUENT HOSPITAL VISIT    ENCOUNTER DATE: 4/21/2022  [facility]    DATE OF ADMISSION: 4/18/2022  3:13 PM  LENGTH OF STAY: 3 days      CHIEF COMPLAINT   Staci Hodge is a 41 y.o. female, seen during daily garcia rounds on the inpatient unit.  Staci Hodge presented with the chief complaint of depression and suicide attempt      The patient was seen and examined. The chart was reviewed.     Reviewed notes from Rns, CTRS and LPC and labs from the last 24 hours.    The patient's case was discussed with the treatment team/care providers today including Rns, CTRS and LPC    Staff reports no behavioral or management issues.     The patient has been compliant with treatment.      Subjective 04/21/2022       Today the patient reports "I feel the same."    Intermittently attending groups.    Continues to have SI.     Symptoms are not improving.    The patient denies any side effects to medications.        Interim/overnight events per report/notes:  Patient blunted affect with depressed mood. Patient tearful at times        Psychiatric ROS (observed, reported, or endorsed/denied):  Depressed mood - Continuing  Interest/pleasure/anhedonia: Continuing  Guilt/hopelessness/worthlessness - Continuing  Changes in Sleep - Continuing  Changes in Appetite - Continuing  Changes in Concentration - Continuing  Changes in Energy - Continuing  PMA/R- Continuing  Suicidal- active/passive ideations - Continuing  Homicidal ideations: active/passive ideations - denies    Hallucinations - denies  Delusions - denies  Disorganized behavior - None  Disorganized speech - None  Negative symptoms - None    Elevated mood - None  Decreased need for sleep - None  Grandiosity - None  Racing thoughts - None  Impulsivity - None  Irritability- None  Increased energy - None  Distractibility - None  Increase in goal-directed activity or PMA- None    Symptoms of MARY LOU - Continuing  Symptoms " of Panic Disorder- Continuing  Symptoms of PTSD - Continuing        Overall progress: Patient is showing no improvement on the Unit to date          Medical ROS  Review of Systems   Constitutional: Negative for chills and fever.   HENT: Negative for hearing loss.    Eyes: Negative for blurred vision and double vision.   Respiratory: Negative for shortness of breath.    Cardiovascular: Negative for chest pain and palpitations.   Gastrointestinal: Negative for constipation, diarrhea, nausea and vomiting.   Genitourinary: Negative for dysuria.   Musculoskeletal: Negative for neck pain.   Skin: Negative for rash.   Neurological: Negative for dizziness and headaches.   Endo/Heme/Allergies: Negative for environmental allergies.         PAST MEDICAL HISTORY   Past Medical History:   Diagnosis Date    Abnormal Pap smear of cervix age 25    no treatement    Anxiety     Breast cancer     Cancer 07/11/2018    breast cancer    Cervical spondylosis 2/9/2018    Colon polyp     Depression     Ectopic pregnancy 2/26/2019    General anesthetics causing adverse effect in therapeutic use     Slow to awaken/ Memory problems-post-op to day 3 after Mastectomy    Headache     History of psychiatric hospitalization     age 20 for SI    Hx of psychiatric care     Hypertension     Peutz-Jeghers syndrome     PONV (postoperative nausea and vomiting)     Psychiatric problem     Right carpal tunnel syndrome 3/9/2017    Self-harming behavior     Suicide attempt     Therapy            PSYCHOTROPIC MEDICATIONS   Scheduled Meds:   ARIPiprazole  7 mg Oral Daily    clonazePAM  0.5 mg Oral QHS    lithium  450 mg Oral QHS    nicotine  1 patch Transdermal Daily     Continuous Infusions:  PRN Meds:.acetaminophen, hydrOXYzine HCL        EXAMINATION    VITALS   Vitals:    04/19/22 2100 04/20/22 0800 04/20/22 2015 04/21/22 0800   BP: 131/77 115/79 (!) 153/85 111/72   BP Location: Right arm Left arm  Left arm   Patient Position:  Sitting Sitting  Sitting   Pulse: 95 91 104 88   Resp: 20 18 20 20   Temp: 98 °F (36.7 °C) 97.1 °F (36.2 °C) 96.9 °F (36.1 °C) 97.3 °F (36.3 °C)   TempSrc: Temporal Temporal  Temporal   Weight:  81.4 kg (179 lb 7.3 oz)     Height:           Body mass index is 32.82 kg/m².        CONSTITUTIONAL  General Appearance: unremarkable, age appropriate    MUSCULOSKELETAL  Muscle Strength and Tone:no tremor, no tic  Abnormal Involuntary Movements: No  Gait and Station: non-ataxic    PSYCHIATRIC   Level of Consciousness: awake and alert   Orientation: person, place and situation  Grooming: Casually dressed and Well groomed  Psychomotor Behavior: normal, cooperative  Speech: normal tone, normal rate, normal pitch, normal volume  Language: grossly intact  Mood: depressed  Affect: Consistent with mood  Thought Process: linear, logical  Associations: intact   Thought Content: +SI, denies HI and no delusions  Perceptions: denies AH and denies  VH  Memory: Able to recall past events, Remote intact and Recent intact  Attention:Attends to interview without distraction  Fund of Knowledge: Aware of current events and Vocabulary appropriate   Estimate if Intelligence:  Average based on work/education history, vocabulary and mental status exam  Insight: has awareness of illness  Judgment: behavior is adequate to circumstances        DIAGNOSTIC TESTING   Laboratory Results  No results found for this or any previous visit (from the past 24 hour(s)).           MEDICAL DECISION MAKING          ASSESSMENT         Bipolar II mre depressed, severe, without psychotic features  Suicide attempt by overdose  MARY LOU  Panic Disorder  PTSD  OCD     Nicotine Dependence     Borderline Personality Disorder              PROBLEM LIST AND MANAGEMENT PLANS          Mood: pt counseled  - stopped cymbalta, not effective for this patient/symptoms not improving  - resumed abilify 5 mg PO qd  -increase to 7 mg PO qd -increase to 10 mg PO qd tomorrow  - started  lithium 300 mg PO qhs -increased to 450 mg PO qhs, will check level  - follow up with outpatient mental health provider after discharge  - pt counseled     Suicidal ideations/Suicide attempt via overdose  - continue psychiatric hospitalization  - provide psychotherapeutic interventions and medication management  - monitor      Insomnia: pt counseled  - start trazodone 50 mg PO qhs  - resumed klonopin at 0.5 mg PO qhs, will plan to taper off given repeated overdoses and alcohol abuse  - vistaril prn  - Encourage sleep hygiene including attempting not to sleep during the day     Anxiety/PTSD: pt counseled  - started/continue hydroxyzine 50 mg PO q 6 hours PRN  - pt counseled     Nicotine Dependence: pt counseld  - Continue at nicotine 14 mg patch dermal q day     Borderline Personality Disorder: pt counseled  - meds off label as above               Discussed diagnosis, risks and benefits of proposed treatment vs alternative treatments vs no treatment, potential side effects of these treatments and the inherent unpredictability of treatment. The patient expresses understanding of the above and displays the capacity to agree with this treatment given said understanding. Patient also agrees that, currently, the benefits outweigh the risks and would like to pursue/continue treatment at this time.      DISCHARGE PLANNING  Expected Disposition Plan: Home or Self Care      NEED FOR CONTINUED HOSPITALIZATION  Psychiatric illness continues to pose a potential threat to life or bodily function, of self or others, thereby requiring the need for continued inpatient psychiatric hospitalization: Yes, due to: danger to self, as evidenced by:  Ongoing concerns with SI.    Protective inpatient pyschiatric hospitalization required while a safe disposition plan is enacted: Yes    Patient stabilized and ready for discharge from inpatient psychiatric unit: No        STAFF:   Adeel Garcia III, MD  Psychiatry

## 2022-04-21 NOTE — PROGRESS NOTES
04/21/22 1040   [facility] Group Therapy   Group Name Therapeutic Recreation   Specific Interventions Cognitive Stimulation Training   Participation Level Appropriate;Attentive;Sharing   Participation Quality Cooperative;Social   Insight/Motivation Good   Affect/Mood Display Appropriate   Cognition Alert   Psychomotor WNL   Patient shows interest and initial ability to comprehend directions, becomes involved, enjoys the activity.

## 2022-04-21 NOTE — NURSING
Pt sleeping at this time, slept 6.5 hrs with no awakenings. NAD. Resp even and unlabored.Pathways clear,bed in low position. Q 15 min safety check ongoing.All precautions maintained.

## 2022-04-22 PROCEDURE — 99233 PR SUBSEQUENT HOSPITAL CARE,LEVL III: ICD-10-PCS | Mod: ,,, | Performed by: STUDENT IN AN ORGANIZED HEALTH CARE EDUCATION/TRAINING PROGRAM

## 2022-04-22 PROCEDURE — 11400000 HC PSYCH PRIVATE ROOM

## 2022-04-22 PROCEDURE — 99233 SBSQ HOSP IP/OBS HIGH 50: CPT | Mod: ,,, | Performed by: STUDENT IN AN ORGANIZED HEALTH CARE EDUCATION/TRAINING PROGRAM

## 2022-04-22 PROCEDURE — 90833 PSYTX W PT W E/M 30 MIN: CPT | Mod: ,,, | Performed by: STUDENT IN AN ORGANIZED HEALTH CARE EDUCATION/TRAINING PROGRAM

## 2022-04-22 PROCEDURE — 25000003 PHARM REV CODE 250: Performed by: STUDENT IN AN ORGANIZED HEALTH CARE EDUCATION/TRAINING PROGRAM

## 2022-04-22 PROCEDURE — S4991 NICOTINE PATCH NONLEGEND: HCPCS | Performed by: STUDENT IN AN ORGANIZED HEALTH CARE EDUCATION/TRAINING PROGRAM

## 2022-04-22 PROCEDURE — 90833 PR PSYCHOTHERAPY W/PATIENT W/E&M, 30 MIN (ADD ON): ICD-10-PCS | Mod: ,,, | Performed by: STUDENT IN AN ORGANIZED HEALTH CARE EDUCATION/TRAINING PROGRAM

## 2022-04-22 RX ORDER — TRAZODONE HYDROCHLORIDE 100 MG/1
100 TABLET ORAL NIGHTLY
Status: DISCONTINUED | OUTPATIENT
Start: 2022-04-22 | End: 2022-04-23

## 2022-04-22 RX ORDER — HYDROXYZINE HYDROCHLORIDE 25 MG/1
100 TABLET, FILM COATED ORAL EVERY 6 HOURS PRN
Status: DISCONTINUED | OUTPATIENT
Start: 2022-04-22 | End: 2022-04-25 | Stop reason: HOSPADM

## 2022-04-22 RX ADMIN — HYDROXYZINE HYDROCHLORIDE 100 MG: 25 TABLET, FILM COATED ORAL at 11:04

## 2022-04-22 RX ADMIN — CLONAZEPAM 0.5 MG: 0.5 TABLET ORAL at 08:04

## 2022-04-22 RX ADMIN — TRAZODONE HYDROCHLORIDE 100 MG: 100 TABLET ORAL at 08:04

## 2022-04-22 RX ADMIN — LITHIUM CARBONATE 450 MG: 450 TABLET, EXTENDED RELEASE ORAL at 08:04

## 2022-04-22 RX ADMIN — NICOTINE 1 PATCH: 21 PATCH, EXTENDED RELEASE TRANSDERMAL at 08:04

## 2022-04-22 RX ADMIN — ARIPIPRAZOLE 10 MG: 10 TABLET ORAL at 08:04

## 2022-04-22 NOTE — PLAN OF CARE
Pt still anxious, slightly tremulous after meeting with the DR.  Dr instructed pt to come get prn vistaril.  Prn vistaril given at 1145.  Medication effective at this time, pt resting in her bed.  No acute distress apparent, will continue to monitor.

## 2022-04-22 NOTE — PROGRESS NOTES
"   04/22/22 1040   [facility] Group Therapy   Group Name Therapeutic Recreation   Specific Interventions Cognitive Stimulation Training   Participation Level Appropriate;Attentive;Sharing   Participation Quality Cooperative;Social   Insight/Motivation Applies New Skills;Good   Affect/Mood Display Appropriate   Cognition Alert   Psychomotor WNL   Patient reports "better" mood, socialized, shows good team work and remained active, states she want to stay home a few days after discharge to adjust before working again, identified walking, opening up to her boyfriend more, start meditating, reading inspirational books and speaking affirmations daily as coping skills she will participate in. Patient was given a list of affirmation.  "

## 2022-04-22 NOTE — NURSING
Pt sleeping at this time, slept 7 hrs with 1 awakening. NAD. Resp even and unlabored.Pathways clear,bed in low position. Q 15 min safety check ongoing.All precautions maintained.

## 2022-04-22 NOTE — PROGRESS NOTES
"PSYCHIATRY DAILY INPATIENT PROGRESS NOTE  SUBSEQUENT HOSPITAL VISIT    ENCOUNTER DATE: 4/22/2022  [facility]    DATE OF ADMISSION: 4/18/2022  3:13 PM  LENGTH OF STAY: 4 days      CHIEF COMPLAINT   Staci Hodge is a 41 y.o. female, seen during daily garcia rounds on the inpatient unit.  Staci Hodge presented with the chief complaint of depression and suicide attempt      The patient was seen and examined. The chart was reviewed.     Reviewed notes from Rns and LPC and labs from the last 24 hours.    The patient's case was discussed with the treatment team/care providers today including Rns    Staff reports no behavioral or management issues.     The patient has been compliant with treatment.        Subjective 04/22/2022       Today the patient reports "I feel a little better today... this morning, for some unknown reason, really anxious, heart beating fast."    She is worrying about work, "it's so busy, I don't know if I should take some time off.... people yell at me at work."    Discuss her experience talking to her roommate about her past.    The patient denies any side effects to medications.         Interim/overnight events per report/notes:  Pt states that she feels "okay" today, spending majority of time in room resting. Sad mood.           Psychiatric ROS (observed, reported, or endorsed/denied):  Depressed mood - less  Interest/pleasure/anhedonia: less  Guilt/hopelessness/worthlessness - Continuing  Changes in Sleep - Continuing  Changes in Appetite - Continuing  Changes in Concentration - Continuing  Changes in Energy - Continuing  PMA/R- Continuing  Suicidal- active/passive ideations - less  Homicidal ideations: active/passive ideations - denies    Hallucinations - denies  Delusions - denies  Disorganized behavior - None  Disorganized speech - None  Negative symptoms - None    Elevated mood - None  Decreased need for sleep - None  Grandiosity - None  Racing thoughts - " None  Impulsivity - None  Irritability- None  Increased energy - None  Distractibility - None  Increase in goal-directed activity or PMA- None    Symptoms of MARY LOU - Continuing  Symptoms of Panic Disorder- Continuing  Symptoms of PTSD - Continuing          Psychotherapy:  · Target symptoms: depression, anxiety   · Why chosen therapy is appropriate versus another modality: relevant to diagnosis  · Outcome monitoring methods: self-report  · Therapeutic intervention type: supportive psychotherapy  · Topics discussed/themes: work stress, building skills sets for symptom management, symptom recognition  · The patient's response to the intervention is accepting. The patient's progress toward treatment goals is fair.   · Duration of intervention: 16 minutes.          Overall progress: Patient is showing mild improvement           Medical ROS  Review of Systems   Constitutional: Negative for chills and fever.   HENT: Negative for hearing loss.    Eyes: Negative for blurred vision and double vision.   Respiratory: Negative for shortness of breath.    Cardiovascular: Negative for chest pain and palpitations.   Gastrointestinal: Negative for constipation, diarrhea, nausea and vomiting.   Genitourinary: Negative for dysuria.   Musculoskeletal: Negative for neck pain.   Skin: Negative for rash.   Neurological: Negative for dizziness and headaches.   Endo/Heme/Allergies: Negative for environmental allergies.         PAST MEDICAL HISTORY   Past Medical History:   Diagnosis Date    Abnormal Pap smear of cervix age 25    no treatement    Anxiety     Breast cancer     Cancer 07/11/2018    breast cancer    Cervical spondylosis 2/9/2018    Colon polyp     Depression     Ectopic pregnancy 2/26/2019    General anesthetics causing adverse effect in therapeutic use     Slow to awaken/ Memory problems-post-op to day 3 after Mastectomy    Headache     History of psychiatric hospitalization     age 20 for SI    Hx of psychiatric  care     Hypertension     Peutz-Jeghers syndrome     PONV (postoperative nausea and vomiting)     Psychiatric problem     Right carpal tunnel syndrome 3/9/2017    Self-harming behavior     Suicide attempt     Therapy            PSYCHOTROPIC MEDICATIONS   Scheduled Meds:   ARIPiprazole  10 mg Oral Daily    clonazePAM  0.5 mg Oral QHS    lithium  450 mg Oral QHS    nicotine  1 patch Transdermal Daily    traZODone  50 mg Oral QHS     Continuous Infusions:  PRN Meds:.acetaminophen, aspirin, hydrOXYzine HCL        EXAMINATION    VITALS   Vitals:    04/20/22 2015 04/21/22 0800 04/21/22 1954 04/22/22 0733   BP: (!) 153/85 111/72 136/84 114/79   BP Location:  Left arm Left arm Right arm   Patient Position:  Sitting Sitting Sitting   Pulse: 104 88 107 110   Resp: 20 20 18 18   Temp: 96.9 °F (36.1 °C) 97.3 °F (36.3 °C) 97.2 °F (36.2 °C) 97.2 °F (36.2 °C)   TempSrc:  Temporal Temporal Temporal   Weight:       Height:           Body mass index is 32.82 kg/m².        CONSTITUTIONAL  General Appearance: unremarkable, age appropriate    MUSCULOSKELETAL  Muscle Strength and Tone:no tremor, no tic  Abnormal Involuntary Movements: No  Gait and Station: non-ataxic    PSYCHIATRIC   Level of Consciousness: awake and alert   Orientation: person, place and situation  Grooming: Casually dressed and Well groomed  Psychomotor Behavior: normal, cooperative  Speech: normal tone, normal rate, normal pitch, normal volume  Language: grossly intact  Mood: anxious  Affect: Consistent with mood  Thought Process: linear, logical  Associations: intact   Thought Content: less SI, denies HI and no delusions  Perceptions: denies AH and denies  VH  Memory: Able to recall past events, Remote intact and Recent intact  Attention:Attends to interview without distraction  Fund of Knowledge: Aware of current events and Vocabulary appropriate   Estimate if Intelligence: Average based on work/education history, vocabulary and mental status  exam  Insight: has awareness of illness  Judgment: behavior is adequate to circumstances        DIAGNOSTIC TESTING   Laboratory Results  No results found for this or any previous visit (from the past 24 hour(s)).           MEDICAL DECISION MAKING          ASSESSMENT         Bipolar II mre depressed, severe, without psychotic features  Suicide attempt by overdose  MARY LOU  Panic Disorder  PTSD  OCD     Nicotine Dependence     Borderline Personality Disorder           PROBLEM LIST AND MANAGEMENT PLANS        Mood: pt counseled  - stopped cymbalta, not effective for this patient/symptoms not improving  - resumed abilify 5 mg PO qd  -increase to 7 mg PO qd -increase to 10 mg PO qd today  - started lithium 300 mg PO qhs -increased to 450 mg PO qhs, will check level  - follow up with outpatient mental health provider after discharge  - pt counseled     Suicidal ideations/Suicide attempt via overdose  - continue psychiatric hospitalization  - provide psychotherapeutic interventions and medication management  - monitor      Insomnia: pt counseled  - started/continue trazodone 50 mg PO qhs  -increase to 100 mg PO qhs tonight  - resumed klonopin at 0.5 mg PO qhs, will plan to taper off given repeated overdoses and alcohol abuse  - vistaril prn  - Encourage sleep hygiene including attempting not to sleep during the day     Anxiety/PTSD: pt counseled  - started/continue hydroxyzine 50 mg PO q 6 hours PRN  -increase to 100 mg q 6 hours PRN  - pt counseled     Nicotine Dependence: pt counseld  - Continue at nicotine 14 mg patch dermal q day     Borderline Personality Disorder: pt counseled  - meds off label as above               Discussed diagnosis, risks and benefits of proposed treatment vs alternative treatments vs no treatment, potential side effects of these treatments and the inherent unpredictability of treatment. The patient expresses understanding of the above and displays the capacity to agree with this treatment given  said understanding. Patient also agrees that, currently, the benefits outweigh the risks and would like to pursue/continue treatment at this time.      DISCHARGE PLANNING  Expected Disposition Plan: Home or Self Care      NEED FOR CONTINUED HOSPITALIZATION  Psychiatric illness continues to pose a potential threat to life or bodily function, of self or others, thereby requiring the need for continued inpatient psychiatric hospitalization: Yes, due to: danger to self, as evidenced by:  Ongoing concerns with SI.    Protective inpatient pyschiatric hospitalization required while a safe disposition plan is enacted: Yes    Patient stabilized and ready for discharge from inpatient psychiatric unit: No        STAFF:   Adeel Garcia III, MD  Psychiatry

## 2022-04-23 PROCEDURE — 99233 PR SUBSEQUENT HOSPITAL CARE,LEVL III: ICD-10-PCS | Mod: ,,, | Performed by: STUDENT IN AN ORGANIZED HEALTH CARE EDUCATION/TRAINING PROGRAM

## 2022-04-23 PROCEDURE — 11400000 HC PSYCH PRIVATE ROOM

## 2022-04-23 PROCEDURE — 25000003 PHARM REV CODE 250: Performed by: STUDENT IN AN ORGANIZED HEALTH CARE EDUCATION/TRAINING PROGRAM

## 2022-04-23 PROCEDURE — 99233 SBSQ HOSP IP/OBS HIGH 50: CPT | Mod: ,,, | Performed by: STUDENT IN AN ORGANIZED HEALTH CARE EDUCATION/TRAINING PROGRAM

## 2022-04-23 PROCEDURE — S4991 NICOTINE PATCH NONLEGEND: HCPCS | Performed by: STUDENT IN AN ORGANIZED HEALTH CARE EDUCATION/TRAINING PROGRAM

## 2022-04-23 RX ORDER — TRAZODONE HYDROCHLORIDE 150 MG/1
150 TABLET ORAL NIGHTLY
Status: DISCONTINUED | OUTPATIENT
Start: 2022-04-23 | End: 2022-04-25 | Stop reason: HOSPADM

## 2022-04-23 RX ORDER — CLONAZEPAM 0.25 MG/1
0.25 TABLET, ORALLY DISINTEGRATING ORAL NIGHTLY
Status: DISCONTINUED | OUTPATIENT
Start: 2022-04-23 | End: 2022-04-25 | Stop reason: HOSPADM

## 2022-04-23 RX ORDER — LITHIUM CARBONATE 300 MG/1
600 TABLET, FILM COATED, EXTENDED RELEASE ORAL NIGHTLY
Status: DISCONTINUED | OUTPATIENT
Start: 2022-04-23 | End: 2022-04-25 | Stop reason: HOSPADM

## 2022-04-23 RX ADMIN — TRAZODONE HYDROCHLORIDE 150 MG: 150 TABLET ORAL at 08:04

## 2022-04-23 RX ADMIN — CLONAZEPAM 0.25 MG: 0.25 TABLET, ORALLY DISINTEGRATING ORAL at 08:04

## 2022-04-23 RX ADMIN — NICOTINE 1 PATCH: 21 PATCH, EXTENDED RELEASE TRANSDERMAL at 08:04

## 2022-04-23 RX ADMIN — LITHIUM CARBONATE 600 MG: 300 TABLET, FILM COATED, EXTENDED RELEASE ORAL at 08:04

## 2022-04-23 RX ADMIN — ARIPIPRAZOLE 10 MG: 10 TABLET ORAL at 08:04

## 2022-04-23 NOTE — NURSING
Rested intermittently with closed eyes and even resp for 4.75 hours, as pt remains now.  Modified VC and all precautions maintained.  Pathways clear and bed in low position.

## 2022-04-23 NOTE — PLAN OF CARE
Pt is improving.  Denies any S/I or H/I at this time.  Mood improving, anxiety is better today.  Interacting appropriately with others.  No acute distress apparent at this time, will continue to monitor.

## 2022-04-23 NOTE — PLAN OF CARE
"Denies suicidal thoughts.  Said she's feeling "good" today.  Interacting well with staff and peers.  Pleasant and polite.  Ate snack.  Encouraged increased fluids and regular meals.  VS stable.   No W/D Sx's noted.  Accepted hs meds.  Stressed compliance with meds upon discharge.    "

## 2022-04-23 NOTE — PROGRESS NOTES
"PSYCHIATRY DAILY INPATIENT PROGRESS NOTE  SUBSEQUENT HOSPITAL VISIT    ENCOUNTER DATE: 4/23/2022  SITE: [facility]    DATE OF ADMISSION: 4/18/2022  3:13 PM  LENGTH OF STAY: 5 days      CHIEF COMPLAINT   Staci Hodge is a 41 y.o. female, seen during daily garcia rounds on the inpatient unit.  Staci Hodge presented with the chief complaint of depression and suicide attempt      The patient was seen and examined. The chart was reviewed.     Reviewed notes from Rns and labs from the last 24 hours.    The patient's case was discussed with the treatment team/care providers today including Rns    Staff reports no behavioral or management issues.     The patient has been compliant with treatment.        Subjective 04/23/2022       Today the patient reports "I'm having a lot of thoughts at once... unfinished thoughts."    Poor sleep overnight.    She states, "I think I was in klonopin denial."    The patient denies any side effects to medications.         Interim/overnight events per report/notes:  Pt still anxious, slightly tremulous after meeting with the DR.   instructed pt to come get prn vistaril.  Prn vistaril given at 1145.  Medication effective at this time, pt resting in her bed.    Rested intermittently with closed eyes and even resp for 4.75 hours, as pt remains now.          Psychiatric ROS (observed, reported, or endorsed/denied):  Depressed mood - less  Interest/pleasure/anhedonia: less  Guilt/hopelessness/worthlessness - less  Changes in Sleep - Continuing  Changes in Appetite - less  Changes in Concentration - less  Changes in Energy - fluctuating  PMA/R- less  Suicidal- active/passive ideations - less  Homicidal ideations: active/passive ideations - denies    Hallucinations - denies  Delusions - denies  Disorganized behavior - None  Disorganized speech - None  Negative symptoms - None    Elevated mood - None  Decreased need for sleep - None  Grandiosity - None  Racing thoughts - " None  Impulsivity - None  Irritability- None  Increased energy - None  Distractibility - None  Increase in goal-directed activity or PMA- None    Symptoms of MARY OLU - Continuing  Symptoms of Panic Disorder- Continuing  Symptoms of PTSD - Continuing          Overall progress: Patient is showing mild improvement           Medical ROS  Review of Systems   Constitutional: Negative for chills and fever.   HENT: Negative for hearing loss.    Eyes: Negative for blurred vision and double vision.   Respiratory: Negative for shortness of breath.    Cardiovascular: Negative for chest pain and palpitations.   Gastrointestinal: Negative for constipation, diarrhea, nausea and vomiting.   Genitourinary: Negative for dysuria.   Musculoskeletal: Negative for neck pain.   Skin: Negative for rash.   Neurological: Negative for dizziness and headaches.   Endo/Heme/Allergies: Negative for environmental allergies.         PAST MEDICAL HISTORY   Past Medical History:   Diagnosis Date    Abnormal Pap smear of cervix age 25    no treatement    Anxiety     Breast cancer     Cancer 07/11/2018    breast cancer    Cervical spondylosis 2/9/2018    Colon polyp     Depression     Ectopic pregnancy 2/26/2019    General anesthetics causing adverse effect in therapeutic use     Slow to awaken/ Memory problems-post-op to day 3 after Mastectomy    Headache     History of psychiatric hospitalization     age 20 for SI    Hx of psychiatric care     Hypertension     Peutz-Jeghers syndrome     PONV (postoperative nausea and vomiting)     Psychiatric problem     Right carpal tunnel syndrome 3/9/2017    Self-harming behavior     Suicide attempt     Therapy            PSYCHOTROPIC MEDICATIONS   Scheduled Meds:   ARIPiprazole  10 mg Oral Daily    clonazePAM  0.5 mg Oral QHS    lithium  450 mg Oral QHS    nicotine  1 patch Transdermal Daily    traZODone  100 mg Oral QHS     Continuous Infusions:  PRN Meds:.acetaminophen, aspirin, hydrOXYzine  "HCL        EXAMINATION    VITALS   Vitals:    04/21/22 1954 04/22/22 0733 04/22/22 1940 04/23/22 0741   BP: 136/84 114/79 137/89 (!) 147/92   BP Location: Left arm Right arm Right arm Right arm   Patient Position: Sitting Sitting Sitting Sitting   Pulse: 107 110 95 97   Resp: 18 18 20 18   Temp: 97.2 °F (36.2 °C) 97.2 °F (36.2 °C) 97.1 °F (36.2 °C) 97.2 °F (36.2 °C)   TempSrc: Temporal Temporal  Temporal   Weight:       Height:           Body mass index is 32.82 kg/m².          CONSTITUTIONAL  General Appearance: unremarkable, age appropriate    MUSCULOSKELETAL  Muscle Strength and Tone:no tremor, no tic  Abnormal Involuntary Movements: No  Gait and Station: non-ataxic    PSYCHIATRIC   Level of Consciousness: awake and alert   Orientation: person, place and situation  Grooming: Casually dressed and Well groomed  Psychomotor Behavior: normal, cooperative  Speech: normal tone, normal rate, normal pitch, normal volume  Language: grossly intact  Mood: "a little better"  Affect: Consistent with mood  Thought Process: linear, logical  Associations: intact   Thought Content: less SI, denies HI and no delusions  Perceptions: denies AH and denies  VH  Memory: Able to recall past events, Remote intact and Recent intact  Attention:Attends to interview without distraction  Fund of Knowledge: Aware of current events and Vocabulary appropriate   Estimate if Intelligence: Average based on work/education history, vocabulary and mental status exam  Insight: has awareness of illness  Judgment: behavior is adequate to circumstances        DIAGNOSTIC TESTING   Laboratory Results  No results found for this or any previous visit (from the past 24 hour(s)).           MEDICAL DECISION MAKING          ASSESSMENT         Bipolar II mre depressed, severe, without psychotic features  Suicide attempt by overdose  MARY LOU  Panic Disorder  PTSD  OCD     Nicotine Dependence     Borderline Personality Disorder           PROBLEM LIST AND MANAGEMENT " PLANS          Mood: pt counseled  - stopped cymbalta, not effective for this patient/symptoms not improving  - resumed abilify 5 mg PO qd  -increase to 7 mg PO qd -increased to 10 mg PO qd    - started lithium 300 mg PO qhs -increased to 450 mg PO qhs  -increase to 600 mg PO qhs tonight  - follow up with outpatient mental health provider after discharge  - pt counseled     Suicidal ideations/Suicide attempt via overdose  - continue psychiatric hospitalization  - provide psychotherapeutic interventions and medication management  - monitor      Insomnia: pt counseled  - started/continue trazodone 50 mg PO qhs  -increase to 100 mg PO qhs -increase to 150 mg PO qhs tonight  - resumed klonopin at 0.5 mg PO qhs, will plan to taper off given repeated overdoses and alcohol abuse  - vistaril prn  - Encourage sleep hygiene including attempting not to sleep during the day     Anxiety/PTSD: pt counseled  - started/continue hydroxyzine 50 mg PO q 6 hours PRN  -increase to 100 mg q 6 hours PRN  - pt counseled     Nicotine Dependence: pt counseld  - Continue at nicotine 14 mg patch dermal q day     Borderline Personality Disorder: pt counseled  - meds off label as above            Discussed diagnosis, risks and benefits of proposed treatment vs alternative treatments vs no treatment, potential side effects of these treatments and the inherent unpredictability of treatment. The patient expresses understanding of the above and displays the capacity to agree with this treatment given said understanding. Patient also agrees that, currently, the benefits outweigh the risks and would like to pursue/continue treatment at this time.      DISCHARGE PLANNING  Expected Disposition Plan: Home or Self Care      NEED FOR CONTINUED HOSPITALIZATION  Psychiatric illness continues to pose a potential threat to life or bodily function, of self or others, thereby requiring the need for continued inpatient psychiatric hospitalization: Yes, due to:  danger to self, as evidenced by:  Ongoing concerns with SI.    Protective inpatient pyschiatric hospitalization required while a safe disposition plan is enacted: Yes    Patient stabilized and ready for discharge from inpatient psychiatric unit: No        STAFF:   Adeel Garcia III, MD  Psychiatry

## 2022-04-24 LAB
BILIRUB UR QL STRIP: NEGATIVE
CLARITY UR: CLEAR
COLOR UR: YELLOW
GLUCOSE UR QL STRIP: ABNORMAL
HGB UR QL STRIP: NEGATIVE
KETONES UR QL STRIP: NEGATIVE
LEUKOCYTE ESTERASE UR QL STRIP: NEGATIVE
NITRITE UR QL STRIP: NEGATIVE
PH UR STRIP: 7 [PH] (ref 5–8)
PROT UR QL STRIP: NEGATIVE
SP GR UR STRIP: 1.01 (ref 1–1.03)
URN SPEC COLLECT METH UR: ABNORMAL
UROBILINOGEN UR STRIP-ACNC: NEGATIVE EU/DL

## 2022-04-24 PROCEDURE — 81003 URINALYSIS AUTO W/O SCOPE: CPT | Performed by: STUDENT IN AN ORGANIZED HEALTH CARE EDUCATION/TRAINING PROGRAM

## 2022-04-24 PROCEDURE — 11400000 HC PSYCH PRIVATE ROOM

## 2022-04-24 PROCEDURE — S4991 NICOTINE PATCH NONLEGEND: HCPCS | Performed by: STUDENT IN AN ORGANIZED HEALTH CARE EDUCATION/TRAINING PROGRAM

## 2022-04-24 PROCEDURE — 25000003 PHARM REV CODE 250: Performed by: STUDENT IN AN ORGANIZED HEALTH CARE EDUCATION/TRAINING PROGRAM

## 2022-04-24 PROCEDURE — 99232 PR SUBSEQUENT HOSPITAL CARE,LEVL II: ICD-10-PCS | Mod: ,,, | Performed by: STUDENT IN AN ORGANIZED HEALTH CARE EDUCATION/TRAINING PROGRAM

## 2022-04-24 PROCEDURE — 99232 SBSQ HOSP IP/OBS MODERATE 35: CPT | Mod: ,,, | Performed by: STUDENT IN AN ORGANIZED HEALTH CARE EDUCATION/TRAINING PROGRAM

## 2022-04-24 RX ADMIN — LITHIUM CARBONATE 600 MG: 300 TABLET, FILM COATED, EXTENDED RELEASE ORAL at 08:04

## 2022-04-24 RX ADMIN — ARIPIPRAZOLE 10 MG: 10 TABLET ORAL at 08:04

## 2022-04-24 RX ADMIN — CLONAZEPAM 0.25 MG: 0.25 TABLET, ORALLY DISINTEGRATING ORAL at 08:04

## 2022-04-24 RX ADMIN — NICOTINE 1 PATCH: 21 PATCH, EXTENDED RELEASE TRANSDERMAL at 08:04

## 2022-04-24 RX ADMIN — TRAZODONE HYDROCHLORIDE 150 MG: 150 TABLET ORAL at 08:04

## 2022-04-24 RX ADMIN — ACETAMINOPHEN 1000 MG: 500 TABLET ORAL at 04:04

## 2022-04-24 NOTE — PLAN OF CARE
Rested intermittently with closed eyes and even resp for 7 hours, as pt remains now.  Modified VC and all precautions maintained.  Pathways clear and bed in low position.

## 2022-04-24 NOTE — PLAN OF CARE
Denies thoughts of wanting to hurt self.  Calm and cooperative.  Voiced mood much improved.  Smiling and interacting well with staff and peers.  Accepted snacks and meds.  Reading book frequently.

## 2022-04-24 NOTE — PLAN OF CARE
Pt continues to improve.  Calm and cooperative.  Denies any S/I or H/I.  Only reports anxiety and depression 3/10 for both, significant improvement.  Interaction and behavior appropriate.  Pt reports some burning and frequency with urination.  UA ordered and collected, taken to lab.  Dr Garcia notified.  Will continue to monitor.

## 2022-04-24 NOTE — PROGRESS NOTES
PSYCHIATRY DAILY INPATIENT PROGRESS NOTE  SUBSEQUENT HOSPITAL VISIT    ENCOUNTER DATE: 4/24/2022  [facility]    DATE OF ADMISSION: 4/18/2022  3:13 PM  LENGTH OF STAY: 6 days      CHIEF COMPLAINT   Staci Hodge is a 41 y.o. female, seen during daily garcia rounds on the inpatient unit.  Staci Hodge presented with the chief complaint of depression and suicide attempt      The patient was seen and examined. The chart was reviewed.     Reviewed notes from Rns and labs from the last 24 hours.    The patient's case was discussed with the treatment team/care providers today including Rns    Staff reports no behavioral or management issues.     The patient has been compliant with treatment.        Subjective 04/24/2022       Today the patient reports medcations are helping.    Some anxiety over new roommate.    The patient denies any side effects to medications.         Interim/overnight events per report/notes:    Only reports anxiety and depression 3/10 for both, significant improvement.  Interaction and behavior appropriate.          Psychiatric ROS (observed, reported, or endorsed/denied):  Depressed mood - less  Interest/pleasure/anhedonia: less  Guilt/hopelessness/worthlessness - less  Changes in Sleep - fluctuating  Changes in Appetite - less  Changes in Concentration - less  Changes in Energy - less  PMA/R- less  Suicidal- active/passive ideations - less  Homicidal ideations: active/passive ideations - denies    Hallucinations - denies  Delusions - denies  Disorganized behavior - None  Disorganized speech - None  Negative symptoms - None    Elevated mood - None  Decreased need for sleep - None  Grandiosity - None  Racing thoughts - None  Impulsivity - None  Irritability- None  Increased energy - None  Distractibility - None  Increase in goal-directed activity or PMA- None    Symptoms of MARY LOU - fluctuating  Symptoms of Panic Disorder- less  Symptoms of PTSD - less          Overall  progress: Patient is showing mild improvement           Medical ROS  Review of Systems   Constitutional: Negative for chills and fever.   HENT: Negative for hearing loss.    Eyes: Negative for blurred vision and double vision.   Respiratory: Negative for shortness of breath.    Cardiovascular: Negative for chest pain and palpitations.   Gastrointestinal: Negative for constipation, diarrhea, nausea and vomiting.   Genitourinary: Negative for dysuria.   Musculoskeletal: Negative for neck pain.   Skin: Negative for rash.   Neurological: Negative for dizziness and headaches.   Endo/Heme/Allergies: Negative for environmental allergies.         PAST MEDICAL HISTORY   Past Medical History:   Diagnosis Date    Abnormal Pap smear of cervix age 25    no treatement    Anxiety     Breast cancer     Cancer 07/11/2018    breast cancer    Cervical spondylosis 2/9/2018    Colon polyp     Depression     Ectopic pregnancy 2/26/2019    General anesthetics causing adverse effect in therapeutic use     Slow to awaken/ Memory problems-post-op to day 3 after Mastectomy    Headache     History of psychiatric hospitalization     age 20 for SI    Hx of psychiatric care     Hypertension     Peutz-Jeghers syndrome     PONV (postoperative nausea and vomiting)     Psychiatric problem     Right carpal tunnel syndrome 3/9/2017    Self-harming behavior     Suicide attempt     Therapy            PSYCHOTROPIC MEDICATIONS   Scheduled Meds:   ARIPiprazole  10 mg Oral Daily    clonazePAM  0.25 mg Oral Nightly    lithium  600 mg Oral QHS    nicotine  1 patch Transdermal Daily    traZODone  150 mg Oral QHS     Continuous Infusions:  PRN Meds:.acetaminophen, aspirin, hydrOXYzine HCL        EXAMINATION    VITALS   Vitals:    04/23/22 0741 04/23/22 2000 04/24/22 0721 04/24/22 0800   BP: (!) 147/92 (!) 146/92 121/83    BP Location: Right arm Right arm Left arm    Patient Position: Sitting Sitting Sitting    Pulse: 97 (!) 111 107   "  Resp: 18 20 20    Temp: 97.2 °F (36.2 °C) 97.5 °F (36.4 °C) 97.5 °F (36.4 °C)    TempSrc: Temporal Temporal Temporal    Weight:    82.5 kg (181 lb 12.3 oz)   Height:           Body mass index is 33.25 kg/m².          CONSTITUTIONAL  General Appearance: unremarkable, age appropriate    MUSCULOSKELETAL  Muscle Strength and Tone:no tremor, no tic  Abnormal Involuntary Movements: No  Gait and Station: non-ataxic    PSYCHIATRIC   Level of Consciousness: awake and alert   Orientation: person, place and situation  Grooming: Casually dressed and Well groomed  Psychomotor Behavior: normal, cooperative  Speech: normal tone, normal rate, normal pitch, normal volume  Language: grossly intact  Mood: "good"  Affect: Consistent with mood  Thought Process: linear, logical  Associations: intact   Thought Content: less SI, denies HI and no delusions  Perceptions: denies AH and denies  VH  Memory: Able to recall past events, Remote intact and Recent intact  Attention:Attends to interview without distraction  Fund of Knowledge: Aware of current events and Vocabulary appropriate   Estimate if Intelligence: Average based on work/education history, vocabulary and mental status exam  Insight: has awareness of illness  Judgment: behavior is adequate to circumstances        DIAGNOSTIC TESTING   Laboratory Results  Recent Results (from the past 24 hour(s))   Urinalysis, Reflex to Urine Culture Urine, Clean Catch    Collection Time: 04/24/22  9:00 AM    Specimen: Urine   Result Value Ref Range    Specimen UA Urine, Clean Catch     Color, UA Yellow Yellow, Straw, Madhavi    Appearance, UA Clear Clear    pH, UA 7.0 5.0 - 8.0    Specific Gravity, UA 1.015 1.005 - 1.030    Protein, UA Negative Negative    Glucose, UA 1+ (A) Negative    Ketones, UA Negative Negative    Bilirubin (UA) Negative Negative    Occult Blood UA Negative Negative    Nitrite, UA Negative Negative    Urobilinogen, UA Negative <2.0 EU/dL    Leukocytes, UA Negative Negative "              MEDICAL DECISION MAKING          ASSESSMENT         Bipolar II mre depressed, severe, without psychotic features  Suicide attempt by overdose  MARY LOU  Panic Disorder  PTSD  OCD     Nicotine Dependence     Borderline Personality Disorder           PROBLEM LIST AND MANAGEMENT PLANS          Mood: pt counseled  - stopped cymbalta, not effective for this patient/symptoms not improving  - resumed abilify 5 mg PO qd  -increase to 7 mg PO qd -increased to 10 mg PO qd    - started lithium 300 mg PO qhs -increased to 450 mg PO qhs  -increased to 600 mg PO qhs   - follow up with outpatient mental health provider after discharge  - pt counseled     Suicidal ideations/Suicide attempt via overdose  - continue psychiatric hospitalization  - provide psychotherapeutic interventions and medication management  - monitor      Insomnia: pt counseled  - started/continue trazodone 50 mg PO qhs  -increase to 100 mg PO qhs -increased to 150 mg PO qhs   - resumed klonopin at 0.5 mg PO qhs, will plan to taper off given repeated overdoses and alcohol abuse  -decreased to 0.25 mg PO qhs  - vistaril prn  - Encourage sleep hygiene including attempting not to sleep during the day     Anxiety/PTSD: pt counseled  - started/continue hydroxyzine 50 mg PO q 6 hours PRN  -increase to 100 mg q 6 hours PRN  - pt counseled     Nicotine Dependence: pt counseld  - Continue at nicotine 14 mg patch dermal q day     Borderline Personality Disorder: pt counseled  - meds off label as above            Discussed diagnosis, risks and benefits of proposed treatment vs alternative treatments vs no treatment, potential side effects of these treatments and the inherent unpredictability of treatment. The patient expresses understanding of the above and displays the capacity to agree with this treatment given said understanding. Patient also agrees that, currently, the benefits outweigh the risks and would like to pursue/continue treatment at this  time.      DISCHARGE PLANNING  Expected Disposition Plan: Home or Self Care      NEED FOR CONTINUED HOSPITALIZATION  Psychiatric illness continues to pose a potential threat to life or bodily function, of self or others, thereby requiring the need for continued inpatient psychiatric hospitalization: Yes, due to: danger to self, as evidenced by:  Ongoing concerns with SI.    Protective inpatient pyschiatric hospitalization required while a safe disposition plan is enacted: Yes    Patient stabilized and ready for discharge from inpatient psychiatric unit: No        STAFF:   Adeel Garcia III, MD  Psychiatry

## 2022-04-25 VITALS
SYSTOLIC BLOOD PRESSURE: 128 MMHG | WEIGHT: 181.75 LBS | DIASTOLIC BLOOD PRESSURE: 66 MMHG | HEIGHT: 62 IN | RESPIRATION RATE: 20 BRPM | BODY MASS INDEX: 33.45 KG/M2 | TEMPERATURE: 98 F | HEART RATE: 105 BPM

## 2022-04-25 LAB — LITHIUM SERPL-SCNC: 0.3 MMOL/L (ref 0.6–1.2)

## 2022-04-25 PROCEDURE — 36415 COLL VENOUS BLD VENIPUNCTURE: CPT | Performed by: STUDENT IN AN ORGANIZED HEALTH CARE EDUCATION/TRAINING PROGRAM

## 2022-04-25 PROCEDURE — 90833 PSYTX W PT W E/M 30 MIN: CPT | Mod: ,,, | Performed by: STUDENT IN AN ORGANIZED HEALTH CARE EDUCATION/TRAINING PROGRAM

## 2022-04-25 PROCEDURE — 80178 ASSAY OF LITHIUM: CPT | Performed by: STUDENT IN AN ORGANIZED HEALTH CARE EDUCATION/TRAINING PROGRAM

## 2022-04-25 PROCEDURE — 25000003 PHARM REV CODE 250: Performed by: STUDENT IN AN ORGANIZED HEALTH CARE EDUCATION/TRAINING PROGRAM

## 2022-04-25 PROCEDURE — S4991 NICOTINE PATCH NONLEGEND: HCPCS | Performed by: STUDENT IN AN ORGANIZED HEALTH CARE EDUCATION/TRAINING PROGRAM

## 2022-04-25 PROCEDURE — 90833 PR PSYCHOTHERAPY W/PATIENT W/E&M, 30 MIN (ADD ON): ICD-10-PCS | Mod: ,,, | Performed by: STUDENT IN AN ORGANIZED HEALTH CARE EDUCATION/TRAINING PROGRAM

## 2022-04-25 PROCEDURE — 99239 HOSP IP/OBS DSCHRG MGMT >30: CPT | Mod: ,,, | Performed by: STUDENT IN AN ORGANIZED HEALTH CARE EDUCATION/TRAINING PROGRAM

## 2022-04-25 PROCEDURE — 99239 PR HOSPITAL DISCHARGE DAY,>30 MIN: ICD-10-PCS | Mod: ,,, | Performed by: STUDENT IN AN ORGANIZED HEALTH CARE EDUCATION/TRAINING PROGRAM

## 2022-04-25 RX ORDER — IBUPROFEN 200 MG
1 TABLET ORAL DAILY
Qty: 28 PATCH | Refills: 0 | Status: SHIPPED | OUTPATIENT
Start: 2022-04-26 | End: 2023-03-23

## 2022-04-25 RX ORDER — TRAZODONE HYDROCHLORIDE 150 MG/1
150 TABLET ORAL NIGHTLY
Qty: 30 TABLET | Refills: 0 | Status: ON HOLD | OUTPATIENT
Start: 2022-04-25 | End: 2024-02-29

## 2022-04-25 RX ORDER — ARIPIPRAZOLE 10 MG/1
10 TABLET ORAL DAILY
Qty: 30 TABLET | Refills: 0 | Status: ON HOLD | OUTPATIENT
Start: 2022-04-26 | End: 2023-10-14 | Stop reason: HOSPADM

## 2022-04-25 RX ORDER — HYDROXYZINE HYDROCHLORIDE 50 MG/1
100 TABLET, FILM COATED ORAL EVERY 6 HOURS PRN
Qty: 120 TABLET | Refills: 0 | Status: SHIPPED | OUTPATIENT
Start: 2022-04-25 | End: 2022-05-17

## 2022-04-25 RX ORDER — TRAZODONE HYDROCHLORIDE 150 MG/1
150 TABLET ORAL NIGHTLY
Qty: 30 TABLET | Refills: 0 | Status: SHIPPED | OUTPATIENT
Start: 2022-04-25 | End: 2022-04-25 | Stop reason: SDUPTHER

## 2022-04-25 RX ORDER — ARIPIPRAZOLE 10 MG/1
10 TABLET ORAL DAILY
Qty: 30 TABLET | Refills: 0 | Status: SHIPPED | OUTPATIENT
Start: 2022-04-26 | End: 2022-04-25 | Stop reason: SDUPTHER

## 2022-04-25 RX ORDER — LITHIUM CARBONATE 300 MG/1
600 TABLET, FILM COATED, EXTENDED RELEASE ORAL NIGHTLY
Qty: 60 TABLET | Refills: 0 | Status: SHIPPED | OUTPATIENT
Start: 2022-04-25 | End: 2022-04-25 | Stop reason: SDUPTHER

## 2022-04-25 RX ORDER — LITHIUM CARBONATE 300 MG/1
600 TABLET, FILM COATED, EXTENDED RELEASE ORAL NIGHTLY
Qty: 60 TABLET | Refills: 0 | Status: SHIPPED | OUTPATIENT
Start: 2022-04-25 | End: 2023-05-15

## 2022-04-25 RX ORDER — HYDROXYZINE PAMOATE 100 MG/1
100 CAPSULE ORAL EVERY 6 HOURS PRN
Qty: 60 CAPSULE | Refills: 0 | Status: ON HOLD | OUTPATIENT
Start: 2022-04-25 | End: 2023-10-14 | Stop reason: SDUPTHER

## 2022-04-25 RX ORDER — IBUPROFEN 200 MG
1 TABLET ORAL DAILY
Qty: 28 PATCH | Refills: 0 | Status: SHIPPED | OUTPATIENT
Start: 2022-04-26 | End: 2022-04-25 | Stop reason: SDUPTHER

## 2022-04-25 RX ADMIN — ARIPIPRAZOLE 10 MG: 10 TABLET ORAL at 08:04

## 2022-04-25 RX ADMIN — NICOTINE 1 PATCH: 21 PATCH, EXTENDED RELEASE TRANSDERMAL at 08:04

## 2022-04-25 NOTE — PROGRESS NOTES
Psychotherapy:  · Target symptoms: depression, anxiety   · Why chosen therapy is appropriate versus another modality: relevant to diagnosis  · Outcome monitoring methods: self-report  · Therapeutic intervention type: supportive psychotherapy  · Topics discussed/themes: motivational, educational, safety plan (boyfriend will keep pills from now on), work stress, building skills sets for symptom management, symptom recognition  · The patient's response to the intervention is accepting. The patient's progress toward treatment goals is good.   · Duration of intervention: 17 minutes.

## 2022-04-25 NOTE — PSYCH
Patient will be following up with [facility] at [address] [place] [address] in [place], [phone].  Appointment will be on 4/27/2022 at 10 am.  Patient will receive tobacco cessation therapy and addictions counseling along with substance abuse therapy due to a positive UDS on admit.  AVS faxed on 4/25/2022 at 1:01 pm to [phone].

## 2022-04-25 NOTE — NURSING
Family member is here for transportation to home. Patient received all personal items along with discharge papers and prescriptions.  Instructed patient on Covid-19 precautions. Patient denies distress, denies SI/HI. NAD noted. Patient was given a mask to wear to be discharged to home. Patient escorted by [place] downstairs for family member to transport to home. Patient was wearing the face mask upon discharge.  NAD noted.

## 2022-04-25 NOTE — NURSING
"Discharge instructions on medication, self care, suicide crisis hotline, and scheduled follow up appointment with the local mental health clinic. Patient voiced understanding of all discharge teachings. Patient denies SI/HI. Patient denies distress and reports " I am feeling so much better".  Patient is awaiting for transportation per discharge orders.     "
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
## 2022-04-25 NOTE — PLAN OF CARE
Mood and affect much improved.  Smiling and interacting well.  Said she's looking forward to going home in am.  Denies thoughts of suicide.  Voicing needs well.  Accepted hs meds.  Stressed compliance with meds upon discharge.

## 2022-04-25 NOTE — MEDICAL/APP STUDENT
"PSYCHIATRY DAILY INPATIENT PROGRESS NOTE  SUBSEQUENT HOSPITAL VISIT    ENCOUNTER DATE: 4/25/2022  [facility]    DATE OF ADMISSION: 4/18/2022  3:13 PM  LENGTH OF STAY: 7 days    CHIEF COMPLAINT   Staci Hodge is a 41 y.o. female, seen during daily garcia rounds on the inpatient unit.  Staci Hodge presented with the chief complaint of depression and suicide ideation    The patient was seen and examined. The chart was reviewed.     Reviewed notes from MD and labs from the last 24 hours.    The patient's case was discussed with the treatment team/care providers today including MD and     Staff reports no behavioral or management issues.     The patient has been compliant with treatment.    Subjective 04/25/2022     Today the patient reports no new symptoms, improved mood, denies SI/HI.    Affirms suicide safety plan: patient will call her cousin who is a safe person if thoughts of suicide come up or if she makes a suicide plan, before acting on it. Patient also says she can reach out to her boyfriend, who has been very supportive and understanding, and that she can talk with him more openly.    The patient denies any side effects to medications.     Endorses some anxiety, which has improved since admission.    Expresses interest in continuing medications and psychotherapy upon d/c.    Interim/overnight events per report/notes:    Did ok overnight, noted her roommate "had a meltdown that had nothing to do with me" and nurses left roommate needed to be moved to a different room, so pt got a new roommate overnight.  Mood has been good, 75% improved from admission.     Expresses interest in psychotherapy, "definitely wants to do that".    Psychiatric ROS (observed, reported, or endorsed/denied):  Depressed mood - denies  Interest/pleasure/anhedonia: denies  Guilt/hopelessness/worthlessness - Continuing, recurring  Changes in Sleep - cannot sleep well in the hospital, but feels will fall asleep " fine at home  Changes in Appetite - denies  Changes in Concentration - improving steadily  Changes in Energy - improving steadily  PMA/R- denies  Suicidal- active/passive ideations - denies  Homicidal ideations: active/passive ideations - denies    Hallucinations - denies  Delusions - denies  Disorganized behavior - denies  Disorganized speech - denies  Negative symptoms - denies    Elevated mood - denies  Decreased need for sleep - denies  Grandiosity - denies  Racing thoughts - denies  Impulsivity - denies  Irritability- denies  Increased energy - denies  Distractibility - denies  Increase in goal-directed activity or PMA- denies    Symptoms of MARY LOU - improving steadily  Symptoms of Panic Disorder- denies  Symptoms of PTSD - denies    Overall progress: Patient is showing moderate improvement    Psychotherapy:  · Target symptoms: depression, SI  · Why chosen therapy is appropriate versus another modality: relevant to diagnosis  · Outcome monitoring methods: self-report, feedback from family  · Therapeutic intervention type: behavior modifying psychotherapy, supportive psychotherapy  · Topics discussed/themes: building skills sets for symptom management, symptom recognition  · The patient's response to the intervention is accepting. The patient's progress toward treatment goals is good.   · Duration of intervention: 15 minutes.    Medical ROS  Review of Systems   Constitutional: Negative for chills, fever and malaise/fatigue.   HENT: Negative for nosebleeds.    Eyes: Negative for blurred vision and double vision.   Respiratory: Negative for cough and shortness of breath.    Cardiovascular: Negative for chest pain.   Gastrointestinal: Negative for nausea and vomiting.   Genitourinary: Negative for urgency.   Musculoskeletal: Negative for back pain.   Skin: Negative for rash.   Neurological: Negative for dizziness, tremors, sensory change, seizures, loss of consciousness, weakness and headaches.    Psychiatric/Behavioral: Positive for depression. Negative for hallucinations, memory loss, substance abuse and suicidal ideas. The patient is nervous/anxious. The patient does not have insomnia.      PAST MEDICAL HISTORY   Past Medical History:   Diagnosis Date    Abnormal Pap smear of cervix age 25    no treatement    Anxiety     Breast cancer     Cancer 07/11/2018    breast cancer    Cervical spondylosis 2/9/2018    Colon polyp     Depression     Ectopic pregnancy 2/26/2019    General anesthetics causing adverse effect in therapeutic use     Slow to awaken/ Memory problems-post-op to day 3 after Mastectomy    Headache     History of psychiatric hospitalization     age 20 for SI    Hx of psychiatric care     Hypertension     Peutz-Jeghers syndrome     PONV (postoperative nausea and vomiting)     Psychiatric problem     Right carpal tunnel syndrome 3/9/2017    Self-harming behavior     Suicide attempt     Therapy      PSYCHOTROPIC MEDICATIONS   Scheduled Meds:   ARIPiprazole  10 mg Oral Daily    clonazePAM  0.25 mg Oral Nightly    lithium  600 mg Oral QHS    nicotine  1 patch Transdermal Daily    traZODone  150 mg Oral QHS     Continuous Infusions:  PRN Meds:.acetaminophen, aspirin, hydrOXYzine HCL    EXAMINATION    VITALS   Vitals:    04/24/22 0721 04/24/22 0800 04/24/22 2000 04/25/22 0800   BP: 121/83  133/89 128/66   BP Location: Left arm  Right arm Right arm   Patient Position: Sitting  Sitting Sitting   Pulse: 107  104 105   Resp: 20  20 20   Temp: 97.5 °F (36.4 °C)  97.9 °F (36.6 °C) 97.9 °F (36.6 °C)   TempSrc: Temporal  Temporal Temporal   Weight:  82.5 kg (181 lb 12.3 oz)     Height:           Body mass index is 33.25 kg/m².    CONSTITUTIONAL  General Appearance: age appropriate, well nourished, well dressed    MUSCULOSKELETAL  Muscle Strength and Tone:no tremor, no tic  Abnormal Involuntary Movements: No  Gait and Station: non-ataxic    PSYCHIATRIC   Level of Consciousness: awake  and alert   Orientation: person, place and situation  Grooming: Casually dressed and Well groomed  Psychomotor Behavior: normal, cooperative  Speech: normal tone, normal rate, normal pitch, normal volume  Language: grossly intact  Mood: great  Affect: Consistent with mood  Thought Process: linear, logical  Associations: intact   Thought Content: denies SI, denies HI and no delusions  Perceptions: denies AH, denies  VH, no TH and not RIS  Memory: Able to recall past events, Remote intact and Recent intact  Attention:Attends to interview without distraction  Fund of Knowledge: Aware of current events and Vocabulary appropriate   Estimate if Intelligence:  Average based on work/education history, vocabulary and mental status exam  Insight: has awareness of illness  Judgment: behavior is adequate to circumstances    DIAGNOSTIC TESTING   Laboratory Results  Recent Results (from the past 24 hour(s))   Urinalysis, Reflex to Urine Culture Urine, Clean Catch    Collection Time: 04/24/22  9:00 AM    Specimen: Urine   Result Value Ref Range    Specimen UA Urine, Clean Catch     Color, UA Yellow Yellow, Straw, Madhavi    Appearance, UA Clear Clear    pH, UA 7.0 5.0 - 8.0    Specific Gravity, UA 1.015 1.005 - 1.030    Protein, UA Negative Negative    Glucose, UA 1+ (A) Negative    Ketones, UA Negative Negative    Bilirubin (UA) Negative Negative    Occult Blood UA Negative Negative    Nitrite, UA Negative Negative    Urobilinogen, UA Negative <2.0 EU/dL    Leukocytes, UA Negative Negative       MEDICAL DECISION MAKING    ASSESSMENT:   Patient is a 41 y.o. female with a past psychiatric history of MDD, MARY LOU, NSSI, a suicide attempt 4 weeks ago currently admitted to the inpatient unit with severe suicidal ideation s/p failed suicide attempt 2 days ago. Patient has been stabilized and is no longer in immediate danger to self requiring acute hospitalization.     PROBLEM LIST AND MANAGEMENT PLANS    1. SI  -- safety plan, contract to  reach out to cousin when ready to act on suicidal thoughts  -- no weapons in the home  -- compliance with anti-depression medication  -- psychotherapy interested in starting as soon as possible: goals including learn coping skills with suicidal ideation and depressed mood    2. Major Depressive Disorder/Bipolar  -- no SSRI, concern for manic sxs  -- Hydroxyzine for sleep changes  -- Aripiprazole 10 mg PO daily    3. Generalized Anxiety Disorder with panic d/o  -- Clonazepam  -- psychotherapy    4. 4. Non-suicidal self injury  -- NSSI targeted psychotherapy and medication management upon d/c      Discussed diagnosis, risks and benefits of proposed treatment vs alternative treatments vs no treatment, potential side effects of these treatments and the inherent unpredictability of treatment. The patient expresses understanding of the above and displays the capacity to agree with this treatment given said understanding. Patient also agrees that, currently, the benefits outweigh the risks and would like to pursue/continue treatment at this time.      DISCHARGE PLANNING  Expected Disposition Plan: {Select Anticipated Discharge Plan:42143}      NEED FOR CONTINUED HOSPITALIZATION  Psychiatric illness continues to pose a potential threat to life or bodily function, of self or others, thereby requiring the need for continued inpatient psychiatric hospitalization: No      Patient stabilized and ready for discharge from inpatient psychiatric unit: Yes      STAFF:   [employer] student  Psychiatry

## 2022-04-25 NOTE — CARE UPDATE
[facility]  Encounter Date: 2022  3:13 PM    Discharge Date No discharge date for patient encounter.   Hospital Account: [id number]    MRN: [id number]   Guarantor: ONEYDA HODGE   Contact Serial #: [id number]         ENCOUNTER             Patient Class: IP Psych   Unit: [facility]   Hospital Service: Psychiatry   Bed: 208 D   Admitting Provider: Adeel Garcia Iii, Md   Referring Physician: Adeel Garcia III   Attending Provider: Adeel Garcia Iii, Md   Adm Diagnosis: Suicidal ideations [R45.*      PATIENT                  Name: ONEYDA HODGE : 1980 (41 yrs)   Address: [address] [place], [place] [address] Sex: Female   City: [place] [address]       Primary Care Provider: Shilpi Clayton NP         Primary Phone: [phone]   EMERGENCY CONTACT   Contact Name Legal Guardian? Relationship to Patient Home Phone Work Phone Mobile Phone   1. Issac Russo  2. Hodge,Jono      Significant other  Father           [phone]      GUARANTOR                  Guarantor: ONEYDA HODGE       : 1980   Address: [facility] [address]    Sex: [place]     [place] [address] Guarantor  Type: B/H   Relation to Patient: Self         Home Phone: [phone]   Guarantor ID: [id number]         Work Phone:     GUARANTOR EMPLOYER     Employer: [employer]           Status: FULL TIME      COVERAGE          PRIMARY INSURANCE   Payor / Plan: [facility] CHOICE PLUS       Group Number: [id number]       Subscriber Name: ONEYDA HODGE Subscriber : 1980   Subscriber ID: [id number] Pat. Rel. to Subscriber: Self   Insurance Address: [id number]  [place] [address]       SECONDARY INSURANCE   Payor / Plan: - No Secondary Coverage -       Group Number:         Subscriber Name:   Subscriber :     Subscriber ID:   Pat. Rel. to Subscriber:     Insurance Address:            Contact Serial # ([id number])         2022    Chart ID  ([id number])

## 2022-04-25 NOTE — DISCHARGE SUMMARY
"Discharge Summary  Psychiatry      Admit Date: 4/18/2022    Discharge Date and Time:  04/25/2022 10:30 AM    Attending Physician: Adeel Garcia III, MD     Discharge Provider: Adeel Garcia III    Reason for Admission:  CHIEF COMPLAINT   Staci Hodge is a 41 y.o. female with a past psychiatric history of depression, anxiety currently admitted to the inpatient unit with the following chief complaint: thoughts of death/suicide    HPI   The patient was seen and examined. The chart was reviewed.     The patient presented to the ER on 4/18/2022 with complaints of thoughts of death/suicide     The patient was medically cleared and admitted to the BHU.           Per RN:  Patient to ED with PEC in hand from [facility]. Patient with c/o depression and increased thoughts of SI. Patient denies HI. Patient reports took "a bunch of Klonopin 1 mg and Metoprolol 25 mg last night. Patient awake and alert in triage.     Per ED MD:  Staci Hodge is a 41 y.o. female presents with suicidal ideation today  Suicidal ideation and depression issues, several social stressors noted today  Patient has been admitted to the hospital for for suicidal ideation and depression  Last Behavioral Health Unit admission was March 2022 with identical presentation  Patient denies any illegal drug use but severe anxiety depression almost daily now     Per RN:  Patient calm and cooperative-answers all questions without hesitation; tearful at times; reports depression 9/10 and anxiety 10/10. Patient states, she has been battling depression since the age of 20 and cannot recall any type of antidepressants that have worked for her. Five weeks ago she was started on cymbalta during a BHU admit.  At that time she had again attempted suicide by taking all her pills. She states, "she took all her metoprolol and klonopin last night and did not come to the emergency room. States I just drank a bunch of water and begged my boyfriend " "not to bring me.  Continues to express thoughts of suicide--contracts for safety at this time for duration of U stay; denies intentions to harm others at this time. Denies auditory and/or visual hallucinations.  Affect and emotion tearful, depressed, and hopeless.  Thoughts of hopelessness and helplessness.  Interacts appropriately with peers and staff. Accepts meals and medications.  She reports her boyfriend Issac Russo as her support system; phone number [phone].        Psychiatric interview:  "I took a whole bunch of pills... I wanted to die... I been having more suicidal thoughts, I think about it all the time.... I feel useless, worthless, like it wouldn't make a difference.... I just don't want to feel like this anymore." She denies any new stressors or specific precipitating events. Reports "I took the pills... wrote my boyfriend and then went to bed." [place] [facility] [place]."        Procedures Performed: * No surgery found *    Hospital Course:    Patient was admitted to the inpatient psychiatry unit after being medically cleared in the ED. Chart and labs were reviewed. The patient was stabilized as follows:         Mood: pt counseled  - stopped cymbalta, not effective for this patient/symptoms not improving  - resumed abilify 5 mg PO qd  -increase to 7 mg PO qd -increased to 10 mg PO qd    - started lithium 300 mg PO qhs -increased to 450 mg PO qhs  -increased to 600 mg PO qhs   - follow up with outpatient mental health provider after discharge  - pt counseled     Suicidal ideations/Suicide attempt via overdose  - continue psychiatric hospitalization  - provide psychotherapeutic interventions and medication management  - monitor      Insomnia: pt counseled  - started/continue trazodone 50 mg PO qhs  -increase to 100 mg PO qhs -increased to 150 mg PO qhs   - resumed klonopin at 0.5 mg PO qhs, will plan to taper off given repeated overdoses and alcohol abuse  -decreased to 0.25 " mg PO qhs  - vistaril prn  - Encourage sleep hygiene including attempting not to sleep during the day     Anxiety/PTSD: pt counseled  - started/continue hydroxyzine 50 mg PO q 6 hours PRN  -increase to 100 mg q 6 hours PRN  - pt counseled     Nicotine Dependence: pt counseld  - Continue at nicotine 14 mg patch dermal q day     Borderline Personality Disorder: pt counseled  - meds off label as above           During hospitalization, the patient was encouraged to go to both groups and individual counseling. Patient was monitored for any side effects. A meeting was held with multidisciplinary team prior to discharge and pt's diagnosis, current medications, and follow up were discussed. The patient has been compliant with treatment and can adequately attend to activities of daily living in an independent manner. The patient denies any side effects. The patient denies SI, HI, plan or intent for self harm or harm to others. The patient is no longer a danger to self or others nor gravely disabled disabled. Patient discharged  in stable condition with scheduled outpatient follow up.      Discussed diagnosis, risks and benefits of proposed treatment vs alternative treatments vs no treatment, and potential side effects of these treatments.  The patient expresses understanding of the above and displays the capacity to agree with this treatment given said understanding.  Patient also agrees that, currently, the benefits outweigh the risks and would like to pursue treatment at this time.      Discharge MSE: stated age, casually dressed, well groomed.  No psychomotor agitation or retardation.  No abnormal involuntary movements.  Gait normal.  Speech normal, conversational.  Language fluent English. Mood fine.  Affect normal range, pleasant, euthymic.  Thought process linear.  Associations intact.  Denies suicidal or homicidal ideation.  Denies auditory hallucinations, paranoid ideation, ideas of reference.  Memory intact.   Attention intact.  Fund of knowledge intact.  Insight intact.  Judgment intact.  Alert and oriented to person, place, time.      Tobacco Usage:  Is patient a smoker? Yes  Does patient want prescription for Tobacco Cessation? Yes  Does patient want counseling for Tobacco Cessation? Yes    If patient would like to quit, then over the counter nicotine patch could be used. The patient could also follow up with his PCP or psychiatric provider for other alternatives.      Final Diagnoses:     Principal Problem: Bipolar II mre depressed, severe, without psychotic features   Secondary Diagnoses:     Suicide attempt by overdose  MARY LOU  Panic Disorder  PTSD  OCD     Nicotine Dependence     Borderline Personality Disorder        Labs:  Admission on 04/18/2022   Component Date Value Ref Range Status    Hemoglobin A1C 04/18/2022 5.3  4.0 - 5.6 % Final    Estimated Avg Glucose 04/18/2022 105  68 - 131 mg/dL Final    Specimen UA 04/24/2022 Urine, Clean Catch   Final    Color, UA 04/24/2022 Yellow  Yellow, Straw, Madhavi Final    Appearance, UA 04/24/2022 Clear  Clear Final    pH, UA 04/24/2022 7.0  5.0 - 8.0 Final    Specific Gravity, UA 04/24/2022 1.015  1.005 - 1.030 Final    Protein, UA 04/24/2022 Negative  Negative Final    Glucose, UA 04/24/2022 1+ (A) Negative Final    Ketones, UA 04/24/2022 Negative  Negative Final    Bilirubin (UA) 04/24/2022 Negative  Negative Final    Occult Blood UA 04/24/2022 Negative  Negative Final    Nitrite, UA 04/24/2022 Negative  Negative Final    Urobilinogen, UA 04/24/2022 Negative  <2.0 EU/dL Final    Leukocytes, UA 04/24/2022 Negative  Negative Final   Admission on 04/18/2022, Discharged on 04/18/2022   Component Date Value Ref Range Status    SARS-CoV-2 RNA, Amplification, Qual 04/18/2022 Negative  Negative Final    Sodium 04/18/2022 138  136 - 145 mmol/L Final    Potassium 04/18/2022 4.4  3.5 - 5.1 mmol/L Final    Chloride 04/18/2022 102  95 - 110 mmol/L Final    CO2  04/18/2022 26  23 - 29 mmol/L Final    Glucose 04/18/2022 102  70 - 110 mg/dL Final    BUN 04/18/2022 12  6 - 20 mg/dL Final    Creatinine 04/18/2022 0.8  0.5 - 1.4 mg/dL Final    Calcium 04/18/2022 9.6  8.7 - 10.5 mg/dL Final    Total Protein 04/18/2022 7.6  6.0 - 8.4 g/dL Final    Albumin 04/18/2022 4.4  3.5 - 5.2 g/dL Final    Total Bilirubin 04/18/2022 0.3  0.1 - 1.0 mg/dL Final    Alkaline Phosphatase 04/18/2022 180 (A) 55 - 135 U/L Final    AST 04/18/2022 22  10 - 40 U/L Final    ALT 04/18/2022 37  10 - 44 U/L Final    Anion Gap 04/18/2022 10  8 - 16 mmol/L Final    eGFR if African American 04/18/2022 >60  >60 mL/min/1.73 m^2 Final    eGFR if non African American 04/18/2022 >60  >60 mL/min/1.73 m^2 Final    WBC 04/18/2022 10.05  3.90 - 12.70 K/uL Final    RBC 04/18/2022 5.21  4.00 - 5.40 M/uL Final    Hemoglobin 04/18/2022 14.7  12.0 - 16.0 g/dL Final    Hematocrit 04/18/2022 44.9  37.0 - 48.5 % Final    MCV 04/18/2022 86  82 - 98 fL Final    MCH 04/18/2022 28.2  27.0 - 31.0 pg Final    MCHC 04/18/2022 32.7  32.0 - 36.0 g/dL Final    RDW 04/18/2022 13.6  11.5 - 14.5 % Final    Platelets 04/18/2022 389  150 - 450 K/uL Final    MPV 04/18/2022 10.1  9.2 - 12.9 fL Final    Immature Granulocytes 04/18/2022 0.4  0.0 - 0.5 % Final    Gran # (ANC) 04/18/2022 6.3  1.8 - 7.7 K/uL Final    Immature Grans (Abs) 04/18/2022 0.04  0.00 - 0.04 K/uL Final    Lymph # 04/18/2022 2.3  1.0 - 4.8 K/uL Final    Mono # 04/18/2022 0.7  0.3 - 1.0 K/uL Final    Eos # 04/18/2022 0.7 (A) 0.0 - 0.5 K/uL Final    Baso # 04/18/2022 0.10  0.00 - 0.20 K/uL Final    nRBC 04/18/2022 0  0 /100 WBC Final    Gran % 04/18/2022 63.0  38.0 - 73.0 % Final    Lymph % 04/18/2022 22.6  18.0 - 48.0 % Final    Mono % 04/18/2022 6.5  4.0 - 15.0 % Final    Eosinophil % 04/18/2022 6.5  0.0 - 8.0 % Final    Basophil % 04/18/2022 1.0  0.0 - 1.9 % Final    Differential Method 04/18/2022 Automated   Final    Acetaminophen  (Tylenol), Serum 04/18/2022 <3.0 (A) 10.0 - 20.0 ug/mL Final    Salicylate Lvl 04/18/2022 <5.0 (A) 15.0 - 30.0 mg/dL Final    Specimen UA 04/18/2022 Urine, Clean Catch   Final    Color, UA 04/18/2022 Yellow  Yellow, Straw, Madhavi Final    Appearance, UA 04/18/2022 Clear  Clear Final    pH, UA 04/18/2022 7.0  5.0 - 8.0 Final    Specific Gravity, UA 04/18/2022 1.020  1.005 - 1.030 Final    Protein, UA 04/18/2022 Negative  Negative Final    Glucose, UA 04/18/2022 Negative  Negative Final    Ketones, UA 04/18/2022 Negative  Negative Final    Bilirubin (UA) 04/18/2022 Negative  Negative Final    Occult Blood UA 04/18/2022 Negative  Negative Final    Nitrite, UA 04/18/2022 Negative  Negative Final    Urobilinogen, UA 04/18/2022 Negative  <2.0 EU/dL Final    Leukocytes, UA 04/18/2022 Negative  Negative Final    Benzodiazepines 04/18/2022 Presumptive Positive (A) Negative Final    Methadone metabolites 04/18/2022 Negative  Negative Final    Cocaine (Metab.) 04/18/2022 Negative  Negative Final    Opiate Scrn, Ur 04/18/2022 Negative  Negative Final    Barbiturate Screen, Ur 04/18/2022 Presumptive Positive (A) Negative Final    Amphetamine Screen, Ur 04/18/2022 Negative  Negative Final    THC 04/18/2022 Negative  Negative Final    Phencyclidine 04/18/2022 Negative  Negative Final    Creatinine, Urine 04/18/2022 81.5  15.0 - 325.0 mg/dL Final    Toxicology Information 04/18/2022 SEE COMMENT   Final    TSH 04/18/2022 2.548  0.400 - 4.000 uIU/mL Final    Alcohol, Serum 04/18/2022 <10  <10 mg/dL Final    Vit D, 25-Hydroxy 04/18/2022 18 (A) 30 - 96 ng/mL Final    Preg Test, Ur 04/18/2022 Negative   Final    Free T4 04/18/2022 0.90  0.71 - 1.51 ng/dL Final    Vitamin B-12 04/18/2022 636  210 - 950 pg/mL Final    Folate 04/18/2022 8.1  4.0 - 24.0 ng/mL Final         Discharged Condition: stable and improved; not currently a danger to self/others or gravely disabled    Disposition: Home or Self Care    Is  patient being discharged on multiple neuroleptics? No    Follow Up/Patient Instructions:     · Take all medications as prescribed.  · Attend all psychiatric and medical follow up appointments.   · Abstain from all drugs and alcohol.  · Call the crisis line at: [phone] for help in a crisis and emergent situations or call 911 and Return to ED for any acute worsening of your condition including suicidal or homicidal ideations      Discharge Procedure Orders   Diet Adult Regular     Notify your health care provider if you experience any of the following:  temperature >100.4     Notify your health care provider if you experience any of the following:  persistent nausea and vomiting or diarrhea     Notify your health care provider if you experience any of the following:   Order Comments: Suicidal thoughts, homicidal thoughts, or any other changes in mental status  If you would like immediate help/crisis counseling, please call [phone] (TALK). Through this toll-free phone number for a network of crisis centers across the country. These centers staff their lines with people who are trained to listen and offer support to people in emotional crisis. If you are in an emergency, please call 911.     Notify your health care provider if you experience any of the following:  increased confusion or weakness     Notify your health care provider if you experience any of the following:  persistent dizziness, light-headedness, or visual disturbances     Reason for not Prescribing Nicotine Replacement     Order Specific Question Answer Comments   Reason for not Prescribing: Other (specify)    Other (Specify): duplicate      Activity as tolerated      Follow-up Information     [facility] Follow up.    Specialties: Psychology, Psychiatry, Behavioral Health  Contact information:  [address] [address] [phone]                           Follow up apt: [facility] [place]      Medications:  Reconciled  Home Medications:      Medication List      START taking these medications    ARIPiprazole 10 MG Tab  Commonly known as: ABILIFY  Take 1 tablet (10 mg total) by mouth once daily.  Start taking on: April 26, 2022     lithium 300 MG CR tablet  Commonly known as: LITHOBID  Take 2 tablets (600 mg total) by mouth every evening.     nicotine 21 mg/24 hr  Commonly known as: NICODERM CQ  Place 1 patch onto the skin once daily.  Start taking on: April 26, 2022     traZODone 150 MG tablet  Commonly known as: DESYREL  Take 1 tablet (150 mg total) by mouth every evening.        CHANGE how you take these medications    hydrOXYzine 100 MG capsule  Commonly known as: VISTARIL  Take 1 capsule (100 mg total) by mouth every 6 (six) hours as needed (Insomnia or anxiety).  What changed:   · medication strength  · how much to take        CONTINUE taking these medications    atorvastatin 20 MG tablet  Commonly known as: LIPITOR  Take 20 mg by mouth every evening.        STOP taking these medications    albuterol 1.25 mg/3 mL Nebu  Commonly known as: ACCUNEB     butalbital-acetaminophen-caffeine -40 mg -40 mg per tablet  Commonly known as: FIORICET, ESGIC     clonazePAM 1 MG tablet  Commonly known as: KlonoPIN     DULoxetine 30 MG capsule  Commonly known as: CYMBALTA     ergocalciferol 50,000 unit Cap  Commonly known as: ERGOCALCIFEROL     gabapentin 600 MG tablet  Commonly known as: NEURONTIN     paroxetine 20 MG tablet  Commonly known as: PAXIL        ASK your doctor about these medications    pantoprazole 40 MG tablet  Commonly known as: PROTONIX  Take 1 tablet (40 mg total) by mouth once daily.              Diet: regular     Activity as tolerated    Total time spent discharging patient: 34 minutes    Adeel Garcia III, MD  Psychiatry

## 2022-04-25 NOTE — NURSING
Rested intermittently with closed eyes and even resp for 6.5 hours, as pt remains now.  Modified VC and all precautions maintained.  Pathways clear and bed in low position.

## 2022-05-03 ENCOUNTER — PATIENT MESSAGE (OUTPATIENT)
Dept: INTERNAL MEDICINE | Facility: CLINIC | Age: 42
End: 2022-05-03
Payer: COMMERCIAL

## 2022-05-03 RX ORDER — BUPROPION HYDROCHLORIDE 150 MG/1
TABLET, EXTENDED RELEASE ORAL
COMMUNITY
Start: 2022-03-28 | End: 2022-05-17

## 2022-05-03 RX ORDER — BUTALBITAL, ACETAMINOPHEN AND CAFFEINE 50; 325; 40 MG/1; MG/1; MG/1
1 TABLET ORAL EVERY 6 HOURS PRN
COMMUNITY
Start: 2022-04-25 | End: 2022-05-17 | Stop reason: SDUPTHER

## 2022-05-03 RX ORDER — METOPROLOL SUCCINATE 25 MG/1
25 TABLET, EXTENDED RELEASE ORAL 2 TIMES DAILY
COMMUNITY
Start: 2022-04-08 | End: 2022-05-17 | Stop reason: SDUPTHER

## 2022-05-17 ENCOUNTER — OFFICE VISIT (OUTPATIENT)
Dept: INTERNAL MEDICINE | Facility: CLINIC | Age: 42
End: 2022-05-17
Payer: COMMERCIAL

## 2022-05-17 VITALS
WEIGHT: 181 LBS | OXYGEN SATURATION: 98 % | BODY MASS INDEX: 33.31 KG/M2 | HEIGHT: 62 IN | SYSTOLIC BLOOD PRESSURE: 132 MMHG | DIASTOLIC BLOOD PRESSURE: 80 MMHG | HEART RATE: 80 BPM | RESPIRATION RATE: 20 BRPM

## 2022-05-17 DIAGNOSIS — E66.9 OBESITY (BMI 30.0-34.9): ICD-10-CM

## 2022-05-17 DIAGNOSIS — F31.81 BIPOLAR 2 DISORDER, MAJOR DEPRESSIVE EPISODE: Primary | ICD-10-CM

## 2022-05-17 DIAGNOSIS — R00.0 TACHYCARDIA: ICD-10-CM

## 2022-05-17 DIAGNOSIS — K21.9 GASTROESOPHAGEAL REFLUX DISEASE WITHOUT ESOPHAGITIS: ICD-10-CM

## 2022-05-17 DIAGNOSIS — G43.009 MIGRAINE WITHOUT AURA AND WITHOUT STATUS MIGRAINOSUS, NOT INTRACTABLE: ICD-10-CM

## 2022-05-17 DIAGNOSIS — E55.9 VITAMIN D DEFICIENCY: ICD-10-CM

## 2022-05-17 PROCEDURE — 1159F PR MEDICATION LIST DOCUMENTED IN MEDICAL RECORD: ICD-10-PCS | Mod: CPTII,S$GLB,, | Performed by: NURSE PRACTITIONER

## 2022-05-17 PROCEDURE — 1111F DSCHRG MED/CURRENT MED MERGE: CPT | Mod: CPTII,S$GLB,, | Performed by: NURSE PRACTITIONER

## 2022-05-17 PROCEDURE — 3008F PR BODY MASS INDEX (BMI) DOCUMENTED: ICD-10-PCS | Mod: CPTII,S$GLB,, | Performed by: NURSE PRACTITIONER

## 2022-05-17 PROCEDURE — 3079F DIAST BP 80-89 MM HG: CPT | Mod: CPTII,S$GLB,, | Performed by: NURSE PRACTITIONER

## 2022-05-17 PROCEDURE — 1111F PR DISCHARGE MEDS RECONCILED W/ CURRENT OUTPATIENT MED LIST: ICD-10-PCS | Mod: CPTII,S$GLB,, | Performed by: NURSE PRACTITIONER

## 2022-05-17 PROCEDURE — 1159F MED LIST DOCD IN RCRD: CPT | Mod: CPTII,S$GLB,, | Performed by: NURSE PRACTITIONER

## 2022-05-17 PROCEDURE — 3075F SYST BP GE 130 - 139MM HG: CPT | Mod: CPTII,S$GLB,, | Performed by: NURSE PRACTITIONER

## 2022-05-17 PROCEDURE — 99214 OFFICE O/P EST MOD 30 MIN: CPT | Mod: S$GLB,,, | Performed by: NURSE PRACTITIONER

## 2022-05-17 PROCEDURE — 99214 PR OFFICE/OUTPT VISIT, EST, LEVL IV, 30-39 MIN: ICD-10-PCS | Mod: S$GLB,,, | Performed by: NURSE PRACTITIONER

## 2022-05-17 PROCEDURE — 3044F PR MOST RECENT HEMOGLOBIN A1C LEVEL <7.0%: ICD-10-PCS | Mod: CPTII,S$GLB,, | Performed by: NURSE PRACTITIONER

## 2022-05-17 PROCEDURE — 99999 PR PBB SHADOW E&M-EST. PATIENT-LVL IV: ICD-10-PCS | Mod: PBBFAC,,, | Performed by: NURSE PRACTITIONER

## 2022-05-17 PROCEDURE — 99999 PR PBB SHADOW E&M-EST. PATIENT-LVL IV: CPT | Mod: PBBFAC,,, | Performed by: NURSE PRACTITIONER

## 2022-05-17 PROCEDURE — 3079F PR MOST RECENT DIASTOLIC BLOOD PRESSURE 80-89 MM HG: ICD-10-PCS | Mod: CPTII,S$GLB,, | Performed by: NURSE PRACTITIONER

## 2022-05-17 PROCEDURE — 3008F BODY MASS INDEX DOCD: CPT | Mod: CPTII,S$GLB,, | Performed by: NURSE PRACTITIONER

## 2022-05-17 PROCEDURE — 3044F HG A1C LEVEL LT 7.0%: CPT | Mod: CPTII,S$GLB,, | Performed by: NURSE PRACTITIONER

## 2022-05-17 PROCEDURE — 3075F PR MOST RECENT SYSTOLIC BLOOD PRESS GE 130-139MM HG: ICD-10-PCS | Mod: CPTII,S$GLB,, | Performed by: NURSE PRACTITIONER

## 2022-05-17 RX ORDER — BUTALBITAL, ACETAMINOPHEN AND CAFFEINE 50; 325; 40 MG/1; MG/1; MG/1
1 TABLET ORAL EVERY 6 HOURS PRN
Qty: 30 TABLET | Refills: 0 | Status: SHIPPED | OUTPATIENT
Start: 2022-05-17 | End: 2022-05-27 | Stop reason: SDUPTHER

## 2022-05-17 RX ORDER — CLONAZEPAM 0.5 MG/1
0.5 TABLET ORAL 2 TIMES DAILY
Status: ON HOLD | COMMUNITY
Start: 2022-05-12 | End: 2024-02-29

## 2022-05-17 RX ORDER — PANTOPRAZOLE SODIUM 40 MG/1
40 TABLET, DELAYED RELEASE ORAL DAILY
COMMUNITY
Start: 2022-05-09 | End: 2022-05-17 | Stop reason: SDUPTHER

## 2022-05-17 RX ORDER — PANTOPRAZOLE SODIUM 40 MG/1
40 TABLET, DELAYED RELEASE ORAL DAILY
Qty: 30 TABLET | Refills: 5 | Status: SHIPPED | OUTPATIENT
Start: 2022-05-17 | End: 2022-12-19 | Stop reason: SDUPTHER

## 2022-05-17 RX ORDER — METOPROLOL SUCCINATE 25 MG/1
25 TABLET, EXTENDED RELEASE ORAL 2 TIMES DAILY
Qty: 60 TABLET | Refills: 5 | Status: ON HOLD | OUTPATIENT
Start: 2022-05-17 | End: 2023-10-14 | Stop reason: HOSPADM

## 2022-05-17 RX ORDER — ERGOCALCIFEROL 1.25 MG/1
50000 CAPSULE ORAL
Qty: 12 CAPSULE | Refills: 1 | Status: SHIPPED | OUTPATIENT
Start: 2022-05-17 | End: 2022-12-12

## 2022-05-17 RX ORDER — ERGOCALCIFEROL 1.25 MG/1
50000 CAPSULE ORAL
COMMUNITY
Start: 2022-05-09 | End: 2022-05-17 | Stop reason: SDUPTHER

## 2022-05-19 ENCOUNTER — LAB VISIT (OUTPATIENT)
Dept: LAB | Facility: HOSPITAL | Age: 42
End: 2022-05-19
Attending: NURSE PRACTITIONER
Payer: COMMERCIAL

## 2022-05-19 DIAGNOSIS — Z79.899 ENCOUNTER FOR LONG-TERM (CURRENT) USE OF OTHER MEDICATIONS: Primary | ICD-10-CM

## 2022-05-19 LAB — LITHIUM SERPL-SCNC: 0.4 MMOL/L (ref 0.6–1.2)

## 2022-05-19 PROCEDURE — 80178 ASSAY OF LITHIUM: CPT | Performed by: NURSE PRACTITIONER

## 2022-05-19 PROCEDURE — 36415 COLL VENOUS BLD VENIPUNCTURE: CPT | Performed by: NURSE PRACTITIONER

## 2022-05-24 PROBLEM — E66.811 OBESITY (BMI 30.0-34.9): Status: ACTIVE | Noted: 2022-05-24

## 2022-05-24 PROBLEM — E66.9 OBESITY (BMI 30.0-34.9): Status: ACTIVE | Noted: 2022-05-24

## 2022-05-24 NOTE — PROGRESS NOTES
Subjective:       Patient ID: Staci Hodge is a 41 y.o. female.    Chief Complaint: Follow-up (X hospital- discuss meds)    Patient is known, to me and presents with   Chief Complaint   Patient presents with    Follow-up     X hospital- discuss meds   .  Denies chest pain and shortness of breath.  Patient presents post hospital for suicidal attempt. She is now doing better and seeing her psy doctors and medications seem to help at this time. She has no sihi noted. She needs refills.    HPI  Review of Systems   Constitutional: Negative for activity change, appetite change, fatigue, fever and unexpected weight change.   HENT: Negative for congestion, ear discharge, ear pain, hearing loss, postnasal drip and tinnitus.    Eyes: Negative for photophobia, pain and visual disturbance.   Respiratory: Negative for cough, shortness of breath, wheezing and stridor.    Cardiovascular: Negative for chest pain, palpitations and leg swelling.   Gastrointestinal: Negative for abdominal distention.   Genitourinary: Negative for difficulty urinating, dysuria, frequency, hematuria and urgency.   Musculoskeletal: Negative for arthralgias, back pain, gait problem, joint swelling and neck pain.   Skin: Negative.    Neurological: Negative for dizziness, seizures, syncope, weakness, light-headedness, numbness and headaches.   Hematological: Negative for adenopathy. Does not bruise/bleed easily.   Psychiatric/Behavioral: Negative for agitation, behavioral problems, confusion, decreased concentration, dysphoric mood, hallucinations, self-injury, sleep disturbance and suicidal ideas. The patient is not nervous/anxious and is not hyperactive.        Objective:      Physical Exam  Constitutional:       General: She is not in acute distress.     Appearance: She is well-developed.   HENT:      Head: Normocephalic and atraumatic.      Right Ear: External ear normal.      Left Ear: External ear normal.      Mouth/Throat:      Pharynx: No  oropharyngeal exudate.   Eyes:      General:         Right eye: No discharge.         Left eye: No discharge.      Conjunctiva/sclera: Conjunctivae normal.      Pupils: Pupils are equal, round, and reactive to light.   Neck:      Thyroid: No thyromegaly.      Vascular: No JVD.   Cardiovascular:      Rate and Rhythm: Normal rate and regular rhythm.      Heart sounds: Normal heart sounds. No murmur heard.    No friction rub. No gallop.   Pulmonary:      Effort: Pulmonary effort is normal. No respiratory distress.      Breath sounds: Normal breath sounds. No stridor. No wheezing or rales.   Chest:      Chest wall: No tenderness.   Abdominal:      General: Bowel sounds are normal. There is no distension.      Palpations: Abdomen is soft. There is no mass.      Tenderness: There is no abdominal tenderness. There is no rebound.   Musculoskeletal:         General: No tenderness. Normal range of motion.      Cervical back: Normal range of motion and neck supple.   Lymphadenopathy:      Cervical: No cervical adenopathy.   Skin:     General: Skin is warm and dry.      Capillary Refill: Capillary refill takes less than 2 seconds.      Coloration: Skin is not pale.      Findings: No erythema or rash.   Neurological:      General: No focal deficit present.      Mental Status: She is alert and oriented to person, place, and time.      Cranial Nerves: No cranial nerve deficit.      Motor: No abnormal muscle tone.      Coordination: Coordination normal.      Deep Tendon Reflexes: Reflexes are normal and symmetric. Reflexes normal.   Psychiatric:         Mood and Affect: Mood normal.         Behavior: Behavior normal.         Thought Content: Thought content normal.         Judgment: Judgment normal.      Comments: Mood is stable. No sihi noted         Assessment:       1. Bipolar 2 disorder, major depressive episode    2. Migraine without aura and without status migrainosus, not intractable    3. Vitamin D deficiency    4.  "Gastroesophageal reflux disease without esophagitis    5. Tachycardia    6. Obesity (BMI 30.0-34.9)        Plan:   Staci was seen today for follow-up.    Diagnoses and all orders for this visit:    Bipolar 2 disorder, major depressive episode    Migraine without aura and without status migrainosus, not intractable  -     butalbital-acetaminophen-caffeine -40 mg (FIORICET, ESGIC) -40 mg per tablet; Take 1 tablet by mouth every 6 (six) hours as needed for Headaches.    Vitamin D deficiency  -     VITAMIN D2 1,250 mcg (50,000 unit) capsule; Take 1 capsule (50,000 Units total) by mouth every 7 days.    Gastroesophageal reflux disease without esophagitis  -     pantoprazole (PROTONIX) 40 MG tablet; Take 1 tablet (40 mg total) by mouth once daily.    Tachycardia  -     metoprolol succinate (TOPROL-XL) 25 MG 24 hr tablet; Take 1 tablet (25 mg total) by mouth 2 (two) times daily.    Obesity (BMI 30.0-34.9)      "This note will not be shared with the patient."  Continue with psy visits and adherence with meds  rtc as scheduled  "

## 2022-05-27 DIAGNOSIS — G43.009 MIGRAINE WITHOUT AURA AND WITHOUT STATUS MIGRAINOSUS, NOT INTRACTABLE: ICD-10-CM

## 2022-05-30 RX ORDER — BUTALBITAL, ACETAMINOPHEN AND CAFFEINE 50; 325; 40 MG/1; MG/1; MG/1
1 TABLET ORAL EVERY 6 HOURS PRN
Qty: 30 TABLET | Refills: 0 | Status: SHIPPED | OUTPATIENT
Start: 2022-05-30 | End: 2022-06-09 | Stop reason: SDUPTHER

## 2022-06-09 DIAGNOSIS — G43.009 MIGRAINE WITHOUT AURA AND WITHOUT STATUS MIGRAINOSUS, NOT INTRACTABLE: ICD-10-CM

## 2022-06-10 RX ORDER — BUTALBITAL, ACETAMINOPHEN AND CAFFEINE 50; 325; 40 MG/1; MG/1; MG/1
1 TABLET ORAL EVERY 6 HOURS PRN
Qty: 30 TABLET | Refills: 0 | Status: SHIPPED | OUTPATIENT
Start: 2022-06-10 | End: 2022-06-20 | Stop reason: SDUPTHER

## 2022-06-20 DIAGNOSIS — G43.009 MIGRAINE WITHOUT AURA AND WITHOUT STATUS MIGRAINOSUS, NOT INTRACTABLE: ICD-10-CM

## 2022-06-21 RX ORDER — BUTALBITAL, ACETAMINOPHEN AND CAFFEINE 50; 325; 40 MG/1; MG/1; MG/1
1 TABLET ORAL EVERY 6 HOURS PRN
Qty: 30 TABLET | Refills: 0 | Status: SHIPPED | OUTPATIENT
Start: 2022-06-21 | End: 2022-06-29 | Stop reason: SDUPTHER

## 2022-06-29 DIAGNOSIS — G43.009 MIGRAINE WITHOUT AURA AND WITHOUT STATUS MIGRAINOSUS, NOT INTRACTABLE: ICD-10-CM

## 2022-06-30 RX ORDER — BUTALBITAL, ACETAMINOPHEN AND CAFFEINE 50; 325; 40 MG/1; MG/1; MG/1
1 TABLET ORAL EVERY 6 HOURS PRN
Qty: 30 TABLET | Refills: 0 | Status: SHIPPED | OUTPATIENT
Start: 2022-06-30 | End: 2022-07-12 | Stop reason: SDUPTHER

## 2022-07-12 DIAGNOSIS — G43.009 MIGRAINE WITHOUT AURA AND WITHOUT STATUS MIGRAINOSUS, NOT INTRACTABLE: ICD-10-CM

## 2022-07-12 RX ORDER — BUTALBITAL, ACETAMINOPHEN AND CAFFEINE 50; 325; 40 MG/1; MG/1; MG/1
1 TABLET ORAL EVERY 6 HOURS PRN
Qty: 30 TABLET | Refills: 0 | Status: SHIPPED | OUTPATIENT
Start: 2022-07-12 | End: 2022-07-19 | Stop reason: SDUPTHER

## 2022-07-28 ENCOUNTER — LAB VISIT (OUTPATIENT)
Dept: LAB | Facility: HOSPITAL | Age: 42
End: 2022-07-28
Attending: NURSE PRACTITIONER
Payer: COMMERCIAL

## 2022-07-28 DIAGNOSIS — Z79.899 ENCOUNTER FOR LONG-TERM (CURRENT) USE OF OTHER MEDICATIONS: Primary | ICD-10-CM

## 2022-07-28 LAB — LITHIUM SERPL-SCNC: 0.3 MMOL/L (ref 0.6–1.2)

## 2022-07-28 PROCEDURE — 80178 ASSAY OF LITHIUM: CPT | Performed by: NURSE PRACTITIONER

## 2022-07-28 PROCEDURE — 36415 COLL VENOUS BLD VENIPUNCTURE: CPT | Performed by: NURSE PRACTITIONER

## 2022-08-04 DIAGNOSIS — G43.009 MIGRAINE WITHOUT AURA AND WITHOUT STATUS MIGRAINOSUS, NOT INTRACTABLE: ICD-10-CM

## 2022-08-04 RX ORDER — BUTALBITAL, ACETAMINOPHEN AND CAFFEINE 50; 325; 40 MG/1; MG/1; MG/1
1 TABLET ORAL EVERY 6 HOURS PRN
Qty: 30 TABLET | Refills: 0 | Status: SHIPPED | OUTPATIENT
Start: 2022-08-04 | End: 2022-08-15 | Stop reason: SDUPTHER

## 2022-08-10 ENCOUNTER — PATIENT MESSAGE (OUTPATIENT)
Dept: GASTROENTEROLOGY | Facility: CLINIC | Age: 42
End: 2022-08-10
Payer: COMMERCIAL

## 2022-08-15 DIAGNOSIS — G43.009 MIGRAINE WITHOUT AURA AND WITHOUT STATUS MIGRAINOSUS, NOT INTRACTABLE: ICD-10-CM

## 2022-08-16 DIAGNOSIS — G43.009 MIGRAINE WITHOUT AURA AND WITHOUT STATUS MIGRAINOSUS, NOT INTRACTABLE: ICD-10-CM

## 2022-08-16 RX ORDER — BUTALBITAL, ACETAMINOPHEN AND CAFFEINE 50; 325; 40 MG/1; MG/1; MG/1
TABLET ORAL
Qty: 30 TABLET | Refills: 0 | OUTPATIENT
Start: 2022-08-16

## 2022-08-16 RX ORDER — BUTALBITAL, ACETAMINOPHEN AND CAFFEINE 50; 325; 40 MG/1; MG/1; MG/1
1 TABLET ORAL EVERY 6 HOURS PRN
Qty: 30 TABLET | Refills: 0 | Status: SHIPPED | OUTPATIENT
Start: 2022-08-16 | End: 2022-08-28 | Stop reason: SDUPTHER

## 2022-08-18 ENCOUNTER — LAB VISIT (OUTPATIENT)
Dept: LAB | Facility: HOSPITAL | Age: 42
End: 2022-08-18
Attending: NURSE PRACTITIONER
Payer: COMMERCIAL

## 2022-08-18 DIAGNOSIS — Z79.899 ENCOUNTER FOR LONG-TERM (CURRENT) USE OF OTHER MEDICATIONS: Primary | ICD-10-CM

## 2022-08-18 PROCEDURE — 80178 ASSAY OF LITHIUM: CPT | Performed by: NURSE PRACTITIONER

## 2022-08-18 PROCEDURE — 36415 COLL VENOUS BLD VENIPUNCTURE: CPT | Performed by: NURSE PRACTITIONER

## 2022-08-19 LAB — LITHIUM SERPL-SCNC: 0.6 MMOL/L (ref 0.6–1.2)

## 2022-08-31 ENCOUNTER — HOSPITAL ENCOUNTER (EMERGENCY)
Facility: HOSPITAL | Age: 42
Discharge: HOME | End: 2022-08-31
Attending: EMERGENCY MEDICINE
Payer: COMMERCIAL

## 2022-08-31 VITALS
HEART RATE: 87 BPM | OXYGEN SATURATION: 98 % | BODY MASS INDEX: 33.87 KG/M2 | SYSTOLIC BLOOD PRESSURE: 115 MMHG | WEIGHT: 185.19 LBS | RESPIRATION RATE: 18 BRPM | DIASTOLIC BLOOD PRESSURE: 78 MMHG | TEMPERATURE: 98 F

## 2022-08-31 DIAGNOSIS — R47.81 SLURRED SPEECH: Primary | ICD-10-CM

## 2022-08-31 LAB
ALBUMIN SERPL BCP-MCNC: 4.6 G/DL (ref 3.5–5.2)
ALP SERPL-CCNC: 156 U/L (ref 55–135)
ALT SERPL W/O P-5'-P-CCNC: 27 U/L (ref 10–44)
AMPHET+METHAMPHET UR QL: NEGATIVE
ANION GAP SERPL CALC-SCNC: 13 MMOL/L (ref 8–16)
APTT BLDCRRT: 27.1 SEC (ref 21–32)
AST SERPL-CCNC: 19 U/L (ref 10–40)
BARBITURATES UR QL SCN>200 NG/ML: ABNORMAL
BASOPHILS # BLD AUTO: 0.11 K/UL (ref 0–0.2)
BASOPHILS NFR BLD: 0.8 % (ref 0–1.9)
BENZODIAZ UR QL SCN>200 NG/ML: NEGATIVE
BILIRUB SERPL-MCNC: 0.3 MG/DL (ref 0.1–1)
BILIRUB UR QL STRIP: NEGATIVE
BUN SERPL-MCNC: 8 MG/DL (ref 6–20)
BZE UR QL SCN: NEGATIVE
CALCIUM SERPL-MCNC: 9.9 MG/DL (ref 8.7–10.5)
CANNABINOIDS UR QL SCN: NEGATIVE
CHLORIDE SERPL-SCNC: 102 MMOL/L (ref 95–110)
CHOLEST SERPL-MCNC: 161 MG/DL (ref 120–199)
CHOLEST/HDLC SERPL: 3.4 {RATIO} (ref 2–5)
CLARITY UR: CLEAR
CO2 SERPL-SCNC: 24 MMOL/L (ref 23–29)
COLOR UR: YELLOW
CREAT SERPL-MCNC: 0.9 MG/DL (ref 0.5–1.4)
CREAT UR-MCNC: 48.9 MG/DL (ref 15–325)
DIFFERENTIAL METHOD: ABNORMAL
EOSINOPHIL # BLD AUTO: 0.4 K/UL (ref 0–0.5)
EOSINOPHIL NFR BLD: 3.1 % (ref 0–8)
ERYTHROCYTE [DISTWIDTH] IN BLOOD BY AUTOMATED COUNT: 12.4 % (ref 11.5–14.5)
EST. GFR  (NO RACE VARIABLE): >60 ML/MIN/1.73 M^2
ETHANOL SERPL-MCNC: <10 MG/DL
GLUCOSE SERPL-MCNC: 99 MG/DL (ref 70–110)
GLUCOSE UR QL STRIP: NEGATIVE
HCT VFR BLD AUTO: 42.6 % (ref 37–48.5)
HDLC SERPL-MCNC: 47 MG/DL (ref 40–75)
HDLC SERPL: 29.2 % (ref 20–50)
HGB BLD-MCNC: 14.5 G/DL (ref 12–16)
HGB UR QL STRIP: NEGATIVE
IMM GRANULOCYTES # BLD AUTO: 0.07 K/UL (ref 0–0.04)
IMM GRANULOCYTES NFR BLD AUTO: 0.5 % (ref 0–0.5)
INR PPP: 1 (ref 0.8–1.2)
KETONES UR QL STRIP: NEGATIVE
LDLC SERPL CALC-MCNC: 81.6 MG/DL (ref 63–159)
LEUKOCYTE ESTERASE UR QL STRIP: NEGATIVE
LYMPHOCYTES # BLD AUTO: 4 K/UL (ref 1–4.8)
LYMPHOCYTES NFR BLD: 28.6 % (ref 18–48)
MCH RBC QN AUTO: 29.5 PG (ref 27–31)
MCHC RBC AUTO-ENTMCNC: 34 G/DL (ref 32–36)
MCV RBC AUTO: 87 FL (ref 82–98)
METHADONE UR QL SCN>300 NG/ML: NEGATIVE
MONOCYTES # BLD AUTO: 0.9 K/UL (ref 0.3–1)
MONOCYTES NFR BLD: 6.3 % (ref 4–15)
NEUTROPHILS # BLD AUTO: 8.6 K/UL (ref 1.8–7.7)
NEUTROPHILS NFR BLD: 60.7 % (ref 38–73)
NITRITE UR QL STRIP: NEGATIVE
NONHDLC SERPL-MCNC: 114 MG/DL
NRBC BLD-RTO: 0 /100 WBC
OPIATES UR QL SCN: NEGATIVE
PCP UR QL SCN>25 NG/ML: NEGATIVE
PH UR STRIP: 7 [PH] (ref 5–8)
PLATELET # BLD AUTO: 355 K/UL (ref 150–450)
PMV BLD AUTO: 9.9 FL (ref 9.2–12.9)
POCT GLUCOSE: 97 MG/DL (ref 70–110)
POTASSIUM SERPL-SCNC: 3.6 MMOL/L (ref 3.5–5.1)
PROT SERPL-MCNC: 7.5 G/DL (ref 6–8.4)
PROT UR QL STRIP: NEGATIVE
PROTHROMBIN TIME: 10.3 SEC (ref 9–12.5)
RBC # BLD AUTO: 4.92 M/UL (ref 4–5.4)
SODIUM SERPL-SCNC: 139 MMOL/L (ref 136–145)
SP GR UR STRIP: 1.01 (ref 1–1.03)
TOXICOLOGY INFORMATION: ABNORMAL
TRIGL SERPL-MCNC: 162 MG/DL (ref 30–150)
TSH SERPL DL<=0.005 MIU/L-ACNC: 3.25 UIU/ML (ref 0.4–4)
URN SPEC COLLECT METH UR: NORMAL
UROBILINOGEN UR STRIP-ACNC: NEGATIVE EU/DL
WBC # BLD AUTO: 14.08 K/UL (ref 3.9–12.7)

## 2022-08-31 PROCEDURE — 80061 LIPID PANEL: CPT | Performed by: EMERGENCY MEDICINE

## 2022-08-31 PROCEDURE — 99213 OFFICE O/P EST LOW 20 MIN: CPT | Mod: 95,,, | Performed by: PSYCHIATRY & NEUROLOGY

## 2022-08-31 PROCEDURE — 36415 COLL VENOUS BLD VENIPUNCTURE: CPT

## 2022-08-31 PROCEDURE — 93010 ELECTROCARDIOGRAM REPORT: CPT | Mod: ,,, | Performed by: INTERNAL MEDICINE

## 2022-08-31 PROCEDURE — 85610 PROTHROMBIN TIME: CPT

## 2022-08-31 PROCEDURE — 85730 THROMBOPLASTIN TIME PARTIAL: CPT

## 2022-08-31 PROCEDURE — 81003 URINALYSIS AUTO W/O SCOPE: CPT | Mod: 59 | Performed by: EMERGENCY MEDICINE

## 2022-08-31 PROCEDURE — 80307 DRUG TEST PRSMV CHEM ANLYZR: CPT | Performed by: EMERGENCY MEDICINE

## 2022-08-31 PROCEDURE — 85025 COMPLETE CBC W/AUTO DIFF WBC: CPT

## 2022-08-31 PROCEDURE — 84443 ASSAY THYROID STIM HORMONE: CPT | Performed by: EMERGENCY MEDICINE

## 2022-08-31 PROCEDURE — 36415 COLL VENOUS BLD VENIPUNCTURE: CPT | Performed by: EMERGENCY MEDICINE

## 2022-08-31 PROCEDURE — 80053 COMPREHEN METABOLIC PANEL: CPT

## 2022-08-31 PROCEDURE — 93005 ELECTROCARDIOGRAM TRACING: CPT

## 2022-08-31 PROCEDURE — 99213 PR OFFICE/OUTPT VISIT, EST, LEVL III, 20-29 MIN: ICD-10-PCS | Mod: 95,,, | Performed by: PSYCHIATRY & NEUROLOGY

## 2022-08-31 PROCEDURE — 80178 ASSAY OF LITHIUM: CPT | Performed by: EMERGENCY MEDICINE

## 2022-08-31 PROCEDURE — 82077 ASSAY SPEC XCP UR&BREATH IA: CPT | Performed by: EMERGENCY MEDICINE

## 2022-08-31 PROCEDURE — 99285 EMERGENCY DEPT VISIT HI MDM: CPT | Mod: 25

## 2022-08-31 PROCEDURE — 93010 EKG 12-LEAD: ICD-10-PCS | Mod: ,,, | Performed by: INTERNAL MEDICINE

## 2022-08-31 NOTE — ED PROVIDER NOTES
[facility]                                                  Chief Complaint  42 y.o. female with Slurred Speech  (Patient to ER CC of slurred speech and a headache that started at 12:30 while she was at work, she states she feels like she's starting to get better )    History of Present Illness  Staci Hodge presents to the emergency room with what she describes as acute onset slurred speech around 12 30 or 1:00 a.m. today.  Patient states that she felt normal this morning and back this did not come on gradually.  She states that she feels that she may be slightly better now than she was however I still appreciate slurred speech.  Patient states she was sent here from urgent care.  Patient reports that she also cannot move her face or her tongue.    Past Medical History:   Diagnosis Date    Abnormal Pap smear of cervix age 25    no treatement    Anxiety     Breast cancer     Cancer 07/11/2018    breast cancer    Cervical spondylosis 2/9/2018    Colon polyp     Depression     Ectopic pregnancy 2/26/2019    General anesthetics causing adverse effect in therapeutic use     Slow to awaken/ Memory problems-post-op to day 3 after Mastectomy    Headache     History of psychiatric hospitalization     age 20 for SI    Hx of psychiatric care     Hypertension     Peutz-Jeghers syndrome     PONV (postoperative nausea and vomiting)     Psychiatric problem     Right carpal tunnel syndrome 3/9/2017    Self-harming behavior     Suicide attempt     Therapy      Past Surgical History:   Procedure Laterality Date    ADENOIDECTOMY      ANTEGRADE SINGLE BALLOON ENTEROSCOPY N/A 4/19/2021    Procedure: ENTEROSCOPY, SINGLE BALLOON, ANTEGRADE;  Surgeon: Vincenzo Herbert MD;  Location: [facility] ([address] [place] [address]);  Service: Endoscopy;  Laterality: N/A;  removal of large polyp in proximal jejunum; please schedule during a time when Dr. Dillon is available in case I need assistance with removal or 2nd opinion prior to  removal    COVID at [place] 4/16 - ttr    APPENDECTOMY      AUGMENTATION OF BREAST      BILATERAL MASTECTOMY Bilateral 7/23/2018    Procedure: MASTECTOMY 23 hr stay;  Surgeon: Sofia Kendrick MD;  Location: [address];  Service: General;  Laterality: Bilateral;    BILATERAL SALPINGO-OOPHORECTOMY (BSO) Bilateral 3/4/2021    Procedure: SALPINGO-OOPHORECTOMY, BILATERAL;  Surgeon: Clayton Vilchis MD;  Location: [facility];  Service: OB/GYN;  Laterality: Bilateral;    BOWEL RESECTION  10/17/14    BREAST BIOPSY Left 06/04/2018    BREAST CAPSULOTOMY Left 4/25/2019    Procedure: CAPSULOTOMY, BREAST LEFT;  Surgeon: Duane Bello MD;  Location: [address];  Service: Plastics;  Laterality: Left;    BREAST RECONSTRUCTION      BREAST SURGERY      Bilateral Mastectomy 07/23/2018    CARPAL TUNNEL RELEASE Bilateral     CARPAL TUNNEL RELEASE      COLONOSCOPY      COLONOSCOPY N/A 2/25/2021    Procedure: COLONOSCOPY;  Surgeon: Vincenzo Herbert MD;  Location: [facility] ([address] [place] [address]);  Service: Endoscopy;  Laterality: N/A;  previous anesthesia complications  okay for this date/time per Dr. Herbert and liliana with Miranda, OC - sm  COVID test on 2/22/21 at [facility]    DILATION AND CURETTAGE OF UTERUS      DILATION AND CURETTAGE OF UTERUS USING SUCTION N/A 2/25/2019    Procedure: DILATION AND CURETTAGE, UTERUS, USING SUCTION  PATHOLOGY NEEDED;  Surgeon: Pam Sifuentes MD;  Location: [facility];  Service: OB/GYN;  Laterality: N/A;    ENDOSCOPIC ULTRASOUND OF UPPER GASTROINTESTINAL TRACT Left 1/15/2021    Procedure: ULTRASOUND, UPPER GI TRACT, ENDOSCOPIC;  Surgeon: Chandan Dillon MD;  Location: [facility];  Service: Endoscopy;  Laterality: Left;  for pancreatic screening    ESOPHAGOGASTRODUODENOSCOPY N/A 1/15/2021    Procedure: EGD (ESOPHAGOGASTRODUODENOSCOPY);  Surgeon: Chandan Dillon MD;  Location: [facility];  Service: Endoscopy;  Laterality: N/A;  with push enteroscopy    ESOPHAGOGASTRODUODENOSCOPY N/A 5/17/2021     Procedure: EGD (ESOPHAGOGASTRODUODENOSCOPY);  Surgeon: Chandan Dillon MD;  Location: [facility] ENDO (2ND FLR);  Service: Endoscopy;  Laterality: N/A;  Please schedule this duodenal polypectomy. Need to be a day that Dr Jose Antonio Stark is in the OR. 90 minutes.   Chandan Dillon MD     COVID at [place] 5/15 (scheduled in the OR for 5/18 - using same COVID results for both procedures) ttr    ESOPHAGOGASTRODUODENOSCOPY N/A 1/25/2022    Procedure: EGD (ESOPHAGOGASTRODUODENOSCOPY);  Surgeon: Chandan Dillon MD;  Location: [facility] ENDO (2ND FLR);  Service: Endoscopy;  Laterality: N/A;  [facility] emr  covid-1/22/[id number]-BB  instructions sent via portal-BB    FAT TRANSFER Bilateral 11/12/2018    Procedure: TRANSFER, FAT TISSUE BILATERAL BREAST;  Surgeon: Duane Bello MD;  Location: [facility] OR 2ND FLR;  Service: Plastics;  Laterality: Bilateral;    INJECTION FOR SENTINEL NODE IDENTIFICATION Left 7/23/2018    Procedure: INJECTION, FOR SENTINEL NODE IDENTIFICATION;  Surgeon: Sofia Kendrick MD;  Location: [facility] OR 2ND FLR;  Service: General;  Laterality: Left;    INSERTION OF BREAST TISSUE EXPANDER Bilateral 7/23/2018    Procedure: INSERTION, TISSUE EXPANDER, BREAST;  Surgeon: Duane Bello MD;  Location: [facility] OR 2ND FLR;  Service: Plastics;  Laterality: Bilateral;    INSERTION OF BREAST TISSUE EXPANDER Bilateral 3/10/2020    Procedure: INSERTION, TISSUE EXPANDER, BREAST, BILATERAL;  Surgeon: Duane Bello MD;  [facility];  Service: Plastics;  Laterality: Bilateral;    intestinal polpy      LYSIS OF ADHESIONS N/A 3/4/2021    Procedure: LYSIS, ADHESIONS;  Surgeon: Clayton Vilchis MD;  [facility];  Service: OB/GYN;  Laterality: N/A;  Omental adhesions    MASTECTOMY      REMOVAL OF BREAST IMPLANT Bilateral 3/10/2020    Procedure: REMOVAL, IMPLANT, BREAST, BILATERAL;  Surgeon: Duane Bello MD;  [facility];  Service: Plastics;  Laterality: Bilateral;    REMOVAL OF  BREAST IMPLANT Bilateral 7/7/2020    Procedure: REMOVAL, IMPLANT, BREAST BILATERAL;  Surgeon: Duane Bello MD;  Location: [facility] OR [address] [place] [address];  Service: Plastics;  Laterality: Bilateral;    SENTINEL LYMPH NODE BIOPSY Left 7/23/2018    Procedure: BIOPSY, LYMPH NODE, SENTINEL;  Surgeon: Sofia Kendrick MD;  Location: [facility] OR [address] [place] [address];  Service: General;  Laterality: Left;    SMALL INTESTINE SURGERY      3 surgeries     TONSILLECTOMY      TOTAL ABDOMINAL HYSTERECTOMY N/A 3/4/2021    Procedure: HYSTERECTOMY, TOTAL, ABDOMINAL;  Surgeon: Clayton Vilchis MD;  Location: [facility];  Service: OB/GYN;  Laterality: N/A;    UPPER GASTROINTESTINAL ENDOSCOPY        Review of patient's allergies indicates:  No Known Allergies     Review of Systems and Physical Exam     Review of Systems  -- Constitution - no fever, no weight loss, no loss of consciousness reports slurred speech  -- Eyes - no changes in vision, no redness, no swelling  -- Ear, Nose - no  earache, denies congestion  -- Mouth,Throat - no sore throat, no toothache, normal voice, normal swallowing  -- Respiratory - denies cough and congestion, no shortness of breath, no wheezing  -- Cardiovascular - denies chest pain, no palpitations,   -- Gastrointestinal - denies abdominal pain, denies nausea, vomiting, and diarrhea  -- Genitourinary - no dysuria, no denies flank pain, no hematuria or frequency   -- Musculoskeletal - denies back pain, negative for myalgias and arthralgias   -- Neurological - no headache, no neurologic changes, no loss of bladder or bowel function no seizure like activity, no changes in hearing or vision  -- Skin - denies skin changes, no rash, no hives, no suspected skin infection    Vital Signs   weight is 84 kg (185 lb 3 oz). Her oral temperature is 97.9 °F (36.6 °C). Her blood pressure is 115/78 and her pulse is 87. Her respiration is 18 and oxygen saturation is 98%.      Physical Exam  -- Nursing note and vitals reviewed  -- Constitutional:   Awake alert and oriented, GCS 15, no acute distress.  Appears well.  -- Head: Atraumatic. Normocephalic. No obvious abnormality  -- Eyes: Pupils are equal and reactive to light. Extraocular movements intact. No nystagmus.  No periorbital swelling. Normal conjunctiva.  -- Nose: Nose grossly normal in appearance, nares grossly normal. No rhinorrhea.  -- Throat: Mucous membranes moist, pharynx normal, normal tonsils.  Airway patent.  -- Ears: External ears and TM normal bilaterally. Normal hearing.   -- Neck: Normal range of motion. Neck supple. No meningismus. No adenopathy  -- Cardiac: Normal rate, regular rhythm and normal heart sounds. No carotid bruit. No lower extremity edema.  -- Pulmonary: Normal respiratory effort, breath sounds equal bilaterally. Adequate flow.  No wheezing.  No crackles.  -- Abdominal: Soft, no tenderness, no guarding, no rebound. Normal bowel sounds.   -- Musculoskeletal: Normal range of motion, all 4 extremities 5/5 strength.  Neurovascularly intact. Atraumatic. No deformities.  -- Neurological:  Cranial nerves 2-12 grossly intact.  Patient does have some slurred speech but it is difficult to appreciate if it is true dysarthria.  Patient asked to participate in neurologic exam but seemingly cannot or will not protrude her tongue or raise her eyebrows.  -- Vascular: Posterior tibial, dorsalis pedis and radial pulses 2+ bilaterally    -- Lymphatics: No cervical or peripheral lymphadenopathy.   -- Skin: Warm and dry. No evidence of rash or cellulitis  -- Psychiatric: Normal mood and affect. Bedside behavior is appropriate.  Patient is cooperative.  Denies suicidal homicidal ideation.    Emergency Room Course     Treatment Course, Evaluation, and Medical Decision Making  1. Physical exam as noted above.  NIH score possibly 2 for dysarthria and facial movements however I feel some of this may be deliberate  2. CT head negative   3. Telestroke agrees that this may be induced by sedation or  possibly a somatic dysarthria.  Recommends no further neurologic intervention or evaluation at this time  4. CBC/CMP within normal limits  5. Lithium level pending  6. Urinalysis pending  7. ETOH pending  8. UDS positive for barbiturates      Care transitioned Dr. Love at 6:00 p.m. for results and disposition      Abnormal lab values  Labs Reviewed   CBC W/ AUTO DIFFERENTIAL - Abnormal; Notable for the following components:       Result Value    WBC 14.08 (*)     Gran # (ANC) 8.6 (*)     Immature Grans (Abs) 0.07 (*)     All other components within normal limits   COMPREHENSIVE METABOLIC PANEL - Abnormal; Notable for the following components:    Alkaline Phosphatase 156 (*)     All other components within normal limits   LIPID PANEL - Abnormal; Notable for the following components:    Triglycerides 162 (*)     All other components within normal limits   DRUG SCREEN PANEL, URINE EMERGENCY - Abnormal; Notable for the following components:    Barbiturate Screen, Ur Presumptive Positive (*)     All other components within normal limits    Narrative:     Specimen Source->Urine   PROTIME-INR   APTT   TSH   ALCOHOL,MEDICAL (ETHANOL)   URINALYSIS   URINALYSIS, REFLEX TO URINE CULTURE    Narrative:     Specimen Source->Urine   LITHIUM LEVEL   POCT GLUCOSE   POCT GLUCOSE MONITORING CONTINUOUS     ED Management  -- I took over this patient at 6:00 p.m. shift change today, chief complaint slurred speech  -- patient underwent telemedicine stroke evaluation, this is not a stroke today  -- patient is on several psychiatric medications, including lithium and Abilify  -- patient feels that these medications are causing her to have slurred speech today  -- patient will follow-up with psychiatrist tomorrow for medication review as an outpatient  -- patient will follow-up with neurology for slurred speech if it persists as outpatient  -- follow-up lithium level that is currently pending on discharge, needs med check ASAP  -- hold  meds until psychiatry evaluation, return with any concerns after discharge                Joe Love MD  08/31/22 2052

## 2022-08-31 NOTE — ASSESSMENT & PLAN NOTE
43 y/o woman presenting with slow and slurred speech and headache.  Speech not dysarthric (no apparent weakness of lingual, labial, or guttural muscle groups).  Consider migraine associated symptom or overdose of home medication.

## 2022-08-31 NOTE — HPI
43 y/o woman with depression, BPD, migraines who presents with slurred speech.  She states symptoms started around 12:30 pm while she was at work.  She also has a headache, 6/10 in severity.  She states symptoms have improved.

## 2022-08-31 NOTE — CONSULTS
[facility]  Vascular Neurology  Comprehensive Stroke Center  TeleVascular Neurology Acute Consultation Note      Consults    Consulting Provider: REJI BECKHAM  Current Providers  No providers found    Patient Location:  [facility] EMERGENCY DEPARTMENT Emergency Department  Spoke hospital nurse at bedside with patient assisting consultant.     Patient information was obtained from patient.         Assessment/Plan:       Diagnoses:   Slurred speech  41 y/o woman presenting with slow and slurred speech and headache.  Speech not dysarthric (no apparent weakness of lingual, labial, or guttural muscle groups).  Consider migraine associated symptom or overdose of home medication.        STROKE DOCUMENTATION     Acute Stroke Times:   Acute Stroke Times   Last Known Normal Date: 08/31/22  Last Known Normal Time: 1230  Stroke Team Called Date: 08/31/22  Stroke Team Called Time: 1636  Stroke Team Arrival Date: 08/31/22  Stroke Team Arrival Time: 1639  CT Interpretation Time: 1645  Alteplase Recommended: No  Thrombectomy Recommended: No    NIH Scale:  1a. Level of Consciousness: 0-->Alert, keenly responsive  1b. LOC Questions: 0-->Answers both questions correctly  1c. LOC Commands: 0-->Performs both tasks correctly  2. Best Gaze: 0-->Normal  3. Visual: 0-->No visual loss  4. Facial Palsy: 0-->Normal symmetrical movements  5a. Motor Arm, Left: 0-->No drift, limb holds 90 (or 45) degrees for full 10 secs  5b. Motor Arm, Right: 0-->No drift, limb holds 90 (or 45) degrees for full 10 secs  6a. Motor Leg, Left: 0-->No drift, leg holds 30 degree position for full 5 secs  6b. Motor Leg, Right: 0-->No drift, leg holds 30 degree position for full 5 secs  7. Limb Ataxia: 0-->Absent  8. Sensory: 0-->Normal, no sensory loss  9. Best Language: 0-->No aphasia, normal  10. Dysarthria: 1-->Mild-to-moderate dysarthria, patient slurs at least some words and, at worst, can be understood with some difficulty  11.  Extinction and Inattention (formerly Neglect): 0-->No abnormality  Total (NIH Stroke Scale): 1     Modified Thomas    Hamilton Coma Scale:    ABCD2 Score:    [id number] Score:   HAS -BLED Score:   ICH Score:   Hunt & Delgado Classification:       Blood pressure 139/83, pulse (!) 111, temperature 97.1 °F (36.2 °C), resp. rate 18, weight 84 kg (185 lb 3 oz), last menstrual period 03/01/2021, SpO2 100 %.  Alteplase Eligible?: Yes  Alteplase Recommendation: Alteplase not recommended due to Suspected stroke mimic   Possible Interventional Revascularization Candidate? No; at this time symptoms not suggestive of large vessel occlusion    Disposition Recommendation: discharge from ED    Subjective:     History of Present Illness:  43 y/o woman with depression, BPD, migraines who presents with slurred speech.  She states symptoms started around 12:30 pm while she was at work.  She also has a headache, 6/10 in severity.  She states symptoms have improved.      Woke up with symptoms?: no    Recent bleeding noted: no  Does the patient take any Blood Thinners? no  Medications: No relevant medications      Past Medical History: bipolar, migraines    Past Surgical History: no relevant surgical history    Family History: no relevant history    Social History: no smoking, no drinking, no drugs    Allergies: No Known Allergies No relevant allergies    Review of Systems   Neurological: Positive for speech difficulty and headaches.   All other systems reviewed and are negative.    Objective:   Vitals: Blood pressure 139/83, pulse (!) 111, temperature 97.1 °F (36.2 °C), resp. rate 18, weight 84 kg (185 lb 3 oz), last menstrual period 03/01/2021, SpO2 100 %. Height: .    CT READ: Yes  No hemmorhage. No mass effect. No early infarct signs.     Physical Exam  Constitutional:       General: She is not in acute distress.  HENT:      Head: Normocephalic and atraumatic.   Eyes:      Extraocular Movements: EOM normal.      Pupils: Pupils are  equal, round, and reactive to light.   Pulmonary:      Effort: Pulmonary effort is normal.   Neurological:      Comments: Slow speech, not dysarthric.  occasional slurring but does not correlate with weakness of any one muscular group             Recommended the emergency room physician to have a brief discussion with the patient and/or family if available regarding the  risks and benefits of treatment, and to briefly document the occurrence of that discussion in his clinical encounter note.     The attending portion of this evaluation, treatment, and documentation was performed per Crystal Tamez MD via audiovisual.    Billing code:  (non-intervention mild to moderate stroke, TIA, some mimics)      This patient has a critical neurological condition/illness, with some potential for high morbidity and mortality.  There is a moderate probability for acute neurological change leading to clinical and possibly life-threatening deterioration requiring highest level of physician preparedness for urgent intervention.  Care was coordinated with other physicians involved in the patient's care.  Radiologic studies and laboratory data were reviewed and interpreted, and plan of care was re-assessed based on the results.  Diagnosis, treatment options and prognosis may have been discussed with the patient and/or family members or caregiver.    In your opinion, this was a: Tier 1 Van Negative    Consult End Time: 5:54 PM     Crystal Tamez MD  [facility]  Vascular Neurology   [facility]

## 2022-08-31 NOTE — SUBJECTIVE & OBJECTIVE
Woke up with symptoms?: no    Recent bleeding noted: no  Does the patient take any Blood Thinners? no  Medications: No relevant medications      Past Medical History: bipolar, migraines    Past Surgical History: no relevant surgical history    Family History: no relevant history    Social History: no smoking, no drinking, no drugs    Allergies: No Known Allergies No relevant allergies    Review of Systems   Neurological: Positive for speech difficulty and headaches.   All other systems reviewed and are negative.    Objective:   Vitals: Blood pressure 139/83, pulse (!) 111, temperature 97.1 °F (36.2 °C), resp. rate 18, weight 84 kg (185 lb 3 oz), last menstrual period 03/01/2021, SpO2 100 %. Height: .    CT READ: Yes  No hemmorhage. No mass effect. No early infarct signs.     Physical Exam  Constitutional:       General: She is not in acute distress.  HENT:      Head: Normocephalic and atraumatic.   Eyes:      Extraocular Movements: EOM normal.      Pupils: Pupils are equal, round, and reactive to light.   Pulmonary:      Effort: Pulmonary effort is normal.   Neurological:      Comments: Slow speech, not dysarthric.  occasional slurring but does not correlate with weakness of any one muscular group

## 2022-09-01 LAB — LITHIUM SERPL-SCNC: 0.4 MMOL/L (ref 0.6–1.2)

## 2022-09-06 ENCOUNTER — OFFICE VISIT (OUTPATIENT)
Dept: INTERNAL MEDICINE | Facility: CLINIC | Age: 42
End: 2022-09-06
Payer: COMMERCIAL

## 2022-09-06 VITALS
HEIGHT: 62 IN | BODY MASS INDEX: 34.39 KG/M2 | DIASTOLIC BLOOD PRESSURE: 88 MMHG | HEART RATE: 100 BPM | SYSTOLIC BLOOD PRESSURE: 126 MMHG | OXYGEN SATURATION: 98 % | WEIGHT: 186.88 LBS | RESPIRATION RATE: 18 BRPM

## 2022-09-06 DIAGNOSIS — G44.209 TENSION-TYPE HEADACHE, NOT INTRACTABLE, UNSPECIFIED CHRONICITY PATTERN: ICD-10-CM

## 2022-09-06 DIAGNOSIS — R47.81 SLURRED SPEECH: Primary | ICD-10-CM

## 2022-09-06 PROCEDURE — 99999 PR PBB SHADOW E&M-EST. PATIENT-LVL V: CPT | Mod: PBBFAC,,, | Performed by: NURSE PRACTITIONER

## 2022-09-06 PROCEDURE — 3008F BODY MASS INDEX DOCD: CPT | Mod: CPTII,S$GLB,, | Performed by: NURSE PRACTITIONER

## 2022-09-06 PROCEDURE — 3074F PR MOST RECENT SYSTOLIC BLOOD PRESSURE < 130 MM HG: ICD-10-PCS | Mod: CPTII,S$GLB,, | Performed by: NURSE PRACTITIONER

## 2022-09-06 PROCEDURE — 1159F PR MEDICATION LIST DOCUMENTED IN MEDICAL RECORD: ICD-10-PCS | Mod: CPTII,S$GLB,, | Performed by: NURSE PRACTITIONER

## 2022-09-06 PROCEDURE — 3074F SYST BP LT 130 MM HG: CPT | Mod: CPTII,S$GLB,, | Performed by: NURSE PRACTITIONER

## 2022-09-06 PROCEDURE — 1159F MED LIST DOCD IN RCRD: CPT | Mod: CPTII,S$GLB,, | Performed by: NURSE PRACTITIONER

## 2022-09-06 PROCEDURE — 3044F PR MOST RECENT HEMOGLOBIN A1C LEVEL <7.0%: ICD-10-PCS | Mod: CPTII,S$GLB,, | Performed by: NURSE PRACTITIONER

## 2022-09-06 PROCEDURE — 99214 PR OFFICE/OUTPT VISIT, EST, LEVL IV, 30-39 MIN: ICD-10-PCS | Mod: S$GLB,,, | Performed by: NURSE PRACTITIONER

## 2022-09-06 PROCEDURE — 3079F PR MOST RECENT DIASTOLIC BLOOD PRESSURE 80-89 MM HG: ICD-10-PCS | Mod: CPTII,S$GLB,, | Performed by: NURSE PRACTITIONER

## 2022-09-06 PROCEDURE — 99999 PR PBB SHADOW E&M-EST. PATIENT-LVL V: ICD-10-PCS | Mod: PBBFAC,,, | Performed by: NURSE PRACTITIONER

## 2022-09-06 PROCEDURE — 3079F DIAST BP 80-89 MM HG: CPT | Mod: CPTII,S$GLB,, | Performed by: NURSE PRACTITIONER

## 2022-09-06 PROCEDURE — 3044F HG A1C LEVEL LT 7.0%: CPT | Mod: CPTII,S$GLB,, | Performed by: NURSE PRACTITIONER

## 2022-09-06 PROCEDURE — 99214 OFFICE O/P EST MOD 30 MIN: CPT | Mod: S$GLB,,, | Performed by: NURSE PRACTITIONER

## 2022-09-06 PROCEDURE — 3008F PR BODY MASS INDEX (BMI) DOCUMENTED: ICD-10-PCS | Mod: CPTII,S$GLB,, | Performed by: NURSE PRACTITIONER

## 2022-09-06 NOTE — PROGRESS NOTES
Subjective:       Patient ID: Staci Hodge is a 42 y.o. female.    Chief Complaint: Follow-up ( X ER)    Patient is known, to me and presents with   Chief Complaint   Patient presents with    Follow-up      X ER   .  Denies chest pain and shortness of breath.  Patient presents with ER follow up for slurred speech. States that this has happened before but this one lasted longer. Cva was ruled out. She is on lithium so may be due to that. She needs neuro referral. No sihi noted. No longer with slurred speech. Does have mild headaches at times  Follow-up  Pertinent negatives include no arthralgias, chest pain, congestion, coughing, fatigue, fever, headaches, joint swelling, neck pain, numbness or weakness.   Review of Systems   Constitutional:  Negative for activity change, appetite change, fatigue, fever and unexpected weight change.   HENT:  Negative for congestion, ear discharge, ear pain, hearing loss, postnasal drip and tinnitus.    Eyes:  Negative for photophobia, pain and visual disturbance.   Respiratory:  Negative for cough, shortness of breath, wheezing and stridor.    Cardiovascular:  Negative for chest pain, palpitations and leg swelling.   Gastrointestinal:  Negative for abdominal distention.   Genitourinary:  Negative for difficulty urinating, dysuria, frequency, hematuria and urgency.   Musculoskeletal:  Negative for arthralgias, back pain, gait problem, joint swelling and neck pain.   Neurological:  Positive for speech difficulty and light-headedness. Negative for dizziness, seizures, syncope, weakness, numbness and headaches.   Hematological:  Negative for adenopathy. Does not bruise/bleed easily.   Psychiatric/Behavioral:  Negative for behavioral problems, confusion and hallucinations.      Objective:      Physical Exam  Constitutional:       General: She is not in acute distress.     Appearance: She is well-developed.   HENT:      Head: Normocephalic and atraumatic.      Right Ear: Tympanic  membrane and external ear normal.      Left Ear: Tympanic membrane and external ear normal.      Nose: Nose normal.      Mouth/Throat:      Mouth: Mucous membranes are moist.      Pharynx: No oropharyngeal exudate.   Eyes:      General: No scleral icterus.        Right eye: No discharge.         Left eye: No discharge.      Conjunctiva/sclera: Conjunctivae normal.      Pupils: Pupils are equal, round, and reactive to light.   Neck:      Thyroid: No thyromegaly.      Vascular: No JVD.   Cardiovascular:      Rate and Rhythm: Normal rate and regular rhythm.      Heart sounds: Normal heart sounds. No murmur heard.    No friction rub. No gallop.   Pulmonary:      Effort: Pulmonary effort is normal. No respiratory distress.      Breath sounds: Normal breath sounds. No stridor. No wheezing or rales.   Chest:      Chest wall: No tenderness.   Abdominal:      General: Bowel sounds are normal. There is no distension.      Palpations: Abdomen is soft. There is no mass.      Tenderness: There is no abdominal tenderness. There is no right CVA tenderness, left CVA tenderness, guarding or rebound.      Hernia: No hernia is present.   Musculoskeletal:         General: No swelling, tenderness, deformity or signs of injury. Normal range of motion.      Cervical back: Normal range of motion and neck supple.      Right lower leg: No edema.      Left lower leg: No edema.   Lymphadenopathy:      Cervical: No cervical adenopathy.   Skin:     General: Skin is warm and dry.      Capillary Refill: Capillary refill takes less than 2 seconds.      Coloration: Skin is not jaundiced or pale.      Findings: No bruising, erythema, lesion or rash.   Neurological:      General: No focal deficit present.      Mental Status: She is alert and oriented to person, place, and time.      Cranial Nerves: No cranial nerve deficit.      Sensory: No sensory deficit.      Motor: No weakness or abnormal muscle tone.      Coordination: Coordination normal.       "Gait: Gait normal.      Deep Tendon Reflexes: Reflexes are normal and symmetric. Reflexes normal.   Psychiatric:         Mood and Affect: Mood normal.         Behavior: Behavior normal.         Thought Content: Thought content normal.         Judgment: Judgment normal.      Comments: Negative sihi noted       Assessment:       1. Slurred speech    2. Tension-type headache, not intractable, unspecified chronicity pattern          Plan:   Staci was seen today for follow-up.    Diagnoses and all orders for this visit:    Slurred speech  -     Ambulatory referral/consult to Neurology; Future    Tension-type headache, not intractable, unspecified chronicity pattern  -     Ambulatory referral/consult to Neurology; Future      "This note will not be shared with the patient."  She wants to see neuro in [facility]   Rtc as scheduled  "

## 2022-09-14 ENCOUNTER — HOSPITAL ENCOUNTER (EMERGENCY)
Facility: HOSPITAL | Age: 42
Discharge: HOME | End: 2022-09-14
Attending: STUDENT IN AN ORGANIZED HEALTH CARE EDUCATION/TRAINING PROGRAM
Payer: COMMERCIAL

## 2022-09-14 ENCOUNTER — TELEPHONE (OUTPATIENT)
Dept: NEUROLOGY | Facility: CLINIC | Age: 42
End: 2022-09-14
Payer: COMMERCIAL

## 2022-09-14 VITALS
TEMPERATURE: 97 F | HEART RATE: 76 BPM | WEIGHT: 191.63 LBS | BODY MASS INDEX: 35.04 KG/M2 | OXYGEN SATURATION: 100 % | DIASTOLIC BLOOD PRESSURE: 80 MMHG | RESPIRATION RATE: 18 BRPM | SYSTOLIC BLOOD PRESSURE: 118 MMHG

## 2022-09-14 DIAGNOSIS — R47.81 SLURRED SPEECH: ICD-10-CM

## 2022-09-14 DIAGNOSIS — G43.909 MIGRAINE WITHOUT STATUS MIGRAINOSUS, NOT INTRACTABLE, UNSPECIFIED MIGRAINE TYPE: Primary | ICD-10-CM

## 2022-09-14 LAB
ALBUMIN SERPL BCP-MCNC: 4.5 G/DL (ref 3.5–5.2)
ALP SERPL-CCNC: 138 U/L (ref 55–135)
ALT SERPL W/O P-5'-P-CCNC: 31 U/L (ref 10–44)
ANION GAP SERPL CALC-SCNC: 10 MMOL/L (ref 8–16)
AST SERPL-CCNC: 21 U/L (ref 10–40)
BASOPHILS # BLD AUTO: 0.09 K/UL (ref 0–0.2)
BASOPHILS NFR BLD: 0.8 % (ref 0–1.9)
BILIRUB SERPL-MCNC: 0.3 MG/DL (ref 0.1–1)
BUN SERPL-MCNC: 6 MG/DL (ref 6–20)
CALCIUM SERPL-MCNC: 10.2 MG/DL (ref 8.7–10.5)
CHLORIDE SERPL-SCNC: 103 MMOL/L (ref 95–110)
CO2 SERPL-SCNC: 28 MMOL/L (ref 23–29)
CREAT SERPL-MCNC: 0.8 MG/DL (ref 0.5–1.4)
DIFFERENTIAL METHOD: ABNORMAL
EOSINOPHIL # BLD AUTO: 0.5 K/UL (ref 0–0.5)
EOSINOPHIL NFR BLD: 4.7 % (ref 0–8)
ERYTHROCYTE [DISTWIDTH] IN BLOOD BY AUTOMATED COUNT: 12.7 % (ref 11.5–14.5)
EST. GFR  (NO RACE VARIABLE): >60 ML/MIN/1.73 M^2
GLUCOSE SERPL-MCNC: 109 MG/DL (ref 70–110)
HCT VFR BLD AUTO: 41.1 % (ref 37–48.5)
HGB BLD-MCNC: 13.7 G/DL (ref 12–16)
IMM GRANULOCYTES # BLD AUTO: 0.06 K/UL (ref 0–0.04)
IMM GRANULOCYTES NFR BLD AUTO: 0.6 % (ref 0–0.5)
LYMPHOCYTES # BLD AUTO: 2.7 K/UL (ref 1–4.8)
LYMPHOCYTES NFR BLD: 25 % (ref 18–48)
MCH RBC QN AUTO: 29 PG (ref 27–31)
MCHC RBC AUTO-ENTMCNC: 33.3 G/DL (ref 32–36)
MCV RBC AUTO: 87 FL (ref 82–98)
MONOCYTES # BLD AUTO: 0.7 K/UL (ref 0.3–1)
MONOCYTES NFR BLD: 6.5 % (ref 4–15)
NEUTROPHILS # BLD AUTO: 6.6 K/UL (ref 1.8–7.7)
NEUTROPHILS NFR BLD: 62.4 % (ref 38–73)
NRBC BLD-RTO: 0 /100 WBC
PLATELET # BLD AUTO: 330 K/UL (ref 150–450)
PMV BLD AUTO: 9.9 FL (ref 9.2–12.9)
POCT GLUCOSE: 94 MG/DL (ref 70–110)
POTASSIUM SERPL-SCNC: 4 MMOL/L (ref 3.5–5.1)
PROT SERPL-MCNC: 7.3 G/DL (ref 6–8.4)
RBC # BLD AUTO: 4.73 M/UL (ref 4–5.4)
SODIUM SERPL-SCNC: 141 MMOL/L (ref 136–145)
WBC # BLD AUTO: 10.6 K/UL (ref 3.9–12.7)

## 2022-09-14 PROCEDURE — 96361 HYDRATE IV INFUSION ADD-ON: CPT

## 2022-09-14 PROCEDURE — 96374 THER/PROPH/DIAG INJ IV PUSH: CPT

## 2022-09-14 PROCEDURE — 25000003 PHARM REV CODE 250: Performed by: STUDENT IN AN ORGANIZED HEALTH CARE EDUCATION/TRAINING PROGRAM

## 2022-09-14 PROCEDURE — 36415 COLL VENOUS BLD VENIPUNCTURE: CPT | Performed by: STUDENT IN AN ORGANIZED HEALTH CARE EDUCATION/TRAINING PROGRAM

## 2022-09-14 PROCEDURE — 99284 EMERGENCY DEPT VISIT MOD MDM: CPT | Mod: 25

## 2022-09-14 PROCEDURE — 85025 COMPLETE CBC W/AUTO DIFF WBC: CPT | Performed by: STUDENT IN AN ORGANIZED HEALTH CARE EDUCATION/TRAINING PROGRAM

## 2022-09-14 PROCEDURE — 63600175 PHARM REV CODE 636 W HCPCS: Performed by: STUDENT IN AN ORGANIZED HEALTH CARE EDUCATION/TRAINING PROGRAM

## 2022-09-14 PROCEDURE — 80053 COMPREHEN METABOLIC PANEL: CPT | Performed by: STUDENT IN AN ORGANIZED HEALTH CARE EDUCATION/TRAINING PROGRAM

## 2022-09-14 PROCEDURE — 96375 TX/PRO/DX INJ NEW DRUG ADDON: CPT

## 2022-09-14 PROCEDURE — 82962 GLUCOSE BLOOD TEST: CPT

## 2022-09-14 RX ORDER — KETOROLAC TROMETHAMINE 30 MG/ML
15 INJECTION, SOLUTION INTRAMUSCULAR; INTRAVENOUS
Status: COMPLETED | OUTPATIENT
Start: 2022-09-14 | End: 2022-09-14

## 2022-09-14 RX ORDER — PROCHLORPERAZINE EDISYLATE 5 MG/ML
10 INJECTION INTRAMUSCULAR; INTRAVENOUS
Status: COMPLETED | OUTPATIENT
Start: 2022-09-14 | End: 2022-09-14

## 2022-09-14 RX ORDER — DIPHENHYDRAMINE HYDROCHLORIDE 50 MG/ML
25 INJECTION INTRAMUSCULAR; INTRAVENOUS
Status: COMPLETED | OUTPATIENT
Start: 2022-09-14 | End: 2022-09-14

## 2022-09-14 RX ADMIN — SODIUM CHLORIDE 1000 ML: 0.9 INJECTION, SOLUTION INTRAVENOUS at 11:09

## 2022-09-14 RX ADMIN — PROCHLORPERAZINE EDISYLATE 10 MG: 5 INJECTION INTRAMUSCULAR; INTRAVENOUS at 11:09

## 2022-09-14 RX ADMIN — KETOROLAC TROMETHAMINE 15 MG: 30 INJECTION, SOLUTION INTRAMUSCULAR; INTRAVENOUS at 11:09

## 2022-09-14 RX ADMIN — DIPHENHYDRAMINE HYDROCHLORIDE 25 MG: 50 INJECTION INTRAMUSCULAR; INTRAVENOUS at 11:09

## 2022-09-14 NOTE — TELEPHONE ENCOUNTER
Able to move appointment to Monday, 9/19/22, with Dr Orourke. Patient is aware.    Speaking with patient on the phone-very slurred, slowed speech and c/o severe headache. Not her norm. Patient advised to report to the ED for evaluation/treatment.

## 2022-09-14 NOTE — ED TRIAGE NOTES
42 y.o. female presents to ER ED 01/ED 01B   Chief Complaint   Patient presents with    Headache           Slurred Speech   Pt presents with complaints of HA that started around 8 AM followed by slurred speech that started at approx 9 AM. Pt also reports dizziness.   Pt states this recently happened to her about 2 weeks ago. Awaiting follow up with neurology.  No acute distress noted.

## 2022-09-14 NOTE — TELEPHONE ENCOUNTER
----- Message from Latrice Jonas MA sent at 2022  9:56 AM CDT -----  Contact: Arleen / co-worker  Staci Hodge  MRN: [id number]  : 1980  PCP: Shilpi Clayton  Home Phone      [phone]  Work Phone      [phone]  Mobile          [phone]      MESSAGE: Would like to see about getting sooner appt due to having speech problems at times and just feeling strange.      Phone:  [phone]

## 2022-09-14 NOTE — Clinical Note
"Staci Alfonso" Naveen was seen and treated in our emergency department on 9/14/2022.  She may return to work on 09/20/2022.       If you have any questions or concerns, please don't hesitate to call.       RN    "

## 2022-09-14 NOTE — ED PROVIDER NOTES
Encounter Date: 9/14/2022    This document was partially completed using speech recognition software and may contain misspellings, grammatical errors, and/or unexpected word substitutions.       History     Chief Complaint   Patient presents with    Headache           Slurred Speech     42 year old female with a PMHx of anxiety, HTN presents to the ED with her boyfriend and father for slurred speech. Occurred suddenly today at work around 9-930am. It was preceded by a mild frontal headache. The exact presentation occurred 8/31/22 and she was seen here and had a stroke protocol activated. Stroke neurology was consulted and advised to consider migraine or home medication overdose. She is in the process of seeing neurology as an outpatient and sees them later this month. Denies numbness, weakness, fevers, facial droop, vision changes.    Review of patient's allergies indicates:  No Known Allergies  Past Medical History:   Diagnosis Date    Abnormal Pap smear of cervix age 25    no treatement    Anxiety     Breast cancer     Cancer 07/11/2018    breast cancer    Cervical spondylosis 2/9/2018    Colon polyp     Depression     Ectopic pregnancy 2/26/2019    General anesthetics causing adverse effect in therapeutic use     Slow to awaken/ Memory problems-post-op to day 3 after Mastectomy    Headache     History of psychiatric hospitalization     age 20 for SI    Hx of psychiatric care     Hypertension     Peutz-Jeghers syndrome     PONV (postoperative nausea and vomiting)     Psychiatric problem     Right carpal tunnel syndrome 3/9/2017    Self-harming behavior     Suicide attempt     Therapy      Past Surgical History:   Procedure Laterality Date    ADENOIDECTOMY      ANTEGRADE SINGLE BALLOON ENTEROSCOPY N/A 4/19/2021    Procedure: ENTEROSCOPY, SINGLE BALLOON, ANTEGRADE;  Surgeon: Vincenzo Herbert MD;  Location: [facility] ([address] [place] [address]);  Service: Endoscopy;  Laterality: N/A;  removal of large polyp in proximal jejunum; please  schedule during a time when Dr. Dillon is available in case I need assistance with removal or 2nd opinion prior to removal    COVID at [place] 4/16 - ttr    APPENDECTOMY      AUGMENTATION OF BREAST      BILATERAL MASTECTOMY Bilateral 7/23/2018    Procedure: MASTECTOMY 23 hr stay;  Surgeon: Sofia Kendrick MD;  Location: [address];  Service: General;  Laterality: Bilateral;    BILATERAL SALPINGO-OOPHORECTOMY (BSO) Bilateral 3/4/2021    Procedure: SALPINGO-OOPHORECTOMY, BILATERAL;  Surgeon: Clayton Vilchis MD;  Location: [facility];  Service: OB/GYN;  Laterality: Bilateral;    BOWEL RESECTION  10/17/14    BREAST BIOPSY Left 06/04/2018    BREAST CAPSULOTOMY Left 4/25/2019    Procedure: CAPSULOTOMY, BREAST LEFT;  Surgeon: Duane Bello MD;  Location: [address];  Service: Plastics;  Laterality: Left;    BREAST RECONSTRUCTION      BREAST SURGERY      Bilateral Mastectomy 07/23/2018    CARPAL TUNNEL RELEASE Bilateral     CARPAL TUNNEL RELEASE      COLONOSCOPY      COLONOSCOPY N/A 2/25/2021    Procedure: COLONOSCOPY;  Surgeon: Vincenzo Herbert MD;  [facility]  Service: Endoscopy;  Laterality: N/A;  previous anesthesia complications  okay for this date/time per Dr. Herbert and liliana with Miranda,  - sm  COVID test on 2/22/21 at [place] -    DILATION AND CURETTAGE OF UTERUS      DILATION AND CURETTAGE OF UTERUS USING SUCTION N/A 2/25/2019    Procedure: DILATION AND CURETTAGE, UTERUS, USING SUCTION  PATHOLOGY NEEDED;  Surgeon: Pam Sifuentes MD;  Location: [facility];  Service: OB/GYN;  Laterality: N/A;    ENDOSCOPIC ULTRASOUND OF UPPER GASTROINTESTINAL TRACT Left 1/15/2021    Procedure: ULTRASOUND, UPPER GI TRACT, ENDOSCOPIC;  Surgeon: Chandan Dillon MD;  Location: [facility];  Service: Endoscopy;  Laterality: Left;  for pancreatic screening    ESOPHAGOGASTRODUODENOSCOPY N/A 1/15/2021    Procedure: EGD (ESOPHAGOGASTRODUODENOSCOPY);  Surgeon: Chandan Dillon MD;  Location: [facility];   Service: Endoscopy;  Laterality: N/A;  with push enteroscopy    ESOPHAGOGASTRODUODENOSCOPY N/A 5/17/2021    Procedure: EGD (ESOPHAGOGASTRODUODENOSCOPY);  Surgeon: Chandan Dillon MD;  Location: [facility] (2ND FLR);  Service: Endoscopy;  Laterality: N/A;  Please schedule this duodenal polypectomy. Need to be a day that Dr Jose Antonio Stark is in the OR. 90 minutes.   Chandan Dillon MD     COVID at [place] 5/15 (scheduled in the OR for 5/18 - using same COVID results for both procedures) ttr    ESOPHAGOGASTRODUODENOSCOPY N/A 1/25/2022    Procedure: EGD (ESOPHAGOGASTRODUODENOSCOPY);  Surgeon: Chandan Dillon MD;  Location: [facility] (2ND FLR);  Service: Endoscopy;  Laterality: N/A;  poss emr  covid-1/22/22-[facility]-BB  instructions sent via portal-BB    FAT TRANSFER Bilateral 11/12/2018    Procedure: TRANSFER, FAT TISSUE BILATERAL BREAST;  Surgeon: Duane Bello MD;  [address];  Service: Plastics;  Laterality: Bilateral;    INJECTION FOR SENTINEL NODE IDENTIFICATION Left 7/23/2018    Procedure: INJECTION, FOR SENTINEL NODE IDENTIFICATION;  Surgeon: Sofia Kendrick MD;  [address];  Service: General;  Laterality: Left;    INSERTION OF BREAST TISSUE EXPANDER Bilateral 7/23/2018    Procedure: INSERTION, TISSUE EXPANDER, BREAST;  Surgeon: Duane Bello MD;  [address];  Service: Plastics;  Laterality: Bilateral;    INSERTION OF BREAST TISSUE EXPANDER Bilateral 3/10/2020    Procedure: INSERTION, TISSUE EXPANDER, BREAST, BILATERAL;  Surgeon: Duane Bello MD;  [facility];  Service: Plastics;  Laterality: Bilateral;    intestinal polpy      LYSIS OF ADHESIONS N/A 3/4/2021    Procedure: LYSIS, ADHESIONS;  Surgeon: Clayton Vilchis MD;  Location: [facility];  Service: OB/GYN;  Laterality: N/A;  Omental adhesions    MASTECTOMY      REMOVAL OF BREAST IMPLANT Bilateral 3/10/2020    Procedure: REMOVAL, IMPLANT, BREAST, BILATERAL;  Surgeon:  Duane Bello MD;  Location: [address];  Service: Plastics;  Laterality: Bilateral;    REMOVAL OF BREAST IMPLANT Bilateral 2020    Procedure: REMOVAL, IMPLANT, BREAST BILATERAL;  Surgeon: Duane Bello MD;  Location: [address];  Service: Plastics;  Laterality: Bilateral;    SENTINEL LYMPH NODE BIOPSY Left 2018    Procedure: BIOPSY, LYMPH NODE, SENTINEL;  Surgeon: Sofia Kendrick MD;  Location: [address];  Service: General;  Laterality: Left;    SMALL INTESTINE SURGERY      3 surgeries     TONSILLECTOMY      TOTAL ABDOMINAL HYSTERECTOMY N/A 3/4/2021    Procedure: HYSTERECTOMY, TOTAL, ABDOMINAL;  Surgeon: Clayton Vilchis MD;  [facility];  Service: OB/GYN;  Laterality: N/A;    UPPER GASTROINTESTINAL ENDOSCOPY       Family History   Problem Relation Age of Onset    Cancer Mother         colon cancer    Stomach cancer Mother     Hypertension Father     Colon polyps Sister         peutz-jeghers syndrome    Breast cancer Paternal Aunt     No Known Problems Maternal Uncle     ADD / ADHD Paternal Uncle     Depression Paternal Uncle     Dementia Maternal Grandfather     Dementia Maternal Grandmother     No Known Problems Paternal Grandfather     Breast cancer Paternal Grandmother     No Known Problems Cousin     Ovarian cancer Neg Hx     Anesthesia problems Neg Hx      Social History     Tobacco Use    Smoking status: Every Day     Packs/day: 0.50     Years: 17.00     Pack years: 8.50     Types: Cigarettes     Start date: 2000     Last attempt to quit: 2018     Years since quittin.1    Smokeless tobacco: Never    Tobacco comments:     sometimes dry cough per patient   Substance Use Topics    Alcohol use: Yes     Alcohol/week: 5.8 standard drinks     Types: 7 Standard drinks or equivalent per week     Comment: occasionally    Drug use: No     Review of Systems   Constitutional:  Negative for chills and fever.   HENT:  Negative for congestion, rhinorrhea and  sneezing.    Eyes:  Negative for discharge and redness.   Respiratory:  Negative for cough and shortness of breath.    Cardiovascular:  Negative for chest pain and palpitations.   Gastrointestinal:  Negative for abdominal pain, diarrhea, nausea and vomiting.   Genitourinary:  Negative for dysuria, frequency, vaginal bleeding and vaginal discharge.   Musculoskeletal:  Negative for back pain and neck pain.   Skin:  Negative for rash and wound.   Neurological:  Positive for speech difficulty. Negative for weakness, numbness and headaches.     Physical Exam     Initial Vitals [09/14/22 1112]   BP Pulse Resp Temp SpO2   (!) 141/88 107 18 97.2 °F (36.2 °C) 100 %      MAP       --         Physical Exam    Nursing note and vitals reviewed.  Constitutional: She appears well-developed. She is not diaphoretic. No distress.   HENT:   Head: Normocephalic and atraumatic.   Right Ear: External ear normal.   Left Ear: External ear normal.   Eyes: Right eye exhibits no discharge. Left eye exhibits no discharge. No scleral icterus.   Neck: Neck supple.   Cardiovascular:  Normal rate and regular rhythm.           Pulmonary/Chest: Breath sounds normal. No stridor. No respiratory distress. She has no wheezes. She has no rhonchi. She has no rales.   Abdominal: Abdomen is soft. There is no abdominal tenderness. There is no guarding.   Musculoskeletal:         General: No edema.      Cervical back: Neck supple.     Neurological: She is alert and oriented to person, place, and time. She has normal strength. No sensory deficit. Coordination and gait normal. GCS eye subscore is 4. GCS verbal subscore is 5. GCS motor subscore is 6.   Slurred speech   Skin: Skin is warm and dry. Capillary refill takes less than 2 seconds.   Psychiatric: She has a normal mood and affect.       ED Course   Procedures  Labs Reviewed   CBC W/ AUTO DIFFERENTIAL - Abnormal; Notable for the following components:       Result Value    Immature Granulocytes 0.6 (*)      Immature Grans (Abs) 0.06 (*)     All other components within normal limits   COMPREHENSIVE METABOLIC PANEL - Abnormal; Notable for the following components:    Alkaline Phosphatase 138 (*)     All other components within normal limits   POCT GLUCOSE   POCT GLUCOSE MONITORING CONTINUOUS          Imaging Results    None          Medications   ketorolac injection 15 mg (15 mg Intravenous Given 9/14/22 1152)   prochlorperazine injection Soln 10 mg (10 mg Intravenous Given 9/14/22 1153)   diphenhydrAMINE injection 25 mg (25 mg Intravenous Given 9/14/22 1149)   sodium chloride 0.9% bolus 1,000 mL (0 mLs Intravenous Stopped 9/14/22 1245)     Medical Decision Making:   Differential Diagnosis:   Ddx: complex migraine, TIA, stroke, hypoglycemia, electrolyte derangement  ED Management:  Based on the patient's evaluation - patient appears well for discharge home. ED workup unremarkable. Had the similar presentation where stroke protocol was initiated and neuro attributed it to meds vs migraine. Treated migraine in the ED with migraine cocktail and speech returned to normal. Boyfriend, father, and patient agree speech is much better! Considered TIA - will have f/u with neurology. Patient is in agreement.                        Clinical Impression:   Final diagnoses:  [G43.909] Migraine without status migrainosus, not intractable, unspecified migraine type (Primary)  [R47.81] Slurred speech      ED Disposition Condition    Discharge Stable          ED Prescriptions    None       Follow-up Information       Follow up With Specialties Details Why Contact Info    Anibal Orourke MD Neurology Go to   [address] [address]  [phone]               Tino Moreland DO  09/14/22 6400

## 2022-09-19 ENCOUNTER — OFFICE VISIT (OUTPATIENT)
Dept: NEUROLOGY | Facility: CLINIC | Age: 42
End: 2022-09-19
Payer: COMMERCIAL

## 2022-09-19 VITALS
BODY MASS INDEX: 34.48 KG/M2 | WEIGHT: 187.38 LBS | RESPIRATION RATE: 16 BRPM | DIASTOLIC BLOOD PRESSURE: 86 MMHG | HEART RATE: 112 BPM | HEIGHT: 62 IN | SYSTOLIC BLOOD PRESSURE: 110 MMHG

## 2022-09-19 DIAGNOSIS — R47.81 SLURRED SPEECH: ICD-10-CM

## 2022-09-19 DIAGNOSIS — G56.02 LEFT CARPAL TUNNEL SYNDROME: ICD-10-CM

## 2022-09-19 DIAGNOSIS — R42 DIZZINESS AND GIDDINESS: ICD-10-CM

## 2022-09-19 DIAGNOSIS — M50.90 CERVICAL DISC DISEASE: ICD-10-CM

## 2022-09-19 DIAGNOSIS — G43.E09 CHRONIC MIGRAINE WITH AURA: Primary | ICD-10-CM

## 2022-09-19 PROCEDURE — 3008F BODY MASS INDEX DOCD: CPT | Mod: CPTII,S$GLB,, | Performed by: PSYCHIATRY & NEUROLOGY

## 2022-09-19 PROCEDURE — 1159F MED LIST DOCD IN RCRD: CPT | Mod: CPTII,S$GLB,, | Performed by: PSYCHIATRY & NEUROLOGY

## 2022-09-19 PROCEDURE — 1159F PR MEDICATION LIST DOCUMENTED IN MEDICAL RECORD: ICD-10-PCS | Mod: CPTII,S$GLB,, | Performed by: PSYCHIATRY & NEUROLOGY

## 2022-09-19 PROCEDURE — 3008F PR BODY MASS INDEX (BMI) DOCUMENTED: ICD-10-PCS | Mod: CPTII,S$GLB,, | Performed by: PSYCHIATRY & NEUROLOGY

## 2022-09-19 PROCEDURE — 3074F PR MOST RECENT SYSTOLIC BLOOD PRESSURE < 130 MM HG: ICD-10-PCS | Mod: CPTII,S$GLB,, | Performed by: PSYCHIATRY & NEUROLOGY

## 2022-09-19 PROCEDURE — 99999 PR PBB SHADOW E&M-EST. PATIENT-LVL IV: CPT | Mod: PBBFAC,,, | Performed by: PSYCHIATRY & NEUROLOGY

## 2022-09-19 PROCEDURE — 1160F PR REVIEW ALL MEDS BY PRESCRIBER/CLIN PHARMACIST DOCUMENTED: ICD-10-PCS | Mod: CPTII,S$GLB,, | Performed by: PSYCHIATRY & NEUROLOGY

## 2022-09-19 PROCEDURE — 99214 PR OFFICE/OUTPT VISIT, EST, LEVL IV, 30-39 MIN: ICD-10-PCS | Mod: S$GLB,,, | Performed by: PSYCHIATRY & NEUROLOGY

## 2022-09-19 PROCEDURE — 3044F HG A1C LEVEL LT 7.0%: CPT | Mod: CPTII,S$GLB,, | Performed by: PSYCHIATRY & NEUROLOGY

## 2022-09-19 PROCEDURE — 3079F PR MOST RECENT DIASTOLIC BLOOD PRESSURE 80-89 MM HG: ICD-10-PCS | Mod: CPTII,S$GLB,, | Performed by: PSYCHIATRY & NEUROLOGY

## 2022-09-19 PROCEDURE — 1160F RVW MEDS BY RX/DR IN RCRD: CPT | Mod: CPTII,S$GLB,, | Performed by: PSYCHIATRY & NEUROLOGY

## 2022-09-19 PROCEDURE — 3074F SYST BP LT 130 MM HG: CPT | Mod: CPTII,S$GLB,, | Performed by: PSYCHIATRY & NEUROLOGY

## 2022-09-19 PROCEDURE — 3079F DIAST BP 80-89 MM HG: CPT | Mod: CPTII,S$GLB,, | Performed by: PSYCHIATRY & NEUROLOGY

## 2022-09-19 PROCEDURE — 3044F PR MOST RECENT HEMOGLOBIN A1C LEVEL <7.0%: ICD-10-PCS | Mod: CPTII,S$GLB,, | Performed by: PSYCHIATRY & NEUROLOGY

## 2022-09-19 PROCEDURE — 99999 PR PBB SHADOW E&M-EST. PATIENT-LVL IV: ICD-10-PCS | Mod: PBBFAC,,, | Performed by: PSYCHIATRY & NEUROLOGY

## 2022-09-19 PROCEDURE — 99214 OFFICE O/P EST MOD 30 MIN: CPT | Mod: S$GLB,,, | Performed by: PSYCHIATRY & NEUROLOGY

## 2022-09-19 RX ORDER — ERENUMAB-AOOE 70 MG/ML
70 INJECTION SUBCUTANEOUS
Qty: 1 EACH | Refills: 11 | Status: SHIPPED | OUTPATIENT
Start: 2022-09-19 | End: 2023-03-23

## 2022-09-19 NOTE — PROGRESS NOTES
"Staci Hodge is a 42 y.o. female with headaches and bilateral CTS, and prior complaints of dizziness. She has a history of PTSD, panic disorder with agoraphobia, insomnia, colon resection, due to small bowel obstruction, tobacco use, and breast cancer.        She is a patient of Camille Robertson, NP in this neurology clinic.   She has not seen Camille in nearly a year    She presents acutely to discuss slurred speech complaint    She has been seen at the [facility] ER twice for this since 8/2022    She has had 3 episodes.    The first spell occurred in August while she feeling fine at work but did have a headaches and she not seek care    For the second spell, She was seen by vascular neurology during the spell in the ER who noted "41 y/o woman presenting with slow and slurred speech and headache.  Speech not dysarthric (no apparent weakness of lingual, labial, or guttural muscle groups).  Consider migraine associated symptom or overdose of home medication."    She did not take any extra meds    Prior to this spell, she was on 150mg Li in the and is now taking 300mg in the am (a few weeks prior to the first spells)    Li level was low    On 9/16/2022, she suffered another spell at work without any clear trigger but she did have a headaches    Speech issue lasts just a few hours      Headaches are currently 3 headaches per week lasting more than 4 hours and disabling. 15 headaches per month.     She has bipolar disorder which is treated by psychiatry, [facility]    She has not been back for follow up/pending follow up    Headaches were worsening on higher dose of Li    She uses Fioricet PRN headache which somewhat helps       Dizziness is episodic       Migraine history noted     She works for the [employer]     I have reviewed all of this patient's past medical and surgical histories as well as family and social histories and active allergies and medications as documented in the electronic " medical record.     Review of Systems  Review of Systems   Constitutional: Negative for appetite change and fatigue.   HENT: Negative for tinnitus.    Eyes: Negative for photophobia and visual disturbance.   Respiratory: Negative for shortness of breath.    Cardiovascular: Positive for tacycardia  Gastrointestinal: Negative for nausea.   Musculoskeletal: Negative for arthralgias, back pain, myalgias, neck pain and neck stiffness.   Neurological: Positive for headaches. Negative for dizziness, tremors, facial asymmetry, speech difficulty, weakness and light-headedness.   Hematological: Does not bruise/bleed easily.   Psychiatric/Behavioral: Negative for agitation, dysphoric mood, sleep disturbance and suicidal ideas. The patient is not nervous/anxious.        Objective:           Vitals:     11/23/21 1336   BP: 122/80   Pulse: 74   Resp: 20      Exam:  Gen Appearance, well developed/nourished in no apparent distress  CV: 2+ distal pulses with no edema or swelling  Neuro:  MS: Awake, alert, oriented to place, person, time, situation. Sustains attention. Recent/remote memory intact, Language is full to spontaneous speech/repetition/naming/comprehension. Fund of Knowledge is full  CN: Optic discs are flat with normal vasculature, PERRL, right nystamus. Normal facial sensation and strength, + fast beat horizontal nystagmus noted today, Hearing symmetric, Tongue and Palate are midline and strong. Shoulder Shrug symmetric and strong.  Motor: Normal bulk, tone, no abnormal movements. 5/5 strength bilateral upper/lower extremities with 2+ reflexes and bilateral plantar response  Sensory: symmetric to light touch, pain, temp, and vibration/proprioception. Romberg negative  Cerebellar: Finger-nose,Heal-shin, Rapid alternating movements intact  Gait: Normal stance, no ataxia  *no dizziness with head thrust today.     Imaging:   10/5/2021 MRI Brain:   No MRI evidence of an acute intracranial abnormality.     MRI C-spine  12/2017:  Mild degenerative changes of the cervical spine; neural foraminal narrowing at the C5-6 and C6-7 levels.  No central canal stenosis.     MRI Brain with and without 2/3/2017: normal     24 hour Holter monitor: not completed     EMG BUE 2/10/2017:   Impression: Abnormal Study secondary to the Presence of:   Mild Bilateral Carpal Tunnel Syndrome.      EMG BUE 1/18/2018:  1. Improved right CTS findings post right CTR.   2. Slight worsening of the CTS findings.   3. No EP evidence of cervical radiculopathy found.      48 hour holter 10/2021:   Sinus rhythm  Normal heart rate behavior  Very rare ectopy  Symptoms correlating with sinus rhythm/tachycardia     There was an episode of chest pain reported. The corresponding rhythm strips revealed the following: the rhythm was sinus rhythm at 81 bpm with without ectopy.     There was an episode of Heart racing reported. The corresponding rhythm strips revealed the following: the rhythm was sinus tachycardia at 108 bpm with without ectopy.     There was an episode of Heart racing reported. The corresponding rhythm strips revealed the following: the rhythm was sinus tachycardia at 111 bpm with without ectopy.     EEG 2021:   Unremarkable    2021 MRI C spine: C6-7 degenerative disc changes resulting in mild spinal canal narrowing and mild bilateral neuroforaminal narrowing.  Additional mild bilateral neuroforaminal narrowing at the C5-6 level.    2021 MRA brain: Normal MRA exam.    8/31/2022 CT head : No acute intracranial abnormality.     Labs:   5/2021 CBC and CMP overall unremarkable  9/2021 CBC, CMP, TSH, T4, B12 reviewed-elevated alkaline phosphatase  Assessment:   Staci Hodge is a 37 y.o. female, with neck pain without radiculopathy, chronic headaches and bilateral CTS, and prior complaints of dizziness. She has a history of PTSD, panic disorder with agoraphobia, insomnia, colon resection, due to small bowel obstruction, and tobacco use.   Plan:   I  "recommend:  1. No longer on Lyrica for for fibromyalgia complaints.   -pain historically worse with stress.      2. Neurosurgery per pain management not needed. Nothing surgical on her MRI C-spine-mild changes only. She failed to respond to two different types of injections prior, and did not have cervical radiculopathy on EMG.  -has left CTS/ can see surgery back PRN. Is s/p right CTR with good relief     3. No longer on Topamax  -Takes  prn Fioricet for migraines per PCP  -Diclofenac, prescribed to abort headaches, was discontinued per Nephrology, given her proteinuria. Can't use NSAIDs due to Li use.   -She was originally prescribed Propranolol for headache prevention, which was ineffective; however, this was changed to metoprolol for tachycardia, which was believed to be anxiety related; however, tachycardia is improved.      4. 2021 MRI Brain and EEG unremarkable.   -48 hour holter shows sinus tachycardia with episodes, but workup per cardiology unrevealing. Outside cardiology records noted in media   -Dizziness occurs when neck is flexed.   2021 MRI C spine showed mild spinal stenosis  -Referred to PT but this was not needed  2021 MRA brain normal (for dizziness complaint)   -Dizziness is currently improved      5. Returns today with slurred speech in 3 episodes associated with headaches    -8/31/2022 CT head : No acute intracranial abnormality.  -She was seen by vascular neurology during the spell in the ER in 8/2022 who noted "slow and slurred speech and headache.  Speech not dysarthric (no apparent weakness of lingual, labial, or guttural muscle groups).  Consider migraine associated symptom or overdose of home medication."  -She did have her Li dose increased prior to this but levels were lower normal. Could still have some side effects but headaches are increased  -Every spells has been associated with a headache which has been more frequent in her (chronic migraine with possible speech aura)  -Failed " Topamax, is on Metoprolol and can't have an TCA given BPD history  -Needs a better migraine prevention: Aimovig per orders unless side effects  -To the ER if any obvious stroke like weakness with speech changes. Otherwise, can monitor at home/ notify me for further spells    RTC in 6 months

## 2022-09-21 ENCOUNTER — TELEPHONE (OUTPATIENT)
Dept: PHARMACY | Facility: CLINIC | Age: 42
End: 2022-09-21
Payer: COMMERCIAL

## 2022-09-21 NOTE — TELEPHONE ENCOUNTER
Hello,       The prior authorization for Staci Hodge's Aimovig prescription has been APPROVED FROM 9/19/2022 TO 3/19/2023 with copayment of $5.00.       [facility] [place] will reach out to patient for further correspondence.     If there are any additional questions or concerns, please contact me.     Christine Gerber PharmD   [facility]   Prior Authorization Department   Ph: [phone]   Fx: [phone]

## 2022-11-01 DIAGNOSIS — K31.7 GASTRIC POLYP: Primary | ICD-10-CM

## 2022-11-15 ENCOUNTER — PATIENT OUTREACH (OUTPATIENT)
Dept: ADMINISTRATIVE | Facility: OTHER | Age: 42
End: 2022-11-15
Payer: COMMERCIAL

## 2022-11-15 VITALS — HEART RATE: 79 BPM | DIASTOLIC BLOOD PRESSURE: 80 MMHG | OXYGEN SATURATION: 98 % | SYSTOLIC BLOOD PRESSURE: 120 MMHG

## 2022-12-01 ENCOUNTER — PATIENT MESSAGE (OUTPATIENT)
Dept: INTERNAL MEDICINE | Facility: CLINIC | Age: 42
End: 2022-12-01
Payer: COMMERCIAL

## 2022-12-01 RX ORDER — ONDANSETRON 8 MG/1
8 TABLET, ORALLY DISINTEGRATING ORAL EVERY 6 HOURS PRN
Qty: 30 TABLET | Refills: 0 | Status: SHIPPED | OUTPATIENT
Start: 2022-12-01 | End: 2023-04-26

## 2023-02-11 ENCOUNTER — TELEPHONE (OUTPATIENT)
Dept: ENDOSCOPY | Facility: HOSPITAL | Age: 43
End: 2023-02-11
Payer: COMMERCIAL

## 2023-02-14 ENCOUNTER — PATIENT MESSAGE (OUTPATIENT)
Dept: ENDOSCOPY | Facility: HOSPITAL | Age: 43
End: 2023-02-14
Payer: COMMERCIAL

## 2023-02-14 ENCOUNTER — TELEPHONE (OUTPATIENT)
Dept: ENDOSCOPY | Facility: HOSPITAL | Age: 43
End: 2023-02-14
Payer: COMMERCIAL

## 2023-02-14 NOTE — TELEPHONE ENCOUNTER
Pt is scheduled for EGD on 3/10/23.  Reviewed medical history and instructions with pt.  Pt verbalized understanding.  Instructions sent to portal.

## 2023-03-10 ENCOUNTER — ANESTHESIA EVENT (OUTPATIENT)
Dept: ENDOSCOPY | Facility: HOSPITAL | Age: 43
End: 2023-03-10
Payer: COMMERCIAL

## 2023-03-10 ENCOUNTER — HOSPITAL ENCOUNTER (OUTPATIENT)
Facility: HOSPITAL | Age: 43
Discharge: HOME | End: 2023-03-10
Attending: INTERNAL MEDICINE | Admitting: INTERNAL MEDICINE
Payer: COMMERCIAL

## 2023-03-10 ENCOUNTER — ANESTHESIA (OUTPATIENT)
Dept: ENDOSCOPY | Facility: HOSPITAL | Age: 43
End: 2023-03-10
Payer: COMMERCIAL

## 2023-03-10 VITALS
HEIGHT: 62 IN | TEMPERATURE: 98 F | SYSTOLIC BLOOD PRESSURE: 97 MMHG | RESPIRATION RATE: 20 BRPM | WEIGHT: 180 LBS | BODY MASS INDEX: 33.13 KG/M2 | HEART RATE: 80 BPM | OXYGEN SATURATION: 99 % | DIASTOLIC BLOOD PRESSURE: 67 MMHG

## 2023-03-10 DIAGNOSIS — Q85.89 PEUTZ-JEGHERS POLYPS OF SMALL BOWEL: Primary | ICD-10-CM

## 2023-03-10 DIAGNOSIS — K31.7 POLYP OF DUODENUM: ICD-10-CM

## 2023-03-10 PROCEDURE — 43239 PR EGD, FLEX, W/BIOPSY, SGL/MULTI: ICD-10-PCS | Mod: ,,, | Performed by: INTERNAL MEDICINE

## 2023-03-10 PROCEDURE — 88305 TISSUE EXAM BY PATHOLOGIST: CPT | Mod: 26,,, | Performed by: PATHOLOGY

## 2023-03-10 PROCEDURE — D9220A PRA ANESTHESIA: Mod: ANES,,, | Performed by: ANESTHESIOLOGY

## 2023-03-10 PROCEDURE — 37000009 HC ANESTHESIA EA ADD 15 MINS: Performed by: INTERNAL MEDICINE

## 2023-03-10 PROCEDURE — 88305 TISSUE EXAM BY PATHOLOGIST: CPT | Performed by: PATHOLOGY

## 2023-03-10 PROCEDURE — 37000008 HC ANESTHESIA 1ST 15 MINUTES: Performed by: INTERNAL MEDICINE

## 2023-03-10 PROCEDURE — 88305 TISSUE EXAM BY PATHOLOGIST: ICD-10-PCS | Mod: 26,,, | Performed by: PATHOLOGY

## 2023-03-10 PROCEDURE — D9220A PRA ANESTHESIA: Mod: CRNA,,, | Performed by: NURSE ANESTHETIST, CERTIFIED REGISTERED

## 2023-03-10 PROCEDURE — 43239 EGD BIOPSY SINGLE/MULTIPLE: CPT | Mod: ,,, | Performed by: INTERNAL MEDICINE

## 2023-03-10 PROCEDURE — D9220A PRA ANESTHESIA: ICD-10-PCS | Mod: ANES,,, | Performed by: ANESTHESIOLOGY

## 2023-03-10 PROCEDURE — 43239 EGD BIOPSY SINGLE/MULTIPLE: CPT | Performed by: INTERNAL MEDICINE

## 2023-03-10 PROCEDURE — 25000003 PHARM REV CODE 250: Performed by: NURSE ANESTHETIST, CERTIFIED REGISTERED

## 2023-03-10 PROCEDURE — 27201012 HC FORCEPS, HOT/COLD, DISP: Performed by: INTERNAL MEDICINE

## 2023-03-10 PROCEDURE — D9220A PRA ANESTHESIA: ICD-10-PCS | Mod: CRNA,,, | Performed by: NURSE ANESTHETIST, CERTIFIED REGISTERED

## 2023-03-10 PROCEDURE — 63600175 PHARM REV CODE 636 W HCPCS: Performed by: NURSE ANESTHETIST, CERTIFIED REGISTERED

## 2023-03-10 RX ORDER — ONDANSETRON 2 MG/ML
INJECTION INTRAMUSCULAR; INTRAVENOUS
Status: DISCONTINUED | OUTPATIENT
Start: 2023-03-10 | End: 2023-03-10

## 2023-03-10 RX ORDER — PROPOFOL 10 MG/ML
VIAL (ML) INTRAVENOUS CONTINUOUS PRN
Status: DISCONTINUED | OUTPATIENT
Start: 2023-03-10 | End: 2023-03-10

## 2023-03-10 RX ORDER — MIDAZOLAM HYDROCHLORIDE 1 MG/ML
INJECTION INTRAMUSCULAR; INTRAVENOUS
Status: DISCONTINUED | OUTPATIENT
Start: 2023-03-10 | End: 2023-03-10

## 2023-03-10 RX ORDER — SODIUM CHLORIDE 0.9 % (FLUSH) 0.9 %
10 SYRINGE (ML) INJECTION
Status: DISCONTINUED | OUTPATIENT
Start: 2023-03-10 | End: 2023-03-10 | Stop reason: HOSPADM

## 2023-03-10 RX ORDER — PROPOFOL 10 MG/ML
VIAL (ML) INTRAVENOUS
Status: DISCONTINUED | OUTPATIENT
Start: 2023-03-10 | End: 2023-03-10

## 2023-03-10 RX ORDER — DEXMEDETOMIDINE HYDROCHLORIDE 100 UG/ML
INJECTION, SOLUTION INTRAVENOUS
Status: DISCONTINUED | OUTPATIENT
Start: 2023-03-10 | End: 2023-03-10

## 2023-03-10 RX ORDER — LIDOCAINE HYDROCHLORIDE 10 MG/ML
INJECTION, SOLUTION INTRAVENOUS
Status: DISCONTINUED | OUTPATIENT
Start: 2023-03-10 | End: 2023-03-10

## 2023-03-10 RX ORDER — DEXAMETHASONE SODIUM PHOSPHATE 4 MG/ML
INJECTION, SOLUTION INTRA-ARTICULAR; INTRALESIONAL; INTRAMUSCULAR; INTRAVENOUS; SOFT TISSUE
Status: DISCONTINUED | OUTPATIENT
Start: 2023-03-10 | End: 2023-03-10

## 2023-03-10 RX ADMIN — ONDANSETRON 4 MG: 2 INJECTION INTRAMUSCULAR; INTRAVENOUS at 09:03

## 2023-03-10 RX ADMIN — PROPOFOL 175 MCG/KG/MIN: 10 INJECTION, EMULSION INTRAVENOUS at 09:03

## 2023-03-10 RX ADMIN — MIDAZOLAM HYDROCHLORIDE 2 MG: 1 INJECTION, SOLUTION INTRAMUSCULAR; INTRAVENOUS at 09:03

## 2023-03-10 RX ADMIN — PROPOFOL 50 MG: 10 INJECTION, EMULSION INTRAVENOUS at 09:03

## 2023-03-10 RX ADMIN — LIDOCAINE HYDROCHLORIDE 100 MG: 10 INJECTION, SOLUTION INTRAVENOUS at 09:03

## 2023-03-10 RX ADMIN — SODIUM CHLORIDE: 0.9 INJECTION, SOLUTION INTRAVENOUS at 09:03

## 2023-03-10 RX ADMIN — DEXAMETHASONE SODIUM PHOSPHATE 4 MG: 4 INJECTION, SOLUTION INTRAMUSCULAR; INTRAVENOUS at 09:03

## 2023-03-10 RX ADMIN — DEXMEDETOMIDINE HYDROCHLORIDE 25 MCG: 100 INJECTION, SOLUTION INTRAVENOUS at 09:03

## 2023-03-10 NOTE — PROVATION PATIENT INSTRUCTIONS
Discharge Summary/Instructions after an Endoscopic Procedure  Patient Name: Staci Hodge  Patient MRN: [id number]  Patient YOB: 1980  Friday, March 10, 2023  Chandan Dillon MD  Dear patient,  As a result of recent federal legislation (The Federal Cures Act), you may   receive lab or pathology results from your procedure in your [facility]   account before your physician is able to contact you. Your physician or   their representative will relay the results to you with their   recommendations at their soonest availability.  Thank you,  RESTRICTIONS:  During your procedure today, you received medications for sedation.  These   medications may affect your judgment, balance and coordination.  Therefore,   for 24 hours, you have the following restrictions:   - DO NOT drive a car, operate machinery, make legal/financial decisions,   sign important papers or drink alcohol.    ACTIVITY:  Today: no heavy lifting, straining or running due to procedural   sedation/anesthesia.  The following day: return to full activity including work.  DIET:  Eat and drink normally unless instructed otherwise.     TREATMENT FOR COMMON SIDE EFFECTS:  - Mild abdominal pain, nausea, belching, bloating or excessive gas:  rest,   eat lightly and use a heating pad.  - Sore Throat: treat with throat lozenges and/or gargle with warm salt   water.  - Because air was used during the procedure, expelling large amounts of air   from your rectum or belching is normal.  - If a bowel prep was taken, you may not have a bowel movement for 1-3 days.    This is normal.  SYMPTOMS TO WATCH FOR AND REPORT TO YOUR PHYSICIAN:  1. Abdominal pain or bloating, other than gas cramps.  2. Chest pain.  3. Back pain.  4. Signs of infection such as: chills or fever occurring within 24 hours   after the procedure.  5. Rectal bleeding, which would show as bright red, maroon, or black stools.   (A tablespoon of blood from the rectum is not serious, especially if    hemorrhoids are present.)  6. Vomiting.  7. Weakness or dizziness.  GO DIRECTLY TO THE NEAREST EMERGENCY ROOM IF YOU HAVE ANY OF THE FOLLOWING:      Difficulty breathing              Chills and/or fever over 101 F   Persistent vomiting and/or vomiting blood   Severe abdominal pain   Severe chest pain   Black, tarry stools   Bleeding- more than one tablespoon   Any other symptom or condition that you feel may need urgent attention  Your doctor recommends these additional instructions:  If any biopsies were taken, your doctors clinic will contact you in 1 to 2   weeks with any results.  - Discharge patient to home (ambulatory).   - Await pathology results.   - Repeat upper endoscopy in 1 year for surveillance based on pathology   results.   - Return to endoscopist at appointment to be scheduled.  For questions, problems or results please call your physician - Chandan Dillon MD at Work:  [phone].  [facility], EMERGENCY ROOM PHONE NUMBER: [phone]  IF A COMPLICATION OR EMERGENCY SITUATION ARISES AND YOU ARE UNABLE TO REACH   YOUR PHYSICIAN - GO DIRECTLY TO THE EMERGENCY ROOM.  Chandan Dillon MD  3/10/2023 9:51:21 AM  This report has been verified and signed electronically.  Dear patient,  As a result of recent federal legislation (The Federal Cures Act), you may   receive lab or pathology results from your procedure in your [facility]   account before your physician is able to contact you. Your physician or   their representative will relay the results to you with their   recommendations at their soonest availability.  Thank you,  PROVATION

## 2023-03-10 NOTE — BRIEF OP NOTE
Discharge Summary/Instructions after an Endoscopic Procedure    Patient Name: Staci Hodge  Patient MRN: [id number]  Patient YOB: 1980    Friday, March 10, 2023  Chandan Dillon MD    Dear patient,  As a result of recent federal legislation (The Federal Cures Act), you may receive lab or pathology results from your procedure in your [facility] account before your physician is able to contact you. Your physician or their representative will relay the results to you with their recommendations at their soonest availability.  Thank you,    RESTRICTIONS:  During your procedure today, you received medications for sedation.  These medications may affect your judgment, balance and coordination.  Therefore, for 24 hours, you have the following restrictions:     - DO NOT drive a car, operate machinery, make legal/financial decisions, sign important papers or drink alcohol.      ACTIVITY:  Today: no heavy lifting, straining or running due to procedural sedation/anesthesia.  The following day: return to full activity including work.    DIET:  Eat and drink normally unless instructed otherwise.     TREATMENT FOR COMMON SIDE EFFECTS:  - Mild abdominal pain, nausea, belching, bloating or excessive gas:  rest, eat lightly and use a heating pad.  - Sore Throat: treat with throat lozenges and/or gargle with warm salt water.  - Because air was used during the procedure, expelling large amounts of air from your rectum or belching is normal.  - If a bowel prep was taken, you may not have a bowel movement for 1-3 days.  This is normal.      SYMPTOMS TO WATCH FOR AND REPORT TO YOUR PHYSICIAN:  1. Abdominal pain or bloating, other than gas cramps.  2. Chest pain.  3. Back pain.  4. Signs of infection such as: chills or fever occurring within 24 hours after the procedure.  5. Rectal bleeding, which would show as bright red, maroon, or black stools. (A tablespoon of blood from the rectum is not serious, especially if hemorrhoids  are present.)  6. Vomiting.  7. Weakness or dizziness.      GO DIRECTLY TO THE NEAREST EMERGENCY ROOM IF YOU HAVE ANY OF THE FOLLOWING:     Difficulty breathing              Chills and/or fever over 101 F   Persistent vomiting and/or vomiting blood   Severe abdominal pain   Severe chest pain   Black, tarry stools   Bleeding- more than one tablespoon   Any other symptom or condition that you feel may need urgent attention    Your doctor recommends these additional instructions:  If any biopsies were taken, your doctors clinic will contact you in 1 to 2 weeks with any results.    - Discharge patient to home (ambulatory).   - Await pathology results.   - Repeat upper endoscopy in 1 year for surveillance based on pathology results.   - Return to endoscopist at appointment to be scheduled.    For questions, problems or results please call your physician - Chandan Dillon MD at Work:  [phone].    [facility], EMERGENCY ROOM PHONE NUMBER: [phone]    IF A COMPLICATION OR EMERGENCY SITUATION ARISES AND YOU ARE UNABLE TO REACH YOUR PHYSICIAN - GO DIRECTLY TO THE EMERGENCY ROOM.

## 2023-03-10 NOTE — H&P
History & Physical - Short Stay  Gastroenterology      SUBJECTIVE:     Procedure: EGD    Chief Complaint/Indication for Procedure: Surveillance    History of Present Illness:  Patient is a 42 y.o. female presents with Peutz-Jeghers syndrome here for surveillance.     PTA Medications   Medication Sig    ARIPiprazole (ABILIFY) 10 MG Tab Take 1 tablet (10 mg total) by mouth once daily.    butalbital-acetaminophen-caffeine -40 mg (FIORICET, ESGIC) -40 mg per tablet TAKE ONE TABLET BY MOUTH EVERY 6 HOURS AS NEEDED FOR PAIN    clonazePAM (KLONOPIN) 0.5 MG tablet Take 0.5 mg by mouth 2 (two) times daily.    erenumab-aooe (AIMOVIG AUTOINJECTOR) 70 mg/mL autoinjector Inject 1 mL (70 mg total) into the skin every 28 days.    hydrOXYzine pamoate (VISTARIL) 100 MG capsule Take 1 capsule (100 mg total) by mouth every 6 (six) hours as needed (Insomnia or anxiety).    lithium (LITHOBID) 300 MG CR tablet Take 2 tablets (600 mg total) by mouth every evening. (Patient taking differently: Take 1 capsule by mouth every morning and 2 capsules at night)    pantoprazole (PROTONIX) 40 MG tablet TAKE ONE TABLET BY MOUTH ONCE A DAY    traZODone (DESYREL) 150 MG tablet Take 1 tablet (150 mg total) by mouth every evening.    butalbital-acetaminophen-caffeine -40 mg (FIORICET, ESGIC) -40 mg per tablet Take 1 tablet by mouth every 6 (six) hours as needed.    metoprolol succinate (TOPROL-XL) 25 MG 24 hr tablet Take 1 tablet (25 mg total) by mouth 2 (two) times daily.    nicotine (NICODERM CQ) 21 mg/24 hr Place 1 patch onto the skin once daily. (Patient not taking: Reported on 9/6/2022)    ondansetron (ZOFRAN-ODT) 8 MG TbDL Take 1 tablet (8 mg total) by mouth every 6 (six) hours as needed (nausea).    VITAMIN D2 1,250 mcg (50,000 unit) capsule TAKE ONE CAPSULE BY MOUTH EVERY 7 DAYS       Review of patient's allergies indicates:  No Known Allergies     Past Medical History:   Diagnosis Date    Abnormal Pap smear of cervix  age 25    no treatement    Anxiety     Breast cancer     Cancer 07/11/2018    breast cancer    Cervical spondylosis 2/9/2018    Colon polyp     Depression     Ectopic pregnancy 2/26/2019    General anesthetics causing adverse effect in therapeutic use     Slow to awaken/ Memory problems-post-op to day 3 after Mastectomy    Headache     History of psychiatric hospitalization     age 20 for SI    Hx of psychiatric care     Hypertension     Peutz-Jeghers syndrome     PONV (postoperative nausea and vomiting)     Psychiatric problem     Right carpal tunnel syndrome 3/9/2017    Self-harming behavior     Suicide attempt     Therapy      Past Surgical History:   Procedure Laterality Date    ADENOIDECTOMY      ANTEGRADE SINGLE BALLOON ENTEROSCOPY N/A 4/19/2021    Procedure: ENTEROSCOPY, SINGLE BALLOON, ANTEGRADE;  Surgeon: Vincenzo Herbert MD;  Location: [facility] (2ND FLR);  Service: Endoscopy;  Laterality: N/A;  removal of large polyp in proximal jejunum; please schedule during a time when Dr. Dillon is available in case I need assistance with removal or 2nd opinion prior to removal    COVID at [place] 4/16 - ttr    APPENDECTOMY      AUGMENTATION OF BREAST      BILATERAL MASTECTOMY Bilateral 7/23/2018    Procedure: MASTECTOMY 23 hr stay;  Surgeon: Sofia Kendrick MD;  Location: [address];  Service: General;  Laterality: Bilateral;    BILATERAL SALPINGO-OOPHORECTOMY (BSO) Bilateral 3/4/2021    Procedure: SALPINGO-OOPHORECTOMY, BILATERAL;  Surgeon: Clayton Vilchis MD;  Location: [facility];  Service: OB/GYN;  Laterality: Bilateral;    BOWEL RESECTION  10/17/14    BREAST BIOPSY Left 06/04/2018    BREAST CAPSULOTOMY Left 4/25/2019    Procedure: CAPSULOTOMY, BREAST LEFT;  Surgeon: Duane Bello MD;  Location: [address];  Service: Plastics;  Laterality: Left;    BREAST RECONSTRUCTION      BREAST SURGERY      Bilateral Mastectomy 07/23/2018    CARPAL TUNNEL RELEASE Bilateral     CARPAL TUNNEL RELEASE       COLONOSCOPY      COLONOSCOPY N/A 2/25/2021    Procedure: COLONOSCOPY;  Surgeon: Vincenzo Herbert MD;  Location: [facility] (2ND FLR);  Service: Endoscopy;  Laterality: N/A;  previous anesthesia complications  okay for this date/time per Dr. Herbert and liliana with Miranda,  - sm  COVID test on 2/22/21 at [facility]    DILATION AND CURETTAGE OF UTERUS      DILATION AND CURETTAGE OF UTERUS USING SUCTION N/A 2/25/2019    Procedure: DILATION AND CURETTAGE, UTERUS, USING SUCTION  PATHOLOGY NEEDED;  Surgeon: Pam Sifuentes MD;  Location: [facility];  Service: OB/GYN;  Laterality: N/A;    ENDOSCOPIC ULTRASOUND OF UPPER GASTROINTESTINAL TRACT Left 1/15/2021    Procedure: ULTRASOUND, UPPER GI TRACT, ENDOSCOPIC;  Surgeon: Chandan Dillon MD;  Location: [facility];  Service: Endoscopy;  Laterality: Left;  for pancreatic screening    ESOPHAGOGASTRODUODENOSCOPY N/A 1/15/2021    Procedure: EGD (ESOPHAGOGASTRODUODENOSCOPY);  Surgeon: Chandan Dillon MD;  Location: [facility];  Service: Endoscopy;  Laterality: N/A;  with push enteroscopy    ESOPHAGOGASTRODUODENOSCOPY N/A 5/17/2021    Procedure: EGD (ESOPHAGOGASTRODUODENOSCOPY);  Surgeon: Chandan Dillon MD;  Location: [facility] (2ND FLR);  Service: Endoscopy;  Laterality: N/A;  Please schedule this duodenal polypectomy. Need to be a day that Dr Jose Antonio Stark is in the OR. 90 minutes.   Chandan Dillon MD     COVID at [place] 5/15 (scheduled in the OR for 5/18 - using same COVID results for both procedures) ttr    ESOPHAGOGASTRODUODENOSCOPY N/A 1/25/2022    Procedure: EGD (ESOPHAGOGASTRODUODENOSCOPY);  Surgeon: Chandan Dillon MD;  Location: [facility] (2ND FLR);  Service: Endoscopy;  Laterality: N/A;  poss emr  covid-1/22/22-ST[facility]-[id number]  instructions sent via portal-BB    FAT TRANSFER Bilateral 11/12/2018    Procedure: TRANSFER, FAT TISSUE BILATERAL BREAST;  Surgeon: Duane Bello MD;  Location: [facility] OR 2ND FLR;  Service: Plastics;  Laterality: Bilateral;    INJECTION  FOR SENTINEL NODE IDENTIFICATION Left 7/23/2018    Procedure: INJECTION, FOR SENTINEL NODE IDENTIFICATION;  Surgeon: Sofia Kendrick MD;  [facility];  Service: General;  Laterality: Left;    INSERTION OF BREAST TISSUE EXPANDER Bilateral 7/23/2018    Procedure: INSERTION, TISSUE EXPANDER, BREAST;  Surgeon: Duane Bello MD;  [facility];  Service: Plastics;  Laterality: Bilateral;    INSERTION OF BREAST TISSUE EXPANDER Bilateral 3/10/2020    Procedure: INSERTION, TISSUE EXPANDER, BREAST, BILATERAL;  Surgeon: Duane Bello MD;  [facility];  Service: Plastics;  Laterality: Bilateral;    intestinal polpy      LYSIS OF ADHESIONS N/A 3/4/2021    Procedure: LYSIS, ADHESIONS;  Surgeon: Clayton Vilchis MD;  Location: [facility];  Service: OB/GYN;  Laterality: N/A;  Omental adhesions    MASTECTOMY      REMOVAL OF BREAST IMPLANT Bilateral 3/10/2020    Procedure: REMOVAL, IMPLANT, BREAST, BILATERAL;  Surgeon: Duane Bello MD;  Location: [facility] OR [address] [place] [address];  Service: Plastics;  Laterality: Bilateral;    REMOVAL OF BREAST IMPLANT Bilateral 7/7/2020    Procedure: REMOVAL, IMPLANT, BREAST BILATERAL;  Surgeon: Duane Bello MD;  Location: [facility] OR [address] [place] [address];  Service: Plastics;  Laterality: Bilateral;    SENTINEL LYMPH NODE BIOPSY Left 7/23/2018    Procedure: BIOPSY, LYMPH NODE, SENTINEL;  Surgeon: Sofia Kendrick MD;  Location: [address];  Service: General;  Laterality: Left;    SMALL INTESTINE SURGERY      3 surgeries     TONSILLECTOMY      TOTAL ABDOMINAL HYSTERECTOMY N/A 3/4/2021    Procedure: HYSTERECTOMY, TOTAL, ABDOMINAL;  Surgeon: Clayton Vilchis MD;  Location: [facility];  Service: OB/GYN;  Laterality: N/A;    UPPER GASTROINTESTINAL ENDOSCOPY       Family History   Problem Relation Age of Onset    Cancer Mother         colon cancer    Stomach cancer Mother     Hypertension Father     Colon polyps Sister         peutz-jeghers syndrome    Breast  cancer Paternal Aunt     No Known Problems Maternal Uncle     ADD / ADHD Paternal Uncle     Depression Paternal Uncle     Dementia Maternal Grandfather     Dementia Maternal Grandmother     No Known Problems Paternal Grandfather     Breast cancer Paternal Grandmother     No Known Problems Cousin     Ovarian cancer Neg Hx     Anesthesia problems Neg Hx      Social History     Tobacco Use    Smoking status: Every Day     Packs/day: 1.00     Years: 17.00     Pack years: 17.00     Types: Cigarettes     Start date: 2000     Last attempt to quit: 2018     Years since quittin.6    Smokeless tobacco: Never    Tobacco comments:     sometimes dry cough per patient   Substance Use Topics    Alcohol use: Yes     Alcohol/week: 5.8 standard drinks     Types: 7 Standard drinks or equivalent per week     Comment: occasionally    Drug use: No       Review of Systems:  Constitutional: no fever or chills  Respiratory: no cough or shortness of breath  Cardiovascular: no chest pain or palpitations    OBJECTIVE:     Vital Signs (Most Recent)  Temp: 98.1 °F (36.7 °C) (03/10/23 0857)  Pulse: 101 (03/10/23 0857)  Resp: 17 (03/10/23 0857)  BP: 118/77 (03/10/23 0857)  SpO2: 96 % (03/10/23 0857)    Physical Exam:  General: well developed, well nourished  Lungs:  normal respiratory effort  Heart: regular rate, S1, S2 normal    Laboratory  CBC: No results for input(s): WBC, RBC, HGB, HCT, PLT, MCV, MCH, MCHC in the last 168 hours.  CMP: No results for input(s): GLU, CALCIUM, ALBUMIN, PROT, NA, K, CO2, CL, BUN, CREATININE, ALKPHOS, ALT, AST, BILITOT in the last 168 hours.  Coagulation: No results for input(s): LABPROT, INR, APTT in the last 168 hours.    Diagnostic Results:      ASSESSMENT/PLAN:     Peutz-Jeghers syndrome    Plan: EGD    Anesthesia Plan: MAC    ASA Grade: ASA 2 - Patient with mild systemic disease with no functional limitations      The impression and plan was discussed in detail with the patient. All questions have  been answered and the patient voices understanding of our plan at this point. The risk of the procedure was discussed in detail which includes but not limited to bleeding, infection, perforation in some cases requiring surgery with its spectrum of complications.

## 2023-03-10 NOTE — TRANSFER OF CARE
"Anesthesia Transfer of Care Note    Patient: Staci Hodge    Procedure(s) Performed: Procedure(s) (LRB):  EGD (ESOPHAGOGASTRODUODENOSCOPY) (N/A)    Patient location: PACU    Anesthesia Type: general    Transport from OR: Transported from OR on room air with adequate spontaneous ventilation. Transported from OR on 6-10 L/min O2 by face mask with adequate spontaneous ventilation    Post pain: adequate analgesia    Post assessment: no apparent anesthetic complications and tolerated procedure well    Post vital signs: stable    Level of consciousness: awake    Nausea/Vomiting: no nausea/vomiting    Complications: none    Transfer of care protocol was followed      Last vitals:   Visit Vitals  /77   Pulse 101   Temp 36.7 °C (98.1 °F) (Tympanic)   Resp 17   Ht 5' 2" (1.575 m)   Wt 81.6 kg (180 lb)   LMP 03/01/2021   SpO2 96%   Breastfeeding No   BMI 32.92 kg/m²     "

## 2023-03-10 NOTE — ANESTHESIA POSTPROCEDURE EVALUATION
Anesthesia Post Evaluation    Patient: Staci Hodge    Procedure(s) Performed: Procedure(s) (LRB):  EGD (ESOPHAGOGASTRODUODENOSCOPY) (N/A)    Final Anesthesia Type: general      Patient location during evaluation: PACU  Patient participation: Yes- Able to Participate  Level of consciousness: awake and alert and oriented  Post-procedure vital signs: reviewed and stable  Pain management: adequate  Airway patency: patent    PONV status at discharge: No PONV  Anesthetic complications: no      Cardiovascular status: blood pressure returned to baseline, hemodynamically stable and stable  Respiratory status: unassisted, room air and spontaneous ventilation  Hydration status: euvolemic  Follow-up not needed.          Vitals Value Taken Time   BP 97/67 03/10/23 1031   Temp 36.7 °C (98.1 °F) 03/10/23 0945   Pulse 78 03/10/23 1036   Resp 24 03/10/23 1036   SpO2 99 % 03/10/23 1035   Vitals shown include unvalidated device data.      No case tracking events are documented in the log.      Pain/Bran Score: Bran Score: 9 (3/10/2023 10:00 AM)

## 2023-03-10 NOTE — ANESTHESIA PREPROCEDURE EVALUATION
03/10/2023  Pre-operative evaluation for Procedure(s) (LRB):  EGD (ESOPHAGOGASTRODUODENOSCOPY) (N/A)    Staci Hodge is a 42 y.o. female     Patient Active Problem List   Diagnosis    History of abnormal Pap smear    LLQ pain    Peutz-Jeghers syndrome    Weight loss    Leukocytosis    Tobacco abuse    Insomnia    Mood disorder    MARY LOU (generalized anxiety disorder)    Panic disorder with agoraphobia    PTSD (post-traumatic stress disorder)    Social anxiety disorder    Palpitations    Tachycardia    Dizziness    Arm numbness left    Stabbing headache    Tension headache    Right carpal tunnel syndrome    Migraine without aura    Microalbuminuria    Neck pain    Right arm numbness    Left carpal tunnel syndrome    Cervical paraspinal muscle spasm    Glucosuria with normal serum glucose    History of pyelonephritis    Left arm numbness    Myofascial muscle pain    Cervical spondylosis    OCD (obsessive compulsive disorder)    Menorrhagia with regular cycle    Hypokalemia    Inguinal lymphadenopathy    Ductal carcinoma in situ (DCIS) of left breast    DCIS (ductal carcinoma in situ)    Menorrhagia with irregular cycle    S/P breast reconstruction    Possible pregnancy    S/P D&C (status post dilation and curettage)    Ectopic pregnancy    Encounter for adjustment or removal of unspecified breast implant    Personal history of breast cancer    Unspecified inflammatory spondylopathy, cervical region    GERD (gastroesophageal reflux disease)    S/P HCRISS-BSO    Small bowel polyp    Peutz-Jeghers polyps of small bowel    Facial numbness    Cervical disc disease    Pseudopolyposis of colon without complication, unspecified part of colon    Suicidal ideations    Bipolar 2 disorder, major depressive episode    Obesity (BMI 30.0-34.9)    Slurred speech        Review of patient's allergies indicates:  No Known Allergies    Current Facility-Administered Medications on File Prior to Encounter   Medication Dose Route Frequency Provider Last Rate Last Admin    0.9%  NaCl infusion   Intravenous Continuous Chandan Dillon MD 20 mL/hr at 01/25/22 0847 New Bag at 01/25/22 0847    sodium chloride 0.9% flush 10 mL  10 mL Intravenous PRN Chandan Dillon MD         Current Outpatient Medications on File Prior to Encounter   Medication Sig Dispense Refill    ARIPiprazole (ABILIFY) 10 MG Tab Take 1 tablet (10 mg total) by mouth once daily. 30 tablet 0    clonazePAM (KLONOPIN) 0.5 MG tablet Take 0.5 mg by mouth 2 (two) times daily.      erenumab-aooe (AIMOVIG AUTOINJECTOR) 70 mg/mL autoinjector Inject 1 mL (70 mg total) into the skin every 28 days. 1 each 11    hydrOXYzine pamoate (VISTARIL) 100 MG capsule Take 1 capsule (100 mg total) by mouth every 6 (six) hours as needed (Insomnia or anxiety). 60 capsule 0    lithium (LITHOBID) 300 MG CR tablet Take 2 tablets (600 mg total) by mouth every evening. (Patient taking differently: Take 1 capsule by mouth every morning and 2 capsules at night) 60 tablet 0    traZODone (DESYREL) 150 MG tablet Take 1 tablet (150 mg total) by mouth every evening. 30 tablet 0    metoprolol succinate (TOPROL-XL) 25 MG 24 hr tablet Take 1 tablet (25 mg total) by mouth 2 (two) times daily. 60 tablet 5    nicotine (NICODERM CQ) 21 mg/24 hr Place 1 patch onto the skin once daily. (Patient not taking: Reported on 9/6/2022) 28 patch 0    [DISCONTINUED] albuterol (ACCUNEB) 1.25 mg/3 mL Nebu Take 3 mLs (1.25 mg total) by nebulization every 6 (six) hours as needed (wheezing). Rescue 75 mL 0    [DISCONTINUED] DULoxetine (CYMBALTA) 30 MG capsule Take 1 capsule (30 mg total) by mouth 2 (two) times daily. 60 capsule 0    [DISCONTINUED] gabapentin (NEURONTIN) 600 MG tablet Take 1 tablet (600 mg total) by mouth every evening. 30 tablet 11     [DISCONTINUED] paroxetine (PAXIL) 20 MG tablet TAKE ONE TABLET BY MOUTH ONCE A DAY IN THE MORNING 30 tablet 5       Past Surgical History:   Procedure Laterality Date    ADENOIDECTOMY      ANTEGRADE SINGLE BALLOON ENTEROSCOPY N/A 4/19/2021    Procedure: ENTEROSCOPY, SINGLE BALLOON, ANTEGRADE;  Surgeon: Vincenzo Herbert MD;  Location: [facility] (2ND FLR);  Service: Endoscopy;  Laterality: N/A;  removal of large polyp in proximal jejunum; please schedule during a time when Dr. Dillon is available in case I need assistance with removal or 2nd opinion prior to removal    COVID at [place] 4/16 - ttr    APPENDECTOMY      AUGMENTATION OF BREAST      BILATERAL MASTECTOMY Bilateral 7/23/2018    Procedure: MASTECTOMY 23 hr stay;  Surgeon: Sofia Kendrick MD;  Location: [address];  Service: General;  Laterality: Bilateral;    BILATERAL SALPINGO-OOPHORECTOMY (BSO) Bilateral 3/4/2021    Procedure: SALPINGO-OOPHORECTOMY, BILATERAL;  Surgeon: Clayton Vilchis MD;  Location[facility];  Service: OB/GYN;  Laterality: Bilateral;    BOWEL RESECTION  10/17/14    BREAST BIOPSY Left 06/04/2018    BREAST CAPSULOTOMY Left 4/25/2019    Procedure: CAPSULOTOMY, BREAST LEFT;  Surgeon: Duane Bello MD;  Location: [address];  Service: Plastics;  Laterality: Left;    BREAST RECONSTRUCTION      BREAST SURGERY      Bilateral Mastectomy 07/23/2018    CARPAL TUNNEL RELEASE Bilateral     CARPAL TUNNEL RELEASE      COLONOSCOPY      COLONOSCOPY N/A 2/25/2021    Procedure: COLONOSCOPY;  Surgeon: Vincenzo Herbert MD;  Location: [facility] (2ND FLR);  Service: Endoscopy;  Laterality: N/A;  previous anesthesia complications  okay for this date/time per Dr. Herbert and liliana with [facility] - sm  COVID test on 2/22/21 at [facility]    DILATION AND CURETTAGE OF UTERUS      DILATION AND CURETTAGE OF UTERUS USING SUCTION N/A 2/25/2019    Procedure: DILATION AND CURETTAGE, UTERUS, USING SUCTION  PATHOLOGY NEEDED;  Surgeon:  Pam Sifuentes MD;  [facility];  Service: OB/GYN;  Laterality: N/A;    ENDOSCOPIC ULTRASOUND OF UPPER GASTROINTESTINAL TRACT Left 1/15/2021    Procedure: ULTRASOUND, UPPER GI TRACT, ENDOSCOPIC;  Surgeon: Chandan Dillon MD;  Location: [facility];  Service: Endoscopy;  Laterality: Left;  for pancreatic screening    ESOPHAGOGASTRODUODENOSCOPY N/A 1/15/2021    Procedure: EGD (ESOPHAGOGASTRODUODENOSCOPY);  Surgeon: Chandan Dillon MD;  Location: [facility];  Service: Endoscopy;  Laterality: N/A;  with push enteroscopy    ESOPHAGOGASTRODUODENOSCOPY N/A 5/17/2021    Procedure: EGD (ESOPHAGOGASTRODUODENOSCOPY);  Surgeon: Chandan Dillon MD;  Location: [facility] (2ND FLR);  Service: Endoscopy;  Laterality: N/A;  Please schedule this duodenal polypectomy. Need to be a day that Dr Jose Antonio Stark is in the OR. 90 minutes.   Chandan Dillon MD     COVID at [place] 5/15 (scheduled in the OR for 5/18 - using same COVID results for both procedures) ttr    ESOPHAGOGASTRODUODENOSCOPY N/A 1/25/2022    Procedure: EGD (ESOPHAGOGASTRODUODENOSCOPY);  Surgeon: Chandan Dillon MD;  Location: [facility] (2ND FLR);  Service: Endoscopy;  Laterality: N/A;  [facility]  covid-1/22/[facility]  instructions sent via portal-[facility]    FAT TRANSFER Bilateral 11/12/2018    Procedure: TRANSFER, FAT TISSUE BILATERAL BREAST;  Surgeon: Duane Bello MD;  [address];  Service: Plastics;  Laterality: Bilateral;    INJECTION FOR SENTINEL NODE IDENTIFICATION Left 7/23/2018    Procedure: INJECTION, FOR SENTINEL NODE IDENTIFICATION;  Surgeon: Sofia Kendrick MD;  Location: [address];  Service: General;  Laterality: Left;    INSERTION OF BREAST TISSUE EXPANDER Bilateral 7/23/2018    Procedure: INSERTION, TISSUE EXPANDER, BREAST;  Surgeon: Duane Bello MD;  [facility];  Service: Plastics;  Laterality: Bilateral;    INSERTION OF BREAST TISSUE EXPANDER Bilateral 3/10/2020    Procedure:  INSERTION, TISSUE EXPANDER, BREAST, BILATERAL;  Surgeon: Duane Bello MD;  Location: [facility] OR 2ND FLR;  Service: Plastics;  Laterality: Bilateral;    intestinal polpy      LYSIS OF ADHESIONS N/A 3/4/2021    Procedure: LYSIS, ADHESIONS;  Surgeon: Clayton Vilchis MD;  Location: [facility];  Service: OB/GYN;  Laterality: N/A;  Omental adhesions    MASTECTOMY      REMOVAL OF BREAST IMPLANT Bilateral 3/10/2020    Procedure: REMOVAL, IMPLANT, BREAST, BILATERAL;  Surgeon: Duane Bello MD;  [facility];  Service: Plastics;  Laterality: Bilateral;    REMOVAL OF BREAST IMPLANT Bilateral 2020    Procedure: REMOVAL, IMPLANT, BREAST BILATERAL;  Surgeon: Duane Bello MD;  [facility];  Service: Plastics;  Laterality: Bilateral;    SENTINEL LYMPH NODE BIOPSY Left 2018    Procedure: BIOPSY, LYMPH NODE, SENTINEL;  Surgeon: Sofia Kendrick MD;  Location: [facility] OR [address] [place] [address];  Service: General;  Laterality: Left;    SMALL INTESTINE SURGERY      3 surgeries     TONSILLECTOMY      TOTAL ABDOMINAL HYSTERECTOMY N/A 3/4/2021    Procedure: HYSTERECTOMY, TOTAL, ABDOMINAL;  Surgeon: Clayton Vilchis MD;  Location: [facility];  Service: OB/GYN;  Laterality: N/A;    UPPER GASTROINTESTINAL ENDOSCOPY         Social History     Socioeconomic History    Marital status: Single    Number of children: 0   Tobacco Use    Smoking status: Every Day     Packs/day: 1.00     Years: 17.00     Pack years: 17.00     Types: Cigarettes     Start date: 2000     Last attempt to quit: 2018     Years since quittin.6    Smokeless tobacco: Never    Tobacco comments:     sometimes dry cough per patient   Substance and Sexual Activity    Alcohol use: Yes     Alcohol/week: 5.8 standard drinks     Types: 7 Standard drinks or equivalent per week     Comment: occasionally    Drug use: No    Sexual activity: Yes     Partners: Male   Other Topics Concern    Patient feels they ought to  cut down on drinking/drug use No    Patient annoyed by others criticizing their drinking/drug use No    Patient has felt bad or guilty about drinking/drug use No    Patient has had a drink/used drugs as an eye opener in the AM No   Social History Narrative    Had one previous miscarriage in     Never     currently in a relationship         CBC: No results for input(s): WBC, RBC, HGB, HCT, PLT, MCV, MCH, MCHC in the last 72 hours.    CMP: No results for input(s): NA, K, CL, CO2, BUN, CREATININE, GLU, MG, PHOS, CALCIUM, ALBUMIN, PROT, ALKPHOS, ALT, AST, BILITOT in the last 72 hours.    INR  No results for input(s): PT, INR, PROTIME, APTT in the last 72 hours.        Diagnostic Studies:      EKD Echo:  No results found. However, due to the size of the patient record, not all encounters were searched. Please check Results Review for a complete set of results.        Pre-op Assessment    I have reviewed the Patient Summary Reports.     I have reviewed the Nursing Notes. I have reviewed the NPO Status.   I have reviewed the Medications.     Review of Systems  Anesthesia Hx:  No problems with previous Anesthesia  History of prior surgery of interest to airway management or planning: Denies Family Hx of Anesthesia complications.   Denies Personal Hx of Anesthesia complications.   Hematology/Oncology:         -- Denies Anemia:   Cardiovascular:   Exercise tolerance: good    Pulmonary:   Denies COPD.  Denies Asthma.  Denies Sleep Apnea.    Renal/:   Denies Chronic Renal Disease.     Hepatic/GI:   GERD, well controlled    Neurological:   Denies CVA. Denies Seizures.        Physical Exam  General: Well nourished, Cooperative and Alert    Airway:  Mallampati: III / II  Mouth Opening: Normal  TM Distance: Normal  Tongue: Normal  Neck ROM: Normal ROM    Dental:  Intact    Chest/Lungs:  Normal Respiratory Rate    Heart:  Rate: Normal  Rhythm: Regular Rhythm        Anesthesia Plan  Type of Anesthesia, risks  & benefits discussed:    Anesthesia Type: Gen Natural Airway  Intra-op Monitoring Plan: Standard ASA Monitors  Post Op Pain Control Plan: multimodal analgesia  Induction:  IV  Informed Consent: Informed consent signed with the Patient and all parties understand the risks and agree with anesthesia plan.  All questions answered.   ASA Score: 2  Day of Surgery Review of History & Physical: H&P Update referred to the surgeon/provider.    Ready For Surgery From Anesthesia Perspective.     .

## 2023-03-16 ENCOUNTER — TELEPHONE (OUTPATIENT)
Dept: ENDOSCOPY | Facility: HOSPITAL | Age: 43
End: 2023-03-16
Payer: COMMERCIAL

## 2023-03-16 LAB
FINAL PATHOLOGIC DIAGNOSIS: NORMAL
GROSS: NORMAL
Lab: NORMAL

## 2023-03-17 NOTE — TELEPHONE ENCOUNTER
----- Message from Chandan Dillon MD sent at 3/16/2023  3:52 PM CDT -----  Please let the patient know the duodenal polyp was benign. Need [facility] follow up and EGD in 1 year.

## 2023-03-23 ENCOUNTER — OFFICE VISIT (OUTPATIENT)
Dept: NEUROLOGY | Facility: CLINIC | Age: 43
End: 2023-03-23
Payer: COMMERCIAL

## 2023-03-23 VITALS
DIASTOLIC BLOOD PRESSURE: 88 MMHG | RESPIRATION RATE: 14 BRPM | BODY MASS INDEX: 34.98 KG/M2 | SYSTOLIC BLOOD PRESSURE: 136 MMHG | HEIGHT: 62 IN | WEIGHT: 190.06 LBS | HEART RATE: 100 BPM

## 2023-03-23 DIAGNOSIS — R47.81 SLURRED SPEECH: Primary | ICD-10-CM

## 2023-03-23 DIAGNOSIS — G43.E09 CHRONIC MIGRAINE WITH AURA: ICD-10-CM

## 2023-03-23 DIAGNOSIS — G56.02 LEFT CARPAL TUNNEL SYNDROME: ICD-10-CM

## 2023-03-23 DIAGNOSIS — M50.90 CERVICAL DISC DISEASE: ICD-10-CM

## 2023-03-23 PROCEDURE — 1160F RVW MEDS BY RX/DR IN RCRD: CPT | Mod: CPTII,S$GLB,, | Performed by: PSYCHIATRY & NEUROLOGY

## 2023-03-23 PROCEDURE — 1159F MED LIST DOCD IN RCRD: CPT | Mod: CPTII,S$GLB,, | Performed by: PSYCHIATRY & NEUROLOGY

## 2023-03-23 PROCEDURE — 1159F PR MEDICATION LIST DOCUMENTED IN MEDICAL RECORD: ICD-10-PCS | Mod: CPTII,S$GLB,, | Performed by: PSYCHIATRY & NEUROLOGY

## 2023-03-23 PROCEDURE — 99214 OFFICE O/P EST MOD 30 MIN: CPT | Mod: S$GLB,,, | Performed by: PSYCHIATRY & NEUROLOGY

## 2023-03-23 PROCEDURE — 3079F DIAST BP 80-89 MM HG: CPT | Mod: CPTII,S$GLB,, | Performed by: PSYCHIATRY & NEUROLOGY

## 2023-03-23 PROCEDURE — 99999 PR PBB SHADOW E&M-EST. PATIENT-LVL IV: CPT | Mod: PBBFAC,,, | Performed by: PSYCHIATRY & NEUROLOGY

## 2023-03-23 PROCEDURE — 3075F SYST BP GE 130 - 139MM HG: CPT | Mod: CPTII,S$GLB,, | Performed by: PSYCHIATRY & NEUROLOGY

## 2023-03-23 PROCEDURE — 1160F PR REVIEW ALL MEDS BY PRESCRIBER/CLIN PHARMACIST DOCUMENTED: ICD-10-PCS | Mod: CPTII,S$GLB,, | Performed by: PSYCHIATRY & NEUROLOGY

## 2023-03-23 PROCEDURE — 3008F PR BODY MASS INDEX (BMI) DOCUMENTED: ICD-10-PCS | Mod: CPTII,S$GLB,, | Performed by: PSYCHIATRY & NEUROLOGY

## 2023-03-23 PROCEDURE — 99999 PR PBB SHADOW E&M-EST. PATIENT-LVL IV: ICD-10-PCS | Mod: PBBFAC,,, | Performed by: PSYCHIATRY & NEUROLOGY

## 2023-03-23 PROCEDURE — 3008F BODY MASS INDEX DOCD: CPT | Mod: CPTII,S$GLB,, | Performed by: PSYCHIATRY & NEUROLOGY

## 2023-03-23 PROCEDURE — 3079F PR MOST RECENT DIASTOLIC BLOOD PRESSURE 80-89 MM HG: ICD-10-PCS | Mod: CPTII,S$GLB,, | Performed by: PSYCHIATRY & NEUROLOGY

## 2023-03-23 PROCEDURE — 3075F PR MOST RECENT SYSTOLIC BLOOD PRESS GE 130-139MM HG: ICD-10-PCS | Mod: CPTII,S$GLB,, | Performed by: PSYCHIATRY & NEUROLOGY

## 2023-03-23 PROCEDURE — 99214 PR OFFICE/OUTPT VISIT, EST, LEVL IV, 30-39 MIN: ICD-10-PCS | Mod: S$GLB,,, | Performed by: PSYCHIATRY & NEUROLOGY

## 2023-03-23 NOTE — PROGRESS NOTES
Staci Hodge is a 42 y.o. female with headaches and bilateral CTS, and prior complaints of dizziness. She has a history of PTSD, panic disorder with agoraphobia, insomnia, colon resection, due to small bowel obstruction, tobacco use, and breast cancer.      Here for follow up    Aimovig started since the last visit and headaches have improved    Slurred speech has not recurred except with one severe headache and was brief    Headache is still about 4-5 days per month    Tolerance is god    Last visit: More than 15 HAs per month    PRN she takes Fioricet per PCP    Dizziness remains episodic    Neck pain/ myalgia have been tolerable    Left CTS are noted at times    She has bipolar disorder which is treated by psychiatry, [facility]  Li dose lowered since the last visit          I have reviewed all of this patient's past medical and surgical histories as well as family and social histories and active allergies and medications as documented in the electronic medical record.       Review of Systems   Constitutional:  Negative for fever.   HENT:  Negative for nosebleeds.    Eyes:  Negative for double vision.   Respiratory:  Negative for hemoptysis.    Cardiovascular:  Negative for leg swelling.   Gastrointestinal:  Negative for blood in stool.   Genitourinary:  Negative for hematuria.   Musculoskeletal:  Negative for falls.   Skin:  Negative for rash.   Neurological:  Positive for headaches.   Endo/Heme/Allergies:  Does not bruise/bleed easily.       Exam:  Gen Appearance, well developed/nourished in no apparent distress  CV: 2+ distal pulses with no edema or swelling  Neuro:  MS: Awake, alert, oriented to place, person, time, situation. Sustains attention. Recent/remote memory intact, Language is full to spontaneous speech/repetition/naming/comprehension. Fund of Knowledge is full  CN: Optic discs are flat with normal vasculature, PERRL, right nystamus. Normal facial sensation and strength, + fast  beat horizontal nystagmus noted today, Hearing symmetric, Tongue and Palate are midline and strong. Shoulder Shrug symmetric and strong.  Motor: Normal bulk, tone, no abnormal movements. 5/5 strength bilateral upper/lower extremities with 2+ reflexes and bilateral plantar response  Sensory: symmetric to light touch, pain, temp, and vibration/,  Romberg negative  Cerebellar: Finger-nose,Heal-shin, Rapid alternating movements intact  Gait: Normal stance, no ataxia    Imaging:   10/5/2021 MRI Brain:   No MRI evidence of an acute intracranial abnormality.     MRI C-spine 12/2017:  Mild degenerative changes of the cervical spine; neural foraminal narrowing at the C5-6 and C6-7 levels.  No central canal stenosis.     MRI Brain with and without 2/3/2017: normal     24 hour Holter monitor: not completed     EMG BUE 2/10/2017:   Impression: Abnormal Study secondary to the Presence of:   Mild Bilateral Carpal Tunnel Syndrome.      EMG BUE 1/18/2018:  1. Improved right CTS findings post right CTR.   2. Slight worsening of the CTS findings.   3. No EP evidence of cervical radiculopathy found.      48 hour holter 10/2021:   Sinus rhythm  Normal heart rate behavior  Very rare ectopy  Symptoms correlating with sinus rhythm/tachycardia     There was an episode of chest pain reported. The corresponding rhythm strips revealed the following: the rhythm was sinus rhythm at 81 bpm with without ectopy.     There was an episode of Heart racing reported. The corresponding rhythm strips revealed the following: the rhythm was sinus tachycardia at 108 bpm with without ectopy.     There was an episode of Heart racing reported. The corresponding rhythm strips revealed the following: the rhythm was sinus tachycardia at 111 bpm with without ectopy.     EEG 2021:   Unremarkable    2021 MRI C spine: C6-7 degenerative disc changes resulting in mild spinal canal narrowing and mild bilateral neuroforaminal narrowing.  Additional mild bilateral  "neuroforaminal narrowing at the C5-6 level.    2021 MRA brain: Normal MRA exam.    8/31/2022 CT head : No acute intracranial abnormality.     Labs:   5/2021 CBC and CMP overall unremarkable  9/2021 CBC, CMP, TSH, T4, B12 reviewed-elevated alkaline phosphatase  Assessment:   Staci Hodge is a 37 y.o. female, with neck pain without radiculopathy, chronic headaches and bilateral CTS, and prior complaints of dizziness.   Plan:   I recommend:  1. No longer on Lyrica for for fibromyalgia complaints.   -pain historically worse with stress.      2. Neurosurgery per pain management not needed. Nothing surgical on her MRI C-spine-mild changes only. She failed to respond to two different types of injections prior, and did not have cervical radiculopathy on EMG.  -has left CTS/ can see surgery back PRN. Is s/p right CTR with good relief  -Can brace left hand nightly otherwise     3. No longer on Topamax  -Takes  prn Fioricet for migraines per PCP  -Diclofenac, prescribed to abort headaches, was discontinued per Nephrology, given her proteinuria. Can't use NSAIDs due to Li use.   -She was originally prescribed Propranolol for headache prevention, which was ineffective; however, this was changed to metoprolol for tachycardia, which was believed to be anxiety related; however, tachycardia is improved.      4. 2021 MRI Brain and EEG unremarkable.   -48 hour holter shows sinus tachycardia with episodes, but workup per cardiology unrevealing. Outside cardiology records noted in media   -Dizziness occurs when neck is flexed.   2021 MRI C spine showed mild spinal stenosis  -Referred to PT but this was not needed  2021 MRA brain normal (for dizziness complaint)   -Dizziness is episodic      5. Returned in 2022  with slurred speech in 3 episodes associated with headaches    -8/31/2022 CT head : No acute intracranial abnormality.  -She was seen by vascular neurology during the spell in the ER in 8/2022 who noted "slow and slurred " "speech and headache.  Speech not dysarthric (no apparent weakness of lingual, labial, or guttural muscle groups).  Consider migraine associated symptom"  -Every spell has been associated with a headache which has been more frequent in her (chronic migraine with possible speech aura) and improved with better treatment of headaches/ suggesting complicated migraine symptoms  -Failed Topamax, is on Metoprolol and can't have an TCA given BPD history  -Needs a better migraine prevention: Aimovig is helping. Raise dose per orders for further improvement  -To the ER if any obvious stroke like weakness with speech changes. Otherwise, can monitor at home/ notify me for further spells    RTC in 6 months                                    "

## 2023-03-25 ENCOUNTER — LAB VISIT (OUTPATIENT)
Dept: LAB | Facility: HOSPITAL | Age: 43
End: 2023-03-25
Attending: NURSE PRACTITIONER
Payer: COMMERCIAL

## 2023-03-25 DIAGNOSIS — F31.4 SEVERE DEPRESSED BIPOLAR I DISORDER WITHOUT PSYCHOTIC FEATURES: Primary | ICD-10-CM

## 2023-03-25 DIAGNOSIS — Z79.899 ENCOUNTER FOR LONG-TERM CURRENT USE OF MEDICATION: ICD-10-CM

## 2023-03-25 LAB
ALBUMIN SERPL BCP-MCNC: 4.6 G/DL (ref 3.5–5.2)
ALP SERPL-CCNC: 160 U/L (ref 55–135)
ALT SERPL W/O P-5'-P-CCNC: 34 U/L (ref 10–44)
ANION GAP SERPL CALC-SCNC: 11 MMOL/L (ref 8–16)
AST SERPL-CCNC: 17 U/L (ref 10–40)
BASOPHILS # BLD AUTO: 0.07 K/UL (ref 0–0.2)
BASOPHILS NFR BLD: 0.6 % (ref 0–1.9)
BILIRUB SERPL-MCNC: 0.5 MG/DL (ref 0.1–1)
BUN SERPL-MCNC: 7 MG/DL (ref 6–20)
CALCIUM SERPL-MCNC: 9.7 MG/DL (ref 8.7–10.5)
CHLORIDE SERPL-SCNC: 101 MMOL/L (ref 95–110)
CHOLEST SERPL-MCNC: 185 MG/DL (ref 120–199)
CHOLEST/HDLC SERPL: 3.5 {RATIO} (ref 2–5)
CO2 SERPL-SCNC: 26 MMOL/L (ref 23–29)
CREAT SERPL-MCNC: 0.9 MG/DL (ref 0.5–1.4)
DIFFERENTIAL METHOD: NORMAL
EOSINOPHIL # BLD AUTO: 0.4 K/UL (ref 0–0.5)
EOSINOPHIL NFR BLD: 3.1 % (ref 0–8)
ERYTHROCYTE [DISTWIDTH] IN BLOOD BY AUTOMATED COUNT: 13.6 % (ref 11.5–14.5)
EST. GFR  (NO RACE VARIABLE): >60 ML/MIN/1.73 M^2
ESTIMATED AVG GLUCOSE: 108 MG/DL (ref 68–131)
GLUCOSE SERPL-MCNC: 108 MG/DL (ref 70–110)
HBA1C MFR BLD: 5.4 % (ref 4–5.6)
HCT VFR BLD AUTO: 41.6 % (ref 37–48.5)
HDLC SERPL-MCNC: 53 MG/DL (ref 40–75)
HDLC SERPL: 28.6 % (ref 20–50)
HGB BLD-MCNC: 13.9 G/DL (ref 12–16)
IMM GRANULOCYTES # BLD AUTO: 0.04 K/UL (ref 0–0.04)
IMM GRANULOCYTES NFR BLD AUTO: 0.4 % (ref 0–0.5)
LDLC SERPL CALC-MCNC: 109 MG/DL (ref 63–159)
LITHIUM SERPL-SCNC: 0.2 MMOL/L (ref 0.6–1.2)
LYMPHOCYTES # BLD AUTO: 2.8 K/UL (ref 1–4.8)
LYMPHOCYTES NFR BLD: 24.8 % (ref 18–48)
MCH RBC QN AUTO: 28.5 PG (ref 27–31)
MCHC RBC AUTO-ENTMCNC: 33.4 G/DL (ref 32–36)
MCV RBC AUTO: 85 FL (ref 82–98)
MONOCYTES # BLD AUTO: 0.7 K/UL (ref 0.3–1)
MONOCYTES NFR BLD: 6.3 % (ref 4–15)
NEUTROPHILS # BLD AUTO: 7.2 K/UL (ref 1.8–7.7)
NEUTROPHILS NFR BLD: 64.8 % (ref 38–73)
NONHDLC SERPL-MCNC: 132 MG/DL
NRBC BLD-RTO: 0 /100 WBC
PLATELET # BLD AUTO: 374 K/UL (ref 150–450)
PMV BLD AUTO: 10 FL (ref 9.2–12.9)
POTASSIUM SERPL-SCNC: 4.1 MMOL/L (ref 3.5–5.1)
PROT SERPL-MCNC: 7.6 G/DL (ref 6–8.4)
RBC # BLD AUTO: 4.87 M/UL (ref 4–5.4)
SODIUM SERPL-SCNC: 138 MMOL/L (ref 136–145)
TRIGL SERPL-MCNC: 115 MG/DL (ref 30–150)
TSH SERPL DL<=0.005 MIU/L-ACNC: 1.14 UIU/ML (ref 0.4–4)
WBC # BLD AUTO: 11.12 K/UL (ref 3.9–12.7)

## 2023-03-25 PROCEDURE — 80061 LIPID PANEL: CPT | Performed by: NURSE PRACTITIONER

## 2023-03-25 PROCEDURE — 84443 ASSAY THYROID STIM HORMONE: CPT | Performed by: NURSE PRACTITIONER

## 2023-03-25 PROCEDURE — 83036 HEMOGLOBIN GLYCOSYLATED A1C: CPT | Performed by: NURSE PRACTITIONER

## 2023-03-25 PROCEDURE — 80053 COMPREHEN METABOLIC PANEL: CPT | Performed by: NURSE PRACTITIONER

## 2023-03-25 PROCEDURE — 80178 ASSAY OF LITHIUM: CPT | Performed by: NURSE PRACTITIONER

## 2023-03-25 PROCEDURE — 85025 COMPLETE CBC W/AUTO DIFF WBC: CPT | Performed by: NURSE PRACTITIONER

## 2023-03-25 PROCEDURE — 36415 COLL VENOUS BLD VENIPUNCTURE: CPT | Performed by: NURSE PRACTITIONER

## 2023-04-24 ENCOUNTER — OFFICE VISIT (OUTPATIENT)
Dept: INTERNAL MEDICINE | Facility: CLINIC | Age: 43
End: 2023-04-24
Payer: COMMERCIAL

## 2023-04-24 VITALS
HEART RATE: 107 BPM | SYSTOLIC BLOOD PRESSURE: 116 MMHG | HEIGHT: 62 IN | DIASTOLIC BLOOD PRESSURE: 80 MMHG | RESPIRATION RATE: 12 BRPM | OXYGEN SATURATION: 98 % | BODY MASS INDEX: 34.56 KG/M2 | WEIGHT: 187.81 LBS

## 2023-04-24 DIAGNOSIS — K51.40 PSEUDOPOLYPOSIS OF COLON WITHOUT COMPLICATION, UNSPECIFIED PART OF COLON: ICD-10-CM

## 2023-04-24 DIAGNOSIS — M46.92 UNSPECIFIED INFLAMMATORY SPONDYLOPATHY, CERVICAL REGION: ICD-10-CM

## 2023-04-24 DIAGNOSIS — R19.7 DIARRHEA, UNSPECIFIED TYPE: ICD-10-CM

## 2023-04-24 DIAGNOSIS — R10.11 RIGHT UPPER QUADRANT PAIN: Primary | ICD-10-CM

## 2023-04-24 PROCEDURE — 99999 PR PBB SHADOW E&M-EST. PATIENT-LVL V: CPT | Mod: PBBFAC,,, | Performed by: NURSE PRACTITIONER

## 2023-04-24 PROCEDURE — 3044F PR MOST RECENT HEMOGLOBIN A1C LEVEL <7.0%: ICD-10-PCS | Mod: CPTII,S$GLB,, | Performed by: NURSE PRACTITIONER

## 2023-04-24 PROCEDURE — 99214 OFFICE O/P EST MOD 30 MIN: CPT | Mod: S$GLB,,, | Performed by: NURSE PRACTITIONER

## 2023-04-24 PROCEDURE — 3079F PR MOST RECENT DIASTOLIC BLOOD PRESSURE 80-89 MM HG: ICD-10-PCS | Mod: CPTII,S$GLB,, | Performed by: NURSE PRACTITIONER

## 2023-04-24 PROCEDURE — 3074F SYST BP LT 130 MM HG: CPT | Mod: CPTII,S$GLB,, | Performed by: NURSE PRACTITIONER

## 2023-04-24 PROCEDURE — 3008F BODY MASS INDEX DOCD: CPT | Mod: CPTII,S$GLB,, | Performed by: NURSE PRACTITIONER

## 2023-04-24 PROCEDURE — 99214 PR OFFICE/OUTPT VISIT, EST, LEVL IV, 30-39 MIN: ICD-10-PCS | Mod: S$GLB,,, | Performed by: NURSE PRACTITIONER

## 2023-04-24 PROCEDURE — 1159F PR MEDICATION LIST DOCUMENTED IN MEDICAL RECORD: ICD-10-PCS | Mod: CPTII,S$GLB,, | Performed by: NURSE PRACTITIONER

## 2023-04-24 PROCEDURE — 3074F PR MOST RECENT SYSTOLIC BLOOD PRESSURE < 130 MM HG: ICD-10-PCS | Mod: CPTII,S$GLB,, | Performed by: NURSE PRACTITIONER

## 2023-04-24 PROCEDURE — 99999 PR PBB SHADOW E&M-EST. PATIENT-LVL V: ICD-10-PCS | Mod: PBBFAC,,, | Performed by: NURSE PRACTITIONER

## 2023-04-24 PROCEDURE — 3079F DIAST BP 80-89 MM HG: CPT | Mod: CPTII,S$GLB,, | Performed by: NURSE PRACTITIONER

## 2023-04-24 PROCEDURE — 3008F PR BODY MASS INDEX (BMI) DOCUMENTED: ICD-10-PCS | Mod: CPTII,S$GLB,, | Performed by: NURSE PRACTITIONER

## 2023-04-24 PROCEDURE — 1159F MED LIST DOCD IN RCRD: CPT | Mod: CPTII,S$GLB,, | Performed by: NURSE PRACTITIONER

## 2023-04-24 PROCEDURE — 3044F HG A1C LEVEL LT 7.0%: CPT | Mod: CPTII,S$GLB,, | Performed by: NURSE PRACTITIONER

## 2023-04-24 RX ORDER — PROPRANOLOL HYDROCHLORIDE 20 MG/1
20 TABLET ORAL 2 TIMES DAILY
Status: ON HOLD | COMMUNITY
Start: 2023-03-27 | End: 2024-02-29

## 2023-04-24 NOTE — PROGRESS NOTES
Subjective:       Patient ID: Staci Hodge is a 42 y.o. female.    Chief Complaint: Abdominal Pain (Started a couple of weeks ago)    Patient is known, to me and presents with   Chief Complaint   Patient presents with    Abdominal Pain     Started a couple of weeks ago   .  Denies chest pain and shortness of breath.  Patient presents with a few weeks of right upper quadrant pain and diarrhea. No blood in stool. States that it is after she eats a fatty meal. No vomiting or nausea. Had a gallbladder ultrasound in 2020 but no stones noted. States that sometimes the pain goes to her mid back region also. She is already on protonix and states that this is a different discomfort from her GERD.    Abdominal Pain  Associated symptoms include diarrhea. Pertinent negatives include no arthralgias, constipation, dysuria, fever, frequency, headaches, hematuria, nausea or vomiting.   Review of Systems   Constitutional:  Negative for activity change, appetite change, fatigue, fever and unexpected weight change.   HENT:  Negative for congestion, ear discharge, ear pain, hearing loss, postnasal drip and tinnitus.    Eyes:  Negative for photophobia, pain and visual disturbance.   Respiratory:  Negative for cough, shortness of breath, wheezing and stridor.    Cardiovascular:  Negative for chest pain, palpitations and leg swelling.   Gastrointestinal:  Positive for abdominal pain and diarrhea. Negative for abdominal distention, anal bleeding, blood in stool, constipation, nausea, rectal pain and vomiting.   Genitourinary:  Negative for difficulty urinating, dysuria, frequency, hematuria and urgency.   Musculoskeletal:  Negative for arthralgias, back pain, gait problem, joint swelling and neck pain.   Skin: Negative.    Neurological:  Negative for dizziness, seizures, syncope, weakness, light-headedness, numbness and headaches.   Hematological:  Negative for adenopathy. Does not bruise/bleed easily.   Psychiatric/Behavioral:   Negative for behavioral problems, confusion, dysphoric mood, hallucinations, sleep disturbance and suicidal ideas. The patient is not nervous/anxious.      Objective:      Physical Exam  Constitutional:       General: She is not in acute distress.     Appearance: She is well-developed.   HENT:      Head: Normocephalic and atraumatic.      Right Ear: Tympanic membrane and external ear normal.      Left Ear: Tympanic membrane and external ear normal.      Nose: Nose normal.      Mouth/Throat:      Mouth: Mucous membranes are moist.      Pharynx: No oropharyngeal exudate.   Eyes:      General: No scleral icterus.        Right eye: No discharge.         Left eye: No discharge.      Conjunctiva/sclera: Conjunctivae normal.      Pupils: Pupils are equal, round, and reactive to light.   Neck:      Thyroid: No thyromegaly.      Vascular: No JVD.   Cardiovascular:      Rate and Rhythm: Normal rate and regular rhythm.      Heart sounds: Normal heart sounds. No murmur heard.    No friction rub. No gallop.   Pulmonary:      Effort: Pulmonary effort is normal. No respiratory distress.      Breath sounds: Normal breath sounds. No stridor. No wheezing or rales.   Chest:      Chest wall: No tenderness.   Abdominal:      General: Bowel sounds are normal. There is no distension.      Palpations: Abdomen is soft. There is no mass.      Tenderness: There is abdominal tenderness in the right upper quadrant. There is no right CVA tenderness, left CVA tenderness, guarding or rebound. Negative signs include Hanks's sign.      Hernia: No hernia is present.          Comments: Tenderness palpated to right upper quadrant region   Musculoskeletal:         General: No swelling, tenderness, deformity or signs of injury. Normal range of motion.      Cervical back: Normal range of motion and neck supple.      Right lower leg: No edema.      Left lower leg: No edema.   Lymphadenopathy:      Cervical: No cervical adenopathy.   Skin:     General:  Skin is warm and dry.      Capillary Refill: Capillary refill takes less than 2 seconds.      Coloration: Skin is not jaundiced or pale.      Findings: No bruising, erythema, lesion or rash.   Neurological:      General: No focal deficit present.      Mental Status: She is alert and oriented to person, place, and time.      Cranial Nerves: No cranial nerve deficit.      Sensory: No sensory deficit.      Motor: No weakness or abnormal muscle tone.      Coordination: Coordination normal.      Gait: Gait normal.      Deep Tendon Reflexes: Reflexes are normal and symmetric. Reflexes normal.   Psychiatric:         Mood and Affect: Mood normal.         Behavior: Behavior normal.         Thought Content: Thought content normal. Thought content does not include homicidal or suicidal ideation. Thought content does not include homicidal or suicidal plan.         Judgment: Judgment normal.       Assessment:       1. Right upper quadrant pain    2. Diarrhea, unspecified type    3. Pseudopolyposis of colon without complication, unspecified part of colon    4. Unspecified inflammatory spondylopathy, cervical region        Plan:   1. Right upper quadrant pain  -     US Abdomen Limited; Future; Expected date: 04/24/2023  -     CBC Auto Differential; Future; Expected date: 04/24/2023  -     Comprehensive Metabolic Panel; Future; Expected date: 04/24/2023  -     LIPASE; Future; Expected date: 04/24/2023  -     AMYLASE; Future; Expected date: 04/24/2023    2. Diarrhea, unspecified type  -     US Abdomen Limited; Future; Expected date: 04/24/2023  -     CBC Auto Differential; Future; Expected date: 04/24/2023  -     Comprehensive Metabolic Panel; Future; Expected date: 04/24/2023  -     LIPASE; Future; Expected date: 04/24/2023  -     AMYLASE; Future; Expected date: 04/24/2023    3. Pseudopolyposis of colon without complication, unspecified part of colon  Assessment & Plan:  Follows up with colorectal      4. Unspecified inflammatory  "spondylopathy, cervical region  Assessment & Plan:  Stable at this time        "This note will not be shared with the patient."  Glades diet for now   If pain worsens or becomes febrile go to ER   Verbalized understanding  Rtc as scheduled  "

## 2023-04-25 ENCOUNTER — HOSPITAL ENCOUNTER (OUTPATIENT)
Dept: RADIOLOGY | Facility: HOSPITAL | Age: 43
Discharge: HOME | End: 2023-04-25
Attending: NURSE PRACTITIONER
Payer: COMMERCIAL

## 2023-04-25 ENCOUNTER — PATIENT MESSAGE (OUTPATIENT)
Dept: INTERNAL MEDICINE | Facility: CLINIC | Age: 43
End: 2023-04-25
Payer: COMMERCIAL

## 2023-04-25 DIAGNOSIS — R19.7 DIARRHEA, UNSPECIFIED TYPE: ICD-10-CM

## 2023-04-25 DIAGNOSIS — R10.10 UPPER ABDOMINAL PAIN, UNSPECIFIED: Primary | ICD-10-CM

## 2023-04-25 DIAGNOSIS — R10.11 RIGHT UPPER QUADRANT PAIN: ICD-10-CM

## 2023-04-25 PROCEDURE — 76705 ECHO EXAM OF ABDOMEN: CPT | Mod: TC

## 2023-04-26 ENCOUNTER — HOSPITAL ENCOUNTER (EMERGENCY)
Facility: HOSPITAL | Age: 43
Discharge: HOME | End: 2023-04-26
Attending: SURGERY
Payer: COMMERCIAL

## 2023-04-26 VITALS
SYSTOLIC BLOOD PRESSURE: 138 MMHG | TEMPERATURE: 98 F | BODY MASS INDEX: 34.61 KG/M2 | DIASTOLIC BLOOD PRESSURE: 93 MMHG | HEART RATE: 109 BPM | HEIGHT: 62 IN | RESPIRATION RATE: 18 BRPM | WEIGHT: 188.06 LBS | OXYGEN SATURATION: 99 %

## 2023-04-26 DIAGNOSIS — R10.9 ABDOMINAL PAIN, UNSPECIFIED ABDOMINAL LOCATION: Primary | ICD-10-CM

## 2023-04-26 LAB
ALBUMIN SERPL BCP-MCNC: 5.2 G/DL (ref 3.5–5.2)
ALP SERPL-CCNC: 166 U/L (ref 55–135)
ALT SERPL W/O P-5'-P-CCNC: 43 U/L (ref 10–44)
AMPHET+METHAMPHET UR QL: NEGATIVE
ANION GAP SERPL CALC-SCNC: 12 MMOL/L (ref 8–16)
AST SERPL-CCNC: 27 U/L (ref 10–40)
BARBITURATES UR QL SCN>200 NG/ML: NEGATIVE
BASOPHILS # BLD AUTO: 0.09 K/UL (ref 0–0.2)
BASOPHILS NFR BLD: 0.8 % (ref 0–1.9)
BENZODIAZ UR QL SCN>200 NG/ML: NEGATIVE
BILIRUB SERPL-MCNC: 0.3 MG/DL (ref 0.1–1)
BILIRUB UR QL STRIP: NEGATIVE
BUN SERPL-MCNC: 10 MG/DL (ref 6–20)
BZE UR QL SCN: NEGATIVE
CALCIUM SERPL-MCNC: 10.9 MG/DL (ref 8.7–10.5)
CANNABINOIDS UR QL SCN: NEGATIVE
CHLORIDE SERPL-SCNC: 100 MMOL/L (ref 95–110)
CLARITY UR: CLEAR
CO2 SERPL-SCNC: 29 MMOL/L (ref 23–29)
COLOR UR: YELLOW
CREAT SERPL-MCNC: 0.9 MG/DL (ref 0.5–1.4)
CREAT UR-MCNC: 13.1 MG/DL (ref 15–325)
DIFFERENTIAL METHOD: NORMAL
EOSINOPHIL # BLD AUTO: 0.4 K/UL (ref 0–0.5)
EOSINOPHIL NFR BLD: 3.1 % (ref 0–8)
ERYTHROCYTE [DISTWIDTH] IN BLOOD BY AUTOMATED COUNT: 13.3 % (ref 11.5–14.5)
EST. GFR  (NO RACE VARIABLE): >60 ML/MIN/1.73 M^2
GLUCOSE SERPL-MCNC: 106 MG/DL (ref 70–110)
GLUCOSE UR QL STRIP: NEGATIVE
HCT VFR BLD AUTO: 45.8 % (ref 37–48.5)
HGB BLD-MCNC: 15.2 G/DL (ref 12–16)
HGB UR QL STRIP: NEGATIVE
IMM GRANULOCYTES # BLD AUTO: 0.04 K/UL (ref 0–0.04)
IMM GRANULOCYTES NFR BLD AUTO: 0.3 % (ref 0–0.5)
KETONES UR QL STRIP: NEGATIVE
LEUKOCYTE ESTERASE UR QL STRIP: NEGATIVE
LIPASE SERPL-CCNC: 43 U/L (ref 4–60)
LYMPHOCYTES # BLD AUTO: 2.8 K/UL (ref 1–4.8)
LYMPHOCYTES NFR BLD: 24.4 % (ref 18–48)
MCH RBC QN AUTO: 28.6 PG (ref 27–31)
MCHC RBC AUTO-ENTMCNC: 33.2 G/DL (ref 32–36)
MCV RBC AUTO: 86 FL (ref 82–98)
METHADONE UR QL SCN>300 NG/ML: NEGATIVE
MONOCYTES # BLD AUTO: 0.8 K/UL (ref 0.3–1)
MONOCYTES NFR BLD: 6.7 % (ref 4–15)
NEUTROPHILS # BLD AUTO: 7.5 K/UL (ref 1.8–7.7)
NEUTROPHILS NFR BLD: 64.7 % (ref 38–73)
NITRITE UR QL STRIP: NEGATIVE
NRBC BLD-RTO: 0 /100 WBC
OPIATES UR QL SCN: NEGATIVE
PCP UR QL SCN>25 NG/ML: NEGATIVE
PH UR STRIP: >8 [PH] (ref 5–8)
PLATELET # BLD AUTO: 390 K/UL (ref 150–450)
PMV BLD AUTO: 9.8 FL (ref 9.2–12.9)
POTASSIUM SERPL-SCNC: 4.5 MMOL/L (ref 3.5–5.1)
PROT SERPL-MCNC: 8.7 G/DL (ref 6–8.4)
PROT UR QL STRIP: NEGATIVE
RBC # BLD AUTO: 5.31 M/UL (ref 4–5.4)
SODIUM SERPL-SCNC: 141 MMOL/L (ref 136–145)
SP GR UR STRIP: 1.01 (ref 1–1.03)
TOXICOLOGY INFORMATION: ABNORMAL
URN SPEC COLLECT METH UR: NORMAL
UROBILINOGEN UR STRIP-ACNC: NEGATIVE EU/DL
WBC # BLD AUTO: 11.56 K/UL (ref 3.9–12.7)

## 2023-04-26 PROCEDURE — 83690 ASSAY OF LIPASE: CPT | Performed by: SURGERY

## 2023-04-26 PROCEDURE — 36415 COLL VENOUS BLD VENIPUNCTURE: CPT | Performed by: SURGERY

## 2023-04-26 PROCEDURE — 85025 COMPLETE CBC W/AUTO DIFF WBC: CPT | Performed by: SURGERY

## 2023-04-26 PROCEDURE — 96374 THER/PROPH/DIAG INJ IV PUSH: CPT

## 2023-04-26 PROCEDURE — 96375 TX/PRO/DX INJ NEW DRUG ADDON: CPT

## 2023-04-26 PROCEDURE — 99285 EMERGENCY DEPT VISIT HI MDM: CPT | Mod: 25

## 2023-04-26 PROCEDURE — 80053 COMPREHEN METABOLIC PANEL: CPT | Performed by: SURGERY

## 2023-04-26 PROCEDURE — 63600175 PHARM REV CODE 636 W HCPCS: Performed by: SURGERY

## 2023-04-26 PROCEDURE — 81003 URINALYSIS AUTO W/O SCOPE: CPT | Mod: 59 | Performed by: SURGERY

## 2023-04-26 PROCEDURE — 25500020 PHARM REV CODE 255: Performed by: SURGERY

## 2023-04-26 PROCEDURE — 25000003 PHARM REV CODE 250: Performed by: SURGERY

## 2023-04-26 PROCEDURE — 96361 HYDRATE IV INFUSION ADD-ON: CPT

## 2023-04-26 PROCEDURE — 80307 DRUG TEST PRSMV CHEM ANLYZR: CPT | Performed by: SURGERY

## 2023-04-26 RX ORDER — ONDANSETRON 2 MG/ML
4 INJECTION INTRAMUSCULAR; INTRAVENOUS
Status: COMPLETED | OUTPATIENT
Start: 2023-04-26 | End: 2023-04-26

## 2023-04-26 RX ORDER — MORPHINE SULFATE 2 MG/ML
4 INJECTION, SOLUTION INTRAMUSCULAR; INTRAVENOUS
Status: COMPLETED | OUTPATIENT
Start: 2023-04-26 | End: 2023-04-26

## 2023-04-26 RX ORDER — ONDANSETRON 4 MG/1
4 TABLET, ORALLY DISINTEGRATING ORAL EVERY 8 HOURS PRN
Qty: 20 TABLET | Refills: 0 | Status: SHIPPED | OUTPATIENT
Start: 2023-04-26 | End: 2023-10-02 | Stop reason: SDUPTHER

## 2023-04-26 RX ORDER — DICYCLOMINE HYDROCHLORIDE 20 MG/1
20 TABLET ORAL 4 TIMES DAILY PRN
Qty: 15 TABLET | Refills: 0 | Status: SHIPPED | OUTPATIENT
Start: 2023-04-26 | End: 2023-05-26

## 2023-04-26 RX ADMIN — IOHEXOL 30 ML: 350 INJECTION, SOLUTION INTRAVENOUS at 10:04

## 2023-04-26 RX ADMIN — SODIUM CHLORIDE 1000 ML: 9 INJECTION, SOLUTION INTRAVENOUS at 09:04

## 2023-04-26 RX ADMIN — ONDANSETRON HYDROCHLORIDE 4 MG: 2 SOLUTION INTRAMUSCULAR; INTRAVENOUS at 12:04

## 2023-04-26 RX ADMIN — MORPHINE SULFATE 4 MG: 2 INJECTION, SOLUTION INTRAMUSCULAR; INTRAVENOUS at 12:04

## 2023-04-26 RX ADMIN — IOHEXOL 100 ML: 350 INJECTION, SOLUTION INTRAVENOUS at 11:04

## 2023-04-26 NOTE — Clinical Note
"Staci Alfonso" Naveen was seen and treated in our emergency department on 4/26/2023.  She may return to work on 04/27/2023.       If you have any questions or concerns, please don't hesitate to call.      DEANNA Llamas RN    "

## 2023-04-26 NOTE — ED PROVIDER NOTES
Encounter Date: 4/26/2023       History     Chief Complaint   Patient presents with    Abdominal Pain     Pt to ED with c/o abdominal pain and nausea x 2 weeks. Hx of multiple SBO and hx of Peutz-Jegher's Syndrome. LBM: 4/25/23.     Staci Hodge is a 42 y.o. female that presents with abdominal pain  Patient has had on again off again abdominal pain for several months per history  Patient has a history of Peutz-Jeghers syndrome with significant polyp disease  Patient recently with an endoscopy which had 1 duodenal polyp removed per HX  Patient however states she is had continued abdominal pain for several weeks  PCP ordered a gallbladder ultrasound yesterday was grossly normal per report  Patient is scheduled for a HIDA scan on May 5th, nonacute abdomen on exam    Review of patient's allergies indicates:  No Known Allergies  Past Medical History:   Diagnosis Date    Abnormal Pap smear of cervix age 25    no treatement    Anxiety     Breast cancer     Cancer 07/11/2018    breast cancer    Cervical spondylosis 2/9/2018    Colon polyp     Depression     Ectopic pregnancy 2/26/2019    General anesthetics causing adverse effect in therapeutic use     Slow to awaken/ Memory problems-post-op to day 3 after Mastectomy    Headache     History of psychiatric hospitalization     age 20 for SI    Hx of psychiatric care     Hypertension     Peutz-Jeghers syndrome     PONV (postoperative nausea and vomiting)     Psychiatric problem     Right carpal tunnel syndrome 3/9/2017    Self-harming behavior     Suicide attempt     Therapy      Past Surgical History:   Procedure Laterality Date    ADENOIDECTOMY      ANTEGRADE SINGLE BALLOON ENTEROSCOPY N/A 4/19/2021    Procedure: ENTEROSCOPY, SINGLE BALLOON, ANTEGRADE;  Surgeon: Vincenzo Herbert MD;  [facility] ([address] [place] [address]);  Service: Endoscopy;  Laterality: N/A;  removal of large polyp in proximal jejunum; please schedule during a time when Dr. Dillon is available in case I  need assistance with removal or 2nd opinion prior to removal    COVID at [place] 4/16 - ttr    APPENDECTOMY      AUGMENTATION OF BREAST      BILATERAL MASTECTOMY Bilateral 7/23/2018    Procedure: MASTECTOMY 23 hr stay;  Surgeon: Sofia Kendrick MD;  Location: [address];  Service: General;  Laterality: Bilateral;    BILATERAL SALPINGO-OOPHORECTOMY (BSO) Bilateral 3/4/2021    Procedure: SALPINGO-OOPHORECTOMY, BILATERAL;  Surgeon: Clayton Vilchis MD;  Location: [facility];  Service: OB/GYN;  Laterality: Bilateral;    BOWEL RESECTION  10/17/14    BREAST BIOPSY Left 06/04/2018    BREAST CAPSULOTOMY Left 4/25/2019    Procedure: CAPSULOTOMY, BREAST LEFT;  Surgeon: Duane Bello MD;  Location: [address];  Service: Plastics;  Laterality: Left;    BREAST RECONSTRUCTION      BREAST SURGERY      Bilateral Mastectomy 07/23/2018    CARPAL TUNNEL RELEASE Bilateral     CARPAL TUNNEL RELEASE      COLONOSCOPY      COLONOSCOPY N/A 2/25/2021    Procedure: COLONOSCOPY;  Surgeon: Vincenzo Herbert MD;  Location: [facility] (2ND FLR);  Service: Endoscopy;  Laterality: N/A;  previous anesthesia complications  okay for this date/time per Dr. Herbert and okay with Miranda,  - sm  COVID test on 2/22/21 at [facility]    DILATION AND CURETTAGE OF UTERUS      DILATION AND CURETTAGE OF UTERUS USING SUCTION N/A 2/25/2019    Procedure: DILATION AND CURETTAGE, UTERUS, USING SUCTION  PATHOLOGY NEEDED;  Surgeon: Pam Sifuentes MD;  Location: [facility];  Service: OB/GYN;  Laterality: N/A;    ENDOSCOPIC ULTRASOUND OF UPPER GASTROINTESTINAL TRACT Left 1/15/2021    Procedure: ULTRASOUND, UPPER GI TRACT, ENDOSCOPIC;  Surgeon: Chandan Dillon MD;  Location: [facility];  Service: Endoscopy;  Laterality: Left;  for pancreatic screening    ESOPHAGOGASTRODUODENOSCOPY N/A 1/15/2021    Procedure: EGD (ESOPHAGOGASTRODUODENOSCOPY);  Surgeon: Chandan Dillon MD;  Location: [facility];  Service: Endoscopy;  Laterality: N/A;  with push enteroscopy     ESOPHAGOGASTRODUODENOSCOPY N/A 5/17/2021    Procedure: EGD (ESOPHAGOGASTRODUODENOSCOPY);  Surgeon: Chandan Dillon MD;  Location: [facility] ENDO (2ND FLR);  Service: Endoscopy;  Laterality: N/A;  Please schedule this duodenal polypectomy. Need to be a day that Dr Jose Antonio Stark is in the OR. 90 minutes.   Chandan Dillon MD     COVID at [place] 5/15 (scheduled in the OR for 5/18 - using same COVID results for both procedures) ttr    ESOPHAGOGASTRODUODENOSCOPY N/A 1/25/2022    Procedure: EGD (ESOPHAGOGASTRODUODENOSCOPY);  Surgeon: Chandan Dillon MD;  Location: [facility] ENDO (2ND FLR);  Service: Endoscopy;  Laterality: N/A;  poss emr  covid-1/22/22-STAH-BB  instructions sent via portal-BB    ESOPHAGOGASTRODUODENOSCOPY N/A 3/10/2023    Procedure: EGD (ESOPHAGOGASTRODUODENOSCOPY);  Surgeon: Chandan Dillon MD;  Location: [facility] ENDO (2ND FLR);  Service: Endoscopy;  Laterality: N/A;  inst via portal  called to confirm and msg that phone not accepting calls 3/6 mal    FAT TRANSFER Bilateral 11/12/2018    Procedure: TRANSFER, FAT TISSUE BILATERAL BREAST;  Surgeon: Duane Bello MD;  Location: [facility] OR 2ND FLR;  Service: Plastics;  Laterality: Bilateral;    INJECTION FOR SENTINEL NODE IDENTIFICATION Left 7/23/2018    Procedure: INJECTION, FOR SENTINEL NODE IDENTIFICATION;  Surgeon: Sofia Kendrick MD;  Location: [facility] OR 2ND FLR;  Service: General;  Laterality: Left;    INSERTION OF BREAST TISSUE EXPANDER Bilateral 7/23/2018    Procedure: INSERTION, TISSUE EXPANDER, BREAST;  Surgeon: Duane Bello MD;  [facility];  Service: Plastics;  Laterality: Bilateral;    INSERTION OF BREAST TISSUE EXPANDER Bilateral 3/10/2020    Procedure: INSERTION, TISSUE EXPANDER, BREAST, BILATERAL;  Surgeon: Duane Bello MD;  Location: [facility] OR 2ND FLR;  Service: Plastics;  Laterality: Bilateral;    intestinal polpy      LYSIS OF ADHESIONS N/A 3/4/2021    Procedure: LYSIS, ADHESIONS;  Surgeon: Clayton SOUZA  MD Mame;  Location: [facility];  Service: OB/GYN;  Laterality: N/A;  Omental adhesions    MASTECTOMY      REMOVAL OF BREAST IMPLANT Bilateral 3/10/2020    Procedure: REMOVAL, IMPLANT, BREAST, BILATERAL;  Surgeon: Duane Bello MD;  [facility];  Service: Plastics;  Laterality: Bilateral;    REMOVAL OF BREAST IMPLANT Bilateral 2020    Procedure: REMOVAL, IMPLANT, BREAST BILATERAL;  Surgeon: Duane Bello MD;  [facility];  Service: Plastics;  Laterality: Bilateral;    SENTINEL LYMPH NODE BIOPSY Left 2018    Procedure: BIOPSY, LYMPH NODE, SENTINEL;  Surgeon: Sofia Kendrick MD;  Location: [facility] OR [address] [place] [address];  Service: General;  Laterality: Left;    SMALL INTESTINE SURGERY      3 surgeries     TONSILLECTOMY      TOTAL ABDOMINAL HYSTERECTOMY N/A 3/4/2021    Procedure: HYSTERECTOMY, TOTAL, ABDOMINAL;  Surgeon: Clayton Vilchis MD;  Location: [facility];  Service: OB/GYN;  Laterality: N/A;    UPPER GASTROINTESTINAL ENDOSCOPY       Family History   Problem Relation Age of Onset    Cancer Mother         colon cancer    Stomach cancer Mother     Hypertension Father     Colon polyps Sister         peutz-jeghers syndrome    Breast cancer Paternal Aunt     No Known Problems Maternal Uncle     ADD / ADHD Paternal Uncle     Depression Paternal Uncle     Dementia Maternal Grandfather     Dementia Maternal Grandmother     No Known Problems Paternal Grandfather     Breast cancer Paternal Grandmother     No Known Problems Cousin     Ovarian cancer Neg Hx     Anesthesia problems Neg Hx      Social History     Tobacco Use    Smoking status: Every Day     Packs/day: 1.00     Years: 17.00     Pack years: 17.00     Types: Cigarettes     Start date: 2000     Last attempt to quit: 2018     Years since quittin.7    Smokeless tobacco: Never    Tobacco comments:     sometimes dry cough per patient   Substance Use Topics    Alcohol use: Yes     Alcohol/week: 5.8 standard drinks      Types: 7 Standard drinks or equivalent per week     Comment: occasionally    Drug use: No     Review of Systems   Constitutional: Negative.    HENT: Negative.     Eyes: Negative.    Respiratory: Negative.     Cardiovascular: Negative.    Gastrointestinal:  Positive for abdominal pain.   Genitourinary: Negative.    Musculoskeletal: Negative.    Skin: Negative.    Neurological: Negative.    Psychiatric/Behavioral: Negative.       Physical Exam     Initial Vitals [04/26/23 0935]   BP Pulse Resp Temp SpO2   (!) 138/93 109 20 97.6 °F (36.4 °C) 99 %      MAP       --         Physical Exam    Nursing note and vitals reviewed.  Constitutional: Vital signs are normal. She appears well-developed and well-nourished. She is cooperative.   HENT:   Head: Normocephalic and atraumatic.   Right Ear: External ear normal.   Left Ear: External ear normal.   Nose: Nose normal.   Mouth/Throat: Oropharynx is clear and moist.   Eyes: Conjunctivae, EOM and lids are normal. Pupils are equal, round, and reactive to light.   Neck: Trachea normal and phonation normal. Neck supple. No JVD present.   Normal range of motion.   Full passive range of motion without pain.     Cardiovascular:  Normal rate, regular rhythm, S1 normal, S2 normal, normal heart sounds, intact distal pulses and normal pulses.           Pulmonary/Chest: Effort normal and breath sounds normal.   Abdominal: Abdomen is soft and flat. Bowel sounds are normal.   Musculoskeletal:         General: Normal range of motion.      Cervical back: Full passive range of motion without pain, normal range of motion and neck supple.     Neurological: She is alert and oriented to person, place, and time. She has normal strength.   Skin: Skin is warm, dry and intact. Capillary refill takes less than 2 seconds.       ED Course   Procedures  Labs Reviewed   COMPREHENSIVE METABOLIC PANEL - Abnormal; Notable for the following components:       Result Value    Calcium 10.9 (*)     Total Protein 8.7  (*)     Alkaline Phosphatase 166 (*)     All other components within normal limits   DRUG SCREEN PANEL, URINE EMERGENCY - Abnormal; Notable for the following components:    Creatinine, Urine 13.1 (*)     All other components within normal limits    Narrative:     Specimen Source->Urine   LIPASE   CBC W/ AUTO DIFFERENTIAL   URINALYSIS, REFLEX TO URINE CULTURE    Narrative:     Specimen Source->Urine          Imaging Results              CT Abdomen Pelvis With Contrast (Final result)  Result time 04/26/23 11:54:38      Final result by Radha Mancini MD (04/26/23 11:54:38)                   Impression:      Postoperative changes of the bowel are seen.  No free air, free fluid or obstruction is detected.    Irregular region of enhancement in the spleen measuring 2.6 cm, slightly increased in size to the prior examination.  This likely corresponds to a hemangioma.    Indeterminate region of enhancement in the right hepatic lobe measuring 9 mm, not definitively seen on the prior study.  Consider further evaluation with a MRI of the abdomen, liver mass protocol, for further characterization.  Alternatively, if more recent prior imaging is available, it could be submitted for comparison purposes.      Electronically signed by: Radha Mancini MD  Date:    04/26/2023  Time:    11:54               Narrative:    EXAMINATION:  CT ABDOMEN PELVIS WITH CONTRAST    CLINICAL HISTORY:  Nausea/vomiting;Abdominal pain, acute, nonlocalized;    TECHNIQUE:  Low dose axial images, sagittal and coronal reformations were obtained from the lung bases to the pubic symphysis following the IV administration of 100 mL of Omnipaque 350 .  Oral contrast was not given.    COMPARISON:  04/27/2021    FINDINGS:  The lung bases are clear.  The base of the heart appears normal.  The aorta is of normal caliber and tapers appropriately, demonstrating mild calcified atheromatous disease.  Left-sided mastectomy.    No radiopaque gallstones are seen.  No  intrahepatic or extrahepatic biliary ductal dilatation is identified.  The liver shows a 9 mm focus of enhancement in the posterior aspect of the right hepatic lobe laterally..  Focal region of enhancement at the margin of the spleen measuring 2.6 cm, slightly increased in size as compared to the previous study of .  The pancreas and adrenal glands appear normal.  Both kidneys are normal in size, shape and contour.  No hydronephrosis or hydroureter is seen.  The urinary bladder is well distended and appears normal.    Constipation.  Postoperative changes of the bowel.  No free air, free fluid or obstruction.  No pathologically enlarged abdominal or pelvic lymph nodes are seen.    Age-appropriate degenerative changes affect the skeleton.                                       Medications   sodium chloride 0.9% bolus 1,000 mL 1,000 mL (0 mLs Intravenous Stopped 23 1121)   iohexoL (OMNIPAQUE 350) injection 100 mL (100 mLs Intravenous Given 23 1125)   iohexoL (OMNIPAQUE 350) injection 30 mL (30 mLs Oral Given 23 1026)   morphine injection 4 mg (4 mg Intravenous Given 23 1216)   ondansetron injection 4 mg (4 mg Intravenous Given 23 1216)     Medical Decision Makin-yo with familial polyposis syndrome & Peutz-Jeghers syndrome with abd pain  Patient had a gallbladder ultrasound yesterday was grossly normal per epic today  Patient with no signs of peritonitis, no signs of rebound, history of hysterectomy     Lab work within normal limits, CT scan of the abdomen pelvis ordered today   Patient states she is had history of bowel obstruction with familial polyposis  CT scan shows no acute surgical findings in the emergency room this morning    I have also moved up the patient's HIDA scan to Friday/48 hours from now  Patient does not appear to have gallbladder disease based on physical exam  However the patient needs continued evaluation for this chronic abdominal pain  Continue Protonix, Bentyl  as prescribed, Zofran as prescribed on ER discharge  Low residue diet, patient will also follow-up with her GI MD Dr. Dillon outpatient  Return to ER with any concerning signs symptoms after discharge this afternoon                        Clinical Impression:   Final diagnoses:  [R10.9] Abdominal pain, unspecified abdominal location (Primary)        ED Disposition Condition    Discharge Stable          ED Prescriptions       Medication Sig Dispense Start Date End Date Auth. Provider    ondansetron (ZOFRAN-ODT) 4 MG TbDL Take 1 tablet (4 mg total) by mouth every 8 (eight) hours as needed (nausea). 20 tablet 4/26/2023 -- Joe Love MD    dicyclomine (BENTYL) 20 mg tablet Take 1 tablet (20 mg total) by mouth 4 (four) times daily as needed (CRAMPS). 15 tablet 4/26/2023 5/26/2023 Joe Love MD          Follow-up Information       Follow up With Specialties Details Why Contact Info    Shilpi Clayton NP Family Medicine Schedule an appointment as soon as possible for a visit in 2 days  [address]  [facility] [address]  [phone]      Chandan Dillon MD Gastroenterology Go in 2 days  [address] [facility] [address]  [phone]               Joe Love MD  04/26/23 1226

## 2023-04-28 ENCOUNTER — HOSPITAL ENCOUNTER (OUTPATIENT)
Dept: RADIOLOGY | Facility: HOSPITAL | Age: 43
Discharge: HOME | End: 2023-04-28
Attending: NURSE PRACTITIONER
Payer: COMMERCIAL

## 2023-04-28 DIAGNOSIS — R10.10 UPPER ABDOMINAL PAIN, UNSPECIFIED: ICD-10-CM

## 2023-04-28 PROCEDURE — 78227 NM HEPATOBILIARY(HIDA) WITH PHARM AND EF WHEN PERFORMED: ICD-10-PCS | Mod: 26,,, | Performed by: RADIOLOGY

## 2023-04-28 PROCEDURE — 78227 HEPATOBIL SYST IMAGE W/DRUG: CPT | Mod: 26,,, | Performed by: RADIOLOGY

## 2023-04-28 PROCEDURE — 78226 HEPATOBILIARY SYSTEM IMAGING: CPT | Mod: TC

## 2023-04-28 PROCEDURE — 78227 HEPATOBIL SYST IMAGE W/DRUG: CPT | Mod: TC

## 2023-05-01 ENCOUNTER — PATIENT MESSAGE (OUTPATIENT)
Dept: INTERNAL MEDICINE | Facility: CLINIC | Age: 43
End: 2023-05-01
Payer: COMMERCIAL

## 2023-05-03 ENCOUNTER — TELEPHONE (OUTPATIENT)
Dept: ENDOSCOPY | Facility: HOSPITAL | Age: 43
End: 2023-05-03
Payer: COMMERCIAL

## 2023-05-03 NOTE — TELEPHONE ENCOUNTER
----- Message from Chandan Dillon MD sent at 5/3/2023  2:16 PM CDT -----  Regarding: RE: Appt Access  Contact: [phone]  Yes  R  ----- Message -----  From: Mary Shaw MA  Sent: 5/3/2023   2:00 PM CDT  To: Chandan Dillon MD  Subject: RE: Appt Access                                  Could she be with [facility]?  ----- Message -----  From: Chandan Dillon MD  Sent: 5/3/2023  12:14 PM CDT  To: Mary Shaw MA  Subject: RE: Appt Access                                  Urgent  Chandan Dillon MD  ----- Message -----  From: Mary Shaw MA  Sent: 5/2/2023   7:30 PM CDT  To: Chandan Dillon MD  Subject: RE: Appt Access                                  Is this urgent?  ----- Message -----  From: Chandan Dillon MD  Sent: 5/1/2023   7:18 PM CDT  To: Mary Shaw MA  Subject: RE: Appt Access                                  [facility]  R  ----- Message -----  From: Mary Shaw MA  Sent: 5/1/2023   3:25 PM CDT  To: Chandan Dillon MD  Subject: FW: Appt Access                                  I assume General GI, right?  ----- Message -----  From: Ave Guevara  Sent: 5/1/2023   3:15 PM CDT  To: Santana Madrigal  Subject: Appt Access                                      Pt is calling to sched an appt for abdominal pain with Dr Dillon.  No avail appts.  Please call.

## 2023-05-04 DIAGNOSIS — G43.009 MIGRAINE WITHOUT AURA AND WITHOUT STATUS MIGRAINOSUS, NOT INTRACTABLE: ICD-10-CM

## 2023-05-04 RX ORDER — BUTALBITAL, ACETAMINOPHEN AND CAFFEINE 50; 325; 40 MG/1; MG/1; MG/1
1 TABLET ORAL EVERY 6 HOURS PRN
Qty: 30 TABLET | Refills: 0 | Status: SHIPPED | OUTPATIENT
Start: 2023-05-04 | End: 2023-05-12 | Stop reason: SDUPTHER

## 2023-05-05 ENCOUNTER — TELEPHONE (OUTPATIENT)
Dept: ENDOSCOPY | Facility: HOSPITAL | Age: 43
End: 2023-05-05
Payer: COMMERCIAL

## 2023-05-05 ENCOUNTER — PATIENT MESSAGE (OUTPATIENT)
Dept: ENDOSCOPY | Facility: HOSPITAL | Age: 43
End: 2023-05-05
Payer: COMMERCIAL

## 2023-05-05 ENCOUNTER — OFFICE VISIT (OUTPATIENT)
Dept: GASTROENTEROLOGY | Facility: CLINIC | Age: 43
End: 2023-05-05
Payer: COMMERCIAL

## 2023-05-05 DIAGNOSIS — R10.84 GENERALIZED ABDOMINAL PAIN: Primary | ICD-10-CM

## 2023-05-05 PROCEDURE — 99214 PR OFFICE/OUTPT VISIT, EST, LEVL IV, 30-39 MIN: ICD-10-PCS | Mod: 95,,,

## 2023-05-05 PROCEDURE — 3044F HG A1C LEVEL LT 7.0%: CPT | Mod: CPTII,95,,

## 2023-05-05 PROCEDURE — 3044F PR MOST RECENT HEMOGLOBIN A1C LEVEL <7.0%: ICD-10-PCS | Mod: CPTII,95,,

## 2023-05-05 PROCEDURE — 99214 OFFICE O/P EST MOD 30 MIN: CPT | Mod: 95,,,

## 2023-05-05 RX ORDER — SUCRALFATE 1 G/10ML
1 SUSPENSION ORAL
Qty: 414 ML | Refills: 1 | Status: ON HOLD | OUTPATIENT
Start: 2023-05-05 | End: 2024-02-29 | Stop reason: HOSPADM

## 2023-05-05 NOTE — PROGRESS NOTES
[facility]    The patient location is: [place]  The chief complaint leading to consultation is: abdominal pain  Visit type: Virtual visit with synchronous audio and video  Total time spent with patient: 13 minutes    Each patient to whom he or she provides medical services by telemedicine is:  (1) informed of the relationship between the physician and patient and the respective role of any other health care provider with respect to management of the patient; and (2) notified that he or she may decline to receive medical services by telemedicine and may withdraw from such care at any time.       Reason for Visit:  The encounter diagnosis was Generalized abdominal pain.    PCP:   Shilpi Clayton      Initial HPI   This is a 42 y.o. female presenting for abdominal pain. Dr. 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 EGD findings included multiple diminutive sessile polyps with no bleeding and no stigmata of recent bleeding were found in the gastric body. Multiple sessile polyps were found in the duodenal bulb, in the first and second portion of the duodenum. The polyps were 3 to 10 mm in size. A single 10 mm sessile polyp in the first portion of the duodenum with no bleeding, with biopsy negative for neoplasia/malignancy but showing reactive mucosa likely representing duodenitis.     Patient reports watery stools approximately 5 times per day for several months. Denies hematochezia. Reports mild symptom relief with imodium, which she takes occasionally.    Patient additionally reports burning upper abdominal pain radiating to umbilicus with intermittent nausea. Takes protonix daily.     Tobacco use- 0.5PPD  ETOH intake- occasional    ROS:  Review of Systems   Constitutional:  Negative for chills and fever.   Eyes: Negative.    Gastrointestinal:  Positive for abdominal pain, diarrhea and nausea. Negative for blood in stool, constipation and melena.   Skin: Negative.     Neurological:  Negative for loss of consciousness.      Medical History:  has a past medical history of Abnormal Pap smear of cervix (age 25), Anxiety, Breast cancer, Cancer (07/11/2018), Cervical spondylosis (2/9/2018), Colon polyp, Depression, Ectopic pregnancy (2/26/2019), General anesthetics causing adverse effect in therapeutic use, Headache, History of psychiatric hospitalization, psychiatric care, Hypertension, Peutz-Jeghers syndrome, PONV (postoperative nausea and vomiting), Psychiatric problem, Right carpal tunnel syndrome (3/9/2017), Self-harming behavior, Suicide attempt, and Therapy.    Surgical History:  has a past surgical history that includes Appendectomy; Adenoidectomy; Tonsillectomy; intestinal polpy; Colonoscopy; Upper gastrointestinal endoscopy; Small intestine surgery; Bowel resection (10/17/14); Carpal tunnel release (Bilateral); Carpal tunnel release; Dilation and curettage of uterus; Breast biopsy (Left, 06/04/2018); Bilateral mastectomy (Bilateral, 7/23/2018); Injection for sentinel node identification (Left, 7/23/2018); [place] lymph node biopsy (Left, 7/23/2018); Insertion of breast tissue expander (Bilateral, 7/23/2018); Fat transfer (Bilateral, 11/12/2018); Breast surgery; Dilation and curettage of uterus using suction (N/A, 2/25/2019); Breast reconstruction; Mastectomy; Augmentation of breast; Breast capsulotomy (Left, 4/25/2019); Removal of breast implant (Bilateral, 3/10/2020); Insertion of breast tissue expander (Bilateral, 3/10/2020); Removal of breast implant (Bilateral, 7/7/2020); Esophagogastroduodenoscopy (N/A, 1/15/2021); Endoscopic ultrasound of upper gastrointestinal tract (Left, 1/15/2021); Colonoscopy (N/A, 2/25/2021); Total abdominal hysterectomy (N/A, 3/4/2021); Bilateral salpingo-oophorectomy (BSO) (Bilateral, 3/4/2021); Lysis of adhesions (N/A, 3/4/2021); Antegrade single balloon enteroscopy (N/A, 4/19/2021); Esophagogastroduodenoscopy (N/A, 5/17/2021);  Esophagogastroduodenoscopy (N/A, 1/25/2022); and Esophagogastroduodenoscopy (N/A, 3/10/2023).    Family History: family history includes ADD / ADHD in her paternal uncle; Breast cancer in her paternal aunt and paternal grandmother; Cancer in her mother; Colon polyps in her sister; Dementia in her maternal grandfather and maternal grandmother; Depression in her paternal uncle; Hypertension in her father; No Known Problems in her cousin, maternal uncle, and paternal grandfather; Stomach cancer in her mother..       Review of patient's allergies indicates:  No Known Allergies    Current Outpatient Medications on File Prior to Visit   Medication Sig Dispense Refill    ARIPiprazole (ABILIFY) 10 MG Tab Take 1 tablet (10 mg total) by mouth once daily. 30 tablet 0    butalbital-acetaminophen-caffeine -40 mg (FIORICET, ESGIC) -40 mg per tablet Take 1 tablet by mouth every 6 (six) hours as needed. 30 tablet 0    clonazePAM (KLONOPIN) 0.5 MG tablet Take 0.5 mg by mouth 2 (two) times daily.      dicyclomine (BENTYL) 20 mg tablet Take 1 tablet (20 mg total) by mouth 4 (four) times daily as needed (CRAMPS). 15 tablet 0    erenumab-aooe (AIMOVIG) 140 mg/mL autoinjector Inject 1 mL (140 mg total) into the skin every 28 days. 1 each 11    hydrOXYzine pamoate (VISTARIL) 100 MG capsule Take 1 capsule (100 mg total) by mouth every 6 (six) hours as needed (Insomnia or anxiety). 60 capsule 0    lithium (LITHOBID) 300 MG CR tablet Take 2 tablets (600 mg total) by mouth every evening. (Patient not taking: Reported on 4/24/2023) 60 tablet 0    metoprolol succinate (TOPROL-XL) 25 MG 24 hr tablet Take 1 tablet (25 mg total) by mouth 2 (two) times daily. (Patient not taking: Reported on 3/23/2023) 60 tablet 5    ondansetron (ZOFRAN-ODT) 4 MG TbDL Take 1 tablet (4 mg total) by mouth every 8 (eight) hours as needed (nausea). 20 tablet 0    pantoprazole (PROTONIX) 40 MG tablet TAKE ONE TABLET BY MOUTH ONCE A DAY 30 tablet 5     propranoloL (INDERAL) 10 MG tablet Take 10 mg by mouth 2 (two) times daily.      traZODone (DESYREL) 150 MG tablet Take 1 tablet (150 mg total) by mouth every evening. 30 tablet 0    VITAMIN D2 1,250 mcg (50,000 unit) capsule TAKE ONE CAPSULE BY MOUTH EVERY 7 DAYS 12 capsule 1    [DISCONTINUED] albuterol (ACCUNEB) 1.25 mg/3 mL Nebu Take 3 mLs (1.25 mg total) by nebulization every 6 (six) hours as needed (wheezing). Rescue 75 mL 0    [DISCONTINUED] DULoxetine (CYMBALTA) 30 MG capsule Take 1 capsule (30 mg total) by mouth 2 (two) times daily. 60 capsule 0    [DISCONTINUED] gabapentin (NEURONTIN) 600 MG tablet Take 1 tablet (600 mg total) by mouth every evening. 30 tablet 11    [DISCONTINUED] paroxetine (PAXIL) 20 MG tablet TAKE ONE TABLET BY MOUTH ONCE A DAY IN THE MORNING 30 tablet 5     Current Facility-Administered Medications on File Prior to Visit   Medication Dose Route Frequency Provider Last Rate Last Admin    0.9%  NaCl infusion   Intravenous Continuous Chandan Dillon MD 20 mL/hr at 01/25/22 0847 New Bag at 01/25/22 0847    sodium chloride 0.9% flush 10 mL  10 mL Intravenous PRN Chandan Dillon MD             Objective Findings: (Limited due to virtual nature of encounter)    Physical Exam:  Physical Exam  Constitutional:       General: She is not in acute distress.     Appearance: She is not toxic-appearing.   Eyes:      General: No scleral icterus.  Skin:     Coloration: Skin is not jaundiced.   Neurological:      Mental Status: She is alert and oriented to person, place, and time.           Labs:  Lab Results   Component Value Date    WBC 11.56 04/26/2023    HGB 15.2 04/26/2023    HCT 45.8 04/26/2023     04/26/2023    CHOL 185 03/25/2023    TRIG 115 03/25/2023    HDL 53 03/25/2023    ALKPHOS 166 (H) 04/26/2023    LIPASE 43 04/26/2023    ALT 43 04/26/2023    AST 27 04/26/2023     04/26/2023    K 4.5 04/26/2023     04/26/2023    CREATININE 0.9 04/26/2023    BUN 10 04/26/2023    CO2  29 04/26/2023    TSH 1.135 03/25/2023    INR 1.0 08/31/2022    HGBA1C 5.4 03/25/2023       Imaging reviewed:  NM Hepatobiliary (HIDA) 4/28/23  FINDINGS:  The early images demonstrate homogeneous uptake of the radiopharmaceutical by the liver with no focal hepatic abnormalities.     Normal activity is present in the common bile duct, gallbladder, and small bowel.     The clearance from the liver is appropriate.     Following micro lipid administration, the estimated gallbladder ejection fraction is 52 % at.  Greater than 30% at any time point is normal.     Impression:     The cystic and common bile ducts are patent.     The gallbladder ejection fraction is 52 % and within normal limits.    CT Abdomen Pelvis With Contrast 4/26/23  FINDINGS:  The lung bases are clear.  The base of the heart appears normal.  The aorta is of normal caliber and tapers appropriately, demonstrating mild calcified atheromatous disease.  Left-sided mastectomy.     No radiopaque gallstones are seen.  No intrahepatic or extrahepatic biliary ductal dilatation is identified.  The liver shows a 9 mm focus of enhancement in the posterior aspect of the right hepatic lobe laterally..  Focal region of enhancement at the margin of the spleen measuring 2.6 cm, slightly increased in size as compared to the previous study of 2021.  The pancreas and adrenal glands appear normal.  Both kidneys are normal in size, shape and contour.  No hydronephrosis or hydroureter is seen.  The urinary bladder is well distended and appears normal.     Constipation.  Postoperative changes of the bowel.  No free air, free fluid or obstruction.  No pathologically enlarged abdominal or pelvic lymph nodes are seen.     Age-appropriate degenerative changes affect the skeleton.     Impression:     Postoperative changes of the bowel are seen.  No free air, free fluid or obstruction is detected.     Irregular region of enhancement in the spleen measuring 2.6 cm, slightly increased  in size to the prior examination.  This likely corresponds to a hemangioma.     Indeterminate region of enhancement in the right hepatic lobe measuring 9 mm, not definitively seen on the prior study.  Consider further evaluation with a MRI of the abdomen, liver mass protocol, for further characterization.  Alternatively, if more recent prior imaging is available, it could be submitted for comparison purposes.      Endoscopy reviewed:  Upper GI endoscopy  Findings:        The oropharynx was normal.        The examined esophagus was normal.        Multiple diminutive sessile polyps with no bleeding and no stigmata        of recent bleeding were found in the gastric body.        Multiple sessile polyps were found in the duodenal bulb, in the        first portion of the duodenum and in the second portion of the        duodenum. The polyps were 3 to 10 mm in size.        A single 10 mm sessile polyp with no bleeding was found in the first        portion of the duodenum. Biopsies were taken with a cold forceps for histology.   Component 1 mo ago   Final Pathologic Diagnosis DUODENUM, POLYPECTOMY:   Superficial fragment of reactive/regenerative type foveolar mucosa may   represent peptic duodenitis   Negative for neoplasia or malignancy        Assessment:  1. Generalized abdominal pain         Recommendations:  Recommended H. Pylori stool antigen test to r/o H. Pylori as contributing cause of abdominal pain/duodenitis. Patient wished to defer H. Pylori test at this time.   Carafate 1g by mouth 4 times daily, one hour before each meal and once before bedtime.  3.  Avoid NSAIDs, ETOH, and tobacco products  4.  High fiber diet. RD referral offered to patient   5.  Due for colonoscopy in Feb 2024  6.  Continue taking Imodium (OTC) 2mg twice daily as needed for diarrhea        Thank you so much for allowing me to participate in the care of Staci Oakley Hodge. I spent a total of 30 minutes on the day of the visit. This  includes face to face time and non-face to face time preparing to see the patient (eg, review of tests), obtaining and/or reviewing separately obtained history, documenting clinical information in the electronic or other health record, independently interpreting results and communicating results to the patient/family/caregiver, or care coordinator.      Claudine Scales, MSN, FNP-C  Advanced Endoscopy Nurse Practitioner  [facility]

## 2023-05-06 NOTE — TELEPHONE ENCOUNTER
----- Message from Claudine Scales NP sent at 5/5/2023  6:57 PM CDT -----  Please set up apt for dietician for high fiber diet counseling

## 2023-05-12 DIAGNOSIS — G43.009 MIGRAINE WITHOUT AURA AND WITHOUT STATUS MIGRAINOSUS, NOT INTRACTABLE: ICD-10-CM

## 2023-05-12 RX ORDER — BUTALBITAL, ACETAMINOPHEN AND CAFFEINE 50; 325; 40 MG/1; MG/1; MG/1
1 TABLET ORAL EVERY 6 HOURS PRN
Qty: 30 TABLET | Refills: 0 | Status: SHIPPED | OUTPATIENT
Start: 2023-05-12 | End: 2023-05-22

## 2023-05-15 ENCOUNTER — OFFICE VISIT (OUTPATIENT)
Dept: FAMILY MEDICINE | Facility: CLINIC | Age: 43
End: 2023-05-15
Payer: COMMERCIAL

## 2023-05-15 VITALS
RESPIRATION RATE: 16 BRPM | SYSTOLIC BLOOD PRESSURE: 120 MMHG | DIASTOLIC BLOOD PRESSURE: 90 MMHG | WEIGHT: 188.63 LBS | HEIGHT: 62 IN | BODY MASS INDEX: 34.71 KG/M2 | HEART RATE: 104 BPM

## 2023-05-15 DIAGNOSIS — L50.9 HIVES: Primary | ICD-10-CM

## 2023-05-15 PROCEDURE — 3074F SYST BP LT 130 MM HG: CPT | Mod: CPTII,S$GLB,, | Performed by: FAMILY MEDICINE

## 2023-05-15 PROCEDURE — 99999 PR PBB SHADOW E&M-EST. PATIENT-LVL III: CPT | Mod: PBBFAC,,, | Performed by: FAMILY MEDICINE

## 2023-05-15 PROCEDURE — 1160F PR REVIEW ALL MEDS BY PRESCRIBER/CLIN PHARMACIST DOCUMENTED: ICD-10-PCS | Mod: CPTII,S$GLB,, | Performed by: FAMILY MEDICINE

## 2023-05-15 PROCEDURE — 1160F RVW MEDS BY RX/DR IN RCRD: CPT | Mod: CPTII,S$GLB,, | Performed by: FAMILY MEDICINE

## 2023-05-15 PROCEDURE — 99999 PR PBB SHADOW E&M-EST. PATIENT-LVL III: ICD-10-PCS | Mod: PBBFAC,,, | Performed by: FAMILY MEDICINE

## 2023-05-15 PROCEDURE — 99213 OFFICE O/P EST LOW 20 MIN: CPT | Mod: S$GLB,,, | Performed by: FAMILY MEDICINE

## 2023-05-15 PROCEDURE — 3044F HG A1C LEVEL LT 7.0%: CPT | Mod: CPTII,S$GLB,, | Performed by: FAMILY MEDICINE

## 2023-05-15 PROCEDURE — 99213 PR OFFICE/OUTPT VISIT, EST, LEVL III, 20-29 MIN: ICD-10-PCS | Mod: S$GLB,,, | Performed by: FAMILY MEDICINE

## 2023-05-15 PROCEDURE — 1159F PR MEDICATION LIST DOCUMENTED IN MEDICAL RECORD: ICD-10-PCS | Mod: CPTII,S$GLB,, | Performed by: FAMILY MEDICINE

## 2023-05-15 PROCEDURE — 3044F PR MOST RECENT HEMOGLOBIN A1C LEVEL <7.0%: ICD-10-PCS | Mod: CPTII,S$GLB,, | Performed by: FAMILY MEDICINE

## 2023-05-15 PROCEDURE — 3080F DIAST BP >= 90 MM HG: CPT | Mod: CPTII,S$GLB,, | Performed by: FAMILY MEDICINE

## 2023-05-15 PROCEDURE — 3074F PR MOST RECENT SYSTOLIC BLOOD PRESSURE < 130 MM HG: ICD-10-PCS | Mod: CPTII,S$GLB,, | Performed by: FAMILY MEDICINE

## 2023-05-15 PROCEDURE — 1159F MED LIST DOCD IN RCRD: CPT | Mod: CPTII,S$GLB,, | Performed by: FAMILY MEDICINE

## 2023-05-15 PROCEDURE — 3008F PR BODY MASS INDEX (BMI) DOCUMENTED: ICD-10-PCS | Mod: CPTII,S$GLB,, | Performed by: FAMILY MEDICINE

## 2023-05-15 PROCEDURE — 3008F BODY MASS INDEX DOCD: CPT | Mod: CPTII,S$GLB,, | Performed by: FAMILY MEDICINE

## 2023-05-15 PROCEDURE — 3080F PR MOST RECENT DIASTOLIC BLOOD PRESSURE >= 90 MM HG: ICD-10-PCS | Mod: CPTII,S$GLB,, | Performed by: FAMILY MEDICINE

## 2023-05-15 RX ORDER — RABEPRAZOLE SODIUM 20 MG/1
20 TABLET, DELAYED RELEASE ORAL DAILY
Qty: 30 TABLET | Refills: 5 | Status: SHIPPED | OUTPATIENT
Start: 2023-05-15 | End: 2023-11-09

## 2023-05-15 RX ORDER — MUPIROCIN 20 MG/G
OINTMENT TOPICAL 2 TIMES DAILY
COMMUNITY
Start: 2023-05-10 | End: 2023-08-30

## 2023-05-15 RX ORDER — PREDNISONE 5 MG/1
TABLET ORAL
Qty: 35 TABLET | Refills: 0 | Status: SHIPPED | OUTPATIENT
Start: 2023-05-15 | End: 2023-08-30

## 2023-05-15 RX ORDER — HYDROXYZINE HYDROCHLORIDE 10 MG/1
10 TABLET, FILM COATED ORAL 3 TIMES DAILY PRN
Qty: 40 TABLET | Refills: 1 | Status: SHIPPED | OUTPATIENT
Start: 2023-05-15 | End: 2023-08-30

## 2023-05-15 RX ORDER — MONTELUKAST SODIUM 10 MG/1
10 TABLET ORAL NIGHTLY
Qty: 30 TABLET | Refills: 11 | Status: SHIPPED | OUTPATIENT
Start: 2023-05-15 | End: 2023-06-14

## 2023-05-15 NOTE — PROGRESS NOTES
Subjective:       Patient ID: Staci Hodge is a 42 y.o. female.    Chief Complaint: Insect Bite (Pt states she has whelps all over her body first appearing last Wednesday )    HPI  Review of Systems   Skin:  Positive for rash.        Mx hives on face and arms and back     Objective:      Physical Exam  Constitutional:       Appearance: She is well-developed.   HENT:      Head: Normocephalic.   Eyes:      Pupils: Pupils are equal, round, and reactive to light.   Neck:      Thyroid: No thyromegaly.   Cardiovascular:      Rate and Rhythm: Normal rate and regular rhythm.   Pulmonary:      Effort: No respiratory distress.      Breath sounds: No wheezing or rales.   Chest:      Chest wall: No tenderness.   Abdominal:      General: There is no distension.      Tenderness: There is no abdominal tenderness. There is no guarding or rebound.   Musculoskeletal:         General: No tenderness. Normal range of motion.      Cervical back: Normal range of motion and neck supple.   Lymphadenopathy:      Cervical: No cervical adenopathy.   Skin:     General: Skin is warm and dry.      Coloration: Skin is not pale.      Findings: Lesion and rash present.      Comments: Mx papules and hives on arms and back   Neurological:      Mental Status: She is alert and oriented to person, place, and time.      Cranial Nerves: No cranial nerve deficit.      Motor: No abnormal muscle tone.      Coordination: Coordination normal.      Deep Tendon Reflexes: Reflexes are normal and symmetric. Reflexes normal.   Psychiatric:         Thought Content: Thought content normal.         Judgment: Judgment normal.       Assessment:       Encounter Diagnosis   Name Primary?    Hives Yes         Plan:   1. Hives

## 2023-05-20 DIAGNOSIS — G43.009 MIGRAINE WITHOUT AURA AND WITHOUT STATUS MIGRAINOSUS, NOT INTRACTABLE: ICD-10-CM

## 2023-05-22 DIAGNOSIS — G43.009 MIGRAINE WITHOUT AURA AND WITHOUT STATUS MIGRAINOSUS, NOT INTRACTABLE: ICD-10-CM

## 2023-05-22 RX ORDER — BUTALBITAL, ACETAMINOPHEN AND CAFFEINE 50; 325; 40 MG/1; MG/1; MG/1
TABLET ORAL
Qty: 30 TABLET | Refills: 0 | Status: SHIPPED | OUTPATIENT
Start: 2023-05-22 | End: 2023-05-30 | Stop reason: SDUPTHER

## 2023-05-22 RX ORDER — BUTALBITAL, ACETAMINOPHEN AND CAFFEINE 50; 325; 40 MG/1; MG/1; MG/1
1 TABLET ORAL EVERY 6 HOURS PRN
Qty: 30 TABLET | Refills: 0 | Status: SHIPPED | OUTPATIENT
Start: 2023-05-22 | End: 2023-10-02 | Stop reason: SDUPTHER

## 2023-05-29 ENCOUNTER — OFFICE VISIT (OUTPATIENT)
Dept: FAMILY MEDICINE | Facility: CLINIC | Age: 43
End: 2023-05-29
Payer: COMMERCIAL

## 2023-05-29 VITALS
HEART RATE: 124 BPM | RESPIRATION RATE: 16 BRPM | HEIGHT: 62 IN | BODY MASS INDEX: 34 KG/M2 | WEIGHT: 184.75 LBS | SYSTOLIC BLOOD PRESSURE: 126 MMHG | DIASTOLIC BLOOD PRESSURE: 88 MMHG

## 2023-05-29 DIAGNOSIS — L50.9 HIVES: Primary | ICD-10-CM

## 2023-05-29 DIAGNOSIS — R00.0 TACHYCARDIA: ICD-10-CM

## 2023-05-29 PROCEDURE — 99999 PR PBB SHADOW E&M-EST. PATIENT-LVL V: CPT | Mod: PBBFAC,,, | Performed by: FAMILY MEDICINE

## 2023-05-29 PROCEDURE — 3074F SYST BP LT 130 MM HG: CPT | Mod: CPTII,S$GLB,, | Performed by: FAMILY MEDICINE

## 2023-05-29 PROCEDURE — 3008F BODY MASS INDEX DOCD: CPT | Mod: CPTII,S$GLB,, | Performed by: FAMILY MEDICINE

## 2023-05-29 PROCEDURE — 3008F PR BODY MASS INDEX (BMI) DOCUMENTED: ICD-10-PCS | Mod: CPTII,S$GLB,, | Performed by: FAMILY MEDICINE

## 2023-05-29 PROCEDURE — 3074F PR MOST RECENT SYSTOLIC BLOOD PRESSURE < 130 MM HG: ICD-10-PCS | Mod: CPTII,S$GLB,, | Performed by: FAMILY MEDICINE

## 2023-05-29 PROCEDURE — 99213 OFFICE O/P EST LOW 20 MIN: CPT | Mod: S$GLB,,, | Performed by: FAMILY MEDICINE

## 2023-05-29 PROCEDURE — 3079F DIAST BP 80-89 MM HG: CPT | Mod: CPTII,S$GLB,, | Performed by: FAMILY MEDICINE

## 2023-05-29 PROCEDURE — 3044F HG A1C LEVEL LT 7.0%: CPT | Mod: CPTII,S$GLB,, | Performed by: FAMILY MEDICINE

## 2023-05-29 PROCEDURE — 99213 PR OFFICE/OUTPT VISIT, EST, LEVL III, 20-29 MIN: ICD-10-PCS | Mod: S$GLB,,, | Performed by: FAMILY MEDICINE

## 2023-05-29 PROCEDURE — 1159F PR MEDICATION LIST DOCUMENTED IN MEDICAL RECORD: ICD-10-PCS | Mod: CPTII,S$GLB,, | Performed by: FAMILY MEDICINE

## 2023-05-29 PROCEDURE — 1159F MED LIST DOCD IN RCRD: CPT | Mod: CPTII,S$GLB,, | Performed by: FAMILY MEDICINE

## 2023-05-29 PROCEDURE — 3044F PR MOST RECENT HEMOGLOBIN A1C LEVEL <7.0%: ICD-10-PCS | Mod: CPTII,S$GLB,, | Performed by: FAMILY MEDICINE

## 2023-05-29 PROCEDURE — 99999 PR PBB SHADOW E&M-EST. PATIENT-LVL V: ICD-10-PCS | Mod: PBBFAC,,, | Performed by: FAMILY MEDICINE

## 2023-05-29 PROCEDURE — 3079F PR MOST RECENT DIASTOLIC BLOOD PRESSURE 80-89 MM HG: ICD-10-PCS | Mod: CPTII,S$GLB,, | Performed by: FAMILY MEDICINE

## 2023-05-29 RX ORDER — ATOMOXETINE 25 MG/1
25 CAPSULE ORAL
COMMUNITY
Start: 2023-05-24 | End: 2023-10-02

## 2023-05-29 NOTE — PROGRESS NOTES
Subjective:       Patient ID: Staci Hodge is a 42 y.o. female.    Chief Complaint: Follow-up (2 wk f/u pt states rash did not get any better and is about the same.)    Pt is a 42 y.o. female who presents for check up for recheck of recurring hives, which hasn't gotten any less.    Review of Systems   Skin:  Positive for rash.        Hives have been recurring     Objective:      Physical Exam  Constitutional:       Appearance: She is well-developed. She is obese.   HENT:      Head: Normocephalic.   Eyes:      Pupils: Pupils are equal, round, and reactive to light.   Neck:      Thyroid: No thyromegaly.   Cardiovascular:      Rate and Rhythm: Normal rate and regular rhythm.   Pulmonary:      Effort: No respiratory distress.      Breath sounds: No wheezing or rales.   Chest:      Chest wall: No tenderness.   Abdominal:      General: There is no distension.      Tenderness: There is no abdominal tenderness. There is no guarding or rebound.   Musculoskeletal:         General: No tenderness. Normal range of motion.      Cervical back: Normal range of motion and neck supple.   Lymphadenopathy:      Cervical: No cervical adenopathy.   Skin:     General: Skin is warm and dry.      Coloration: Skin is not pale.      Findings: No rash.      Comments: Hives on arms and abdomen   Neurological:      Mental Status: She is alert and oriented to person, place, and time.      Cranial Nerves: No cranial nerve deficit.      Motor: No abnormal muscle tone.      Coordination: Coordination normal.      Deep Tendon Reflexes: Reflexes are normal and symmetric. Reflexes normal.   Psychiatric:         Thought Content: Thought content normal.         Judgment: Judgment normal.       Assessment:       Encounter Diagnoses   Name Primary?    Hives Yes    Tachycardia          Plan:   1. Hives  -     Ambulatory referral/consult to Dermatology; Future; Expected date: 06/05/2023  -     Ambulatory referral/consult to Allergy; Future; Expected  date: 06/05/2023    2. Tachycardia

## 2023-05-30 DIAGNOSIS — G43.009 MIGRAINE WITHOUT AURA AND WITHOUT STATUS MIGRAINOSUS, NOT INTRACTABLE: ICD-10-CM

## 2023-05-30 RX ORDER — BUTALBITAL, ACETAMINOPHEN AND CAFFEINE 50; 325; 40 MG/1; MG/1; MG/1
1 TABLET ORAL EVERY 6 HOURS PRN
Qty: 30 TABLET | Refills: 0 | Status: SHIPPED | OUTPATIENT
Start: 2023-05-30 | End: 2023-06-07 | Stop reason: SDUPTHER

## 2023-06-06 DIAGNOSIS — E55.9 VITAMIN D DEFICIENCY: ICD-10-CM

## 2023-06-06 RX ORDER — ERGOCALCIFEROL 1.25 MG/1
CAPSULE ORAL
Qty: 12 CAPSULE | Refills: 1 | Status: SHIPPED | OUTPATIENT
Start: 2023-06-06 | End: 2023-11-28

## 2023-06-07 DIAGNOSIS — G43.009 MIGRAINE WITHOUT AURA AND WITHOUT STATUS MIGRAINOSUS, NOT INTRACTABLE: ICD-10-CM

## 2023-06-07 RX ORDER — BUTALBITAL, ACETAMINOPHEN AND CAFFEINE 50; 325; 40 MG/1; MG/1; MG/1
1 TABLET ORAL EVERY 6 HOURS PRN
Qty: 30 TABLET | Refills: 0 | Status: SHIPPED | OUTPATIENT
Start: 2023-06-07 | End: 2023-06-16 | Stop reason: SDUPTHER

## 2023-06-16 DIAGNOSIS — G43.009 MIGRAINE WITHOUT AURA AND WITHOUT STATUS MIGRAINOSUS, NOT INTRACTABLE: ICD-10-CM

## 2023-06-19 RX ORDER — BUTALBITAL, ACETAMINOPHEN AND CAFFEINE 50; 325; 40 MG/1; MG/1; MG/1
1 TABLET ORAL EVERY 6 HOURS PRN
Qty: 30 TABLET | Refills: 0 | Status: SHIPPED | OUTPATIENT
Start: 2023-06-19 | End: 2023-06-27 | Stop reason: SDUPTHER

## 2023-06-19 NOTE — TELEPHONE ENCOUNTER
LOV 4/24/23    Pt requesting RX refill for butalbital-acetaminophen-caffeine -40 mg (FIORICET, ESGIC) -40 mg per tablet      pharmacy confirmed   rx pending  please advise

## 2023-06-27 DIAGNOSIS — G43.009 MIGRAINE WITHOUT AURA AND WITHOUT STATUS MIGRAINOSUS, NOT INTRACTABLE: ICD-10-CM

## 2023-06-27 RX ORDER — BUTALBITAL, ACETAMINOPHEN AND CAFFEINE 50; 325; 40 MG/1; MG/1; MG/1
1 TABLET ORAL EVERY 6 HOURS PRN
Qty: 30 TABLET | Refills: 0 | Status: SHIPPED | OUTPATIENT
Start: 2023-06-27 | End: 2023-07-05 | Stop reason: SDUPTHER

## 2023-07-05 DIAGNOSIS — G43.009 MIGRAINE WITHOUT AURA AND WITHOUT STATUS MIGRAINOSUS, NOT INTRACTABLE: ICD-10-CM

## 2023-07-05 RX ORDER — BUTALBITAL, ACETAMINOPHEN AND CAFFEINE 50; 325; 40 MG/1; MG/1; MG/1
1 TABLET ORAL EVERY 6 HOURS PRN
Qty: 30 TABLET | Refills: 0 | Status: SHIPPED | OUTPATIENT
Start: 2023-07-05 | End: 2023-07-21 | Stop reason: SDUPTHER

## 2023-07-19 NOTE — PROGRESS NOTES
39F s/p bilat breast TE expander exchanged after DTI. She  had a lateral capsular contracture on the left, and after presentation at plastic surgery conference, it was determined that the best mode of reconstruction was placement of tissue expanders.      Today, she complains of some vague painful symptoms bilaterally, mostly on left.     On exam:   Nice breast pocket development with TE  Soft, will healed, no redness    -150cc saline injected into bilateral expanders   -discussed that she can decide later if she would like to go with a flap or implant for final reconstruction, both reasonable result.   -f/u in 2 weeks for more expansion   Peng Advancement Flap Text: The defect edges were debeveled with a #15 scalpel blade.  Given the location of the defect, shape of the defect and the proximity to free margins a Peng advancement flap was deemed most appropriate.  Using a sterile surgical marker, an appropriate advancement flap was drawn incorporating the defect and placing the expected incisions within the relaxed skin tension lines where possible. The area thus outlined was incised deep to adipose tissue with a #15 scalpel blade.  The skin margins were undermined to an appropriate distance in all directions utilizing iris scissors.

## 2023-07-21 DIAGNOSIS — G43.009 MIGRAINE WITHOUT AURA AND WITHOUT STATUS MIGRAINOSUS, NOT INTRACTABLE: ICD-10-CM

## 2023-07-21 RX ORDER — BUTALBITAL, ACETAMINOPHEN AND CAFFEINE 50; 325; 40 MG/1; MG/1; MG/1
1 TABLET ORAL EVERY 6 HOURS PRN
Qty: 30 TABLET | Refills: 0 | Status: SHIPPED | OUTPATIENT
Start: 2023-07-21 | End: 2023-07-28 | Stop reason: SDUPTHER

## 2023-07-27 ENCOUNTER — TELEPHONE (OUTPATIENT)
Dept: INTERNAL MEDICINE | Facility: CLINIC | Age: 43
End: 2023-07-27
Payer: COMMERCIAL

## 2023-07-27 RX ORDER — ONDANSETRON 4 MG/1
8 TABLET, ORALLY DISINTEGRATING ORAL EVERY 8 HOURS PRN
Qty: 30 TABLET | Refills: 0 | Status: SHIPPED | OUTPATIENT
Start: 2023-07-27 | End: 2024-01-09

## 2023-07-27 NOTE — TELEPHONE ENCOUNTER
Pt called saying she Started tues night with throwing up and now with diarrhea seeing if meds can be sent in for this and also seeing about a note wed and thurs for work  Please advise  Thanks!

## 2023-07-28 DIAGNOSIS — G43.009 MIGRAINE WITHOUT AURA AND WITHOUT STATUS MIGRAINOSUS, NOT INTRACTABLE: ICD-10-CM

## 2023-07-28 RX ORDER — BUTALBITAL, ACETAMINOPHEN AND CAFFEINE 50; 325; 40 MG/1; MG/1; MG/1
1 TABLET ORAL EVERY 6 HOURS PRN
Qty: 30 TABLET | Refills: 0 | Status: SHIPPED | OUTPATIENT
Start: 2023-07-28 | End: 2023-08-07 | Stop reason: SDUPTHER

## 2023-08-01 ENCOUNTER — TELEPHONE (OUTPATIENT)
Dept: FAMILY MEDICINE | Facility: CLINIC | Age: 43
End: 2023-08-01
Payer: COMMERCIAL

## 2023-08-01 ENCOUNTER — TELEPHONE (OUTPATIENT)
Dept: FAMILY MEDICINE | Facility: CLINIC | Age: 43
End: 2023-08-01

## 2023-08-07 DIAGNOSIS — G43.009 MIGRAINE WITHOUT AURA AND WITHOUT STATUS MIGRAINOSUS, NOT INTRACTABLE: ICD-10-CM

## 2023-08-07 RX ORDER — BUTALBITAL, ACETAMINOPHEN AND CAFFEINE 50; 325; 40 MG/1; MG/1; MG/1
1 TABLET ORAL EVERY 6 HOURS PRN
Qty: 30 TABLET | Refills: 0 | Status: SHIPPED | OUTPATIENT
Start: 2023-08-07 | End: 2023-08-14

## 2023-08-07 RX ORDER — BUTALBITAL, ACETAMINOPHEN AND CAFFEINE 50; 325; 40 MG/1; MG/1; MG/1
1 TABLET ORAL EVERY 6 HOURS PRN
Qty: 30 TABLET | Refills: 0 | Status: SHIPPED | OUTPATIENT
Start: 2023-08-07 | End: 2023-10-02 | Stop reason: SDUPTHER

## 2023-08-12 DIAGNOSIS — G43.009 MIGRAINE WITHOUT AURA AND WITHOUT STATUS MIGRAINOSUS, NOT INTRACTABLE: ICD-10-CM

## 2023-08-14 DIAGNOSIS — G43.009 MIGRAINE WITHOUT AURA AND WITHOUT STATUS MIGRAINOSUS, NOT INTRACTABLE: ICD-10-CM

## 2023-08-14 RX ORDER — BUTALBITAL, ACETAMINOPHEN AND CAFFEINE 50; 325; 40 MG/1; MG/1; MG/1
1 TABLET ORAL EVERY 6 HOURS PRN
Qty: 30 TABLET | Refills: 0 | Status: SHIPPED | OUTPATIENT
Start: 2023-08-14 | End: 2023-08-14 | Stop reason: SDUPTHER

## 2023-08-15 RX ORDER — BUTALBITAL, ACETAMINOPHEN AND CAFFEINE 50; 325; 40 MG/1; MG/1; MG/1
1 TABLET ORAL EVERY 6 HOURS PRN
Qty: 30 TABLET | Refills: 0 | Status: SHIPPED | OUTPATIENT
Start: 2023-08-15 | End: 2023-08-23 | Stop reason: SDUPTHER

## 2023-08-23 DIAGNOSIS — G43.009 MIGRAINE WITHOUT AURA AND WITHOUT STATUS MIGRAINOSUS, NOT INTRACTABLE: ICD-10-CM

## 2023-08-23 RX ORDER — BUTALBITAL, ACETAMINOPHEN AND CAFFEINE 50; 325; 40 MG/1; MG/1; MG/1
1 TABLET ORAL EVERY 6 HOURS PRN
Qty: 30 TABLET | Refills: 0 | Status: SHIPPED | OUTPATIENT
Start: 2023-08-23 | End: 2023-08-31 | Stop reason: SDUPTHER

## 2023-08-30 ENCOUNTER — OFFICE VISIT (OUTPATIENT)
Dept: INTERNAL MEDICINE | Facility: CLINIC | Age: 43
End: 2023-08-30
Payer: COMMERCIAL

## 2023-08-30 VITALS
BODY MASS INDEX: 35.57 KG/M2 | HEART RATE: 113 BPM | OXYGEN SATURATION: 98 % | RESPIRATION RATE: 18 BRPM | DIASTOLIC BLOOD PRESSURE: 88 MMHG | HEIGHT: 62 IN | WEIGHT: 193.31 LBS | SYSTOLIC BLOOD PRESSURE: 124 MMHG

## 2023-08-30 DIAGNOSIS — M94.0 COSTAL CHONDRITIS: Primary | ICD-10-CM

## 2023-08-30 PROCEDURE — 3079F DIAST BP 80-89 MM HG: CPT | Mod: CPTII,S$GLB,, | Performed by: NURSE PRACTITIONER

## 2023-08-30 PROCEDURE — 96372 PR INJECTION,THERAP/PROPH/DIAG2ST, IM OR SUBCUT: ICD-10-PCS | Mod: S$GLB,,, | Performed by: NURSE PRACTITIONER

## 2023-08-30 PROCEDURE — 3044F HG A1C LEVEL LT 7.0%: CPT | Mod: CPTII,S$GLB,, | Performed by: NURSE PRACTITIONER

## 2023-08-30 PROCEDURE — 96372 THER/PROPH/DIAG INJ SC/IM: CPT | Mod: S$GLB,,, | Performed by: NURSE PRACTITIONER

## 2023-08-30 PROCEDURE — 3074F SYST BP LT 130 MM HG: CPT | Mod: CPTII,S$GLB,, | Performed by: NURSE PRACTITIONER

## 2023-08-30 PROCEDURE — 99999 PR PBB SHADOW E&M-EST. PATIENT-LVL V: ICD-10-PCS | Mod: PBBFAC,,, | Performed by: NURSE PRACTITIONER

## 2023-08-30 PROCEDURE — 3008F PR BODY MASS INDEX (BMI) DOCUMENTED: ICD-10-PCS | Mod: CPTII,S$GLB,, | Performed by: NURSE PRACTITIONER

## 2023-08-30 PROCEDURE — 1159F MED LIST DOCD IN RCRD: CPT | Mod: CPTII,S$GLB,, | Performed by: NURSE PRACTITIONER

## 2023-08-30 PROCEDURE — 99999 PR PBB SHADOW E&M-EST. PATIENT-LVL V: CPT | Mod: PBBFAC,,, | Performed by: NURSE PRACTITIONER

## 2023-08-30 PROCEDURE — 3079F PR MOST RECENT DIASTOLIC BLOOD PRESSURE 80-89 MM HG: ICD-10-PCS | Mod: CPTII,S$GLB,, | Performed by: NURSE PRACTITIONER

## 2023-08-30 PROCEDURE — 99213 PR OFFICE/OUTPT VISIT, EST, LEVL III, 20-29 MIN: ICD-10-PCS | Mod: 25,S$GLB,, | Performed by: NURSE PRACTITIONER

## 2023-08-30 PROCEDURE — 99213 OFFICE O/P EST LOW 20 MIN: CPT | Mod: 25,S$GLB,, | Performed by: NURSE PRACTITIONER

## 2023-08-30 PROCEDURE — 3044F PR MOST RECENT HEMOGLOBIN A1C LEVEL <7.0%: ICD-10-PCS | Mod: CPTII,S$GLB,, | Performed by: NURSE PRACTITIONER

## 2023-08-30 PROCEDURE — 1159F PR MEDICATION LIST DOCUMENTED IN MEDICAL RECORD: ICD-10-PCS | Mod: CPTII,S$GLB,, | Performed by: NURSE PRACTITIONER

## 2023-08-30 PROCEDURE — 3008F BODY MASS INDEX DOCD: CPT | Mod: CPTII,S$GLB,, | Performed by: NURSE PRACTITIONER

## 2023-08-30 PROCEDURE — 3074F PR MOST RECENT SYSTOLIC BLOOD PRESSURE < 130 MM HG: ICD-10-PCS | Mod: CPTII,S$GLB,, | Performed by: NURSE PRACTITIONER

## 2023-08-30 RX ORDER — KETOROLAC TROMETHAMINE 30 MG/ML
60 INJECTION, SOLUTION INTRAMUSCULAR; INTRAVENOUS
Status: COMPLETED | OUTPATIENT
Start: 2023-08-30 | End: 2023-08-30

## 2023-08-30 RX ORDER — MONTELUKAST SODIUM 10 MG/1
10 TABLET ORAL DAILY
COMMUNITY
Start: 2023-08-14

## 2023-08-30 RX ORDER — IBUPROFEN 800 MG/1
800 TABLET ORAL 3 TIMES DAILY PRN
Qty: 90 TABLET | Refills: 0 | Status: ON HOLD | OUTPATIENT
Start: 2023-08-30 | End: 2023-10-14 | Stop reason: HOSPADM

## 2023-08-30 RX ORDER — LISDEXAMFETAMINE DIMESYLATE CAPSULES 20 MG/1
1 CAPSULE ORAL EVERY MORNING
Status: ON HOLD | COMMUNITY
Start: 2023-08-18 | End: 2023-10-14 | Stop reason: HOSPADM

## 2023-08-30 RX ADMIN — KETOROLAC TROMETHAMINE 60 MG: 30 INJECTION, SOLUTION INTRAMUSCULAR; INTRAVENOUS at 02:08

## 2023-08-30 NOTE — PROGRESS NOTES
Subjective:       Patient ID: Staci Hodge is a 43 y.o. female.    Chief Complaint: Chest Pain (R. Side- x yesterday comes and goes )    Patient is known, to me and presents with   Chief Complaint   Patient presents with    Chest Pain     R. Side- x yesterday comes and goes    .   Patient presents with right sided chest wall pain that started yesterday. No other sympotms associated with it. Describes it as sharp in nature. Did not injure herself or pulled any muscles that she is aware of . Has had this before.  Denies any sob with it. Denies any n/v, diaphoresis or cough. No congestion. No fever or chills.   Chest Pain       Review of Systems   Constitutional: Negative.    Respiratory: Negative.     Cardiovascular:  Positive for chest pain.   Skin: Negative.        Objective:      Physical Exam  Constitutional:       General: She is not in acute distress.     Appearance: Normal appearance. She is obese. She is not ill-appearing, toxic-appearing or diaphoretic.   Cardiovascular:      Rate and Rhythm: Regular rhythm. Tachycardia present.      Heart sounds: Normal heart sounds. No murmur heard.     No friction rub. No gallop.   Pulmonary:      Effort: Pulmonary effort is normal. No respiratory distress.      Breath sounds: Normal breath sounds. No stridor. No wheezing, rhonchi or rales.   Chest:      Chest wall: Tenderness present.   Musculoskeletal:         General: Tenderness present.        Arms:       Comments: Pain palpated to right upper chest wall. No rashes noted    Skin:     General: Skin is warm and dry.      Capillary Refill: Capillary refill takes less than 2 seconds.      Coloration: Skin is not jaundiced or pale.      Findings: No bruising, erythema, lesion or rash.   Neurological:      Mental Status: She is alert.         Assessment:       1. Costal chondritis        Plan:   1. Costal chondritis  -     EKG 12-lead; Future  -     ketorolac injection 60 mg  -     ibuprofen (ADVIL,MOTRIN) 800 MG  "tablet; Take 1 tablet (800 mg total) by mouth 3 (three) times daily as needed.  Dispense: 90 tablet; Refill: 0       "This note will not be shared with the patient."  EKG revealed no changes from one done in 2022  No evidence of ischemia  Any worsening of chest pain or other symptoms with this  call 911    Will treat as above and if no improvement will let me know  Rtc as scheduled   "

## 2023-09-07 DIAGNOSIS — G43.009 MIGRAINE WITHOUT AURA AND WITHOUT STATUS MIGRAINOSUS, NOT INTRACTABLE: ICD-10-CM

## 2023-09-08 RX ORDER — BUTALBITAL, ACETAMINOPHEN AND CAFFEINE 50; 325; 40 MG/1; MG/1; MG/1
1 TABLET ORAL EVERY 6 HOURS PRN
Qty: 30 TABLET | Refills: 5 | Status: SHIPPED | OUTPATIENT
Start: 2023-09-08 | End: 2023-09-18 | Stop reason: SDUPTHER

## 2023-09-11 ENCOUNTER — CLINICAL SUPPORT (OUTPATIENT)
Dept: INTERNAL MEDICINE | Facility: CLINIC | Age: 43
End: 2023-09-11
Payer: COMMERCIAL

## 2023-09-11 DIAGNOSIS — G43.909 MIGRAINE SYNDROME: Primary | ICD-10-CM

## 2023-09-11 PROCEDURE — 96372 THER/PROPH/DIAG INJ SC/IM: CPT | Mod: S$GLB,,, | Performed by: NURSE PRACTITIONER

## 2023-09-11 PROCEDURE — 96372 PR INJECTION,THERAP/PROPH/DIAG2ST, IM OR SUBCUT: ICD-10-PCS | Mod: S$GLB,,, | Performed by: NURSE PRACTITIONER

## 2023-09-11 RX ORDER — KETOROLAC TROMETHAMINE 30 MG/ML
60 INJECTION, SOLUTION INTRAMUSCULAR; INTRAVENOUS
Status: COMPLETED | OUTPATIENT
Start: 2023-09-11 | End: 2023-09-11

## 2023-09-11 RX ADMIN — KETOROLAC TROMETHAMINE 60 MG: 30 INJECTION, SOLUTION INTRAMUSCULAR; INTRAVENOUS at 10:09

## 2023-09-18 DIAGNOSIS — G43.009 MIGRAINE WITHOUT AURA AND WITHOUT STATUS MIGRAINOSUS, NOT INTRACTABLE: ICD-10-CM

## 2023-09-18 RX ORDER — BUTALBITAL, ACETAMINOPHEN AND CAFFEINE 50; 325; 40 MG/1; MG/1; MG/1
1 TABLET ORAL EVERY 6 HOURS PRN
Qty: 30 TABLET | Refills: 5 | Status: SHIPPED | OUTPATIENT
Start: 2023-09-18 | End: 2023-09-26 | Stop reason: SDUPTHER

## 2023-09-26 DIAGNOSIS — G43.009 MIGRAINE WITHOUT AURA AND WITHOUT STATUS MIGRAINOSUS, NOT INTRACTABLE: ICD-10-CM

## 2023-09-27 RX ORDER — BUTALBITAL, ACETAMINOPHEN AND CAFFEINE 50; 325; 40 MG/1; MG/1; MG/1
1 TABLET ORAL EVERY 6 HOURS PRN
Qty: 30 TABLET | Refills: 5 | Status: SHIPPED | OUTPATIENT
Start: 2023-09-27 | End: 2023-10-03 | Stop reason: SDUPTHER

## 2023-10-02 ENCOUNTER — OFFICE VISIT (OUTPATIENT)
Dept: NEUROLOGY | Facility: CLINIC | Age: 43
End: 2023-10-02
Payer: COMMERCIAL

## 2023-10-02 VITALS
HEIGHT: 62 IN | RESPIRATION RATE: 14 BRPM | BODY MASS INDEX: 35.3 KG/M2 | DIASTOLIC BLOOD PRESSURE: 104 MMHG | SYSTOLIC BLOOD PRESSURE: 126 MMHG | WEIGHT: 191.81 LBS | HEART RATE: 94 BPM

## 2023-10-02 DIAGNOSIS — G43.E19 INTRACTABLE CHRONIC MIGRAINE WITH AURA AND WITHOUT STATUS MIGRAINOSUS: ICD-10-CM

## 2023-10-02 DIAGNOSIS — M50.90 CERVICAL DISC DISEASE: ICD-10-CM

## 2023-10-02 DIAGNOSIS — F17.200 SMOKER: ICD-10-CM

## 2023-10-02 DIAGNOSIS — I10 PRIMARY HYPERTENSION: Primary | ICD-10-CM

## 2023-10-02 DIAGNOSIS — R42 DIZZINESS AND GIDDINESS: ICD-10-CM

## 2023-10-02 DIAGNOSIS — G56.02 LEFT CARPAL TUNNEL SYNDROME: ICD-10-CM

## 2023-10-02 PROCEDURE — 3080F PR MOST RECENT DIASTOLIC BLOOD PRESSURE >= 90 MM HG: ICD-10-PCS | Mod: CPTII,S$GLB,, | Performed by: PSYCHIATRY & NEUROLOGY

## 2023-10-02 PROCEDURE — 99999 PR PBB SHADOW E&M-EST. PATIENT-LVL IV: ICD-10-PCS | Mod: PBBFAC,,, | Performed by: PSYCHIATRY & NEUROLOGY

## 2023-10-02 PROCEDURE — 3044F PR MOST RECENT HEMOGLOBIN A1C LEVEL <7.0%: ICD-10-PCS | Mod: CPTII,S$GLB,, | Performed by: PSYCHIATRY & NEUROLOGY

## 2023-10-02 PROCEDURE — 99214 PR OFFICE/OUTPT VISIT, EST, LEVL IV, 30-39 MIN: ICD-10-PCS | Mod: S$GLB,,, | Performed by: PSYCHIATRY & NEUROLOGY

## 2023-10-02 PROCEDURE — 3008F PR BODY MASS INDEX (BMI) DOCUMENTED: ICD-10-PCS | Mod: CPTII,S$GLB,, | Performed by: PSYCHIATRY & NEUROLOGY

## 2023-10-02 PROCEDURE — 1160F RVW MEDS BY RX/DR IN RCRD: CPT | Mod: CPTII,S$GLB,, | Performed by: PSYCHIATRY & NEUROLOGY

## 2023-10-02 PROCEDURE — 99999 PR PBB SHADOW E&M-EST. PATIENT-LVL IV: CPT | Mod: PBBFAC,,, | Performed by: PSYCHIATRY & NEUROLOGY

## 2023-10-02 PROCEDURE — 3008F BODY MASS INDEX DOCD: CPT | Mod: CPTII,S$GLB,, | Performed by: PSYCHIATRY & NEUROLOGY

## 2023-10-02 PROCEDURE — 3074F PR MOST RECENT SYSTOLIC BLOOD PRESSURE < 130 MM HG: ICD-10-PCS | Mod: CPTII,S$GLB,, | Performed by: PSYCHIATRY & NEUROLOGY

## 2023-10-02 PROCEDURE — 3044F HG A1C LEVEL LT 7.0%: CPT | Mod: CPTII,S$GLB,, | Performed by: PSYCHIATRY & NEUROLOGY

## 2023-10-02 PROCEDURE — 1159F MED LIST DOCD IN RCRD: CPT | Mod: CPTII,S$GLB,, | Performed by: PSYCHIATRY & NEUROLOGY

## 2023-10-02 PROCEDURE — 1159F PR MEDICATION LIST DOCUMENTED IN MEDICAL RECORD: ICD-10-PCS | Mod: CPTII,S$GLB,, | Performed by: PSYCHIATRY & NEUROLOGY

## 2023-10-02 PROCEDURE — 99214 OFFICE O/P EST MOD 30 MIN: CPT | Mod: S$GLB,,, | Performed by: PSYCHIATRY & NEUROLOGY

## 2023-10-02 PROCEDURE — 1160F PR REVIEW ALL MEDS BY PRESCRIBER/CLIN PHARMACIST DOCUMENTED: ICD-10-PCS | Mod: CPTII,S$GLB,, | Performed by: PSYCHIATRY & NEUROLOGY

## 2023-10-02 PROCEDURE — 3074F SYST BP LT 130 MM HG: CPT | Mod: CPTII,S$GLB,, | Performed by: PSYCHIATRY & NEUROLOGY

## 2023-10-02 PROCEDURE — 3080F DIAST BP >= 90 MM HG: CPT | Mod: CPTII,S$GLB,, | Performed by: PSYCHIATRY & NEUROLOGY

## 2023-10-02 NOTE — PROGRESS NOTES
Staci Hodge is a 42 y.o. female with headaches and bilateral CTS, and prior complaints of dizziness. She has a history of PTSD, panic disorder with agoraphobia, insomnia, colon resection, due to small bowel obstruction, tobacco use, and breast cancer.      Here for 6 months follow up      Aimovig dose raised to 140mg IM monthly since the last visit    HA's are still noted but this improved again    Slurred speech with headaches    Tolerance is good    Abdominal pain noted since the last visit but this resolved      Currently headache frequency : 2-3 severe headaches per month     Last visit: 4-5 days HAs per month    Fioriect PRN helps    Dizziness remains episodic    Neck pain/ myalgia  stable    Left CTS is noted still    She has elevated BP today in light of HTN    Back on propranolol with PCP    +Smoker    She has bipolar disorder which is treated by psychiatry, [facility]            I have reviewed all of this patient's past medical and surgical histories as well as family and social histories and active allergies and medications as documented in the electronic medical record.       Review of Systems   Constitutional:  Negative for fever.   HENT:  Negative for nosebleeds.    Eyes:  Negative for double vision.   Respiratory:  Negative for hemoptysis.    Cardiovascular:  Negative for leg swelling.   Gastrointestinal:  Negative for blood in stool.   Genitourinary:  Negative for hematuria.   Musculoskeletal:  Negative for falls.   Skin:  Negative for rash.   Neurological:  Positive for headaches.   Endo/Heme/Allergies:  Does not bruise/bleed easily.         Exam:  Gen Appearance, well developed/nourished in no apparent distress  CV: 2+ distal pulses with no edema or swelling  Neuro:  MS: Awake, alert, Sustains attention. Recent/remote memory intact, Language is full to spontaneous speech/repetition/naming/comprehension. Fund of Knowledge is full  CN: Optic discs are flat with normal  vasculature, PERRL, EOM full,  Normal facial sensation and strength, Hearing symmetric, Tongue and Palate are midline and strong. Shoulder Shrug symmetric and strong.  Motor: Normal bulk, tone, no abnormal movements. 5/5 strength bilateral upper/lower extremities with 2+ reflexes and bilateral plantar response  Sensory: symmetric to emp, and vibration  Romberg negative  Cerebellar: Finger-nose,Heal-shin, Rapid alternating movements intact  Gait: Normal stance, no ataxia    Imaging:   10/5/2021 MRI Brain:   No MRI evidence of an acute intracranial abnormality.     MRI C-spine 12/2017:  Mild degenerative changes of the cervical spine; neural foraminal narrowing at the C5-6 and C6-7 levels.  No central canal stenosis.     MRI Brain with and without 2/3/2017: normal     24 hour Holter monitor: not completed     EMG BUE 2/10/2017:   Impression: Abnormal Study secondary to the Presence of:   Mild Bilateral Carpal Tunnel Syndrome.      EMG BUE 1/18/2018:  1. Improved right CTS findings post right CTR.   2. Slight worsening of the CTS findings.   3. No EP evidence of cervical radiculopathy found.      48 hour holter 10/2021:   Sinus rhythm  Normal heart rate behavior  Very rare ectopy  Symptoms correlating with sinus rhythm/tachycardia     There was an episode of chest pain reported. The corresponding rhythm strips revealed the following: the rhythm was sinus rhythm at 81 bpm with without ectopy.     There was an episode of Heart racing reported. The corresponding rhythm strips revealed the following: the rhythm was sinus tachycardia at 108 bpm with without ectopy.     There was an episode of Heart racing reported. The corresponding rhythm strips revealed the following: the rhythm was sinus tachycardia at 111 bpm with without ectopy.     EEG 2021:   Unremarkable    2021 MRI C spine: C6-7 degenerative disc changes resulting in mild spinal canal narrowing and mild bilateral neuroforaminal narrowing.  Additional mild bilateral  "neuroforaminal narrowing at the C5-6 level.    2021 MRA brain: Normal MRA exam.    8/31/2022 CT head : No acute intracranial abnormality.     Labs:   5/2021 CBC and CMP overall unremarkable  9/2021 CBC, CMP, TSH, T4, B12 reviewed-elevated alkaline phosphatase  Assessment:   Staci Hodge is a 37 y.o. female, with neck pain without radiculopathy, chronic headaches and bilateral CTS, and prior complaints of dizziness.   Plan:   I recommend:  1. No longer on Lyrica for for fibromyalgia complaints.   -pain historically worse with stress.      2. Neurosurgery per pain management not needed. Nothing surgical on her MRI C-spine-mild changes only. She failed to respond to two different types of injections prior, and did not have cervical radiculopathy on EMG.  -has left CTS/ can see ortho surgery back PRN. Is s/p right CTR with good relief  -Can brace left hand nightly otherwise     3. No longer on Topamax  -Takes  prn Fioricet for migraines per PCP  -Diclofenac, prescribed to abort headaches, was discontinued per Nephrology, given her proteinuria. Can't use NSAIDs due to Li use.        4. 2021 MRI Brain and EEG unremarkable.   -48 hour holter shows sinus tachycardia with episodes, but workup per cardiology unrevealing. Outside cardiology records noted in media   -Dizziness occured when neck is flexed.   2021 MRI C spine showed mild spinal stenosis  -Referred to PT but this was not needed  2021 MRA brain normal (for dizziness complaint)   -Dizziness is episodic and improved      5. Returned in 2022  with slurred speech in 3 episodes associated with headaches  -8/31/2022 CT head : No acute intracranial abnormality.  -She was seen by vascular neurology during the spell in the ER in 8/2022 who noted "slow and slurred speech and headache.  Speech not dysarthric (no apparent weakness of lingual, labial, or guttural muscle groups).  Consider migraine associated symptom"  -Every spell has been associated with a headache " which has been more frequent in her (chronic migraine with possible speech aura) and improved with better treatment of headaches/ suggesting complicated migraine symptoms  -Failed Topamax, is on Propranolol for tachycardia and can't have an TCA given BPD history  -For  migraine prevention: Aimovig is helping/ continue.       6. HTN today Check BP at home daily for 1-2 weeks and see PCP if this remains elevated    7. Smoker urged to quit. She is trying to taper.     RTC in 12 months

## 2023-10-03 DIAGNOSIS — G43.009 MIGRAINE WITHOUT AURA AND WITHOUT STATUS MIGRAINOSUS, NOT INTRACTABLE: ICD-10-CM

## 2023-10-04 RX ORDER — BUTALBITAL, ACETAMINOPHEN AND CAFFEINE 50; 325; 40 MG/1; MG/1; MG/1
1 TABLET ORAL EVERY 6 HOURS PRN
Qty: 30 TABLET | Refills: 5 | Status: SHIPPED | OUTPATIENT
Start: 2023-10-04 | End: 2023-10-15 | Stop reason: SDUPTHER

## 2023-10-05 ENCOUNTER — PATIENT OUTREACH (OUTPATIENT)
Dept: ADMINISTRATIVE | Facility: OTHER | Age: 43
End: 2023-10-05

## 2023-10-05 VITALS — DIASTOLIC BLOOD PRESSURE: 100 MMHG | SYSTOLIC BLOOD PRESSURE: 134 MMHG | HEART RATE: 96 BPM | OXYGEN SATURATION: 100 %

## 2023-10-09 ENCOUNTER — HOSPITAL ENCOUNTER (INPATIENT)
Facility: HOSPITAL | Age: 43
LOS: 6 days | Discharge: HOME | DRG: 885 | End: 2023-10-15
Attending: PSYCHIATRY & NEUROLOGY | Admitting: PSYCHIATRY & NEUROLOGY
Payer: COMMERCIAL

## 2023-10-09 ENCOUNTER — HOSPITAL ENCOUNTER (EMERGENCY)
Facility: HOSPITAL | Age: 43
Discharge: HOME | End: 2023-10-09
Attending: STUDENT IN AN ORGANIZED HEALTH CARE EDUCATION/TRAINING PROGRAM
Payer: COMMERCIAL

## 2023-10-09 VITALS
SYSTOLIC BLOOD PRESSURE: 124 MMHG | WEIGHT: 188.5 LBS | OXYGEN SATURATION: 99 % | RESPIRATION RATE: 18 BRPM | HEIGHT: 62 IN | TEMPERATURE: 98 F | BODY MASS INDEX: 34.69 KG/M2 | HEART RATE: 107 BPM | DIASTOLIC BLOOD PRESSURE: 80 MMHG

## 2023-10-09 DIAGNOSIS — R45.851 SUICIDAL IDEATIONS: ICD-10-CM

## 2023-10-09 DIAGNOSIS — F32.A DEPRESSION WITH SUICIDAL IDEATION: ICD-10-CM

## 2023-10-09 DIAGNOSIS — Z00.8 MEDICAL CLEARANCE FOR PSYCHIATRIC ADMISSION: Primary | ICD-10-CM

## 2023-10-09 DIAGNOSIS — R45.851 DEPRESSION WITH SUICIDAL IDEATION: ICD-10-CM

## 2023-10-09 DIAGNOSIS — F31.81 BIPOLAR 2 DISORDER, MAJOR DEPRESSIVE EPISODE: Primary | ICD-10-CM

## 2023-10-09 LAB
ALBUMIN SERPL BCP-MCNC: 4.9 G/DL (ref 3.5–5.2)
ALP SERPL-CCNC: 178 U/L (ref 55–135)
ALT SERPL W/O P-5'-P-CCNC: 43 U/L (ref 10–44)
AMPHET+METHAMPHET UR QL: ABNORMAL
ANION GAP SERPL CALC-SCNC: 13 MMOL/L (ref 8–16)
APAP SERPL-MCNC: <3 UG/ML (ref 10–20)
AST SERPL-CCNC: 24 U/L (ref 10–40)
B-HCG UR QL: NEGATIVE
BARBITURATES UR QL SCN>200 NG/ML: ABNORMAL
BASOPHILS # BLD AUTO: 0.06 K/UL (ref 0–0.2)
BASOPHILS NFR BLD: 0.5 % (ref 0–1.9)
BENZODIAZ UR QL SCN>200 NG/ML: NEGATIVE
BILIRUB SERPL-MCNC: 0.4 MG/DL (ref 0.1–1)
BILIRUB UR QL STRIP: NEGATIVE
BUN SERPL-MCNC: 6 MG/DL (ref 6–20)
BZE UR QL SCN: NEGATIVE
CALCIUM SERPL-MCNC: 9.8 MG/DL (ref 8.7–10.5)
CANNABINOIDS UR QL SCN: NEGATIVE
CHLORIDE SERPL-SCNC: 103 MMOL/L (ref 95–110)
CHOLEST SERPL-MCNC: 227 MG/DL (ref 120–199)
CHOLEST/HDLC SERPL: 4.1 {RATIO} (ref 2–5)
CLARITY UR: CLEAR
CO2 SERPL-SCNC: 24 MMOL/L (ref 23–29)
COLOR UR: YELLOW
CREAT SERPL-MCNC: 0.8 MG/DL (ref 0.5–1.4)
CREAT UR-MCNC: 53.3 MG/DL (ref 15–325)
DIFFERENTIAL METHOD: ABNORMAL
EOSINOPHIL # BLD AUTO: 0.3 K/UL (ref 0–0.5)
EOSINOPHIL NFR BLD: 2.1 % (ref 0–8)
ERYTHROCYTE [DISTWIDTH] IN BLOOD BY AUTOMATED COUNT: 13 % (ref 11.5–14.5)
EST. GFR  (NO RACE VARIABLE): >60 ML/MIN/1.73 M^2
ESTIMATED AVG GLUCOSE: 105 MG/DL (ref 68–131)
ETHANOL SERPL-MCNC: <10 MG/DL
GLUCOSE SERPL-MCNC: 98 MG/DL (ref 70–110)
GLUCOSE UR QL STRIP: NEGATIVE
HBA1C MFR BLD: 5.3 % (ref 4–5.6)
HCT VFR BLD AUTO: 44.3 % (ref 37–48.5)
HDLC SERPL-MCNC: 56 MG/DL (ref 40–75)
HDLC SERPL: 24.7 % (ref 20–50)
HGB BLD-MCNC: 15.1 G/DL (ref 12–16)
HGB UR QL STRIP: NEGATIVE
IMM GRANULOCYTES # BLD AUTO: 0.05 K/UL (ref 0–0.04)
IMM GRANULOCYTES NFR BLD AUTO: 0.4 % (ref 0–0.5)
KETONES UR QL STRIP: NEGATIVE
LDLC SERPL CALC-MCNC: 133.6 MG/DL (ref 63–159)
LEUKOCYTE ESTERASE UR QL STRIP: NEGATIVE
LYMPHOCYTES # BLD AUTO: 2.5 K/UL (ref 1–4.8)
LYMPHOCYTES NFR BLD: 21.3 % (ref 18–48)
MCH RBC QN AUTO: 29.3 PG (ref 27–31)
MCHC RBC AUTO-ENTMCNC: 34.1 G/DL (ref 32–36)
MCV RBC AUTO: 86 FL (ref 82–98)
METHADONE UR QL SCN>300 NG/ML: NEGATIVE
MONOCYTES # BLD AUTO: 0.7 K/UL (ref 0.3–1)
MONOCYTES NFR BLD: 6.1 % (ref 4–15)
NEUTROPHILS # BLD AUTO: 8.3 K/UL (ref 1.8–7.7)
NEUTROPHILS NFR BLD: 69.6 % (ref 38–73)
NITRITE UR QL STRIP: NEGATIVE
NONHDLC SERPL-MCNC: 171 MG/DL
NRBC BLD-RTO: 0 /100 WBC
OPIATES UR QL SCN: NEGATIVE
PCP UR QL SCN>25 NG/ML: NEGATIVE
PH UR STRIP: >8 [PH] (ref 5–8)
PLATELET # BLD AUTO: 431 K/UL (ref 150–450)
PMV BLD AUTO: 10.1 FL (ref 9.2–12.9)
POTASSIUM SERPL-SCNC: 4.1 MMOL/L (ref 3.5–5.1)
PROT SERPL-MCNC: 8.1 G/DL (ref 6–8.4)
PROT UR QL STRIP: NEGATIVE
RBC # BLD AUTO: 5.16 M/UL (ref 4–5.4)
SALICYLATES SERPL-MCNC: <5 MG/DL (ref 15–30)
SODIUM SERPL-SCNC: 140 MMOL/L (ref 136–145)
SP GR UR STRIP: 1.02 (ref 1–1.03)
TOXICOLOGY INFORMATION: ABNORMAL
TRIGL SERPL-MCNC: 187 MG/DL (ref 30–150)
TSH SERPL DL<=0.005 MIU/L-ACNC: 0.9 UIU/ML (ref 0.4–4)
URN SPEC COLLECT METH UR: NORMAL
UROBILINOGEN UR STRIP-ACNC: NEGATIVE EU/DL
WBC # BLD AUTO: 11.86 K/UL (ref 3.9–12.7)

## 2023-10-09 PROCEDURE — 80307 DRUG TEST PRSMV CHEM ANLYZR: CPT | Performed by: NURSE PRACTITIONER

## 2023-10-09 PROCEDURE — 25000003 PHARM REV CODE 250: Performed by: PSYCHIATRY & NEUROLOGY

## 2023-10-09 PROCEDURE — 99223 1ST HOSP IP/OBS HIGH 75: CPT | Mod: ,,, | Performed by: PSYCHIATRY & NEUROLOGY

## 2023-10-09 PROCEDURE — 84443 ASSAY THYROID STIM HORMONE: CPT | Performed by: NURSE PRACTITIONER

## 2023-10-09 PROCEDURE — 81025 URINE PREGNANCY TEST: CPT | Performed by: NURSE PRACTITIONER

## 2023-10-09 PROCEDURE — 90833 PR PSYCHOTHERAPY W/PATIENT W/E&M, 30 MIN (ADD ON): ICD-10-PCS | Mod: ,,, | Performed by: PSYCHIATRY & NEUROLOGY

## 2023-10-09 PROCEDURE — 80179 DRUG ASSAY SALICYLATE: CPT | Performed by: NURSE PRACTITIONER

## 2023-10-09 PROCEDURE — 80053 COMPREHEN METABOLIC PANEL: CPT | Performed by: NURSE PRACTITIONER

## 2023-10-09 PROCEDURE — 85025 COMPLETE CBC W/AUTO DIFF WBC: CPT | Performed by: NURSE PRACTITIONER

## 2023-10-09 PROCEDURE — 80061 LIPID PANEL: CPT | Performed by: PSYCHIATRY & NEUROLOGY

## 2023-10-09 PROCEDURE — 11400000 HC PSYCH PRIVATE ROOM

## 2023-10-09 PROCEDURE — S4991 NICOTINE PATCH NONLEGEND: HCPCS | Performed by: PSYCHIATRY & NEUROLOGY

## 2023-10-09 PROCEDURE — 80143 DRUG ASSAY ACETAMINOPHEN: CPT | Performed by: NURSE PRACTITIONER

## 2023-10-09 PROCEDURE — 90833 PSYTX W PT W E/M 30 MIN: CPT | Mod: ,,, | Performed by: PSYCHIATRY & NEUROLOGY

## 2023-10-09 PROCEDURE — 83036 HEMOGLOBIN GLYCOSYLATED A1C: CPT | Performed by: PSYCHIATRY & NEUROLOGY

## 2023-10-09 PROCEDURE — 99285 EMERGENCY DEPT VISIT HI MDM: CPT

## 2023-10-09 PROCEDURE — 81003 URINALYSIS AUTO W/O SCOPE: CPT | Mod: 59 | Performed by: NURSE PRACTITIONER

## 2023-10-09 PROCEDURE — 82077 ASSAY SPEC XCP UR&BREATH IA: CPT | Performed by: NURSE PRACTITIONER

## 2023-10-09 PROCEDURE — 36415 COLL VENOUS BLD VENIPUNCTURE: CPT | Performed by: PSYCHIATRY & NEUROLOGY

## 2023-10-09 PROCEDURE — 99223 PR INITIAL HOSPITAL CARE,LEVL III: ICD-10-PCS | Mod: ,,, | Performed by: PSYCHIATRY & NEUROLOGY

## 2023-10-09 PROCEDURE — 36415 COLL VENOUS BLD VENIPUNCTURE: CPT | Performed by: NURSE PRACTITIONER

## 2023-10-09 RX ORDER — ACETAMINOPHEN 325 MG/1
650 TABLET ORAL EVERY 6 HOURS PRN
Status: DISCONTINUED | OUTPATIENT
Start: 2023-10-09 | End: 2023-10-15 | Stop reason: HOSPADM

## 2023-10-09 RX ORDER — BENZONATATE 100 MG/1
100 CAPSULE ORAL 3 TIMES DAILY PRN
Status: DISCONTINUED | OUTPATIENT
Start: 2023-10-09 | End: 2023-10-15 | Stop reason: HOSPADM

## 2023-10-09 RX ORDER — LOPERAMIDE HYDROCHLORIDE 2 MG/1
2 CAPSULE ORAL
Status: DISCONTINUED | OUTPATIENT
Start: 2023-10-09 | End: 2023-10-15 | Stop reason: HOSPADM

## 2023-10-09 RX ORDER — MAG HYDROX/ALUMINUM HYD/SIMETH 200-200-20
30 SUSPENSION, ORAL (FINAL DOSE FORM) ORAL EVERY 6 HOURS PRN
Status: DISCONTINUED | OUTPATIENT
Start: 2023-10-09 | End: 2023-10-15 | Stop reason: HOSPADM

## 2023-10-09 RX ORDER — CLONAZEPAM 0.5 MG/1
0.5 TABLET ORAL 2 TIMES DAILY
Status: DISCONTINUED | OUTPATIENT
Start: 2023-10-09 | End: 2023-10-15 | Stop reason: HOSPADM

## 2023-10-09 RX ORDER — OLANZAPINE 10 MG/2ML
10 INJECTION, POWDER, FOR SOLUTION INTRAMUSCULAR EVERY 8 HOURS PRN
Status: DISCONTINUED | OUTPATIENT
Start: 2023-10-09 | End: 2023-10-15 | Stop reason: HOSPADM

## 2023-10-09 RX ORDER — BENZTROPINE MESYLATE 1 MG/ML
2 INJECTION, SOLUTION INTRAMUSCULAR; INTRAVENOUS EVERY 8 HOURS PRN
Status: DISCONTINUED | OUTPATIENT
Start: 2023-10-09 | End: 2023-10-15 | Stop reason: HOSPADM

## 2023-10-09 RX ORDER — IBUPROFEN 200 MG
1 TABLET ORAL DAILY PRN
Status: DISCONTINUED | OUTPATIENT
Start: 2023-10-09 | End: 2023-10-15 | Stop reason: HOSPADM

## 2023-10-09 RX ORDER — LITHIUM CARBONATE 300 MG/1
300 TABLET, FILM COATED, EXTENDED RELEASE ORAL NIGHTLY
Status: DISCONTINUED | OUTPATIENT
Start: 2023-10-09 | End: 2023-10-11

## 2023-10-09 RX ORDER — ONDANSETRON 4 MG/1
4 TABLET, ORALLY DISINTEGRATING ORAL EVERY 8 HOURS PRN
Status: DISCONTINUED | OUTPATIENT
Start: 2023-10-09 | End: 2023-10-15 | Stop reason: HOSPADM

## 2023-10-09 RX ORDER — OLANZAPINE 10 MG/1
10 TABLET ORAL EVERY 8 HOURS PRN
Status: DISCONTINUED | OUTPATIENT
Start: 2023-10-09 | End: 2023-10-15 | Stop reason: HOSPADM

## 2023-10-09 RX ORDER — TRAZODONE HYDROCHLORIDE 150 MG/1
150 TABLET ORAL NIGHTLY
Status: DISCONTINUED | OUTPATIENT
Start: 2023-10-09 | End: 2023-10-15 | Stop reason: HOSPADM

## 2023-10-09 RX ORDER — PROMETHAZINE HYDROCHLORIDE 25 MG/1
25 TABLET ORAL EVERY 6 HOURS PRN
Status: DISCONTINUED | OUTPATIENT
Start: 2023-10-09 | End: 2023-10-15 | Stop reason: HOSPADM

## 2023-10-09 RX ORDER — HYDROXYZINE PAMOATE 50 MG/1
50 CAPSULE ORAL EVERY 6 HOURS PRN
Status: DISCONTINUED | OUTPATIENT
Start: 2023-10-09 | End: 2023-10-10

## 2023-10-09 RX ADMIN — LITHIUM CARBONATE 300 MG: 300 TABLET, FILM COATED, EXTENDED RELEASE ORAL at 08:10

## 2023-10-09 RX ADMIN — HYDROXYZINE PAMOATE 50 MG: 50 CAPSULE ORAL at 03:10

## 2023-10-09 RX ADMIN — NICOTINE 1 PATCH: 14 PATCH, EXTENDED RELEASE TRANSDERMAL at 08:10

## 2023-10-09 RX ADMIN — CLONAZEPAM 0.5 MG: 0.5 TABLET ORAL at 08:10

## 2023-10-09 RX ADMIN — ARIPIPRAZOLE 12 MG: 10 TABLET ORAL at 03:10

## 2023-10-09 RX ADMIN — TRAZODONE HYDROCHLORIDE 150 MG: 150 TABLET ORAL at 08:10

## 2023-10-09 NOTE — NURSING
Patient arrived to the unit with escort of the [facility] staff and security.  Patient scanned by Metjanis Proscrearturo no sharps or contraband noted. Patient assisted to the unit by [employer] tech with a steady even gait. Oriented patient to unit and unit expectations. Educated and instructed patient on the plan of care, medications and fall precaution. Verified patient per hospital policy and applied ID armband at this time. Patient reported she had a bilateral Mastectomy and no blood pressures are to be done to her left arm. Applied a LIMB alert to left arm and put a sign above her bed for no blood pressured to left arm. Patient voiced understanding of all teaching and care plan. Patient signed and agreed the acknowledgement of rights. Personal items inventoried and verified and placed in appropriate areas.

## 2023-10-09 NOTE — NURSING
Patient dressed in blue paper scrubs. Patient has a flat affect with a depressed mood. Patient reports on a scale of 1-10 with ten being the highest level of depression. Patient rating depression today a 8/10. On the same scale patient rating anxiety a 6/10. Encouraged patient to attend groups and activities.  Patient slow thoughts with delayed responses and tearful at times. Patient reports she went to the clinic for a regular visit and told her NP that she was depressed with thoughts of driving into traffic or overdosing. Patient reports she was not sleeping nor taking baths. Encouraged patient to perform daily ADL's. Patient reports she had a bilateral mastectomy in 2019 with no need for chemo but has had breast implants that has caused her body to reject them. She reported she has had three bowel resections as well. Patient has Peutz-Jeghers Syndrome . She has a self inflicted superficial lacerations to left inner thigh. Oriented patient to unit and plan of care along with medication regiment.

## 2023-10-09 NOTE — NURSING
Received  report from Michaela TEAGUE on the patient Staci Hodge 43 year old female. Report given this patient was at the clinic and reported thoughts of SI with a plan to drive into traffic or overdose on her current medications. The patient was PEC'ed at the clinic and sent to [facility]. Patient medically cleared by Dr. Zhang. Last V/S B/P 124/80, Pulse 107,  resp 18 temp 97.7, pulse ox on room air 99%. Patient accepted to the U by Dr. Emerson. Awaiting for this patient to arrive to the unit.

## 2023-10-09 NOTE — H&P
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 non-ataxic    PSYCHIATRIC   Level of Consciousness: awake and alert   Orientation: person, place, time and situation  Grooming: Disheveled and Hospital garb  Psychomotor Behavior: normal, friendly and cooperative, eye contact normal, no PMA/R  Speech: normal tone, normal rate, normal pitch, normal volume, spontaneous  Language: grossly intact, able to name, able to repeat  Mood: anxious and dysphoric  Affect: Anxious and Constricted  Thought Process: linear, logical  Associations: intact   Thought Content: +SI, denies HI and no delusions  Perceptions: denies AH and denies  VH  Memory: Able to recall past events, Remote intact and Recent intact  Attention:Normal and Attends to interview without distraction  Fund of Knowledge: Aware of current events and Vocabulary appropriate   Estimate if Intelligence:  Average based on work/education history, vocabulary and mental status exam  Insight: intact, has awareness of illness- fair  Judgment: impaired due to depression; poor- s/p SA      PSYCHOSOCIAL    PSYCHOSOCIAL STRESSORS   Health, family    FUNCTIONING RELATIONSHIPS   good support system    STRENGTHS AND LIABILITIES   Strength: Patient accepts " guidance/feedback, Strength: Patient is expressive/articulate., Liability: Patient is unstable., Liability: Patient lacks coping skills.    Is the patient aware of the biomedical complications associated with substance abuse and mental illness? yes    Does the patient have an Advance Directive for Mental Health treatment? 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  MARY LOU  Panic Disorder with agoraphobia  Social Anxiety Disorder  PTSD  OCD     Nicotine Dependence     Borderline Personality Disorder     H/o Breast Cancer s/p mastectomy and reconstructive surgery  Peutz-Jeghers syndrome  Right Carpal Tunnel syndrome  Cervical spondylosis  Chronic pain    PROBLEM LIST AND MANAGEMENT PLANS    Mood: pt counseled  -resume home Abilify but increase from 10 to 12 mg po q day with plans to titrate to 15 mg po q day  -start re-trial of adjunctive Lithium CR at 300 mg po q HS      Insomnia: pt counseled  -vistaril prn  -resume off-label Trazodone 150 mg po q HS- will optimize as indicated    Anxiety/PTSD: pt counseled  -resume home klonopin at lower dose of 0.5 mg po BID with plans to taper off  -resume home vistaril 100 mg po q 6 hours prn    Nicotine Dependence: pt counseld  -start nicotine 14 mg patch dermal q day     Borderline Personality Disorder: pt counseled  -meds off label as above    Chronic pain: pt counseled  -consider re-trial of gabapentin      PRESCRIPTION DRUG MANAGEMENT  Compliance: yes  Side Effects: no  Regimen Adjustments: see above    Discussed diagnosis, risks and benefits of proposed treatment vs alternative treatments vs no treatment, potential side effects of these treatments and the inherent unpredictability of treatment. The patient expresses understanding of the above and displays the capacity to agree with this treatment given said  understanding. Patient also agrees that, currently, the benefits outweigh the risks and would like to pursue/continue treatment at this time.      DIAGNOSTIC TESTING  Labs reviewed with patient; follow up pending labs    Disposition:  -Will attempt to obtain outside psychiatric records if available  -SW to assist with aftercare planning and collateral  -Once stable discharge home with outpatient follow up care and/or rehab  -Continue inpatient treatment under a PEC and/or CEC for danger to self/ danger to others/grave disability as evident by danger to self and suicidal ideation        Christiano Emerson MD  Psychiatry

## 2023-10-09 NOTE — ED TRIAGE NOTES
Pt has history of SI about 1.5 years ago, currently having SI x 3-4 months. Seen this morning at  clinic where patient was PEC'd and sent her for medical clearance. Pt states she does have a plan of driving into traffic or overdosing on her current medications.

## 2023-10-09 NOTE — ED PROVIDER NOTES
Encounter Date: 10/9/2023       History     Chief Complaint   Patient presents with    Psychiatric Evaluation     Staci Hodge is a 43 y.o. female with PMH of breast CA, HTN, anxiety, Peutz-Jeghers syndrome, previous suicide attempt sent to the ED by behavior health clinic for medical clearance for psychiatric admission for SI. Depression and anxiety worse for the last 2 mo with thoughts of suicide daily. Plan is to overdose on current medication or drive into traffic. No HI/AH/VH. Denies use of street drugs or ETOH.     The history is provided by the patient.     Review of patient's allergies indicates:  No Known Allergies  Past Medical History:   Diagnosis Date    Abnormal Pap smear of cervix age 25    no treatement    Anxiety     Breast cancer     Cancer 07/11/2018    breast cancer    Cervical spondylosis 2/9/2018    Colon polyp     Depression     Ectopic pregnancy 2/26/2019    General anesthetics causing adverse effect in therapeutic use     Slow to awaken/ Memory problems-post-op to day 3 after Mastectomy    Headache     History of psychiatric hospitalization     age 20 for SI    Hx of psychiatric care     Hypertension     Peutz-Jeghers syndrome     PONV (postoperative nausea and vomiting)     Psychiatric problem     Right carpal tunnel syndrome 3/9/2017    Self-harming behavior     Suicide attempt     Therapy      Past Surgical History:   Procedure Laterality Date    ADENOIDECTOMY      ANTEGRADE SINGLE BALLOON ENTEROSCOPY N/A 4/19/2021    Procedure: ENTEROSCOPY, SINGLE BALLOON, ANTEGRADE;  Surgeon: Vincenzo Herbert MD;  Location: [facility] ([address] [place] [address]);  Service: Endoscopy;  Laterality: N/A;  removal of large polyp in proximal jejunum; please schedule during a time when Dr. Dillon is available in case I need assistance with removal or 2nd opinion prior to removal    COVID at [place] 4/16 - ttr    APPENDECTOMY      AUGMENTATION OF BREAST      BILATERAL MASTECTOMY Bilateral 7/23/2018    Procedure:  MASTECTOMY 23 hr stay;  Surgeon: Sofia Kendrick MD;  [address];  Service: General;  Laterality: Bilateral;    BILATERAL SALPINGO-OOPHORECTOMY (BSO) Bilateral 3/4/2021    Procedure: SALPINGO-OOPHORECTOMY, BILATERAL;  Surgeon: Clayton Vilchis MD;  Location: [facility];  Service: OB/GYN;  Laterality: Bilateral;    BOWEL RESECTION  10/17/14    BREAST BIOPSY Left 06/04/2018    BREAST CAPSULOTOMY Left 4/25/2019    Procedure: CAPSULOTOMY, BREAST LEFT;  Surgeon: Duane Bello MD;  [address];  Service: Plastics;  Laterality: Left;    BREAST RECONSTRUCTION      BREAST SURGERY      Bilateral Mastectomy 07/23/2018    CARPAL TUNNEL RELEASE Bilateral     CARPAL TUNNEL RELEASE      COLONOSCOPY      COLONOSCOPY N/A 2/25/2021    Procedure: COLONOSCOPY;  Surgeon: Vincenzo Herbert MD;  Location: [address]);  Service: Endoscopy;  Laterality: N/A;  previous anesthesia complications  okay for this date/time per Dr. Herbert and liliana with Miranda,  - sm  COVID test on 2/22/21 at [facility]    DILATION AND CURETTAGE OF UTERUS      DILATION AND CURETTAGE OF UTERUS USING SUCTION N/A 2/25/2019    Procedure: DILATION AND CURETTAGE, UTERUS, USING SUCTION  PATHOLOGY NEEDED;  Surgeon: Pam Sifuentes MD;  Location: [facility];  Service: OB/GYN;  Laterality: N/A;    ENDOSCOPIC ULTRASOUND OF UPPER GASTROINTESTINAL TRACT Left 1/15/2021    Procedure: ULTRASOUND, UPPER GI TRACT, ENDOSCOPIC;  Surgeon: Chadnan Dillon MD;  Location: [facility];  Service: Endoscopy;  Laterality: Left;  for pancreatic screening    ESOPHAGOGASTRODUODENOSCOPY N/A 1/15/2021    Procedure: EGD (ESOPHAGOGASTRODUODENOSCOPY);  Surgeon: Chandan Dillon MD;  Location: [facility];  Service: Endoscopy;  Laterality: N/A;  with push enteroscopy    ESOPHAGOGASTRODUODENOSCOPY N/A 5/17/2021    Procedure: EGD (ESOPHAGOGASTRODUODENOSCOPY);  Surgeon: Chandan Dillon MD;  Location: [facility] ([address] [place] [address]);  Service: Endoscopy;  Laterality: N/A;   Please schedule this duodenal polypectomy. Need to be a day that Dr Jose Antonio Stark is in the OR. 90 minutes.   Chandan Dillon MD     COVID at [place] 5/15 (scheduled in the OR for 5/18 - using same COVID results for both procedures) ttr    ESOPHAGOGASTRODUODENOSCOPY N/A 1/25/2022    Procedure: EGD (ESOPHAGOGASTRODUODENOSCOPY);  Surgeon: Chandan Dillon MD;  Location: [facility] ENDO (2ND FLR);  Service: Endoscopy;  Laterality: N/A;  poss emr  covid-1/22/22-STAH-BB  instructions sent via portal-BB    ESOPHAGOGASTRODUODENOSCOPY N/A 3/10/2023    Procedure: EGD (ESOPHAGOGASTRODUODENOSCOPY);  Surgeon: Chandan Dillon MD;  Location: [facility] ENDO (2ND FLR);  Service: Endoscopy;  Laterality: N/A;  inst via portal  called to confirm and msg that phone not accepting calls 3/6 mal    FAT TRANSFER Bilateral 11/12/2018    Procedure: TRANSFER, FAT TISSUE BILATERAL BREAST;  Surgeon: Duane Bello MD;  [address];  Service: Plastics;  Laterality: Bilateral;    INJECTION FOR SENTINEL NODE IDENTIFICATION Left 7/23/2018    Procedure: INJECTION, FOR SENTINEL NODE IDENTIFICATION;  Surgeon: Sofia Kendrick MD;  [facility];  Service: General;  Laterality: Left;    INSERTION OF BREAST TISSUE EXPANDER Bilateral 7/23/2018    Procedure: INSERTION, TISSUE EXPANDER, BREAST;  Surgeon: Duane Bello MD;  [address];  Service: Plastics;  Laterality: Bilateral;    INSERTION OF BREAST TISSUE EXPANDER Bilateral 3/10/2020    Procedure: INSERTION, TISSUE EXPANDER, BREAST, BILATERAL;  Surgeon: Duane Bello MD;  [facility];  Service: Plastics;  Laterality: Bilateral;    intestinal polpy      LYSIS OF ADHESIONS N/A 3/4/2021    Procedure: LYSIS, ADHESIONS;  Surgeon: Clayton Vilchis MD;  Location: [facility];  Service: OB/GYN;  Laterality: N/A;  Omental adhesions    MASTECTOMY      REMOVAL OF BREAST IMPLANT Bilateral 3/10/2020    Procedure: REMOVAL, IMPLANT, BREAST,  BILATERAL;  Surgeon: Duane Bello MD;  Location: [address];  Service: Plastics;  Laterality: Bilateral;    REMOVAL OF BREAST IMPLANT Bilateral 2020    Procedure: REMOVAL, IMPLANT, BREAST BILATERAL;  Surgeon: Duane Bello MD;  Location: [address];  Service: Plastics;  Laterality: Bilateral;    SENTINEL LYMPH NODE BIOPSY Left 2018    Procedure: BIOPSY, LYMPH NODE, SENTINEL;  Surgeon: Sofia Kendrick MD;  Location: [address];  Service: General;  Laterality: Left;    SMALL INTESTINE SURGERY      3 surgeries     TONSILLECTOMY      TOTAL ABDOMINAL HYSTERECTOMY N/A 3/4/2021    Procedure: HYSTERECTOMY, TOTAL, ABDOMINAL;  Surgeon: Clayton Vilchis MD;  Location: [facility];  Service: OB/GYN;  Laterality: N/A;    UPPER GASTROINTESTINAL ENDOSCOPY       Family History   Problem Relation Age of Onset    Cancer Mother         colon cancer    Stomach cancer Mother     Hypertension Father     Colon polyps Sister         peutz-jeghers syndrome    Breast cancer Paternal Aunt     No Known Problems Maternal Uncle     ADD / ADHD Paternal Uncle     Depression Paternal Uncle     Dementia Maternal Grandfather     Dementia Maternal Grandmother     No Known Problems Paternal Grandfather     Breast cancer Paternal Grandmother     No Known Problems Cousin     Ovarian cancer Neg Hx     Anesthesia problems Neg Hx      Social History     Tobacco Use    Smoking status: Every Day     Current packs/day: 0.00     Average packs/day: 1 pack/day for 18.3 years (18.3 ttl pk-yrs)     Types: Cigarettes     Start date: 2000     Last attempt to quit: 2018     Years since quittin.2    Smokeless tobacco: Never    Tobacco comments:     sometimes dry cough per patient   Substance Use Topics    Alcohol use: Yes     Alcohol/week: 5.8 standard drinks of alcohol     Types: 7 Standard drinks or equivalent per week     Comment: occasionally    Drug use: No     Review of Systems   Constitutional:  Negative for  fever.   HENT:  Negative for sore throat.    Respiratory:  Negative for chest tightness and shortness of breath.    Cardiovascular:  Negative for chest pain.   Gastrointestinal:  Negative for abdominal pain and nausea.   Genitourinary:  Negative for dysuria, frequency and urgency.   Musculoskeletal:  Negative for back pain.   Skin:  Negative for rash.   Neurological:  Negative for dizziness, weakness, light-headedness and numbness.   Hematological:  Does not bruise/bleed easily.   Psychiatric/Behavioral:  Positive for suicidal ideas.        Physical Exam     Initial Vitals [10/09/23 1047]   BP Pulse Resp Temp SpO2   (!) 137/101 (!) 122 20 97.7 °F (36.5 °C) 96 %      MAP       --         Physical Exam    Nursing note and vitals reviewed.  Constitutional: She appears well-developed and well-nourished.   HENT:   Head: Normocephalic and atraumatic.   Right Ear: Tympanic membrane, external ear and ear canal normal. Tympanic membrane is not erythematous. No middle ear effusion.   Left Ear: Tympanic membrane, external ear and ear canal normal. Tympanic membrane is not erythematous.  No middle ear effusion.   Nose: Nose normal.   Mouth/Throat: Uvula is midline, oropharynx is clear and moist and mucous membranes are normal. Mucous membranes are not pale and not dry.   Eyes: Conjunctivae and EOM are normal. Pupils are equal, round, and reactive to light.   Neck: Neck supple.   Normal range of motion.  Cardiovascular:  Normal rate, regular rhythm, normal heart sounds and intact distal pulses.           Pulmonary/Chest: Effort normal and breath sounds normal. She has no decreased breath sounds. She has no wheezes. She has no rhonchi. She has no rales.   Abdominal: Abdomen is soft. Bowel sounds are normal. There is no abdominal tenderness.   Musculoskeletal:         General: Normal range of motion.      Cervical back: Normal range of motion and neck supple.     Neurological: She is alert and oriented to person, place, and time.  She has normal strength. She displays normal reflexes. No cranial nerve deficit or sensory deficit.   Skin: Skin is warm and dry. Capillary refill takes less than 2 seconds. No rash noted.   Psychiatric: Her behavior is normal. Judgment normal. Thought content is not paranoid and not delusional. She exhibits a depressed mood. She expresses suicidal ideation. She expresses no homicidal ideation. She expresses suicidal plans. She expresses no homicidal plans.         ED Course   Procedures  Labs Reviewed   CBC W/ AUTO DIFFERENTIAL - Abnormal; Notable for the following components:       Result Value    Gran # (ANC) 8.3 (*)     Immature Grans (Abs) 0.05 (*)     All other components within normal limits   COMPREHENSIVE METABOLIC PANEL - Abnormal; Notable for the following components:    Alkaline Phosphatase 178 (*)     All other components within normal limits   DRUG SCREEN PANEL, URINE EMERGENCY - Abnormal; Notable for the following components:    Barbiturate Screen, Ur Presumptive Positive (*)     Amphetamine Screen, Ur Presumptive Positive (*)     All other components within normal limits    Narrative:     Specimen Source->Urine   ACETAMINOPHEN LEVEL - Abnormal; Notable for the following components:    Acetaminophen (Tylenol), Serum <3.0 (*)     All other components within normal limits   SALICYLATE LEVEL - Abnormal; Notable for the following components:    Salicylate Lvl <5.0 (*)     All other components within normal limits   TSH   URINALYSIS, REFLEX TO URINE CULTURE    Narrative:     Specimen Source->Urine   ALCOHOL,MEDICAL (ETHANOL)   PREGNANCY TEST, URINE RAPID    Narrative:     Specimen Source->Urine          Imaging Results    None          Medications - No data to display  Medical Decision Making  Evaluation of a 44 yo female with SI sent by behavioral health with [facility] for psychiatric placement.   Patient with thoughts of suicide daily for the past several months with plan to overdose or drive directly into  traffic    Diff dx includes depression, major depressive disorder, bipolar disorder    Problems Addressed:  Depression with suicidal ideation: acute illness or injury     Details: Pec    Amount and/or Complexity of Data Reviewed  Labs: ordered. Decision-making details documented in ED Course.     Details: Lab workup with no gross abnormality    Risk  Risk Details: Medically cleared for psychiatric admission.                  Medically cleared for psychiatry placement: 10/9/2023 12:40 PM            Clinical Impression:   Final diagnoses:  [Z00.8] Medical clearance for psychiatric admission (Primary)  [F32.A, R45.851] Depression with suicidal ideation        ED Disposition Condition    Transfer to Psych Facility Stable          ED Prescriptions    None       Follow-up Information    None          Aarti Hart, NP  10/09/23 1242

## 2023-10-10 PROCEDURE — 99222 PR INITIAL HOSPITAL CARE,LEVL II: ICD-10-PCS | Mod: ,,, | Performed by: INTERNAL MEDICINE

## 2023-10-10 PROCEDURE — 90833 PR PSYCHOTHERAPY W/PATIENT W/E&M, 30 MIN (ADD ON): ICD-10-PCS | Mod: ,,, | Performed by: PSYCHIATRY & NEUROLOGY

## 2023-10-10 PROCEDURE — 99233 SBSQ HOSP IP/OBS HIGH 50: CPT | Mod: ,,, | Performed by: PSYCHIATRY & NEUROLOGY

## 2023-10-10 PROCEDURE — 25000003 PHARM REV CODE 250: Performed by: INTERNAL MEDICINE

## 2023-10-10 PROCEDURE — 99222 1ST HOSP IP/OBS MODERATE 55: CPT | Mod: ,,, | Performed by: INTERNAL MEDICINE

## 2023-10-10 PROCEDURE — 11400000 HC PSYCH PRIVATE ROOM

## 2023-10-10 PROCEDURE — 99233 PR SUBSEQUENT HOSPITAL CARE,LEVL III: ICD-10-PCS | Mod: ,,, | Performed by: PSYCHIATRY & NEUROLOGY

## 2023-10-10 PROCEDURE — 90833 PSYTX W PT W E/M 30 MIN: CPT | Mod: ,,, | Performed by: PSYCHIATRY & NEUROLOGY

## 2023-10-10 PROCEDURE — 25000003 PHARM REV CODE 250: Performed by: PSYCHIATRY & NEUROLOGY

## 2023-10-10 PROCEDURE — S4991 NICOTINE PATCH NONLEGEND: HCPCS | Performed by: PSYCHIATRY & NEUROLOGY

## 2023-10-10 RX ORDER — HYDROXYZINE PAMOATE 50 MG/1
100 CAPSULE ORAL EVERY 6 HOURS PRN
Status: DISCONTINUED | OUTPATIENT
Start: 2023-10-10 | End: 2023-10-15 | Stop reason: HOSPADM

## 2023-10-10 RX ORDER — PANTOPRAZOLE SODIUM 40 MG/1
40 TABLET, DELAYED RELEASE ORAL DAILY
Status: DISCONTINUED | OUTPATIENT
Start: 2023-10-10 | End: 2023-10-15 | Stop reason: HOSPADM

## 2023-10-10 RX ORDER — PROPRANOLOL HYDROCHLORIDE 20 MG/1
20 TABLET ORAL 2 TIMES DAILY
Status: DISCONTINUED | OUTPATIENT
Start: 2023-10-10 | End: 2023-10-15 | Stop reason: HOSPADM

## 2023-10-10 RX ADMIN — PANTOPRAZOLE SODIUM 40 MG: 40 TABLET, DELAYED RELEASE ORAL at 11:10

## 2023-10-10 RX ADMIN — PROPRANOLOL HYDROCHLORIDE 20 MG: 20 TABLET ORAL at 11:10

## 2023-10-10 RX ADMIN — CLONAZEPAM 0.5 MG: 0.5 TABLET ORAL at 08:10

## 2023-10-10 RX ADMIN — PROPRANOLOL HYDROCHLORIDE 20 MG: 20 TABLET ORAL at 08:10

## 2023-10-10 RX ADMIN — LITHIUM CARBONATE 300 MG: 300 TABLET, FILM COATED, EXTENDED RELEASE ORAL at 08:10

## 2023-10-10 RX ADMIN — TRAZODONE HYDROCHLORIDE 150 MG: 150 TABLET ORAL at 08:10

## 2023-10-10 RX ADMIN — NICOTINE 1 PATCH: 14 PATCH, EXTENDED RELEASE TRANSDERMAL at 08:10

## 2023-10-10 RX ADMIN — ARIPIPRAZOLE 12 MG: 10 TABLET ORAL at 08:10

## 2023-10-10 NOTE — HPI
"Patient admitted to [facility] for SI/depression. Medicine consulted for H&P. She reports she has h/o "fast heart rate" and was taking propranolol 20mg BID. She also reports h/o reflux and taking aciphex daily prior to admit. Otherwise, no chronic medical problems. Denies chest pains, SOB, abd pain, n/v/d/c. Labs personally reviewed: UDS + amphetamine and barbiturate---fioricet and vyvanse prescribed outpatient.   "

## 2023-10-10 NOTE — PROGRESS NOTES
10/10/23 2872   [facility] Group Therapy   Group Name Other  (my strengths and qualities and leisure benefits)   Specific Interventions Coping Skills Training   Participation Level Appropriate;Sharing   Participation Quality Cooperative   Insight/Motivation Improved   Affect/Mood Display Depressed   Cognition Alert   Psychomotor WNL     Patient presents depressed, needed encouragement and examples to share strengths and qualities about herself. Patient couldn't think of anything she likes about herself but shared challenge she overcame was breast cancer and with examples given, she most value her yuan, family boyfriend and job. Discussed and identified activities/things she can do to help improve her self-esteem. Patient was receptive to resource information given.

## 2023-10-10 NOTE — PLAN OF CARE
"Patient states that she feels "okay" today, spending majority of time in room and on telephone with minimal interaction with peers. Depressed mood. Denies SI at present time, and contracts for safety. Denies HI at this time. Denies hallucinations. Appetite adequate. Denies sleep disturbances. Medication compliant. NADN. Remains calm and cooperative. Safety precautions remain in place.  "

## 2023-10-10 NOTE — PLAN OF CARE
"TREATMENT TEAM      Chief Complaint:  Pt.is a 43 year old female whom was sent to the ED by behavioral health clinic for SI.      Per ED note pt.has had worsening depression and anxiety over the last 2 months with SI with a plan to overdose on current medication or drive into traffic     Pt.reports being severely depressed and start cutting and SI is always there- thoughts became more intense     Pt.reports trying to distract herself from the thoughts and deep breathing     Pt.reports being stuck and dwelling in the past of her mother and sister not being here- grief           Pt Goal(s):  "To go home"      Current Progress:   Pt attended treatment team dressed in personal clothing     Pt affect/mood:Pt.presented broad of affect     Pt.reports to feeling better than yesterday     Program/groups:Pt.is a new admit will encourage attendance           Revisions to Plan:         "

## 2023-10-10 NOTE — PLAN OF CARE
Pt is sleeping at this time and has slept 6.5 hours with 1 awakenings.  NAD.  Resp even & unlabored.  Pathways clear.  Q 15 minute safety checks ongoing.  All precautions maintained

## 2023-10-10 NOTE — PROGRESS NOTES
"   10/10/23 1015   [facility] Group Therapy   Group Name Therapeutic Recreation   Specific Interventions Cognitive Stimulation Training   Participation Level Appropriate;Sharing   Participation Quality Cooperative   Insight/Motivation Good   Affect/Mood Display Depressed   Cognition Alert   Psychomotor WNL     Patient presents depressed, reports "little better, still depressed" mood, verbalized she want to stop negative thinking, discussed and identified affirmations to read daily and journaling as new coping skills. Patient was given a list of affirmations to read daily to help redirect negative thoughts.  "

## 2023-10-10 NOTE — PLAN OF CARE
No falls, accepting meals and meds, depressed mood, affect consistent with mood. Isolating in room most of the day.Denies suicidal ideation. Reports small cuts (self inflicted) to her inner thigh. Denies any drainage or open cuts anywhere. Safety maintained.

## 2023-10-10 NOTE — PROGRESS NOTES
"   10/10/23 0836   Assessment   Patient's Identification of the Problem Patrient presents anxious, depressed, reports "little better, still depressed" mood. Patient states her admit is due to depression and suicidal thoughts, "I cut myself (left thigh)". Patient complains of work stress and financial stress, "I don't want to get out of bed or I stay on the sofa. I'm not sure if the medicine is working." Patient reports the last time she was on Lithium it slowed her down, "I felt disconnected."Patient admits to negative leisure lifestyle of smoking cigarettes and alcohol occasionally, denies drug use. Patient reports she is in a committed relationship (4.5 years), have no children, has high school diploma, is employed as a  with [employer], wears glasses, lives with her boyfriend in [place]. Patient verbalized her main goals are to feel better, coping with depression better, and be positive.   Leisure Interest Reading;Watching TV;Movies;Listen to Music;Enjoy Family/Friends;Sleep;Doing Nothing   Leisure Barriers Lack of Finances;Loss of Interest;Energy Level;Self Confidence   Treatment Focus To Improve Mood;To Increase Motivation;Increase Self Confidence;To Improve Leisure Awareness/Lifestyle/Interest;To Promote Successful and Safe Self Expression;To Improve Coping Skills;To Increase Energy Level       Treatment Recommendation:   1:1 Intervention (as needed)    Cognitive Stimulation Skilled Activity  Creative Expression Skilled Activity  Mild Exercises Skilled Activity  Stress Management Skilled Activity  Coping Skilled Activity  Leisure Education and Awareness Skilled Activity    Treatment Goal(s):  Long Term Goals Refer To Master Treatment Plan    Short Term Treatment Goal(s)  Patient Will:  Comply with Treatment  Exhibit Improvement in Mood  Demonstrate Constructive Expression of Feelings and Behavior  Verbalize Improvement in Mood  Identify at Least 2 Coping Skills or Leisure Skills to " Reduce Depression and Hopelessness Upon Request from Therapist    Discharge Recommendations:  Encourage Patient to Actively Utilize Available Community Resources to Increase Leisure Involvement to Decrease Signs and Symptoms of Illness  Encourage Patient to Utilize Coping Skills on a Regular Basis to Reduce the Risk of Decompensating and Re-Hospitalizations  Follow Up with After Care Appointments  Continue with Current Leisure Activities

## 2023-10-10 NOTE — PROGRESS NOTES
"PSYCHIATRY DAILY INPATIENT PROGRESS NOTE  SUBSEQUENT HOSPITAL VISIT    ENCOUNTER DATE: 10/10/2023  SITE: [facility]    DATE OF ADMISSION: 10/9/2023  2:40 PM  LENGTH OF STAY: 1 days      CHIEF COMPLAINT   Staci Hodge is a 43 y.o. female, seen during daily garcia rounds on the inpatient unit.  Staci Hodge presented with the chief complaint of depression with SI, "I started cutting and having really bad suicidal thoughts"      The patient was seen and examined. The chart was reviewed.     Reviewed notes from Rns and NP and labs from the last 24 hours.    The patient's case was discussed with the treatment team/care providers today including Rns, Speciality services, and     Staff reports no behavioral or management issues.     The patient has been compliant with treatment.      Subjective 10/10/2023       Today the patient reports she feels a little better than yesterday. She notes continued but less symptoms of depression/anxiety.     She has good social support. Triggers were identified- grief and trauma were the mostly likely triggers.     She notes continued urges to cut. SI persist and remain s more intense than her chronic SI.       The patient denies any side effects to medications.          Psychiatric ROS (observed, reported, or endorsed/denied):  Depressed mood - Continuing  Interest/pleasure/anhedonia: Continuing  Guilt/hopelessness/worthlessness - Continuing  Changes in Sleep - Continuing  Changes in Appetite - Continuing  Changes in Concentration - Continuing  Changes in Energy - Continuing  PMA/R- Continuing  Suicidal- active/passive ideations - Continuing  Homicidal ideations: active/passive ideations - No    Hallucinations - No  Delusions - No  Disorganized behavior - No  Disorganized speech - No  Negative symptoms - No    Elevated mood - No  Decreased need for sleep - No  Grandiosity - No  Racing thoughts - No  Impulsivity - No  Irritability- No  Increased energy - " No  Distractibility - No  Increase in goal-directed activity or PMA- No    Symptoms of MARY LOU - Continuing  Symptoms of Panic Disorder- No  Symptoms of PTSD - Continuing        Overall progress: Patient is showing mild improvement         Psychotherapy:  Target symptoms: depression, anxiety   Why chosen therapy is appropriate versus another modality: relevant to diagnosis, patient responds to this modality, evidence based practice  Outcome monitoring methods: self-report, observation  Therapeutic intervention type: insight oriented psychotherapy, behavior modifying psychotherapy, supportive psychotherapy, interactive psychotherapy  Topics discussed/themes: building skills sets for symptom management, symptom recognition  The patient's response to the intervention is accepting. The patient's progress toward treatment goals is fair.   Duration of intervention: 16 minutes.        Medical ROS  General ROS: negative  Ophthalmic ROS: negative  ENT ROS: negative  Allergy and Immunology ROS: negative  Hematological and Lymphatic ROS: negative  Endocrine ROS: negative  Respiratory ROS: no cough, shortness of breath, or wheezing  Cardiovascular ROS: no chest pain or dyspnea on exertion  Gastrointestinal ROS: no abdominal pain, change in bowel habits, or black or bloody stools  Genito-Urinary ROS: no dysuria, trouble voiding, or hematuria  Musculoskeletal ROS: negative  Neurological ROS: no TIA or stroke symptoms  Dermatological ROS: negative      PAST MEDICAL HISTORY   Past Medical History:   Diagnosis Date    Abnormal Pap smear of cervix age 25    no treatement    Anxiety     Breast cancer     Cancer 07/11/2018    breast cancer    Cervical spondylosis 2/9/2018    Colon polyp     Depression     Ectopic pregnancy 2/26/2019    General anesthetics causing adverse effect in therapeutic use     Slow to awaken/ Memory problems-post-op to day 3 after Mastectomy    Headache     History of psychiatric hospitalization     age 20 for SI     "Hx of psychiatric care     Hypertension     Peutz-Jeghers syndrome     PONV (postoperative nausea and vomiting)     Psychiatric problem     Right carpal tunnel syndrome 3/9/2017    Self-harming behavior     Suicide attempt     Therapy            PSYCHOTROPIC MEDICATIONS   Scheduled Meds:   ARIPiprazole  12 mg Oral Daily    clonazePAM  0.5 mg Oral BID    lithium  300 mg Oral QHS    traZODone  150 mg Oral QHS     Continuous Infusions:  PRN Meds:.acetaminophen, aluminum-magnesium hydroxide-simethicone, benzonatate, benztropine mesylate, hydrOXYzine pamoate, loperamide, nicotine, OLANZapine **AND** OLANZapine, ondansetron, promethazine        EXAMINATION    VITALS   Vitals:    10/09/23 1435 10/09/23 1917 10/10/23 0738   BP: 123/86 116/88 121/75   BP Location: Right arm Right arm Right arm   Patient Position: Sitting Sitting Sitting   Pulse: 110 103 105   Resp: 16 18 16   Temp: 97.2 °F (36.2 °C) 97.5 °F (36.4 °C) 97.8 °F (36.6 °C)   TempSrc: Temporal Temporal Temporal   SpO2: 99%     Weight: 84.7 kg (186 lb 11.7 oz)     Height: 5' 2" (1.575 m)         Body mass index is 34.15 kg/m².      CONSTITUTIONAL  General Appearance: unremarkable, age appropriate, well nourished, obese     MUSCULOSKELETAL  Muscle Strength and Tone:no dyskinesia, no dystonia, no tremor, no tic  Abnormal Involuntary Movements: No  Gait and Station: non-ataxic     PSYCHIATRIC   Level of Consciousness: awake and alert   Orientation: person, place, time and situation  Grooming: less Disheveled and casual garb  Psychomotor Behavior: normal, friendly and cooperative, eye contact normal, no PMA/R  Speech: normal tone, normal rate, normal pitch, normal volume, spontaneous  Language: grossly intact, able to name, able to repeat  Mood: anxious and dysphoric  Affect: Anxious and Constricted  Thought Process: linear, logical  Associations: intact   Thought Content: +SI, denies HI and no delusions  Perceptions: denies AH and denies  VH  Memory: Able to recall " past events, Remote intact and Recent intact  Attention:Normal and Attends to interview without distraction  Fund of Knowledge: Aware of current events and Vocabulary appropriate   Estimate if Intelligence:  Average based on work/education history, vocabulary and mental status exam  Insight: intact, has awareness of illness- fair  Judgment: impaired due to depression; fair        DIAGNOSTIC TESTING   Laboratory Results  Recent Results (from the past 24 hour(s))   CBC auto differential    Collection Time: 10/09/23 11:06 AM   Result Value Ref Range    WBC 11.86 3.90 - 12.70 K/uL    RBC 5.16 4.00 - 5.40 M/uL    Hemoglobin 15.1 12.0 - 16.0 g/dL    Hematocrit 44.3 37.0 - 48.5 %    MCV 86 82 - 98 fL    MCH 29.3 27.0 - 31.0 pg    MCHC 34.1 32.0 - 36.0 g/dL    RDW 13.0 11.5 - 14.5 %    Platelets 431 150 - 450 K/uL    MPV 10.1 9.2 - 12.9 fL    Immature Granulocytes 0.4 0.0 - 0.5 %    Gran # (ANC) 8.3 (H) 1.8 - 7.7 K/uL    Immature Grans (Abs) 0.05 (H) 0.00 - 0.04 K/uL    Lymph # 2.5 1.0 - 4.8 K/uL    Mono # 0.7 0.3 - 1.0 K/uL    Eos # 0.3 0.0 - 0.5 K/uL    Baso # 0.06 0.00 - 0.20 K/uL    nRBC 0 0 /100 WBC    Gran % 69.6 38.0 - 73.0 %    Lymph % 21.3 18.0 - 48.0 %    Mono % 6.1 4.0 - 15.0 %    Eosinophil % 2.1 0.0 - 8.0 %    Basophil % 0.5 0.0 - 1.9 %    Differential Method Automated    Comprehensive metabolic panel    Collection Time: 10/09/23 11:06 AM   Result Value Ref Range    Sodium 140 136 - 145 mmol/L    Potassium 4.1 3.5 - 5.1 mmol/L    Chloride 103 95 - 110 mmol/L    CO2 24 23 - 29 mmol/L    Glucose 98 70 - 110 mg/dL    BUN 6 6 - 20 mg/dL    Creatinine 0.8 0.5 - 1.4 mg/dL    Calcium 9.8 8.7 - 10.5 mg/dL    Total Protein 8.1 6.0 - 8.4 g/dL    Albumin 4.9 3.5 - 5.2 g/dL    Total Bilirubin 0.4 0.1 - 1.0 mg/dL    Alkaline Phosphatase 178 (H) 55 - 135 U/L    AST 24 10 - 40 U/L    ALT 43 10 - 44 U/L    eGFR >60 >60 mL/min/1.73 m^2    Anion Gap 13 8 - 16 mmol/L   TSH    Collection Time: 10/09/23 11:06 AM   Result Value Ref  Range    TSH 0.899 0.400 - 4.000 uIU/mL   Ethanol    Collection Time: 10/09/23 11:06 AM   Result Value Ref Range    Alcohol, Serum <10 <10 mg/dL   Acetaminophen level    Collection Time: 10/09/23 11:06 AM   Result Value Ref Range    Acetaminophen (Tylenol), Serum <3.0 (L) 10.0 - 20.0 ug/mL   Salicylate level    Collection Time: 10/09/23 11:06 AM   Result Value Ref Range    Salicylate Lvl <5.0 (L) 15.0 - 30.0 mg/dL   Hemoglobin A1c    Collection Time: 10/09/23 11:06 AM   Result Value Ref Range    Hemoglobin A1C 5.3 4.0 - 5.6 %    Estimated Avg Glucose 105 68 - 131 mg/dL   Lipid panel    Collection Time: 10/09/23 11:06 AM   Result Value Ref Range    Cholesterol 227 (H) 120 - 199 mg/dL    Triglycerides 187 (H) 30 - 150 mg/dL    HDL 56 40 - 75 mg/dL    LDL Cholesterol 133.6 63.0 - 159.0 mg/dL    HDL/Cholesterol Ratio 24.7 20.0 - 50.0 %    Total Cholesterol/HDL Ratio 4.1 2.0 - 5.0    Non-HDL Cholesterol 171 mg/dL   Urinalysis, Reflex to Urine Culture Urine, Clean Catch    Collection Time: 10/09/23 11:15 AM    Specimen: Urine   Result Value Ref Range    Specimen UA Urine, Clean Catch     Color, UA Yellow Yellow, Straw, Madhavi    Appearance, UA Clear Clear    pH, UA >8.0 5.0 - 8.0    Specific Gravity, UA 1.020 1.005 - 1.030    Protein, UA Negative Negative    Glucose, UA Negative Negative    Ketones, UA Negative Negative    Bilirubin (UA) Negative Negative    Occult Blood UA Negative Negative    Nitrite, UA Negative Negative    Urobilinogen, UA Negative <2.0 EU/dL    Leukocytes, UA Negative Negative   Drug screen panel, emergency    Collection Time: 10/09/23 11:15 AM   Result Value Ref Range    Benzodiazepines Negative Negative    Methadone metabolites Negative Negative    Cocaine (Metab.) Negative Negative    Opiate Scrn, Ur Negative Negative    Barbiturate Screen, Ur Presumptive Positive (A) Negative    Amphetamine Screen, Ur Presumptive Positive (A) Negative    THC Negative Negative    Phencyclidine Negative Negative     Creatinine, Urine 53.3 15.0 - 325.0 mg/dL    Toxicology Information SEE COMMENT    Pregnancy, urine rapid    Collection Time: 10/09/23 11:15 AM   Result Value Ref Range    Preg Test, Ur Negative              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  MARY LOU  Panic Disorder with agoraphobia  Social Anxiety Disorder  PTSD  OCD     Nicotine Dependence     Borderline Personality Disorder     H/o Breast Cancer s/p mastectomy and reconstructive surgery  Peutz-Jeghers syndrome  Right Carpal Tunnel syndrome  Cervical spondylosis  Chronic pain     PROBLEM LIST AND MANAGEMENT PLANS     Mood: pt counseled  -resume home Abilify but increase from 10 to 12 mg po q day with plans to titrate to 15 mg po q day- increase to 15 mg po q day tomorrow  -started/continue re-trial of adjunctive Lithium CR at 300 mg po q HS- consider optimizing further; check level in 3 days        Insomnia: pt counseled  -vistaril prn  -resumed/continue off-label Trazodone 150 mg po q HS- will optimize as indicated     Anxiety/PTSD: pt counseled  -resumed/continue home klonopin at lower dose of 0.5 mg po BID with plans to taper down/off as able  -resumed/continue home vistaril 100 mg po q 6 hours prn     Nicotine Dependence: pt counseld  -started/continue nicotine 14 mg patch dermal q day     Borderline Personality Disorder: pt counseled  -meds off label as above     Chronic pain: pt counseled  -consider re-trial of gabapentin          Discussed diagnosis, risks and benefits of proposed treatment vs alternative treatments vs no treatment, potential side effects of these treatments and the inherent unpredictability of treatment. The patient expresses understanding of the above and displays the capacity to agree with this treatment given said understanding. Patient also agrees that, currently, the benefits outweigh the risks and would like to  pursue/continue treatment at this time.    Any medications being used off-label were discussed with the patient inclusive of the evidence base for the use of the medications and consent was obtained for the off-label use of the medication.       DISCHARGE PLANNING  Expected Disposition Plan: Home or Self Care      NEED FOR CONTINUED HOSPITALIZATION  Psychiatric illness continues to pose a potential threat to life or bodily function, of self or others, thereby requiring the need for continued inpatient psychiatric hospitalization: Yes, due to: danger to self and suicidal ideation, as evidenced by:  Ongoing concerns with SI.    Protective inpatient pyschiatric hospitalization required while a safe disposition plan is enacted: Yes    Patient stabilized and ready for discharge from inpatient psychiatric unit: No        STAFF:   Christiano Emerson MD  Psychiatry

## 2023-10-10 NOTE — SUBJECTIVE & OBJECTIVE
Past Medical History:   Diagnosis Date    Abnormal Pap smear of cervix age 25    no treatement    Anxiety     Breast cancer     Cancer 07/11/2018    breast cancer    Cervical spondylosis 2/9/2018    Colon polyp     Depression     Ectopic pregnancy 2/26/2019    General anesthetics causing adverse effect in therapeutic use     Slow to awaken/ Memory problems-post-op to day 3 after Mastectomy    Headache     History of psychiatric hospitalization     age 20 for SI    Hx of psychiatric care     Hypertension     Peutz-Jeghers syndrome     PONV (postoperative nausea and vomiting)     Psychiatric problem     Right carpal tunnel syndrome 3/9/2017    Self-harming behavior     Suicide attempt     Therapy        Past Surgical History:   Procedure Laterality Date    ADENOIDECTOMY      ANTEGRADE SINGLE BALLOON ENTEROSCOPY N/A 4/19/2021    Procedure: ENTEROSCOPY, SINGLE BALLOON, ANTEGRADE;  Surgeon: Vincenzo Herbert MD;  Location: [facility] ([address] [place] [address]);  Service: Endoscopy;  Laterality: N/A;  removal of large polyp in proximal jejunum; please schedule during a time when Dr. Dillon is available in case I need assistance with removal or 2nd opinion prior to removal    COVID at [place] 4/16 - ttr    APPENDECTOMY      AUGMENTATION OF BREAST      BILATERAL MASTECTOMY Bilateral 7/23/2018    Procedure: MASTECTOMY 23 hr stay;  Surgeon: Sofia Kendrick MD;  Location: [address];  Service: General;  Laterality: Bilateral;    BILATERAL SALPINGO-OOPHORECTOMY (BSO) Bilateral 3/4/2021    Procedure: SALPINGO-OOPHORECTOMY, BILATERAL;  Surgeon: Clayton Vilchis MD;  Location: [facility];  Service: OB/GYN;  Laterality: Bilateral;    BOWEL RESECTION  10/17/14    BREAST BIOPSY Left 06/04/2018    BREAST CAPSULOTOMY Left 4/25/2019    Procedure: CAPSULOTOMY, BREAST LEFT;  Surgeon: Duane Bello MD;  Location: [address];  Service: Plastics;  Laterality: Left;    BREAST RECONSTRUCTION      BREAST SURGERY      Bilateral Mastectomy  07/23/2018    CARPAL TUNNEL RELEASE Bilateral     CARPAL TUNNEL RELEASE      COLONOSCOPY      COLONOSCOPY N/A 2/25/2021    Procedure: COLONOSCOPY;  Surgeon: Vincenzo Herbert MD;  Location: [facility] (2ND FLR);  Service: Endoscopy;  Laterality: N/A;  previous anesthesia complications  okay for this date/time per Dr. Herbert and liliana with Miranda,  - sm  COVID test on 2/22/21 at [facility]    DILATION AND CURETTAGE OF UTERUS      DILATION AND CURETTAGE OF UTERUS USING SUCTION N/A 2/25/2019    Procedure: DILATION AND CURETTAGE, UTERUS, USING SUCTION  PATHOLOGY NEEDED;  Surgeon: Pam Sifuentes MD;  Location: [facility];  Service: OB/GYN;  Laterality: N/A;    ENDOSCOPIC ULTRASOUND OF UPPER GASTROINTESTINAL TRACT Left 1/15/2021    Procedure: ULTRASOUND, UPPER GI TRACT, ENDOSCOPIC;  Surgeon: Chandan Dillon MD;  Location: [facility];  Service: Endoscopy;  Laterality: Left;  for pancreatic screening    ESOPHAGOGASTRODUODENOSCOPY N/A 1/15/2021    Procedure: EGD (ESOPHAGOGASTRODUODENOSCOPY);  Surgeon: Chandan Dillon MD;  Location: [facility];  Service: Endoscopy;  Laterality: N/A;  with push enteroscopy    ESOPHAGOGASTRODUODENOSCOPY N/A 5/17/2021    Procedure: EGD (ESOPHAGOGASTRODUODENOSCOPY);  Surgeon: Chandan Dillon MD;  Location: [facility] (2ND FLR);  Service: Endoscopy;  Laterality: N/A;  Please schedule this duodenal polypectomy. Need to be a day that Dr Jose Antonio Stark is in the OR. 90 minutes.   Chandan Dillon MD     COVID at [place] 5/15 (scheduled in the OR for 5/18 - using same COVID results for both procedures) ttr    ESOPHAGOGASTRODUODENOSCOPY N/A 1/25/2022    Procedure: EGD (ESOPHAGOGASTRODUODENOSCOPY);  Surgeon: Chandan Dillon MD;  Location: [facility] (2ND FLR);  Service: Endoscopy;  Laterality: N/A;  poss emr  covid-1/22/22-STA-BB  instructions sent via portal-BB    ESOPHAGOGASTRODUODENOSCOPY N/A 3/10/2023    Procedure: EGD (ESOPHAGOGASTRODUODENOSCOPY);  Surgeon: Chandan Dillon MD;  Location:  [facility] (2ND FLR);  Service: Endoscopy;  Laterality: N/A;  inst via portal  called to confirm and msg that phone not accepting calls 3/6 mal    FAT TRANSFER Bilateral 11/12/2018    Procedure: TRANSFER, FAT TISSUE BILATERAL BREAST;  Surgeon: Duane Bello MD;  Location: [address];  Service: Plastics;  Laterality: Bilateral;    INJECTION FOR SENTINEL NODE IDENTIFICATION Left 7/23/2018    Procedure: INJECTION, FOR SENTINEL NODE IDENTIFICATION;  Surgeon: Sofia Kendrick MD;  Location: [address];  Service: General;  Laterality: Left;    INSERTION OF BREAST TISSUE EXPANDER Bilateral 7/23/2018    Procedure: INSERTION, TISSUE EXPANDER, BREAST;  Surgeon: Duane Bello MD;  Location: [address];  Service: Plastics;  Laterality: Bilateral;    INSERTION OF BREAST TISSUE EXPANDER Bilateral 3/10/2020    Procedure: INSERTION, TISSUE EXPANDER, BREAST, BILATERAL;  Surgeon: Duane Bello MD;  Location: [address];  Service: Plastics;  Laterality: Bilateral;    intestinal polpy      LYSIS OF ADHESIONS N/A 3/4/2021    Procedure: LYSIS, ADHESIONS;  Surgeon: Clayton Vilchis MD;  Location: [facility];  Service: OB/GYN;  Laterality: N/A;  Omental adhesions    MASTECTOMY      REMOVAL OF BREAST IMPLANT Bilateral 3/10/2020    Procedure: REMOVAL, IMPLANT, BREAST, BILATERAL;  Surgeon: Duane Bello MD;  Location: [address];  Service: Plastics;  Laterality: Bilateral;    REMOVAL OF BREAST IMPLANT Bilateral 7/7/2020    Procedure: REMOVAL, IMPLANT, BREAST BILATERAL;  Surgeon: Duane Bello MD;  Location: [address];  Service: Plastics;  Laterality: Bilateral;    SENTINEL LYMPH NODE BIOPSY Left 7/23/2018    Procedure: BIOPSY, LYMPH NODE, SENTINEL;  Surgeon: Sofia Kendrick MD;  Location: [address];  Service: General;  Laterality: Left;    SMALL INTESTINE SURGERY      3 surgeries     TONSILLECTOMY      TOTAL ABDOMINAL HYSTERECTOMY N/A 3/4/2021    Procedure:  HYSTERECTOMY, TOTAL, ABDOMINAL;  Surgeon: Clayton Vilchis MD;  Location: [facility];  Service: OB/GYN;  Laterality: N/A;    UPPER GASTROINTESTINAL ENDOSCOPY         Review of patient's allergies indicates:  No Known Allergies    Current Facility-Administered Medications on File Prior to Encounter   Medication    0.9%  NaCl infusion    sodium chloride 0.9% flush 10 mL     Current Outpatient Medications on File Prior to Encounter   Medication Sig    ARIPiprazole (ABILIFY) 10 MG Tab Take 1 tablet (10 mg total) by mouth once daily.    butalbital-acetaminophen-caffeine -40 mg (FIORICET, ESGIC) -40 mg per tablet Take 1 tablet by mouth every 6 (six) hours as needed for Headaches.    clonazePAM (KLONOPIN) 0.5 MG tablet Take 0.5 mg by mouth 2 (two) times daily.    erenumab-aooe (AIMOVIG) 140 mg/mL autoinjector Inject 1 mL (140 mg total) into the skin every 28 days.    hydrOXYzine pamoate (VISTARIL) 100 MG capsule Take 1 capsule (100 mg total) by mouth every 6 (six) hours as needed (Insomnia or anxiety).    ibuprofen (ADVIL,MOTRIN) 800 MG tablet Take 1 tablet (800 mg total) by mouth 3 (three) times daily as needed.    lisdexamfetamine (VYVANSE) 20 MG capsule Take 1 capsule by mouth every morning.    metoprolol succinate (TOPROL-XL) 25 MG 24 hr tablet Take 1 tablet (25 mg total) by mouth 2 (two) times daily.    montelukast (SINGULAIR) 10 mg tablet Take 10 mg by mouth once daily.    ondansetron (ZOFRAN-ODT) 4 MG TbDL Take 2 tablets (8 mg total) by mouth every 8 (eight) hours as needed (nausea).    propranoloL (INDERAL) 20 MG tablet Take 20 mg by mouth 2 (two) times daily.    RABEprazole (ACIPHEX) 20 mg tablet Take 1 tablet (20 mg total) by mouth once daily.    sucralfate (CARAFATE) 100 mg/mL suspension Take 10 mLs (1 g total) by mouth 4 (four) times daily before meals and nightly. Medication should be take 1 hour prior to meals    traZODone (DESYREL) 150 MG tablet Take 1 tablet (150 mg total) by mouth every evening.     VITAMIN D2 1,250 mcg (50,000 unit) capsule TAKE ONE CAPSULE BY MOUTH EVERY 7 DAYS    [DISCONTINUED] albuterol (ACCUNEB) 1.25 mg/3 mL Nebu Take 3 mLs (1.25 mg total) by nebulization every 6 (six) hours as needed (wheezing). Rescue    [DISCONTINUED] DULoxetine (CYMBALTA) 30 MG capsule Take 1 capsule (30 mg total) by mouth 2 (two) times daily.    [DISCONTINUED] gabapentin (NEURONTIN) 600 MG tablet Take 1 tablet (600 mg total) by mouth every evening.    [DISCONTINUED] paroxetine (PAXIL) 20 MG tablet TAKE ONE TABLET BY MOUTH ONCE A DAY IN THE MORNING     Family History       Problem Relation (Age of Onset)    ADD / ADHD Paternal Uncle    Breast cancer Paternal Aunt, Paternal Grandmother    Cancer Mother    Colon polyps Sister    Dementia Maternal Grandfather, Maternal Grandmother    Depression Paternal Uncle    Hypertension Father    No Known Problems Maternal Uncle, Paternal Grandfather, Cousin    Stomach cancer Mother          Tobacco Use    Smoking status: Every Day     Current packs/day: 0.00     Average packs/day: 1 pack/day for 18.3 years (18.3 ttl pk-yrs)     Types: Cigarettes     Start date: 2000     Last attempt to quit: 2018     Years since quittin.2    Smokeless tobacco: Never    Tobacco comments:     sometimes dry cough per patient   Substance and Sexual Activity    Alcohol use: Yes     Alcohol/week: 5.8 standard drinks of alcohol     Types: 7 Standard drinks or equivalent per week     Comment: occasionally    Drug use: No    Sexual activity: Yes     Partners: Male     Review of Systems   Constitutional:  Negative for activity change, fatigue, fever and unexpected weight change.   HENT:  Negative for congestion, ear pain, hearing loss, rhinorrhea and sore throat.    Eyes:  Negative for redness and visual disturbance.   Respiratory:  Negative for cough, shortness of breath and wheezing.    Cardiovascular:  Negative for chest pain, palpitations and leg swelling.   Gastrointestinal:  Negative  for abdominal pain, constipation, diarrhea, nausea and vomiting.   Genitourinary:  Negative for dysuria, frequency and urgency.   Musculoskeletal:  Negative for back pain, joint swelling and neck pain.   Skin:  Negative for color change, rash and wound.   Neurological:  Negative for dizziness, tremors, weakness, light-headedness and headaches.   Psychiatric/Behavioral:  Positive for dysphoric mood and suicidal ideas.      Objective:     Vital Signs (Most Recent):  Temp: 97.8 °F (36.6 °C) (10/10/23 0738)  Pulse: 105 (10/10/23 0738)  Resp: 16 (10/10/23 0738)  BP: 121/75 (10/10/23 0738)  SpO2: 99 % (10/09/23 1435) Vital Signs (24h Range):  Temp:  [97.2 °F (36.2 °C)-97.8 °F (36.6 °C)] 97.8 °F (36.6 °C)  Pulse:  [103-110] 105  Resp:  [16-18] 16  SpO2:  [99 %] 99 %  BP: (116-124)/(75-88) 121/75     Weight: 84.7 kg (186 lb 11.7 oz)  Body mass index is 34.15 kg/m².     Physical Exam  Vitals and nursing note reviewed.   Constitutional:       General: She is not in acute distress.     Appearance: She is well-developed.   HENT:      Head: Normocephalic and atraumatic.      Right Ear: External ear normal.      Left Ear: External ear normal.      Nose: Nose normal.      Mouth/Throat:      Pharynx: No oropharyngeal exudate or posterior oropharyngeal erythema.   Eyes:      General:         Right eye: No discharge.         Left eye: No discharge.      Conjunctiva/sclera: Conjunctivae normal.      Pupils: Pupils are equal, round, and reactive to light.   Neck:      Thyroid: No thyromegaly.   Cardiovascular:      Rate and Rhythm: Normal rate and regular rhythm.      Heart sounds: No murmur heard.  Pulmonary:      Effort: Pulmonary effort is normal. No respiratory distress.      Breath sounds: Normal breath sounds.   Abdominal:      General: Bowel sounds are normal. There is no distension.      Palpations: Abdomen is soft.      Tenderness: There is no abdominal tenderness.   Skin:     General: Skin is warm and dry.   Neurological:      " Mental Status: She is alert and oriented to person, place, and time.      Comments:   Neuro: Cranial nerves:  CN II Visual fields full to confrontation.   CN III, IV, VI Pupils are equal, round, and reactive to light.  CN III: no palsy  Nystagmus: none   Diplopia: none  Ophthalmoparesis: none  CN V Facial sensation intact.   CN VII Facial expression full, symmetric.   CN VIII normal.   CN IX normal.   CN X normal.   CN XI normal.   CN XII normal.       Psychiatric:         Behavior: Behavior normal.         Thought Content: Thought content normal.          Significant Labs: All pertinent labs within the past 24 hours have been reviewed.  CBC:   Recent Labs   Lab 10/09/23  1106   WBC 11.86   HGB 15.1   HCT 44.3        CMP:   Recent Labs   Lab 10/09/23  1106      K 4.1      CO2 24   GLU 98   BUN 6   CREATININE 0.8   CALCIUM 9.8   PROT 8.1   ALBUMIN 4.9   BILITOT 0.4   ALKPHOS 178*   AST 24   ALT 43   ANIONGAP 13     Cardiac Markers: No results for input(s): "CKMB", "MYOGLOBIN", "BNP", "TROPISTAT" in the last 48 hours.  Lipase: No results for input(s): "LIPASE" in the last 48 hours.  Lipid Panel:   Recent Labs   Lab 10/09/23  1106   CHOL 227*   HDL 56   LDLCALC 133.6   TRIG 187*   CHOLHDL 24.7     Troponin: No results for input(s): "TROPONINI", "TROPONINIHS" in the last 48 hours.  TSH:   Recent Labs   Lab 10/09/23  1106   TSH 0.899     Urine Studies:   Recent Labs   Lab 10/09/23  1115   COLORU Yellow   APPEARANCEUA Clear   PHUR >8.0   SPECGRAV 1.020   PROTEINUA Negative   GLUCUA Negative   KETONESU Negative   BILIRUBINUA Negative   OCCULTUA Negative   NITRITE Negative   UROBILINOGEN Negative   LEUKOCYTESUR Negative       Significant Imaging: I have reviewed all pertinent imaging results/findings within the past 24 hours.  "
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
## 2023-10-10 NOTE — CONSULTS
"[facility] Medicine  Consult Note    Patient Name: Staci Hodge  MRN: [id number]  Admission Date: 10/9/2023  Hospital Length of Stay: 1 days  Attending Physician: Christiano Emerson MD   Primary Care Provider: Shilpi Clayton NP           Patient information was obtained from patient and ER records.     Inpatient consult to [facility] for History and Physical  Consult performed by: Snow Williamson MD  Consult ordered by: Christiano Emerson MD        Subjective:     Principal Problem: Suicidal ideations    Chief Complaint: No chief complaint on file.       HPI: Patient admitted to [facility] for SI/depression. Medicine consulted for H&P. She reports she has h/o "fast heart rate" and was taking propranolol 20mg BID. She also reports h/o reflux and taking aciphex daily prior to admit. Otherwise, no chronic medical problems. Denies chest pains, SOB, abd pain, n/v/d/c. Labs personally reviewed: UDS + amphetamine and barbiturate---fioricet and vyvanse prescribed outpatient.       Past Medical History:   Diagnosis Date    Abnormal Pap smear of cervix age 25    no treatement    Anxiety     Breast cancer     Cancer 07/11/2018    breast cancer    Cervical spondylosis 2/9/2018    Colon polyp     Depression     Ectopic pregnancy 2/26/2019    General anesthetics causing adverse effect in therapeutic use     Slow to awaken/ Memory problems-post-op to day 3 after Mastectomy    Headache     History of psychiatric hospitalization     age 20 for SI    Hx of psychiatric care     Hypertension     Peutz-Jeghers syndrome     PONV (postoperative nausea and vomiting)     Psychiatric problem     Right carpal tunnel syndrome 3/9/2017    Self-harming behavior     Suicide attempt     Therapy        Past Surgical History:   Procedure Laterality Date    ADENOIDECTOMY      ANTEGRADE SINGLE BALLOON ENTEROSCOPY N/A 4/19/2021    Procedure: ENTEROSCOPY, SINGLE BALLOON, ANTEGRADE;  " Surgeon: Vincenzo Herbert MD;  Location: [facility] (2ND FLR);  Service: Endoscopy;  Laterality: N/A;  removal of large polyp in proximal jejunum; please schedule during a time when Dr. Dillon is available in case I need assistance with removal or 2nd opinion prior to removal    COVID at [place] 4/16 - ttr    APPENDECTOMY      AUGMENTATION OF BREAST      BILATERAL MASTECTOMY Bilateral 7/23/2018    Procedure: MASTECTOMY 23 hr stay;  Surgeon: Sofia Kendrick MD;  Location: [facility] 2ND FLR;  Service: General;  Laterality: Bilateral;    BILATERAL SALPINGO-OOPHORECTOMY (BSO) Bilateral 3/4/2021    Procedure: SALPINGO-OOPHORECTOMY, BILATERAL;  Surgeon: Clayton Vilchis MD;  Location: [facility];  Service: OB/GYN;  Laterality: Bilateral;    BOWEL RESECTION  10/17/14    BREAST BIOPSY Left 06/04/2018    BREAST CAPSULOTOMY Left 4/25/2019    Procedure: CAPSULOTOMY, BREAST LEFT;  Surgeon: Duane Bello MD;  Location: [facility] 2ND FLR;  Service: Plastics;  Laterality: Left;    BREAST RECONSTRUCTION      BREAST SURGERY      Bilateral Mastectomy 07/23/2018    CARPAL TUNNEL RELEASE Bilateral     CARPAL TUNNEL RELEASE      COLONOSCOPY      COLONOSCOPY N/A 2/25/2021    Procedure: COLONOSCOPY;  Surgeon: Vincenzo Herbert MD;  Location: [facility] (2ND FLR);  Service: Endoscopy;  Laterality: N/A;  previous anesthesia complications  okay for this date/time per Dr. Herbert and okay with Miranda OC - sm  COVID test on 2/22/21 at [facility]    DILATION AND CURETTAGE OF UTERUS      DILATION AND CURETTAGE OF UTERUS USING SUCTION N/A 2/25/2019    Procedure: DILATION AND CURETTAGE, UTERUS, USING SUCTION  PATHOLOGY NEEDED;  Surgeon: Pam Sifuentes MD;  Location: [facility];  Service: OB/GYN;  Laterality: N/A;    ENDOSCOPIC ULTRASOUND OF UPPER GASTROINTESTINAL TRACT Left 1/15/2021    Procedure: ULTRASOUND, UPPER GI TRACT, ENDOSCOPIC;  Surgeon: Chandan Dillon MD;  Location: [facility];  Service: Endoscopy;  Laterality: Left;   for pancreatic screening    ESOPHAGOGASTRODUODENOSCOPY N/A 1/15/2021    Procedure: EGD (ESOPHAGOGASTRODUODENOSCOPY);  Surgeon: Chandan Dillon MD;  Location: [facility];  Service: Endoscopy;  Laterality: N/A;  with push enteroscopy    ESOPHAGOGASTRODUODENOSCOPY N/A 5/17/2021    Procedure: EGD (ESOPHAGOGASTRODUODENOSCOPY);  Surgeon: Chandan Dlilon MD;  Location: [facility] (2ND FLR);  Service: Endoscopy;  Laterality: N/A;  Please schedule this duodenal polypectomy. Need to be a day that Dr Jose Antonio Stark is in the OR. 90 minutes.   Chandan Dillon MD     COVID at [place] 5/15 (scheduled in the OR for 5/18 - using same COVID results for both procedures) ttr    ESOPHAGOGASTRODUODENOSCOPY N/A 1/25/2022    Procedure: EGD (ESOPHAGOGASTRODUODENOSCOPY);  Surgeon: Chandan Dillon MD;  Location: [facility] (2ND FLR);  Service: Endoscopy;  Laterality: N/A;  poss emr  covid-1/22/22-STAH-[id number]  instructions sent via portal-BB    ESOPHAGOGASTRODUODENOSCOPY N/A 3/10/2023    Procedure: EGD (ESOPHAGOGASTRODUODENOSCOPY);  Surgeon: Chandan Dillon MD;  Location: [facility] (2ND FLR);  Service: Endoscopy;  Laterality: N/A;  inst via portal  called to confirm and msg that phone not accepting calls 3/6 mal    FAT TRANSFER Bilateral 11/12/2018    Procedure: TRANSFER, FAT TISSUE BILATERAL BREAST;  Surgeon: Duane Bello MD;  [facility];  Service: Plastics;  Laterality: Bilateral;    INJECTION FOR SENTINEL NODE IDENTIFICATION Left 7/23/2018    Procedure: INJECTION, FOR SENTINEL NODE IDENTIFICATION;  Surgeon: Sofia Kendrick MD;  [facility];  Service: General;  Laterality: Left;    INSERTION OF BREAST TISSUE EXPANDER Bilateral 7/23/2018    Procedure: INSERTION, TISSUE EXPANDER, BREAST;  Surgeon: Duane Bello MD;  [facility];  Service: Plastics;  Laterality: Bilateral;    INSERTION OF BREAST TISSUE EXPANDER Bilateral 3/10/2020    Procedure: INSERTION, TISSUE  EXPANDER, BREAST, BILATERAL;  Surgeon: Duane Bello MD;  [facility]: [facility] FLR;  Service: Plastics;  Laterality: Bilateral;    intestinal polpy      LYSIS OF ADHESIONS N/A 3/4/2021    Procedure: LYSIS, ADHESIONS;  Surgeon: Clayton Vilchis MD;  Location: [facility];  Service: OB/GYN;  Laterality: N/A;  Omental adhesions    MASTECTOMY      REMOVAL OF BREAST IMPLANT Bilateral 3/10/2020    Procedure: REMOVAL, IMPLANT, BREAST, BILATERAL;  Surgeon: Duane Bello MD;  [facility];  Service: Plastics;  Laterality: Bilateral;    REMOVAL OF BREAST IMPLANT Bilateral 7/7/2020    Procedure: REMOVAL, IMPLANT, BREAST BILATERAL;  Surgeon: Duane Bello MD;  [facility];  Service: Plastics;  Laterality: Bilateral;    SENTINEL LYMPH NODE BIOPSY Left 7/23/2018    Procedure: BIOPSY, LYMPH NODE, SENTINEL;  Surgeon: Sofia Kendrick MD;  Location: [facility] OR [address] [place] [address];  Service: General;  Laterality: Left;    SMALL INTESTINE SURGERY      3 surgeries     TONSILLECTOMY      TOTAL ABDOMINAL HYSTERECTOMY N/A 3/4/2021    Procedure: HYSTERECTOMY, TOTAL, ABDOMINAL;  Surgeon: Clayton Vilchis MD;  Location: [facility];  Service: OB/GYN;  Laterality: N/A;    UPPER GASTROINTESTINAL ENDOSCOPY         Review of patient's allergies indicates:  No Known Allergies    Current Facility-Administered Medications on File Prior to Encounter   Medication    0.9%  NaCl infusion    sodium chloride 0.9% flush 10 mL     Current Outpatient Medications on File Prior to Encounter   Medication Sig    ARIPiprazole (ABILIFY) 10 MG Tab Take 1 tablet (10 mg total) by mouth once daily.    butalbital-acetaminophen-caffeine -40 mg (FIORICET, ESGIC) -40 mg per tablet Take 1 tablet by mouth every 6 (six) hours as needed for Headaches.    clonazePAM (KLONOPIN) 0.5 MG tablet Take 0.5 mg by mouth 2 (two) times daily.    erenumab-aooe (AIMOVIG) 140 mg/mL autoinjector Inject 1 mL (140 mg total) into the  skin every 28 days.    hydrOXYzine pamoate (VISTARIL) 100 MG capsule Take 1 capsule (100 mg total) by mouth every 6 (six) hours as needed (Insomnia or anxiety).    ibuprofen (ADVIL,MOTRIN) 800 MG tablet Take 1 tablet (800 mg total) by mouth 3 (three) times daily as needed.    lisdexamfetamine (VYVANSE) 20 MG capsule Take 1 capsule by mouth every morning.    metoprolol succinate (TOPROL-XL) 25 MG 24 hr tablet Take 1 tablet (25 mg total) by mouth 2 (two) times daily.    montelukast (SINGULAIR) 10 mg tablet Take 10 mg by mouth once daily.    ondansetron (ZOFRAN-ODT) 4 MG TbDL Take 2 tablets (8 mg total) by mouth every 8 (eight) hours as needed (nausea).    propranoloL (INDERAL) 20 MG tablet Take 20 mg by mouth 2 (two) times daily.    RABEprazole (ACIPHEX) 20 mg tablet Take 1 tablet (20 mg total) by mouth once daily.    sucralfate (CARAFATE) 100 mg/mL suspension Take 10 mLs (1 g total) by mouth 4 (four) times daily before meals and nightly. Medication should be take 1 hour prior to meals    traZODone (DESYREL) 150 MG tablet Take 1 tablet (150 mg total) by mouth every evening.    VITAMIN D2 1,250 mcg (50,000 unit) capsule TAKE ONE CAPSULE BY MOUTH EVERY 7 DAYS    [DISCONTINUED] albuterol (ACCUNEB) 1.25 mg/3 mL Nebu Take 3 mLs (1.25 mg total) by nebulization every 6 (six) hours as needed (wheezing). Rescue    [DISCONTINUED] DULoxetine (CYMBALTA) 30 MG capsule Take 1 capsule (30 mg total) by mouth 2 (two) times daily.    [DISCONTINUED] gabapentin (NEURONTIN) 600 MG tablet Take 1 tablet (600 mg total) by mouth every evening.    [DISCONTINUED] paroxetine (PAXIL) 20 MG tablet TAKE ONE TABLET BY MOUTH ONCE A DAY IN THE MORNING     Family History       Problem Relation (Age of Onset)    ADD / ADHD Paternal Uncle    Breast cancer Paternal Aunt, Paternal Grandmother    Cancer Mother    Colon polyps Sister    Dementia Maternal Grandfather, Maternal Grandmother    Depression Paternal Uncle    Hypertension Father     No Known Problems Maternal Uncle, Paternal Grandfather, Cousin    Stomach cancer Mother          Tobacco Use    Smoking status: Every Day     Current packs/day: 0.00     Average packs/day: 1 pack/day for 18.3 years (18.3 ttl pk-yrs)     Types: Cigarettes     Start date: 2000     Last attempt to quit: 2018     Years since quittin.2    Smokeless tobacco: Never    Tobacco comments:     sometimes dry cough per patient   Substance and Sexual Activity    Alcohol use: Yes     Alcohol/week: 5.8 standard drinks of alcohol     Types: 7 Standard drinks or equivalent per week     Comment: occasionally    Drug use: No    Sexual activity: Yes     Partners: Male     Review of Systems   Constitutional:  Negative for activity change, fatigue, fever and unexpected weight change.   HENT:  Negative for congestion, ear pain, hearing loss, rhinorrhea and sore throat.    Eyes:  Negative for redness and visual disturbance.   Respiratory:  Negative for cough, shortness of breath and wheezing.    Cardiovascular:  Negative for chest pain, palpitations and leg swelling.   Gastrointestinal:  Negative for abdominal pain, constipation, diarrhea, nausea and vomiting.   Genitourinary:  Negative for dysuria, frequency and urgency.   Musculoskeletal:  Negative for back pain, joint swelling and neck pain.   Skin:  Negative for color change, rash and wound.   Neurological:  Negative for dizziness, tremors, weakness, light-headedness and headaches.   Psychiatric/Behavioral:  Positive for dysphoric mood and suicidal ideas.      Objective:     Vital Signs (Most Recent):  Temp: 97.8 °F (36.6 °C) (10/10/23 07)  Pulse: 105 (10/10/23 0738)  Resp: 16 (10/10/23 0738)  BP: 121/75 (10/10/23 07)  SpO2: 99 % (10/09/23 1435) Vital Signs (24h Range):  Temp:  [97.2 °F (36.2 °C)-97.8 °F (36.6 °C)] 97.8 °F (36.6 °C)  Pulse:  [103-110] 105  Resp:  [16-18] 16  SpO2:  [99 %] 99 %  BP: (116-124)/(75-88) 121/75     Weight: 84.7 kg (186 lb 11.7  oz)  Body mass index is 34.15 kg/m².     Physical Exam  Vitals and nursing note reviewed.   Constitutional:       General: She is not in acute distress.     Appearance: She is well-developed.   HENT:      Head: Normocephalic and atraumatic.      Right Ear: External ear normal.      Left Ear: External ear normal.      Nose: Nose normal.      Mouth/Throat:      Pharynx: No oropharyngeal exudate or posterior oropharyngeal erythema.   Eyes:      General:         Right eye: No discharge.         Left eye: No discharge.      Conjunctiva/sclera: Conjunctivae normal.      Pupils: Pupils are equal, round, and reactive to light.   Neck:      Thyroid: No thyromegaly.   Cardiovascular:      Rate and Rhythm: Normal rate and regular rhythm.      Heart sounds: No murmur heard.  Pulmonary:      Effort: Pulmonary effort is normal. No respiratory distress.      Breath sounds: Normal breath sounds.   Abdominal:      General: Bowel sounds are normal. There is no distension.      Palpations: Abdomen is soft.      Tenderness: There is no abdominal tenderness.   Skin:     General: Skin is warm and dry.   Neurological:      Mental Status: She is alert and oriented to person, place, and time.      Comments:   Neuro: Cranial nerves:  CN II Visual fields full to confrontation.   CN III, IV, VI Pupils are equal, round, and reactive to light.  CN III: no palsy  Nystagmus: none   Diplopia: none  Ophthalmoparesis: none  CN V Facial sensation intact.   CN VII Facial expression full, symmetric.   CN VIII normal.   CN IX normal.   CN X normal.   CN XI normal.   CN XII normal.       Psychiatric:         Behavior: Behavior normal.         Thought Content: Thought content normal.          Significant Labs: All pertinent labs within the past 24 hours have been reviewed.  CBC:   Recent Labs   Lab 10/09/23  1106   WBC 11.86   HGB 15.1   HCT 44.3        CMP:   Recent Labs   Lab 10/09/23  1106      K 4.1      CO2 24   GLU 98   BUN 6  "  CREATININE 0.8   CALCIUM 9.8   PROT 8.1   ALBUMIN 4.9   BILITOT 0.4   ALKPHOS 178*   AST 24   ALT 43   ANIONGAP 13     Cardiac Markers: No results for input(s): "CKMB", "MYOGLOBIN", "BNP", "TROPISTAT" in the last 48 hours.  Lipase: No results for input(s): "LIPASE" in the last 48 hours.  Lipid Panel:   Recent Labs   Lab 10/09/23  1106   CHOL 227*   HDL 56   LDLCALC 133.6   TRIG 187*   CHOLHDL 24.7     Troponin: No results for input(s): "TROPONINI", "TROPONINIHS" in the last 48 hours.  TSH:   Recent Labs   Lab 10/09/23  1106   TSH 0.899     Urine Studies:   Recent Labs   Lab 10/09/23  1115   COLORU Yellow   APPEARANCEUA Clear   PHUR >8.0   SPECGRAV 1.020   PROTEINUA Negative   GLUCUA Negative   KETONESU Negative   BILIRUBINUA Negative   OCCULTUA Negative   NITRITE Negative   UROBILINOGEN Negative   LEUKOCYTESUR Negative       Significant Imaging: I have reviewed all pertinent imaging results/findings within the past 24 hours.    Assessment/Plan:     * Suicidal ideations  Management per psych team      GERD (gastroesophageal reflux disease)  Cont PPI-aciphex at home but not on formulary so started protonix 40mg daily here      Palpitations  Cont home propranolol 20mg BID-reordered        VTE Risk Mitigation (From admission, onward)    None              Thank you for your consult. I will sign off. Please contact us if you have any additional questions.    Snow Williamson MD  Department of Hospital Medicine   [facility]    "

## 2023-10-11 PROCEDURE — 25000003 PHARM REV CODE 250: Performed by: STUDENT IN AN ORGANIZED HEALTH CARE EDUCATION/TRAINING PROGRAM

## 2023-10-11 PROCEDURE — 11400000 HC PSYCH PRIVATE ROOM

## 2023-10-11 PROCEDURE — 99233 SBSQ HOSP IP/OBS HIGH 50: CPT | Mod: ,,, | Performed by: STUDENT IN AN ORGANIZED HEALTH CARE EDUCATION/TRAINING PROGRAM

## 2023-10-11 PROCEDURE — 25000003 PHARM REV CODE 250: Performed by: PSYCHIATRY & NEUROLOGY

## 2023-10-11 PROCEDURE — 99233 PR SUBSEQUENT HOSPITAL CARE,LEVL III: ICD-10-PCS | Mod: ,,, | Performed by: STUDENT IN AN ORGANIZED HEALTH CARE EDUCATION/TRAINING PROGRAM

## 2023-10-11 PROCEDURE — 90833 PR PSYCHOTHERAPY W/PATIENT W/E&M, 30 MIN (ADD ON): ICD-10-PCS | Mod: ,,, | Performed by: STUDENT IN AN ORGANIZED HEALTH CARE EDUCATION/TRAINING PROGRAM

## 2023-10-11 PROCEDURE — 90833 PSYTX W PT W E/M 30 MIN: CPT | Mod: ,,, | Performed by: STUDENT IN AN ORGANIZED HEALTH CARE EDUCATION/TRAINING PROGRAM

## 2023-10-11 PROCEDURE — S4991 NICOTINE PATCH NONLEGEND: HCPCS | Performed by: PSYCHIATRY & NEUROLOGY

## 2023-10-11 PROCEDURE — 25000003 PHARM REV CODE 250: Performed by: INTERNAL MEDICINE

## 2023-10-11 RX ORDER — LITHIUM CARBONATE 300 MG/1
600 TABLET, FILM COATED, EXTENDED RELEASE ORAL NIGHTLY
Status: DISCONTINUED | OUTPATIENT
Start: 2023-10-11 | End: 2023-10-15 | Stop reason: HOSPADM

## 2023-10-11 RX ADMIN — PROPRANOLOL HYDROCHLORIDE 20 MG: 20 TABLET ORAL at 08:10

## 2023-10-11 RX ADMIN — TRAZODONE HYDROCHLORIDE 150 MG: 150 TABLET ORAL at 08:10

## 2023-10-11 RX ADMIN — ARIPIPRAZOLE 15 MG: 10 TABLET ORAL at 08:10

## 2023-10-11 RX ADMIN — CLONAZEPAM 0.5 MG: 0.5 TABLET ORAL at 08:10

## 2023-10-11 RX ADMIN — NICOTINE 1 PATCH: 14 PATCH, EXTENDED RELEASE TRANSDERMAL at 08:10

## 2023-10-11 RX ADMIN — LITHIUM CARBONATE 600 MG: 300 TABLET, FILM COATED, EXTENDED RELEASE ORAL at 08:10

## 2023-10-11 RX ADMIN — PANTOPRAZOLE SODIUM 40 MG: 40 TABLET, DELAYED RELEASE ORAL at 08:10

## 2023-10-11 NOTE — PLAN OF CARE
"  Problem: Mood Impairment (Depressive Signs/Symptoms)  Goal: Improved Mood Symptoms (Depressive Signs/Symptoms)  Outcome: Ongoing, Progressing  Intervention: Promote Mood Improvement  Flowsheets (Taken 10/11/2023 2011)  Supportive Measures:   active listening utilized   counseling provided   decision-making supported   self-reflection promoted   verbalization of feelings encouraged   Process Group        Behavior:  Pt attended group dressed in personal clothing. Pt attentive and engaged.      Intervention:     Process discussion on symptoms of indication of processing thoughts with discharge planning. Patients were encouraged to identify ways to cope with and reflect on what are the plans when they are discharged and what coping skills did they learn while in the hospital?        Response:  Pt affect depressed with euthymic mood. Pt states she had been reading affirmations and identified "I am not my mistake" as a positive statement she'll say to herself daily and is looking forward to going home and continuing to be positive with herself.      Plan:  Staff will continue to assess and obtain collaterals as needed.  Staff will coordinate discharge to home with residential treatment when deemed stable.       "

## 2023-10-11 NOTE — PLAN OF CARE
Pt is sleeping at this time and has slept 6.5 hours with 1 awakening.  NAD.  Resp even & unlabored.  Pathways clear.  Q 15 minute safety checks ongoing.  All precautions maintained

## 2023-10-11 NOTE — PROGRESS NOTES
"   10/11/23 5087   [facility] Group Therapy   Group Name Therapeutic Recreation   Specific Interventions Cognitive Stimulation Training   Participation Level Appropriate   Participation Quality Cooperative   Insight/Motivation Improved   Affect/Mood Display Depressed   Cognition Alert   Psychomotor WNL     Patient presents depressed, reports better mood, not as depressed, willingly participates in group activities, shared she had been reading affirmations and identified "I am not my mistake" as a positive statement she'll say to herself daily.  "

## 2023-10-11 NOTE — PLAN OF CARE
Pt is calm and cooperative.  Denies any S/I or H/I at this time.  Pt reports feeling better.  Sleep and appetite normal.  She states she is feeling better since being here.  Mood continues to improve.  Somewhat flat and depressed.  Encouraged to continue complying with treatment, understanding verbalized.  No acute distress apparent at this time, will continue to monitor.

## 2023-10-11 NOTE — PROGRESS NOTES
"   10/11/23 1010   [facility] Group Therapy   Group Name Education   Specific Interventions Coping Skills Training   Participation Level Appropriate;Sharing   Participation Quality Cooperative   Insight/Motivation Improved   Affect/Mood Display Appropriate   Cognition Alert   Psychomotor WNL     Patient presents calm, cooperative, reports a "better" mood. Patient shared making change is important and benefit is "to better myself." Patient identified she want to stop negative thinking and benefits are "open more opportunities, feel better. Patient identified coping skills to help are saying affirmations and focus on gratitude.  "

## 2023-10-11 NOTE — PROGRESS NOTES
"PSYCHIATRY DAILY INPATIENT PROGRESS NOTE  SUBSEQUENT HOSPITAL VISIT    ENCOUNTER DATE: 10/11/2023  SITE: [facility]    DATE OF ADMISSION: 10/9/2023  2:40 PM  LENGTH OF STAY: 2 days      CHIEF COMPLAINT   Staci Hodge is a 43 y.o. female, seen during daily garcia rounds on the inpatient unit.  Staci Hodge presented with the chief complaint of depression with SI, "I started cutting and having really bad suicidal thoughts"      The patient was seen and examined. The chart was reviewed.     Reviewed notes from MD, CTRS, and SW and labs from the last 24 hours.    The patient's case was discussed with the treatment team/care providers today including Rns    Staff reports no behavioral or management issues.     The patient has been compliant with treatment.      Subjective 10/11/2023       Today the patient reports she feels a little better than yesterday. She notes continued but less symptoms of depression/anxiety.     She has good social support. Triggers were identified- grief and trauma were the mostly likely triggers.     SI persist and remains more intense than her chronic SI.     Discussed coping skills and challenging negative thoughts.     The patient denies any side effects to medications.      Psychiatric ROS (observed, reported, or endorsed/denied):  Depressed mood - Continuing  Interest/pleasure/anhedonia: Continuing  Guilt/hopelessness/worthlessness - Continuing  Changes in Sleep - Continuing  Changes in Appetite - Continuing  Changes in Concentration - Continuing  Changes in Energy - Continuing  PMA/R- Continuing  Suicidal- active/passive ideations - Continuing  Homicidal ideations: active/passive ideations - No    Hallucinations - No  Delusions - No  Disorganized behavior - No  Disorganized speech - No  Negative symptoms - No    Elevated mood - No  Decreased need for sleep - No  Grandiosity - No  Racing thoughts - No  Impulsivity - No  Irritability- No  Increased energy - " No  Distractibility - No  Increase in goal-directed activity or PMA- No    Symptoms of MARY LOU - Continuing  Symptoms of Panic Disorder- No  Symptoms of PTSD - Continuing        Overall progress: Patient is showing mild improvement         Psychotherapy:  Target symptoms: depression, anxiety   Why chosen therapy is appropriate versus another modality: relevant to diagnosis, patient responds to this modality, evidence based practice  Outcome monitoring methods: self-report, observation  Therapeutic intervention type: insight oriented psychotherapy, behavior modifying psychotherapy, supportive psychotherapy, interactive psychotherapy  Topics discussed/themes: building skills sets for symptom management, symptom recognition  The patient's response to the intervention is accepting. The patient's progress toward treatment goals is fair.   Duration of intervention: 16 minutes.        Medical ROS  General ROS: negative  Ophthalmic ROS: negative  ENT ROS: negative  Allergy and Immunology ROS: negative  Hematological and Lymphatic ROS: negative  Endocrine ROS: negative  Respiratory ROS: no cough, shortness of breath, or wheezing  Cardiovascular ROS: no chest pain or dyspnea on exertion  Gastrointestinal ROS: no abdominal pain, change in bowel habits, or black or bloody stools  Genito-Urinary ROS: no dysuria, trouble voiding, or hematuria  Musculoskeletal ROS: negative  Neurological ROS: no TIA or stroke symptoms  Dermatological ROS: negative      PAST MEDICAL HISTORY   Past Medical History:   Diagnosis Date    Abnormal Pap smear of cervix age 25    no treatement    Anxiety     Breast cancer     Cancer 07/11/2018    breast cancer    Cervical spondylosis 2/9/2018    Colon polyp     Depression     Ectopic pregnancy 2/26/2019    General anesthetics causing adverse effect in therapeutic use     Slow to awaken/ Memory problems-post-op to day 3 after Mastectomy    Headache     History of psychiatric hospitalization     age 20 for SI     Hx of psychiatric care     Hypertension     Peutz-Jeghers syndrome     PONV (postoperative nausea and vomiting)     Psychiatric problem     Right carpal tunnel syndrome 3/9/2017    Self-harming behavior     Suicide attempt     Therapy            PSYCHOTROPIC MEDICATIONS   Scheduled Meds:   ARIPiprazole  15 mg Oral Daily    clonazePAM  0.5 mg Oral BID    lithium  300 mg Oral QHS    pantoprazole  40 mg Oral Daily    propranoloL  20 mg Oral BID    traZODone  150 mg Oral QHS     Continuous Infusions:  PRN Meds:.acetaminophen, aluminum-magnesium hydroxide-simethicone, benzonatate, benztropine mesylate, hydrOXYzine pamoate, loperamide, nicotine, OLANZapine **AND** OLANZapine, ondansetron, promethazine        EXAMINATION    VITALS   Vitals:    10/10/23 0738 10/10/23 1952 10/11/23 0756 10/11/23 0800   BP: 121/75 124/84 124/88    BP Location: Right arm Right arm Right arm    Patient Position: Sitting Sitting Sitting    Pulse: 105 91 95    Resp: 16 20 18    Temp: 97.8 °F (36.6 °C) 96.8 °F (36 °C) 97.8 °F (36.6 °C)    TempSrc: Temporal Temporal Temporal    SpO2:       Weight:    85.3 kg (188 lb 2.6 oz)   Height:           Body mass index is 34.42 kg/m².      CONSTITUTIONAL  General Appearance: unremarkable, age appropriate, well nourished, obese     MUSCULOSKELETAL  Muscle Strength and Tone:no dyskinesia, no dystonia, no tremor, no tic  Abnormal Involuntary Movements: No  Gait and Station: non-ataxic     PSYCHIATRIC   Level of Consciousness: awake and alert   Orientation: person, place, time and situation  Grooming: less Disheveled and casual garb  Psychomotor Behavior: normal, friendly and cooperative, eye contact normal, no PMA/R  Speech: normal tone, normal rate, normal pitch, normal volume, spontaneous  Language: grossly intact, able to name, able to repeat  Mood: ok  Affect: Anxious and Constricted  Thought Process: linear, logical  Associations: intact   Thought Content: +SI, denies HI and no delusions  Perceptions:  denies AH and denies  VH  Memory: Able to recall past events, Remote intact and Recent intact  Attention:Normal and Attends to interview without distraction  Fund of Knowledge: Aware of current events and Vocabulary appropriate   Estimate if Intelligence:  Average based on work/education history, vocabulary and mental status exam  Insight: intact, has awareness of illness- fair  Judgment: impaired due to depression; fair        DIAGNOSTIC TESTING   Laboratory Results  No results found for this or any previous visit (from the past 24 hour(s)).            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  MARY LOU  Panic Disorder with agoraphobia  Social Anxiety Disorder  PTSD  OCD     Nicotine Dependence     Borderline Personality Disorder     H/o Breast Cancer s/p mastectomy and reconstructive surgery  Peutz-Jeghers syndrome  Right Carpal Tunnel syndrome  Cervical spondylosis  Chronic pain         PROBLEM LIST AND MANAGEMENT PLANS         Mood: pt counseled  -resume home Abilify but increase from 10 to 12 mg po q day with plans to titrate to 15 mg po q day- increase to 15 mg po q day today  -started/continue re-trial of adjunctive Lithium CR at 300 mg po q HS- consider optimizing further; check level in 3 days  -increase to 600 mg PO qhs tonight (stabilized on this dose last admit, tolerated well previously)        Insomnia: pt counseled  -vistaril prn  -resumed/continue off-label Trazodone 150 mg po q HS- will optimize as indicated     Anxiety/PTSD: pt counseled  -resumed/continue home klonopin at lower dose of 0.5 mg po BID with plans to taper down/off as able  -resumed/continue home vistaril 100 mg po q 6 hours prn     Nicotine Dependence: pt counseld  -started/continue nicotine 14 mg patch dermal q day     Borderline Personality Disorder: pt counseled  -meds off label as above     Chronic pain: pt counseled  -consider  re-trial of gabapentin          Discussed diagnosis, risks and benefits of proposed treatment vs alternative treatments vs no treatment, potential side effects of these treatments and the inherent unpredictability of treatment. The patient expresses understanding of the above and displays the capacity to agree with this treatment given said understanding. Patient also agrees that, currently, the benefits outweigh the risks and would like to pursue/continue treatment at this time.    Any medications being used off-label were discussed with the patient inclusive of the evidence base for the use of the medications and consent was obtained for the off-label use of the medication.       DISCHARGE PLANNING  Expected Disposition Plan: Home or Self Care      NEED FOR CONTINUED HOSPITALIZATION  Psychiatric illness continues to pose a potential threat to life or bodily function, of self or others, thereby requiring the need for continued inpatient psychiatric hospitalization: Yes, due to: danger to self and suicidal ideation, as evidenced by:  Ongoing concerns with SI.    Protective inpatient pyschiatric hospitalization required while a safe disposition plan is enacted: Yes    Patient stabilized and ready for discharge from inpatient psychiatric unit: No        STAFF:   Adeel Garcia III, MD  Psychiatry

## 2023-10-11 NOTE — PLAN OF CARE
"Patient states that she feels "alright" today, spending majority of time in room reading and occasionally on telephone. Sad mood. Denies SI at present time. Denies HI at this time. Denies hallucinations. Appetite adequate. Denies sleep disturbances. Medication compliant. NADN. Remains calm and cooperative. Safety precautions remain in place  "

## 2023-10-12 PROCEDURE — 11400000 HC PSYCH PRIVATE ROOM

## 2023-10-12 PROCEDURE — 99233 PR SUBSEQUENT HOSPITAL CARE,LEVL III: ICD-10-PCS | Mod: ,,, | Performed by: STUDENT IN AN ORGANIZED HEALTH CARE EDUCATION/TRAINING PROGRAM

## 2023-10-12 PROCEDURE — 25000003 PHARM REV CODE 250: Performed by: STUDENT IN AN ORGANIZED HEALTH CARE EDUCATION/TRAINING PROGRAM

## 2023-10-12 PROCEDURE — 25000003 PHARM REV CODE 250: Performed by: PSYCHIATRY & NEUROLOGY

## 2023-10-12 PROCEDURE — 99233 SBSQ HOSP IP/OBS HIGH 50: CPT | Mod: ,,, | Performed by: STUDENT IN AN ORGANIZED HEALTH CARE EDUCATION/TRAINING PROGRAM

## 2023-10-12 PROCEDURE — 90833 PSYTX W PT W E/M 30 MIN: CPT | Mod: ,,, | Performed by: STUDENT IN AN ORGANIZED HEALTH CARE EDUCATION/TRAINING PROGRAM

## 2023-10-12 PROCEDURE — 25000003 PHARM REV CODE 250: Performed by: INTERNAL MEDICINE

## 2023-10-12 PROCEDURE — 90833 PR PSYCHOTHERAPY W/PATIENT W/E&M, 30 MIN (ADD ON): ICD-10-PCS | Mod: ,,, | Performed by: STUDENT IN AN ORGANIZED HEALTH CARE EDUCATION/TRAINING PROGRAM

## 2023-10-12 RX ADMIN — ARIPIPRAZOLE 15 MG: 10 TABLET ORAL at 08:10

## 2023-10-12 RX ADMIN — CLONAZEPAM 0.5 MG: 0.5 TABLET ORAL at 08:10

## 2023-10-12 RX ADMIN — LITHIUM CARBONATE 600 MG: 300 TABLET, FILM COATED, EXTENDED RELEASE ORAL at 08:10

## 2023-10-12 RX ADMIN — PROPRANOLOL HYDROCHLORIDE 20 MG: 20 TABLET ORAL at 08:10

## 2023-10-12 RX ADMIN — TRAZODONE HYDROCHLORIDE 150 MG: 150 TABLET ORAL at 08:10

## 2023-10-12 RX ADMIN — PANTOPRAZOLE SODIUM 40 MG: 40 TABLET, DELAYED RELEASE ORAL at 08:10

## 2023-10-12 NOTE — PROGRESS NOTES
"PSYCHIATRY DAILY INPATIENT PROGRESS NOTE  SUBSEQUENT HOSPITAL VISIT    ENCOUNTER DATE: 10/12/2023  SITE: [facility]    DATE OF ADMISSION: 10/9/2023  2:40 PM  LENGTH OF STAY: 3 days      CHIEF COMPLAINT   Staci Hodge is a 43 y.o. female, seen during daily garcia rounds on the inpatient unit.  Staci Hodge presented with the chief complaint of depression with SI, "I started cutting and having really bad suicidal thoughts"      The patient was seen and examined. The chart was reviewed.     Reviewed notes from MD, CTRS, and SW and labs from the last 24 hours.    The patient's case was discussed with the treatment team/care providers today including Rns    Staff reports no behavioral or management issues.     The patient has been compliant with treatment.      Subjective 10/12/2023       Today the patient reports she is making progress. She has acquired some insight after discussion today that SI is partially due to negative assumption that people do not like her. Explored evidence for this and patient recognizes this negative thought is a cognitive distortion/ANT after discussion. Discussed cognitive model of depression and motivated patient to challenge her negative thoughts. Encouraged patient to consider exercise as another coping skill and she agrees. She is continuing to enjoy positive affirmation such as "I am enough," and "I am not my mistakes." Praised patient for her progress.    The patient denies any side effects to medications.          Psychiatric ROS (observed, reported, or endorsed/denied):  Depressed mood - less  Interest/pleasure/anhedonia: less  Guilt/hopelessness/worthlessness - fluctuating  Changes in Sleep - less  Changes in Appetite - less  Changes in Concentration - less  Changes in Energy - fluctuating  PMA/R- fluctuating  Suicidal- active/passive ideations - less  Homicidal ideations: active/passive ideations - No    Hallucinations - No  Delusions - No  Disorganized " behavior - No  Disorganized speech - No  Negative symptoms - No    Elevated mood - No  Decreased need for sleep - No  Grandiosity - No  Racing thoughts - No  Impulsivity - No  Irritability- No  Increased energy - No  Distractibility - No  Increase in goal-directed activity or PMA- No    Symptoms of MARY LOU - fluctuating  Symptoms of Panic Disorder- No  Symptoms of PTSD - fluctuating        Overall progress: Patient is showing mild improvement         Psychotherapy:  Target symptoms: depression, anxiety   Why chosen therapy is appropriate versus another modality: relevant to diagnosis, patient responds to this modality, evidence based practice  Outcome monitoring methods: self-report, observation  Therapeutic intervention type: insight oriented psychotherapy, behavior modifying psychotherapy, supportive psychotherapy, interactive psychotherapy  Topics discussed/themes: building skills sets for symptom management, symptom recognition  The patient's response to the intervention is accepting. The patient's progress toward treatment goals is fair.   Duration of intervention: 17 minutes.        Medical ROS  General ROS: negative  Ophthalmic ROS: negative  ENT ROS: negative  Allergy and Immunology ROS: negative  Hematological and Lymphatic ROS: negative  Endocrine ROS: negative  Respiratory ROS: no cough, shortness of breath, or wheezing  Cardiovascular ROS: no chest pain or dyspnea on exertion  Gastrointestinal ROS: no abdominal pain, change in bowel habits, or black or bloody stools  Genito-Urinary ROS: no dysuria, trouble voiding, or hematuria  Musculoskeletal ROS: negative  Neurological ROS: no TIA or stroke symptoms  Dermatological ROS: negative      PAST MEDICAL HISTORY   Past Medical History:   Diagnosis Date    Abnormal Pap smear of cervix age 25    no treatement    Anxiety     Breast cancer     Cancer 07/11/2018    breast cancer    Cervical spondylosis 2/9/2018    Colon polyp     Depression     Ectopic pregnancy  2/26/2019    General anesthetics causing adverse effect in therapeutic use     Slow to awaken/ Memory problems-post-op to day 3 after Mastectomy    Headache     History of psychiatric hospitalization     age 20 for SI    Hx of psychiatric care     Hypertension     Peutz-Jeghers syndrome     PONV (postoperative nausea and vomiting)     Psychiatric problem     Right carpal tunnel syndrome 3/9/2017    Self-harming behavior     Suicide attempt     Therapy            PSYCHOTROPIC MEDICATIONS   Scheduled Meds:   ARIPiprazole  15 mg Oral Daily    clonazePAM  0.5 mg Oral BID    lithium  600 mg Oral QHS    pantoprazole  40 mg Oral Daily    propranoloL  20 mg Oral BID    traZODone  150 mg Oral QHS     Continuous Infusions:  PRN Meds:.acetaminophen, aluminum-magnesium hydroxide-simethicone, benzonatate, benztropine mesylate, hydrOXYzine pamoate, loperamide, nicotine, OLANZapine **AND** OLANZapine, ondansetron, promethazine        EXAMINATION    VITALS   Vitals:    10/11/23 0756 10/11/23 0800 10/11/23 1936 10/12/23 0729   BP: 124/88  124/78 113/72   BP Location: Right arm  Right arm Right arm   Patient Position: Sitting  Sitting Lying   Pulse: 95  85 89   Resp: 18  18 16   Temp: 97.8 °F (36.6 °C)  96.8 °F (36 °C) 97.5 °F (36.4 °C)   TempSrc: Temporal  Temporal Temporal   SpO2:       Weight:  85.3 kg (188 lb 2.6 oz)     Height:           Body mass index is 34.42 kg/m².      CONSTITUTIONAL  General Appearance: unremarkable, age appropriate, well nourished, obese     MUSCULOSKELETAL  Muscle Strength and Tone:no dyskinesia, no dystonia, no tremor, no tic  Abnormal Involuntary Movements: No  Gait and Station: non-ataxic     PSYCHIATRIC   Level of Consciousness: awake and alert   Orientation: person, place, time and situation  Grooming: less Disheveled and casual garb  Psychomotor Behavior: normal, friendly and cooperative, eye contact normal, no PMA/R  Speech: normal tone, normal rate, normal pitch, normal volume,  spontaneous  Language: grossly intact, able to name, able to repeat  Mood: alright  Affect: Anxious and Constricted  Thought Process: linear, logical  Associations: intact   Thought Content: less SI, denies HI and no delusions  Perceptions: denies AH and denies  VH  Memory: Able to recall past events, Remote intact and Recent intact  Attention:Normal and Attends to interview without distraction  Fund of Knowledge: Aware of current events and Vocabulary appropriate   Estimate if Intelligence:  Average based on work/education history, vocabulary and mental status exam  Insight: intact, has awareness of illness- fair  Judgment: improving        DIAGNOSTIC TESTING   Laboratory Results  No results found for this or any previous visit (from the past 24 hour(s)).            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  MARY LOU  Panic Disorder with agoraphobia  Social Anxiety Disorder  PTSD  OCD     Nicotine Dependence     Borderline Personality Disorder     H/o Breast Cancer s/p mastectomy and reconstructive surgery  Peutz-Jeghers syndrome  Right Carpal Tunnel syndrome  Cervical spondylosis  Chronic pain         PROBLEM LIST AND MANAGEMENT PLANS         Mood: pt counseled  -resume home Abilify but increase from 10 to 12 mg po q day with plans to titrate to 15 mg po q day- increase to 15 mg po q day   -started/continue re-trial of adjunctive Lithium CR at 300 mg po q HS- consider optimizing further; check level in 3 days  -increase to 600 mg PO qhs tonight (stabilized on this dose last admit, tolerated well previously), will check level for toxicity        Insomnia: pt counseled  -vistaril prn  -resumed/continue off-label Trazodone 150 mg po q HS- will optimize as indicated     Anxiety/PTSD: pt counseled  -resumed/continue home klonopin at lower dose of 0.5 mg po BID with plans to taper down/off as able  -resumed/continue  home vistaril 100 mg po q 6 hours prn     Nicotine Dependence: pt counseld  -started/continue nicotine 14 mg patch dermal q day     Borderline Personality Disorder: pt counseled  -meds off label as above     Chronic pain: pt counseled  -consider re-trial of gabapentin          Discussed diagnosis, risks and benefits of proposed treatment vs alternative treatments vs no treatment, potential side effects of these treatments and the inherent unpredictability of treatment. The patient expresses understanding of the above and displays the capacity to agree with this treatment given said understanding. Patient also agrees that, currently, the benefits outweigh the risks and would like to pursue/continue treatment at this time.    Any medications being used off-label were discussed with the patient inclusive of the evidence base for the use of the medications and consent was obtained for the off-label use of the medication.       DISCHARGE PLANNING  Expected Disposition Plan: Home or Self Care      NEED FOR CONTINUED HOSPITALIZATION  Psychiatric illness continues to pose a potential threat to life or bodily function, of self or others, thereby requiring the need for continued inpatient psychiatric hospitalization: Yes, due to: danger to self and suicidal ideation, as evidenced by:  Ongoing concerns with SI.    Protective inpatient pyschiatric hospitalization required while a safe disposition plan is enacted: Yes    Patient stabilized and ready for discharge from inpatient psychiatric unit: No        STAFF:   Adeel Garcia III, MD  Psychiatry

## 2023-10-12 NOTE — PLAN OF CARE
Pt is calm and cooperative.  She denies any S/I or H/I at this time.  Reports she is doing much better.  Sleep and appetite good.  States she hopes to go back home soon.  Mood continues to improve.  Encouraged pt to continue complying with treatment, understanding verbalized.  No acute distress apparent at this time, will continue to monitor.

## 2023-10-12 NOTE — PROGRESS NOTES
"   10/12/23 1000   [facility] Group Therapy   Group Name Therapeutic Recreation   Specific Interventions Cognitive Stimulation Training  (improve communication and leisure skills)   Participation Level Appropriate   Participation Quality Cooperative   Insight/Motivation Good   Affect/Mood Display Appropriate   Cognition Alert   Psychomotor WNL     Patient presents less depressed, smiling, reports a "better, good" mood. Patient verbalized the medicine, reading affirmations and a combination of being around people, "really helped."  "

## 2023-10-12 NOTE — CONSULTS
[facility]  Adult Nutrition  Consult Note    SUMMARY     Recommendations  1. Rec continue regular diet.   2. RD to follow and make recs accordingly.    Intervention  1. Collaboration with other providers    Goals: Pt will continue to meet at least 75% ENN by RD follow up.  Nutrition Goal Status: new  Communication of RD Recs: other (comment) (POC)    Assessment and Plan    No nutrition diagnosis at this time. Please re-consult if any acute nutrition concerns arise prior to RD follow up on Thursday, 10/19/2023.          Malnutrition Assessment       No NFPE -- PEC        Pt does not meet positive criteria at this time                        Reason for Assessment    Reason For Assessment: consult  Diagnosis: psychological disorder (SI)  Past Medical History:   Diagnosis Date    Abnormal Pap smear of cervix age 25    no treatement    Anxiety     Breast cancer     Cancer 07/11/2018    breast cancer    Cervical spondylosis 2/9/2018    Colon polyp     Depression     Ectopic pregnancy 2/26/2019    General anesthetics causing adverse effect in therapeutic use     Slow to awaken/ Memory problems-post-op to day 3 after Mastectomy    Headache     History of psychiatric hospitalization     age 20 for SI    Hx of psychiatric care     Hypertension     Peutz-Jeghers syndrome     PONV (postoperative nausea and vomiting)     Psychiatric problem     Right carpal tunnel syndrome 3/9/2017    Self-harming behavior     Suicide attempt     Therapy      Interdisciplinary Rounds: did not attend  General Information Comments:   44 yo F new admit to U for suicidal ideations. On regular diet -- consuming 50-75% with snacks. Meeting ENN at this time. GI system WDL. Weight stable per chart review. Will continue to monitor for any nutrition related changes.  Nutrition Discharge Planning: Pt to dc on general healthful diet    Nutrition Risk Screen    Nutrition Risk Screen: no indicators present    Nutrition/Diet  "History    Vitamin/Mineral/Herbal Supplements: D2  Food Allergies: NKFA  Factors Affecting Nutritional Intake: None identified at this time    Anthropometrics    Temp: 97.5 °F (36.4 °C)  Height Method: Measured  Height: 5' 2" (157.5 cm)  Height (inches): 62 in  Weight Method: Standard Scale  Weight: 85.3 kg (188 lb 2.6 oz)  Weight (lb): 188.16 lb  Ideal Body Weight (IBW), Female: 110 lb  % Ideal Body Weight, Female (lb): 169.75 %  BMI (Calculated): 34.4  BMI Grade: 30 - 34.9- obesity - grade I       Lab/Procedures/Meds    Pertinent Labs Reviewed: reviewed  Pertinent Labs Comments: 10/9/2023 - ALP: 178 (H), cholesterol: 277 (H), T (H)  Pertinent Medications Reviewed: reviewed  Pertinent Medications Comments: Abilify, Klonopin, lithium, PPI, propranolol, trazadone, nicotine patch    Physical Findings/Assessment         Estimated/Assessed Needs    Weight Used For Calorie Calculations: 85.3 kg (188 lb 0.8 oz)  Energy Calorie Requirements (kcal): 1465  Energy Need Method: Aurora-St Jeor  Protein Requirements: 68-85 g (0.8-1 g/kg)  Weight Used For Protein Calculations: 85.3 kg (188 lb 0.8 oz)  Fluid Requirements (mL): 8917-8724  Estimated Fluid Requirement Method: other (see comments) (age method - 30-35 mL/kg IBW)  RDA Method (mL): 1465         Nutrition Prescription Ordered    Current Diet Order: regular    Evaluation of Received Nutrient/Fluid Intake    % Kcal Needs: > 125%  % Protein Needs: %  I/O: N/A  Energy Calories Required: exceeds needs  Protein Required: meeting needs  Fluid Required: meeting needs  Tolerance: tolerating  % Intake of Estimated Energy Needs: Other: > 125%  % Meal Intake: 50 - 75 % 100% snack x 2 on 10/11/2023    Nutrition Risk    Level of Risk/Frequency of Follow-up: low (1 x per week)       Monitor and Evaluation    Food and Nutrient Intake: energy intake, food and beverage intake  Food and Nutrient Adminstration: diet order  Knowledge/Beliefs/Attitudes: beliefs and " attitudes  Physical Activity and Function: nutrition-related ADLs and IADLs, factors affecting access to physical activity  Anthropometric Measurements: height/length, weight, weight change, body mass index  Biochemical Data, Medical Tests and Procedures: electrolyte and renal panel, gastrointestinal profile, glucose/endocrine profile, inflammatory profile, lipid profile       Nutrition Follow-Up    RD Follow-up?: Yes

## 2023-10-12 NOTE — PROGRESS NOTES
"   10/12/23 7164   [facility] Group Therapy   Group Name Other  (leisure skills crossword puzzle)   Specific Interventions Leisure Counseling   Participation Level Appropriate;Sharing;Attentive   Participation Quality Cooperative   Insight/Motivation Applies New Skills;Good   Affect/Mood Display Appropriate   Cognition Alert   Psychomotor WNL     Patient presents calm, cooperative, reports good mood, identified words that relate to leisure. Patient shared she liked the word growth, "I always have room to improve myself and grow." Patient identified coping skill  of exercising and eating healthy for improving her self-esteem.  "

## 2023-10-12 NOTE — PLAN OF CARE
Recommendations  1. Rec continue regular diet.   2. RD to follow and make recs accordingly.    Intervention  1. Collaboration with other providers    Goals: Pt will continue to meet at least 75% ENN by RD follow up.  Nutrition Goal Status: new

## 2023-10-12 NOTE — PLAN OF CARE
"Patient states that she feels "pretty good" today, spending majority of time in hallway on telephone and in activity room. Mood improving. Denies SI at present time. Denies HI at this time. Denies hallucinations. Appetite adequate. Denies sleep disturbances. Medication compliant. NADN. Remains calm and cooperative. Safety precautions remain in place  "

## 2023-10-13 PROCEDURE — S4991 NICOTINE PATCH NONLEGEND: HCPCS | Performed by: PSYCHIATRY & NEUROLOGY

## 2023-10-13 PROCEDURE — 90833 PR PSYCHOTHERAPY W/PATIENT W/E&M, 30 MIN (ADD ON): ICD-10-PCS | Mod: ,,, | Performed by: STUDENT IN AN ORGANIZED HEALTH CARE EDUCATION/TRAINING PROGRAM

## 2023-10-13 PROCEDURE — 25000003 PHARM REV CODE 250: Performed by: PSYCHIATRY & NEUROLOGY

## 2023-10-13 PROCEDURE — 25000003 PHARM REV CODE 250: Performed by: STUDENT IN AN ORGANIZED HEALTH CARE EDUCATION/TRAINING PROGRAM

## 2023-10-13 PROCEDURE — 25000003 PHARM REV CODE 250: Performed by: INTERNAL MEDICINE

## 2023-10-13 PROCEDURE — 11400000 HC PSYCH PRIVATE ROOM

## 2023-10-13 PROCEDURE — 90833 PSYTX W PT W E/M 30 MIN: CPT | Mod: ,,, | Performed by: STUDENT IN AN ORGANIZED HEALTH CARE EDUCATION/TRAINING PROGRAM

## 2023-10-13 PROCEDURE — 99233 PR SUBSEQUENT HOSPITAL CARE,LEVL III: ICD-10-PCS | Mod: ,,, | Performed by: STUDENT IN AN ORGANIZED HEALTH CARE EDUCATION/TRAINING PROGRAM

## 2023-10-13 PROCEDURE — 99233 SBSQ HOSP IP/OBS HIGH 50: CPT | Mod: ,,, | Performed by: STUDENT IN AN ORGANIZED HEALTH CARE EDUCATION/TRAINING PROGRAM

## 2023-10-13 RX ADMIN — NICOTINE 1 PATCH: 14 PATCH, EXTENDED RELEASE TRANSDERMAL at 07:10

## 2023-10-13 RX ADMIN — CLONAZEPAM 0.5 MG: 0.5 TABLET ORAL at 08:10

## 2023-10-13 RX ADMIN — PROPRANOLOL HYDROCHLORIDE 20 MG: 20 TABLET ORAL at 08:10

## 2023-10-13 RX ADMIN — LITHIUM CARBONATE 600 MG: 300 TABLET, FILM COATED, EXTENDED RELEASE ORAL at 08:10

## 2023-10-13 RX ADMIN — PANTOPRAZOLE SODIUM 40 MG: 40 TABLET, DELAYED RELEASE ORAL at 08:10

## 2023-10-13 RX ADMIN — ARIPIPRAZOLE 15 MG: 10 TABLET ORAL at 08:10

## 2023-10-13 RX ADMIN — TRAZODONE HYDROCHLORIDE 150 MG: 150 TABLET ORAL at 08:10

## 2023-10-13 NOTE — PROGRESS NOTES
"   10/13/23 1000   [facility] Group Therapy   Group Name Therapeutic Recreation   Specific Interventions Cognitive Stimulation Training  (brain teasers to boost brain power, keep memory strong and promote enjoyment)   Participation Level Appropriate;Attentive;Sharing   Participation Quality Cooperative;Social   Insight/Motivation Good   Affect/Mood Display Appropriate   Cognition Alert   Psychomotor WNL     Patient presents calm, cooperative, reports a "good" mood, shared her brain exercise include reading, watching movies and hanging out with friends.  "

## 2023-10-13 NOTE — NURSING
Rested quietly with closed eyes and even resp for 7.5 hours.  Modified VC and all precautions maintained.  Pathways clear and bed in low position.

## 2023-10-13 NOTE — PROGRESS NOTES
"PSYCHIATRY DAILY INPATIENT PROGRESS NOTE  SUBSEQUENT HOSPITAL VISIT    ENCOUNTER DATE: 10/13/2023  SITE: [facility]    DATE OF ADMISSION: 10/9/2023  2:40 PM  LENGTH OF STAY: 4 days      CHIEF COMPLAINT   Staci Hodge is a 43 y.o. female, seen during daily garcia rounds on the inpatient unit.  Staci Hodge presented with the chief complaint of depression with SI, "I started cutting and having really bad suicidal thoughts"      The patient was seen and examined. The chart was reviewed.     Reviewed notes from Rns and CTRS and labs from the last 24 hours.    The patient's case was discussed with the treatment team/care providers today including Rns    Staff reports no behavioral or management issues.     The patient has been compliant with treatment.      Subjective 10/13/2023       Discussed gratitude practice and box breathing techniques. Patient did exercises of both and responded positively. Motivated patient to utilize behavioral tools.    Her depression and SI are improving steadily. She is participating well.    She feels medications are helping.    The patient denies any side effects to medications.      Per CTRS:  Patient presents calm, cooperative, reports a "good" mood, shared her brain exercise include reading, watching movies and hanging out with friends.      Psychiatric ROS (observed, reported, or endorsed/denied):  Depressed mood - less  Interest/pleasure/anhedonia: less  Guilt/hopelessness/worthlessness - fluctuating  Changes in Sleep - less  Changes in Appetite - less  Changes in Concentration - less  Changes in Energy - fluctuating  PMA/R- fluctuating  Suicidal- active/passive ideations - less  Homicidal ideations: active/passive ideations - No    Hallucinations - No  Delusions - No  Disorganized behavior - No  Disorganized speech - No  Negative symptoms - No    Elevated mood - No  Decreased need for sleep - No  Grandiosity - No  Racing thoughts - No  Impulsivity - " No  Irritability- No  Increased energy - No  Distractibility - No  Increase in goal-directed activity or PMA- No    Symptoms of MARY LOU - fluctuating  Symptoms of Panic Disorder- No  Symptoms of PTSD - fluctuating        Overall progress: Patient is showing mild improvement         Psychotherapy:  Target symptoms: depression, anxiety   Why chosen therapy is appropriate versus another modality: relevant to diagnosis, patient responds to this modality, evidence based practice  Outcome monitoring methods: self-report, observation  Therapeutic intervention type: behavior modifying psychotherapy, supportive psychotherapy  Topics discussed/themes: building skills sets for symptom management, symptom recognition  The patient's response to the intervention is accepting. The patient's progress toward treatment goals is fair.   Duration of intervention: 16 minutes.        Medical ROS  General ROS: negative  Ophthalmic ROS: negative  ENT ROS: negative  Allergy and Immunology ROS: negative  Hematological and Lymphatic ROS: negative  Endocrine ROS: negative  Respiratory ROS: no cough, shortness of breath, or wheezing  Cardiovascular ROS: no chest pain or dyspnea on exertion  Gastrointestinal ROS: no abdominal pain, change in bowel habits, or black or bloody stools  Genito-Urinary ROS: no dysuria, trouble voiding, or hematuria  Musculoskeletal ROS: negative  Neurological ROS: no TIA or stroke symptoms  Dermatological ROS: negative      PAST MEDICAL HISTORY   Past Medical History:   Diagnosis Date    Abnormal Pap smear of cervix age 25    no treatement    Anxiety     Breast cancer     Cancer 07/11/2018    breast cancer    Cervical spondylosis 2/9/2018    Colon polyp     Depression     Ectopic pregnancy 2/26/2019    General anesthetics causing adverse effect in therapeutic use     Slow to awaken/ Memory problems-post-op to day 3 after Mastectomy    Headache     History of psychiatric hospitalization     age 20 for SI    Hx of  psychiatric care     Hypertension     Peutz-Jeghers syndrome     PONV (postoperative nausea and vomiting)     Psychiatric problem     Right carpal tunnel syndrome 3/9/2017    Self-harming behavior     Suicide attempt     Therapy            PSYCHOTROPIC MEDICATIONS   Scheduled Meds:   ARIPiprazole  15 mg Oral Daily    clonazePAM  0.5 mg Oral BID    lithium  600 mg Oral QHS    pantoprazole  40 mg Oral Daily    propranoloL  20 mg Oral BID    traZODone  150 mg Oral QHS     Continuous Infusions:  PRN Meds:.acetaminophen, aluminum-magnesium hydroxide-simethicone, benzonatate, benztropine mesylate, hydrOXYzine pamoate, loperamide, nicotine, OLANZapine **AND** OLANZapine, ondansetron, promethazine        EXAMINATION    VITALS   Vitals:    10/11/23 1936 10/12/23 0729 10/12/23 2042 10/13/23 0732   BP: 124/78 113/72 121/86 137/72   BP Location: Right arm Right arm Right arm Right arm   Patient Position: Sitting Lying Sitting Sitting   Pulse: 85 89 92 91   Resp: 18 16 18 16   Temp: 96.8 °F (36 °C) 97.5 °F (36.4 °C) 97.4 °F (36.3 °C) 97 °F (36.1 °C)   TempSrc: Temporal Temporal Temporal Temporal   SpO2:       Weight:       Height:           Body mass index is 34.42 kg/m².      CONSTITUTIONAL  General Appearance: unremarkable, age appropriate, well nourished, obese     MUSCULOSKELETAL  Muscle Strength and Tone:no dyskinesia, no dystonia, no tremor, no tic  Abnormal Involuntary Movements: No  Gait and Station: non-ataxic     PSYCHIATRIC   Level of Consciousness: awake and alert   Orientation: person, place, time and situation  Grooming: less Disheveled and casual garb  Psychomotor Behavior: normal, friendly and cooperative, eye contact normal, no PMA/R  Speech: normal tone, normal rate, normal pitch, normal volume, spontaneous  Language: grossly intact, able to name, able to repeat  Mood: ok  Affect: reactive  Thought Process: linear, logical  Associations: intact   Thought Content: less SI, denies HI and no  delusions  Perceptions: denies AH and denies  VH  Memory: Able to recall past events, Remote intact and Recent intact  Attention:Normal and Attends to interview without distraction  Fund of Knowledge: Aware of current events and Vocabulary appropriate   Estimate if Intelligence:  Average based on work/education history, vocabulary and mental status exam  Insight: intact, has awareness of illness- fair  Judgment: improving        DIAGNOSTIC TESTING   Laboratory Results  No results found for this or any previous visit (from the past 24 hour(s)).            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  AMRY LOU  Panic Disorder with agoraphobia  Social Anxiety Disorder  PTSD  OCD     Nicotine Dependence     Borderline Personality Disorder     H/o Breast Cancer s/p mastectomy and reconstructive surgery  Peutz-Jeghers syndrome  Right Carpal Tunnel syndrome  Cervical spondylosis  Chronic pain         PROBLEM LIST AND MANAGEMENT PLANS         Mood: pt counseled  -resume home Abilify but increase from 10 to 12 mg po q day with plans to titrate to 15 mg po q day- increase to 15 mg po q day   -started/continue re-trial of adjunctive Lithium CR at 300 mg po q HS- consider optimizing further;   -increase to 600 mg PO qhs (stabilized on this dose last admit, tolerated well previously), will check level for toxicity in 3 days     Insomnia: pt counseled  -vistaril prn  -resumed/continue off-label Trazodone 150 mg po q HS- will optimize as indicated       Anxiety/PTSD: pt counseled  -resumed/continue home klonopin at lower dose of 0.5 mg po BID with plans to taper down/off as able  -resumed/continue home vistaril 100 mg po q 6 hours prn       Nicotine Dependence: pt counseld  -started/continue nicotine 14 mg patch dermal q day     Borderline Personality Disorder: pt counseled  -meds off label as above     Chronic pain: pt  counseled  -consider re-trial of gabapentin          Discussed diagnosis, risks and benefits of proposed treatment vs alternative treatments vs no treatment, potential side effects of these treatments and the inherent unpredictability of treatment. The patient expresses understanding of the above and displays the capacity to agree with this treatment given said understanding. Patient also agrees that, currently, the benefits outweigh the risks and would like to pursue/continue treatment at this time.    Any medications being used off-label were discussed with the patient inclusive of the evidence base for the use of the medications and consent was obtained for the off-label use of the medication.       DISCHARGE PLANNING  Expected Disposition Plan: Home or Self Care, anticipate stability for discharge on Sunday       NEED FOR CONTINUED HOSPITALIZATION  Psychiatric illness continues to pose a potential threat to life or bodily function, of self or others, thereby requiring the need for continued inpatient psychiatric hospitalization: Yes, due to: danger to self and suicidal ideation, as evidenced by:  Ongoing concerns with SI.    Protective inpatient pyschiatric hospitalization required while a safe disposition plan is enacted: Yes    Patient stabilized and ready for discharge from inpatient psychiatric unit: No        STAFF:   Adeel Garcia III, MD  Psychiatry

## 2023-10-13 NOTE — PLAN OF CARE
Pt calm and cooperative, denies SI at this time, med compliant, out on unit more interacting better with staff and peers, appetite good, safety precautions maintained, will continue to monitor

## 2023-10-13 NOTE — PLAN OF CARE
"  Problem: Adult Behavioral Health Plan of Care  Goal: Optimized Coping Skills in Response to Life Stressors  Outcome: Ongoing, Progressing  Intervention: Promote Effective Coping Strategies  Flowsheets (Taken 10/13/2023 1712)  Supportive Measures:   active listening utilized   counseling provided   decision-making supported   verbalization of feelings encouraged   Group Process      Behavior    Patient attended group psychotherapy today. Pt affect flat with  depressed mood.      Intervention      Group psychotherapy focused on Guided Imagery of the Window. Patients stood in front of a window and focused on different objects they saw. Patients practiced how different objects brings positive feelings about one-self which gives the patient courage and strength. The guided Imagery provide direction and gave meaning to optimize different situations. Patients explored and discussed their positive thinking of how to distract their negative thoughts into positive thoughts.        Response     Patient stated  I have learned t there are a lot of things that one can do to distract themselves of negative feelings and to just look around at the positive things that they can do to get over their crisis."     Plan     Patient will be encouraged to continue to attend process group.  "

## 2023-10-14 PROBLEM — R45.851 SUICIDAL IDEATIONS: Status: RESOLVED | Noted: 2022-04-18 | Resolved: 2023-10-14

## 2023-10-14 PROCEDURE — 25000003 PHARM REV CODE 250: Performed by: STUDENT IN AN ORGANIZED HEALTH CARE EDUCATION/TRAINING PROGRAM

## 2023-10-14 PROCEDURE — 36415 COLL VENOUS BLD VENIPUNCTURE: CPT | Performed by: PSYCHIATRY & NEUROLOGY

## 2023-10-14 PROCEDURE — 99232 PR SUBSEQUENT HOSPITAL CARE,LEVL II: ICD-10-PCS | Mod: ,,, | Performed by: PSYCHIATRY & NEUROLOGY

## 2023-10-14 PROCEDURE — 80178 ASSAY OF LITHIUM: CPT | Performed by: PSYCHIATRY & NEUROLOGY

## 2023-10-14 PROCEDURE — 25000003 PHARM REV CODE 250: Performed by: PSYCHIATRY & NEUROLOGY

## 2023-10-14 PROCEDURE — 90833 PR PSYCHOTHERAPY W/PATIENT W/E&M, 30 MIN (ADD ON): ICD-10-PCS | Mod: ,,, | Performed by: PSYCHIATRY & NEUROLOGY

## 2023-10-14 PROCEDURE — 25000003 PHARM REV CODE 250: Performed by: INTERNAL MEDICINE

## 2023-10-14 PROCEDURE — 90833 PSYTX W PT W E/M 30 MIN: CPT | Mod: ,,, | Performed by: PSYCHIATRY & NEUROLOGY

## 2023-10-14 PROCEDURE — 99232 SBSQ HOSP IP/OBS MODERATE 35: CPT | Mod: ,,, | Performed by: PSYCHIATRY & NEUROLOGY

## 2023-10-14 PROCEDURE — 11400000 HC PSYCH PRIVATE ROOM

## 2023-10-14 PROCEDURE — S4991 NICOTINE PATCH NONLEGEND: HCPCS | Performed by: PSYCHIATRY & NEUROLOGY

## 2023-10-14 RX ORDER — HYDROXYZINE PAMOATE 100 MG/1
100 CAPSULE ORAL EVERY 6 HOURS PRN
Qty: 60 CAPSULE | Refills: 1 | Status: ON HOLD | OUTPATIENT
Start: 2023-10-14 | End: 2024-02-29 | Stop reason: HOSPADM

## 2023-10-14 RX ORDER — IBUPROFEN 200 MG
1 TABLET ORAL DAILY
COMMUNITY
Start: 2023-10-14 | End: 2024-01-09

## 2023-10-14 RX ORDER — ARIPIPRAZOLE 15 MG/1
15 TABLET ORAL DAILY
Qty: 30 TABLET | Refills: 2 | Status: ON HOLD | OUTPATIENT
Start: 2023-10-15 | End: 2024-02-29 | Stop reason: HOSPADM

## 2023-10-14 RX ORDER — LITHIUM CARBONATE 300 MG/1
600 TABLET, FILM COATED, EXTENDED RELEASE ORAL NIGHTLY
Qty: 60 TABLET | Refills: 1 | Status: SHIPPED | OUTPATIENT
Start: 2023-10-14 | End: 2024-01-09

## 2023-10-14 RX ADMIN — NICOTINE 1 PATCH: 14 PATCH, EXTENDED RELEASE TRANSDERMAL at 08:10

## 2023-10-14 RX ADMIN — CLONAZEPAM 0.5 MG: 0.5 TABLET ORAL at 08:10

## 2023-10-14 RX ADMIN — PROPRANOLOL HYDROCHLORIDE 20 MG: 20 TABLET ORAL at 08:10

## 2023-10-14 RX ADMIN — TRAZODONE HYDROCHLORIDE 150 MG: 150 TABLET ORAL at 08:10

## 2023-10-14 RX ADMIN — ARIPIPRAZOLE 15 MG: 10 TABLET ORAL at 08:10

## 2023-10-14 RX ADMIN — HYDROXYZINE PAMOATE 100 MG: 50 CAPSULE ORAL at 08:10

## 2023-10-14 RX ADMIN — PANTOPRAZOLE SODIUM 40 MG: 40 TABLET, DELAYED RELEASE ORAL at 08:10

## 2023-10-14 RX ADMIN — LITHIUM CARBONATE 600 MG: 300 TABLET, FILM COATED, EXTENDED RELEASE ORAL at 08:10

## 2023-10-14 NOTE — PROGRESS NOTES
"PSYCHIATRY DAILY INPATIENT PROGRESS NOTE  SUBSEQUENT HOSPITAL VISIT    ENCOUNTER DATE: 10/14/2023  SITE: [facility]    DATE OF ADMISSION: 10/9/2023  2:40 PM  LENGTH OF STAY: 5 days      CHIEF COMPLAINT   Staci Hodge is a 43 y.o. female, seen during daily gacria rounds on the inpatient unit.  Staci Hodge presented with the chief complaint of depression with SI, "I started cutting and having really bad suicidal thoughts"      The patient was seen and examined. The chart was reviewed.     Reviewed notes from Rns, MD, CTRS, and LPC and labs from the last 24 hours.    The patient's case was discussed with the treatment team/care providers today including Rns    Staff reports no behavioral or management issues.     The patient has been compliant with treatment.      Subjective 10/14/2023     Per yesterday's notes:   Discussed gratitude practice and box breathing techniques. Patient did exercises of both and responded positively. Motivated patient to utilize behavioral tools.  Her depression and SI are improving steadily. She is participating well.  She feels medications are helping.    Today, she reports that she is doing "much better." Symptoms of depression/anxiety are steadily improving. She denied SI this AM  She feels better supported. She can identify positive social support.  -she can identify positive coping skills.    She is steadily improving and will be stable for discharge home tomorrow.     The patient denies any side effects to medications.      Psychiatric ROS (observed, reported, or endorsed/denied):  Depressed mood - improving steadily  Interest/pleasure/anhedonia: improving steadily  Guilt/hopelessness/worthlessness - improving steadily  Changes in Sleep - improving steadily  Changes in Appetite - improving steadily  Changes in Concentration - improving steadily  Changes in Energy - improving steadily  PMA/R- improving steadily  Suicidal- active/passive ideations - " denies  Homicidal ideations: active/passive ideations - No    Hallucinations - No  Delusions - No  Disorganized behavior - No  Disorganized speech - No  Negative symptoms - No    Elevated mood - No  Decreased need for sleep - No  Grandiosity - No  Racing thoughts - No  Impulsivity - No  Irritability- No  Increased energy - No  Distractibility - No  Increase in goal-directed activity or PMA- No    Symptoms of MARY LOU - improving steadily  Symptoms of Panic Disorder- improving steadily  Symptoms of PTSD - improving steadily        Overall progress: Patient is showing moderate improvement        Psychotherapy:  Target symptoms: depression, anxiety   Why chosen therapy is appropriate versus another modality: relevant to diagnosis, patient responds to this modality, evidence based practice  Outcome monitoring methods: self-report, observation  Therapeutic intervention type: behavior modifying psychotherapy, supportive psychotherapy  Topics discussed/themes: building skills sets for symptom management, symptom recognition  The patient's response to the intervention is accepting. The patient's progress toward treatment goals is fair.   Duration of intervention: 16 minutes.        Medical ROS  General ROS: negative  Ophthalmic ROS: negative  ENT ROS: negative  Allergy and Immunology ROS: negative  Hematological and Lymphatic ROS: negative  Endocrine ROS: negative  Respiratory ROS: no cough, shortness of breath, or wheezing  Cardiovascular ROS: no chest pain or dyspnea on exertion  Gastrointestinal ROS: no abdominal pain, change in bowel habits, or black or bloody stools  Genito-Urinary ROS: no dysuria, trouble voiding, or hematuria  Musculoskeletal ROS: negative  Neurological ROS: no TIA or stroke symptoms  Dermatological ROS: negative      PAST MEDICAL HISTORY   Past Medical History:   Diagnosis Date    Abnormal Pap smear of cervix age 25    no treatement    Anxiety     Breast cancer     Cancer 07/11/2018    breast cancer     Cervical spondylosis 2/9/2018    Colon polyp     Depression     Ectopic pregnancy 2/26/2019    General anesthetics causing adverse effect in therapeutic use     Slow to awaken/ Memory problems-post-op to day 3 after Mastectomy    Headache     History of psychiatric hospitalization     age 20 for SI    Hx of psychiatric care     Hypertension     Peutz-Jeghers syndrome     PONV (postoperative nausea and vomiting)     Psychiatric problem     Right carpal tunnel syndrome 3/9/2017    Self-harming behavior     Suicide attempt     Therapy            PSYCHOTROPIC MEDICATIONS   Scheduled Meds:   ARIPiprazole  15 mg Oral Daily    clonazePAM  0.5 mg Oral BID    lithium  600 mg Oral QHS    pantoprazole  40 mg Oral Daily    propranoloL  20 mg Oral BID    traZODone  150 mg Oral QHS     Continuous Infusions:  PRN Meds:.acetaminophen, aluminum-magnesium hydroxide-simethicone, benzonatate, benztropine mesylate, hydrOXYzine pamoate, loperamide, nicotine, OLANZapine **AND** OLANZapine, ondansetron, promethazine        EXAMINATION    VITALS   Vitals:    10/11/23 0800 10/13/23 0732 10/13/23 2004 10/14/23 0749   BP:  137/72 (!) 125/91 117/86   BP Location:  Right arm Right arm Right arm   Patient Position:  Sitting Sitting Sitting   Pulse:  91 96 89   Resp:  16 18 16   Temp:  97 °F (36.1 °C) 97.7 °F (36.5 °C) 97 °F (36.1 °C)   TempSrc:  Temporal Temporal Temporal   SpO2:       Weight: 85.3 kg (188 lb 2.6 oz)      Height:           Body mass index is 34.42 kg/m².      CONSTITUTIONAL  General Appearance: unremarkable, age appropriate, well nourished, obese     MUSCULOSKELETAL  Muscle Strength and Tone:no dyskinesia, no dystonia, no tremor, no tic  Abnormal Involuntary Movements: No  Gait and Station: non-ataxic     PSYCHIATRIC   Level of Consciousness: awake and alert   Orientation: person, place, time and situation  Grooming: improved and casual garb  Psychomotor Behavior: normal, friendly and cooperative, eye contact normal, no  PMA/R  Speech: normal tone, normal rate, normal pitch, normal volume, spontaneous  Language: grossly intact, able to name, able to repeat  Mood: ok  Affect: improving range; less anxious/depressed  Thought Process: linear, logical  Associations: intact   Thought Content: less SI, denies HI and no delusions  Perceptions: denies AH and denies  VH  Memory: Able to recall past events, Remote intact and Recent intact  Attention:Normal and Attends to interview without distraction  Fund of Knowledge: Aware of current events and Vocabulary appropriate   Estimate if Intelligence:  Average based on work/education history, vocabulary and mental status exam  Insight: intact, has awareness of illness- fair  Judgment: improving        DIAGNOSTIC TESTING   Laboratory Results  No results found for this or any previous visit (from the past 24 hour(s)).            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  MARY LOU  Panic Disorder with agoraphobia  Social Anxiety Disorder  PTSD  OCD     Nicotine Dependence     Borderline Personality Disorder     H/o Breast Cancer s/p mastectomy and reconstructive surgery  Peutz-Jeghers syndrome  Right Carpal Tunnel syndrome  Cervical spondylosis  Chronic pain         PROBLEM LIST AND MANAGEMENT PLANS         Mood: pt counseled  -resume home Abilify but increase from 10 to 12 mg po q day with plans to titrate to 15 mg po q day- increase to/continue at 15 mg po q day   -started/continue re-trial of adjunctive Lithium CR at 300 mg po q HS- consider optimizing further;   -increase to/continue at 600 mg PO qhs (stabilized on this dose last admit, tolerated well previously), will check level for toxicity today     Insomnia: pt counseled  -vistaril prn  -resumed/continue off-label Trazodone 150 mg po q HS- will optimize as indicated       Anxiety/PTSD: pt counseled  -resumed/continue home klonopin at lower  dose of 0.5 mg po BID with plans to taper down/off as able  -resumed/continue home vistaril 100 mg po q 6 hours prn       Nicotine Dependence: pt counseld  -started/continue nicotine 14 mg patch dermal q day     Borderline Personality Disorder: pt counseled  -meds off label as above     Chronic pain: pt counseled  -consider re-trial of gabapentin          Discussed diagnosis, risks and benefits of proposed treatment vs alternative treatments vs no treatment, potential side effects of these treatments and the inherent unpredictability of treatment. The patient expresses understanding of the above and displays the capacity to agree with this treatment given said understanding. Patient also agrees that, currently, the benefits outweigh the risks and would like to pursue/continue treatment at this time.    Any medications being used off-label were discussed with the patient inclusive of the evidence base for the use of the medications and consent was obtained for the off-label use of the medication.       DISCHARGE PLANNING  Expected Disposition Plan: Home or Self Care, discharge home tomorrow/Sunday       NEED FOR CONTINUED HOSPITALIZATION  Psychiatric illness continues to pose a potential threat to life or bodily function, of self or others, thereby requiring the need for continued inpatient psychiatric hospitalization: Yes, due to: danger to self and suicidal ideation, as evidenced by:  Concerns with SI--Fading.    Protective inpatient pyschiatric hospitalization required while a safe disposition plan is enacted: Yes    Patient stabilized and ready for discharge from inpatient psychiatric unit: No        STAFF:   Christiano Emerson MD  Psychiatry
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
## 2023-10-14 NOTE — PLAN OF CARE
Pt calm and cooperative, out on unit interacting good with staff and peers, appetite good, med compliant, denies SI,safety precautions maintained, will continue to monitor

## 2023-10-14 NOTE — PLAN OF CARE
Patient sitting in bed reading. Denies SI/HI at present. Appetite appropriate and medication compliant. States that she discussed with MD that she should be getting discharged tomorrow. States that she feels the medications/current plan of care are helping her.

## 2023-10-14 NOTE — NURSING
Pt sleeping at this time, slept 6 hrs with 3 awakenings. NAD. Resp even and unlabored.Pathways clear,bed in low position. Q 15 min safety check ongoing.All precautions maintained.

## 2023-10-15 VITALS
DIASTOLIC BLOOD PRESSURE: 82 MMHG | HEIGHT: 62 IN | RESPIRATION RATE: 16 BRPM | WEIGHT: 188.94 LBS | BODY MASS INDEX: 34.77 KG/M2 | OXYGEN SATURATION: 99 % | SYSTOLIC BLOOD PRESSURE: 136 MMHG | HEART RATE: 100 BPM | TEMPERATURE: 98 F

## 2023-10-15 DIAGNOSIS — G43.009 MIGRAINE WITHOUT AURA AND WITHOUT STATUS MIGRAINOSUS, NOT INTRACTABLE: ICD-10-CM

## 2023-10-15 LAB — LITHIUM SERPL-SCNC: 0.2 MMOL/L (ref 0.6–1.2)

## 2023-10-15 PROCEDURE — 99239 PR HOSPITAL DISCHARGE DAY,>30 MIN: ICD-10-PCS | Mod: 25,,, | Performed by: PSYCHIATRY & NEUROLOGY

## 2023-10-15 PROCEDURE — 99406 BEHAV CHNG SMOKING 3-10 MIN: CPT | Mod: ,,, | Performed by: PSYCHIATRY & NEUROLOGY

## 2023-10-15 PROCEDURE — 99239 HOSP IP/OBS DSCHRG MGMT >30: CPT | Mod: 25,,, | Performed by: PSYCHIATRY & NEUROLOGY

## 2023-10-15 PROCEDURE — S4991 NICOTINE PATCH NONLEGEND: HCPCS | Performed by: PSYCHIATRY & NEUROLOGY

## 2023-10-15 PROCEDURE — 99406 PR TOBACCO USE CESSATION INTERMEDIATE 3-10 MINUTES: ICD-10-PCS | Mod: ,,, | Performed by: PSYCHIATRY & NEUROLOGY

## 2023-10-15 PROCEDURE — 25000003 PHARM REV CODE 250: Performed by: PSYCHIATRY & NEUROLOGY

## 2023-10-15 PROCEDURE — 25000003 PHARM REV CODE 250: Performed by: INTERNAL MEDICINE

## 2023-10-15 RX ADMIN — ARIPIPRAZOLE 15 MG: 10 TABLET ORAL at 08:10

## 2023-10-15 RX ADMIN — PROPRANOLOL HYDROCHLORIDE 20 MG: 20 TABLET ORAL at 08:10

## 2023-10-15 RX ADMIN — CLONAZEPAM 0.5 MG: 0.5 TABLET ORAL at 08:10

## 2023-10-15 RX ADMIN — NICOTINE 1 PATCH: 14 PATCH, EXTENDED RELEASE TRANSDERMAL at 08:10

## 2023-10-15 RX ADMIN — PANTOPRAZOLE SODIUM 40 MG: 40 TABLET, DELAYED RELEASE ORAL at 08:10

## 2023-10-15 NOTE — NURSING
Pt sleeping at this time, slept 8 hrs with 2 awakenings. NAD. Resp even and unlabored.Pathways clear,bed in low position. Q 15 min safety check ongoing.All precautions maintained.
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
## 2023-10-15 NOTE — NURSING
Patient ready for discharge, denies suicidal ideation. Belongings given to patient at discharge. Ambulated off unit. Stable.

## 2023-10-15 NOTE — PLAN OF CARE
Following POC.  Makes needs known.  Denies SI/HI,AVH.  Out on the unit much of the evening.  States she is feeling better.  States she is ready for discharge.  Medication compliant.  Appetite is good.  Encouraged adherence to out patient POC.  Free of fall.

## 2023-10-15 NOTE — NURSING
"Discharge instructions on medication, self care, suicide crisis hotline, and scheduled follow up appointment with the local mental health clinic. Patient voiced understanding of all discharge teachings. Patient denies SI/HI. Patient denies distress and reports " I am feeling so much better".  Patient is awaiting for transportation per discharge orders.  Patient instructed discharge planner will call her on Monday to give her a date and time follow up appointment at [facility]. This patient gave her follow up contact phone number as [phone]  "

## 2023-10-16 RX ORDER — BUTALBITAL, ACETAMINOPHEN AND CAFFEINE 50; 325; 40 MG/1; MG/1; MG/1
1 TABLET ORAL EVERY 6 HOURS PRN
Qty: 30 TABLET | Refills: 5 | Status: SHIPPED | OUTPATIENT
Start: 2023-10-16 | End: 2023-10-23 | Stop reason: SDUPTHER

## 2023-10-16 NOTE — PSYCH
The patient will transition to outpatient treatment at , [facility], [address] [place] [address]. [id number], [phone]. An appointment has been scheduled on 10/20/2023 at 8am for medication management. In addition the patient will be treated for substance abuse and will receive smoking cessation classes. The AVS was faxed on 10/16/2023 at 2:40pm.

## 2023-10-18 ENCOUNTER — PATIENT MESSAGE (OUTPATIENT)
Dept: INTERNAL MEDICINE | Facility: CLINIC | Age: 43
End: 2023-10-18

## 2023-10-20 NOTE — PSYCH
[facility]  Discharge Instructions  Staci Hodge  MRN: [id number]    Icon primary diagnosis        Bipolar 2 disorder, major depressive episode  Icon Date   10/9/2023 - 10/15/2023  Icon Location   [facility]   Icon Checklist header    Your Next Steps (Patient should check each box as tasks are completed)    Icon do   Do    Icon Checkbox     these medications [place] [facility] [facility] [place] [address]  ARIPiprazole  hydrOXYzine  lithium  Icon Checkbox     these medications from any pharmacy  nicotine  Icon read   Read    Icon Checkbox    Read 12 attachments  Patient Portal    We want you to be involved with your health care. Our patient portal, called [facility], is a secure, online website for convenient 24-hour access to your personal health information.     With [facility], you can view your after visit summary, schedule appointments, request prescription refills, view test results, communicate with your health care providers, and make payments online at [email or web address]           Discharge Instructions  Instructions    Icon medication changes this visit         Your medications have changed    Icon medications to start taking   START taking:  lithium (LITHOBID)   nicotine (NICODERM CQ)   Icon medications to change how you take   CHANGE how you take:  ARIPiprazole (ABILIFY)   Icon medications to stop taking   STOP taking:  ibuprofen 800 MG tablet (ADVIL,MOTRIN)   lisdexamfetamine 20 MG capsule (VYVANSE)   metoprolol succinate 25 MG 24 hr tablet (TOPROL-XL)   Review your updated medication list below.  Your Next Steps (Patient should check each box as tasks are completed)  Icon do   Do  Icon read   Read  What's Next    What's Next          Follow up with [facility]  Friday Oct 20, 2023  Outpatient Psych Services, as scheduled on 10/20/2023 at 8am [address] [facility] [address] [phone]       All  Component Based Labs     10/14/23   2026  10/09/23   1115  10/09/23   1106     Benzodiazepines   Negative       Methadone metabolites   Negative       Phencyclidine   Negative       Acetaminophen (Tylenol), Serum     <3.0 Low      Albumin     4.9     Alcohol, Serum     <10     ALP     178 High      ALT     43     Amphetamine Screen, Ur   Presumptive Positive Abnormal        Anion Gap     13     Appearance, UA   Clear       AST     24     Barbiturate Screen, Ur   Presumptive Positive Abnormal        Baso #     0.06     Basophil %     0.5     Bilirubin (UA)   Negative       BILIRUBIN TOTAL     0.4     BUN     6     Calcium     9.8     Chloride     103     Cholesterol Total     227 High      CO2     24     Cocaine (Metab.)   Negative       Color, UA   Yellow       Creatinine     0.8     Creatinine, Urine   53.3       Differential Method     Automated     eGFR     >60     Eos #     0.3     Eosinophil %     2.1     Estimated Avg Glucose     105     Glucose     98     Glucose, UA   Negative       Gran # (ANC)     8.3 High      Gran %     69.6     HDL     56     HDL/Cholesterol Ratio     24.7     Hematocrit     44.3     Hemoglobin     15.1     Hemoglobin A1C External     5.3     Immature Grans (Abs)     0.05 High      Immature Granulocytes     0.4     Ketones, UA   Negative       LDL Cholesterol External     133.6     Leukocytes, UA   Negative       Lithium Level 0.2 Low          Lymph #     2.5     Lymph %     21.3     MCH     29.3     MCHC     34.1     MCV     86     Mono #     0.7     Mono %     6.1     MPV     10.1     NITRITE UA   Negative       Non-HDL Cholesterol     171     nRBC     0     Occult Blood UA   Negative       Opiate Scrn, Ur   Negative       pH, UA   >8.0       Platelet Count     431     Potassium     4.1     Preg Test, Ur   Negative       PROTEIN TOTAL     8.1     Protein, UA   Negative       RBC     5.16     RDW     13.0     Salicylate Lvl     <5.0 Low      Sodium     140     Specific Gravity, UA    "1.020       Specimen UA   Urine, Clean Catch       Marijuana (THC) Metabolite   Negative       Total Cholesterol/HDL Ratio     4.1     Toxicology Information   SEE COMMENT       Triglycerides     187 High      TSH     0.899     UROBILINOGEN UA   Negative       WBC     11.86                   Your care is important to us. If your provider recommended a follow-up appointment or test, we are happy to help you coordinate your recommended care. It is important that you complete your recommended follow-up.  If you need help scheduling, please call [phone]. Appointments can also be made online through the patient portal.      While scheduling and attending your appointments is your responsibility, our goal is to support and empower you throughout that process.         Your Diagnoses at Discharge     Bipolar 2 disorder, major depressive episode  Palpitations  GERD (gastroesophageal reflux disease)  Reason for Admission    Patient to [facility] reporting severe depression and anxiety, and suicidal ideation with a plan to drive into traffic or overdose. Patient reports having a history of suicde attempts by overdosing.  You are allergic to the following    You are allergic to the following   No active allergies     Your Latest Vitals    Icon Blood Pressure                Blood Pressure 136/82  Icon BMI       BMI 34.56  Icon Weight                         Weight 188 lb 15 oz  Icon Height             Height 5' 2"  Icon Temperature            Temperature (Temporal) 97.8 °F  Icon Pulse          Pulse 100  Icon respiration          Respiration 16  Icon oxygen saturation       Oxygen Saturation 99%  Icon body surface area                BSA 1.94 m²  Treatment Team  Chat With All   Treatment Team    Provider Role Specialty    Maria Teresa Mixon PA-C  Consulting Physician Hospitalist    Christiano Emerson MD  Admitting Psychiatry     Recent Lab Values     Include labs without results    Most Recent Result    A1C  5.3  10/09 " 1106  Most Recent 8 Results  Show dates as rows                      Blood Glucose and Lipid Panel Labs     Include labs without results    Most Recent Result from Past 365 Days    Cholesterol  227  10/09 1106  Triglycerides  187  10/09 1106  HDL Cholesterol  56  10/09 1106  LDL Cholesterol  133.6  10/09 1106  HDL/Cholesterol Ratio  24.7  10/09 1106  Total Cholesterol/HDL Ratio  4.1  10/09 1106  Non-HDL Cholesterol  171  10/09 1106  Most Recent 8 Results  Show dates as rows                                            Pending Labs/Studies    You have no pending labs/studies.  Contact Information    Call [phone] (anytime, 7 days a week) to:  Report concerns regarding hospital stay  Ask questions about your hospital stay and home care plan  Get new or updated results of your lab tests and other procedures  [facility]    [facility] Nurse Care Line - 24/7 Assistance  Unless otherwise directed by your provider, please contact [facility], our nurse care line that is available for 24/7 assistance. Please refer to the Patient Instructions section of your After Visit Summary for specific instructions from your physician.     Registered nurses in the [facility] Center provide appointment scheduling, clinical advisement, health education, and other advisory services.  Call: [phone] (toll free).  Advance Directives    An advance directive is a document which, in the event you are no longer able to make decisions for yourself, tells your healthcare team what kind of treatment you do or do not want to receive, or who you would like to make those decisions for you.  If you do not currently have an advance directive, [facility] encourages you to create one.  For more information call:  [phone]), [phone] ([phone]),  or log on to [email or web address].  Advance Directive Status    Flowsheet Row Most Recent Value   Advance Directive Status Patient does not have Advance  "Directive, declines information.   Advance Directive Type Does not have either a psychiatric or medical Advance Directive   Reason you declined to provide an Advance Directive Do not feel it is needed     Behavioral Health Safety Plan            Step 1: Warning Signs:     Pt.stated"Loud noises"     Pt.stated"Grief"     Pt.stated"When I get angry"       Step 2: Internal coping strategies - Things I can do to take my mind off my problems without contacting another person:     Pt.stated"Reading"     Pt.stated"Watching tv"     Pt.stated"Listen to music"       Step 3: People and social settings that provide distraction:     Name: Edd Phone: In cell phone     Name: Issac Phone: In cell phone     Place: Pt.stated"To my cousin house on the [facility] side"     Place: Pt.stated"I could start going for a walk"       Step 4: People whom I can ask for help:     Name: Edd Phone: In cell phone     Name: Issac Phone: In cell phone     Name: Sofia Phone: In cell phone       Step 5: Professionals or agencies I can contact during a crisis:     Clinician Name: [facility] Phone: [phone]     Clinician Pager or Emergency Contact #: [phone]           Clinician Name: [facility] Phone: [phone]     Clinician Pager or Emergency Contact #: [phone]           Suicide Prevention Lifeline: 611 or 8-345-434-XAQJ (2782)           Local Emergency Service: 911, Text Crisis Line-849-156, [place] Line-211     Emergency Services Address: [facility]     Emergency Services Phone: [phone]       Step 6: Making the environment safer:     Pt.stated"Take my medicine correctly"   2. Pt.stated"Boyfriend"         Used with permission from [employer] Perez [employer] Sánchez [employer]HELL. (2011). Safety planning intervention: A brief intervention to mitigate suicide risk. Cognitive and Behavioral Practice. 19, 256-264            Smoking Cessation    If you would like to quit smoking:  You may be eligible for free services if you are a [place] or " [place] resident Call [facility] at (211) [phone].  Contact us via email: [email or web address]  View our website for more information: [email or web address]  Language Assistance Services    ATTENTION: Language assistance services are available, free of charge. Please call [phone].       ATENCIÓN: Si habla dom, tiene a weaver disposición servicios gratuitos de asistencia lingüística. Llame al [phone].     CHÚ Ý: N?u b?n nói Ti?ng Vi?t, có các d?ch v? h? tr? ngôn ng? mi?n phí dành cho b?n. G?i s? [phone].  National Suicide Prevention Lifeline    If you or someone you know is thinking about suicide, call the National Suicide Prevention Lifeline using the 3 digit phone number of 976 or 6-281-621-JATQ (6035).  The lifeline is free, confidential and always available.  Help a loved one, a friend or yourself.  www.suicidepreventionlifeline.org     Medication list    START taking these medications    START taking these medications     Additional info Begin Date AM Noon PM Bedtime   Icon medications to start taking   lithium 300 MG CR tablet  Commonly known as: LITHOBID  Quantity: 60 tablet  Refills: 1  Dose: 600 mg Take 2 tablets (600 mg total) by mouth every evening.        Icon medications to start taking   nicotine 14 mg/24 hr  Commonly known as: NICODERM CQ  Refills: 0  Dose: 1 patch Place 1 patch onto the skin once daily.          CHANGE how you take these medications    CHANGE how you take these medications     Additional info Begin Date AM Noon PM Bedtime   Icon medications to change how you take   ARIPiprazole 15 MG Tab  Commonly known as: ABILIFY  Quantity: 30 tablet  Refills: 2  Dose: 15 mg  What changed:   medication strength  how much to take Take 1 tablet (15 mg total) by mouth once daily.          CONTINUE taking these medications    CONTINUE taking these medications     Additional info Begin Date AM Noon PM Bedtime    AIMOVIG AUTOINJECTOR 140 mg/mL autoinjector  Quantity: 1  each  Refills: 11  Dose: 140 mg  Generic drug: erenumab-aooe Inject 1 mL (140 mg total) into the skin every 28 days.         clonazePAM 0.5 MG tablet  Commonly known as: KlonoPIN  Refills: 0  Dose: 0.5 mg Take 0.5 mg by mouth 2 (two) times daily.         hydrOXYzine 100 MG capsule  Commonly known as: VISTARIL  Quantity: 60 capsule  Refills: 1  Dose: 100 mg Take 1 capsule (100 mg total) by mouth every 6 (six) hours as needed (Insomnia or anxiety).         montelukast 10 mg tablet  Commonly known as: SINGULAIR  Refills: 0  Dose: 10 mg Take 10 mg by mouth once daily.         ondansetron 4 MG Tbdl  Commonly known as: ZOFRAN-ODT  Quantity: 30 tablet  Refills: 0  Dose: 8 mg Take 2 tablets (8 mg total) by mouth every 8 (eight) hours as needed (nausea).         propranoloL 20 MG tablet  Commonly known as: INDERAL  Refills: 0  Dose: 20 mg Take 20 mg by mouth 2 (two) times daily.         RABEprazole 20 mg tablet  Commonly known as: Aciphex  Quantity: 30 tablet  Refills: 5  Dose: 20 mg Take 1 tablet (20 mg total) by mouth once daily.         sucralfate 100 mg/mL suspension  Commonly known as: CARAFATE  Quantity: 414 mL  Refills: 1  Dose: 1 g Take 10 mLs (1 g total) by mouth 4 (four) times daily before meals and nightly. Medication should be take 1 hour prior to meals         traZODone 150 MG tablet  Commonly known as: DESYREL  Quantity: 30 tablet  Refills: 0  Dose: 150 mg Take 1 tablet (150 mg total) by mouth every evening.         VITAMIN D2 50,000 unit Cap  Quantity: 12 capsule  Refills: 1  Generic drug: ergocalciferol TAKE ONE CAPSULE BY MOUTH EVERY 7 DAYS  Doctor's comments: This prescription was filled on 5/19/2023. Any refills authorized will be placed on file.          STOP taking these medications    STOP taking these medications   Icon medications to stop taking   ibuprofen 800 MG tablet  Commonly known as: ADVIL,MOTRIN   Icon medications to stop taking   lisdexamfetamine 20 MG capsule  Commonly known as: VYVANSE    Icon medications to stop taking   metoprolol succinate 25 MG 24 hr tablet  Commonly known as: TOPROL-XL              Where to  your medications    Icon medication  information                [place] [facility] [address]    ARIPiprazole  hydrOXYzine  lithium  Address: [address] [place] [address] [facility] [address]   Phone: [phone]

## 2023-10-20 NOTE — CARE UPDATE
formerly [facility]  Encounter Date: 10/9/2023  2:40 PM    Discharge Date 10/15/2023 10:58 AM   Hospital Account: [id number]    MRN: [id number]   Guarantor: ONEYDA HODGE   Contact Serial #: [id number]         ENCOUNTER             Patient Class: IP Psych   Unit: [facility]*   [facility] Service: Psychiatry   Bed: 210 D   Admitting Provider: Christiano Emerson Md   Referring Physician: Christiano Emerson   Attending Provider:     Adm Diagnosis: Depression with suicidal*      PATIENT                 Name: ONEYDA HODGE : 1980 (43 yrs)   Address: [address] [place] [address] Sex: Female   City: [place] [address]       Primary Care Provider: Shilpi Clayton NP         Primary Phone: [phone]   EMERGENCY CONTACT   Contact Name Legal Guardian? Relationship to Patient Home Phone Work Phone Mobile Phone   1. RafaelaIssac  2. Jono Hodge      Significant other  Father           [phone]      GUARANTOR                  Guarantor: ONEYDA HODGE       : 1980   Address: [facility] [address]    Sex: [place] [address] Guarantor  Type: B/H   Relation to Patient: Self         Home Phone: [phone]   Guarantor ID: [id number]         Work Phone:     GUARANTOR EMPLOYER     Employer: [employer]           Status: FULL TIME      COVERAGE          PRIMARY INSURANCE   Payor / Plan: [facility] CHOICE PLUS       Group Number: [id number]       Subscriber Name: ONEYDA HODGE Subscriber : 1980   Subscriber ID: [id number] Pat. Rel. to Subscriber: Self   Insurance Address: [address]  [place] [address]       SECONDARY INSURANCE   Payor / Plan: - No Secondary Coverage -       Group Number:         Subscriber Name:   Subscriber :     Subscriber ID:   Pat. Rel. to Subscriber:     Insurance Address:

## 2023-10-23 DIAGNOSIS — G43.009 MIGRAINE WITHOUT AURA AND WITHOUT STATUS MIGRAINOSUS, NOT INTRACTABLE: ICD-10-CM

## 2023-10-24 RX ORDER — BUTALBITAL, ACETAMINOPHEN AND CAFFEINE 50; 325; 40 MG/1; MG/1; MG/1
1 TABLET ORAL EVERY 6 HOURS PRN
Qty: 30 TABLET | Refills: 5 | Status: SHIPPED | OUTPATIENT
Start: 2023-10-24 | End: 2023-12-13 | Stop reason: SDUPTHER

## 2023-11-09 DIAGNOSIS — L50.9 HIVES: ICD-10-CM

## 2023-11-09 RX ORDER — RABEPRAZOLE SODIUM 20 MG/1
20 TABLET, DELAYED RELEASE ORAL
Qty: 30 TABLET | Refills: 5 | Status: SHIPPED | OUTPATIENT
Start: 2023-11-09

## 2023-11-12 ENCOUNTER — HOSPITAL ENCOUNTER (EMERGENCY)
Facility: HOSPITAL | Age: 43
Discharge: HOME | End: 2023-11-13
Attending: SURGERY
Payer: COMMERCIAL

## 2023-11-12 DIAGNOSIS — R10.13 EPIGASTRIC PAIN: ICD-10-CM

## 2023-11-12 DIAGNOSIS — K85.90 ACUTE PANCREATITIS, UNSPECIFIED COMPLICATION STATUS, UNSPECIFIED PANCREATITIS TYPE: Primary | ICD-10-CM

## 2023-11-12 LAB
ALBUMIN SERPL BCP-MCNC: 4.6 G/DL (ref 3.5–5.2)
ALP SERPL-CCNC: 177 U/L (ref 55–135)
ALT SERPL W/O P-5'-P-CCNC: 43 U/L (ref 10–44)
ANION GAP SERPL CALC-SCNC: 12 MMOL/L (ref 8–16)
AST SERPL-CCNC: 22 U/L (ref 10–40)
BASOPHILS # BLD AUTO: 0.1 K/UL (ref 0–0.2)
BASOPHILS NFR BLD: 0.6 % (ref 0–1.9)
BILIRUB SERPL-MCNC: 0.3 MG/DL (ref 0.1–1)
BILIRUB UR QL STRIP: NEGATIVE
BUN SERPL-MCNC: 8 MG/DL (ref 6–20)
CALCIUM SERPL-MCNC: 10.3 MG/DL (ref 8.7–10.5)
CHLORIDE SERPL-SCNC: 103 MMOL/L (ref 95–110)
CLARITY UR: CLEAR
CO2 SERPL-SCNC: 26 MMOL/L (ref 23–29)
COLOR UR: YELLOW
CREAT SERPL-MCNC: 0.9 MG/DL (ref 0.5–1.4)
DIFFERENTIAL METHOD: ABNORMAL
EOSINOPHIL # BLD AUTO: 0.5 K/UL (ref 0–0.5)
EOSINOPHIL NFR BLD: 3 % (ref 0–8)
ERYTHROCYTE [DISTWIDTH] IN BLOOD BY AUTOMATED COUNT: 12.9 % (ref 11.5–14.5)
EST. GFR  (NO RACE VARIABLE): >60 ML/MIN/1.73 M^2
GLUCOSE SERPL-MCNC: 96 MG/DL (ref 70–110)
GLUCOSE UR QL STRIP: NEGATIVE
HCT VFR BLD AUTO: 43.2 % (ref 37–48.5)
HGB BLD-MCNC: 14.6 G/DL (ref 12–16)
HGB UR QL STRIP: NEGATIVE
IMM GRANULOCYTES # BLD AUTO: 0.08 K/UL (ref 0–0.04)
IMM GRANULOCYTES NFR BLD AUTO: 0.4 % (ref 0–0.5)
INFLUENZA A, MOLECULAR: NEGATIVE
INFLUENZA B, MOLECULAR: NEGATIVE
KETONES UR QL STRIP: NEGATIVE
LEUKOCYTE ESTERASE UR QL STRIP: NEGATIVE
LIPASE SERPL-CCNC: 272 U/L (ref 4–60)
LYMPHOCYTES # BLD AUTO: 3.1 K/UL (ref 1–4.8)
LYMPHOCYTES NFR BLD: 17.5 % (ref 18–48)
MCH RBC QN AUTO: 29.4 PG (ref 27–31)
MCHC RBC AUTO-ENTMCNC: 33.8 G/DL (ref 32–36)
MCV RBC AUTO: 87 FL (ref 82–98)
MONOCYTES # BLD AUTO: 1.1 K/UL (ref 0.3–1)
MONOCYTES NFR BLD: 5.9 % (ref 4–15)
NEUTROPHILS # BLD AUTO: 12.9 K/UL (ref 1.8–7.7)
NEUTROPHILS NFR BLD: 72.6 % (ref 38–73)
NITRITE UR QL STRIP: NEGATIVE
NRBC BLD-RTO: 0 /100 WBC
PH UR STRIP: 8 [PH] (ref 5–8)
PLATELET # BLD AUTO: 409 K/UL (ref 150–450)
PMV BLD AUTO: 10.6 FL (ref 9.2–12.9)
POTASSIUM SERPL-SCNC: 4.1 MMOL/L (ref 3.5–5.1)
PROT SERPL-MCNC: 8 G/DL (ref 6–8.4)
PROT UR QL STRIP: NEGATIVE
RBC # BLD AUTO: 4.97 M/UL (ref 4–5.4)
SODIUM SERPL-SCNC: 141 MMOL/L (ref 136–145)
SP GR UR STRIP: 1.02 (ref 1–1.03)
SPECIMEN SOURCE: NORMAL
URN SPEC COLLECT METH UR: NORMAL
UROBILINOGEN UR STRIP-ACNC: NEGATIVE EU/DL
WBC # BLD AUTO: 17.8 K/UL (ref 3.9–12.7)

## 2023-11-12 PROCEDURE — 25000003 PHARM REV CODE 250

## 2023-11-12 PROCEDURE — 63600175 PHARM REV CODE 636 W HCPCS

## 2023-11-12 PROCEDURE — 96374 THER/PROPH/DIAG INJ IV PUSH: CPT

## 2023-11-12 PROCEDURE — 87502 INFLUENZA DNA AMP PROBE: CPT

## 2023-11-12 PROCEDURE — 80053 COMPREHEN METABOLIC PANEL: CPT | Performed by: SURGERY

## 2023-11-12 PROCEDURE — 96361 HYDRATE IV INFUSION ADD-ON: CPT

## 2023-11-12 PROCEDURE — 81003 URINALYSIS AUTO W/O SCOPE: CPT | Performed by: SURGERY

## 2023-11-12 PROCEDURE — 83690 ASSAY OF LIPASE: CPT | Performed by: SURGERY

## 2023-11-12 PROCEDURE — 85025 COMPLETE CBC W/AUTO DIFF WBC: CPT | Performed by: SURGERY

## 2023-11-12 PROCEDURE — 99285 EMERGENCY DEPT VISIT HI MDM: CPT | Mod: 25

## 2023-11-12 PROCEDURE — 96375 TX/PRO/DX INJ NEW DRUG ADDON: CPT

## 2023-11-12 PROCEDURE — 36415 COLL VENOUS BLD VENIPUNCTURE: CPT | Performed by: SURGERY

## 2023-11-12 RX ORDER — ONDANSETRON 2 MG/ML
4 INJECTION INTRAMUSCULAR; INTRAVENOUS
Status: COMPLETED | OUTPATIENT
Start: 2023-11-12 | End: 2023-11-12

## 2023-11-12 RX ORDER — MORPHINE SULFATE 2 MG/ML
4 INJECTION, SOLUTION INTRAMUSCULAR; INTRAVENOUS
Status: COMPLETED | OUTPATIENT
Start: 2023-11-12 | End: 2023-11-12

## 2023-11-12 RX ADMIN — SODIUM CHLORIDE 1000 ML: 9 INJECTION, SOLUTION INTRAVENOUS at 11:11

## 2023-11-12 RX ADMIN — MORPHINE SULFATE 4 MG: 2 INJECTION, SOLUTION INTRAMUSCULAR; INTRAVENOUS at 11:11

## 2023-11-12 RX ADMIN — ONDANSETRON 4 MG: 2 INJECTION INTRAMUSCULAR; INTRAVENOUS at 11:11

## 2023-11-12 NOTE — ED TRIAGE NOTES
43 y.o. female presents to ER qTrack 04/qTrk 04   Chief Complaint   Patient presents with    Abdominal Pain   .   C/o abd pain all day, denies n/v/d

## 2023-11-12 NOTE — ED PROVIDER NOTES
Encounter Date: 11/12/2023       History     Chief Complaint   Patient presents with    Abdominal Pain     This note is dictated on M*Modal word recognition program.  There are word recognition mistakes and grammatical errors that are occasionally missed on review.     Staci Hodge is a 43 y.o. female presents to ER today with complaints of epigastric pain that radiates to left upper quadrant.  Patient reports this pain started this morning and has been present all day.  Patient currently rates pain 6/10 however when her epigastric regions palpated the pain is 10/10.  Patient denies nausea vomiting or diarrhea the current time.  Of note CBC revealed leukocytosis of 17.8, lipase of 272.  Alk-phos 177 AST ALT within normal limits.    The history is provided by the patient.     Review of patient's allergies indicates:  No Known Allergies  Past Medical History:   Diagnosis Date    Abnormal Pap smear of cervix age 25    no treatement    Anxiety     Breast cancer     Cancer 07/11/2018    breast cancer    Cervical spondylosis 2/9/2018    Colon polyp     Depression     Ectopic pregnancy 2/26/2019    General anesthetics causing adverse effect in therapeutic use     Slow to awaken/ Memory problems-post-op to day 3 after Mastectomy    Headache     History of psychiatric hospitalization     age 20 for SI    Hx of psychiatric care     Hypertension     Peutz-Jeghers syndrome     PONV (postoperative nausea and vomiting)     Psychiatric problem     Right carpal tunnel syndrome 3/9/2017    Self-harming behavior     Suicide attempt     Therapy      Past Surgical History:   Procedure Laterality Date    ADENOIDECTOMY      ANTEGRADE SINGLE BALLOON ENTEROSCOPY N/A 4/19/2021    Procedure: ENTEROSCOPY, SINGLE BALLOON, ANTEGRADE;  Surgeon: Vincenzo Herbert MD;  Location: [facility] ([address] [place] [address]);  Service: Endoscopy;  Laterality: N/A;  removal of large polyp in proximal jejunum; please schedule during a time when Dr. Dillon is available  in case I need assistance with removal or 2nd opinion prior to removal    COVID at [place] 4/16 - ttr    APPENDECTOMY      AUGMENTATION OF BREAST      BILATERAL MASTECTOMY Bilateral 7/23/2018    Procedure: MASTECTOMY 23 hr stay;  Surgeon: Sofia Kendrick MD;  Location: [address];  Service: General;  Laterality: Bilateral;    BILATERAL SALPINGO-OOPHORECTOMY (BSO) Bilateral 3/4/2021    Procedure: SALPINGO-OOPHORECTOMY, BILATERAL;  Surgeon: Clayton Vilchis MD;  Location: [facility];  Service: OB/GYN;  Laterality: Bilateral;    BOWEL RESECTION  10/17/14    BREAST BIOPSY Left 06/04/2018    BREAST CAPSULOTOMY Left 4/25/2019    Procedure: CAPSULOTOMY, BREAST LEFT;  Surgeon: Duane Bello MD;  Location: [address];  Service: Plastics;  Laterality: Left;    BREAST RECONSTRUCTION      BREAST SURGERY      Bilateral Mastectomy 07/23/2018    CARPAL TUNNEL RELEASE Bilateral     CARPAL TUNNEL RELEASE      COLONOSCOPY      COLONOSCOPY N/A 2/25/2021    Procedure: COLONOSCOPY;  Surgeon: Vincenzo Herbert MD;  [address]  Service: Endoscopy;  Laterality: N/A;  previous anesthesia complications  okay for this date/time per Dr. Herbert and liliana with Miranda,  - sm  COVID test on 2/22/21 at [facility]    DILATION AND CURETTAGE OF UTERUS      DILATION AND CURETTAGE OF UTERUS USING SUCTION N/A 2/25/2019    Procedure: DILATION AND CURETTAGE, UTERUS, USING SUCTION  PATHOLOGY NEEDED;  Surgeon: Pam Sifuentes MD;  Location: [facility];  Service: OB/GYN;  Laterality: N/A;    ENDOSCOPIC ULTRASOUND OF UPPER GASTROINTESTINAL TRACT Left 1/15/2021    Procedure: ULTRASOUND, UPPER GI TRACT, ENDOSCOPIC;  Surgeon: Chandan Dillon MD;  Location: [facility];  Service: Endoscopy;  Laterality: Left;  for pancreatic screening    ESOPHAGOGASTRODUODENOSCOPY N/A 1/15/2021    Procedure: EGD (ESOPHAGOGASTRODUODENOSCOPY);  Surgeon: Chandan Dillon MD;  Location: [facility];  Service: Endoscopy;  Laterality: N/A;  with push  enteroscopy    ESOPHAGOGASTRODUODENOSCOPY N/A 5/17/2021    Procedure: EGD (ESOPHAGOGASTRODUODENOSCOPY);  Surgeon: Chandan Dillon MD;  Location: [facility] ENDO (2ND FLR);  Service: Endoscopy;  Laterality: N/A;  Please schedule this duodenal polypectomy. Need to be a day that Dr Jose Antonio Stark is in the OR. 90 minutes.   Chandan Dillon MD     COVID at [place] 5/15 (scheduled in the OR for 5/18 - using same COVID results for both procedures) ttr    ESOPHAGOGASTRODUODENOSCOPY N/A 1/25/2022    Procedure: EGD (ESOPHAGOGASTRODUODENOSCOPY);  Surgeon: Chandan Dillon MD;  Location: [facility] ENDO (2ND FLR);  Service: Endoscopy;  Laterality: N/A;  poss emr  covid-1/22/22-STAH-BB  instructions sent via portal-BB    ESOPHAGOGASTRODUODENOSCOPY N/A 3/10/2023    Procedure: EGD (ESOPHAGOGASTRODUODENOSCOPY);  Surgeon: Chandan Dillon MD;  Location: [facility] ENDO (2ND FLR);  Service: Endoscopy;  Laterality: N/A;  inst via portal  called to confirm and msg that phone not accepting calls 3/6 mal    FAT TRANSFER Bilateral 11/12/2018    Procedure: TRANSFER, FAT TISSUE BILATERAL BREAST;  Surgeon: Duane Bello MD;  Location: [facility] OR 2ND FLR;  Service: Plastics;  Laterality: Bilateral;    INJECTION FOR SENTINEL NODE IDENTIFICATION Left 7/23/2018    Procedure: INJECTION, FOR SENTINEL NODE IDENTIFICATION;  Surgeon: Sofia Kendrick MD;  Location: [facility] OR 2ND FLR;  Service: General;  Laterality: Left;    INSERTION OF BREAST TISSUE EXPANDER Bilateral 7/23/2018    Procedure: INSERTION, TISSUE EXPANDER, BREAST;  Surgeon: Duane Bello MD;  Location: [facility] OR 2ND FLR;  Service: Plastics;  Laterality: Bilateral;    INSERTION OF BREAST TISSUE EXPANDER Bilateral 3/10/2020    Procedure: INSERTION, TISSUE EXPANDER, BREAST, BILATERAL;  Surgeon: Duane Bello MD;  Location: [facility] OR 2ND FLR;  Service: Plastics;  Laterality: Bilateral;    intestinal polpy      LYSIS OF ADHESIONS N/A 3/4/2021    Procedure: LYSIS, ADHESIONS;   Surgeon: Clayton Vilchis MD;  Location: [facility];  Service: OB/GYN;  Laterality: N/A;  Omental adhesions    MASTECTOMY      REMOVAL OF BREAST IMPLANT Bilateral 3/10/2020    Procedure: REMOVAL, IMPLANT, BREAST, BILATERAL;  Surgeon: Duane Bello MD;  [facility];  Service: Plastics;  Laterality: Bilateral;    REMOVAL OF BREAST IMPLANT Bilateral 2020    Procedure: REMOVAL, IMPLANT, BREAST BILATERAL;  Surgeon: Duane Bello MD;  [facility];  Service: Plastics;  Laterality: Bilateral;    SENTINEL LYMPH NODE BIOPSY Left 2018    Procedure: BIOPSY, LYMPH NODE, SENTINEL;  Surgeon: Sofia Kendrick MD;  Location: [facility] OR [address] [place] [address];  Service: General;  Laterality: Left;    SMALL INTESTINE SURGERY      3 surgeries     TONSILLECTOMY      TOTAL ABDOMINAL HYSTERECTOMY N/A 3/4/2021    Procedure: HYSTERECTOMY, TOTAL, ABDOMINAL;  Surgeon: Clayton Vilchis MD;  Location: [facility];  Service: OB/GYN;  Laterality: N/A;    UPPER GASTROINTESTINAL ENDOSCOPY       Family History   Problem Relation Age of Onset    Cancer Mother         colon cancer    Stomach cancer Mother     Hypertension Father     Colon polyps Sister         peutz-jeghers syndrome    Breast cancer Paternal Aunt     No Known Problems Maternal Uncle     ADD / ADHD Paternal Uncle     Depression Paternal Uncle     Dementia Maternal Grandfather     Dementia Maternal Grandmother     No Known Problems Paternal Grandfather     Breast cancer Paternal Grandmother     No Known Problems Cousin     Ovarian cancer Neg Hx     Anesthesia problems Neg Hx      Social History     Tobacco Use    Smoking status: Every Day     Current packs/day: 0.00     Average packs/day: 1 pack/day for 18.3 years (18.3 ttl pk-yrs)     Types: Cigarettes     Start date: 2000     Last attempt to quit: 2018     Years since quittin.3    Smokeless tobacco: Never    Tobacco comments:     sometimes dry cough per patient   Substance Use Topics     Alcohol use: Yes     Alcohol/week: 5.8 standard drinks of alcohol     Types: 7 Standard drinks or equivalent per week     Comment: occasionally    Drug use: No     Review of Systems   Constitutional: Negative.    HENT: Negative.     Eyes: Negative.    Respiratory: Negative.     Cardiovascular: Negative.    Gastrointestinal:  Positive for abdominal pain.   Endocrine: Negative.    Genitourinary: Negative.    Musculoskeletal: Negative.    Skin: Negative.    Allergic/Immunologic: Negative.    Neurological: Negative.    Hematological: Negative.    Psychiatric/Behavioral: Negative.         Physical Exam     Initial Vitals [11/12/23 1729]   BP Pulse Resp Temp SpO2   (!) 158/103 (!) 111 18 97.5 °F (36.4 °C) 99 %      MAP       --         Physical Exam    Constitutional: She appears well-developed and well-nourished. She is not diaphoretic. No distress.   HENT:   Right Ear: External ear normal.   Left Ear: External ear normal.   Eyes: Pupils are equal, round, and reactive to light. Right eye exhibits no discharge. Left eye exhibits no discharge.   Neck: Neck supple.   Normal range of motion.  Cardiovascular:  Normal rate, regular rhythm and normal heart sounds.     Exam reveals no gallop and no friction rub.       No murmur heard.  Pulmonary/Chest: Breath sounds normal. No respiratory distress. She has no wheezes. She has no rhonchi. She has no rales. She exhibits no tenderness.   Abdominal: Abdomen is soft. There is abdominal tenderness (has tenderness to epigastric region on palpation today.).   Musculoskeletal:         General: No tenderness or edema.      Cervical back: Normal range of motion and neck supple.     Neurological: She is alert and oriented to person, place, and time. GCS score is 15. GCS eye subscore is 4. GCS verbal subscore is 5. GCS motor subscore is 6.   Skin: Capillary refill takes less than 2 seconds.   Psychiatric: She has a normal mood and affect. Her behavior is normal. Judgment and thought content  normal.         ED Course   Procedures  Labs Reviewed   CBC W/ AUTO DIFFERENTIAL - Abnormal; Notable for the following components:       Result Value    WBC 17.80 (*)     Gran # (ANC) 12.9 (*)     Immature Grans (Abs) 0.08 (*)     Mono # 1.1 (*)     Lymph % 17.5 (*)     All other components within normal limits   COMPREHENSIVE METABOLIC PANEL - Abnormal; Notable for the following components:    Alkaline Phosphatase 177 (*)     All other components within normal limits   LIPASE - Abnormal; Notable for the following components:    Lipase 272 (*)     All other components within normal limits   INFLUENZA A & B BY MOLECULAR   URINALYSIS, REFLEX TO URINE CULTURE    Narrative:     Specimen Source->Urine          Imaging Results              CT Abdomen Pelvis With IV Contrast (Final result)  Result time 11/13/23 01:12:23      Final result by John Pérez MD (11/13/23 01:12:23)                   Impression:      No acute findings.      Electronically signed by: John Pérez  Date:    11/13/2023  Time:    01:12               Narrative:    EXAMINATION:  CT ABDOMEN PELVIS WITH IV CONTRAST    CLINICAL HISTORY:  Epigastric pain;    TECHNIQUE:  Low dose axial images, sagittal and coronal reformations were obtained from the lung bases to the pubic symphysis following the IV administration of 100 mL of Omnipaque 350 .  Oral contrast was not administered.    COMPARISON:  CT 04/26/2023    FINDINGS:  Abdomen:    - Lower thorax:Unremarkable.    - Lung bases: No infiltrates and no nodules.    - Liver: Enlarged and fatty liver.    - Gallbladder: No calcified gallstones.    - Bile Ducts: No evidence of intra or extra hepatic biliary ductal dilation.    - Spleen: 26 mm splenic hemangioma, similar to prior.    - Kidneys: No mass or hydronephrosis.    - Adrenals: Unremarkable.    - Pancreas: No mass or peripancreatic fat stranding.    - Retroperitoneum:  No significant adenopathy.    - Vascular: No abdominal aortic  aneurysm.    - Abdominal wall:  Unremarkable.    Pelvis:    No pelvic mass, adenopathy, or free fluid.    Bowel/Mesentery:    Postsurgical changes of small bowel.  No evidence of bowel obstruction or inflammation.    Bones:  No acute osseous abnormality and no suspicious lytic or blastic lesion.                                       US Abdomen Limited (Gallbladder) (Final result)  Result time 11/12/23 22:21:19      Final result by John Pérez MD (11/12/23 22:21:19)                   Impression:      Enlarged and fatty liver.      Electronically signed by: John Pérez  Date:    11/12/2023  Time:    22:21               Narrative:    EXAMINATION:  US ABDOMEN LIMITED    CLINICAL HISTORY:  Epigastric pain    TECHNIQUE:  Limited ultrasound of the right upper quadrant of the abdomen was performed.    COMPARISON:  04/25/2023    FINDINGS:  Pancreas is obscured by bowel gas.  IVC is unremarkable.    Unremarkable gallbladder.    Common bile duct measures 3 mm.  No intrahepatic biliary ductal dilatation.    Enlarged liver measuring 19 cm.  Diffusely increased echogenicity.  No discrete mass.    Unremarkable right kidney.                                       Medications   sodium chloride 0.9% bolus 1,000 mL 1,000 mL (0 mLs Intravenous Stopped 11/13/23 0058)   ondansetron injection 4 mg (4 mg Intravenous Given 11/12/23 2359)   morphine injection 4 mg (4 mg Intravenous Given 11/12/23 2359)   iohexoL (OMNIPAQUE 350) injection 100 mL (100 mLs Intravenous Given 11/13/23 0059)   oxyCODONE-acetaminophen 5-325 mg per tablet 1 tablet (1 tablet Oral Given 11/13/23 0135)     Medical Decision Making  Differential diagnosis include cholecystitis, choledocholithiasis, pancreatitis, gastritis, peptic ulcer disease,     Care handoff given to Dr Vicente ORNELAS. currently awaiting CT results, ultrasound results. Final dispo will be made by Dr Zhang.   Care handoff completed 11/12/23 21:00    Amount and/or Complexity of Data  Reviewed  Labs: ordered. Decision-making details documented in ED Course.  Radiology: ordered.    Risk  Prescription drug management.               ED Course as of 11/17/23 1000   Sun Nov 12, 2023   2229 Lipase(!) [NB]   Mon Nov 13, 2023   0120 Tima Zhang 1:20 AM  Symptoms improved. Discussed results, findings, supportive care, follow up recommendations, and return to ED precautions with this patient. Patient verbalized understanding and agreement.  Patient is stable for discharge at this time.   [NB]      ED Course User Index  [NB] Tima Zhang MD                    Clinical Impression:   Final diagnoses:  [R10.13] Epigastric pain  [K85.90] Acute pancreatitis, unspecified complication status, unspecified pancreatitis type (Primary)        ED Disposition Condition    Discharge Stable          ED Prescriptions       Medication Sig Dispense Start Date End Date Auth. Provider    ondansetron (ZOFRAN) 4 MG tablet Take 1 tablet (4 mg total) by mouth every 6 (six) hours. 12 tablet 11/13/2023 -- Tima Zhang MD    oxyCODONE-acetaminophen (PERCOCET) 5-325 mg per tablet Take 1 tablet by mouth every 6 (six) hours as needed for Pain. 6 tablet 11/13/2023 -- Tima Zhang MD          Follow-up Information       Follow up With Specialties Details Why Contact Shilpi Stoner NP Family Medicine Schedule an appointment as soon as possible for a visit in 2 days  [address] [facility] [address]  [phone]      [facility] - Emergency Dept Emergency Medicine  If symptoms worsen [address] [place] [address] [id number] [phone]    [facility] Gastroenterology Schedule an appointment as soon as possible for a visit in 2 days  [address]   [facility] [phone]             Erasto, Joe M., NP  11/17/23 1000

## 2023-11-13 VITALS
WEIGHT: 188.25 LBS | OXYGEN SATURATION: 95 % | RESPIRATION RATE: 18 BRPM | TEMPERATURE: 98 F | HEART RATE: 72 BPM | SYSTOLIC BLOOD PRESSURE: 128 MMHG | DIASTOLIC BLOOD PRESSURE: 88 MMHG | BODY MASS INDEX: 34.44 KG/M2

## 2023-11-13 PROCEDURE — 25500020 PHARM REV CODE 255: Performed by: STUDENT IN AN ORGANIZED HEALTH CARE EDUCATION/TRAINING PROGRAM

## 2023-11-13 PROCEDURE — 25000003 PHARM REV CODE 250: Performed by: STUDENT IN AN ORGANIZED HEALTH CARE EDUCATION/TRAINING PROGRAM

## 2023-11-13 RX ORDER — ONDANSETRON 4 MG/1
4 TABLET, FILM COATED ORAL EVERY 6 HOURS
Qty: 12 TABLET | Refills: 0 | Status: ON HOLD | OUTPATIENT
Start: 2023-11-13 | End: 2024-02-29 | Stop reason: HOSPADM

## 2023-11-13 RX ORDER — OXYCODONE AND ACETAMINOPHEN 5; 325 MG/1; MG/1
1 TABLET ORAL EVERY 6 HOURS PRN
Qty: 6 TABLET | Refills: 0 | Status: SHIPPED | OUTPATIENT
Start: 2023-11-13 | End: 2024-01-09

## 2023-11-13 RX ORDER — OXYCODONE AND ACETAMINOPHEN 5; 325 MG/1; MG/1
1 TABLET ORAL
Status: COMPLETED | OUTPATIENT
Start: 2023-11-13 | End: 2023-11-13

## 2023-11-13 RX ADMIN — OXYCODONE HYDROCHLORIDE AND ACETAMINOPHEN 1 TABLET: 5; 325 TABLET ORAL at 01:11

## 2023-11-13 RX ADMIN — IOHEXOL 100 ML: 350 INJECTION, SOLUTION INTRAVENOUS at 12:11

## 2023-11-16 ENCOUNTER — OFFICE VISIT (OUTPATIENT)
Dept: INTERNAL MEDICINE | Facility: CLINIC | Age: 43
End: 2023-11-16
Payer: COMMERCIAL

## 2023-11-16 VITALS
HEART RATE: 80 BPM | OXYGEN SATURATION: 99 % | BODY MASS INDEX: 34.29 KG/M2 | HEIGHT: 62 IN | RESPIRATION RATE: 18 BRPM | WEIGHT: 186.31 LBS

## 2023-11-16 DIAGNOSIS — K85.00 IDIOPATHIC ACUTE PANCREATITIS, UNSPECIFIED COMPLICATION STATUS: Primary | ICD-10-CM

## 2023-11-16 PROCEDURE — 3008F BODY MASS INDEX DOCD: CPT | Mod: CPTII,S$GLB,, | Performed by: NURSE PRACTITIONER

## 2023-11-16 PROCEDURE — 3008F PR BODY MASS INDEX (BMI) DOCUMENTED: ICD-10-PCS | Mod: CPTII,S$GLB,, | Performed by: NURSE PRACTITIONER

## 2023-11-16 PROCEDURE — 99999 PR PBB SHADOW E&M-EST. PATIENT-LVL V: ICD-10-PCS | Mod: PBBFAC,,, | Performed by: NURSE PRACTITIONER

## 2023-11-16 PROCEDURE — 3044F HG A1C LEVEL LT 7.0%: CPT | Mod: CPTII,S$GLB,, | Performed by: NURSE PRACTITIONER

## 2023-11-16 PROCEDURE — 1159F PR MEDICATION LIST DOCUMENTED IN MEDICAL RECORD: ICD-10-PCS | Mod: CPTII,S$GLB,, | Performed by: NURSE PRACTITIONER

## 2023-11-16 PROCEDURE — 99999 PR PBB SHADOW E&M-EST. PATIENT-LVL V: CPT | Mod: PBBFAC,,, | Performed by: NURSE PRACTITIONER

## 2023-11-16 PROCEDURE — 99214 PR OFFICE/OUTPT VISIT, EST, LEVL IV, 30-39 MIN: ICD-10-PCS | Mod: S$GLB,,, | Performed by: NURSE PRACTITIONER

## 2023-11-16 PROCEDURE — 1159F MED LIST DOCD IN RCRD: CPT | Mod: CPTII,S$GLB,, | Performed by: NURSE PRACTITIONER

## 2023-11-16 PROCEDURE — 3044F PR MOST RECENT HEMOGLOBIN A1C LEVEL <7.0%: ICD-10-PCS | Mod: CPTII,S$GLB,, | Performed by: NURSE PRACTITIONER

## 2023-11-16 PROCEDURE — 99214 OFFICE O/P EST MOD 30 MIN: CPT | Mod: S$GLB,,, | Performed by: NURSE PRACTITIONER

## 2023-11-22 NOTE — PROGRESS NOTES
Subjective:       Patient ID: Staci Hodge is a 43 y.o. female.    Chief Complaint: Follow-up (X ER- pancreatitis/)    Patient is known, to me and presents with   Chief Complaint   Patient presents with    Follow-up     X ER- pancreatitis     .  Denies chest pain and shortness of breath.  Patient presents with ER pancreatitis and is feeling somewhat better. States that she has been resting and eating healthy. I advised she may need to see GI   Follow-up  Associated symptoms include abdominal pain. Pertinent negatives include no arthralgias, chest pain, congestion, coughing, fatigue, fever, headaches, joint swelling, nausea, neck pain, numbness, rash, vomiting or weakness.     Review of Systems   Constitutional:  Negative for activity change, appetite change, fatigue, fever and unexpected weight change.   HENT:  Negative for congestion, ear discharge, ear pain, hearing loss, postnasal drip and tinnitus.    Eyes:  Negative for photophobia, pain and visual disturbance.   Respiratory:  Negative for cough, shortness of breath, wheezing and stridor.    Cardiovascular:  Negative for chest pain, palpitations and leg swelling.   Gastrointestinal:  Positive for abdominal pain. Negative for abdominal distention, anal bleeding, blood in stool, constipation, diarrhea, nausea, rectal pain and vomiting.   Genitourinary:  Negative for difficulty urinating, dysuria, frequency, hematuria and urgency.   Musculoskeletal:  Negative for arthralgias, back pain, gait problem, joint swelling and neck pain.   Skin: Negative.  Negative for color change, pallor, rash and wound.   Neurological:  Negative for dizziness, seizures, syncope, weakness, light-headedness, numbness and headaches.   Hematological:  Negative for adenopathy. Does not bruise/bleed easily.   Psychiatric/Behavioral:  Negative for behavioral problems, confusion, dysphoric mood, hallucinations, sleep disturbance and suicidal ideas. The patient is not nervous/anxious.         Objective:      Physical Exam  Constitutional:       General: She is not in acute distress.     Appearance: She is well-developed.   HENT:      Head: Normocephalic and atraumatic.      Right Ear: Tympanic membrane and external ear normal.      Left Ear: Tympanic membrane and external ear normal.      Nose: Nose normal.      Mouth/Throat:      Mouth: Mucous membranes are moist.      Pharynx: No oropharyngeal exudate.   Eyes:      General: No scleral icterus.        Right eye: No discharge.         Left eye: No discharge.      Conjunctiva/sclera: Conjunctivae normal.      Pupils: Pupils are equal, round, and reactive to light.   Neck:      Thyroid: No thyromegaly.      Vascular: No JVD.   Cardiovascular:      Rate and Rhythm: Normal rate and regular rhythm.      Heart sounds: Normal heart sounds. No murmur heard.     No friction rub. No gallop.   Pulmonary:      Effort: Pulmonary effort is normal. No respiratory distress.      Breath sounds: Normal breath sounds. No stridor. No wheezing or rales.   Chest:      Chest wall: No tenderness.   Abdominal:      General: Bowel sounds are normal. There is no distension.      Palpations: Abdomen is soft. There is no mass.      Tenderness: There is abdominal tenderness in the periumbilical area. There is no right CVA tenderness, left CVA tenderness, guarding or rebound.      Hernia: No hernia is present.       Musculoskeletal:         General: No swelling, tenderness, deformity or signs of injury. Normal range of motion.      Cervical back: Normal range of motion and neck supple.      Right lower leg: No edema.      Left lower leg: No edema.   Lymphadenopathy:      Cervical: No cervical adenopathy.   Skin:     General: Skin is warm and dry.      Capillary Refill: Capillary refill takes less than 2 seconds.      Coloration: Skin is not jaundiced or pale.      Findings: No bruising, erythema, lesion or rash.   Neurological:      General: No focal deficit present.      Mental  "Status: She is alert and oriented to person, place, and time.      Cranial Nerves: No cranial nerve deficit.      Sensory: No sensory deficit.      Motor: No weakness or abnormal muscle tone.      Coordination: Coordination normal.      Gait: Gait normal.      Deep Tendon Reflexes: Reflexes are normal and symmetric. Reflexes normal.   Psychiatric:         Attention and Perception: She does not perceive auditory or visual hallucinations.         Mood and Affect: Mood and affect normal. Mood is not anxious or depressed. Affect is not tearful.         Behavior: Behavior normal.         Thought Content: Thought content normal. Thought content does not include homicidal or suicidal ideation. Thought content does not include homicidal or suicidal plan.         Judgment: Judgment normal.         Assessment:       1. Idiopathic acute pancreatitis, unspecified complication status        Plan:   1. Idiopathic acute pancreatitis, unspecified complication status       "This note will not be shared with the patient."  Low fat diet  Avoid alcohol and also nsaids  If any worsening need to go to ER  Rtc as scheduled   "

## 2023-11-28 DIAGNOSIS — E55.9 VITAMIN D DEFICIENCY: ICD-10-CM

## 2023-11-28 RX ORDER — ERGOCALCIFEROL 1.25 MG/1
CAPSULE ORAL
Qty: 12 CAPSULE | Refills: 1 | Status: SHIPPED | OUTPATIENT
Start: 2023-11-28

## 2023-12-13 DIAGNOSIS — G43.009 MIGRAINE WITHOUT AURA AND WITHOUT STATUS MIGRAINOSUS, NOT INTRACTABLE: ICD-10-CM

## 2023-12-13 RX ORDER — BUTALBITAL, ACETAMINOPHEN AND CAFFEINE 50; 325; 40 MG/1; MG/1; MG/1
1 TABLET ORAL EVERY 6 HOURS PRN
Qty: 30 TABLET | Refills: 5 | Status: SHIPPED | OUTPATIENT
Start: 2023-12-13 | End: 2024-03-20

## 2024-01-03 ENCOUNTER — TELEPHONE (OUTPATIENT)
Dept: ENDOSCOPY | Facility: HOSPITAL | Age: 44
End: 2024-01-03
Payer: COMMERCIAL

## 2024-01-03 ENCOUNTER — PATIENT MESSAGE (OUTPATIENT)
Dept: ENDOSCOPY | Facility: HOSPITAL | Age: 44
End: 2024-01-03
Payer: COMMERCIAL

## 2024-01-03 DIAGNOSIS — K31.7 GASTRIC POLYP: Primary | ICD-10-CM

## 2024-01-03 NOTE — TELEPHONE ENCOUNTER
Spoke to patient to schedule procedure(s) Upper Endoscopy (EGD)       Physician to perform procedure(s) Dr. SUSANA Dillon  Date of Procedure (s) 3/22/24  Arrival Time 7:00 AM  Time of Procedure(s) 8:00 AM   Location of Procedure(s) [address]  Type of Rx Prep sent to patient: Other  Instructions provided to patient via [facility]    Patient was informed on the following information and verbalized understanding. Screening questionnaire reviewed with patient and complete. If procedure requires anesthesia, a responsible adult needs to be present to accompany the patient home, patient cannot drive after receiving anesthesia. Appointment details are tentative, especially check-in time. Patient will receive a prep-op call 7 days prior to confirm check-in time for procedure. If applicable the patient should contact their pharmacy to verify Rx for procedure prep is ready for pick-up. Patient was advised to call the scheduling department at [phone] if pharmacy states no Rx is available. Patient was advised to call the endoscopy scheduling department if any questions or concerns arise.      [facility] Scheduling Department

## 2024-01-09 ENCOUNTER — TELEPHONE (OUTPATIENT)
Dept: INTERNAL MEDICINE | Facility: CLINIC | Age: 44
End: 2024-01-09
Payer: COMMERCIAL

## 2024-01-09 ENCOUNTER — OFFICE VISIT (OUTPATIENT)
Dept: NEUROLOGY | Facility: CLINIC | Age: 44
End: 2024-01-09
Payer: COMMERCIAL

## 2024-01-09 ENCOUNTER — CLINICAL SUPPORT (OUTPATIENT)
Dept: INTERNAL MEDICINE | Facility: CLINIC | Age: 44
End: 2024-01-09
Payer: COMMERCIAL

## 2024-01-09 VITALS
BODY MASS INDEX: 35.38 KG/M2 | SYSTOLIC BLOOD PRESSURE: 116 MMHG | HEIGHT: 62 IN | WEIGHT: 192.25 LBS | DIASTOLIC BLOOD PRESSURE: 98 MMHG | RESPIRATION RATE: 14 BRPM | HEART RATE: 112 BPM

## 2024-01-09 DIAGNOSIS — G43.E11 INTRACTABLE CHRONIC MIGRAINE WITH AURA WITH STATUS MIGRAINOSUS: Primary | ICD-10-CM

## 2024-01-09 DIAGNOSIS — M79.18 MYOFASCIAL MUSCLE PAIN: ICD-10-CM

## 2024-01-09 DIAGNOSIS — G43.909 MIGRAINE SYNDROME: Primary | ICD-10-CM

## 2024-01-09 PROCEDURE — 1160F RVW MEDS BY RX/DR IN RCRD: CPT | Mod: CPTII,S$GLB,, | Performed by: PHYSICIAN ASSISTANT

## 2024-01-09 PROCEDURE — 99999 PR PBB SHADOW E&M-EST. PATIENT-LVL IV: CPT | Mod: PBBFAC,,, | Performed by: PHYSICIAN ASSISTANT

## 2024-01-09 PROCEDURE — 3008F BODY MASS INDEX DOCD: CPT | Mod: CPTII,S$GLB,, | Performed by: PHYSICIAN ASSISTANT

## 2024-01-09 PROCEDURE — 3074F SYST BP LT 130 MM HG: CPT | Mod: CPTII,S$GLB,, | Performed by: PHYSICIAN ASSISTANT

## 2024-01-09 PROCEDURE — 96372 THER/PROPH/DIAG INJ SC/IM: CPT | Mod: S$GLB,,, | Performed by: NURSE PRACTITIONER

## 2024-01-09 PROCEDURE — 99214 OFFICE O/P EST MOD 30 MIN: CPT | Mod: S$GLB,,, | Performed by: PHYSICIAN ASSISTANT

## 2024-01-09 PROCEDURE — 3080F DIAST BP >= 90 MM HG: CPT | Mod: CPTII,S$GLB,, | Performed by: PHYSICIAN ASSISTANT

## 2024-01-09 PROCEDURE — 1159F MED LIST DOCD IN RCRD: CPT | Mod: CPTII,S$GLB,, | Performed by: PHYSICIAN ASSISTANT

## 2024-01-09 RX ORDER — PROMETHAZINE HYDROCHLORIDE 25 MG/1
25 TABLET ORAL EVERY 8 HOURS
Status: ON HOLD | COMMUNITY
Start: 2023-10-27

## 2024-01-09 RX ORDER — LITHIUM CARBONATE 450 MG/1
450 TABLET ORAL EVERY 12 HOURS
Status: ON HOLD | COMMUNITY

## 2024-01-09 RX ORDER — LISDEXAMFETAMINE DIMESYLATE 40 MG/1
40 CAPSULE ORAL DAILY
Status: ON HOLD | COMMUNITY

## 2024-01-09 RX ORDER — KETOROLAC TROMETHAMINE 30 MG/ML
60 INJECTION, SOLUTION INTRAMUSCULAR; INTRAVENOUS
Status: COMPLETED | OUTPATIENT
Start: 2024-01-09 | End: 2024-01-09

## 2024-01-09 RX ORDER — ATOGEPANT 60 MG/1
TABLET ORAL
Qty: 30 TABLET | Refills: 11 | Status: ON HOLD | OUTPATIENT
Start: 2024-01-09

## 2024-01-09 RX ADMIN — KETOROLAC TROMETHAMINE 60 MG: 30 INJECTION, SOLUTION INTRAMUSCULAR; INTRAVENOUS at 10:01

## 2024-01-09 NOTE — PROGRESS NOTES
Subjective:       Patient ID: Staci Hodge is a 43 y.o. female.    Chief Complaint: Migraine (Migraine and slurred speech)      HPI:  Staci Hodge is a 43 y.o. female is here for follow up visit. She is followed for chronic migraine with aura,  dizziness which has resolved, and hand numbness (s/p CTR, doing well). Medications and tolerability were reviewed and discussed.  Patient states compliance with medications prescribed. Patient continues to suffer with headaches, despite treatment.  She is here with complaints of increased migraine frequency over the past few months.  She has been on TPM, Propranolol, gabapentin and now Aimovig for prevention. Aimovig previously with a very good response. However, since October, she has had more frequent and more debilitating migraines. She has had to leave work more often than she should due to migraine. She uses Fioricet for rescue, but she only takes this a few times a month for fear of rebound headache. She has nausea, light and noise sensitivity, slurred speech and blurred vision with migraine. Not all symptoms with each headache.     Hand numbness no longer an issue. She has surgery on the right and braced the left. Symptoms improved.    No longer has dizziness. Myalgias improved. Previously on Lyrica. No longer taking.    Past Medical History:   Diagnosis Date    Abnormal Pap smear of cervix age 25    no treatement    Anxiety     Breast cancer     Cancer 07/11/2018    breast cancer    Cervical spondylosis 2/9/2018    Colon polyp     Depression     Ectopic pregnancy 2/26/2019    General anesthetics causing adverse effect in therapeutic use     Slow to awaken/ Memory problems-post-op to day 3 after Mastectomy    Headache     History of psychiatric hospitalization     age 20 for SI    Hx of psychiatric care     Hypertension     Peutz-Jeghers syndrome     PONV (postoperative nausea and vomiting)     Psychiatric problem     Right carpal tunnel syndrome  3/9/2017    Self-harming behavior     Suicide attempt     Therapy        Past Surgical History:   Procedure Laterality Date    ADENOIDECTOMY      ANTEGRADE SINGLE BALLOON ENTEROSCOPY N/A 4/19/2021    Procedure: ENTEROSCOPY, SINGLE BALLOON, ANTEGRADE;  Surgeon: Vincenzo Herbert MD;  Location: [facility] (2ND FLR);  Service: Endoscopy;  Laterality: N/A;  removal of large polyp in proximal jejunum; please schedule during a time when Dr. Dillon is available in case I need assistance with removal or 2nd opinion prior to removal    COVID at [place] 4/16 - ttr    APPENDECTOMY      AUGMENTATION OF BREAST      BILATERAL MASTECTOMY Bilateral 7/23/2018    Procedure: MASTECTOMY 23 hr stay;  Surgeon: Sofia Kendrick MD;  [address];  Service: General;  Laterality: Bilateral;    BILATERAL SALPINGO-OOPHORECTOMY (BSO) Bilateral 3/4/2021    Procedure: SALPINGO-OOPHORECTOMY, BILATERAL;  Surgeon: Clayton Vilchis MD;  Location: [facility];  Service: OB/GYN;  Laterality: Bilateral;    BOWEL RESECTION  10/17/14    BREAST BIOPSY Left 06/04/2018    BREAST CAPSULOTOMY Left 4/25/2019    Procedure: CAPSULOTOMY, BREAST LEFT;  Surgeon: Duane Bello MD;  Location: [address];  Service: Plastics;  Laterality: Left;    BREAST RECONSTRUCTION      BREAST SURGERY      Bilateral Mastectomy 07/23/2018    CARPAL TUNNEL RELEASE Bilateral     CARPAL TUNNEL RELEASE      COLONOSCOPY      COLONOSCOPY N/A 2/25/2021    Procedure: COLONOSCOPY;  Surgeon: Vincenzo Herbert MD;  Location: [facility] (2ND FLR);  Service: Endoscopy;  Laterality: N/A;  previous anesthesia complications  okay for this date/time per Dr. Herbert and liliana with [facility] - sm  COVID test on 2/22/21 at [facility]    DILATION AND CURETTAGE OF UTERUS      DILATION AND CURETTAGE OF UTERUS USING SUCTION N/A 2/25/2019    Procedure: DILATION AND CURETTAGE, UTERUS, USING SUCTION  PATHOLOGY NEEDED;  Surgeon: Pam Sifuentes MD;  Location: [facility];  Service: OB/GYN;   Laterality: N/A;    ENDOSCOPIC ULTRASOUND OF UPPER GASTROINTESTINAL TRACT Left 1/15/2021    Procedure: ULTRASOUND, UPPER GI TRACT, ENDOSCOPIC;  Surgeon: Chandan Dillon MD;  Location: [facility];  Service: Endoscopy;  Laterality: Left;  for pancreatic screening    ESOPHAGOGASTRODUODENOSCOPY N/A 1/15/2021    Procedure: EGD (ESOPHAGOGASTRODUODENOSCOPY);  Surgeon: Chandan Dillon MD;  Location: [facility];  Service: Endoscopy;  Laterality: N/A;  with push enteroscopy    ESOPHAGOGASTRODUODENOSCOPY N/A 5/17/2021    Procedure: EGD (ESOPHAGOGASTRODUODENOSCOPY);  Surgeon: Chandan Dillon MD;  Location: [facility] (2ND FLR);  Service: Endoscopy;  Laterality: N/A;  Please schedule this duodenal polypectomy. Need to be a day that Dr Jose Antonio Stark is in the OR. 90 minutes.   Chandan Dillon MD     COVID at [place] 5/15 (scheduled in the OR for 5/18 - using same COVID results for both procedures) ttr    ESOPHAGOGASTRODUODENOSCOPY N/A 1/25/2022    Procedure: EGD (ESOPHAGOGASTRODUODENOSCOPY);  Surgeon: Chandan Dillon MD;  Location: [facility] (2ND FLR);  Service: Endoscopy;  Laterality: N/A;  poss emr  covid-1/22/22-[facility]  instructions sent via portal-[facility]    ESOPHAGOGASTRODUODENOSCOPY N/A 3/10/2023    Procedure: EGD (ESOPHAGOGASTRODUODENOSCOPY);  Surgeon: Chandan Dillon MD;  Location: [facility] (2ND FLR);  Service: Endoscopy;  Laterality: N/A;  inst via portal  called to confirm and msg that phone not accepting calls 3/6 mal    FAT TRANSFER Bilateral 11/12/2018    Procedure: TRANSFER, FAT TISSUE BILATERAL BREAST;  Surgeon: Duane Bello MD;  Location: [facility] OR 2ND FLR;  Service: Plastics;  Laterality: Bilateral;    INJECTION FOR SENTINEL NODE IDENTIFICATION Left 7/23/2018    Procedure: INJECTION, FOR SENTINEL NODE IDENTIFICATION;  Surgeon: Sofia Kendrick MD;  Location: [facility] OR 2ND FLR;  Service: General;  Laterality: Left;    INSERTION OF BREAST TISSUE EXPANDER Bilateral 7/23/2018    Procedure: INSERTION,  TISSUE EXPANDER, BREAST;  Surgeon: Duane Bello MD;  Location: [facility] OR [facility] FLR;  Service: Plastics;  Laterality: Bilateral;    INSERTION OF BREAST TISSUE EXPANDER Bilateral 3/10/2020    Procedure: INSERTION, TISSUE EXPANDER, BREAST, BILATERAL;  Surgeon: Duane Bello MD;  [facility];  Service: Plastics;  Laterality: Bilateral;    intestinal polpy      LYSIS OF ADHESIONS N/A 3/4/2021    Procedure: LYSIS, ADHESIONS;  Surgeon: Clayton Vilchis MD;  Location: [facility];  Service: OB/GYN;  Laterality: N/A;  Omental adhesions    MASTECTOMY      REMOVAL OF BREAST IMPLANT Bilateral 3/10/2020    Procedure: REMOVAL, IMPLANT, BREAST, BILATERAL;  Surgeon: Duane Bello MD;  [facility];  Service: Plastics;  Laterality: Bilateral;    REMOVAL OF BREAST IMPLANT Bilateral 7/7/2020    Procedure: REMOVAL, IMPLANT, BREAST BILATERAL;  Surgeon: Duane Bello MD;  [facility];  Service: Plastics;  Laterality: Bilateral;    SENTINEL LYMPH NODE BIOPSY Left 7/23/2018    Procedure: BIOPSY, LYMPH NODE, SENTINEL;  Surgeon: Sofia Kendrick MD;  [facility];  Service: General;  Laterality: Left;    SMALL INTESTINE SURGERY      3 surgeries     TONSILLECTOMY      TOTAL ABDOMINAL HYSTERECTOMY N/A 3/4/2021    Procedure: HYSTERECTOMY, TOTAL, ABDOMINAL;  Surgeon: Clayton Vilchis MD;  Location: [facility];  Service: OB/GYN;  Laterality: N/A;    UPPER GASTROINTESTINAL ENDOSCOPY         Family History   Problem Relation Age of Onset    Cancer Mother         colon cancer    Stomach cancer Mother     Hypertension Father     Colon polyps Sister         peutz-jeghers syndrome    Breast cancer Paternal Aunt     No Known Problems Maternal Uncle     ADD / ADHD Paternal Uncle     Depression Paternal Uncle     Dementia Maternal Grandfather     Dementia Maternal Grandmother     No Known Problems Paternal Grandfather     Breast cancer Paternal Grandmother     No Known  Problems Cousin     Ovarian cancer Neg Hx     Anesthesia problems Neg Hx        Social History     Socioeconomic History    Marital status: Single    Number of children: 0   Tobacco Use    Smoking status: Every Day     Current packs/day: 0.00     Average packs/day: 1 pack/day for 18.3 years (18.3 ttl pk-yrs)     Types: Cigarettes     Start date: 2000     Last attempt to quit: 2018     Years since quittin.4    Smokeless tobacco: Never    Tobacco comments:     sometimes dry cough per patient   Substance and Sexual Activity    Alcohol use: Yes     Alcohol/week: 5.8 standard drinks of alcohol     Types: 7 Standard drinks or equivalent per week     Comment: occasionally    Drug use: No    Sexual activity: Yes     Partners: Male   Other Topics Concern    Patient feels they ought to cut down on drinking/drug use No    Patient annoyed by others criticizing their drinking/drug use No    Patient has felt bad or guilty about drinking/drug use No    Patient has had a drink/used drugs as an eye opener in the AM No   Social History Narrative    Had one previous miscarriage in     Never     currently in a relationship       Current Outpatient Medications   Medication Sig Dispense Refill    ARIPiprazole (ABILIFY) 15 MG Tab Take 1 tablet (15 mg total) by mouth once daily. 30 tablet 2    butalbital-acetaminophen-caffeine -40 mg (FIORICET, ESGIC) -40 mg per tablet Take 1 tablet by mouth every 6 (six) hours as needed for Headaches. 30 tablet 5    clonazePAM (KLONOPIN) 0.5 MG tablet Take 0.5 mg by mouth 2 (two) times daily.      hydrOXYzine (VISTARIL) 100 MG capsule Take 1 capsule (100 mg total) by mouth every 6 (six) hours as needed (Insomnia or anxiety). 60 capsule 1    lithium (ESKALITH) 450 MG TbSR Take 450 mg by mouth every 12 (twelve) hours.      montelukast (SINGULAIR) 10 mg tablet Take 10 mg by mouth once daily.      ondansetron (ZOFRAN) 4 MG tablet Take 1 tablet (4 mg total) by mouth every 6  (six) hours. 12 tablet 0    promethazine (PHENERGAN) 25 MG tablet Take 25 mg by mouth every 8 (eight) hours.      propranoloL (INDERAL) 20 MG tablet Take 20 mg by mouth 2 (two) times daily.      RABEprazole (ACIPHEX) 20 mg tablet TAKE ONE TABLET BY MOUTH ONCE A DAY 30 tablet 5    sucralfate (CARAFATE) 100 mg/mL suspension Take 10 mLs (1 g total) by mouth 4 (four) times daily before meals and nightly. Medication should be take 1 hour prior to meals 414 mL 1    traZODone (DESYREL) 150 MG tablet Take 1 tablet (150 mg total) by mouth every evening. 30 tablet 0    VITAMIN D2 1,250 mcg (50,000 unit) capsule TAKE ONE CAPSULE BY MOUTH EVERY 7 DAYS 12 capsule 1    VYVANSE 40 mg Cap Take 40 mg by mouth once daily.              No current facility-administered medications for this visit.     Facility-Administered Medications Ordered in Other Visits   Medication Dose Route Frequency Provider Last Rate Last Admin    0.9%  NaCl infusion   Intravenous Continuous Chandan Dillon MD 20 mL/hr at 01/25/22 0847 New Bag at 01/25/22 0847    sodium chloride 0.9% flush 10 mL  10 mL Intravenous PRN Chandan Dillon MD           Review of patient's allergies indicates:  No Known Allergies    Review of Systems   Constitutional:  Positive for fatigue. Negative for appetite change and fever.   HENT:  Negative for sore throat.    Eyes:  Positive for photophobia and visual disturbance.   Respiratory:  Negative for cough and shortness of breath.    Cardiovascular:  Negative for chest pain.   Gastrointestinal:  Positive for nausea. Negative for vomiting.   Endocrine: Negative for cold intolerance and heat intolerance.   Genitourinary:  Negative for difficulty urinating.   Musculoskeletal:  Negative for arthralgias, back pain and neck pain.   Skin:  Negative for rash.   Allergic/Immunologic: Negative for food allergies.   Neurological:  Positive for speech difficulty (with headaches) and headaches. Negative for dizziness, tremors, seizures,  weakness and numbness.   Hematological:  Does not bruise/bleed easily.   Psychiatric/Behavioral:  Negative for agitation, decreased concentration and sleep disturbance.        Objective:      Neurologic Exam     Mental Status   Oriented to person, place, and time.     Cranial Nerves     CN III, IV, VI   Pupils are equal, round, and reactive to light.    Physical Exam  Vitals and nursing note reviewed.   Constitutional:       Appearance: Normal appearance.   HENT:      Head: Normocephalic and atraumatic.      Nose: Nose normal.      Mouth/Throat:      Mouth: Mucous membranes are moist.      Pharynx: Oropharynx is clear.   Eyes:      Extraocular Movements: Extraocular movements intact.      Conjunctiva/sclera: Conjunctivae normal.      Pupils: Pupils are equal, round, and reactive to light.   Cardiovascular:      Rate and Rhythm: Normal rate and regular rhythm.      Pulses: Normal pulses.   Pulmonary:      Effort: Pulmonary effort is normal. No respiratory distress.   Abdominal:      General: There is no distension.      Palpations: Abdomen is soft.      Tenderness: There is no abdominal tenderness.   Genitourinary:     Comments: Deferred    Musculoskeletal:         General: No swelling or tenderness. Normal range of motion.      Cervical back: Normal range of motion and neck supple. No tenderness.   Skin:     General: Skin is warm and dry.   Neurological:      General: No focal deficit present.      Mental Status: She is alert and oriented to person, place, and time. Mental status is at baseline.   Psychiatric:         Mood and Affect: Mood normal.         Behavior: Behavior normal.         Thought Content: Thought content normal.         Judgment: Judgment normal.         Assessment:       1. Intractable chronic migraine with aura with status migrainosus    2. Myofascial muscle pain        Plan:   Discussed risks and benefits of potential treatment options at length as well as potential side effects of medication  changes. Medications were changed. Please refer to medication reconciliation report for details. Medication compliance discussed. Instructed to call clinic if concerned or to ED if emergency.     We discussed the risk of medication overuse headaches (rebound headaches) associated with frequent use of abortive agents even ones as seemingly benign as OTC Tylenol or Motrin. We discussed the goal of seven days without these medications to avoid rebound. We discussed a headache calendar to better record headaches for the next visit.    Intractable chronic migraine with aura with status migrainosus  -     atogepant (QULIPTA) 60 mg Tab; One tablet daily for migraine prevention.  Dispense: 30 tablet; Refill: 11  Stop Aimovig. Too many breakthrough migraines over the past 3 months.  Trial of Ubrelvy for rescue.  OK to take ffioricet more often than 2 times a month. Suggested limiting to twice a week. Ok for second dose if still with headache in one hour.  She tolerates Fioricet. No drowsiness. She could work while taking this, she may not need to leave work.  She willnotify me on how this new regiment is going and if she would like Ubrelvy rx.    Myofascial muscle pain  Controlled. No treatment needed at this time.  No radiculopathy on EMG of BUEs, non surgical C-spine findings.  Did not respond to injection therapy per pain management.      LINDSAY Cano

## 2024-01-09 NOTE — TELEPHONE ENCOUNTER
----- Message from Tonie Callaway MA sent at 2024  9:47 AM CST -----  Staci Hodge  MRN: [id number]  : 1980  PCP: Shilpi Clayton  Home Phone      [phone]  Work Phone      [phone]  Mobile          [phone]      MESSAGE:     Patient says back in September she came in with a migraine and was given an injection.     Patient c/o migraine today, Can she come in for injection today.    Please Advise:  [phone]

## 2024-01-11 ENCOUNTER — PATIENT MESSAGE (OUTPATIENT)
Dept: NEUROLOGY | Facility: CLINIC | Age: 44
End: 2024-01-11
Payer: COMMERCIAL

## 2024-01-29 DIAGNOSIS — G43.701 CHRONIC MIGRAINE WITHOUT AURA WITH STATUS MIGRAINOSUS, NOT INTRACTABLE: Primary | ICD-10-CM

## 2024-01-29 RX ORDER — UBROGEPANT 100 MG/1
100 TABLET ORAL
Qty: 16 TABLET | Refills: 2 | Status: ON HOLD | OUTPATIENT
Start: 2024-01-29 | End: 2024-03-22 | Stop reason: HOSPADM

## 2024-02-13 ENCOUNTER — LAB VISIT (OUTPATIENT)
Dept: LAB | Facility: HOSPITAL | Age: 44
End: 2024-02-13
Attending: NURSE PRACTITIONER
Payer: COMMERCIAL

## 2024-02-13 DIAGNOSIS — Z79.899 LONG-TERM USE OF HIGH-RISK MEDICATION: Primary | ICD-10-CM

## 2024-02-13 LAB
LITHIUM SERPL-SCNC: 0.6 MMOL/L (ref 0.6–1.2)
T4 FREE SERPL-MCNC: 1.16 NG/DL (ref 0.71–1.51)
TSH SERPL DL<=0.005 MIU/L-ACNC: 1.97 UIU/ML (ref 0.4–4)

## 2024-02-13 PROCEDURE — 36415 COLL VENOUS BLD VENIPUNCTURE: CPT | Performed by: NURSE PRACTITIONER

## 2024-02-13 PROCEDURE — 80178 ASSAY OF LITHIUM: CPT | Performed by: NURSE PRACTITIONER

## 2024-02-13 PROCEDURE — 84443 ASSAY THYROID STIM HORMONE: CPT | Performed by: NURSE PRACTITIONER

## 2024-02-13 PROCEDURE — 84439 ASSAY OF FREE THYROXINE: CPT | Performed by: NURSE PRACTITIONER

## 2024-02-23 ENCOUNTER — HOSPITAL ENCOUNTER (EMERGENCY)
Facility: HOSPITAL | Age: 44
Discharge: PSYCHIATRIC OR SUBSTANCE USE FACILITY | End: 2024-02-23
Attending: STUDENT IN AN ORGANIZED HEALTH CARE EDUCATION/TRAINING PROGRAM
Payer: COMMERCIAL

## 2024-02-23 ENCOUNTER — HOSPITAL ENCOUNTER (INPATIENT)
Facility: HOSPITAL | Age: 44
LOS: 6 days | Discharge: HOME | DRG: 885 | End: 2024-02-29
Attending: STUDENT IN AN ORGANIZED HEALTH CARE EDUCATION/TRAINING PROGRAM | Admitting: STUDENT IN AN ORGANIZED HEALTH CARE EDUCATION/TRAINING PROGRAM
Payer: COMMERCIAL

## 2024-02-23 VITALS
TEMPERATURE: 98 F | OXYGEN SATURATION: 100 % | RESPIRATION RATE: 18 BRPM | SYSTOLIC BLOOD PRESSURE: 119 MMHG | WEIGHT: 181.13 LBS | BODY MASS INDEX: 33.13 KG/M2 | DIASTOLIC BLOOD PRESSURE: 87 MMHG | HEART RATE: 94 BPM

## 2024-02-23 DIAGNOSIS — R45.851 SUICIDAL IDEATIONS: ICD-10-CM

## 2024-02-23 DIAGNOSIS — F32.A DEPRESSION WITH SUICIDAL IDEATION: ICD-10-CM

## 2024-02-23 DIAGNOSIS — R45.851 DEPRESSION WITH SUICIDAL IDEATION: ICD-10-CM

## 2024-02-23 DIAGNOSIS — F31.81 BIPOLAR 2 DISORDER, MAJOR DEPRESSIVE EPISODE: Primary | ICD-10-CM

## 2024-02-23 DIAGNOSIS — Z00.8 MEDICAL CLEARANCE FOR PSYCHIATRIC ADMISSION: Primary | ICD-10-CM

## 2024-02-23 LAB
ALBUMIN SERPL BCP-MCNC: 4.8 G/DL (ref 3.5–5.2)
ALP SERPL-CCNC: 183 U/L (ref 55–135)
ALT SERPL W/O P-5'-P-CCNC: 44 U/L (ref 10–44)
AMPHET+METHAMPHET UR QL: NEGATIVE
ANION GAP SERPL CALC-SCNC: 14 MMOL/L (ref 8–16)
APAP SERPL-MCNC: <3 UG/ML (ref 10–20)
AST SERPL-CCNC: 21 U/L (ref 10–40)
BARBITURATES UR QL SCN>200 NG/ML: ABNORMAL
BASOPHILS # BLD AUTO: 0.09 K/UL (ref 0–0.2)
BASOPHILS NFR BLD: 0.6 % (ref 0–1.9)
BENZODIAZ UR QL SCN>200 NG/ML: NEGATIVE
BILIRUB SERPL-MCNC: 0.3 MG/DL (ref 0.1–1)
BILIRUB UR QL STRIP: NEGATIVE
BUN SERPL-MCNC: 5 MG/DL (ref 6–20)
BZE UR QL SCN: NEGATIVE
CALCIUM SERPL-MCNC: 10.3 MG/DL (ref 8.7–10.5)
CANNABINOIDS UR QL SCN: NEGATIVE
CHLORIDE SERPL-SCNC: 105 MMOL/L (ref 95–110)
CLARITY UR: CLEAR
CO2 SERPL-SCNC: 23 MMOL/L (ref 23–29)
COLOR UR: YELLOW
CREAT SERPL-MCNC: 0.9 MG/DL (ref 0.5–1.4)
CREAT UR-MCNC: 24.3 MG/DL (ref 15–325)
DIFFERENTIAL METHOD BLD: ABNORMAL
EOSINOPHIL # BLD AUTO: 0.1 K/UL (ref 0–0.5)
EOSINOPHIL NFR BLD: 0.4 % (ref 0–8)
ERYTHROCYTE [DISTWIDTH] IN BLOOD BY AUTOMATED COUNT: 12.7 % (ref 11.5–14.5)
EST. GFR  (NO RACE VARIABLE): >60 ML/MIN/1.73 M^2
ETHANOL SERPL-MCNC: <10 MG/DL
GLUCOSE SERPL-MCNC: 100 MG/DL (ref 70–110)
GLUCOSE UR QL STRIP: NEGATIVE
HCT VFR BLD AUTO: 43.1 % (ref 37–48.5)
HGB BLD-MCNC: 14.2 G/DL (ref 12–16)
HGB UR QL STRIP: NEGATIVE
IMM GRANULOCYTES # BLD AUTO: 0.06 K/UL (ref 0–0.04)
IMM GRANULOCYTES NFR BLD AUTO: 0.4 % (ref 0–0.5)
KETONES UR QL STRIP: NEGATIVE
LEUKOCYTE ESTERASE UR QL STRIP: NEGATIVE
LYMPHOCYTES # BLD AUTO: 2.6 K/UL (ref 1–4.8)
LYMPHOCYTES NFR BLD: 18.2 % (ref 18–48)
MCH RBC QN AUTO: 28.6 PG (ref 27–31)
MCHC RBC AUTO-ENTMCNC: 32.9 G/DL (ref 32–36)
MCV RBC AUTO: 87 FL (ref 82–98)
METHADONE UR QL SCN>300 NG/ML: NEGATIVE
MONOCYTES # BLD AUTO: 0.6 K/UL (ref 0.3–1)
MONOCYTES NFR BLD: 4.5 % (ref 4–15)
NEUTROPHILS # BLD AUTO: 10.8 K/UL (ref 1.8–7.7)
NEUTROPHILS NFR BLD: 75.9 % (ref 38–73)
NITRITE UR QL STRIP: NEGATIVE
NRBC BLD-RTO: 0 /100 WBC
OPIATES UR QL SCN: NEGATIVE
PCP UR QL SCN>25 NG/ML: NEGATIVE
PH UR STRIP: 8 [PH] (ref 5–8)
PLATELET # BLD AUTO: 375 K/UL (ref 150–450)
PMV BLD AUTO: 9.8 FL (ref 9.2–12.9)
POTASSIUM SERPL-SCNC: 3.7 MMOL/L (ref 3.5–5.1)
PROT SERPL-MCNC: 7.8 G/DL (ref 6–8.4)
PROT UR QL STRIP: NEGATIVE
RBC # BLD AUTO: 4.97 M/UL (ref 4–5.4)
SALICYLATES SERPL-MCNC: <5 MG/DL (ref 15–30)
SODIUM SERPL-SCNC: 142 MMOL/L (ref 136–145)
SP GR UR STRIP: 1.02 (ref 1–1.03)
TOXICOLOGY INFORMATION: ABNORMAL
TSH SERPL DL<=0.005 MIU/L-ACNC: 1.37 UIU/ML (ref 0.4–4)
URN SPEC COLLECT METH UR: NORMAL
UROBILINOGEN UR STRIP-ACNC: NEGATIVE EU/DL
WBC # BLD AUTO: 14.2 K/UL (ref 3.9–12.7)

## 2024-02-23 PROCEDURE — 11400000 HC PSYCH PRIVATE ROOM

## 2024-02-23 PROCEDURE — 82077 ASSAY SPEC XCP UR&BREATH IA: CPT | Performed by: NURSE PRACTITIONER

## 2024-02-23 PROCEDURE — 25000003 PHARM REV CODE 250: Performed by: STUDENT IN AN ORGANIZED HEALTH CARE EDUCATION/TRAINING PROGRAM

## 2024-02-23 PROCEDURE — 81003 URINALYSIS AUTO W/O SCOPE: CPT | Mod: 59 | Performed by: NURSE PRACTITIONER

## 2024-02-23 PROCEDURE — S4991 NICOTINE PATCH NONLEGEND: HCPCS | Performed by: STUDENT IN AN ORGANIZED HEALTH CARE EDUCATION/TRAINING PROGRAM

## 2024-02-23 PROCEDURE — 84443 ASSAY THYROID STIM HORMONE: CPT | Performed by: NURSE PRACTITIONER

## 2024-02-23 PROCEDURE — 80179 DRUG ASSAY SALICYLATE: CPT | Performed by: NURSE PRACTITIONER

## 2024-02-23 PROCEDURE — 99285 EMERGENCY DEPT VISIT HI MDM: CPT

## 2024-02-23 PROCEDURE — 80143 DRUG ASSAY ACETAMINOPHEN: CPT | Performed by: NURSE PRACTITIONER

## 2024-02-23 PROCEDURE — 85025 COMPLETE CBC W/AUTO DIFF WBC: CPT | Performed by: NURSE PRACTITIONER

## 2024-02-23 PROCEDURE — 80307 DRUG TEST PRSMV CHEM ANLYZR: CPT | Performed by: NURSE PRACTITIONER

## 2024-02-23 PROCEDURE — 80053 COMPREHEN METABOLIC PANEL: CPT | Performed by: NURSE PRACTITIONER

## 2024-02-23 RX ORDER — GUAIFENESIN 100 MG/5ML
200 SOLUTION ORAL EVERY 4 HOURS PRN
Status: DISCONTINUED | OUTPATIENT
Start: 2024-02-23 | End: 2024-02-29 | Stop reason: HOSPADM

## 2024-02-23 RX ORDER — IBUPROFEN 200 MG
1 TABLET ORAL DAILY PRN
Status: DISCONTINUED | OUTPATIENT
Start: 2024-02-23 | End: 2024-02-24

## 2024-02-23 RX ORDER — ACETAMINOPHEN 325 MG/1
650 TABLET ORAL EVERY 6 HOURS PRN
Status: DISCONTINUED | OUTPATIENT
Start: 2024-02-23 | End: 2024-02-29 | Stop reason: HOSPADM

## 2024-02-23 RX ORDER — CALCIUM CARBONATE 200(500)MG
1000 TABLET,CHEWABLE ORAL EVERY 8 HOURS PRN
Status: DISCONTINUED | OUTPATIENT
Start: 2024-02-23 | End: 2024-02-29 | Stop reason: HOSPADM

## 2024-02-23 RX ORDER — HYDROXYZINE PAMOATE 50 MG/1
50 CAPSULE ORAL EVERY 6 HOURS PRN
Status: DISCONTINUED | OUTPATIENT
Start: 2024-02-23 | End: 2024-02-29 | Stop reason: HOSPADM

## 2024-02-23 RX ORDER — ONDANSETRON 4 MG/1
4 TABLET, ORALLY DISINTEGRATING ORAL EVERY 8 HOURS PRN
Status: DISCONTINUED | OUTPATIENT
Start: 2024-02-23 | End: 2024-02-29 | Stop reason: HOSPADM

## 2024-02-23 RX ORDER — OLANZAPINE 10 MG/1
10 TABLET ORAL EVERY 8 HOURS PRN
Status: DISCONTINUED | OUTPATIENT
Start: 2024-02-23 | End: 2024-02-29 | Stop reason: HOSPADM

## 2024-02-23 RX ORDER — OLANZAPINE 10 MG/2ML
10 INJECTION, POWDER, FOR SOLUTION INTRAMUSCULAR EVERY 8 HOURS PRN
Status: DISCONTINUED | OUTPATIENT
Start: 2024-02-23 | End: 2024-02-29 | Stop reason: HOSPADM

## 2024-02-23 RX ORDER — TALC
6 POWDER (GRAM) TOPICAL NIGHTLY PRN
Status: DISCONTINUED | OUTPATIENT
Start: 2024-02-23 | End: 2024-02-29 | Stop reason: HOSPADM

## 2024-02-23 RX ADMIN — Medication 6 MG: at 08:02

## 2024-02-23 RX ADMIN — NICOTINE 1 PATCH: 14 PATCH TRANSDERMAL at 08:02

## 2024-02-23 RX ADMIN — HYDROXYZINE PAMOATE 50 MG: 50 CAPSULE ORAL at 08:02

## 2024-02-23 NOTE — LETTER
"Staci Alfonso" Naveen was seen and treated at our hospital from 2/23/2024-2/29-24  She may return to work on 3/4/24 with no restrictions       If you have any questions or concerns, please don't hesitate to call.      ZAHIDA Ngo/Dr. Idania MD      "

## 2024-02-23 NOTE — ED PROVIDER NOTES
Encounter Date: 2/23/2024       History     Chief Complaint   Patient presents with    Psychiatric Evaluation     Pt arrived with PEC from [facility] seen by Dr. Aguiar. Pt reports feeling depressed with suicidal thoughts x few weeks. States her plan was to take all her BP pills. States instead of this plan she made an appointment to get help today.     Staci Hodge is a 43 y.o. female with PMH of anxiety, breast cancer, depression, hypertension, Peutz-Jeghers syndrome presenting to the ED depressed with suicidal thoughts for the past several weeks.Hx of depression and bipolar disorder currently taking Abilify, Vyvanse, clonazepam, trazodone, and lithium.  Patient reports that as depression continues despite taking medication.  For the past several weeks she has had thoughts of suicide, today she was thinking about overdosing on her BP medication. Denies HI/AH/VH. She was evaluated by mental health today where she was PECd and sent to the Ed for medical clearance.     The history is provided by the patient.     Review of patient's allergies indicates:  No Known Allergies  Past Medical History:   Diagnosis Date    Abnormal Pap smear of cervix age 25    no treatement    Anxiety     Breast cancer     Cancer 07/11/2018    breast cancer    Cervical spondylosis 2/9/2018    Colon polyp     Depression     Ectopic pregnancy 2/26/2019    General anesthetics causing adverse effect in therapeutic use     Slow to awaken/ Memory problems-post-op to day 3 after Mastectomy    Headache     History of psychiatric hospitalization     age 20 for SI    Hx of psychiatric care     Hypertension     Peutz-Jeghers syndrome     PONV (postoperative nausea and vomiting)     Psychiatric problem     Right carpal tunnel syndrome 3/9/2017    Self-harming behavior     Suicide attempt     Therapy      Past Surgical History:   Procedure Laterality Date    ADENOIDECTOMY      ANTEGRADE SINGLE BALLOON ENTEROSCOPY N/A 4/19/2021    Procedure:  ENTEROSCOPY, SINGLE BALLOON, ANTEGRADE;  Surgeon: Vincenzo Herbert MD;  Location: [facility] (2ND FLR);  Service: Endoscopy;  Laterality: N/A;  removal of large polyp in proximal jejunum; please schedule during a time when Dr. Dillon is available in case I need assistance with removal or 2nd opinion prior to removal    COVID at [place] 4/16 - ttr    APPENDECTOMY      AUGMENTATION OF BREAST      BILATERAL MASTECTOMY Bilateral 7/23/2018    Procedure: MASTECTOMY 23 hr stay;  Surgeon: Sofia Kendrick MD;  Location: [facility] 2ND FLR;  Service: General;  Laterality: Bilateral;    BILATERAL SALPINGO-OOPHORECTOMY (BSO) Bilateral 3/4/2021    Procedure: SALPINGO-OOPHORECTOMY, BILATERAL;  Surgeon: Clayton Vilchis MD;  Location: [facility];  Service: OB/GYN;  Laterality: Bilateral;    BOWEL RESECTION  10/17/14    BREAST BIOPSY Left 06/04/2018    BREAST CAPSULOTOMY Left 4/25/2019    Procedure: CAPSULOTOMY, BREAST LEFT;  Surgeon: Duane Bello MD;  Location: [facility] 2ND FLR;  Service: Plastics;  Laterality: Left;    BREAST RECONSTRUCTION      BREAST SURGERY      Bilateral Mastectomy 07/23/2018    CARPAL TUNNEL RELEASE Bilateral     CARPAL TUNNEL RELEASE      COLONOSCOPY      COLONOSCOPY N/A 2/25/2021    Procedure: COLONOSCOPY;  Surgeon: Vincenzo Herbert MD;  Location: [facility] (2ND FLR);  Service: Endoscopy;  Laterality: N/A;  previous anesthesia complications  okay for this date/time per Dr. Herbert and okay with Miranda OC - sm  COVID test on 2/22/21 at [facility]    DILATION AND CURETTAGE OF UTERUS      DILATION AND CURETTAGE OF UTERUS USING SUCTION N/A 2/25/2019    Procedure: DILATION AND CURETTAGE, UTERUS, USING SUCTION  PATHOLOGY NEEDED;  Surgeon: Pam Sifuentes MD;  Location: [facility];  Service: OB/GYN;  Laterality: N/A;    ENDOSCOPIC ULTRASOUND OF UPPER GASTROINTESTINAL TRACT Left 1/15/2021    Procedure: ULTRASOUND, UPPER GI TRACT, ENDOSCOPIC;  Surgeon: Chandan Dillon MD;  Location: [facility];  Service:  Endoscopy;  Laterality: Left;  for pancreatic screening    ESOPHAGOGASTRODUODENOSCOPY N/A 1/15/2021    Procedure: EGD (ESOPHAGOGASTRODUODENOSCOPY);  Surgeon: Chandan Dillon MD;  Location: [facility];  Service: Endoscopy;  Laterality: N/A;  with push enteroscopy    ESOPHAGOGASTRODUODENOSCOPY N/A 5/17/2021    Procedure: EGD (ESOPHAGOGASTRODUODENOSCOPY);  Surgeon: Chandan Dillon MD;  Location: [facility] (2ND FLR);  Service: Endoscopy;  Laterality: N/A;  Please schedule this duodenal polypectomy. Need to be a day that Dr Jose Antonio Stark is in the OR. 90 minutes.   Chandan Dillon MD     COVID at [place] 5/15 (scheduled in the OR for 5/18 - using same COVID results for both procedures) ttr    ESOPHAGOGASTRODUODENOSCOPY N/A 1/25/2022    Procedure: EGD (ESOPHAGOGASTRODUODENOSCOPY);  Surgeon: Chandan Dillon MD;  Location: [facility] (2ND FLR);  Service: Endoscopy;  Laterality: N/A;  poss emr  covid-1/22/22-STAH-BB  instructions sent via portal-BB    ESOPHAGOGASTRODUODENOSCOPY N/A 3/10/2023    Procedure: EGD (ESOPHAGOGASTRODUODENOSCOPY);  Surgeon: Chandan Dillon MD;  Location: [facility] (2ND FLR);  Service: Endoscopy;  Laterality: N/A;  inst via portal  called to confirm and msg that phone not accepting calls 3/6 mal    FAT TRANSFER Bilateral 11/12/2018    Procedure: TRANSFER, FAT TISSUE BILATERAL BREAST;  Surgeon: Duane Bello MD;  [facility];  Service: Plastics;  Laterality: Bilateral;    INJECTION FOR SENTINEL NODE IDENTIFICATION Left 7/23/2018    Procedure: INJECTION, FOR SENTINEL NODE IDENTIFICATION;  Surgeon: Sofia Kendrick MD;  [facility];  Service: General;  Laterality: Left;    INSERTION OF BREAST TISSUE EXPANDER Bilateral 7/23/2018    Procedure: INSERTION, TISSUE EXPANDER, BREAST;  Surgeon: Duane Bello MD;  Location: [facility] OR [address] [place] [address];  Service: Plastics;  Laterality: Bilateral;    INSERTION OF BREAST TISSUE EXPANDER Bilateral 3/10/2020    Procedure:  INSERTION, TISSUE EXPANDER, BREAST, BILATERAL;  Surgeon: Duane Bello MD;  Location: [facility] OR 2ND FLR;  Service: Plastics;  Laterality: Bilateral;    intestinal polpy      LYSIS OF ADHESIONS N/A 3/4/2021    Procedure: LYSIS, ADHESIONS;  Surgeon: Clayton Vilchis MD;  Location: [facility];  Service: OB/GYN;  Laterality: N/A;  Omental adhesions    MASTECTOMY      REMOVAL OF BREAST IMPLANT Bilateral 3/10/2020    Procedure: REMOVAL, IMPLANT, BREAST, BILATERAL;  Surgeon: Duane Bello MD;  [facility];  Service: Plastics;  Laterality: Bilateral;    REMOVAL OF BREAST IMPLANT Bilateral 7/7/2020    Procedure: REMOVAL, IMPLANT, BREAST BILATERAL;  Surgeon: Duane Bello MD;  [facility];  Service: Plastics;  Laterality: Bilateral;    SENTINEL LYMPH NODE BIOPSY Left 7/23/2018    Procedure: BIOPSY, LYMPH NODE, SENTINEL;  Surgeon: Sofia Kendrick MD;  [facility];  Service: General;  Laterality: Left;    SMALL INTESTINE SURGERY      3 surgeries     TONSILLECTOMY      TOTAL ABDOMINAL HYSTERECTOMY N/A 3/4/2021    Procedure: HYSTERECTOMY, TOTAL, ABDOMINAL;  Surgeon: Clayton Vilchis MD;  Location: [facility];  Service: OB/GYN;  Laterality: N/A;    UPPER GASTROINTESTINAL ENDOSCOPY       Family History   Problem Relation Age of Onset    Cancer Mother         colon cancer    Stomach cancer Mother     Hypertension Father     Colon polyps Sister         peutz-jeghers syndrome    Breast cancer Paternal Aunt     No Known Problems Maternal Uncle     ADD / ADHD Paternal Uncle     Depression Paternal Uncle     Dementia Maternal Grandfather     Dementia Maternal Grandmother     No Known Problems Paternal Grandfather     Breast cancer Paternal Grandmother     No Known Problems Cousin     Ovarian cancer Neg Hx     Anesthesia problems Neg Hx      Social History     Tobacco Use    Smoking status: Every Day     Current packs/day: 0.00     Average packs/day: 1 pack/day for 18.3 years  (18.3 ttl pk-yrs)     Types: Cigarettes     Start date: 2000     Last attempt to quit: 2018     Years since quittin.5    Smokeless tobacco: Never    Tobacco comments:     sometimes dry cough per patient   Substance Use Topics    Alcohol use: Yes     Alcohol/week: 5.8 standard drinks of alcohol     Types: 7 Standard drinks or equivalent per week     Comment: occasionally    Drug use: No     Review of Systems   Constitutional:  Negative for activity change, chills and fever.   HENT:  Negative for congestion, ear discharge, ear pain, postnasal drip, sinus pressure, sinus pain and sore throat.    Respiratory:  Negative for cough, chest tightness and shortness of breath.    Cardiovascular:  Negative for chest pain.   Gastrointestinal:  Negative for abdominal distention, abdominal pain and nausea.   Genitourinary:  Negative for dysuria, frequency and urgency.   Musculoskeletal:  Negative for back pain.   Skin: Negative.  Negative for rash.   Neurological:  Negative for dizziness, weakness, light-headedness and numbness.   Hematological:  Does not bruise/bleed easily.   Psychiatric/Behavioral:  Positive for suicidal ideas.        Physical Exam     Initial Vitals [24 1220]   BP Pulse Resp Temp SpO2   132/86 108 18 97.8 °F (36.6 °C) 100 %      MAP       --         Physical Exam    Nursing note and vitals reviewed.  Constitutional: She appears well-developed and well-nourished.   HENT:   Head: Normocephalic and atraumatic.   Eyes: Conjunctivae and EOM are normal. Pupils are equal, round, and reactive to light.   Neck: Neck supple.   Cardiovascular:  Normal rate, regular rhythm, normal heart sounds and intact distal pulses.           Pulmonary/Chest: Breath sounds normal.   Abdominal: Abdomen is soft. Bowel sounds are normal.   Musculoskeletal:         General: Normal range of motion.      Cervical back: Neck supple.     Neurological: She is alert and oriented to person, place, and time. She has normal  strength.   Skin: Skin is warm and dry.   Psychiatric: She has a normal mood and affect. Her behavior is normal. Judgment normal. Thought content is not paranoid and not delusional. She expresses suicidal ideation. She expresses no homicidal ideation. She expresses suicidal plans. She expresses no homicidal plans.         ED Course   Procedures  Labs Reviewed   CBC W/ AUTO DIFFERENTIAL - Abnormal; Notable for the following components:       Result Value    WBC 14.20 (*)     Gran # (ANC) 10.8 (*)     Immature Grans (Abs) 0.06 (*)     Gran % 75.9 (*)     All other components within normal limits   COMPREHENSIVE METABOLIC PANEL - Abnormal; Notable for the following components:    BUN 5 (*)     Alkaline Phosphatase 183 (*)     All other components within normal limits   DRUG SCREEN PANEL, URINE EMERGENCY - Abnormal; Notable for the following components:    Barbiturate Screen, Ur Presumptive Positive (*)     All other components within normal limits    Narrative:     Specimen Source->Urine   ACETAMINOPHEN LEVEL - Abnormal; Notable for the following components:    Acetaminophen (Tylenol), Serum <3.0 (*)     All other components within normal limits   SALICYLATE LEVEL - Abnormal; Notable for the following components:    Salicylate Lvl <5.0 (*)     All other components within normal limits   TSH   URINALYSIS, REFLEX TO URINE CULTURE    Narrative:     Specimen Source->Urine   ALCOHOL,MEDICAL (ETHANOL)          Imaging Results    None          Medications - No data to display  Medical Decision Making  Evaluation of a 43-year-old female with suicidal thoughts with plan to overdose on blood pressure medication.  Patient initially evaluated by mental health where she was pec and sent to the ED for medical clearance.  She presents crying with + SI.     Diff dx includes depression, bipolar DO, anxiety     Amount and/or Complexity of Data Reviewed  Labs: ordered. Decision-making details documented in ED Course.    Risk  Risk  Details: Patient medically cleared for psychiatric admission                  Medically cleared for psychiatry placement: 2/23/2024  2:02 PM                   Clinical Impression:  Final diagnoses:  [Z00.8] Medical clearance for psychiatric admission (Primary)  [F32.A, R45.851] Depression with suicidal ideation          ED Disposition Condition    Transfer to Psych Facility Stable          ED Prescriptions    None       Follow-up Information    None          Aarti Hart NP  02/23/24 [id number]

## 2024-02-23 NOTE — ED NOTES
[facility] being transfer to [facility] a this [facility]. Patient taken over per Jing TEAGUE, Denies any further questions or concerns.

## 2024-02-23 NOTE — NURSING
Patient to [facility] reporting 10/10 depression and anxiety for several weeks. Reports SI with a plan to overdose on her blood pressure medications. Unsure of possible triggers to mood changes. She reports having trouble falling and staying asleep. Decreased appetite. Denies HI. Smokes one pack of cigarettes per day, denies wanting to quit. Denies alcohol use. UDS + barbiturates. Denies hallucinations. NADN. Oriented patient to unit. Safety precautions remain in place.

## 2024-02-23 NOTE — NURSING
Pt to [facility] via wheelchair accompanied by MHT and security. Pt wanded on arrival and belongings inventoried. No weapons or paraphernalia found. Skin assessment completed. Pt changed into paper scrubs and nonskid socks. NADN. Safety precautions remain in place.

## 2024-02-24 PROCEDURE — 25000003 PHARM REV CODE 250: Performed by: STUDENT IN AN ORGANIZED HEALTH CARE EDUCATION/TRAINING PROGRAM

## 2024-02-24 PROCEDURE — 25000003 PHARM REV CODE 250: Performed by: PSYCHIATRY & NEUROLOGY

## 2024-02-24 PROCEDURE — S4991 NICOTINE PATCH NONLEGEND: HCPCS | Performed by: STUDENT IN AN ORGANIZED HEALTH CARE EDUCATION/TRAINING PROGRAM

## 2024-02-24 PROCEDURE — 99223 1ST HOSP IP/OBS HIGH 75: CPT | Mod: ,,, | Performed by: PSYCHIATRY & NEUROLOGY

## 2024-02-24 PROCEDURE — 90833 PSYTX W PT W E/M 30 MIN: CPT | Mod: ,,, | Performed by: PSYCHIATRY & NEUROLOGY

## 2024-02-24 PROCEDURE — 99221 1ST HOSP IP/OBS SF/LOW 40: CPT | Mod: ,,, | Performed by: FAMILY MEDICINE

## 2024-02-24 PROCEDURE — 11400000 HC PSYCH PRIVATE ROOM

## 2024-02-24 RX ORDER — HYDROXYZINE PAMOATE 50 MG/1
100 CAPSULE ORAL NIGHTLY
Status: DISCONTINUED | OUTPATIENT
Start: 2024-02-24 | End: 2024-02-29 | Stop reason: HOSPADM

## 2024-02-24 RX ORDER — CLONAZEPAM 0.5 MG/1
0.5 TABLET ORAL 2 TIMES DAILY
Status: DISCONTINUED | OUTPATIENT
Start: 2024-02-24 | End: 2024-02-29 | Stop reason: HOSPADM

## 2024-02-24 RX ORDER — ARIPIPRAZOLE 10 MG/1
10 TABLET ORAL DAILY
Status: DISCONTINUED | OUTPATIENT
Start: 2024-02-24 | End: 2024-02-26

## 2024-02-24 RX ORDER — LURASIDONE HYDROCHLORIDE 40 MG/1
40 TABLET, FILM COATED ORAL
Status: DISCONTINUED | OUTPATIENT
Start: 2024-02-24 | End: 2024-02-29 | Stop reason: HOSPADM

## 2024-02-24 RX ORDER — TRAZODONE HYDROCHLORIDE 150 MG/1
150 TABLET ORAL NIGHTLY
Status: DISCONTINUED | OUTPATIENT
Start: 2024-02-24 | End: 2024-02-29 | Stop reason: HOSPADM

## 2024-02-24 RX ORDER — IBUPROFEN 200 MG
1 TABLET ORAL DAILY
Status: DISCONTINUED | OUTPATIENT
Start: 2024-02-24 | End: 2024-02-29 | Stop reason: HOSPADM

## 2024-02-24 RX ORDER — LITHIUM CARBONATE 450 MG/1
900 TABLET ORAL NIGHTLY
Status: DISCONTINUED | OUTPATIENT
Start: 2024-02-24 | End: 2024-02-29 | Stop reason: HOSPADM

## 2024-02-24 RX ORDER — PROPRANOLOL HYDROCHLORIDE 20 MG/1
20 TABLET ORAL 2 TIMES DAILY
Status: DISCONTINUED | OUTPATIENT
Start: 2024-02-24 | End: 2024-02-29 | Stop reason: HOSPADM

## 2024-02-24 RX ADMIN — TRAZODONE HYDROCHLORIDE 150 MG: 150 TABLET ORAL at 08:02

## 2024-02-24 RX ADMIN — CLONAZEPAM 0.5 MG: 0.5 TABLET ORAL at 08:02

## 2024-02-24 RX ADMIN — PROPRANOLOL HYDROCHLORIDE 20 MG: 20 TABLET ORAL at 08:02

## 2024-02-24 RX ADMIN — HYDROXYZINE PAMOATE 100 MG: 50 CAPSULE ORAL at 08:02

## 2024-02-24 RX ADMIN — NICOTINE 1 PATCH: 14 PATCH, EXTENDED RELEASE TRANSDERMAL at 08:02

## 2024-02-24 RX ADMIN — LITHIUM CARBONATE 900 MG: 450 TABLET, EXTENDED RELEASE ORAL at 08:02

## 2024-02-24 RX ADMIN — HYDROXYZINE PAMOATE 50 MG: 50 CAPSULE ORAL at 12:02

## 2024-02-24 RX ADMIN — LURASIDONE HYDROCHLORIDE 40 MG: 40 TABLET, COATED ORAL at 04:02

## 2024-02-24 RX ADMIN — ARIPIPRAZOLE 10 MG: 10 TABLET ORAL at 12:02

## 2024-02-24 NOTE — NURSING
Rested quietly with closed eyes and even resp for 7.75 hours.  Modified VC and all precautions maintained.  Pathways clear and bed in low position.

## 2024-02-24 NOTE — PLAN OF CARE
Patient orienting well to unit, spending majority of time on telephone. Reports depression and anxiety. +SI. Denies HI. Denies hallucinations. Appetite adequate. PRN Vistaril administered to aid with anxiety and PRN melatonin administered to aid with sleep. Remains calm and cooperative. NADN. Safety precautions remain in place.

## 2024-02-24 NOTE — PLAN OF CARE
Pt is calm and cooperative.  She still has some S/I, able to contract for safety.  Denies any H/I at this time.  Pt tired and depressed.  Depression still severe.  Resting at this time.  Will allow pt to rest and continue to monitor.  Appropriate behavior on unit thus far.

## 2024-02-25 PROCEDURE — S4991 NICOTINE PATCH NONLEGEND: HCPCS | Performed by: STUDENT IN AN ORGANIZED HEALTH CARE EDUCATION/TRAINING PROGRAM

## 2024-02-25 PROCEDURE — 99233 SBSQ HOSP IP/OBS HIGH 50: CPT | Mod: ,,, | Performed by: PSYCHIATRY & NEUROLOGY

## 2024-02-25 PROCEDURE — 11400000 HC PSYCH PRIVATE ROOM

## 2024-02-25 PROCEDURE — 25000003 PHARM REV CODE 250: Performed by: PSYCHIATRY & NEUROLOGY

## 2024-02-25 PROCEDURE — 25000003 PHARM REV CODE 250: Performed by: STUDENT IN AN ORGANIZED HEALTH CARE EDUCATION/TRAINING PROGRAM

## 2024-02-25 RX ADMIN — ARIPIPRAZOLE 10 MG: 10 TABLET ORAL at 08:02

## 2024-02-25 RX ADMIN — NICOTINE 1 PATCH: 14 PATCH, EXTENDED RELEASE TRANSDERMAL at 08:02

## 2024-02-25 RX ADMIN — LITHIUM CARBONATE 900 MG: 450 TABLET, EXTENDED RELEASE ORAL at 08:02

## 2024-02-25 RX ADMIN — HYDROXYZINE PAMOATE 100 MG: 50 CAPSULE ORAL at 08:02

## 2024-02-25 RX ADMIN — PROPRANOLOL HYDROCHLORIDE 20 MG: 20 TABLET ORAL at 08:02

## 2024-02-25 RX ADMIN — TRAZODONE HYDROCHLORIDE 150 MG: 150 TABLET ORAL at 08:02

## 2024-02-25 RX ADMIN — CLONAZEPAM 0.5 MG: 0.5 TABLET ORAL at 08:02

## 2024-02-25 RX ADMIN — LURASIDONE HYDROCHLORIDE 40 MG: 40 TABLET, COATED ORAL at 04:02

## 2024-02-25 NOTE — PROGRESS NOTES
"PSYCHIATRY DAILY INPATIENT PROGRESS NOTE  SUBSEQUENT HOSPITAL VISIT    ENCOUNTER DATE: 2/25/2024  SITE: [facility]    DATE OF ADMISSION: 2/23/2024  4:25 PM  LENGTH OF STAY: 2 days    HISTORY  Staci Hodge is a 43 y.o. female, seen during daily garcia rounds on the inpatient unit for depression with SI to OD      Interim Events  The patient was seen and examined. The chart was reviewed. Staff gave report.    Sleep was fragmented due to unfamiliar environment and bed. Eating well. Denies SE. States that fleeting thoughts of SI have not been present in last 12-24 hours. But she still feels hopeless. Canceration is impaired. Still dysphoric, withdrawn though is in group area.       ROS  General ROS: negative  Ophthalmic ROS: negative  ENT ROS: negative  Respiratory ROS: no cough, shortness of breath, or wheezing  Cardiovascular ROS: no chest pain or dyspnea on exertion  Gastrointestinal ROS: no abdominal pain, change in bowel habits, or black or bloody stools  Genito-Urinary ROS: no dysuria, trouble voiding, or hematuria  Musculoskeletal ROS: negative  Neurological ROS: no TIA or stroke symptoms    DIAGNOSTIC TESTING   Laboratory Results  No results found for this or any previous visit (from the past 24 hour(s)).    VITALS   Vitals:    02/25/24 0751   BP: (!) 141/65   Pulse: 96   Resp: 18   Temp: 97 °F (36.1 °C)      Gait and Station: Stable  Involuntary Movements: none  Muscle Tone: normal  Strength: 5/5 in 4 extremities     MSE:   Alert and oriented x3, fund of knowledge intact x 4 presidents, 3/3 at 5 minute memory, DLROW-attention intact, language fluent english   Appearance: stated age, casually dressed, well groomed  Behavior No psychomotor agitation or retardation, good eye contact  Speech normal rate, tone, volume, conversational.    Language fluent English.   Mood "not having thought of harming myself but I still feel hopeless"  Affect blunted dysphoric appropriate, mood congruent  Thought " process: linear and logical; Associations intact.    Thought content: passive denies active suicidal or homicidal ideation.  Denies auditory hallucinations, paranoid ideation, ideas of reference.    Insight good  Judgment good           ASSESSMENT/PLAN:   (Diagnosis and Medical Decision Making)     Bipolar 1, MRE depressed severe without psychotic features  Unspecified Anxiety  Continue Klonopin 1mg PO BID   Continue Lithium CR 900mg PO qhs   Continue Trazodone 150mg PO qhs   Continue Vistaril 100mg PO qhs   Continue Propranolol 20mg PO BID   Reduced home Abilify back to 10mg PO daily (increase to 15mg daily for several weeks has been ineffective)  Started trial of Latuda 40mg PO with dinner     Will not restart Vyvanse that was started for dx of ADHD (no childhood hx of attentional deficits)  Strongly suggest freuqent 1:1 therapy session and/or IOP     Physical Health Conditions  FM consulted    Chart and Labs reviewed. Discussed progress and ongoing symptoms with clinical team.    Estimated remaining hospital day(s): 4-5     NEED FOR CONTINUED HOSPITALIZATION  Psychiatric illness continues to pose a potential threat to life or bodily function, of self or others, thereby requiring the need for continued inpatient psychiatric hospitalization: Yes  Protective inpatient pyschiatric hospitalization required while a safe disposition plan is enacted: Yes  Patient stabilized and ready for discharge from inpatient psychiatric unit: No    STAFF:   Cortney Johnson MD  Psychiatry    This note was partially created with M Model word recognition program.  There may be word recognition mistakes that are occasionally missed on review.  Please interpret accordingly.

## 2024-02-25 NOTE — CONSULTS
History & Physical      SUBJECTIVE:     History of Present Illness:  Patient is a 43 y.o. female presents with depressionSI. Admitted to [facility].    No past medical history other than psych. No complaints today.    PTA Medications   Medication Sig    ARIPiprazole (ABILIFY) 15 MG Tab Take 1 tablet (15 mg total) by mouth once daily.    atogepant (QULIPTA) 60 mg Tab Take one tablet daily for migraine prevention.    butalbital-acetaminophen-caffeine -40 mg (FIORICET, ESGIC) -40 mg per tablet Take 1 tablet by mouth every 6 (six) hours as needed for Headaches.    clonazePAM (KLONOPIN) 0.5 MG tablet Take 0.5 mg by mouth 2 (two) times daily.    hydrOXYzine (VISTARIL) 100 MG capsule Take 1 capsule (100 mg total) by mouth every 6 (six) hours as needed (Insomnia or anxiety).    lithium (ESKALITH) 450 MG TbSR Take 450 mg by mouth every 12 (twelve) hours.    montelukast (SINGULAIR) 10 mg tablet Take 10 mg by mouth once daily.    ondansetron (ZOFRAN) 4 MG tablet Take 1 tablet (4 mg total) by mouth every 6 (six) hours.    promethazine (PHENERGAN) 25 MG tablet Take 25 mg by mouth every 8 (eight) hours.    propranoloL (INDERAL) 20 MG tablet Take 20 mg by mouth 2 (two) times daily.    RABEprazole (ACIPHEX) 20 mg tablet TAKE ONE TABLET BY MOUTH ONCE A DAY    sucralfate (CARAFATE) 100 mg/mL suspension Take 10 mLs (1 g total) by mouth 4 (four) times daily before meals and nightly. Medication should be take 1 hour prior to meals    traZODone (DESYREL) 150 MG tablet Take 1 tablet (150 mg total) by mouth every evening.    ubrogepant (UBRELVY) 100 mg tablet Take 1 tablet (100 mg total) by mouth as needed for Migraine. If symptoms persist or return, may repeat dose after 2 hours. Maximum: 200 mg per 24 hours    VITAMIN D2 1,250 mcg (50,000 unit) capsule TAKE ONE CAPSULE BY MOUTH EVERY 7 DAYS    VYVANSE 40 mg Cap Take 40 mg by mouth once daily.       Review of patient's allergies indicates:  No Known Allergies    Past Medical History:    Diagnosis Date    Abnormal Pap smear of cervix age 25    no treatement    Anxiety     Breast cancer     Cancer 07/11/2018    breast cancer    Cervical spondylosis 2/9/2018    Colon polyp     Depression     Ectopic pregnancy 2/26/2019    General anesthetics causing adverse effect in therapeutic use     Slow to awaken/ Memory problems-post-op to day 3 after Mastectomy    Headache     History of psychiatric hospitalization     age 20 for SI    Hx of psychiatric care     Hypertension     Peutz-Jeghers syndrome     PONV (postoperative nausea and vomiting)     Psychiatric problem     Right carpal tunnel syndrome 3/9/2017    Self-harming behavior     Suicide attempt     Therapy      Past Surgical History:   Procedure Laterality Date    ADENOIDECTOMY      ANTEGRADE SINGLE BALLOON ENTEROSCOPY N/A 4/19/2021    Procedure: ENTEROSCOPY, SINGLE BALLOON, ANTEGRADE;  Surgeon: Vincenzo Herbert MD;  [facility] ([address] [place] [address]);  Service: Endoscopy;  Laterality: N/A;  removal of large polyp in proximal jejunum; please schedule during a time when Dr. Dillon is available in case I need assistance with removal or 2nd opinion prior to removal    COVID at [place] 4/16 - ttr    APPENDECTOMY      AUGMENTATION OF BREAST      BILATERAL MASTECTOMY Bilateral 7/23/2018    Procedure: MASTECTOMY 23 hr stay;  Surgeon: Sofia Kendrick MD;  Location: [address];  Service: General;  Laterality: Bilateral;    BILATERAL SALPINGO-OOPHORECTOMY (BSO) Bilateral 3/4/2021    Procedure: SALPINGO-OOPHORECTOMY, BILATERAL;  Surgeon: Clayton Vilchis MD;  Location: [facility];  Service: OB/GYN;  Laterality: Bilateral;    BOWEL RESECTION  10/17/14    BREAST BIOPSY Left 06/04/2018    BREAST CAPSULOTOMY Left 4/25/2019    Procedure: CAPSULOTOMY, BREAST LEFT;  Surgeon: Duane Bello MD;  Location: [address];  Service: Plastics;  Laterality: Left;    BREAST RECONSTRUCTION      BREAST SURGERY      Bilateral Mastectomy 07/23/2018    CARPAL TUNNEL  RELEASE Bilateral     CARPAL TUNNEL RELEASE      COLONOSCOPY      COLONOSCOPY N/A 2/25/2021    Procedure: COLONOSCOPY;  Surgeon: Vincenzo Herbert MD;  Location: [facility] (2ND FLR);  Service: Endoscopy;  Laterality: N/A;  previous anesthesia complications  okay for this date/time per Dr. Herbert and liliana with Miranda,  - sm  COVID test on 2/22/21 at [facility]    DILATION AND CURETTAGE OF UTERUS      DILATION AND CURETTAGE OF UTERUS USING SUCTION N/A 2/25/2019    Procedure: DILATION AND CURETTAGE, UTERUS, USING SUCTION  PATHOLOGY NEEDED;  Surgeon: Pam Sifuentes MD;  Location: [facility];  Service: OB/GYN;  Laterality: N/A;    ENDOSCOPIC ULTRASOUND OF UPPER GASTROINTESTINAL TRACT Left 1/15/2021    Procedure: ULTRASOUND, UPPER GI TRACT, ENDOSCOPIC;  Surgeon: Chandan Dillon MD;  Location: [facility];  Service: Endoscopy;  Laterality: Left;  for pancreatic screening    ESOPHAGOGASTRODUODENOSCOPY N/A 1/15/2021    Procedure: EGD (ESOPHAGOGASTRODUODENOSCOPY);  Surgeon: Chandan Dillon MD;  Location: [facility];  Service: Endoscopy;  Laterality: N/A;  with push enteroscopy    ESOPHAGOGASTRODUODENOSCOPY N/A 5/17/2021    Procedure: EGD (ESOPHAGOGASTRODUODENOSCOPY);  Surgeon: Chandan Dillon MD;  Location: [facility] (2ND FLR);  Service: Endoscopy;  Laterality: N/A;  Please schedule this duodenal polypectomy. Need to be a day that Dr Jose Antonio Stark is in the OR. 90 minutes.   Chandan Dillon MD     COVID at [place] 5/15 (scheduled in the OR for 5/18 - using same COVID results for both procedures) ttr    ESOPHAGOGASTRODUODENOSCOPY N/A 1/25/2022    Procedure: EGD (ESOPHAGOGASTRODUODENOSCOPY);  Surgeon: Chandan Dillon MD;  Location: [facility] (2ND FLR);  Service: Endoscopy;  Laterality: N/A;  poss emr  covid-1/22/22-STA-BB  instructions sent via portal-BB    ESOPHAGOGASTRODUODENOSCOPY N/A 3/10/2023    Procedure: EGD (ESOPHAGOGASTRODUODENOSCOPY);  Surgeon: Chandan Dillon MD;  Location: [facility] ([address] [place] [address]);  Service:  Endoscopy;  Laterality: N/A;  inst via portal  called to confirm and msg that phone not accepting calls 3/6 mal    FAT TRANSFER Bilateral 11/12/2018    Procedure: TRANSFER, FAT TISSUE BILATERAL BREAST;  Surgeon: Duaen Bello MD;  Location: [address];  Service: Plastics;  Laterality: Bilateral;    INJECTION FOR SENTINEL NODE IDENTIFICATION Left 7/23/2018    Procedure: INJECTION, FOR SENTINEL NODE IDENTIFICATION;  Surgeon: Sofia Kendrick MD;  Location: [address];  Service: General;  Laterality: Left;    INSERTION OF BREAST TISSUE EXPANDER Bilateral 7/23/2018    Procedure: INSERTION, TISSUE EXPANDER, BREAST;  Surgeon: Duane Bello MD;  Location: [facility] OR [address] [place] [address];  Service: Plastics;  Laterality: Bilateral;    INSERTION OF BREAST TISSUE EXPANDER Bilateral 3/10/2020    Procedure: INSERTION, TISSUE EXPANDER, BREAST, BILATERAL;  Surgeon: Duane Bello MD;  Location: [address];  Service: Plastics;  Laterality: Bilateral;    intestinal polpy      LYSIS OF ADHESIONS N/A 3/4/2021    Procedure: LYSIS, ADHESIONS;  Surgeon: Clayton Vilchis MD;  Location: [facility];  Service: OB/GYN;  Laterality: N/A;  Omental adhesions    MASTECTOMY      REMOVAL OF BREAST IMPLANT Bilateral 3/10/2020    Procedure: REMOVAL, IMPLANT, BREAST, BILATERAL;  Surgeon: Duane Bello MD;  Location: [address];  Service: Plastics;  Laterality: Bilateral;    REMOVAL OF BREAST IMPLANT Bilateral 7/7/2020    Procedure: REMOVAL, IMPLANT, BREAST BILATERAL;  Surgeon: Duane Bello MD;  Location: [address];  Service: Plastics;  Laterality: Bilateral;    SENTINEL LYMPH NODE BIOPSY Left 7/23/2018    Procedure: BIOPSY, LYMPH NODE, SENTINEL;  Surgeon: Sofia Kendrick MD;  Location: [facility] OR [address] [place] [address];  Service: General;  Laterality: Left;    SMALL INTESTINE SURGERY      3 surgeries     TONSILLECTOMY      TOTAL ABDOMINAL HYSTERECTOMY N/A 3/4/2021    Procedure: HYSTERECTOMY, TOTAL, ABDOMINAL;   Surgeon: Clayton Vilchis MD;  [facility]: [facility];  Service: OB/GYN;  Laterality: N/A;    UPPER GASTROINTESTINAL ENDOSCOPY       Family History   Problem Relation Age of Onset    Cancer Mother         colon cancer    Stomach cancer Mother     Hypertension Father     Colon polyps Sister         peutz-jeghers syndrome    Breast cancer Paternal Aunt     No Known Problems Maternal Uncle     ADD / ADHD Paternal Uncle     Depression Paternal Uncle     Dementia Maternal Grandfather     Dementia Maternal Grandmother     No Known Problems Paternal Grandfather     Breast cancer Paternal Grandmother     No Known Problems Cousin     Ovarian cancer Neg Hx     Anesthesia problems Neg Hx      Social History     Tobacco Use    Smoking status: Every Day     Current packs/day: 0.00     Average packs/day: 1 pack/day for 18.3 years (18.3 ttl pk-yrs)     Types: Cigarettes     Start date: 2000     Last attempt to quit: 2018     Years since quittin.5    Smokeless tobacco: Never    Tobacco comments:     sometimes dry cough per patient   Substance Use Topics    Alcohol use: Yes     Alcohol/week: 5.8 standard drinks of alcohol     Types: 7 Standard drinks or equivalent per week     Comment: occasionally    Drug use: No        Review of Systems:  Constitutional: no fever or chills  Respiratory: no cough or shorness of breath  Cardiovascular: no chest pain or palpitations  Gastrointestinal: no nausea or vomiting, no abdominal pain or change in bowel habits  Musculoskeletal: no arthralgias or myalgias  Psych:SI  OBJECTIVE:     Vital Signs (Most Recent)  Temp: 97.2 °F (36.2 °C) (24 07)  Pulse: 90 (24 07)  Resp: 18 (24 0741)  BP: 118/64 (24 0741)  SpO2: 100 % (24 1647)    Physical Exam:  General: well developed, well nourished  Lungs:  clear to auscultation bilaterally and normal respiratory effort  Cardiovascular: Heart: regular rate and rhythm, S1, S2 normal, no murmur, click, rub or gallop. Chest  Wall: no tenderness. Extremities: no cyanosis or edema, or clubbing. Pulses: 2+ and symmetric.  Abdomen/Rectal: Abdomen: soft, non-tender non-distented; bowel sounds normal; no masses,  no organomegaly. Rectal: not examined  Skin: Skin color, texture, turgor normal. No rashes or lesions  Musculoskeletal:normal gait  Psych:SI  Neuro: Cranial nerves:  CN II Visual fields full to confrontation.   CN III, IV, VI Pupils are equal, round, and reactive to light.  CN III: no palsy  Nystagmus: none   Diplopia: none  Ophthalmoparesis: none  CN V Facial sensation intact.   CN VII Facial expression full, symmetric.   CN VIII normal.   CN IX normal.   CN X normal.   CN XI normal.   CN XII normal.        Laboratory  CBC:   Recent Labs   Lab 02/23/24  1243   WBC 14.20*   RBC 4.97   HGB 14.2   HCT 43.1      MCV 87   MCH 28.6   MCHC 32.9     CMP:   Recent Labs   Lab 02/23/24  1243      CALCIUM 10.3   ALBUMIN 4.8   PROT 7.8      K 3.7   CO2 23      BUN 5*   CREATININE 0.9   ALKPHOS 183*   ALT 44   AST 21   BILITOT 0.3     Recent Labs   Lab 02/23/24  1230   COLORU Yellow   SPECGRAV 1.020   PHUR 8.0   PROTEINUA Negative   NITRITE Negative   LEUKOCYTESUR Negative   UROBILINOGEN Negative     TSH   Date Value Ref Range Status   02/23/2024 1.373 0.400 - 4.000 uIU/mL Final     No results found. However, due to the size of the patient record, not all encounters were searched. Please check Results Review for a complete set of results.  Alcohol, Serum   Date Value Ref Range Status   02/23/2024 <10 <10 mg/dL Final     Acetaminophen (Tylenol), Serum   Date Value Ref Range Status   02/23/2024 <3.0 (L) 10.0 - 20.0 ug/mL Final     Comment:     Toxic Levels:  Adults (4 hr post-ingestion).........>150 ug/mL  Adults (12 hr post-ingestion)........>40 ug/mL  Peds (2 hr post-ingestion, liquid)...>225 ug/mL       Results for orders placed or performed during the hospital encounter of 02/23/24   Salicylate level   Result Value Ref  Range    Salicylate Lvl <5.0 (L) 15.0 - 30.0 mg/dL     Results for orders placed or performed during the hospital encounter of 02/23/24   Drug screen panel, emergency   Result Value Ref Range    Benzodiazepines Negative Negative    Methadone metabolites Negative Negative    Cocaine (Metab.) Negative Negative    Opiate Scrn, Ur Negative Negative    Barbiturate Screen, Ur Presumptive Positive (A) Negative    Amphetamine Screen, Ur Negative Negative    THC Negative Negative    Phencyclidine Negative Negative    Creatinine, Urine 24.3 15.0 - 325.0 mg/dL    Toxicology Information SEE COMMENT      Preg Test, Ur   Date Value Ref Range Status   10/09/2023 Negative  Final       ASSESSMENT/PLAN:     There are no hospital problems to display for this patient.      Plan: Further orders for psych

## 2024-02-25 NOTE — PLAN OF CARE
Plan of care reviewed. Denies intent to harm self or others at this time. Denies hallucinations and delusions. Accepts snacks and medications. Gait steady, no falls. Patient is staying in room and is having minimal interaction with others. She denies pain. She is calm and cooperative. Will continue precautions and monitor for safety.

## 2024-02-25 NOTE — PLAN OF CARE
Pt is calm and cooperative.  Denies any S/I or H/I at this time.  Affect is flat/blunted.  Mood is improving, still reports some depression.  Reports feeling tired at this time.  States she try to remain awake throughout the day.  Compliant with treatment thus far.  No acute distress apparent at this time, will continue to monitor.

## 2024-02-25 NOTE — PLAN OF CARE
Lying quietly in bed, eyes closed, respirations even, unlabored. Apparently asleep. Slept 7 hours thus far with 3 interruptions. Safety precautions maintained. Rounds done every 15 minutes. Bed is fixed in low position and room is uncluttered and pathways are clear.
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
## 2024-02-26 PROCEDURE — 25000003 PHARM REV CODE 250: Performed by: STUDENT IN AN ORGANIZED HEALTH CARE EDUCATION/TRAINING PROGRAM

## 2024-02-26 PROCEDURE — 99233 SBSQ HOSP IP/OBS HIGH 50: CPT | Mod: ,,, | Performed by: PSYCHIATRY & NEUROLOGY

## 2024-02-26 PROCEDURE — S4991 NICOTINE PATCH NONLEGEND: HCPCS | Performed by: STUDENT IN AN ORGANIZED HEALTH CARE EDUCATION/TRAINING PROGRAM

## 2024-02-26 PROCEDURE — 25000003 PHARM REV CODE 250: Performed by: PSYCHIATRY & NEUROLOGY

## 2024-02-26 PROCEDURE — 11400000 HC PSYCH PRIVATE ROOM

## 2024-02-26 PROCEDURE — 90833 PSYTX W PT W E/M 30 MIN: CPT | Mod: ,,, | Performed by: PSYCHIATRY & NEUROLOGY

## 2024-02-26 RX ORDER — ARIPIPRAZOLE 5 MG/1
5 TABLET ORAL DAILY
Status: COMPLETED | OUTPATIENT
Start: 2024-02-27 | End: 2024-02-27

## 2024-02-26 RX ADMIN — TRAZODONE HYDROCHLORIDE 150 MG: 150 TABLET ORAL at 08:02

## 2024-02-26 RX ADMIN — CLONAZEPAM 0.5 MG: 0.5 TABLET ORAL at 08:02

## 2024-02-26 RX ADMIN — LITHIUM CARBONATE 900 MG: 450 TABLET, EXTENDED RELEASE ORAL at 08:02

## 2024-02-26 RX ADMIN — HYDROXYZINE PAMOATE 50 MG: 50 CAPSULE ORAL at 12:02

## 2024-02-26 RX ADMIN — NICOTINE 1 PATCH: 14 PATCH, EXTENDED RELEASE TRANSDERMAL at 08:02

## 2024-02-26 RX ADMIN — PROPRANOLOL HYDROCHLORIDE 20 MG: 20 TABLET ORAL at 08:02

## 2024-02-26 RX ADMIN — HYDROXYZINE PAMOATE 100 MG: 50 CAPSULE ORAL at 08:02

## 2024-02-26 RX ADMIN — LURASIDONE HYDROCHLORIDE 40 MG: 40 TABLET, COATED ORAL at 04:02

## 2024-02-26 RX ADMIN — ARIPIPRAZOLE 10 MG: 10 TABLET ORAL at 08:02

## 2024-02-26 NOTE — PLAN OF CARE
"Patient flat affect with depressed mood. Patient tearful at times. Patient interacting with select group of peers.  Reporting " I am just so sad. I don't want to be this way". Patient reports on a scale of 1-10 with ten being the highest level of depression. Patient rating depression today a 9/10. On the same scale patient rating anxiety a 9/10. Encouraged patient to attend groups and activities. Patient denies SI/HI no self harming behavior displayed, no aggressive behavior displayed towards others. Patient contracted safety with staff and unit. Patient reports "I guess if I could see a counselor more often that mght help me". Discussed and educated patient on the anxiety packets, CBT packet and coping skills packet. Educated, reviewed  and discussed plan of care and medication regiment with this patient. Discussed and educated with this patient any concerns that needed to be addressed along with the importance of medication compliance  1:1 with this patient. Patient voices understanding of all teachings.   "

## 2024-02-26 NOTE — PLAN OF CARE
"Plan of care reviewed. Denies intent to harm self or others at this time. States did not have suicidal thoughts today. She also states she is ready to get back to her normal routine of being at home and going to work. States she feels "less depressed." Accepts snacks and medications. Gait steady, no falls. Interacting with staff and peers.  Will continue precautions and monitor for safety.  "

## 2024-02-26 NOTE — PLAN OF CARE
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 engage pt in treatment and encourage participation in group therapy. Safety plan to be facilitated and encouraged. [facility] will aid in discharge planning.

## 2024-02-26 NOTE — PROGRESS NOTES
"   02/26/24 2990   [facility] Group Therapy   Group Name Therapeutic Recreation   Specific Interventions Coping Skills Training   Participation Level Appropriate;Sharing   Participation Quality Cooperative;Social   Insight/Motivation Good   Affect/Mood Display Appropriate   Cognition Alert   Psychomotor WNL     Patient reports a "depressed, little anxious" mood. Patient verbalized that her leisure time is important, "because it's time to take care of myself." Patient shared healthy leisure outlets of reading "to escape from my thoughts" and getting a manicure to feel better about myself."  "
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
## 2024-02-26 NOTE — PROGRESS NOTES
"   02/26/24 0923   Assessment   Patient's Identification of the Problem Patient presents calm, cooperative, reports an "okay, little depressed" mood. Patient states her admit is due to depression, suicidal thoughts of overdosing on medication, "I was having thoughts of wanting to kill myself. I don'y why." Patient admits to negative leisure lifestyle of cigarettes, alcohol occasionally and denies drug use. 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 Patient verbalized her main goal, "to learn how to cope with suicidal thoughts."   Leisure Interest Reading;Watching TV;Listen to Music;Enjoy Family/Friends  (getting a manicure)   Leisure Barriers Loss of Interest;Energy Level;Self Confidence   Treatment Focus To Improve Mood;To Increase Motivation;Increase Self Confidence;To Improve Leisure Awareness/Lifestyle/Interest;To Promote Successful and Safe Self Expression;To Improve Coping Skills;To Increase Energy Level       Treatment Recommendation:   1:1 Intervention (as needed)    Cognitive Stimulation Skilled Activity  Creative Expression Skilled Activity  Mild Exercises Skilled Activity  Coping Skilled Activity  Leisure Education and Awareness Skilled Activity    Treatment Goal(s):  Long Term Goals Refer To Master Treatment Plan    Short Term Treatment Goal(s)  Patient Will:  Comply with Treatment  Exhibit Improvement in Mood  Demonstrate Constructive Expression of Feelings and Behavior  Verbalize Improvement in Mood  Identify at Least 2 Coping Skills or Leisure Skills to Reduce Depression and Hopelessness Upon Request from Therapist    Discharge Recommendations:  Encourage Patient to Actively Utilize Available Community Resources to Increase Leisure Involvement to Decrease Signs and Symptoms of Illness  Encourage Patient to Utilize Coping Skills on a Regular " Basis to Reduce the Risk of Decompensating and Re-Hospitalizations  Follow Up with After Care Appointments  Continue with Current Leisure Activities

## 2024-02-26 NOTE — PLAN OF CARE
Lying quietly in bed, eyes closed, respirations even, unlabored. Apparently asleep. Slept 7.5 hours thus far with 3 interruptions. Safety precautions maintained. Rounds done every 15 minutes. Bed is fixed in low position and room is uncluttered and pathways are clear.

## 2024-02-26 NOTE — PSYCH
[facility] discussed with pt on collateral consent. Pt signed collateral consent for  Issac Russo/boyfriend [phone]. [facility] attemtped to contact collateral consent to discuss pt admission. Issac stated:        Reason for admission- describe what happened?    I can just tell when she is having issues and episodes. She was shaking and she speaks differently and I can see it in her eyes and it is not normal. She is not herself and she starts to sleep a lot and then she shakes out of it. She did tell me that her boyfriend contacted her to tell me they had something over there for her, but hat may have triggered her emotions, but I am not sure. He was very respectful, but the fact that he contacted her just really upset. I have taken all her medications and put them under lock and key. She is only to fill for the week that she has.         Prior treatment places and dates-doctor name and location  Reason for prior treatment- same or different  How long has patient had problem(s)?      We have been together for like 4 years and she has been having these episodes off an on.  I feel like the lithium makes her talk slow, but it happens every so soften  that I car tell. Even when she is taking her medications faithfully she still slips into these state of minds of SI and depression. She does gets migraines at least once a week and sometimes 2x's a week.      Substance abuse- what , how long, how much, how often?    Not to my knowledge.     Legal Issues- Current charges, type of offense, probation or parole?     Not to my knowledge     History of suicide attempts- when and what methods, did they require medical attention?    She had one previously like she took her blood pressure and her other medications in 2022, but nothing after that.         Alcohol Use-  What preference of alcohol, how much, how long, how often?     She does drink alcohol occasionally and maybe once a week.      History of violence-describe behaviors and  triggers    Not to my knowledge.     Any guns or weapons in the home? If yes, recommend that these be secured.      I do have a rifle under my bed and she does not know where it is and she does not know about it.  I will make sure that it is taken out and that all sharp objects within the home are secured.     What is patients baseline behavior/mood- how well do they know if patient is doing well?    When she is happy and talking to everyone.     What helps the patient stay well?  Internal/external coping strategies ( attending meetings, going to groups, taking medications, spending time with family ( etc)     When she is busy and taking her medications.     Discharge plan:    Where will the patient live upon discharge?   She will come back to live with me at our home.     Who else in the home?   Just myself and her.     Will someone be able to pick the patient up when discharged?   I will come to pick her up upon discharge.

## 2024-02-27 PROCEDURE — S4991 NICOTINE PATCH NONLEGEND: HCPCS | Performed by: STUDENT IN AN ORGANIZED HEALTH CARE EDUCATION/TRAINING PROGRAM

## 2024-02-27 PROCEDURE — 25000003 PHARM REV CODE 250: Performed by: PSYCHIATRY & NEUROLOGY

## 2024-02-27 PROCEDURE — 90836 PSYTX W PT W E/M 45 MIN: CPT | Mod: ,,, | Performed by: PSYCHIATRY & NEUROLOGY

## 2024-02-27 PROCEDURE — 25000003 PHARM REV CODE 250: Performed by: STUDENT IN AN ORGANIZED HEALTH CARE EDUCATION/TRAINING PROGRAM

## 2024-02-27 PROCEDURE — 99233 SBSQ HOSP IP/OBS HIGH 50: CPT | Mod: ,,, | Performed by: PSYCHIATRY & NEUROLOGY

## 2024-02-27 PROCEDURE — 11400000 HC PSYCH PRIVATE ROOM

## 2024-02-27 RX ADMIN — CLONAZEPAM 0.5 MG: 0.5 TABLET ORAL at 08:02

## 2024-02-27 RX ADMIN — PROPRANOLOL HYDROCHLORIDE 20 MG: 20 TABLET ORAL at 08:02

## 2024-02-27 RX ADMIN — TRAZODONE HYDROCHLORIDE 150 MG: 150 TABLET ORAL at 08:02

## 2024-02-27 RX ADMIN — ARIPIPRAZOLE 5 MG: 5 TABLET ORAL at 08:02

## 2024-02-27 RX ADMIN — LURASIDONE HYDROCHLORIDE 40 MG: 40 TABLET, COATED ORAL at 04:02

## 2024-02-27 RX ADMIN — HYDROXYZINE PAMOATE 100 MG: 50 CAPSULE ORAL at 08:02

## 2024-02-27 RX ADMIN — LITHIUM CARBONATE 900 MG: 450 TABLET, EXTENDED RELEASE ORAL at 08:02

## 2024-02-27 RX ADMIN — NICOTINE 1 PATCH: 14 PATCH, EXTENDED RELEASE TRANSDERMAL at 08:02

## 2024-02-27 NOTE — NURSING
Pt sleeping at this time, slept 8.5 hrs with 1 awakenings. NAD. Resp even and unlabored.Pathways clear,bed in low position. Q 15 min safety check ongoing.All precautions maintained.

## 2024-02-27 NOTE — MEDICAL/APP STUDENT
"PSYCHIATRY DAILY INPATIENT PROGRESS NOTE  SUBSEQUENT HOSPITAL VISIT    ENCOUNTER DATE: 2/27/2024  SITE: [facility]    DATE OF ADMISSION: 2/23/2024  4:25 PM  LENGTH OF STAY: 4 days      CHIEF COMPLAINT   Staci Hodge is a 43 y.o. female, seen during daily garcia rounds on the inpatient unit.  Staci Hodge presented with the chief complaint of depression with SI, "I started cutting and having really bad suicidal thoughts."      The patient was seen and examined. The chart was reviewed.     Reviewed notes from Rns and MD and labs from the last 24 hours.    The patient's case was discussed with the treatment team/care providers today including Rns, Speciality services, and [facility]    Staff reports no behavioral or management issues.     The patient has been compliant with treatment.      Subjective 02/27/2024    Today the patient reports slight improvement from yesterday. She notes continued but less symptoms of depression/anxiety.     She endorses improvement in her SI, states that she has not had the urge to cut herself since yesterday. When asked about whether she had any further questions/concerns, patient verbalized desire to be discharged home.     The patient denies any side effects to medications.      Interim/overnight events per report/notes:  No interim/overnight events reported. RN noted good appetite and medication compliance. She reported minimal interactions with peers and staff.         Psychiatric ROS (observed, reported, or endorsed/denied):    Medical ROS  ROS  Depressed mood - decreasing slowly  Interest/pleasure/anhedonia: decreasing slowly  Guilt/hopelessness/worthlessness - decreasing slowly  Changes in Sleep - decreasing slowly  Changes in Appetite - decreasing slowly  Changes in Concentration - decreasing slowly  Changes in Energy - decreasing slowly  PMA/R- decreasing slowly  Suicidal- active/passive ideations - decreasing slowly  Homicidal ideations: active/passive ideations " - No     Hallucinations - No  Delusions - No  Disorganized behavior - No  Disorganized speech - No  Negative symptoms - No     Elevated mood - No  Decreased need for sleep - No  Grandiosity - No  Racing thoughts - No  Impulsivity - No  Irritability- No  Increased energy - No  Distractibility - No  Increase in goal-directed activity or PMA- No     Symptoms of MARY LOU - decreasing slowly  Symptoms of Panic Disorder- No  Symptoms of PTSD - decreasing slowly        Overall progress: Patient is showing mild improvement         Psychotherapy:  Target symptoms: {PSY TARGET SYMPTOMS:11754}  Why chosen therapy is appropriate versus another modality: {reason:42529}  Outcome monitoring methods: {methods:72570}  Therapeutic intervention type: {types:26995}  Topics discussed/themes: {Topics:15928}  The patient's response to the intervention is {PSY INTERVENTION RESPONSE:44915}. The patient's progress toward treatment goals is {Progress:20262}.   Duration of intervention: *** minutes.        Medical ROS  ROS      PAST MEDICAL HISTORY   Past Medical History:   Diagnosis Date    Abnormal Pap smear of cervix age 25    no treatement    Anxiety     Breast cancer     Cancer 07/11/2018    breast cancer    Cervical spondylosis 2/9/2018    Colon polyp     Depression     Ectopic pregnancy 2/26/2019    General anesthetics causing adverse effect in therapeutic use     Slow to awaken/ Memory problems-post-op to day 3 after Mastectomy    Headache     History of psychiatric hospitalization     age 20 for SI    Hx of psychiatric care     Hypertension     Peutz-Jeghers syndrome     PONV (postoperative nausea and vomiting)     Psychiatric problem     Right carpal tunnel syndrome 3/9/2017    Self-harming behavior     Suicide attempt     Therapy            PSYCHOTROPIC MEDICATIONS   Scheduled Meds:   ARIPiprazole  5 mg Oral Daily    clonazePAM  0.5 mg Oral BID    hydrOXYzine pamoate  100 mg Oral QHS    lithium  900 mg Oral QHS    lurasidone  40 mg Oral  with dinner    nicotine  1 patch Transdermal Daily    propranoloL  20 mg Oral BID    traZODone  150 mg Oral QHS     Continuous Infusions:  PRN Meds:.acetaminophen, calcium carbonate, guaiFENesin 100 mg/5 ml, hydrOXYzine pamoate, melatonin, OLANZapine **AND** OLANZapine, ondansetron        EXAMINATION    VITALS   Vitals:    02/25/24 1914 02/26/24 0745 02/26/24 1952 02/27/24 0749   BP: (!) 142/83 120/83 113/75 106/74   BP Location: Right arm Right arm Right arm Right arm   Patient Position: Sitting Sitting Standing Sitting   Pulse: 94 87 (!) 119 99   Resp: 18 16 16 16   Temp: 97.3 °F (36.3 °C) 97.2 °F (36.2 °C) 96.9 °F (36.1 °C) 97.2 °F (36.2 °C)   TempSrc:  Temporal Temporal Temporal   SpO2:       Weight:       Height:           Body mass index is 32.68 kg/m².        CONSTITUTIONAL  General Appearance: unremarkable, age appropriate    MUSCULOSKELETAL  Muscle Strength and Tone:no tremor, no tic  Abnormal Involuntary Movements: No  Gait and Station: non-ataxic    PSYCHIATRIC   Level of Consciousness: awake and alert   Orientation: person, place, and situation  Grooming: Casually dressed and Well groomed  Psychomotor Behavior: normal, cooperative  Speech: normal tone, normal rate, normal pitch, normal volume  Language: grossly intact  Mood: anxious and dysphoric  Affect: Consistent with mood  Thought Process: linear, logical  Associations: intact   Thought Content: linear, logical  Perceptions: denies AH and denies  VH  Memory: Able to recall past events, Remote intact, and Recent intact  Attention:Attends to interview without distraction  Fund of Knowledge: Aware of current events and Vocabulary appropriate   Estimate if Intelligence:  Average based on work/education history, vocabulary and mental status exam  Insight: has awareness of illness  Judgment: behavior is adequate to circumstances        DIAGNOSTIC TESTING   Laboratory Results  No results found for this or any previous visit (from the past 24  hour(s)).          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 The patient expresses understanding of the above and displays the capacity to agree with this treatment given said understanding. Patient also agrees that, currently, the benefits outweigh the risks and would like to pursue/continue treatment at this time.    Any medications being used off-label were discussed with the patient inclusive of the evidence base for the use of the medications and consent was obtained for the off-label use of the medication.       DISCHARGE PLANNING  Expected Disposition Plan: Home or Self Care      NEED FOR CONTINUED HOSPITALIZATION  Psychiatric illness continues to pose a potential threat to life or bodily function, of self or others, thereby requiring the need for continued inpatient psychiatric hospitalization: Yes, due to: danger to self, as evidenced by:  Ongoing concerns with SI.    Protective inpatient pyschiatric hospitalization required while a safe disposition plan is enacted: Yes    Patient stabilized and ready for discharge from inpatient psychiatric unit: No        STAFF:   Karl Sampson MS3

## 2024-02-27 NOTE — PROGRESS NOTES
02/27/24 1440   [facility] Group Therapy   Group Name Therapeutic Recreation   Specific Interventions Coping Skills Training   Participation Level Appropriate   Participation Quality Cooperative   Insight/Motivation Applies New Skills;Good   Affect/Mood Display Appropriate   Cognition Alert   Psychomotor WNL     Patient presents calm, cooperative, less anxious, reports a better mood, improved concentration and willing to participate. Patient participated in a cognitive skill activity to improve learning skills, decision making and promote enjoyment.

## 2024-02-27 NOTE — PROGRESS NOTES
02/27/24 1020   [facility] Group Therapy   Group Name Therapeutic Recreation   Specific Interventions Cognitive Stimulation Training   Participation Level Appropriate;Sharing   Participation Quality Cooperative;Social   Insight/Motivation Applies New Skills;Good   Affect/Mood Display Appropriate   Cognition Alert   Psychomotor WNL     Patient presents calm, cooperative, reports a better mood, shared she is not as anxious, not in her head as much, reading and group activities help preoccupy her mind. Patient was able to discuss and identify using affirmations and focusing on gratitude to help redirect negative thoughts.

## 2024-02-27 NOTE — PLAN OF CARE
Pt is calm and cooperative.  Mood continues to improve.  She denies any S/I or H/I at this time.  Compliant with treatment thus far.  Encouraged pt to continue, understanding verbalized.  Behavior/interaction on unit is appropriate.  No acute distress apparent at this time, will continue to monitor.

## 2024-02-27 NOTE — PLAN OF CARE
Following POC.  Denies HI, AVH.  Vague SI, has lessened.  Contracts for safety.  Endorses depression.  Out on the unit off and on.  Minimal interactions with peers and staff.  Appetite is good.  Medication compliance.  Encouraged out of room activities.  Free of fall.
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
## 2024-02-28 PROCEDURE — 99233 SBSQ HOSP IP/OBS HIGH 50: CPT | Mod: ,,, | Performed by: PSYCHIATRY & NEUROLOGY

## 2024-02-28 PROCEDURE — 25000003 PHARM REV CODE 250: Performed by: STUDENT IN AN ORGANIZED HEALTH CARE EDUCATION/TRAINING PROGRAM

## 2024-02-28 PROCEDURE — S4991 NICOTINE PATCH NONLEGEND: HCPCS | Performed by: STUDENT IN AN ORGANIZED HEALTH CARE EDUCATION/TRAINING PROGRAM

## 2024-02-28 PROCEDURE — 25000003 PHARM REV CODE 250: Performed by: PSYCHIATRY & NEUROLOGY

## 2024-02-28 PROCEDURE — 90833 PSYTX W PT W E/M 30 MIN: CPT | Mod: ,,, | Performed by: PSYCHIATRY & NEUROLOGY

## 2024-02-28 PROCEDURE — 11400000 HC PSYCH PRIVATE ROOM

## 2024-02-28 RX ADMIN — LITHIUM CARBONATE 900 MG: 450 TABLET, EXTENDED RELEASE ORAL at 08:02

## 2024-02-28 RX ADMIN — LURASIDONE HYDROCHLORIDE 40 MG: 40 TABLET, COATED ORAL at 04:02

## 2024-02-28 RX ADMIN — PROPRANOLOL HYDROCHLORIDE 20 MG: 20 TABLET ORAL at 08:02

## 2024-02-28 RX ADMIN — TRAZODONE HYDROCHLORIDE 150 MG: 150 TABLET ORAL at 08:02

## 2024-02-28 RX ADMIN — CLONAZEPAM 0.5 MG: 0.5 TABLET ORAL at 08:02

## 2024-02-28 RX ADMIN — HYDROXYZINE PAMOATE 100 MG: 50 CAPSULE ORAL at 08:02

## 2024-02-28 RX ADMIN — NICOTINE 1 PATCH: 14 PATCH, EXTENDED RELEASE TRANSDERMAL at 08:02

## 2024-02-28 NOTE — PLAN OF CARE
Problem: Adult Behavioral Health Plan of Care  Goal: Optimized Coping Skills in Response to Life Stressors  Outcome: Ongoing, Progressing  Intervention: Promote Effective Coping Strategies  Flowsheets (Taken 2/28/2024 8335)  Supportive Measures:   active listening utilized   counseling provided   self-reflection promoted   self-responsibility promoted   Process Group        Behavior:  Pt attended group dressed in personal clothing. Pt attentive and engaged.      Intervention:     Process discussion on symptoms of indication of processing thoughts with discharge planning. Patients were encouraged to identify ways to cope with and reflect on what are the plans when they are discharged and what coping skills did they learn while in the hospital?        Response:  Pt affect calm with euthymic mood. Pt  states that she will have to look into getting a counselor due to not only going to the one she sees now 1x per month. Pt states she is looking forward to getting the help hat she needs to get through her negative thoughts and trauma.      Plan:  Staff will continue to assess and obtain collaterals as needed.  Staff will coordinate discharge to home with residential treatment when deemed stable.

## 2024-02-28 NOTE — PROGRESS NOTES
"   02/28/24 1020   [facility] Group Therapy   Group Name Education   Specific Interventions Skilled Activity Self-Expression  (what do I want to change skilled worksheet)   Participation Level Appropriate;Sharing;Attentive   Participation Quality Cooperative   Insight/Motivation Improved   Affect/Mood Display Appropriate   Cognition Alert   Psychomotor WNL     Patient presents calm, cooperative, shared she want to stop negative thinking and benefits are "have a positive outlook on life, be more confident and be more productive and positive. Patient identified reading affirmations and focusing on things that are going right as coping skills to redirect negative thinking.  "

## 2024-02-28 NOTE — PLAN OF CARE
Pt is calm and cooperative.  She denies any S/I or H/I at this time.  Mood continues to improve.  Behavior/interaction on unit is appropriate.  Compliant with treatment.  Planning for discharge tomorrow.  Pleasant demeanor.  No acute distress apparent at this time, will continue to monitor.   disucssed need for hep C screening and also for labs Patient will do that Patient also to have yearly flu shot and and FIT testing for colon cancr screening

## 2024-02-28 NOTE — PROGRESS NOTES
"   02/28/24 1270   [facility] Group Therapy   Group Name Therapeutic Recreation   Specific Interventions Cognitive Stimulation Training   Participation Level Appropriate;Attentive   Participation Quality Cooperative   Insight/Motivation Good;Applies New Skills   Affect/Mood Display Appropriate   Cognition Alert   Psychomotor WNL     Patient presents calm ,cooperative, reports a "good" mood, shared she has been reading affirmations to help redirect negative thoughts. Patient participated willingly, shared answers and remained involved.  "

## 2024-02-28 NOTE — NURSING
Pt sleeping at this time, slept 8 hrs with 1 awakenings. NAD. Resp even and unlabored.Pathways clear,bed in low position. Q 15 min safety check ongoing.All precautions maintained.

## 2024-02-28 NOTE — PLAN OF CARE
"Patient reports feeling "better" today, spending majority of time in activity room and on telephone, interacting appropriately with staff. Reports that her depression and anxiety are improving. Denies SI/HI. Denies hallucinations. Appetite adequate. Denies sleep disturbances. Remains calm and cooperative. NADN. Safety precautions remain in place.  "
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
## 2024-02-29 VITALS
SYSTOLIC BLOOD PRESSURE: 129 MMHG | RESPIRATION RATE: 16 BRPM | WEIGHT: 179.88 LBS | DIASTOLIC BLOOD PRESSURE: 76 MMHG | HEART RATE: 95 BPM | OXYGEN SATURATION: 100 % | TEMPERATURE: 98 F | HEIGHT: 62 IN | BODY MASS INDEX: 33.1 KG/M2

## 2024-02-29 PROCEDURE — 25000003 PHARM REV CODE 250: Performed by: PSYCHIATRY & NEUROLOGY

## 2024-02-29 PROCEDURE — S4991 NICOTINE PATCH NONLEGEND: HCPCS | Performed by: STUDENT IN AN ORGANIZED HEALTH CARE EDUCATION/TRAINING PROGRAM

## 2024-02-29 PROCEDURE — 90833 PSYTX W PT W E/M 30 MIN: CPT | Mod: ,,, | Performed by: PSYCHIATRY & NEUROLOGY

## 2024-02-29 PROCEDURE — 99239 HOSP IP/OBS DSCHRG MGMT >30: CPT | Mod: ,,, | Performed by: PSYCHIATRY & NEUROLOGY

## 2024-02-29 PROCEDURE — 25000003 PHARM REV CODE 250: Performed by: STUDENT IN AN ORGANIZED HEALTH CARE EDUCATION/TRAINING PROGRAM

## 2024-02-29 RX ORDER — IBUPROFEN 200 MG
1 TABLET ORAL DAILY
COMMUNITY
Start: 2024-02-29 | End: 2024-04-17

## 2024-02-29 RX ORDER — LITHIUM CARBONATE 450 MG/1
900 TABLET ORAL NIGHTLY
Qty: 60 TABLET | Refills: 1 | Status: SHIPPED | OUTPATIENT
Start: 2024-02-29 | End: 2025-02-28

## 2024-02-29 RX ORDER — TRAZODONE HYDROCHLORIDE 150 MG/1
150 TABLET ORAL NIGHTLY
Qty: 30 TABLET | Refills: 1 | Status: SHIPPED | OUTPATIENT
Start: 2024-02-29 | End: 2025-02-28

## 2024-02-29 RX ORDER — LURASIDONE HYDROCHLORIDE 40 MG/1
40 TABLET, FILM COATED ORAL
Qty: 30 TABLET | Refills: 1 | Status: SHIPPED | OUTPATIENT
Start: 2024-02-29 | End: 2024-04-17

## 2024-02-29 RX ORDER — CLONAZEPAM 0.5 MG/1
0.5 TABLET ORAL 2 TIMES DAILY
Qty: 60 TABLET | Refills: 0 | Status: SHIPPED | OUTPATIENT
Start: 2024-02-29 | End: 2024-04-17

## 2024-02-29 RX ORDER — PROPRANOLOL HYDROCHLORIDE 20 MG/1
20 TABLET ORAL 2 TIMES DAILY
Qty: 60 TABLET | Refills: 1 | Status: SHIPPED | OUTPATIENT
Start: 2024-02-29

## 2024-02-29 RX ORDER — HYDROXYZINE PAMOATE 50 MG/1
50 CAPSULE ORAL EVERY 6 HOURS PRN
Qty: 90 CAPSULE | Refills: 0 | Status: SHIPPED | OUTPATIENT
Start: 2024-02-29

## 2024-02-29 RX ADMIN — NICOTINE 1 PATCH: 14 PATCH, EXTENDED RELEASE TRANSDERMAL at 08:02

## 2024-02-29 RX ADMIN — PROPRANOLOL HYDROCHLORIDE 20 MG: 20 TABLET ORAL at 08:02

## 2024-02-29 RX ADMIN — CLONAZEPAM 0.5 MG: 0.5 TABLET ORAL at 08:02

## 2024-02-29 NOTE — PSYCH
[facility] discussed with pt [facility] does not take her insurance. [facility] also discussed with pt to ask at [facility] to get her more counseling sessions at least once a week vs once a month.

## 2024-02-29 NOTE — PROGRESS NOTES
Psychotherapy:  Target symptoms: depression, anxiety   Why chosen therapy is appropriate versus another modality: relevant to diagnosis, patient responds to this modality, evidence based practice  Outcome monitoring methods: self-report, observation  Therapeutic intervention type: insight oriented psychotherapy, behavior modifying psychotherapy, supportive psychotherapy, interactive psychotherapy  Topics discussed/themes: building skills sets for symptom management, symptom recognition  Safety planning and wrap up session  The patient's response to the intervention is accepting. The patient's progress toward treatment goals is fair.   Duration of intervention: 16 minutes.    Christiano Emerson MD  Psychiatry

## 2024-02-29 NOTE — PLAN OF CARE
Lying quietly in bed, eyes closed, respirations even, unlabored. Apparently asleep. Slept 8 hours thus far without interruption. Safety precautions maintained. Rounds done every 15 minutes. Bed is fixed in low position and room is uncluttered and pathways are clear.

## 2024-02-29 NOTE — CARE UPDATE
Staci Hodge MRN:[id number] ([facility]#[id number]) (43 y.o. F) (Adm: 24)  [facility] [id number]  Patient Demographics    Patient Name  Staci Hodge Legal Sex  Female          Age  1980 (43 y.o.) [facility]   Address  [address]  [facility] [address] Phone  [phone] (Home)  [phone] (Work)  [phone] (Mobile) *Preferred*     Patient Demographics    Address  [address]  [place] [place] [place] [address] Phone  [phone] (Home)  [phone] (Work)  [phone] (Mobile) *Preferred* E-mail Address  [email or web address]     PCP and Center    Primary Care Provider  Shilpi Clayton NP Phone  [phone] Center  [facility]     Patient Contacts    Name Relation Home Work Mobile   Issac Russo Significant other   [phone]   Jono Hodge Father   [phone]   Concetta Parikh Relative   [phone]     Documents on File     Status Date Received Description   Documents for the Patient   Notice of Privacy Pract Ackn Received () 12    Advance Directive Acknowledgement Received () 18 PSDA/SELF   Clinic Authorization Received () 12    Clinic Authorization Received () 14    Provider Based Acknowledgement  ()     Release of Information  10/21/14    Outside Records Clinic  10/24/14    Outside [facility]  01/05/15    Notice of Privacy Pract Ackn LEANA Received 01/22/15 hipaa/self   Clinic Authorization LEANA Received () 01/22/15 consent/self   Provider Based Acknowledgement LEANA      Disclosure Agreement Accepted 01/22/15    Clinic Authorization Received () 07/16/15    Plain Language Summary English No, Patient Declined Print () 16    Plain Language Summary English No, Patient Declined Print () 16    Plain Language Summary English No, Patient Declined Print () 16    Miscellaneous Documents clinic   [email or web address]   Plain Language Summary  English No, Patient Declined Print () 16    Plain Language Summary English No, Patient Declined Print () 16    Plain Language Summary English No, Patient Declined Print () 16    Miscellaneous Documents clinic   [email or web address]   Clinic Authorization Received () 16    Plain Language Summary English No, Patient Declined Print () 16    Plain Language Summary English No, Patient Declined Print () 09/15/16    Plain Language Summary English No, Patient Declined Print () 16    Plain Language Summary English No, Patient Declined Print () 10/20/16    Plain Language Summary English No, Patient Declined Print () 17    Patient ID Received () 19 LA DL   2021     Plain Language Summary English No, Patient Declined Print () 17    Plain Language Summary English No, Patient Declined Print () 17    Plain Language Summary English No, Patient Declined Print () 02/10/17    Plain Language Summary English No, Patient Declined Print () 17    Plain Language Summary English No, Patient Declined Print () 17    Plain Language Summary English No, Patient Declined Print () 17    Plain Language Summary English No, Patient Declined Print () 17    Plain Language Summary English No, Patient Declined Print () 17    Plain Language Summary English No, Patient Declined Print () 17    Plain Language Summary English No, Patient Declined Print () 17    Plain Language Summary English No, Patient Declined Print () 17    Plain Language Summary English No, Patient Declined Print () 17    Plain Language Summary English No, Patient Declined Print () 17    Clinic Authorization Signed () 17    Plain Language Summary English No, Patient Declined Print () 17     Plain Language Summary English No, Patient Declined Print () 10/17/17 fin assist info   Plain Language Summary English No, Patient Declined Print () 10/24/17    [facility] Provider Based Facility Disclosure Signed () 10/27/17 Contracted   Plain Language Summary English No, Patient Declined Print () 10/27/17 fin assist info   [facility] Provider Based Facility Disclosure Signed () 10/30/17    Plain Language Summary English No, Patient Declined Print () 10/30/17    [facility] Provider Based Facility Disclosure Signed () 11/15/17    [facility] Provider Based Facility Disclosure Contracted () 17    [facility] Provider Based Facility Disclosure Signed () 17    [facility] Provider Based Facility Disclosure Contracted () 17    Plain Language Summary English No, Patient Declined Print () 17    Plain Language Summary English No, Patient Declined Print () 11/15/17    [facility] Provider Based Facility Disclosure Signed () 17    Outside Correspondence Received 17    Plain Language Summary English No, Patient Declined Print () 17    Plain Language Summary English No, Patient Declined Print () 17    [facility] Provider Based Facility Disclosure Signed () 17    Plain Language Summary English No, Patient Declined Print () 17    [facility] Provider Based Facility Disclosure Signed () 17    Plain Language Summary English No, Patient Declined Print () 17    [facility] Provider Based Facility Disclosure Signed () 17    Plain Language Summary English No, Patient Declined Print () 17    [facility] Provider Based Facility Disclosure Signed () 18    Plain Language Summary English No, Patient Declined Print () 18    [facility] Provider Based Facility Disclosure Contracted () 01/15/18    [facility] Provider Based Facility Disclosure Signed () 18    Plain  Language Summary English No, Patient Declined Print () 18    [facility] Provider Based Facility Disclosure Signed () 18    Plain Language Summary English No, Patient Declined Print () 18    Plain Language Summary English No, Patient Declined Print () 18    [facility] Provider Based Facility Disclosure Signed () 18 CONTRACTED/SELF   Plain Language Summary English No, Patient Declined Print () 18 FIN ASSIST INFO   HIM GILDA Authorization  () 18 Patient   Plain Language Summary English No, Patient Declined Print () 18    [facility] Provider Based Facility Disclosure Signed () 18    [facility] Provider Based Facility Disclosure Signed () 18    Plain Language Summary English No, Patient Declined Print () 18    [facility] Provider Based Facility Disclosure Signed () 18    [facility] Provider Based Facility Disclosure Signed () 18    Plain Language Summary English No, Patient Declined Print () 18    [facility] Provider Based Facility Disclosure Signed () 18    Plain Language Summary English No, Patient Declined Print () 18    Plain Language Summary English No, Patient Declined Print () 18    [facility] Provider Based Facility Disclosure Signed () 18    Plain Language Summary English No, Patient Declined Print () 18    [facility] Provider Based Facility Disclosure Signed () 18    Plain Language Summary English No, Patient Declined Print () 18    [facility] Provider Based Facility Disclosure Signed () 18    [facility] Provider Based Facility Disclosure Contracted () 18    Plain Language Summary English No, Patient Declined Print () 18    Plain Language Summary English No, Patient Declined Print () 18    HIM GILDA Authorization  () 18 Patient   [facility] Provider Based Facility  Disclosure Signed () 18    Plain Language Summary English No, Patient Declined Print () 18    [facility] Provider Based Facility Disclosure Signed () 18    Plain Language Summary English No, Patient Declined Print () 18    [facility] Provider Based Facility Disclosure Signed () 18    [facility] Provider Based Facility Disclosure Signed () 18    Plain Language Summary English No, Patient Declined Print () 18    Plain Language Summary English No, Patient Declined Print () 18    Plain Language Summary English No, Patient Declined Print () 18    [facility] Provider Based Facility Disclosure Signed () 18    [facility] Provider Based Facility Disclosure Contracted () 18    [facility] Provider Based Facility Disclosure Signed () 18    Plain Language Summary English No, Patient Declined Print () 18    Plain Language Summary English No, Patient Declined Print () 18    [facility] Provider Based Facility Disclosure Signed () 18    [facility] Provider Based Facility Disclosure Contracted () 18    [facility] Provider Based Facility Disclosure Contracted () 18    Involvement In Care  ()     [facility] Provider Based Facility Disclosure Signed () 05/15/18    Clinic Authorization LEANA Signed () 05/15/18    [facility] Provider Based Facility Disclosure Signed () 18 pbb,self   [facility] Provider Based Facility Disclosure Signed () 18    Plain Language Summary English No, Patient Declined Print () 18    [place] Provider Based Facility Disclosure Signed () 18 Signed   Insurance Documents  ()     HIM GILDA Authorization Received () 18 HIM Auth for ReleaseID [id number]   [facility] Provider Based Facility Disclosure Signed () 18    Plain Language Summary English No, Patient Declined Print ()  18    Plain Language Summary English No, Patient Declined Print () 18    Insurance Documents Received () 18 [place] 18   Plain Language Summary English No, Patient Declined Print () 18    Authorization or Referral  ()  Breast MRI   Plain Language Summary English No, Patient Declined Print () 18 FIN ASSIST INFO   Plain Language Summary English No, Patient Declined Print () 18    Plain Language Summary English No, Patient Declined Print () 18    Plain Language Summary English No, Patient Declined Print () 18    HIM GILDA Authorization  () 18    Notice of Privacy Pract Ackn Signed 18    Plain Language Summary English No, Patient Declined Print () 18 fin asst   [facility] Provider Based Facility Disclosure Signed () 18    Plain Language Summary English No, Patient Declined Print () 18    HIM GILDA Authorization  () 18    HIM GILDA Authorization  () 18    HIM GILDA Authorization  () 18    Plain Language Summary English No, Patient Declined Print () 18    HIM GILDA Authorization  () 18 PATIENT   HIM GILDA Authorization  ()     Plain Language Summary English No, Patient Declined Print () 08/15/18    Plain Language Summary English No, Patient Declined Print () 18    [facility] Provider Based Facility Disclosure Signed () 18    Insurance Documents Received () 19 AET[employer]ARA BTTER HL   18   Plain Language Summary English No, Patient Declined Print () 18    HIM GILDA Authorization  () 18    Plain Language Summary English No, Patient Declined Print () 18    Clinic Authorization Signed () 18    [facility] Provider Based Facility Disclosure Signed () 18    [facility] Provider Based Facility Disclosure  Contracted () 18    [place] Contracted Facility Disclosure Signed 18    Plain Language Summary English Acknowledged () 10/03/18    [facility] Contracted Facility Disclosure  ()     Plain Language Summary English Acknowledged () 10/23/18    HIM GILDA Authorization Received () 10/24/18 HIM Auth for ReleaseID [id number]   HIM GILDA Authorization Received () 10/24/18 HIM Auth for ReleaseID [id number]   HIM GILDA Authorization Received () 10/24/18 HIM Auth for ReleaseID [id number]   HIM GILDA Authorization Received () 10/25/18 HIM Auth for ReleaseID [id number]   Plain Language Summary English Acknowledged () 18    Plain Language Summary English Acknowledged () 18    HIM GILDA Authorization Received () 18 HIM Auth for ReleaseID [id number]   HIM GILDA Authorization Received () 18 HIM Auth for ReleaseID [id number]   HIM GILDA Authorization Received () 18 HIM Auth for ReleaseID [id number]   HIM GILDA Authorization  () 18    Plain Language Summary English Acknowledged () 18    Plain Language Summary English Acknowledged () 18    [facility] Contracted Facility Disclosure Signed () 18    HIM GILDA Authorization  () 18 patient   HIM GILDA Authorization Received () 18 HIM Auth for ReleaseID [id number]   Plain Language Summary English Acknowledged () 18    HIM GILDA Authorization  () 18    HIM GILDA Authorization  () 19    Plain Language Summary English Acknowledged () 19    Plain Language Summary English Acknowledged () 19    Plain Language Summary English Acknowledged () 19    Plain Language Summary English Acknowledged () 19    Plain Language Summary English Acknowledged () 19    HIM GILDA Authorization Received () 19 HIM Auth for ReleaseID [id number]   Authorization or  Referral Received () 19 Staci Hodge Auth 2019 Dr Bello .pdf   HIM GILDA Authorization  () 04/10/19    HIM GILDA Authorization Received () 19 HIM Auth for ReleaseID [id number]   Plain Language Summary English Acknowledged () 19    Plain Language Summary English Acknowledged () 19    Plain Language Summary English Acknowledged () 19    Plain Language Summary English Acknowledged () 19    HIM GILDA Authorization  () 19    HIM GILDA Authorization  () 19    HIM GILDA Authorization  () 19    Plain Language Summary English Acknowledged () 19    HIM GILDA Authorization  () 19    Plain Language Summary English Acknowledged () 19    [facility] Contracted Facility Disclosure Signed 19    Plain Language Summary English Acknowledged () 19    Insurance Documents  ()     Plain Language Summary English Acknowledged () 09/10/19    Clinic Authorization Signed () 20    Clinic Authorization LEANA Signed () 19    HIM GILDA Authorization Received () 19 HIM Auth for ReleaseID [id number]   Plain Language Summary English Acknowledged () 19    Clinic Authorization Signed () 19    Authorization or Referral Received () 20 Staci Hodge Auth Surgery Dr Bello 03-.pdf   Plain Language Summary English Acknowledged () 03/10/20 pls   Medical Leave or Disability Form Received 20    HIM GILDA Authorization  () 20 PATIENT   Plain Language Summary English Acknowledged () 04/15/20    Plain Language Summary English Acknowledged () 20    HIM GILDA Authorization Received () 05/15/20 HIM Auth for ReleaseID [id number]   Plain Language Summary English Acknowledged () 20    HIM GILDA Authorization Received () 20 HIM Auth for  ReleaseID [id number]   Plain Language Summary English Acknowledged () 20    Plain Language Summary English Acknowledged () 20    Plain Language Summary English Acknowledged () 20    Plain Language Summary English Acknowledged () 07/15/20    Plain Language Summary English Acknowledged () 20    Medical Leave or Disability Form Received 20    Plain Language Summary English Acknowledged () 20    Plain Language Summary English Acknowledged () 20    Plain Language Summary English Acknowledged () 20    Plain Language Summary English Acknowledged () 20    Authorization or Referral Received () 10/01/20 PLASTIC SURGERY AUTH   Plain Language Summary English Acknowledged () 20    Plain Language Summary English Acknowledged () 20    Plain Language Summary English Acknowledged () 21    Plain Language Summary English Acknowledged () 01/15/21    Clinic Authorization LEANA Signed () 21    Plain Language Summary English Acknowledged () 21    Insurance Documents Received 21 SELF   Plain Language Summary English Acknowledged () 21    Medical Leave or Disability Form Received 21    Plain Language Summary English Acknowledged () 21    Plain Language Summary English Acknowledged () 21    Notice of Privacy Pract Alden DUEÑAS      HIM GILDA Authorization Received () 21 HIM Auth for ReleaseID [id number]   HIM GILDA Authorization Received () 21 HIM Auth for ReleaseID [id number]   Plain Language Summary English Acknowledged () 21    Plain Language Summary English Acknowledged () 21    Plain Language Summary English Acknowledged () 21    Plain Language Summary English Acknowledged () 21    Patient ID Received 21 LA DL 2027   Plain  Language Summary English Acknowledged () 21    Plain Language Summary English Acknowledged () 21    Plain Language Summary English Acknowledged () 10/05/21    Plain Language Summary English Acknowledged () 10/21/21    Plain Language Summary English Acknowledged () 21    Advance Directive Acknowledgement Received () 22 psda   Clinic Authorization Signed () 21    Plain Language Summary English Acknowledged () 21    Clinic Consents Received 12/10/21    Plain Language Summary English Acknowledged () 21    Outside Progress or Provider Notes Received 21    Outside Lab Received 21    Outside Cardiology Report Received 21 CARDIOLOGY - EKG OR ECG   Outside Cardiology Report Received 21 GENERAL   Outside Cardiology Report Received 21 CARDIOLOGY - ECHOCARDIOGRAM   Outside Cardiology Report Received 21 GENERAL   Outside Cardiology Report Received 21 CARDIOLOGY - EKG OR ECG   Outside Cardiology Report Received 21 CARDIOLOGY - TREADMILL STRESS TEST   Plain Language Summary English Acknowledged () 22    Plain Language Summary English Acknowledged () 22    Plain Language Summary English Acknowledged () 22    Plain Language Summary English Acknowledged () 22    Outside PT OT ST Reports Received 22    Plain Language Summary English Acknowledged () 22    Plain Language Summary English Acknowledged () 03/15/22    Plain Language Summary English Acknowledged () 03/15/22    Notice of Privacy Pract Alden      Plain Language Summary English Acknowledged () 22    Release of Information Received 22    Outside Psychiatric Documentation Received 22    Plain Language Summary English Acknowledged () 22    HIM GILDA Authorization Received () 22 HIM Auth for ReleaseID  [id number]   HIM GILDA Authorization Received () 22 HIM Auth for ReleaseID [id number]   Plain Language Summary English Acknowledged () 22    Plain Language Summary English Acknowledged () 22    Plain Language Summary English Acknowledged () 22    HIM GILDA Authorization Received () 22 HIM Auth for ReleaseID [id number]   Plain Language Summary English Acknowledged () 22    Plain Language Summary English Acknowledged () 22    Plain Language Summary English Acknowledged () 22    Clinic Consents Received 22    Plain Language Summary English Acknowledged () 03/10/23    Patient Rights and Responsibilities Acknowledged 03/10/23    Plain Language Summary English Acknowledged () 23    Plain Language Summary English Acknowledged () 23    Plain Language Summary English Acknowledged () 23    Clinic Authorization Signed 23    Plain Language Summary English Acknowledged () 23    Plain Language Summary English Acknowledged () 23    Plain Language Summary English Acknowledged () 23    Plain Language Summary English Acknowledged () 10/02/23    Plain Language Summary English Acknowledged () 05/15/23    Plain Language Summary English Acknowledged () 23    Outside Progress or Provider Notes Received 23    Plain Language Summary English Acknowledged () 23    Plain Language Summary English Acknowledged () 23    [facility] Not Contracted Facility Disclosure      HIM GILDA Authorization Received 10/01/23 HIM Auth for ReleaseID [id number]   Outside Cardiology Report Received 10/05/23 CARDIOLOGY - EKG OR ECG   Advance Directive Acknowledgement Received 10/10/23    Provider Based Acknowledgement      Plain Language Summary English Acknowledged () 10/09/23    Provider Based Acknowledgement      HIM GILDA  Authorization Received 10/23/23 [facility] Auth for ReleaseID [id number]   Release of Information Received 23    Outside Psychiatric Documentation Received 23    Clinic Consents Received 23    Plain Language Summary English Acknowledged () 23    Plain Language Summary English Acknowledged () 23    Outside Lab Received 23    Outside Misc Notes or Reports Received () 23    Plain Language Summary English      Plain Language Summary English Acknowledged () 24    Plain Language Summary English Acknowledged () 24    HIM GILDA Authorization Received 24 HIM Auth for ReleaseID [id number]   Plain Language Summary English Acknowledged () 24    Advance Directive Acknowledgement Received 24    Insurance Documents Received (Deleted) 12 TERMED   Patient ID Received (Deleted) 02/11/15    Insurance Documents Received (Deleted) 10/30/14    Insurance Documents Received (Deleted) 02/11/15 TERMED   Insurance Documents  (Deleted) 03/10/15 TERMED   Insurance Documents Received (Deleted) 16 TERMED   Insurance Documents Received (Deleted) 18 TERMED/SENT TO MISFILE   Patient Photo   Photo of Patient   Patient Photo   Photo of Patient   HIM Release of Information Output  18 Visit record from 2018   HIM Release of Information Output  (Deleted) 18 Visit record from 2018   HIM Release of Information Output  (Deleted) 18 Entire Encounter   HIM Release of Information Output  (Deleted) 18 Visit record from 2018   CE Prospective Auth Received () 18 Authorization Document from [employer] Administration   System-Retrieved CE Auth Form Received () 18 External Authorization Form from [employer]   HIM Release of Information Output  (Deleted) 18 Anesthesia Record   HIM Release of Information Output  (Deleted) 10/26/18 Continuity of Care  Abstract   HIM Release of Information Output  (Deleted) 10/26/18 Continuity of Care Abstract   HIM Release of Information Output  (Deleted) 10/29/18 Continuity of Care Abstract   HIM Release of Information Output  (Deleted) 10/29/18 Continuity of Care Abstract   [facility] Contracted Facility Disclosure  (Deleted)     HIM Release of Information Output  (Deleted) 18 Continuity of Care Abstract   HIM Release of Information Output  (Deleted) 18 Continuity of Care Abstract   HIM Release of Information Output  (Deleted) 18 Continuity of Care Abstract   HIM Release of Information Output  (Deleted) 18 Continuity of Care Abstract   HIM Release of Information Output  (Deleted) 18 Continuity of Care Abstract   HIM Release of Information Output  18 Progress Note   HIM Release of Information Output  (Deleted) 19 Progress Note   HIM Release of Information Output  (Deleted) 19 Requested records   HIM Release of Information Output  (Deleted) 04/10/19 Requested records   HIM Release of Information Output  (Deleted) 19 Requested records   HIM Release of Information Output  (Deleted) 19 Requested records   HIM Release of Information Output  (Deleted) 19 Requested records   HIM Release of Information Output  19 Requested records   HIM Release of Information Output  (Deleted) 19 Requested records   CE Prospective Auth Received () 19 Authorization Document from [employer] Security Administration   System-Retrieved CE Auth Form Received () 19 External Authorization Form from [employer]   HIM Release of Information Output  (Deleted) 19 Requested records   HIM GILDA Authorization  (Deleted) 20    HIM Release of Information Output  (Deleted) 20 Requested records   HIM GILDA Authorization  (Deleted) 20    HIM Release of Information Output  (Deleted) 20 Requested records   HIM Release of Information Output   (Deleted) 05/15/20 Requested records   HIM Release of Information Output  (Deleted) 05/22/20 Requested records   HIM GILDA Authorization  (Deleted) 07/20/20    HIM Release of Information Output  (Deleted) 08/04/20    HIM GILDA Authorization  (Deleted) 08/25/20    HIM Release of Information Output  (Deleted) 08/25/20 Requested records   HIM GILDA Authorization  (Deleted) 08/31/20    HIM Release of Information Output  08/31/20 Requested records   HIM Release of Information Output  (Deleted) 02/24/21    HIM GILDA Authorization  (Deleted) 03/03/21    HIM Release of Information Output  (Deleted) 04/21/21    HIM Release of Information Output  (Deleted) 05/04/21 Requested records   Patient Photo   Photo of Patient   Provider Based Acknowledgement  (Deleted)     Provider Based Acknowledgement  (Deleted)     HIM GILDA Authorization  (Deleted) 05/11/22    HIM Release of Information Output  (Deleted) 05/11/22 Requested records   Plain Language Summary English  (Deleted)     Plain Language Summary English  (Deleted)     HIM Release of Information Output  (Deleted) 06/02/22 Requested records   HIM Release of Information Output  (Deleted) 06/22/22 Requested records   HIM Release of Information Output  (Deleted) 06/28/22 Requested records   HIM GILDA Authorization  (Deleted) 07/01/22    HIM Release of Information Output  07/01/22 Requested records   HIM Release of Information Output  07/01/22 Requested records   HIM Release of Information Output  (Deleted) 08/04/22 Requested records   HIM Release of Information Output  (Deleted) 08/26/22 Requested records   HIM Release of Information Output  (Deleted) 09/01/22 Requested records   [facility] Not Contracted Facility Disclosure  (Deleted)     HIM GILDA Authorization  (Deleted) 12/07/22    HIM Release of Information Output  (Deleted) 12/07/22 Requested records   HIM Release of Information Output  01/18/23 Requested records   [facility] Not Contracted Facility Disclosure  (Deleted)     Advance Directive  Acknowledgement  (Deleted)     Provider Based Acknowledgement  (Deleted)     Clinic Authorization  (Deleted)     [facility] Not Contracted Facility Disclosure  (Deleted)     Patient Photo   Photo of Patient   [facility] Release of Information Output  (Deleted) 02/14/24 Requested records   Documents for the Encounter   Medicare Lifetime Reserve Form      Important Medicare Message Printed at Discharge      Advance Beneficiary Notice      Hospital Authorization Received 02/24/24    Flowsheets Nursing Received 02/24/24    Clinical References Attachment   Quitting Smoking (English)   Clinical References Attachment   Suicide Prevention (English)   Clinical References Attachment   Depression Discharge Instructions, Adult (English)     Admission Information    Current Information    Attending Provider Admitting Provider Admission Type Admission Status   Adeel Garcia III, MD Orgeron, Aubrey III, MD Urgent Confirmed Admission          Admission Date/Time Discharge Date Hospital Service Auth/Cert Status   02/23/24  1625  Psychiatry Incomplete          Hospital Area Unit Room/Bed    [facility] 208/208 D            Discharge Disposition Discharge Destination   Home or Self Care      Admission    Complaint        Hospital Account    Name Acct ID Class Status Primary Coverage   Staci Hodge [id number] IP- Psych Open [employer] - U[employer] CHOICE PLUS          Guarantor Account (for Hospital Account #[id number])    Name Relation to Pt Service Area Active? Acct Type   Staci Hodge Self [facility] Yes Behavioral Health   Address Phone     [address] [place] [address] [phone](H)            Coverage Information (for Hospital Account #[id number])    F/O Payor/Plan Precert #   [employer] PLUS    Subscriber Subscriber #   Staci Hodge [id number]   Address Phone   [id number]  [place] [address] [phone]

## 2024-02-29 NOTE — PLAN OF CARE
"Patient reports feeling "good" today, spending majority of time in activity room and on telephone, interacting appropriately with staff and peers. Verbalizes readiness for discharge. Denies SI/HI. Denies hallucinations. Appetite adequate. Denies sleep disturbances. Remains calm and cooperative. NADN. Safety precautions remain in place.  "

## 2024-02-29 NOTE — PROGRESS NOTES
"   02/29/24 1010   [facility] Group Therapy   Group Name Therapeutic Recreation   Specific Interventions Cognitive Stimulation Training  (brain teaser to boost brain power, keep memory strong and promote enjoyment)   Participation Level Appropriate;Sharing;Attentive   Participation Quality Cooperative   Insight/Motivation Applies New Skills;Good   Affect/Mood Display Appropriate   Cognition Alert   Psychomotor WNL     Patient presents calm, cooperative, reports a "good, better" mood, "I know now to not stress out over small things which could increase my anxiety." Patient identified coping skills of reading affirmations, taking deep breaths, walking away, reading, listening to music and start walking.  "

## 2024-02-29 NOTE — PSYCH
Called to schedule an aftercare appointment with  Keith; however, LIUDMILA was asked to refax to another fax number. Waiting on a return call with the appointment.
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
## 2024-02-29 NOTE — NURSING
Pt discharge arranged for today.  All belongings accounted for and will be returned upon discharge.  AVS and meds reviewed with pt, understanding verbalized.  Pt denies any S/I or H/I.  Mood stable.  Pt has someone coming to pick her up from hospital.

## 2024-03-01 NOTE — PSYCH
DISCHARGE INSTRUCTIONS  Staci Hodge MRN: [id number]     Bipolar 2 disorder, major depressive episode   2/23/2024 - 2/29/2024   [facility]   Instructions    Your medications have changed   START taking:  lurasidone (LATUDA)   nicotine (NICODERM CQ)    CHANGE how you take:  hydrOXYzine pamoate (VISTARIL)   lithium (ESKALITH)    STOP taking:  ARIPiprazole 15 MG Tab (ABILIFY)   ondansetron 4 MG tablet (ZOFRAN)   promethazine 25 MG tablet (PHENERGAN)   sucralfate 100 mg/mL suspension (CARAFATE)   Review your updated medication list below.  Your Next Steps (Patient should check each box as tasks are completed)   Do     6 medications from [email or web address], [address]     these medications from any pharmacy  nicotine   Read    Read these attachments  Quitting Smoking (English)  Suicide Prevention (English)  Depression Discharge Instructions, Adult (English)   Go  MAR    22 EGD (ESOPHAGOGASTRODUODENOSCOPY)  MD JOVAN Pizano (2ND FLR)   You have more future appointments. Please review your full appointment list.  What's Next  What's Next         Go to [facility]  Tuesday Mar 5, 2024  as scheduled on March 5, 2024 9 am for, medication management and, Counseling [address] [facility] [address]  [phone]   MAR    22 EGD (ESOPHAGOGASTRODUODENOSCOPY) with Chandan Dillon MD  Friday Mar 22, 2024 JOVAN LOWE (2ND FLR)   APR    3 Established Patient Visit with Chandan Dillon MD  Wednesday Apr 3, 2024 11:30 AM  Please arrive approximately 15 minutes before your scheduled appointment time and ensure that you have a valid government issued ID and your insurance card. ePre-Check is available and completion prior to your arrival will assist with a quicker registration process.     Two Options to Check-In for Your Appointment     With Mobile Check-In simply complete ePre-Check before your appointment and  "click "I'm Here" in the megan when you park  Or, visit the registration desk to check-in for your appointment  [facility] - Main Building, 4th Floor  Please park in [facility] and use Atrium elevator [facility] - [facility] Atrium 4th Fl  [address], 4th Floor  [facility] [address]  [phone]   APR 9 Established Patient Visit with LIDNSAY Cano  Tuesday Apr 9, 2024 8:00 AM  Please arrive approximately 15 minutes before your scheduled appointment time and ensure that you have a valid government issued ID and your insurance card. ePre-Check is available and completion prior to your arrival will assist with a quicker registration process.     Two Options to Check-In for Your Appointment     With Mobile Check-In simply complete ePre-Check before your appointment and click "I'm Here" in the megan when you park  Or, visit the registration desk to check-in for your appointment  [facility] - Neurology  [address] [facility] [id number] [phone]     All Component Based Labs   02/23/24  1243  02/23/24  1230  02/13/24  1105     Benzodiazepines   Negative       Methadone metabolites   Negative       Phencyclidine   Negative       Acetaminophen Level <3.0 Low          Albumin 4.8         Alcohol, Serum <10          High          ALT 44         Amphetamines, Urine   Negative       Anion Gap 14         Appearance, UA   Clear       AST 21         Barbituates, Urine   Presumptive Positive Abnormal        Baso # 0.09         Basophil % 0.6         Bilirubin (UA)   Negative       BILIRUBIN TOTAL 0.3         BUN 5 Low          Calcium 10.3         Chloride 105         CO2 23         Cocaine, Urine   Negative       Color, UA   Yellow       Creatinine 0.9         Urine Creatinine   24.3       Differential Method Automated         eGFR >60         Eos # 0.1         Eos % 0.4         Free T4     1.16     Glucose 100         Glucose, UA   Negative       Gran # " (ANC) 10.8 High          Gran % 75.9 High          Hematocrit 43.1         Hemoglobin 14.2         Immature Grans (Abs) 0.06 High          Immature Granulocytes 0.4         Ketones, UA   Negative       Leukocyte Esterase, UA   Negative       Lithium Level     0.6     Lymph # 2.6         Lymph % 18.2         MCH 28.6         MCHC 32.9         MCV 87         Mono # 0.6         Mono % 4.5         MPV 9.8         NITRITE UA   Negative       nRBC 0         Blood, UA   Negative       Opiates, Urine   Negative       pH, UA   8.0       Platelet Count 375         Potassium 3.7         PROTEIN TOTAL 7.8         Protein, UA   Negative       RBC 4.97         RDW 12.7         Salicylate Level <5.0 Low          Sodium 142         Specific Gravity, UA   1.020       Specimen UA   Urine, Clean Catch       Marijuana (THC) Metabolite   Negative       Toxicology Information   SEE COMMENT       TSH 1.373    1.967     UROBILINOGEN UA   Negative       WBC 14.20 High                        Your care is important to us. If your provider recommended a follow-up appointment or test, we are happy to help you coordinate your recommended care. It is important that you complete your recommended follow-up.  If you need help scheduling, please call [phone]. Appointments can also be made online through the patient portal.      While scheduling and attending your appointments is your responsibility, our goal is to support and empower you throughout that process.         Your Diagnoses at Discharge   Bipolar 2 disorder, major depressive episode  Reason for Admission  patient reports to [facility] for suicidal ideations with a plan to take an overdose on her blood pressure medications. She is unsure of what triggered her depression, but states that she does have  a history of self mutilating behaviors and suicide attempt two years ago.  You are allergic to the following  You are allergic to the following  No active allergies     Your Latest  "Vitals    Blood Pressure 129/76       BMI 32.90       Weight 179 lb 14.3 oz       Height 5' 2"       Temperature (Temporal) 97.5 °F       Pulse 95       Respiration 16       Oxygen Saturation 100%       BSA 1.89 m²  Treatment Team  Chat With All Treatment Team   Provider Role Specialty    Adeel Garcia III, MD  Admitting Psychiatry     Recent Lab Values   Include labs without results  Most Recent Result    A1C  5.3  10/09 1106  Most Recent 8 Results  Show dates as rows  Blood Glucose and Lipid Panel Labs   Include labs without results  Most Recent Result from Past 365 Days    Cholesterol  227  10/09 1106  Triglycerides  187  10/09 1106  HDL Cholesterol  56  10/09 1106  LDL Cholesterol  133.6  10/09 1106  HDL/Cholesterol Ratio  24.7  10/09 1106  Total Cholesterol/HDL Ratio  4.1  10/09 1106  Non-HDL Cholesterol  171  10/09 1106  Most Recent 8 Results  Show dates as rows  Pending Labs/Studies  You have no pending labs/studies.  Contact Information  Call [phone] (anytime, 7 days a week) to:  Report concerns regarding hospital stay  Ask questions about your hospital stay and home care plan  Get new or updated results of your lab tests and other procedures  [facility] On Call  [facility] On Call Nurse Care Line - 24/7 Assistance  Unless otherwise directed by your provider, please contact [facility], our nurse care line that is available for 24/7 assistance. Please refer to the Patient Instructions section of your After Visit Summary for specific instructions from your physician.     Registered nurses in the [facility] Center provide appointment scheduling, clinical advisement, health education, and other advisory services.  Call: [phone] (toll free).  Advance Directives  An advance directive is a document which, in the event you are no longer able to make decisions for yourself, tells your healthcare team what kind of treatment you do or do not want to receive, or who you would like to make those " "decisions for you.  If you do not currently have an advance directive, [facility] encourages you to create one.  For more information call:  [phone]), [phone] ([phone]),  or log on to [email or web address].  Advance Directive Status  Flowsheet Row Most Recent Value   Advance Directive Status Patient does not have Advance Directive, declines information.   Advance Directive Type Both psychiatric and medical Advance Directives   Reason you declined to provide an Advance Directive Do not feel it is needed     Behavioral Health Safety Plan          Step 1: Warning Signs:     Pt stated" When I hear loud noises."     Pt stated" When my grief comes."     Pt stated" When I get very angry."       Step 2: Internal coping strategies - Things I can do to take my mind off my problems without contacting another person:     Pt stated" I like to read."     Pt stated" I like to watch t.v.. "     Pt stated" I like to Listen to music"       Step 3: People and social settings that provide distraction:     Name: Edd Phone: In cell phone     Name: Issac Phone: In cell phone     Place: Pt stated"At my cousins house on the [facility] side."     Place: Pt stated" I could start going for a walk."       Step 4: People whom I can ask for help:     Name: Edd Phone: In cell phone     Name: Issac Phone: In cell phone     Name: Sofia Phone: In cell phone       Step 5: Professionals or agencies I can contact during a crisis:     Clinician Name: [facility] Phone: [phone]     Clinician Pager or Emergency Contact #: [phone]           Clinician Name: [facility] [employer] Phone: [phone]     Clinician Pager or Emergency Contact #: [phone]           Suicide Prevention Lifeline: 904 or 1-679-022-GHOX (7970)           Local Emergency Service: [facility]     Emergency Services Address: [address] [place] [address]     Emergency Services Phone: [phone] [place] Crisis " "Line, [place] Line, [place] Crisis and Suicide Hotline       Step 6: Making the environment safer:     Pt stated"Take my medications correctly."   2. Pt stated" Myself."         Used with permission from ANTONELLA Perez & GRACIELA Pozo. (2011). Safety planning intervention: A brief intervention to mitigate suicide risk. [facility]. 19, 256264            Smoking Cessation  If you would like to quit smoking:  You may be eligible for free services if you are a [place] or [place] resident Call [facility] at ([phone].  Contact us via email: [email or web address]  View our website for more information: [email or web address]  Language Assistance Services  ATTENTION: Language assistance services are available, free of charge. Please call [phone].       ATENCIÓN: Si habla español, tiene a ewaver disposición servicios gratuitos de asistencia lingüística. Llame al [phone].     CHÚ Ý: N?u b?n nói Ti?ng Vi?t, có các d?ch v? h? tr? ngôn ng? mi?n phí dành cho b?n. G?i s? [phone].  National Suicide Prevention Lifeline  If you or someone you know is thinking about suicide, call the National Suicide Prevention Lifeline using the 3 digit phone number of 988 or 8-601-040-NFEP (9719).  The lifeline is free, confidential and always available.  Help a loved one, a friend or yourself.  www.suicidepreventionlifeline.org     Medication list    START taking these medications  START taking these medications    Additional info         lurasidone 40 mg Tab tablet  Commonly known as: LATUDA  Quantity: 30 tablet  Refills: 1  Dose: 40 mg Take 1 tablet (40 mg total) by mouth before dinner. Begin Date AM Noon PM Bedtime    nicotine 14 mg/24 hr  Commonly known as: NICODERM CQ  Refills: 0  Dose: 1 patch Place 1 patch onto the skin once daily. Begin Date AM Noon PM Bedtime     CHANGE how you take these medications  CHANGE how you take these medications    Additional info         hydrOXYzine " pamoate 50 MG Cap  Commonly known as: VISTARIL  Quantity: 90 capsule  Refills: 0  Dose: 50 mg  What changed:  medication strength  how much to take  reasons to take this Take 1 capsule (50 mg total) by mouth every 6 (six) hours as needed (anxiety and/or insomnia). Begin Date AM Noon PM Bedtime    lithium 450 MG Tbsr  Commonly known as: ESKALITH  Quantity: 60 tablet  Refills: 1  Dose: 900 mg  What changed:  how much to take  when to take this Take 2 tablets (900 mg total) by mouth every evening. Begin Date AM Noon PM Bedtime     CONTINUE taking these medications  CONTINUE taking these medications    Additional info         butalbital-acetaminophen-caffeine -40 mg -40 mg per tablet  Commonly known as: FIORICET, ESGIC  Quantity: 30 tablet  Refills: 5  Dose: 1 tablet Take 1 tablet by mouth every 6 (six) hours as needed for Headaches. Begin Date AM Noon PM Bedtime    clonazePAM 0.5 MG tablet  Commonly known as: KlonoPIN  Quantity: 60 tablet  Refills: 0  Dose: 0.5 mg Take 1 tablet (0.5 mg total) by mouth 2 (two) times daily. Begin Date AM Noon PM Bedtime    montelukast 10 mg tablet  Commonly known as: SINGULAIR  Refills: 0  Dose: 10 mg Take 10 mg by mouth once daily. Begin Date AM Noon PM Bedtime    propranoloL 20 MG tablet  Commonly known as: INDERAL  Quantity: 60 tablet  Refills: 1  Dose: 20 mg Take 1 tablet (20 mg total) by mouth 2 (two) times daily.  Doctor's comments: . Begin Date AM Noon PM Bedtime    QULIPTA 60 mg Tab  Quantity: 30 tablet  Refills: 11  Generic drug: atogepant Take one tablet daily for migraine prevention. Begin Date AM Noon PM Bedtime    RABEprazole 20 mg tablet  Commonly known as: ACIPHEX  Quantity: 30 tablet  Refills: 5  Dose: 20 mg TAKE ONE TABLET BY MOUTH ONCE A DAY Begin Date AM Noon PM Bedtime    traZODone 150 MG tablet  Commonly known as: DESYREL  Quantity: 30 tablet  Refills: 1  Dose: 150 mg Take 1 tablet (150 mg total) by mouth every evening. Begin Date AM Noon PM Bedtime     UBRELVY 100 mg tablet  Quantity: 16 tablet  Refills: 2  Dose: 100 mg  Generic drug: ubrogepant Take 1 tablet (100 mg total) by mouth as needed for Migraine. If symptoms persist or return, may repeat dose after 2 hours. Maximum: 200 mg per 24 hours Begin Date AM Noon PM Bedtime    VITAMIN D2 50,000 unit Cap  Quantity: 12 capsule  Refills: 1  Generic drug: ergocalciferol TAKE ONE CAPSULE BY MOUTH EVERY 7 DAYS  Doctor's comments: This prescription was filled on 11/10/2023. Any refills authorized will be placed on file. Begin Date AM Noon PM Bedtime    VYVANSE 40 MG Cap  Refills: 0  Dose: 40 mg  Generic drug: lisdexamfetamine Take 40 mg by mouth once daily. Begin Date AM Noon PM Bedtime     STOP taking these medications  STOP taking these medications   ARIPiprazole 15 MG Tab  Commonly known as: ABILIFY    ondansetron 4 MG tablet  Commonly known as: ZOFRAN    promethazine 25 MG tablet  Commonly known as: PHENERGAN    sucralfate 100 mg/mL suspension  Commonly known as: CARAFATE            Where to  your medications     these medications at [facility], [facility] [address]  clonazePAM  hydrOXYzine pamoate  lithium  lurasidone  propranoloL  traZODone  Address: [address] [facility] [address]   Phone: [phone]

## 2024-03-01 NOTE — PSYCH
The patient has transitioned to outpatient treatment with [facility], [address] [place] [address]. [id number], an appointment has been scheduled on March 5, 2024 at 9 am  for medication management,  counseling, and tobacco cessation classes. The AVS was faxed on 3/1/2024 at 9:11 am

## 2024-03-19 ENCOUNTER — TELEPHONE (OUTPATIENT)
Dept: ENDOSCOPY | Facility: HOSPITAL | Age: 44
End: 2024-03-19
Payer: COMMERCIAL

## 2024-03-19 DIAGNOSIS — G43.009 MIGRAINE WITHOUT AURA AND WITHOUT STATUS MIGRAINOSUS, NOT INTRACTABLE: ICD-10-CM

## 2024-03-20 RX ORDER — BUTALBITAL, ACETAMINOPHEN AND CAFFEINE 50; 325; 40 MG/1; MG/1; MG/1
1 TABLET ORAL EVERY 6 HOURS PRN
Qty: 30 TABLET | Refills: 5 | Status: SHIPPED | OUTPATIENT
Start: 2024-03-20 | End: 2024-05-14

## 2024-03-22 ENCOUNTER — ANESTHESIA (OUTPATIENT)
Dept: ENDOSCOPY | Facility: HOSPITAL | Age: 44
End: 2024-03-22
Payer: COMMERCIAL

## 2024-03-22 ENCOUNTER — ANESTHESIA EVENT (OUTPATIENT)
Dept: ENDOSCOPY | Facility: HOSPITAL | Age: 44
End: 2024-03-22
Payer: COMMERCIAL

## 2024-03-22 ENCOUNTER — HOSPITAL ENCOUNTER (OUTPATIENT)
Facility: HOSPITAL | Age: 44
Discharge: HOME | End: 2024-03-22
Attending: INTERNAL MEDICINE | Admitting: INTERNAL MEDICINE
Payer: COMMERCIAL

## 2024-03-22 VITALS
HEIGHT: 60 IN | TEMPERATURE: 98 F | WEIGHT: 175 LBS | RESPIRATION RATE: 16 BRPM | HEART RATE: 84 BPM | SYSTOLIC BLOOD PRESSURE: 94 MMHG | BODY MASS INDEX: 34.36 KG/M2 | DIASTOLIC BLOOD PRESSURE: 76 MMHG | OXYGEN SATURATION: 99 %

## 2024-03-22 DIAGNOSIS — Q85.89 PEUTZ-JEGHERS SYNDROME: ICD-10-CM

## 2024-03-22 DIAGNOSIS — Q85.89 PEUTZ-JEGHERS POLYPS OF SMALL BOWEL: Primary | ICD-10-CM

## 2024-03-22 PROCEDURE — 27201042 HC RETRIEVAL NET: Performed by: INTERNAL MEDICINE

## 2024-03-22 PROCEDURE — 88305 TISSUE EXAM BY PATHOLOGIST: CPT | Mod: 26,,, | Performed by: PATHOLOGY

## 2024-03-22 PROCEDURE — D9220A PRA ANESTHESIA: Mod: CRNA,,, | Performed by: NURSE ANESTHETIST, CERTIFIED REGISTERED

## 2024-03-22 PROCEDURE — 37000008 HC ANESTHESIA 1ST 15 MINUTES: Performed by: INTERNAL MEDICINE

## 2024-03-22 PROCEDURE — 25000003 PHARM REV CODE 250: Performed by: NURSE ANESTHETIST, CERTIFIED REGISTERED

## 2024-03-22 PROCEDURE — 43239 EGD BIOPSY SINGLE/MULTIPLE: CPT | Mod: 59,,, | Performed by: INTERNAL MEDICINE

## 2024-03-22 PROCEDURE — 43239 EGD BIOPSY SINGLE/MULTIPLE: CPT | Mod: 59 | Performed by: INTERNAL MEDICINE

## 2024-03-22 PROCEDURE — 43251 EGD REMOVE LESION SNARE: CPT | Mod: ,,, | Performed by: INTERNAL MEDICINE

## 2024-03-22 PROCEDURE — 27201089 HC SNARE, DISP (ANY): Performed by: INTERNAL MEDICINE

## 2024-03-22 PROCEDURE — 43251 EGD REMOVE LESION SNARE: CPT | Performed by: INTERNAL MEDICINE

## 2024-03-22 PROCEDURE — D9220A PRA ANESTHESIA: Mod: ANES,,, | Performed by: ANESTHESIOLOGY

## 2024-03-22 PROCEDURE — 37000009 HC ANESTHESIA EA ADD 15 MINS: Performed by: INTERNAL MEDICINE

## 2024-03-22 PROCEDURE — 63600175 PHARM REV CODE 636 W HCPCS: Performed by: NURSE ANESTHETIST, CERTIFIED REGISTERED

## 2024-03-22 PROCEDURE — 27201012 HC FORCEPS, HOT/COLD, DISP: Performed by: INTERNAL MEDICINE

## 2024-03-22 PROCEDURE — 88305 TISSUE EXAM BY PATHOLOGIST: CPT | Performed by: PATHOLOGY

## 2024-03-22 RX ORDER — PROPOFOL 10 MG/ML
VIAL (ML) INTRAVENOUS CONTINUOUS PRN
Status: DISCONTINUED | OUTPATIENT
Start: 2024-03-22 | End: 2024-03-22

## 2024-03-22 RX ORDER — SODIUM CHLORIDE 0.9 % (FLUSH) 0.9 %
3 SYRINGE (ML) INJECTION
Status: DISCONTINUED | OUTPATIENT
Start: 2024-03-22 | End: 2024-03-22 | Stop reason: HOSPADM

## 2024-03-22 RX ORDER — PROPOFOL 10 MG/ML
VIAL (ML) INTRAVENOUS
Status: DISCONTINUED | OUTPATIENT
Start: 2024-03-22 | End: 2024-03-22

## 2024-03-22 RX ORDER — SODIUM CHLORIDE 9 MG/ML
INJECTION, SOLUTION INTRAVENOUS CONTINUOUS
Status: DISCONTINUED | OUTPATIENT
Start: 2024-03-22 | End: 2024-03-22 | Stop reason: HOSPADM

## 2024-03-22 RX ORDER — ONDANSETRON HYDROCHLORIDE 2 MG/ML
4 INJECTION, SOLUTION INTRAVENOUS ONCE AS NEEDED
Status: DISCONTINUED | OUTPATIENT
Start: 2024-03-22 | End: 2024-03-22 | Stop reason: HOSPADM

## 2024-03-22 RX ORDER — HYDROMORPHONE HYDROCHLORIDE 1 MG/ML
0.2 INJECTION, SOLUTION INTRAMUSCULAR; INTRAVENOUS; SUBCUTANEOUS EVERY 5 MIN PRN
Status: DISCONTINUED | OUTPATIENT
Start: 2024-03-22 | End: 2024-03-22 | Stop reason: HOSPADM

## 2024-03-22 RX ORDER — LIDOCAINE HYDROCHLORIDE 20 MG/ML
INJECTION INTRAVENOUS
Status: DISCONTINUED | OUTPATIENT
Start: 2024-03-22 | End: 2024-03-22

## 2024-03-22 RX ORDER — HALOPERIDOL 5 MG/ML
0.5 INJECTION INTRAMUSCULAR EVERY 10 MIN PRN
Status: DISCONTINUED | OUTPATIENT
Start: 2024-03-22 | End: 2024-03-22 | Stop reason: HOSPADM

## 2024-03-22 RX ADMIN — PROPOFOL 80 MG: 10 INJECTION, EMULSION INTRAVENOUS at 08:03

## 2024-03-22 RX ADMIN — PROPOFOL 175 MCG/KG/MIN: 10 INJECTION, EMULSION INTRAVENOUS at 08:03

## 2024-03-22 RX ADMIN — LIDOCAINE HYDROCHLORIDE 100 MG: 20 INJECTION INTRAVENOUS at 08:03

## 2024-03-22 RX ADMIN — SODIUM CHLORIDE: 9 INJECTION, SOLUTION INTRAVENOUS at 07:03

## 2024-03-22 NOTE — PROGRESS NOTES
Discharge instructions reviewed w/pt and s.o, verbalized understanding. Pt in NADn.No complaints at this time. Tolerated liquids w/ no issues. To be d/c'd home w/ family.

## 2024-03-22 NOTE — BRIEF OP NOTE
Discharge Summary/Instructions after an Endoscopic Procedure    Patient Name: Staci Hodge  Patient MRN: [id number]  Patient YOB: 1980 Friday, March 22, 2024  Chandan Dillon MD    Dear patient,  As a result of recent federal legislation (The Federal Cures Act), you may receive lab or pathology results from your procedure in your [facility] account before your physician is able to contact you. Your physician or their representative will relay the results to you with their recommendations at their soonest availability.  Thank you,    RESTRICTIONS:  During your procedure today, you received medications for sedation.  These medications may affect your judgment, balance and coordination.  Therefore, for 24 hours, you have the following restrictions:     - DO NOT drive a car, operate machinery, make legal/financial decisions, sign important papers or drink alcohol.      ACTIVITY:  Today: no heavy lifting, straining or running due to procedural sedation/anesthesia.  The following day: return to full activity including work.    DIET:  Eat and drink normally unless instructed otherwise.     TREATMENT FOR COMMON SIDE EFFECTS:  - Mild abdominal pain, nausea, belching, bloating or excessive gas:  rest, eat lightly and use a heating pad.  - Sore Throat: treat with throat lozenges and/or gargle with warm salt water.  - Because air was used during the procedure, expelling large amounts of air from your rectum or belching is normal.  - If a bowel prep was taken, you may not have a bowel movement for 1-3 days.  This is normal.      SYMPTOMS TO WATCH FOR AND REPORT TO YOUR PHYSICIAN:  1. Abdominal pain or bloating, other than gas cramps.  2. Chest pain.  3. Back pain.  4. Signs of infection such as: chills or fever occurring within 24 hours after the procedure.  5. Rectal bleeding, which would show as bright red, maroon, or black stools. (A tablespoon of blood from the rectum is not serious, especially if hemorrhoids  are present.)  6. Vomiting.  7. Weakness or dizziness.      GO DIRECTLY TO THE NEAREST EMERGENCY ROOM IF YOU HAVE ANY OF THE FOLLOWING:     Difficulty breathing              Chills and/or fever over 101 F   Persistent vomiting and/or vomiting blood   Severe abdominal pain   Severe chest pain   Black, tarry stools   Bleeding- more than one tablespoon   Any other symptom or condition that you feel may need urgent attention    Your doctor recommends these additional instructions:  If any biopsies were taken, your doctors clinic will contact you in 1 to 2 weeks with any results.    - Discharge patient to home (ambulatory).   - Resume previous diet.   - Await pathology results.   - Repeat upper endoscopy in 1 year for surveillance based on pathology results.   - Return to endoscopist at appointment to be scheduled.    For questions, problems or results please call your physician - Chandan Dillon MD at Work:  [phone].    [facility], EMERGENCY ROOM PHONE NUMBER: [phone]    IF A COMPLICATION OR EMERGENCY SITUATION ARISES AND YOU ARE UNABLE TO REACH YOUR PHYSICIAN - GO DIRECTLY TO THE EMERGENCY ROOM.

## 2024-03-22 NOTE — TRANSFER OF CARE
Anesthesia Transfer of Care Note    Patient: Staci Hodge    Procedure(s) Performed: Procedure(s) (LRB):  EGD (ESOPHAGOGASTRODUODENOSCOPY) (N/A)    Patient location: PACU    Anesthesia Type: general    Transport from OR: Transported from OR on room air with adequate spontaneous ventilation    Post pain: adequate analgesia    Post assessment: no apparent anesthetic complications and tolerated procedure well    Post vital signs: stable    Level of consciousness: awake, alert and oriented    Nausea/Vomiting: no nausea/vomiting    Complications: none    Transfer of care protocol was followed      Last vitals: Visit Vitals  BP (!) 104/59 (BP Location: Right arm, Patient Position: Lying)   Pulse 85   Temp 36.7 °C (98.1 °F) (Temporal)   Resp 16   Ht 5' (1.524 m)   Wt 79.4 kg (175 lb)   LMP 03/01/2021   SpO2 96%   Breastfeeding No   BMI 34.18 kg/m²

## 2024-03-22 NOTE — H&P
History & Physical - Short Stay  Gastroenterology      SUBJECTIVE:     Procedure: EGD    Chief Complaint/Indication for Procedure: Surveillance    History of Present Illness:  Patient is a 43 y.o. female presents with  Peutz-Jeghers syndrome here for surveillance.    PTA Medications   Medication Sig    atogepant (QULIPTA) 60 mg Tab Take one tablet daily for migraine prevention.    clonazePAM (KLONOPIN) 0.5 MG tablet Take 1 tablet (0.5 mg total) by mouth 2 (two) times daily.    hydrOXYzine pamoate (VISTARIL) 50 MG Cap Take 1 capsule (50 mg total) by mouth every 6 (six) hours as needed (anxiety and/or insomnia).    lithium (ESKALITH) 450 MG TbSR Take 2 tablets (900 mg total) by mouth every evening.    lurasidone (LATUDA) 40 mg Tab tablet Take 1 tablet (40 mg total) by mouth before dinner.    propranoloL (INDERAL) 20 MG tablet Take 1 tablet (20 mg total) by mouth 2 (two) times daily.    RABEprazole (ACIPHEX) 20 mg tablet TAKE ONE TABLET BY MOUTH ONCE A DAY    traZODone (DESYREL) 150 MG tablet Take 1 tablet (150 mg total) by mouth every evening.    VITAMIN D2 1,250 mcg (50,000 unit) capsule TAKE ONE CAPSULE BY MOUTH EVERY 7 DAYS    VYVANSE 40 mg Cap Take 40 mg by mouth once daily.    butalbital-acetaminophen-caffeine -40 mg (FIORICET, ESGIC) -40 mg per tablet TAKE ONE TABLET BY MOUTH EVERY 6 HOURS AS NEEDED FOR HEADACHE    montelukast (SINGULAIR) 10 mg tablet Take 10 mg by mouth once daily.    nicotine (NICODERM CQ) 14 mg/24 hr Place 1 patch onto the skin once daily.    ubrogepant (UBRELVY) 100 mg tablet Take 1 tablet (100 mg total) by mouth as needed for Migraine. If symptoms persist or return, may repeat dose after 2 hours. Maximum: 200 mg per 24 hours       Review of patient's allergies indicates:  No Known Allergies     Past Medical History:   Diagnosis Date    Abnormal Pap smear of cervix age 25    no treatement    Anxiety     Breast cancer     Cancer 07/11/2018    breast cancer    Cervical spondylosis  2/9/2018    Colon polyp     Depression     Ectopic pregnancy 2/26/2019    General anesthetics causing adverse effect in therapeutic use     Slow to awaken/ Memory problems-post-op to day 3 after Mastectomy    Headache     History of psychiatric hospitalization     age 20 for SI    Hx of psychiatric care     Hypertension     Peutz-Jeghers syndrome     PONV (postoperative nausea and vomiting)     Psychiatric problem     Right carpal tunnel syndrome 3/9/2017    Self-harming behavior     Suicide attempt     Therapy      Past Surgical History:   Procedure Laterality Date    ADENOIDECTOMY      ANTEGRADE SINGLE BALLOON ENTEROSCOPY N/A 4/19/2021    Procedure: ENTEROSCOPY, SINGLE BALLOON, ANTEGRADE;  Surgeon: Vincenzo Herbert MD;  Location: [facility] (2ND FLR);  Service: Endoscopy;  Laterality: N/A;  removal of large polyp in proximal jejunum; please schedule during a time when Dr. Dillon is available in case I need assistance with removal or 2nd opinion prior to removal    COVID at [place] 4/16 - ttr    APPENDECTOMY      AUGMENTATION OF BREAST      BILATERAL MASTECTOMY Bilateral 7/23/2018    Procedure: MASTECTOMY 23 hr stay;  Surgeon: Sofia Kendrick MD;  [address];  Service: General;  Laterality: Bilateral;    BILATERAL SALPINGO-OOPHORECTOMY (BSO) Bilateral 3/4/2021    Procedure: SALPINGO-OOPHORECTOMY, BILATERAL;  Surgeon: Clayton Vilchis MD;  Location: [facility];  Service: OB/GYN;  Laterality: Bilateral;    BOWEL RESECTION  10/17/14    BREAST BIOPSY Left 06/04/2018    BREAST CAPSULOTOMY Left 4/25/2019    Procedure: CAPSULOTOMY, BREAST LEFT;  Surgeon: Duane Bello MD;  [address];  Service: Plastics;  Laterality: Left;    BREAST RECONSTRUCTION      BREAST SURGERY      Bilateral Mastectomy 07/23/2018    CARPAL TUNNEL RELEASE Bilateral     CARPAL TUNNEL RELEASE      COLONOSCOPY      COLONOSCOPY N/A 2/25/2021    Procedure: COLONOSCOPY;  Surgeon: Vincenzo Herbert MD;  Location: [facility]  ENDO (2ND FLR);  Service: Endoscopy;  Laterality: N/A;  previous anesthesia complications  okay for this date/time per Dr. Herbert and liliana with Miranda  -   COVID test on 2/22/21 at [facility]    DILATION AND CURETTAGE OF UTERUS      DILATION AND CURETTAGE OF UTERUS USING SUCTION N/A 2/25/2019    Procedure: DILATION AND CURETTAGE, UTERUS, USING SUCTION  PATHOLOGY NEEDED;  Surgeon: Pam Sifuentes MD;  Location: [facility];  Service: OB/GYN;  Laterality: N/A;    ENDOSCOPIC ULTRASOUND OF UPPER GASTROINTESTINAL TRACT Left 1/15/2021    Procedure: ULTRASOUND, UPPER GI TRACT, ENDOSCOPIC;  Surgeon: Chandan Dillon MD;  Location: [facility];  Service: Endoscopy;  Laterality: Left;  for pancreatic screening    ESOPHAGOGASTRODUODENOSCOPY N/A 1/15/2021    Procedure: EGD (ESOPHAGOGASTRODUODENOSCOPY);  Surgeon: Chandan Dillon MD;  Location: [facility];  Service: Endoscopy;  Laterality: N/A;  with push enteroscopy    ESOPHAGOGASTRODUODENOSCOPY N/A 5/17/2021    Procedure: EGD (ESOPHAGOGASTRODUODENOSCOPY);  Surgeon: Chandan Dillon MD;  Location: [facility] (2ND FLR);  Service: Endoscopy;  Laterality: N/A;  Please schedule this duodenal polypectomy. Need to be a day that Dr Jose Antonio Stark is in the OR. 90 minutes.   Chandan Dillon MD     COVID at [place] 5/15 (scheduled in the OR for 5/18 - using same COVID results for both procedures) ttr    ESOPHAGOGASTRODUODENOSCOPY N/A 1/25/2022    Procedure: EGD (ESOPHAGOGASTRODUODENOSCOPY);  Surgeon: Chandan Dillon MD;  Location: [facility] (2ND FLR);  Service: Endoscopy;  Laterality: N/A;  poss emr  covid-1/22/22-STA-BB  instructions sent via portal-BB    ESOPHAGOGASTRODUODENOSCOPY N/A 3/10/2023    Procedure: EGD (ESOPHAGOGASTRODUODENOSCOPY);  Surgeon: Chandan Dillon MD;  Location: [facility] ENDO (2ND FLR);  Service: Endoscopy;  Laterality: N/A;  inst via portal  called to confirm and msg that phone not accepting calls 3/6 mal    FAT TRANSFER Bilateral 11/12/2018    Procedure:  TRANSFER, FAT TISSUE BILATERAL BREAST;  Surgeon: Duane Bello MD;  [facility];  Service: Plastics;  Laterality: Bilateral;    INJECTION FOR SENTINEL NODE IDENTIFICATION Left 7/23/2018    Procedure: INJECTION, FOR SENTINEL NODE IDENTIFICATION;  Surgeon: Sofia Kendrick MD;  [facility];  Service: General;  Laterality: Left;    INSERTION OF BREAST TISSUE EXPANDER Bilateral 7/23/2018    Procedure: INSERTION, TISSUE EXPANDER, BREAST;  Surgeon: Duane Bello MD;  [facility];  Service: Plastics;  Laterality: Bilateral;    INSERTION OF BREAST TISSUE EXPANDER Bilateral 3/10/2020    Procedure: INSERTION, TISSUE EXPANDER, BREAST, BILATERAL;  Surgeon: Duane Bello MD;  Location: [facility] OR [address] [place] [address];  Service: Plastics;  Laterality: Bilateral;    intestinal polpy      LYSIS OF ADHESIONS N/A 3/4/2021    Procedure: LYSIS, ADHESIONS;  Surgeon: Clayton Vilchis MD;  Location: [facility];  Service: OB/GYN;  Laterality: N/A;  Omental adhesions    MASTECTOMY      REMOVAL OF BREAST IMPLANT Bilateral 3/10/2020    Procedure: REMOVAL, IMPLANT, BREAST, BILATERAL;  Surgeon: Duane Bello MD;  Location: [address];  Service: Plastics;  Laterality: Bilateral;    REMOVAL OF BREAST IMPLANT Bilateral 7/7/2020    Procedure: REMOVAL, IMPLANT, BREAST BILATERAL;  Surgeon: Duaen Bello MD;  Location: [facility] OR [address] [place] [address];  Service: Plastics;  Laterality: Bilateral;    SENTINEL LYMPH NODE BIOPSY Left 7/23/2018    Procedure: BIOPSY, LYMPH NODE, SENTINEL;  Surgeon: Sofia Kendrick MD;  Location: [address];  Service: General;  Laterality: Left;    SMALL INTESTINE SURGERY      3 surgeries     TONSILLECTOMY      TOTAL ABDOMINAL HYSTERECTOMY N/A 3/4/2021    Procedure: HYSTERECTOMY, TOTAL, ABDOMINAL;  Surgeon: Clayton Vilchis MD;  Location: [facility];  Service: OB/GYN;  Laterality: N/A;    UPPER GASTROINTESTINAL ENDOSCOPY       Family History   Problem Relation Age  "of Onset    Cancer Mother         colon cancer    Stomach cancer Mother     Hypertension Father     Colon polyps Sister         peutz-jeghers syndrome    Breast cancer Paternal Aunt     No Known Problems Maternal Uncle     ADD / ADHD Paternal Uncle     Depression Paternal Uncle     Dementia Maternal Grandfather     Dementia Maternal Grandmother     No Known Problems Paternal Grandfather     Breast cancer Paternal Grandmother     No Known Problems Cousin     Ovarian cancer Neg Hx     Anesthesia problems Neg Hx      Social History     Tobacco Use    Smoking status: Every Day     Current packs/day: 1.00     Average packs/day: 1 pack/day for 23.9 years (23.9 ttl pk-yrs)     Types: Cigarettes     Start date: 4/17/2000    Smokeless tobacco: Never    Tobacco comments:     sometimes dry cough per patient   Substance Use Topics    Alcohol use: Yes     Alcohol/week: 5.8 standard drinks of alcohol     Types: 7 Standard drinks or equivalent per week     Comment: occasionally    Drug use: No       Review of Systems:  Constitutional: no fever or chills  Respiratory: no cough or shortness of breath  Cardiovascular: no chest pain or palpitations    OBJECTIVE:     Vital Signs (Most Recent)  Temp: 98.1 °F (36.7 °C) (03/22/24 0725)  Pulse: 85 (03/22/24 0725)  Resp: 16 (03/22/24 0725)  BP: (!) 104/59 (03/22/24 0725)  SpO2: 96 % (03/22/24 0725)    Physical Exam:  General: well developed, well nourished  Lungs:  normal respiratory effort  Heart: regular rate, S1, S2 normal    Laboratory  CBC: No results for input(s): "WBC", "RBC", "HGB", "HCT", "PLT", "MCV", "MCH", "MCHC" in the last 168 hours.  CMP: No results for input(s): "GLU", "CALCIUM", "ALBUMIN", "PROT", "NA", "K", "CO2", "CL", "BUN", "CREATININE", "ALKPHOS", "ALT", "AST", "BILITOT" in the last 168 hours.  Coagulation: No results for input(s): "LABPROT", "INR", "APTT" in the last 168 hours.    Diagnostic Results:      ASSESSMENT/PLAN:     Peutz-Jeghers syndrome     Plan: " EGD    Anesthesia Plan: MAC    ASA Grade: ASA 2 - Patient with mild systemic disease with no functional limitations      The impression and plan was discussed in detail with the patient and family. All questions have been answered and the patient voices understanding of our plan at this point. The risk of the procedure was discussed in detail which includes but not limited to bleeding, infection, perforation in some cases requiring surgery with its spectrum of complications.

## 2024-03-22 NOTE — ANESTHESIA PREPROCEDURE EVALUATION
03/22/2024  Staci Hodge is a 43 y.o., female.      Pre-op Assessment    I have reviewed the Patient Summary Reports.     I have reviewed the Nursing Notes. I have reviewed the NPO Status.   I have reviewed the Medications.     Review of Systems  Anesthesia Hx:  No problems with previous Anesthesia   History of prior surgery of interest to airway management or planning:  Previous anesthesia: General          Denies Personal Hx of Anesthesia complications.                    Cardiovascular:     Hypertension                                        Pulmonary:  Pulmonary Normal                       Renal/:  Renal/ Normal                 Hepatic/GI:     GERD   Peutz-jehgers syndrome          Musculoskeletal:  Arthritis               Neurological:    Neuromuscular Disease,  Headaches                                 Endocrine:  Endocrine Normal            Psych:  Psychiatric History anxiety depression              Patient Active Problem List   Diagnosis    History of abnormal Pap smear    LLQ pain    Peutz-Jeghers syndrome    Weight loss    Leukocytosis    Tobacco abuse    Insomnia    Mood disorder    MARY LOU (generalized anxiety disorder)    Panic disorder with agoraphobia    PTSD (post-traumatic stress disorder)    Social anxiety disorder    Palpitations    Tachycardia    Dizziness    Arm numbness left    Stabbing headache    Tension headache    Right carpal tunnel syndrome    Migraine without aura    Microalbuminuria    Neck pain    Right arm numbness    Left carpal tunnel syndrome    Cervical paraspinal muscle spasm    Glucosuria with normal serum glucose    History of pyelonephritis    Left arm numbness    Myofascial muscle pain    Cervical spondylosis    OCD (obsessive compulsive disorder)    Menorrhagia with regular cycle    Hypokalemia    Inguinal lymphadenopathy    Ductal carcinoma in situ (DCIS)  of left breast    DCIS (ductal carcinoma in situ)    Menorrhagia with irregular cycle    S/P breast reconstruction    Possible pregnancy    S/P D&C (status post dilation and curettage)    Ectopic pregnancy    Encounter for adjustment or removal of unspecified breast implant    Personal history of breast cancer    Unspecified inflammatory spondylopathy, cervical region    GERD (gastroesophageal reflux disease)    S/P CHRISS-BSO    Small bowel polyp    Peutz-Jeghers polyps of small bowel    Facial numbness    Cervical disc disease    Pseudopolyposis of colon without complication, unspecified part of colon    Bipolar 2 disorder, major depressive episode    Obesity (BMI 30.0-34.9)    Slurred speech    Hives    Idiopathic acute pancreatitis     Past Medical History:   Diagnosis Date    Abnormal Pap smear of cervix age 25    no treatement    Anxiety     Breast cancer     Cancer 07/11/2018    breast cancer    Cervical spondylosis 2/9/2018    Colon polyp     Depression     Ectopic pregnancy 2/26/2019    General anesthetics causing adverse effect in therapeutic use     Slow to awaken/ Memory problems-post-op to day 3 after Mastectomy    Headache     History of psychiatric hospitalization     age 20 for SI    Hx of psychiatric care     Hypertension     Peutz-Jeghers syndrome     PONV (postoperative nausea and vomiting)     Psychiatric problem     Right carpal tunnel syndrome 3/9/2017    Self-harming behavior     Suicide attempt     Therapy      Past Surgical History:   Procedure Laterality Date    ADENOIDECTOMY      ANTEGRADE SINGLE BALLOON ENTEROSCOPY N/A 4/19/2021    Procedure: ENTEROSCOPY, SINGLE BALLOON, ANTEGRADE;  Surgeon: Vincenzo Herbert MD;  Location: [facility] (2ND FLR);  Service: Endoscopy;  Laterality: N/A;  removal of large polyp in proximal jejunum; please schedule during a time when Dr. Dillon is available in case I need assistance with removal or 2nd opinion prior to removal    COVID at [place] 4/16 - ttr     APPENDECTOMY      AUGMENTATION OF BREAST      BILATERAL MASTECTOMY Bilateral 7/23/2018    Procedure: MASTECTOMY 23 hr stay;  Surgeon: Sofia Kendrick MD;  [address];  Service: General;  Laterality: Bilateral;    BILATERAL SALPINGO-OOPHORECTOMY (BSO) Bilateral 3/4/2021    Procedure: SALPINGO-OOPHORECTOMY, BILATERAL;  Surgeon: Clayton Vilchis MD;  Location: [facility];  Service: OB/GYN;  Laterality: Bilateral;    BOWEL RESECTION  10/17/14    BREAST BIOPSY Left 06/04/2018    BREAST CAPSULOTOMY Left 4/25/2019    Procedure: CAPSULOTOMY, BREAST LEFT;  Surgeon: Duane Bello MD;  Location: [facility] 2ND FLR;  Service: Plastics;  Laterality: Left;    BREAST RECONSTRUCTION      BREAST SURGERY      Bilateral Mastectomy 07/23/2018    CARPAL TUNNEL RELEASE Bilateral     CARPAL TUNNEL RELEASE      COLONOSCOPY      COLONOSCOPY N/A 2/25/2021    Procedure: COLONOSCOPY;  Surgeon: Vincenzo Herbert MD;  Location: [facility] (2ND FLR);  Service: Endoscopy;  Laterality: N/A;  previous anesthesia complications  okay for this date/time per Dr. Herbert and liliana with Miranda, OC - sm  COVID test on 2/22/21 at [facility]    DILATION AND CURETTAGE OF UTERUS      DILATION AND CURETTAGE OF UTERUS USING SUCTION N/A 2/25/2019    Procedure: DILATION AND CURETTAGE, UTERUS, USING SUCTION  PATHOLOGY NEEDED;  Surgeon: Pam Sifuentes MD;  Location: [facility];  Service: OB/GYN;  Laterality: N/A;    ENDOSCOPIC ULTRASOUND OF UPPER GASTROINTESTINAL TRACT Left 1/15/2021    Procedure: ULTRASOUND, UPPER GI TRACT, ENDOSCOPIC;  Surgeon: Chandan Dillon MD;  Location: [facility];  Service: Endoscopy;  Laterality: Left;  for pancreatic screening    ESOPHAGOGASTRODUODENOSCOPY N/A 1/15/2021    Procedure: EGD (ESOPHAGOGASTRODUODENOSCOPY);  Surgeon: Chandan Dillon MD;  Location: [facility];  Service: Endoscopy;  Laterality: N/A;  with push enteroscopy    ESOPHAGOGASTRODUODENOSCOPY N/A 5/17/2021    Procedure: EGD (ESOPHAGOGASTRODUODENOSCOPY);   Surgeon: Chandan Dillon MD;  Location: [facility] ENDO (2ND FLR);  Service: Endoscopy;  Laterality: N/A;  Please schedule this duodenal polypectomy. Need to be a day that Dr Jose Antonio Stark is in the OR. 90 minutes.   Chandan Dillon MD     COVID at [place] 5/15 (scheduled in the OR for 5/18 - using same COVID results for both procedures) ttr    ESOPHAGOGASTRODUODENOSCOPY N/A 1/25/2022    Procedure: EGD (ESOPHAGOGASTRODUODENOSCOPY);  Surgeon: Chandan Dillon MD;  Location: [facility] ENDO (2ND FLR);  Service: Endoscopy;  Laterality: N/A;  poss emr  covid-1/22/22-STAH-BB  instructions sent via portal-BB    ESOPHAGOGASTRODUODENOSCOPY N/A 3/10/2023    Procedure: EGD (ESOPHAGOGASTRODUODENOSCOPY);  Surgeon: Chandan Dillon MD;  Location: [facility] ENDO (2ND FLR);  Service: Endoscopy;  Laterality: N/A;  inst via portal  called to confirm and msg that phone not accepting calls 3/6 mal    FAT TRANSFER Bilateral 11/12/2018    Procedure: TRANSFER, FAT TISSUE BILATERAL BREAST;  Surgeon: Duane Bello MD;  [facility];  Service: Plastics;  Laterality: Bilateral;    INJECTION FOR SENTINEL NODE IDENTIFICATION Left 7/23/2018    Procedure: INJECTION, FOR SENTINEL NODE IDENTIFICATION;  Surgeon: Sofia Kendrick MD;  [facility];  Service: General;  Laterality: Left;    INSERTION OF BREAST TISSUE EXPANDER Bilateral 7/23/2018    Procedure: INSERTION, TISSUE EXPANDER, BREAST;  Surgeon: Duane Bello MD;  Location: [facility] OR 2ND FLR;  Service: Plastics;  Laterality: Bilateral;    INSERTION OF BREAST TISSUE EXPANDER Bilateral 3/10/2020    Procedure: INSERTION, TISSUE EXPANDER, BREAST, BILATERAL;  Surgeon: Duane Bello MD;  Location: [facility] OR 2ND FLR;  Service: Plastics;  Laterality: Bilateral;    intestinal polpy      LYSIS OF ADHESIONS N/A 3/4/2021    Procedure: LYSIS, ADHESIONS;  Surgeon: Clayton Vilchis MD;  Location: [facility];  Service: OB/GYN;  Laterality: N/A;  Omental adhesions     MASTECTOMY      REMOVAL OF BREAST IMPLANT Bilateral 3/10/2020    Procedure: REMOVAL, IMPLANT, BREAST, BILATERAL;  Surgeon: Duane Bello MD;  Location: [address];  Service: Plastics;  Laterality: Bilateral;    REMOVAL OF BREAST IMPLANT Bilateral 7/7/2020    Procedure: REMOVAL, IMPLANT, BREAST BILATERAL;  Surgeon: Duane Bello MD;  Location: [address];  Service: Plastics;  Laterality: Bilateral;    SENTINEL LYMPH NODE BIOPSY Left 7/23/2018    Procedure: BIOPSY, LYMPH NODE, SENTINEL;  Surgeon: Sofia Kendrick MD;  Location: [address];  Service: General;  Laterality: Left;    SMALL INTESTINE SURGERY      3 surgeries     TONSILLECTOMY      TOTAL ABDOMINAL HYSTERECTOMY N/A 3/4/2021    Procedure: HYSTERECTOMY, TOTAL, ABDOMINAL;  Surgeon: Clayton Vilchis MD;  Location: [facility];  Service: OB/GYN;  Laterality: N/A;    UPPER GASTROINTESTINAL ENDOSCOPY           Physical Exam  General: Well nourished, Cooperative and Alert    Airway:  Mallampati: II   Mouth Opening: Normal  TM Distance: Normal  Tongue: Normal  Neck ROM: Normal ROM    Dental:  Intact    Chest/Lungs:  Normal Respiratory Rate    Heart:  Rate: Normal  Rhythm: Regular Rhythm        Anesthesia Plan  Type of Anesthesia, risks & benefits discussed:    Anesthesia Type: Gen Natural Airway, Gen ETT, MAC  Intra-op Monitoring Plan: Standard ASA Monitors  Post Op Pain Control Plan: multimodal analgesia  Induction:  IV  Airway Plan: Direct, Post-Induction  Informed Consent: Informed consent signed with the Patient and all parties understand the risks and agree with anesthesia plan.  All questions answered.   ASA Score: 3  Day of Surgery Review of History & Physical: H&P Update referred to the surgeon/provider.    Ready For Surgery From Anesthesia Perspective.     .

## 2024-03-22 NOTE — PROVATION PATIENT INSTRUCTIONS
Discharge Summary/Instructions after an Endoscopic Procedure  Patient Name: Staci Hodge  Patient MRN: [id number]  Patient YOB: 1980 Friday, March 22, 2024  Chandan Dillon MD  Dear patient,  As a result of recent federal legislation (The Federal Cures Act), you may   receive lab or pathology results from your procedure in your [facility]   account before your physician is able to contact you. Your physician or   their representative will relay the results to you with their   recommendations at their soonest availability.  Thank you,  RESTRICTIONS:  During your procedure today, you received medications for sedation.  These   medications may affect your judgment, balance and coordination.  Therefore,   for 24 hours, you have the following restrictions:   - DO NOT drive a car, operate machinery, make legal/financial decisions,   sign important papers or drink alcohol.    ACTIVITY:  Today: no heavy lifting, straining or running due to procedural   sedation/anesthesia.  The following day: return to full activity including work.  DIET:  Eat and drink normally unless instructed otherwise.     TREATMENT FOR COMMON SIDE EFFECTS:  - Mild abdominal pain, nausea, belching, bloating or excessive gas:  rest,   eat lightly and use a heating pad.  - Sore Throat: treat with throat lozenges and/or gargle with warm salt   water.  - Because air was used during the procedure, expelling large amounts of air   from your rectum or belching is normal.  - If a bowel prep was taken, you may not have a bowel movement for 1-3 days.    This is normal.  SYMPTOMS TO WATCH FOR AND REPORT TO YOUR PHYSICIAN:  1. Abdominal pain or bloating, other than gas cramps.  2. Chest pain.  3. Back pain.  4. Signs of infection such as: chills or fever occurring within 24 hours   after the procedure.  5. Rectal bleeding, which would show as bright red, maroon, or black stools.   (A tablespoon of blood from the rectum is not serious, especially if    hemorrhoids are present.)  6. Vomiting.  7. Weakness or dizziness.  GO DIRECTLY TO THE NEAREST EMERGENCY ROOM IF YOU HAVE ANY OF THE FOLLOWING:      Difficulty breathing              Chills and/or fever over 101 F   Persistent vomiting and/or vomiting blood   Severe abdominal pain   Severe chest pain   Black, tarry stools   Bleeding- more than one tablespoon   Any other symptom or condition that you feel may need urgent attention  Your doctor recommends these additional instructions:  If any biopsies were taken, your doctors clinic will contact you in 1 to 2   weeks with any results.  - Discharge patient to home (ambulatory).   - Resume previous diet.   - Await pathology results.   - Repeat upper endoscopy in 1 year for surveillance based on pathology   results.   - Return to endoscopist at appointment to be scheduled.  For questions, problems or results please call your physician - Chandan Dillon MD at Work:  [phone].  [facility], EMERGENCY ROOM PHONE NUMBER: [phone]  IF A COMPLICATION OR EMERGENCY SITUATION ARISES AND YOU ARE UNABLE TO REACH   YOUR PHYSICIAN - GO DIRECTLY TO THE EMERGENCY ROOM.  Chandan Dillon MD  3/22/2024 8:45:02 AM  This report has been verified and signed electronically.  Dear patient,  As a result of recent federal legislation (The Federal Cures Act), you may   receive lab or pathology results from your procedure in your [facility]   account before your physician is able to contact you. Your physician or   their representative will relay the results to you with their   recommendations at their soonest availability.  Thank you,  PROVATION

## 2024-03-25 NOTE — ANESTHESIA POSTPROCEDURE EVALUATION
Anesthesia Post Evaluation    Patient: Staci Hodge    Procedure(s) Performed: Procedure(s) (LRB):  EGD (ESOPHAGOGASTRODUODENOSCOPY) (N/A)    Final Anesthesia Type: general      Patient location during evaluation: [facility]  Patient participation: Yes- Able to Participate  Level of consciousness: awake and alert and oriented  Post-procedure vital signs: reviewed and stable  Pain management: adequate  Airway patency: patent    PONV status at discharge: No PONV  Anesthetic complications: no      Cardiovascular status: hemodynamically stable  Respiratory status: unassisted, spontaneous ventilation and room air  Hydration status: euvolemic  Follow-up not needed.              Vitals Value Taken Time   BP 94/76 03/22/24 0910   Temp 36.4 °C (97.5 °F) 03/22/24 0838   Pulse 83 03/22/24 0911   Resp 26 03/22/24 0911   SpO2 100 % 03/22/24 0911   Vitals shown include unvalidated device data.      No case tracking events are documented in the log.      Pain/Bran Score: No data recorded

## 2024-03-27 LAB
FINAL PATHOLOGIC DIAGNOSIS: NORMAL
GROSS: NORMAL
Lab: NORMAL

## 2024-04-03 ENCOUNTER — TELEPHONE (OUTPATIENT)
Dept: GASTROENTEROLOGY | Facility: CLINIC | Age: 44
End: 2024-04-03
Payer: COMMERCIAL

## 2024-04-03 ENCOUNTER — OFFICE VISIT (OUTPATIENT)
Dept: GASTROENTEROLOGY | Facility: CLINIC | Age: 44
End: 2024-04-03
Payer: COMMERCIAL

## 2024-04-03 VITALS
BODY MASS INDEX: 33.55 KG/M2 | SYSTOLIC BLOOD PRESSURE: 116 MMHG | WEIGHT: 182.31 LBS | DIASTOLIC BLOOD PRESSURE: 81 MMHG | HEIGHT: 62 IN | HEART RATE: 88 BPM

## 2024-04-03 DIAGNOSIS — K51.40 PSEUDOPOLYPOSIS OF COLON WITHOUT COMPLICATION, UNSPECIFIED PART OF COLON: ICD-10-CM

## 2024-04-03 DIAGNOSIS — Q85.89 PEUTZ-JEGHERS SYNDROME: Primary | ICD-10-CM

## 2024-04-03 DIAGNOSIS — R19.7 DIARRHEA, UNSPECIFIED TYPE: ICD-10-CM

## 2024-04-03 PROCEDURE — 1159F MED LIST DOCD IN RCRD: CPT | Mod: CPTII,S$GLB,, | Performed by: INTERNAL MEDICINE

## 2024-04-03 PROCEDURE — 99999 PR PBB SHADOW E&M-EST. PATIENT-LVL IV: CPT | Mod: PBBFAC,,, | Performed by: INTERNAL MEDICINE

## 2024-04-03 PROCEDURE — 3008F BODY MASS INDEX DOCD: CPT | Mod: CPTII,S$GLB,, | Performed by: INTERNAL MEDICINE

## 2024-04-03 PROCEDURE — 99214 OFFICE O/P EST MOD 30 MIN: CPT | Mod: S$GLB,,, | Performed by: INTERNAL MEDICINE

## 2024-04-03 PROCEDURE — 3079F DIAST BP 80-89 MM HG: CPT | Mod: CPTII,S$GLB,, | Performed by: INTERNAL MEDICINE

## 2024-04-03 PROCEDURE — 3074F SYST BP LT 130 MM HG: CPT | Mod: CPTII,S$GLB,, | Performed by: INTERNAL MEDICINE

## 2024-04-03 NOTE — PROGRESS NOTES
Subjective     Patient ID: Staci Hodge is a 43 y.o. female.    Chief Complaint: PEUTZ-JEGHER SYNDROME    HPI The patient is in follow up for her Peutz-Jeghers syndrome. Underwent an EGD on 3/22/2024 with evidence of a 15 mm duodenal polyp S/P polypectomy. Multiple gastric polyps. Biopsies of the gastric polyps showed fundic gland polyps and the 15 mm duodenal polyp was a P-J polyp without dysplasia. The patient state history of breast cancer in 2018. She is S/P total hysterectomy and oophorectomy. Last colonscopy was in 2021 showed evidence of a tubular adenoma.. The patient also have previous history of large duodenal polyps requiring endoscopic resection (hamartoma).  No previous VCE.  Review of Systems   Constitutional:  Negative for unexpected weight change.   Gastrointestinal:  Positive for diarrhea.          Objective     Physical Exam  Constitutional:       General: She is not in acute distress.     Appearance: She is not ill-appearing.   Neurological:      Mental Status: She is alert.            Assessment and Plan     1. Peutz-Jeghers syndrome  -     Case Request Endoscopy: ULTRASOUND, UPPER GI TRACT, ENDOSCOPIC    2. Diarrhea, unspecified type  -     Case Request Endoscopy: ULTRASOUND, UPPER GI TRACT, ENDOSCOPIC    3. Pseudopolyposis of colon without complication, unspecified part of colon      Surveillance-    Need follow up with Oncology for surveillance given her breast cancer history.  EUS on 11/2024 for pancreas cancer screening.  Need VCE and colonscopy with Dr Herbert for diarrhea and further visualization of the small bowel for larger polyps.  Need follow up with Gyn.  RTC in 1 year.

## 2024-04-03 NOTE — PROGRESS NOTES
"GENERAL GI PATIENT INTAKE:    COVID symptoms in the last 7 days (runny nose, sore throat, congestion, cough, fever): No  PCP: Shilpi Claytno  If not PCP-  number given to establish [phone]: N/A    ALLERGIES REVIEWED:  Yes    CHIEF COMPLAINT:    Chief Complaint   Patient presents with    PEUTZ-JEGHER SYNDROME       VITAL SIGNS:  /81   Pulse 88   Ht 5' 2" (1.575 m)   Wt 82.7 kg (182 lb 5.1 oz)   LMP 03/01/2021   BMI 33.35 kg/m²      Change in medical, surgical, family or social history: No      REVIEWED MEDICATION LIST RECONCILED INCLUDING ABOVE MEDS:  Yes     "

## 2024-04-03 NOTE — TELEPHONE ENCOUNTER
[facility]. Need EUS (linear) for pancreas cancer screening in patient with Peutz-Jeghers syndrome.    Need follow up with the Breast cancer center for personal history of breast cancer.    Need Gyn follow up for Peutz-Jeghers screening.    Need VCE/colonoscopy with Dr Herbert for diarrhea, previous colon polyps and Peutz-Jeghers surveillance.    RTC in 1 year.  Thanks,  Chandan Dillon MD

## 2024-04-04 ENCOUNTER — PATIENT MESSAGE (OUTPATIENT)
Dept: GASTROENTEROLOGY | Facility: CLINIC | Age: 44
End: 2024-04-04
Payer: COMMERCIAL

## 2024-04-08 ENCOUNTER — PATIENT MESSAGE (OUTPATIENT)
Dept: ENDOSCOPY | Facility: HOSPITAL | Age: 44
End: 2024-04-08
Payer: COMMERCIAL

## 2024-04-08 ENCOUNTER — TELEPHONE (OUTPATIENT)
Dept: ENDOSCOPY | Facility: HOSPITAL | Age: 44
End: 2024-04-08
Payer: COMMERCIAL

## 2024-04-08 DIAGNOSIS — Q85.89 PEUTZ-JEGHERS SYNDROME: Primary | ICD-10-CM

## 2024-04-08 NOTE — TELEPHONE ENCOUNTER
Spoke to pt to schedule procedure(s) Upper Endoscopy Ultrasound (EUS)       Physician to perform procedure(s) Dr. SUSANA Dillon  Date of Procedure (s) 4/26/24  Arrival Time 7:00 AM  Time of Procedure(s) 8:00 AM   Location of Procedure(s) [place] [address]nd Floor  Type of Rx Prep sent to patient: N/A  Instructions provided to patient via [facility]    Patient was informed on the following information and verbalized understanding. Screening questionnaire reviewed with patient and complete. If procedure requires anesthesia, a responsible adult needs to be present to accompany the patient home, patient cannot drive after receiving anesthesia. Appointment details are tentative, especially check-in time. Patient will receive a prep-op call 7 days prior to confirm check-in time for procedure. If applicable the patient should contact their pharmacy to verify Rx for procedure prep is ready for pick-up. Patient was advised to call the scheduling department at [phone] if pharmacy states no Rx is available. Patient was advised to call the endoscopy scheduling department if any questions or concerns arise.      [facility] Scheduling Department

## 2024-04-17 ENCOUNTER — OFFICE VISIT (OUTPATIENT)
Dept: NEUROLOGY | Facility: CLINIC | Age: 44
End: 2024-04-17
Payer: COMMERCIAL

## 2024-04-17 VITALS
DIASTOLIC BLOOD PRESSURE: 80 MMHG | WEIGHT: 175.5 LBS | HEIGHT: 62 IN | HEART RATE: 118 BPM | BODY MASS INDEX: 32.3 KG/M2 | SYSTOLIC BLOOD PRESSURE: 104 MMHG | OXYGEN SATURATION: 97 %

## 2024-04-17 DIAGNOSIS — G43.E09 CHRONIC MIGRAINE WITH AURA WITHOUT STATUS MIGRAINOSUS, NOT INTRACTABLE: Primary | ICD-10-CM

## 2024-04-17 PROCEDURE — 3079F DIAST BP 80-89 MM HG: CPT | Mod: CPTII,S$GLB,, | Performed by: PHYSICIAN ASSISTANT

## 2024-04-17 PROCEDURE — 1159F MED LIST DOCD IN RCRD: CPT | Mod: CPTII,S$GLB,, | Performed by: PHYSICIAN ASSISTANT

## 2024-04-17 PROCEDURE — 3008F BODY MASS INDEX DOCD: CPT | Mod: CPTII,S$GLB,, | Performed by: PHYSICIAN ASSISTANT

## 2024-04-17 PROCEDURE — 1160F RVW MEDS BY RX/DR IN RCRD: CPT | Mod: CPTII,S$GLB,, | Performed by: PHYSICIAN ASSISTANT

## 2024-04-17 PROCEDURE — 99999 PR PBB SHADOW E&M-EST. PATIENT-LVL IV: CPT | Mod: PBBFAC,,, | Performed by: PHYSICIAN ASSISTANT

## 2024-04-17 PROCEDURE — 99213 OFFICE O/P EST LOW 20 MIN: CPT | Mod: S$GLB,,, | Performed by: PHYSICIAN ASSISTANT

## 2024-04-17 PROCEDURE — 3074F SYST BP LT 130 MM HG: CPT | Mod: CPTII,S$GLB,, | Performed by: PHYSICIAN ASSISTANT

## 2024-04-17 RX ORDER — LISDEXAMFETAMINE DIMESYLATE 60 MG/1
60 CAPSULE ORAL EVERY MORNING
COMMUNITY
Start: 2024-04-05

## 2024-04-17 RX ORDER — CLONAZEPAM 1 MG/1
1 TABLET ORAL 2 TIMES DAILY
COMMUNITY
Start: 2024-04-09

## 2024-04-17 RX ORDER — OLANZAPINE AND SAMIDORPHAN L-MALATE 5; 10 MG/1; MG/1
1 TABLET, FILM COATED ORAL NIGHTLY
COMMUNITY
End: 2024-06-03

## 2024-04-17 NOTE — PROGRESS NOTES
Subjective:       Patient ID: Staci Hodge is a 43 y.o. female.    Chief Complaint: Migraine (3 month follow up.)      HPI:  Staci Hodge is a 43 y.o. female is here for follow up visit. She is followed for chronic migraine with aura,  dizziness which has resolved, and hand numbness (s/p CTR, doing well). Medications and tolerability were reviewed and discussed.  Patient states compliance with medications prescribed. She is followed for chronic migraine.    After complaints a few months ago of increased migraine frequency on Aimovig, she was then started on Qulipta.  She has been on TPM, Propranolol, gabapentin and now Aimovig for prevention. Aimovig previously with a very good response. However, in October 2023, she has had more frequent and more debilitating migraines. She has had to leave work more often than she should due to migraine. She uses Fioricet for rescue, but she only takes this a few times a month for fear of rebound headache. She has nausea, light and noise sensitivity, slurred speech and blurred vision with migraine. Not all symptoms with each headache.     Since on Qulipta (started in January), she rarely has migraine. She rarely needs rescue with Fioricet. She tolerates Qulipta with no side effects. She is very happy.    Hand numbness no longer an issue. She has surgery on the right and braced the left. Symptoms improved.    No longer has dizziness. Myalgias improved. Previously on Lyrica. No longer taking    Past Medical History:   Diagnosis Date    Abnormal Pap smear of cervix age 25    no treatement    Anxiety     Breast cancer     Cancer 07/11/2018    breast cancer    Cervical spondylosis 2/9/2018    Colon polyp     Depression     Ectopic pregnancy 2/26/2019    General anesthetics causing adverse effect in therapeutic use     Slow to awaken/ Memory problems-post-op to day 3 after Mastectomy    Headache     History of psychiatric hospitalization     age 20 for SI    Hx of  psychiatric care     Hypertension     Peutz-Jeghers syndrome     PONV (postoperative nausea and vomiting)     Psychiatric problem     Right carpal tunnel syndrome 3/9/2017    Self-harming behavior     Suicide attempt     Therapy        Past Surgical History:   Procedure Laterality Date    ADENOIDECTOMY      ANTEGRADE SINGLE BALLOON ENTEROSCOPY N/A 4/19/2021    Procedure: ENTEROSCOPY, SINGLE BALLOON, ANTEGRADE;  Surgeon: Vincenzo Herbert MD;  Location: [facility] (2ND FLR);  Service: Endoscopy;  Laterality: N/A;  removal of large polyp in proximal jejunum; please schedule during a time when Dr. Dillon is available in case I need assistance with removal or 2nd opinion prior to removal    COVID at [place] 4/16 - ttr    APPENDECTOMY      AUGMENTATION OF BREAST      BILATERAL MASTECTOMY Bilateral 7/23/2018    Procedure: MASTECTOMY 23 hr stay;  Surgeon: Sofia Kendrick MD;  Location: [address];  Service: General;  Laterality: Bilateral;    BILATERAL SALPINGO-OOPHORECTOMY (BSO) Bilateral 3/4/2021    Procedure: SALPINGO-OOPHORECTOMY, BILATERAL;  Surgeon: Clayton Vilchis MD;  Location: [facility];  Service: OB/GYN;  Laterality: Bilateral;    BOWEL RESECTION  10/17/14    BREAST BIOPSY Left 06/04/2018    BREAST CAPSULOTOMY Left 4/25/2019    Procedure: CAPSULOTOMY, BREAST LEFT;  Surgeon: Duane Bello MD;  Location: [address];  Service: Plastics;  Laterality: Left;    BREAST RECONSTRUCTION      BREAST SURGERY      Bilateral Mastectomy 07/23/2018    CARPAL TUNNEL RELEASE Bilateral     CARPAL TUNNEL RELEASE      COLONOSCOPY      COLONOSCOPY N/A 2/25/2021    Procedure: COLONOSCOPY;  Surgeon: Vincenzo Herbert MD;  [facility]  Service: Endoscopy;  Laterality: N/A;  previous anesthesia complications  okay for this date/time per Dr. Herbert and liliana with [facility] - sm  COVID test on 2/22/21 at [facility]    DILATION AND CURETTAGE OF UTERUS      DILATION AND CURETTAGE OF UTERUS USING SUCTION N/A  2/25/2019    Procedure: DILATION AND CURETTAGE, UTERUS, USING SUCTION  PATHOLOGY NEEDED;  Surgeon: Pam Sifuentes MD;  Location: [facility] OR;  Service: OB/GYN;  Laterality: N/A;    ENDOSCOPIC ULTRASOUND OF UPPER GASTROINTESTINAL TRACT Left 1/15/2021    Procedure: ULTRASOUND, UPPER GI TRACT, ENDOSCOPIC;  Surgeon: Chandan Dillon MD;  Location: [facility];  Service: Endoscopy;  Laterality: Left;  for pancreatic screening    ESOPHAGOGASTRODUODENOSCOPY N/A 1/15/2021    Procedure: EGD (ESOPHAGOGASTRODUODENOSCOPY);  Surgeon: Chandan Dillon MD;  Location: [facility];  Service: Endoscopy;  Laterality: N/A;  with push enteroscopy    ESOPHAGOGASTRODUODENOSCOPY N/A 5/17/2021    Procedure: EGD (ESOPHAGOGASTRODUODENOSCOPY);  Surgeon: Chandan Dillon MD;  Location: [facility] (2ND FLR);  Service: Endoscopy;  Laterality: N/A;  Please schedule this duodenal polypectomy. Need to be a day that Dr Jose Antonio Stark is in the OR. 90 minutes.   Chandan Dillon MD     COVID at [place] 5/15 (scheduled in the OR for 5/18 - using same COVID results for both procedures) ttr    ESOPHAGOGASTRODUODENOSCOPY N/A 1/25/2022    Procedure: EGD (ESOPHAGOGASTRODUODENOSCOPY);  Surgeon: Chandan Dillon MD;  Location: [facility] (2ND FLR);  Service: Endoscopy;  Laterality: N/A;  poss emr  covid-1/22/22-STAH-BB  instructions sent via portal-BB    ESOPHAGOGASTRODUODENOSCOPY N/A 3/10/2023    Procedure: EGD (ESOPHAGOGASTRODUODENOSCOPY);  Surgeon: Chandan Dillon MD;  Location: [facility] ENDO (2ND FLR);  Service: Endoscopy;  Laterality: N/A;  inst via portal  called to confirm and msg that phone not accepting calls 3/6 mal    ESOPHAGOGASTRODUODENOSCOPY N/A 3/22/2024    Procedure: EGD (ESOPHAGOGASTRODUODENOSCOPY);  Surgeon: Chandan Dillon MD;  Location: [facility] ENDO (2ND FLR);  Service: Endoscopy;  Laterality: N/A;  EGD in 1 year. dillon  instr portal-tb  3/19-precall complete-MS    FAT TRANSFER Bilateral 11/12/2018    Procedure: TRANSFER, FAT TISSUE  BILATERAL BREAST;  Surgeon: Duane Bello MD;  [facility];  Service: Plastics;  Laterality: Bilateral;    INJECTION FOR SENTINEL NODE IDENTIFICATION Left 7/23/2018    Procedure: INJECTION, FOR SENTINEL NODE IDENTIFICATION;  Surgeon: Sofia Kendrick MD;  [facility];  Service: General;  Laterality: Left;    INSERTION OF BREAST TISSUE EXPANDER Bilateral 7/23/2018    Procedure: INSERTION, TISSUE EXPANDER, BREAST;  Surgeon: Duane Bello MD;  [facility];  Service: Plastics;  Laterality: Bilateral;    INSERTION OF BREAST TISSUE EXPANDER Bilateral 3/10/2020    Procedure: INSERTION, TISSUE EXPANDER, BREAST, BILATERAL;  Surgeon: Duane Bello MD;  [facility];  Service: Plastics;  Laterality: Bilateral;    intestinal polpy      LYSIS OF ADHESIONS N/A 3/4/2021    Procedure: LYSIS, ADHESIONS;  Surgeon: Clayton Vilchis MD;  Location: [facility];  Service: OB/GYN;  Laterality: N/A;  Omental adhesions    MASTECTOMY      REMOVAL OF BREAST IMPLANT Bilateral 3/10/2020    Procedure: REMOVAL, IMPLANT, BREAST, BILATERAL;  Surgeon: Duane Bello MD;  Location: [facility] OR [address] [place] [address];  Service: Plastics;  Laterality: Bilateral;    REMOVAL OF BREAST IMPLANT Bilateral 7/7/2020    Procedure: REMOVAL, IMPLANT, BREAST BILATERAL;  Surgeon: Duane Bello MD;  Location: [facility] OR [address] [place] [address];  Service: Plastics;  Laterality: Bilateral;    SENTINEL LYMPH NODE BIOPSY Left 7/23/2018    Procedure: BIOPSY, LYMPH NODE, SENTINEL;  Surgeon: Sofia Kendrick MD;  Location: [facility] OR [address] [place] [address];  Service: General;  Laterality: Left;    SMALL INTESTINE SURGERY      3 surgeries     TONSILLECTOMY      TOTAL ABDOMINAL HYSTERECTOMY N/A 3/4/2021    Procedure: HYSTERECTOMY, TOTAL, ABDOMINAL;  Surgeon: Clayton Vilchis MD;  Location: [facility];  Service: OB/GYN;  Laterality: N/A;    UPPER GASTROINTESTINAL ENDOSCOPY         Family History   Problem Relation Name Age of Onset     Cancer Mother Kera         colon cancer    Stomach cancer Mother Kera     Hypertension Father Jono     Colon polyps Sister Sarah         peutz-jeghers syndrome    Breast cancer Paternal Aunt Lili     No Known Problems Maternal Uncle Umer     ADD / ADHD Paternal Uncle Christopher     Depression Paternal Uncle Christopher     Dementia Maternal Grandfather Jimy     Dementia Maternal Grandmother Mignon     No Known Problems Paternal Grandfather Perez     Breast cancer Paternal Grandmother Jose     No Known Problems Cousin Coco     Ovarian cancer Neg Hx      Anesthesia problems Neg Hx         Social History     Socioeconomic History    Marital status: Unknown    Number of children: 0   Tobacco Use    Smoking status: Every Day     Current packs/day: 1.00     Average packs/day: 1 pack/day for 24.0 years (24.0 ttl pk-yrs)     Types: Cigarettes     Start date: 4/17/2000    Smokeless tobacco: Never    Tobacco comments:     sometimes dry cough per patient   Substance and Sexual Activity    Alcohol use: Yes     Alcohol/week: 5.8 standard drinks of alcohol     Types: 7 Standard drinks or equivalent per week     Comment: occasionally    Drug use: No    Sexual activity: Yes     Partners: Male   Other Topics Concern    Patient feels they ought to cut down on drinking/drug use No    Patient annoyed by others criticizing their drinking/drug use No    Patient has felt bad or guilty about drinking/drug use No    Patient has had a drink/used drugs as an eye opener in the AM No   Social History Narrative    Had one previous miscarriage in 2005    Never     currently in a relationship       Current Outpatient Medications   Medication Sig Dispense Refill    ARIPiprazole (ABILIFY) 15 MG Tab Take 1 tablet (15 mg total) by mouth once daily. 30 tablet 2    butalbital-acetaminophen-caffeine -40 mg (FIORICET, ESGIC) -40 mg per tablet Take 1 tablet by mouth every 6 (six) hours as needed for Headaches. 30 tablet 5    clonazePAM  (KLONOPIN) 0.5 MG tablet Take 0.5 mg by mouth 2 (two) times daily.      hydrOXYzine (VISTARIL) 100 MG capsule Take 1 capsule (100 mg total) by mouth every 6 (six) hours as needed (Insomnia or anxiety). 60 capsule 1    lithium (ESKALITH) 450 MG TbSR Take 450 mg by mouth every 12 (twelve) hours.      montelukast (SINGULAIR) 10 mg tablet Take 10 mg by mouth once daily.      ondansetron (ZOFRAN) 4 MG tablet Take 1 tablet (4 mg total) by mouth every 6 (six) hours. 12 tablet 0    promethazine (PHENERGAN) 25 MG tablet Take 25 mg by mouth every 8 (eight) hours.      propranoloL (INDERAL) 20 MG tablet Take 20 mg by mouth 2 (two) times daily.      RABEprazole (ACIPHEX) 20 mg tablet TAKE ONE TABLET BY MOUTH ONCE A DAY 30 tablet 5    sucralfate (CARAFATE) 100 mg/mL suspension Take 10 mLs (1 g total) by mouth 4 (four) times daily before meals and nightly. Medication should be take 1 hour prior to meals 414 mL 1    traZODone (DESYREL) 150 MG tablet Take 1 tablet (150 mg total) by mouth every evening. 30 tablet 0    VITAMIN D2 1,250 mcg (50,000 unit) capsule TAKE ONE CAPSULE BY MOUTH EVERY 7 DAYS 12 capsule 1    VYVANSE 40 mg Cap Take 40 mg by mouth once daily.              No current facility-administered medications for this visit.     Facility-Administered Medications Ordered in Other Visits   Medication Dose Route Frequency Provider Last Rate Last Admin    0.9%  NaCl infusion   Intravenous Continuous Chandan Dillon MD 20 mL/hr at 01/25/22 0847 New Bag at 01/25/22 0847    sodium chloride 0.9% flush 10 mL  10 mL Intravenous PRN Chandan Dillon MD           Review of patient's allergies indicates:  No Known Allergies    Review of Systems   Constitutional:  Positive for fatigue. Negative for appetite change and fever.   HENT:  Negative for sore throat.    Eyes:  Positive for photophobia and visual disturbance.   Respiratory:  Negative for cough and shortness of breath.    Cardiovascular:  Negative for chest pain.    Gastrointestinal:  Positive for nausea. Negative for vomiting.   Endocrine: Negative for cold intolerance and heat intolerance.   Genitourinary:  Negative for difficulty urinating.   Musculoskeletal:  Negative for arthralgias, back pain and neck pain.   Skin:  Negative for rash.   Allergic/Immunologic: Negative for food allergies.   Neurological:  Positive for speech difficulty (with headaches) and headaches. Negative for dizziness, tremors, seizures, weakness and numbness.   Hematological:  Does not bruise/bleed easily.   Psychiatric/Behavioral:  Negative for agitation, decreased concentration and sleep disturbance.        Objective:      Neurologic Exam     Mental Status   Oriented to person, place, and time.     Cranial Nerves     CN III, IV, VI   Pupils are equal, round, and reactive to light.    Physical Exam  Vitals and nursing note reviewed.   Constitutional:       Appearance: Normal appearance.   HENT:      Head: Normocephalic and atraumatic.      Nose: Nose normal.      Mouth/Throat:      Mouth: Mucous membranes are moist.      Pharynx: Oropharynx is clear.   Eyes:      Extraocular Movements: Extraocular movements intact.      Conjunctiva/sclera: Conjunctivae normal.      Pupils: Pupils are equal, round, and reactive to light.   Cardiovascular:      Rate and Rhythm: Normal rate and regular rhythm.      Pulses: Normal pulses.   Pulmonary:      Effort: Pulmonary effort is normal. No respiratory distress.   Abdominal:      General: There is no distension.      Palpations: Abdomen is soft.      Tenderness: There is no abdominal tenderness.   Genitourinary:     Comments: Deferred    Musculoskeletal:         General: No swelling or tenderness. Normal range of motion.      Cervical back: Normal range of motion and neck supple. No tenderness.   Skin:     General: Skin is warm and dry.   Neurological:      General: No focal deficit present.      Mental Status: She is alert and oriented to person, place, and time.  Mental status is at baseline.   Psychiatric:         Mood and Affect: Mood normal.         Behavior: Behavior normal.         Thought Content: Thought content normal.         Judgment: Judgment normal.         Assessment:       No diagnosis found.      Plan:   Discussed risks and benefits of potential treatment options at length as well as potential side effects of medication changes. Medications were changed. Please refer to medication reconciliation report for details. Medication compliance discussed. Instructed to call clinic if concerned or to ED if emergency.     We discussed the risk of medication overuse headaches (rebound headaches) associated with frequent use of abortive agents even ones as seemingly benign as OTC Tylenol or Motrin. We discussed the goal of seven days without these medications to avoid rebound. We discussed a headache calendar to better record headaches for the next visit.     chronic migraine with aura without status migrainosus  continue atogepant (QULIPTA) 60 mg Tab; One tablet daily for migraine prevention.  Dispense: 30 tablet; Refill: 11    OK to take Fioricet more often than 2 times a month. Suggested limiting to twice a week. Ok for second dose if still with headache in one hour.  She tolerates Fioricet. No drowsiness. She could work while taking this, she may not need to leave work.  She willnotify me on how this new regiment is going and if she would like Ubrelvy rx.    6 months      LINDSAY Cano

## 2024-04-25 ENCOUNTER — OFFICE VISIT (OUTPATIENT)
Dept: OBSTETRICS AND GYNECOLOGY | Facility: CLINIC | Age: 44
End: 2024-04-25
Payer: COMMERCIAL

## 2024-04-25 VITALS
WEIGHT: 185.31 LBS | DIASTOLIC BLOOD PRESSURE: 84 MMHG | SYSTOLIC BLOOD PRESSURE: 120 MMHG | HEIGHT: 62 IN | HEART RATE: 88 BPM | BODY MASS INDEX: 34.1 KG/M2

## 2024-04-25 DIAGNOSIS — Q85.89 PEUTZ-JEGHERS SYNDROME: ICD-10-CM

## 2024-04-25 PROCEDURE — 99396 PREV VISIT EST AGE 40-64: CPT | Mod: S$GLB,,, | Performed by: OBSTETRICS & GYNECOLOGY

## 2024-04-25 PROCEDURE — 99999 PR PBB SHADOW E&M-EST. PATIENT-LVL IV: CPT | Mod: PBBFAC,,, | Performed by: OBSTETRICS & GYNECOLOGY

## 2024-04-25 PROCEDURE — 1159F MED LIST DOCD IN RCRD: CPT | Mod: CPTII,S$GLB,, | Performed by: OBSTETRICS & GYNECOLOGY

## 2024-04-25 PROCEDURE — 3008F BODY MASS INDEX DOCD: CPT | Mod: CPTII,S$GLB,, | Performed by: OBSTETRICS & GYNECOLOGY

## 2024-04-25 PROCEDURE — 3074F SYST BP LT 130 MM HG: CPT | Mod: CPTII,S$GLB,, | Performed by: OBSTETRICS & GYNECOLOGY

## 2024-04-25 PROCEDURE — 3079F DIAST BP 80-89 MM HG: CPT | Mod: CPTII,S$GLB,, | Performed by: OBSTETRICS & GYNECOLOGY

## 2024-04-25 PROCEDURE — 1160F RVW MEDS BY RX/DR IN RCRD: CPT | Mod: CPTII,S$GLB,, | Performed by: OBSTETRICS & GYNECOLOGY

## 2024-04-25 NOTE — PROGRESS NOTES
Subjective:       Patient ID: Staci Hodge is a 43 y.o. female.    Chief Complaint:  Consult (Pt states she was referred here to follow up for breast cancer , she states she did have a full mastectomy ) and Annual Exam (Well woman exam )      History of Present Illness  Patient presents for annual exam.  Patient has been diagnosed with peutz-jeghers syndrome.  Patient has a history of breast cancer and bilateral mastectomy along with removal of both tubes ovaries and uterus.  Patient presents today for annual exam.  She is otherwise without gyn complaints.    Peutz-Jeghers syndrome       Menstrual History:  OB History          2    Para   0    Term   0       0    AB   2    Living   0         SAB   2    IAB   0    Ectopic   0    Multiple   0    Live Births   0                Menarche age:  Patient's last menstrual period was 2021.         Review of Systems  Review of Systems   Constitutional:  Positive for appetite change, diaphoresis and fatigue. Negative for activity change, chills, fever and unexpected weight change.   HENT:  Negative for congestion, dental problem, drooling, ear discharge, ear pain, facial swelling, hearing loss, mouth sores, nosebleeds, postnasal drip, rhinorrhea, sinus pressure, sneezing, sore throat, tinnitus, trouble swallowing and voice change.    Eyes:  Negative for photophobia, pain, discharge, redness, itching and visual disturbance.   Respiratory:  Negative for apnea, cough, choking, chest tightness, shortness of breath, wheezing and stridor.    Cardiovascular:  Negative for chest pain, palpitations and leg swelling.   Gastrointestinal:  Positive for diarrhea and nausea. Negative for abdominal distention, abdominal pain, anal bleeding, blood in stool, constipation, rectal pain and vomiting.   Endocrine: Positive for polydipsia and polyuria. Negative for cold intolerance, heat intolerance and polyphagia.   Genitourinary:  Negative for decreased urine volume,  difficulty urinating, dyspareunia, dysuria, enuresis, flank pain, frequency, genital sores, hematuria, menstrual problem, pelvic pain, urgency, vaginal bleeding, vaginal discharge and vaginal pain.   Musculoskeletal:  Negative for arthralgias, back pain, gait problem, joint swelling, myalgias, neck pain and neck stiffness.   Skin:  Negative for color change, pallor, rash and wound.   Allergic/Immunologic: Negative for environmental allergies, food allergies and immunocompromised state.   Neurological:  Positive for dizziness, tremors and headaches. Negative for seizures, syncope, facial asymmetry, speech difficulty, weakness, light-headedness and numbness.   Hematological:  Negative for adenopathy. Does not bruise/bleed easily.   Psychiatric/Behavioral:  Positive for decreased concentration, dysphoric mood and suicidal ideas. Negative for agitation, behavioral problems, confusion, hallucinations, self-injury and sleep disturbance. The patient is nervous/anxious and is hyperactive.          Objective:      Physical Exam  Vitals and nursing note reviewed. Exam conducted with a chaperone present.   Constitutional:       Appearance: She is well-developed.   Neck:      Thyroid: No thyromegaly.   Cardiovascular:      Rate and Rhythm: Normal rate and regular rhythm.   Pulmonary:      Effort: Pulmonary effort is normal.      Breath sounds: Normal breath sounds.   Chest:   Breasts:     Breasts are symmetrical.      Right: Absent.      Left: Absent.       Abdominal:      General: Bowel sounds are normal.      Palpations: Abdomen is soft. There is no mass.      Tenderness: There is no abdominal tenderness.      Hernia: There is no hernia in the left inguinal area or right inguinal area.   Genitourinary:     General: Normal vulva.      Labia:         Right: No rash, tenderness, lesion or injury.         Left: No rash, tenderness, lesion or injury.       Urethra: No prolapse, urethral pain, urethral swelling or urethral lesion.       Vagina: Normal. No signs of injury and foreign body. No vaginal discharge, erythema, tenderness, bleeding, lesions or prolapsed vaginal walls.      Cervix: Cervical motion tenderness ( surgically absent) present.      Uterus: Deviated ( surgically absent).       Adnexa:         Right: No mass, tenderness or fullness.          Left: No mass, tenderness or fullness.        Rectum: No external hemorrhoid.   Musculoskeletal:         General: Normal range of motion.   Lymphadenopathy:      Lower Body: No right inguinal adenopathy.   Skin:     General: Skin is dry.   Neurological:      Mental Status: She is alert and oriented to person, place, and time.      Deep Tendon Reflexes: Reflexes are normal and symmetric.   Psychiatric:         Behavior: Behavior normal.         Thought Content: Thought content normal.         Judgment: Judgment normal.         Assessment:        1. Peutz-Jeghers syndrome                Plan:         Staci was seen today for consult and annual exam.    Diagnoses and all orders for this visit:    Peutz-Jeghers syndrome  -     Ambulatory referral/consult to Obstetrics / Gynecology

## 2024-04-26 ENCOUNTER — ANESTHESIA EVENT (OUTPATIENT)
Dept: ENDOSCOPY | Facility: HOSPITAL | Age: 44
End: 2024-04-26
Payer: COMMERCIAL

## 2024-04-26 ENCOUNTER — HOSPITAL ENCOUNTER (OUTPATIENT)
Facility: HOSPITAL | Age: 44
Discharge: HOME | End: 2024-04-26
Attending: INTERNAL MEDICINE | Admitting: INTERNAL MEDICINE
Payer: COMMERCIAL

## 2024-04-26 ENCOUNTER — ANESTHESIA (OUTPATIENT)
Dept: ENDOSCOPY | Facility: HOSPITAL | Age: 44
End: 2024-04-26
Payer: COMMERCIAL

## 2024-04-26 VITALS
TEMPERATURE: 98 F | OXYGEN SATURATION: 95 % | WEIGHT: 175 LBS | RESPIRATION RATE: 13 BRPM | HEIGHT: 62 IN | HEART RATE: 80 BPM | BODY MASS INDEX: 32.2 KG/M2 | SYSTOLIC BLOOD PRESSURE: 103 MMHG | DIASTOLIC BLOOD PRESSURE: 57 MMHG

## 2024-04-26 DIAGNOSIS — Q85.89 PEUTZ-JEGHERS POLYPS OF SMALL BOWEL: Primary | ICD-10-CM

## 2024-04-26 DIAGNOSIS — Q85.89 PEUTZ-JEGHERS SYNDROME: ICD-10-CM

## 2024-04-26 PROCEDURE — 25000003 PHARM REV CODE 250: Performed by: INTERNAL MEDICINE

## 2024-04-26 PROCEDURE — 37000009 HC ANESTHESIA EA ADD 15 MINS: Performed by: INTERNAL MEDICINE

## 2024-04-26 PROCEDURE — D9220A PRA ANESTHESIA: Mod: CRNA,,, | Performed by: NURSE ANESTHETIST, CERTIFIED REGISTERED

## 2024-04-26 PROCEDURE — 43259 EGD US EXAM DUODENUM/JEJUNUM: CPT | Mod: ,,, | Performed by: INTERNAL MEDICINE

## 2024-04-26 PROCEDURE — 37000008 HC ANESTHESIA 1ST 15 MINUTES: Performed by: INTERNAL MEDICINE

## 2024-04-26 PROCEDURE — 25000003 PHARM REV CODE 250: Performed by: NURSE ANESTHETIST, CERTIFIED REGISTERED

## 2024-04-26 PROCEDURE — D9220A PRA ANESTHESIA: Mod: ANES,,, | Performed by: ANESTHESIOLOGY

## 2024-04-26 PROCEDURE — 43259 EGD US EXAM DUODENUM/JEJUNUM: CPT | Performed by: INTERNAL MEDICINE

## 2024-04-26 PROCEDURE — 63600175 PHARM REV CODE 636 W HCPCS: Performed by: NURSE ANESTHETIST, CERTIFIED REGISTERED

## 2024-04-26 RX ORDER — LIDOCAINE HYDROCHLORIDE 20 MG/ML
INJECTION INTRAVENOUS
Status: DISCONTINUED | OUTPATIENT
Start: 2024-04-26 | End: 2024-04-26

## 2024-04-26 RX ORDER — DEXMEDETOMIDINE HYDROCHLORIDE 100 UG/ML
INJECTION, SOLUTION INTRAVENOUS
Status: DISCONTINUED | OUTPATIENT
Start: 2024-04-26 | End: 2024-04-26

## 2024-04-26 RX ORDER — SODIUM CHLORIDE 9 MG/ML
INJECTION, SOLUTION INTRAVENOUS CONTINUOUS
Status: DISCONTINUED | OUTPATIENT
Start: 2024-04-26 | End: 2024-04-26 | Stop reason: HOSPADM

## 2024-04-26 RX ORDER — PROPOFOL 10 MG/ML
VIAL (ML) INTRAVENOUS
Status: DISCONTINUED | OUTPATIENT
Start: 2024-04-26 | End: 2024-04-26

## 2024-04-26 RX ORDER — PROPOFOL 10 MG/ML
VIAL (ML) INTRAVENOUS CONTINUOUS PRN
Status: DISCONTINUED | OUTPATIENT
Start: 2024-04-26 | End: 2024-04-26

## 2024-04-26 RX ORDER — SODIUM CHLORIDE 0.9 % (FLUSH) 0.9 %
20 SYRINGE (ML) INJECTION ONCE
Status: DISCONTINUED | OUTPATIENT
Start: 2024-04-26 | End: 2024-04-26 | Stop reason: HOSPADM

## 2024-04-26 RX ADMIN — PROPOFOL 100 MG: 10 INJECTION, EMULSION INTRAVENOUS at 08:04

## 2024-04-26 RX ADMIN — PROPOFOL 150 MCG/KG/MIN: 10 INJECTION, EMULSION INTRAVENOUS at 08:04

## 2024-04-26 RX ADMIN — GLYCOPYRROLATE 0.2 MG: 0.2 INJECTION, SOLUTION INTRAMUSCULAR; INTRAVITREAL at 08:04

## 2024-04-26 RX ADMIN — DEXMEDETOMIDINE HYDROCHLORIDE 8 MCG: 100 INJECTION, SOLUTION, CONCENTRATE INTRAVENOUS at 08:04

## 2024-04-26 RX ADMIN — SODIUM CHLORIDE: 9 INJECTION, SOLUTION INTRAVENOUS at 07:04

## 2024-04-26 RX ADMIN — LIDOCAINE HYDROCHLORIDE 100 MG: 20 INJECTION, SOLUTION INTRAVENOUS at 08:04

## 2024-04-26 NOTE — TRANSFER OF CARE
"Anesthesia Transfer of Care Note    Patient: Staci Hodge    Procedure(s) Performed: Procedure(s) (LRB):  ULTRASOUND, UPPER GI TRACT, ENDOSCOPIC (N/A)    Patient location: GI    Anesthesia Type: general    Transport from OR: Transported from OR on room air with adequate spontaneous ventilation    Post pain: adequate analgesia    Post assessment: no apparent anesthetic complications    Post vital signs: stable    Level of consciousness: awake, alert and oriented    Nausea/Vomiting: no nausea/vomiting    Complications: none    Transfer of care protocol was followed      Last vitals: Visit Vitals  /71   Pulse 100   Temp 36.5 °C (97.7 °F)   Resp 18   Ht 5' 2" (1.575 m)   Wt 79.4 kg (175 lb)   LMP 03/01/2021   SpO2 96%   Breastfeeding No   BMI 32.01 kg/m²     "

## 2024-04-26 NOTE — BRIEF OP NOTE
Discharge Summary/Instructions after an Endoscopic Procedure    Patient Name: Staci Hodge  Patient MRN: [id number]  Patient YOB: 1980 Friday, April 26, 2024  Chandan Dillon MD  Dear patient,  As a result of recent federal legislation (The Federal Cures Act), you may receive lab or pathology results from your procedure in your [facility] account before your physician is able to contact you. Your physician or their representative will relay the results to you with their recommendations at their soonest availability.  Thank you,    Your health is very important to us during the Covid Crisis. Following your procedure today, you will receive a daily text for 2 weeks asking about signs or symptoms of Covid 19.  Please respond to this text when you receive it so we can follow up and keep you as safe as possible.     RESTRICTIONS:  During your procedure today, you received medications for sedation.  These medications may affect your judgment, balance and coordination.  Therefore, for 24 hours, you have the following restrictions:     - DO NOT drive a car, operate machinery, make legal/financial decisions, sign important papers or drink alcohol.      ACTIVITY:  Today: no heavy lifting, straining or running due to procedural sedation/anesthesia.  The following day: return to full activity including work.    DIET:  Eat and drink normally unless instructed otherwise.     TREATMENT FOR COMMON SIDE EFFECTS:  - Mild abdominal pain, nausea, belching, bloating or excessive gas:  rest, eat lightly and use a heating pad.  - Sore Throat: treat with throat lozenges and/or gargle with warm salt water.  - Because air was used during the procedure, expelling large amounts of air from your rectum or belching is normal.  - If a bowel prep was taken, you may not have a bowel movement for 1-3 days.  This is normal.      SYMPTOMS TO WATCH FOR AND REPORT TO YOUR PHYSICIAN:  1. Abdominal pain or bloating, other than gas cramps.  2.  Chest pain.  3. Back pain.  4. Signs of infection such as: chills or fever occurring within 24 hours after the procedure.  5. Rectal bleeding, which would show as bright red, maroon, or black stools. (A tablespoon of blood from the rectum is not serious, especially if hemorrhoids are present.)  6. Vomiting.  7. Weakness or dizziness.      GO DIRECTLY TO THE NEAREST EMERGENCY ROOM IF YOU HAVE ANY OF THE FOLLOWING:     Difficulty breathing              Chills and/or fever over 101 F   Persistent vomiting and/or vomiting blood   Severe abdominal pain   Severe chest pain   Black, tarry stools   Bleeding- more than one tablespoon   Any other symptom or condition that you feel may need urgent attention    Your doctor recommends these additional instructions:  If any biopsies were taken, your doctors clinic will contact you in 1 to 2 weeks with any results.    - Discharge patient to home (ambulatory).   - Resume previous diet.   - Perform magnetic resonance imaging (MRI) with gadolinium in 1 year.    For questions, problems or results please call your physician - Chandan Dillon MD.    EMERGENCY PHONE NUMBER: [phone],  LAB RESULTS: [phone]    IF A COMPLICATION OR EMERGENCY SITUATION ARISES AND YOU ARE UNABLE TO REACH YOUR PHYSICIAN - GO DIRECTLY TO THE EMERGENCY ROOM.

## 2024-04-26 NOTE — ANESTHESIA POSTPROCEDURE EVALUATION
Anesthesia Post Evaluation    Patient: Staci Hodge    Procedure(s) Performed: Procedure(s) (LRB):  ULTRASOUND, UPPER GI TRACT, ENDOSCOPIC (N/A)    Final Anesthesia Type: general      Patient location during evaluation: PACU  Patient participation: Yes- Able to Participate  Level of consciousness: awake and alert  Post-procedure vital signs: reviewed and stable  Pain management: adequate  Airway patency: patent    PONV status at discharge: No PONV  Anesthetic complications: no      Cardiovascular status: blood pressure returned to baseline  Respiratory status: unassisted  Hydration status: euvolemic                Vitals Value Taken Time   /57 04/26/24 0858   Temp 36.7 °C (98.1 °F) 04/26/24 0828   Pulse 80 04/26/24 0858   Resp 13 04/26/24 0858   SpO2 95 % 04/26/24 0858         Event Time   Out of Recovery 08:50:00         Pain/Bran Score: Bran Score: 10 (4/26/2024  8:58 AM)

## 2024-04-26 NOTE — ANESTHESIA PREPROCEDURE EVALUATION
04/26/2024    Staci Hodge is a 43 y.o., female.    Past Medical History:   Diagnosis Date    Abnormal Pap smear of cervix age 25    no treatement    Anxiety     Breast cancer     Cancer 07/11/2018    breast cancer    Cervical spondylosis 2/9/2018    Colon polyp     Depression     Ectopic pregnancy 2/26/2019    General anesthetics causing adverse effect in therapeutic use     Slow to awaken/ Memory problems-post-op to day 3 after Mastectomy    Headache     History of psychiatric hospitalization     age 20 for SI    Hx of psychiatric care     Hypertension     Peutz-Jeghers syndrome     PONV (postoperative nausea and vomiting)     Psychiatric problem     Right carpal tunnel syndrome 3/9/2017    Self-harming behavior     Suicide attempt     Therapy      Past Surgical History:   Procedure Laterality Date    ADENOIDECTOMY      ANTEGRADE SINGLE BALLOON ENTEROSCOPY N/A 4/19/2021    Procedure: ENTEROSCOPY, SINGLE BALLOON, ANTEGRADE;  Surgeon: Vincenzo Herbert MD;  Location: [facility] (2ND FLR);  Service: Endoscopy;  Laterality: N/A;  removal of large polyp in proximal jejunum; please schedule during a time when Dr. Dillon is available in case I need assistance with removal or 2nd opinion prior to removal    COVID at [place] 4/16 - ttr    APPENDECTOMY      AUGMENTATION OF BREAST      BILATERAL MASTECTOMY Bilateral 7/23/2018    Procedure: MASTECTOMY 23 hr stay;  Surgeon: Sofia Kendrick MD;  Location: [address];  Service: General;  Laterality: Bilateral;    BILATERAL SALPINGO-OOPHORECTOMY (BSO) Bilateral 3/4/2021    Procedure: SALPINGO-OOPHORECTOMY, BILATERAL;  Surgeon: Clayton Vilchis MD;  Location: [facility];  Service: OB/GYN;  Laterality: Bilateral;    BOWEL RESECTION  10/17/14    BREAST BIOPSY Left 06/04/2018    BREAST CAPSULOTOMY Left 4/25/2019    Procedure: CAPSULOTOMY, BREAST LEFT;  Surgeon:  Duane Bello MD;  Location: [facility] 2ND FLR;  Service: Plastics;  Laterality: Left;    BREAST RECONSTRUCTION      BREAST SURGERY      Bilateral Mastectomy 07/23/2018    CARPAL TUNNEL RELEASE Bilateral     CARPAL TUNNEL RELEASE      COLONOSCOPY      COLONOSCOPY N/A 2/25/2021    Procedure: COLONOSCOPY;  Surgeon: Vincenzo Herbert MD;  Location: [facility] (2ND FLR);  Service: Endoscopy;  Laterality: N/A;  previous anesthesia complications  okay for this date/time per Dr. Herbert and liliana with Miranda  - sm  COVID test on 2/22/21 at [facility]    DILATION AND CURETTAGE OF UTERUS      DILATION AND CURETTAGE OF UTERUS USING SUCTION N/A 2/25/2019    Procedure: DILATION AND CURETTAGE, UTERUS, USING SUCTION  PATHOLOGY NEEDED;  Surgeon: Pam Sifuentes MD;  Location: [facility];  Service: OB/GYN;  Laterality: N/A;    ENDOSCOPIC ULTRASOUND OF UPPER GASTROINTESTINAL TRACT Left 1/15/2021    Procedure: ULTRASOUND, UPPER GI TRACT, ENDOSCOPIC;  Surgeon: Chandan Dillon MD;  Location: [facility];  Service: Endoscopy;  Laterality: Left;  for pancreatic screening    ESOPHAGOGASTRODUODENOSCOPY N/A 1/15/2021    Procedure: EGD (ESOPHAGOGASTRODUODENOSCOPY);  Surgeon: Chandan Dillon MD;  Location: [facility];  Service: Endoscopy;  Laterality: N/A;  with push enteroscopy    ESOPHAGOGASTRODUODENOSCOPY N/A 5/17/2021    Procedure: EGD (ESOPHAGOGASTRODUODENOSCOPY);  Surgeon: Chandan Dillon MD;  Location: [facility] (2ND FLR);  Service: Endoscopy;  Laterality: N/A;  Please schedule this duodenal polypectomy. Need to be a day that Dr Jose Antonio Stark is in the OR. 90 minutes.   Chandan Dillon MD     COVID at [place] 5/15 (scheduled in the OR for 5/18 - using same COVID results for both procedures) ttr    ESOPHAGOGASTRODUODENOSCOPY N/A 1/25/2022    Procedure: EGD (ESOPHAGOGASTRODUODENOSCOPY);  Surgeon: Chandan Dillon MD;  Location: [facility] (2ND FLR);  Service: Endoscopy;  Laterality: N/A;  poss  emr  [facility]  instructions sent via portal-[facility]    ESOPHAGOGASTRODUODENOSCOPY N/A 3/10/2023    Procedure: EGD (ESOPHAGOGASTRODUODENOSCOPY);  Surgeon: Chandan Dillon MD;  Location: [facility] (2ND FLR);  Service: Endoscopy;  Laterality: N/A;  inst via portal  called to confirm and msg that phone not accepting calls 3/6 mal    ESOPHAGOGASTRODUODENOSCOPY N/A 3/22/2024    Procedure: EGD (ESOPHAGOGASTRODUODENOSCOPY);  Surgeon: Chandan Dillon MD;  Location: [facility] END[facility] (2ND FLR);  Service: Endoscopy;  Laterality: N/A;  EGD in 1 year. way[place]  instr portal-tb  3/19-precall complete-MS    FAT TRANSFER Bilateral 11/12/2018    Procedure: TRANSFER, FAT TISSUE BILATERAL BREAST;  Surgeon: Duane Bello MD;  [facility];  Service: Plastics;  Laterality: Bilateral;    INJECTION FOR SENTINEL NODE IDENTIFICATION Left 7/23/2018    Procedure: INJECTION, FOR SENTINEL NODE IDENTIFICATION;  Surgeon: Sofia Kendrick MD;  Location: [facility] OR 2ND FLR;  Service: General;  Laterality: Left;    INSERTION OF BREAST TISSUE EXPANDER Bilateral 7/23/2018    Procedure: INSERTION, TISSUE EXPANDER, BREAST;  Surgeon: Duane Bello MD;  [facility];  Service: Plastics;  Laterality: Bilateral;    INSERTION OF BREAST TISSUE EXPANDER Bilateral 3/10/2020    Procedure: INSERTION, TISSUE EXPANDER, BREAST, BILATERAL;  Surgeon: Duane Bello MD;  [facility];  Service: Plastics;  Laterality: Bilateral;    intestinal polpy      LYSIS OF ADHESIONS N/A 3/4/2021    Procedure: LYSIS, ADHESIONS;  Surgeon: Clayton Vilchis MD;  Location: [facility];  Service: OB/GYN;  Laterality: N/A;  Omental adhesions    MASTECTOMY      REMOVAL OF BREAST IMPLANT Bilateral 3/10/2020    Procedure: REMOVAL, IMPLANT, BREAST, BILATERAL;  Surgeon: Duane Bello MD;  [facility];  Service: Plastics;  Laterality: Bilateral;    REMOVAL OF BREAST IMPLANT Bilateral 7/7/2020    Procedure:  REMOVAL, IMPLANT, BREAST BILATERAL;  Surgeon: Duane Bello MD;  Location: [address];  Service: Plastics;  Laterality: Bilateral;    SENTINEL LYMPH NODE BIOPSY Left 7/23/2018    Procedure: BIOPSY, LYMPH NODE, SENTINEL;  Surgeon: Sofia Kendrick MD;  [facility];  Service: General;  Laterality: Left;    SMALL INTESTINE SURGERY      3 surgeries     TONSILLECTOMY      TOTAL ABDOMINAL HYSTERECTOMY N/A 3/4/2021    Procedure: HYSTERECTOMY, TOTAL, ABDOMINAL;  Surgeon: Clayton Vilchis MD;  [facility];  Service: OB/GYN;  Laterality: N/A;    UPPER GASTROINTESTINAL ENDOSCOPY             Pre-op Assessment    I have reviewed the Patient Summary Reports.     I have reviewed the Nursing Notes. I have reviewed the NPO Status.      Review of Systems  Anesthesia Hx:   History of prior surgery of interest to airway management or planning:           Personal Hx of Anesthesia complications, Post-Operative Nausea/Vomiting                    Cardiovascular:     Hypertension                                        Hepatic/GI:     GERD             Neurological:    Neuromuscular Disease,                                   Psych:  Psychiatric History                  Physical Exam  General: Well nourished    Airway:  Mallampati: II   Mouth Opening: Normal  Neck ROM: Normal ROM    Dental:  Intact        Anesthesia Plan  Type of Anesthesia, risks & benefits discussed:    Anesthesia Type: Gen Natural Airway  Informed Consent: Informed consent signed with the Patient and all parties understand the risks and agree with anesthesia plan.  All questions answered.   ASA Score: 2    Ready For Surgery From Anesthesia Perspective.     .

## 2024-04-26 NOTE — H&P
History & Physical - Short Stay  Gastroenterology      SUBJECTIVE:     Procedure: EUS    Chief Complaint/Indication for Procedure: Screening    History of Present Illness:  Patient is a 43 y.o. female presents with Peutz-Jeghers syndrome here for pancreas cancer screening.    Current Facility-Administered Medications   Medication Dose Route Frequency Provider Last Rate Last Admin    0.9%  NaCl infusion   Intravenous Continuous Chandan Dillon MD 20 mL/hr at 04/26/24 0717 New Bag at 04/26/24 0717    sodium chloride 0.9% flush 20 mL  20 mL Intravenous Once Chandan Dillon MD         Facility-Administered Medications Ordered in Other Encounters   Medication Dose Route Frequency Provider Last Rate Last Admin    0.9%  NaCl infusion   Intravenous Continuous Chandan Diloln MD 20 mL/hr at 01/25/22 0847 New Bag at 01/25/22 0847    sodium chloride 0.9% flush 10 mL  10 mL Intravenous PRN Chandan Dillon MD           Review of patient's allergies indicates:  No Known Allergies     Past Medical History:   Diagnosis Date    Abnormal Pap smear of cervix age 25    no treatement    Anxiety     Breast cancer     Cancer 07/11/2018    breast cancer    Cervical spondylosis 2/9/2018    Colon polyp     Depression     Ectopic pregnancy 2/26/2019    General anesthetics causing adverse effect in therapeutic use     Slow to awaken/ Memory problems-post-op to day 3 after Mastectomy    Headache     History of psychiatric hospitalization     age 20 for SI    Hx of psychiatric care     Hypertension     Peutz-Jeghers syndrome     PONV (postoperative nausea and vomiting)     Psychiatric problem     Right carpal tunnel syndrome 3/9/2017    Self-harming behavior     Suicide attempt     Therapy      Past Surgical History:   Procedure Laterality Date    ADENOIDECTOMY      ANTEGRADE SINGLE BALLOON ENTEROSCOPY N/A 4/19/2021    Procedure: ENTEROSCOPY, SINGLE BALLOON, ANTEGRADE;  Surgeon: Vincenzo Herbert MD;  Location: [facility] ([facility]  FL[facility]);  Service: Endoscopy;  Laterality: N/A;  removal of large polyp in proximal jejunum; please schedule during a time when Dr. Dillon is available in case I need assistance with removal or 2nd opinion prior to removal    COVID at [place] 4/16 - ttr    APPENDECTOMY      AUGMENTATION OF BREAST      BILATERAL MASTECTOMY Bilateral 7/23/2018    Procedure: MASTECTOMY 23 hr stay;  Surgeon: Sofia Kendrick MD;  Location: [facility] 2ND FLR;  Service: General;  Laterality: Bilateral;    BILATERAL SALPINGO-OOPHORECTOMY (BSO) Bilateral 3/4/2021    Procedure: SALPINGO-OOPHORECTOMY, BILATERAL;  Surgeon: Clayton Vilchis MD;  Location: [facility];  Service: OB/GYN;  Laterality: Bilateral;    BOWEL RESECTION  10/17/14    BREAST BIOPSY Left 06/04/2018    BREAST CAPSULOTOMY Left 4/25/2019    Procedure: CAPSULOTOMY, BREAST LEFT;  Surgeon: Duane Bello MD;  Location: [facility] 2ND FLR;  Service: Plastics;  Laterality: Left;    BREAST RECONSTRUCTION      BREAST SURGERY      Bilateral Mastectomy 07/23/2018    CARPAL TUNNEL RELEASE Bilateral     CARPAL TUNNEL RELEASE      COLONOSCOPY      COLONOSCOPY N/A 2/25/2021    Procedure: COLONOSCOPY;  Surgeon: Vincenzo Herbert MD;  Location: [facility] (2ND FLR);  Service: Endoscopy;  Laterality: N/A;  previous anesthesia complications  okay for this date/time per Dr. Herbert and liliana with Miranda,  - sm  COVID test on 2/22/21 at [facility]    DILATION AND CURETTAGE OF UTERUS      DILATION AND CURETTAGE OF UTERUS USING SUCTION N/A 2/25/2019    Procedure: DILATION AND CURETTAGE, UTERUS, USING SUCTION  PATHOLOGY NEEDED;  Surgeon: Pam Sifuentes MD;  Location: [facility];  Service: OB/GYN;  Laterality: N/A;    ENDOSCOPIC ULTRASOUND OF UPPER GASTROINTESTINAL TRACT Left 1/15/2021    Procedure: ULTRASOUND, UPPER GI TRACT, ENDOSCOPIC;  Surgeon: Chandan Dillon MD;  Location: [facility];  Service: Endoscopy;  Laterality: Left;  for pancreatic screening    ESOPHAGOGASTRODUODENOSCOPY N/A 1/15/2021     Procedure: EGD (ESOPHAGOGASTRODUODENOSCOPY);  Surgeon: Chandan Dillon MD;  [facility]: [facility];  Service: Endoscopy;  Laterality: N/A;  with push enteroscopy    ESOPHAGOGASTRODUODENOSCOPY N/A 5/17/2021    Procedure: EGD (ESOPHAGOGASTRODUODENOSCOPY);  Surgeon: Chandan Dillon MD;  Location: [facility] (2ND FLR);  Service: Endoscopy;  Laterality: N/A;  Please schedule this duodenal polypectomy. Need to be a day that Dr Jose Antonio Stark is in the OR. 90 minutes.   Chandan Dillon MD     COVID at [place] 5/15 (scheduled in the OR for 5/18 - using same COVID results for both procedures) ttr    ESOPHAGOGASTRODUODENOSCOPY N/A 1/25/2022    Procedure: EGD (ESOPHAGOGASTRODUODENOSCOPY);  Surgeon: Chandan Dillon MD;  Location: [facility] (2ND FLR);  Service: Endoscopy;  Laterality: N/A;  poss emr  covid-1/22/22-[facility]  instructions sent via portal-BB    ESOPHAGOGASTRODUODENOSCOPY N/A 3/10/2023    Procedure: EGD (ESOPHAGOGASTRODUODENOSCOPY);  Surgeon: Chandan Dillon MD;  Location: [facility] (2ND FLR);  Service: Endoscopy;  Laterality: N/A;  inst via portal  called to confirm and msg that phone not accepting calls 3/6 mal    ESOPHAGOGASTRODUODENOSCOPY N/A 3/22/2024    Procedure: EGD (ESOPHAGOGASTRODUODENOSCOPY);  Surgeon: Chandan Dillon MD;  Location: [facility] (2ND FLR);  Service: Endoscopy;  Laterality: N/A;  EGD in 1 year. wayne melendrez portal-tb  3/19-precall complete-MS    FAT TRANSFER Bilateral 11/12/2018    Procedure: TRANSFER, FAT TISSUE BILATERAL BREAST;  Surgeon: Duane Bello MD;  Location: [facility] OR 2ND FLR;  Service: Plastics;  Laterality: Bilateral;    INJECTION FOR SENTINEL NODE IDENTIFICATION Left 7/23/2018    Procedure: INJECTION, FOR SENTINEL NODE IDENTIFICATION;  Surgeon: Sofia Kendrick MD;  Location: [facility] OR 2ND FLR;  Service: General;  Laterality: Left;    INSERTION OF BREAST TISSUE EXPANDER Bilateral 7/23/2018    Procedure: INSERTION, TISSUE EXPANDER, BREAST;  Surgeon: Duane MEZA  MD Ace;  Location: [facility] OR 2ND FLR;  Service: Plastics;  Laterality: Bilateral;    INSERTION OF BREAST TISSUE EXPANDER Bilateral 3/10/2020    Procedure: INSERTION, TISSUE EXPANDER, BREAST, BILATERAL;  Surgeon: Duane Bello MD;  [facility];  Service: Plastics;  Laterality: Bilateral;    intestinal polpy      LYSIS OF ADHESIONS N/A 3/4/2021    Procedure: LYSIS, ADHESIONS;  Surgeon: Clayton Vilchis MD;  Location: [facility];  Service: OB/GYN;  Laterality: N/A;  Omental adhesions    MASTECTOMY      REMOVAL OF BREAST IMPLANT Bilateral 3/10/2020    Procedure: REMOVAL, IMPLANT, BREAST, BILATERAL;  Surgeon: Duane Bello MD;  [facility];  Service: Plastics;  Laterality: Bilateral;    REMOVAL OF BREAST IMPLANT Bilateral 7/7/2020    Procedure: REMOVAL, IMPLANT, BREAST BILATERAL;  Surgeon: Duane Bello MD;  [facility];  Service: Plastics;  Laterality: Bilateral;    SENTINEL LYMPH NODE BIOPSY Left 7/23/2018    Procedure: BIOPSY, LYMPH NODE, SENTINEL;  Surgeon: Sofia Kendrick MD;  [facility];  Service: General;  Laterality: Left;    SMALL INTESTINE SURGERY      3 surgeries     TONSILLECTOMY      TOTAL ABDOMINAL HYSTERECTOMY N/A 3/4/2021    Procedure: HYSTERECTOMY, TOTAL, ABDOMINAL;  Surgeon: Clayton Vilchis MD;  Location: [facility];  Service: OB/GYN;  Laterality: N/A;    UPPER GASTROINTESTINAL ENDOSCOPY       Family History   Problem Relation Name Age of Onset    Cancer Mother Kera         colon cancer    Stomach cancer Mother Kera     Hypertension Father Jono     Colon polyps Sister Sarah         peutz-jeghers syndrome    Breast cancer Paternal Aunt Lili     No Known Problems Maternal Uncle Umer     ADD / ADHD Paternal Uncle Christopher     Depression Paternal Uncle Christopher     Dementia Maternal Grandfather Jiym     Dementia Maternal Grandmother Mignon     No Known Problems Paternal Grandfather Perez     Breast cancer Paternal  "Grandmother Jose     No Known Problems Cousin Coco     Ovarian cancer Neg Hx      Anesthesia problems Neg Hx       Social History     Tobacco Use    Smoking status: Every Day     Current packs/day: 1.00     Average packs/day: 1 pack/day for 24.0 years (24.0 ttl pk-yrs)     Types: Cigarettes     Start date: 4/17/2000    Smokeless tobacco: Never    Tobacco comments:     sometimes dry cough per patient   Substance Use Topics    Alcohol use: Yes     Alcohol/week: 5.8 standard drinks of alcohol     Types: 7 Standard drinks or equivalent per week     Comment: occasionally    Drug use: No       Review of Systems:  Constitutional: no fever or chills  Respiratory: no cough or shortness of breath  Cardiovascular: no chest pain or palpitations    OBJECTIVE:     Vital Signs (Most Recent)  Temp: 97.7 °F (36.5 °C) (04/26/24 0709)  Pulse: 100 (04/26/24 0709)  Resp: 18 (04/26/24 0709)  BP: 133/71 (04/26/24 0709)  SpO2: 96 % (04/26/24 0709)    Physical Exam:  General: well developed, well nourished  Lungs:  normal respiratory effort  Heart: regular rate, S1, S2 normal    Laboratory  CBC: No results for input(s): "WBC", "RBC", "HGB", "HCT", "PLT", "MCV", "MCH", "MCHC" in the last 168 hours.  CMP: No results for input(s): "GLU", "CALCIUM", "ALBUMIN", "PROT", "NA", "K", "CO2", "CL", "BUN", "CREATININE", "ALKPHOS", "ALT", "AST", "BILITOT" in the last 168 hours.  Coagulation: No results for input(s): "LABPROT", "INR", "APTT" in the last 168 hours.    Diagnostic Results:      ASSESSMENT/PLAN:     Pancreas cancer screening  Peutz-Jeghers syndrome    Plan: EUS    Anesthesia Plan: MAC    ASA Grade: ASA 2 - Patient with mild systemic disease with no functional limitations    The impression and plan was discussed in detail with the patient and family. All questions have been answered and the patient voices understanding of our plan at this point. The risk of the procedure was discussed in detail which includes but not limited to bleeding, " infection, perforation in some cases requiring surgery with its spectrum of complications.

## 2024-04-26 NOTE — PLAN OF CARE
Admission process complete.  Patient ready for procedure.  Plan of care reviewed with patient.  VSS.  NPO status maintained.  Call light in reach.  Bed locked and in lowest position.

## 2024-04-26 NOTE — PROVATION PATIENT INSTRUCTIONS
Discharge Summary/Instructions after an Endoscopic Procedure  Patient Name: Staci Hodge  Patient MRN: [id number]  Patient YOB: 1980 Friday, April 26, 2024  Chandan Dillon MD  Dear patient,  As a result of recent federal legislation (The Federal Cures Act), you may   receive lab or pathology results from your procedure in your [facility]   account before your physician is able to contact you. Your physician or   their representative will relay the results to you with their   recommendations at their soonest availability.  Thank you,  Your health is very important to us during the Covid Crisis. Following your   procedure today, you will receive a daily text for 2 weeks asking about   signs or symptoms of Covid 19.  Please respond to this text when you   receive it so we can follow up and keep you as safe as possible.   RESTRICTIONS:  During your procedure today, you received medications for sedation.  These   medications may affect your judgment, balance and coordination.  Therefore,   for 24 hours, you have the following restrictions:   - DO NOT drive a car, operate machinery, make legal/financial decisions,   sign important papers or drink alcohol.    ACTIVITY:  Today: no heavy lifting, straining or running due to procedural   sedation/anesthesia.  The following day: return to full activity including work.  DIET:  Eat and drink normally unless instructed otherwise.     TREATMENT FOR COMMON SIDE EFFECTS:  - Mild abdominal pain, nausea, belching, bloating or excessive gas:  rest,   eat lightly and use a heating pad.  - Sore Throat: treat with throat lozenges and/or gargle with warm salt   water.  - Because air was used during the procedure, expelling large amounts of air   from your rectum or belching is normal.  - If a bowel prep was taken, you may not have a bowel movement for 1-3 days.    This is normal.  SYMPTOMS TO WATCH FOR AND REPORT TO YOUR PHYSICIAN:  1. Abdominal pain or bloating, other than  gas cramps.  2. Chest pain.  3. Back pain.  4. Signs of infection such as: chills or fever occurring within 24 hours   after the procedure.  5. Rectal bleeding, which would show as bright red, maroon, or black stools.   (A tablespoon of blood from the rectum is not serious, especially if   hemorrhoids are present.)  6. Vomiting.  7. Weakness or dizziness.  GO DIRECTLY TO THE NEAREST EMERGENCY ROOM IF YOU HAVE ANY OF THE FOLLOWING:      Difficulty breathing              Chills and/or fever over 101 F   Persistent vomiting and/or vomiting blood   Severe abdominal pain   Severe chest pain   Black, tarry stools   Bleeding- more than one tablespoon   Any other symptom or condition that you feel may need urgent attention  Your doctor recommends these additional instructions:  If any biopsies were taken, your doctors clinic will contact you in 1 to 2   weeks with any results.  - Discharge patient to home (ambulatory).   - Resume previous diet.   - Perform magnetic resonance imaging (MRI) with gadolinium in 1 year.  For questions, problems or results please call your physician - Chandan Dillon MD.  EMERGENCY PHONE NUMBER: [phone],  LAB RESULTS: [phone]  IF A COMPLICATION OR EMERGENCY SITUATION ARISES AND YOU ARE UNABLE TO REACH   YOUR PHYSICIAN - GO DIRECTLY TO THE EMERGENCY ROOM.  Chandan Dillon MD  4/26/2024 8:35:30 AM  This report has been verified and signed electronically.  Dear patient,  As a result of recent federal legislation (The Federal Cures Act), you may   receive lab or pathology results from your procedure in your [facility]   account before your physician is able to contact you. Your physician or   their representative will relay the results to you with their   recommendations at their soonest availability.  Thank you,  PROVATION

## 2024-04-30 DIAGNOSIS — L50.9 URTICARIA, UNSPECIFIED: ICD-10-CM

## 2024-04-30 DIAGNOSIS — L50.9 HIVES: ICD-10-CM

## 2024-04-30 RX ORDER — RABEPRAZOLE SODIUM 20 MG/1
20 TABLET, DELAYED RELEASE ORAL
Qty: 30 TABLET | Refills: 5 | Status: SHIPPED | OUTPATIENT
Start: 2024-04-30 | End: 2024-05-31

## 2024-04-30 RX ORDER — MONTELUKAST SODIUM 10 MG/1
10 TABLET ORAL NIGHTLY
Qty: 30 TABLET | Refills: 11 | Status: SHIPPED | OUTPATIENT
Start: 2024-04-30

## 2024-05-13 ENCOUNTER — OFFICE VISIT (OUTPATIENT)
Dept: GASTROENTEROLOGY | Facility: CLINIC | Age: 44
End: 2024-05-13
Payer: MEDICAID

## 2024-05-13 ENCOUNTER — TELEPHONE (OUTPATIENT)
Dept: ENDOSCOPY | Facility: HOSPITAL | Age: 44
End: 2024-05-13
Payer: MEDICAID

## 2024-05-13 VITALS
WEIGHT: 180.13 LBS | HEIGHT: 62 IN | DIASTOLIC BLOOD PRESSURE: 97 MMHG | HEART RATE: 87 BPM | BODY MASS INDEX: 33.15 KG/M2 | SYSTOLIC BLOOD PRESSURE: 127 MMHG

## 2024-05-13 DIAGNOSIS — K63.5 POLYP OF COLON, UNSPECIFIED PART OF COLON, UNSPECIFIED TYPE: Primary | ICD-10-CM

## 2024-05-13 DIAGNOSIS — K59.00 CONSTIPATION, UNSPECIFIED CONSTIPATION TYPE: ICD-10-CM

## 2024-05-13 DIAGNOSIS — Q85.89 PEUTZ-JEGHERS SYNDROME: Primary | ICD-10-CM

## 2024-05-13 DIAGNOSIS — K63.89 SMALL BOWEL POLYP: ICD-10-CM

## 2024-05-13 DIAGNOSIS — Z12.11 ENCOUNTER FOR SCREENING COLONOSCOPY: ICD-10-CM

## 2024-05-13 PROCEDURE — 3008F BODY MASS INDEX DOCD: CPT | Mod: CPTII,,,

## 2024-05-13 PROCEDURE — 3080F DIAST BP >= 90 MM HG: CPT | Mod: CPTII,,,

## 2024-05-13 PROCEDURE — 1159F MED LIST DOCD IN RCRD: CPT | Mod: CPTII,,,

## 2024-05-13 PROCEDURE — 99214 OFFICE O/P EST MOD 30 MIN: CPT | Mod: PBBFAC

## 2024-05-13 PROCEDURE — 99214 OFFICE O/P EST MOD 30 MIN: CPT | Mod: S$PBB,,,

## 2024-05-13 PROCEDURE — 99999 PR PBB SHADOW E&M-EST. PATIENT-LVL IV: CPT | Mod: PBBFAC,,,

## 2024-05-13 PROCEDURE — 3074F SYST BP LT 130 MM HG: CPT | Mod: CPTII,,,

## 2024-05-13 NOTE — PROGRESS NOTES
Gastroenterology Clinic Consultation Note    Reason for Visit:  The primary encounter diagnosis was Peutz-Jeghers syndrome. Diagnoses of Small bowel polyp and Constipation, unspecified constipation type were also pertinent to this visit.    PCP:   Shilpi Clayton   [address] [facility] / [place] [address]      Initial HPI   This is a 43 y.o. female presenting for GI visit for her Peutz-Jeghers history with history of colon and small bowel polyps. Last Colonoscopy 2/2021 with multiple polypectomy. Due for repeat colonoscopy for surveillance of her colon polyps with hx of Peutz-Jeghers Syndrome. Hx of small bowel large jejunal polyp noted on Antegrade balloon enteroscopy performed 4/2021. Unable to be resected and due to the size, Dr. Stark was consulted for removal. Removal was successful on 5/17/2021. Polyp removed was noted to be a hamartomatous polyp (negative for dysplasia or malignancy).     Patient with history of SBO with multiple resections. First surgery was when she was 12 years old. Reports that a decent amount of her small intestines was removed. Her last surgery due to SBO was in 2014 with 5cm removed. No other issues with SBO reported since her last surgery 10 years prior.     Patient does report a long history of diarrhea. Would have about 2-3 episodes daily. Since starting her new medication called Lybalvi, she reports constipation with a BM only every 3 days. Not taking anything over the counter for this. Denies blood in stool, unintentional weight loss, nausea, vomiting, abdominal pain.     ROS:  Review of Systems   Constitutional:  Negative for chills, fever, malaise/fatigue and weight loss.   HENT:  Negative for sore throat.    Gastrointestinal:  Positive for constipation (now with new medication). Negative for abdominal pain, blood in stool, diarrhea, heartburn, melena, nausea and vomiting.   Neurological:  Negative  for dizziness, loss of consciousness and weakness.        Medical History:  has a past medical history of Abnormal Pap smear of cervix (age 25), Anxiety, Breast cancer, Cancer (07/11/2018), Cervical spondylosis (2/9/2018), Colon polyp, Depression, Ectopic pregnancy (2/26/2019), General anesthetics causing adverse effect in therapeutic use, Headache, History of psychiatric hospitalization, psychiatric care, Hypertension, Peutz-Jeghers syndrome, PONV (postoperative nausea and vomiting), Psychiatric problem, Right carpal tunnel syndrome (3/9/2017), Self-harming behavior, Suicide attempt, and Therapy.    Surgical History:  has a past surgical history that includes Appendectomy; Adenoidectomy; Tonsillectomy; intestinal polpy; Colonoscopy; Upper gastrointestinal endoscopy; Small intestine surgery; Bowel resection (10/17/14); Carpal tunnel release (Bilateral); Carpal tunnel release; Dilation and curettage of uterus; Breast biopsy (Left, 06/04/2018); Bilateral mastectomy (Bilateral, 7/23/2018); Injection for sentinel node identification (Left, 7/23/2018); [place] lymph node biopsy (Left, 7/23/2018); Insertion of breast tissue expander (Bilateral, 7/23/2018); Fat transfer (Bilateral, 11/12/2018); Breast surgery; Dilation and curettage of uterus using suction (N/A, 2/25/2019); Breast reconstruction; Mastectomy; Augmentation of breast; Breast capsulotomy (Left, 4/25/2019); Removal of breast implant (Bilateral, 3/10/2020); Insertion of breast tissue expander (Bilateral, 3/10/2020); Removal of breast implant (Bilateral, 7/7/2020); Esophagogastroduodenoscopy (N/A, 1/15/2021); Endoscopic ultrasound of upper gastrointestinal tract (Left, 1/15/2021); Colonoscopy (N/A, 2/25/2021); Total abdominal hysterectomy (N/A, 3/4/2021); Bilateral salpingo-oophorectomy (BSO) (Bilateral, 3/4/2021); Lysis of adhesions (N/A, 3/4/2021); Antegrade single balloon enteroscopy (N/A, 4/19/2021); Esophagogastroduodenoscopy (N/A, 5/17/2021);  Esophagogastroduodenoscopy (N/A, 1/25/2022); Esophagogastroduodenoscopy (N/A, 3/10/2023); Esophagogastroduodenoscopy (N/A, 3/22/2024); and Endoscopic ultrasound of upper gastrointestinal tract (N/A, 4/26/2024).    Family History: family history includes ADD / ADHD in her paternal uncle; Breast cancer in her paternal aunt and paternal grandmother; Cancer in her mother; Colon polyps in her sister; Dementia in her maternal grandfather and maternal grandmother; Depression in her paternal uncle; Hypertension in her father; No Known Problems in her cousin, maternal uncle, and paternal grandfather; Stomach cancer in her mother..       Review of patient's allergies indicates:  No Known Allergies    Current Outpatient Medications on File Prior to Visit   Medication Sig Dispense Refill    atogepant (QULIPTA) 60 mg Tab Take one tablet daily for migraine prevention. 30 tablet 11    butalbital-acetaminophen-caffeine -40 mg (FIORICET, ESGIC) -40 mg per tablet TAKE ONE TABLET BY MOUTH EVERY 6 HOURS AS NEEDED FOR HEADACHE 30 tablet 5    clonazePAM (KLONOPIN) 1 MG tablet Take 1 mg by mouth 2 (two) times daily.      hydrOXYzine pamoate (VISTARIL) 50 MG Cap Take 1 capsule (50 mg total) by mouth every 6 (six) hours as needed (anxiety and/or insomnia). 90 capsule 0    lisdexamfetamine (VYVANSE) 60 MG capsule Take 60 mg by mouth every morning.      lithium (ESKALITH) 450 MG TbSR Take 2 tablets (900 mg total) by mouth every evening. 60 tablet 1    montelukast (SINGULAIR) 10 mg tablet TAKE ONE TABLET BY MOUTH ONCE A DAY IN THE EVENING 30 tablet 11    OLANZapine-samidorphan (LYBALVI) 5-10 mg Tab Take 1 tablet by mouth nightly.      propranoloL (INDERAL) 20 MG tablet Take 1 tablet (20 mg total) by mouth 2 (two) times daily. 60 tablet 1    RABEprazole (ACIPHEX) 20 mg tablet TAKE ONE TABLET BY MOUTH ONCE A DAY 30 tablet 5    traZODone (DESYREL) 150 MG tablet Take 1 tablet (150 mg total) by mouth every evening. 30 tablet 1     "VITAMIN D2 1,250 mcg (50,000 unit) capsule TAKE ONE CAPSULE BY MOUTH EVERY 7 DAYS 12 capsule 1    [DISCONTINUED] albuterol (ACCUNEB) 1.25 mg/3 mL Nebu Take 3 mLs (1.25 mg total) by nebulization every 6 (six) hours as needed (wheezing). Rescue 75 mL 0    [DISCONTINUED] DULoxetine (CYMBALTA) 30 MG capsule Take 1 capsule (30 mg total) by mouth 2 (two) times daily. 60 capsule 0    [DISCONTINUED] gabapentin (NEURONTIN) 600 MG tablet Take 1 tablet (600 mg total) by mouth every evening. 30 tablet 11    [DISCONTINUED] paroxetine (PAXIL) 20 MG tablet TAKE ONE TABLET BY MOUTH ONCE A DAY IN THE MORNING 30 tablet 5     Current Facility-Administered Medications on File Prior to Visit   Medication Dose Route Frequency Provider Last Rate Last Admin    0.9%  NaCl infusion   Intravenous Continuous Chandan Dillon MD 20 mL/hr at 01/25/22 0847 New Bag at 01/25/22 0847    sodium chloride 0.9% flush 10 mL  10 mL Intravenous PRN Chandan Dillon MD             Objective Findings:    Vital Signs:  BP (!) 127/97   Pulse 87   Ht 5' 2" (1.575 m)   Wt 81.7 kg (180 lb 1.9 oz)   LMP 03/01/2021   BMI 32.94 kg/m²   Body mass index is 32.94 kg/m².    Physical Exam:  Physical Exam  Vitals reviewed.   Constitutional:       Appearance: She is normal weight. She is not ill-appearing.   HENT:      Mouth/Throat:      Mouth: Mucous membranes are moist.      Pharynx: Oropharynx is clear.   Eyes:      General: No scleral icterus.  Abdominal:      General: Bowel sounds are normal. There is no distension.      Palpations: Abdomen is soft. There is no mass.   Skin:     General: Skin is warm and dry.      Capillary Refill: Capillary refill takes less than 2 seconds.      Coloration: Skin is not jaundiced or pale.   Neurological:      Mental Status: She is alert and oriented to person, place, and time. Mental status is at baseline.             Labs:  Lab Results   Component Value Date    WBC 14.20 (H) 02/23/2024    HGB 14.2 02/23/2024    HCT 43.1 " 02/23/2024     02/23/2024    CHOL 227 (H) 10/09/2023    TRIG 187 (H) 10/09/2023    HDL 56 10/09/2023    ALKPHOS 183 (H) 02/23/2024    LIPASE 272 (H) 11/12/2023    ALT 44 02/23/2024    AST 21 02/23/2024     02/23/2024    K 3.7 02/23/2024     02/23/2024    CREATININE 0.9 02/23/2024    BUN 5 (L) 02/23/2024    CO2 23 02/23/2024    TSH 1.373 02/23/2024    INR 1.0 08/31/2022    HGBA1C 5.3 10/09/2023       Imaging reviewed: No pertinent imaging reviewed      Endoscopy reviewed: EUS 4/26/2024  Impression:            - There was abnormal echogenicity in the                          visualized portion of the liver. This was                          hyperechoic and homogenous.                          - There was no sign of significant pathology in                          the entire pancreas.                          - No specimens collected.                          - There was no sign of significant pathology in                          the entire pancreas.   Recommendation:        - Discharge patient to home (ambulatory).                          - Resume previous diet.                          - Perform magnetic resonance imaging (MRI) with                          gadolinium in 1 year.   Attending Participation:        I personally performed the entire procedure.   Chandan Dillon MD   4/26/2024 8:35:30 AM     EGD 3/22/2024  Impression:            - Normal oropharynx.                          - Esophagogastric landmarks identified.                          - Multiple gastric polyps. Biopsied.                          - Duodenal polyposis.                          - A single duodenal polyp. Resected and retrieved.   Recommendation:        - Discharge patient to home (ambulatory).                          - Resume previous diet.                          - Await pathology results.                          - Repeat upper endoscopy in 1 year for                          surveillance based on pathology  results.                          - Return to endoscopist at appointment to be                          scheduled.   Attending Participation:        I personally performed the entire procedure.   Chandan Dillon MD   3/22/2024 8:45:02 AM     EGD 5/17/2021--Dr. Jose Antonio Stark MD  Impression:            - Normal oropharynx.                          - Normal esophagus.                          - Multiple gastric polyps.                          - Duodenal polyposis.                          - Two duodenal polyps. Resected and retrieved.                          - A single greater than 50 mm pedunculated polyp                          with no bleeding was found in the third portion of                          the duodenum. The polyp was lobulated. There was                          significant small bowel contraction in the area                          despite the use of glucagon. Preparations were                          made for mucosal resection. Cap and snare mucosal                          resection was performed in piecemeal. Resection                          was complete, and retrieval was complete. Multiple                          areas with mucosal defect were identified                          (probable related to inadvertently capture of                          mucosa due to contractions). The defect were                          inspected and appear to be mucosal. No obvious                          perforation identified. To close a defect and                          decreased the risk of bleeding after mucosal                          resection, nine hemostatic clips were successfully                          placed. There was no bleeding at the end of the                          procedure.   Recommendation:        - Discharge patient to home (ambulatory).                          - Await pathology results.                          - Repeat upper endoscopy in 6 months for                           surveillance.                          - No aspirin, ibuprofen, naproxen, or other                          non-steroidal anti-inflammatory drugs for 2 weeks                          after polyp removal.                          - Full liquid diet for 1 day, then advance as                          tolerated to advance diet as tolerated.   Attending Participation:        I personally performed the entire procedure.   Chandan Dillon MD   5/17/2021 11:57:16 AM     Antegrade Balloon Enteroscopy 4/19/2021  Impression:            - Normal esophagus.                          - Scar in the gastric body.                          - A few gastric polyps.                          - A few duodenal polyps.                          - One duodenal polyp. Appears to be pedunculated,                          but with a very large/broad stalk. Unable to                          endoscopically resect due to size, and inability                          to fully appreciate the stalk size and mucosal                          attachment. This polyp is at high risk for causing                          obstruction or intussusception.                          - One duodenal polyp. Resected and retrieved.                          - A few duodenal polyps.                          - Jejunal polyps.   Recommendation:        - Discharge patient to home.                          - Patient has a contact number available for                          emergencies. The signs and symptoms of potential                          delayed complications were discussed with the                          patient. Return to normal activities tomorrow.                          Written discharge instructions were provided to                          the patient.                          - Resume previous diet.                          - Continue present medications.                          - Refer to a surgeon (Dr. Jose Antonio Stark with                           surgical oncology) at appointment to be scheduled.   Attending Participation:        I personally performed the entire procedure.   Vincenzo Herbert MD   4/19/2021 10:14:08 AM     Colonoscopy 2/25/2021  Impression:           - The examined portion of the ileum was normal.                         - One 13 mm polyp in the transverse colon, removed                         with a hot snare. Resected and retrieved.                         - Two 9 to 18 mm polyps in the proximal descending                         colon, removed with a hot snare. Resected and                         retrieved.                         - One 5 mm polyp in the mid descending colon,                         removed with a hot snare. Resected and retrieved.                         - One 4 mm polyp in the sigmoid colon, removed with                         a cold snare. Resected and retrieved.                         - The examination was otherwise normal on direct                         and retroflexion views.   Recommendation:       - Discharge patient to home (ambulatory).                         - Patient has a contact number available for                         emergencies. The signs and symptoms of potential                         delayed complications were discussed with the                         patient. Return to normal activities tomorrow.                         Written discharge instructions were provided to the                         patient.                         - Resume previous diet.                         - Continue present medications.                         - Await pathology results.                         - Repeat colonoscopy in 3 years for surveillance                         based on pathology results.   Attending Participation:        I was present and participated during the entire procedure from        insertion to removal of the endoscope.   Vincenzo Herbert MD   2/25/2021 2:12:45 PM     Assessment:  1.  Peutz-Jeghers syndrome    2. Small bowel polyp    3. Constipation, unspecified constipation type             Plan:     Patient with history of Peutz-Jeghers syndrome with mltiple polyps removed. Due for her surveillance colonoscopy. Referral placed for Colonoscopy. Requesting Dr. Herbert as that is who has been previously doing her colonoscopy's.   Hx of small bowel polyp with resection completed by Surg Onc, most recently in 2021. Considering video capsule endoscopy. Will discuss with Dr. Herbert prior to arranging due to surgical history and hx of SBO.   Recommended fiber and miralax to improve her bowel movements.   RTC pending above      Thank you for allowing me to participate in this patient's care.    Sincerely,     Jing Schwartz NP  Gastroenterology Department  [facility]

## 2024-05-13 NOTE — PROGRESS NOTES
"GENERAL GI PATIENT INTAKE:    COVID symptoms in the last 7 days (runny nose, sore throat, congestion, cough, fever): No  PCP: Shilpi Clayton  If not PCP-  number given to establish [phone]: N/A    ALLERGIES REVIEWED:  Yes    CHIEF COMPLAINT:    Chief Complaint   Patient presents with    Diarrhea     Discuss colonoscopy/VCE       VITAL SIGNS:  BP (!) 127/97   Pulse 87   Ht 5' 2" (1.575 m)   Wt 81.7 kg (180 lb 1.9 oz)   LMP 03/01/2021   BMI 32.94 kg/m²      Change in medical, surgical, family or social history: No      REVIEWED MEDICATION LIST RECONCILED INCLUDING ABOVE MEDS:  Yes     "

## 2024-05-13 NOTE — TELEPHONE ENCOUNTER
"----- Message from Jing Schwartz NP sent at 5/13/2024 10:47 AM CDT -----  Regarding: Colonoscopy  Procedure: Colonoscopy    Diagnosis: Surveillance (Colon polyps), Peutz-Jaegar syndrome    Procedure Timing: Within 1-8 weeks     #If within 4 weeks selected, please cleo as high priority#    #If greater than 12 weeks, please select "5-12 weeks" and delay sending until 3 months prior to requested date#     [facility] [facility] (Dr. Herbert preferably)    Additional Scheduling Information: No scheduling concerns    Prep Specifications:Standard prep    Is the patient taking a GLP-1 Agonist:no    Have you attached a patient to this message: yes  "

## 2024-05-13 NOTE — PATIENT INSTRUCTIONS
Start daily fiber. Take 1 tsp of fiber powder (psyllium or other sugar-free powder).  Mix in 8 oz of water.  Take x 3-5 days.  Then, increase fiber by 1 tsp every 3-5 days until stool is easy to pass.  Stop and continue at that dose.   Do not exceed 6 tsps/day. GOAL:  More well-formed stool (one continuous well-formed piece vs. Pellets) and minimize straining with initiation. Can cause increased gas.     Start miralax daily (17g per dose). Start at once per day and can titrate up to twice daily. Decrease and/or stop Miralax if stools become softer/liquid in consistency.

## 2024-05-13 NOTE — TELEPHONE ENCOUNTER
Spoke to pt to schedule procedure(s) Colonoscopy       Physician to perform procedure(s) Dr. ANGELICA Herbert  Date of Procedure (s) 7/8/24  Arrival Time 11:30 AM  Time of Procedure(s) 12:30 PM   Location of Procedure(s) [facility] [address] [facility]  Type of Rx Prep sent to patient: Miralax  Instructions provided to patient via [facility]    Patient was informed on the following information and verbalized understanding. Screening questionnaire reviewed with patient and complete. If procedure requires anesthesia, a responsible adult needs to be present to accompany the patient home, patient cannot drive after receiving anesthesia. Appointment details are tentative, especially check-in time. Patient will receive a prep-op call 7 days prior to confirm check-in time for procedure. If applicable the patient should contact their pharmacy to verify Rx for procedure prep is ready for pick-up. Patient was advised to call the scheduling department at [phone] if pharmacy states no Rx is available. Patient was advised to call the endoscopy scheduling department if any questions or concerns arise.      [facility] Scheduling Department

## 2024-05-14 ENCOUNTER — PATIENT MESSAGE (OUTPATIENT)
Dept: NEUROLOGY | Facility: CLINIC | Age: 44
End: 2024-05-14
Payer: MEDICAID

## 2024-05-14 DIAGNOSIS — G43.019 INTRACTABLE MIGRAINE WITHOUT AURA AND WITHOUT STATUS MIGRAINOSUS: Primary | ICD-10-CM

## 2024-05-14 DIAGNOSIS — G43.009 MIGRAINE WITHOUT AURA AND WITHOUT STATUS MIGRAINOSUS, NOT INTRACTABLE: ICD-10-CM

## 2024-05-14 DIAGNOSIS — G43.E19 INTRACTABLE CHRONIC MIGRAINE WITH AURA AND WITHOUT STATUS MIGRAINOSUS: ICD-10-CM

## 2024-05-14 RX ORDER — BUTALBITAL, ACETAMINOPHEN AND CAFFEINE 50; 325; 40 MG/1; MG/1; MG/1
1 TABLET ORAL EVERY 6 HOURS PRN
Qty: 30 TABLET | Refills: 5 | Status: SHIPPED | OUTPATIENT
Start: 2024-05-14

## 2024-05-14 RX ORDER — GALCANEZUMAB 120 MG/ML
INJECTION, SOLUTION SUBCUTANEOUS
Qty: 1 ML | Refills: 11 | Status: SHIPPED | OUTPATIENT
Start: 2024-05-14

## 2024-05-14 RX ORDER — GALCANEZUMAB 120 MG/ML
INJECTION, SOLUTION SUBCUTANEOUS
Qty: 2 ML | Refills: 0 | Status: SHIPPED | OUTPATIENT
Start: 2024-05-14

## 2024-05-16 ENCOUNTER — PATIENT MESSAGE (OUTPATIENT)
Dept: ENDOSCOPY | Facility: HOSPITAL | Age: 44
End: 2024-05-16
Payer: MEDICAID

## 2024-05-17 ENCOUNTER — TELEPHONE (OUTPATIENT)
Dept: GASTROENTEROLOGY | Facility: CLINIC | Age: 44
End: 2024-05-17
Payer: MEDICAID

## 2024-05-17 ENCOUNTER — TELEPHONE (OUTPATIENT)
Dept: ENDOSCOPY | Facility: HOSPITAL | Age: 44
End: 2024-05-17
Payer: MEDICAID

## 2024-05-17 NOTE — TELEPHONE ENCOUNTER
----- Message from Daphne Finn sent at 5/16/2024  8:42 AM CDT -----  Regarding: FW: patency capsule    ----- Message -----  From: Jing Schwartz NP  Sent: 5/15/2024  12:55 PM CDT  To: [facility]  Subject: patency capsule                                  Please arrange for patient to complete the patency capsule. I have the capsule and the information in my office.     [place]man

## 2024-05-18 DIAGNOSIS — E55.9 VITAMIN D DEFICIENCY: ICD-10-CM

## 2024-05-20 RX ORDER — ERGOCALCIFEROL 1.25 MG/1
50000 CAPSULE ORAL
Qty: 12 CAPSULE | Refills: 1 | Status: SHIPPED | OUTPATIENT
Start: 2024-05-20

## 2024-05-31 ENCOUNTER — TELEPHONE (OUTPATIENT)
Dept: INTERNAL MEDICINE | Facility: CLINIC | Age: 44
End: 2024-05-31
Payer: MEDICAID

## 2024-05-31 DIAGNOSIS — K21.9 GASTROESOPHAGEAL REFLUX DISEASE WITHOUT ESOPHAGITIS: Primary | ICD-10-CM

## 2024-05-31 RX ORDER — PANTOPRAZOLE SODIUM 40 MG/1
40 TABLET, DELAYED RELEASE ORAL DAILY
Qty: 90 TABLET | Refills: 3 | Status: SHIPPED | OUTPATIENT
Start: 2024-05-31 | End: 2025-05-31

## 2024-06-03 ENCOUNTER — OFFICE VISIT (OUTPATIENT)
Dept: INTERNAL MEDICINE | Facility: CLINIC | Age: 44
End: 2024-06-03
Payer: MEDICAID

## 2024-06-03 VITALS
HEIGHT: 62 IN | OXYGEN SATURATION: 98 % | HEART RATE: 122 BPM | WEIGHT: 180.75 LBS | BODY MASS INDEX: 33.26 KG/M2 | RESPIRATION RATE: 18 BRPM

## 2024-06-03 DIAGNOSIS — R11.0 NAUSEA: Primary | ICD-10-CM

## 2024-06-03 DIAGNOSIS — M46.92 UNSPECIFIED INFLAMMATORY SPONDYLOPATHY, CERVICAL REGION: ICD-10-CM

## 2024-06-03 PROCEDURE — 99999 PR PBB SHADOW E&M-EST. PATIENT-LVL IV: CPT | Mod: PBBFAC,,, | Performed by: NURSE PRACTITIONER

## 2024-06-03 PROCEDURE — 3008F BODY MASS INDEX DOCD: CPT | Mod: CPTII,,, | Performed by: NURSE PRACTITIONER

## 2024-06-03 PROCEDURE — 99214 OFFICE O/P EST MOD 30 MIN: CPT | Mod: S$PBB,,, | Performed by: NURSE PRACTITIONER

## 2024-06-03 PROCEDURE — 99214 OFFICE O/P EST MOD 30 MIN: CPT | Mod: PBBFAC,PN | Performed by: NURSE PRACTITIONER

## 2024-06-10 NOTE — PROGRESS NOTES
Subjective:       Patient ID: Staci Hodge is a 43 y.o. female.    Chief Complaint: Nausea (With abdominal pain- x few weeks happening daily PS:0)    Patient is known, to me and presents with   Chief Complaint   Patient presents with    Nausea     With abdominal pain- x few weeks happening daily PS:0   .  Denies chest pain and shortness of breath.  Patient presents with nausea but thinks it is medication related. Her psy doctor placed her on new meds but will discuss getting off due to her nausea. She is doing ok today. Her mood is stable with no  sihi noted  HPI  Review of Systems   Constitutional:  Negative for activity change, appetite change, fatigue, fever and unexpected weight change.   HENT:  Negative for congestion, ear discharge, ear pain, hearing loss, postnasal drip, rhinorrhea, tinnitus and trouble swallowing.    Eyes:  Negative for photophobia, pain, discharge and visual disturbance.   Respiratory:  Negative for cough, chest tightness, shortness of breath, wheezing and stridor.    Cardiovascular:  Negative for chest pain, palpitations and leg swelling.   Gastrointestinal:  Positive for diarrhea and vomiting. Negative for abdominal distention, blood in stool and constipation.   Endocrine: Negative for polydipsia and polyuria.   Genitourinary:  Negative for difficulty urinating, dysuria, frequency, hematuria, menstrual problem and urgency.   Musculoskeletal:  Positive for neck pain. Negative for arthralgias, back pain, gait problem and joint swelling.   Skin: Negative.    Neurological:  Positive for headaches. Negative for dizziness, seizures, syncope, weakness, light-headedness and numbness.   Hematological:  Negative for adenopathy. Does not bruise/bleed easily.   Psychiatric/Behavioral:  Positive for dysphoric mood. Negative for behavioral problems, confusion, hallucinations, sleep disturbance and suicidal ideas. The patient is not nervous/anxious.        Objective:      Physical  Exam  Constitutional:       General: She is not in acute distress.     Appearance: She is well-developed.   HENT:      Head: Normocephalic and atraumatic.      Right Ear: Tympanic membrane and external ear normal.      Left Ear: Tympanic membrane and external ear normal.      Nose: Nose normal.      Mouth/Throat:      Mouth: Mucous membranes are moist.      Pharynx: No oropharyngeal exudate.   Eyes:      General: No scleral icterus.        Right eye: No discharge.         Left eye: No discharge.      Conjunctiva/sclera: Conjunctivae normal.      Pupils: Pupils are equal, round, and reactive to light.   Neck:      Thyroid: No thyromegaly.      Vascular: No JVD.   Cardiovascular:      Rate and Rhythm: Normal rate and regular rhythm.      Heart sounds: Normal heart sounds. No murmur heard.     No friction rub. No gallop.   Pulmonary:      Effort: Pulmonary effort is normal. No respiratory distress.      Breath sounds: Normal breath sounds. No stridor. No wheezing or rales.   Chest:      Chest wall: No tenderness.   Abdominal:      General: Bowel sounds are normal. There is no distension.      Palpations: Abdomen is soft. There is no mass.      Tenderness: There is no abdominal tenderness. There is no right CVA tenderness, left CVA tenderness, guarding or rebound.      Hernia: No hernia is present.   Musculoskeletal:         General: No swelling, tenderness, deformity or signs of injury. Normal range of motion.      Cervical back: Normal range of motion and neck supple.      Right lower leg: No edema.      Left lower leg: No edema.   Lymphadenopathy:      Cervical: No cervical adenopathy.   Skin:     General: Skin is warm and dry.      Capillary Refill: Capillary refill takes less than 2 seconds.      Coloration: Skin is not jaundiced or pale.      Findings: No bruising, erythema, lesion or rash.   Neurological:      General: No focal deficit present.      Mental Status: She is alert and oriented to person, place, and  "time.      Cranial Nerves: No cranial nerve deficit.      Sensory: No sensory deficit.      Motor: No weakness or abnormal muscle tone.      Coordination: Coordination normal.      Gait: Gait normal.      Deep Tendon Reflexes: Reflexes are normal and symmetric. Reflexes normal.   Psychiatric:         Attention and Perception: She does not perceive auditory or visual hallucinations.         Mood and Affect: Mood is depressed. Mood is not anxious. Affect is not tearful.         Behavior: Behavior normal.         Thought Content: Thought content normal. Thought content does not include homicidal or suicidal ideation. Thought content does not include homicidal or suicidal plan.         Judgment: Judgment normal.         Assessment:       1. Nausea    2. Unspecified inflammatory spondylopathy, cervical region        Plan:   1. Nausea    2. Unspecified inflammatory spondylopathy, cervical region  Assessment & Plan:  stable      "This note will not be shared with the patient."   She will contact her psy doc  Rtc as scheduled  "

## 2024-06-27 ENCOUNTER — TELEPHONE (OUTPATIENT)
Dept: ENDOSCOPY | Facility: HOSPITAL | Age: 44
End: 2024-06-27
Payer: MEDICAID

## 2024-06-27 NOTE — TELEPHONE ENCOUNTER
Confirmed  colonoscopy appt for 7/8/24 with pt. Confirmed receipt of prep and instructions. Reviewed instructions pt denies taking blood thinning or glp1 medications.Confirmed ride home after procedure.Pt verbalized understanding

## 2024-07-08 ENCOUNTER — ANESTHESIA EVENT (OUTPATIENT)
Dept: ENDOSCOPY | Facility: HOSPITAL | Age: 44
End: 2024-07-08
Payer: MEDICAID

## 2024-07-08 ENCOUNTER — ANESTHESIA (OUTPATIENT)
Dept: ENDOSCOPY | Facility: HOSPITAL | Age: 44
End: 2024-07-08
Payer: MEDICAID

## 2024-07-08 ENCOUNTER — HOSPITAL ENCOUNTER (OUTPATIENT)
Facility: HOSPITAL | Age: 44
Discharge: HOME | End: 2024-07-08
Attending: INTERNAL MEDICINE | Admitting: INTERNAL MEDICINE
Payer: MEDICAID

## 2024-07-08 VITALS
TEMPERATURE: 98 F | OXYGEN SATURATION: 94 % | WEIGHT: 180 LBS | HEIGHT: 62 IN | DIASTOLIC BLOOD PRESSURE: 72 MMHG | HEART RATE: 98 BPM | RESPIRATION RATE: 16 BRPM | SYSTOLIC BLOOD PRESSURE: 132 MMHG | BODY MASS INDEX: 33.13 KG/M2

## 2024-07-08 DIAGNOSIS — Q85.89 PEUTZ-JEGHERS SYNDROME: Primary | ICD-10-CM

## 2024-07-08 PROCEDURE — 25000003 PHARM REV CODE 250: Performed by: NURSE ANESTHETIST, CERTIFIED REGISTERED

## 2024-07-08 PROCEDURE — 37000008 HC ANESTHESIA 1ST 15 MINUTES: Performed by: INTERNAL MEDICINE

## 2024-07-08 PROCEDURE — 88305 TISSUE EXAM BY PATHOLOGIST: CPT | Mod: 26,,, | Performed by: STUDENT IN AN ORGANIZED HEALTH CARE EDUCATION/TRAINING PROGRAM

## 2024-07-08 PROCEDURE — 37000009 HC ANESTHESIA EA ADD 15 MINS: Performed by: INTERNAL MEDICINE

## 2024-07-08 PROCEDURE — 63600175 PHARM REV CODE 636 W HCPCS: Performed by: NURSE ANESTHETIST, CERTIFIED REGISTERED

## 2024-07-08 PROCEDURE — 88305 TISSUE EXAM BY PATHOLOGIST: CPT | Mod: 59 | Performed by: STUDENT IN AN ORGANIZED HEALTH CARE EDUCATION/TRAINING PROGRAM

## 2024-07-08 PROCEDURE — 45380 COLONOSCOPY AND BIOPSY: CPT | Mod: PT | Performed by: INTERNAL MEDICINE

## 2024-07-08 PROCEDURE — 45380 COLONOSCOPY AND BIOPSY: CPT | Mod: 33,,, | Performed by: INTERNAL MEDICINE

## 2024-07-08 PROCEDURE — 27201012 HC FORCEPS, HOT/COLD, DISP: Performed by: INTERNAL MEDICINE

## 2024-07-08 RX ORDER — PROPOFOL 10 MG/ML
VIAL (ML) INTRAVENOUS CONTINUOUS PRN
Status: DISCONTINUED | OUTPATIENT
Start: 2024-07-08 | End: 2024-07-08

## 2024-07-08 RX ORDER — PROPOFOL 10 MG/ML
VIAL (ML) INTRAVENOUS
Status: DISCONTINUED | OUTPATIENT
Start: 2024-07-08 | End: 2024-07-08

## 2024-07-08 RX ORDER — SODIUM CHLORIDE 9 MG/ML
INJECTION, SOLUTION INTRAVENOUS CONTINUOUS
Status: DISCONTINUED | OUTPATIENT
Start: 2024-07-08 | End: 2024-07-08 | Stop reason: HOSPADM

## 2024-07-08 RX ORDER — FENTANYL CITRATE 50 UG/ML
25 INJECTION, SOLUTION INTRAMUSCULAR; INTRAVENOUS EVERY 5 MIN PRN
Status: DISCONTINUED | OUTPATIENT
Start: 2024-07-08 | End: 2024-07-08 | Stop reason: HOSPADM

## 2024-07-08 RX ORDER — LIDOCAINE HYDROCHLORIDE 20 MG/ML
INJECTION INTRAVENOUS
Status: DISCONTINUED | OUTPATIENT
Start: 2024-07-08 | End: 2024-07-08

## 2024-07-08 RX ORDER — SODIUM CHLORIDE 0.9 % (FLUSH) 0.9 %
3 SYRINGE (ML) INJECTION
Status: DISCONTINUED | OUTPATIENT
Start: 2024-07-08 | End: 2024-07-08 | Stop reason: HOSPADM

## 2024-07-08 RX ADMIN — PROPOFOL 70 MG: 10 INJECTION, EMULSION INTRAVENOUS at 01:07

## 2024-07-08 RX ADMIN — LIDOCAINE HYDROCHLORIDE 30 MG: 20 INJECTION INTRAVENOUS at 01:07

## 2024-07-08 RX ADMIN — SODIUM CHLORIDE: 9 INJECTION, SOLUTION INTRAVENOUS at 12:07

## 2024-07-08 RX ADMIN — PROPOFOL 150 MCG/KG/MIN: 10 INJECTION, EMULSION INTRAVENOUS at 01:07

## 2024-07-08 NOTE — ANESTHESIA POSTPROCEDURE EVALUATION
Anesthesia Post Evaluation    Patient: Staci Hodge    Procedure(s) Performed: Procedure(s) (LRB):  COLONOSCOPY (N/A)    Final Anesthesia Type: general      Patient location during evaluation: PACU  Patient participation: Yes- Able to Participate  Level of consciousness: awake  Post-procedure vital signs: reviewed and stable  Pain management: adequate  Airway patency: patent    PONV status at discharge: No PONV  Anesthetic complications: no      Cardiovascular status: hemodynamically stable  Respiratory status: unassisted and spontaneous ventilation  Hydration status: euvolemic  Follow-up not needed.          Vitals Value Taken Time   /58 07/08/24 1346   Temp 36.3 °C (97.3 °F) 07/08/24 1341   Pulse 92 07/08/24 1351   Resp 16 07/08/24 1351   SpO2 96 % 07/08/24 1351   Vitals shown include unfiled device data.      No case tracking events are documented in the log.      Pain/Bran Score: Bran Score: 9 (7/8/2024  1:41 PM)

## 2024-07-08 NOTE — PROVATION PATIENT INSTRUCTIONS
Discharge Summary/Instructions after an Endoscopic Procedure  Patient Name: Staci Hodge  Patient MRN: [id number]  Patient YOB: 1980 Monday, July 8, 2024  Vincenzo Herbert MD  Dear patient,  As a result of recent federal legislation (The Federal Cures Act), you may   receive lab or pathology results from your procedure in your [facility]   account before your physician is able to contact you. Your physician or   their representative will relay the results to you with their   recommendations at their soonest availability.  Thank you,  RESTRICTIONS:  During your procedure today, you received medications for sedation.  These   medications may affect your judgment, balance and coordination.  Therefore,   for 24 hours, you have the following restrictions:   - DO NOT drive a car, operate machinery, make legal/financial decisions,   sign important papers or drink alcohol.    ACTIVITY:  Today: no heavy lifting, straining or running due to procedural   sedation/anesthesia.  The following day: return to full activity including work.  DIET:  Eat and drink normally unless instructed otherwise.     TREATMENT FOR COMMON SIDE EFFECTS:  - Mild abdominal pain, nausea, belching, bloating or excessive gas:  rest,   eat lightly and use a heating pad.  - Sore Throat: treat with throat lozenges and/or gargle with warm salt   water.  - Because air was used during the procedure, expelling large amounts of air   from your rectum or belching is normal.  - If a bowel prep was taken, you may not have a bowel movement for 1-3 days.    This is normal.  SYMPTOMS TO WATCH FOR AND REPORT TO YOUR PHYSICIAN:  1. Abdominal pain or bloating, other than gas cramps.  2. Chest pain.  3. Back pain.  4. Signs of infection such as: chills or fever occurring within 24 hours   after the procedure.  5. Rectal bleeding, which would show as bright red, maroon, or black stools.   (A tablespoon of blood from the rectum is not serious, especially if    hemorrhoids are present.)  6. Vomiting.  7. Weakness or dizziness.  GO DIRECTLY TO THE NEAREST EMERGENCY ROOM IF YOU HAVE ANY OF THE FOLLOWING:      Difficulty breathing              Chills and/or fever over 101 F   Persistent vomiting and/or vomiting blood   Severe abdominal pain   Severe chest pain   Black, tarry stools   Bleeding- more than one tablespoon   Any other symptom or condition that you feel may need urgent attention  Your doctor recommends these additional instructions:  If any biopsies were taken, your doctors clinic will contact you in 1 to 2   weeks with any results.  - Discharge patient to home.   - Patient has a contact number available for emergencies.  The signs and   symptoms of potential delayed complications were discussed with the   patient.  Return to normal activities tomorrow.  Written discharge   instructions were provided to the patient.   - Resume previous diet.   - Continue present medications.   - Await pathology results.   - Repeat colonoscopy in 3 years for surveillance.  For questions, problems or results please call your physician - Vincenzo Herbert MD at Work:  [phone].  [facility] [place], EMERGENCY ROOM PHONE NUMBER: [phone]  IF A COMPLICATION OR EMERGENCY SITUATION ARISES AND YOU ARE UNABLE TO REACH   YOUR PHYSICIAN - GO DIRECTLY TO THE EMERGENCY ROOM.  Vincenzo Herbert MD  7/8/2024 1:53:11 PM  This report has been verified and signed electronically.  Dear patient,  As a result of recent federal legislation (The Federal Cures Act), you may   receive lab or pathology results from your procedure in your [facility]   account before your physician is able to contact you. Your physician or   their representative will relay the results to you with their   recommendations at their soonest availability.  Thank you,  PROVATION

## 2024-07-08 NOTE — TRANSFER OF CARE
"Anesthesia Transfer of Care Note    Patient: Staci Hodge    Procedure(s) Performed: Procedure(s) (LRB):  COLONOSCOPY (N/A)    Patient location: PACU    Anesthesia Type: general    Transport from OR: Transported from OR on room air with adequate spontaneous ventilation    Post pain: adequate analgesia    Post assessment: no apparent anesthetic complications and tolerated procedure well    Post vital signs: stable    Level of consciousness: awake, alert and oriented    Nausea/Vomiting: no nausea/vomiting    Complications: none    Transfer of care protocol was followed      Last vitals: Visit Vitals  /59 (Patient Position: Lying)   Pulse (!) 115   Temp 36.3 °C (97.3 °F) (Temporal)   Resp 16   Ht 5' 2" (1.575 m)   Wt 81.6 kg (180 lb)   LMP 03/01/2021   SpO2 96%   Breastfeeding No   BMI 32.92 kg/m²     "

## 2024-07-08 NOTE — ANESTHESIA PREPROCEDURE EVALUATION
07/08/2024  Staci Hodge is a 43 y.o., female.    Pre-operative evaluation for Procedure(s) (LRB):  COLONOSCOPY (N/A)    Staci Hodge is a 43 y.o. female     Patient Active Problem List   Diagnosis    History of abnormal Pap smear    LLQ pain    Peutz-Jeghers syndrome    Weight loss    Leukocytosis    Tobacco abuse    Insomnia    Mood disorder    MARY LOU (generalized anxiety disorder)    Panic disorder with agoraphobia    PTSD (post-traumatic stress disorder)    Social anxiety disorder    Palpitations    Tachycardia    Dizziness    Arm numbness left    Stabbing headache    Tension headache    Right carpal tunnel syndrome    Migraine without aura    Microalbuminuria    Neck pain    Right arm numbness    Left carpal tunnel syndrome    Cervical paraspinal muscle spasm    Glucosuria with normal serum glucose    History of pyelonephritis    Left arm numbness    Myofascial muscle pain    Cervical spondylosis    OCD (obsessive compulsive disorder)    Menorrhagia with regular cycle    Hypokalemia    Inguinal lymphadenopathy    Ductal carcinoma in situ (DCIS) of left breast    DCIS (ductal carcinoma in situ)    Menorrhagia with irregular cycle    S/P breast reconstruction    Possible pregnancy    S/P D&C (status post dilation and curettage)    Ectopic pregnancy    Encounter for adjustment or removal of unspecified breast implant    Personal history of breast cancer    Unspecified inflammatory spondylopathy, cervical region    GERD (gastroesophageal reflux disease)    S/P CHRISS-BSO    Small bowel polyp    Peutz-Jeghers polyps of small bowel    Facial numbness    Cervical disc disease    Pseudopolyposis of colon without complication, unspecified part of colon    Bipolar 2 disorder, major depressive episode    Obesity (BMI 30.0-34.9)    Slurred speech     Hives    Idiopathic acute pancreatitis       Review of patient's allergies indicates:  No Known Allergies    Current Facility-Administered Medications on File Prior to Encounter   Medication Dose Route Frequency Provider Last Rate Last Admin    0.9%  NaCl infusion   Intravenous Continuous Chandan Dillon MD 20 mL/hr at 01/25/22 0847 New Bag at 01/25/22 0847    sodium chloride 0.9% flush 10 mL  10 mL Intravenous PRN Chandan Dillon MD         Current Outpatient Medications on File Prior to Encounter   Medication Sig Dispense Refill    clonazePAM (KLONOPIN) 1 MG tablet Take 1 mg by mouth 2 (two) times daily.      lisdexamfetamine (VYVANSE) 60 MG capsule Take 60 mg by mouth every morning.      lithium (ESKALITH) 450 MG TbSR Take 2 tablets (900 mg total) by mouth every evening. 60 tablet 1    montelukast (SINGULAIR) 10 mg tablet TAKE ONE TABLET BY MOUTH ONCE A DAY IN THE EVENING 30 tablet 11    propranoloL (INDERAL) 20 MG tablet Take 1 tablet (20 mg total) by mouth 2 (two) times daily. 60 tablet 1    traZODone (DESYREL) 150 MG tablet Take 1 tablet (150 mg total) by mouth every evening. 30 tablet 1    hydrOXYzine pamoate (VISTARIL) 50 MG Cap Take 1 capsule (50 mg total) by mouth every 6 (six) hours as needed (anxiety and/or insomnia). 90 capsule 0    [DISCONTINUED] albuterol (ACCUNEB) 1.25 mg/3 mL Nebu Take 3 mLs (1.25 mg total) by nebulization every 6 (six) hours as needed (wheezing). Rescue 75 mL 0    [DISCONTINUED] DULoxetine (CYMBALTA) 30 MG capsule Take 1 capsule (30 mg total) by mouth 2 (two) times daily. 60 capsule 0    [DISCONTINUED] gabapentin (NEURONTIN) 600 MG tablet Take 1 tablet (600 mg total) by mouth every evening. 30 tablet 11    [DISCONTINUED] paroxetine (PAXIL) 20 MG tablet TAKE ONE TABLET BY MOUTH ONCE A DAY IN THE MORNING 30 tablet 5       Past Surgical History:   Procedure Laterality Date    ADENOIDECTOMY      ANTEGRADE SINGLE BALLOON ENTEROSCOPY N/A 4/19/2021    Procedure:  ENTEROSCOPY, SINGLE BALLOON, ANTEGRADE;  Surgeon: Vincenzo Herbert MD;  Location: [facility] (2ND FLR);  Service: Endoscopy;  Laterality: N/A;  removal of large polyp in proximal jejunum; please schedule during a time when Dr. Dillon is available in case I need assistance with removal or 2nd opinion prior to removal    COVID at [employer] 4/16 - ttr    APPENDECTOMY      AUGMENTATION OF BREAST      BILATERAL MASTECTOMY Bilateral 7/23/2018    Procedure: MASTECTOMY 23 hr stay;  Surgeon: Sofia Kendrick MD;  Location: [facility] 2ND FLR;  Service: General;  Laterality: Bilateral;    BILATERAL SALPINGO-OOPHORECTOMY (BSO) Bilateral 3/4/2021    Procedure: SALPINGO-OOPHORECTOMY, BILATERAL;  Surgeon: Clayton Vilchis MD;  Location: [facility];  Service: OB/GYN;  Laterality: Bilateral;    BOWEL RESECTION  10/17/14    BREAST BIOPSY Left 06/04/2018    BREAST CAPSULOTOMY Left 4/25/2019    Procedure: CAPSULOTOMY, BREAST LEFT;  Surgeon: Duane Bello MD;  Location: [facility] 2ND FLR;  Service: Plastics;  Laterality: Left;    BREAST RECONSTRUCTION      BREAST SURGERY      Bilateral Mastectomy 07/23/2018    CARPAL TUNNEL RELEASE Bilateral     CARPAL TUNNEL RELEASE      COLONOSCOPY      COLONOSCOPY N/A 2/25/2021    Procedure: COLONOSCOPY;  Surgeon: Vincenzo Herbert MD;  Location: [facility] (2ND FLR);  Service: Endoscopy;  Laterality: N/A;  previous anesthesia complications  okay for this date/time per Dr. Herbert and okay with Miranda, OC - sm  COVID test on 2/22/21 at [facility]    DILATION AND CURETTAGE OF UTERUS      DILATION AND CURETTAGE OF UTERUS USING SUCTION N/A 2/25/2019    Procedure: DILATION AND CURETTAGE, UTERUS, USING SUCTION  PATHOLOGY NEEDED;  Surgeon: Pam Sifuentes MD;  Location: [facility];  Service: OB/GYN;  Laterality: N/A;    ENDOSCOPIC ULTRASOUND OF UPPER GASTROINTESTINAL TRACT Left 1/15/2021    Procedure: ULTRASOUND, UPPER GI TRACT, ENDOSCOPIC;  Surgeon: Chandan Dillon MD;  [facility];   Service: Endoscopy;  Laterality: Left;  for pancreatic screening    ENDOSCOPIC ULTRASOUND OF UPPER GASTROINTESTINAL TRACT N/A 4/26/2024    Procedure: ULTRASOUND, UPPER GI TRACT, ENDOSCOPIC;  Surgeon: Chandan Dillon MD;  Location: [facility];  Service: Endoscopy;  Laterality: N/A;  4/8/24: instuctions sent via portal-GD  4/19-precall complete-MS    ESOPHAGOGASTRODUODENOSCOPY N/A 1/15/2021    Procedure: EGD (ESOPHAGOGASTRODUODENOSCOPY);  Surgeon: Chandan Dillon MD;  Location: [facility];  Service: Endoscopy;  Laterality: N/A;  with push enteroscopy    ESOPHAGOGASTRODUODENOSCOPY N/A 5/17/2021    Procedure: EGD (ESOPHAGOGASTRODUODENOSCOPY);  Surgeon: Chandan Dillon MD;  Location: [facility] (2ND FLR);  Service: Endoscopy;  Laterality: N/A;  Please schedule this duodenal polypectomy. Need to be a day that Dr Jose Antonio Stark is in the OR. 90 minutes.   Chandan Dillon MD     COVID at [place] 5/15 (scheduled in the OR for 5/18 - using same COVID results for both procedures) ttr    ESOPHAGOGASTRODUODENOSCOPY N/A 1/25/2022    Procedure: EGD (ESOPHAGOGASTRODUODENOSCOPY);  Surgeon: Chandan Dillon MD;  Location: [facility] (2ND FLR);  Service: Endoscopy;  Laterality: N/A;  poss emr  covid-1/22/22-STAH-BB  instructions sent via portal-BB    ESOPHAGOGASTRODUODENOSCOPY N/A 3/10/2023    Procedure: EGD (ESOPHAGOGASTRODUODENOSCOPY);  Surgeon: Chandan Dillon MD;  Location: [facility] ENDO (2ND FLR);  Service: Endoscopy;  Laterality: N/A;  inst via portal  called to confirm and msg that phone not accepting calls 3/6 mal    ESOPHAGOGASTRODUODENOSCOPY N/A 3/22/2024    Procedure: EGD (ESOPHAGOGASTRODUODENOSCOPY);  Surgeon: Chandan Dillon MD;  Location: [facility] ENDO (2ND FLR);  Service: Endoscopy;  Laterality: N/A;  EGD in 1 year. dillon  instr portal-tb  3/19-precall complete-MS    FAT TRANSFER Bilateral 11/12/2018    Procedure: TRANSFER, FAT TISSUE BILATERAL BREAST;  Surgeon: Duane Bello MD;  Location: [facility]  OR 2ND FLR;  Service: Plastics;  Laterality: Bilateral;    INJECTION FOR SENTINEL NODE IDENTIFICATION Left 7/23/2018    Procedure: INJECTION, FOR SENTINEL NODE IDENTIFICATION;  Surgeon: Sofia Kendrick MD;  Location: [facility] OR 2ND FLR;  Service: General;  Laterality: Left;    INSERTION OF BREAST TISSUE EXPANDER Bilateral 7/23/2018    Procedure: INSERTION, TISSUE EXPANDER, BREAST;  Surgeon: Duane Bello MD;  Location: [facility] OR 2ND FLR;  Service: Plastics;  Laterality: Bilateral;    INSERTION OF BREAST TISSUE EXPANDER Bilateral 3/10/2020    Procedure: INSERTION, TISSUE EXPANDER, BREAST, BILATERAL;  Surgeon: Duane Bello MD;  Location: [facility] OR 2ND FLR;  Service: Plastics;  Laterality: Bilateral;    intestinal polpy      LYSIS OF ADHESIONS N/A 3/4/2021    Procedure: LYSIS, ADHESIONS;  Surgeon: Clayton Vilchis MD;  Location: [facility];  Service: OB/GYN;  Laterality: N/A;  Omental adhesions    MASTECTOMY      REMOVAL OF BREAST IMPLANT Bilateral 3/10/2020    Procedure: REMOVAL, IMPLANT, BREAST, BILATERAL;  Surgeon: Duane Bello MD;  [facility];  Service: Plastics;  Laterality: Bilateral;    REMOVAL OF BREAST IMPLANT Bilateral 7/7/2020    Procedure: REMOVAL, IMPLANT, BREAST BILATERAL;  Surgeon: Duane Bello MD;  [facility];  Service: Plastics;  Laterality: Bilateral;    SENTINEL LYMPH NODE BIOPSY Left 7/23/2018    Procedure: BIOPSY, LYMPH NODE, SENTINEL;  Surgeon: Sofia Kendrick MD;  [facility];  Service: General;  Laterality: Left;    SMALL INTESTINE SURGERY      3 surgeries     TONSILLECTOMY      TOTAL ABDOMINAL HYSTERECTOMY N/A 3/4/2021    Procedure: HYSTERECTOMY, TOTAL, ABDOMINAL;  Surgeon: Clayton Vilchis MD;  Location: [facility];  Service: OB/GYN;  Laterality: N/A;    UPPER GASTROINTESTINAL ENDOSCOPY           Pre-op Assessment    I have reviewed the Patient Summary Reports.     I have reviewed the Nursing Notes. I have  reviewed the NPO Status.   I have reviewed the Medications.     Review of Systems      Physical Exam  General: Cooperative    Airway:  Mallampati: III / II  Mouth Opening: Normal  TM Distance: Normal  Tongue: Normal  Neck ROM: Normal ROM    Dental:  Intact      Anesthesia Plan  Type of Anesthesia, risks & benefits discussed:    Anesthesia Type: Gen Natural Airway  Intra-op Monitoring Plan: Standard ASA Monitors  Post Op Pain Control Plan: multimodal analgesia  Induction:  IV  Informed Consent: Informed consent signed with the Patient and all parties understand the risks and agree with anesthesia plan.  All questions answered.   ASA Score: 2  Day of Surgery Review of History & Physical: H&P Update referred to the surgeon/provider.    Ready For Surgery From Anesthesia Perspective.     .

## 2024-07-08 NOTE — PLAN OF CARE
Pt is AAOX4,VSS. Discharge instructions reviewed and pt verbalizes understanding. All questions answered. IV removed. MD came to bedside to discuss procedure.Pt ready for discharge.

## 2024-07-08 NOTE — H&P
Short Stay Endoscopy History and Physical      Procedure - Colonoscopy  ASA - per anesthesia  Mallampati - per anesthesia  History of Anesthesia problems - no  Family history Anesthesia problems - no   Plan of anesthesia - MAC    HPI:  This is a 43 y.o. female here for Peutz-Jeghers syndrome and colonic polyps.       ROS:  Constitutional: No fevers, chills  CV: No chest pain  Pulm: No cough, No shortness of breath  GI: see HPI    Medical History:  has a past medical history of Abnormal Pap smear of cervix (age 25), Anxiety, Breast cancer, Cancer (07/11/2018), Cervical spondylosis (2/9/2018), Colon polyp, Depression, Ectopic pregnancy (2/26/2019), General anesthetics causing adverse effect in therapeutic use, Headache, History of psychiatric hospitalization, psychiatric care, Hypertension, Peutz-Jeghers syndrome, PONV (postoperative nausea and vomiting), Psychiatric problem, Right carpal tunnel syndrome (3/9/2017), Self-harming behavior, Suicide attempt, and Therapy.    Surgical History:  has a past surgical history that includes Appendectomy; Adenoidectomy; Tonsillectomy; intestinal polpy; Colonoscopy; Upper gastrointestinal endoscopy; Small intestine surgery; Bowel resection (10/17/14); Carpal tunnel release (Bilateral); Carpal tunnel release; Dilation and curettage of uterus; Breast biopsy (Left, 06/04/2018); Bilateral mastectomy (Bilateral, 7/23/2018); Injection for sentinel node identification (Left, 7/23/2018); [place] lymph node biopsy (Left, 7/23/2018); Insertion of breast tissue expander (Bilateral, 7/23/2018); Fat transfer (Bilateral, 11/12/2018); Breast surgery; Dilation and curettage of uterus using suction (N/A, 2/25/2019); Breast reconstruction; Mastectomy; Augmentation of breast; Breast capsulotomy (Left, 4/25/2019); Removal of breast implant (Bilateral, 3/10/2020); Insertion of breast tissue expander (Bilateral, 3/10/2020); Removal of breast implant (Bilateral, 7/7/2020);  Esophagogastroduodenoscopy (N/A, 1/15/2021); Endoscopic ultrasound of upper gastrointestinal tract (Left, 1/15/2021); Colonoscopy (N/A, 2/25/2021); Total abdominal hysterectomy (N/A, 3/4/2021); Bilateral salpingo-oophorectomy (BSO) (Bilateral, 3/4/2021); Lysis of adhesions (N/A, 3/4/2021); Antegrade single balloon enteroscopy (N/A, 4/19/2021); Esophagogastroduodenoscopy (N/A, 5/17/2021); Esophagogastroduodenoscopy (N/A, 1/25/2022); Esophagogastroduodenoscopy (N/A, 3/10/2023); Esophagogastroduodenoscopy (N/A, 3/22/2024); and Endoscopic ultrasound of upper gastrointestinal tract (N/A, 4/26/2024).    Family History: family history includes ADD / ADHD in her paternal uncle; Breast cancer in her paternal aunt and paternal grandmother; Cancer in her mother; Colon polyps in her sister; Dementia in her maternal grandfather and maternal grandmother; Depression in her paternal uncle; Hypertension in her father; No Known Problems in her cousin, maternal uncle, and paternal grandfather; Stomach cancer in her mother.    Social History:  reports that she has been smoking cigarettes. She started smoking about 24 years ago. She has a 24.2 pack-year smoking history. She has never used smokeless tobacco. She reports current alcohol use of about 5.8 standard drinks of alcohol per week. She reports that she does not use drugs.    Review of patient's allergies indicates:  No Known Allergies    Medications:   Medications Prior to Admission   Medication Sig Dispense Refill Last Dose    clonazePAM (KLONOPIN) 1 MG tablet Take 1 mg by mouth 2 (two) times daily.   7/7/2024    ergocalciferol (ERGOCALCIFEROL) 50,000 unit Cap TAKE ONE CAPSULE BY MOUTH EVERY 7 DAYS 12 capsule 1 Past Week    lisdexamfetamine (VYVANSE) 60 MG capsule Take 60 mg by mouth every morning.   7/7/2024    lithium (ESKALITH) 450 MG TbSR Take 2 tablets (900 mg total) by mouth every evening. 60 tablet 1 7/7/2024    montelukast (SINGULAIR) 10 mg tablet TAKE ONE TABLET BY MOUTH  ONCE A DAY IN THE EVENING 30 tablet 11 7/7/2024    pantoprazole (PROTONIX) 40 MG tablet Take 1 tablet (40 mg total) by mouth once daily. 90 tablet 3 7/7/2024    propranoloL (INDERAL) 20 MG tablet Take 1 tablet (20 mg total) by mouth 2 (two) times daily. 60 tablet 1 7/7/2024    traZODone (DESYREL) 150 MG tablet Take 1 tablet (150 mg total) by mouth every evening. 30 tablet 1 7/7/2024    butalbital-acetaminophen-caffeine -40 mg (FIORICET, ESGIC) -40 mg per tablet TAKE ONE TABLET BY MOUTH EVERY 6 HOURS AS NEEDED FOR HEADACHE 30 tablet 5 More than a month    galcanezumab-gnlm (EMGALITY PEN) 120 mg/mL PnIj Inject 2 syringes for initial dose, loading dose. Then one injection every 28 days for migraine prevention. 2 mL 0     galcanezumab-gnlm (EMGALITY PEN) 120 mg/mL PnIj Inject one syringe every 28 days for migraine prevention. 1 mL 11     hydrOXYzine pamoate (VISTARIL) 50 MG Cap Take 1 capsule (50 mg total) by mouth every 6 (six) hours as needed (anxiety and/or insomnia). 90 capsule 0          Physical Exam:    Vital Signs:   Vitals:    07/08/24 1130   BP: 106/76   Pulse: (!) 115   Resp: 16   Temp: 97.3 °F (36.3 °C)       General Appearance: Well appearing in no acute distress  Eyes:    No scleral icterus  Lungs: CTA bilaterally  Heart:  reg rate and rhythm   Abdomen: Soft, non tender, non distended with positive bowel sounds      Labs:  Lab Results   Component Value Date    WBC 14.20 (H) 02/23/2024    HGB 14.2 02/23/2024    HCT 43.1 02/23/2024    MCV 87 02/23/2024     02/23/2024        BMP  Lab Results   Component Value Date     02/23/2024    K 3.7 02/23/2024     02/23/2024    CO2 23 02/23/2024    BUN 5 (L) 02/23/2024    CREATININE 0.9 02/23/2024    CALCIUM 10.3 02/23/2024    ANIONGAP 14 02/23/2024    ESTGFRAFRICA >60 04/18/2022    EGFRNONAA >60 04/18/2022     Lab Results   Component Value Date    INR 1.0 08/31/2022          Assessment:  43 y.o. female with Peutz-Jeghers syndrome and  colonic polyps.     Plan:  Proceed with colonoscopy today.  I have explained the risks and benefits of endoscopy procedures to the patient including but not limited to bleeding, perforation, infection, and death.  All questions and answered.        Vincenzo Herbert MD

## 2024-07-15 LAB
COMMENT: NORMAL
FINAL PATHOLOGIC DIAGNOSIS: NORMAL
GROSS: NORMAL
Lab: NORMAL
MICROSCOPIC EXAM: NORMAL

## 2024-07-31 ENCOUNTER — OFFICE VISIT (OUTPATIENT)
Dept: INTERNAL MEDICINE | Facility: CLINIC | Age: 44
End: 2024-07-31
Payer: MEDICAID

## 2024-07-31 VITALS
HEIGHT: 62 IN | HEART RATE: 120 BPM | OXYGEN SATURATION: 96 % | BODY MASS INDEX: 32.94 KG/M2 | RESPIRATION RATE: 18 BRPM | WEIGHT: 179 LBS

## 2024-07-31 DIAGNOSIS — R11.0 NAUSEA: Primary | ICD-10-CM

## 2024-07-31 PROCEDURE — 99999 PR PBB SHADOW E&M-EST. PATIENT-LVL IV: CPT | Mod: PBBFAC,,, | Performed by: NURSE PRACTITIONER

## 2024-07-31 PROCEDURE — 99214 OFFICE O/P EST MOD 30 MIN: CPT | Mod: PBBFAC,PN | Performed by: NURSE PRACTITIONER

## 2024-07-31 PROCEDURE — 3008F BODY MASS INDEX DOCD: CPT | Mod: CPTII,,, | Performed by: NURSE PRACTITIONER

## 2024-07-31 PROCEDURE — 1159F MED LIST DOCD IN RCRD: CPT | Mod: CPTII,,, | Performed by: NURSE PRACTITIONER

## 2024-07-31 PROCEDURE — 99214 OFFICE O/P EST MOD 30 MIN: CPT | Mod: S$PBB,,, | Performed by: NURSE PRACTITIONER

## 2024-07-31 RX ORDER — SUCRALFATE 1 G/1
1 TABLET ORAL 2 TIMES DAILY
Qty: 60 TABLET | Refills: 0 | Status: SHIPPED | OUTPATIENT
Start: 2024-07-31

## 2024-08-01 NOTE — PROGRESS NOTES
Subjective:       Patient ID: Staci Hodge is a 43 y.o. female.    Chief Complaint: Nausea (X few months with diarrhea /\)    Patient is known, to me and presents with   Chief Complaint   Patient presents with    Nausea     X few months with diarrhea   \   .  Denies chest pain and shortness of breath.  Patient presents with ongoing issues with nausea particularly after meals. She has had scopes done as well as gallbladder testing. Also, thought it was due to medication changes but states that the medication changes were done months ago. Taking zofran doesn't help. Not really having abdminal pain     Nausea  Associated symptoms include diaphoresis, headaches, nausea and vomiting. Pertinent negatives include no arthralgias, chest pain, joint swelling, neck pain or weakness.     Review of Systems   Constitutional:  Positive for diaphoresis. Negative for activity change and unexpected weight change.   HENT:  Negative for hearing loss, rhinorrhea and trouble swallowing.    Eyes:  Negative for discharge and visual disturbance.   Respiratory:  Negative for chest tightness and wheezing.    Cardiovascular:  Negative for chest pain and palpitations.   Gastrointestinal:  Positive for diarrhea, nausea and vomiting. Negative for blood in stool and constipation.   Endocrine: Negative for polydipsia and polyuria.   Genitourinary:  Negative for difficulty urinating, dysuria, hematuria and menstrual problem.   Musculoskeletal:  Negative for arthralgias, joint swelling and neck pain.   Skin: Negative.    Neurological:  Positive for headaches. Negative for weakness.   Psychiatric/Behavioral:  Positive for dysphoric mood. Negative for confusion, sleep disturbance and suicidal ideas. The patient is not nervous/anxious.        Objective:      Physical Exam  Constitutional:       General: She is not in acute distress.     Appearance: She is well-developed.   HENT:      Head: Normocephalic and atraumatic.      Right Ear: Tympanic  membrane and external ear normal.      Left Ear: Tympanic membrane and external ear normal.      Nose: Nose normal.      Mouth/Throat:      Mouth: Mucous membranes are moist.      Pharynx: No oropharyngeal exudate.   Eyes:      General: No scleral icterus.        Right eye: No discharge.         Left eye: No discharge.      Conjunctiva/sclera: Conjunctivae normal.      Pupils: Pupils are equal, round, and reactive to light.   Neck:      Thyroid: No thyromegaly.      Vascular: No JVD.   Cardiovascular:      Rate and Rhythm: Normal rate and regular rhythm.      Heart sounds: Normal heart sounds. No murmur heard.     No friction rub. No gallop.   Pulmonary:      Effort: Pulmonary effort is normal. No respiratory distress.      Breath sounds: Normal breath sounds. No stridor. No wheezing, rhonchi or rales.   Chest:      Chest wall: No tenderness.   Abdominal:      General: Bowel sounds are normal. There is no distension.      Palpations: Abdomen is soft. There is no mass.      Tenderness: There is no abdominal tenderness. There is no right CVA tenderness, left CVA tenderness, guarding or rebound.      Hernia: No hernia is present.   Musculoskeletal:         General: No swelling, tenderness, deformity or signs of injury. Normal range of motion.      Cervical back: Normal range of motion and neck supple.      Right lower leg: No edema.      Left lower leg: No edema.   Lymphadenopathy:      Cervical: No cervical adenopathy.   Skin:     General: Skin is warm and dry.      Capillary Refill: Capillary refill takes less than 2 seconds.      Coloration: Skin is not jaundiced or pale.      Findings: No bruising, erythema, lesion or rash.   Neurological:      General: No focal deficit present.      Mental Status: She is alert and oriented to person, place, and time.      Cranial Nerves: No cranial nerve deficit.      Sensory: No sensory deficit.      Motor: No weakness or abnormal muscle tone.      Coordination: Coordination  "normal.      Gait: Gait normal.      Deep Tendon Reflexes: Reflexes are normal and symmetric. Reflexes normal.   Psychiatric:         Attention and Perception: She does not perceive auditory or visual hallucinations.         Mood and Affect: Mood and affect normal. Mood is not anxious or depressed. Affect is not tearful.         Behavior: Behavior normal.         Thought Content: Thought content normal. Thought content does not include homicidal or suicidal ideation. Thought content does not include homicidal or suicidal plan.         Judgment: Judgment normal.         Assessment:       1. Nausea        Plan:   1. Nausea  -     sucralfate (CARAFATE) 1 gram tablet; Take 1 tablet (1 g total) by mouth 2 (two) times daily.  Dispense: 60 tablet; Refill: 0       "This note will not be shared with the patient."    Will place on carafate bid for now  Rtc in two weeks   "

## 2024-08-14 ENCOUNTER — OFFICE VISIT (OUTPATIENT)
Dept: INTERNAL MEDICINE | Facility: CLINIC | Age: 44
End: 2024-08-14
Payer: MEDICAID

## 2024-08-14 VITALS
BODY MASS INDEX: 33.34 KG/M2 | OXYGEN SATURATION: 98 % | HEART RATE: 110 BPM | HEIGHT: 62 IN | WEIGHT: 181.19 LBS | RESPIRATION RATE: 20 BRPM

## 2024-08-14 DIAGNOSIS — R11.0 NAUSEA: Primary | ICD-10-CM

## 2024-08-14 PROCEDURE — 99214 OFFICE O/P EST MOD 30 MIN: CPT | Mod: S$PBB,,, | Performed by: NURSE PRACTITIONER

## 2024-08-14 PROCEDURE — 1159F MED LIST DOCD IN RCRD: CPT | Mod: CPTII,,, | Performed by: NURSE PRACTITIONER

## 2024-08-14 PROCEDURE — 3008F BODY MASS INDEX DOCD: CPT | Mod: CPTII,,, | Performed by: NURSE PRACTITIONER

## 2024-08-14 PROCEDURE — 99214 OFFICE O/P EST MOD 30 MIN: CPT | Mod: PBBFAC,PN | Performed by: NURSE PRACTITIONER

## 2024-08-14 PROCEDURE — 99999 PR PBB SHADOW E&M-EST. PATIENT-LVL IV: CPT | Mod: PBBFAC,,, | Performed by: NURSE PRACTITIONER

## 2024-08-14 NOTE — PROGRESS NOTES
Subjective:       Patient ID: Staci Hodge is a 43 y.o. female.    Chief Complaint: Follow-up (X 2 wks/)    Patient is known, to me and presents with   Chief Complaint   Patient presents with    Follow-up     X 2 wks     .  Denies chest pain and shortness of breath.  Patient presents with two weeks follow up for nausea and on carafate. Seeing a difference. She still has nausea from time to time but has improved.    Follow-up  Pertinent negatives include no abdominal pain, arthralgias, chest pain, congestion, coughing, fatigue, fever, headaches, joint swelling, nausea, neck pain, numbness, vomiting or weakness.     Review of Systems   Constitutional:  Negative for activity change, appetite change, fatigue, fever and unexpected weight change.   HENT:  Negative for congestion, ear discharge, ear pain, hearing loss, postnasal drip and tinnitus.    Eyes:  Negative for photophobia, pain and visual disturbance.   Respiratory:  Negative for cough, shortness of breath, wheezing and stridor.    Cardiovascular:  Negative for chest pain, palpitations and leg swelling.   Gastrointestinal:  Negative for abdominal distention, abdominal pain, anal bleeding, blood in stool, constipation, diarrhea, nausea, rectal pain and vomiting.   Genitourinary:  Negative for difficulty urinating, dysuria, frequency, hematuria and urgency.   Musculoskeletal:  Negative for arthralgias, back pain, gait problem, joint swelling and neck pain.   Skin: Negative.    Neurological:  Negative for dizziness, seizures, syncope, weakness, light-headedness, numbness and headaches.   Hematological:  Negative for adenopathy. Does not bruise/bleed easily.   Psychiatric/Behavioral:  Negative for behavioral problems, confusion, dysphoric mood, hallucinations, sleep disturbance and suicidal ideas. The patient is not nervous/anxious.        Objective:      Physical Exam  Constitutional:       General: She is not in acute distress.     Appearance: She is  well-developed.   HENT:      Head: Normocephalic and atraumatic.      Right Ear: Tympanic membrane and external ear normal.      Left Ear: Tympanic membrane and external ear normal.      Nose: Nose normal.      Mouth/Throat:      Mouth: Mucous membranes are moist.      Pharynx: No oropharyngeal exudate.   Eyes:      General: No scleral icterus.        Right eye: No discharge.         Left eye: No discharge.      Conjunctiva/sclera: Conjunctivae normal.      Pupils: Pupils are equal, round, and reactive to light.   Neck:      Thyroid: No thyromegaly.      Vascular: No JVD.   Cardiovascular:      Rate and Rhythm: Normal rate and regular rhythm.      Heart sounds: Normal heart sounds. No murmur heard.     No friction rub. No gallop.   Pulmonary:      Effort: Pulmonary effort is normal. No respiratory distress.      Breath sounds: Normal breath sounds. No stridor. No wheezing or rales.   Chest:      Chest wall: No tenderness.   Abdominal:      General: Bowel sounds are normal. There is no distension.      Palpations: Abdomen is soft. There is no mass.      Tenderness: There is no abdominal tenderness. There is no right CVA tenderness, left CVA tenderness, guarding or rebound.      Hernia: No hernia is present.   Musculoskeletal:         General: No swelling, tenderness, deformity or signs of injury. Normal range of motion.      Cervical back: Normal range of motion and neck supple.      Right lower leg: No edema.      Left lower leg: No edema.   Lymphadenopathy:      Cervical: No cervical adenopathy.   Skin:     General: Skin is warm and dry.      Capillary Refill: Capillary refill takes less than 2 seconds.      Coloration: Skin is not jaundiced or pale.      Findings: No bruising, erythema, lesion or rash.   Neurological:      General: No focal deficit present.      Mental Status: She is alert and oriented to person, place, and time.      Cranial Nerves: No cranial nerve deficit.      Sensory: No sensory deficit.       "Motor: No weakness or abnormal muscle tone.      Coordination: Coordination normal.      Gait: Gait normal.      Deep Tendon Reflexes: Reflexes are normal and symmetric. Reflexes normal.   Psychiatric:         Attention and Perception: She does not perceive auditory or visual hallucinations.         Mood and Affect: Mood and affect normal. Mood is not anxious or depressed. Affect is not tearful.         Behavior: Behavior normal.         Thought Content: Thought content normal. Thought content does not include homicidal or suicidal ideation. Thought content does not include homicidal or suicidal plan.         Judgment: Judgment normal.         Assessment:       1. Nausea        Plan:   1. Nausea       "This note will not be shared with the patient."    Continue with carafate for now  Rtc as scheduled  "

## 2024-08-20 ENCOUNTER — PATIENT MESSAGE (OUTPATIENT)
Dept: INTERNAL MEDICINE | Facility: CLINIC | Age: 44
End: 2024-08-20
Payer: MEDICAID

## 2024-08-20 DIAGNOSIS — E53.8 FOLIC ACID DEFICIENCY: Primary | ICD-10-CM

## 2024-08-21 RX ORDER — FOLIC ACID 1 MG/1
1 TABLET ORAL DAILY
Qty: 30 TABLET | Refills: 5 | Status: SHIPPED | OUTPATIENT
Start: 2024-08-21 | End: 2025-08-21

## 2024-08-28 ENCOUNTER — OFFICE VISIT (OUTPATIENT)
Dept: GASTROENTEROLOGY | Facility: CLINIC | Age: 44
End: 2024-08-28
Payer: MEDICAID

## 2024-08-28 ENCOUNTER — TELEPHONE (OUTPATIENT)
Dept: GASTROENTEROLOGY | Facility: CLINIC | Age: 44
End: 2024-08-28
Payer: MEDICAID

## 2024-08-28 VITALS
WEIGHT: 187.38 LBS | HEART RATE: 102 BPM | BODY MASS INDEX: 34.48 KG/M2 | SYSTOLIC BLOOD PRESSURE: 130 MMHG | DIASTOLIC BLOOD PRESSURE: 94 MMHG | HEIGHT: 62 IN

## 2024-08-28 DIAGNOSIS — R19.7 DIARRHEA, UNSPECIFIED TYPE: ICD-10-CM

## 2024-08-28 DIAGNOSIS — F17.200 SMOKER: ICD-10-CM

## 2024-08-28 DIAGNOSIS — Q85.89 PEUTZ-JEGHERS SYNDROME: Primary | ICD-10-CM

## 2024-08-28 PROCEDURE — 3075F SYST BP GE 130 - 139MM HG: CPT | Mod: CPTII,,, | Performed by: INTERNAL MEDICINE

## 2024-08-28 PROCEDURE — 3008F BODY MASS INDEX DOCD: CPT | Mod: CPTII,,, | Performed by: INTERNAL MEDICINE

## 2024-08-28 PROCEDURE — 99214 OFFICE O/P EST MOD 30 MIN: CPT | Mod: PBBFAC | Performed by: INTERNAL MEDICINE

## 2024-08-28 PROCEDURE — 1159F MED LIST DOCD IN RCRD: CPT | Mod: CPTII,,, | Performed by: INTERNAL MEDICINE

## 2024-08-28 PROCEDURE — 99999 PR PBB SHADOW E&M-EST. PATIENT-LVL IV: CPT | Mod: PBBFAC,,, | Performed by: INTERNAL MEDICINE

## 2024-08-28 PROCEDURE — 99214 OFFICE O/P EST MOD 30 MIN: CPT | Mod: S$PBB,,, | Performed by: INTERNAL MEDICINE

## 2024-08-28 PROCEDURE — 1160F RVW MEDS BY RX/DR IN RCRD: CPT | Mod: CPTII,,, | Performed by: INTERNAL MEDICINE

## 2024-08-28 PROCEDURE — 3080F DIAST BP >= 90 MM HG: CPT | Mod: CPTII,,, | Performed by: INTERNAL MEDICINE

## 2024-08-28 NOTE — TELEPHONE ENCOUNTER
[facility] patient. Need MRI on April 2025 for pancreas cancer screening (order in). Need EGD with possible EMR in March 2025 for Peutz- Jeghers syndrome (order in). Need US thyroid (order in). Need follow up with Dr Herbert for diarrhea and VCE.  Thanks,  RR

## 2024-08-28 NOTE — PROGRESS NOTES
"GENERAL GI PATIENT INTAKE:    COVID symptoms in the last 7 days (runny nose, sore throat, congestion, cough, fever): No  PCP: Shilpi Clayton  If not PCP-  number given to establish [phone]: N/A    ALLERGIES REVIEWED:  Yes    CHIEF COMPLAINT:    Chief Complaint   Patient presents with    PEUTZ-JEGHERS       VITAL SIGNS:  BP (!) 130/94   Pulse 102   Ht 5' 2" (1.575 m)   Wt 85 kg (187 lb 6.3 oz)   LMP 03/01/2021   BMI 34.27 kg/m²      Change in medical, surgical, family or social history: No      REVIEWED MEDICATION LIST RECONCILED INCLUDING ABOVE MEDS:  Yes     "

## 2024-08-28 NOTE — PROGRESS NOTES
[facility] Note    Reason for Visit:  The encounter diagnosis was Peutz-Jeghers syndrome.    PCP: Shilpi Clayton       HPI:  This is a 44 y.o. female here for follow up of her Peutz-Jeghers syndrome. The patient have history of breast cancer. She is S/P bilateral mastectomy and total hysterectomy and bilateral oophorectomy. There is no family history of pancreatic or thyroid cancer.     She is up to date on her pancreas cancer screening with a normal EUS on 4/26/2024.    She have a colonoscopy for diarrhea on 7/8/2024 which did not showed any polyps but evidence of congestion and aphthous ulcers in the TI. Biopsies showed acute colitis and enteritis without evidence of chronicity. She continue to have diarrhea and is follow by Dr Herbert.    Never have a VCE.    For the PJS her last EGD was on  3/22/2024 with evidence of a 15 mm duodenal polyp S/P EMR (hamartoma). She have gastric fundic gland polyps.    The patient is an active smoker.    Medical History:  has a past medical history of Abnormal Pap smear of cervix (age 25), Anxiety, Breast cancer, Cancer (07/11/2018), Cervical spondylosis (2/9/2018), Colon polyp, Depression, Ectopic pregnancy (2/26/2019), General anesthetics causing adverse effect in therapeutic use, Headache, History of psychiatric hospitalization, psychiatric care, Hypertension, Peutz-Jeghers syndrome, PONV (postoperative nausea and vomiting), Psychiatric problem, Right carpal tunnel syndrome (3/9/2017), Self-harming behavior, Suicide attempt, and Therapy.    Surgical History:  has a past surgical history that includes Appendectomy; Adenoidectomy; Tonsillectomy; intestinal polpy; Colonoscopy; Upper gastrointestinal endoscopy; Small intestine surgery; Bowel resection (10/17/14); Carpal tunnel release (Bilateral); Carpal tunnel release; Dilation and curettage of uterus; Breast biopsy (Left, 06/04/2018); Bilateral mastectomy (Bilateral, 7/23/2018); Injection for sentinel  node identification (Left, 7/23/2018); [place] lymph node biopsy (Left, 7/23/2018); Insertion of breast tissue expander (Bilateral, 7/23/2018); Fat transfer (Bilateral, 11/12/2018); Breast surgery; Dilation and curettage of uterus using suction (N/A, 2/25/2019); Breast reconstruction; Mastectomy; Augmentation of breast; Breast capsulotomy (Left, 4/25/2019); Removal of breast implant (Bilateral, 3/10/2020); Insertion of breast tissue expander (Bilateral, 3/10/2020); Removal of breast implant (Bilateral, 7/7/2020); Esophagogastroduodenoscopy (N/A, 1/15/2021); Endoscopic ultrasound of upper gastrointestinal tract (Left, 1/15/2021); Colonoscopy (N/A, 2/25/2021); Total abdominal hysterectomy (N/A, 3/4/2021); Bilateral salpingo-oophorectomy (BSO) (Bilateral, 3/4/2021); Lysis of adhesions (N/A, 3/4/2021); Antegrade single balloon enteroscopy (N/A, 4/19/2021); Esophagogastroduodenoscopy (N/A, 5/17/2021); Esophagogastroduodenoscopy (N/A, 1/25/2022); Esophagogastroduodenoscopy (N/A, 3/10/2023); Esophagogastroduodenoscopy (N/A, 3/22/2024); Endoscopic ultrasound of upper gastrointestinal tract (N/A, 4/26/2024); and Colonoscopy (N/A, 7/8/2024).    Family History: family history includes ADD / ADHD in her paternal uncle; Breast cancer in her paternal aunt and paternal grandmother; Cancer in her mother; Colon polyps in her sister; Dementia in her maternal grandfather and maternal grandmother; Depression in her paternal uncle; Hypertension in her father; No Known Problems in her cousin, maternal uncle, and paternal grandfather; Stomach cancer in her mother..     Social History:  reports that she has been smoking cigarettes. She started smoking about 24 years ago. She has a 24.4 pack-year smoking history. She has never used smokeless tobacco. She reports current alcohol use of about 5.8 standard drinks of alcohol per week. She reports that she does not use drugs.    Review of patient's allergies indicates:  No Known  "Allergies    Current Outpatient Rx   Medication Sig Dispense Refill    butalbital-acetaminophen-caffeine -40 mg (FIORICET, ESGIC) -40 mg per tablet TAKE ONE TABLET BY MOUTH EVERY 6 HOURS AS NEEDED FOR HEADACHE 30 tablet 5    clonazePAM (KLONOPIN) 1 MG tablet Take 1 mg by mouth 2 (two) times daily.      ergocalciferol (ERGOCALCIFEROL) 50,000 unit Cap TAKE ONE CAPSULE BY MOUTH EVERY 7 DAYS 12 capsule 1    folic acid (FOLVITE) 1 MG tablet Take 1 tablet (1 mg total) by mouth once daily. 30 tablet 5    galcanezumab-gnlm (EMGALITY PEN) 120 mg/mL PnIj Inject 2 syringes for initial dose, loading dose. Then one injection every 28 days for migraine prevention. 2 mL 0    galcanezumab-gnlm (EMGALITY PEN) 120 mg/mL PnIj Inject one syringe every 28 days for migraine prevention. 1 mL 11    hydrOXYzine pamoate (VISTARIL) 50 MG Cap Take 1 capsule (50 mg total) by mouth every 6 (six) hours as needed (anxiety and/or insomnia). 90 capsule 0    lisdexamfetamine (VYVANSE) 60 MG capsule Take 60 mg by mouth every morning.      lithium (ESKALITH) 450 MG TbSR Take 2 tablets (900 mg total) by mouth every evening. 60 tablet 1    montelukast (SINGULAIR) 10 mg tablet TAKE ONE TABLET BY MOUTH ONCE A DAY IN THE EVENING 30 tablet 11    pantoprazole (PROTONIX) 40 MG tablet Take 1 tablet (40 mg total) by mouth once daily. 90 tablet 3    propranoloL (INDERAL) 20 MG tablet Take 1 tablet (20 mg total) by mouth 2 (two) times daily. 60 tablet 1    sucralfate (CARAFATE) 1 gram tablet Take 1 tablet (1 g total) by mouth 2 (two) times daily. 60 tablet 0    traZODone (DESYREL) 150 MG tablet Take 1 tablet (150 mg total) by mouth every evening. 30 tablet 1       Objective Findings:    Vital Signs:  BP (!) 130/94   Pulse 102   Ht 5' 2" (1.575 m)   Wt 85 kg (187 lb 6.3 oz)   LMP 03/01/2021   BMI 34.27 kg/m²   Body mass index is 34.27 kg/m².    Physical Exam:  General Appearance: Well appearing in no acute distress        Labs:  Lab Results "   Component Value Date    WBC 14.20 (H) 02/23/2024    HGB 14.2 02/23/2024    HCT 43.1 02/23/2024     02/23/2024    CHOL 227 (H) 10/09/2023    TRIG 187 (H) 10/09/2023    HDL 56 10/09/2023    ALT 44 02/23/2024    AST 21 02/23/2024     02/23/2024    K 3.7 02/23/2024     02/23/2024    CREATININE 0.9 02/23/2024    BUN 5 (L) 02/23/2024    CO2 23 02/23/2024    TSH 1.373 02/23/2024    INR 1.0 08/31/2022    HGBA1C 5.3 10/09/2023              Assessment:  1. Peutz-Jeghers syndrome    2. Diarrhea  3. Cancer screening  4. Smoker    Recommendations:  1. For pancreas cancer screening we will order an MRI on April 2025.  2. For her PJS we will order an EGD with possible EMR on March 2025.  3. Baseline US of the thyroid.  4. She need a follow up with Dr Herbert for her diarrhea and possible VCE.  5. Recommend smoking cessation.      RTC in 1 year.    Order summary:  Orders Placed This Encounter   Procedures    MRI Abdomen W WO Contrast    US Thyroid       Thank you so much for allowing me to participate in the care of Staci Dillon MD

## 2024-08-30 DIAGNOSIS — G43.009 MIGRAINE WITHOUT AURA AND WITHOUT STATUS MIGRAINOSUS, NOT INTRACTABLE: ICD-10-CM

## 2024-08-30 RX ORDER — BUTALBITAL, ACETAMINOPHEN AND CAFFEINE 50; 325; 40 MG/1; MG/1; MG/1
1 TABLET ORAL EVERY 6 HOURS PRN
Qty: 30 TABLET | Refills: 5 | Status: SHIPPED | OUTPATIENT
Start: 2024-08-30

## 2024-09-05 ENCOUNTER — HOSPITAL ENCOUNTER (OUTPATIENT)
Dept: RADIOLOGY | Facility: HOSPITAL | Age: 44
Discharge: HOME | End: 2024-09-05
Attending: INTERNAL MEDICINE
Payer: MEDICAID

## 2024-09-05 DIAGNOSIS — Q85.89 PEUTZ-JEGHERS SYNDROME: ICD-10-CM

## 2024-09-05 PROCEDURE — 25500020 PHARM REV CODE 255: Performed by: INTERNAL MEDICINE

## 2024-09-05 PROCEDURE — 74183 MRI ABD W/O CNTR FLWD CNTR: CPT | Mod: TC

## 2024-09-05 PROCEDURE — A9585 GADOBUTROL INJECTION: HCPCS | Performed by: INTERNAL MEDICINE

## 2024-09-05 PROCEDURE — 76536 US EXAM OF HEAD AND NECK: CPT | Mod: TC

## 2024-09-05 PROCEDURE — 74183 MRI ABD W/O CNTR FLWD CNTR: CPT | Mod: 26,,, | Performed by: RADIOLOGY

## 2024-09-05 PROCEDURE — 76536 US EXAM OF HEAD AND NECK: CPT | Mod: 26,,, | Performed by: RADIOLOGY

## 2024-09-05 RX ORDER — GADOBUTROL 604.72 MG/ML
8 INJECTION INTRAVENOUS
Status: COMPLETED | OUTPATIENT
Start: 2024-09-05 | End: 2024-09-05

## 2024-09-05 RX ADMIN — GADOBUTROL 10 ML: 604.72 INJECTION INTRAVENOUS at 07:09

## 2024-09-13 DIAGNOSIS — E04.9 GOITER: Primary | ICD-10-CM

## 2024-10-14 ENCOUNTER — OFFICE VISIT (OUTPATIENT)
Dept: NEUROLOGY | Facility: CLINIC | Age: 44
End: 2024-10-14
Payer: MEDICAID

## 2024-10-14 VITALS
DIASTOLIC BLOOD PRESSURE: 98 MMHG | SYSTOLIC BLOOD PRESSURE: 148 MMHG | WEIGHT: 196.19 LBS | HEIGHT: 62 IN | BODY MASS INDEX: 36.1 KG/M2 | HEART RATE: 124 BPM | RESPIRATION RATE: 16 BRPM

## 2024-10-14 DIAGNOSIS — M48.02 CERVICAL SPINAL STENOSIS: ICD-10-CM

## 2024-10-14 DIAGNOSIS — F17.200 SMOKER: ICD-10-CM

## 2024-10-14 DIAGNOSIS — M79.18 MYOFASCIAL MUSCLE PAIN: ICD-10-CM

## 2024-10-14 DIAGNOSIS — G56.02 LEFT CARPAL TUNNEL SYNDROME: ICD-10-CM

## 2024-10-14 DIAGNOSIS — G43.019 INTRACTABLE MIGRAINE WITHOUT AURA AND WITHOUT STATUS MIGRAINOSUS: Primary | ICD-10-CM

## 2024-10-14 PROCEDURE — 1160F RVW MEDS BY RX/DR IN RCRD: CPT | Mod: CPTII,,, | Performed by: PSYCHIATRY & NEUROLOGY

## 2024-10-14 PROCEDURE — 99214 OFFICE O/P EST MOD 30 MIN: CPT | Mod: PBBFAC | Performed by: PSYCHIATRY & NEUROLOGY

## 2024-10-14 PROCEDURE — 3077F SYST BP >= 140 MM HG: CPT | Mod: CPTII,,, | Performed by: PSYCHIATRY & NEUROLOGY

## 2024-10-14 PROCEDURE — 1159F MED LIST DOCD IN RCRD: CPT | Mod: CPTII,,, | Performed by: PSYCHIATRY & NEUROLOGY

## 2024-10-14 PROCEDURE — 3008F BODY MASS INDEX DOCD: CPT | Mod: CPTII,,, | Performed by: PSYCHIATRY & NEUROLOGY

## 2024-10-14 PROCEDURE — 99999 PR PBB SHADOW E&M-EST. PATIENT-LVL IV: CPT | Mod: PBBFAC,,, | Performed by: PSYCHIATRY & NEUROLOGY

## 2024-10-14 PROCEDURE — 3080F DIAST BP >= 90 MM HG: CPT | Mod: CPTII,,, | Performed by: PSYCHIATRY & NEUROLOGY

## 2024-10-14 PROCEDURE — 99214 OFFICE O/P EST MOD 30 MIN: CPT | Mod: S$PBB,,, | Performed by: PSYCHIATRY & NEUROLOGY

## 2024-10-14 NOTE — PROGRESS NOTES
HPI: Staci Hodge is a 44 y.o. female with headaches and bilateral CTS, and prior complaints of dizziness. She has a history of PTSD, panic disorder with agoraphobia, insomnia, colon resection, due to small bowel obstruction, tobacco use, and breast cancer.      Here for follow up    Since the last visit with me, she has been seeing LINDSAY Woodall, in this clinic for increased migraines    She was started on Emgality in 5/2024    Migraines are improved.    She will have 2-3 a month      Slurred speech with headaches  not active      Fioriect PRN helps and she uses it rarely     Dizziness is noted at times / not improved with migraine treatment to date.     Neck pain/ myalgia  noted ongoing. Neck pain is bothersome    Left CTS is treated with brace     BP is mildly elevated      On propranolol with PCP for tachycardia    +Smoker    She has bipolar disorder which is treated by psychiatry, [facility]            I have reviewed all of this patient's past medical and surgical histories as well as family and social histories and active allergies and medications as documented in the electronic medical record.       Review of Systems   Constitutional:  Negative for fever.   HENT:  Negative for nosebleeds.    Eyes:  Negative for double vision.   Respiratory:  Negative for hemoptysis.    Cardiovascular:  Negative for leg swelling.   Gastrointestinal:  Negative for blood in stool.   Genitourinary:  Negative for hematuria.   Musculoskeletal:  Negative for falls.   Skin:  Negative for rash.   Neurological:  Positive for headaches.   Endo/Heme/Allergies:  Does not bruise/bleed easily.         Exam:  Gen Appearance, well developed/nourished in no apparent distress  CV: 2+ distal pulses with no edema or swelling  Neuro:  MS: Awake, alert, Sustains attention. Recent/remote memory intact, Language is full to spontaneous speech/repetition/naming/comprehension. Fund of Knowledge is full  CN: Optic discs are flat  with normal vasculature, PERRL, EOM full,  Normal facial sensation and strength, Hearing symmetric, Tongue and Palate are midline and strong. Shoulder Shrug symmetric and strong.  Motor: Normal bulk, tone, no abnormal movements at rest. 5/5 strength bilateral upper/lower extremities with 2+ reflexes and bilateral plantar response  Sensory: symmetric to temp, and vibration  Romberg negative  Cerebellar: Finger-nose,Heal-shin, Rapid alternating movements intact  Gait: Normal stance, no ataxia    Imaging:   10/5/2021 MRI Brain:   No MRI evidence of an acute intracranial abnormality.     MRI C-spine 12/2017:  Mild degenerative changes of the cervical spine; neural foraminal narrowing at the C5-6 and C6-7 levels.  No central canal stenosis.     MRI Brain with and without 2/3/2017: normal     24 hour Holter monitor: not completed     EMG BUE 2/10/2017:   Impression: Abnormal Study secondary to the Presence of:   Mild Bilateral Carpal Tunnel Syndrome.      EMG BUE 1/18/2018:  1. Improved right CTS findings post right CTR.   2. Slight worsening of the CTS findings.   3. No EP evidence of cervical radiculopathy found.      48 hour holter 10/2021:   Sinus rhythm  Normal heart rate behavior  Very rare ectopy  Symptoms correlating with sinus rhythm/tachycardia     There was an episode of chest pain reported. The corresponding rhythm strips revealed the following: the rhythm was sinus rhythm at 81 bpm with without ectopy.     There was an episode of Heart racing reported. The corresponding rhythm strips revealed the following: the rhythm was sinus tachycardia at 108 bpm with without ectopy.     There was an episode of Heart racing reported. The corresponding rhythm strips revealed the following: the rhythm was sinus tachycardia at 111 bpm with without ectopy.     EEG 2021:   Unremarkable    2021 MRI C spine: C6-7 degenerative disc changes resulting in mild spinal canal narrowing and mild bilateral neuroforaminal narrowing.   "Additional mild bilateral neuroforaminal narrowing at the C5-6 level.    2021 MRA brain: Normal MRA exam.    8/31/2022 CT head : No acute intracranial abnormality.     Labs:   5/2021 CBC and CMP overall unremarkable  9/2021 CBC, CMP, TSH, T4, B12 reviewed-elevated alkaline phosphatase  Assessment:   Staci Hodge is a 44 y.o. female  with neck pain without radiculopathy, chronic headaches and bilateral CTS, and prior complaints of dizziness.   Plan:   I recommend:  1. No longer on Lyrica for for fibromyalgia complaints.   -pain historically worsens with stress.      2. Nothing surgical on her 2021 MRI C-spine-mild changes only. She failed to respond to two different types of injections with pain management  prior, and did not have cervical radiculopathy on EMG.  -has left CTS/ and will see ortho about this/ brace not fully effective. CTS found on the left by 2018 EMG/NCS. She is s/p right CTR with good relief       3. No longer on Topamax  -Takes  prn Fioricet rarely  for migraines per PCP  Ubrelvy to be considered if worse  -Diclofenac, prescribed to abort headaches, was discontinued per Nephrology, given her proteinuria. Can't use NSAIDs due to Li use.        4. 2021 MRI Brain and EEG unremarkable.   -48 hour holter shows sinus tachycardia with episodes, but workup per cardiology unrevealing. Outside cardiology records noted in media   -Dizziness occured when neck is flexed.   2021 MRI C spine showed mild spinal stenosis. Refer to pain management per orders for continued neck pain   -Referred to PT but this was not needed  2021 MRA brain normal (for dizziness complaint)   -Dizziness is episodic /possibly migraine releated      5. Returned in 2022  with slurred speech in 3 episodes associated with headaches  -8/31/2022 CT head : No acute intracranial abnormality.  -She was seen by vascular neurology during the spell in the ER in 8/2022 who noted "slow and slurred speech and headache.  Speech not dysarthric " "(no apparent weakness of lingual, labial, or guttural muscle groups).  Consider migraine associated symptom"  -Has chronic migraine with possible speech aura and improved with better treatment of headaches/ suggesting complicated migraine symptoms  -Failed Topamax, is on Propranolol for tachycardia and can't have an TCA given BPD history  -Failed Aimovig and Qultipa   Emgality is greatly helpint to reduce headaches      6.  Smoker urged to quit. Tapering    RTC 1 year                                        "

## 2024-10-16 ENCOUNTER — OFFICE VISIT (OUTPATIENT)
Dept: ENDOCRINOLOGY | Facility: CLINIC | Age: 44
End: 2024-10-16
Payer: MEDICAID

## 2024-10-16 ENCOUNTER — LAB VISIT (OUTPATIENT)
Dept: LAB | Facility: HOSPITAL | Age: 44
End: 2024-10-16
Attending: STUDENT IN AN ORGANIZED HEALTH CARE EDUCATION/TRAINING PROGRAM
Payer: MEDICAID

## 2024-10-16 VITALS
HEIGHT: 62 IN | DIASTOLIC BLOOD PRESSURE: 82 MMHG | RESPIRATION RATE: 17 BRPM | SYSTOLIC BLOOD PRESSURE: 140 MMHG | WEIGHT: 198.69 LBS | BODY MASS INDEX: 36.56 KG/M2 | OXYGEN SATURATION: 98 % | HEART RATE: 119 BPM

## 2024-10-16 DIAGNOSIS — E04.9 GOITER: ICD-10-CM

## 2024-10-16 DIAGNOSIS — Z79.899 LITHIUM USE: Primary | ICD-10-CM

## 2024-10-16 DIAGNOSIS — E04.2 MULTIPLE THYROID NODULES: ICD-10-CM

## 2024-10-16 DIAGNOSIS — Z79.899 LITHIUM USE: ICD-10-CM

## 2024-10-16 DIAGNOSIS — E55.9 VITAMIN D DEFICIENCY: ICD-10-CM

## 2024-10-16 LAB
25(OH)D3+25(OH)D2 SERPL-MCNC: 29 NG/ML (ref 30–96)
ALBUMIN SERPL BCP-MCNC: 4.1 G/DL (ref 3.5–5.2)
ANION GAP SERPL CALC-SCNC: 11 MMOL/L (ref 8–16)
BUN SERPL-MCNC: 10 MG/DL (ref 6–20)
CA-I BLDV-SCNC: 1.22 MMOL/L (ref 1.06–1.42)
CALCIUM SERPL-MCNC: 10.3 MG/DL (ref 8.7–10.5)
CHLORIDE SERPL-SCNC: 102 MMOL/L (ref 95–110)
CO2 SERPL-SCNC: 27 MMOL/L (ref 23–29)
CREAT SERPL-MCNC: 0.8 MG/DL (ref 0.5–1.4)
EST. GFR  (NO RACE VARIABLE): >60 ML/MIN/1.73 M^2
GLUCOSE SERPL-MCNC: 109 MG/DL (ref 70–110)
PHOSPHATE SERPL-MCNC: 4.8 MG/DL (ref 2.7–4.5)
POTASSIUM SERPL-SCNC: 4.8 MMOL/L (ref 3.5–5.1)
PTH-INTACT SERPL-MCNC: 48.4 PG/ML (ref 9–77)
SODIUM SERPL-SCNC: 140 MMOL/L (ref 136–145)
T3 SERPL-MCNC: 129 NG/DL (ref 60–180)
T4 FREE SERPL-MCNC: 0.99 NG/DL (ref 0.71–1.51)
THYROGLOB AB SERPL IA-ACNC: <4 IU/ML (ref 0–3.9)
THYROPEROXIDASE IGG SERPL-ACNC: <6 IU/ML
TSH SERPL DL<=0.005 MIU/L-ACNC: 3.17 UIU/ML (ref 0.4–4)

## 2024-10-16 PROCEDURE — 84439 ASSAY OF FREE THYROXINE: CPT | Performed by: STUDENT IN AN ORGANIZED HEALTH CARE EDUCATION/TRAINING PROGRAM

## 2024-10-16 PROCEDURE — 83970 ASSAY OF PARATHORMONE: CPT | Performed by: STUDENT IN AN ORGANIZED HEALTH CARE EDUCATION/TRAINING PROGRAM

## 2024-10-16 PROCEDURE — 99214 OFFICE O/P EST MOD 30 MIN: CPT | Mod: PBBFAC | Performed by: STUDENT IN AN ORGANIZED HEALTH CARE EDUCATION/TRAINING PROGRAM

## 2024-10-16 PROCEDURE — 86800 THYROGLOBULIN ANTIBODY: CPT | Performed by: STUDENT IN AN ORGANIZED HEALTH CARE EDUCATION/TRAINING PROGRAM

## 2024-10-16 PROCEDURE — 82330 ASSAY OF CALCIUM: CPT | Performed by: STUDENT IN AN ORGANIZED HEALTH CARE EDUCATION/TRAINING PROGRAM

## 2024-10-16 PROCEDURE — 82306 VITAMIN D 25 HYDROXY: CPT | Performed by: STUDENT IN AN ORGANIZED HEALTH CARE EDUCATION/TRAINING PROGRAM

## 2024-10-16 PROCEDURE — 36415 COLL VENOUS BLD VENIPUNCTURE: CPT | Performed by: STUDENT IN AN ORGANIZED HEALTH CARE EDUCATION/TRAINING PROGRAM

## 2024-10-16 PROCEDURE — 99999 PR PBB SHADOW E&M-EST. PATIENT-LVL IV: CPT | Mod: PBBFAC,,, | Performed by: STUDENT IN AN ORGANIZED HEALTH CARE EDUCATION/TRAINING PROGRAM

## 2024-10-16 PROCEDURE — 84480 ASSAY TRIIODOTHYRONINE (T3): CPT | Performed by: STUDENT IN AN ORGANIZED HEALTH CARE EDUCATION/TRAINING PROGRAM

## 2024-10-16 PROCEDURE — 84443 ASSAY THYROID STIM HORMONE: CPT | Performed by: STUDENT IN AN ORGANIZED HEALTH CARE EDUCATION/TRAINING PROGRAM

## 2024-10-16 PROCEDURE — 80069 RENAL FUNCTION PANEL: CPT | Performed by: STUDENT IN AN ORGANIZED HEALTH CARE EDUCATION/TRAINING PROGRAM

## 2024-10-16 PROCEDURE — 86376 MICROSOMAL ANTIBODY EACH: CPT | Performed by: STUDENT IN AN ORGANIZED HEALTH CARE EDUCATION/TRAINING PROGRAM

## 2024-10-16 NOTE — PROGRESS NOTES
"Subjective:      Patient ID: Staci Hodge is a 44 y.o. female.    Chief Complaint:  Thyroid nodules    History of Present Illness  This is a 44 y.o. female. with a past medical history of Peutz-Jeghers syndrome, breast cancer, bipolar disorder on lithium here for evaluation of thyroid nodules.    Thyroid nodules  First noted to have thyroid nodules in 2024    Lab Results   Component Value Date    TSH 3.168 10/16/2024       No family history of thyroid cancer or head/neck irradiation.     Denies neck swelling, dysphagia or hoarseness.  She reports had a recent episode of neck pain and dysphagia but resolved    Thyroid US (Sep/2024)  The thyroid is normal in size.    Right lobe of the thyroid measures 4.9 x 1.5 x 1.4 cm.    Left lobe of the thyroid measures 4.4 x 1.3 x 1.4 cm.    Tiny cystic nodule in the mid right thyroid lobe measures 0.2 cm.    Cystic nodule in the mid aspect of the left thyroid lobe measures 0.2 cm.    Solid hypoechoic nodule in the inferior aspect of the left thyroid lobe measures 0.5 cm.    The thyroid is slightly heterogenous in appearance.  Cervical lymph nodes demonstrate normal morphology and size.  Impression:  Imaging findings in keeping with a multinodular thyroid goiter.  No nodule meets size criteria suggestive for further evaluation with FNA.    Review of Systems  As above    Social and family history reviewed  Current medications and allergies reviewed    Objective:   BP (!) 140/82   Pulse (!) 119   Resp 17   Ht 5' 2" (1.575 m)   Wt 90.1 kg (198 lb 11.2 oz)   LMP 03/01/2021   SpO2 98%   BMI 36.34 kg/m²   Physical Exam  Alert, oriented    BP Readings from Last 1 Encounters:   10/16/24 (!) 140/82      Wt Readings from Last 1 Encounters:   10/16/24 0935 90.1 kg (198 lb 11.2 oz)     Body mass index is 36.34 kg/m².    Lab Review:   Lab Results   Component Value Date    HGBA1C 5.3 10/09/2023     Lab Results   Component Value Date    CHOL 227 (H) 10/09/2023    HDL 56 " 10/09/2023    LDLCALC 133.6 10/09/2023    TRIG 187 (H) 10/09/2023    CHOLHDL 24.7 10/09/2023     Lab Results   Component Value Date     10/16/2024    K 4.8 10/16/2024     10/16/2024    CO2 27 10/16/2024     10/16/2024    BUN 10 10/16/2024    CREATININE 0.8 10/16/2024    CALCIUM 10.3 10/16/2024    PROT 7.8 02/23/2024    ALBUMIN 4.1 10/16/2024    BILITOT 0.3 02/23/2024    ALKPHOS 183 (H) 02/23/2024    AST 21 02/23/2024    ALT 44 02/23/2024    ANIONGAP 11 10/16/2024    ESTGFRAFRICA >60 04/18/2022    EGFRNONAA >60 04/18/2022    TSH 3.168 10/16/2024       All pertinent labs reviewed    Assessment and Plan     Multiple thyroid nodules  Very small sub centimeter mostly cystic nodules  Very low risk of malignancy, no FNA indicated  F/u in 1 year with repeat US, if stable, can space out US checks    Vitamin D deficiency  Currently on ergocalciferol 50,000 IU weekly   Recheck levels    Lithium use  Check TSH, PTH, calcium      Follow-up in 1 year    Eliot Camarillo MD  Endocrinology

## 2024-10-16 NOTE — ASSESSMENT & PLAN NOTE
Very small sub centimeter mostly cystic nodules  Very low risk of malignancy, no FNA indicated  F/u in 1 year with repeat US, if stable, can space out US checks

## 2024-10-17 ENCOUNTER — PATIENT MESSAGE (OUTPATIENT)
Dept: ENDOCRINOLOGY | Facility: CLINIC | Age: 44
End: 2024-10-17
Payer: MEDICAID

## 2024-10-28 ENCOUNTER — TELEPHONE (OUTPATIENT)
Dept: ORTHOPEDICS | Facility: CLINIC | Age: 44
End: 2024-10-28
Payer: MEDICAID

## 2024-10-30 ENCOUNTER — TELEPHONE (OUTPATIENT)
Dept: ORTHOPEDICS | Facility: CLINIC | Age: 44
End: 2024-10-30
Payer: MEDICAID

## 2024-10-30 DIAGNOSIS — R52 PAIN: Primary | ICD-10-CM

## 2024-10-31 ENCOUNTER — OFFICE VISIT (OUTPATIENT)
Dept: ORTHOPEDICS | Facility: CLINIC | Age: 44
End: 2024-10-31
Payer: MEDICAID

## 2024-10-31 ENCOUNTER — OFFICE VISIT (OUTPATIENT)
Dept: PAIN MEDICINE | Facility: CLINIC | Age: 44
End: 2024-10-31
Payer: MEDICAID

## 2024-10-31 ENCOUNTER — HOSPITAL ENCOUNTER (OUTPATIENT)
Dept: RADIOLOGY | Facility: HOSPITAL | Age: 44
Discharge: HOME | End: 2024-10-31
Attending: PHYSICIAN ASSISTANT
Payer: MEDICAID

## 2024-10-31 VITALS
DIASTOLIC BLOOD PRESSURE: 82 MMHG | SYSTOLIC BLOOD PRESSURE: 140 MMHG | BODY MASS INDEX: 36.25 KG/M2 | RESPIRATION RATE: 18 BRPM | HEART RATE: 114 BPM | WEIGHT: 197 LBS | HEIGHT: 62 IN | OXYGEN SATURATION: 99 %

## 2024-10-31 VITALS — BODY MASS INDEX: 35.48 KG/M2 | WEIGHT: 194 LBS

## 2024-10-31 DIAGNOSIS — M25.78 DEGENERATION OF INTERVERTEBRAL DISC OF CERVICAL REGION WITH OSTEOPHYTE OF CERVICAL VERTEBRA: ICD-10-CM

## 2024-10-31 DIAGNOSIS — M54.2 CHRONIC NECK PAIN: ICD-10-CM

## 2024-10-31 DIAGNOSIS — M48.02 CERVICAL SPINAL STENOSIS: ICD-10-CM

## 2024-10-31 DIAGNOSIS — M50.30 DEGENERATION OF INTERVERTEBRAL DISC OF CERVICAL REGION WITH OSTEOPHYTE OF CERVICAL VERTEBRA: ICD-10-CM

## 2024-10-31 DIAGNOSIS — M79.18 CERVICAL MYOFASCIAL PAIN SYNDROME: Primary | ICD-10-CM

## 2024-10-31 DIAGNOSIS — G56.02 LEFT CARPAL TUNNEL SYNDROME: Primary | ICD-10-CM

## 2024-10-31 DIAGNOSIS — R52 PAIN: ICD-10-CM

## 2024-10-31 DIAGNOSIS — M47.812 ARTHROPATHY OF CERVICAL FACET JOINT: ICD-10-CM

## 2024-10-31 DIAGNOSIS — G89.29 CHRONIC NECK PAIN: ICD-10-CM

## 2024-10-31 PROCEDURE — 99999 PR PBB SHADOW E&M-EST. PATIENT-LVL IV: CPT | Mod: PBBFAC,,, | Performed by: ANESTHESIOLOGY

## 2024-10-31 PROCEDURE — 99214 OFFICE O/P EST MOD 30 MIN: CPT | Mod: PBBFAC,25 | Performed by: ANESTHESIOLOGY

## 2024-10-31 PROCEDURE — 99213 OFFICE O/P EST LOW 20 MIN: CPT | Mod: S$PBB,,, | Performed by: PHYSICIAN ASSISTANT

## 2024-10-31 PROCEDURE — 99999 PR PBB SHADOW E&M-EST. PATIENT-LVL V: CPT | Mod: PBBFAC,,, | Performed by: PHYSICIAN ASSISTANT

## 2024-10-31 PROCEDURE — 73130 X-RAY EXAM OF HAND: CPT | Mod: 26,LT,, | Performed by: RADIOLOGY

## 2024-10-31 PROCEDURE — 3077F SYST BP >= 140 MM HG: CPT | Mod: CPTII,,, | Performed by: PHYSICIAN ASSISTANT

## 2024-10-31 PROCEDURE — 73130 X-RAY EXAM OF HAND: CPT | Mod: TC,LT

## 2024-10-31 PROCEDURE — 99215 OFFICE O/P EST HI 40 MIN: CPT | Mod: PBBFAC,25,27 | Performed by: PHYSICIAN ASSISTANT

## 2024-10-31 PROCEDURE — 1160F RVW MEDS BY RX/DR IN RCRD: CPT | Mod: CPTII,,, | Performed by: PHYSICIAN ASSISTANT

## 2024-10-31 PROCEDURE — 1159F MED LIST DOCD IN RCRD: CPT | Mod: CPTII,,, | Performed by: PHYSICIAN ASSISTANT

## 2024-10-31 PROCEDURE — 3079F DIAST BP 80-89 MM HG: CPT | Mod: CPTII,,, | Performed by: PHYSICIAN ASSISTANT

## 2024-10-31 PROCEDURE — 3008F BODY MASS INDEX DOCD: CPT | Mod: CPTII,,, | Performed by: PHYSICIAN ASSISTANT

## 2024-10-31 RX ORDER — MUPIROCIN 20 MG/G
OINTMENT TOPICAL
OUTPATIENT
Start: 2024-10-31

## 2024-10-31 NOTE — H&P (VIEW-ONLY)
Subjective:      Patient ID: Staci Hodge is a 44 y.o. female.    Chief Complaint: Pain of the Left Hand    Review of patient's allergies indicates:  No Known Allergies   43 yo F presents to clinic with c/o intermittent numbness/tingling to left hand x years.  Numbness/tingling to hand, thumb, index finger, and middle finger.  Symptoms worse at night and when she wakes up in the morning.  She has previously tried bracing and carpal tunnel injection with little relief.  She would now like to consider surgery.    EMG showed mild bilateral carpal tunnel.  She has previously had right carpal tunnel release and ulnar nerve decompression with resolution of right sided symptoms.  She does also have history of neck pain, currently seeing pain management for this.        Review of Systems   Constitutional: Negative for chills, diaphoresis and fever.   HENT:  Negative for congestion, ear discharge and ear pain.    Eyes:  Negative for blurred vision, discharge, double vision and pain.   Cardiovascular:  Negative for chest pain, claudication and cyanosis.   Respiratory:  Negative for cough, hemoptysis and shortness of breath.    Endocrine: Negative for cold intolerance and heat intolerance.   Skin:  Negative for color change, dry skin, itching and rash.   Musculoskeletal:  Negative for arthritis, back pain, falls, gout, joint pain, joint swelling, muscle weakness and neck pain.   Gastrointestinal:  Negative for abdominal pain and change in bowel habit.   Neurological:  Positive for numbness and paresthesias. Negative for brief paralysis, disturbances in coordination and dizziness.   Psychiatric/Behavioral:  Negative for altered mental status and depression.          Objective:          General    Constitutional: She is oriented to person, place, and time. She appears well-developed and well-nourished. No distress.   HENT:   Head: Atraumatic.   Eyes: EOM are normal. Right eye exhibits no discharge. Left eye exhibits no  discharge.   Cardiovascular:  Normal rate.            Pulmonary/Chest: Effort normal. No respiratory distress.   Abdominal: Soft.   Neurological: She is alert and oriented to person, place, and time.   Psychiatric: She has a normal mood and affect. Her behavior is normal.             Right Hand/Wrist Exam     Inspection   Scars: Wrist - absent Hand -  absent  Effusion: Wrist - absent Hand -  absent  Bruising: Wrist - absent Hand -  absent  Deformity: Wrist - deformity Hand -  deformity    Range of Motion     Wrist   Extension:  normal   Flexion:  normal   Pronation:  normal   Supination:  normal     Tests   Phalens Sign: negative  Tinel's sign (median nerve): negative  Finkelstein's test: negative  Carpal Tunnel Compression Test: negative  Cubital Tunnel Compression Test: negative      Other     Neuorologic Exam    Median Distribution: normal  Ulnar Distribution: normal  Radial Distribution: normal      Left Hand/Wrist Exam     Inspection   Scars: Wrist - absent Hand -  absent  Effusion: Wrist - absent Hand -  absent  Bruising: Wrist - absent Hand -  absent  Deformity: Wrist - absent Hand -  absent    Range of Motion     Wrist   Extension:  normal   Flexion:  normal   Pronation:  normal   Supination:  normal     Tests   Phalens sign: positive  Tinel's sign (median nerve): negative  Finkelstein's test: negative  Carpal Tunnel Compression Test: positive  Cubital Tunnel Compression Test: negative      Other     Sensory Exam  Median Distribution: normal  Ulnar Distribution: normal  Radial Distribution: normal    Comments:  No tenderness, swelling, erythema, warmth      Right Elbow Exam     Tests   Tinel's sign (cubital tunnel): negative      Left Elbow Exam     Tests   Tinel's sign (cubital tunnel): negative        Vascular Exam       Capillary Refill  Right Hand: normal capillary refill  Left Hand: normal capillary refill                  Assessment:           EMG BUE 2/10/17:  Mild Bilateral Carpal Tunnel  Syndrome      Xray Left Hand 10/31/24:  No evidence of fracture.No significant degenerative changes.     Encounter Diagnosis   Name Primary?    Left carpal tunnel syndrome Yes    Left carpal tunnel syndrome  -     Ambulatory referral/consult to Orthopedics  -     Case Request Operating Room: RELEASE, CARPAL TUNNEL  -     Vital signs; Standing  -     Cleanse with Chlorhexidine (CHG); Standing  -     Diet NPO; Standing  -     Place LAURIE hose; Standing  -     Place sequential compression device; Standing  -     Chlorohexidine Gluconate Bath; Standing  -     Full code; Standing  -     Place in Outpatient; Standing    Other orders  -     IP VTE LOW RISK PATIENT; Standing  -     mupirocin 2 % ointment  -     ceFAZolin 2 g in D5W 50 mL IVPB (MB+)               Plan:         I made the decision to obtain old records of the patient including previous notes and imaging. New imaging was ordered today of the extremity or extremities evaluated.     The total face-to-face encounter time with this patient was 30 minutes and greater than 50% of of the encounter time was spent counseling the patient, coordinating care, and education regarding the pathology of his/her diagnosis. We have discussed a variety of treatment options including medications, bracing, nerve glide exercises, injections, occupational therapy and surgery. Pt is requesting surgical treatment at this time. We discussed the procedure and risks and benefits of surgery, and patient elects to proceed.    1. Left carpal tunnel release with Dr. Bosch. Consents signed today in clinic.  2. Wrist brace to be worn while sleeping and as needed during the day.  3. RTC for post op visit, sooner if needed.      Patient voices understanding of and agreement with treatment plan. All of the patient's questions were answered and the patient will contact us if she has any questions or concerns in the interim.

## 2024-11-08 ENCOUNTER — HOSPITAL ENCOUNTER (OUTPATIENT)
Dept: RADIOLOGY | Facility: HOSPITAL | Age: 44
Discharge: HOME | End: 2024-11-08
Attending: ANESTHESIOLOGY
Payer: MEDICAID

## 2024-11-08 DIAGNOSIS — M48.02 CERVICAL SPINAL STENOSIS: ICD-10-CM

## 2024-11-08 PROCEDURE — 72141 MRI NECK SPINE W/O DYE: CPT | Mod: TC

## 2024-11-08 PROCEDURE — 72141 MRI NECK SPINE W/O DYE: CPT | Mod: 26,,, | Performed by: RADIOLOGY

## 2024-11-08 PROCEDURE — 72050 X-RAY EXAM NECK SPINE 4/5VWS: CPT | Mod: TC

## 2024-11-11 ENCOUNTER — CLINICAL SUPPORT (OUTPATIENT)
Dept: REHABILITATION | Facility: HOSPITAL | Age: 44
End: 2024-11-11
Attending: ANESTHESIOLOGY
Payer: MEDICAID

## 2024-11-11 DIAGNOSIS — M48.02 CERVICAL SPINAL STENOSIS: ICD-10-CM

## 2024-11-11 DIAGNOSIS — E55.9 VITAMIN D DEFICIENCY: ICD-10-CM

## 2024-11-11 PROCEDURE — 97161 PT EVAL LOW COMPLEX 20 MIN: CPT | Mod: PN

## 2024-11-11 PROCEDURE — 97110 THERAPEUTIC EXERCISES: CPT | Mod: PN

## 2024-11-11 RX ORDER — ERGOCALCIFEROL 1.25 MG/1
50000 CAPSULE ORAL
Qty: 12 CAPSULE | Refills: 1 | Status: SHIPPED | OUTPATIENT
Start: 2024-11-11

## 2024-11-11 NOTE — PLAN OF CARE
[facility]   Physical Therapy Initial Evaluation      Name: Staci Hodge  Clinic Number: [id number]    Therapy Diagnosis:   Encounter Diagnosis   Name Primary?    Cervical spinal stenosis         Physician: Shahid Almonte MD    Physician Orders: PT Eval and Treat   Medical Diagnosis from Referral: M48.02 (ICD-10-CM) - Cervical spinal stenosis  Evaluation Date: 11/11/2024  Authorization Period Expiration: 10/31/2025  Plan of Care Expiration: 12/31/2024  Progress Note Due: VISIT 3  Date of Surgery: carpal tunnel scheduled 12/20218  Visit # / Visits authorized: 1/ 1   FOTO: 1/ 3    Precautions: Standard     Time In: 1345  Time Out: 1420  Total Billable Time: 35 minutes    Subjective     Date of onset: years, 2017 had injections     History of current condition - Staci reports: she had no relief with injections. For the last few months it has been getting progressive worse. She states it is mostly in the middle of her lower neck, she finds her pain is skewed more to her left side. She states her pain is fairly constant but turning her head mostly towards the right will increase her pain. She does have interrupted sleep. Unable to identify any issue to account for increase in pain.     Falls: none    Imaging: MRI studies: Narrative & Impression  EXAMINATION:  MRI CERVICAL SPINE WITHOUT CONTRAST     CLINICAL HISTORY:  Neck pain, chronic, degenerative changes on xray; Spinal stenosis, cervical region     TECHNIQUE:  Multiplanar, multisequence MR images of the cervical spine were performed without the administration of contrast.     COMPARISON:  Cervical spine radiographs 11/08/2024, MRI cervical spine 12/08/2021     FINDINGS:  Alignment: Straightening of the cervical lordosis, which may be secondary to positioning.  No abnormal subluxation.     Vertebral Column: Vertebral body heights are maintained. No acute fracture is identified; however, if trauma is suspected, a CT scan would be a  more sensitive examination for fractures. No evidence of an aggressive marrow replacement process. Stable probable hemangioma within the T1 in T3 vertebral bodies.  Multilevel disc degeneration with intervertebral disc space narrowing, disc desiccation, and posterior disc osteophyte complexes, most pronounced at C5-6 and C6-7 with moderate disc space narrowing.     Cord: Normal signal and morphology.     Skull base and craniocervical junction: Normal.     Degenerative findings:     C2-C3: The disc is normal in configuration.  No significant neural foraminal or spinal canal stenosis.     C3-C4: Mild posterior disc osteophyte complex.  Mild left greater than right uncovertebral joint spurring.   No significant neural foraminal or spinal canal stenosis.     C4-C5: Minimal posterior disc osteophyte complex.  Mild bilateral facet arthropathy.   No significant neural foraminal or spinal canal stenosis.     C5-C6: Moderate disc space narrowing.  Posterior disc osteophyte complex.  Mild bilateral facet arthropathy.  Marked bilateral uncovertebral joint spurring.  Moderate to severe bilateral neural foraminal stenosis.  Mild spinal canal stenosis.     C6-C7: Moderate disc space narrowing.  Posterior disc osteophyte complex.  Mild bilateral facet arthropathy.  Moderate left and mild right uncovertebral joint spurring.  Moderate left and mild right neural foraminal stenosis.  Mild spinal canal stenosis.     C7-T1: Mild posterior disc osteophyte complex.  Mild bilateral facet arthropathy.  Mild right uncovertebral joint spurring.  Mild right neural foraminal stenosis.  No spinal canal stenosis.     Paraspinal muscles & soft tissues: No significant abnormality.     Normal signal voids are present in the vertebral arteries.     Impression:     Mild-to-moderate degenerative changes of the cervical spine, as detailed above, most pronounced at C5-6 and C6-C7 and resulting in mild spinal canal stenosis and moderate to severe  "bilateral neural foraminal stenosis at C5-6 and moderate left neural foraminal stenosis at C6-7.        Electronically signed by: Joe Max  Date:                                            11/09/2024  Time:                                           10:49    Prior Therapy: only for her carpal tunnel   Social History:  lives with their family, needs no assist.   Occupation:  when employed   Current Level of Function: pain with activities.     Pain:  Current 4/10, worst 9/10, best 3/10   Location: bilateral neck    Description: Aching and Shooting, burning in her neck   Aggravating Factors: looking up has a shocking pain  Easing Factors: laying down     Patients goals: " to get her pain down"     Medical History:   Past Medical History:   Diagnosis Date    Abnormal Pap smear of cervix age 25    no treatement    Anxiety     Breast cancer     Cancer 07/11/2018    breast cancer    Cervical spondylosis 2/9/2018    Colon polyp     Depression     Ectopic pregnancy 2/26/2019    General anesthetics causing adverse effect in therapeutic use     Slow to awaken/ Memory problems-post-op to day 3 after Mastectomy    Headache     History of psychiatric hospitalization     age 20 for SI    Hx of psychiatric care     Hypertension     Peutz-Jeghers syndrome     PONV (postoperative nausea and vomiting)     Psychiatric problem     Right carpal tunnel syndrome 3/9/2017    Self-harming behavior     Suicide attempt     Therapy        Surgical History:   Staci Hodge  has a past surgical history that includes Appendectomy; Adenoidectomy; Tonsillectomy; intestinal polpy; Colonoscopy; Upper gastrointestinal endoscopy; Small intestine surgery; Bowel resection (10/17/14); Carpal tunnel release (Bilateral); Carpal tunnel release; Dilation and curettage of uterus; Breast biopsy (Left, 06/04/2018); Bilateral mastectomy (Bilateral, 7/23/2018); Injection for sentinel node identification (Left, 7/23/2018); [place] lymph node " biopsy (Left, 7/23/2018); Insertion of breast tissue expander (Bilateral, 7/23/2018); Fat transfer (Bilateral, 11/12/2018); Breast surgery; Dilation and curettage of uterus using suction (N/A, 2/25/2019); Breast reconstruction; Mastectomy; Augmentation of breast; Breast capsulotomy (Left, 4/25/2019); Removal of breast implant (Bilateral, 3/10/2020); Insertion of breast tissue expander (Bilateral, 3/10/2020); Removal of breast implant (Bilateral, 7/7/2020); Esophagogastroduodenoscopy (N/A, 1/15/2021); Endoscopic ultrasound of upper gastrointestinal tract (Left, 1/15/2021); Colonoscopy (N/A, 2/25/2021); Total abdominal hysterectomy (N/A, 3/4/2021); Bilateral salpingo-oophorectomy (BSO) (Bilateral, 3/4/2021); Lysis of adhesions (N/A, 3/4/2021); Antegrade single balloon enteroscopy (N/A, 4/19/2021); Esophagogastroduodenoscopy (N/A, 5/17/2021); Esophagogastroduodenoscopy (N/A, 1/25/2022); Esophagogastroduodenoscopy (N/A, 3/10/2023); Esophagogastroduodenoscopy (N/A, 3/22/2024); Endoscopic ultrasound of upper gastrointestinal tract (N/A, 4/26/2024); and Colonoscopy (N/A, 7/8/2024).    Medications:   Staci has a current medication list which includes the following prescription(s): butalbital-acetaminophen-caffeine -40 mg, clonazepam, ergocalciferol, folic acid, emgality pen, lisdexamfetamine, lithium, montelukast, pantoprazole, propranolol, sucralfate, trazodone, [DISCONTINUED] albuterol, [DISCONTINUED] duloxetine, [DISCONTINUED] gabapentin, and [DISCONTINUED] paroxetine, and the following Facility-Administered Medications: 0.9% nacl and sodium chloride 0.9%.    Allergies:   Review of patient's allergies indicates:  No Known Allergies     Objective      Observation: no outward signs of distress,  hesitant to perform head movements.     Posture:  moderate forward head  and rounded shoulders     Cervical Range of Motion:    Degrees Pain   Flexion 20 **     Extension 20 *     Right Rotation 20 *     Left Rotation 20  "*     Right Side Bending 40 *   Left Side Bending 40 *      Shoulder Range of Motion:   Shoulder Left Right   Flexion Within Normal Limits  Within Normal Limits    Abduction " "   ER " "   IR " "       Upper Extremity Strength  (R) UE  (L) UE    Shoulder flexion: 5/5 Shoulder flexion: 5/5   Shoulder Abduction: 5/5 Shoulder abduction: 5/5   Shoulder ER 4/5 Shoulder ER 4/5   Shoulder IR 5/5 Shoulder IR 5/5   Elbow flexion: 5/5 Elbow flexion: 5/5   Elbow extension: 5/5 Elbow extension: 5/5   Wrist flexion: 5/5 Wrist flexion: 5/5         Special Tests:  Distraction +   Compression +   Spurlings -             Joint Mobility:    PA's within functional limits      Thoracic mobility: within functional limits     Palpation: tenderness, trigger point noted on left trap       Sensation: light touch intact       Intake Outcome Measure for FOTO Cervical Survey    Therapist reviewed FOTO scores for Staci Hodge on 11/11/2024.   FOTO report - see Media section or FOTO account episode details.    Intake Score: 52%         Treatment     Total Treatment time (time-based codes) separate from Evaluation: 15 minutes     Staci received the treatments listed below:      therapeutic exercises to develop strength, endurance, ROM, flexibility, posture, and core stabilization for 15 minutes including:  Towel cervical extension x 5   Towel cervical rotation x 3   Chin tuck in sitting x 10   Pt was educated on Functional Dry Needling. Pt provided written and verbal consent to treatment. Pt received FDN to left upper trap.  Used 40mm needle and performed in/out. Pt was issued educational handout on what to expect following treatment.       Patient Education and Home Exercises     Education provided:   - PT plan of care and goals for therapy     Written Home Exercises Provided: yes. Exercises were reviewed and Staci was able to demonstrate them prior to the end of the session.  Staci demonstrated good  understanding of the " education provided. See EMR under Patient Instructions for exercises provided during therapy sessions.    Assessment     Staci is a 44 y.o. female referred to outpatient Physical Therapy with a medical diagnosis of M48.02 (ICD-10-CM) - Cervical spinal stenosis. Patient presents with decrease range of motion, pain with function.     Patient prognosis is Excellent.   Patient will benefit from skilled outpatient Physical Therapy to address the deficits stated above and in the chart below, provide patient /family education, and to maximize patientt's level of independence.     Plan of care discussed with patient: Yes  Patient's spiritual, cultural and educational needs considered and patient is agreeable to the plan of care and goals as stated below:     Anticipated Barriers for therapy: none    Medical Necessity is demonstrated by the following  History  Co-morbidities and personal factors that may impact the plan of care [x] LOW: no personal factors / co-morbidities  [] MODERATE: 1-2 personal factors / co-morbidities  [] HIGH: 3+ personal factors / co-morbidities    Moderate / High Support Documentation:   Co-morbidities affecting plan of care: carpal tunnel surgeries    Personal Factors:   lifestyle     Examination  Body Structures and Functions, activity limitations and participation restrictions that may impact the plan of care [x] LOW: addressing 1-2 elements  [] MODERATE: 3+ elements  [] HIGH: 4+ elements (please support below)    Moderate / High Support Documentation: range of motion      Clinical Presentation [x] LOW: stable  [] MODERATE: Evolving  [] HIGH: Unstable     Decision Making/ Complexity Score: low       Goals:  Short Term Goals (3 Weeks):   1. Patient will be independent with home exercise program to supplement therapy sessions in improving pain free mobility.  2. Patient will improve cervical  active range of motion 10 degrees to improve mobility for tracking.   3. Patient will improve upper  extremity manual muscle tests by 1/2 grade in all planes to improve strength for lifting groceries.  4. Patient will report pain <6/10 pain at its highest in the last week.   Long Term Goals (6 Weeks):   1. Patient will improve FOTO Survey score to </=44% limitation to improve perceived limitation with changing and maintaining body positions.  2. Patient will improve cervical by 25  active range of motion  to improve mobility for driving.  3. Patient will improve upper extremity manual muscle tests 1 grade in all planes to improve strength for lifting and carrying tasks.  4. Patient will report no increase pain with head turning. .   5. Patient will report no pain with cervical active range of motion in all planes to promote unlimited functional mobility.  Plan     Plan of care Certification: 11/11/2024 to 12/31/2024.    Outpatient Physical Therapy 2 times weekly for 6 weeks to include the following interventions: Cervical/Lumbar Traction, Electrical Stimulation  , Gait Training, Manual Therapy, Moist Heat/ Ice, Neuromuscular Re-ed, Orthotic Management and Training, Patient Education, Self Care, Therapeutic Activities, and Therapeutic Exercise.     Mey Newton PT        Physician's Signature: _________________________________________ Date: ________________

## 2024-11-14 ENCOUNTER — CLINICAL SUPPORT (OUTPATIENT)
Dept: REHABILITATION | Facility: HOSPITAL | Age: 44
End: 2024-11-14
Payer: MEDICAID

## 2024-11-14 DIAGNOSIS — G43.009 MIGRAINE WITHOUT AURA AND WITHOUT STATUS MIGRAINOSUS, NOT INTRACTABLE: ICD-10-CM

## 2024-11-14 DIAGNOSIS — M48.02 CERVICAL SPINAL STENOSIS: Primary | ICD-10-CM

## 2024-11-14 PROCEDURE — 97110 THERAPEUTIC EXERCISES: CPT | Mod: PN

## 2024-11-14 PROCEDURE — 20561 NDL INSJ W/O NJX 3+ MUSC: CPT | Mod: PN

## 2024-11-14 NOTE — PROGRESS NOTES
[facility]   Physical Therapy Treatment Note      Name: Staci Hodge  Clinic Number: [id number]    Therapy Diagnosis: No diagnosis found.  Physician: Shahid Almonte MD    Visit Date: 11/14/2024    Physician Orders: PT Eval and Treat   Medical Diagnosis from Referral: M48.02 (ICD-10-CM) - Cervical spinal stenosis  Evaluation Date: 11/11/2024  Authorization Period Expiration: 10/31/2025  Plan of Care Expiration: 12/31/2024  Progress Note Due: VISIT 3  Date of Surgery: carpal tunnel scheduled 12/20218  Visit # / Visits authorized: 1/ 1   FOTO: 1/ 3     Precautions: Standard      Time In: 1345  Time Out: 1425  Total Billable Time: 35 minutes    PTA Visit #: -/5       Subjective     Patient reports: that she had no change after her first visit.  She was compliant with home exercise program.  Response to previous treatment: positive  Functional change: minimal    Pain: 6/10  Location: left shoulder      Objective      Objective Measures updated at progress report unless specified.     Treatment     Staci received the treatments listed below:      therapeutic exercises to develop strength, endurance, and ROM for 20 minutes including:  -Tbar flexion 2 x 10 2#  -cervical rotations 2 x 10   -Chin tuck in supine 1 x 10   -seated Bruegger's with YTB 2 x 10  Pt was educated on Functional Dry Needling. Pt provided written and verbal consent to treatment. Pt received FDN to left upper trap.  Used 40mm needle and performed in/out. Pt was issued educational handout on what to expect following treatment.     manual therapy techniques: Soft tissue Mobilization were applied to the: cervical spine for 10 minutes, including:  -PROM cervical rotation, UT stretch, STM      hot pack for 5 minutes to cervical spine in supine.    cold pack for  minutes to .    Patient Education and Home Exercises       Education provided:   - Cont HEP    Written Home Exercises Provided: yes. Exercises were reviewed and  Staci was able to demonstrate them prior to the end of the session.  Staci demonstrated good  understanding of the education provided. See Electronic Medical Record under Patient Instructions for exercises provided during therapy sessions    Assessment     Patient tolerated FDN well today with no increased symptoms. She tolerated new there ex well.    Staci Is progressing well towards her goals.   Patient prognosis is Good.     Patient will continue to benefit from skilled outpatient physical therapy to address the deficits listed in the problem list box on initial evaluation, provide pt/family education and to maximize pt's level of independence in the home and community environment.     Patient's spiritual, cultural and educational needs considered and pt agreeable to plan of care and goals.     Anticipated barriers to physical therapy: none    Goals: Short Term Goals (3 Weeks):   1. Patient will be independent with home exercise program to supplement therapy sessions in improving pain free mobility.  2. Patient will improve cervical  active range of motion 10 degrees to improve mobility for tracking.   3. Patient will improve upper extremity manual muscle tests by 1/2 grade in all planes to improve strength for lifting groceries.  4. Patient will report pain <6/10 pain at its highest in the last week.   Long Term Goals (6 Weeks):   1. Patient will improve FOTO Survey score to </=44% limitation to improve perceived limitation with changing and maintaining body positions.  2. Patient will improve cervical by 25  active range of motion  to improve mobility for driving.  3. Patient will improve upper extremity manual muscle tests 1 grade in all planes to improve strength for lifting and carrying tasks.  4. Patient will report no increase pain with head turning. .   5. Patient will report no pain with cervical active range of motion in all planes to promote unlimited functional mobility.  Plan      Plan of care  Certification: 11/11/2024 to 12/31/2024.     Outpatient Physical Therapy 2 times weekly for 6 weeks to include the following interventions: Cervical/Lumbar Traction, Electrical Stimulation  , Gait Training, Manual Therapy, Moist Heat/ Ice, Neuromuscular Re-ed, Orthotic Management and Training, Patient Education, Self Care, Therapeutic Activities, and Therapeutic Exercise.       Marixa Corbett, PT

## 2024-11-15 ENCOUNTER — HOSPITAL ENCOUNTER (OUTPATIENT)
Dept: PULMONOLOGY | Facility: HOSPITAL | Age: 44
Discharge: HOME | End: 2024-11-15
Attending: ORTHOPAEDIC SURGERY
Payer: MEDICAID

## 2024-11-15 DIAGNOSIS — Z01.818 PREOPERATIVE TESTING: ICD-10-CM

## 2024-11-15 PROCEDURE — 93005 ELECTROCARDIOGRAM TRACING: CPT

## 2024-11-15 PROCEDURE — 93010 ELECTROCARDIOGRAM REPORT: CPT | Mod: ,,, | Performed by: INTERNAL MEDICINE

## 2024-11-15 RX ORDER — BUTALBITAL, ACETAMINOPHEN AND CAFFEINE 50; 325; 40 MG/1; MG/1; MG/1
1 TABLET ORAL EVERY 6 HOURS PRN
Qty: 30 TABLET | Refills: 5 | Status: SHIPPED | OUTPATIENT
Start: 2024-11-15

## 2024-11-18 LAB
OHS QRS DURATION: 78 MS
OHS QTC CALCULATION: 471 MS

## 2024-11-19 ENCOUNTER — CLINICAL SUPPORT (OUTPATIENT)
Dept: REHABILITATION | Facility: HOSPITAL | Age: 44
End: 2024-11-19
Payer: MEDICAID

## 2024-11-19 DIAGNOSIS — M48.02 CERVICAL SPINAL STENOSIS: Primary | ICD-10-CM

## 2024-11-19 PROCEDURE — 97110 THERAPEUTIC EXERCISES: CPT | Mod: PN,CQ

## 2024-11-19 NOTE — PROGRESS NOTES
[facility]   Physical Therapy Treatment Note      Name: Staci Hodge  Clinic Number: [id number]    Therapy Diagnosis: No diagnosis found.  Physician: Shahid Almonte MD    Visit Date: 11/19/2024    Physician Orders: PT Eval and Treat   Medical Diagnosis from Referral: M48.02 (ICD-10-CM) - Cervical spinal stenosis  Evaluation Date: 11/11/2024  Authorization Period Expiration: 10/31/2025  Plan of Care Expiration: 12/31/2024  Progress Note Due: VISIT 3  Date of Surgery: carpal tunnel scheduled 12/20218  Visit # / Visits authorized: 1/ 1   FOTO: 1/ 3     Precautions: Standard      Time In: 950  Time Out: 1030  Total Billable Time: 40 minutes    PTA Visit #: 1/5       Subjective     Patient reports: she has not noticed a difference with needles. She states she is having carpal tunnel surgery on Nov 27th.   She was compliant with home exercise program.  Response to previous treatment: positive  Functional change: minimal    Pain: 3/10  Location: left shoulder      Objective      Objective Measures updated at progress report unless specified.     Treatment     Staci received the treatments listed below:      therapeutic exercises to develop strength, endurance, and ROM for 30 minutes including:  -Tbar flexion 2 x 10 2#  -cervical rotations 2 x 10   -Chin tuck in supine 1 x 15   -seated Bruegger[employer] with YTB 2 x 10  -supine 2# wand bench press with protraction 10x's    manual therapy techniques: Soft tissue Mobilization were applied to the: cervical spine for 10 minutes, including:  -Pt was educated on Functional Dry Needling. Pt provided written and verbal consent to treatment. Pt received FDN to left upper trap.  Used 40mm needle and performed in/out. Pt was issued educational handout on what to expect following treatment. - deferred today  -PROM cervical rotation, UT stretch, STM - L side only.     Hot pack for 5 minutes to cervical spine in supine while initiating supine ex's.    cold  pack for  minutes to .    Patient Education and Home Exercises       Education provided:   - Cont HEP    Written Home Exercises Provided: yes. Exercises were reviewed and Staci was able to demonstrate them prior to the end of the session.  Staci demonstrated good  understanding of the education provided. See Electronic Medical Record under Patient Instructions for exercises provided during therapy sessions    Assessment     Pt reported good response to UT STM. However, upon sitting, she reports increase dizziness. Pt reports she gets dizzy just laying in her bed. PT will be revised as needed per upcoming surgery.     Staci Is progressing well towards her goals.   Patient prognosis is Good.     Patient will continue to benefit from skilled outpatient physical therapy to address the deficits listed in the problem list box on initial evaluation, provide pt/family education and to maximize pt's level of independence in the home and community environment.     Patient's spiritual, cultural and educational needs considered and pt agreeable to plan of care and goals.     Anticipated barriers to physical therapy: none    Goals: Short Term Goals (3 Weeks):   1. Patient will be independent with home exercise program to supplement therapy sessions in improving pain free mobility.  2. Patient will improve cervical  active range of motion 10 degrees to improve mobility for tracking.   3. Patient will improve upper extremity manual muscle tests by 1/2 grade in all planes to improve strength for lifting groceries.  4. Patient will report pain <6/10 pain at its highest in the last week.   Long Term Goals (6 Weeks):   1. Patient will improve FOTO Survey score to </=44% limitation to improve perceived limitation with changing and maintaining body positions.  2. Patient will improve cervical by 25  active range of motion  to improve mobility for driving.  3. Patient will improve upper extremity manual muscle tests 1 grade in all  planes to improve strength for lifting and carrying tasks.  4. Patient will report no increase pain with head turning. .   5. Patient will report no pain with cervical active range of motion in all planes to promote unlimited functional mobility.  Plan      Plan of care Certification: 11/11/2024 to 12/31/2024.     Outpatient Physical Therapy 2 times weekly for 6 weeks to include the following interventions: Cervical/Lumbar Traction, Electrical Stimulation  , Gait Training, Manual Therapy, Moist Heat/ Ice, Neuromuscular Re-ed, Orthotic Management and Training, Patient Education, Self Care, Therapeutic Activities, and Therapeutic Exercise.       Shagufta Owen, PTA

## 2024-11-21 ENCOUNTER — CLINICAL SUPPORT (OUTPATIENT)
Dept: REHABILITATION | Facility: HOSPITAL | Age: 44
End: 2024-11-21
Payer: MEDICAID

## 2024-11-21 ENCOUNTER — OFFICE VISIT (OUTPATIENT)
Dept: PAIN MEDICINE | Facility: CLINIC | Age: 44
End: 2024-11-21
Payer: MEDICAID

## 2024-11-21 ENCOUNTER — TELEPHONE (OUTPATIENT)
Dept: PAIN MEDICINE | Facility: CLINIC | Age: 44
End: 2024-11-21
Payer: MEDICAID

## 2024-11-21 DIAGNOSIS — G89.29 CHRONIC NECK PAIN: Primary | ICD-10-CM

## 2024-11-21 DIAGNOSIS — M25.78 DEGENERATION OF INTERVERTEBRAL DISC OF CERVICAL REGION WITH OSTEOPHYTE OF CERVICAL VERTEBRA: ICD-10-CM

## 2024-11-21 DIAGNOSIS — M47.812 ARTHROPATHY OF CERVICAL FACET JOINT: ICD-10-CM

## 2024-11-21 DIAGNOSIS — M79.18 CERVICAL MYOFASCIAL PAIN SYNDROME: ICD-10-CM

## 2024-11-21 DIAGNOSIS — M47.812 CERVICAL SPONDYLOSIS: ICD-10-CM

## 2024-11-21 DIAGNOSIS — M54.2 CHRONIC NECK PAIN: Primary | ICD-10-CM

## 2024-11-21 DIAGNOSIS — M50.30 DEGENERATION OF INTERVERTEBRAL DISC OF CERVICAL REGION WITH OSTEOPHYTE OF CERVICAL VERTEBRA: ICD-10-CM

## 2024-11-21 DIAGNOSIS — M54.12 CERVICAL RADICULOPATHY: Primary | ICD-10-CM

## 2024-11-21 DIAGNOSIS — M48.02 CERVICAL SPINAL STENOSIS: Primary | ICD-10-CM

## 2024-11-21 PROCEDURE — 97110 THERAPEUTIC EXERCISES: CPT | Mod: PN

## 2024-11-21 PROCEDURE — 99999 PR PBB SHADOW E&M-EST. PATIENT-LVL I: CPT | Mod: PBBFAC,,, | Performed by: ANESTHESIOLOGY

## 2024-11-21 PROCEDURE — 99211 OFF/OP EST MAY X REQ PHY/QHP: CPT | Mod: PBBFAC | Performed by: ANESTHESIOLOGY

## 2024-11-21 NOTE — TELEPHONE ENCOUNTER
----- Message from Shahid Almonte MD sent at 11/21/2024 11:52 AM CST -----  · Interventional Therapy:  please schedule for left CMBB at TON, C3, C4  · Sedation: Iv sedation   · Anticoagulation medications: None -Clearance to stop Blood thinner: NA

## 2024-11-21 NOTE — PROGRESS NOTES
EST Patient Evaluation  [facility]    Staci Hodge  : 1980  Date: 2024     CHIEF COMPLAINT:  Neck Pain    Referring Physician: No ref. provider found  Primary Care Physician: Shilpi Clayton NP    HPI:  This is a 44 y.o. female with a chief complaint of Neck Pain  . The patient has Past medical history/Past surgical history of  breast cancer, tobacco use disorder, chronic neck pain, bilateral carpal tunnel syndrome    Patient was evaluated and referred by  neurology for neck pain, she was previously seen by Grey brandt and Dr Chapin for neck pain, she had bilateral cervical medial branch block and cervical epidural steroid injection with no relief.    Interval History 2024:  Staci Hodge is here  to discuss MRI cervical spine that showed Mild-to-moderate degenerative changes of the cervical spine, most pronounced at C5-6 and C6-C7 and resulting in mild spinal canal stenosis and moderate to severe bilateral neural foraminal stenosis at C5-6 and moderate left neural foraminal stenosis at C6-7.  Current pain score: 3/10    Diabetic: No    Anticoagulation medications: None    Allergy To Iodine: No    Currently on Antibiotic: No    Current Description of Pain Symptoms:    History of Recent Fall or Trauma: No   Onset: Chronic, started 4 years  Pain Location: Neck, BL+ CH,  worse left-sided  BL CTS  Radiates/associated symptoms: Radiates to the shoulders slightly during certain movements.   Pain is Getting worse over the last 7-8 months    The pain is described as aching, burning, and stabbing.   Exacerbating factors: Looking upward causes a shocking stabbing pain.   Mitigating factors nothing.   Symptoms interfere with daily activity, sleeping.   The patient feels like symptoms have been worsening.   Patient denies night fever/night sweats, urinary incontinence, bowel incontinence, significant weight loss, significant motor weakness, and loss of  sensations.    Pain score:  Best: 3/10  Worst: 10/10    Current pain medications:    butalbital-acetaminophen-caffeine -40 mg (FIORICET, ESGIC) q6h prn  Current Narcotics/Opioid /benzo Medications:  Opioids- None  Benzodiazepines: Yes    UDS:  NA    PDMP:  Reviewed and consistent with medication use as prescribed.     Previous Chronic Pain Treatment History:  Physical Therapy/HEP/Physician Lead Exercise Program:  Over the past 12 months, Patient has done 4 sessions.  PT response: not noticing anything different from therapy   Dates of the PT sessions: 11/11,11/14,11/19,11/20 in 2024  Is patient participating in home exercise program (HEP)/ physician led exercise program: Yes.    Non-interventional Pain Therapy:  [x]Chiropractor: 05/2024: 12 sessions, mild relief  []Acupuncture/Dry needle.  []TENS unit.  [x]Heat/ICE.  []Back Brace.    Medications previously tried:  NSAIDs: Ibuprofen (Advil/Motrin) and Naproxen (Naprosyn)  Topical Agent: Yes  TCA/SSRI/SNRI: None  Anti-convulsants: Gabapentin  and Lyrica : stopped due to SE  Muscle Relaxants: Flexeril (Cyclobenzaprine)  Opioids- None.    Interventional Pain Procedures:   -2/9/18 BILATERAL C3,4,5 CERVICAL FACET MEDIAL BRANCH NERVE BLOCK (Prone) 0% relief    -1/19/18 C7-T1 CERVICAL EPIDURAL STEROID INJECTION- 0% relief    Previous spine/Relevant joint surgery:  Right CT  surgery with mild relief  Surgical History:   has a past surgical history that includes Appendectomy; Adenoidectomy; Tonsillectomy; intestinal polpy; Colonoscopy; Upper gastrointestinal endoscopy; Small intestine surgery; Bowel resection (10/17/14); Carpal tunnel release (Bilateral); Carpal tunnel release; Dilation and curettage of uterus; Breast biopsy (Left, 06/04/2018); Bilateral mastectomy (Bilateral, 7/23/2018); Injection for sentinel node identification (Left, 7/23/2018); [place] lymph node biopsy (Left, 7/23/2018); Insertion of breast tissue expander (Bilateral, 7/23/2018); Fat transfer  (Bilateral, 11/12/2018); Breast surgery; Dilation and curettage of uterus using suction (N/A, 2/25/2019); Breast reconstruction; Mastectomy; Augmentation of breast; Breast capsulotomy (Left, 4/25/2019); Removal of breast implant (Bilateral, 3/10/2020); Insertion of breast tissue expander (Bilateral, 3/10/2020); Removal of breast implant (Bilateral, 7/7/2020); Esophagogastroduodenoscopy (N/A, 1/15/2021); Endoscopic ultrasound of upper gastrointestinal tract (Left, 1/15/2021); Colonoscopy (N/A, 2/25/2021); Total abdominal hysterectomy (N/A, 3/4/2021); Bilateral salpingo-oophorectomy (BSO) (Bilateral, 3/4/2021); Lysis of adhesions (N/A, 3/4/2021); Antegrade single balloon enteroscopy (N/A, 4/19/2021); Esophagogastroduodenoscopy (N/A, 5/17/2021); Esophagogastroduodenoscopy (N/A, 1/25/2022); Esophagogastroduodenoscopy (N/A, 3/10/2023); Esophagogastroduodenoscopy (N/A, 3/22/2024); Endoscopic ultrasound of upper gastrointestinal tract (N/A, 4/26/2024); and Colonoscopy (N/A, 7/8/2024).  Medical History:   has a past medical history of Abnormal Pap smear of cervix (age 25), Anxiety, Breast cancer, Cancer (07/11/2018), Cervical spondylosis (2/9/2018), Colon polyp, Depression, Ectopic pregnancy (2/26/2019), General anesthetics causing adverse effect in therapeutic use, Headache, History of psychiatric hospitalization, psychiatric care, Hypertension, Peutz-Jeghers syndrome, PONV (postoperative nausea and vomiting), Psychiatric problem, Right carpal tunnel syndrome (3/9/2017), Self-harming behavior, Suicide attempt, and Therapy.  Family History:  family history includes ADD / ADHD in her paternal uncle; Breast cancer in her paternal aunt and paternal grandmother; Cancer in her mother; Colon polyps in her sister; Dementia in her maternal grandfather and maternal grandmother; Depression in her paternal uncle; Hypertension in her father; No Known Problems in her cousin, maternal uncle, and paternal grandfather; Stomach cancer in  her mother.  Allergies:  Patient has no known allergies.   Social History/SUBSTANCE ABUSE HISTORY:  Personal history of substance abuse: No   reports that she has been smoking cigarettes. She started smoking about 24 years ago. She has a 24.6 pack-year smoking history. She has never used smokeless tobacco. She reports current alcohol use of about 5.8 standard drinks of alcohol per week. She reports that she does not use drugs.  LABS:  CBC  Lab Results   Component Value Date    WBC 14.20 (H) 02/23/2024    HGB 14.2 02/23/2024    HCT 43.1 02/23/2024     Coagulation Profile   Lab Results   Component Value Date     02/23/2024       Lab Results   Component Value Date    INR 1.0 08/31/2022     CMP:  BMP  Lab Results   Component Value Date     10/16/2024    K 4.8 10/16/2024     10/16/2024    CO2 27 10/16/2024    BUN 10 10/16/2024    CREATININE 0.8 10/16/2024    CALCIUM 10.3 10/16/2024    ANIONGAP 11 10/16/2024    EGFRNORACEVR >60 10/16/2024     Lab Results   Component Value Date    ALT 44 02/23/2024    AST 21 02/23/2024    ALKPHOS 183 (H) 02/23/2024    BILITOT 0.3 02/23/2024     HGBA1C:  Lab Results   Component Value Date    HGBA1C 5.3 10/09/2023     ROS:    Review of Systems   GENERAL:  No weight loss, malaise or fevers.  HEENT:   No recent changes in vision or hearing  NECK:  Negative for lumps, no difficulty with swallowing.  RESPIRATORY:  Negative for cough, wheezing or shortness of breath, patient denies any recent URI.  CARDIOVASCULAR:  Negative for chest pain or palpitations.  GI:  Negative for abdominal discomfort, blood in stools or black stools or change in bowel habits.  MUSCULOSKELETAL:  See HPI.  SKIN:  Negative for lesions, rash, and itching.  PSYCH:  No mood disorder or recent psychosocial stressors.   HEMATOLOGY/LYMPHOLOGY:  See the blood thinner sectioned in HPI.  NEURO:  See HPI  All other reviewed and negative other than HPI.    PHYSICAL EXAM:  VITALS: LMP 03/01/2021   There is no  height or weight on file to calculate BMI.  GENERAL: Well appearing, in no acute distress, alert and oriented x3, answers questions appropriately.   PSYCH: Flat affect.  SKIN: Skin color, texture, turgor normal, no rashes or lesions.  HEAD/FACE:  Normocephalic, atraumatic. Cranial nerves grossly intact.  CV: Regular rate  PULM: No evidence of respiratory difficulty, symmetric chest rise.  GI:  Soft and non-Distended.  NECK: (+) pain to palpation over the cervical paraspinous muscles. Spurling: -. (+) pain with neck flexion, extension, or lateral flexion, Muscle strength in RT UE 5/5 and Left UE 5/5, Hand  5/5 bilaterally     DIAGNOSTIC STUDIES AND MEDICAL RECORDS REVIEW:  I have personally reviewed and interpreted relevant radiology reports and reviewed relevant records from other services in the EMR.     MRI Cervical Spine Without Contrast 2021:  The craniocervical junction is intact.  There is no evidence for a Chiari mount formation.  The spinal cord is normal in signal without cord edema or myelomalacia.    There is straightening of the normal cervical lordosis.  Vertebral body heights are maintained.  There is disc desiccation throughout the cervical spine with mild disc space narrowing.  The marrow signal is normal without evidence for a marrow replacement process, infection or tumor.    At C2-3, no disk herniation, central canal stenosis or neural foraminal narrowing.    At C3-4, there is a posterior disc osteophyte complex and bilateral uncovertebral spurring without central canal stenosis or neural foraminal narrowing.    At C4-5, there is a posterior disc osteophyte complex without central canal stenosis or neural foraminal narrowing.    At C5-6, there is a posterior disc osteophyte complex with bilateral uncovertebral spurring and mild facet arthropathy contribute to mild bilateral neural foraminal narrowing.    At C6-7, there is a posterior disc osteophyte complex and bilateral uncovertebral spurring  and facet arthropathy contributing to mild bilateral neural foraminal narrowing.    At C7-T1, there is bilateral uncovertebral spurring without central canal stenosis or neural foraminal narrowing.    Incidentally noted on the left at T1-2 is a left perineural cyst.    EMG/NCS 2018:  1. Improved Right Carpal Tunnel Syndrome findings post recent right CTR.   2. Slight worsening of the Left Carpal Tunnel Syndrome findings  3. No electrophysiological evidence of Cervical Radiculopathy found    -  MRI cervical spine 10/2024:  Alignment: Straightening of the cervical lordosis, which may be secondary to positioning.  No abnormal subluxation.     Vertebral Column: Vertebral body heights are maintained. No acute fracture is identified; however, if trauma is suspected, a CT scan would be a more sensitive examination for fractures. No evidence of an aggressive marrow replacement process. Stable probable hemangioma within the T1 in T3 vertebral bodies.  Multilevel disc degeneration with intervertebral disc space narrowing, disc desiccation, and posterior disc osteophyte complexes, most pronounced at C5-6 and C6-7 with moderate disc space narrowing.     Cord: Normal signal and morphology.     Skull base and craniocervical junction: Normal.     Degenerative findings:     C2-C3: The disc is normal in configuration.  No significant neural foraminal or spinal canal stenosis.     C3-C4: Mild posterior disc osteophyte complex.  Mild left greater than right uncovertebral joint spurring.   No significant neural foraminal or spinal canal stenosis.     C4-C5: Minimal posterior disc osteophyte complex.  Mild bilateral facet arthropathy.   No significant neural foraminal or spinal canal stenosis.     C5-C6: Moderate disc space narrowing.  Posterior disc osteophyte complex.  Mild bilateral facet arthropathy.  Marked bilateral uncovertebral joint spurring.  Moderate to severe bilateral neural foraminal stenosis.  Mild spinal canal  stenosis.     C6-C7: Moderate disc space narrowing.  Posterior disc osteophyte complex.  Mild bilateral facet arthropathy.  Moderate left and mild right uncovertebral joint spurring.  Moderate left and mild right neural foraminal stenosis.  Mild spinal canal stenosis.     C7-T1: Mild posterior disc osteophyte complex.  Mild bilateral facet arthropathy.  Mild right uncovertebral joint spurring.  Mild right neural foraminal stenosis.  No spinal canal stenosis.     Paraspinal muscles & soft tissues: No significant abnormality.     Normal signal voids are present in the vertebral arteries.     Impression:     Mild-to-moderate degenerative changes of the cervical spine, as detailed above, most pronounced at C5-6 and C6-C7 and resulting in mild spinal canal stenosis and moderate to severe bilateral neural foraminal stenosis at C5-6 and moderate left neural foraminal stenosis at C6-7.    Clinical Impression:  This is a pleasant 44 y.o. female patient with PMH/PSH of breast cancer, tobacco use disorder, chronic neck pain, bilateral carpal tunnel syndrome, presenting with neck pain, cervicogenic headache, mainly axial in nature, no bilateral upper extremity radiation, she has bilateral carpal tunnel syndrome, she has completed multiple sessions of chiropractor this year with mild relief, last MRI lumbar spine was 2021, last EMG/nerve conduction study showed no evidence of cervical radiculopathy that was 2018, she had right carpal tunnel surgery, she is pending to be seen by ortho for left carpal tunnel syndrome,  updated MRI cervical spine showed  progressive worsening of symptoms with Mild-to-moderate degenerative changes of the cervical spine, most pronounced at C5-6 and C6-C7 and resulting in mild spinal canal stenosis and moderate to severe bilateral neural foraminal stenosis at C5-6 and moderate left neural foraminal stenosis at C6-7, she has completed multiple sessions of physical therapy with no benefit.  Encounter  Diagnosis:  Staci Hodge is a 44 y.o. female with the following diagnoses based on history, exam, and imagin. Chronic neck pain    2. Arthropathy of cervical facet joint    3. Degeneration of intervertebral disc of cervical region with osteophyte of cervical vertebra    4. Cervical myofascial pain syndrome    Treatment Plan:    Diagnostics/Referrals:  continue with a home exercise    Medications:    NSAIDs: OTC  Topical Agent:  may consider compound cream  TCA/SSRI/SNRI: None  Anti-convulsants: None  Muscle Relaxants: None  Opioids: None    Interventional Therapy:  please schedule for left CMBB at TON, C3, C4  Sedation: Iv sedation  with midazolam  Anticoagulation medications: None -Clearance to stop Blood thinner: NA    Regarding the above interventions, the patient has been educated regarding the risks (including bleeding, infection, increased pain, nerve damage, or allergic reaction), benefits, and alternatives. The patient states she understands and is eager to proceed.    Physical Rehabilitation: Referral to Physical therapy for Cervical ROM, stretching, strengthening and a home exercise program    Patient Education: Counseled patient regarding the importance of activity modification, I have stressed the importance of physical activity and a home exercise plan to help with pain and improve health.    Follow-up: RTC  to discuss MRI cervical spine.    May consider: Botox, ONB,  cervical epidural    Shahid Almonte MD  Anesthesiologist  Interventional Pain Medicine  2024    Disclaimer:  This note was prepared using voice recognition system and is likely to have sound alike errors that may have been overlooked even after proof reading.  Please call me with any questions.

## 2024-11-21 NOTE — PROGRESS NOTES
EST Patient Evaluation  [facility]    Staci Hodge  : 1980  Date: 2024     CHIEF COMPLAINT:  No chief complaint on file.    Referring Physician: No ref. provider found  Primary Care Physician: Shilpi Clayton, NP    HPI:  This is a 44 y.o. female with a chief complaint of No chief complaint on file.  . The patient has Past medical history/Past surgical history of  breast cancer, tobacco use disorder, chronic neck pain, bilateral carpal tunnel syndrome    Patient was evaluated and referred by  neurology for neck pain, she was previously seen by [facility] brandt and Dr Chapin for neck pain, she had bilateral cervical medial branch block and cervical epidural steroid injection with no relief.    Interval History 2024:  Staci Hodge is here  to discuss MRI cervical spine that showed Mild-to-moderate degenerative changes of the cervical spine, most pronounced at C5-6 and C6-C7 and resulting in mild spinal canal stenosis and moderate to severe bilateral neural foraminal stenosis at C5-6 and moderate left neural foraminal stenosis at C6-7.  Current pain score: ***/10    Diabetic: No    Anticoagulation medications: None    Allergy To Iodine: No    Currently on Antibiotic: No    Current Description of Pain Symptoms:    History of Recent Fall or Trauma: No   Onset: Chronic, started 4 years  Pain Location: Neck, BL+ CH  BL CTS  Radiates/associated symptoms: Radiates to the shoulders slightly during certain movements.   Pain is Getting worse over the last 7-8 months    The pain is described as aching, burning, and stabbing.   Exacerbating factors: Looking upward causes a shocking stabbing pain.   Mitigating factors nothing.   Symptoms interfere with daily activity, sleeping.   The patient feels like symptoms have been worsening.   Patient denies night fever/night sweats, urinary incontinence, bowel incontinence, significant weight loss, significant motor  weakness, and loss of sensations.    Pain score:   Best: 3/10  Worst: 10/10    Current pain medications:    butalbital-acetaminophen-caffeine -40 mg (FIORICET, ESGIC) q6h prn  Current Narcotics/Opioid /benzo Medications:  Opioids- None  Benzodiazepines: Yes    UDS:  NA    PDMP:  Reviewed and consistent with medication use as prescribed.     Previous Chronic Pain Treatment History:  Physical Therapy/HEP/Physician Lead Exercise Program:  Over the past 12 months, Patient has done 0 sessions.  PT response: NA  Dates of the PT sessions: NA  Is patient participating in home exercise program (HEP)/ physician led exercise program: Yes.    Non-interventional Pain Therapy:  [x]Chiropractor: 05/2024: 12 sessions, mild relief  []Acupuncture/Dry needle.  []TENS unit.  [x]Heat/ICE.  []Back Brace.    Medications previously tried:  NSAIDs: Ibuprofen (Advil/Motrin) and Naproxen (Naprosyn)  Topical Agent: Yes  TCA/SSRI/SNRI: None  Anti-convulsants: Gabapentin  and Lyrica : stopped due to SE  Muscle Relaxants: Flexeril (Cyclobenzaprine)  Opioids- None.    Interventional Pain Procedures:   -2/9/18 BILATERAL C3,4,5 CERVICAL FACET MEDIAL BRANCH NERVE BLOCK (Prone) 0% relief    -1/19/18 C7-T1 CERVICAL EPIDURAL STEROID INJECTION- 0% relief    Previous spine/Relevant joint surgery:  Right CT  surgery with mild relief  Surgical History:   has a past surgical history that includes Appendectomy; Adenoidectomy; Tonsillectomy; intestinal polpy; Colonoscopy; Upper gastrointestinal endoscopy; Small intestine surgery; Bowel resection (10/17/14); Carpal tunnel release (Bilateral); Carpal tunnel release; Dilation and curettage of uterus; Breast biopsy (Left, 06/04/2018); Bilateral mastectomy (Bilateral, 7/23/2018); Injection for sentinel node identification (Left, 7/23/2018); Rescue lymph node biopsy (Left, 7/23/2018); Insertion of breast tissue expander (Bilateral, 7/23/2018); Fat transfer (Bilateral, 11/12/2018); Breast surgery; Dilation  and curettage of uterus using suction (N/A, 2/25/2019); Breast reconstruction; Mastectomy; Augmentation of breast; Breast capsulotomy (Left, 4/25/2019); Removal of breast implant (Bilateral, 3/10/2020); Insertion of breast tissue expander (Bilateral, 3/10/2020); Removal of breast implant (Bilateral, 7/7/2020); Esophagogastroduodenoscopy (N/A, 1/15/2021); Endoscopic ultrasound of upper gastrointestinal tract (Left, 1/15/2021); Colonoscopy (N/A, 2/25/2021); Total abdominal hysterectomy (N/A, 3/4/2021); Bilateral salpingo-oophorectomy (BSO) (Bilateral, 3/4/2021); Lysis of adhesions (N/A, 3/4/2021); Antegrade single balloon enteroscopy (N/A, 4/19/2021); Esophagogastroduodenoscopy (N/A, 5/17/2021); Esophagogastroduodenoscopy (N/A, 1/25/2022); Esophagogastroduodenoscopy (N/A, 3/10/2023); Esophagogastroduodenoscopy (N/A, 3/22/2024); Endoscopic ultrasound of upper gastrointestinal tract (N/A, 4/26/2024); and Colonoscopy (N/A, 7/8/2024).  Medical History:   has a past medical history of Abnormal Pap smear of cervix (age 25), Anxiety, Breast cancer, Cancer (07/11/2018), Cervical spondylosis (2/9/2018), Colon polyp, Depression, Ectopic pregnancy (2/26/2019), General anesthetics causing adverse effect in therapeutic use, Headache, History of psychiatric hospitalization, psychiatric care, Hypertension, Peutz-Jeghers syndrome, PONV (postoperative nausea and vomiting), Psychiatric problem, Right carpal tunnel syndrome (3/9/2017), Self-harming behavior, Suicide attempt, and Therapy.  Family History:  family history includes ADD / ADHD in her paternal uncle; Breast cancer in her paternal aunt and paternal grandmother; Cancer in her mother; Colon polyps in her sister; Dementia in her maternal grandfather and maternal grandmother; Depression in her paternal uncle; Hypertension in her father; No Known Problems in her cousin, maternal uncle, and paternal grandfather; Stomach cancer in her mother.  Allergies:  Patient has no known  allergies.   Social History/SUBSTANCE ABUSE HISTORY:  Personal history of substance abuse: No   reports that she has been smoking cigarettes. She started smoking about 24 years ago. She has a 24.6 pack-year smoking history. She has never used smokeless tobacco. She reports current alcohol use of about 5.8 standard drinks of alcohol per week. She reports that she does not use drugs.  LABS:  CBC  Lab Results   Component Value Date    WBC 14.20 (H) 02/23/2024    HGB 14.2 02/23/2024    HCT 43.1 02/23/2024     Coagulation Profile   Lab Results   Component Value Date     02/23/2024       Lab Results   Component Value Date    INR 1.0 08/31/2022     CMP:  BMP  Lab Results   Component Value Date     10/16/2024    K 4.8 10/16/2024     10/16/2024    CO2 27 10/16/2024    BUN 10 10/16/2024    CREATININE 0.8 10/16/2024    CALCIUM 10.3 10/16/2024    ANIONGAP 11 10/16/2024    EGFRNORACEVR >60 10/16/2024     Lab Results   Component Value Date    ALT 44 02/23/2024    AST 21 02/23/2024    ALKPHOS 183 (H) 02/23/2024    BILITOT 0.3 02/23/2024     HGBA1C:  Lab Results   Component Value Date    HGBA1C 5.3 10/09/2023     ROS:    Review of Systems   GENERAL:  No weight loss, malaise or fevers.  HEENT:   No recent changes in vision or hearing  NECK:  Negative for lumps, no difficulty with swallowing.  RESPIRATORY:  Negative for cough, wheezing or shortness of breath, patient denies any recent URI.  CARDIOVASCULAR:  Negative for chest pain or palpitations.  GI:  Negative for abdominal discomfort, blood in stools or black stools or change in bowel habits.  MUSCULOSKELETAL:  See HPI.  SKIN:  Negative for lesions, rash, and itching.  PSYCH:  No mood disorder or recent psychosocial stressors.   HEMATOLOGY/LYMPHOLOGY:  See the blood thinner sectioned in HPI.  NEURO:  See HPI  All other reviewed and negative other than HPI.    PHYSICAL EXAM:  VITALS: LMP 03/01/2021   There is no height or weight on file to calculate  BMI.  GENERAL: Well appearing, in no acute distress, alert and oriented x3, answers questions appropriately.   PSYCH: Flat affect.  SKIN: Skin color, texture, turgor normal, no rashes or lesions.  HEAD/FACE:  Normocephalic, atraumatic. Cranial nerves grossly intact.  CV: Regular rate  PULM: No evidence of respiratory difficulty, symmetric chest rise.  GI:  Soft and non-Distended.  NECK: (+) pain to palpation over the cervical paraspinous muscles. Spurling: -. (+) pain with neck flexion, extension, or lateral flexion, Muscle strength in RT UE 5/5 and Left UE 5/5, Hand  5/5 bilaterally     DIAGNOSTIC STUDIES AND MEDICAL RECORDS REVIEW:  I have personally reviewed and interpreted relevant radiology reports and reviewed relevant records from other services in the EMR.     MRI Cervical Spine Without Contrast 2021:  The craniocervical junction is intact.  There is no evidence for a Chiari mount formation.  The spinal cord is normal in signal without cord edema or myelomalacia.    There is straightening of the normal cervical lordosis.  Vertebral body heights are maintained.  There is disc desiccation throughout the cervical spine with mild disc space narrowing.  The marrow signal is normal without evidence for a marrow replacement process, infection or tumor.    At C2-3, no disk herniation, central canal stenosis or neural foraminal narrowing.    At C3-4, there is a posterior disc osteophyte complex and bilateral uncovertebral spurring without central canal stenosis or neural foraminal narrowing.    At C4-5, there is a posterior disc osteophyte complex without central canal stenosis or neural foraminal narrowing.    At C5-6, there is a posterior disc osteophyte complex with bilateral uncovertebral spurring and mild facet arthropathy contribute to mild bilateral neural foraminal narrowing.    At C6-7, there is a posterior disc osteophyte complex and bilateral uncovertebral spurring and facet arthropathy contributing to  mild bilateral neural foraminal narrowing.    At C7-T1, there is bilateral uncovertebral spurring without central canal stenosis or neural foraminal narrowing.    Incidentally noted on the left at T1-2 is a left perineural cyst.    EMG/NCS 2018:  1. Improved Right Carpal Tunnel Syndrome findings post recent right CTR.   2. Slight worsening of the Left Carpal Tunnel Syndrome findings  3. No electrophysiological evidence of Cervical Radiculopathy found    -  MRI cervical spine 10/2024:  Alignment: Straightening of the cervical lordosis, which may be secondary to positioning.  No abnormal subluxation.     Vertebral Column: Vertebral body heights are maintained. No acute fracture is identified; however, if trauma is suspected, a CT scan would be a more sensitive examination for fractures. No evidence of an aggressive marrow replacement process. Stable probable hemangioma within the T1 in T3 vertebral bodies.  Multilevel disc degeneration with intervertebral disc space narrowing, disc desiccation, and posterior disc osteophyte complexes, most pronounced at C5-6 and C6-7 with moderate disc space narrowing.     Cord: Normal signal and morphology.     Skull base and craniocervical junction: Normal.     Degenerative findings:     C2-C3: The disc is normal in configuration.  No significant neural foraminal or spinal canal stenosis.     C3-C4: Mild posterior disc osteophyte complex.  Mild left greater than right uncovertebral joint spurring.   No significant neural foraminal or spinal canal stenosis.     C4-C5: Minimal posterior disc osteophyte complex.  Mild bilateral facet arthropathy.   No significant neural foraminal or spinal canal stenosis.     C5-C6: Moderate disc space narrowing.  Posterior disc osteophyte complex.  Mild bilateral facet arthropathy.  Marked bilateral uncovertebral joint spurring.  Moderate to severe bilateral neural foraminal stenosis.  Mild spinal canal stenosis.     C6-C7: Moderate disc space  narrowing.  Posterior disc osteophyte complex.  Mild bilateral facet arthropathy.  Moderate left and mild right uncovertebral joint spurring.  Moderate left and mild right neural foraminal stenosis.  Mild spinal canal stenosis.     C7-T1: Mild posterior disc osteophyte complex.  Mild bilateral facet arthropathy.  Mild right uncovertebral joint spurring.  Mild right neural foraminal stenosis.  No spinal canal stenosis.     Paraspinal muscles & soft tissues: No significant abnormality.     Normal signal voids are present in the vertebral arteries.     Impression:     Mild-to-moderate degenerative changes of the cervical spine, as detailed above, most pronounced at C5-6 and C6-C7 and resulting in mild spinal canal stenosis and moderate to severe bilateral neural foraminal stenosis at C5-6 and moderate left neural foraminal stenosis at C6-7.    Clinical Impression:  This is a pleasant 44 y.o. female patient with PMH/PSH of breast cancer, tobacco use disorder, chronic neck pain, bilateral carpal tunnel syndrome, presenting with neck pain, cervicogenic headache, mainly axial in nature, no bilateral upper extremity radiation, she has bilateral carpal tunnel syndrome, she has completed multiple sessions of chiropractor this year with mild relief, last MRI lumbar spine was 2021, last EMG/nerve conduction study showed no evidence of cervical radiculopathy that was 2018, she had right carpal tunnel surgery, she is pending to be seen by ortho for left carpal tunnel syndrome,   updated MRI cervical spine showed  progressive worsening of symptoms with Mild-to-moderate degenerative changes of the cervical spine, most pronounced at C5-6 and C6-C7 and resulting in mild spinal canal stenosis and moderate to severe bilateral neural foraminal stenosis at C5-6 and moderate left neural foraminal stenosis at C6-7  Encounter Diagnosis:  Staci Hodge is a 44 y.o. female with the following diagnoses based on history, exam, and  imaging:  There are no diagnoses linked to this encounter.     Treatment Plan:    Diagnostics/Referrals:  continue with a home exercise    Medications:    NSAIDs: OTC  Topical Agent:  may consider compound cream  TCA/SSRI/SNRI: None  Anti-convulsants: None  Muscle Relaxants: None  Opioids: None    Interventional Therapy:  may consider Botox injection.  Sedation: NA.  Anticoagulation medications: None -Clearance to stop Blood thinner: NA    Regarding the above interventions, the patient has been educated regarding the risks (including bleeding, infection, increased pain, nerve damage, or allergic reaction), benefits, and alternatives. The patient states she understands and is eager to proceed.    Physical Rehabilitation: Referral to Physical therapy for Cervical ROM, stretching, strengthening and a home exercise program    Patient Education: Counseled patient regarding the importance of activity modification, I have stressed the importance of physical activity and a home exercise plan to help with pain and improve health.    Follow-up: RTC  to discuss MRI cervical spine.    May consider: TPI, Botox, may repeat CMBB at higher level, ONB.      Shahid Almonte MD  Anesthesiologist  Interventional Pain Medicine  11/21/2024    Disclaimer:  This note was prepared using voice recognition system and is likely to have sound alike errors that may have been overlooked even after proof reading.  Please call me with any questions.

## 2024-11-21 NOTE — PROGRESS NOTES
[facility]   Physical Therapy Treatment Note      Name: Staci Hodge  Clinic Number: [id number]    Therapy Diagnosis: No diagnosis found.  Physician: Shahid Almonte MD    Visit Date: 11/21/2024    Physician Orders: PT Eval and Treat   Medical Diagnosis from Referral: M48.02 (ICD-10-CM) - Cervical spinal stenosis  Evaluation Date: 11/11/2024  Authorization Period Expiration: 10/31/2025  Plan of Care Expiration: 12/31/2024  Progress Note Due: VISIT 3  Date of Surgery: carpal tunnel scheduled 12/20218  Visit # / Visits authorized: 1/ 1   FOTO: 1/ 3     Precautions: Standard      Time In: 950  Time Out: 1030  Total Billable Time: 40 minutes    PTA Visit #: 1/5       Subjective     Patient reports: she has not noticed a difference with needles. She states she is having carpal tunnel surgery on Nov 27th.   She was compliant with home exercise program.  Response to previous treatment: positive  Functional change: minimal    Pain: 3/10  Location: left shoulder      Objective      Objective Measures updated at progress report unless specified.     Treatment     Staci received the treatments listed below:      therapeutic exercises to develop strength, endurance, and ROM for 30 minutes including:  -Tbar flexion 2 x 10 2#  -cervical rotations 2 x 10   -Chin tuck in supine 1 x 15   -seated Bruegger's with YTB 2 x 10  -supine 2# wand bench press with protraction 10x's    manual therapy techniques: Soft tissue Mobilization were applied to the: cervical spine for 0 minutes, including:  Not performed:  -Pt was educated on Functional Dry Needling. Pt provided written and verbal consent to treatment. Pt received FDN to left upper trap.  Used 40mm needle and performed in/out. Pt was issued educational handout on what to expect following treatment. - deferred today  -PROM cervical rotation, UT stretch, STM - L side only.     Hot pack for 5 minutes to cervical spine in supine while initiating supine  ex's.    cold pack for  minutes to .    Patient Education and Home Exercises       Education provided:   - Cont HEP    Written Home Exercises Provided: yes. Exercises were reviewed and Staci was able to demonstrate them prior to the end of the session.  Staci demonstrated good  understanding of the education provided. See Electronic Medical Record under Patient Instructions for exercises provided during therapy sessions    Assessment     Patient with little progress with P.T. at this point. Patient with improved ROM but continues to have pain.    Staci Is progressing well towards her goals.   Patient prognosis is Good.     Patient will continue to benefit from skilled outpatient physical therapy to address the deficits listed in the problem list box on initial evaluation, provide pt/family education and to maximize pt's level of independence in the home and community environment.     Patient's spiritual, cultural and educational needs considered and pt agreeable to plan of care and goals.     Anticipated barriers to physical therapy: none    Goals: Short Term Goals (3 Weeks):   1. Patient will be independent with home exercise program to supplement therapy sessions in improving pain free mobility.  2. Patient will improve cervical  active range of motion 10 degrees to improve mobility for tracking.   3. Patient will improve upper extremity manual muscle tests by 1/2 grade in all planes to improve strength for lifting groceries.  4. Patient will report pain <6/10 pain at its highest in the last week.   Long Term Goals (6 Weeks):   1. Patient will improve FOTO Survey score to </=44% limitation to improve perceived limitation with changing and maintaining body positions.  2. Patient will improve cervical by 25  active range of motion  to improve mobility for driving.  3. Patient will improve upper extremity manual muscle tests 1 grade in all planes to improve strength for lifting and carrying tasks.  4. Patient  will report no increase pain with head turning. .   5. Patient will report no pain with cervical active range of motion in all planes to promote unlimited functional mobility.  Plan      Plan of care Certification: 11/11/2024 to 12/31/2024.     Outpatient Physical Therapy 2 times weekly for 6 weeks to include the following interventions: Cervical/Lumbar Traction, Electrical Stimulation  , Gait Training, Manual Therapy, Moist Heat/ Ice, Neuromuscular Re-ed, Orthotic Management and Training, Patient Education, Self Care, Therapeutic Activities, and Therapeutic Exercise.       Marixa Corbett, PT

## 2024-11-23 ENCOUNTER — HOSPITAL ENCOUNTER (EMERGENCY)
Facility: HOSPITAL | Age: 44
Discharge: HOME | End: 2024-11-23
Payer: MEDICAID

## 2024-11-23 VITALS
RESPIRATION RATE: 24 BRPM | TEMPERATURE: 98 F | SYSTOLIC BLOOD PRESSURE: 165 MMHG | DIASTOLIC BLOOD PRESSURE: 91 MMHG | BODY MASS INDEX: 35.83 KG/M2 | WEIGHT: 195.88 LBS | HEART RATE: 137 BPM | OXYGEN SATURATION: 100 %

## 2024-11-23 DIAGNOSIS — G47.00 INSOMNIA, UNSPECIFIED TYPE: ICD-10-CM

## 2024-11-23 DIAGNOSIS — J32.9 SINUSITIS, UNSPECIFIED CHRONICITY, UNSPECIFIED LOCATION: Primary | ICD-10-CM

## 2024-11-23 LAB
ALBUMIN SERPL BCP-MCNC: 4.5 G/DL (ref 3.5–5.2)
ALP SERPL-CCNC: 154 U/L (ref 40–150)
ALT SERPL W/O P-5'-P-CCNC: 40 U/L (ref 10–44)
ANION GAP SERPL CALC-SCNC: 13 MMOL/L (ref 8–16)
AST SERPL-CCNC: 19 U/L (ref 10–40)
BASOPHILS # BLD AUTO: 0.09 K/UL (ref 0–0.2)
BASOPHILS NFR BLD: 0.8 % (ref 0–1.9)
BILIRUB SERPL-MCNC: 0.4 MG/DL (ref 0.1–1)
BUN SERPL-MCNC: 8 MG/DL (ref 6–20)
CALCIUM SERPL-MCNC: 10.4 MG/DL (ref 8.7–10.5)
CHLORIDE SERPL-SCNC: 106 MMOL/L (ref 95–110)
CO2 SERPL-SCNC: 23 MMOL/L (ref 23–29)
CREAT SERPL-MCNC: 0.8 MG/DL (ref 0.5–1.4)
DIFFERENTIAL METHOD BLD: ABNORMAL
EOSINOPHIL # BLD AUTO: 0.1 K/UL (ref 0–0.5)
EOSINOPHIL NFR BLD: 1.2 % (ref 0–8)
ERYTHROCYTE [DISTWIDTH] IN BLOOD BY AUTOMATED COUNT: 13.1 % (ref 11.5–14.5)
EST. GFR  (NO RACE VARIABLE): >60 ML/MIN/1.73 M^2
GLUCOSE SERPL-MCNC: 96 MG/DL (ref 70–110)
HCT VFR BLD AUTO: 36.8 % (ref 37–48.5)
HGB BLD-MCNC: 12.9 G/DL (ref 12–16)
IMM GRANULOCYTES # BLD AUTO: 0.07 K/UL (ref 0–0.04)
IMM GRANULOCYTES NFR BLD AUTO: 0.6 % (ref 0–0.5)
LYMPHOCYTES # BLD AUTO: 3.1 K/UL (ref 1–4.8)
LYMPHOCYTES NFR BLD: 27 % (ref 18–48)
MCH RBC QN AUTO: 28 PG (ref 27–31)
MCHC RBC AUTO-ENTMCNC: 35.1 G/DL (ref 32–36)
MCV RBC AUTO: 80 FL (ref 82–98)
MONOCYTES # BLD AUTO: 1 K/UL (ref 0.3–1)
MONOCYTES NFR BLD: 8.5 % (ref 4–15)
NEUTROPHILS # BLD AUTO: 7.1 K/UL (ref 1.8–7.7)
NEUTROPHILS NFR BLD: 61.9 % (ref 38–73)
NRBC BLD-RTO: 0 /100 WBC
PLATELET # BLD AUTO: 373 K/UL (ref 150–450)
PMV BLD AUTO: 9.6 FL (ref 9.2–12.9)
POTASSIUM SERPL-SCNC: 4 MMOL/L (ref 3.5–5.1)
PROCALCITONIN SERPL IA-MCNC: 0.02 NG/ML
PROT SERPL-MCNC: 7.6 G/DL (ref 6–8.4)
RBC # BLD AUTO: 4.6 M/UL (ref 4–5.4)
SODIUM SERPL-SCNC: 142 MMOL/L (ref 136–145)
WBC # BLD AUTO: 11.5 K/UL (ref 3.9–12.7)

## 2024-11-23 PROCEDURE — 25000003 PHARM REV CODE 250

## 2024-11-23 PROCEDURE — 63600175 PHARM REV CODE 636 W HCPCS: Performed by: SURGERY

## 2024-11-23 PROCEDURE — 84145 PROCALCITONIN (PCT): CPT

## 2024-11-23 PROCEDURE — 80053 COMPREHEN METABOLIC PANEL: CPT

## 2024-11-23 PROCEDURE — 96372 THER/PROPH/DIAG INJ SC/IM: CPT | Performed by: SURGERY

## 2024-11-23 PROCEDURE — 99285 EMERGENCY DEPT VISIT HI MDM: CPT | Mod: 25

## 2024-11-23 PROCEDURE — 85025 COMPLETE CBC W/AUTO DIFF WBC: CPT

## 2024-11-23 RX ORDER — KETOROLAC TROMETHAMINE 30 MG/ML
15 INJECTION, SOLUTION INTRAMUSCULAR; INTRAVENOUS
Status: COMPLETED | OUTPATIENT
Start: 2024-11-23 | End: 2024-11-23

## 2024-11-23 RX ORDER — ALPRAZOLAM 0.5 MG/1
1 TABLET ORAL
Status: COMPLETED | OUTPATIENT
Start: 2024-11-23 | End: 2024-11-23

## 2024-11-23 RX ORDER — KETOROLAC TROMETHAMINE 30 MG/ML
15 INJECTION, SOLUTION INTRAMUSCULAR; INTRAVENOUS
Status: DISCONTINUED | OUTPATIENT
Start: 2024-11-23 | End: 2024-11-23

## 2024-11-23 RX ORDER — ZOLPIDEM TARTRATE 5 MG/1
5 TABLET ORAL NIGHTLY PRN
Qty: 5 TABLET | Refills: 0 | Status: SHIPPED | OUTPATIENT
Start: 2024-11-23 | End: 2025-05-24

## 2024-11-23 RX ORDER — AMOXICILLIN AND CLAVULANATE POTASSIUM 875; 125 MG/1; MG/1
1 TABLET, FILM COATED ORAL 2 TIMES DAILY
Qty: 20 TABLET | Refills: 0 | Status: SHIPPED | OUTPATIENT
Start: 2024-11-23 | End: 2024-12-03

## 2024-11-23 RX ADMIN — ALPRAZOLAM 1 MG: 0.5 TABLET ORAL at 06:11

## 2024-11-23 RX ADMIN — KETOROLAC TROMETHAMINE 15 MG: 30 INJECTION, SOLUTION INTRAMUSCULAR; INTRAVENOUS at 06:11

## 2024-11-24 NOTE — ED PROVIDER NOTES
Encounter Date: 11/23/2024       History     Chief Complaint   Patient presents with    Facial Pain     Pt c/o nasal pressure that started 3 days ago which has now started radiating to her eyes.     History of Present Illness  Staci Hodge is a 44 y.o. female that presents with facial pain tonight  It was had sinus symptoms, patient also anxious, history of bipolar disorder  Patient was states that she feels like she was had sinus pressure/congestion  Yellow drainage earlier this week with no fever on ER evaluation this evening  Patient was initially seen by the daytime ER provider Dr. Austin Bergeron today  Patient insistent on imaging, CT scans ordered by daytime MD after triage    The history is provided by the patient.     Review of patient's allergies indicates:  No Known Allergies  Past Medical History:   Diagnosis Date    Abnormal Pap smear of cervix age 25    no treatement    Anxiety     Breast cancer     Cancer 07/11/2018    breast cancer    Cervical spondylosis 2/9/2018    Colon polyp     Depression     Ectopic pregnancy 2/26/2019    General anesthetics causing adverse effect in therapeutic use     Slow to awaken/ Memory problems-post-op to day 3 after Mastectomy    Headache     History of psychiatric hospitalization     age 20 for SI    Hx of psychiatric care     Hypertension     Peutz-Jeghers syndrome     PONV (postoperative nausea and vomiting)     Psychiatric problem     Right carpal tunnel syndrome 3/9/2017    Self-harming behavior     Suicide attempt     Therapy      Past Surgical History:   Procedure Laterality Date    ADENOIDECTOMY      ANTEGRADE SINGLE BALLOON ENTEROSCOPY N/A 4/19/2021    Procedure: ENTEROSCOPY, SINGLE BALLOON, ANTEGRADE;  Surgeon: Vincenzo Herbert MD;  [facility]  Service: Endoscopy;  Laterality: N/A;  removal of large polyp in proximal jejunum; please schedule during a time when Dr. Dillon is available in case I need assistance with removal or 2nd  opinion prior to removal    COVID at [place] 4/16 - ttr    APPENDECTOMY      AUGMENTATION OF BREAST      BILATERAL MASTECTOMY Bilateral 7/23/2018    Procedure: MASTECTOMY 23 hr stay;  Surgeon: Sofia Kendrick MD;  [address]  Service: General;  Laterality: Bilateral;    BILATERAL SALPINGO-OOPHORECTOMY (BSO) Bilateral 3/4/2021    Procedure: SALPINGO-OOPHORECTOMY, BILATERAL;  Surgeon: Clayton Vilchis MD;  Location: [facility];  Service: OB/GYN;  Laterality: Bilateral;    BOWEL RESECTION  10/17/14    BREAST BIOPSY Left 06/04/2018    BREAST CAPSULOTOMY Left 4/25/2019    Procedure: CAPSULOTOMY, BREAST LEFT;  Surgeon: Duane Bello MD;  [address];  Service: Plastics;  Laterality: Left;    BREAST RECONSTRUCTION      BREAST SURGERY      Bilateral Mastectomy 07/23/2018    CARPAL TUNNEL RELEASE Bilateral     CARPAL TUNNEL RELEASE      COLONOSCOPY      COLONOSCOPY N/A 2/25/2021    Procedure: COLONOSCOPY;  Surgeon: Vincenzo Herbert MD;  Location: [facility] (2ND FLR);  Service: Endoscopy;  Laterality: N/A;  previous anesthesia complications  okay for this date/time per Dr. Herbert and liliana with Miranda  -   COVID test on 2/22/21 at [facility]    COLONOSCOPY N/A 7/8/2024    Procedure: COLONOSCOPY;  Surgeon: Vincenzo Herbert MD;  Location: [facility] (2ND FLR);  Service: Endoscopy;  Laterality: N/A;  Ref By:parvin Cruz sent via [employer].  6/27-pre call complete-tb-2nd floor for availability-, Peutz-Jaegar syndrome    DILATION AND CURETTAGE OF UTERUS      DILATION AND CURETTAGE OF UTERUS USING SUCTION N/A 2/25/2019    Procedure: DILATION AND CURETTAGE, UTERUS, USING SUCTION  PATHOLOGY NEEDED;  Surgeon: Pam Sifuentes MD;  Location: [facility];  Service: OB/GYN;  Laterality: N/A;    ENDOSCOPIC ULTRASOUND OF UPPER GASTROINTESTINAL TRACT Left 1/15/2021    Procedure: ULTRASOUND, UPPER GI TRACT, ENDOSCOPIC;  Surgeon: Chandan Dillon MD;  Location: [facility];  Service: Endoscopy;   Laterality: Left;  for pancreatic screening    ENDOSCOPIC ULTRASOUND OF UPPER GASTROINTESTINAL TRACT N/A 4/26/2024    Procedure: ULTRASOUND, UPPER GI TRACT, ENDOSCOPIC;  Surgeon: Chandan Dillon MD;  Location: [facility];  Service: Endoscopy;  Laterality: N/A;  4/8/24: instuctions sent via portal-GD  4/19-precall complete-MS    ESOPHAGOGASTRODUODENOSCOPY N/A 1/15/2021    Procedure: EGD (ESOPHAGOGASTRODUODENOSCOPY);  Surgeon: Chandan Dillon MD;  Location: [facility];  Service: Endoscopy;  Laterality: N/A;  with push enteroscopy    ESOPHAGOGASTRODUODENOSCOPY N/A 5/17/2021    Procedure: EGD (ESOPHAGOGASTRODUODENOSCOPY);  Surgeon: Chandan Dillon MD;  Location: [facility] (2ND FLR);  Service: Endoscopy;  Laterality: N/A;  Please schedule this duodenal polypectomy. Need to be a day that Dr Jose Antonio Stark is in the OR. 90 minutes.   Chandan Dillon MD     COVID at [place] 5/15 (scheduled in the OR for 5/18 - using same COVID results for both procedures) ttr    ESOPHAGOGASTRODUODENOSCOPY N/A 1/25/2022    Procedure: EGD (ESOPHAGOGASTRODUODENOSCOPY);  Surgeon: Chandan Dillon MD;  Location: [facility] (2ND FLR);  Service: Endoscopy;  Laterality: N/A;  poss emr  covid-1/22/22-STAH-BB  instructions sent via portal-BB    ESOPHAGOGASTRODUODENOSCOPY N/A 3/10/2023    Procedure: EGD (ESOPHAGOGASTRODUODENOSCOPY);  Surgeon: Chandan Dillon MD;  Location: [facility] (2ND FLR);  Service: Endoscopy;  Laterality: N/A;  inst via portal  called to confirm and msg that phone not accepting calls 3/6 mal    ESOPHAGOGASTRODUODENOSCOPY N/A 3/22/2024    Procedure: EGD (ESOPHAGOGASTRODUODENOSCOPY);  Surgeon: Chandan Dillon MD;  Location: [facility] (2ND FLR);  Service: Endoscopy;  Laterality: N/A;  EGD in 1 year. wayne melendrez portal-tb  3/19-precall complete-MS    FAT TRANSFER Bilateral 11/12/2018    Procedure: TRANSFER, FAT TISSUE BILATERAL BREAST;  Surgeon: Duane Bello MD;  Location: [facility] OR [address] [place] [address];  Service:  Plastics;  Laterality: Bilateral;    INJECTION FOR SENTINEL NODE IDENTIFICATION Left 7/23/2018    Procedure: INJECTION, FOR SENTINEL NODE IDENTIFICATION;  Surgeon: Sofia Kendrick MD;  [facility];  Service: General;  Laterality: Left;    INSERTION OF BREAST TISSUE EXPANDER Bilateral 7/23/2018    Procedure: INSERTION, TISSUE EXPANDER, BREAST;  Surgeon: Duane Bello MD;  [facility];  Service: Plastics;  Laterality: Bilateral;    INSERTION OF BREAST TISSUE EXPANDER Bilateral 3/10/2020    Procedure: INSERTION, TISSUE EXPANDER, BREAST, BILATERAL;  Surgeon: Duane Bello MD;  [facility];  Service: Plastics;  Laterality: Bilateral;    intestinal polpy      LYSIS OF ADHESIONS N/A 3/4/2021    Procedure: LYSIS, ADHESIONS;  Surgeon: Clayton Vilchis MD;  [facility];  Service: OB/GYN;  Laterality: N/A;  Omental adhesions    MASTECTOMY      REMOVAL OF BREAST IMPLANT Bilateral 3/10/2020    Procedure: REMOVAL, IMPLANT, BREAST, BILATERAL;  Surgeon: Duane Bello MD;  [facility];  Service: Plastics;  Laterality: Bilateral;    REMOVAL OF BREAST IMPLANT Bilateral 7/7/2020    Procedure: REMOVAL, IMPLANT, BREAST BILATERAL;  Surgeon: Duane Bello MD;  [facility] [address] [place] [address];  Service: Plastics;  Laterality: Bilateral;    SENTINEL LYMPH NODE BIOPSY Left 7/23/2018    Procedure: BIOPSY, LYMPH NODE, SENTINEL;  Surgeon: Sofia Kendrick MD;  [facility] [address] [place] [address];  Service: General;  Laterality: Left;    SMALL INTESTINE SURGERY      3 surgeries     TONSILLECTOMY      TOTAL ABDOMINAL HYSTERECTOMY N/A 3/4/2021    Procedure: HYSTERECTOMY, TOTAL, ABDOMINAL;  Surgeon: Clayton Vilchis MD;  [facility];  Service: OB/GYN;  Laterality: N/A;    UPPER GASTROINTESTINAL ENDOSCOPY       Family History   Problem Relation Name Age of Onset    Cancer Mother Kera         colon cancer    Stomach cancer Mother Kera     Hypertension Father  Jono     Colon polyps Sister Sarah         peutz-jeghers syndrome    Breast cancer Paternal Aunt Lili     No Known Problems Maternal Uncle Umer     ADD / ADHD Paternal Uncle Christopher     Depression Paternal Uncle Christopher     Dementia Maternal Grandfather Jimy     Dementia Maternal Grandmother Mignon     No Known Problems Paternal Grandfather Perez     Breast cancer Paternal Grandmother Jose     No Known Problems Cousin Coco     Ovarian cancer Neg Hx      Anesthesia problems Neg Hx       Social History     Tobacco Use    Smoking status: Every Day     Current packs/day: 1.00     Average packs/day: 1 pack/day for 24.6 years (24.6 ttl pk-yrs)     Types: Cigarettes     Start date: 4/17/2000    Smokeless tobacco: Never    Tobacco comments:     sometimes dry cough per patient   Substance Use Topics    Alcohol use: Yes     Alcohol/week: 5.8 standard drinks of alcohol     Types: 7 Standard drinks or equivalent per week     Comment: occasionally    Drug use: No     Review of Systems   Constitutional: Negative.    HENT:  Positive for facial swelling, postnasal drip, sinus pressure and sinus pain.    Eyes: Negative.    Respiratory: Negative.     Cardiovascular: Negative.    Gastrointestinal: Negative.    Genitourinary: Negative.    Musculoskeletal: Negative.    Skin: Negative.    Neurological: Negative.    Psychiatric/Behavioral: Negative.         Physical Exam     Initial Vitals [11/23/24 1728]   BP Pulse Resp Temp SpO2   (!) 165/91 (!) 137 (!) 24 98 °F (36.7 °C) 100 %      MAP       --         Physical Exam    Nursing note and vitals reviewed.  Constitutional: Vital signs are normal. She appears well-developed and well-nourished. She is cooperative.   HENT:   (+) clear nasal drainage with postnasal drip; nasal mucosa erythema    Eyes: Conjunctivae, EOM and lids are normal. Pupils are equal, round, and reactive to light.   Neck: Trachea normal and phonation normal. Neck supple. No JVD present.   Normal range of motion.    Full passive range of motion without pain.     Cardiovascular:  Normal rate, regular rhythm, S1 normal, S2 normal, normal heart sounds, intact distal pulses and normal pulses.           Pulmonary/Chest: Effort normal and breath sounds normal.   Abdominal: Abdomen is soft and flat. Bowel sounds are normal.   Musculoskeletal:         General: Normal range of motion.      Cervical back: Full passive range of motion without pain, normal range of motion and neck supple.     Neurological: She is alert and oriented to person, place, and time. She has normal strength.   Skin: Skin is warm, dry and intact. Capillary refill takes less than 2 seconds.         ED Course   Procedures  Labs Reviewed   CBC W/ AUTO DIFFERENTIAL - Abnormal       Result Value    WBC 11.50      RBC 4.60      Hemoglobin 12.9      Hematocrit 36.8 (*)     MCV 80 (*)     MCH 28.0      MCHC 35.1      RDW 13.1      Platelets 373      MPV 9.6      Immature Granulocytes 0.6 (*)     Gran # (ANC) 7.1      Immature Grans (Abs) 0.07 (*)     Lymph # 3.1      Mono # 1.0      Eos # 0.1      Baso # 0.09      nRBC 0      Gran % 61.9      Lymph % 27.0      Mono % 8.5      Eosinophil % 1.2      Basophil % 0.8      Differential Method Automated     COMPREHENSIVE METABOLIC PANEL - Abnormal    Sodium 142      Potassium 4.0      Chloride 106      CO2 23      Glucose 96      BUN 8      Creatinine 0.8      Calcium 10.4      Total Protein 7.6      Albumin 4.5      Total Bilirubin 0.4      Alkaline Phosphatase 154 (*)     AST 19      ALT 40      eGFR >60      Anion Gap 13     PROCALCITONIN    Procalcitonin 0.02            Imaging Results              CT Maxillofacial Without Contrast (Final result)  Result time 11/23/24 20:01:19      Final result by Faith Qureshi MD (11/23/24 20:01:19)                   Impression:      No acute abnormality or significant focal abnormalities involving the facial bones or imaged soft tissues..      Electronically signed by: Faith  Titus  Date:    11/23/2024  Time:    20:01               Narrative:    EXAMINATION:  CT MAXILLOFACIAL WITHOUT CONTRAST    CLINICAL HISTORY:  Maxillofacial pain;    TECHNIQUE:  Low dose axial images, sagittal and coronal reformations were obtained through the face.  Contrast was not administered.    COMPARISON:  None    FINDINGS:  There is no evidence of acute fracture involving the facial bones.  Paranasal sinuses appear clear as imaged.  Mastoid air cells and middle ears appear clear.  Orbital structures appear symmetric and grossly intact.  No significant soft tissue focal swelling or radiopaque foreign body seen.  Imaged intracranial structures are unremarkable.  Cervical spine spondylosis noted.                                       CT Head Without Contrast (Final result)  Result time 11/23/24 19:11:27      Final result by John Pérez MD (11/23/24 19:11:27)                   Impression:      No acute findings.      Electronically signed by: John Pérez  Date:    11/23/2024  Time:    19:11               Narrative:    EXAMINATION:  CT HEAD WITHOUT CONTRAST    CLINICAL HISTORY:  Mental status change, unknown cause;    TECHNIQUE:  Low dose axial images were obtained through the head.  Coronal and sagittal reformations were also performed. Contrast was not administered.    COMPARISON:  08/31/2022    FINDINGS:  Intracranial compartment:    Ventricles and sulci are normal in size for age without evidence of hydrocephalus. No extra-axial blood or fluid collections.    The brain parenchyma appears normal. No parenchymal mass, hemorrhage, edema or major vascular distribution infarct.    Skull/extracranial contents (limited evaluation): No fracture. Mastoid air cells and paranasal sinuses are essentially clear.                                       Medications   ALPRAZolam tablet 1 mg (1 mg Oral Given 11/23/24 1816)   ketorolac injection 15 mg (15 mg Intramuscular Given 11/23/24 1815)     Medical  Decision Making  Differential includes sinusitis, upper respiratory tract infection, anxiety, insomnia    Problems Addressed:  Insomnia, unspecified type: complicated acute illness or injury  Sinusitis, unspecified chronicity, unspecified location: complicated acute illness or injury    Amount and/or Complexity of Data Reviewed  Labs: ordered. Decision-making details documented in ED Course.  Radiology: ordered and independent interpretation performed.    ED Management & Risks of Complication, Morbidity, & Mortality:  CT head & face showed no acute findings in the emergency room   Patient was feeling much better, panic & anxiety has dissipated  Will place the patient on Augmentin on ER discharge today  Patient asking for a sleep aid, trazodone it was not helping  I will give the patient 5 days of Ambien to assist with insomnia  Patient was alert & appropriate & with her  at bedside   She was not feel that she needs any mental health help at this time  States that she will follow-up with her therapist Monday morning  Feeling much better, acting appropriate,  happy with care    Clinical Impression:  Final diagnoses:  [J32.9] Sinusitis, unspecified chronicity, unspecified location (Primary)  [G47.00] Insomnia, unspecified type          ED Disposition Condition    Discharge Stable          ED Prescriptions       Medication Sig Dispense Start Date End Date Auth. Provider    amoxicillin-clavulanate 875-125mg (AUGMENTIN) 875-125 mg per tablet Take 1 tablet by mouth 2 (two) times daily. for 10 days 20 tablet 11/23/2024 12/3/2024 Joe Love MD    zolpidem (AMBIEN) 5 MG Tab Take 1 tablet (5 mg total) by mouth nightly as needed. 5 tablet 11/23/2024 5/24/2025 Joe Love MD          Follow-up Information       Follow up With Specialties Details Why Contact Shilpi Stoner NP Family Medicine Schedule an appointment as soon as possible for a visit in 2 days  [address] [facility]  [address]  [phone]               Joe Love MD  11/23/24 2052

## 2024-11-27 ENCOUNTER — ANESTHESIA EVENT (OUTPATIENT)
Dept: SURGERY | Facility: HOSPITAL | Age: 44
End: 2024-11-27
Payer: MEDICAID

## 2024-11-27 ENCOUNTER — HOSPITAL ENCOUNTER (OUTPATIENT)
Facility: HOSPITAL | Age: 44
Discharge: HOME | End: 2024-11-27
Attending: ORTHOPAEDIC SURGERY | Admitting: ORTHOPAEDIC SURGERY
Payer: MEDICAID

## 2024-11-27 ENCOUNTER — ANESTHESIA (OUTPATIENT)
Dept: SURGERY | Facility: HOSPITAL | Age: 44
End: 2024-11-27
Payer: MEDICAID

## 2024-11-27 DIAGNOSIS — Z01.818 PREOPERATIVE TESTING: Primary | ICD-10-CM

## 2024-11-27 DIAGNOSIS — G56.02 LEFT CARPAL TUNNEL SYNDROME: ICD-10-CM

## 2024-11-27 PROCEDURE — 37000009 HC ANESTHESIA EA ADD 15 MINS: Performed by: ORTHOPAEDIC SURGERY

## 2024-11-27 PROCEDURE — 63600175 PHARM REV CODE 636 W HCPCS: Performed by: NURSE ANESTHETIST, CERTIFIED REGISTERED

## 2024-11-27 PROCEDURE — 63600175 PHARM REV CODE 636 W HCPCS: Performed by: PHYSICIAN ASSISTANT

## 2024-11-27 PROCEDURE — 64721 CARPAL TUNNEL SURGERY: CPT | Mod: LT,,, | Performed by: ORTHOPAEDIC SURGERY

## 2024-11-27 PROCEDURE — 36000706: Performed by: ORTHOPAEDIC SURGERY

## 2024-11-27 PROCEDURE — 63600175 PHARM REV CODE 636 W HCPCS: Performed by: ORTHOPAEDIC SURGERY

## 2024-11-27 PROCEDURE — 37000008 HC ANESTHESIA 1ST 15 MINUTES: Performed by: ORTHOPAEDIC SURGERY

## 2024-11-27 PROCEDURE — 36000707: Performed by: ORTHOPAEDIC SURGERY

## 2024-11-27 PROCEDURE — 71000033 HC RECOVERY, INTIAL HOUR: Performed by: ORTHOPAEDIC SURGERY

## 2024-11-27 RX ORDER — LIDOCAINE HYDROCHLORIDE 10 MG/ML
INJECTION, SOLUTION EPIDURAL; INFILTRATION; INTRACAUDAL; PERINEURAL
Status: DISCONTINUED | OUTPATIENT
Start: 2024-11-27 | End: 2024-11-27 | Stop reason: HOSPADM

## 2024-11-27 RX ORDER — LIDOCAINE HYDROCHLORIDE 20 MG/ML
INJECTION INTRAVENOUS
Status: DISCONTINUED | OUTPATIENT
Start: 2024-11-27 | End: 2024-11-27

## 2024-11-27 RX ORDER — CEFAZOLIN 2 G/1
2 INJECTION, POWDER, FOR SOLUTION INTRAMUSCULAR; INTRAVENOUS
Status: COMPLETED | OUTPATIENT
Start: 2024-11-27 | End: 2024-11-27

## 2024-11-27 RX ORDER — ACETAMINOPHEN 325 MG/1
650 TABLET ORAL EVERY 4 HOURS PRN
Status: DISCONTINUED | OUTPATIENT
Start: 2024-11-27 | End: 2024-11-27 | Stop reason: HOSPADM

## 2024-11-27 RX ORDER — KETOROLAC TROMETHAMINE 30 MG/ML
30 INJECTION, SOLUTION INTRAMUSCULAR; INTRAVENOUS ONCE
Status: DISCONTINUED | OUTPATIENT
Start: 2024-11-27 | End: 2024-11-27 | Stop reason: HOSPADM

## 2024-11-27 RX ORDER — BUPIVACAINE HYDROCHLORIDE 2.5 MG/ML
INJECTION, SOLUTION EPIDURAL; INFILTRATION; INTRACAUDAL
Status: DISCONTINUED | OUTPATIENT
Start: 2024-11-27 | End: 2024-11-27 | Stop reason: HOSPADM

## 2024-11-27 RX ORDER — PROPOFOL 10 MG/ML
INJECTION, EMULSION INTRAVENOUS
Status: DISCONTINUED | OUTPATIENT
Start: 2024-11-27 | End: 2024-11-27

## 2024-11-27 RX ORDER — HYDROCODONE BITARTRATE AND ACETAMINOPHEN 5; 325 MG/1; MG/1
1 TABLET ORAL EVERY 6 HOURS PRN
Qty: 12 TABLET | Refills: 0 | Status: SHIPPED | OUTPATIENT
Start: 2024-11-27

## 2024-11-27 RX ORDER — MUPIROCIN 20 MG/G
OINTMENT TOPICAL
Status: DISCONTINUED | OUTPATIENT
Start: 2024-11-27 | End: 2024-11-27 | Stop reason: HOSPADM

## 2024-11-27 RX ORDER — MIDAZOLAM HYDROCHLORIDE 1 MG/ML
INJECTION INTRAMUSCULAR; INTRAVENOUS
Status: DISCONTINUED | OUTPATIENT
Start: 2024-11-27 | End: 2024-11-27

## 2024-11-27 RX ORDER — ONDANSETRON 8 MG/1
8 TABLET, ORALLY DISINTEGRATING ORAL EVERY 8 HOURS PRN
Status: DISCONTINUED | OUTPATIENT
Start: 2024-11-27 | End: 2024-11-27 | Stop reason: HOSPADM

## 2024-11-27 RX ORDER — MORPHINE SULFATE 4 MG/ML
2 INJECTION, SOLUTION INTRAMUSCULAR; INTRAVENOUS EVERY 10 MIN PRN
Status: DISCONTINUED | OUTPATIENT
Start: 2024-11-27 | End: 2024-11-27 | Stop reason: HOSPADM

## 2024-11-27 RX ADMIN — PROPOFOL 25 MG: 10 INJECTION, EMULSION INTRAVENOUS at 12:11

## 2024-11-27 RX ADMIN — LIDOCAINE HYDROCHLORIDE 100 MG: 20 INJECTION, SOLUTION INTRAVENOUS at 12:11

## 2024-11-27 RX ADMIN — PROPOFOL 50 MG: 10 INJECTION, EMULSION INTRAVENOUS at 12:11

## 2024-11-27 RX ADMIN — PROPOFOL 100 MG: 10 INJECTION, EMULSION INTRAVENOUS at 12:11

## 2024-11-27 RX ADMIN — MIDAZOLAM HYDROCHLORIDE 2 MG: 1 INJECTION, SOLUTION INTRAMUSCULAR; INTRAVENOUS at 12:11

## 2024-11-27 RX ADMIN — PROPOFOL 30 MG: 10 INJECTION, EMULSION INTRAVENOUS at 12:11

## 2024-11-27 RX ADMIN — CEFAZOLIN 2 G: 2 INJECTION, POWDER, FOR SOLUTION INTRAMUSCULAR; INTRAVENOUS at 12:11

## 2024-11-27 NOTE — ANESTHESIA POSTPROCEDURE EVALUATION
Anesthesia Post Evaluation    Patient: Staci Hodge    Procedure(s) Performed: Procedure(s) (LRB):  RELEASE, CARPAL TUNNEL (Left)    Final Anesthesia Type: general      Patient location during evaluation: PACU  Patient participation: Yes- Able to Participate  Level of consciousness: awake and alert, oriented and awake  Post-procedure vital signs: reviewed and stable  Pain management: adequate  Airway patency: patent    PONV status at discharge: No PONV  Anesthetic complications: no      Cardiovascular status: blood pressure returned to baseline  Respiratory status: unassisted, spontaneous ventilation and room air  Hydration status: euvolemic  Follow-up not needed.  Comments: [facility]              Vitals Value Taken Time   BP 96/61 11/27/24 1250   Temp 36 °C (96.8 °F) 11/27/24 1235   Pulse 76 11/27/24 1250   Resp 16 11/27/24 1250   SpO2 98 % 11/27/24 1250         No case tracking events are documented in the log.      Pain/Bran Score: Bran Score: 8 (11/27/2024 12:44 PM)

## 2024-11-27 NOTE — TRANSFER OF CARE
Anesthesia Transfer of Care Note    Patient: Staci Hodge    Procedure(s) Performed: Procedure(s) (LRB):  RELEASE, CARPAL TUNNEL (Left)    Patient location: PACU    Anesthesia Type: general and MAC    Transport from OR: Transported from OR on room air with adequate spontaneous ventilation    Post pain: adequate analgesia    Post assessment: no apparent anesthetic complications and tolerated procedure well    Post vital signs: stable    Level of consciousness: awake    Complications: none    Transfer of care protocol was followed      Last vitals: Visit Vitals  BP 93/62 (BP Location: Right arm, Patient Position: Sitting)   Pulse 79   Temp 36 °C (96.8 °F) (Oral)   Resp 14   LMP 03/01/2021   SpO2 95%   Breastfeeding No

## 2024-11-27 NOTE — ANESTHESIA PREPROCEDURE EVALUATION
11/27/2024  Staci Hodge is a 44 y.o., female.    Pre-operative evaluation for Procedure(s) (LRB):  COLONOSCOPY (N/A)    Staci Hodge is a 44 y.o. female     Patient Active Problem List   Diagnosis    History of abnormal Pap smear    LLQ pain    Peutz-Jeghers syndrome    Weight loss    Leukocytosis    Tobacco abuse    Insomnia    Mood disorder    MARY LOU (generalized anxiety disorder)    Panic disorder with agoraphobia    PTSD (post-traumatic stress disorder)    Social anxiety disorder    Palpitations    Tachycardia    Dizziness    Arm numbness left    Stabbing headache    Tension headache    Right carpal tunnel syndrome    Migraine without aura    Microalbuminuria    Neck pain    Right arm numbness    Left carpal tunnel syndrome    Cervical paraspinal muscle spasm    Glucosuria with normal serum glucose    History of pyelonephritis    Left arm numbness    Myofascial muscle pain    Cervical spondylosis    OCD (obsessive compulsive disorder)    Menorrhagia with regular cycle    Hypokalemia    Inguinal lymphadenopathy    Ductal carcinoma in situ (DCIS) of left breast    DCIS (ductal carcinoma in situ)    Menorrhagia with irregular cycle    S/P breast reconstruction    Possible pregnancy    S/P D&C (status post dilation and curettage)    Ectopic pregnancy    Encounter for adjustment or removal of unspecified breast implant    Personal history of breast cancer    Unspecified inflammatory spondylopathy, cervical region    GERD (gastroesophageal reflux disease)    S/P CHRISS-BSO    Small bowel polyp    Peutz-Jeghers polyps of small bowel    Facial numbness    Cervical disc disease    Pseudopolyposis of colon without complication, unspecified part of colon    Bipolar 2 disorder, major depressive episode    Obesity (BMI 30.0-34.9)    Slurred speech    Hives    Idiopathic acute pancreatitis    Nausea     Lithium use    Vitamin D deficiency    Multiple thyroid nodules       Review of patient's allergies indicates:  No Known Allergies    Current Facility-Administered Medications on File Prior to Encounter   Medication Dose Route Frequency Provider Last Rate Last Admin    0.9%  NaCl infusion   Intravenous Continuous Chandan Dillon MD 20 mL/hr at 01/25/22 0847 New Bag at 01/25/22 0847    sodium chloride 0.9% flush 10 mL  10 mL Intravenous PRN Chandan Dillon MD         Current Outpatient Medications on File Prior to Encounter   Medication Sig Dispense Refill    clonazePAM (KLONOPIN) 1 MG tablet Take 1 mg by mouth 2 (two) times daily.      folic acid (FOLVITE) 1 MG tablet Take 1 tablet (1 mg total) by mouth once daily. 30 tablet 5    galcanezumab-gnlm (EMGALITY PEN) 120 mg/mL PnIj Inject one syringe every 28 days for migraine prevention. 1 mL 11    lisdexamfetamine (VYVANSE) 60 MG capsule Take 60 mg by mouth every morning.      lithium (ESKALITH) 450 MG TbSR Take 2 tablets (900 mg total) by mouth every evening. 60 tablet 1    montelukast (SINGULAIR) 10 mg tablet TAKE ONE TABLET BY MOUTH ONCE A DAY IN THE EVENING 30 tablet 11    pantoprazole (PROTONIX) 40 MG tablet Take 1 tablet (40 mg total) by mouth once daily. 90 tablet 3    propranoloL (INDERAL) 20 MG tablet Take 1 tablet (20 mg total) by mouth 2 (two) times daily. 60 tablet 1    sucralfate (CARAFATE) 1 gram tablet Take 1 tablet (1 g total) by mouth 2 (two) times daily. 60 tablet 0    traZODone (DESYREL) 150 MG tablet Take 1 tablet (150 mg total) by mouth every evening. 30 tablet 1    [DISCONTINUED] albuterol (ACCUNEB) 1.25 mg/3 mL Nebu Take 3 mLs (1.25 mg total) by nebulization every 6 (six) hours as needed (wheezing). Rescue 75 mL 0    [DISCONTINUED] DULoxetine (CYMBALTA) 30 MG capsule Take 1 capsule (30 mg total) by mouth 2 (two) times daily. 60 capsule 0    [DISCONTINUED] gabapentin (NEURONTIN) 600 MG tablet Take 1 tablet (600 mg total) by mouth every  evening. 30 tablet 11    [DISCONTINUED] paroxetine (PAXIL) 20 MG tablet TAKE ONE TABLET BY MOUTH ONCE A DAY IN THE MORNING 30 tablet 5       Past Surgical History:   Procedure Laterality Date    ADENOIDECTOMY      ANTEGRADE SINGLE BALLOON ENTEROSCOPY N/A 04/19/2021    Procedure: ENTEROSCOPY, SINGLE BALLOON, ANTEGRADE;  Surgeon: Vincenzo Herbert MD;  Location: [facility] (2ND FLR);  Service: Endoscopy;  Laterality: N/A;  removal of large polyp in proximal jejunum; please schedule during a time when Dr. Dillon is available in case I need assistance with removal or 2nd opinion prior to removal    COVID at [place] 4/16 - ttr    APPENDECTOMY      AUGMENTATION OF BREAST      BILATERAL MASTECTOMY Bilateral 07/23/2018    Procedure: MASTECTOMY 23 hr stay;  Surgeon: Sofia Kendrick MD;  Location: [address];  Service: General;  Laterality: Bilateral;    BILATERAL SALPINGO-OOPHORECTOMY (BSO) Bilateral 03/04/2021    Procedure: SALPINGO-OOPHORECTOMY, BILATERAL;  Surgeon: Clayton Vilchis MD;  Location: [facility];  Service: OB/GYN;  Laterality: Bilateral;    BOWEL RESECTION  10/17/2014    BREAST BIOPSY Left 06/04/2018    BREAST CAPSULOTOMY Left 04/25/2019    Procedure: CAPSULOTOMY, BREAST LEFT;  Surgeon: Duane Bello MD;  Location: [address];  Service: Plastics;  Laterality: Left;    BREAST RECONSTRUCTION      BREAST SURGERY      Bilateral Mastectomy 07/23/2018    CARPAL TUNNEL RELEASE Right 2016    COLONOSCOPY      COLONOSCOPY N/A 02/25/2021    Procedure: COLONOSCOPY;  Surgeon: Vincenzo Herbert MD;  Location: [facility] (2ND FLR);  Service: Endoscopy;  Laterality: N/A;  previous anesthesia complications  okay for this date/time per Dr. Herbert and liliana with Miranda  - thomas  COVID test on 2/22/21 at [facility]    COLONOSCOPY N/A 07/08/2024    Procedure: COLONOSCOPY;  Surgeon: Vincenzo Herbert MD;  Location: [facility] (2ND FLR);  Service: Endoscopy;  Laterality: N/A;  Ref By:parvin Cruz sent via  portal,miralax.  6/27-pre call complete-tb-2nd floor for availability-, Peutz-Jaegar syndrome    DILATION AND CURETTAGE OF UTERUS      DILATION AND CURETTAGE OF UTERUS USING SUCTION N/A 02/25/2019    Procedure: DILATION AND CURETTAGE, UTERUS, USING SUCTION  PATHOLOGY NEEDED;  Surgeon: Pam Sifuentes MD;  Location: [facility];  Service: OB/GYN;  Laterality: N/A;    ENDOSCOPIC ULTRASOUND OF UPPER GASTROINTESTINAL TRACT Left 01/15/2021    Procedure: ULTRASOUND, UPPER GI TRACT, ENDOSCOPIC;  Surgeon: Chandan Dillon MD;  Location: [facility];  Service: Endoscopy;  Laterality: Left;  for pancreatic screening    ENDOSCOPIC ULTRASOUND OF UPPER GASTROINTESTINAL TRACT N/A 04/26/2024    Procedure: ULTRASOUND, UPPER GI TRACT, ENDOSCOPIC;  Surgeon: Chandan Dillon MD;  Location: [facility];  Service: Endoscopy;  Laterality: N/A;  4/8/24: instuctions sent via portal-GD  4/19-precall complete-MS    ESOPHAGOGASTRODUODENOSCOPY N/A 01/15/2021    Procedure: EGD (ESOPHAGOGASTRODUODENOSCOPY);  Surgeon: Chandan Dillon MD;  Location: [facility];  Service: Endoscopy;  Laterality: N/A;  with push enteroscopy    ESOPHAGOGASTRODUODENOSCOPY N/A 05/17/2021    Procedure: EGD (ESOPHAGOGASTRODUODENOSCOPY);  Surgeon: Chandan Dillon MD;  Location: [facility] (2ND FLR);  Service: Endoscopy;  Laterality: N/A;  Please schedule this duodenal polypectomy. Need to be a day that Dr Jose Antonio Stark is in the OR. 90 minutes.   Chandan Dillon MD     COVID at [place] 5/15 (scheduled in the OR for 5/18 - using same COVID results for both procedures) ttr    ESOPHAGOGASTRODUODENOSCOPY N/A 01/25/2022    Procedure: EGD (ESOPHAGOGASTRODUODENOSCOPY);  Surgeon: Chandan Dillon MD;  Location: [facility] (2ND FLR);  Service: Endoscopy;  Laterality: N/A;  poss emr  covid-1/22/22-STAH-BB  instructions sent via portal-BB    ESOPHAGOGASTRODUODENOSCOPY N/A 03/10/2023    Procedure: EGD (ESOPHAGOGASTRODUODENOSCOPY);  Surgeon: Chandan Dillon MD;  Location:  [facility] ENDO (2ND FLR);  Service: Endoscopy;  Laterality: N/A;  inst via portal  called to confirm and msg that phone not accepting calls 3/6 mal    ESOPHAGOGASTRODUODENOSCOPY N/A 03/22/2024    Procedure: EGD (ESOPHAGOGASTRODUODENOSCOPY);  Surgeon: Chandan Dillon MD;  Location: [facility] ENDO (2ND FLR);  Service: Endoscopy;  Laterality: N/A;  EGD in 1 year. wayne  instr portal-tb  3/19-precall complete-MS    FAT TRANSFER Bilateral 11/12/2018    Procedure: TRANSFER, FAT TISSUE BILATERAL BREAST;  Surgeon: Duane Bello MD;  Location: [facility] OR [address] [place] [address];  Service: Plastics;  Laterality: Bilateral;    INJECTION FOR SENTINEL NODE IDENTIFICATION Left 07/23/2018    Procedure: INJECTION, FOR SENTINEL NODE IDENTIFICATION;  Surgeon: Sofia Kendrick MD;  Location: [facility] OR [address] [place] [address];  Service: General;  Laterality: Left;    INSERTION OF BREAST TISSUE EXPANDER Bilateral 07/23/2018    Procedure: INSERTION, TISSUE EXPANDER, BREAST;  Surgeon: Duane Bello MD;  Location: [facility] OR [address] [place] [address];  Service: Plastics;  Laterality: Bilateral;    INSERTION OF BREAST TISSUE EXPANDER Bilateral 03/10/2020    Procedure: INSERTION, TISSUE EXPANDER, BREAST, BILATERAL;  Surgeon: Duane Bello MD;  Location: [facility] OR [address] [place] [address];  Service: Plastics;  Laterality: Bilateral;    intestinal polpy      LYSIS OF ADHESIONS N/A 03/04/2021    Procedure: LYSIS, ADHESIONS;  Surgeon: Clayton Vilchis MD;  Location: [facility];  Service: OB/GYN;  Laterality: N/A;  Omental adhesions    MASTECTOMY      REMOVAL OF BREAST IMPLANT Bilateral 03/10/2020    Procedure: REMOVAL, IMPLANT, BREAST, BILATERAL;  Surgeon: Duane Bello MD;  Location: [address];  Service: Plastics;  Laterality: Bilateral;    REMOVAL OF BREAST IMPLANT Bilateral 07/07/2020    Procedure: REMOVAL, IMPLANT, BREAST BILATERAL;  Surgeon: Duane Bello MD;  Location: [facility] OR [address] [place] [address];  Service: Plastics;  Laterality: Bilateral;    SENTINEL LYMPH NODE BIOPSY Left  07/23/2018    Procedure: BIOPSY, LYMPH NODE, SENTINEL;  Surgeon: Sofia Kendrick MD;  Location: [address];  Service: General;  Laterality: Left;    SMALL INTESTINE SURGERY      3 surgeries     TONSILLECTOMY      TOTAL ABDOMINAL HYSTERECTOMY N/A 03/04/2021    Procedure: HYSTERECTOMY, TOTAL, ABDOMINAL;  Surgeon: Clayton Vilchis MD;  Location: [facility];  Service: OB/GYN;  Laterality: N/A;    UPPER GASTROINTESTINAL ENDOSCOPY           Pre-op Assessment    I have reviewed the Patient Summary Reports.     I have reviewed the Nursing Notes. I have reviewed the NPO Status.   I have reviewed the Medications.     Review of Systems  Anesthesia Hx:  No problems with previous Anesthesia                Hematology/Oncology:  Hematology Normal   Oncology Normal                                   EENT/Dental:  EENT/Dental Normal           Cardiovascular:     Hypertension, well controlled                                          Pulmonary:  Pulmonary Normal                       Hepatic/GI:     GERD, well controlled                Musculoskeletal:  Arthritis               Neurological:    Neuromuscular Disease,  Headaches                                 Endocrine:  Endocrine Normal            Psych:  Psychiatric History anxiety                 Physical Exam  General: Cooperative, Well nourished, Alert and Oriented    Airway:  Mallampati: III / II  Mouth Opening: Normal  TM Distance: Normal  Tongue: Normal  Neck ROM: Normal ROM    Dental:  Intact        Anesthesia Plan  Type of Anesthesia, risks & benefits discussed:    Anesthesia Type: Gen Natural Airway, MAC  Intra-op Monitoring Plan: Standard ASA Monitors  Post Op Pain Control Plan: multimodal analgesia  Induction:  IV  Informed Consent: Informed consent signed with the Patient and all parties understand the risks and agree with anesthesia plan.  All questions answered.   ASA Score: 2  Day of Surgery Review of History & Physical: H&P Update referred to the surgeon/provider.I  have interviewed and examined the patient. I have reviewed the patient's H&P dated: 11/24/2024. There are no significant changes.     Ready For Surgery From Anesthesia Perspective.     .

## 2024-11-27 NOTE — OP NOTE
[facility] - Surgery  Operative Note      Date of Procedure: 11/27/2024     Procedure: Procedure(s) (LRB):  RELEASE, CARPAL TUNNEL (Left)     Surgeons and Role:     * Daniel Bosch Jr., MD - Primary    Assisting Surgeon: None    Pre-Operative Diagnosis: Left carpal tunnel syndrome [G56.02]    Post-Operative Diagnosis: Post-Op Diagnosis Codes:     * Left carpal tunnel syndrome [G56.02]    Anesthesia: General    Operative Findings (including complications, if any): CTS    Description of Technical Procedures:   DATE OF PROCEDURE:   11/27/2024     PREOPERATIVE DIAGNOSIS:  left carpal tunnel syndrome.     POSTOPERATIVE DIAGNOSIS: left carpal tunnel syndrome.     OPERATIVE PROCEDURE: left carpal tunnel release.     SURGEON:  Daniel Bosch Jr., MMENDY.     ANESTHESIA:  MAC.     ESTIMATED BLOOD LOSS:  Minimal.     COMPLICATIONS:  None.     SPECIMENS:  None.     BRIEF INDICATIONS:  A 44 y.o.-year-old female with carpal tunnel syndrome, unresponsive to conservative treatment, taken to surgery for the above   procedure.     OPERATIVE PROCEDURE IN DETAIL:  After operative consent was obtained, the   patient was brought to the Operating Room, placed supine on the operating room   table.  Anesthesia by IV sedation followed by injection of Xylocaine and   Marcaine combination into the operative site in the palm.  Following this,   tourniquet applied to the arm.  The arm was then prepped and draped out in the   normal sterile fashion.  The Esmarch used to exsanguinate the limb and the   tourniquet inflated to 225 mmHg.     Following this, a curved incision was made thenar in the crease of palm with a   #15 blade, 2.5 cm in length.  Palmar fascia divided, deep retractors placed.    Transverse carpal ligament identified, carefully divided with the [place] blade.    The median nerve protected with the Boyd elevator and division of ligament   continued proximally and distally under direct visualization.  After complete   release of the  median nerve, the contents of the carpal canal were inspected and   noted to be intact including the recurrent branch.  The wound was irrigated and   closed with interrupted 4-0 Monocryl in the subcutaneous layer.  Dermabond on   the skin.  Sterile dressing applied followed by light wrap and the tourniquet   deflated.  The patient was brought to the Recovery Room in stable condition.    All sponge and needle counts reported as correct.  No complications.          Significant Surgical Tasks Conducted by the Assistant(s), if Applicable: retraction    Estimated Blood Loss (EBL): * No values recorded between 11/27/2024 12:11 PM and 11/27/2024 12:26 PM *           Implants: * No implants in log *    Specimens:   Specimen (24h ago, onward)      None                    Condition: Good    Disposition: PACU - hemodynamically stable.    Attestation: I was present and scrubbed for the entire procedure.    Discharge Note    OUTCOME: Patient tolerated treatment/procedure well without complication and is now ready for discharge.    DISPOSITION: Home or Self Care    FINAL DIAGNOSIS:  Left carpal tunnel syndrome    FOLLOWUP: In clinic    DISCHARGE INSTRUCTIONS:    Discharge Procedure Orders   Diet general     Call MD for:  temperature >100.4     Call MD for:  persistent nausea and vomiting     Call MD for:  severe uncontrolled pain     Keep surgical extremity elevated     Remove dressing in 48 hours     EKG 12-lead   Standing Status: Future Number of Occurrences: 1 Standing Exp. Date: 11/04/25   Order Comments: Preop testing

## 2024-11-29 VITALS
RESPIRATION RATE: 16 BRPM | DIASTOLIC BLOOD PRESSURE: 66 MMHG | TEMPERATURE: 97 F | HEART RATE: 75 BPM | SYSTOLIC BLOOD PRESSURE: 104 MMHG | OXYGEN SATURATION: 99 %

## 2024-11-29 NOTE — PROGRESS NOTES
Patient states that pain meds and removing brace helps with the pain and swelling, encouraged patient to contact MD or Princess for any signs/symptoms of infection like fever or red streaks.

## 2024-12-11 ENCOUNTER — OFFICE VISIT (OUTPATIENT)
Dept: ORTHOPEDICS | Facility: CLINIC | Age: 44
End: 2024-12-11
Payer: MEDICAID

## 2024-12-11 VITALS
SYSTOLIC BLOOD PRESSURE: 110 MMHG | HEIGHT: 62 IN | RESPIRATION RATE: 18 BRPM | DIASTOLIC BLOOD PRESSURE: 70 MMHG | OXYGEN SATURATION: 98 % | WEIGHT: 201.63 LBS | BODY MASS INDEX: 37.1 KG/M2 | HEART RATE: 104 BPM

## 2024-12-11 DIAGNOSIS — Z98.890 S/P CARPAL TUNNEL RELEASE: Primary | ICD-10-CM

## 2024-12-11 PROCEDURE — 99024 POSTOP FOLLOW-UP VISIT: CPT | Mod: ,,, | Performed by: PHYSICIAN ASSISTANT

## 2024-12-11 PROCEDURE — 3074F SYST BP LT 130 MM HG: CPT | Mod: CPTII,,, | Performed by: PHYSICIAN ASSISTANT

## 2024-12-11 PROCEDURE — 1160F RVW MEDS BY RX/DR IN RCRD: CPT | Mod: CPTII,,, | Performed by: PHYSICIAN ASSISTANT

## 2024-12-11 PROCEDURE — 99214 OFFICE O/P EST MOD 30 MIN: CPT | Mod: PBBFAC | Performed by: PHYSICIAN ASSISTANT

## 2024-12-11 PROCEDURE — 99999 PR PBB SHADOW E&M-EST. PATIENT-LVL IV: CPT | Mod: PBBFAC,,, | Performed by: PHYSICIAN ASSISTANT

## 2024-12-11 PROCEDURE — 3078F DIAST BP <80 MM HG: CPT | Mod: CPTII,,, | Performed by: PHYSICIAN ASSISTANT

## 2024-12-11 PROCEDURE — 1159F MED LIST DOCD IN RCRD: CPT | Mod: CPTII,,, | Performed by: PHYSICIAN ASSISTANT

## 2024-12-11 NOTE — PROGRESS NOTES
Pt presents for post-op evaluation. She is 2 weeks s/p left carpal tunnel release with Dr. Bosch. Pt c/o mild stiffness to hand. She has noticed some improvement of pre operative numbness/tingling to fingers. Pt is taking pain meds as needed. Denies fever, chills, discharge from wound site, and N/V.     Left wrist/hand:  Incision healing well  No erythema, warmth, swelling, drainage, or any other signs of infection  Neurovascular status intact in extremity  FROM wrist and all fingers        A/P:  S/p left carpal tunnel release     1. Advance activity as tolerated, no heavy lifting.  2. Return to clinic for follow up as scheduled, sooner if needed.      Patient voices understanding of and agreement with treatment plan. All of the patient's questions were answered, and the patient will contact us if she has any questions or concerns in the interim.         36.5

## 2024-12-12 ENCOUNTER — OFFICE VISIT (OUTPATIENT)
Dept: INTERNAL MEDICINE | Facility: CLINIC | Age: 44
End: 2024-12-12
Payer: MEDICAID

## 2024-12-12 VITALS
WEIGHT: 201.5 LBS | HEART RATE: 74 BPM | RESPIRATION RATE: 20 BRPM | HEIGHT: 62 IN | TEMPERATURE: 98 F | OXYGEN SATURATION: 98 % | BODY MASS INDEX: 37.08 KG/M2 | DIASTOLIC BLOOD PRESSURE: 78 MMHG | SYSTOLIC BLOOD PRESSURE: 116 MMHG

## 2024-12-12 DIAGNOSIS — J01.10 ACUTE FRONTAL SINUSITIS, RECURRENCE NOT SPECIFIED: Primary | ICD-10-CM

## 2024-12-12 PROCEDURE — 3078F DIAST BP <80 MM HG: CPT | Mod: CPTII,,, | Performed by: NURSE PRACTITIONER

## 2024-12-12 PROCEDURE — 3008F BODY MASS INDEX DOCD: CPT | Mod: CPTII,,, | Performed by: NURSE PRACTITIONER

## 2024-12-12 PROCEDURE — 3074F SYST BP LT 130 MM HG: CPT | Mod: CPTII,,, | Performed by: NURSE PRACTITIONER

## 2024-12-12 PROCEDURE — 99213 OFFICE O/P EST LOW 20 MIN: CPT | Mod: S$PBB,,, | Performed by: NURSE PRACTITIONER

## 2024-12-12 PROCEDURE — 99999 PR PBB SHADOW E&M-EST. PATIENT-LVL III: CPT | Mod: PBBFAC,,, | Performed by: NURSE PRACTITIONER

## 2024-12-12 PROCEDURE — 99213 OFFICE O/P EST LOW 20 MIN: CPT | Mod: PBBFAC,PN | Performed by: NURSE PRACTITIONER

## 2024-12-12 RX ORDER — LISDEXAMFETAMINE DIMESYLATE 70 MG/1
70 CAPSULE ORAL
COMMUNITY

## 2024-12-12 RX ORDER — LUMATEPERONE 42 MG/1
1 CAPSULE ORAL
COMMUNITY

## 2024-12-12 RX ORDER — FLUTICASONE PROPIONATE 50 MCG
2 SPRAY, SUSPENSION (ML) NASAL DAILY
Qty: 16 G | Refills: 2 | Status: SHIPPED | OUTPATIENT
Start: 2024-12-12

## 2024-12-12 NOTE — PROGRESS NOTES
History of Present Illness    CHIEF COMPLAINT:  Patient presents today for sinus pressure and related symptoms.    SINUS SYMPTOMS:  She reports experiencing sinus pressure and swelling, particularly later in the day, accompanied by head pressure and a dry cough. These symptoms have persisted for over a month. She describes a dry nostril and a feeling of obstruction, likened to a pea-sized obstruction in one nostril. During episodes of increased pressure, she experiences transient vision changes, with objects appearing closer or farther away, and larger or smaller. A previous CT of the sinuses was reported as normal. She uses OTC nasal spray for symptom management.    RECENT ER VISIT:  She recently visited the ER where she was diagnosed with sinusitis and prescribed amoxicillin. Despite treatment, she reports symptoms have worsened since the visit. She expresses frustration with her ongoing symptoms.    MENTAL HEALTH HISTORY:  She has a history of manic episodes, with a recent episode occurring over a month ago that coincided with an ER visit. She has since visited her psychiatrist, who indicated that she no longer appears to be in a manic state. She emphasizes that she does not currently feel manic and expresses concern about symptoms being perceived as solely mental health-related.      ROS:  General: -fever, -chills, -fatigue, -weight gain, -weight loss  Eyes: +vision changes, -redness, -discharge  ENT: -ear pain, +nasal congestion, -sore throat  Cardiovascular: -chest pain, -palpitations, -lower extremity edema  Respiratory: +cough, -shortness of breath  Gastrointestinal: -abdominal pain, -nausea, -vomiting, -diarrhea, -constipation, -blood in stool  Genitourinary: -dysuria, -hematuria, -frequency  Musculoskeletal: -joint pain, -muscle pain  Skin: -rash, -lesion  Neurological: -headache, -dizziness, -numbness, -tingling  Psychiatric: -anxiety, -depression, -sleep difficulty          Physical Exam    General: No  acute distress. Well-developed. Well-nourished.  Eyes: EOMI. Sclerae anicteric.  HENT: Normocephalic. Atraumatic. Nares patent. Moist oral mucosa. Frontal pressure noted  Ears: Bilateral TMs clear. Bilateral EACs clear.  Cardiovascular: Regular rate. Regular rhythm. No murmurs. No rubs. No gallops. Normal S1, S2.  Respiratory: Normal respiratory effort. Clear to auscultation bilaterally. No rales. No rhonchi. No wheezing.  Abdomen: Soft. Non-tender. Non-distended. Normoactive bowel sounds.  Musculoskeletal: No  obvious deformity.  Extremities: No lower extremity edema.  Neurological: Alert & oriented x3. No slurred speech. Normal gait.  Psychiatric: Normal mood. Normal affect. Good insight. Good judgment. Negative sihi noted  Skin: Warm. Dry. No rash.          Assessment & Plan    IMPRESSION:  - Assessed patient's symptoms of sinus pressure, swelling, head pressure, and dry cough persisting for over 1 month  - Reviewed previous ER visit diagnosis of sinusitis and normal CT scan results  - Determined current symptoms likely due to inflammation rather than infection, given normal imaging and lack of improvement with antibiotics  - Considered patient's history of recent manic episode as context for symptom presentation  - Decided against prescribing additional antibiotics    ACUTE SINUSITIS:  - Explained that nasal steroid spray is not for quick relief but works effectively with consistent use over time.  - Started fluticasone nasal spray: 2 sprays in each nostril daily.  - Started Claritin: Take daily in combination with fluticasone nasal spray.    VISUAL DISTURBANCES:  - Discussed that vision changes may not be related to sinus problems and may require separate eye exam.    FOLLOW-UP:  - Follow up in a few days if symptoms do not improve with prescribed treatment.         This note was generated with the assistance of ambient listening technology. Verbal consent was obtained by the patient and accompanying visitor(s)  "for the recording of patient appointment to facilitate this note. I attest to having reviewed and edited the generated note for accuracy, though some syntax or spelling errors may persist. Please contact the author of this note for any clarification.      "This note will not be shared with the patient."    1. Acute frontal sinusitis, recurrence not specified  fluticasone propionate (FLONASE) 50 mcg/actuation nasal spray        Rtc as scheduled  "

## 2024-12-16 ENCOUNTER — HOSPITAL ENCOUNTER (OUTPATIENT)
Dept: RADIOLOGY | Facility: HOSPITAL | Age: 44
Discharge: HOME | End: 2024-12-16
Attending: ANESTHESIOLOGY | Admitting: ANESTHESIOLOGY
Payer: MEDICAID

## 2024-12-16 ENCOUNTER — HOSPITAL ENCOUNTER (OUTPATIENT)
Facility: HOSPITAL | Age: 44
Discharge: HOME | End: 2024-12-16
Attending: ANESTHESIOLOGY | Admitting: ANESTHESIOLOGY
Payer: MEDICAID

## 2024-12-16 VITALS
DIASTOLIC BLOOD PRESSURE: 82 MMHG | RESPIRATION RATE: 16 BRPM | TEMPERATURE: 97 F | OXYGEN SATURATION: 98 % | HEART RATE: 105 BPM | SYSTOLIC BLOOD PRESSURE: 127 MMHG

## 2024-12-16 DIAGNOSIS — G89.29 CHRONIC PAIN: ICD-10-CM

## 2024-12-16 DIAGNOSIS — M47.812 CERVICAL SPONDYLOSIS: Primary | ICD-10-CM

## 2024-12-16 DIAGNOSIS — M54.12 CERVICAL RADICULOPATHY: ICD-10-CM

## 2024-12-16 DIAGNOSIS — M47.812 CERVICAL SPONDYLOSIS: ICD-10-CM

## 2024-12-16 DIAGNOSIS — Z01.818 PREOP TESTING: ICD-10-CM

## 2024-12-16 PROCEDURE — 63600175 PHARM REV CODE 636 W HCPCS: Performed by: ANESTHESIOLOGY

## 2024-12-16 PROCEDURE — 76000 FLUOROSCOPY <1 HR PHYS/QHP: CPT | Mod: TC

## 2024-12-16 PROCEDURE — 64492 INJ PARAVERT F JNT C/T 3 LEV: CPT | Mod: LT | Performed by: ANESTHESIOLOGY

## 2024-12-16 RX ORDER — LIDOCAINE HYDROCHLORIDE 20 MG/ML
INJECTION, SOLUTION INFILTRATION; PERINEURAL
Status: DISCONTINUED | OUTPATIENT
Start: 2024-12-16 | End: 2024-12-16 | Stop reason: HOSPADM

## 2024-12-16 RX ORDER — DEXAMETHASONE SODIUM PHOSPHATE 10 MG/ML
INJECTION INTRAMUSCULAR; INTRAVENOUS
Status: DISCONTINUED | OUTPATIENT
Start: 2024-12-16 | End: 2024-12-16 | Stop reason: HOSPADM

## 2024-12-16 RX ORDER — LIDOCAINE HYDROCHLORIDE 20 MG/ML
INJECTION, SOLUTION EPIDURAL; INFILTRATION; INTRACAUDAL; PERINEURAL
Status: DISCONTINUED | OUTPATIENT
Start: 2024-12-16 | End: 2024-12-16 | Stop reason: HOSPADM

## 2024-12-16 RX ORDER — MIDAZOLAM HYDROCHLORIDE 1 MG/ML
INJECTION INTRAMUSCULAR; INTRAVENOUS
Status: DISCONTINUED | OUTPATIENT
Start: 2024-12-16 | End: 2024-12-16 | Stop reason: HOSPADM

## 2024-12-16 NOTE — DISCHARGE SUMMARY
Discharge Note  Short Stay    Admit Date: 12/16/2024    Attending Physician: Shahid Almonte    Discharge Physician: Shahid Almonte    Discharge Date: 12/16/2024 10:51 AM    Procedure(s) (LRB):  CERVICAL MEDIAL BRANCH NERVE BLOCK (C3,4,TON) (Left)    Final Diagnosis: Cervical radiculopathy [M54.12]  Cervical spondylosis [M47.812]    Disposition: Home or self care    Patient Instructions:   Current Discharge Medication List        CONTINUE these medications which have NOT CHANGED    Details   CAPLYTA 42 mg Cap 1 capsule.      clonazePAM (KLONOPIN) 1 MG tablet Take 1 mg by mouth 2 (two) times daily.      ergocalciferol (ERGOCALCIFEROL) 50,000 unit Cap TAKE ONE CAPSULE BY MOUTH EVERY 7 DAYS  Qty: 12 capsule, Refills: 1    Comments: This prescription was filled on 10/24/2024. Any refills authorized will be placed on file.  Associated Diagnoses: Vitamin D deficiency      folic acid (FOLVITE) 1 MG tablet Take 1 tablet (1 mg total) by mouth once daily.  Qty: 30 tablet, Refills: 5    Associated Diagnoses: Folic acid deficiency      galcanezumab-gnlm (EMGALITY PEN) 120 mg/mL PnIj Inject one syringe every 28 days for migraine prevention.  Qty: 1 mL, Refills: 11    Associated Diagnoses: Intractable chronic migraine with aura and without status migrainosus      lisdexamfetamine (VYVANSE) 70 MG capsule Take 70 mg by mouth.      lithium (ESKALITH) 450 MG TbSR Take 2 tablets (900 mg total) by mouth every evening.  Qty: 60 tablet, Refills: 1      montelukast (SINGULAIR) 10 mg tablet TAKE ONE TABLET BY MOUTH ONCE A DAY IN THE EVENING  Qty: 30 tablet, Refills: 11    Associated Diagnoses: Urticaria, unspecified      pantoprazole (PROTONIX) 40 MG tablet Take 1 tablet (40 mg total) by mouth once daily.  Qty: 90 tablet, Refills: 3    Associated Diagnoses: Gastroesophageal reflux disease without esophagitis      propranoloL (INDERAL) 20 MG tablet Take 1 tablet (20 mg total) by mouth 2 (two) times daily.  Qty: 60 tablet, Refills: 1     Comments: .      sucralfate (CARAFATE) 1 gram tablet Take 1 tablet (1 g total) by mouth 2 (two) times daily.  Qty: 60 tablet, Refills: 0    Associated Diagnoses: Nausea      traZODone (DESYREL) 150 MG tablet Take 1 tablet (150 mg total) by mouth every evening.  Qty: 30 tablet, Refills: 1      butalbital-acetaminophen-caffeine -40 mg (FIORICET, ESGIC) -40 mg per tablet TAKE ONE TABLET BY MOUTH EVERY 6 HOURS AS NEEDED FOR HEADACHE  Qty: 30 tablet, Refills: 5    Comments: This prescription was filled on 11/14/2024. Any refills authorized will be placed on file.  Associated Diagnoses: Migraine without aura and without status migrainosus, not intractable      fluticasone propionate (FLONASE) 50 mcg/actuation nasal spray 2 sprays (100 mcg total) by Each Nostril route once daily.  Qty: 16 g, Refills: 2    Associated Diagnoses: Acute frontal sinusitis, recurrence not specified      zolpidem (AMBIEN) 5 MG Tab Take 1 tablet (5 mg total) by mouth nightly as needed.  Qty: 5 tablet, Refills: 0    Associated Diagnoses: Sinusitis, unspecified chronicity, unspecified location; Insomnia, unspecified type           Discharge Diagnosis: Cervical radiculopathy [M54.12]  Cervical spondylosis [M47.812]  Condition on Discharge: Stable with no complications to procedure   Diet on Discharge: Same as before.  Activity: as per instruction sheet.  Discharge to: Home with a responsible adult.  Follow up: 2-4 weeks       Please call my office or pager at [phone] if experienced any weakness or loss of sensation, fever > 101.5, pain uncontrolled with oral medications, persistent nausea/vomiting/or diarrhea, redness or drainage from the incisions, or any other worrisome concerns. If physician on call was not reached or could not communicate with our office for any reason please go to the nearest emergency department.     Shahid Almonte  12/16/2024

## 2024-12-16 NOTE — OP NOTE
Diagnostic Cervical Medial Branch Block under Fluoroscopy Guidance    The procedure, risks, benefits, and options were discussed with the patient. There are no contraindications to the procedure. The patent expressed understanding and agreed to the procedure. Informed written consent was obtained prior to the start of the procedure and can be found in the patient's chart.        PATIENT NAME: Staci Hodge   MRN: [id number]     DATE OF PROCEDURE: 12/16/2024                                                             PROCEDURE:  Diagnostic Left TON, C2, C3, and C4 Cervical Medial Branch Block under Fluoroscopy Guidance    PRE-OP DIAGNOSIS: Cervical radiculopathy [M54.12]  Cervical spondylosis [M47.812] Cervical Spondylosis [M47.812]    POST-OP DIAGNOSIS: Same    PHYSICIAN: Shahid Almonte MD      MEDICATIONS INJECTED:  Bupivacaine 0.25%    LOCAL ANESTHETIC INJECTED:   Xylocaine 2%    SEDATION: Versed 4mg and Fentanyl 0                                                                                                                                                                                     Conscious sedation ordered by M.D. Patient re-evaluation prior to administration of conscious sedation. No changes noted in patient's status from initial evaluation. The patient's vital signs were monitored by RN and patient remained hemodynamically stable throughout the procedure.  No case tracking events are documented in the log.    ESTIMATED BLOOD LOSS:  None    COMPLICATIONS:  None.    INDICATIONS: Patient has clinical and imaging findings suggestive of facet mediated pain.    TECHNIQUE:   Time-out was performed to identify the patient and procedure to be performed. With the patient laying in a prone position, the surgical area was prepped and draped in the usual sterile fashion using ChloraPrep and fenestrated drape. The levels were determined under fluoroscopic guidance. Skin anesthesia was achieved by injecting  Lidocaine 2% over the injection sites.  A 25 gauge, 3.5 inch needle was introduced to each level using AP, lateral and/or contralateral oblique fluoroscopic imaging. After negative aspiration for blood or CSF was confirmed, 0.5 mL of the anesthetic listed above was then slowly injected at each site. The needles were removed and bleeding was nil. A sterile dressing was applied. No specimens collected. The patient tolerated the procedure well.      The patient was monitored after the procedure in the recovery area. They were given post-procedure and discharge instructions to follow at home. The patient was discharged in a stable condition.      Shahid Almonte MD

## 2024-12-16 NOTE — DISCHARGE INSTRUCTIONS
DIET: You may resume your normal diet today.    BATHING: You may resume your normal bathing.          You may shower, no hot water directly on site for 24 hours.    DRESSING: You may remove your bandage today.    ACTIVITY LEVEL: You may resume your normal activities 24 hours after your  procedure.    If you have received sedation or an anesthetic, you may feel sleepy for several hours. Rest until you are more awake. Gradually resume your normal activities tomorrow.    If you have received sedation or an anesthetic, do not drive or operate heavy machinery for at least 24 hours.    MEDICATION: You may resume your normal medications today.    You will receive instructions for any pain prescriptions. Pain medications should be taken only as directed.    SPECIAL INSTRUCTIONS: No heat to the injection site for 24 hours including: bath or shower, heating pad, moist heat, hot tubs.    Use ice pack to injection site for any pain or discomfort. Apply ice pack to 20 minutes then remove for 20 minutes before re-applying to site.    WHEN TO CALL DOCTOR: Redness or swelling around injection site    Fever of 101F    Drainage (pus) from the injection site    For any continuous bleeding (some dried blood over the incision is normal).    FOLLOW UP: Follow up phone call will be made by office.    FOR EMERGENCIES: If any unusual problems or difficulties occur during clinic hours, call [phone] or [phone].

## 2024-12-18 ENCOUNTER — TELEPHONE (OUTPATIENT)
Dept: PAIN MEDICINE | Facility: CLINIC | Age: 44
End: 2024-12-18
Payer: MEDICAID

## 2024-12-18 NOTE — TELEPHONE ENCOUNTER
----- Message from Toyin sent at 2024 11:14 AM CST -----  Contact: PATIENT  Staci Hodge  MRN: [id number]  : 1980  PCP: Shilpi Clayton  Home Phone      [phone]  Work Phone      Not on file.  Mobile          [phone]      MESSAGE: Patient had a nerve block done on 24 and was told to call the office to give an update.  She states that she is feeling the same as she did before the procedure.          Phone: [phone]

## 2024-12-18 NOTE — TELEPHONE ENCOUNTER
Medial Branch Follow Up Phone Call    Procedure: Left cervical medial branch diagnostic blocks TON, C3,C4  Procedure Date: 12//16/24    The patient was contacted via telephone after the diagnostic block to assess post-operative pain scores and function.  The following was reported.    The patient reports less than 80% improvement in pain/function in the first few hours after the procedure.      Pre procedure pain 6/10  Post procedure pain 6/10  Current Pain: 6/10-8/10 depending on activity.     APPT SCHEDULED FOR 1/6 to follow up with MRI/failed mbb.   Follow-up: RTC  to discuss MRI cervical spine.     May consider: Botox, ONB,  cervical epidural

## 2025-01-06 ENCOUNTER — OFFICE VISIT (OUTPATIENT)
Dept: PAIN MEDICINE | Facility: CLINIC | Age: 45
End: 2025-01-06
Payer: MEDICAID

## 2025-01-06 VITALS
BODY MASS INDEX: 38.64 KG/M2 | HEART RATE: 110 BPM | DIASTOLIC BLOOD PRESSURE: 72 MMHG | OXYGEN SATURATION: 99 % | SYSTOLIC BLOOD PRESSURE: 110 MMHG | WEIGHT: 210 LBS | HEIGHT: 62 IN

## 2025-01-06 DIAGNOSIS — M25.78 DEGENERATION OF INTERVERTEBRAL DISC OF CERVICAL REGION WITH OSTEOPHYTE OF CERVICAL VERTEBRA: ICD-10-CM

## 2025-01-06 DIAGNOSIS — M50.30 DEGENERATION OF INTERVERTEBRAL DISC OF CERVICAL REGION WITH OSTEOPHYTE OF CERVICAL VERTEBRA: ICD-10-CM

## 2025-01-06 DIAGNOSIS — M47.812 ARTHROPATHY OF CERVICAL FACET JOINT: ICD-10-CM

## 2025-01-06 DIAGNOSIS — G89.29 CHRONIC NECK PAIN: Primary | ICD-10-CM

## 2025-01-06 DIAGNOSIS — M54.2 CHRONIC NECK PAIN: Primary | ICD-10-CM

## 2025-01-06 PROCEDURE — 1160F RVW MEDS BY RX/DR IN RCRD: CPT | Mod: CPTII,,, | Performed by: ANESTHESIOLOGY

## 2025-01-06 PROCEDURE — 3078F DIAST BP <80 MM HG: CPT | Mod: CPTII,,, | Performed by: ANESTHESIOLOGY

## 2025-01-06 PROCEDURE — 99214 OFFICE O/P EST MOD 30 MIN: CPT | Mod: S$PBB,,, | Performed by: ANESTHESIOLOGY

## 2025-01-06 PROCEDURE — 99999 PR PBB SHADOW E&M-EST. PATIENT-LVL III: CPT | Mod: PBBFAC,,, | Performed by: ANESTHESIOLOGY

## 2025-01-06 PROCEDURE — 3008F BODY MASS INDEX DOCD: CPT | Mod: CPTII,,, | Performed by: ANESTHESIOLOGY

## 2025-01-06 PROCEDURE — 1159F MED LIST DOCD IN RCRD: CPT | Mod: CPTII,,, | Performed by: ANESTHESIOLOGY

## 2025-01-06 PROCEDURE — 99213 OFFICE O/P EST LOW 20 MIN: CPT | Mod: PBBFAC | Performed by: ANESTHESIOLOGY

## 2025-01-06 PROCEDURE — 3074F SYST BP LT 130 MM HG: CPT | Mod: CPTII,,, | Performed by: ANESTHESIOLOGY

## 2025-01-06 NOTE — PROGRESS NOTES
EST Patient Evaluation  [facility]    Staci Hodge  : 1980  Date: 2025     CHIEF COMPLAINT:  Follow-up (CERVICAL MEDIAL BRANCH NERVE BLOCK (C2,3,4,TON)) and Neck Pain    Referring Physician: No ref. provider found  Primary Care Physician: Shilpi Clayton, NP    HPI:  This is a 44 y.o. female with a chief complaint of Follow-up (CERVICAL MEDIAL BRANCH NERVE BLOCK (C2,3,4,TON)) and Neck Pain  . The patient has Past medical history/Past surgical history of  breast cancer, tobacco use disorder, chronic neck pain, bilateral carpal tunnel syndrome    Patient was evaluated and referred by  neurology for neck pain, she was previously seen by [facility] and Dr Chapin for neck pain, she had bilateral cervical medial branch block and cervical epidural steroid injection with no relief.    Interval History 2024:  Staci Hodge is here  to discuss MRI cervical spine that showed Mild-to-moderate degenerative changes of the cervical spine, most pronounced at C5-6 and C6-C7 and resulting in mild spinal canal stenosis and moderate to severe bilateral neural foraminal stenosis at C5-6 and moderate left neural foraminal stenosis at C6-7.  Current pain score: 3/10    Interval History 2025  Staci Hodge is here for procedure follow up visit after left cervical medial branch block TON, C3 and C4,  patient reported 0 % improvement in pain and functionality.  Current pain score: 4/10      Diabetic: No    Anticoagulation medications: None    Allergy To Iodine: No    Currently on Antibiotic: No    Current Description of Pain Symptoms:    History of Recent Fall or Trauma: No   Onset: Chronic, started 4 years  Pain Location: Neck, BL+ CH,  worse left-sided  BL CTS  Radiates/associated symptoms: Radiates to the shoulders slightly during certain movements.   Pain is Getting worse over the last 7-8 months    The pain is described as aching, burning, and stabbing.    Exacerbating factors: Looking upward causes a shocking stabbing pain.   Mitigating factors nothing.   Symptoms interfere with daily activity, sleeping.   The patient feels like symptoms have been worsening.   Patient denies night fever/night sweats, urinary incontinence, bowel incontinence, significant weight loss, significant motor weakness, and loss of sensations.    Pain score:  Best: 3/10  Worst: 10/10    Current pain medications:    butalbital-acetaminophen-caffeine -40 mg (FIORICET, ESGIC) q6h prn  Current Narcotics/Opioid /benzo Medications:  Opioids- None  Benzodiazepines: Yes    UDS:  NA    PDMP:  Reviewed and consistent with medication use as prescribed.     Previous Chronic Pain Treatment History:  Physical Therapy/HEP/Physician Lead Exercise Program:  Over the past 12 months, Patient has done 4 sessions.  PT response: not noticing anything different from therapy   Dates of the PT sessions: 11/11,11/14,11/19,11/20 in 2024  Is patient participating in home exercise program (HEP)/ physician led exercise program: Yes.    Non-interventional Pain Therapy:  [x]Chiropractor: 05/2024: 12 sessions, mild relief  []Acupuncture/Dry needle.  []TENS unit.  [x]Heat/ICE.  []Back Brace.    Medications previously tried:  NSAIDs: Ibuprofen (Advil/Motrin) and Naproxen (Naprosyn)  Topical Agent: Yes  TCA/SSRI/SNRI: None  Anti-convulsants: Gabapentin  and Lyrica : stopped due to SE  Muscle Relaxants: Flexeril (Cyclobenzaprine)  Opioids- None.    Interventional Pain Procedures:  -12/16/2024:  left cervical medial branch block at TON, C3 and C4- No relief     -2/9/18 BILATERAL C3,4,5 CERVICAL FACET MEDIAL BRANCH NERVE BLOCK (Prone) 0% relief    -1/19/18 C7-T1 CERVICAL EPIDURAL STEROID INJECTION- 0% relief    Previous spine/Relevant joint surgery:  Right and left C, last surgery 11/2024  Surgical History:   has a past surgical history that includes Appendectomy; Adenoidectomy; Tonsillectomy; intestinal polpy; Colonoscopy;  Upper gastrointestinal endoscopy; Small intestine surgery; Bowel resection (10/17/2014); Carpal tunnel release (Right, 2016); Dilation and curettage of uterus; Breast biopsy (Left, 06/04/2018); Bilateral mastectomy (Bilateral, 07/23/2018); Injection for sentinel node identification (Left, 07/23/2018); Brandon lymph node biopsy (Left, 07/23/2018); Insertion of breast tissue expander (Bilateral, 07/23/2018); Fat transfer (Bilateral, 11/12/2018); Breast surgery; Dilation and curettage of uterus using suction (N/A, 02/25/2019); Breast reconstruction; Mastectomy; Augmentation of breast; Breast capsulotomy (Left, 04/25/2019); Removal of breast implant (Bilateral, 03/10/2020); Insertion of breast tissue expander (Bilateral, 03/10/2020); Removal of breast implant (Bilateral, 07/07/2020); Esophagogastroduodenoscopy (N/A, 01/15/2021); Endoscopic ultrasound of upper gastrointestinal tract (Left, 01/15/2021); Colonoscopy (N/A, 02/25/2021); Total abdominal hysterectomy (N/A, 03/04/2021); Bilateral salpingo-oophorectomy (BSO) (Bilateral, 03/04/2021); Lysis of adhesions (N/A, 03/04/2021); Antegrade single balloon enteroscopy (N/A, 04/19/2021); Esophagogastroduodenoscopy (N/A, 05/17/2021); Esophagogastroduodenoscopy (N/A, 01/25/2022); Esophagogastroduodenoscopy (N/A, 03/10/2023); Esophagogastroduodenoscopy (N/A, 03/22/2024); Endoscopic ultrasound of upper gastrointestinal tract (N/A, 04/26/2024); Colonoscopy (N/A, 07/08/2024); Carpal tunnel release (Left, 11/27/2024); and Injection of anesthetic agent around medial branch nerves innervating cervical facet joint (Left, 12/16/2024).  Medical History:   has a past medical history of Abnormal Pap smear of cervix (age 25), Anxiety, Breast cancer, Cancer (07/11/2018), Cervical spondylosis (2/9/2018), Colon polyp, Depression, Ectopic pregnancy (2/26/2019), General anesthetics causing adverse effect in therapeutic use, Headache, History of psychiatric hospitalization, psychiatric care,  Hypertension, Peutz-Jeghers syndrome, PONV (postoperative nausea and vomiting), Psychiatric problem, Right carpal tunnel syndrome (3/9/2017), Self-harming behavior, Suicide attempt, and Therapy.  Family History:  family history includes ADD / ADHD in her paternal uncle; Breast cancer in her paternal aunt and paternal grandmother; Cancer in her mother; Colon polyps in her sister; Dementia in her maternal grandfather and maternal grandmother; Depression in her paternal uncle; Hypertension in her father; No Known Problems in her cousin, maternal uncle, and paternal grandfather; Stomach cancer in her mother.  Allergies:  Patient has no known allergies.   Social History/SUBSTANCE ABUSE HISTORY:  Personal history of substance abuse: No   reports that she has been smoking cigarettes. She started smoking about 24 years ago. She has a 24.7 pack-year smoking history. She has never used smokeless tobacco. She reports current alcohol use of about 5.8 standard drinks of alcohol per week. She reports that she does not use drugs.  LABS:  CBC  Lab Results   Component Value Date    WBC 11.50 11/23/2024    HGB 12.9 11/23/2024    HCT 36.8 (L) 11/23/2024     Coagulation Profile   Lab Results   Component Value Date     11/23/2024       Lab Results   Component Value Date    INR 1.0 08/31/2022     CMP:  BMP  Lab Results   Component Value Date     11/23/2024    K 4.0 11/23/2024     11/23/2024    CO2 23 11/23/2024    BUN 8 11/23/2024    CREATININE 0.8 11/23/2024    CALCIUM 10.4 11/23/2024    ANIONGAP 13 11/23/2024    EGFRNORACEVR >60 11/23/2024     Lab Results   Component Value Date    ALT 40 11/23/2024    AST 19 11/23/2024    ALKPHOS 154 (H) 11/23/2024    BILITOT 0.4 11/23/2024     HGBA1C:  Lab Results   Component Value Date    HGBA1C 5.3 10/09/2023     ROS:    Review of Systems   GENERAL:  No weight loss, malaise or fevers.  HEENT:   No recent changes in vision or hearing  NECK:  Negative for lumps, no difficulty with  "swallowing.  RESPIRATORY:  Negative for cough, wheezing or shortness of breath, patient denies any recent URI.  CARDIOVASCULAR:  Negative for chest pain or palpitations.  GI:  Negative for abdominal discomfort, blood in stools or black stools or change in bowel habits.  MUSCULOSKELETAL:  See HPI.  SKIN:  Negative for lesions, rash, and itching.  PSYCH:  No mood disorder or recent psychosocial stressors.   HEMATOLOGY/LYMPHOLOGY:  See the blood thinner sectioned in HPI.  NEURO:  See HPI  All other reviewed and negative other than HPI.    PHYSICAL EXAM:  VITALS: /72 (BP Location: Right arm, Patient Position: Sitting)   Pulse 110   Ht 5' 2" (1.575 m)   Wt 95.3 kg (210 lb)   LMP 03/01/2021   SpO2 99%   BMI 38.41 kg/m²   Body mass index is 38.41 kg/m².  GENERAL: Well appearing, in no acute distress, alert and oriented x3, answers questions appropriately.   PSYCH: Flat affect.  SKIN: Skin color, texture, turgor normal, no rashes or lesions.  HEAD/FACE:  Normocephalic, atraumatic. Cranial nerves grossly intact.  CV: Regular rate  PULM: No evidence of respiratory difficulty, symmetric chest rise.  GI:  Soft and non-Distended.  NECK: (+) pain to palpation over the cervical paraspinous muscles. Spurling: -. (+) pain with neck flexion, extension, or lateral flexion, Muscle strength in RT UE 5/5 and Left UE 5/5, Hand  5/5 bilaterally     DIAGNOSTIC STUDIES AND MEDICAL RECORDS REVIEW:  I have personally reviewed and interpreted relevant radiology reports and reviewed relevant records from other services in the EMR.     MRI Cervical Spine Without Contrast 2021  The craniocervical junction is intact.  There is no evidence for a Chiari mount formation.  The spinal cord is normal in signal without cord edema or myelomalacia.    There is straightening of the normal cervical lordosis.  Vertebral body heights are maintained.  There is disc desiccation throughout the cervical spine with mild disc space narrowing.  The " marrow signal is normal without evidence for a marrow replacement process, infection or tumor.    At C2-3, no disk herniation, central canal stenosis or neural foraminal narrowing.    At C3-4, there is a posterior disc osteophyte complex and bilateral uncovertebral spurring without central canal stenosis or neural foraminal narrowing.    At C4-5, there is a posterior disc osteophyte complex without central canal stenosis or neural foraminal narrowing.    At C5-6, there is a posterior disc osteophyte complex with bilateral uncovertebral spurring and mild facet arthropathy contribute to mild bilateral neural foraminal narrowing.    At C6-7, there is a posterior disc osteophyte complex and bilateral uncovertebral spurring and facet arthropathy contributing to mild bilateral neural foraminal narrowing.    At C7-T1, there is bilateral uncovertebral spurring without central canal stenosis or neural foraminal narrowing.    Incidentally noted on the left at T1-2 is a left perineural cyst.    EMG/NCS 2018:  1. Improved Right Carpal Tunnel Syndrome findings post recent right CTR.   2. Slight worsening of the Left Carpal Tunnel Syndrome findings  3. No electrophysiological evidence of Cervical Radiculopathy found    -  MRI cervical spine 10/2024:  Alignment: Straightening of the cervical lordosis, which may be secondary to positioning.  No abnormal subluxation.     Vertebral Column: Vertebral body heights are maintained. No acute fracture is identified; however, if trauma is suspected, a CT scan would be a more sensitive examination for fractures. No evidence of an aggressive marrow replacement process. Stable probable hemangioma within the T1 in T3 vertebral bodies.  Multilevel disc degeneration with intervertebral disc space narrowing, disc desiccation, and posterior disc osteophyte complexes, most pronounced at C5-6 and C6-7 with moderate disc space narrowing.     Cord: Normal signal and morphology.     Skull base and  craniocervical junction: Normal.     Degenerative findings:     C2-C3: The disc is normal in configuration.  No significant neural foraminal or spinal canal stenosis.     C3-C4: Mild posterior disc osteophyte complex.  Mild left greater than right uncovertebral joint spurring.   No significant neural foraminal or spinal canal stenosis.     C4-C5: Minimal posterior disc osteophyte complex.  Mild bilateral facet arthropathy.   No significant neural foraminal or spinal canal stenosis.     C5-C6: Moderate disc space narrowing.  Posterior disc osteophyte complex.  Mild bilateral facet arthropathy.  Marked bilateral uncovertebral joint spurring.  Moderate to severe bilateral neural foraminal stenosis.  Mild spinal canal stenosis.     C6-C7: Moderate disc space narrowing.  Posterior disc osteophyte complex.  Mild bilateral facet arthropathy.  Moderate left and mild right uncovertebral joint spurring.  Moderate left and mild right neural foraminal stenosis.  Mild spinal canal stenosis.     C7-T1: Mild posterior disc osteophyte complex.  Mild bilateral facet arthropathy.  Mild right uncovertebral joint spurring.  Mild right neural foraminal stenosis.  No spinal canal stenosis.     Paraspinal muscles & soft tissues: No significant abnormality.     Normal signal voids are present in the vertebral arteries.     Impression:     Mild-to-moderate degenerative changes of the cervical spine, as detailed above, most pronounced at C5-6 and C6-C7 and resulting in mild spinal canal stenosis and moderate to severe bilateral neural foraminal stenosis at C5-6 and moderate left neural foraminal stenosis at C6-7.    Clinical Impression:  This is a pleasant 44 y.o. female patient with PMH/PSH of breast cancer, tobacco use disorder, chronic neck pain, bilateral carpal tunnel syndrome, presenting with neck pain, cervicogenic headache, mainly axial in nature, no bilateral upper extremity radiation, she has bilateral carpal tunnel syndrome, she  has completed multiple sessions of chiropractor this year with mild relief, last EMG/nerve conduction study showed no evidence of cervical radiculopathy that was 2018, she had right carpal tunnel surgery, she is pending to be seen by ortho for left carpal tunnel syndrome,  updated MRI cervical spine  showed  progressive worsening of symptoms with Mild-to-moderate degenerative changes of the cervical spine, most pronounced at C5-6 and C6-C7 and resulting in mild spinal canal stenosis and moderate to severe bilateral neural foraminal stenosis at C5-6 and moderate left neural foraminal stenosis at C6-7, she has completed multiple sessions of physical therapy with no benefit,  she failed cervical medial branch block in 2018 &  at 2 different levels.   I discussed with the patient different treatment options including physical therapy, neurosurgery referral, considering Botox injection.    Encounter Diagnosis:  Staci Hodge is a 44 y.o. female with the following diagnoses based on history, exam, and imagin. Chronic neck pain    2. Arthropathy of cervical facet joint    3. Degeneration of intervertebral disc of cervical region with osteophyte of cervical vertebra    Treatment Plan:    Diagnostics/Referrals:  continue with a home exercise    Medications:    NSAIDs: OTC  Topical Agent:  may consider compound cream  TCA/SSRI/SNRI: None  Anti-convulsants: None  Muscle Relaxants: None  Opioids: None    Interventional Therapy:  may consider Botox, cervical paraspinal muscle.  Sedation: NA  Anticoagulation medications: None - Clearance to stop Blood thinner: NA    Regarding the above interventions, the patient has been educated regarding the risks (including bleeding, infection, increased pain, nerve damage, or allergic reaction), benefits, and alternatives. The patient states she understands and is eager to proceed.    Physical Rehabilitation: Referral to Physical therapy for Cervical ROM, stretching,  strengthening and a home exercise program    Patient Education: Counseled patient regarding the importance of activity modification, I have stressed the importance of physical activity and a home exercise plan to help with pain and improve health.    Follow-up: RTC  as needed    May consider: SG Almonte MD  Anesthesiologist  Interventional Pain Medicine  01/06/2025    Disclaimer:  This note was prepared using voice recognition system and is likely to have sound alike errors that may have been overlooked even after proof reading.  Please call me with any questions.

## 2025-01-06 NOTE — PROGRESS NOTES
EST Patient Evaluation  [facility]    Staci Hodge  : 1980  Date: 2025     CHIEF COMPLAINT:  No chief complaint on file.    Referring Physician: No ref. provider found  Primary Care Physician: Shilpi Clayton, NP    HPI:  This is a 44 y.o. female with a chief complaint of No chief complaint on file.  . The patient has Past medical history/Past surgical history of  breast cancer, tobacco use disorder, chronic neck pain, bilateral carpal tunnel syndrome    Patient was evaluated and referred by  neurology for neck pain, she was previously seen by Grey brandt and Dr Chapin for neck pain, she had bilateral cervical medial branch block and cervical epidural steroid injection with no relief.    Interval History 2024:  Staci Hodge is here  to discuss MRI cervical spine that showed Mild-to-moderate degenerative changes of the cervical spine, most pronounced at C5-6 and C6-C7 and resulting in mild spinal canal stenosis and moderate to severe bilateral neural foraminal stenosis at C5-6 and moderate left neural foraminal stenosis at C6-7.  Current pain score: 3/10    Interval History 2025  Staci Hodge is here for procedure follow up visit after F cervical medial branch block TON, C3 and C4,  patient reported *** % improvement in pain and functionality.  Current pain score: ***/10      Diabetic: No    Anticoagulation medications: None    Allergy To Iodine: No    Currently on Antibiotic: No    Current Description of Pain Symptoms:    History of Recent Fall or Trauma: No   Onset: Chronic, started 4 years  Pain Location: Neck, BL+ CH,  worse left-sided  BL CTS  Radiates/associated symptoms: Radiates to the shoulders slightly during certain movements.   Pain is Getting worse over the last 7-8 months    The pain is described as aching, burning, and stabbing.   Exacerbating factors: Looking upward causes a shocking stabbing pain.   Mitigating factors  nothing.   Symptoms interfere with daily activity, sleeping.   The patient feels like symptoms have been worsening.   Patient denies night fever/night sweats, urinary incontinence, bowel incontinence, significant weight loss, significant motor weakness, and loss of sensations.    Pain score:  Best: 3/10  Worst: 10/10    Current pain medications:    butalbital-acetaminophen-caffeine -40 mg (FIORICET, ESGIC) q6h prn  Current Narcotics/Opioid /benzo Medications:  Opioids- None  Benzodiazepines: Yes    UDS:  NA    PDMP:  Reviewed and consistent with medication use as prescribed.     Previous Chronic Pain Treatment History:  Physical Therapy/HEP/Physician Lead Exercise Program:  Over the past 12 months, Patient has done 4 sessions.  PT response: not noticing anything different from therapy   Dates of the PT sessions: 11/11,11/14,11/19,11/20 in 2024  Is patient participating in home exercise program (HEP)/ physician led exercise program: Yes.    Non-interventional Pain Therapy:  [x]Chiropractor: 05/2024: 12 sessions, mild relief  []Acupuncture/Dry needle.  []TENS unit.  [x]Heat/ICE.  []Back Brace.    Medications previously tried:  NSAIDs: Ibuprofen (Advil/Motrin) and Naproxen (Naprosyn)  Topical Agent: Yes  TCA/SSRI/SNRI: None  Anti-convulsants: Gabapentin  and Lyrica : stopped due to SE  Muscle Relaxants: Flexeril (Cyclobenzaprine)  Opioids- None.    Interventional Pain Procedures:  -12/16/2024:  left cervical medial branch block at TON, C3 and C4     -2/9/18 BILATERAL C3,4,5 CERVICAL FACET MEDIAL BRANCH NERVE BLOCK (Prone) 0% relief    -1/19/18 C7-T1 CERVICAL EPIDURAL STEROID INJECTION- 0% relief    Previous spine/Relevant joint surgery:  Right CT  surgery with mild relief  Surgical History:   has a past surgical history that includes Appendectomy; Adenoidectomy; Tonsillectomy; intestinal polpy; Colonoscopy; Upper gastrointestinal endoscopy; Small intestine surgery; Bowel resection (10/17/2014); Carpal tunnel  release (Right, 2016); Dilation and curettage of uterus; Breast biopsy (Left, 06/04/2018); Bilateral mastectomy (Bilateral, 07/23/2018); Injection for sentinel node identification (Left, 07/23/2018); [place] lymph node biopsy (Left, 07/23/2018); Insertion of breast tissue expander (Bilateral, 07/23/2018); Fat transfer (Bilateral, 11/12/2018); Breast surgery; Dilation and curettage of uterus using suction (N/A, 02/25/2019); Breast reconstruction; Mastectomy; Augmentation of breast; Breast capsulotomy (Left, 04/25/2019); Removal of breast implant (Bilateral, 03/10/2020); Insertion of breast tissue expander (Bilateral, 03/10/2020); Removal of breast implant (Bilateral, 07/07/2020); Esophagogastroduodenoscopy (N/A, 01/15/2021); Endoscopic ultrasound of upper gastrointestinal tract (Left, 01/15/2021); Colonoscopy (N/A, 02/25/2021); Total abdominal hysterectomy (N/A, 03/04/2021); Bilateral salpingo-oophorectomy (BSO) (Bilateral, 03/04/2021); Lysis of adhesions (N/A, 03/04/2021); Antegrade single balloon enteroscopy (N/A, 04/19/2021); Esophagogastroduodenoscopy (N/A, 05/17/2021); Esophagogastroduodenoscopy (N/A, 01/25/2022); Esophagogastroduodenoscopy (N/A, 03/10/2023); Esophagogastroduodenoscopy (N/A, 03/22/2024); Endoscopic ultrasound of upper gastrointestinal tract (N/A, 04/26/2024); Colonoscopy (N/A, 07/08/2024); Carpal tunnel release (Left, 11/27/2024); and Injection of anesthetic agent around medial branch nerves innervating cervical facet joint (Left, 12/16/2024).  Medical History:   has a past medical history of Abnormal Pap smear of cervix (age 25), Anxiety, Breast cancer, Cancer (07/11/2018), Cervical spondylosis (2/9/2018), Colon polyp, Depression, Ectopic pregnancy (2/26/2019), General anesthetics causing adverse effect in therapeutic use, Headache, History of psychiatric hospitalization, psychiatric care, Hypertension, Peutz-Jeghers syndrome, PONV (postoperative nausea and vomiting), Psychiatric problem,  Right carpal tunnel syndrome (3/9/2017), Self-harming behavior, Suicide attempt, and Therapy.  Family History:  family history includes ADD / ADHD in her paternal uncle; Breast cancer in her paternal aunt and paternal grandmother; Cancer in her mother; Colon polyps in her sister; Dementia in her maternal grandfather and maternal grandmother; Depression in her paternal uncle; Hypertension in her father; No Known Problems in her cousin, maternal uncle, and paternal grandfather; Stomach cancer in her mother.  Allergies:  Patient has no known allergies.   Social History/SUBSTANCE ABUSE HISTORY:  Personal history of substance abuse: No   reports that she has been smoking cigarettes. She started smoking about 24 years ago. She has a 24.7 pack-year smoking history. She has never used smokeless tobacco. She reports current alcohol use of about 5.8 standard drinks of alcohol per week. She reports that she does not use drugs.  LABS:  CBC  Lab Results   Component Value Date    WBC 11.50 11/23/2024    HGB 12.9 11/23/2024    HCT 36.8 (L) 11/23/2024     Coagulation Profile   Lab Results   Component Value Date     11/23/2024       Lab Results   Component Value Date    INR 1.0 08/31/2022     CMP:  BMP  Lab Results   Component Value Date     11/23/2024    K 4.0 11/23/2024     11/23/2024    CO2 23 11/23/2024    BUN 8 11/23/2024    CREATININE 0.8 11/23/2024    CALCIUM 10.4 11/23/2024    ANIONGAP 13 11/23/2024    EGFRNORACEVR >60 11/23/2024     Lab Results   Component Value Date    ALT 40 11/23/2024    AST 19 11/23/2024    ALKPHOS 154 (H) 11/23/2024    BILITOT 0.4 11/23/2024     HGBA1C:  Lab Results   Component Value Date    HGBA1C 5.3 10/09/2023     ROS:    Review of Systems   GENERAL:  No weight loss, malaise or fevers.  HEENT:   No recent changes in vision or hearing  NECK:  Negative for lumps, no difficulty with swallowing.  RESPIRATORY:  Negative for cough, wheezing or shortness of breath, patient denies any  recent URI.  CARDIOVASCULAR:  Negative for chest pain or palpitations.  GI:  Negative for abdominal discomfort, blood in stools or black stools or change in bowel habits.  MUSCULOSKELETAL:  See HPI.  SKIN:  Negative for lesions, rash, and itching.  PSYCH:  No mood disorder or recent psychosocial stressors.   HEMATOLOGY/LYMPHOLOGY:  See the blood thinner sectioned in HPI.  NEURO:  See HPI  All other reviewed and negative other than HPI.    PHYSICAL EXAM:  VITALS: [facility] 03/01/2021   There is no height or weight on file to calculate BMI.  GENERAL: Well appearing, in no acute distress, alert and oriented x3, answers questions appropriately.   PSYCH: Flat affect.  SKIN: Skin color, texture, turgor normal, no rashes or lesions.  HEAD/FACE:  Normocephalic, atraumatic. Cranial nerves grossly intact.  CV: Regular rate  PULM: No evidence of respiratory difficulty, symmetric chest rise.  GI:  Soft and non-Distended.  NECK: (+) pain to palpation over the cervical paraspinous muscles. Spurling: -. (+) pain with neck flexion, extension, or lateral flexion, Muscle strength in RT UE 5/5 and Left UE 5/5, Hand  5/5 bilaterally     DIAGNOSTIC STUDIES AND MEDICAL RECORDS REVIEW:  I have personally reviewed and interpreted relevant radiology reports and reviewed relevant records from other services in the EMR.     MRI Cervical Spine Without Contrast 2021:  The craniocervical junction is intact.  There is no evidence for a Chiari mount formation.  The spinal cord is normal in signal without cord edema or myelomalacia.    There is straightening of the normal cervical lordosis.  Vertebral body heights are maintained.  There is disc desiccation throughout the cervical spine with mild disc space narrowing.  The marrow signal is normal without evidence for a marrow replacement process, infection or tumor.    At C2-3, no disk herniation, central canal stenosis or neural foraminal narrowing.    At C3-4, there is a posterior disc osteophyte  complex and bilateral uncovertebral spurring without central canal stenosis or neural foraminal narrowing.    At C4-5, there is a posterior disc osteophyte complex without central canal stenosis or neural foraminal narrowing.    At C5-6, there is a posterior disc osteophyte complex with bilateral uncovertebral spurring and mild facet arthropathy contribute to mild bilateral neural foraminal narrowing.    At C6-7, there is a posterior disc osteophyte complex and bilateral uncovertebral spurring and facet arthropathy contributing to mild bilateral neural foraminal narrowing.    At C7-T1, there is bilateral uncovertebral spurring without central canal stenosis or neural foraminal narrowing.    Incidentally noted on the left at T1-2 is a left perineural cyst.    EMG/NCS 2018:  1. Improved Right Carpal Tunnel Syndrome findings post recent right CTR.   2. Slight worsening of the Left Carpal Tunnel Syndrome findings  3. No electrophysiological evidence of Cervical Radiculopathy found    -  MRI cervical spine 10/2024:  Alignment: Straightening of the cervical lordosis, which may be secondary to positioning.  No abnormal subluxation.     Vertebral Column: Vertebral body heights are maintained. No acute fracture is identified; however, if trauma is suspected, a CT scan would be a more sensitive examination for fractures. No evidence of an aggressive marrow replacement process. Stable probable hemangioma within the T1 in T3 vertebral bodies.  Multilevel disc degeneration with intervertebral disc space narrowing, disc desiccation, and posterior disc osteophyte complexes, most pronounced at C5-6 and C6-7 with moderate disc space narrowing.     Cord: Normal signal and morphology.     Skull base and craniocervical junction: Normal.     Degenerative findings:     C2-C3: The disc is normal in configuration.  No significant neural foraminal or spinal canal stenosis.     C3-C4: Mild posterior disc osteophyte complex.  Mild left  greater than right uncovertebral joint spurring.   No significant neural foraminal or spinal canal stenosis.     C4-C5: Minimal posterior disc osteophyte complex.  Mild bilateral facet arthropathy.   No significant neural foraminal or spinal canal stenosis.     C5-C6: Moderate disc space narrowing.  Posterior disc osteophyte complex.  Mild bilateral facet arthropathy.  Marked bilateral uncovertebral joint spurring.  Moderate to severe bilateral neural foraminal stenosis.  Mild spinal canal stenosis.     C6-C7: Moderate disc space narrowing.  Posterior disc osteophyte complex.  Mild bilateral facet arthropathy.  Moderate left and mild right uncovertebral joint spurring.  Moderate left and mild right neural foraminal stenosis.  Mild spinal canal stenosis.     C7-T1: Mild posterior disc osteophyte complex.  Mild bilateral facet arthropathy.  Mild right uncovertebral joint spurring.  Mild right neural foraminal stenosis.  No spinal canal stenosis.     Paraspinal muscles & soft tissues: No significant abnormality.     Normal signal voids are present in the vertebral arteries.     Impression:     Mild-to-moderate degenerative changes of the cervical spine, as detailed above, most pronounced at C5-6 and C6-C7 and resulting in mild spinal canal stenosis and moderate to severe bilateral neural foraminal stenosis at C5-6 and moderate left neural foraminal stenosis at C6-7.    Clinical Impression:  This is a pleasant 44 y.o. female patient with PMH/PSH of breast cancer, tobacco use disorder, chronic neck pain, bilateral carpal tunnel syndrome, presenting with neck pain, cervicogenic headache, mainly axial in nature, no bilateral upper extremity radiation, she has bilateral carpal tunnel syndrome, she has completed multiple sessions of chiropractor this year with mild relief, last EMG/nerve conduction study showed no evidence of cervical radiculopathy that was 2018, she had right carpal tunnel surgery, she is pending to be seen  by ortho for left carpal tunnel syndrome,  updated MRI cervical spine 2024 showed  progressive worsening of symptoms with Mild-to-moderate degenerative changes of the cervical spine, most pronounced at C5-6 and C6-C7 and resulting in mild spinal canal stenosis and moderate to severe bilateral neural foraminal stenosis at C5-6 and moderate left neural foraminal stenosis at C6-7, she has completed multiple sessions of physical therapy with no benefit,  she failed cervical medial branch block in 2018 & 2024 at 2 different levels  Encounter Diagnosis:  Staci Hodge is a 44 y.o. female with the following diagnoses based on history, exam, and imaging:  There are no diagnoses linked to this encounter.    Treatment Plan:    Diagnostics/Referrals:  continue with a home exercise    Medications:    NSAIDs: OTC  Topical Agent:  may consider compound cream  TCA/SSRI/SNRI: None  Anti-convulsants: None  Muscle Relaxants: None  Opioids: None    Interventional Therapy:  please schedule for Botox,  cervical paraspinal muscle  Sedation: NA  Anticoagulation medications: None -Clearance to stop Blood thinner: NA    Regarding the above interventions, the patient has been educated regarding the risks (including bleeding, infection, increased pain, nerve damage, or allergic reaction), benefits, and alternatives. The patient states she understands and is eager to proceed.    Physical Rehabilitation: Referral to Physical therapy for Cervical ROM, stretching, strengthening and a home exercise program    Patient Education: Counseled patient regarding the importance of activity modification, I have stressed the importance of physical activity and a home exercise plan to help with pain and improve health.    Follow-up: RTC  to discuss MRI cervical spine.    May consider: ONB,  cervical epidural    Shahid Almonte MD  Anesthesiologist  Interventional Pain Medicine  01/06/2025    Disclaimer:  This note was prepared using voice recognition  system and is likely to have sound alike errors that may have been overlooked even after proof reading.  Please call me with any questions.

## 2025-01-09 ENCOUNTER — OFFICE VISIT (OUTPATIENT)
Dept: ORTHOPEDICS | Facility: CLINIC | Age: 45
End: 2025-01-09
Payer: MEDICAID

## 2025-01-09 VITALS
WEIGHT: 212.5 LBS | RESPIRATION RATE: 18 BRPM | DIASTOLIC BLOOD PRESSURE: 72 MMHG | HEART RATE: 111 BPM | BODY MASS INDEX: 39.11 KG/M2 | OXYGEN SATURATION: 98 % | HEIGHT: 62 IN | SYSTOLIC BLOOD PRESSURE: 114 MMHG

## 2025-01-09 DIAGNOSIS — Z98.890 S/P CARPAL TUNNEL RELEASE: Primary | ICD-10-CM

## 2025-01-09 PROCEDURE — 99999 PR PBB SHADOW E&M-EST. PATIENT-LVL IV: CPT | Mod: PBBFAC,,, | Performed by: PHYSICIAN ASSISTANT

## 2025-01-09 PROCEDURE — 99214 OFFICE O/P EST MOD 30 MIN: CPT | Mod: PBBFAC | Performed by: PHYSICIAN ASSISTANT

## 2025-01-10 NOTE — PROGRESS NOTES
Pt presents for post-op evaluation. She is 6 weeks s/p left carpal tunnel release with Dr. Bosch. Pt c/o continued mild stiffness to hand/wrist. She has noticed some improvement of pre operative numbness/tingling to fingers.      Left wrist/hand:  Incision healing well  No erythema, warmth, swelling, drainage, or any other signs of infection  Neurovascular status intact in extremity  FROM wrist and all fingers        A/P:  S/p left carpal tunnel release     1. Advance activity as tolerated.  2. OT referral.  3. Return to clinic for follow up in 6 weeks as scheduled, sooner if needed.      Patient voices understanding of and agreement with treatment plan. All of the patient's questions were answered, and the patient will contact us if she has any questions or concerns in the interim.

## 2025-01-13 ENCOUNTER — CLINICAL SUPPORT (OUTPATIENT)
Dept: REHABILITATION | Facility: HOSPITAL | Age: 45
End: 2025-01-13
Payer: MEDICAID

## 2025-01-13 DIAGNOSIS — M79.642 HAND PAIN, LEFT: ICD-10-CM

## 2025-01-13 DIAGNOSIS — Z98.890 S/P CARPAL TUNNEL RELEASE: ICD-10-CM

## 2025-01-13 DIAGNOSIS — Z98.890 HISTORY OF CARPAL TUNNEL RELEASE: ICD-10-CM

## 2025-01-13 DIAGNOSIS — R29.898 DECREASED GRIP STRENGTH OF LEFT HAND: Primary | ICD-10-CM

## 2025-01-13 PROCEDURE — 97530 THERAPEUTIC ACTIVITIES: CPT | Mod: PN

## 2025-01-13 PROCEDURE — 97165 OT EVAL LOW COMPLEX 30 MIN: CPT | Mod: PN

## 2025-01-13 NOTE — PLAN OF CARE
[facility] OUTPATIENT THERAPY AND WELLNESS  Occupational Therapy Initial Evaluation    Date: 1/13/2025  Name: Staci Hodge  Clinic Number: [id number]    Therapy Diagnosis:   Encounter Diagnoses   Name Primary?    S/P carpal tunnel release     Decreased  strength of left hand Yes    Hand pain, left     History of carpal tunnel release      Physician: Princess King PA-C    Physician Orders: OT Evaluate and treat   Medical Diagnosis: S/P CTS release   Surgical Procedure and Date: 11/27/2024  Evaluation Date: 1/13/2025  Insurance Authorization Period Expiration: 1/9/2025 - 1/9/2026  Plan of Care Certification Period: 1/13/2025 - 3/13/2025   Progress Note Due: 2/13/2025    Date of Return to MD: Unknown  Visit # / Visits authorized: Evaluation   FOTO: 1 / 3    Time In:3:15  Time Out: 4:00  Total Appointment Time (timed & untimed codes): 45 minutes    SUBJECTIVE     Date of Onset: 11/27/2024    History of Current Condition/Mechanism of Injury: Staci reports: Pt reports having symptoms for years but never opted for surgery to left wrist. She did have right done in 2017. She states she does feel as though she waited too long for surgery. She continues with significant pain to left wrist with  weakness impacting ADL.     Falls: None    Involved Side: left   Dominant Side: Right  Imaging: X-ray 11/18/2024    FINDINGS:  The alignment is within normal limits.  No displaced fractures identified.  No evidence of lytic or blastic lesions.Joint spaces are unremarkable.Soft tissues are unremarkable.     Impression:     No evidence of fracture.No significant degenerative changes.     Prior Therapy: Post Op OT for right hand.   Occupation: Unemployed      Functional Limitations/Social History:    Previous functional status includes: Independent with all ADLs.     Current Functional Status   Home/Living environment: lives with their partner      Limitation of Functional Status as follows:   ADLs/IADLs:     - Feeding: MOD  I    - Bathing: MOD I    - Dressing/Grooming: MOD I     - Driving: MOD I    Pain:  Functional Pain Scale Rating 0-10: Current 2/10, worst 9/10, best 2/10   Location: Scar site, thenar eminence, ulnar side of hand.  Description: Aching, Dull, and Sharp  Aggravating Factors: Holding Ipad, doing dishes, lifting.   Easing Factors: pain medication    Medical History:   Past Medical History:   Diagnosis Date    Abnormal Pap smear of cervix age 25    no treatement    Anxiety     Breast cancer     Cancer 07/11/2018    breast cancer    Cervical spondylosis 2/9/2018    Colon polyp     Depression     Ectopic pregnancy 2/26/2019    General anesthetics causing adverse effect in therapeutic use     Slow to awaken/ Memory problems-post-op to day 3 after Mastectomy    Headache     History of psychiatric hospitalization     age 20 for SI    Hx of psychiatric care     Hypertension     Peutz-Jeghers syndrome     PONV (postoperative nausea and vomiting)     Psychiatric problem     Right carpal tunnel syndrome 3/9/2017    Self-harming behavior     Suicide attempt     Therapy        Surgical History:    has a past surgical history that includes Appendectomy; Adenoidectomy; Tonsillectomy; intestinal polpy; Colonoscopy; Upper gastrointestinal endoscopy; Small intestine surgery; Bowel resection (10/17/2014); Carpal tunnel release (Right, 2016); Dilation and curettage of uterus; Breast biopsy (Left, 06/04/2018); Bilateral mastectomy (Bilateral, 07/23/2018); Injection for sentinel node identification (Left, 07/23/2018); [place] lymph node biopsy (Left, 07/23/2018); Insertion of breast tissue expander (Bilateral, 07/23/2018); Fat transfer (Bilateral, 11/12/2018); Breast surgery; Dilation and curettage of uterus using suction (N/A, 02/25/2019); Breast reconstruction; Mastectomy; Augmentation of breast; Breast capsulotomy (Left, 04/25/2019); Removal of breast implant (Bilateral, 03/10/2020); Insertion of breast tissue expander (Bilateral,  03/10/2020); Removal of breast implant (Bilateral, 07/07/2020); Esophagogastroduodenoscopy (N/A, 01/15/2021); Endoscopic ultrasound of upper gastrointestinal tract (Left, 01/15/2021); Colonoscopy (N/A, 02/25/2021); Total abdominal hysterectomy (N/A, 03/04/2021); Bilateral salpingo-oophorectomy (BSO) (Bilateral, 03/04/2021); Lysis of adhesions (N/A, 03/04/2021); Antegrade single balloon enteroscopy (N/A, 04/19/2021); Esophagogastroduodenoscopy (N/A, 05/17/2021); Esophagogastroduodenoscopy (N/A, 01/25/2022); Esophagogastroduodenoscopy (N/A, 03/10/2023); Esophagogastroduodenoscopy (N/A, 03/22/2024); Endoscopic ultrasound of upper gastrointestinal tract (N/A, 04/26/2024); Colonoscopy (N/A, 07/08/2024); Carpal tunnel release (Left, 11/27/2024); and Injection of anesthetic agent around medial branch nerves innervating cervical facet joint (Left, 12/16/2024).    Medications:   has a current medication list which includes the following prescription(s): butalbital-acetaminophen-caffeine -40 mg, caplyta, clonazepam, ergocalciferol, fluticasone propionate, folic acid, emgality pen, lisdexamfetamine, lithium, montelukast, pantoprazole, propranolol, sucralfate, trazodone, [DISCONTINUED] albuterol, [DISCONTINUED] duloxetine, [DISCONTINUED] gabapentin, and [DISCONTINUED] paroxetine, and the following Facility-Administered Medications: 0.9% nacl and sodium chloride 0.9%.    Allergies:   Review of patient's allergies indicates:  No Known Allergies       OBJECTIVE     Active Range of Motion:  (Measured in degrees)   Right Left   Wrist Extension  60 60   Wrist Flexion 65 65   Radial Deviation 20 15   Ulnar Deviation 50 35     Range of Motion Hand  (Active/Passive)  Measured in degrees.   1/13/2025 1/13/2025    Right Left        Digit 2:  MP  wnl wnl                 PIP                       DIP     Digit 3:  MP                   PIP                   DIP     Digit 4:   MP                    PIP                    DIP     Digit  5:  MP                   PIP                   DIP          CMC:    MP                   IP       Manual Muscle Test   Right Left   Wrist Extension  5/5 5/5   Wrist Flexion 5/5 4-/5*   Radial Deviation 5/5 5/5   Ulnar Deviation 5/5 5/5        Strength (Dynamometer/Measured in pounds on Rung II) and Pinch Strength (Pinch Gauge)   1/13/2025 1/13/2025    Right Left   Composite 60 15   Lateral Pinch 11 7   Tripod Pinch 11 9     Opposition (Fine Motor Skills/Dexterity): 9 hole:   Left: 26.12  Right: 24.75    Sensation: Pt reporting reduced sensation to middle and ring fingers volar and dorsal but she states it is improving.       Edema. Measured in centimeters.   1/13/2025 1/13/2025    Right Left   Wrist Crease 16.5 17   MCPs 18.3 19        Limitation/Restriction for FOTO Survey    Therapist reviewed FOTO scores for Staci Hodge on 1/13/2025.   FOTO documents entered into [employer] - see Media section.    Limitation Score: See media section         Treatment   Total Treatment time (time-based codes) separate from Evaluation: 10 minutes    Staci received the treatments listed below:     Therapeutic activities to improve functional performance for 12  minutes, including:  Green sponge Squeezer: 2 minutes  Green sponge opposition: 2 minutes  Green sponge radial adduction: 2 minutes  Tendon glides: 1 x 10  Scar massage: 3 minutes      Patient Education and Home Exercises      Education provided:   - HEP    Written Home Exercises Provided: yes.  Exercises were reviewed and Staci was able to demonstrate them prior to the end of the session.  Staci demonstrated good  understanding of the education provided. See EMR under Patient Instructions for exercises provided during therapy sessions.     Pt was advised to perform these exercises free of pain, and to stop performing them if pain occurs.    Patient/Family Education: role of OT, goals for OT, scheduling/cancellations - pt verbalized understanding. Discussed  insurance limitations with patient.      ASSESSMENT     Staci Hodge is a 44 y.o. female referred to outpatient occupational therapy and presents with a medical diagnosis of s/p carpal tunnel release.  Patient presents with the following therapy deficits: Decreased  strength, Decreased pinch strength, Decreased functional hand use, Increased pain, Edema, Scar Adhesions, and Diminished/Impaired Sensation and demonstrates limitations as described in the chart below. Following medical record review it is determined that pt will benefit from occupational therapy services in order to maximize pain free and/or functional use of left hand and wrist. The following goals were discussed with the patient and patient is in agreement with them as to be addressed in the treatment plan. The patient's rehab potential is Good.     Anticipated barriers to occupational therapy: None  Pt has no cultural, educational or language barriers to learning provided.    Profile and History Assessment of Occupational Performance Level of Clinical Decision Making Complexity Score   Occupational Profile:   Staci Hdoge is a 44 y.o. female who lives with their partner and is Unemployed Staci Hodge has difficulty with  ADLs and IADLs as listed previously, which  Affecting herdaily functional abilities.      Comorbidities:    has a past medical history of Abnormal Pap smear of cervix, Anxiety, Breast cancer, Cancer, Cervical spondylosis, Colon polyp, Depression, Ectopic pregnancy, General anesthetics causing adverse effect in therapeutic use, Headache, History of psychiatric hospitalization, psychiatric care, Hypertension, Peutz-Jeghers syndrome, PONV (postoperative nausea and vomiting), Psychiatric problem, Right carpal tunnel syndrome, Self-harming behavior, Suicide attempt, and Therapy.    Medical and Therapy History Review:   Brief               Performance Deficits    Physical:  Joint Mobility  Muscle  Power/Strength  Skin Integrity/Scar Formation  Edema   Strength  Pinch Strength  Fine Motor Coordination  Pain    Cognitive:  No Deficits    Psychosocial:    No Deficits     Clinical Decision Making:  low    Assessment Process:  Problem-Focused Assessments    Modification/Need for Assistance:  Not Necessary    Intervention Selection:  Limited Treatment Options       low  Based on PMHX, co morbidities , data from assessments and functional level of assistance required with task and clinical presentation directly impacting function.       The following goals were discussed with the patient and patient is in agreement with them as to be addressed in the treatment plan.       Goals:   The following goals were discussed with the patient and patient is in agreement with them as to be addressed in the treatment plan.     Short Term Goals (STGs); to be met within 4 weeks.   STG #1: Pt will report 6 out of 10 pain level at worst in left upper extremity.  STG #2: Pt will demonstrate increased  strength of 10#s in order to increase fine motor skills and dexterity.  STG #3: Pt will demonstrate independence with issued HEP.     Long Term Goals (LTGs); to be met by discharge.  LTG #1: Pt will report a pain level of 3 out of 10 at worst in left upper extremity.   LTG #2: Pt will demo improved FOTO score to predicted level.   LTG #3: Pt will return to prior level of function for ADL  LTG # 4: Pt will show improved scar pliability and reduced skin sensitivity to scar site to 3-4/10..     PLAN   Plan of Care Certification: 1/13/2025 to 3/13/2025 .     Outpatient Occupational Therapy 1 - 2 times weekly for 6 weeks to include the following interventions: Paraffin, Fluidotherapy, Manual therapy/joint mobilizations, Modalities for pain management, US 3 mhz, Therapeutic exercises/activities., Strengthening, Orthotic Fabrication/Fit/Training, Edema Control, Scar Management, and Joint Protection.      Ilir Hernandez OT      I  CERTIFY THE NEED FOR THESE SERVICES FURNISHED UNDER THIS PLAN OF TREATMENT AND WHILE UNDER MY CARE  Physician's comments:      Physician's Signature: ___________________________________________________

## 2025-01-28 ENCOUNTER — CLINICAL SUPPORT (OUTPATIENT)
Dept: REHABILITATION | Facility: HOSPITAL | Age: 45
End: 2025-01-28
Payer: MEDICAID

## 2025-01-28 DIAGNOSIS — R29.898 DECREASED GRIP STRENGTH OF LEFT HAND: Primary | ICD-10-CM

## 2025-01-28 DIAGNOSIS — Z98.890 HISTORY OF CARPAL TUNNEL RELEASE: ICD-10-CM

## 2025-01-28 DIAGNOSIS — R20.0 ARM NUMBNESS LEFT: ICD-10-CM

## 2025-01-28 DIAGNOSIS — G56.02 LEFT CARPAL TUNNEL SYNDROME: ICD-10-CM

## 2025-01-28 DIAGNOSIS — M79.642 HAND PAIN, LEFT: ICD-10-CM

## 2025-01-28 PROCEDURE — 97530 THERAPEUTIC ACTIVITIES: CPT | Mod: PN

## 2025-01-28 NOTE — PROGRESS NOTES
"  [facility]PAT[facility]ERAP[facility] AND [facility]  Occupational Therapy Treatment Note    Date: 1/28/2025  Name: Staci Hodge  Clinic Number: [id number]    Therapy Diagnosis:   Encounter Diagnoses   Name Primary?    Decreased  strength of left hand Yes    Hand pain, left     Arm numbness left     History of carpal tunnel release     Left carpal tunnel syndrome      Physician: Princess King PA-C     Physician Orders: OT Evaluate and treat   Medical Diagnosis: S/P CTS release   Surgical Procedure and Date: 11/27/2024  Evaluation Date: 1/13/2025  Insurance Authorization Period Expiration: 1/9/2025 - 1/9/2026  Plan of Care Certification Period: 1/13/2025 - 3/13/2025   Progress Note Due: 2/13/2025    Date of Return to MD: Unknown  Visit # / Visits authorized: Evaluation   FOTO: 1 / 3     Visit # / Visits authorized: 1 / 15    Time In: 11:15  Time Out: 12:00  Total Billable Time: 39 minutes    SUBJECTIVE     Pt reports: "It feels the same."  She was compliant with home exercise program given last session.     Response to previous treatment:NA      Pain: 2/10 at rest    7-8 /10 with functional use  Location: left wrists      OBJECTIVE    Measurements updated at progress report unless specified.    Treatment     Staci received the treatments listed below:     Supervised modalities after being cleared for contradictions for minutes to include:   Paraffin Wax: applied to left hand and wrist to reduce pain and increase muscle relaxation prior to session to facilitate participation in therapy for 8 minutes.    Manual therapy techniques:   Scar massage applied to left wrist scar site to reduce sensitivity and scar adhesion accumulation for 8 minutes prior to exercises.    Therapeutic exercises to develop strength and ROM for 15 minutes, including:  Theraball squeezers: 20 reps with 5 second hold  Sponge opposition with green sponge: 20 reps each  Tendon glides: 1 x 10  Digit opposition to base of 5th digit: 30 reps     Pt " received the following ultrasound modalities after being cleared for contraindication for 8 minutes (with setup):  Ultrasound  3.3MHz with 1.0 Wcm2 100%dutycycle  x 8 minutes with prep  (left) wrist CTS scar site to  promote improved muscle circulation, elasticity, relaxation, and reduce spasm as well as post session inflammation.      Patient Education and Home Exercises      Education provided:   - HEP  - Progress towards goals     Written Home Exercises Provided: yes.  Exercises were reviewed and Staci was able to demonstrate them prior to the end of the session.  Staci demonstrated good  understanding of the HEP provided. See EMR under Patient Instructions for exercises provided during therapy sessions.       Assessment     Pt would continue to benefit from skilled OT. She reports continued sensitivity and pain  to CTS site. She continues reports of pain with sleeping and discomfort with sleeping. She states current splints are uncomfortable due to pressure to anterior wrist. Dorsal wrist extension splint will be fabricated at next visit for increased comfort to wear when sleeping.     Staci is progressing well towards her goals and there are no updates to goals at this time. Pt prognosis is Good.     Pt will continue to benefit from skilled outpatient occupational therapy to address the deficits listed in the problem list on initial evaluation provide pt/family education and to maximize pt's level of independence in the home and community environment.     Pt's spiritual, cultural and educational needs considered and pt agreeable to plan of care and goals.    Anticipated barriers to occupational therapy: None    Goals  Short Term Goals (STGs); to be met within 4 weeks.   STG #1: Pt will report 6 out of 10 pain level at worst in left upper extremity.  STG #2: Pt will demonstrate increased  strength of 10#s in order to increase fine motor skills and dexterity.  STG #3: Pt will demonstrate independence  with issued HEP.      Long Term Goals (LTGs); to be met by discharge.  LTG #1: Pt will report a pain level of 3 out of 10 at worst in left upper extremity.       LTG #2: Pt will demo improved FOTO score to predicted level.   LTG #3: Pt will return to prior level of function for ADL  LTG # 4: Pt will show improved scar pliability and reduced skin sensitivity to scar site to 3-4/10..      PLAN   Plan of Care Certification: 1/13/2025 to 3/13/2025 .      Outpatient Occupational Therapy 1 - 2 times weekly for 6 weeks to include the following interventions: Paraffin, Fluidotherapy, Manual therapy/joint mobilizations, Modalities for pain management, US 3 mhz, Therapeutic exercises/activities., Strengthening, Orthotic Fabrication/Fit/Training, Edema Control, Scar Management, and Joint Protection.         Ilir Hernandez, OT

## 2025-01-29 ENCOUNTER — CLINICAL SUPPORT (OUTPATIENT)
Dept: REHABILITATION | Facility: HOSPITAL | Age: 45
End: 2025-01-29
Payer: MEDICAID

## 2025-01-29 DIAGNOSIS — R29.898 DECREASED GRIP STRENGTH OF LEFT HAND: Primary | ICD-10-CM

## 2025-01-29 DIAGNOSIS — M79.642 HAND PAIN, LEFT: ICD-10-CM

## 2025-01-29 DIAGNOSIS — Z98.890 HISTORY OF CARPAL TUNNEL RELEASE: ICD-10-CM

## 2025-01-29 PROCEDURE — 97530 THERAPEUTIC ACTIVITIES: CPT | Mod: PN

## 2025-01-29 NOTE — PROGRESS NOTES
"  [facility]  Occupational Therapy Treatment Note    Date: 1/29/2025  Name: Staci Hodge  Clinic Number: [id number]    Therapy Diagnosis:   Encounter Diagnoses   Name Primary?    Decreased  strength of left hand Yes    Hand pain, left     History of carpal tunnel release      Physician: Princess King PA-C     Physician Orders: OT Evaluate and treat   Medical Diagnosis: S/P CTS release   Surgical Procedure and Date: 11/27/2024  Evaluation Date: 1/13/2025  Insurance Authorization Period Expiration: 1/9/2025 - 1/9/2026  Plan of Care Certification Period: 1/13/2025 - 3/13/2025   Progress Note Due: 2/13/2025    Date of Return to MD: Unknown  Visit # / Visits authorized: Evaluation   FOTO: 1 / 3     Visit # / Visits authorized: 2 / 15    Time In: 2:30  Time Out: 3:15  Total Billable Time: 35 minutes    SUBJECTIVE     Pt reports: "It feels the same."  She was compliant with home exercise program given last session.     Response to previous treatment:NA      Pain: 2/10 at rest    7-8 /10 with functional use  Location: left wrists      OBJECTIVE    Measurements updated at progress report unless specified.    Treatment     Staci received the treatments listed below:     Supervised modalities after being cleared for contradictions for minutes to include:   Paraffin Wax: applied to left hand and wrist to reduce pain and increase muscle relaxation prior to session to facilitate participation in therapy for 0 minutes.    Manual therapy techniques:   Edema massage applied to left hand digits 1-5 to palm and wrist to reduce swelling to hand for 8 minutes with Pt educated on technique and informed to complete daily 2-3x per day.    Therapeutic exercises to develop strength and ROM for 0 minutes, including:  Theraball squeezers: 20 reps with 5 second hold  Sponge opposition with green sponge: 20 reps each  Tendon glides: 1 x 10  Digit opposition to base of 5th digit: 30 reps     Pt received " the following ultrasound modalities after being cleared for contraindication for 0 minutes (with setup):  Ultrasound  3.3MHz with 1.0 Wcm2 100%dutycycle  x 8 minutes with prep  (left) wrist CTS scar site to  promote improved muscle circulation, elasticity, relaxation, and reduce spasm as well as post session inflammation.    Fabricated a dorsal wrist support orthosis to left hand to protect tendon, maintain immobilization of wrist, to improve functional hand use. Instructed to wear at night with removal for HEP, bathing/hygiene. Instructed to monitor for pain/pressure, increased edema, or redness and to contact office for adjustments as needed. Patient reported good understanding of orthotic wear and care schedule. 27 minutes    Patient Education and Home Exercises      Education provided:   - HEP  - Progress towards goals     Written Home Exercises Provided: yes.  Exercises were reviewed and Staci was able to demonstrate them prior to the end of the session.  Staci demonstrated good  understanding of the HEP provided. See EMR under Patient Instructions for exercises provided during therapy sessions.       Assessment     Pt would continue to benefit from skilled OT. Pt continues to report intense sensitivity to carpal tunnel repair site with swelling. She reports discomfort with current splints due to placing pressure on volar scar site. A dorsal wrist support orthosis was fabricated today to provide wrist support at night to relieve pressure.  Pt educated on splint wear and care schedule as well as returning to clinic for adjustments as needed.     Staci is progressing well towards her goals and there are no updates to goals at this time. Pt prognosis is Good.     Pt will continue to benefit from skilled outpatient occupational therapy to address the deficits listed in the problem list on initial evaluation provide pt/family education and to maximize pt's level of independence in the home and community  environment.     Pt's spiritual, cultural and educational needs considered and pt agreeable to plan of care and goals.    Anticipated barriers to occupational therapy: None    Goals  Short Term Goals (STGs); to be met within 4 weeks.   STG #1: Pt will report 6 out of 10 pain level at worst in left upper extremity.  STG #2: Pt will demonstrate increased  strength of 10#s in order to increase fine motor skills and dexterity.  STG #3: Pt will demonstrate independence with issued HEP.      Long Term Goals (LTGs); to be met by discharge.  LTG #1: Pt will report a pain level of 3 out of 10 at worst in left upper extremity.       LTG #2: Pt will demo improved FOTO score to predicted level.   LTG #3: Pt will return to prior level of function for ADL  LTG # 4: Pt will show improved scar pliability and reduced skin sensitivity to scar site to 3-4/10..      PLAN   Plan of Care Certification: 1/13/2025 to 3/13/2025 .      Outpatient Occupational Therapy 1 - 2 times weekly for 6 weeks to include the following interventions: Paraffin, Fluidotherapy, Manual therapy/joint mobilizations, Modalities for pain management, US 3 mhz, Therapeutic exercises/activities., Strengthening, Orthotic Fabrication/Fit/Training, Edema Control, Scar Management, and Joint Protection.         Ilir Hernandez OT

## 2025-02-04 ENCOUNTER — CLINICAL SUPPORT (OUTPATIENT)
Dept: REHABILITATION | Facility: HOSPITAL | Age: 45
End: 2025-02-04
Payer: MEDICAID

## 2025-02-04 DIAGNOSIS — Z98.890 HISTORY OF CARPAL TUNNEL RELEASE: ICD-10-CM

## 2025-02-04 DIAGNOSIS — R29.898 DECREASED GRIP STRENGTH OF LEFT HAND: Primary | ICD-10-CM

## 2025-02-04 DIAGNOSIS — M79.642 HAND PAIN, LEFT: ICD-10-CM

## 2025-02-04 PROCEDURE — 97018 PARAFFIN BATH THERAPY: CPT | Mod: PN

## 2025-02-04 PROCEDURE — 97110 THERAPEUTIC EXERCISES: CPT | Mod: PN

## 2025-02-04 NOTE — PROGRESS NOTES
"  [facility]  Occupational Therapy Treatment Note    Date: 2/4/2025  Name: Staci Hodge  Clinic Number: [id number]    Therapy Diagnosis:   Encounter Diagnoses   Name Primary?    Decreased  strength of left hand Yes    Hand pain, left     History of carpal tunnel release      Physician: Princess King PA-C     Physician Orders: OT Evaluate and treat   Medical Diagnosis: S/P CTS release   Surgical Procedure and Date: 11/27/2024  Evaluation Date: 1/13/2025  Insurance Authorization Period Expiration: 1/9/2025 - 1/9/2026  Plan of Care Certification Period: 1/13/2025 - 3/13/2025   Progress Note Due: 2/13/2025    Date of Return to MD: Unknown  Visit # / Visits authorized: Evaluation   FOTO: 1 / 3     Visit # / Visits authorized: 3 / 15    Time In: 11:25  Time Out: 12:00  Total Billable Time: 29 minutes    SUBJECTIVE     Pt reports: "The splint was bothering me but I forgot to bring it in."  She was compliant with home exercise program given last session.     Response to previous treatment:NA      Pain: 2/10 at rest    7-8 /10 with functional use  Location: left wrists      OBJECTIVE    Measurements updated at progress report unless specified.    Treatment     Staci received the treatments listed below:     Supervised modalities after being cleared for contradictions for 8 minutes to include:   Paraffin Wax: applied to left hand and wrist to reduce pain and increase muscle relaxation prior to session to facilitate participation in therapy for 8 minutes.    Manual therapy techniques:   Edema massage applied to left hand digits 1-5 to palm and wrist to reduce swelling to hand for 0 minutes with Pt educated on technique and informed to complete daily 2-3x per day.    Therapeutic exercises to develop strength and ROM for 21 minutes, including:  Wrist resisted range of motion  2 x 10 each including:   Flexion 2#   Extension 2#   Supination (no weight pronator bar)   Pronation (No weight " pronator bar)   Ulnar/radial deviation (No weight pronator bar)  Prayer stretch: 1 x 10 with 5 second hold  Red putty exercises including:   Tripod pinch   Squeezer (Unable to finish due to pain, Pt crying)   Dowel press   Large knob twists      Patient Education and Home Exercises      Education provided:   - HEP  - Progress towards goals     Written Home Exercises Provided: yes.  Exercises were reviewed and Staci was able to demonstrate them prior to the end of the session.  Staci demonstrated good  understanding of the HEP provided. See EMR under Patient Instructions for exercises provided during therapy sessions.       Assessment     Pt would continue to benefit from skilled OT. Pt remains with reported intense sensitivity to carpal tunnel scar site. Pt unable to complete composite gripping with putty due to pain. Pain primarily occurs with contact to site. Wrist range of motion looking good at this time with limitations in strength present.     Staci is progressing well towards her goals and there are no updates to goals at this time. Pt prognosis is Good.     Pt will continue to benefit from skilled outpatient occupational therapy to address the deficits listed in the problem list on initial evaluation provide pt/family education and to maximize pt's level of independence in the home and community environment.     Pt's spiritual, cultural and educational needs considered and pt agreeable to plan of care and goals.    Anticipated barriers to occupational therapy: None    Goals  Short Term Goals (STGs); to be met within 4 weeks.   STG #1: Pt will report 6 out of 10 pain level at worst in left upper extremity.  STG #2: Pt will demonstrate increased  strength of 10#s in order to increase fine motor skills and dexterity.  STG #3: Pt will demonstrate independence with issued HEP.      Long Term Goals (LTGs); to be met by discharge.  LTG #1: Pt will report a pain level of 3 out of 10 at worst in left upper  extremity.       LTG #2: Pt will demo improved FOTO score to predicted level.   LTG #3: Pt will return to prior level of function for ADL  LTG # 4: Pt will show improved scar pliability and reduced skin sensitivity to scar site to 3-4/10..      PLAN   Plan of Care Certification: 1/13/2025 to 3/13/2025 .      Outpatient Occupational Therapy 1 - 2 times weekly for 6 weeks to include the following interventions: Paraffin, Fluidotherapy, Manual therapy/joint mobilizations, Modalities for pain management, US 3 mhz, Therapeutic exercises/activities., Strengthening, Orthotic Fabrication/Fit/Training, Edema Control, Scar Management, and Joint Protection.         Ilir Hernandez, OT

## 2025-02-05 DIAGNOSIS — E53.8 FOLIC ACID DEFICIENCY: ICD-10-CM

## 2025-02-06 ENCOUNTER — CLINICAL SUPPORT (OUTPATIENT)
Dept: REHABILITATION | Facility: HOSPITAL | Age: 45
End: 2025-02-06
Payer: MEDICAID

## 2025-02-06 DIAGNOSIS — R29.898 DECREASED GRIP STRENGTH OF LEFT HAND: Primary | ICD-10-CM

## 2025-02-06 DIAGNOSIS — Z98.890 HISTORY OF CARPAL TUNNEL RELEASE: ICD-10-CM

## 2025-02-06 DIAGNOSIS — M79.642 HAND PAIN, LEFT: ICD-10-CM

## 2025-02-06 PROCEDURE — 97530 THERAPEUTIC ACTIVITIES: CPT | Mod: PN

## 2025-02-06 RX ORDER — FOLIC ACID 1 MG/1
1000 TABLET ORAL
Qty: 30 TABLET | Refills: 5 | Status: SHIPPED | OUTPATIENT
Start: 2025-02-06

## 2025-02-06 NOTE — PROGRESS NOTES
"  [facility]  Occupational Therapy Treatment Note    Date: 2/6/2025  Name: Staci Hodge  Clinic Number: [id number]    Therapy Diagnosis:   Encounter Diagnoses   Name Primary?    Decreased  strength of left hand Yes    Hand pain, left     History of carpal tunnel release      Physician: Princess King PA-C     Physician Orders: OT Evaluate and treat   Medical Diagnosis: S/P CTS release   Surgical Procedure and Date: 11/27/2024  Evaluation Date: 1/13/2025  Insurance Authorization Period Expiration: 1/9/2025 - 1/9/2026  Plan of Care Certification Period: 1/13/2025 - 3/13/2025   Progress Note Due: 2/13/2025    Date of Return to MD: Unknown  Visit # / Visits authorized: Evaluation   FOTO: 1 / 3     Visit # / Visits authorized: 4 / 15    Time In: 11:45  Time Out: 12:30  Total Billable Time: 45 minutes    SUBJECTIVE     Pt reports: "It's still really sensitive."   She was compliant with home exercise program given last session.     Response to previous treatment:NA      Pain: 2/10 at rest    7-8 /10 with functional use  Location: left wrists      OBJECTIVE    Measurements updated at progress report unless specified.    Treatment     Staci received the treatments listed below:     Cold Pack - Applied to left hand to reduce post session inflammation and pain following therapy for 5 minutes.    Supervised modalities after being cleared for contradictions for 0 minutes to include:   Paraffin Wax: applied to left hand and wrist to reduce pain and increase muscle relaxation prior to session to facilitate participation in therapy for 0 minutes.    Manual therapy techniques:   Edema massage applied to left hand digits 1-5 to palm and wrist to reduce swelling to hand for 0 minutes with Pt educated on technique and informed to complete daily 2-3x per day.    Therapeutic Activity to develop strength and ROM for 40 minutes, including:  Desensitization technique using various textured gloves to " provide stimulation to CTS scar site and wrist to reduce sensitivity to touch and improve functional use of left hand.   Wrist resisted range of motion  2 x 10 each including:   Flexion 3#   Extension 3#   Supination 3#   Pronation 3#   Ulnar/radial deviation 3#  Prayer stretch: 1 x 10 with 5 second hold  Red putty exercises including:  Dorsal wrist extension splint modified with replaced adhesive cushioning and trimming to reduce skin irritation and blisters.      Patient Education and Home Exercises      Education provided:   - HEP  - Progress towards goals     Written Home Exercises Provided: yes.  Exercises were reviewed and Staci was able to demonstrate them prior to the end of the session.  Staci demonstrated good  understanding of the HEP provided. See EMR under Patient Instructions for exercises provided during therapy sessions.       Assessment     Pt would continue to benefit from skilled OT. Pt remains with significant sensitivity to scar and wrist and reports with increased inflammation today. She demonstrated fair tolerance to desensitization techniques today with some reported discomfort. Splint modified to increase comfort due to skin irritation at night. Pt endorses comfort with splint modifications today.     Staci is progressing well towards her goals and there are no updates to goals at this time. Pt prognosis is Good.     Pt will continue to benefit from skilled outpatient occupational therapy to address the deficits listed in the problem list on initial evaluation provide pt/family education and to maximize pt's level of independence in the home and community environment.     Pt's spiritual, cultural and educational needs considered and pt agreeable to plan of care and goals.    Anticipated barriers to occupational therapy: None    Goals  Short Term Goals (STGs); to be met within 4 weeks.   STG #1: Pt will report 6 out of 10 pain level at worst in left upper extremity.  STG #2: Pt will  demonstrate increased  strength of 10#s in order to increase fine motor skills and dexterity.  STG #3: Pt will demonstrate independence with issued HEP.      Long Term Goals (LTGs); to be met by discharge.  LTG #1: Pt will report a pain level of 3 out of 10 at worst in left upper extremity.       LTG #2: Pt will demo improved FOTO score to predicted level.   LTG #3: Pt will return to prior level of function for ADL  LTG # 4: Pt will show improved scar pliability and reduced skin sensitivity to scar site to 3-4/10..      PLAN   Plan of Care Certification: 1/13/2025 to 3/13/2025 .      Outpatient Occupational Therapy 1 - 2 times weekly for 6 weeks to include the following interventions: Paraffin, Fluidotherapy, Manual therapy/joint mobilizations, Modalities for pain management, US 3 mhz, Therapeutic exercises/activities., Strengthening, Orthotic Fabrication/Fit/Training, Edema Control, Scar Management, and Joint Protection.         Ilir Hernandez, OT

## 2025-02-11 ENCOUNTER — CLINICAL SUPPORT (OUTPATIENT)
Dept: REHABILITATION | Facility: HOSPITAL | Age: 45
End: 2025-02-11
Payer: MEDICAID

## 2025-02-11 DIAGNOSIS — R29.898 DECREASED GRIP STRENGTH OF LEFT HAND: Primary | ICD-10-CM

## 2025-02-11 DIAGNOSIS — Z98.890 HISTORY OF CARPAL TUNNEL RELEASE: ICD-10-CM

## 2025-02-11 DIAGNOSIS — M79.642 HAND PAIN, LEFT: ICD-10-CM

## 2025-02-11 PROCEDURE — 97530 THERAPEUTIC ACTIVITIES: CPT | Mod: PN

## 2025-02-11 NOTE — PROGRESS NOTES
"  [facility]  Occupational Therapy Treatment Note    Date: 2/11/2025  Name: Staci Hodge  Clinic Number: [id number]    Therapy Diagnosis:   Encounter Diagnoses   Name Primary?    Decreased  strength of left hand Yes    Hand pain, left     History of carpal tunnel release      Physician: Princess King PA-C     Physician Orders: OT Evaluate and treat   Medical Diagnosis: S/P CTS release   Surgical Procedure and Date: 11/27/2024  Evaluation Date: 1/13/2025  Insurance Authorization Period Expiration: 1/9/2025 - 1/9/2026  Plan of Care Certification Period: 1/13/2025 - 3/13/2025   Progress Note Due: 2/13/2025    Date of Return to MD: Unknown  Visit # / Visits authorized: Evaluation   FOTO: 1 / 3     Visit # / Visits authorized: 4 / 15    Time In: 11:50  Time Out: 12:30  Total Billable Time: 40 minutes    SUBJECTIVE     Pt reports: "It's still sensitive but I have noticed some improvement."  She was compliant with home exercise program given last session.     Response to previous treatment:NA      Pain: 2/10 at rest    7-8 /10 with functional use  Location: left wrists      OBJECTIVE    Measurements updated at progress report unless specified.    Treatment     Staci received the treatments listed below:     Cold Pack - Applied to left hand to reduce post session inflammation and pain following therapy for 5 minutes.    Supervised modalities after being cleared for contradictions for 0 minutes to include:   Paraffin Wax: applied to left hand and wrist to reduce pain and increase muscle relaxation prior to session to facilitate participation in therapy for 0 minutes.    Manual therapy techniques:   Edema massage applied to left hand digits 1-5 to palm and wrist to reduce swelling to hand for 0 minutes with Pt educated on technique and informed to complete daily 2-3x per day.    Therapeutic Activity to develop strength and ROM for 35 minutes, including:  Desensitization technique " using various textured gloves to provide stimulation to CTS scar site and wrist to reduce sensitivity to touch and improve functional use of left hand.   Green sponge opposition: 30 reps each  Green sponge squeeze: 30 reps  Prayer stretch: 1 x 10 with 5 second hold  Wrist resisted range of motion  2 x 10 each including:              Flexion 3#              Extension 3#              Supination 3#              Pronation 3#              Ulnar/radial deviation 3#    Patient Education and Home Exercises      Education provided:   - HEP  - Progress towards goals     Written Home Exercises Provided: yes.  Exercises were reviewed and Staci was able to demonstrate them prior to the end of the session.  Staci demonstrated good  understanding of the HEP provided. See EMR under Patient Instructions for exercises provided during therapy sessions.       Assessment     Pt would continue to benefit from skilled OT. She reports she has been trying to increase use of left hand with daily activities and has noticed a slight improvement in hypersensitivity following desensitization exercises.     Staci is progressing well towards her goals and there are no updates to goals at this time. Pt prognosis is Good.     Pt will continue to benefit from skilled outpatient occupational therapy to address the deficits listed in the problem list on initial evaluation provide pt/family education and to maximize pt's level of independence in the home and community environment.     Pt's spiritual, cultural and educational needs considered and pt agreeable to plan of care and goals.    Anticipated barriers to occupational therapy: None    Goals  Short Term Goals (STGs); to be met within 4 weeks.   STG #1: Pt will report 6 out of 10 pain level at worst in left upper extremity.  STG #2: Pt will demonstrate increased  strength of 10#s in order to increase fine motor skills and dexterity.  STG #3: Pt will demonstrate independence with issued  HEP.      Long Term Goals (LTGs); to be met by discharge.  LTG #1: Pt will report a pain level of 3 out of 10 at worst in left upper extremity.       LTG #2: Pt will demo improved FOTO score to predicted level.   LTG #3: Pt will return to prior level of function for ADL  LTG # 4: Pt will show improved scar pliability and reduced skin sensitivity to scar site to 3-4/10..      PLAN   Plan of Care Certification: 1/13/2025 to 3/13/2025 .      Outpatient Occupational Therapy 1 - 2 times weekly for 6 weeks to include the following interventions: Paraffin, Fluidotherapy, Manual therapy/joint mobilizations, Modalities for pain management, US 3 mhz, Therapeutic exercises/activities., Strengthening, Orthotic Fabrication/Fit/Training, Edema Control, Scar Management, and Joint Protection.         Ilir Hernandez, OT

## 2025-02-13 ENCOUNTER — CLINICAL SUPPORT (OUTPATIENT)
Dept: REHABILITATION | Facility: HOSPITAL | Age: 45
End: 2025-02-13
Payer: MEDICAID

## 2025-02-13 DIAGNOSIS — Z98.890 HISTORY OF CARPAL TUNNEL RELEASE: ICD-10-CM

## 2025-02-13 DIAGNOSIS — M79.642 HAND PAIN, LEFT: ICD-10-CM

## 2025-02-13 DIAGNOSIS — R29.898 DECREASED GRIP STRENGTH OF LEFT HAND: Primary | ICD-10-CM

## 2025-02-13 PROCEDURE — 97530 THERAPEUTIC ACTIVITIES: CPT | Mod: PN

## 2025-02-13 NOTE — PROGRESS NOTES
"  [facility]  Occupational Therapy Treatment Note    Date: 2/13/2025  Name: Staci Hodge  Clinic Number: [id number]    Therapy Diagnosis:   Encounter Diagnoses   Name Primary?    Decreased  strength of left hand Yes    Hand pain, left     History of carpal tunnel release      Physician: Princess King PA-C     Physician Orders: OT Evaluate and treat   Medical Diagnosis: S/P CTS release   Surgical Procedure and Date: 11/27/2024  Evaluation Date: 1/13/2025  Insurance Authorization Period Expiration: 1/9/2025 - 1/9/2026  Plan of Care Certification Period: 1/13/2025 - 3/13/2025   Progress Note Due: 2/13/2025    Date of Return to MD: Unknown  Visit # / Visits authorized: Evaluation   FOTO: 1 / 3     Visit # / Visits authorized: 6 / 15    Time In: 9:30  Time Out: 10:15  Total Billable Time: 40 minutes    SUBJECTIVE     Pt reports: "It's still sensitive but I have noticed some improvement."  She was compliant with home exercise program given last session.     Response to previous treatment:NA      Pain: 2/10 at rest    7-8 /10 with functional use  Location: left wrists      OBJECTIVE       Active Range of Motion:  (Measured in degrees)    Right Left Left    Wrist Extension  60 60 65   Wrist Flexion 65 65 70   Radial Deviation 20 15 25   Ulnar Deviation 50 35 40     Manual Muscle Test    Right Left Left    Wrist Extension  5/5 5/5 5/5   Wrist Flexion 5/5 4-/5* 5/5*   Radial Deviation 5/5 5/5 5/5   Ulnar Deviation 5/5 5/5 5/5          Strength (Dynamometer/Measured in pounds on Rung II) and Pinch Strength (Pinch Gauge)    1/13/2025 1/13/2025 2/13/2025     Right Left Left    Composite 60 15 35   Lateral Pinch 11 7 10   Tripod Pinch 11 9 8        Left: 26.12 > 23.94    Treatment     Staci received the treatments listed below:     Cold Pack - Applied to left hand to reduce post session inflammation and pain following therapy for 5 minutes.    Supervised modalities after being cleared " for contradictions for 0 minutes to include:   Paraffin Wax: applied to left hand and wrist to reduce pain and increase muscle relaxation prior to session to facilitate participation in therapy for 0 minutes.    Manual therapy techniques:   Edema massage applied to left hand digits 1-5 to palm and wrist to reduce swelling to hand for 0 minutes with Pt educated on technique and informed to complete daily 2-3x per day.    Therapeutic Activity to develop strength and ROM for 35 minutes, including:  Desensitization technique using various textured gloves to provide stimulation to CTS scar site and wrist to reduce sensitivity to touch and improve functional use of left hand.   Green sponge opposition: 30 reps each  Green sponge squeeze: 30 reps  Prayer stretch: 1 x 10 with 5 second hold  Wrist resisted range of motion  2 x 10 each including:              Flexion 3#              Extension 3#              Supination 3#              Pronation 3#              Ulnar/radial deviation 3#    Patient Education and Home Exercises      Education provided:   - HEP  - Progress towards goals     Written Home Exercises Provided: yes.  Exercises were reviewed and Staci was able to demonstrate them prior to the end of the session.  Staci demonstrated good  understanding of the HEP provided. See EMR under Patient Instructions for exercises provided during therapy sessions.       Assessment     Pt would continue to benefit from skilled OT. Upon entry Pt is reporting reduced sensitivity to carpal tunnel scar stating she is able to use her hand more during daily activities. She also demonstrates improvements in strength and range of motion to left hand as documented above.     Staci is progressing well towards her goals and there are no updates to goals at this time. Pt prognosis is Good.     Pt will continue to benefit from skilled outpatient occupational therapy to address the deficits listed in the problem list on initial evaluation  provide pt/family education and to maximize pt's level of independence in the home and community environment.     Pt's spiritual, cultural and educational needs considered and pt agreeable to plan of care and goals.    Anticipated barriers to occupational therapy: None    Goals  Short Term Goals (STGs); to be met within 4 weeks.   STG #1: Pt will report 6 out of 10 pain level at worst in left upper extremity.  STG #2: Pt will demonstrate increased  strength of 10#s in order to increase fine motor skills and dexterity. (MET)  STG #3: Pt will demonstrate independence with issued HEP.      Long Term Goals (LTGs); to be met by discharge.  LTG #1: Pt will report a pain level of 3 out of 10 at worst in left upper extremity.       LTG #2: Pt will demo improved FOTO score to predicted level.   LTG #3: Pt will return to prior level of function for ADL  LTG # 4: Pt will show improved scar pliability and reduced skin sensitivity to scar site to 3-4/10..      PLAN   Plan of Care Certification: 1/13/2025 to 3/13/2025 .      Outpatient Occupational Therapy 1 - 2 times weekly for 6 weeks to include the following interventions: Paraffin, Fluidotherapy, Manual therapy/joint mobilizations, Modalities for pain management, US 3 mhz, Therapeutic exercises/activities., Strengthening, Orthotic Fabrication/Fit/Training, Edema Control, Scar Management, and Joint Protection.         Ilir Hernandez OT

## 2025-02-17 ENCOUNTER — CLINICAL SUPPORT (OUTPATIENT)
Dept: REHABILITATION | Facility: HOSPITAL | Age: 45
End: 2025-02-17
Payer: MEDICAID

## 2025-02-17 DIAGNOSIS — R29.898 DECREASED GRIP STRENGTH OF LEFT HAND: Primary | ICD-10-CM

## 2025-02-17 DIAGNOSIS — M79.642 HAND PAIN, LEFT: ICD-10-CM

## 2025-02-17 DIAGNOSIS — Z98.890 HISTORY OF CARPAL TUNNEL RELEASE: ICD-10-CM

## 2025-02-17 PROCEDURE — 97530 THERAPEUTIC ACTIVITIES: CPT | Mod: PN

## 2025-02-17 NOTE — PROGRESS NOTES
"  [facility]  Occupational Therapy Treatment Note    Date: 2/17/2025  Name: Staci Hodge  Clinic Number: [id number]    Therapy Diagnosis:   Encounter Diagnoses   Name Primary?    Decreased  strength of left hand Yes    Hand pain, left     History of carpal tunnel release      Physician: Princess King PA-C     Physician Orders: OT Evaluate and treat   Medical Diagnosis: S/P CTS release   Surgical Procedure and Date: 11/27/2024  Evaluation Date: 1/13/2025  Insurance Authorization Period Expiration: 1/9/2025 - 1/9/2026  Plan of Care Certification Period: 1/13/2025 - 3/13/2025   Progress Note Due: 2/13/2025    Date of Return to MD: Unknown  Visit # / Visits authorized: Evaluation   FOTO: 1 / 3     Visit # / Visits authorized: 7 / 15    Time In: 12:00 (Pt running late)  Time Out: 12:30  Total Billable Time: 30 minutes    SUBJECTIVE     Pt reports: "It feels a little better every time I come."  She was compliant with home exercise program given last session.     Response to previous treatment:NA      Pain: 1-2/10 at rest    5-6 /10 with functional use  Location: left wrists      OBJECTIVE       Active Range of Motion:  (Measured in degrees)    Right Left Left    Wrist Extension  60 60 65   Wrist Flexion 65 65 70   Radial Deviation 20 15 25   Ulnar Deviation 50 35 40     Manual Muscle Test    Right Left Left    Wrist Extension  5/5 5/5 5/5   Wrist Flexion 5/5 4-/5* 5/5*   Radial Deviation 5/5 5/5 5/5   Ulnar Deviation 5/5 5/5 5/5          Strength (Dynamometer/Measured in pounds on Rung II) and Pinch Strength (Pinch Gauge)    1/13/2025 1/13/2025 2/13/2025     Right Left Left    Composite 60 15 35   Lateral Pinch 11 7 10   Tripod Pinch 11 9 8        Left: 26.12 > 23.94    Treatment     Staci received the treatments listed below:     Cold Pack - Applied to left hand to reduce post session inflammation and pain following therapy for 5 minutes.    Supervised modalities after being " cleared for contradictions for 0 minutes to include:   Paraffin Wax: applied to left hand and wrist to reduce pain and increase muscle relaxation prior to session to facilitate participation in therapy for 0 minutes.    Manual therapy techniques:   Edema massage applied to left hand digits 1-5 to palm and wrist to reduce swelling to hand for 0 minutes with Pt educated on technique and informed to complete daily 2-3x per day.    Therapeutic Activity to develop strength and ROM for 25 minutes, including:  Desensitization technique using various textured gloves to provide stimulation to CTS scar site and wrist to reduce sensitivity to touch and improve functional use of left hand.   Red digiweb metacarpal phalangeal flexion and extension: 2 x 10 reps each  Prayer stretch: 1 x 10 with 5 second hold  Wrist resisted range of motion  2 x 10 each including:              Flexion 3#              Extension 3#              Supination 3#              Pronation 3#              Ulnar/radial deviation 3#    Patient Education and Home Exercises      Education provided:   - HEP  - Progress towards goals     Written Home Exercises Provided: yes.  Exercises were reviewed and Staci was able to demonstrate them prior to the end of the session.  Staci demonstrated good  understanding of the HEP provided. See EMR under Patient Instructions for exercises provided during therapy sessions.       Assessment     Pt would continue to benefit from skilled OT. Upon entry Pt is reporting reduced sensitivity to carpal tunnel scar stating she is able to use her hand more during daily activities. She also demonstrates improvements in strength and range of motion to left hand as documented above.     Staci is progressing well towards her goals and there are no updates to goals at this time. Pt prognosis is Good.     Pt will continue to benefit from skilled outpatient occupational therapy to address the deficits listed in the problem list on  initial evaluation provide pt/family education and to maximize pt's level of independence in the home and community environment.     Pt's spiritual, cultural and educational needs considered and pt agreeable to plan of care and goals.    Anticipated barriers to occupational therapy: None    Goals  Short Term Goals (STGs); to be met within 4 weeks.   STG #1: Pt will report 6 out of 10 pain level at worst in left upper extremity.  STG #2: Pt will demonstrate increased  strength of 10#s in order to increase fine motor skills and dexterity. (MET)  STG #3: Pt will demonstrate independence with issued HEP.      Long Term Goals (LTGs); to be met by discharge.  LTG #1: Pt will report a pain level of 3 out of 10 at worst in left upper extremity.       LTG #2: Pt will demo improved FOTO score to predicted level.   LTG #3: Pt will return to prior level of function for ADL  LTG # 4: Pt will show improved scar pliability and reduced skin sensitivity to scar site to 3-4/10..      PLAN   Plan of Care Certification: 1/13/2025 to 3/13/2025 .      Outpatient Occupational Therapy 1 - 2 times weekly for 6 weeks to include the following interventions: Paraffin, Fluidotherapy, Manual therapy/joint mobilizations, Modalities for pain management, US 3 mhz, Therapeutic exercises/activities., Strengthening, Orthotic Fabrication/Fit/Training, Edema Control, Scar Management, and Joint Protection.         Ilir Hernandez, OT

## 2025-02-19 ENCOUNTER — CLINICAL SUPPORT (OUTPATIENT)
Dept: REHABILITATION | Facility: HOSPITAL | Age: 45
End: 2025-02-19
Payer: MEDICAID

## 2025-02-19 DIAGNOSIS — R29.898 DECREASED GRIP STRENGTH OF LEFT HAND: Primary | ICD-10-CM

## 2025-02-19 DIAGNOSIS — M79.642 HAND PAIN, LEFT: ICD-10-CM

## 2025-02-19 DIAGNOSIS — Z98.890 HISTORY OF CARPAL TUNNEL RELEASE: ICD-10-CM

## 2025-02-19 PROCEDURE — 97530 THERAPEUTIC ACTIVITIES: CPT | Mod: PN

## 2025-02-19 NOTE — PROGRESS NOTES
"  [facility]  Occupational Therapy Treatment Note    Date: 2/19/2025  Name: Staci Hodge  Clinic Number: [id number]    Therapy Diagnosis:   Encounter Diagnoses   Name Primary?    Decreased  strength of left hand Yes    Hand pain, left     History of carpal tunnel release      Physician: Princess King PA-C     Physician Orders: OT Evaluate and treat   Medical Diagnosis: S/P CTS release   Surgical Procedure and Date: 11/27/2024  Evaluation Date: 1/13/2025  Insurance Authorization Period Expiration: 1/9/2025 - 1/9/2026  Plan of Care Certification Period: 1/13/2025 - 3/13/2025   Progress Note Due: 2/13/2025    Date of Return to MD: Unknown  Visit # / Visits authorized: Evaluation   FOTO: 1 / 3     Visit # / Visits authorized: 8 / 15    Time In: 11:50   Time Out: 12:30  Total Billable Time: 30 minutes    SUBJECTIVE     Pt reports: "It feels a little better every time I come."  She was compliant with home exercise program given last session.     Response to previous treatment:NA      Pain: 1-2/10 at rest    5-6 /10 with functional use  Location: left wrists      OBJECTIVE       Active Range of Motion:  (Measured in degrees)    Right Left Left    Wrist Extension  60 60 65   Wrist Flexion 65 65 70   Radial Deviation 20 15 25   Ulnar Deviation 50 35 40     Manual Muscle Test    Right Left Left    Wrist Extension  5/5 5/5 5/5   Wrist Flexion 5/5 4-/5* 5/5*   Radial Deviation 5/5 5/5 5/5   Ulnar Deviation 5/5 5/5 5/5          Strength (Dynamometer/Measured in pounds on Rung II) and Pinch Strength (Pinch Gauge)    1/13/2025 1/13/2025 2/13/2025     Right Left Left    Composite 60 15 35   Lateral Pinch 11 7 10   Tripod Pinch 11 9 8        Left: 26.12 > 23.94    Treatment     Staci received the treatments listed below:     Cold Pack - Applied to left hand to reduce post session inflammation and pain following therapy for 5 minutes.    Supervised modalities after being cleared for " contradictions for 0 minutes to include:   Paraffin Wax: applied to left hand and wrist to reduce pain and increase muscle relaxation prior to session to facilitate participation in therapy for 0 minutes.    Manual therapy techniques:   Edema massage applied to left hand digits 1-5 to palm and wrist to reduce swelling to hand for 0 minutes with Pt educated on technique and informed to complete daily 2-3x per day.    Therapeutic Activity to develop strength and ROM for 25 minutes, including:  Desensitization technique using various textured gloves to provide stimulation to CTS scar site and wrist to reduce sensitivity to touch and improve functional use of left hand.   Theraball squeezers: 30 reps   Red digiweb metacarpal phalangeal flexion and extension: 2 x 10 reps each  Prayer stretch: 1 x 10 with 5 second hold  Wrist resisted range of motion  2 x 10 each including:              Flexion 3#              Extension 3#              Supination 3#              Pronation 3#              Ulnar/radial deviation 3#    Fluidotherapy - applied to right hand/wrist to increase muscle elasticity and reduce pain prior to therapy session. Active range of motion wrist exercises performed in fluidotherapy including wrist flexion, extension, metacarpal phalangeal joint flexion, composite fist, and abduction for 10 minutes.   Patient Education and Home Exercises      Education provided:   - HEP  - Progress towards goals     Written Home Exercises Provided: yes.  Exercises were reviewed and Staci was able to demonstrate them prior to the end of the session.  Staci demonstrated good  understanding of the HEP provided. See EMR under Patient Instructions for exercises provided during therapy sessions.       Assessment     Pt would continue to benefit from skilled OT. Pt tolerating session well. She continues with sensitivity to scar site however with reduction noted with better tolerance to sponge texture glove. She also tolerated  fluidotherapy application with no reports of discomfort for further desensitization and heated range of motion. She is currently meeting all short term goals.     Staci is progressing well towards her goals and there are no updates to goals at this time. Pt prognosis is Good.     Pt will continue to benefit from skilled outpatient occupational therapy to address the deficits listed in the problem list on initial evaluation provide pt/family education and to maximize pt's level of independence in the home and community environment.     Pt's spiritual, cultural and educational needs considered and pt agreeable to plan of care and goals.    Anticipated barriers to occupational therapy: None    Goals  Short Term Goals (STGs); to be met within 4 weeks.   STG #1: Pt will report 6 out of 10 pain level at worst in left upper extremity. (MET)  STG #2: Pt will demonstrate increased  strength of 10#s in order to increase fine motor skills and dexterity. (MET)  STG #3: Pt will demonstrate independence with issued HEP. (MET)     Long Term Goals (LTGs); to be met by discharge.  LTG #1: Pt will report a pain level of 3 out of 10 at worst in left upper extremity.       LTG #2: Pt will demo improved FOTO score to predicted level.   LTG #3: Pt will return to prior level of function for ADL  LTG # 4: Pt will show improved scar pliability and reduced skin sensitivity to scar site to 3-4/10..      PLAN   Plan of Care Certification: 1/13/2025 to 3/13/2025 .      Outpatient Occupational Therapy 1 - 2 times weekly for 6 weeks to include the following interventions: Paraffin, Fluidotherapy, Manual therapy/joint mobilizations, Modalities for pain management, US 3 mhz, Therapeutic exercises/activities., Strengthening, Orthotic Fabrication/Fit/Training, Edema Control, Scar Management, and Joint Protection.         Ilir Hernandez, OT

## 2025-02-22 DIAGNOSIS — G43.009 MIGRAINE WITHOUT AURA AND WITHOUT STATUS MIGRAINOSUS, NOT INTRACTABLE: ICD-10-CM

## 2025-02-24 ENCOUNTER — CLINICAL SUPPORT (OUTPATIENT)
Dept: REHABILITATION | Facility: HOSPITAL | Age: 45
End: 2025-02-24
Payer: MEDICAID

## 2025-02-24 DIAGNOSIS — R29.898 DECREASED GRIP STRENGTH OF LEFT HAND: Primary | ICD-10-CM

## 2025-02-24 DIAGNOSIS — Z98.890 HISTORY OF CARPAL TUNNEL RELEASE: ICD-10-CM

## 2025-02-24 DIAGNOSIS — M79.642 HAND PAIN, LEFT: ICD-10-CM

## 2025-02-24 PROCEDURE — 97530 THERAPEUTIC ACTIVITIES: CPT | Mod: PN

## 2025-02-24 RX ORDER — BUTALBITAL, ACETAMINOPHEN AND CAFFEINE 50; 325; 40 MG/1; MG/1; MG/1
1 TABLET ORAL EVERY 6 HOURS PRN
Qty: 30 TABLET | Refills: 5 | Status: SHIPPED | OUTPATIENT
Start: 2025-02-24

## 2025-02-24 NOTE — PROGRESS NOTES
"  [facility]  Occupational Therapy Treatment Note    Date: 2/24/2025  Name: Staci Hodge  Clinic Number: [id number]    Therapy Diagnosis:   Encounter Diagnoses   Name Primary?    Decreased  strength of left hand Yes    Hand pain, left     History of carpal tunnel release        Physician: Princess King PA-C     Physician Orders: OT Evaluate and treat   Medical Diagnosis: S/P CTS release   Surgical Procedure and Date: 11/27/2024  Evaluation Date: 1/13/2025  Insurance Authorization Period Expiration: 1/9/2025 - 1/9/2026  Plan of Care Certification Period: 1/13/2025 - 3/13/2025   Progress Note Due: 2/13/2025    Date of Return to MD: Unknown  Visit # / Visits authorized: Evaluation   FOTO: 1 / 3     Visit # / Visits authorized: 9 / 15    Time In: 11:45  Time Out: 12:30  Total Billable Time: 38 minutes    SUBJECTIVE     Pt reports: "I still feel like I drop things kind of like before surgery."  She was compliant with home exercise program given last session.     Response to previous treatment:NA      Pain: 1-2/10 at rest    5-6 /10 with functional use  Location: left wrists      OBJECTIVE       Active Range of Motion:  (Measured in degrees)    Right Left Left    Wrist Extension  60 60 65   Wrist Flexion 65 65 70   Radial Deviation 20 15 25   Ulnar Deviation 50 35 40     Manual Muscle Test    Right Left Left    Wrist Extension  5/5 5/5 5/5   Wrist Flexion 5/5 4-/5* 5/5*   Radial Deviation 5/5 5/5 5/5   Ulnar Deviation 5/5 5/5 5/5          Strength (Dynamometer/Measured in pounds on Rung II) and Pinch Strength (Pinch Gauge)    1/13/2025 1/13/2025 2/13/2025     Right Left Left    Composite 60 15 35   Lateral Pinch 11 7 10   Tripod Pinch 11 9 8        Left: 26.12 > 23.94    Treatment     Staci received the treatments listed below:     Cold Pack - Applied to left hand to reduce post session inflammation and pain following therapy for 5 minutes.    Supervised modalities after being " cleared for contradictions for 0 minutes to include:   Paraffin Wax: applied to left hand and wrist to reduce pain and increase muscle relaxation prior to session to facilitate participation in therapy for 0 minutes.    Manual therapy techniques:   Edema massage applied to left hand digits 1-5 to palm and wrist to reduce swelling to hand for 6 minutes with Pt educated on technique and informed to complete daily 2-3x per day.    Therapeutic Activity to develop strength and ROM for 26 minutes with rest breaks, including:  Pillow case scrub: 2 x 2 minutes   5# weighted dumbbell hold: 2 x 2 minutes   Theraball squeezers: 30 reps   Red theraball opposition: 20 reps each  Prayer stretch: 1 x 10  Wrist flexion and extension: 20 reps each 3#  Yellow weighted clip tripod and lateral pinches: 30 reps each  Supination/pronation: 2 x 10 1#  Ulnar/radial deviation yellow bar: 2 x 10  Tendon glides: 1 x 10     Pt received the following ultrasound modalities after being cleared for contraindication for 6 minutes (with setup):  Ultrasound  3.3MHz with 0.8 Wcm2 100%dutycycle  x 6 minutes with prep  (L) wrist region to CTS scar site promote improved muscle circulation, elasticity, relaxation, and reduce spasm as well as post session inflammation.    Patient Education and Home Exercises      Education provided:   - HEP  - Progress towards goals     Written Home Exercises Provided: yes.  Exercises were reviewed and Staci was able to demonstrate them prior to the end of the session.  Staci demonstrated good  understanding of the HEP provided. See EMR under Patient Instructions for exercises provided during therapy sessions.       Assessment     Pt would continue to benefit from skilled OT. Pt continues to reports some decrease in hypersensitivity today. She does report that she feels as though she is dropping items with her left hand similar to prior to surgery. She is not having numbness to hand but continues to report pain and  weakness with some improvement.     Staci is progressing well towards her goals and there are no updates to goals at this time. Pt prognosis is Good.     Pt will continue to benefit from skilled outpatient occupational therapy to address the deficits listed in the problem list on initial evaluation provide pt/family education and to maximize pt's level of independence in the home and community environment.     Pt's spiritual, cultural and educational needs considered and pt agreeable to plan of care and goals.    Anticipated barriers to occupational therapy: None    Goals  Short Term Goals (STGs); to be met within 4 weeks.   STG #1: Pt will report 6 out of 10 pain level at worst in left upper extremity. (MET)  STG #2: Pt will demonstrate increased  strength of 10#s in order to increase fine motor skills and dexterity. (MET)  STG #3: Pt will demonstrate independence with issued HEP. (MET)     Long Term Goals (LTGs); to be met by discharge.  LTG #1: Pt will report a pain level of 3 out of 10 at worst in left upper extremity.       LTG #2: Pt will demo improved FOTO score to predicted level.   LTG #3: Pt will return to prior level of function for ADL  LTG # 4: Pt will show improved scar pliability and reduced skin sensitivity to scar site to 3-4/10..      PLAN   Plan of Care Certification: 1/13/2025 to 3/13/2025 .      Outpatient Occupational Therapy 1 - 2 times weekly for 6 weeks to include the following interventions: Paraffin, Fluidotherapy, Manual therapy/joint mobilizations, Modalities for pain management, US 3 mhz, Therapeutic exercises/activities., Strengthening, Orthotic Fabrication/Fit/Training, Edema Control, Scar Management, and Joint Protection.         Ilir Hernandez, OT

## 2025-02-26 ENCOUNTER — TELEPHONE (OUTPATIENT)
Dept: ENDOSCOPY | Facility: HOSPITAL | Age: 45
End: 2025-02-26
Payer: MEDICAID

## 2025-02-26 ENCOUNTER — CLINICAL SUPPORT (OUTPATIENT)
Dept: REHABILITATION | Facility: HOSPITAL | Age: 45
End: 2025-02-26
Payer: MEDICAID

## 2025-02-26 DIAGNOSIS — Z98.890 HISTORY OF CARPAL TUNNEL RELEASE: ICD-10-CM

## 2025-02-26 DIAGNOSIS — R29.898 DECREASED GRIP STRENGTH OF LEFT HAND: Primary | ICD-10-CM

## 2025-02-26 DIAGNOSIS — M79.642 HAND PAIN, LEFT: ICD-10-CM

## 2025-02-26 PROCEDURE — 97530 THERAPEUTIC ACTIVITIES: CPT | Mod: PN

## 2025-02-26 NOTE — PROGRESS NOTES
[facility]  Occupational Therapy Treatment Note    Date: 2/26/2025  Name: Staci Hodge  Clinic Number: [id number]    Therapy Diagnosis:   Encounter Diagnoses   Name Primary?    Decreased  strength of left hand Yes    Hand pain, left     History of carpal tunnel release        Physician: Princess King PA-C     Physician Orders: OT Evaluate and treat   Medical Diagnosis: S/P CTS release   Surgical Procedure and Date: 11/27/2024  Evaluation Date: 1/13/2025  Insurance Authorization Period Expiration: 1/9/2025 - 1/9/2026  Plan of Care Certification Period: 1/13/2025 - 3/13/2025   Progress Note Due: 2/13/2025    Date of Return to MD: Unknown  Visit # / Visits authorized: Evaluation   FOTO: 1 / 3     Visit # / Visits authorized: 10 / 15    Time In: 11:45  Time Out: 12:30  Total Billable Time: 42 minutes    SUBJECTIVE     Pt reports: No new complaints  She was compliant with home exercise program given last session.     Response to previous treatment:NA      Pain: 2-3/10 at rest    5-6 /10 with functional use  Location: left wrists      OBJECTIVE       Active Range of Motion:  (Measured in degrees)    Right Left Left    Wrist Extension  60 60 65   Wrist Flexion 65 65 70   Radial Deviation 20 15 25   Ulnar Deviation 50 35 40     Manual Muscle Test    Right Left Left    Wrist Extension  5/5 5/5 5/5   Wrist Flexion 5/5 4-/5* 5/5*   Radial Deviation 5/5 5/5 5/5   Ulnar Deviation 5/5 5/5 5/5          Strength (Dynamometer/Measured in pounds on Rung II) and Pinch Strength (Pinch Gauge)    1/13/2025 1/13/2025 2/13/2025     Right Left Left    Composite 60 15 35   Lateral Pinch 11 7 10   Tripod Pinch 11 9 8        Left: 26.12 > 23.94    Treatment     Staci received the treatments listed below:     Cold Pack - Applied to left hand to reduce post session inflammation and pain following therapy for 5 minutes.    Pt received the following ultrasound modalities after being cleared for  contraindication for 6 minutes (with setup):  Ultrasound  3.3MHz with 0.8 Wcm2 100%dutycycle  x 6 minutes with prep  (L) wrist region to CTS scar site promote improved muscle circulation, elasticity, relaxation, and reduce spasm as well as post session inflammation.    Manual therapy techniques:   Edema massage applied to left hand digits 1-5 to palm and wrist to reduce swelling to hand for 6 minutes with Pt educated on technique and informed to complete daily 2-3x per day.    Therapeutic Activity to develop strength and ROM for 20 minutes with rest breaks, including:  Pillow case scrub: 2 x 2 minutes   5# weighted dumbbell hold: 2 x 2 minutes   Theraball squeezers: 30 reps   Red weighted clip tripod and lateral pinches: 30 reps each   Red resistance bar exercises including:   Wrist flexion/extension: 20 reps each   Supination/pronation: 20 minutes each  Red resistance ball opposition: 30 reps     TENS applied to left hand and wrist with positive electrodes placed on dorsal aspect of hand and wrist flexor tendons with negative electrodes placed on volar aspect of hand and wrist extensor tendons to localize current to wrist and carpal tunnel to reduce pain and inflammation for 10 minutes. Parameters set to Interferential 9 volts CV at 4000Hz with frequency 80/150 Hz.    Patient Education and Home Exercises      Education provided:   - HEP  - Progress towards goals     Written Home Exercises Provided: yes.  Exercises were reviewed and Staci was able to demonstrate them prior to the end of the session.  Staci demonstrated good  understanding of the HEP provided. See EMR under Patient Instructions for exercises provided during therapy sessions.       Assessment     Pt would continue to benefit from skilled OT.     Staci is progressing well towards her goals and there are no updates to goals at this time. Pt prognosis is Good.     Pt will continue to benefit from skilled outpatient occupational therapy to address  the deficits listed in the problem list on initial evaluation provide pt/family education and to maximize pt's level of independence in the home and community environment.     Pt's spiritual, cultural and educational needs considered and pt agreeable to plan of care and goals.    Anticipated barriers to occupational therapy: None    Goals  Short Term Goals (STGs); to be met within 4 weeks.   STG #1: Pt will report 6 out of 10 pain level at worst in left upper extremity. (MET)  STG #2: Pt will demonstrate increased  strength of 10#s in order to increase fine motor skills and dexterity. (MET)  STG #3: Pt will demonstrate independence with issued HEP. (MET)     Long Term Goals (LTGs); to be met by discharge.  LTG #1: Pt will report a pain level of 3 out of 10 at worst in left upper extremity.       LTG #2: Pt will demo improved FOTO score to predicted level.   LTG #3: Pt will return to prior level of function for ADL  LTG # 4: Pt will show improved scar pliability and reduced skin sensitivity to scar site to 3-4/10.      PLAN   Plan of Care Certification: 1/13/2025 to 3/13/2025 .      Outpatient Occupational Therapy 1 - 2 times weekly for 6 weeks to include the following interventions: Paraffin, Fluidotherapy, Manual therapy/joint mobilizations, Modalities for pain management, US 3 mhz, Therapeutic exercises/activities., Strengthening, Orthotic Fabrication/Fit/Training, Edema Control, Scar Management, and Joint Protection.         Ilir Hernandez OT

## 2025-02-26 NOTE — TELEPHONE ENCOUNTER
[facility]      Spoke to pt to schedule procedure(s) Upper Endoscopy (EGD)       Physician to perform procedure(s) Dr. KORIN Carrillo  Date of Procedure (s) 3/27/25  Arrival Time 11:00 AM  Time of Procedure(s) 12:00 PM   Location of Procedure(s) [address]  Type of Rx Prep sent to patient: N/A  Instructions provided to patient via [facility]    Patient was informed on the following information and verbalized understanding. Screening questionnaire reviewed with patient and complete. If procedure requires anesthesia, a responsible adult needs to be present to accompany the patient home, patient cannot drive after receiving anesthesia. Appointment details are tentative, especially check-in time. Patient will receive a prep-op call 7 days prior to confirm check-in time for procedure. If applicable the patient should contact their pharmacy to verify Rx for procedure prep is ready for pick-up. Patient was advised to call the scheduling department at [phone] if pharmacy states no Rx is available. Patient was advised to call the endoscopy scheduling department if any questions or concerns arise.       Endoscopy Scheduling Department

## 2025-02-26 NOTE — TELEPHONE ENCOUNTER
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   Thanks,   RR

## 2025-03-03 ENCOUNTER — CLINICAL SUPPORT (OUTPATIENT)
Dept: REHABILITATION | Facility: HOSPITAL | Age: 45
End: 2025-03-03
Payer: MEDICAID

## 2025-03-03 DIAGNOSIS — R29.898 DECREASED GRIP STRENGTH OF LEFT HAND: Primary | ICD-10-CM

## 2025-03-03 DIAGNOSIS — M79.642 HAND PAIN, LEFT: ICD-10-CM

## 2025-03-03 PROCEDURE — 97530 THERAPEUTIC ACTIVITIES: CPT | Mod: PN

## 2025-03-03 NOTE — PROGRESS NOTES
"  [facility]  Occupational Therapy Treatment Note    Date: 3/3/2025  Name: Staci Hodge  Clinic Number: [id number]    Therapy Diagnosis:   Encounter Diagnoses   Name Primary?    Decreased  strength of left hand Yes    Hand pain, left      Physician: Princess King PA-C     Physician Orders: OT Evaluate and treat   Medical Diagnosis: S/P CTS release   Surgical Procedure and Date: 11/27/2024  Evaluation Date: 1/13/2025  Insurance Authorization Period Expiration: 1/9/2025 - 1/9/2026  Plan of Care Certification Period: 1/13/2025 - 3/13/2025   Progress Note Due: 2/13/2025    Date of Return to MD: Unknown  Visit # / Visits authorized: Evaluation   FOTO: 1 / 3     Visit # / Visits authorized: 11 / 15    Time In: 11:00  Time Out: 11:45  Total Billable Time: 36 minutes    SUBJECTIVE     Pt reports: "I feel like I lost some ground this weekend. It was really bothering me.:"  She was compliant with home exercise program given last session.     Response to previous treatment:NA      Pain: 2-3/10 at rest    5-6 /10 with functional use  Location: left wrists      OBJECTIVE       Active Range of Motion:  (Measured in degrees)    Right Left Left    Wrist Extension  60 60 65   Wrist Flexion 65 65 70   Radial Deviation 20 15 25   Ulnar Deviation 50 35 40     Manual Muscle Test    Right Left Left    Wrist Extension  5/5 5/5 5/5   Wrist Flexion 5/5 4-/5* 5/5*   Radial Deviation 5/5 5/5 5/5   Ulnar Deviation 5/5 5/5 5/5          Strength (Dynamometer/Measured in pounds on Rung II) and Pinch Strength (Pinch Gauge)    1/13/2025 1/13/2025 2/13/2025     Right Left Left    Composite 60 15 35   Lateral Pinch 11 7 10   Tripod Pinch 11 9 8        Left: 26.12 > 23.94    Treatment     Staci received the treatments listed below:     Cold Pack - Applied to left hand to reduce post session inflammation and pain following therapy for 0 minutes.    Pt received the following ultrasound modalities after being " 23-Oct-2020 20:32 cleared for contraindication for 6 minutes (with setup):  Ultrasound  3.3MHz with 0.8 Wcm2 100%dutycycle  x 6 minutes with prep  (L) wrist region to CTS scar site promote improved muscle circulation, elasticity, relaxation, and reduce spasm as well as post session inflammation.    Manual therapy techniques:   Edema massage with biofreeze applied to left hand digits 1-5 to palm and wrist to reduce swelling to hand for 6 minutes with Pt educated on technique and informed to complete daily 2-3x per day.    Therapeutic Activity to develop strength and ROM for 24 minutes with rest breaks, including:  Pillow case scrub: 2 x 2 minutes   5# weighted dumbbell hold: 2 x 2 minutes   Unweighted digit opposition: 20 reps  Prayer stretch: 1 x 10  Tendon glides: 1 x 10  Wrist range of motion including:   Flexion   Extension   Radial/ulnar deviation   Supination   Pronation      Cold Pack - Applied to left hand to reduce post session inflammation and pain following therapy for 5 minutes.     Patient Education and Home Exercises      Education provided:   - HEP  - Progress towards goals     Written Home Exercises Provided: yes.  Exercises were reviewed and Staci was able to demonstrate them prior to the end of the session.  Staci demonstrated good  understanding of the HEP provided. See EMR under Patient Instructions for exercises provided during therapy sessions.       Assessment     Pt would continue to benefit from skilled OT. Pt reporting today with increased pain over the weekend. She states no specific incident causing pain. She remains with hypersensitivity to left hand and hypothenar with continued pain with functional tasks. Will increase ultrasound setting for deeper penetration for scar tissue and possible CRPS.    Staci is progressing well towards her goals and there are no updates to goals at this time. Pt prognosis is Good.     Pt will continue to benefit from skilled outpatient occupational therapy to address  the deficits listed in the problem list on initial evaluation provide pt/family education and to maximize pt's level of independence in the home and community environment.     Pt's spiritual, cultural and educational needs considered and pt agreeable to plan of care and goals.    Anticipated barriers to occupational therapy: None    Goals  Short Term Goals (STGs); to be met within 4 weeks.   STG #1: Pt will report 6 out of 10 pain level at worst in left upper extremity. (MET)  STG #2: Pt will demonstrate increased  strength of 10#s in order to increase fine motor skills and dexterity. (MET)  STG #3: Pt will demonstrate independence with issued HEP. (MET)     Long Term Goals (LTGs); to be met by discharge.  LTG #1: Pt will report a pain level of 3 out of 10 at worst in left upper extremity.       LTG #2: Pt will demo improved FOTO score to predicted level.   LTG #3: Pt will return to prior level of function for ADL  LTG # 4: Pt will show improved scar pliability and reduced skin sensitivity to scar site to 3-4/10. (MET)     PLAN   Plan of Care Certification: 1/13/2025 to 3/13/2025 .      Outpatient Occupational Therapy 1 - 2 times weekly for 6 weeks to include the following interventions: Paraffin, Fluidotherapy, Manual therapy/joint mobilizations, Modalities for pain management, US 3 mhz, Therapeutic exercises/activities., Strengthening, Orthotic Fabrication/Fit/Training, Edema Control, Scar Management, and Joint Protection.         Ilir Hernandez OT

## 2025-03-06 ENCOUNTER — CLINICAL SUPPORT (OUTPATIENT)
Dept: REHABILITATION | Facility: HOSPITAL | Age: 45
End: 2025-03-06
Payer: MEDICAID

## 2025-03-06 DIAGNOSIS — R29.898 DECREASED GRIP STRENGTH OF LEFT HAND: Primary | ICD-10-CM

## 2025-03-06 DIAGNOSIS — Z98.890 HISTORY OF CARPAL TUNNEL RELEASE: ICD-10-CM

## 2025-03-06 DIAGNOSIS — M79.642 HAND PAIN, LEFT: ICD-10-CM

## 2025-03-06 PROCEDURE — 97530 THERAPEUTIC ACTIVITIES: CPT | Mod: PN

## 2025-03-06 NOTE — PROGRESS NOTES
[facility]  Occupational Therapy Treatment Note/Discharge summary    Date: 3/6/2025  Name: Staci Hodge  Clinic Number: [id number]    Therapy Diagnosis:   Encounter Diagnoses   Name Primary?    Decreased  strength of left hand Yes    Hand pain, left     History of carpal tunnel release        Physician: Princess King PA-C     Physician Orders: OT Evaluate and treat   Medical Diagnosis: S/P CTS release   Surgical Procedure and Date: 11/27/2024  Evaluation Date: 1/13/2025  Insurance Authorization Period Expiration: 1/9/2025 - 1/9/2026  Plan of Care Certification Period: 1/13/2025 - 3/13/2025   Progress Note Due: 2/13/2025    Date of Return to MD: Unknown  Visit # / Visits authorized: Evaluation   FOTO: 1 / 3     Visit # / Visits authorized: 12 / 15    Time In: 11:00  Time Out: 11:45  Total Billable Time: 36 minutes    SUBJECTIVE     Pt reports: Continued pain and sensitivity to hypothenar and wrist.  She was compliant with home exercise program given last session.     Response to previous treatment:NA      Pain: 2-3/10 at rest    5-6 /10 with functional use  Location: left wrists      OBJECTIVE       Active Range of Motion:  (Measured in degrees)    Right Left Left    Wrist Extension  60 60 65   Wrist Flexion 65 65 70   Radial Deviation 20 15 25   Ulnar Deviation 50 35 40     Manual Muscle Test    Right Left Left    Wrist Extension  5/5 5/5 5/5   Wrist Flexion 5/5 4-/5* 5/5*   Radial Deviation 5/5 5/5 5/5   Ulnar Deviation 5/5 5/5 5/5          Strength (Dynamometer/Measured in pounds on Rung II) and Pinch Strength (Pinch Gauge)    1/13/2025 1/13/2025 2/13/2025     Right Left Left    Composite 60 15 35   Lateral Pinch 11 7 10   Tripod Pinch 11 9 8        Left: 26.12 > 23.94    Treatment     Staci received the treatments listed below:     Cold Pack - Applied to left hand to reduce post session inflammation and pain following therapy for 0 minutes.    Pt received the  following ultrasound modalities after being cleared for contraindication for 6 minutes (with setup):  Ultrasound  3.3MHz with 0.8 Wcm2 100%dutycycle  x 6 minutes with prep  (L) wrist region to CTS scar site promote improved muscle circulation, elasticity, relaxation, and reduce spasm as well as post session inflammation.    Manual therapy techniques:   Edema massage with biofreeze applied to left hand digits 1-5 to palm and wrist to reduce swelling to hand for 6 minutes with Pt educated on technique and informed to complete daily 2-3x per day.     Therapeutic Activity to develop strength and ROM for 15 minutes with rest breaks, including:  CTS scar site and wrist to reduce sensitivity to touch and improve functional use of left hand. Reduced tolerance noted today with each texture.   Pillow case scrub: 2 x 2 minutes   5# weighted dumbbell hold: 2 x 2 minutes       Patient Education and Home Exercises      Education provided:   - HEP  - Pt educated on receiving new referral if needed.    Written Home Exercises Provided: yes.  Exercises were reviewed and Staci was able to demonstrate them prior to the end of the session.  Staci demonstrated good  understanding of the HEP provided. See EMR under Patient Instructions for exercises provided during therapy sessions.       Assessment     Pt continuing to report hypersensitivity to left wrist and hypothenar today. She states her pain had gotten worse since the weekend with no particular incident causing increased pain. While she has made progress in therapy up until this point, she has experienced recent regression with plateauing results. At this time a referral for pain management would be recommended to address continued pain symptoms.     Anticipated barriers to occupational therapy: None    Goals  Short Term Goals (STGs); to be met within 4 weeks.   STG #1: Pt will report 6 out of 10 pain level at worst in left upper extremity. (MET)  STG #2: Pt will demonstrate  increased  strength of 10#s in order to increase fine motor skills and dexterity. (MET)  STG #3: Pt will demonstrate independence with issued HEP. (MET)     Long Term Goals (LTGs); to be met by discharge.  LTG #1: Pt will report a pain level of 3 out of 10 at worst in left upper extremity.       LTG #2: Pt will demo improved FOTO score to predicted level.   LTG #3: Pt will return to prior level of function for ADL  LTG # 4: Pt will show improved scar pliability and reduced skin sensitivity to scar site to 3-4/10. (MET)     PLAN   Pt to be discharged from OT services at this time.          Ilir Hernandez OT

## 2025-03-07 ENCOUNTER — TELEPHONE (OUTPATIENT)
Dept: ORTHOPEDICS | Facility: CLINIC | Age: 45
End: 2025-03-07
Payer: MEDICAID

## 2025-03-07 NOTE — TELEPHONE ENCOUNTER
----- Message from OT Ilir sent at 3/7/2025  9:15 AM CST -----  Regarding: Pain Management Referral  Zia Sánchez, I have been working with this patient for a few weeks now with little progress. She is still having a lot of sensitivity to her thenar/hypothenar and palm with limited use of left hand with functional tasks. She had poor response to traditional CTS treatment but experienced some slight progress with CRPS treatment. She recently had a regression in progress and pain levels over the weekend. I would recommend a referral to pain management for possible CRPS.

## 2025-03-17 ENCOUNTER — OFFICE VISIT (OUTPATIENT)
Dept: FAMILY MEDICINE | Facility: CLINIC | Age: 45
End: 2025-03-17
Payer: MEDICAID

## 2025-03-17 VITALS
HEIGHT: 62 IN | OXYGEN SATURATION: 99 % | SYSTOLIC BLOOD PRESSURE: 112 MMHG | RESPIRATION RATE: 18 BRPM | DIASTOLIC BLOOD PRESSURE: 76 MMHG | HEART RATE: 87 BPM | WEIGHT: 209.31 LBS | BODY MASS INDEX: 38.52 KG/M2

## 2025-03-17 DIAGNOSIS — R60.9 SWELLING: ICD-10-CM

## 2025-03-17 DIAGNOSIS — R94.31 ABNORMAL EKG: ICD-10-CM

## 2025-03-17 DIAGNOSIS — R06.02 SOB (SHORTNESS OF BREATH): Primary | ICD-10-CM

## 2025-03-17 PROCEDURE — 1159F MED LIST DOCD IN RCRD: CPT | Mod: CPTII,,, | Performed by: FAMILY MEDICINE

## 2025-03-17 PROCEDURE — 3008F BODY MASS INDEX DOCD: CPT | Mod: CPTII,,, | Performed by: FAMILY MEDICINE

## 2025-03-17 PROCEDURE — 99214 OFFICE O/P EST MOD 30 MIN: CPT | Mod: PBBFAC | Performed by: FAMILY MEDICINE

## 2025-03-17 PROCEDURE — 99999 PR PBB SHADOW E&M-EST. PATIENT-LVL IV: CPT | Mod: PBBFAC,,, | Performed by: FAMILY MEDICINE

## 2025-03-17 PROCEDURE — 99214 OFFICE O/P EST MOD 30 MIN: CPT | Mod: S$PBB,,, | Performed by: FAMILY MEDICINE

## 2025-03-17 PROCEDURE — 3074F SYST BP LT 130 MM HG: CPT | Mod: CPTII,,, | Performed by: FAMILY MEDICINE

## 2025-03-17 PROCEDURE — 93010 ELECTROCARDIOGRAM REPORT: CPT | Mod: S$PBB,,, | Performed by: INTERNAL MEDICINE

## 2025-03-17 PROCEDURE — 3078F DIAST BP <80 MM HG: CPT | Mod: CPTII,,, | Performed by: FAMILY MEDICINE

## 2025-03-17 PROCEDURE — 93005 ELECTROCARDIOGRAM TRACING: CPT | Mod: PBBFAC | Performed by: INTERNAL MEDICINE

## 2025-03-17 RX ORDER — LITHIUM CARBONATE 300 MG/1
300 TABLET, FILM COATED, EXTENDED RELEASE ORAL EVERY MORNING
COMMUNITY
Start: 2025-03-06

## 2025-03-17 NOTE — PROGRESS NOTES
Subjective:       Patient ID: Staci Hodge is a 44 y.o. female.    Chief Complaint: Shortness of Breath, Back Pain, Neck Pain, Foot Swelling, and Joint Swelling    Pt is a 44 y.o. female who presents for evaluation and management of   Encounter Diagnoses   Name Primary?    SOB (shortness of breath) Yes    Swelling    .notes for a couple of months her legs/ feet swell off and on.   Some SOB. Very poor historian. Feels like she can't exhale properly at times?   Wakes up with SOB/tachypnea at times. PND?   No orthopnea  SOB after block and a half.   Smoker        Doing well on current meds. Denies any side effects. Prevention is up to date.  Review of Systems   Constitutional:  Negative for chills and fever.   Respiratory:  Positive for shortness of breath and wheezing.    Cardiovascular:  Positive for leg swelling. Negative for chest pain.   Psychiatric/Behavioral:  The patient is nervous/anxious.        Objective:      Physical Exam  Constitutional:       Appearance: She is well-developed. She is obese.   HENT:      Head: Normocephalic and atraumatic.      Right Ear: External ear normal.      Left Ear: External ear normal.      Nose: Nose normal.   Eyes:      Pupils: Pupils are equal, round, and reactive to light.   Neck:      Thyroid: No thyromegaly.      Vascular: No JVD.      Trachea: No tracheal deviation.   Cardiovascular:      Rate and Rhythm: Normal rate.      Heart sounds: Normal heart sounds. No murmur heard.  Pulmonary:      Effort: Pulmonary effort is normal. No respiratory distress.      Breath sounds: Normal breath sounds. No wheezing or rales.   Chest:      Chest wall: No tenderness.   Abdominal:      General: Bowel sounds are normal. There is no distension.      Palpations: Abdomen is soft. There is no mass.      Tenderness: There is no abdominal tenderness. There is no guarding or rebound.   Musculoskeletal:         General: No tenderness. Normal range of motion.      Cervical back: Normal  range of motion and neck supple.      Right lower leg: No edema.      Left lower leg: Edema present.      Comments: Trace edema LLE      Lymphadenopathy:      Cervical: No cervical adenopathy.   Skin:     General: Skin is warm and dry.      Coloration: Skin is not pale.      Findings: No erythema or rash.   Neurological:      Mental Status: She is alert and oriented to person, place, and time.      Cranial Nerves: No cranial nerve deficit.      Motor: No abnormal muscle tone.      Coordination: Coordination normal.      Deep Tendon Reflexes: Reflexes are normal and symmetric. Reflexes normal.   Psychiatric:         Behavior: Behavior normal.         Thought Content: Thought content normal.         Judgment: Judgment normal.         Assessment:       1. SOB (shortness of breath)    2. Swelling        Plan:   1. SOB (shortness of breath)  -     Complete PFT w/ bronchodilator; Future  -     X-Ray Chest PA And Lateral; Future; Expected date: 03/17/2025  -     EKG 12-lead  -     CBC Auto Differential; Future; Expected date: 03/17/2025  -     Comprehensive Metabolic Panel; Future; Expected date: 03/17/2025  -     TSH; Future; Expected date: 03/17/2025  -     B-TYPE NATRIURETIC PEPTIDE; Future; Expected date: 03/17/2025    2. Swelling  -     Comprehensive Metabolic Panel; Future; Expected date: 03/17/2025  -     TSH; Future; Expected date: 03/17/2025    Physical exam findings not in agreement with subjective c/o. Will get above work up for her subjective c/o.     No follow-ups on file.

## 2025-03-18 ENCOUNTER — RESULTS FOLLOW-UP (OUTPATIENT)
Dept: FAMILY MEDICINE | Facility: CLINIC | Age: 45
End: 2025-03-18

## 2025-03-18 ENCOUNTER — HOSPITAL ENCOUNTER (OUTPATIENT)
Dept: RADIOLOGY | Facility: HOSPITAL | Age: 45
Discharge: HOME | End: 2025-03-18
Attending: FAMILY MEDICINE
Payer: MEDICAID

## 2025-03-18 ENCOUNTER — OFFICE VISIT (OUTPATIENT)
Dept: ORTHOPEDICS | Facility: CLINIC | Age: 45
End: 2025-03-18
Payer: MEDICAID

## 2025-03-18 VITALS
RESPIRATION RATE: 16 BRPM | DIASTOLIC BLOOD PRESSURE: 84 MMHG | SYSTOLIC BLOOD PRESSURE: 158 MMHG | HEART RATE: 88 BPM | BODY MASS INDEX: 38.46 KG/M2 | HEIGHT: 62 IN | OXYGEN SATURATION: 97 % | WEIGHT: 209 LBS

## 2025-03-18 DIAGNOSIS — Z98.890 S/P CARPAL TUNNEL RELEASE: Primary | ICD-10-CM

## 2025-03-18 DIAGNOSIS — R06.02 SOB (SHORTNESS OF BREATH): ICD-10-CM

## 2025-03-18 LAB
OHS QRS DURATION: 76 MS
OHS QTC CALCULATION: 441 MS

## 2025-03-18 PROCEDURE — 99215 OFFICE O/P EST HI 40 MIN: CPT | Mod: PBBFAC,25 | Performed by: PHYSICIAN ASSISTANT

## 2025-03-18 PROCEDURE — 99999 PR PBB SHADOW E&M-EST. PATIENT-LVL V: CPT | Mod: PBBFAC,,, | Performed by: PHYSICIAN ASSISTANT

## 2025-03-18 PROCEDURE — 3008F BODY MASS INDEX DOCD: CPT | Mod: CPTII,,, | Performed by: PHYSICIAN ASSISTANT

## 2025-03-18 PROCEDURE — 71046 X-RAY EXAM CHEST 2 VIEWS: CPT | Mod: 26,,, | Performed by: RADIOLOGY

## 2025-03-18 PROCEDURE — 1160F RVW MEDS BY RX/DR IN RCRD: CPT | Mod: CPTII,,, | Performed by: PHYSICIAN ASSISTANT

## 2025-03-18 PROCEDURE — 3077F SYST BP >= 140 MM HG: CPT | Mod: CPTII,,, | Performed by: PHYSICIAN ASSISTANT

## 2025-03-18 PROCEDURE — 99213 OFFICE O/P EST LOW 20 MIN: CPT | Mod: S$PBB,,, | Performed by: PHYSICIAN ASSISTANT

## 2025-03-18 PROCEDURE — 71046 X-RAY EXAM CHEST 2 VIEWS: CPT | Mod: TC

## 2025-03-18 PROCEDURE — 3079F DIAST BP 80-89 MM HG: CPT | Mod: CPTII,,, | Performed by: PHYSICIAN ASSISTANT

## 2025-03-18 PROCEDURE — 1159F MED LIST DOCD IN RCRD: CPT | Mod: CPTII,,, | Performed by: PHYSICIAN ASSISTANT

## 2025-03-18 NOTE — PROGRESS NOTES
Pt presents for post-op evaluation. She is 3 months s/p left carpal tunnel release with Dr. Bosch. Pt c/o continued pain and swelling to her hand. She has been completing OT, therapist is concerned for CRPS and is recommending that she see pain management.     Left wrist/hand:  Incision well healed  Minimal swelling to palm of hand  No erythema, warmth, drainage, or any other signs of infection  Neurovascular status intact in extremity  FROM wrist and all fingers        A/P:  S/p left carpal tunnel release     1. Advance activity as tolerated.  2. Pain management referral.  3. Follow up appointment with Dr. Bosch.  4. Return to clinic as scheduled, sooner if needed.      Patient voices understanding of and agreement with treatment plan. All of the patient's questions were answered, and the patient will contact us if she has any questions or concerns in the interim.

## 2025-03-19 ENCOUNTER — TELEPHONE (OUTPATIENT)
Dept: ENDOSCOPY | Facility: HOSPITAL | Age: 45
End: 2025-03-19
Payer: MEDICAID

## 2025-03-19 ENCOUNTER — OFFICE VISIT (OUTPATIENT)
Dept: CARDIOLOGY | Facility: CLINIC | Age: 45
End: 2025-03-19
Payer: MEDICAID

## 2025-03-19 ENCOUNTER — TELEPHONE (OUTPATIENT)
Dept: FAMILY MEDICINE | Facility: CLINIC | Age: 45
End: 2025-03-19
Payer: MEDICAID

## 2025-03-19 VITALS
OXYGEN SATURATION: 98 % | HEART RATE: 90 BPM | DIASTOLIC BLOOD PRESSURE: 72 MMHG | HEIGHT: 62 IN | SYSTOLIC BLOOD PRESSURE: 103 MMHG | BODY MASS INDEX: 37.91 KG/M2 | RESPIRATION RATE: 18 BRPM | WEIGHT: 206 LBS

## 2025-03-19 DIAGNOSIS — R94.31 ABNORMAL EKG: ICD-10-CM

## 2025-03-19 DIAGNOSIS — E66.01 CLASS 2 SEVERE OBESITY WITH SERIOUS COMORBIDITY AND BODY MASS INDEX (BMI) OF 37.0 TO 37.9 IN ADULT, UNSPECIFIED OBESITY TYPE: ICD-10-CM

## 2025-03-19 DIAGNOSIS — D72.829 LEUKOCYTOSIS, UNSPECIFIED TYPE: Primary | ICD-10-CM

## 2025-03-19 DIAGNOSIS — Z72.0 TOBACCO ABUSE: ICD-10-CM

## 2025-03-19 DIAGNOSIS — R06.02 SOB (SHORTNESS OF BREATH): Primary | ICD-10-CM

## 2025-03-19 DIAGNOSIS — R94.31 ABNORMAL ELECTROCARDIOGRAM: ICD-10-CM

## 2025-03-19 DIAGNOSIS — E66.812 CLASS 2 SEVERE OBESITY WITH SERIOUS COMORBIDITY AND BODY MASS INDEX (BMI) OF 37.0 TO 37.9 IN ADULT, UNSPECIFIED OBESITY TYPE: ICD-10-CM

## 2025-03-19 PROCEDURE — 1160F RVW MEDS BY RX/DR IN RCRD: CPT | Mod: CPTII,,, | Performed by: INTERNAL MEDICINE

## 2025-03-19 PROCEDURE — 3008F BODY MASS INDEX DOCD: CPT | Mod: CPTII,,, | Performed by: INTERNAL MEDICINE

## 2025-03-19 PROCEDURE — 99204 OFFICE O/P NEW MOD 45 MIN: CPT | Mod: S$PBB,,, | Performed by: INTERNAL MEDICINE

## 2025-03-19 PROCEDURE — 1159F MED LIST DOCD IN RCRD: CPT | Mod: CPTII,,, | Performed by: INTERNAL MEDICINE

## 2025-03-19 PROCEDURE — 99214 OFFICE O/P EST MOD 30 MIN: CPT | Mod: PBBFAC | Performed by: INTERNAL MEDICINE

## 2025-03-19 PROCEDURE — 3078F DIAST BP <80 MM HG: CPT | Mod: CPTII,,, | Performed by: INTERNAL MEDICINE

## 2025-03-19 PROCEDURE — 3074F SYST BP LT 130 MM HG: CPT | Mod: CPTII,,, | Performed by: INTERNAL MEDICINE

## 2025-03-19 PROCEDURE — 99999 PR PBB SHADOW E&M-EST. PATIENT-LVL IV: CPT | Mod: PBBFAC,,, | Performed by: INTERNAL MEDICINE

## 2025-03-19 NOTE — PROGRESS NOTES
[facility] Cardiology     Subjective:    Patient ID:  Staci Hodge is a 44 y.o. female who presents for evaluation of Abnormal ECG, Shortness of Breath, and Hyperlipidemia    Review of patient's allergies indicates:  No Known Allergies  She presents with shortness of breath and abnormal Electrocardiogram prompting cardiac assessment.  In 2021 she had a walk treadmill, echo, her calcium score result was 3.  Atorvastatin was ordered, her LDL fell into good range.  She is off atorvastatin she is not sure why.  She took 20 mg.  Current  mg%.    She does not have chest pain.  She does have shortness of breath which developed last month or so.  In the past she has had intermittent shortness of breath.  She has also noted swelling in her legs by the end of the day.  Her echo in 2021 showed normal ejection fraction without valvular disease.  She states she has to drink a lot of water because she is on lithium.    She is with her  today.  She has not had any chest pain.  Her PCP ordered an Electrocardiogram which showed T-wave abnormality.  She has had some Electrocardiogram is in the past dating back to 2022 with nonspecific ST changes.  She is a cigarette smoker.  Her family history is pertinent for coronary disease in her father.    She is currently not working.  She does not do a lot of exercise.  Her shortness of breath symptoms occur with activity.  Current BMI 37 range.  She admits to some weight gain over the past couple of years.  Labs were drawn including BNP which returned in normal range.  She has normal renal function and she is not anemic.         Review of Systems   Constitutional: Negative for chills, decreased appetite, diaphoresis, fever, malaise/fatigue, night sweats, weight gain and weight loss.   HENT:  Negative for congestion, ear discharge, ear pain, hearing loss, hoarse voice, nosebleeds, odynophagia, sore  throat, stridor and tinnitus.    Eyes:  Negative for blurred vision, discharge, double vision, pain, photophobia, redness, vision loss in left eye, vision loss in right eye, visual disturbance and visual halos.   Cardiovascular:  Positive for dyspnea on exertion and leg swelling. Negative for chest pain, claudication, cyanosis, irregular heartbeat, near-syncope, orthopnea, palpitations, paroxysmal nocturnal dyspnea and syncope.   Respiratory:  Positive for shortness of breath. Negative for cough, hemoptysis, sleep disturbances due to breathing, snoring, sputum production and wheezing.    Endocrine: Negative for cold intolerance, heat intolerance, polydipsia, polyphagia and polyuria.   Hematologic/Lymphatic: Negative for adenopathy and bleeding problem. Does not bruise/bleed easily.   Skin:  Negative for color change, dry skin, flushing, itching, nail changes, poor wound healing, rash, skin cancer, suspicious lesions and unusual hair distribution.   Musculoskeletal:  Negative for arthritis, back pain, falls, gout, joint pain, joint swelling, muscle cramps, muscle weakness, myalgias, neck pain and stiffness.   Gastrointestinal:  Negative for bloating, abdominal pain, anorexia, change in bowel habit, bowel incontinence, constipation, diarrhea, dysphagia, excessive appetite, flatus, heartburn, hematemesis, hematochezia, hemorrhoids, jaundice, melena, nausea and vomiting.   Genitourinary:  Negative for bladder incontinence, decreased libido, dysuria, flank pain, frequency, genital sores, hematuria, hesitancy, incomplete emptying, nocturia and urgency.   Neurological:  Negative for aphonia, brief paralysis, difficulty with concentration, disturbances in coordination, excessive daytime sleepiness, dizziness, focal weakness, headaches, light-headedness, loss of balance, numbness, paresthesias, seizures, sensory change, tremors, vertigo and weakness.   Psychiatric/Behavioral:  Negative for altered mental status, depression,  "hallucinations, memory loss, substance abuse, suicidal ideas and thoughts of violence. The patient does not have insomnia and is not nervous/anxious.    Allergic/Immunologic: Negative for hives and persistent infections.        Objective:       Vitals:    03/19/25 1050   BP: 103/72   Patient Position: Sitting   Pulse: 90   Resp: 18   SpO2: 98%   Weight: 93.4 kg (206 lb)   Height: 5' 2" (1.575 m)    Physical Exam  Constitutional:       General: She is not in acute distress.     Appearance: She is well-developed. She is not diaphoretic.   HENT:      Head: Normocephalic and atraumatic.      Nose: Nose normal.   Eyes:      General: No scleral icterus.        Right eye: No discharge.      Conjunctiva/sclera: Conjunctivae normal.      Pupils: Pupils are equal, round, and reactive to light.   Neck:      Thyroid: No thyromegaly.      Vascular: No JVD.      Trachea: No tracheal deviation.   Cardiovascular:      Rate and Rhythm: Normal rate and regular rhythm.      Pulses:           Carotid pulses are 2+ on the right side and 2+ on the left side.       Radial pulses are 2+ on the right side and 2+ on the left side.        Dorsalis pedis pulses are 2+ on the right side and 2+ on the left side.        Posterior tibial pulses are 2+ on the right side and 2+ on the left side.      Heart sounds: Normal heart sounds. No murmur heard.     No friction rub. No gallop.   Pulmonary:      Effort: Pulmonary effort is normal. No respiratory distress.      Breath sounds: Normal breath sounds. No stridor. No wheezing or rales.   Chest:      Chest wall: No tenderness.   Abdominal:      General: Bowel sounds are normal. There is no distension.      Palpations: Abdomen is soft. There is no mass.      Tenderness: There is no abdominal tenderness. There is no guarding or rebound.   Musculoskeletal:         General: No tenderness. Normal range of motion.      Cervical back: Normal range of motion and neck supple.   Lymphadenopathy:      Cervical: " No cervical adenopathy.   Skin:     General: Skin is warm and dry.      Coloration: Skin is not pale.      Findings: No erythema or rash.   Neurological:      Mental Status: She is alert and oriented to person, place, and time.      Cranial Nerves: No cranial nerve deficit.      Coordination: Coordination normal.   Psychiatric:         Behavior: Behavior normal.         Thought Content: Thought content normal.         Judgment: Judgment normal.           Assessment:       1. SOB (shortness of breath)    2. Abnormal EKG    3. Abnormal electrocardiogram    4. Tobacco abuse    5. Class 2 severe obesity with serious comorbidity and body mass index (BMI) of 37.0 to 37.9 in adult, unspecified obesity type      Results for orders placed or performed in visit on 03/18/25   CBC Auto Differential    Collection Time: 03/18/25  4:05 PM   Result Value Ref Range    WBC 16.45 (H) 3.90 - 12.70 K/uL    RBC 4.61 4.00 - 5.40 M/uL    Hemoglobin 12.7 12.0 - 16.0 g/dL    Hematocrit 39.1 37.0 - 48.5 %    MCV 85 82 - 98 fL    MCH 27.5 27.0 - 31.0 pg    MCHC 32.5 32.0 - 36.0 g/dL    RDW 12.8 11.5 - 14.5 %    Platelets 332 150 - 450 K/uL    MPV 10.5 9.2 - 12.9 fL    Immature Granulocytes 0.4 0.0 - 0.5 %    Gran # (ANC) 11.6 (H) 1.8 - 7.7 K/uL    Immature Grans (Abs) 0.07 (H) 0.00 - 0.04 K/uL    Lymph # 3.0 1.0 - 4.8 K/uL    Mono # 1.3 (H) 0.3 - 1.0 K/uL    Eos # 0.4 0.0 - 0.5 K/uL    Baso # 0.13 0.00 - 0.20 K/uL    nRBC 0 0 /100 WBC    Gran % 70.2 38.0 - 73.0 %    Lymph % 18.4 18.0 - 48.0 %    Mono % 7.6 4.0 - 15.0 %    Eosinophil % 2.6 0.0 - 8.0 %    Basophil % 0.8 0.0 - 1.9 %    Differential Method Automated    Comprehensive Metabolic Panel    Collection Time: 03/18/25  4:05 PM   Result Value Ref Range    Sodium 140 136 - 145 mmol/L    Potassium 4.9 3.5 - 5.1 mmol/L    Chloride 105 95 - 110 mmol/L    CO2 26 23 - 29 mmol/L    Glucose 101 70 - 110 mg/dL    BUN 4 (L) 6 - 20 mg/dL    Creatinine 0.9 0.5 - 1.4 mg/dL    Calcium 9.8 8.7 - 10.5  mg/dL    Total Protein 7.4 6.0 - 8.4 g/dL    Albumin 4.1 3.5 - 5.2 g/dL    Total Bilirubin 0.3 0.1 - 1.0 mg/dL    Alkaline Phosphatase 167 (H) 40 - 150 U/L    AST 16 10 - 40 U/L    ALT 39 10 - 44 U/L    eGFR >60 >60 mL/min/1.73 m^2    Anion Gap 9 8 - 16 mmol/L   TSH    Collection Time: 03/18/25  4:05 PM   Result Value Ref Range    TSH 0.807 0.400 - 4.000 uIU/mL   B-TYPE NATRIURETIC PEPTIDE    Collection Time: 03/18/25  4:05 PM   Result Value Ref Range    BNP 61 0 - 99 pg/mL     *Note: Due to a large number of results and/or encounters for the requested time period, some results have not been displayed. A complete set of results can be found in Results Review.       Current Medications[1]     Lab Results   Component Value Date    WBC 16.45 (H) 03/18/2025    RBC 4.61 03/18/2025    HGB 12.7 03/18/2025    HCT 39.1 03/18/2025    MCV 85 03/18/2025    MCH 27.5 03/18/2025    MCHC 32.5 03/18/2025    RDW 12.8 03/18/2025     03/18/2025    MPV 10.5 03/18/2025    GRAN 11.6 (H) 03/18/2025    GRAN 70.2 03/18/2025    LYMPH 3.0 03/18/2025    LYMPH 18.4 03/18/2025    MONO 1.3 (H) 03/18/2025    MONO 7.6 03/18/2025    EOS 0.4 03/18/2025    BASO 0.13 03/18/2025    EOSINOPHIL 2.6 03/18/2025    BASOPHIL 0.8 03/18/2025    MG 1.9 09/29/2021        CMP  Lab Results   Component Value Date     03/18/2025    K 4.9 03/18/2025     03/18/2025    CO2 26 03/18/2025     03/18/2025    BUN 4 (L) 03/18/2025    CREATININE 0.9 03/18/2025    CALCIUM 9.8 03/18/2025    PROT 7.4 03/18/2025    ALBUMIN 4.1 03/18/2025    BILITOT 0.3 03/18/2025    ALKPHOS 167 (H) 03/18/2025    AST 16 03/18/2025    ALT 39 03/18/2025    ANIONGAP 9 03/18/2025    ESTGFRAFRICA >60 04/18/2022    EGFRNONAA >60 04/18/2022        Lab Results   Component Value Date    LABURIN No growth 09/10/2019            Results for orders placed or performed in visit on 03/17/25   EKG 12-lead    Collection Time: 03/17/25  5:28 PM   Result Value Ref Range    QRS Duration 76 ms     [facility] QTC Calculation 441 ms    Narrative    Test Reason : R06.02,    Vent. Rate :  83 BPM     Atrial Rate :  83 BPM     P-R Int : 136 ms          QRS Dur :  76 ms      QT Int : 376 ms       P-R-T Axes :  40 -29 -23 degrees    QTcB Int : 441 ms    Normal sinus rhythm  Left axis deviation (> -30Â°)  Nonspecific ST abnormality  Abnormal ECG  When compared with ECG of 17-Mar-2025 17:27,  No significant change was found  Confirmed by Clif Asencio ([id number]) on 3/18/2025 9:34:52 AM    Referred By: DAIN RAM           Confirmed By: Clif Asencio        X-Ray Chest PA And Lateral 03/18/2025 [id number] Final   SURG FL Surgery Fluoro Usage 12/16/2024 [id number] Final   CT Maxillofacial Without Contrast 11/23/2024 [id number] Final             Plan:       Problem List Items Addressed This Visit          Pulmonary    SOB (shortness of breath) - Primary    Echocardiogram ordered.  Diastolic heart failure is a consideration.  Recent BNP 61.    She is having leg swelling.  I did not recommend as needed diuretics today.  I did discuss compression stockings.    Her leg swelling supports 3rd spacing, noticed most at the end of the day.         Relevant Orders    Stress Echo Which stress agent will be used? Pharmacological; Color Flow Doppler? No       Cardiac/Vascular    Abnormal electrocardiogram    Her current ST changes are somewhat nonspecific.  Previous electronic have at least my ST changes.    Stress test ordered for completeness sake.    Previous calcium score 3 in 2021.            Endocrine    Class 2 obesity in adult    Weight loss encouraged.            Other    Tobacco abuse    Cessation advised.          Other Visit Diagnoses         Abnormal EKG        Relevant Orders    Stress Echo Which stress agent will be used? Pharmacological; Color Flow Doppler? No               Dobutamine stress echo ordered.  Some reassurance given to the patient on her Electrocardiogram is particularly in the setting of her low calcium  score.    I would support resuming statin therapy.  Made a 2 month follow-up.  We discussed her leg swelling and some fluid restriction.  Compression stockings discussed.    Her blood pressure is on the low side today, I did not feel comfortable recommending diuretics or spironolactone.  Interestingly her blood pressure yesterday was on the high side.    Thank you for allowing me to participate in your patient's care.              Clif Asencio MD  03/19/2025   11:51 AM               [1]   Current Outpatient Medications:     butalbital-acetaminophen-caffeine -40 mg (FIORICET, ESGIC) -40 mg per tablet, TAKE ONE TABLET BY MOUTH EVERY 6 HOURS AS NEEDED FOR HEADACHE, Disp: 30 tablet, Rfl: 5    CAPLYTA 42 mg Cap, 1 capsule., Disp: , Rfl:     clonazePAM (KLONOPIN) 1 MG tablet, Take 1 mg by mouth 2 (two) times daily., Disp: , Rfl:     ergocalciferol (ERGOCALCIFEROL) 50,000 unit Cap, TAKE ONE CAPSULE BY MOUTH EVERY 7 DAYS, Disp: 12 capsule, Rfl: 1    fluticasone propionate (FLONASE) 50 mcg/actuation nasal spray, 2 sprays (100 mcg total) by Each Nostril route once daily., Disp: 16 g, Rfl: 2    folic acid (FOLVITE) 1 MG tablet, TAKE ONE TABLET BY MOUTH ONCE A DAY, Disp: 30 tablet, Rfl: 5    galcanezumab-gnlm (EMGALITY PEN) 120 mg/mL PnIj, Inject one syringe every 28 days for migraine prevention., Disp: 1 mL, Rfl: 11    lisdexamfetamine (VYVANSE) 70 MG capsule, Take 70 mg by mouth., Disp: , Rfl:     lithium (LITHOBID) 300 MG CR tablet, Take 300 mg by mouth every morning., Disp: , Rfl:     montelukast (SINGULAIR) 10 mg tablet, TAKE ONE TABLET BY MOUTH ONCE A DAY IN THE EVENING, Disp: 30 tablet, Rfl: 11    pantoprazole (PROTONIX) 40 MG tablet, Take 1 tablet (40 mg total) by mouth once daily., Disp: 90 tablet, Rfl: 3    propranoloL (INDERAL) 20 MG tablet, Take 1 tablet (20 mg total) by mouth 2 (two) times daily., Disp: 60 tablet, Rfl: 1    sucralfate (CARAFATE) 1 gram tablet, Take 1 tablet (1 g total) by mouth 2  (two) times daily., Disp: 60 tablet, Rfl: 0    traZODone (DESYREL) 150 MG tablet, Take 1 tablet (150 mg total) by mouth every evening., Disp: 30 tablet, Rfl: 1  No current facility-administered medications for this visit.    Facility-Administered Medications Ordered in Other Visits:     0.9%  NaCl infusion, , Intravenous, Continuous, Chandan Dillon MD, Last Rate: 20 mL/hr at 01/25/22 0847, New Bag at 01/25/22 0847    sodium chloride 0.9% flush 10 mL, 10 mL, Intravenous, PRN, Chandan Dillon MD

## 2025-03-19 NOTE — ASSESSMENT & PLAN NOTE
Her current ST changes are somewhat nonspecific.  Previous electronic have at least my ST changes.    Stress test ordered for completeness sake.    Previous calcium score 3 in 2021.

## 2025-03-19 NOTE — ASSESSMENT & PLAN NOTE
Echocardiogram ordered.  Diastolic heart failure is a consideration.  Recent BNP 61.    She is having leg swelling.  I did not recommend as needed diuretics today.  I did discuss compression stockings.    Her leg swelling supports 3rd spacing, noticed most at the end of the day.

## 2025-03-19 NOTE — TELEPHONE ENCOUNTER
----- Message from Juan Javed MD sent at 3/18/2025  7:28 PM CDT -----  Test results normal but her WBC is elevated a bit. Has she had any steroids recently? Has she been sick at all recently? A cold or virus???   ----- Message -----  From: Wade, [facility] Interface  Sent: 3/18/2025   4:23 PM CDT  To: Juan Javed MD

## 2025-03-25 ENCOUNTER — OFFICE VISIT (OUTPATIENT)
Dept: FAMILY MEDICINE | Facility: CLINIC | Age: 45
End: 2025-03-25
Payer: MEDICAID

## 2025-03-25 ENCOUNTER — CLINICAL SUPPORT (OUTPATIENT)
Dept: FAMILY MEDICINE | Facility: CLINIC | Age: 45
End: 2025-03-25
Payer: MEDICAID

## 2025-03-25 VITALS
OXYGEN SATURATION: 98 % | HEART RATE: 89 BPM | DIASTOLIC BLOOD PRESSURE: 76 MMHG | RESPIRATION RATE: 20 BRPM | TEMPERATURE: 96 F | SYSTOLIC BLOOD PRESSURE: 110 MMHG

## 2025-03-25 DIAGNOSIS — R09.81 CONGESTION OF NASAL SINUS: ICD-10-CM

## 2025-03-25 DIAGNOSIS — R05.9 COUGH, UNSPECIFIED TYPE: Primary | ICD-10-CM

## 2025-03-25 DIAGNOSIS — R05.1 ACUTE COUGH: ICD-10-CM

## 2025-03-25 LAB
CTP QC/QA: YES
CTP QC/QA: YES
POC MOLECULAR INFLUENZA A AGN: NEGATIVE
POC MOLECULAR INFLUENZA B AGN: NEGATIVE
SARS-COV-2 RDRP RESP QL NAA+PROBE: NEGATIVE

## 2025-03-25 PROCEDURE — 99999 PR PBB SHADOW E&M-EST. PATIENT-LVL III: CPT | Mod: PBBFAC,,, | Performed by: FAMILY MEDICINE

## 2025-03-25 PROCEDURE — G2211 COMPLEX E/M VISIT ADD ON: HCPCS | Mod: S$PBB,,, | Performed by: FAMILY MEDICINE

## 2025-03-25 PROCEDURE — 3078F DIAST BP <80 MM HG: CPT | Mod: CPTII,,, | Performed by: FAMILY MEDICINE

## 2025-03-25 PROCEDURE — 1159F MED LIST DOCD IN RCRD: CPT | Mod: CPTII,,, | Performed by: FAMILY MEDICINE

## 2025-03-25 PROCEDURE — 99213 OFFICE O/P EST LOW 20 MIN: CPT | Mod: S$PBB,,, | Performed by: FAMILY MEDICINE

## 2025-03-25 PROCEDURE — 87635 SARS-COV-2 COVID-19 AMP PRB: CPT | Mod: PBBFAC | Performed by: FAMILY MEDICINE

## 2025-03-25 PROCEDURE — 87502 INFLUENZA DNA AMP PROBE: CPT | Mod: PBBFAC | Performed by: FAMILY MEDICINE

## 2025-03-25 PROCEDURE — 3074F SYST BP LT 130 MM HG: CPT | Mod: CPTII,,, | Performed by: FAMILY MEDICINE

## 2025-03-25 PROCEDURE — 99999PBSHW POCT INFLUENZA A/B MOLECULAR: Mod: PBBFAC,,,

## 2025-03-25 PROCEDURE — 99213 OFFICE O/P EST LOW 20 MIN: CPT | Mod: PBBFAC | Performed by: FAMILY MEDICINE

## 2025-03-25 PROCEDURE — 99999PBSHW: Mod: PBBFAC,,,

## 2025-03-25 RX ORDER — VENLAFAXINE HYDROCHLORIDE 75 MG/1
75 CAPSULE, EXTENDED RELEASE ORAL DAILY
COMMUNITY

## 2025-03-25 RX ORDER — PROMETHAZINE HYDROCHLORIDE AND DEXTROMETHORPHAN HYDROBROMIDE 6.25; 15 MG/5ML; MG/5ML
5 SYRUP ORAL EVERY 6 HOURS PRN
Qty: 180 ML | Refills: 0 | Status: SHIPPED | OUTPATIENT
Start: 2025-03-25 | End: 2025-04-04

## 2025-03-25 NOTE — PROGRESS NOTES
Subjective:       Patient ID: Staci Hodge is a 44 y.o. female.    Chief Complaint: Cough, Shortness of Breath, and Nasal Congestion (Pt reports nasal congestion since 03/22. )    Pt is a 44 y.o. female who presents for evaluation and management of   Encounter Diagnoses   Name Primary?    Cough, unspecified type Yes    Acute cough     Congestion of nasal sinus    .  Doing well on current meds. Denies any side effects. Prevention is up to date.  Review of Systems   Constitutional:  Negative for chills and fever.   Respiratory:  Positive for cough. Negative for shortness of breath.    Cardiovascular:  Negative for chest pain and palpitations.   Gastrointestinal:  Negative for abdominal pain, blood in stool, constipation and nausea.   Genitourinary:  Negative for difficulty urinating.   Psychiatric/Behavioral:  Negative for dysphoric mood, sleep disturbance and suicidal ideas. The patient is not nervous/anxious.        Objective:      Physical Exam  Constitutional:       Appearance: She is well-developed.   HENT:      Head: Normocephalic and atraumatic.      Right Ear: External ear normal.      Left Ear: External ear normal.      Nose: Nose normal.   Eyes:      Pupils: Pupils are equal, round, and reactive to light.   Neck:      Thyroid: No thyromegaly.      Vascular: No JVD.      Trachea: No tracheal deviation.   Cardiovascular:      Rate and Rhythm: Normal rate.      Heart sounds: Normal heart sounds. No murmur heard.  Pulmonary:      Effort: Pulmonary effort is normal. No respiratory distress.      Breath sounds: Normal breath sounds. No wheezing or rales.   Chest:      Chest wall: No tenderness.   Abdominal:      General: Bowel sounds are normal. There is no distension.      Palpations: Abdomen is soft. There is no mass.      Tenderness: There is no abdominal tenderness. There is no guarding or rebound.   Musculoskeletal:         General: No tenderness. Normal range of motion.      Cervical back: Normal  range of motion and neck supple.   Lymphadenopathy:      Cervical: No cervical adenopathy.   Skin:     General: Skin is warm and dry.      Coloration: Skin is not pale.      Findings: No erythema or rash.   Neurological:      Mental Status: She is alert and oriented to person, place, and time.      Cranial Nerves: No cranial nerve deficit.      Motor: No abnormal muscle tone.      Coordination: Coordination normal.      Deep Tendon Reflexes: Reflexes are normal and symmetric. Reflexes normal.   Psychiatric:         Behavior: Behavior normal.         Thought Content: Thought content normal.         Judgment: Judgment normal.         Assessment:       1. Cough, unspecified type    2. Acute cough    3. Congestion of nasal sinus        Plan:   1. Cough, unspecified type  -     POCT Influenza A/B Molecular  -     POCT COVID-19 Rapid Screening    2. Acute cough  -     promethazine-dextromethorphan (PROMETHAZINE-DM) 6.25-15 mg/5 mL Syrp; Take 5 mLs by mouth every 6 (six) hours as needed.  Dispense: 180 mL; Refill: 0    3. Congestion of nasal sinus      No follow-ups on file.

## 2025-04-01 ENCOUNTER — HOSPITAL ENCOUNTER (OUTPATIENT)
Dept: PULMONOLOGY | Facility: HOSPITAL | Age: 45
Discharge: HOME | End: 2025-04-01
Attending: INTERNAL MEDICINE
Payer: MEDICAID

## 2025-04-01 VITALS — WEIGHT: 206 LBS | BODY MASS INDEX: 37.68 KG/M2

## 2025-04-01 DIAGNOSIS — R06.02 SOB (SHORTNESS OF BREATH): ICD-10-CM

## 2025-04-01 DIAGNOSIS — R94.31 ABNORMAL EKG: Primary | ICD-10-CM

## 2025-04-01 LAB
CV STRESS BASE HR: 94 BPM
DIASTOLIC BLOOD PRESSURE: 80 MMHG
OHS CV CPX 1 MINUTE RECOVERY HEART RATE: 151 BPM
OHS CV CPX 85 PERCENT MAX PREDICTED HEART RATE MALE: 150
OHS CV CPX ESTIMATED METS: 1
OHS CV CPX MAX PREDICTED HEART RATE: 177
OHS CV CPX PATIENT IS FEMALE: 1
OHS CV CPX PATIENT IS MALE: 0
OHS CV CPX PEAK DIASTOLIC BLOOD PRESSURE: 87 MMHG
OHS CV CPX PEAK HEAR RATE: 153 BPM
OHS CV CPX PEAK RATE PRESSURE PRODUCT: NORMAL
OHS CV CPX PEAK SYSTOLIC BLOOD PRESSURE: 175 MMHG
OHS CV CPX PERCENT MAX PREDICTED HEART RATE ACHIEVED: 91
OHS CV CPX RATE PRESSURE PRODUCT PRESENTING: NORMAL
OHS CV INITIAL DOSE: 10 MCG/KG/MIN
OHS CV PEAK DOSE: 30 MCG/KG/MIN
POST STRESS EJECTION FRACTION: 80 %
STRESS ECHO POST EXERCISE DUR MIN: 7 MINUTES
STRESS ECHO POST EXERCISE DUR SEC: 59 SECONDS
SYSTOLIC BLOOD PRESSURE: 134 MMHG

## 2025-04-01 PROCEDURE — 93351 STRESS TTE COMPLETE: CPT | Mod: 26,,, | Performed by: INTERNAL MEDICINE

## 2025-04-01 PROCEDURE — 93351 STRESS TTE COMPLETE: CPT

## 2025-04-01 PROCEDURE — 63600175 PHARM REV CODE 636 W HCPCS: Performed by: INTERNAL MEDICINE

## 2025-04-01 RX ORDER — ATROPINE SULFATE 0.1 MG/ML
0.25 INJECTION INTRAVENOUS
Status: DISCONTINUED | OUTPATIENT
Start: 2025-04-01 | End: 2025-04-02 | Stop reason: HOSPADM

## 2025-04-01 RX ORDER — DOBUTAMINE HYDROCHLORIDE 400 MG/100ML
10 INJECTION INTRAVENOUS CONTINUOUS
Status: DISCONTINUED | OUTPATIENT
Start: 2025-04-01 | End: 2025-04-02 | Stop reason: HOSPADM

## 2025-04-01 RX ADMIN — DOBUTAMINE HYDROCHLORIDE 10 MCG/KG/MIN: 400 INJECTION INTRAVENOUS at 09:04

## 2025-04-02 ENCOUNTER — PATIENT MESSAGE (OUTPATIENT)
Dept: CARDIOLOGY | Facility: CLINIC | Age: 45
End: 2025-04-02
Payer: MEDICAID

## 2025-04-04 ENCOUNTER — OFFICE VISIT (OUTPATIENT)
Dept: INTERNAL MEDICINE | Facility: CLINIC | Age: 45
End: 2025-04-04
Payer: MEDICAID

## 2025-04-04 ENCOUNTER — RESULTS FOLLOW-UP (OUTPATIENT)
Dept: INTERNAL MEDICINE | Facility: CLINIC | Age: 45
End: 2025-04-04

## 2025-04-04 ENCOUNTER — HOSPITAL ENCOUNTER (OUTPATIENT)
Dept: RADIOLOGY | Facility: HOSPITAL | Age: 45
Discharge: HOME | End: 2025-04-04
Attending: NURSE PRACTITIONER
Payer: MEDICAID

## 2025-04-04 VITALS
HEIGHT: 62 IN | RESPIRATION RATE: 18 BRPM | BODY MASS INDEX: 37.85 KG/M2 | SYSTOLIC BLOOD PRESSURE: 120 MMHG | WEIGHT: 205.69 LBS | OXYGEN SATURATION: 99 % | DIASTOLIC BLOOD PRESSURE: 86 MMHG | HEART RATE: 125 BPM

## 2025-04-04 DIAGNOSIS — R10.12 LEFT UPPER QUADRANT ABDOMINAL PAIN: Primary | ICD-10-CM

## 2025-04-04 DIAGNOSIS — R10.9 LEFT FLANK PAIN: ICD-10-CM

## 2025-04-04 DIAGNOSIS — R11.0 NAUSEA: ICD-10-CM

## 2025-04-04 DIAGNOSIS — R10.12 LEFT UPPER QUADRANT ABDOMINAL PAIN: ICD-10-CM

## 2025-04-04 PROCEDURE — 99999PBSHW PR PBB SHADOW TECHNICAL ONLY FILED TO HB: Mod: JZ,PBBFAC,,

## 2025-04-04 PROCEDURE — 99214 OFFICE O/P EST MOD 30 MIN: CPT | Mod: PBBFAC,PN | Performed by: NURSE PRACTITIONER

## 2025-04-04 PROCEDURE — 74176 CT ABD & PELVIS W/O CONTRAST: CPT | Mod: TC

## 2025-04-04 PROCEDURE — 99214 OFFICE O/P EST MOD 30 MIN: CPT | Mod: S$PBB,,, | Performed by: NURSE PRACTITIONER

## 2025-04-04 PROCEDURE — 3008F BODY MASS INDEX DOCD: CPT | Mod: CPTII,,, | Performed by: NURSE PRACTITIONER

## 2025-04-04 PROCEDURE — 96372 THER/PROPH/DIAG INJ SC/IM: CPT | Mod: PBBFAC,PN

## 2025-04-04 PROCEDURE — 1159F MED LIST DOCD IN RCRD: CPT | Mod: CPTII,,, | Performed by: NURSE PRACTITIONER

## 2025-04-04 PROCEDURE — 3079F DIAST BP 80-89 MM HG: CPT | Mod: CPTII,,, | Performed by: NURSE PRACTITIONER

## 2025-04-04 PROCEDURE — 74176 CT ABD & PELVIS W/O CONTRAST: CPT | Mod: 26,,, | Performed by: RADIOLOGY

## 2025-04-04 PROCEDURE — 3074F SYST BP LT 130 MM HG: CPT | Mod: CPTII,,, | Performed by: NURSE PRACTITIONER

## 2025-04-04 PROCEDURE — 99999 PR PBB SHADOW E&M-EST. PATIENT-LVL IV: CPT | Mod: PBBFAC,,, | Performed by: NURSE PRACTITIONER

## 2025-04-04 RX ORDER — KETOROLAC TROMETHAMINE 10 MG/1
10 TABLET, FILM COATED ORAL EVERY 6 HOURS
Qty: 20 TABLET | Refills: 0 | Status: SHIPPED | OUTPATIENT
Start: 2025-04-04 | End: 2025-04-09

## 2025-04-04 RX ORDER — SERTRALINE HYDROCHLORIDE 25 MG/1
25 TABLET, FILM COATED ORAL
COMMUNITY

## 2025-04-04 RX ORDER — KETOROLAC TROMETHAMINE 30 MG/ML
60 INJECTION, SOLUTION INTRAMUSCULAR; INTRAVENOUS
Status: COMPLETED | OUTPATIENT
Start: 2025-04-04 | End: 2025-04-04

## 2025-04-04 RX ADMIN — KETOROLAC TROMETHAMINE 60 MG: 30 INJECTION, SOLUTION INTRAMUSCULAR; INTRAVENOUS at 03:04

## 2025-04-04 NOTE — PROGRESS NOTES
History of Present Illness    CHIEF COMPLAINT:  Patient presents today with left-sided flank pain    HISTORY OF PRESENT ILLNESS:  She reports left flank pain that began Monday evening while sitting down. The pain is constant and not positional. She feels flushed and nauseated due to the pain severity. She denies vomiting or diarrhea. She experiences pain with urination, describing increased urinary sensation when voiding. She reports discomfort during urination but denies difficulty with initiation.    MEDICAL HISTORY:  She has a history of GI issues requiring multiple gastroenterology evaluations.      ROS:  General: -fever, -chills, -fatigue, -weight gain, -weight loss  Eyes: -vision changes, -redness, -discharge  ENT: -ear pain, -nasal congestion, -sore throat  Cardiovascular: -chest pain, -palpitations, -lower extremity edema  Respiratory: -cough, -shortness of breath  Gastrointestinal: -abdominal pain, +nausea, -vomiting, -diarrhea, -constipation, -blood in stool  Genitourinary: -dysuria, -hematuria, -frequency, +painful urination  Musculoskeletal: -joint pain, -muscle pain  Skin: -rash, -lesion  Neurological: -headache, -dizziness, -numbness, -tingling  Psychiatric: -anxiety, -depression, -sleep difficulty          Physical Exam    General: No acute distress. Well-developed. Well-nourished.  Eyes: EOMI. Sclerae anicteric.  HENT: Normocephalic. Atraumatic. Nares patent. Moist oral mucosa.  Ears: Bilateral TMs clear. Bilateral EACs clear.  Cardiovascular: tachycardic  Regular rhythm. No murmurs. No rubs. No gallops. Normal S1, S2.  Respiratory: Normal respiratory effort. Clear to auscultation bilaterally. No rales. No rhonchi. No wheezing.  Abdomen: Soft. Non-tender. Non-distended. Normoactive bowel sounds. Severe left flank pain and cva tenderness  Musculoskeletal: No  obvious deformity.  Extremities: No lower extremity edema.  Neurological: Alert & oriented x3. No slurred speech. Normal gait.  Psychiatric:  Normal mood. Normal affect. Good insight. Good judgment.  Skin: Warm. Dry. No rash.          Assessment & Plan    1. Left upper quadrant abdominal pain  CT Renal Stone Study ABD Pelvis WO    POCT URINE DIPSTICK WITHOUT MICROSCOPE      2. Left flank pain  CT Renal Stone Study ABD Pelvis WO    ketorolac injection 60 mg    ketorolac (TORADOL) 10 mg tablet    POCT URINE DIPSTICK WITHOUT MICROSCOPE      3. Nausea  CT Renal Stone Study ABD Pelvis WO    POCT URINE DIPSTICK WITHOUT MICROSCOPE           IMPRESSION:  - Suspect kidney stone or UTI based on left-sided pain under the rib and urinary symptoms.    RENAL COLIC:  - Evaluated the patient's complaint of persistent pain on the left side under the rib that started recently.  - Performed physical exam, eliciting significant pain response from the patient.  - Suspected either a kidney stone or a severe infection.  - Ordered urinalysis to evaluate for stone.  - Ordered PT test for further assessment.    DYSURIA:  - Noted the patient's report of pain during urination.  - Ordered urinalysis to evaluate for infection.  - Requested a urine sample for analysis.    URGENCY OF URINATION:  - Noted the patient's report of feeling like there's a large volume of urine when urinating.    NAUSEA AND FLUSHING:  - Noted the patient's report of feeling nauseated due to pain.  - Noted the patient's report of feeling flushed along with nausea.    HISTORY OF DIGESTIVE SYSTEM DISEASES:  - Documented the patient's history of consulting gastroenterologists.       Will order renal ct stat     Keep hydrated for now     Monitor for a rash  to area over the weekend       This note was generated with the assistance of ambient listening technology. Verbal consent was obtained by the patient and accompanying visitor(s) for the recording of patient appointment to facilitate this note. I attest to having reviewed and edited the generated note for accuracy, though some syntax or spelling errors may  persist. Please contact the author of this note for any clarification.

## 2025-04-07 ENCOUNTER — TELEPHONE (OUTPATIENT)
Dept: ENDOSCOPY | Facility: HOSPITAL | Age: 45
End: 2025-04-07
Payer: MEDICAID

## 2025-04-07 ENCOUNTER — HOSPITAL ENCOUNTER (OUTPATIENT)
Dept: PULMONOLOGY | Facility: HOSPITAL | Age: 45
Discharge: HOME | End: 2025-04-07
Attending: FAMILY MEDICINE
Payer: MEDICAID

## 2025-04-07 ENCOUNTER — PATIENT MESSAGE (OUTPATIENT)
Dept: GASTROENTEROLOGY | Facility: CLINIC | Age: 45
End: 2025-04-07
Payer: MEDICAID

## 2025-04-07 DIAGNOSIS — Q85.89 PEUTZ-JEGHERS POLYPS OF SMALL BOWEL: Primary | ICD-10-CM

## 2025-04-07 DIAGNOSIS — R06.02 SOB (SHORTNESS OF BREATH): ICD-10-CM

## 2025-04-07 DIAGNOSIS — Q85.89 PEUTZ-JEGHERS SYNDROME: Primary | ICD-10-CM

## 2025-04-07 PROCEDURE — 94060 EVALUATION OF WHEEZING: CPT

## 2025-04-07 PROCEDURE — 27100098 HC SPACER

## 2025-04-07 PROCEDURE — 99900031 HC PATIENT EDUCATION (STAT)

## 2025-04-07 PROCEDURE — 94727 GAS DIL/WSHOT DETER LNG VOL: CPT

## 2025-04-07 PROCEDURE — 94729 DIFFUSING CAPACITY: CPT

## 2025-04-07 PROCEDURE — 99900035 HC TECH TIME PER 15 MIN (STAT)

## 2025-04-07 NOTE — TELEPHONE ENCOUNTER
Referral for procedure from [facility]      Spoke to patient to reschedule procedure(s) Upper Endoscopy (EGD)       Physician to perform procedure(s) Dr. KORIN Carrillo  Date of Procedure (s) 4/14/25  Arrival Time 07:00 AM  Time of Procedure(s) 08:00 AM   Location of Procedure(s) [address]  Type of Rx Prep sent to patient: N/A  Instructions provided to patient via [facility]    Patient was informed on the following information and verbalized understanding. Screening questionnaire reviewed with patient and complete. If procedure requires anesthesia, a responsible adult needs to be present to accompany the patient home, patient cannot drive after receiving anesthesia. Appointment details are tentative, especially check-in time. Patient will receive a prep-op call 7 days prior to confirm check-in time for procedure. If applicable the patient should contact their pharmacy to verify Rx for procedure prep is ready for pick-up. Patient was advised to call the scheduling department at [phone] if pharmacy states no Rx is available. Patient was advised to call the endoscopy scheduling department if any questions or concerns arise.       Endoscopy Scheduling Department

## 2025-04-08 ENCOUNTER — HOSPITAL ENCOUNTER (EMERGENCY)
Facility: HOSPITAL | Age: 45
Discharge: HOME | End: 2025-04-08
Attending: FAMILY MEDICINE
Payer: MEDICAID

## 2025-04-08 ENCOUNTER — OFFICE VISIT (OUTPATIENT)
Dept: FAMILY MEDICINE | Facility: CLINIC | Age: 45
End: 2025-04-08
Payer: MEDICAID

## 2025-04-08 VITALS
DIASTOLIC BLOOD PRESSURE: 76 MMHG | OXYGEN SATURATION: 97 % | SYSTOLIC BLOOD PRESSURE: 126 MMHG | BODY MASS INDEX: 37.38 KG/M2 | RESPIRATION RATE: 15 BRPM | HEART RATE: 100 BPM | WEIGHT: 203.13 LBS | HEIGHT: 62 IN

## 2025-04-08 VITALS
TEMPERATURE: 98 F | WEIGHT: 204.06 LBS | SYSTOLIC BLOOD PRESSURE: 169 MMHG | HEART RATE: 92 BPM | BODY MASS INDEX: 37.32 KG/M2 | RESPIRATION RATE: 20 BRPM | OXYGEN SATURATION: 98 % | DIASTOLIC BLOOD PRESSURE: 93 MMHG

## 2025-04-08 DIAGNOSIS — R10.9 ABDOMINAL PAIN, UNSPECIFIED ABDOMINAL LOCATION: Primary | ICD-10-CM

## 2025-04-08 DIAGNOSIS — Q85.89 PEUTZ-JEGHERS SYNDROME: ICD-10-CM

## 2025-04-08 DIAGNOSIS — R80.9 PROTEINURIA, UNSPECIFIED TYPE: ICD-10-CM

## 2025-04-08 DIAGNOSIS — R10.9 LEFT FLANK PAIN: ICD-10-CM

## 2025-04-08 DIAGNOSIS — D72.829 LEUKOCYTOSIS, UNSPECIFIED TYPE: ICD-10-CM

## 2025-04-08 DIAGNOSIS — R10.12 LEFT UPPER QUADRANT ABDOMINAL PAIN: Primary | ICD-10-CM

## 2025-04-08 DIAGNOSIS — G43.119 INTRACTABLE MIGRAINE WITH AURA WITHOUT STATUS MIGRAINOSUS: ICD-10-CM

## 2025-04-08 DIAGNOSIS — K57.92 DIVERTICULITIS: ICD-10-CM

## 2025-04-08 LAB
ABSOLUTE EOSINOPHIL (OHS): 0.41 K/UL
ABSOLUTE MONOCYTE (OHS): 0.73 K/UL (ref 0.3–1)
ABSOLUTE NEUTROPHIL COUNT (OHS): 6.88 K/UL (ref 1.8–7.7)
ALBUMIN SERPL BCP-MCNC: 4.7 G/DL (ref 3.5–5.2)
ALP SERPL-CCNC: 197 UNIT/L (ref 40–150)
ALT SERPL W/O P-5'-P-CCNC: 47 UNIT/L (ref 10–44)
ANION GAP (OHS): 10 MMOL/L (ref 8–16)
AST SERPL-CCNC: 31 UNIT/L (ref 11–45)
BASOPHILS # BLD AUTO: 0.08 K/UL
BASOPHILS NFR BLD AUTO: 0.7 %
BILIRUB SERPL-MCNC: 0.7 MG/DL (ref 0.1–1)
BILIRUB UR QL STRIP.AUTO: ABNORMAL
BUN SERPL-MCNC: 7 MG/DL (ref 6–20)
CALCIUM SERPL-MCNC: 9.8 MG/DL (ref 8.7–10.5)
CHLORIDE SERPL-SCNC: 104 MMOL/L (ref 95–110)
CLARITY UR: CLEAR
CO2 SERPL-SCNC: 24 MMOL/L (ref 23–29)
COLOR UR AUTO: YELLOW
CREAT SERPL-MCNC: 0.8 MG/DL (ref 0.5–1.4)
ERYTHROCYTE [DISTWIDTH] IN BLOOD BY AUTOMATED COUNT: 13.1 % (ref 11.5–14.5)
GFR SERPLBLD CREATININE-BSD FMLA CKD-EPI: >60 ML/MIN/1.73/M2
GLUCOSE SERPL-MCNC: 102 MG/DL (ref 70–110)
GLUCOSE UR QL STRIP: NEGATIVE
HCT VFR BLD AUTO: 38 % (ref 37–48.5)
HGB BLD-MCNC: 12.8 GM/DL (ref 12–16)
HGB UR QL STRIP: NEGATIVE
IMM GRANULOCYTES # BLD AUTO: 0.03 K/UL (ref 0–0.04)
IMM GRANULOCYTES NFR BLD AUTO: 0.3 % (ref 0–0.5)
KETONES UR QL STRIP: NEGATIVE
LEUKOCYTE ESTERASE UR QL STRIP: NEGATIVE
LIPASE SERPL-CCNC: 25 U/L (ref 4–60)
LYMPHOCYTES # BLD AUTO: 2.71 K/UL (ref 1–4.8)
MCH RBC QN AUTO: 26.9 PG (ref 27–31)
MCHC RBC AUTO-ENTMCNC: 33.7 G/DL (ref 32–36)
MCV RBC AUTO: 80 FL (ref 82–98)
NITRITE UR QL STRIP: NEGATIVE
NUCLEATED RBC (/100WBC) (OHS): 0 /100 WBC
PH UR STRIP: 7 [PH]
PLATELET # BLD AUTO: 350 K/UL (ref 150–450)
PMV BLD AUTO: 9.9 FL (ref 9.2–12.9)
POTASSIUM SERPL-SCNC: 3.9 MMOL/L (ref 3.5–5.1)
PROT SERPL-MCNC: 7.9 GM/DL (ref 6–8.4)
PROT UR QL STRIP: ABNORMAL
RBC # BLD AUTO: 4.75 M/UL (ref 4–5.4)
RELATIVE EOSINOPHIL (OHS): 3.8 %
RELATIVE LYMPHOCYTE (OHS): 25 % (ref 18–48)
RELATIVE MONOCYTE (OHS): 6.7 % (ref 4–15)
RELATIVE NEUTROPHIL (OHS): 63.5 % (ref 38–73)
SODIUM SERPL-SCNC: 138 MMOL/L (ref 136–145)
SP GR UR STRIP: 1.02
UROBILINOGEN UR STRIP-ACNC: NEGATIVE EU/DL
WBC # BLD AUTO: 10.84 K/UL (ref 3.9–12.7)

## 2025-04-08 PROCEDURE — 96374 THER/PROPH/DIAG INJ IV PUSH: CPT

## 2025-04-08 PROCEDURE — 3078F DIAST BP <80 MM HG: CPT | Mod: CPTII,,, | Performed by: FAMILY MEDICINE

## 2025-04-08 PROCEDURE — 3074F SYST BP LT 130 MM HG: CPT | Mod: CPTII,,, | Performed by: FAMILY MEDICINE

## 2025-04-08 PROCEDURE — 25000003 PHARM REV CODE 250: Performed by: FAMILY MEDICINE

## 2025-04-08 PROCEDURE — 85025 COMPLETE CBC W/AUTO DIFF WBC: CPT | Performed by: FAMILY MEDICINE

## 2025-04-08 PROCEDURE — 96375 TX/PRO/DX INJ NEW DRUG ADDON: CPT

## 2025-04-08 PROCEDURE — 96361 HYDRATE IV INFUSION ADD-ON: CPT

## 2025-04-08 PROCEDURE — 99999 PR PBB SHADOW E&M-EST. PATIENT-LVL IV: CPT | Mod: PBBFAC,,, | Performed by: FAMILY MEDICINE

## 2025-04-08 PROCEDURE — 83690 ASSAY OF LIPASE: CPT | Performed by: FAMILY MEDICINE

## 2025-04-08 PROCEDURE — 99214 OFFICE O/P EST MOD 30 MIN: CPT | Mod: PBBFAC | Performed by: FAMILY MEDICINE

## 2025-04-08 PROCEDURE — 84450 TRANSFERASE (AST) (SGOT): CPT | Performed by: FAMILY MEDICINE

## 2025-04-08 PROCEDURE — 99285 EMERGENCY DEPT VISIT HI MDM: CPT | Mod: 25,27

## 2025-04-08 PROCEDURE — 99213 OFFICE O/P EST LOW 20 MIN: CPT | Mod: S$PBB,,, | Performed by: FAMILY MEDICINE

## 2025-04-08 PROCEDURE — 81003 URINALYSIS AUTO W/O SCOPE: CPT | Performed by: FAMILY MEDICINE

## 2025-04-08 PROCEDURE — 1159F MED LIST DOCD IN RCRD: CPT | Mod: CPTII,,, | Performed by: FAMILY MEDICINE

## 2025-04-08 PROCEDURE — 25500020 PHARM REV CODE 255: Performed by: FAMILY MEDICINE

## 2025-04-08 PROCEDURE — 63600175 PHARM REV CODE 636 W HCPCS: Performed by: FAMILY MEDICINE

## 2025-04-08 PROCEDURE — 1160F RVW MEDS BY RX/DR IN RCRD: CPT | Mod: CPTII,,, | Performed by: FAMILY MEDICINE

## 2025-04-08 PROCEDURE — 3008F BODY MASS INDEX DOCD: CPT | Mod: CPTII,,, | Performed by: FAMILY MEDICINE

## 2025-04-08 RX ORDER — SODIUM CHLORIDE 9 MG/ML
1000 INJECTION, SOLUTION INTRAVENOUS
Status: COMPLETED | OUTPATIENT
Start: 2025-04-08 | End: 2025-04-08

## 2025-04-08 RX ORDER — ONDANSETRON HYDROCHLORIDE 2 MG/ML
4 INJECTION, SOLUTION INTRAVENOUS
Status: COMPLETED | OUTPATIENT
Start: 2025-04-08 | End: 2025-04-08

## 2025-04-08 RX ORDER — HYDROMORPHONE HYDROCHLORIDE 1 MG/ML
0.5 INJECTION, SOLUTION INTRAMUSCULAR; INTRAVENOUS; SUBCUTANEOUS
Refills: 0 | Status: COMPLETED | OUTPATIENT
Start: 2025-04-08 | End: 2025-04-08

## 2025-04-08 RX ADMIN — ONDANSETRON 4 MG: 2 INJECTION INTRAMUSCULAR; INTRAVENOUS at 03:04

## 2025-04-08 RX ADMIN — SODIUM CHLORIDE 1000 ML: 9 INJECTION, SOLUTION INTRAVENOUS at 03:04

## 2025-04-08 RX ADMIN — HYDROMORPHONE HYDROCHLORIDE 0.5 MG: 1 INJECTION, SOLUTION INTRAMUSCULAR; INTRAVENOUS; SUBCUTANEOUS at 03:04

## 2025-04-08 RX ADMIN — IOHEXOL 30 ML: 350 INJECTION, SOLUTION INTRAVENOUS at 05:04

## 2025-04-08 RX ADMIN — IOHEXOL 100 ML: 350 INJECTION, SOLUTION INTRAVENOUS at 05:04

## 2025-04-08 NOTE — ED PROVIDER NOTES
Encounter Date: 4/8/2025       History     Chief Complaint   Patient presents with    Abdominal Pain     Patient to ER CC of left abd pain and left flank that started about a week ago, states the pain is worse when she moves      HPI  44-year-old female that presents to the ED with a history of hypertension, Peutz-Jeghers syndrome, multiple abdominal surgeries with bowel resections that presents to the ED from her primary care physician with left-sided abdominal pain and left flank pain that started about 1 week ago.  Patient states pain is worsened with movement.  Patient was seen by her PCP who sent her to the ED for labs and imaging.  She denies any known fevers, chills, chest pain, shortness of breath.  Increased bowel movements over the past week.  Review of patient's allergies indicates:  No Known Allergies  Past Medical History:   Diagnosis Date    Abnormal Pap smear of cervix age 25    no treatement    Anxiety     Breast cancer     Cancer 07/11/2018    breast cancer    Cervical spondylosis 2/9/2018    Colon polyp     Depression     Ectopic pregnancy 2/26/2019    General anesthetics causing adverse effect in therapeutic use     Slow to awaken/ Memory problems-post-op to day 3 after Mastectomy    Headache     History of psychiatric hospitalization     age 20 for SI    Hx of psychiatric care     Hypertension     Peutz-Jeghers syndrome     PONV (postoperative nausea and vomiting)     Psychiatric problem     Right carpal tunnel syndrome 3/9/2017    Self-harming behavior     Suicide attempt     Therapy      Past Surgical History:   Procedure Laterality Date    ADENOIDECTOMY      ANTEGRADE SINGLE BALLOON ENTEROSCOPY N/A 04/19/2021    Procedure: ENTEROSCOPY, SINGLE BALLOON, ANTEGRADE;  Surgeon: Vincenzo Herbert MD;  [facility] ([address] [place] [address]);  Service: Endoscopy;  Laterality: N/A;  removal of large polyp in proximal jejunum; please schedule during a time when Dr. Dillon is available in case I need assistance  with removal or 2nd opinion prior to removal    COVID at [facility] 4/16 - ttr    APPENDECTOMY      AUGMENTATION OF BREAST      BILATERAL MASTECTOMY Bilateral 07/23/2018    Procedure: MASTECTOMY 23 hr stay;  Surgeon: Sofia Kendrick MD;  Location: [facility] 2ND FLR;  Service: General;  Laterality: Bilateral;    BILATERAL SALPINGO-OOPHORECTOMY (BSO) Bilateral 03/04/2021    Procedure: SALPINGO-OOPHORECTOMY, BILATERAL;  Surgeon: Clayton Vilchis MD;  Location: [facility];  Service: OB/GYN;  Laterality: Bilateral;    BOWEL RESECTION  10/17/2014    BREAST BIOPSY Left 06/04/2018    BREAST CAPSULOTOMY Left 04/25/2019    Procedure: CAPSULOTOMY, BREAST LEFT;  Surgeon: Duane Bello MD;  Location: [facility] 2ND FLR;  Service: Plastics;  Laterality: Left;    BREAST RECONSTRUCTION      BREAST SURGERY      Bilateral Mastectomy 07/23/2018    CARPAL TUNNEL RELEASE Right 2016    CARPAL TUNNEL RELEASE Left 11/27/2024    Procedure: RELEASE, CARPAL TUNNEL;  Surgeon: Daniel Bosch Jr., MD;  Location: [facility];  Service: Orthopedics;  Laterality: Left;    COLONOSCOPY      COLONOSCOPY N/A 02/25/2021    Procedure: COLONOSCOPY;  Surgeon: Vincenzo Herbert MD;  Location: [facility] (2ND FLR);  Service: Endoscopy;  Laterality: N/A;  previous anesthesia complications  okay for this date/time per Dr. Herbert and liliana with [facility]  COVID test on 2/22/21 at [facility]    COLONOSCOPY N/A 07/08/2024    Procedure: COLONOSCOPY;  Surgeon: Vincenzo Herbert MD;  Location: [facility] ENDO (2ND FLR);  Service: Endoscopy;  Laterality: N/A;  Ref By:parvin Cruz sent via [employer]  6/27-pre call complete-tb-2nd floor for availability-, Peutz-Jaegar syndrome    DILATION AND CURETTAGE OF UTERUS      DILATION AND CURETTAGE OF UTERUS USING SUCTION N/A 02/25/2019    Procedure: DILATION AND CURETTAGE, UTERUS, USING SUCTION  PATHOLOGY NEEDED;  Surgeon: Pam Sifuentes MD;  Location: [facility];  Service: OB/GYN;  Laterality: N/A;    ENDOSCOPIC  ULTRASOUND OF UPPER GASTROINTESTINAL TRACT Left 01/15/2021    Procedure: ULTRASOUND, UPPER GI TRACT, ENDOSCOPIC;  Surgeon: Chandan Dillon MD;  Location: [facility] ENDO;  Service: Endoscopy;  Laterality: Left;  for pancreatic screening    ENDOSCOPIC ULTRASOUND OF UPPER GASTROINTESTINAL TRACT N/A 04/26/2024    Procedure: ULTRASOUND, UPPER GI TRACT, ENDOSCOPIC;  Surgeon: Chandan Dillon MD;  Location: [facility] ENDO;  Service: Endoscopy;  Laterality: N/A;  4/8/24: instuctions sent via portal-GD  4/19-precall complete-MS    ESOPHAGOGASTRODUODENOSCOPY N/A 01/15/2021    Procedure: EGD (ESOPHAGOGASTRODUODENOSCOPY);  Surgeon: Chandan Dillon MD;  Location: [facility];  Service: Endoscopy;  Laterality: N/A;  with push enteroscopy    ESOPHAGOGASTRODUODENOSCOPY N/A 05/17/2021    Procedure: EGD (ESOPHAGOGASTRODUODENOSCOPY);  Surgeon: Chandan Dillon MD;  Location: [facility] (2ND FLR);  Service: Endoscopy;  Laterality: N/A;  Please schedule this duodenal polypectomy. Need to be a day that Dr Jose Antonio Stark is in the OR. 90 minutes.   Chandan Dillon MD     COVID at [place] 5/15 (scheduled in the OR for 5/18 - using same COVID results for both procedures) ttr    ESOPHAGOGASTRODUODENOSCOPY N/A 01/25/2022    Procedure: EGD (ESOPHAGOGASTRODUODENOSCOPY);  Surgeon: Chandan Dillon MD;  Location: [facility] (2ND FLR);  Service: Endoscopy;  Laterality: N/A;  poss emr  covid-1/22/22-STAH-BB  instructions sent via portal-BB    ESOPHAGOGASTRODUODENOSCOPY N/A 03/10/2023    Procedure: EGD (ESOPHAGOGASTRODUODENOSCOPY);  Surgeon: Chandan Dillon MD;  Location: [facility] ENDO (2ND FLR);  Service: Endoscopy;  Laterality: N/A;  inst via portal  called to confirm and msg that phone not accepting calls 3/6 mal    ESOPHAGOGASTRODUODENOSCOPY N/A 03/22/2024    Procedure: EGD (ESOPHAGOGASTRODUODENOSCOPY);  Surgeon: Chandan Dillon MD;  Location: [facility] ([address] [place] [address]);  Service: Endoscopy;  Laterality: N/A;  EGD in 1 year. [place]  instr  portal-tb  3/19-precall complete-MS    FAT TRANSFER Bilateral 11/12/2018    Procedure: TRANSFER, FAT TISSUE BILATERAL BREAST;  Surgeon: Duane Bello MD;  Location: [address];  Service: Plastics;  Laterality: Bilateral;    INJECTION FOR SENTINEL NODE IDENTIFICATION Left 07/23/2018    Procedure: INJECTION, FOR SENTINEL NODE IDENTIFICATION;  Surgeon: Sofia Kendrick MD;  Location: [address];  Service: General;  Laterality: Left;    INJECTION OF ANESTHETIC AGENT AROUND MEDIAL BRANCH NERVES INNERVATING CERVICAL FACET JOINT Left 12/16/2024    Procedure: CERVICAL MEDIAL BRANCH NERVE BLOCK (C2,3,4,TON);  Surgeon: Shahid Almonte MD;  Location: [facility];  Service: Pain Management;  Laterality: Left;    INSERTION OF BREAST TISSUE EXPANDER Bilateral 07/23/2018    Procedure: INSERTION, TISSUE EXPANDER, BREAST;  Surgeon: Duane Bello MD;  Location: [address];  Service: Plastics;  Laterality: Bilateral;    INSERTION OF BREAST TISSUE EXPANDER Bilateral 03/10/2020    Procedure: INSERTION, TISSUE EXPANDER, BREAST, BILATERAL;  Surgeon: Duane Bello MD;  Location: [address];  Service: Plastics;  Laterality: Bilateral;    intestinal polpy      LYSIS OF ADHESIONS N/A 03/04/2021    Procedure: LYSIS, ADHESIONS;  Surgeon: Clayton Vilchis MD;  Location: [facility];  Service: OB/GYN;  Laterality: N/A;  Omental adhesions    MASTECTOMY      REMOVAL OF BREAST IMPLANT Bilateral 03/10/2020    Procedure: REMOVAL, IMPLANT, BREAST, BILATERAL;  Surgeon: Duane Bello MD;  Location: [address];  Service: Plastics;  Laterality: Bilateral;    REMOVAL OF BREAST IMPLANT Bilateral 07/07/2020    Procedure: REMOVAL, IMPLANT, BREAST BILATERAL;  Surgeon: Duane Bello MD;  Location: [address];  Service: Plastics;  Laterality: Bilateral;    SENTINEL LYMPH NODE BIOPSY Left 07/23/2018    Procedure: BIOPSY, LYMPH NODE, SENTINEL;  Surgeon: Sofia Kendrick MD;  Location: [address]  FLR;  Service: General;  Laterality: Left;    SMALL INTESTINE SURGERY      3 surgeries     TONSILLECTOMY      TOTAL ABDOMINAL HYSTERECTOMY N/A 03/04/2021    Procedure: HYSTERECTOMY, TOTAL, ABDOMINAL;  Surgeon: Claytno Vilchis MD;  Location: [facility] OR;  Service: OB/GYN;  Laterality: N/A;    UPPER GASTROINTESTINAL ENDOSCOPY       Family History   Problem Relation Name Age of Onset    Cancer Mother Kera         colon cancer    Stomach cancer Mother Kera     Hypertension Father Jono     Colon polyps Sister Sarah         peutz-jeghers syndrome    Breast cancer Paternal Aunt Lili     No Known Problems Maternal Uncle Umer     ADD / ADHD Paternal Uncle Christopher     Depression Paternal Uncle Christopher     Dementia Maternal Grandfather Jimy     Dementia Maternal Grandmother Mignon     No Known Problems Paternal Grandfather Perez     Breast cancer Paternal Grandmother Leoncia     No Known Problems Cousin Coco     Ovarian cancer Neg Hx      Anesthesia problems Neg Hx       Social History[1]  Review of Systems   Constitutional:  Negative for chills and fever.   HENT:  Negative for sore throat.    Eyes:  Negative for visual disturbance.   Respiratory:  Negative for shortness of breath.    Cardiovascular:  Negative for chest pain.   Gastrointestinal:  Positive for abdominal pain, diarrhea and nausea. Negative for constipation and vomiting.   Genitourinary:  Negative for dysuria.   Musculoskeletal:  Negative for back pain.   Skin:  Negative for rash.   Neurological:  Negative for headaches.   All other systems reviewed and are negative.      Physical Exam     Initial Vitals [04/08/25 1503]   BP Pulse Resp Temp SpO2   (!) 169/93 (!) 114 18 98.4 °F (36.9 °C) 100 %      MAP       --         Physical Exam    Constitutional: She appears well-developed and well-nourished. She is not diaphoretic. No distress.   HENT:   Head: Normocephalic and atraumatic.   Right Ear: External ear normal.   Left Ear: External ear normal.   Eyes: EOM are  normal. Pupils are equal, round, and reactive to light.   Neck:   Normal range of motion.  Cardiovascular:            S1, S2, tachycardia   Pulmonary/Chest: Breath sounds normal. No respiratory distress. She has no wheezes.   Abdominal: Abdomen is soft. She exhibits no distension. There is abdominal tenderness. There is rebound and guarding (left lower quadrant).   Musculoskeletal:         General: Tenderness (left CVA) present. Normal range of motion.      Cervical back: Normal range of motion.     Skin: Skin is warm and dry.   Psychiatric: She has a normal mood and affect.         ED Course   Procedures  Labs Reviewed   COMPREHENSIVE METABOLIC PANEL - Abnormal       Result Value    Sodium 138      Potassium 3.9      Chloride 104      CO2 24      Glucose 102      BUN 7      Creatinine 0.8      Calcium 9.8      Protein Total 7.9      Albumin 4.7      Bilirubin Total 0.7       (*)     AST 31      ALT 47 (*)     Anion Gap 10      eGFR >60     URINALYSIS, REFLEX TO URINE CULTURE - Abnormal    Color, UA Yellow      Appearance, UA Clear      pH, UA 7.0      Spec Grav UA 1.020      Protein, UA Trace (*)     Glucose, UA Negative      Ketones, UA Negative      Bilirubin, UA 1+ (*)     Blood, UA Negative      Nitrites, UA Negative      Urobilinogen, UA Negative      Leukocyte Esterase, UA Negative     CBC WITH DIFFERENTIAL - Abnormal    WBC 10.84      RBC 4.75      HGB 12.8      HCT 38.0      MCV 80 (*)     MCH 26.9 (*)     MCHC 33.7      RDW 13.1      Platelet Count 350      MPV 9.9      Nucleated RBC 0      Neut % 63.5      Lymph % 25.0      Mono % 6.7      Eos % 3.8      Basophil % 0.7      Imm Grans % 0.3      Neut # 6.88      Lymph # 2.71      Mono # 0.73      Eos # 0.41      Baso # 0.08      Imm Grans # 0.03     LIPASE - Normal    Lipase Level 25     CBC W/ AUTO DIFFERENTIAL    Narrative:     The following orders were created for panel order CBC W/ AUTO DIFFERENTIAL.  Procedure                                Abnormality         Status                     ---------                               -----------         ------                     CBC with Differential[[id number]]       Abnormal            Final result                 Please view results for these tests on the individual orders.          Imaging Results              CT Abdomen Pelvis With IV Contrast Routine Oral Contrast (Final result)  Result time 04/08/25 18:22:20      Final result by John Pérez MD (04/08/25 18:22:20)                   Impression:      No acute findings.      Electronically signed by: John Pérez  Date:    04/08/2025  Time:    18:22               Narrative:    EXAMINATION:  CT ABDOMEN PELVIS WITH IV CONTRAST    CLINICAL HISTORY:  Abdominal abscess/infection suspected;Bowel obstruction suspected;Abdominal pain, acute, nonlocalized;    TECHNIQUE:  Low dose axial images, sagittal and coronal reformations were obtained from the lung bases to the pubic symphysis following the IV administration of 100 mL of Omnipaque 350 and the oral administration of 30 mL of Omnipaque 350.    COMPARISON:  04/04/2025    FINDINGS:  Soft tissues: Unremarkable.    Bones: No acute osseous abnormality.    Lower chest: Normal heart size. No pericardial effusion.    Lung Bases: Well aerated, without consolidation or pleural fluid.    Liver: Fatty liver.    Gallbladder: No calcified gallstones.    Bile Ducts: No evidence of dilated ducts.    Pancreas: No mass or peripancreatic fat stranding.    Spleen: Similar heterogeneous splenic lesion.    Adrenals: Unremarkable.    Kidneys/ Ureters: Unremarkable.    Bladder: No evidence of wall thickening.    Reproductive organs: Hysterectomy.    GI Tract/Mesentery: Postsurgical changes of small-bowel.  No acute findings.    Peritoneal Space: No ascites. No free air.    Lymphadenopathy: No significant adenopathy.    Vasculature: No significant atherosclerosis or aneurysm.                                        Medications   0.9% NaCl infusion (0 mLs Intravenous Stopped 4/8/25 1639)   HYDROmorphone injection 0.5 mg (0.5 mg Intravenous Given 4/8/25 1526)   ondansetron injection 4 mg (4 mg Intravenous Given 4/8/25 1526)   iohexoL (OMNIPAQUE 350) injection 30 mL (30 mLs Oral Given 4/8/25 1733)   iohexoL (OMNIPAQUE 350) injection 100 mL (100 mLs Intravenous Given 4/8/25 1733)     Medical Decision Making  Differential diagnosis:  UTI, electrolyte abnormality, small bowel obstruction, infectious process    44-year-old female with significant past medical history for multiple abdominal surgeries.  Patient presents to the ED from her PCP for concern of possible infection versus small bowel obstruction.  Patient given IV fluids, Dilaudid and Zofran while in the ED with significant improvement of symptoms.  No leukocytosis.  CT scan reviewed by myself that does not demonstrate any acute infectious process or small bowel obstruction.  Patient tolerating p.o..  She understands strict return precautions.  She will follow-up closely with her PCP.  All questions answered prior to discharge home.  Urology consult placed for proteinuria.    Amount and/or Complexity of Data Reviewed  Labs: ordered.  Radiology: ordered.    Risk  Prescription drug management.                                      Clinical Impression:  Final diagnoses:  [R10.12] Left upper quadrant abdominal pain (Primary)  [R10.9] Left flank pain  [R80.9] Proteinuria, unspecified type          ED Disposition Condition    Discharge Stable          ED Prescriptions    None       Follow-up Information       Follow up With Specialties Details Why Contact Shilpi Stoner NP Family Medicine Schedule an appointment as soon as possible for a visit in 2 days  [address] [place] [address]  [phone]      Christiano Terrazas MD Urology   [address] [facility] [address] [phone]                   [1]   Social History  Tobacco Use    Smoking status:  Every Day     Current packs/day: 1.00     Average packs/day: 1 pack/day for 25.0 years (25.0 ttl pk-yrs)     Types: Cigarettes     Start date: 4/17/2000    Smokeless tobacco: Never    Tobacco comments:     sometimes dry cough per patient   Substance Use Topics    Alcohol use: Yes     Alcohol/week: 5.8 standard drinks of alcohol     Types: 7 Standard drinks or equivalent per week     Comment: occasionally    Drug use: No        Atiya Madden MD  04/08/25 0927

## 2025-04-08 NOTE — PROGRESS NOTES
Subjective:       Patient ID: Staci Hodge is a 44 y.o. female.    Chief Complaint: Abdominal Pain (Pt is complaining of left upper quadrant pain x 9 days . Pt states that pain is worse with position changes. Pt states she has been taking ibuprofen with only mild relief. )    43 y/o W F with L sided abd pain 7/10 for for 8-9 days    Abdominal Pain  This is a new problem. The current episode started in the past 7 days. The onset quality is sudden. The problem occurs constantly. The most recent episode lasted 7 days. The problem has been gradually worsening. The pain is located in the LUQ, epigastric region and left flank. The pain is at a severity of 7/10. The pain is moderate. The quality of the pain is aching, burning, cramping, a sensation of fullness and sharp. Associated symptoms include anorexia, belching, diarrhea, frequency, headaches, nausea and vomiting. Pertinent negatives include no arthralgias, constipation, dysuria, fever, flatus, hematochezia, hematuria, melena, myalgias or weight loss. The pain is aggravated by certain positions, coughing and movement. The pain is relieved by Nothing. She has tried acetaminophen and antacids for the symptoms. The treatment provided no relief. Prior diagnostic workup includes CT scan. Her past medical history is significant for abdominal surgery, GERD, pancreatitis, PUD and ulcerative colitis. There is no history of colon cancer, Crohn's disease, gallstones or irritable bowel syndrome. Patient's medical history includes UTI. Patient's medical history does not include kidney stones.     Review of Systems   Constitutional:  Negative for fever and weight loss.   Gastrointestinal:  Positive for abdominal pain, anorexia, diarrhea, nausea and vomiting. Negative for constipation, flatus, hematochezia and melena.        Has been having 5 watery BMs/day for > 1 week   Genitourinary:  Positive for frequency. Negative for dysuria and hematuria.   Musculoskeletal:   Negative for arthralgias and myalgias.   Neurological:  Positive for headaches.       Objective:      Physical Exam  Constitutional:       Appearance: She is well-developed.      Comments: 45 y/o W F with abd pain   HENT:      Head: Normocephalic.   Eyes:      Pupils: Pupils are equal, round, and reactive to light.   Neck:      Thyroid: No thyromegaly.   Cardiovascular:      Rate and Rhythm: Normal rate and regular rhythm.   Pulmonary:      Effort: No respiratory distress.      Breath sounds: No wheezing or rales.   Chest:      Chest wall: No tenderness.   Abdominal:      General: There is no distension.      Tenderness: There is abdominal tenderness. There is rebound. There is no guarding.      Comments: LLQ and L sided abd/ pains, 8/10 with BS++   Musculoskeletal:         General: No tenderness. Normal range of motion.      Cervical back: Normal range of motion and neck supple.   Lymphadenopathy:      Cervical: No cervical adenopathy.   Skin:     General: Skin is warm and dry.      Coloration: Skin is not pale.      Findings: No rash.   Neurological:      Mental Status: She is alert and oriented to person, place, and time.      Cranial Nerves: No cranial nerve deficit.      Motor: No abnormal muscle tone.      Coordination: Coordination normal.      Deep Tendon Reflexes: Reflexes are normal and symmetric. Reflexes normal.   Psychiatric:         Thought Content: Thought content normal.         Judgment: Judgment normal.       Assessment:       Encounter Diagnoses   Name Primary?    Peutz-Jeghers syndrome Yes    Abdominal pain, unspecified abdominal location     Intractable migraine with aura without status migrainosus          Plan:   1. Peutz-Jeghers syndrome    2. Abdominal pain, unspecified abdominal location    3. Intractable migraine with aura without status migrainosus

## 2025-04-09 LAB
BRPFT: ABNORMAL
DLCO SINGLE BREATH LLN: 18.26
DLCO SINGLE BREATH PRE REF: 58.5 %
DLCO SINGLE BREATH REF: 23.99
DLCOC SBVA LLN: 3.54
DLCOC SBVA REF: 5.21
DLCOC SINGLE BREATH LLN: 18.26
DLCOC SINGLE BREATH REF: 23.99
DLCOVA LLN: 3.54
DLCOVA PRE REF: 79.8 %
DLCOVA PRE: 4.16 ML/(MIN*MMHG*L) (ref 3.54–6.89)
DLCOVA REF: 5.21
ERVN2 LLN: -16448.97
ERVN2 PRE REF: 13.1 %
ERVN2 PRE: 0.14 L (ref -16448.97–16451.03)
ERVN2 REF: 1.03
FEF 25 75 CHG: 5.6 %
FEF 25 75 LLN: 2.03
FEF 25 75 POST REF: 95 %
FEF 25 75 PRE REF: 89.9 %
FEF 25 75 REF: 3.43
FET100 CHG: -52.6 %
FEV1 CHG: 0.2 %
FEV1 FVC CHG: 3.5 %
FEV1 FVC LLN: 71
FEV1 FVC POST REF: 109.2 %
FEV1 FVC PRE REF: 105.6 %
FEV1 FVC REF: 82
FEV1 LLN: 2.17
FEV1 POST REF: 84.2 %
FEV1 PRE REF: 84 %
FEV1 REF: 2.74
FRCN2 LLN: 1.75
FRCN2 PRE REF: 32.1 %
FRCN2 REF: 2.57
FVC CHG: -3.2 %
FVC LLN: 2.67
FVC POST REF: 76.7 %
FVC PRE REF: 79.3 %
FVC REF: 3.36
IVC PRE: 2.36 L (ref 2.67–4.08)
IVC SINGLE BREATH LLN: 2.67
IVC SINGLE BREATH PRE REF: 70.3 %
IVC SINGLE BREATH REF: 3.36
MEP LLN: 63
MEP PRE REF: 105.4 %
MEP PRE: 84.35 CMH2O (ref 63.23–96.78)
MEP REF: 80
MIP LLN: 33
MIP PRE REF: 147.4 %
MIP PRE: 73.7 CMH2O (ref 33.23–66.78)
MIP REF: 50
MVV LLN: 87
MVV PRE REF: 76.5 %
MVV REF: 102
PEF CHG: -6.8 %
PEF LLN: 5.01
PEF POST REF: 71.6 %
PEF PRE REF: 76.9 %
PEF REF: 6.62
POST FEF 25 75: 3.25 L/S (ref 2.03–4.82)
POST FET 100: 3.16 SEC
POST FEV1 FVC: 89.4 % (ref 70.84–90.98)
POST FEV1: 2.3 L (ref 2.17–3.29)
POST FVC: 2.58 L (ref 2.67–4.08)
POST PEF: 4.74 L/S (ref 5.01–8.22)
PRE DLCO: 14.03 ML/(MIN*MMHG) (ref 18.26–29.73)
PRE FEF 25 75: 3.08 L/S (ref 2.03–4.82)
PRE FET 100: 6.67 SEC
PRE FEV1 FVC: 86.41 % (ref 70.84–90.98)
PRE FEV1: 2.3 L (ref 2.17–3.29)
PRE FRC N2: 0.83 L (ref 1.75–3.39)
PRE FVC: 2.66 L (ref 2.67–4.08)
PRE MVV: 77.88 L/MIN (ref 86.55–117.09)
PRE PEF: 5.09 L/S (ref 5.01–8.22)
RVN2 LLN: 0.96
RVN2 PRE REF: 44.9 %
RVN2 PRE: 0.69 L (ref 0.96–2.11)
RVN2 REF: 1.54
RVN2TLCN2 LLN: 23.99
RVN2TLCN2 PRE REF: 61.3 %
RVN2TLCN2 PRE: 20.6 % (ref 23.99–43.17)
RVN2TLCN2 REF: 33.58
TLCN2 LLN: 3.62
TLCN2 PRE REF: 72.8 %
TLCN2 PRE: 3.35 L (ref 3.62–5.59)
TLCN2 REF: 4.6
VA PRE: 3.37 L (ref 4.45–4.45)
VA SINGLE BREATH LLN: 4.45
VA SINGLE BREATH PRE REF: 75.7 %
VA SINGLE BREATH REF: 4.45
VCMAXN2 LLN: 2.67
VCMAXN2 PRE REF: 79.3 %
VCMAXN2 PRE: 2.66 L (ref 2.67–4.08)
VCMAXN2 REF: 3.36

## 2025-04-10 NOTE — PROGRESS NOTES
Test results normal  
Test results normal  She has some mild restrictive lung disease and reduction in her DLCO, both of which are caused by obesity. If she is able to lose weight, her SOB should improve. Thanks     
Test results normal but her WBC is elevated a bit. Has she had any steroids recently? Has she been sick at all recently? A cold or virus???   
[Negative] : Heme/Lymph

## 2025-04-11 RX ORDER — SODIUM CHLORIDE 0.9 % (FLUSH) 0.9 %
10 SYRINGE (ML) INJECTION
OUTPATIENT
Start: 2025-04-11

## 2025-04-11 RX ORDER — SODIUM CHLORIDE 9 MG/ML
INJECTION, SOLUTION INTRAVENOUS CONTINUOUS
OUTPATIENT
Start: 2025-04-11

## 2025-04-14 ENCOUNTER — TELEPHONE (OUTPATIENT)
Dept: ENDOSCOPY | Facility: HOSPITAL | Age: 45
End: 2025-04-14
Payer: MEDICAID

## 2025-04-14 ENCOUNTER — ANESTHESIA (OUTPATIENT)
Dept: ENDOSCOPY | Facility: HOSPITAL | Age: 45
End: 2025-04-14
Payer: MEDICAID

## 2025-04-14 ENCOUNTER — HOSPITAL ENCOUNTER (OUTPATIENT)
Facility: HOSPITAL | Age: 45
LOS: 1 days | Discharge: HOME | End: 2025-04-15
Attending: INTERNAL MEDICINE | Admitting: STUDENT IN AN ORGANIZED HEALTH CARE EDUCATION/TRAINING PROGRAM
Payer: MEDICAID

## 2025-04-14 ENCOUNTER — ANESTHESIA EVENT (OUTPATIENT)
Dept: ENDOSCOPY | Facility: HOSPITAL | Age: 45
End: 2025-04-14
Payer: MEDICAID

## 2025-04-14 DIAGNOSIS — K86.2 PANCREAS CYST: Primary | ICD-10-CM

## 2025-04-14 DIAGNOSIS — R07.9 CHEST PAIN: ICD-10-CM

## 2025-04-14 DIAGNOSIS — Q85.89 PEUTZ-JEGHERS POLYPS OF SMALL BOWEL: ICD-10-CM

## 2025-04-14 DIAGNOSIS — R10.9 ABDOMINAL PAIN: Primary | ICD-10-CM

## 2025-04-14 PROBLEM — F17.200 TOBACCO DEPENDENCY: Status: ACTIVE | Noted: 2025-04-14

## 2025-04-14 PROBLEM — G89.18 POSTOPERATIVE GENERALIZED ABDOMINAL PAIN: Status: ACTIVE | Noted: 2025-04-08

## 2025-04-14 PROBLEM — R10.84 POSTOPERATIVE GENERALIZED ABDOMINAL PAIN: Status: ACTIVE | Noted: 2025-04-08

## 2025-04-14 LAB — LACTATE SERPL-SCNC: 0.6 MMOL/L (ref 0.5–2.2)

## 2025-04-14 PROCEDURE — 63600175 PHARM REV CODE 636 W HCPCS: Performed by: ANESTHESIOLOGY

## 2025-04-14 PROCEDURE — 25500020 PHARM REV CODE 255: Performed by: STUDENT IN AN ORGANIZED HEALTH CARE EDUCATION/TRAINING PROGRAM

## 2025-04-14 PROCEDURE — 63600175 PHARM REV CODE 636 W HCPCS: Performed by: INTERNAL MEDICINE

## 2025-04-14 PROCEDURE — 83605 ASSAY OF LACTIC ACID: CPT | Performed by: STUDENT IN AN ORGANIZED HEALTH CARE EDUCATION/TRAINING PROGRAM

## 2025-04-14 PROCEDURE — 25000003 PHARM REV CODE 250: Performed by: STUDENT IN AN ORGANIZED HEALTH CARE EDUCATION/TRAINING PROGRAM

## 2025-04-14 PROCEDURE — 43251 EGD REMOVE LESION SNARE: CPT | Mod: XS,,, | Performed by: INTERNAL MEDICINE

## 2025-04-14 PROCEDURE — 43251 EGD REMOVE LESION SNARE: CPT | Mod: XS | Performed by: INTERNAL MEDICINE

## 2025-04-14 PROCEDURE — 63600175 PHARM REV CODE 636 W HCPCS: Mod: JZ,TB | Performed by: STUDENT IN AN ORGANIZED HEALTH CARE EDUCATION/TRAINING PROGRAM

## 2025-04-14 PROCEDURE — 37000009 HC ANESTHESIA EA ADD 15 MINS: Performed by: INTERNAL MEDICINE

## 2025-04-14 PROCEDURE — G0378 HOSPITAL OBSERVATION PER HR: HCPCS

## 2025-04-14 PROCEDURE — 63600175 PHARM REV CODE 636 W HCPCS: Performed by: STUDENT IN AN ORGANIZED HEALTH CARE EDUCATION/TRAINING PROGRAM

## 2025-04-14 PROCEDURE — 27200997: Performed by: INTERNAL MEDICINE

## 2025-04-14 PROCEDURE — 88305 TISSUE EXAM BY PATHOLOGIST: CPT | Mod: TC | Performed by: INTERNAL MEDICINE

## 2025-04-14 PROCEDURE — 37000008 HC ANESTHESIA 1ST 15 MINUTES: Performed by: INTERNAL MEDICINE

## 2025-04-14 PROCEDURE — 27201089 HC SNARE, DISP (ANY): Performed by: INTERNAL MEDICINE

## 2025-04-14 PROCEDURE — 43254 EGD ENDO MUCOSAL RESECTION: CPT | Mod: ,,, | Performed by: INTERNAL MEDICINE

## 2025-04-14 PROCEDURE — 36415 COLL VENOUS BLD VENIPUNCTURE: CPT | Performed by: STUDENT IN AN ORGANIZED HEALTH CARE EDUCATION/TRAINING PROGRAM

## 2025-04-14 PROCEDURE — 27201028 HC NEEDLE, SCLERO: Performed by: INTERNAL MEDICINE

## 2025-04-14 PROCEDURE — 43254 EGD ENDO MUCOSAL RESECTION: CPT | Performed by: INTERNAL MEDICINE

## 2025-04-14 RX ORDER — LIDOCAINE HYDROCHLORIDE 20 MG/ML
INJECTION INTRAVENOUS
Status: DISCONTINUED | OUTPATIENT
Start: 2025-04-14 | End: 2025-04-14

## 2025-04-14 RX ORDER — CLONAZEPAM 1 MG/1
1 TABLET ORAL 2 TIMES DAILY
Status: DISCONTINUED | OUTPATIENT
Start: 2025-04-14 | End: 2025-04-14

## 2025-04-14 RX ORDER — PROPOFOL 10 MG/ML
VIAL (ML) INTRAVENOUS
Status: DISCONTINUED | OUTPATIENT
Start: 2025-04-14 | End: 2025-04-14

## 2025-04-14 RX ORDER — LISDEXAMFETAMINE DIMESYLATE 70 MG/1
70 CAPSULE ORAL DAILY
Refills: 0 | Status: DISCONTINUED | OUTPATIENT
Start: 2025-04-15 | End: 2025-04-15

## 2025-04-14 RX ORDER — PROPRANOLOL HYDROCHLORIDE 20 MG/1
20 TABLET ORAL 2 TIMES DAILY
Status: DISCONTINUED | OUTPATIENT
Start: 2025-04-14 | End: 2025-04-15 | Stop reason: HOSPADM

## 2025-04-14 RX ORDER — BUTALBITAL, ACETAMINOPHEN AND CAFFEINE 50; 325; 40 MG/1; MG/1; MG/1
1 TABLET ORAL EVERY 6 HOURS PRN
Status: DISCONTINUED | OUTPATIENT
Start: 2025-04-14 | End: 2025-04-15 | Stop reason: HOSPADM

## 2025-04-14 RX ORDER — KETAMINE HCL IN 0.9 % NACL 50 MG/5 ML
SYRINGE (ML) INTRAVENOUS
Status: DISCONTINUED | OUTPATIENT
Start: 2025-04-14 | End: 2025-04-14

## 2025-04-14 RX ORDER — PHENYLEPHRINE HYDROCHLORIDE 10 MG/ML
INJECTION INTRAVENOUS
Status: DISCONTINUED | OUTPATIENT
Start: 2025-04-14 | End: 2025-04-14

## 2025-04-14 RX ORDER — MIDAZOLAM HYDROCHLORIDE 1 MG/ML
INJECTION INTRAMUSCULAR; INTRAVENOUS
Status: DISCONTINUED | OUTPATIENT
Start: 2025-04-14 | End: 2025-04-14

## 2025-04-14 RX ORDER — EPHEDRINE SULFATE 50 MG/ML
INJECTION, SOLUTION INTRAVENOUS
Status: DISCONTINUED | OUTPATIENT
Start: 2025-04-14 | End: 2025-04-14

## 2025-04-14 RX ORDER — HYDROMORPHONE HYDROCHLORIDE 1 MG/ML
1 INJECTION, SOLUTION INTRAMUSCULAR; INTRAVENOUS; SUBCUTANEOUS EVERY 6 HOURS PRN
Status: DISCONTINUED | OUTPATIENT
Start: 2025-04-14 | End: 2025-04-15 | Stop reason: HOSPADM

## 2025-04-14 RX ORDER — SODIUM CHLORIDE 9 MG/ML
INJECTION, SOLUTION INTRAVENOUS CONTINUOUS
Status: DISCONTINUED | OUTPATIENT
Start: 2025-04-14 | End: 2025-04-15 | Stop reason: HOSPADM

## 2025-04-14 RX ORDER — GLUCAGON 1 MG
KIT INJECTION
Status: DISCONTINUED | OUTPATIENT
Start: 2025-04-14 | End: 2025-04-14

## 2025-04-14 RX ORDER — FENTANYL CITRATE 50 UG/ML
25 INJECTION, SOLUTION INTRAMUSCULAR; INTRAVENOUS EVERY 5 MIN PRN
Status: DISCONTINUED | OUTPATIENT
Start: 2025-04-14 | End: 2025-04-14 | Stop reason: HOSPADM

## 2025-04-14 RX ORDER — MONTELUKAST SODIUM 10 MG/1
10 TABLET ORAL NIGHTLY
Status: DISCONTINUED | OUTPATIENT
Start: 2025-04-14 | End: 2025-04-15 | Stop reason: HOSPADM

## 2025-04-14 RX ORDER — IBUPROFEN 200 MG
24 TABLET ORAL
Status: DISCONTINUED | OUTPATIENT
Start: 2025-04-14 | End: 2025-04-15 | Stop reason: HOSPADM

## 2025-04-14 RX ORDER — SODIUM CHLORIDE 0.9 % (FLUSH) 0.9 %
10 SYRINGE (ML) INJECTION
Status: DISCONTINUED | OUTPATIENT
Start: 2025-04-14 | End: 2025-04-14 | Stop reason: HOSPADM

## 2025-04-14 RX ORDER — LORAZEPAM 2 MG/ML
0.25 INJECTION INTRAMUSCULAR ONCE AS NEEDED
Status: COMPLETED | OUTPATIENT
Start: 2025-04-14 | End: 2025-04-14

## 2025-04-14 RX ORDER — SERTRALINE HYDROCHLORIDE 25 MG/1
25 TABLET, FILM COATED ORAL DAILY
Status: DISCONTINUED | OUTPATIENT
Start: 2025-04-14 | End: 2025-04-15 | Stop reason: HOSPADM

## 2025-04-14 RX ORDER — IBUPROFEN 200 MG
1 TABLET ORAL DAILY
Status: DISCONTINUED | OUTPATIENT
Start: 2025-04-15 | End: 2025-04-15 | Stop reason: HOSPADM

## 2025-04-14 RX ORDER — LITHIUM CARBONATE 300 MG/1
300 TABLET, FILM COATED, EXTENDED RELEASE ORAL EVERY MORNING
Status: DISCONTINUED | OUTPATIENT
Start: 2025-04-15 | End: 2025-04-15 | Stop reason: HOSPADM

## 2025-04-14 RX ORDER — SODIUM CHLORIDE, SODIUM LACTATE, POTASSIUM CHLORIDE, CALCIUM CHLORIDE 600; 310; 30; 20 MG/100ML; MG/100ML; MG/100ML; MG/100ML
INJECTION, SOLUTION INTRAVENOUS CONTINUOUS
Status: ACTIVE | OUTPATIENT
Start: 2025-04-14 | End: 2025-04-15

## 2025-04-14 RX ORDER — SODIUM CHLORIDE 0.9 % (FLUSH) 0.9 %
10 SYRINGE (ML) INJECTION EVERY 12 HOURS PRN
Status: DISCONTINUED | OUTPATIENT
Start: 2025-04-14 | End: 2025-04-15 | Stop reason: HOSPADM

## 2025-04-14 RX ORDER — NALOXONE HCL 0.4 MG/ML
0.02 VIAL (ML) INJECTION
Status: DISCONTINUED | OUTPATIENT
Start: 2025-04-14 | End: 2025-04-15 | Stop reason: HOSPADM

## 2025-04-14 RX ORDER — HYDROMORPHONE HYDROCHLORIDE 1 MG/ML
0.5 INJECTION, SOLUTION INTRAMUSCULAR; INTRAVENOUS; SUBCUTANEOUS ONCE
Status: COMPLETED | OUTPATIENT
Start: 2025-04-14 | End: 2025-04-14

## 2025-04-14 RX ORDER — EPINEPHRINE 0.1 MG/ML
INJECTION INTRAVENOUS
Status: DISCONTINUED | OUTPATIENT
Start: 2025-04-14 | End: 2025-04-14 | Stop reason: HOSPADM

## 2025-04-14 RX ORDER — ERGOCALCIFEROL 1.25 MG/1
50000 CAPSULE ORAL
Status: DISCONTINUED | OUTPATIENT
Start: 2025-04-15 | End: 2025-04-15 | Stop reason: HOSPADM

## 2025-04-14 RX ORDER — HALOPERIDOL LACTATE 5 MG/ML
0.5 INJECTION, SOLUTION INTRAMUSCULAR EVERY 10 MIN PRN
Status: DISCONTINUED | OUTPATIENT
Start: 2025-04-14 | End: 2025-04-14 | Stop reason: HOSPADM

## 2025-04-14 RX ORDER — GLUCAGON 1 MG
1 KIT INJECTION
Status: DISCONTINUED | OUTPATIENT
Start: 2025-04-14 | End: 2025-04-14 | Stop reason: HOSPADM

## 2025-04-14 RX ORDER — FOLIC ACID 1 MG/1
1000 TABLET ORAL DAILY
Status: DISCONTINUED | OUTPATIENT
Start: 2025-04-15 | End: 2025-04-15 | Stop reason: HOSPADM

## 2025-04-14 RX ORDER — ONDANSETRON HYDROCHLORIDE 2 MG/ML
4 INJECTION, SOLUTION INTRAVENOUS EVERY 8 HOURS PRN
Status: DISCONTINUED | OUTPATIENT
Start: 2025-04-14 | End: 2025-04-15 | Stop reason: HOSPADM

## 2025-04-14 RX ORDER — PANTOPRAZOLE SODIUM 40 MG/1
40 TABLET, DELAYED RELEASE ORAL DAILY
Status: DISCONTINUED | OUTPATIENT
Start: 2025-04-15 | End: 2025-04-15 | Stop reason: HOSPADM

## 2025-04-14 RX ORDER — GLUCAGON 1 MG
1 KIT INJECTION
Status: DISCONTINUED | OUTPATIENT
Start: 2025-04-14 | End: 2025-04-15 | Stop reason: HOSPADM

## 2025-04-14 RX ORDER — CLONAZEPAM 1 MG/1
1 TABLET ORAL 2 TIMES DAILY
Status: DISCONTINUED | OUTPATIENT
Start: 2025-04-14 | End: 2025-04-15 | Stop reason: HOSPADM

## 2025-04-14 RX ORDER — IBUPROFEN 200 MG
16 TABLET ORAL
Status: DISCONTINUED | OUTPATIENT
Start: 2025-04-14 | End: 2025-04-15 | Stop reason: HOSPADM

## 2025-04-14 RX ADMIN — PHENYLEPHRINE HYDROCHLORIDE 200 MCG: 10 INJECTION INTRAVENOUS at 08:04

## 2025-04-14 RX ADMIN — Medication 10 MG: at 08:04

## 2025-04-14 RX ADMIN — CLONAZEPAM 1 MG: 1 TABLET ORAL at 06:04

## 2025-04-14 RX ADMIN — MIDAZOLAM HYDROCHLORIDE 2 MG: 2 INJECTION, SOLUTION INTRAMUSCULAR; INTRAVENOUS at 08:04

## 2025-04-14 RX ADMIN — MONTELUKAST 10 MG: 10 TABLET, FILM COATED ORAL at 08:04

## 2025-04-14 RX ADMIN — PROPRANOLOL HYDROCHLORIDE 20 MG: 20 TABLET ORAL at 08:04

## 2025-04-14 RX ADMIN — GLUCAGON 0.5 MG: 1 INJECTION, POWDER, LYOPHILIZED, FOR SOLUTION INTRAMUSCULAR; INTRAVENOUS at 08:04

## 2025-04-14 RX ADMIN — LORAZEPAM 0.25 MG: 2 INJECTION INTRAMUSCULAR; INTRAVENOUS at 10:04

## 2025-04-14 RX ADMIN — PHENYLEPHRINE HYDROCHLORIDE 150 MCG: 10 INJECTION INTRAVENOUS at 08:04

## 2025-04-14 RX ADMIN — FENTANYL CITRATE 25 MCG: 50 INJECTION, SOLUTION INTRAMUSCULAR; INTRAVENOUS at 09:04

## 2025-04-14 RX ADMIN — EPHEDRINE SULFATE 10 MG: 50 INJECTION INTRAVENOUS at 08:04

## 2025-04-14 RX ADMIN — LIDOCAINE HYDROCHLORIDE 100 MG: 20 INJECTION INTRAVENOUS at 08:04

## 2025-04-14 RX ADMIN — IOHEXOL 100 ML: 350 INJECTION, SOLUTION INTRAVENOUS at 11:04

## 2025-04-14 RX ADMIN — BENZOCAINE 1 CAN: 200 SPRAY DENTAL; ORAL; PERIODONTAL at 08:04

## 2025-04-14 RX ADMIN — PROPOFOL 200 MCG/KG/MIN: 10 INJECTION, EMULSION INTRAVENOUS at 08:04

## 2025-04-14 RX ADMIN — HYDROMORPHONE HYDROCHLORIDE 0.5 MG: 0.5 INJECTION, SOLUTION INTRAMUSCULAR; INTRAVENOUS; SUBCUTANEOUS at 05:04

## 2025-04-14 RX ADMIN — SERTRALINE HYDROCHLORIDE 25 MG: 25 TABLET ORAL at 04:04

## 2025-04-14 RX ADMIN — HALOPERIDOL LACTATE 0.5 MG: 5 INJECTION, SOLUTION INTRAMUSCULAR at 09:04

## 2025-04-14 RX ADMIN — GLYCOPYRROLATE 0.2 MG: 0.2 INJECTION, SOLUTION INTRAMUSCULAR; INTRAVENOUS at 08:04

## 2025-04-14 RX ADMIN — PROPOFOL 50 MG: 10 INJECTION, EMULSION INTRAVENOUS at 08:04

## 2025-04-14 RX ADMIN — SODIUM CHLORIDE, POTASSIUM CHLORIDE, SODIUM LACTATE AND CALCIUM CHLORIDE: 600; 310; 30; 20 INJECTION, SOLUTION INTRAVENOUS at 05:04

## 2025-04-14 RX ADMIN — Medication 10 MG: at 09:04

## 2025-04-14 RX ADMIN — SODIUM CHLORIDE: 9 INJECTION, SOLUTION INTRAVENOUS at 08:04

## 2025-04-14 RX ADMIN — FENTANYL CITRATE 25 MCG: 50 INJECTION, SOLUTION INTRAMUSCULAR; INTRAVENOUS at 10:04

## 2025-04-14 NOTE — ASSESSMENT & PLAN NOTE
- multiple interventions with Gastroenterology associated with this disorder  - s/p polyp EMR as above  - will continue to follow with GI

## 2025-04-14 NOTE — ANESTHESIA POSTPROCEDURE EVALUATION
Anesthesia Post Evaluation    Patient: Staci Hodge    Procedure(s) Performed: Procedure(s) (LRB):  EGD, WITH ENDOSCOPIC MUCOSAL RESECTION (N/A)    Final Anesthesia Type: general      Patient location during evaluation: PACU  Patient participation: Yes- Able to Participate  Level of consciousness: awake and alert  Post-procedure vital signs: reviewed and stable  Pain management: adequate  Airway patency: patent    PONV status at discharge: No PONV  Anesthetic complications: no      Cardiovascular status: blood pressure returned to baseline and hemodynamically stable  Respiratory status: unassisted  Hydration status: euvolemic  Follow-up not needed.              Vitals Value Taken Time   /80 04/14/25 10:47   Temp 36.5 °C (97.7 °F) 04/14/25 09:15   Pulse 71 04/14/25 10:47   Resp 36 04/14/25 10:47   SpO2 99 % 04/14/25 10:47   Vitals shown include unfiled device data.      No case tracking events are documented in the log.      Pain/Bran Score: Pain Rating Prior to Med Admin: 8 (4/14/2025 10:09 AM)  Bran Score: 9 (4/14/2025  9:36 AM)

## 2025-04-14 NOTE — ASSESSMENT & PLAN NOTE
- - on once monthly injection which she received one week ago  - Fioricet PRN outpatient  - continue Fioricet as needed with attention to concurrent benzo and barbiturate administration as she is on scheduled Klonopin

## 2025-04-14 NOTE — TREATMENT PLAN
"[facility] Treatment Plan    Staci Hodge is a 44 y.o. female with pmhx Peutz-Jeghers syndrome admitted to hospital 4/14/2025 (Hospital Day: 1) due to Postoperative generalized abdominal pain. Patient had EMR today 4/14 with Dr. Carrillo for resection of two larger duodenal polyps measuring 12 mm and 18-20 mm. There were no apparent complications during the procedure, however patient in PACU patient began experiencing significant upper abdominal pain. Abdomen is soft. She is being admitted at least overnight for observation and management of her symptoms. Labs pending. VSS. Afebrile     Interval History  Patient uncomfortable on exam with significant upper abdominal pain. Denies NV. VSS. Afebrile. Upper abdominal tenderness on exam. Abdomen is soft and nondistended.    Objective  Temp:  [97.5 °F (36.4 °C)-98.2 °F (36.8 °C)] 97.5 °F (36.4 °C) (04/14 1528)  Pulse:  [65-91] 91 (04/14 1528)  BP: ()/(61-89) 97/61 (04/14 1528)  Resp:  [16-67] 18 (04/14 1528)  SpO2:  [94 %-100 %] 100 % (04/14 1528)    General: Alert, Oriented x3, no distress  Abdomen: Soft. Non-distended. Tender to palpation upper abdomen . No rebound or guarding.    Laboratory    No results for input(s): "WBC", "RBC", "HGB", "HCT", "PLT", "MCV", "MCH", "MCHC" in the last 24 hours.   No results for input(s): "GLUCOSE", "CALCIUM", "ALBUMIN", "PROT", "NA", "K", "CO2", "CL", "BUN", "CREATININE", "ALKPHOS", "ALT", "AST", "BILITOT" in the last 24 hours.       Assessment and Plan  -Recommend bowel rest, IV fluids, pain control, empiric IV antibiotics   -Can consider cross sectional imaging with CT abd/pel if pains persist.   - We will continue to follow.    Thank you for involving us in the care of Staci Hodge. Please call with any additional questions, concerns or changes in the patient's clinical status.    [facility] Nava[facility]  "

## 2025-04-14 NOTE — ASSESSMENT & PLAN NOTE
- patient with onset of anterior abdominal/epigastric pain in recovery suite s/p EMR of two sizeable duodenal polyps associated with Peutz-Jeghers Syndrome  - per GI, patient has had intussusception in past associated with polyps but location of current pain inconsistent with location of polyps  - will provide bowel rest, IVF, obtain CT abd/pelvis for evaluation, PRN anti-emetics, lactic acid  - hemodynamically stable; will start Zosyn in event patient becomes toxic or hemodynamically unstable

## 2025-04-14 NOTE — PLAN OF CARE
Pt resting comfortably in bed, rates pain as a 4/10 and states it is tolerable. Report called to Preeti TEAGUE on poss no questions or concerns stated. All belongings obtained and with  @ bedside.  and pt updated on POC, pt prepared for transport now.

## 2025-04-14 NOTE — TRANSFER OF CARE
"Anesthesia Transfer of Care Note    Patient: Staci Hodge    Procedure(s) Performed: Procedure(s) (LRB):  EGD, WITH ENDOSCOPIC MUCOSAL RESECTION (N/A)    Patient location: [facility]    Anesthesia Type: general    Transport from OR: Transported from OR on room air with adequate spontaneous ventilation    Post pain: adequate analgesia    Post assessment: no apparent anesthetic complications and tolerated procedure well    Post vital signs: stable    Level of consciousness: awake and alert    Nausea/Vomiting: no nausea/vomiting    Complications: none    Transfer of care protocol was followed      Last vitals: Visit Vitals  /67 (Patient Position: Lying)   Pulse 93   Temp 36.8 °C (98.2 °F) (Temporal)   Resp 16   Ht 5' 2" (1.575 m)   Wt 92.5 kg (204 lb)   LMP 03/01/2021   SpO2 99%   Breastfeeding No   BMI 37.31 kg/m²     "

## 2025-04-14 NOTE — PLAN OF CARE
"Pt restless and crying, states she has abd discomfort 5/10. Pt states "I feel funny and never wakes up like this" Vitals WNL, abd soft to touch and no tenderness or guarding noted on palpitation. MD Dove made aware, meds ordered for restlessness and discomfort.   "

## 2025-04-14 NOTE — H&P
Short Stay Endoscopy History and Physical    PCP - Shilpi Clayton NP  Referring Physician - Mandy Dejesus LPN  No address on file    Procedure - EGD  ASA - per anesthesia  Mallampati - per anesthesia  History of Anesthesia problems - per anesthesia  Family history Anesthesia problems -  per anesthesia   Plan of anesthesia - per anesthesia    HPI:  This is a 44 y.o. female here for evaluation of: EGD possible EMR for known Peutz-Jehgers syndrome/polyposis.    Reflux - no  Dysphagia - no  Abdominal pain - no  Diarrhea - no    ROS:  Constitutional: No fevers, chills, No weight loss  CV: No chest pain  Pulm: No cough, No shortness of breath  Ophtho: No vision changes  GI: see HPI  Derm: No rash    Medical History:  has a past medical history of Abnormal Pap smear of cervix (age 25), Anxiety, Breast cancer, Cancer (07/11/2018), Cervical spondylosis (2/9/2018), Colon polyp, Depression, Ectopic pregnancy (2/26/2019), General anesthetics causing adverse effect in therapeutic use, Headache, History of psychiatric hospitalization, psychiatric care, Hypertension, Peutz-Jeghers syndrome, PONV (postoperative nausea and vomiting), Psychiatric problem, Right carpal tunnel syndrome (3/9/2017), Self-harming behavior, Suicide attempt, and Therapy.    Surgical History:  has a past surgical history that includes Appendectomy; Adenoidectomy; Tonsillectomy; intestinal polpy; Colonoscopy; Upper gastrointestinal endoscopy; Small intestine surgery; Bowel resection (10/17/2014); Carpal tunnel release (Right, 2016); Dilation and curettage of uterus; Breast biopsy (Left, 06/04/2018); Bilateral mastectomy (Bilateral, 07/23/2018); Injection for sentinel node identification (Left, 07/23/2018); [place] lymph node biopsy (Left, 07/23/2018); Insertion of breast tissue expander (Bilateral, 07/23/2018); Fat transfer (Bilateral, 11/12/2018); Breast surgery; Dilation and curettage of uterus using suction (N/A, 02/25/2019); Breast  reconstruction; Mastectomy; Augmentation of breast; Breast capsulotomy (Left, 04/25/2019); Removal of breast implant (Bilateral, 03/10/2020); Insertion of breast tissue expander (Bilateral, 03/10/2020); Removal of breast implant (Bilateral, 07/07/2020); Esophagogastroduodenoscopy (N/A, 01/15/2021); Endoscopic ultrasound of upper gastrointestinal tract (Left, 01/15/2021); Colonoscopy (N/A, 02/25/2021); Total abdominal hysterectomy (N/A, 03/04/2021); Bilateral salpingo-oophorectomy (BSO) (Bilateral, 03/04/2021); Lysis of adhesions (N/A, 03/04/2021); Antegrade single balloon enteroscopy (N/A, 04/19/2021); Esophagogastroduodenoscopy (N/A, 05/17/2021); Esophagogastroduodenoscopy (N/A, 01/25/2022); Esophagogastroduodenoscopy (N/A, 03/10/2023); Esophagogastroduodenoscopy (N/A, 03/22/2024); Endoscopic ultrasound of upper gastrointestinal tract (N/A, 04/26/2024); Colonoscopy (N/A, 07/08/2024); Carpal tunnel release (Left, 11/27/2024); and Injection of anesthetic agent around medial branch nerves innervating cervical facet joint (Left, 12/16/2024).    Family History: family history includes ADD / ADHD in her paternal uncle; Breast cancer in her paternal aunt and paternal grandmother; Cancer in her mother; Colon polyps in her sister; Dementia in her maternal grandfather and maternal grandmother; Depression in her paternal uncle; Hypertension in her father; No Known Problems in her cousin, maternal uncle, and paternal grandfather; Stomach cancer in her mother..    Social History:  reports that she has been smoking cigarettes. She started smoking about 25 years ago. She has a 25 pack-year smoking history. She has never used smokeless tobacco. She reports current alcohol use of about 5.8 standard drinks of alcohol per week. She reports that she does not use drugs.    Review of patient's allergies indicates:  No Known Allergies    Medications:   Prescriptions Prior to Admission[1]    Physical Exam:    Vital Signs:   Vitals:     04/14/25 0727   BP: 113/73   Pulse: 89   Resp: 16   Temp: 98.2 °F (36.8 °C)       General Appearance: Well appearing in no acute distress    Labs:  Lab Results   Component Value Date    WBC 10.84 04/08/2025    HGB 12.8 04/08/2025    HCT 38.0 04/08/2025     04/08/2025    CHOL 227 (H) 10/09/2023    TRIG 187 (H) 10/09/2023    HDL 56 10/09/2023    ALT 47 (H) 04/08/2025    AST 31 04/08/2025     04/08/2025    K 3.9 04/08/2025     04/08/2025    CREATININE 0.8 04/08/2025    BUN 7 04/08/2025    CO2 24 04/08/2025    TSH 0.807 03/18/2025    INR 1.0 08/31/2022    HGBA1C 5.3 10/09/2023       I have explained the risks and benefits of this endoscopic procedure to the patient including but not limited to bleeding, inflammation, infection, perforation, missing a lesion and death.      Henny Carrillo MD         [1]   Medications Prior to Admission   Medication Sig Dispense Refill Last Dose/Taking    CAPLYTA 42 mg Cap 1 capsule.   4/13/2025    clonazePAM (KLONOPIN) 1 MG tablet Take 1 mg by mouth 2 (two) times daily.   4/13/2025    folic acid (FOLVITE) 1 MG tablet TAKE ONE TABLET BY MOUTH ONCE A DAY 30 tablet 5 4/13/2025    lisdexamfetamine (VYVANSE) 70 MG capsule Take 70 mg by mouth.   4/13/2025    lithium (LITHOBID) 300 MG CR tablet Take 300 mg by mouth every morning.   4/13/2025    montelukast (SINGULAIR) 10 mg tablet TAKE ONE TABLET BY MOUTH ONCE A DAY IN THE EVENING 30 tablet 11 4/13/2025    pantoprazole (PROTONIX) 40 MG tablet Take 1 tablet (40 mg total) by mouth once daily. 90 tablet 3 4/13/2025    propranoloL (INDERAL) 20 MG tablet Take 1 tablet (20 mg total) by mouth 2 (two) times daily. 60 tablet 1 4/14/2025 Morning    sertraline (ZOLOFT) 25 MG tablet Take 25 mg by mouth.   4/13/2025    butalbital-acetaminophen-caffeine -40 mg (FIORICET, ESGIC) -40 mg per tablet TAKE ONE TABLET BY MOUTH EVERY 6 HOURS AS NEEDED FOR HEADACHE 30 tablet 5     ergocalciferol (ERGOCALCIFEROL) 50,000 unit Cap TAKE ONE  CAPSULE BY MOUTH EVERY 7 DAYS 12 capsule 1     fluticasone propionate (FLONASE) 50 mcg/actuation nasal spray 2 sprays (100 mcg total) by Each Nostril route once daily. 16 g 2     galcanezumab-gnlm (EMGALITY PEN) 120 mg/mL PnIj Inject one syringe every 28 days for migraine prevention. 1 mL 11     sucralfate (CARAFATE) 1 gram tablet Take 1 tablet (1 g total) by mouth 2 (two) times daily. 60 tablet 0     traZODone (DESYREL) 150 MG tablet Take 1 tablet (150 mg total) by mouth every evening. 30 tablet 1

## 2025-04-14 NOTE — HPI
Patient is a 44F with a past medical history of Peutz-Jeghers Syndrome, Colonic polyp, multiple Gastrointestinal procedures, Breast cancer with mastectomy and revision, Ectopic pregnancy (2019), Vit D deficiency, multiple psychiatric disorders (Bipolar Disorder, Generalized Anxiety, Self-harm, Suicide Attempt) being placed in observation for generalized post-abdominal pain s/p endoscopic mucosal resection of two duodenal polyps.  Upon discussion with GI, it is difficult to ascertain cause.  Patient has had intussusception in past from polyps associated with Peutz-Jehgers polyps but location of pain is mainly in anterior upper abdomen/epigastric region and different region than duodenal polyps.    Symptoms are accompanied by mild nausea but no emesis and vital signs remain stable.  It would be prudent to place in observation under Hospital Medicine for bowel rest, IVF administration, pain control, and low threshold for administration of antibiotics and STAT abdominal imaging.      Patient information was obtained from patient and Gastroenterology.

## 2025-04-14 NOTE — PLAN OF CARE
Made MD soto aware of pt's abd discomfort, MD at bedside now to evaluate pt. MD verbalized pt would be admitted at this time for observation. Pending admit order from hospital med team.

## 2025-04-14 NOTE — [employer] PATIENT INSTRUCTIONS
Discharge Summary/Instructions after an Endoscopic Procedure  Patient Name: Staci Hodge  Patient MRN: [id number]  Patient YOB: 1980 Monday, April 14, 2025  Henny Carrillo MD  Dear patient,  As a result of recent federal legislation (The Federal Cures Act), you may   receive lab or pathology results from your procedure in your [facility]   account before your physician is able to contact you. Your physician or   their representative will relay the results to you with their   recommendations at their soonest availability.  Thank you,  RESTRICTIONS:  During your procedure today, you received medications for sedation.  These   medications may affect your judgment, balance and coordination.  Therefore,   for 24 hours, you have the following restrictions:   - DO NOT drive a car, operate machinery, make legal/financial decisions,   sign important papers or drink alcohol.    ACTIVITY:  Today: no heavy lifting, straining or running due to procedural   sedation/anesthesia.  The following day: return to full activity including work.  DIET:  Eat and drink normally unless instructed otherwise.     TREATMENT FOR COMMON SIDE EFFECTS:  - Mild abdominal pain, nausea, belching, bloating or excessive gas:  rest,   eat lightly and use a heating pad.  - Sore Throat: treat with throat lozenges and/or gargle with warm salt   water.  - Because air was used during the procedure, expelling large amounts of air   from your rectum or belching is normal.  - If a bowel prep was taken, you may not have a bowel movement for 1-3 days.    This is normal.  SYMPTOMS TO WATCH FOR AND REPORT TO YOUR PHYSICIAN:  1. Abdominal pain or bloating, other than gas cramps.  2. Chest pain.  3. Back pain.  4. Signs of infection such as: chills or fever occurring within 24 hours   after the procedure.  5. Rectal bleeding, which would show as bright red, maroon, or black stools.   (A tablespoon of blood from the rectum is not serious, especially if    hemorrhoids are present.)  6. Vomiting.  7. Weakness or dizziness.  GO DIRECTLY TO THE NEAREST EMERGENCY ROOM IF YOU HAVE ANY OF THE FOLLOWING:      Difficulty breathing              Chills and/or fever over 101 F   Persistent vomiting and/or vomiting blood   Severe abdominal pain   Severe chest pain   Black, tarry stools   Bleeding- more than one tablespoon   Any other symptom or condition that you feel may need urgent attention  Your doctor recommends these additional instructions:  If any biopsies were taken, your doctors clinic will contact you in 1 to 2   weeks with any results.  - Work with PCP to achieve and maintain smoking cessation.  - Continue with MRCP alternating with EUS for pancreas cancer screening in   setting of PJS. Next is MRCP this month followed by EUS in April 2026.  - Repeat upper endoscopy in 6-9 months for retreatment and for surveillance   with probable EMR/resection of further medium-larger polyps if   seen/located.   - Follow up in [facility] clinic annually to check in/review - next visit (can be   virtual) can be in about 9 months from now.  - Next colonoscopy with Dr. Herbert seems to be due in 2027 per last procedure   note.  - Patient has a contact number available for emergencies.  The signs and   symptoms of potential delayed complications were discussed with the   patient.  Return to normal activities tomorrow.  Written discharge   instructions were provided to the patient.   - Discharge patient to home (ambulatory).   - Resume previous diet.   - Continue present medications.   - Await pathology results.   - Return to referring physician.  For questions, problems or results please call your physician - Henny Carrillo MD at Work:  [phone].  [facility] [place], EMERGENCY ROOM PHONE NUMBER: [phone]  IF A COMPLICATION OR EMERGENCY SITUATION ARISES AND YOU ARE UNABLE TO REACH   YOUR PHYSICIAN - GO DIRECTLY TO THE EMERGENCY ROOM.  Henny Carrillo MD  4/14/2025 9:30:21 AM  This  report has been verified and signed electronically.  Dear patient,  As a result of recent federal legislation (The Federal Cures Act), you may   receive lab or pathology results from your procedure in your [facility]   account before your physician is able to contact you. Your physician or   their representative will relay the results to you with their   recommendations at their soonest availability.  Thank you,  [employer]

## 2025-04-14 NOTE — ANESTHESIA PREPROCEDURE EVALUATION
04/14/2025  Pre-operative evaluation for Procedure(s) (LRB):  EGD, WITH ENDOSCOPIC MUCOSAL RESECTION (N/A)    Staci Hodge is a 44 y.o. female     Past Medical History:   Diagnosis Date    Abnormal Pap smear of cervix age 25    no treatement    Anxiety     Breast cancer     Cancer 07/11/2018    breast cancer    Cervical spondylosis 2/9/2018    Colon polyp     Depression     Ectopic pregnancy 2/26/2019    General anesthetics causing adverse effect in therapeutic use     Slow to awaken/ Memory problems-post-op to day 3 after Mastectomy    Headache     History of psychiatric hospitalization     age 20 for SI    Hx of psychiatric care     Hypertension     Peutz-Jeghers syndrome     PONV (postoperative nausea and vomiting)     Psychiatric problem     Right carpal tunnel syndrome 3/9/2017    Self-harming behavior     Suicide attempt     Therapy      Problem List[1]  Review of patient's allergies indicates:  No Known Allergies    Medications Ordered Prior to Encounter[2]    Past Surgical History:   Procedure Laterality Date    ADENOIDECTOMY      ANTEGRADE SINGLE BALLOON ENTEROSCOPY N/A 04/19/2021    Procedure: ENTEROSCOPY, SINGLE BALLOON, ANTEGRADE;  Surgeon: Vincenzo Herbert MD;  Location: [facility] (2ND FLR);  Service: Endoscopy;  Laterality: N/A;  removal of large polyp in proximal jejunum; please schedule during a time when Dr. Dillon is available in case I need assistance with removal or 2nd opinion prior to removal    COVID at [place] 4/16 - ttr    APPENDECTOMY      AUGMENTATION OF BREAST      BILATERAL MASTECTOMY Bilateral 07/23/2018    Procedure: MASTECTOMY 23 hr stay;  Surgeon: Sofia Kendrick MD;  Location: [facility] OR [address] [place] [address];  Service: General;  Laterality: Bilateral;    BILATERAL SALPINGO-OOPHORECTOMY (BSO) Bilateral 03/04/2021    Procedure: SALPINGO-OOPHORECTOMY, BILATERAL;  Surgeon: Clayton SOUZA  MD Mame;  [facility];  Service: OB/GYN;  Laterality: Bilateral;    BOWEL RESECTION  10/17/2014    BREAST BIOPSY Left 06/04/2018    BREAST CAPSULOTOMY Left 04/25/2019    Procedure: CAPSULOTOMY, BREAST LEFT;  Surgeon: Duane Bello MD;  Location: [facility] 2ND FLR;  Service: Plastics;  Laterality: Left;    BREAST RECONSTRUCTION      BREAST SURGERY      Bilateral Mastectomy 07/23/2018    CARPAL TUNNEL RELEASE Right 2016    CARPAL TUNNEL RELEASE Left 11/27/2024    Procedure: RELEASE, CARPAL TUNNEL;  Surgeon: Daniel Bosch Jr., MD;  Location: [facility];  Service: Orthopedics;  Laterality: Left;    COLONOSCOPY      COLONOSCOPY N/A 02/25/2021    Procedure: COLONOSCOPY;  Surgeon: Vincenzo Herbert MD;  Location: [facility] (2ND FLR);  Service: Endoscopy;  Laterality: N/A;  previous anesthesia complications  okay for this date/time [facility]  COVID test on 2/22/21 at [facility]    COLONOSCOPY N/A 07/08/2024    Procedure: COLONOSCOPY;  Surgeon: Vincenzo Herbert MD;  Location: [facility] (2ND FLR);  Service: Endoscopy;  Laterality: N/A;  Ref By:parvin Cruz sent via [employer]  6/27-pre call complete-tb-2nd floor for availability-, Peutz-Jaegar syndrome    DILATION AND CURETTAGE OF UTERUS      DILATION AND CURETTAGE OF UTERUS USING SUCTION N/A 02/25/2019    Procedure: DILATION AND CURETTAGE, UTERUS, USING SUCTION  PATHOLOGY NEEDED;  Surgeon: Pam Sifuentes MD;  [facility];  Service: OB/GYN;  Laterality: N/A;    ENDOSCOPIC ULTRASOUND OF UPPER GASTROINTESTINAL TRACT Left 01/15/2021    Procedure: ULTRASOUND, UPPER GI TRACT, ENDOSCOPIC;  Surgeon: Chandan Dillon MD;  Location: [facility];  Service: Endoscopy;  Laterality: Left;  for pancreatic screening    ENDOSCOPIC ULTRASOUND OF UPPER GASTROINTESTINAL TRACT N/A 04/26/2024    Procedure: ULTRASOUND, UPPER GI TRACT, ENDOSCOPIC;  Surgeon: Chandan Dillon MD;  Location: [facility];  Service: Endoscopy;   Laterality: N/A;  4/8/24: instuctions sent via portal-GD  4/19-precall complete-MS    ESOPHAGOGASTRODUODENOSCOPY N/A 01/15/2021    Procedure: EGD (ESOPHAGOGASTRODUODENOSCOPY);  Surgeon: Chandan Dillon MD;  Location: [facility] ENDO;  Service: Endoscopy;  Laterality: N/A;  with push enteroscopy    ESOPHAGOGASTRODUODENOSCOPY N/A 05/17/2021    Procedure: EGD (ESOPHAGOGASTRODUODENOSCOPY);  Surgeon: Chandan Dillon MD;  Location: [facility] ENDO (2ND FLR);  Service: Endoscopy;  Laterality: N/A;  Please schedule this duodenal polypectomy. Need to be a day that Dr Jose Antonio Stark is in the OR. 90 minutes.   Chandan Dillon MD     COVID at [place] 5/15 (scheduled in the OR for 5/18 - using same COVID results for both procedures) ttr    ESOPHAGOGASTRODUODENOSCOPY N/A 01/25/2022    Procedure: EGD (ESOPHAGOGASTRODUODENOSCOPY);  Surgeon: Chandan Dillon MD;  Location: [facility] (2ND FLR);  Service: Endoscopy;  Laterality: N/A;  poss emr  covid-1/22/22-STAH-BB  instructions sent via portal-BB    ESOPHAGOGASTRODUODENOSCOPY N/A 03/10/2023    Procedure: EGD (ESOPHAGOGASTRODUODENOSCOPY);  Surgeon: Chandan Dillon MD;  Location: [facility] ENDO (2ND FLR);  Service: Endoscopy;  Laterality: N/A;  inst via portal  called to confirm and msg that phone not accepting calls 3/6 mal    ESOPHAGOGASTRODUODENOSCOPY N/A 03/22/2024    Procedure: EGD (ESOPHAGOGASTRODUODENOSCOPY);  Surgeon: Chandan Dillon MD;  Location: [facility] ENDO (2ND FLR);  Service: Endoscopy;  Laterality: N/A;  EGD in 1 year. wayne melendrez portal-tb  3/19-precall complete-MS    FAT TRANSFER Bilateral 11/12/2018    Procedure: TRANSFER, FAT TISSUE BILATERAL BREAST;  Surgeon: Duane Bello MD;  Location: [facility] OR 2ND FLR;  Service: Plastics;  Laterality: Bilateral;    INJECTION FOR SENTINEL NODE IDENTIFICATION Left 07/23/2018    Procedure: INJECTION, FOR SENTINEL NODE IDENTIFICATION;  Surgeon: Sofia Kendrick MD;  Location: [facility] OR 2ND [facility];  Service: General;  Laterality:  "Left;    INJECTION OF ANESTHETIC AGENT AROUND MEDIAL BRANCH NERVES INNERVATING CERVICAL FACET JOINT Left 12/16/2024    Procedure: CERVICAL MEDIAL BRANCH NERVE BLOCK (C2,3,4,TON);  Surgeon: Shahid Almonte MD;  Location: [facility];  Service: Pain Management;  Laterality: Left;    INSERTION OF BREAST TISSUE EXPANDER Bilateral 07/23/2018    Procedure: INSERTION, TISSUE EXPANDER, BREAST;  Surgeon: Duane Bello MD;  [facility];  Service: Plastics;  Laterality: Bilateral;    INSERTION OF BREAST TISSUE EXPANDER Bilateral 03/10/2020    Procedure: INSERTION, TISSUE EXPANDER, BREAST, BILATERAL;  Surgeon: Duane Bello MD;  [facility];  Service: Plastics;  Laterality: Bilateral;    intestinal polpy      LYSIS OF ADHESIONS N/A 03/04/2021    Procedure: LYSIS, ADHESIONS;  Surgeon: Clayton Vilchis MD;  Location: [facility];  Service: OB/GYN;  Laterality: N/A;  Omental adhesions    MASTECTOMY      REMOVAL OF BREAST IMPLANT Bilateral 03/10/2020    Procedure: REMOVAL, IMPLANT, BREAST, BILATERAL;  Surgeon: Duane Bello MD;  [facility];  Service: Plastics;  Laterality: Bilateral;    REMOVAL OF BREAST IMPLANT Bilateral 07/07/2020    Procedure: REMOVAL, IMPLANT, BREAST BILATERAL;  Surgeon: Duane Bello MD;  [facility];  Service: Plastics;  Laterality: Bilateral;    SENTINEL LYMPH NODE BIOPSY Left 07/23/2018    Procedure: BIOPSY, LYMPH NODE, SENTINEL;  Surgeon: Sofia Kendrick MD;  [facility];  Service: General;  Laterality: Left;    SMALL INTESTINE SURGERY      3 surgeries     TONSILLECTOMY      TOTAL ABDOMINAL HYSTERECTOMY N/A 03/04/2021    Procedure: HYSTERECTOMY, TOTAL, ABDOMINAL;  Surgeon: Clayton Vilchis MD;  Location: [facility];  Service: OB/GYN;  Laterality: N/A;    UPPER GASTROINTESTINAL ENDOSCOPY         CBC: No results for input(s): "WBC", "HGB", "HCT", "PLT" in the last 72 hours.    CMP: No results for input(s): " ""NA", "K", "CL", "CO2", "BUN", "CREATININE", "GLU", "MG", "PHOS", "CALCIUM", "ALBUMIN", "PROT", "ALKPHOS", "ALT", "AST", "BILITOT" in the last 72 hours.    COAGS  No results for input(s): "PT", "INR", "PROTIME", "APTT" in the last 72 hours.    BP Readings from Last 3 Encounters:   04/08/25 (!) 169/93   04/08/25 126/76   04/04/25 120/86       Diagnostic Studies:    EKG:  Results for orders placed or performed in visit on 03/17/25   EKG 12-lead    Collection Time: 03/17/25  5:28 PM   Result Value Ref Range    QRS Duration 76 ms    [facility] QTC Calculation 441 ms    Narrative    Test Reason : R06.02,    Vent. Rate :  83 BPM     Atrial Rate :  83 BPM     P-R Int : 136 ms          QRS Dur :  76 ms      QT Int : 376 ms       P-R-T Axes :  40 -29 -23 degrees    QTcB Int : 441 ms    Normal sinus rhythm  Left axis deviation (> -30Â°)  Nonspecific ST abnormality  Abnormal ECG  When compared with ECG of 17-Mar-2025 17:27,  No significant change was found  Confirmed by Clif Asencio ([id number]) on 3/18/2025 9:34:52 AM    Referred By: DAIN RAM           Confirmed By: Clif Asencio       2D Echo:  No results found for this or any previous visit.        Pre-op Assessment    I have reviewed the Patient Summary Reports.     I have reviewed the Nursing Notes. I have reviewed the NPO Status.      Review of Systems  Hematology/Oncology:                        --  Cancer in past history:       Breast    left          Cardiovascular:     Hypertension                                          Hepatic/GI:     GERD                Neurological:      Headaches                                 Psych:  Psychiatric History                  Physical Exam  General: Cooperative, Well nourished, Alert and Oriented    Airway:  Mallampati: III / II  Mouth Opening: Normal  TM Distance: Normal  Tongue: Normal  Neck ROM: Normal ROM    Dental:  Intact        Anesthesia Plan  Type of Anesthesia, risks & benefits discussed:    Anesthesia Type: Gen Natural " Airway  Intra-op Monitoring Plan: Standard ASA Monitors  Induction:  IV  Informed Consent: Informed consent signed with the Patient and all parties understand the risks and agree with anesthesia plan.  All questions answered.   ASA Score: 3  Day of Surgery Review of History & Physical: H&P Update referred to the surgeon/provider.    Ready For Surgery From Anesthesia Perspective.     .           [1]   Patient Active Problem List  Diagnosis    History of abnormal Pap smear    LLQ pain    Peutz-Jeghers syndrome    Weight loss    Leukocytosis    Tobacco abuse    Insomnia    Mood disorder    MARY LOU (generalized anxiety disorder)    Panic disorder with agoraphobia    PTSD (post-traumatic stress disorder)    Social anxiety disorder    Palpitations    Tachycardia    Dizziness    Arm numbness left    Stabbing headache    Tension headache    Right carpal tunnel syndrome    Migraine without aura    Microalbuminuria    Neck pain    Right arm numbness    Left carpal tunnel syndrome    Cervical paraspinal muscle spasm    Glucosuria with normal serum glucose    History of pyelonephritis    Left arm numbness    Myofascial muscle pain    Cervical spondylosis    OCD (obsessive compulsive disorder)    Menorrhagia with regular cycle    Hypokalemia    Inguinal lymphadenopathy    Ductal carcinoma in situ (DCIS) of left breast    DCIS (ductal carcinoma in situ)    Menorrhagia with irregular cycle    S/P breast reconstruction    Possible pregnancy    S/P D&C (status post dilation and curettage)    Ectopic pregnancy    Encounter for adjustment or removal of unspecified breast implant    Personal history of breast cancer    Unspecified inflammatory spondylopathy, cervical region    GERD (gastroesophageal reflux disease)    S/P CHRISS-BSO    Small bowel polyp    Peutz-Jeghers polyps of small bowel    Facial numbness    Cervical disc disease    Pseudopolyposis of colon without complication, unspecified part of colon    Bipolar 2 disorder, major  depressive episode    Obesity (BMI 30.0-34.9)    Slurred speech    Hives    Idiopathic acute pancreatitis    Nausea    Lithium use    Vitamin D deficiency    Multiple thyroid nodules    Decreased  strength of left hand    Hand pain, left    History of carpal tunnel release    Abnormal electrocardiogram    SOB (shortness of breath)    Class 2 obesity in adult    Abdominal pain    Intractable migraine with aura without status migrainosus    Diverticulitis   [2]   Current Facility-Administered Medications on File Prior to Encounter   Medication Dose Route Frequency Provider Last Rate Last Admin    0.9%  NaCl infusion   Intravenous Continuous Chandan Dillon MD 20 mL/hr at 01/25/22 0847 New Bag at 01/25/22 0847    sodium chloride 0.9% flush 10 mL  10 mL Intravenous PRN Chandan Dillon MD         Current Outpatient Medications on File Prior to Encounter   Medication Sig Dispense Refill    butalbital-acetaminophen-caffeine -40 mg (FIORICET, ESGIC) -40 mg per tablet TAKE ONE TABLET BY MOUTH EVERY 6 HOURS AS NEEDED FOR HEADACHE 30 tablet 5    CAPLYTA 42 mg Cap 1 capsule.      clonazePAM (KLONOPIN) 1 MG tablet Take 1 mg by mouth 2 (two) times daily.      ergocalciferol (ERGOCALCIFEROL) 50,000 unit Cap TAKE ONE CAPSULE BY MOUTH EVERY 7 DAYS 12 capsule 1    fluticasone propionate (FLONASE) 50 mcg/actuation nasal spray 2 sprays (100 mcg total) by Each Nostril route once daily. 16 g 2    folic acid (FOLVITE) 1 MG tablet TAKE ONE TABLET BY MOUTH ONCE A DAY 30 tablet 5    galcanezumab-gnlm (EMGALITY PEN) 120 mg/mL PnIj Inject one syringe every 28 days for migraine prevention. 1 mL 11    lisdexamfetamine (VYVANSE) 70 MG capsule Take 70 mg by mouth.      lithium (LITHOBID) 300 MG CR tablet Take 300 mg by mouth every morning.      montelukast (SINGULAIR) 10 mg tablet TAKE ONE TABLET BY MOUTH ONCE A DAY IN THE EVENING 30 tablet 11    pantoprazole (PROTONIX) 40 MG tablet Take 1 tablet (40 mg total) by mouth once  daily. 90 tablet 3    propranoloL (INDERAL) 20 MG tablet Take 1 tablet (20 mg total) by mouth 2 (two) times daily. 60 tablet 1    sertraline (ZOLOFT) 25 MG tablet Take 25 mg by mouth.      sucralfate (CARAFATE) 1 gram tablet Take 1 tablet (1 g total) by mouth 2 (two) times daily. 60 tablet 0    traZODone (DESYREL) 150 MG tablet Take 1 tablet (150 mg total) by mouth every evening. 30 tablet 1    [DISCONTINUED] albuterol (ACCUNEB) 1.25 mg/3 mL Nebu Take 3 mLs (1.25 mg total) by nebulization every 6 (six) hours as needed (wheezing). Rescue 75 mL 0    [DISCONTINUED] DULoxetine (CYMBALTA) 30 MG capsule Take 1 capsule (30 mg total) by mouth 2 (two) times daily. 60 capsule 0    [DISCONTINUED] gabapentin (NEURONTIN) 600 MG tablet Take 1 tablet (600 mg total) by mouth every evening. 30 tablet 11    [DISCONTINUED] paroxetine (PAXIL) 20 MG tablet TAKE ONE TABLET BY MOUTH ONCE A DAY IN THE MORNING 30 tablet 5

## 2025-04-14 NOTE — ASSESSMENT & PLAN NOTE
- history of multiple psychiatric conditions, including Bipolar disorder, self-harm, and suicide attempt  - on Lithium and Sertraline  - continue home meds; obtain Lithium levels in AM

## 2025-04-14 NOTE — NURSING
"Patient anxious , moving around in her bed cannot tell what is going. Stating " I dont'know . " Hospital medicine Dr Gonzalez notified   "

## 2025-04-14 NOTE — H&P
Juan Carlos jaxon - [facility]  H&P    Subjective:     Principal Problem: Postoperative generalized abdominal pain    HPI: Patient is a 44F with a past medical history of Peutz-Jeghers Syndrome, Colonic polyp, multiple Gastrointestinal procedures, Breast cancer with mastectomy and revision, Ectopic pregnancy (2019), Vit D deficiency, multiple psychiatric disorders (Bipolar Disorder, Generalized Anxiety, Self-harm, Suicide Attempt) being placed in observation for generalized post-abdominal pain s/p endoscopic mucosal resection of two duodenal polyps.  Upon discussion with GI, it is difficult to ascertain cause.  Patient has had intussusception in past from polyps associated with Peutz-Jehgers polyps but location of pain is mainly in anterior upper abdomen/epigastric region and different region than duodenal polyps.    Symptoms are accompanied by mild nausea but no emesis and vital signs remain stable.  It would be prudent to place in observation under Hospital Medicine for bowel rest, IVF administration, pain control, and low threshold for administration of antibiotics and STAT abdominal imaging.      Patient information was obtained from patient and Gastroenterology .       Patient has no known allergies.     Past Medical History:   Diagnosis Date    Abnormal Pap smear of cervix age 25    no treatement    Anxiety     Breast cancer     Cancer 07/11/2018    breast cancer    Cervical spondylosis 2/9/2018    Colon polyp     Depression     Ectopic pregnancy 2/26/2019    General anesthetics causing adverse effect in therapeutic use     Slow to awaken/ Memory problems-post-op to day 3 after Mastectomy    Headache     History of psychiatric hospitalization     age 20 for SI    Hx of psychiatric care     Hypertension     Peutz-Jeghers syndrome     PONV (postoperative nausea and vomiting)     Psychiatric problem     Right carpal tunnel syndrome 3/9/2017    Self-harming behavior     Suicide attempt     Therapy      Past  Surgical History:   Procedure Laterality Date    ADENOIDECTOMY      ANTEGRADE SINGLE BALLOON ENTEROSCOPY N/A 04/19/2021    Procedure: ENTEROSCOPY, SINGLE BALLOON, ANTEGRADE;  Surgeon: Vincenzo Herbert MD;  Location: [facility] (2ND FLR);  Service: Endoscopy;  Laterality: N/A;  removal of large polyp in proximal jejunum; please schedule during a time when Dr. Dillon is available in case I need assistance with removal or 2nd opinion prior to removal    COVID at [facility] 4/16 - ttr    APPENDECTOMY      AUGMENTATION OF BREAST      BILATERAL MASTECTOMY Bilateral 07/23/2018    Procedure: MASTECTOMY 23 hr stay;  Surgeon: Sofia Kendrick MD;  Location: [address];  Service: General;  Laterality: Bilateral;    BILATERAL SALPINGO-OOPHORECTOMY (BSO) Bilateral 03/04/2021    Procedure: SALPINGO-OOPHORECTOMY, BILATERAL;  Surgeon: Clayton Vilchis MD;  Location: [facility];  Service: OB/GYN;  Laterality: Bilateral;    BOWEL RESECTION  10/17/2014    BREAST BIOPSY Left 06/04/2018    BREAST CAPSULOTOMY Left 04/25/2019    Procedure: CAPSULOTOMY, BREAST LEFT;  Surgeon: Duane Bello MD;  Location: [address];  Service: Plastics;  Laterality: Left;    BREAST RECONSTRUCTION      BREAST SURGERY      Bilateral Mastectomy 07/23/2018    CARPAL TUNNEL RELEASE Right 2016    CARPAL TUNNEL RELEASE Left 11/27/2024    Procedure: RELEASE, CARPAL TUNNEL;  Surgeon: Daniel Bosch Jr., MD;  Location: [facility];  Service: Orthopedics;  Laterality: Left;    COLONOSCOPY      COLONOSCOPY N/A 02/25/2021    Procedure: COLONOSCOPY;  Surgeon: Vincenzo Herbert MD;  Location: [facility] (2ND FLR);  Service: Endoscopy;  Laterality: N/A;  previous anesthesia complications  okay for this date/time per Dr. Herbert and liliana with Miranda  -   COVID test on 2/22/21 at [facility]    COLONOSCOPY N/A 07/08/2024    Procedure: COLONOSCOPY;  Surgeon: Vincenzo Herbert MD;  Location: [facility] ENDO (2ND FLR);  Service: Endoscopy;  Laterality: N/A;  Ref By:Jing  labelías,instr sent via [employer].  6/27-pre call complete-tb-2nd floor for availability-, Peutz-Jaegar syndrome    DILATION AND CURETTAGE OF UTERUS      DILATION AND CURETTAGE OF UTERUS USING SUCTION N/A 02/25/2019    Procedure: DILATION AND CURETTAGE, UTERUS, USING SUCTION  PATHOLOGY NEEDED;  Surgeon: Pam Sifuentes MD;  Location: [facility];  Service: OB/GYN;  Laterality: N/A;    ENDOSCOPIC ULTRASOUND OF UPPER GASTROINTESTINAL TRACT Left 01/15/2021    Procedure: ULTRASOUND, UPPER GI TRACT, ENDOSCOPIC;  Surgeon: Chandan Dillon MD;  Location: [facility];  Service: Endoscopy;  Laterality: Left;  for pancreatic screening    ENDOSCOPIC ULTRASOUND OF UPPER GASTROINTESTINAL TRACT N/A 04/26/2024    Procedure: ULTRASOUND, UPPER GI TRACT, ENDOSCOPIC;  Surgeon: Chandan Dillon MD;  Location: [facility];  Service: Endoscopy;  Laterality: N/A;  4/8/24: instuctions sent via portal-GD  4/19-precall complete-MS    ESOPHAGOGASTRODUODENOSCOPY N/A 01/15/2021    Procedure: EGD (ESOPHAGOGASTRODUODENOSCOPY);  Surgeon: Chandan Dillon MD;  Location: [facility];  Service: Endoscopy;  Laterality: N/A;  with push enteroscopy    ESOPHAGOGASTRODUODENOSCOPY N/A 05/17/2021    Procedure: EGD (ESOPHAGOGASTRODUODENOSCOPY);  Surgeon: Chandan Dillon MD;  Location: [facility] (2ND FLR);  Service: Endoscopy;  Laterality: N/A;  Please schedule this duodenal polypectomy. Need to be a day that Dr Jose Antonio Stark is in the OR. 90 minutes.   Chandan Dillon MD     COVID at [place] 5/15 (scheduled in the OR for 5/18 - using same COVID results for both procedures) ttr    ESOPHAGOGASTRODUODENOSCOPY N/A 01/25/2022    Procedure: EGD (ESOPHAGOGASTRODUODENOSCOPY);  Surgeon: Chandan Dillon MD;  Location: [facility] (2ND FLR);  Service: Endoscopy;  Laterality: N/A;  poss emr  covid-1/22/22-STAH-BB  instructions sent via portal-BB    ESOPHAGOGASTRODUODENOSCOPY N/A 03/10/2023    Procedure: EGD (ESOPHAGOGASTRODUODENOSCOPY);  Surgeon: Chandan AGUILLON  MD Santana;  Location: [facility] ENDO (2ND FLR);  Service: Endoscopy;  Laterality: N/A;  inst via portal  called to confirm and msg that phone not accepting calls 3/6 mal    ESOPHAGOGASTRODUODENOSCOPY N/A 03/22/2024    Procedure: EGD (ESOPHAGOGASTRODUODENOSCOPY);  Surgeon: Chandan Dillon MD;  Location: [facility] ENDO (2ND FLR);  Service: Endoscopy;  Laterality: N/A;  EGD in 1 year. santana  instr portal-tb  3/19-precall complete-MS    FAT TRANSFER Bilateral 11/12/2018    Procedure: TRANSFER, FAT TISSUE BILATERAL BREAST;  Surgeon: Duane Bello MD;  Location: [facility] OR [address] [place] [address];  Service: Plastics;  Laterality: Bilateral;    INJECTION FOR SENTINEL NODE IDENTIFICATION Left 07/23/2018    Procedure: INJECTION, FOR SENTINEL NODE IDENTIFICATION;  Surgeon: Sofia Kendrick MD;  Location: [facility] OR [address] [place] [address];  Service: General;  Laterality: Left;    INJECTION OF ANESTHETIC AGENT AROUND MEDIAL BRANCH NERVES INNERVATING CERVICAL FACET JOINT Left 12/16/2024    Procedure: CERVICAL MEDIAL BRANCH NERVE BLOCK (C2,3,4,TON);  Surgeon: Shahid Almonte MD;  Location: [facility];  Service: Pain Management;  Laterality: Left;    INSERTION OF BREAST TISSUE EXPANDER Bilateral 07/23/2018    Procedure: INSERTION, TISSUE EXPANDER, BREAST;  Surgeon: Duane Bello MD;  Location: [address];  Service: Plastics;  Laterality: Bilateral;    INSERTION OF BREAST TISSUE EXPANDER Bilateral 03/10/2020    Procedure: INSERTION, TISSUE EXPANDER, BREAST, BILATERAL;  Surgeon: Duane Bello MD;  Location: [address];  Service: Plastics;  Laterality: Bilateral;    intestinal polpy      LYSIS OF ADHESIONS N/A 03/04/2021    Procedure: LYSIS, ADHESIONS;  Surgeon: Clayton Vilchis MD;  Location: [facility];  Service: OB/GYN;  Laterality: N/A;  Omental adhesions    MASTECTOMY      REMOVAL OF BREAST IMPLANT Bilateral 03/10/2020    Procedure: REMOVAL, IMPLANT, BREAST, BILATERAL;  Surgeon: Duane Bello MD;  Location: [address];   Service: Plastics;  Laterality: Bilateral;    REMOVAL OF BREAST IMPLANT Bilateral 07/07/2020    Procedure: REMOVAL, IMPLANT, BREAST BILATERAL;  Surgeon: Duane Bello MD;  Location: [facility] OR [address] [place] [address];  Service: Plastics;  Laterality: Bilateral;    SENTINEL LYMPH NODE BIOPSY Left 07/23/2018    Procedure: BIOPSY, LYMPH NODE, SENTINEL;  Surgeon: Sofia Kendrick MD;  Location: [facility] OR [address] [place] [address];  Service: General;  Laterality: Left;    SMALL INTESTINE SURGERY      3 surgeries     TONSILLECTOMY      TOTAL ABDOMINAL HYSTERECTOMY N/A 03/04/2021    Procedure: HYSTERECTOMY, TOTAL, ABDOMINAL;  Surgeon: Clayton Vilchis MD;  Location: [facility];  Service: OB/GYN;  Laterality: N/A;    UPPER GASTROINTESTINAL ENDOSCOPY       Family History       Problem Relation (Age of Onset)    ADD / ADHD Paternal Uncle    Breast cancer Paternal Aunt, Paternal Grandmother    Cancer Mother    Colon polyps Sister    Dementia Maternal Grandfather, Maternal Grandmother    Depression Paternal Uncle    Hypertension Father    No Known Problems Maternal Uncle, Paternal Grandfather, Cousin    Stomach cancer Mother          Tobacco Use    Smoking status: Every Day     Current packs/day: 1.00     Average packs/day: 1 pack/day for 25.0 years (25.0 ttl pk-yrs)     Types: Cigarettes     Start date: 4/17/2000    Smokeless tobacco: Never    Tobacco comments:     sometimes dry cough per patient   Substance and Sexual Activity    Alcohol use: Yes     Alcohol/week: 5.8 standard drinks of alcohol     Types: 7 Standard drinks or equivalent per week     Comment: occasionally    Drug use: No    Sexual activity: Yes     Partners: Male     Birth control/protection: None       Review of Systems   Constitutional:  Negative for diaphoresis and fever.   HENT:  Negative for trouble swallowing.    Respiratory:  Negative for cough and shortness of breath.    Cardiovascular:  Negative for chest pain and palpitations.   Gastrointestinal:  Positive for abdominal pain and  nausea. Negative for abdominal distention and vomiting.   Musculoskeletal:  Negative for arthralgias and back pain.   Neurological:  Negative for light-headedness and headaches.   Psychiatric/Behavioral:  Negative for agitation and behavioral problems.      Objective:     Vital Signs (Most Recent):  Temp: 97.5 °F (36.4 °C) (04/14/25 1528)  Pulse: 91 (04/14/25 1528)  Resp: 18 (04/14/25 1528)  BP: 97/61 (04/14/25 1528)  SpO2: 100 % (04/14/25 1528) Vital Signs (24h Range):  Temp:  [97.5 °F (36.4 °C)-98.2 °F (36.8 °C)] 97.5 °F (36.4 °C)  Pulse:  [65-91] 91  Resp:  [16-67] 18  SpO2:  [94 %-100 %] 100 %  BP: ()/(61-89) 97/61     Weight: 96.1 kg (211 lb 13.8 oz)  Body mass index is 38.75 kg/m².  Body surface area is 2.05 meters squared.    ECOG SCORE           [unfilled]    Lines/Drains/Airways       Peripheral Intravenous Line  Duration                  Peripheral IV - Single Lumen 04/14/25 0730 22 G Right Hand <1 day                    Physical Exam  Constitutional:       Comments: Uncomfortable   HENT:      Head: Normocephalic and atraumatic.      Mouth/Throat:      Pharynx: Oropharynx is clear.   Eyes:      General: No scleral icterus.  Cardiovascular:      Rate and Rhythm: Normal rate and regular rhythm.   Pulmonary:      Effort: Pulmonary effort is normal. No respiratory distress.   Abdominal:      General: Bowel sounds are normal. There is no distension.      Palpations: Abdomen is soft.      Tenderness: There is abdominal tenderness (TTP in epigastric region). There is no rebound.   Musculoskeletal:      Right lower leg: No edema.      Left lower leg: No edema.   Skin:     Coloration: Skin is not jaundiced.   Neurological:      General: No focal deficit present.      Mental Status: She is alert and oriented to person, place, and time.         Significant Labs:   CBC, Cmp, Lactic acid pending    Assessment/Plan:     Postoperative generalized abdominal pain   - patient with onset of anterior abdominal/epigastric  pain in recovery suite s/p EMR of two sizeable duodenal polyps associated with Peutz-Jeghers Syndrome  - per GI, patient has had intussusception in past associated with polyps but location of current pain inconsistent with location of polyps  - will provide bowel rest, IVF, obtain CT abd/pelvis for evaluation, PRN anti-emetics, lactic acid  - hemodynamically stable; will start Zosyn in event patient becomes toxic or hemodynamically unstable    Peutz-Jeghers syndrome   - multiple interventions with Gastroenterology associated with this disorder  - s/p polyp EMR as above  - will continue to follow with GI    Mood disorder   - history of multiple psychiatric conditions, including Bipolar disorder, self-harm, and suicide attempt  - on Lithium and Sertraline  - continue home meds; obtain Lithium levels in AM    MARY LOU (generalized anxiety disorder)   - on Klonopiin 1 mg daily BID  - continue while in hospital    Personal history of breast cancer   - s/p mastectomy with revision  - ensure appropriate follow up as necessary with Oncology    Tobacco dependency   - will assess for cessation desire once more comfortable  - will provide NRT with patch    Migraine without aura   - on once monthly injection which she received one week ago  - Fioricet PRN outpatient  - continue Fioricet as needed with attention to concurrent benzo and barbiturate administration as she is on scheduled Klonopin      VTE Risk Mitigation (From admission, onward)           Ordered     IP VTE HIGH RISK PATIENT  Once         04/14/25 1540     Place sequential compression device  Until discontinued         04/14/25 1540                      Bobby Gonzalez MD  [facility]

## 2025-04-15 VITALS
HEART RATE: 69 BPM | WEIGHT: 211.88 LBS | BODY MASS INDEX: 38.99 KG/M2 | SYSTOLIC BLOOD PRESSURE: 100 MMHG | DIASTOLIC BLOOD PRESSURE: 55 MMHG | RESPIRATION RATE: 17 BRPM | OXYGEN SATURATION: 96 % | TEMPERATURE: 98 F | HEIGHT: 62 IN

## 2025-04-15 PROBLEM — R10.84 POSTOPERATIVE GENERALIZED ABDOMINAL PAIN: Status: RESOLVED | Noted: 2025-04-08 | Resolved: 2025-04-15

## 2025-04-15 PROBLEM — G89.18 POSTOPERATIVE GENERALIZED ABDOMINAL PAIN: Status: RESOLVED | Noted: 2025-04-08 | Resolved: 2025-04-15

## 2025-04-15 LAB
ABSOLUTE EOSINOPHIL (OHS): 0.31 K/UL
ABSOLUTE MONOCYTE (OHS): 0.56 K/UL (ref 0.3–1)
ABSOLUTE NEUTROPHIL COUNT (OHS): 3.3 K/UL (ref 1.8–7.7)
ALBUMIN SERPL BCP-MCNC: 3.2 G/DL (ref 3.5–5.2)
ALP SERPL-CCNC: 154 UNIT/L (ref 40–150)
ALT SERPL W/O P-5'-P-CCNC: 24 UNIT/L (ref 10–44)
ANION GAP (OHS): 7 MMOL/L (ref 8–16)
AST SERPL-CCNC: 15 UNIT/L (ref 11–45)
BASOPHILS # BLD AUTO: 0.07 K/UL
BASOPHILS NFR BLD AUTO: 1.1 %
BILIRUB SERPL-MCNC: 0.5 MG/DL (ref 0.1–1)
BUN SERPL-MCNC: 5 MG/DL (ref 6–20)
CALCIUM SERPL-MCNC: 8.6 MG/DL (ref 8.7–10.5)
CHLORIDE SERPL-SCNC: 108 MMOL/L (ref 95–110)
CO2 SERPL-SCNC: 21 MMOL/L (ref 23–29)
CREAT SERPL-MCNC: 0.7 MG/DL (ref 0.5–1.4)
ERYTHROCYTE [DISTWIDTH] IN BLOOD BY AUTOMATED COUNT: 13.1 % (ref 11.5–14.5)
ESTROGEN SERPL-MCNC: NORMAL PG/ML
GFR SERPLBLD CREATININE-BSD FMLA CKD-EPI: >60 ML/MIN/1.73/M2
GLUCOSE SERPL-MCNC: 71 MG/DL (ref 70–110)
HCT VFR BLD AUTO: 33.9 % (ref 37–48.5)
HGB BLD-MCNC: 10.9 GM/DL (ref 12–16)
IMM GRANULOCYTES # BLD AUTO: 0.02 K/UL (ref 0–0.04)
IMM GRANULOCYTES NFR BLD AUTO: 0.3 % (ref 0–0.5)
INSULIN SERPL-ACNC: NORMAL U[IU]/ML
LAB AP CLINICAL INFORMATION: NORMAL
LAB AP GROSS DESCRIPTION: NORMAL
LAB AP PERFORMING LOCATION(S): NORMAL
LAB AP REPORT FOOTNOTES: NORMAL
LYMPHOCYTES # BLD AUTO: 2.24 K/UL (ref 1–4.8)
MAGNESIUM SERPL-MCNC: 1.7 MG/DL (ref 1.6–2.6)
MCH RBC QN AUTO: 27 PG (ref 27–31)
MCHC RBC AUTO-ENTMCNC: 32.2 G/DL (ref 32–36)
MCV RBC AUTO: 84 FL (ref 82–98)
NUCLEATED RBC (/100WBC) (OHS): 0 /100 WBC
PHOSPHATE SERPL-MCNC: 3.4 MG/DL (ref 2.7–4.5)
PLATELET # BLD AUTO: 249 K/UL (ref 150–450)
PMV BLD AUTO: 10.9 FL (ref 9.2–12.9)
POTASSIUM SERPL-SCNC: 3.7 MMOL/L (ref 3.5–5.1)
PROT SERPL-MCNC: 5.5 GM/DL (ref 6–8.4)
RBC # BLD AUTO: 4.03 M/UL (ref 4–5.4)
RELATIVE EOSINOPHIL (OHS): 4.8 %
RELATIVE LYMPHOCYTE (OHS): 34.5 % (ref 18–48)
RELATIVE MONOCYTE (OHS): 8.6 % (ref 4–15)
RELATIVE NEUTROPHIL (OHS): 50.7 % (ref 38–73)
SODIUM SERPL-SCNC: 136 MMOL/L (ref 136–145)
WBC # BLD AUTO: 6.5 K/UL (ref 3.9–12.7)

## 2025-04-15 PROCEDURE — 80053 COMPREHEN METABOLIC PANEL: CPT | Performed by: STUDENT IN AN ORGANIZED HEALTH CARE EDUCATION/TRAINING PROGRAM

## 2025-04-15 PROCEDURE — 99214 OFFICE O/P EST MOD 30 MIN: CPT | Mod: ,,,

## 2025-04-15 PROCEDURE — S4991 NICOTINE PATCH NONLEGEND: HCPCS | Performed by: STUDENT IN AN ORGANIZED HEALTH CARE EDUCATION/TRAINING PROGRAM

## 2025-04-15 PROCEDURE — G0378 HOSPITAL OBSERVATION PER HR: HCPCS

## 2025-04-15 PROCEDURE — 25000003 PHARM REV CODE 250: Performed by: STUDENT IN AN ORGANIZED HEALTH CARE EDUCATION/TRAINING PROGRAM

## 2025-04-15 PROCEDURE — 84100 ASSAY OF PHOSPHORUS: CPT | Performed by: STUDENT IN AN ORGANIZED HEALTH CARE EDUCATION/TRAINING PROGRAM

## 2025-04-15 PROCEDURE — 36415 COLL VENOUS BLD VENIPUNCTURE: CPT | Performed by: STUDENT IN AN ORGANIZED HEALTH CARE EDUCATION/TRAINING PROGRAM

## 2025-04-15 PROCEDURE — 83735 ASSAY OF MAGNESIUM: CPT | Performed by: STUDENT IN AN ORGANIZED HEALTH CARE EDUCATION/TRAINING PROGRAM

## 2025-04-15 PROCEDURE — 85025 COMPLETE CBC W/AUTO DIFF WBC: CPT | Performed by: STUDENT IN AN ORGANIZED HEALTH CARE EDUCATION/TRAINING PROGRAM

## 2025-04-15 RX ORDER — DICYCLOMINE HYDROCHLORIDE 10 MG/1
10 CAPSULE ORAL
Qty: 28 CAPSULE | Refills: 0 | Status: SHIPPED | OUTPATIENT
Start: 2025-04-15 | End: 2025-04-15 | Stop reason: HOSPADM

## 2025-04-15 RX ORDER — SIMETHICONE 125 MG
125 CAPSULE ORAL 4 TIMES DAILY PRN
Qty: 20 EACH | Refills: 0 | Status: SHIPPED | OUTPATIENT
Start: 2025-04-15 | End: 2025-04-20

## 2025-04-15 RX ADMIN — FOLIC ACID 1000 MCG: 1 TABLET ORAL at 08:04

## 2025-04-15 RX ADMIN — CLONAZEPAM 1 MG: 1 TABLET ORAL at 08:04

## 2025-04-15 RX ADMIN — PROPRANOLOL HYDROCHLORIDE 20 MG: 20 TABLET ORAL at 08:04

## 2025-04-15 RX ADMIN — ERGOCALCIFEROL 50000 UNITS: 1.25 CAPSULE ORAL at 08:04

## 2025-04-15 RX ADMIN — PANTOPRAZOLE SODIUM 40 MG: 40 TABLET, DELAYED RELEASE ORAL at 08:04

## 2025-04-15 RX ADMIN — LITHIUM CARBONATE 300 MG: 300 TABLET, EXTENDED RELEASE ORAL at 06:04

## 2025-04-15 RX ADMIN — SERTRALINE HYDROCHLORIDE 25 MG: 25 TABLET ORAL at 08:04

## 2025-04-15 RX ADMIN — NICOTINE 1 PATCH: 14 PATCH, EXTENDED RELEASE TRANSDERMAL at 08:04

## 2025-04-15 NOTE — ASSESSMENT & PLAN NOTE
-Patient with significant upper abdominal pain s/p EMR yesterday with resection of 2 large duodenal polyps  -recommended conservative management with pain control, IV fluids bowel rest, empiric IV antibiotics  -patient seems to have responded to conservative management well, reports abdominal pain is still occurring but significantly improved from yesterday  -WBC normal, HGB 10.9  -CT performed 4/14 was unremarkable and showed no obvious complication of EMR  -tolerating liquid diet at this time with no issues  -okay for discharge from [facility] perspective  - Continue with MRCP alternating with EUS for pancreas cancer screening in setting of PJS. Next is MRCP this month followed by EUS in April 2026.   -Repeat upper endoscopy in 6-9 months for retreatment and for surveillance with probable EMR/resection of further medium-larger polyps if seen/located.   -Follow up in [facility] clinic annually to check in/review - next visit (can be virtual) can be in about 9 months from now.

## 2025-04-15 NOTE — DISCHARGE SUMMARY
[facility] Medicine  Discharge Summary      Patient Name: Staci Hodge  MRN: [id number]  Admission Date: 4/14/2025  Hospital Length of Stay: 1 days  Discharge Date and Time: 04/15/2025 10:28 AM  Attending Physician: Bobby Gonzalez MD   Discharging Provider: Bobby Gonzalez MD  Discharge Provider Team: [facility]  Primary Care Provider: Shilpi Clayton NP    Principal Problem: Postoperative generalized abdominal pain     HPI:  Patient is a 44F with a past medical history of Peutz-Jeghers Syndrome, Colonic polyp, multiple Gastrointestinal procedures, Breast cancer with mastectomy and revision, Ectopic pregnancy (2019), Vit D deficiency, multiple psychiatric disorders (Bipolar Disorder, Generalized Anxiety, Self-harm, Suicide Attempt) being placed in observation for generalized post-abdominal pain s/p endoscopic mucosal resection of two duodenal polyps.  Upon discussion with GI, it is difficult to ascertain cause.  Patient has had intussusception in past from polyps associated with Peutz-Jehgers polyps but location of pain is mainly in anterior upper abdomen/epigastric region and different region than duodenal polyps.     Symptoms are accompanied by mild nausea but no emesis and vital signs remain stable.  It would be prudent to place in observation under Hospital Medicine for bowel rest, IVF administration, pain control, and low threshold for administration of antibiotics and STAT abdominal imaging.        Patient information was obtained from patient and Gastroenterology .        Procedure(s) (LRB):  EGD, WITH ENDOSCOPIC MUCOSAL RESECTION (N/A)      Hospital Course: Patient placed on bowel rest, received IVF, and initially required IV medications for pain control.  CT abd/pelvis was obtained with no new or concerning findings.  Antibiotics were held but kept low threshold to initiate and ultimately did not require them.  Overnight, pain resolved, patient requested diet, and  endorsed desire to return home.  Case discussed with Gastroenterology and agreed with discharge on simethicone.  Strict return precautions provided.      DISCHARGE PHYSICAL EXAM  Physical Exam  Constitutional:       General: She is not in acute distress.     Appearance: She is not toxic-appearing.   HENT:      Head: Normocephalic and atraumatic.   Eyes:      Extraocular Movements: Extraocular movements intact.   Cardiovascular:      Rate and Rhythm: Normal rate and regular rhythm.   Pulmonary:      Effort: Pulmonary effort is normal. No respiratory distress.   Abdominal:      General: Bowel sounds are normal. There is no distension.      Palpations: Abdomen is soft.      Tenderness: There is no guarding.      Comments: Mild epigastric TTP   Musculoskeletal:      Right lower leg: No edema.      Left lower leg: No edema.   Skin:     General: Skin is warm.      Coloration: Skin is not jaundiced.   Neurological:      General: No focal deficit present.      Mental Status: She is alert and oriented to person, place, and time.   Psychiatric:         Mood and Affect: Mood normal.         Behavior: Behavior normal.          Treatment Per Other Problems Inpatient    Peutz-Jeghers syndrome   - multiple interventions with Gastroenterology associated with this disorder  - s/p polyp EMR as above  - will continue to follow with GI     Mood disorder   - history of multiple psychiatric conditions, including Bipolar disorder, self-harm, and suicide attempt  - on Lithium and Sertraline  - continued home meds     MARY LOU (generalized anxiety disorder)   - on Klonopiin 1 mg daily BID  - continued while in hospital     Personal history of breast cancer   - s/p mastectomy with revision  - ensure appropriate follow up as necessary with Oncology     Tobacco dependency   - counseled; provided with Nicotine patch     Migraine without aura   - on once monthly injection which she received one week ago  - Fioricet PRN outpatient  - continued  Fioricet as needed with attention to concurrent benzo and barbiturate administration as she is on scheduled Klonopin      Consults:     Final Active Diagnoses:    Diagnosis Date Noted POA    Peutz-Jeghers syndrome [Q85.89] 01/22/2015 Not Applicable    Mood disorder [F39] 06/30/2016 Yes    MARY LOU (generalized anxiety disorder) [F41.1] 06/30/2016 Yes    Personal history of breast cancer [Z85.3] 07/07/2020 Not Applicable    Tobacco dependency [F17.200] 04/14/2025 Yes    Migraine without aura [G43.009] 03/30/2017 Yes      Problems Resolved During this Admission:    Diagnosis Date Noted Date Resolved POA    PRINCIPAL PROBLEM:  Postoperative generalized abdominal pain [G89.18, R10.84] 04/08/2025 04/15/2025 Yes      Discharged Condition: good    Disposition: Home or Self Care    Follow Up:   Follow-up Information       Shilpi Clayton, NP Follow up.    Specialty: Family Medicine  Contact information:  [address]  [place] [address]  [phone]               Gastroenterology Follow up.                           Patient Instructions:      Diet general     Diet Adult Regular     Notify your health care provider if you experience any of the following:  temperature >100.4     Notify your health care provider if you experience any of the following:  persistent nausea and vomiting or diarrhea     Notify your health care provider if you experience any of the following:  severe uncontrolled pain     Activity as tolerated     Medications:  Reconciled Home Medications:      Medication List        START taking these medications      simethicone 125 mg Cap capsule  Commonly known as: MYLICON  Take 1 capsule (125 mg total) by mouth 4 (four) times daily as needed for Flatulence (Abdominal pain).            CONTINUE taking these medications      butalbital-acetaminophen-caffeine -40 mg -40 mg per tablet  Commonly known as: FIORICET, ESGIC  TAKE ONE TABLET BY MOUTH EVERY 6 HOURS AS NEEDED FOR HEADACHE     CAPLYTA 42  mg Cap  Generic drug: lumateperone  1 capsule.     clonazePAM 1 MG tablet  Commonly known as: KlonoPIN  Take 1 mg by mouth 2 (two) times daily.     EMGALITY  mg/mL Pnij  Generic drug: galcanezumab-gnlm  Inject one syringe every 28 days for migraine prevention.     ergocalciferol 50,000 unit Cap  Commonly known as: ERGOCALCIFEROL  TAKE ONE CAPSULE BY MOUTH EVERY 7 DAYS     fluticasone propionate 50 mcg/actuation nasal spray  Commonly known as: FLONASE  2 sprays (100 mcg total) by Each Nostril route once daily.     folic acid 1 MG tablet  Commonly known as: FOLVITE  TAKE ONE TABLET BY MOUTH ONCE A DAY     lisdexamfetamine 70 MG capsule  Commonly known as: VYVANSE  Take 70 mg by mouth.     lithium 300 MG CR tablet  Commonly known as: LITHOBID  Take 300 mg by mouth every morning.     montelukast 10 mg tablet  Commonly known as: SINGULAIR  TAKE ONE TABLET BY MOUTH ONCE A DAY IN THE EVENING     pantoprazole 40 MG tablet  Commonly known as: PROTONIX  Take 1 tablet (40 mg total) by mouth once daily.     propranoloL 20 MG tablet  Commonly known as: INDERAL  Take 1 tablet (20 mg total) by mouth 2 (two) times daily.     sertraline 25 MG tablet  Commonly known as: ZOLOFT  Take 25 mg by mouth.     sucralfate 1 gram tablet  Commonly known as: CARAFATE  Take 1 tablet (1 g total) by mouth 2 (two) times daily.     traZODone 150 MG tablet  Commonly known as: DESYREL  Take 1 tablet (150 mg total) by mouth every evening.              Significant Diagnostic Studies:     CT Abdomen Pelvis With IV Contrast NO Oral Contrast  Order: [id number]   Status: Final result       Next appt: 04/21/2025 at 11:30 AM in Radiology (Critical access hospital MRI1)    Test Result Released: Yes (seen)    0 Result Notes  Details    Reading Physician Reading Date Result Priority   Manju Agustin MD  [phone]  4/15/2025 Routine   Mt Amin MD  [phone]  4/15/2025      Narrative & Impression  EXAMINATION:  CT ABDOMEN PELVIS WITH IV CONTRAST     CLINICAL  HISTORY:  Abdominal pain, post-op     TECHNIQUE:  Low dose axial images, sagittal and coronal reformations were obtained from the lung bases to the pubic symphysis following the IV administration of 100 mL of Omnipaque 350 .  Oral contrast was not given.     COMPARISON:  CT abdomen pelvis 04/08/2025, CT renal stone study 04/04/2025, MRI abdomen 09/05/2024     FINDINGS:  SOFT TISSUES: Unremarkable.     LUNG BASES/VISUALIZED MEDIASTINUM: Unremarkable.     HEPATOBILIARY: Liver is enlarged measuring 20.1 cm craniocaudal.  Diffuse parenchymal hypoattenuation suggestive of steatosis with suspected focal fatty sparing near the gallbladder fossa..  No focal hepatic lesions. No biliary ductal dilatation. Normal gallbladder.     PANCREAS: No focal masses or ductal dilatation.     SPLEEN: Normal size. Similar 3.1 cm heterogeneously enhancing lesion in the superior aspect of the spleen, suggestive of a hemangioma.     ADRENALS: No adrenal nodules.     KIDNEYS/URETERS: Kidneys are normal size and enhance symmetrically.  No nephrolithiasis or hydronephrosis. No solid mass lesions. Ureters are unremarkable.     BLADDER/PELVIC ORGANS: No bladder wall thickening.  Hysterectomy.  No adnexal masses.     PERITONEUM / RETROPERITONEUM: No free air or fluid.     LYMPH NODES: No lymphadenopathy.     VESSELS: No aortic aneurysm.  Mild aortoiliac calcific atherosclerosis.  Major aortic branch vessels are patent.  Portal venous system patent.     GI TRACT: Distal esophagus and stomach are unremarkable.  Interval endoscopic resection of two duodenal polyps with hemostasis clips in place.  No evidence of immediate complication.  Postoperative change of several bowel resections.  No evidence of bowel obstruction or inflammation.  Appendix is reportedly surgically absent.     BONES: Mild degenerative changes of the spine.  No acute fractures or suspicious osseous lesions.     Impression:     1. Interval endoscopic resection of two duodenal polyps  with hemostasis clips in place.  No CT evidence of immediate complication.  2. Hepatomegaly and hepatic steatosis with focal fatty sparing near the gallbladder fossa.  3. Additional stable findings as above.       Pending Diagnostic Studies:       None          Indwelling Lines/Drains at time of discharge:   Lines/Drains/Airways       None                   Time spent on the discharge of patient: 35 minutes         Bobby Gonzalez MD  Department of Hospital Medicine  [facility] - Surgery

## 2025-04-15 NOTE — PROGRESS NOTES
[facility]  Gastroenterology  Progress Note    Patient Name: Staci Hodge  MRN: [id number]  Admission Date: 4/14/2025  Hospital Length of Stay: 1 days  Code Status: Prior   Attending Provider: No att. providers found  Consulting Provider: Jeremias Mejia PA-C  Primary Care Physician: Shilpi Clayton NP  Principal Problem: Postoperative generalized abdominal pain        Subjective:   HPI: Patient is a 44F with a past medical history of Peutz-Jeghers Syndrome, Colonic polyp, multiple Gastrointestinal procedures, Breast cancer with mastectomy and revision, Ectopic pregnancy (2019), Vit D deficiency, multiple psychiatric disorders (Bipolar Disorder, Generalized Anxiety, Self-harm, Suicide Attempt) being placed in observation for generalized post-abdominal pain s/p endoscopic mucosal resection of two duodenal polyps.  Upon discussion with GI, it is difficult to ascertain cause.  Patient has had intussusception in past from polyps associated with Peutz-Jehgers polyps but location of pain is mainly in anterior upper abdomen/epigastric region and different region than duodenal polyps.     Symptoms are accompanied by mild nausea but no emesis and vital signs remain stable.  It would be prudent to place in observation under Hospital Medicine for bowel rest, IVF administration, pain control, and low threshold for administration of antibiotics and STAT abdominal imaging.      Interval History: CT performed 4/14 showing interval endoscopic resection of 2 duodenal polyps with hemostasis clips in place, no CT evidence of immediate complication. Patient reports ongoing upper abdominal pain but is much improved from yesterday. Denies NV, fever, chills, melena. She is tolerating liquid diet with no issues. Hgb 10.9. Afebrile    Review of Systems   Constitutional:  Negative for chills and fever.   Gastrointestinal:  Positive for abdominal pain. Negative for abdominal distention, anal bleeding, blood in stool,  "constipation, diarrhea, nausea, rectal pain and vomiting.   Psychiatric/Behavioral:  Negative for confusion.      Objective:     Vital Signs (Most Recent):  Temp: 97.5 °F (36.4 °C) (04/15/25 0729)  Pulse: 69 (04/15/25 0729)  Resp: 17 (04/15/25 0729)  BP: (!) 100/55 (04/15/25 0729)  SpO2: 96 % (04/15/25 0729) Vital Signs (24h Range):  Temp:  [97.5 °F (36.4 °C)-98.1 °F (36.7 °C)] 97.5 °F (36.4 °C)  Pulse:  [65-91] 69  Resp:  [16-18] 17  SpO2:  [94 %-100 %] 96 %  BP: ()/(55-77) 100/55     Weight: 96.1 kg (211 lb 13.8 oz) (04/14/25 1157)  Body mass index is 38.75 kg/m².    No intake or output data in the 24 hours ending 04/15/25 1134    Lines/Drains/Airways       None                    Physical Exam  Constitutional:       General: She is not in acute distress.     Appearance: Normal appearance. She is not ill-appearing.   Eyes:      General: No scleral icterus.  Abdominal:      General: Abdomen is flat. Bowel sounds are normal. There is no distension.      Palpations: Abdomen is soft. There is no mass.      Tenderness: There is abdominal tenderness (upper abdominal TTP). There is no guarding or rebound.      Hernia: No hernia is present.   Skin:     General: Skin is warm and dry.      Coloration: Skin is not jaundiced or pale.   Neurological:      Mental Status: She is alert and oriented to person, place, and time. Mental status is at baseline.          Significant Labs:  CBC:   Recent Labs   Lab 04/15/25  0605   WBC 6.50   HGB 10.9*   HCT 33.9*        CMP:   Recent Labs   Lab 04/15/25  0739   CALCIUM 8.6*   ALBUMIN 3.2*      K 3.7   CO2 21*      BUN 5*   CREATININE 0.7   ALKPHOS 154*   ALT 24   AST 15   BILITOT 0.5     Lipase: No results for input(s): "LIPASE" in the last 48 hours.  Liver Function Test:   Recent Labs   Lab 04/15/25  0739   ALT 24   AST 15   ALKPHOS 154*   BILITOT 0.5   ALBUMIN 3.2*         Significant Imaging:  Imaging results within the past 24 hours have been " reviewed.  Assessment/Plan:     Neuro  * Postoperative generalized abdominal pain-resolved as of 4/15/2025  -Patient with significant upper abdominal pain s/p EMR yesterday with resection of 2 large duodenal polyps  -recommended conservative management with pain control, IV fluids bowel rest, empiric IV antibiotics  -patient seems to have responded to conservative management well, reports abdominal pain is still occurring but significantly improved from yesterday  -WBC normal, HGB 10.9  -CT performed 4/14 was unremarkable and showed no obvious complication of EMR  -tolerating liquid diet at this time with no issues  -okay for discharge from [facility] perspective  - Continue with MRCP alternating with EUS for pancreas cancer screening in setting of PJS. Next is MRCP this month followed by EUS in April 2026.   -Repeat upper endoscopy in 6-9 months for retreatment and for surveillance with probable EMR/resection of further medium-larger polyps if seen/located.   -Follow up in [facility] clinic annually to check in/review - next visit (can be virtual) can be in about 9 months from now.         Thank you for your consult. I will sign off. Please contact us if you have any additional questions.    Jeremias Mejia PA-C  Gastroenterology  [facility]

## 2025-04-15 NOTE — PLAN OF CARE
04/15/25 1501   Final Note   Assessment Type Final Discharge Note   Anticipated Discharge Disposition Home     Patient discharged Home for self-Care on 04/15/2025.    Future Appointments   Date Time Provider Department Center   4/21/2025 11:30 AM STA MRI1 [facility] MRI [facility]   4/25/2025 10:00 AM [facility]   5/20/2025 10:40 AM Clif Asencio MD [facility] CARDIO [facility]   5/28/2025  1:00 PM Daniel Bosch Jr., MD [facility] ORTHO [place] [facility]   5/29/2025  9:00 AM Shahid Almonte MD [facility] PAINMGT [facility]   10/17/2025 10:00 AM Eliot Camarillo MD [facility] ENDOCR [facility]     LIZ Martinez  Case Management   [facility]   Ext. [phone]

## 2025-04-15 NOTE — PLAN OF CARE
Problem: Adult Inpatient Plan of Care  Goal: Plan of Care Review  Outcome: Met  Goal: Patient-Specific Goal (Individualized)  Outcome: Met  Goal: Absence of Hospital-Acquired Illness or Injury  Outcome: Met  Goal: Optimal Comfort and Wellbeing  Outcome: Met  Goal: Readiness for Transition of Care  Outcome: Met   She is discharged to home with spouse.   All lda's are removed  Discharge instructions reviewed and she voiced understanding.   I did teaching re diet and avoid eating large amounts until total return of bowel function.  May need to eat multiple small meals per day and she voiced understanding.

## 2025-04-15 NOTE — PLAN OF CARE
[facility]  Initial Discharge Assessment       Primary Care Provider: Shilpi Clayton NP    Admission Diagnosis: Peutz-Jeghers polyps of small bowel [Q85.89]  Abdominal pain [R10.9]    Admission Date: 4/14/2025  Expected Discharge Date:     Transition of Care Barriers: None    Payor: MEDICAID / Plan: [facility] / Product Type: Managed Medicaid /     Extended Emergency Contact Information  Primary Emergency Contact: RafaelaIssac desai  Mobile Phone: [phone]  Relation: Significant other  Secondary Emergency Contact: Jono Hodge   [place]  Mobile Phone: [phone]  Relation: Father    Discharge Plan A: Home with family  Discharge Plan B: Home with family, Home Health      [facility] [place] [address]  [address]  [place] [address]  Phone: [phone] Fax: [phone]    [facility] [address] [place] [address]  Phone: [phone] Fax: [phone]    [id number] [place] - [address]  [address]  [place] [address]  Phone: [phone] Fax: [phone]      Initial Assessment (most recent)       Adult Discharge Assessment - 04/15/25 0829          Discharge Assessment    Assessment Type Discharge Planning Assessment     Confirmed/corrected address, phone number and insurance Yes     Confirmed Demographics Correct on Facesheet     Source of Information patient     If unable to respond/provide information was family/caregiver contacted? Yes     Communicated DIEGO with patient/caregiver Yes     People in Home significant other     Do you expect to return to your current living situation? Yes     Do you have help at home or someone to help you manage your care at home? Yes     Prior to hospitilization cognitive status: Alert/Oriented     Current cognitive status: Alert/Oriented     Walking or Climbing Stairs Difficulty no     Dressing/Bathing Difficulty no      Home Accessibility wheelchair accessible     Home Layout Able to live on 1st floor     Equipment Currently Used at Home none     Readmission within 30 days? No     Patient currently being followed by outpatient case management? No     Do you currently have service(s) that help you manage your care at home? No     Do you take prescription medications? Yes     Do you have prescription coverage? Yes     Do you have any problems affording any of your prescribed medications? No     Is the patient taking medications as prescribed? yes     How do you get to doctors appointments? car, drives self;family or friend will provide     Are you on dialysis? No     Do you take coumadin? No     Discharge Plan A Home with family     Discharge Plan B Home with family;Home Health     DME Needed Upon Discharge  none     Discharge Plan discussed with: Patient     Transition of Care Barriers None                         SW completed discharge planning assessment with the patient at bedside. SW verified demographic information listed on the pt.'s Face sheet. Pt reports living in a single story home with her significant other (Issac), whom she plans to help manage her discharge needs upon discharge.Patient doesn't report utilizing any equipment prior to this hospital stay, nor reports any DME needs upon discharge at this time.SW is following this Pt for DC planning needs. There are no identified needs at this time.    Discharge Plan A and Plan B have been determined by review of patient's clinical status, future medical and therapeutic needs, and coverage/benefits for post-acute care in coordination with multidisciplinary team members.      Staci Mary LCSW  Case Management   [facility]   Ext. [phone]

## 2025-04-15 NOTE — SUBJECTIVE & OBJECTIVE
Subjective:   HPI: Patient is a 44F with a past medical history of Peutz-Jeghers Syndrome, Colonic polyp, multiple Gastrointestinal procedures, Breast cancer with mastectomy and revision, Ectopic pregnancy (2019), Vit D deficiency, multiple psychiatric disorders (Bipolar Disorder, Generalized Anxiety, Self-harm, Suicide Attempt) being placed in observation for generalized post-abdominal pain s/p endoscopic mucosal resection of two duodenal polyps.  Upon discussion with GI, it is difficult to ascertain cause.  Patient has had intussusception in past from polyps associated with Peutz-Jehgers polyps but location of pain is mainly in anterior upper abdomen/epigastric region and different region than duodenal polyps.     Symptoms are accompanied by mild nausea but no emesis and vital signs remain stable.  It would be prudent to place in observation under Hospital Medicine for bowel rest, IVF administration, pain control, and low threshold for administration of antibiotics and STAT abdominal imaging.      Interval History: CT performed 4/14 showing interval endoscopic resection of 2 duodenal polyps with hemostasis clips in place, no CT evidence of immediate complication. Patient reports ongoing upper abdominal pain but is much improved from yesterday. Denies NV, fever, chills, melena. She is tolerating liquid diet with no issues. Hgb 10.9. Afebrile    Review of Systems   Constitutional:  Negative for chills and fever.   Gastrointestinal:  Positive for abdominal pain. Negative for abdominal distention, anal bleeding, blood in stool, constipation, diarrhea, nausea, rectal pain and vomiting.   Psychiatric/Behavioral:  Negative for confusion.      Objective:     Vital Signs (Most Recent):  Temp: 97.5 °F (36.4 °C) (04/15/25 0729)  Pulse: 69 (04/15/25 0729)  Resp: 17 (04/15/25 0729)  BP: (!) 100/55 (04/15/25 0729)  SpO2: 96 % (04/15/25 0729) Vital Signs (24h Range):  Temp:  [97.5 °F (36.4 °C)-98.1 °F (36.7 °C)] 97.5 °F  "(36.4 °C)  Pulse:  [65-91] 69  Resp:  [16-18] 17  SpO2:  [94 %-100 %] 96 %  BP: ()/(55-77) 100/55     Weight: 96.1 kg (211 lb 13.8 oz) (04/14/25 1157)  Body mass index is 38.75 kg/m².    No intake or output data in the 24 hours ending 04/15/25 1134    Lines/Drains/Airways       None                    Physical Exam  Constitutional:       General: She is not in acute distress.     Appearance: Normal appearance. She is not ill-appearing.   Eyes:      General: No scleral icterus.  Abdominal:      General: Abdomen is flat. Bowel sounds are normal. There is no distension.      Palpations: Abdomen is soft. There is no mass.      Tenderness: There is abdominal tenderness (upper abdominal TTP). There is no guarding or rebound.      Hernia: No hernia is present.   Skin:     General: Skin is warm and dry.      Coloration: Skin is not jaundiced or pale.   Neurological:      Mental Status: She is alert and oriented to person, place, and time. Mental status is at baseline.          Significant Labs:  CBC:   Recent Labs   Lab 04/15/25  0605   WBC 6.50   HGB 10.9*   HCT 33.9*        CMP:   Recent Labs   Lab 04/15/25  0739   CALCIUM 8.6*   ALBUMIN 3.2*      K 3.7   CO2 21*      BUN 5*   CREATININE 0.7   ALKPHOS 154*   ALT 24   AST 15   BILITOT 0.5     Lipase: No results for input(s): "LIPASE" in the last 48 hours.  Liver Function Test:   Recent Labs   Lab 04/15/25  0739   ALT 24   AST 15   ALKPHOS 154*   BILITOT 0.5   ALBUMIN 3.2*         Significant Imaging:  Imaging results within the past 24 hours have been reviewed.  "

## 2025-04-22 ENCOUNTER — PATIENT MESSAGE (OUTPATIENT)
Dept: ADMINISTRATIVE | Facility: OTHER | Age: 45
End: 2025-04-22
Payer: MEDICAID

## 2025-04-22 DIAGNOSIS — Q85.89 PEUTZ-JEGHERS SYNDROME: Primary | ICD-10-CM

## 2025-04-25 ENCOUNTER — CLINICAL SUPPORT (OUTPATIENT)
Dept: FAMILY MEDICINE | Facility: CLINIC | Age: 45
End: 2025-04-25
Payer: MEDICAID

## 2025-04-25 DIAGNOSIS — D72.829 LEUKOCYTOSIS, UNSPECIFIED TYPE: ICD-10-CM

## 2025-04-25 DIAGNOSIS — L50.9 URTICARIA, UNSPECIFIED: ICD-10-CM

## 2025-04-25 LAB
ABSOLUTE EOSINOPHIL (OHS): 0.54 K/UL
ABSOLUTE MONOCYTE (OHS): 0.81 K/UL (ref 0.3–1)
ABSOLUTE NEUTROPHIL COUNT (OHS): 5.58 K/UL (ref 1.8–7.7)
BASOPHILS # BLD AUTO: 0.13 K/UL
BASOPHILS NFR BLD AUTO: 1.3 %
ERYTHROCYTE [DISTWIDTH] IN BLOOD BY AUTOMATED COUNT: 13.2 % (ref 11.5–14.5)
HCT VFR BLD AUTO: 37.3 % (ref 37–48.5)
HGB BLD-MCNC: 11.9 GM/DL (ref 12–16)
IMM GRANULOCYTES # BLD AUTO: 0.15 K/UL (ref 0–0.04)
IMM GRANULOCYTES NFR BLD AUTO: 1.5 % (ref 0–0.5)
LYMPHOCYTES # BLD AUTO: 3.04 K/UL (ref 1–4.8)
MCH RBC QN AUTO: 26.9 PG (ref 27–31)
MCHC RBC AUTO-ENTMCNC: 31.9 G/DL (ref 32–36)
MCV RBC AUTO: 84 FL (ref 82–98)
NUCLEATED RBC (/100WBC) (OHS): 0 /100 WBC
PLATELET # BLD AUTO: 384 K/UL (ref 150–450)
PMV BLD AUTO: 10.4 FL (ref 9.2–12.9)
RBC # BLD AUTO: 4.42 M/UL (ref 4–5.4)
RELATIVE EOSINOPHIL (OHS): 5.3 %
RELATIVE LYMPHOCYTE (OHS): 29.7 % (ref 18–48)
RELATIVE MONOCYTE (OHS): 7.9 % (ref 4–15)
RELATIVE NEUTROPHIL (OHS): 54.3 % (ref 38–73)
WBC # BLD AUTO: 10.25 K/UL (ref 3.9–12.7)

## 2025-04-25 PROCEDURE — 85025 COMPLETE CBC W/AUTO DIFF WBC: CPT

## 2025-04-25 RX ORDER — MONTELUKAST SODIUM 10 MG/1
10 TABLET ORAL NIGHTLY
Qty: 30 TABLET | Refills: 11 | Status: SHIPPED | OUTPATIENT
Start: 2025-04-25

## 2025-05-06 ENCOUNTER — PATIENT MESSAGE (OUTPATIENT)
Dept: INTERNAL MEDICINE | Facility: CLINIC | Age: 45
End: 2025-05-06
Payer: MEDICAID

## 2025-05-14 DIAGNOSIS — E55.9 VITAMIN D DEFICIENCY: ICD-10-CM

## 2025-05-15 RX ORDER — ERGOCALCIFEROL 1.25 MG/1
50000 CAPSULE ORAL
Qty: 12 CAPSULE | Refills: 1 | Status: SHIPPED | OUTPATIENT
Start: 2025-05-15

## 2025-05-16 DIAGNOSIS — K21.9 GASTROESOPHAGEAL REFLUX DISEASE WITHOUT ESOPHAGITIS: ICD-10-CM

## 2025-05-19 ENCOUNTER — OFFICE VISIT (OUTPATIENT)
Dept: FAMILY MEDICINE | Facility: CLINIC | Age: 45
End: 2025-05-19
Payer: MEDICAID

## 2025-05-19 VITALS
SYSTOLIC BLOOD PRESSURE: 120 MMHG | WEIGHT: 209 LBS | HEIGHT: 62 IN | OXYGEN SATURATION: 98 % | RESPIRATION RATE: 18 BRPM | HEART RATE: 104 BPM | DIASTOLIC BLOOD PRESSURE: 84 MMHG | BODY MASS INDEX: 38.46 KG/M2
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
## 2025-05-19 DIAGNOSIS — L03.116 CELLULITIS OF LEFT LOWER EXTREMITY: ICD-10-CM

## 2025-05-19 DIAGNOSIS — S80.862A INSECT BITE OF LEFT LOWER LEG, INITIAL ENCOUNTER: Primary | ICD-10-CM

## 2025-05-19 DIAGNOSIS — G43.009 MIGRAINE WITHOUT AURA AND WITHOUT STATUS MIGRAINOSUS, NOT INTRACTABLE: ICD-10-CM

## 2025-05-19 PROCEDURE — 3079F DIAST BP 80-89 MM HG: CPT | Mod: CPTII,,, | Performed by: FAMILY MEDICINE

## 2025-05-19 PROCEDURE — 99999 PR PBB SHADOW E&M-EST. PATIENT-LVL IV: CPT | Mod: PBBFAC,,, | Performed by: FAMILY MEDICINE

## 2025-05-19 PROCEDURE — 3008F BODY MASS INDEX DOCD: CPT | Mod: CPTII,,, | Performed by: FAMILY MEDICINE

## 2025-05-19 PROCEDURE — 99214 OFFICE O/P EST MOD 30 MIN: CPT | Mod: S$PBB,,, | Performed by: FAMILY MEDICINE

## 2025-05-19 PROCEDURE — 99214 OFFICE O/P EST MOD 30 MIN: CPT | Mod: PBBFAC | Performed by: FAMILY MEDICINE

## 2025-05-19 PROCEDURE — 3074F SYST BP LT 130 MM HG: CPT | Mod: CPTII,,, | Performed by: FAMILY MEDICINE

## 2025-05-19 PROCEDURE — 99999PBSHW PR PBB SHADOW TECHNICAL ONLY FILED TO HB: Mod: PBBFAC,,,

## 2025-05-19 PROCEDURE — 1159F MED LIST DOCD IN RCRD: CPT | Mod: CPTII,,, | Performed by: FAMILY MEDICINE

## 2025-05-19 PROCEDURE — 1160F RVW MEDS BY RX/DR IN RCRD: CPT | Mod: CPTII,,, | Performed by: FAMILY MEDICINE

## 2025-05-19 PROCEDURE — 96372 THER/PROPH/DIAG INJ SC/IM: CPT | Mod: PBBFAC

## 2025-05-19 RX ORDER — BUTALBITAL, ACETAMINOPHEN AND CAFFEINE 50; 325; 40 MG/1; MG/1; MG/1
1 TABLET ORAL EVERY 6 HOURS PRN
Qty: 30 TABLET | Refills: 5 | Status: SHIPPED | OUTPATIENT
Start: 2025-05-19 | End: 2025-05-20 | Stop reason: SDUPTHER

## 2025-05-19 RX ORDER — TRIAMCINOLONE ACETONIDE 1 MG/G
CREAM TOPICAL 2 TIMES DAILY
Qty: 15 G | Refills: 2 | Status: SHIPPED | OUTPATIENT
Start: 2025-05-19

## 2025-05-19 RX ORDER — BETAMETHASONE SODIUM PHOSPHATE AND BETAMETHASONE ACETATE 3; 3 MG/ML; MG/ML
6 INJECTION, SUSPENSION INTRA-ARTICULAR; INTRALESIONAL; INTRAMUSCULAR; SOFT TISSUE
Status: COMPLETED | OUTPATIENT
Start: 2025-05-19 | End: 2025-05-19

## 2025-05-19 RX ORDER — PANTOPRAZOLE SODIUM 40 MG/1
40 TABLET, DELAYED RELEASE ORAL
Qty: 90 TABLET | Refills: 3 | Status: SHIPPED | OUTPATIENT
Start: 2025-05-19

## 2025-05-19 RX ORDER — DOXYCYCLINE HYCLATE 100 MG
100 TABLET ORAL EVERY 12 HOURS
Qty: 14 TABLET | Refills: 0 | Status: SHIPPED | OUTPATIENT
Start: 2025-05-19

## 2025-05-19 RX ADMIN — BETAMETHASONE ACETATE AND BETAMETHASONE SODIUM PHOSPHATE 6 MG: 3; 3 INJECTION, SUSPENSION INTRA-ARTICULAR; INTRALESIONAL; INTRAMUSCULAR; SOFT TISSUE at 02:05

## 2025-05-19 NOTE — PROGRESS NOTES
"Subjective:       Patient ID: Staci Hodge is a 44 y.o. female.    Chief Complaint: Foot Swelling (Pt is here for swelling in the foot. Pt states she started with swelling in the feet yesterday morning. Pt is taking IBU OTC.) and Insect Bite (Pt also is having swelling on her thigh. Pt called this morning stating she suffered a insect bite. Pt states she is unsure of what caused the swelling to her thigh pt states she did use Benadryl OTC)    HPI  History of Present Illness    CHIEF COMPLAINT:  Patient presents today for evaluation of insect bites with allergic reaction    CURRENT INSECT BITES:  She presents with two active insect bites. The first occurred this morning on the left back thigh, with extensive spreading around the thigh area and weeping at the site. The second bite from yesterday is on the left second toe, causing foot swelling with visible shoe markings. She reports the toe bite pain is more intense than the thigh bite, describing it as feeling like "1000 ants." She did not visualize the insects that caused either bite.    PAST MEDICAL HISTORY:  She has a history of severe allergic reaction to an insect bite that resulted in prolonged bruising from knee to ankle lasting over a month. During this previous incident, she experienced a sensation of fluid flooding her pant leg accompanied by three distinct "pops." She also reports chronic dry mouth as a medication side effect.      ROS:  General: -fever, -chills, -fatigue, -weight gain, -weight loss, +dry mouth  Eyes: -vision changes, -redness, -discharge  ENT: -ear pain, -nasal congestion, -sore throat  Cardiovascular: -chest pain, -palpitations, +lower extremity edema  Respiratory: -cough, -shortness of breath  Gastrointestinal: -abdominal pain, -nausea, -vomiting, -diarrhea, -constipation, -blood in stool  Genitourinary: -dysuria, -hematuria, -frequency  Musculoskeletal: -joint pain, -muscle pain  Skin: -rash, -lesion, +insect " bites  Neurological: -headache, -dizziness, -numbness, -tingling  Psychiatric: +anxiety, -depression, -sleep difficulty, +nervousness  Allergic: +allergic reactions, +anaphylactic reactions       Review of Systems    Objective:      Vitals:    05/19/25 1356   BP: 120/84   Pulse: 104   Resp: 18     Physical Exam    Physical Exam    General: No acute distress. Well-developed. Well-nourished.  Eyes: EOMI. Sclerae anicteric.  HENT: Normocephalic. Atraumatic. Nares patent. Moist oral mucosa.  Ears: Bilateral TMs clear. Bilateral EACs clear.  Cardiovascular: Regular rate. Regular rhythm. No murmurs. No rubs. No gallops. Normal S1, S2.  Respiratory: Normal respiratory effort. Clear to auscultation bilaterally. No rales. No rhonchi. No wheezing.  Abdomen: Soft. Non-tender. Non-distended. Normoactive bowel sounds.  Musculoskeletal: No  obvious deformity.  Extremities: No lower extremity edema.  Neurological: Alert & oriented x3. No slurred speech. Normal gait.  Psychiatric: Normal mood. Normal affect. Good insight. Good judgment.  Skin: Warm. Dry. No rash. Ecchymosis from a deer fly bite. Weepy appearance of skin.       Assessment:       1. Insect bite of left lower leg, initial encounter    2. Cellulitis of left lower extremity        Plan:   1. Insect bite of left lower leg, initial encounter  -     betamethasone acetate-betamethasone sodium phosphate injection 6 mg  -     triamcinolone acetonide 0.1% (KENALOG) 0.1 % cream; Apply topically 2 (two) times daily.  Dispense: 15 g; Refill: 2    2. Cellulitis of left lower extremity  -     doxycycline (VIBRA-TABS) 100 MG tablet; Take 1 tablet (100 mg total) by mouth every 12 (twelve) hours.  Dispense: 14 tablet; Refill: 0    Assessment & Plan    T63.481A Toxic effect of venom of other arthropod, accidental (unintentional), initial encounter  K11.7 Disturbances of salivary secretion    TOXIC EFFECT OF INSECT BITE:  - Assessed significant ecchymosis and swelling indicative of  allergic response to deer fly or horsefly bites.  - Bite areas appear weepy and inflamed, suggesting potential infection risk.  - Explained that insect bites contain toxins that can damage small blood vessels, causing inflammation and bruising, with some individuals experiencing substantial allergic reactions.  - Treatment plan includes: 1) Cortisone cream for topical application to affected areas, 2) Cortisone injection for systemic relief of allergic reaction, 3) Doxycycline 100 mg twice daily with food to prevent infection.  - Patient instructed to monitor for increased swelling, redness, or drainage that could indicate worsening infection.    DISTURBANCES OF SALIVARY SECRETION:  - Patient reports chronic dry mouth from medications.  - Recommend adequate hydration and use of saliva substitutes as needed.    FOLLOW-UP:  - Will monitor both conditions at next follow-up visit.

## 2025-05-20 ENCOUNTER — TELEPHONE (OUTPATIENT)
Dept: INTERNAL MEDICINE | Facility: CLINIC | Age: 45
End: 2025-05-20
Payer: MEDICAID

## 2025-05-20 DIAGNOSIS — G43.009 MIGRAINE WITHOUT AURA AND WITHOUT STATUS MIGRAINOSUS, NOT INTRACTABLE: ICD-10-CM

## 2025-05-20 RX ORDER — BUTALBITAL, ACETAMINOPHEN AND CAFFEINE 50; 325; 40 MG/1; MG/1; MG/1
1 TABLET ORAL EVERY 6 HOURS PRN
Qty: 30 TABLET | Refills: 5 | Status: SHIPPED | OUTPATIENT
Start: 2025-05-20 | End: 2025-05-22 | Stop reason: SDUPTHER

## 2025-05-20 NOTE — TELEPHONE ENCOUNTER
----- Message from Taylor sent at 5/20/2025 10:27 AM CDT -----  Contact: Pt  Staci HodgeMRN: [id number]: 1980PCP: Shilpi ClaytonHome Phone      [phone] Phone      Not on file.Mobile          [phone]: Pt states she needs a refill of butalbital-acetaminophen-caffeine -40 mg (FIORICET, ESGIC) -40 mg per tablet sent to [facility]. Staci [phone]

## 2025-05-22 ENCOUNTER — TELEPHONE (OUTPATIENT)
Dept: INTERNAL MEDICINE | Facility: CLINIC | Age: 45
End: 2025-05-22
Payer: MEDICAID

## 2025-05-22 DIAGNOSIS — G43.009 MIGRAINE WITHOUT AURA AND WITHOUT STATUS MIGRAINOSUS, NOT INTRACTABLE: ICD-10-CM

## 2025-05-22 RX ORDER — BUTALBITAL, ACETAMINOPHEN AND CAFFEINE 50; 325; 40 MG/1; MG/1; MG/1
1 TABLET ORAL EVERY 6 HOURS PRN
Qty: 30 TABLET | Refills: 5 | Status: SHIPPED | OUTPATIENT
Start: 2025-05-22

## 2025-05-22 NOTE — TELEPHONE ENCOUNTER
----- Message from Med Assistant Blackman sent at 5/22/2025  8:18 AM CDT -----  Contact: Pt    ----- Message -----  From: Taylor Jonas  Sent: 5/22/2025   7:14 AM CDT  To: [facility] Staff    Staci Celejace HodgeMRN: [id number]: 1980PCP: Shilpi Clayton.Home Phone      [phone] Phone      Not on file.Mobile          [phone]: Pt states butalbital-acetaminophen-caffeine -40 mg (FIORICET, ESGIC) -40 mg per tablet isn't going through to [facility]. They told her it needs to be faxed due to not receiving it. [phone]

## 2025-05-28 ENCOUNTER — TELEPHONE (OUTPATIENT)
Dept: PAIN MEDICINE | Facility: CLINIC | Age: 45
End: 2025-05-28
Payer: MEDICAID

## 2025-05-28 ENCOUNTER — OFFICE VISIT (OUTPATIENT)
Dept: ORTHOPEDICS | Facility: CLINIC | Age: 45
End: 2025-05-28
Payer: MEDICAID

## 2025-05-28 DIAGNOSIS — G56.02 LEFT CARPAL TUNNEL SYNDROME: Primary | ICD-10-CM

## 2025-05-28 PROCEDURE — 99213 OFFICE O/P EST LOW 20 MIN: CPT | Mod: S$PBB,,, | Performed by: ORTHOPAEDIC SURGERY

## 2025-05-28 NOTE — PROGRESS NOTES
Subjective:      Patient ID: Staci Hodge is a 44 y.o. female.  Chief Complaint: Pain of the Left Hand      HPI  Staci Hodge is a  44 y.o. female presenting today for follow up of left hand symptoms after previous carpal tunnel release.  She reports that she is having some sensitivity in the hand and interestingly enough having some numbness and tingling which starts at her shoulder and radiates down to the left elbow but not the fingers   She has completed physical therapy in his currently using a squeeze ball.    Review of patient's allergies indicates:  No Known Allergies      Current Medications[1]    Past Medical History:   Diagnosis Date    Abnormal Pap smear of cervix age 25    no treatement    Anxiety     Breast cancer     Cancer 07/11/2018    breast cancer    Cervical spondylosis 2/9/2018    Colon polyp     Depression     Ectopic pregnancy 2/26/2019    General anesthetics causing adverse effect in therapeutic use     Slow to awaken/ Memory problems-post-op to day 3 after Mastectomy    Headache     History of psychiatric hospitalization     age 20 for SI    Hx of psychiatric care     Hypertension     Peutz-Jeghers syndrome     PONV (postoperative nausea and vomiting)     Psychiatric problem     Right carpal tunnel syndrome 3/9/2017    Self-harming behavior     Suicide attempt     Therapy        Past Surgical History:   Procedure Laterality Date    ADENOIDECTOMY      ANTEGRADE SINGLE BALLOON ENTEROSCOPY N/A 04/19/2021    Procedure: ENTEROSCOPY, SINGLE BALLOON, ANTEGRADE;  Surgeon: Vincenzo Herbert MD;  Location: [facility] ([address] [place] [address]);  Service: Endoscopy;  Laterality: N/A;  removal of large polyp in proximal jejunum; please schedule during a time when Dr. Dillon is available in case I need assistance with removal or 2nd opinion prior to removal    COVID at [place] 4/16 - ttr    APPENDECTOMY      AUGMENTATION OF BREAST      BILATERAL MASTECTOMY Bilateral 07/23/2018    Procedure: MASTECTOMY 23  hr stay;  Surgeon: Sofia Kendrick MD;  Location: [facility] 2ND FLR;  Service: General;  Laterality: Bilateral;    BILATERAL SALPINGO-OOPHORECTOMY (BSO) Bilateral 03/04/2021    Procedure: SALPINGO-OOPHORECTOMY, BILATERAL;  Surgeon: Clayton Vilchis MD;  Location: [facility];  Service: OB/GYN;  Laterality: Bilateral;    BOWEL RESECTION  10/17/2014    BREAST BIOPSY Left 06/04/2018    BREAST CAPSULOTOMY Left 04/25/2019    Procedure: CAPSULOTOMY, BREAST LEFT;  Surgeon: Duane Bello MD;  Location: [facility] 2ND FLR;  Service: Plastics;  Laterality: Left;    BREAST RECONSTRUCTION      BREAST SURGERY      Bilateral Mastectomy 07/23/2018    CARPAL TUNNEL RELEASE Right 2016    CARPAL TUNNEL RELEASE Left 11/27/2024    Procedure: RELEASE, CARPAL TUNNEL;  Surgeon: Daniel Bosch Jr., MD;  Location: [facility];  Service: Orthopedics;  Laterality: Left;    COLONOSCOPY      COLONOSCOPY N/A 02/25/2021    Procedure: COLONOSCOPY;  Surgeon: Vincenzo Herbert MD;  Location: [facility] (2ND FLR);  Service: Endoscopy;  Laterality: N/A;  previous anesthesia complications  okay for this date/time per Dr. Herbert and liliana with [facility]  COVID test on 2/22/21 at [facility]    COLONOSCOPY N/A 07/08/2024    Procedure: COLONOSCOPY;  Surgeon: iVncenzo Herbert MD;  Location: [facility] (2ND FLR);  Service: Endoscopy;  Laterality: N/A;  Ref By:parvin Cruz sent via [employer].  6/27-pre call complete-tb-2nd floor for availability-, Peutz-Jaegar syndrome    DILATION AND CURETTAGE OF UTERUS      DILATION AND CURETTAGE OF UTERUS USING SUCTION N/A 02/25/2019    Procedure: DILATION AND CURETTAGE, UTERUS, USING SUCTION  PATHOLOGY NEEDED;  Surgeon: Pam Sifuentes MD;  Location: [facility];  Service: OB/GYN;  Laterality: N/A;    ENDOSCOPIC ULTRASOUND OF UPPER GASTROINTESTINAL TRACT Left 01/15/2021    Procedure: ULTRASOUND, UPPER GI TRACT, ENDOSCOPIC;  Surgeon: Chandan Dillon MD;  Location: [facility];  Service: Endoscopy;  Laterality:  Left;  for pancreatic screening    ENDOSCOPIC ULTRASOUND OF UPPER GASTROINTESTINAL TRACT N/A 04/26/2024    Procedure: ULTRASOUND, UPPER GI TRACT, ENDOSCOPIC;  Surgeon: Chandan Dillon MD;  Location: [facility] ENDO;  Service: Endoscopy;  Laterality: N/A;  4/8/24: instuctions sent via portal-GD  4/19-precall complete-MS    ESOPHAGOGASTRODUODENOSCOPY N/A 01/15/2021    Procedure: EGD (ESOPHAGOGASTRODUODENOSCOPY);  Surgeon: Chandan Dillon MD;  Location: [facility] ENDO;  Service: Endoscopy;  Laterality: N/A;  with push enteroscopy    ESOPHAGOGASTRODUODENOSCOPY N/A 05/17/2021    Procedure: EGD (ESOPHAGOGASTRODUODENOSCOPY);  Surgeon: Chandan Dillon MD;  Location: [facility] (2ND FLR);  Service: Endoscopy;  Laterality: N/A;  Please schedule this duodenal polypectomy. Need to be a day that Dr Jose Antonio Stark is in the OR. 90 minutes.   Chandan Dillon MD     COVID at [facility] 5/15 (scheduled in the OR for 5/18 - using same COVID results for both procedures) ttr    ESOPHAGOGASTRODUODENOSCOPY N/A 01/25/2022    Procedure: EGD (ESOPHAGOGASTRODUODENOSCOPY);  Surgeon: Chandan Dillon MD;  Location: [facility] (2ND FLR);  Service: Endoscopy;  Laterality: N/A;  poss emr  covid-1/22/22-STAH-BB  instructions sent via portal-BB    ESOPHAGOGASTRODUODENOSCOPY N/A 03/10/2023    Procedure: EGD (ESOPHAGOGASTRODUODENOSCOPY);  Surgeon: Chandan Dillon MD;  Location: [facility] (2ND FLR);  Service: Endoscopy;  Laterality: N/A;  inst via portal  called to confirm and msg that phone not accepting calls 3/6 mal    ESOPHAGOGASTRODUODENOSCOPY N/A 03/22/2024    Procedure: EGD (ESOPHAGOGASTRODUODENOSCOPY);  Surgeon: Chandan Dillon MD;  Location: [facility] (2ND FLR);  Service: Endoscopy;  Laterality: N/A;  EGD in 1 year. [place]  instr portal-tb  3/19-precall complete-MS    ESOPHAGOGASTRODUODENOSCOPY (EGD) WITH ENDOSCOPIC MUCOSAL RESECTION N/A 4/14/2025    Procedure: EGD, WITH ENDOSCOPIC MUCOSAL RESECTION;  Surgeon: Henny Carrillo MD;   Location: [facility] ENDO (2ND FLR);  Service: Endoscopy;  Laterality: N/A;  4/7: EGD possible EMR- March 2025 for Peutz- Jeghers syndrome RR-instructions sent via portal-  4/8 precall complete, JL    FAT TRANSFER Bilateral 11/12/2018    Procedure: TRANSFER, FAT TISSUE BILATERAL BREAST;  Surgeon: Duane Bello MD;  [facility];  Service: Plastics;  Laterality: Bilateral;    INJECTION FOR SENTINEL NODE IDENTIFICATION Left 07/23/2018    Procedure: INJECTION, FOR SENTINEL NODE IDENTIFICATION;  Surgeon: Sofia Kendrick MD;  Location: [facility] OR [address] [place] [address];  Service: General;  Laterality: Left;    INJECTION OF ANESTHETIC AGENT AROUND MEDIAL BRANCH NERVES INNERVATING CERVICAL FACET JOINT Left 12/16/2024    Procedure: CERVICAL MEDIAL BRANCH NERVE BLOCK (C2,3,4,TON);  Surgeon: Shahid Almonte MD;  Location: [facility];  Service: Pain Management;  Laterality: Left;    INSERTION OF BREAST TISSUE EXPANDER Bilateral 07/23/2018    Procedure: INSERTION, TISSUE EXPANDER, BREAST;  Surgeon: Duane Bello MD;  Location: [address];  Service: Plastics;  Laterality: Bilateral;    INSERTION OF BREAST TISSUE EXPANDER Bilateral 03/10/2020    Procedure: INSERTION, TISSUE EXPANDER, BREAST, BILATERAL;  Surgeon: Dunae Bello MD;  Location: [address];  Service: Plastics;  Laterality: Bilateral;    intestinal polpy      LYSIS OF ADHESIONS N/A 03/04/2021    Procedure: LYSIS, ADHESIONS;  Surgeon: Clayton Vilchis MD;  Location: [facility];  Service: OB/GYN;  Laterality: N/A;  Omental adhesions    MASTECTOMY      REMOVAL OF BREAST IMPLANT Bilateral 03/10/2020    Procedure: REMOVAL, IMPLANT, BREAST, BILATERAL;  Surgeon: Duane Bello MD;  Location: [address];  Service: Plastics;  Laterality: Bilateral;    REMOVAL OF BREAST IMPLANT Bilateral 07/07/2020    Procedure: REMOVAL, IMPLANT, BREAST BILATERAL;  Surgeon: Duane Bello MD;  Location: [facility] OR [address] [place] [address];  Service: Plastics;   Laterality: Bilateral;    SENTINEL LYMPH NODE BIOPSY Left 07/23/2018    Procedure: BIOPSY, LYMPH NODE, SENTINEL;  Surgeon: Sofia Kendrick MD;  Location: [address];  Service: General;  Laterality: Left;    SMALL INTESTINE SURGERY      3 surgeries     TONSILLECTOMY      TOTAL ABDOMINAL HYSTERECTOMY N/A 03/04/2021    Procedure: HYSTERECTOMY, TOTAL, ABDOMINAL;  Surgeon: Clayton Vilchis MD;  Location: [facility];  Service: OB/GYN;  Laterality: N/A;    UPPER GASTROINTESTINAL ENDOSCOPY         OBJECTIVE:   PHYSICAL EXAM:       There were no vitals filed for this visit.  Ortho/SPM Exam  Indication left hand the incision well healed  No evidence of infection no swelling   Mild sensitivity in the palm   Range of motion fingers full sensation intact   No atrophy  strength intact    RADIOGRAPHS:  None  Comments: I have personally reviewed the imaging and I agree with the above radiologist's report.    ASSESSMENT/PLAN:     IMPRESSION:  1. Mild sensitivity after previous carpal tunnel release.      2. No evidence of CRPS.      3. Probable superimposed cervical disc disease affecting left arm      PLAN:  I explained to the patient that I believe her symptoms are most likely coming from her neck and I would recommend that she follow up with pain management for that or possibly neuro surgery   Continue anti-inflammatory medication by mouth   I have given her a compounding cream for topical use on the left palm which should help with the sensitivity    FOLLOW UP:  1-2 months    Disclaimer: This note has been generated using voice-recognition software. There may be typographical errors that have been missed during proof-reading.          [1]   Current Outpatient Medications   Medication Sig Dispense Refill    butalbital-acetaminophen-caffeine -40 mg (FIORICET, ESGIC) -40 mg per tablet Take 1 tablet by mouth every 6 (six) hours as needed for Headaches. 30 tablet 5    CAPLYTA 42 mg Cap 1 capsule.      clonazePAM  (KLONOPIN) 1 MG tablet Take 1 mg by mouth 2 (two) times daily.      doxycycline (VIBRA-TABS) 100 MG tablet Take 1 tablet (100 mg total) by mouth every 12 (twelve) hours. 14 tablet 0    ergocalciferol (ERGOCALCIFEROL) 50,000 unit Cap TAKE ONE CAPSULE BY MOUTH EVERY 7 DAYS 12 capsule 1    fluticasone propionate (FLONASE) 50 mcg/actuation nasal spray 2 sprays (100 mcg total) by Each Nostril route once daily. 16 g 2    folic acid (FOLVITE) 1 MG tablet TAKE ONE TABLET BY MOUTH ONCE A DAY 30 tablet 5    galcanezumab-gnlm (EMGALITY PEN) 120 mg/mL PnIj Inject one syringe every 28 days for migraine prevention. 1 mL 11    lisdexamfetamine (VYVANSE) 70 MG capsule Take 70 mg by mouth.      lithium (LITHOBID) 300 MG CR tablet Take 300 mg by mouth every morning.      montelukast (SINGULAIR) 10 mg tablet TAKE ONE TABLET BY MOUTH ONCE A DAY IN THE EVENING 30 tablet 11    pantoprazole (PROTONIX) 40 MG tablet TAKE ONE TABLET BY MOUTH ONCE A DAY 90 tablet 3    propranoloL (INDERAL) 20 MG tablet Take 1 tablet (20 mg total) by mouth 2 (two) times daily. 60 tablet 1    sertraline (ZOLOFT) 25 MG tablet Take 25 mg by mouth.      sucralfate (CARAFATE) 1 gram tablet Take 1 tablet (1 g total) by mouth 2 (two) times daily. 60 tablet 0    triamcinolone acetonide 0.1% (KENALOG) 0.1 % cream Apply topically 2 (two) times daily. 15 g 2    traZODone (DESYREL) 150 MG tablet Take 1 tablet (150 mg total) by mouth every evening. 30 tablet 1     No current facility-administered medications for this visit.     Facility-Administered Medications Ordered in Other Visits   Medication Dose Route Frequency Provider Last Rate Last Admin    0.9%  NaCl infusion   Intravenous Continuous Chandan Dillon MD 20 mL/hr at 01/25/22 0847 New Bag at 01/25/22 0847    sodium chloride 0.9% flush 10 mL  10 mL Intravenous PRN Chandan Dillon MD

## 2025-05-29 ENCOUNTER — OFFICE VISIT (OUTPATIENT)
Dept: PAIN MEDICINE | Facility: CLINIC | Age: 45
End: 2025-05-29
Payer: MEDICAID

## 2025-05-29 VITALS
SYSTOLIC BLOOD PRESSURE: 116 MMHG | WEIGHT: 210 LBS | HEART RATE: 98 BPM | DIASTOLIC BLOOD PRESSURE: 72 MMHG | HEIGHT: 62 IN | OXYGEN SATURATION: 98 % | BODY MASS INDEX: 38.64 KG/M2

## 2025-05-29 DIAGNOSIS — M25.78 DEGENERATION OF INTERVERTEBRAL DISC OF CERVICAL REGION WITH OSTEOPHYTE OF CERVICAL VERTEBRA: ICD-10-CM

## 2025-05-29 DIAGNOSIS — R20.0 LEFT ARM NUMBNESS: ICD-10-CM

## 2025-05-29 DIAGNOSIS — M54.12 CERVICAL RADICULITIS: Primary | ICD-10-CM

## 2025-05-29 DIAGNOSIS — M50.30 DEGENERATION OF INTERVERTEBRAL DISC OF CERVICAL REGION WITH OSTEOPHYTE OF CERVICAL VERTEBRA: ICD-10-CM

## 2025-05-29 DIAGNOSIS — M47.812 ARTHROPATHY OF CERVICAL FACET JOINT: ICD-10-CM

## 2025-05-29 DIAGNOSIS — M79.18 CERVICAL MYOFASCIAL PAIN SYNDROME: ICD-10-CM

## 2025-05-29 PROCEDURE — 99999 PR PBB SHADOW E&M-EST. PATIENT-LVL IV: CPT | Mod: PBBFAC,,, | Performed by: ANESTHESIOLOGY

## 2025-05-29 PROCEDURE — 99214 OFFICE O/P EST MOD 30 MIN: CPT | Mod: PBBFAC | Performed by: ANESTHESIOLOGY

## 2025-05-29 RX ORDER — VILAZODONE HYDROCHLORIDE 20 MG/1
20 TABLET ORAL DAILY
COMMUNITY
Start: 2025-05-29

## 2025-05-29 RX ORDER — GABAPENTIN 300 MG/1
300 CAPSULE ORAL 3 TIMES DAILY
Qty: 90 CAPSULE | Refills: 1 | Status: SHIPPED | OUTPATIENT
Start: 2025-05-29 | End: 2025-07-28

## 2025-05-29 NOTE — PROGRESS NOTES
EST Patient Evaluation  [facility]    Staci Hodge  : 1980  Date: 2025     CHIEF COMPLAINT:  Neck Pain, Arm Pain, Hand Pain, and Headache    Referring Physician: No ref. provider found  Primary Care Physician: Shilpi Clayton NP    HPI:  This is a 44 y.o. female with a chief complaint of Neck Pain, Arm Pain, Hand Pain, and Headache  . The patient has Past medical history/Past surgical history of  breast cancer, tobacco use disorder, chronic neck pain, bilateral carpal tunnel syndrome    Patient was evaluated and referred by  neurology for neck pain, she was previously seen by Grey brandt and Dr Chapin for neck pain, she had bilateral cervical medial branch block and cervical epidural steroid injection with no relief.    Interval History 2024:  Staci Hodge is here  to discuss MRI cervical spine that showed Mild-to-moderate degenerative changes of the cervical spine, most pronounced at C5-6 and C6-C7 and resulting in mild spinal canal stenosis and moderate to severe bilateral neural foraminal stenosis at C5-6 and moderate left neural foraminal stenosis at C6-7.  Current pain score: 3/10    Interval History 2025  Staci Hodge is here for procedure follow up visit after left cervical medial branch block TON, C3 and C4,  patient reported 0 % improvement in pain and functionality.  Current pain score: 4/10      Interval History 2025:  Staci Hodge is here for  follow up visit she was recently seen by Daniel Bosch Jr., MD, Hand Surgery,  for left hand symptoms after carpal tunnel release, she continues to have sensitivity in her hand,  in addition to numbness /tingling of the left shoulder to the elbow  Current pain score: 6/10    Diabetic: No    Anticoagulation medications: None    Allergy To Iodine: No    Currently on Antibiotic: No    Current Description of Pain Symptoms:    History of Recent Fall or Trauma: No    Onset: Chronic  Pain Location: neck and occipital headache   Radiates/associated symptoms: numbness in the shoulder, elbow and forearm   Pain in the left hand due to scar from CTS  Pain is Getting worse over the last  2 weeks    The pain is described as aching, numbing, sharp, and tingling.   Exacerbating factors: Sitting, Standing, Laying, Lifting, and Getting out of bed/chair.   Mitigating factors N/A.   Symptoms interfere with daily activity, sleeping.   The patient feels like symptoms have been worsening.   Patient denies night fever/night sweats, urinary incontinence, bowel incontinence, significant weight loss, significant motor weakness, and loss of sensations.  Pain score:  Best: 3/10  Worst: 10/10    Current pain medications:    butalbital-acetaminophen-caffeine -40 mg (FIORICET, ESGIC) q6h prn   OTC  Current Narcotics/Opioid /benzo Medications:  Opioids- None  Benzodiazepines: Yes    UDS:  NA    PDMP:  Reviewed and consistent with medication use as prescribed.     Previous Chronic Pain Treatment History:  Physical Therapy/HEP/Physician Lead Exercise Program:  Over the past 12 months, Patient has done 4 sessions.  PT response: not noticing anything different from therapy   Dates of the PT sessions: 11/11,11/14,11/19,11/20 in 2024  Is patient participating in home exercise program (HEP)/ physician led exercise program: Yes.    Non-interventional Pain Therapy:  [x]Chiropractor: 05/2024: 12 sessions, mild relief  []Acupuncture/Dry needle.  []TENS unit.  [x]Heat/ICE.  []Back Brace.    Medications previously tried:  NSAIDs: Ibuprofen (Advil/Motrin) and Naproxen (Naprosyn)  Topical Agent: Yes  TCA/SSRI/SNRI: Cymbalta   Anti-convulsants: Gabapentin  and Lyrica : stopped due to SE  Muscle Relaxants: Flexeril (Cyclobenzaprine)  Opioids- None.    Interventional Pain Procedures:  -1/19/18 C7-T1 CERVICAL EPIDURAL STEROID INJECTION- 0% relief     -2/9/18 BILATERAL C3,4,5 CERVICAL FACET MEDIAL BRANCH NERVE BLOCK  (Prone) 0% relief    -12/16/2024:  left cervical medial branch block at TON, C3 and C4- No relief      Previous spine/Relevant joint surgery:  Right and left CTS, last surgery 11/2024  Surgical History:   has a past surgical history that includes Appendectomy; Adenoidectomy; Tonsillectomy; intestinal polpy; Colonoscopy; Upper gastrointestinal endoscopy; Small intestine surgery; Bowel resection (10/17/2014); Carpal tunnel release (Right, 2016); Dilation and curettage of uterus; Breast biopsy (Left, 06/04/2018); Bilateral mastectomy (Bilateral, 07/23/2018); Injection for sentinel node identification (Left, 07/23/2018); [place] lymph node biopsy (Left, 07/23/2018); Insertion of breast tissue expander (Bilateral, 07/23/2018); Fat transfer (Bilateral, 11/12/2018); Breast surgery; Dilation and curettage of uterus using suction (N/A, 02/25/2019); Breast reconstruction; Mastectomy; Augmentation of breast; Breast capsulotomy (Left, 04/25/2019); Removal of breast implant (Bilateral, 03/10/2020); Insertion of breast tissue expander (Bilateral, 03/10/2020); Removal of breast implant (Bilateral, 07/07/2020); Esophagogastroduodenoscopy (N/A, 01/15/2021); Endoscopic ultrasound of upper gastrointestinal tract (Left, 01/15/2021); Colonoscopy (N/A, 02/25/2021); Total abdominal hysterectomy (N/A, 03/04/2021); Bilateral salpingo-oophorectomy (BSO) (Bilateral, 03/04/2021); Lysis of adhesions (N/A, 03/04/2021); Antegrade single balloon enteroscopy (N/A, 04/19/2021); Esophagogastroduodenoscopy (N/A, 05/17/2021); Esophagogastroduodenoscopy (N/A, 01/25/2022); Esophagogastroduodenoscopy (N/A, 03/10/2023); Esophagogastroduodenoscopy (N/A, 03/22/2024); Endoscopic ultrasound of upper gastrointestinal tract (N/A, 04/26/2024); Colonoscopy (N/A, 07/08/2024); Carpal tunnel release (Left, 11/27/2024); Injection of anesthetic agent around medial branch nerves innervating cervical facet joint (Left, 12/16/2024); and Esophagogastroduodenoscopy (EGD)  with endoscopic mucosal resection (N/A, 4/14/2025).  Medical History:   has a past medical history of Abnormal Pap smear of cervix (age 25), Anxiety, Breast cancer, Cancer (07/11/2018), Cervical spondylosis (2/9/2018), Colon polyp, Depression, Ectopic pregnancy (2/26/2019), General anesthetics causing adverse effect in therapeutic use, Headache, History of psychiatric hospitalization, psychiatric care, Hypertension, Peutz-Jeghers syndrome, PONV (postoperative nausea and vomiting), Psychiatric problem, Right carpal tunnel syndrome (3/9/2017), Self-harming behavior, Suicide attempt, and Therapy.  Family History:  family history includes ADD / ADHD in her paternal uncle; Breast cancer in her paternal aunt and paternal grandmother; Cancer in her mother; Colon polyps in her sister; Dementia in her maternal grandfather and maternal grandmother; Depression in her paternal uncle; Hypertension in her father; No Known Problems in her cousin, maternal uncle, and paternal grandfather; Stomach cancer in her mother.  Allergies:  Patient has no known allergies.   Social History/SUBSTANCE ABUSE HISTORY:  Personal history of substance abuse: No   reports that she has been smoking cigarettes. She started smoking about 25 years ago. She has a 25.1 pack-year smoking history. She has never used smokeless tobacco. She reports current alcohol use of about 5.8 standard drinks of alcohol per week. She reports that she does not use drugs.  LABS:  CBC  Lab Results   Component Value Date    WBC 10.25 04/25/2025    HGB 11.9 (L) 04/25/2025    HCT 37.3 04/25/2025     Coagulation Profile   Lab Results   Component Value Date     04/25/2025       Lab Results   Component Value Date    INR 1.0 08/31/2022     CMP:  BMP  Lab Results   Component Value Date     04/15/2025    K 3.7 04/15/2025     04/15/2025    CO2 21 (L) 04/15/2025    BUN 5 (L) 04/15/2025    CREATININE 0.7 04/15/2025    CALCIUM 8.6 (L) 04/15/2025    ANIONGAP 7 (L)  "04/15/2025    EGFRNORACEVR >60 04/15/2025     Lab Results   Component Value Date    ALT 24 04/15/2025    AST 15 04/15/2025    ALKPHOS 154 (H) 04/15/2025    BILITOT 0.5 04/15/2025     HGBA1C:  Lab Results   Component Value Date    HGBA1C 5.3 10/09/2023     ROS:    Review of Systems   GENERAL:  No weight loss, malaise or fevers.  HEENT:   No recent changes in vision or hearing  NECK:  Negative for lumps, no difficulty with swallowing.  RESPIRATORY:  Negative for cough, wheezing or shortness of breath, patient denies any recent URI.  CARDIOVASCULAR:  Negative for chest pain or palpitations.  GI:  Negative for abdominal discomfort, blood in stools or black stools or change in bowel habits.  MUSCULOSKELETAL:  See HPI.  SKIN:  Negative for lesions, rash, and itching.  PSYCH:  No mood disorder or recent psychosocial stressors.   HEMATOLOGY/LYMPHOLOGY:  See the blood thinner sectioned in HPI.  NEURO:  See HPI  All other reviewed and negative other than HPI.    PHYSICAL EXAM:  VITALS: /72 (BP Location: Right arm, Patient Position: Sitting)   Pulse 98   Ht 5' 2" (1.575 m)   Wt 95.3 kg (210 lb)   LMP 03/01/2021   SpO2 98%   BMI 38.41 kg/m²   Body mass index is 38.41 kg/m².  GENERAL: Well appearing, in no acute distress, alert and oriented x3, answers questions appropriately.   PSYCH: Flat affect.  SKIN: Skin color, texture, turgor normal, no rashes or lesions.  HEAD/FACE:  Normocephalic, atraumatic. Cranial nerves grossly intact.  CV: Regular rate  PULM: No evidence of respiratory difficulty, symmetric chest rise.  GI:  Soft and non-Distended.  NECK: (+) pain to palpation over the cervical paraspinous muscles. Spurling: -. (+) pain with neck flexion, extension, or lateral flexion, Muscle strength in RT UE 5/5 and Left UE 5/5, Hand  5/5 bilaterally     DIAGNOSTIC STUDIES AND MEDICAL RECORDS REVIEW:  I have personally reviewed and interpreted relevant radiology reports and reviewed relevant records from other " services in the EMR.     MRI Cervical Spine Without Contrast 2021  The craniocervical junction is intact.  There is no evidence for a Chiari mount formation.  The spinal cord is normal in signal without cord edema or myelomalacia.    There is straightening of the normal cervical lordosis.  Vertebral body heights are maintained.  There is disc desiccation throughout the cervical spine with mild disc space narrowing.  The marrow signal is normal without evidence for a marrow replacement process, infection or tumor.    At C2-3, no disk herniation, central canal stenosis or neural foraminal narrowing.    At C3-4, there is a posterior disc osteophyte complex and bilateral uncovertebral spurring without central canal stenosis or neural foraminal narrowing.    At C4-5, there is a posterior disc osteophyte complex without central canal stenosis or neural foraminal narrowing.    At C5-6, there is a posterior disc osteophyte complex with bilateral uncovertebral spurring and mild facet arthropathy contribute to mild bilateral neural foraminal narrowing.    At C6-7, there is a posterior disc osteophyte complex and bilateral uncovertebral spurring and facet arthropathy contributing to mild bilateral neural foraminal narrowing.    At C7-T1, there is bilateral uncovertebral spurring without central canal stenosis or neural foraminal narrowing.    Incidentally noted on the left at T1-2 is a left perineural cyst.    EMG/NCS 2018:  1. Improved Right Carpal Tunnel Syndrome findings post recent right CTR.   2. Slight worsening of the Left Carpal Tunnel Syndrome findings  3. No electrophysiological evidence of Cervical Radiculopathy found    -  MRI cervical spine 10/2024:  Alignment: Straightening of the cervical lordosis, which may be secondary to positioning.  No abnormal subluxation.     Vertebral Column: Vertebral body heights are maintained. No acute fracture is identified; however, if trauma is suspected, a CT scan would be a more  sensitive examination for fractures. No evidence of an aggressive marrow replacement process. Stable probable hemangioma within the T1 in T3 vertebral bodies.  Multilevel disc degeneration with intervertebral disc space narrowing, disc desiccation, and posterior disc osteophyte complexes, most pronounced at C5-6 and C6-7 with moderate disc space narrowing.     Cord: Normal signal and morphology.     Skull base and craniocervical junction: Normal.     Degenerative findings:     C2-C3: The disc is normal in configuration.  No significant neural foraminal or spinal canal stenosis.     C3-C4: Mild posterior disc osteophyte complex.  Mild left greater than right uncovertebral joint spurring.   No significant neural foraminal or spinal canal stenosis.     C4-C5: Minimal posterior disc osteophyte complex.  Mild bilateral facet arthropathy.   No significant neural foraminal or spinal canal stenosis.     C5-C6: Moderate disc space narrowing.  Posterior disc osteophyte complex.  Mild bilateral facet arthropathy.  Marked bilateral uncovertebral joint spurring.  Moderate to severe bilateral neural foraminal stenosis.  Mild spinal canal stenosis.     C6-C7: Moderate disc space narrowing.  Posterior disc osteophyte complex.  Mild bilateral facet arthropathy.  Moderate left and mild right uncovertebral joint spurring.  Moderate left and mild right neural foraminal stenosis.  Mild spinal canal stenosis.     C7-T1: Mild posterior disc osteophyte complex.  Mild bilateral facet arthropathy.  Mild right uncovertebral joint spurring.  Mild right neural foraminal stenosis.  No spinal canal stenosis.     Paraspinal muscles & soft tissues: No significant abnormality.     Normal signal voids are present in the vertebral arteries.     Impression:     Mild-to-moderate degenerative changes of the cervical spine, as detailed above, most pronounced at C5-6 and C6-C7 and resulting in mild spinal canal stenosis and moderate to severe bilateral  neural foraminal stenosis at C5-6 and moderate left neural foraminal stenosis at C6-7.    Clinical Impression:  This is a pleasant 44 y.o. female patient with PMH/PSH of breast cancer, tobacco use disorder, chronic neck pain, bilateral carpal tunnel syndrome, presenting originally with neck pain, cervicogenic headache, mainly axial in nature, no bilateral upper extremity radiation, she has bilateral carpal tunnel syndrome, she has completed multiple sessions of chiropractor this year with mild relief, last EMG/nerve conduction study showed no evidence of cervical radiculopathy that was 2018, she had right carpal tunnel surgery,   followed by left carpal tunnel surgery,  updated MRI cervical spine  showed  progressive worsening of symptoms with Mild-to-moderate degenerative changes of the cervical spine, most pronounced at C5-6 and C6-C7 and resulting in mild spinal canal stenosis and moderate to severe bilateral neural foraminal stenosis at C5-6 and moderate left neural foraminal stenosis at C6-7, she has completed multiple sessions of physical therapy with no benefit,  she failed cervical medial branch block in  &  at 2 different levels,  in addition to cervical epidural steroid injection in 2018.   Patient was referred 2nd time for left shoulder, arm and forearm numbness,  patient has tried and failed multimodal pain medication including NSAIDs, anticonvulsants, antidepressants in addition to multiple interventional procedures in  cervical spine   She would like to restart gabapentin 300 mg 3 times a day and may titrate to optimal dose    Encounter Diagnosis:  Staci Hodge is a 44 y.o. female with the following diagnoses based on history, exam, and imagin. Cervical radiculitis    2. Left arm numbness    3. Arthropathy of cervical facet joint    4. Degeneration of intervertebral disc of cervical region with osteophyte of cervical vertebra    5. Cervical myofascial pain  syndrome    Treatment Plan:    Diagnostics/Referrals:  continue with a home exercise    Medications:    NSAIDs: OTC  Topical Agent:  non  TCA/SSRI/SNRI: None  Anti-convulsants:  she would like to restart gabapentin, 300 mg 3 times a day.  Muscle Relaxants: None  Opioids: None    Interventional Therapy:  may consider cervical epidural steroid injection  Sedation: NA  Anticoagulation medications: None - Clearance to stop Blood thinner: NA      Physical Rehabilitation: Referral to Physical therapy for Cervical ROM, stretching, strengthening and a home exercise program    Patient Education: Counseled patient regarding the importance of activity modification, I have stressed the importance of physical activity and a home exercise plan to help with pain and improve health.    Follow-up: RTC  per her request    May consider: BOY, increasing gabapentin    Shahid Almonte MD  Anesthesiologist  Interventional Pain Medicine  05/29/2025    Disclaimer:  This note was prepared using voice recognition system and is likely to have sound alike errors that may have been overlooked even after proof reading.  Please call me with any questions.

## 2025-05-29 NOTE — PROGRESS NOTES
EST Patient Evaluation  [facility]    Staci Hodge  : 1980  Date: 2025     CHIEF COMPLAINT:  No chief complaint on file.    Referring Physician: No ref. provider found  Primary Care Physician: Shilpi Clayton NP    HPI:  This is a 44 y.o. female with a chief complaint of No chief complaint on file.  . The patient has Past medical history/Past surgical history of  breast cancer, tobacco use disorder, chronic neck pain, bilateral carpal tunnel syndrome    Patient was evaluated and referred by  neurology for neck pain, she was previously seen by Grey brandt and Dr Chapin for neck pain, she had bilateral cervical medial branch block and cervical epidural steroid injection with no relief.    Interval History 2024:  Staci Hodge is here  to discuss MRI cervical spine that showed Mild-to-moderate degenerative changes of the cervical spine, most pronounced at C5-6 and C6-C7 and resulting in mild spinal canal stenosis and moderate to severe bilateral neural foraminal stenosis at C5-6 and moderate left neural foraminal stenosis at C6-7.  Current pain score: 3/10    Interval History 2025  Staci Hodge is here for procedure follow up visit after left cervical medial branch block TON, C3 and C4,  patient reported 0 % improvement in pain and functionality.  Current pain score: 4/10      Interval History 2025:  Staci Hodge is here for  follow up visit she was recently seen by Daniel Bosch Jr., MD, Hand Surgery,  for left hand symptoms after carpal tunnel release, she continues to have sensitivity in her  hand,  in addition to numbness /tingling of the left shoulder to the elbow    Current pain score: ***/10      Diabetic: No    Anticoagulation medications: None    Allergy To Iodine: No    Currently on Antibiotic: No    Current Description of Pain Symptoms:    History of Recent Fall or Trauma: {GAYes/No/NA:52807}   Onset:  Chronic, started ***  Pain Location: ***  Radiates/associated symptoms: ***.   Pain is Getting worse over the last *** months    The pain is described as {Desc; pain character:42978}.   Exacerbating factors: {Causes; Pain:32322}.   Mitigating factors ***.   Symptoms interfere with daily activity, sleeping, and ***.   The patient feels like symptoms have been {IUW:40213}.   Patient {Denies / Reports:58884} {RED FLAGS:35186}.      Pain score:  Best: 3/10  Worst: 10/10    Current pain medications:    butalbital-acetaminophen-caffeine -40 mg (FIORICET, ESGIC) q6h prn  Current Narcotics/Opioid /benzo Medications:  Opioids- None  Benzodiazepines: Yes    UDS:  NA    PDMP:  Reviewed and consistent with medication use as prescribed.     Previous Chronic Pain Treatment History:  Physical Therapy/HEP/Physician Lead Exercise Program:  Over the past 12 months, Patient has done 4 sessions.  PT response: not noticing anything different from therapy   Dates of the PT sessions: 11/11,11/14,11/19,11/20 in 2024  Is patient participating in home exercise program (HEP)/ physician led exercise program: Yes.    Non-interventional Pain Therapy:  [x]Chiropractor: 05/2024: 12 sessions, mild relief  []Acupuncture/Dry needle.  []TENS unit.  [x]Heat/ICE.  []Back Brace.    Medications previously tried:  NSAIDs: Ibuprofen (Advil/Motrin) and Naproxen (Naprosyn)  Topical Agent: Yes  TCA/SSRI/SNRI: None  Anti-convulsants: Gabapentin  and Lyrica : stopped due to SE  Muscle Relaxants: Flexeril (Cyclobenzaprine)  Opioids- None.    Interventional Pain Procedures:  -1/19/18 C7-T1 CERVICAL EPIDURAL STEROID INJECTION- 0% relief     -2/9/18 BILATERAL C3,4,5 CERVICAL FACET MEDIAL BRANCH NERVE BLOCK (Prone) 0% relief    -12/16/2024:  left cervical medial branch block at TON, C3 and C4- No relief      Previous spine/Relevant joint surgery:  Right and left C, last surgery 11/2024  Surgical History:   has a past surgical history that includes Appendectomy;  Adenoidectomy; Tonsillectomy; intestinal polpy; Colonoscopy; Upper gastrointestinal endoscopy; Small intestine surgery; Bowel resection (10/17/2014); Carpal tunnel release (Right, 2016); Dilation and curettage of uterus; Breast biopsy (Left, 06/04/2018); Bilateral mastectomy (Bilateral, 07/23/2018); Injection for sentinel node identification (Left, 07/23/2018); [place] lymph node biopsy (Left, 07/23/2018); Insertion of breast tissue expander (Bilateral, 07/23/2018); Fat transfer (Bilateral, 11/12/2018); Breast surgery; Dilation and curettage of uterus using suction (N/A, 02/25/2019); Breast reconstruction; Mastectomy; Augmentation of breast; Breast capsulotomy (Left, 04/25/2019); Removal of breast implant (Bilateral, 03/10/2020); Insertion of breast tissue expander (Bilateral, 03/10/2020); Removal of breast implant (Bilateral, 07/07/2020); Esophagogastroduodenoscopy (N/A, 01/15/2021); Endoscopic ultrasound of upper gastrointestinal tract (Left, 01/15/2021); Colonoscopy (N/A, 02/25/2021); Total abdominal hysterectomy (N/A, 03/04/2021); Bilateral salpingo-oophorectomy (BSO) (Bilateral, 03/04/2021); Lysis of adhesions (N/A, 03/04/2021); Antegrade single balloon enteroscopy (N/A, 04/19/2021); Esophagogastroduodenoscopy (N/A, 05/17/2021); Esophagogastroduodenoscopy (N/A, 01/25/2022); Esophagogastroduodenoscopy (N/A, 03/10/2023); Esophagogastroduodenoscopy (N/A, 03/22/2024); Endoscopic ultrasound of upper gastrointestinal tract (N/A, 04/26/2024); Colonoscopy (N/A, 07/08/2024); Carpal tunnel release (Left, 11/27/2024); Injection of anesthetic agent around medial branch nerves innervating cervical facet joint (Left, 12/16/2024); and Esophagogastroduodenoscopy (EGD) with endoscopic mucosal resection (N/A, 4/14/2025).  Medical History:   has a past medical history of Abnormal Pap smear of cervix (age 25), Anxiety, Breast cancer, Cancer (07/11/2018), Cervical spondylosis (2/9/2018), Colon polyp, Depression, Ectopic pregnancy  (2/26/2019), General anesthetics causing adverse effect in therapeutic use, Headache, History of psychiatric hospitalization, psychiatric care, Hypertension, Peutz-Jeghers syndrome, PONV (postoperative nausea and vomiting), Psychiatric problem, Right carpal tunnel syndrome (3/9/2017), Self-harming behavior, Suicide attempt, and Therapy.  Family History:  family history includes ADD / ADHD in her paternal uncle; Breast cancer in her paternal aunt and paternal grandmother; Cancer in her mother; Colon polyps in her sister; Dementia in her maternal grandfather and maternal grandmother; Depression in her paternal uncle; Hypertension in her father; No Known Problems in her cousin, maternal uncle, and paternal grandfather; Stomach cancer in her mother.  Allergies:  Patient has no known allergies.   Social History/SUBSTANCE ABUSE HISTORY:  Personal history of substance abuse: No   reports that she has been smoking cigarettes. She started smoking about 25 years ago. She has a 25.1 pack-year smoking history. She has never used smokeless tobacco. She reports current alcohol use of about 5.8 standard drinks of alcohol per week. She reports that she does not use drugs.  LABS:  CBC  Lab Results   Component Value Date    WBC 10.25 04/25/2025    HGB 11.9 (L) 04/25/2025    HCT 37.3 04/25/2025     Coagulation Profile   Lab Results   Component Value Date     04/25/2025       Lab Results   Component Value Date    INR 1.0 08/31/2022     CMP:  BMP  Lab Results   Component Value Date     04/15/2025    K 3.7 04/15/2025     04/15/2025    CO2 21 (L) 04/15/2025    BUN 5 (L) 04/15/2025    CREATININE 0.7 04/15/2025    CALCIUM 8.6 (L) 04/15/2025    ANIONGAP 7 (L) 04/15/2025    EGFRNORACEVR >60 04/15/2025     Lab Results   Component Value Date    ALT 24 04/15/2025    AST 15 04/15/2025    ALKPHOS 154 (H) 04/15/2025    BILITOT 0.5 04/15/2025     HGBA1C:  Lab Results   Component Value Date    HGBA1C 5.3 10/09/2023     ROS:     Review of Systems   GENERAL:  No weight loss, malaise or fevers.  HEENT:   No recent changes in vision or hearing  NECK:  Negative for lumps, no difficulty with swallowing.  RESPIRATORY:  Negative for cough, wheezing or shortness of breath, patient denies any recent URI.  CARDIOVASCULAR:  Negative for chest pain or palpitations.  GI:  Negative for abdominal discomfort, blood in stools or black stools or change in bowel habits.  MUSCULOSKELETAL:  See HPI.  SKIN:  Negative for lesions, rash, and itching.  PSYCH:  No mood disorder or recent psychosocial stressors.   HEMATOLOGY/LYMPHOLOGY:  See the blood thinner sectioned in HPI.  NEURO:  See HPI  All other reviewed and negative other than HPI.    PHYSICAL EXAM:  VITALS: LMP 03/01/2021   There is no height or weight on file to calculate BMI.  GENERAL: Well appearing, in no acute distress, alert and oriented x3, answers questions appropriately.   PSYCH: Flat affect.  SKIN: Skin color, texture, turgor normal, no rashes or lesions.  HEAD/FACE:  Normocephalic, atraumatic. Cranial nerves grossly intact.  CV: Regular rate  PULM: No evidence of respiratory difficulty, symmetric chest rise.  GI:  Soft and non-Distended.  NECK: (+) pain to palpation over the cervical paraspinous muscles. Spurling: -. (+) pain with neck flexion, extension, or lateral flexion, Muscle strength in RT UE 5/5 and Left UE 5/5, Hand  5/5 bilaterally     DIAGNOSTIC STUDIES AND MEDICAL RECORDS REVIEW:  I have personally reviewed and interpreted relevant radiology reports and reviewed relevant records from other services in the EMR.     MRI Cervical Spine Without Contrast 2021  The craniocervical junction is intact.  There is no evidence for a Chiari mount formation.  The spinal cord is normal in signal without cord edema or myelomalacia.    There is straightening of the normal cervical lordosis.  Vertebral body heights are maintained.  There is disc desiccation throughout the cervical spine with  mild disc space narrowing.  The marrow signal is normal without evidence for a marrow replacement process, infection or tumor.    At C2-3, no disk herniation, central canal stenosis or neural foraminal narrowing.    At C3-4, there is a posterior disc osteophyte complex and bilateral uncovertebral spurring without central canal stenosis or neural foraminal narrowing.    At C4-5, there is a posterior disc osteophyte complex without central canal stenosis or neural foraminal narrowing.    At C5-6, there is a posterior disc osteophyte complex with bilateral uncovertebral spurring and mild facet arthropathy contribute to mild bilateral neural foraminal narrowing.    At C6-7, there is a posterior disc osteophyte complex and bilateral uncovertebral spurring and facet arthropathy contributing to mild bilateral neural foraminal narrowing.    At C7-T1, there is bilateral uncovertebral spurring without central canal stenosis or neural foraminal narrowing.    Incidentally noted on the left at T1-2 is a left perineural cyst.    EMG/NCS 2018:  1. Improved Right Carpal Tunnel Syndrome findings post recent right CTR.   2. Slight worsening of the Left Carpal Tunnel Syndrome findings  3. No electrophysiological evidence of Cervical Radiculopathy found    -  MRI cervical spine 10/2024:  Alignment: Straightening of the cervical lordosis, which may be secondary to positioning.  No abnormal subluxation.     Vertebral Column: Vertebral body heights are maintained. No acute fracture is identified; however, if trauma is suspected, a CT scan would be a more sensitive examination for fractures. No evidence of an aggressive marrow replacement process. Stable probable hemangioma within the T1 in T3 vertebral bodies.  Multilevel disc degeneration with intervertebral disc space narrowing, disc desiccation, and posterior disc osteophyte complexes, most pronounced at C5-6 and C6-7 with moderate disc space narrowing.     Cord: Normal signal and  morphology.     Skull base and craniocervical junction: Normal.     Degenerative findings:     C2-C3: The disc is normal in configuration.  No significant neural foraminal or spinal canal stenosis.     C3-C4: Mild posterior disc osteophyte complex.  Mild left greater than right uncovertebral joint spurring.   No significant neural foraminal or spinal canal stenosis.     C4-C5: Minimal posterior disc osteophyte complex.  Mild bilateral facet arthropathy.   No significant neural foraminal or spinal canal stenosis.     C5-C6: Moderate disc space narrowing.  Posterior disc osteophyte complex.  Mild bilateral facet arthropathy.  Marked bilateral uncovertebral joint spurring.  Moderate to severe bilateral neural foraminal stenosis.  Mild spinal canal stenosis.     C6-C7: Moderate disc space narrowing.  Posterior disc osteophyte complex.  Mild bilateral facet arthropathy.  Moderate left and mild right uncovertebral joint spurring.  Moderate left and mild right neural foraminal stenosis.  Mild spinal canal stenosis.     C7-T1: Mild posterior disc osteophyte complex.  Mild bilateral facet arthropathy.  Mild right uncovertebral joint spurring.  Mild right neural foraminal stenosis.  No spinal canal stenosis.     Paraspinal muscles & soft tissues: No significant abnormality.     Normal signal voids are present in the vertebral arteries.     Impression:     Mild-to-moderate degenerative changes of the cervical spine, as detailed above, most pronounced at C5-6 and C6-C7 and resulting in mild spinal canal stenosis and moderate to severe bilateral neural foraminal stenosis at C5-6 and moderate left neural foraminal stenosis at C6-7.    Clinical Impression:  This is a pleasant 44 y.o. female patient with PMH/PSH of breast cancer, tobacco use disorder, chronic neck pain, bilateral carpal tunnel syndrome, presenting  originally with neck pain, cervicogenic headache, mainly axial in nature, no bilateral upper extremity radiation, she  has bilateral carpal tunnel syndrome, she has completed multiple sessions of chiropractor this year with mild relief, last EMG/nerve conduction study showed no evidence of cervical radiculopathy that was 2018, she had right carpal tunnel surgery,  and left carpal tunnel surgery,  updated MRI cervical spine 2024 showed  progressive worsening of symptoms with Mild-to-moderate degenerative changes of the cervical spine, most pronounced at C5-6 and C6-C7 and resulting in mild spinal canal stenosis and moderate to severe bilateral neural foraminal stenosis at C5-6 and moderate left neural foraminal stenosis at C6-7, she has completed multiple sessions of physical therapy with no benefit,  she failed cervical medial branch block in 2018 & 2024 at 2 different levels,  in addition to cervical epidural steroid injection in 2018    Encounter Diagnosis:  Staci Hodge is a 44 y.o. female with the following diagnoses based on history, exam, and imaging:  There are no diagnoses linked to this encounter.    Treatment Plan:    Diagnostics/Referrals:  continue with a home exercise    Medications:    NSAIDs: OTC  Topical Agent:  may consider compound cream  TCA/SSRI/SNRI: None  Anti-convulsants: None  Muscle Relaxants: None  Opioids: None    Interventional Therapy:  may consider Botox, cervical paraspinal muscle.  Sedation: NA  Anticoagulation medications: None - Clearance to stop Blood thinner: NA    Regarding the above interventions, the patient has been educated regarding the risks (including bleeding, infection, increased pain, nerve damage, or allergic reaction), benefits, and alternatives. The patient states she understands and is eager to proceed.    Physical Rehabilitation: Referral to Physical therapy for Cervical ROM, stretching, strengthening and a home exercise program    Patient Education: Counseled patient regarding the importance of activity modification, I have stressed the importance of physical activity and  a home exercise plan to help with pain and improve health.    Follow-up: RTC  as needed    May consider: SG Almonte MD  Anesthesiologist  Interventional Pain Medicine  05/29/2025    Disclaimer:  This note was prepared using voice recognition system and is likely to have sound alike errors that may have been overlooked even after proof reading.  Please call me with any questions.

## 2025-06-03 ENCOUNTER — LAB VISIT (OUTPATIENT)
Dept: LAB | Facility: HOSPITAL | Age: 45
End: 2025-06-03
Attending: NURSE PRACTITIONER
Payer: MEDICAID

## 2025-06-03 DIAGNOSIS — Z79.899 LONG-TERM USE OF HIGH-RISK MEDICATION: ICD-10-CM

## 2025-06-03 DIAGNOSIS — R06.02 SOB (SHORTNESS OF BREATH): Primary | ICD-10-CM

## 2025-06-03 DIAGNOSIS — Z79.899 LITHIUM USE: ICD-10-CM

## 2025-06-03 DIAGNOSIS — R60.9 SWELLING: ICD-10-CM

## 2025-06-03 DIAGNOSIS — E55.9 VITAMIN D DEFICIENCY: ICD-10-CM

## 2025-06-03 DIAGNOSIS — E04.9 GOITER: ICD-10-CM

## 2025-06-03 LAB
ABSOLUTE EOSINOPHIL (OHS): 0.06 K/UL
ABSOLUTE MONOCYTE (OHS): 0.63 K/UL (ref 0.3–1)
ABSOLUTE NEUTROPHIL COUNT (OHS): 6.77 K/UL (ref 1.8–7.7)
ALBUMIN SERPL BCP-MCNC: 4.4 G/DL (ref 3.5–5.2)
ALP SERPL-CCNC: 182 UNIT/L (ref 40–150)
ALT SERPL W/O P-5'-P-CCNC: 49 UNIT/L (ref 10–44)
ANION GAP (OHS): 8 MMOL/L (ref 8–16)
AST SERPL-CCNC: 32 UNIT/L (ref 11–45)
BASOPHILS # BLD AUTO: 0.02 K/UL
BASOPHILS NFR BLD AUTO: 0.2 %
BILIRUB SERPL-MCNC: 0.4 MG/DL (ref 0.1–1)
BUN SERPL-MCNC: 7 MG/DL (ref 6–20)
CALCIUM SERPL-MCNC: 9.7 MG/DL (ref 8.7–10.5)
CHLORIDE SERPL-SCNC: 104 MMOL/L (ref 95–110)
CHOLEST SERPL-MCNC: 209 MG/DL (ref 120–199)
CHOLEST/HDLC SERPL: 4.6 {RATIO} (ref 2–5)
CO2 SERPL-SCNC: 28 MMOL/L (ref 23–29)
CREAT SERPL-MCNC: 0.8 MG/DL (ref 0.5–1.4)
EAG (OHS): 111 MG/DL (ref 68–131)
ERYTHROCYTE [DISTWIDTH] IN BLOOD BY AUTOMATED COUNT: 13.6 % (ref 11.5–14.5)
GFR SERPLBLD CREATININE-BSD FMLA CKD-EPI: >60 ML/MIN/1.73/M2
GLUCOSE SERPL-MCNC: 113 MG/DL (ref 70–110)
HBA1C MFR BLD: 5.5 % (ref 4–5.6)
HCT VFR BLD AUTO: 38.2 % (ref 37–48.5)
HDLC SERPL-MCNC: 45 MG/DL (ref 40–75)
HDLC SERPL: 21.5 % (ref 20–50)
HGB BLD-MCNC: 12.4 GM/DL (ref 12–16)
IMM GRANULOCYTES # BLD AUTO: 0.06 K/UL (ref 0–0.04)
IMM GRANULOCYTES NFR BLD AUTO: 0.6 % (ref 0–0.5)
LDLC SERPL CALC-MCNC: 137.8 MG/DL (ref 63–159)
LITHIUM SERPL-SCNC: 0.6 MMOL/L (ref 0.6–1.2)
LYMPHOCYTES # BLD AUTO: 2.44 K/UL (ref 1–4.8)
MCH RBC QN AUTO: 27.1 PG (ref 27–31)
MCHC RBC AUTO-ENTMCNC: 32.5 G/DL (ref 32–36)
MCV RBC AUTO: 83 FL (ref 82–98)
NONHDLC SERPL-MCNC: 164 MG/DL
NUCLEATED RBC (/100WBC) (OHS): 0 /100 WBC
PLATELET # BLD AUTO: 305 K/UL (ref 150–450)
PMV BLD AUTO: 9.8 FL (ref 9.2–12.9)
POTASSIUM SERPL-SCNC: 4.3 MMOL/L (ref 3.5–5.1)
PROT SERPL-MCNC: 8.2 GM/DL (ref 6–8.4)
RBC # BLD AUTO: 4.58 M/UL (ref 4–5.4)
RELATIVE EOSINOPHIL (OHS): 0.6 %
RELATIVE LYMPHOCYTE (OHS): 24.4 % (ref 18–48)
RELATIVE MONOCYTE (OHS): 6.3 % (ref 4–15)
RELATIVE NEUTROPHIL (OHS): 67.9 % (ref 38–73)
SODIUM SERPL-SCNC: 140 MMOL/L (ref 136–145)
TRIGL SERPL-MCNC: 131 MG/DL (ref 30–150)
TSH SERPL-ACNC: 1.79 UIU/ML (ref 0.4–4)
WBC # BLD AUTO: 9.98 K/UL (ref 3.9–12.7)

## 2025-06-03 PROCEDURE — 83036 HEMOGLOBIN GLYCOSYLATED A1C: CPT

## 2025-06-03 PROCEDURE — 82040 ASSAY OF SERUM ALBUMIN: CPT

## 2025-06-03 PROCEDURE — 82465 ASSAY BLD/SERUM CHOLESTEROL: CPT

## 2025-06-03 PROCEDURE — 80178 ASSAY OF LITHIUM: CPT

## 2025-06-03 PROCEDURE — 36415 COLL VENOUS BLD VENIPUNCTURE: CPT

## 2025-06-03 PROCEDURE — 85025 COMPLETE CBC W/AUTO DIFF WBC: CPT

## 2025-06-03 PROCEDURE — 84443 ASSAY THYROID STIM HORMONE: CPT

## 2025-06-06 ENCOUNTER — OFFICE VISIT (OUTPATIENT)
Dept: FAMILY MEDICINE | Facility: CLINIC | Age: 45
End: 2025-06-06
Payer: MEDICAID

## 2025-06-06 ENCOUNTER — RESULTS FOLLOW-UP (OUTPATIENT)
Dept: FAMILY MEDICINE | Facility: CLINIC | Age: 45
End: 2025-06-06

## 2025-06-06 ENCOUNTER — HOSPITAL ENCOUNTER (OUTPATIENT)
Dept: RADIOLOGY | Facility: HOSPITAL | Age: 45
Discharge: HOME | End: 2025-06-06
Attending: NURSE PRACTITIONER
Payer: MEDICAID

## 2025-06-06 VITALS
OXYGEN SATURATION: 99 % | HEIGHT: 62 IN | HEART RATE: 84 BPM | WEIGHT: 199.19 LBS | RESPIRATION RATE: 16 BRPM | BODY MASS INDEX: 36.65 KG/M2 | SYSTOLIC BLOOD PRESSURE: 124 MMHG | DIASTOLIC BLOOD PRESSURE: 82 MMHG

## 2025-06-06 DIAGNOSIS — R60.0 PEDAL EDEMA: Primary | ICD-10-CM

## 2025-06-06 DIAGNOSIS — M79.661 RIGHT CALF PAIN: ICD-10-CM

## 2025-06-06 PROCEDURE — 93971 EXTREMITY STUDY: CPT | Mod: TC,RT

## 2025-06-06 PROCEDURE — 99999 PR PBB SHADOW E&M-EST. PATIENT-LVL IV: CPT | Mod: PBBFAC,,, | Performed by: NURSE PRACTITIONER

## 2025-06-06 PROCEDURE — 93971 EXTREMITY STUDY: CPT | Mod: 26,RT,, | Performed by: RADIOLOGY

## 2025-06-06 PROCEDURE — 99214 OFFICE O/P EST MOD 30 MIN: CPT | Mod: PBBFAC,25 | Performed by: NURSE PRACTITIONER

## 2025-06-06 RX ORDER — TORSEMIDE 10 MG/1
10 TABLET ORAL DAILY PRN
Qty: 30 TABLET | Refills: 5 | Status: SHIPPED | OUTPATIENT
Start: 2025-06-06

## 2025-06-09 ENCOUNTER — OFFICE VISIT (OUTPATIENT)
Dept: INTERNAL MEDICINE | Facility: CLINIC | Age: 45
End: 2025-06-09
Payer: MEDICAID

## 2025-06-09 VITALS
HEART RATE: 120 BPM | OXYGEN SATURATION: 100 % | HEIGHT: 62 IN | RESPIRATION RATE: 20 BRPM | WEIGHT: 197.06 LBS | DIASTOLIC BLOOD PRESSURE: 88 MMHG | BODY MASS INDEX: 36.26 KG/M2 | SYSTOLIC BLOOD PRESSURE: 126 MMHG

## 2025-06-09 DIAGNOSIS — I87.2 VENOUS INSUFFICIENCY OF BOTH LOWER EXTREMITIES: Primary | ICD-10-CM

## 2025-06-09 DIAGNOSIS — M54.2 NECK PAIN: ICD-10-CM

## 2025-06-09 PROCEDURE — 3079F DIAST BP 80-89 MM HG: CPT | Mod: CPTII,,, | Performed by: NURSE PRACTITIONER

## 2025-06-09 PROCEDURE — 96372 THER/PROPH/DIAG INJ SC/IM: CPT | Mod: PBBFAC,PN

## 2025-06-09 PROCEDURE — 3008F BODY MASS INDEX DOCD: CPT | Mod: CPTII,,, | Performed by: NURSE PRACTITIONER

## 2025-06-09 PROCEDURE — 3074F SYST BP LT 130 MM HG: CPT | Mod: CPTII,,, | Performed by: NURSE PRACTITIONER

## 2025-06-09 PROCEDURE — 99999PBSHW PR PBB SHADOW TECHNICAL ONLY FILED TO HB: Mod: JZ,PBBFAC,,

## 2025-06-09 PROCEDURE — 1159F MED LIST DOCD IN RCRD: CPT | Mod: CPTII,,, | Performed by: NURSE PRACTITIONER

## 2025-06-09 PROCEDURE — 99215 OFFICE O/P EST HI 40 MIN: CPT | Mod: PBBFAC,PN | Performed by: NURSE PRACTITIONER

## 2025-06-09 PROCEDURE — 99214 OFFICE O/P EST MOD 30 MIN: CPT | Mod: S$PBB,,, | Performed by: NURSE PRACTITIONER

## 2025-06-09 PROCEDURE — 99999 PR PBB SHADOW E&M-EST. PATIENT-LVL V: CPT | Mod: PBBFAC,,, | Performed by: NURSE PRACTITIONER

## 2025-06-09 PROCEDURE — 3044F HG A1C LEVEL LT 7.0%: CPT | Mod: CPTII,,, | Performed by: NURSE PRACTITIONER

## 2025-06-09 RX ORDER — KETOROLAC TROMETHAMINE 30 MG/ML
60 INJECTION, SOLUTION INTRAMUSCULAR; INTRAVENOUS
Status: COMPLETED | OUTPATIENT
Start: 2025-06-09 | End: 2025-06-09

## 2025-06-09 RX ADMIN — KETOROLAC TROMETHAMINE 60 MG: 30 INJECTION, SOLUTION INTRAMUSCULAR; INTRAVENOUS at 03:06

## 2025-06-09 NOTE — PROGRESS NOTES
History of Present Illness    CHIEF COMPLAINT:  Patient presents today for swelling in lower extremities.    LOWER EXTREMITY EDEMA:  She reports bilateral lower extremity swelling since November, with left leg more affected than right. The swelling progresses throughout the day, becoming worse by evening. She experiences heaviness and aching in her legs with significant associated pain. She recently had an episode of severe swelling and pain in her feet. The swelling has impacted her ability to wear regular shoes.    PLANTAR FASCIITIS:  She reports heel pain with dried, cracked heels showing ring formation. Pain is localized to the plantar surface of the heel. Previously noted redness in the cracks is improving. She has not sought podiatric evaluation.    CERVICOGENIC VERTIGO:  Since Friday, she reports occipital pain extending from the head to the base of the skull, sparing the lateral aspects. Pain is positional with shocking sensation on upward and lateral head movements. Associated symptoms include vertigo (notably when looking up into refrigerator) and otologic symptoms (whirling sounds and intermittent ear popping sensation similar to altitude changes) that persisted through Sunday night.    CARDIOPULMONARY HISTORY:  Cardiology evaluation with Dr. Asencio in March revealed negative stress echo. PFT showed some restrictions. She was advised to exercise and pursue weight loss.    SOCIAL HISTORY:  Current smoker with decreased tobacco use.      ROS:  General: -fever, -chills, -fatigue, -weight gain, -weight loss, +difficulty falling asleep, +restless legs  Eyes: -vision changes, -redness, -discharge  ENT: -ear pain, -nasal congestion, -sore throat, +ear pressure  Cardiovascular: -chest pain, -palpitations, +lower extremity edema  Respiratory: -cough, +shortness of breath, +exertional dyspnea  Gastrointestinal: -abdominal pain, -nausea, -vomiting, -diarrhea, -constipation, -blood in stool  Genitourinary:  -dysuria, -hematuria, -frequency  Musculoskeletal: -joint pain, -muscle pain, +pain with movement, +limb pain, +neck pain  Skin: -rash, -lesion, +skin cracking  Neurological: -headache, +dizziness, -numbness, -tingling  Psychiatric: -anxiety, -depression, -sleep difficulty denies sihi          Physical Exam    General: No acute distress. Well-developed. Well-nourished.  Eyes: EOMI. Sclerae anicteric.  HENT: Normocephalic. Atraumatic. Nares patent. Moist oral mucosa.  Ears: Bilateral TMs clear. Bilateral EACs clear. Ears look congested.  Cardiovascular: Regular rate. Regular rhythm. No murmurs. No rubs. No gallops. Normal S1, S2.  Respiratory: Normal respiratory effort. Clear to auscultation bilaterally. No rales. No rhonchi. No wheezing.  Abdomen: Soft. Non-tender. Non-distended. Normoactive bowel sounds.  Musculoskeletal: No  obvious deformity.  Extremities: Left side more swollen than right.  Neurological: Alert & oriented x3. No slurred speech. Normal gait.  Psychiatric: Normal mood. Normal affect. Good insight. Good judgment. No sihi noted  Skin: Warm. Dry. No rash.          Assessment & Plan    1. Venous insufficiency of both lower extremities  Ambulatory referral/consult to Cardiology      2. Neck pain  ketorolac injection 60 mg         VENOUS INSUFFICIENCY:  - Suspect venous insufficiency as cause of lower extremity swelling and pain, given symptoms of heavy legs, white discoloration, and difficulty exercising.  - Patient has been experiencing these symptoms since November, with feet turning white and red upon movement.  - Noted puffiness in the patient's legs during exam, though they appear less swollen now compared to before, indicating some improvement.  - Patient reports bruises and brush burn-like marks on legs from compression when crouching.  - Referred the patient to Dr. Hernandez at [facility] for evaluation of lower extremity circulation and potential vascular blockages.  - Suggested fluid  pills as a temporary measure to manage symptoms.    PLANTAR FASCIAL FIBROMATOSIS AND HEEL PAIN:  - Patient is experiencing severe pain in both heels with certain motions and sensitivity in the feet.  - Noted dried cracked heels with a ring formation underneath, causing discomfort with every step.  - Patient currently uses Dove and Avino lotion for cracked heels.  - Administered a pain injection during the visit to provide relief.  - Referred the patient to a podiatrist for further evaluation and proper treatment of heel pain and swelling.    EPIDERMAL THICKENING:  - Observed skin sensitivity and redness in cracked heels, with some improvement noted.  - Patient currently uses Dove and Avino lotion for treatment.  - Referred the patient to a podiatrist for further evaluation of skin issues on feet for proper treatment.    VESTIBULAR DYSFUNCTION:  - Assessed dizziness as likely related to existing cervical arthritis, possibly exacerbated by recent movement.  - Patient reports dizziness and swirling sound in ears when moving head, with positional changes triggering symptoms that have persisted throughout the week.  - Administered a pain injection to relieve symptoms temporarily.  - Referred the patient to neuro for further evaluation of vestibular symptoms.    CERVICAL SPONDYLOSIS AND CERVICALGIA:  - Assessed neck pain as likely related to existing cervical arthritis, possibly exacerbated by recent movement.  - Patient reports neck pain with positional changes and dizziness when moving head upwards, with symptoms persisting throughout the week.  - Administered a pain injection to relieve symptoms temporarily.  - Referred the patient to Dr. Orourke for follow-up on neck pain and recommended contacting neuro for further evaluation of neck pain and associated dizziness.    TINNITUS:  - Patient reports popping sound in ears, similar to the sensation of going over a bridge, persisting throughout the week.  - Administered  "a pain injection to relieve symptoms temporarily.  - Referred the patient to Dr. Barajas for further evaluation of ear symptoms.    NICOTINE DEPENDENCE:  - Patient reports smoking but has reduced consumption recently.  - Expressed concern about smoking as a potential contributor to health issues, particularly vein problems.  - Discussed new proc  edures for vein issues that may be related to smoking.  - Advised the patient to further reduce smoking.    ABNORMAL PULMONARY FUNCTION:  - Patient reports shortness of breath and difficulty walking far without becoming breathless.  - Reviewed pulmonary function test results showing some restrictions.  - Advised the patient to exercise and lose weight to improve pulmonary function.  - Referred the patient to Dr. Hernandez for further evaluation of circulation issues potentially affecting pulmonary function.  - Suggested fluid pills as a temporary measure to manage symptoms.    FOLLOW-UP:  - Ear congestion noted, potentially unrelated to neck pain.  - Follow up in 4 weeks.       "This note will not be shared with the patient."  This note was generated with the assistance of ambient listening technology. Verbal consent was obtained by the patient and accompanying visitor(s) for the recording of patient appointment to facilitate this note. I attest to having reviewed and edited the generated note for accuracy, though some syntax or spelling errors may persist. Please contact the author of this note for any clarification.        "

## 2025-06-13 ENCOUNTER — TELEPHONE (OUTPATIENT)
Dept: HEMATOLOGY/ONCOLOGY | Facility: CLINIC | Age: 45
End: 2025-06-13
Payer: MEDICAID

## 2025-06-16 ENCOUNTER — OFFICE VISIT (OUTPATIENT)
Dept: HEMATOLOGY/ONCOLOGY | Facility: CLINIC | Age: 45
End: 2025-06-16
Payer: MEDICAID

## 2025-06-16 DIAGNOSIS — D39.10: ICD-10-CM

## 2025-06-16 DIAGNOSIS — Q85.89 PEUTZ-JEGHERS SYNDROME: Primary | ICD-10-CM

## 2025-06-16 DIAGNOSIS — Z85.3 PERSONAL HISTORY OF BREAST CANCER: ICD-10-CM

## 2025-06-16 DIAGNOSIS — Q85.89 PEUTZ-JEGHERS POLYPS OF SMALL BOWEL: ICD-10-CM

## 2025-06-16 PROCEDURE — 99212 OFFICE O/P EST SF 10 MIN: CPT | Mod: PBBFAC | Performed by: GENETIC COUNSELOR, MS

## 2025-06-16 PROCEDURE — 99999 PR PBB SHADOW E&M-EST. PATIENT-LVL II: CPT | Mod: PBBFAC,,, | Performed by: GENETIC COUNSELOR, MS

## 2025-06-16 NOTE — LETTER
June 17, 2025    Staci Hodge  [address] [place] [address]             [facility]  [address]  [place] [address]  Phone: [phone]  Fax: [phone] Dear Ms. Hodge:    It was a pleasure meeting you and your boyfriend yesterday, June 16, 2025 for your genetic counseling appointment at [facility]. Below is a summary of our discussion.    Cancer Genetics    Before we discussed your results, we briefly reviewed some information about genetics. DNA is our genetic code, as it provides instructions for our bodies on how to grow and function. Our DNA is organized into chunks called genes, and each gene has an important role in our bodies. Some genes determine our physical traits, such as height and eye color, but other genes are supposed to protect our bodies from developing cancer, including STK11. When an individual is born with a damaging mutation (pathogenic variant) in one of these genes, it is important to identify them so that increased screening recommendations and/or preventative options can be made.    Genetic Test Results    You had a sample submitted to [facility] on 2/7/19 for [facility] Hereditary Cancer Panel testing.     RESULT: POSITIVE  STK11 (c.993dup)       The results were positive for a pathogenic variant in STK11, confirming a diagnosis of Peutz-Jeghers syndrome (PJS). The specific mutation is known as c.993dup which is a duplication of one DNA base. This causes the gene to not be able to code for the correct, working protein, which leads to an increased risk of polyps and cancer.             Cancer Risks    Individuals with PJS have a significantly increased risk to develop Peutz-Jeghers type polyps in the GI tract. The lifetime risks for individuals with PJS are as follows:   Colon: up to 39%  Pancreas: up to 26%  Stomach: up to 29%  Small intestine: up to 13%  Lung: up to 17%    Women with PJS have these  "additional cancer risks:  Breast: up to 50%  Ovarian: up to 21%  Cervical: up to 10%  Uterine: up to 9%    Men with PJS have these additional cancer risks  Testicular: increased    Management Recommendations    Breast: annual chest wall exam (can be performed by OB/GYN and high risk provider in Hereditary Clinic)  Colon: due for repeat colonoscopy in July 2027  Gynecologic: annual pelvic exam with Dr. Peterson (I will determine if another high-risk provider should evaluate you as well)  Pancreas: due for EUS and/or MRI/MRCP in September 2025 (I messaged Dr. Carrillo about insurance coverage issues with MRCP)  Stomach/small bowel: due for repeat upper endoscopy by December 2025  Lung: smoking cessation, can consider low-dose CT scan in the future    Additionally, I will make sure that you become an established patient with our new Hereditary and High Risk Clinic once it officially opens. This will be your "home base" for PJS care.    Family Members and Inheritance    PJS is inherited in an autosomal dominant pattern, meaning each of your first degree relatives (children, parents, siblings) have a 50% chance to have also inherited the same STK11 mutation found in you. Based on your family history, it would appear that the STK11 mutation was inherited from your mother. However, there is no additional history of polyps, cancers or hyperpigmentation (dark spots around the mouth) in other maternal family members. Therefore, it is possible that the mutation in your mother was a new mutation in her (called a de maddie mutation). This has not been confirmed, so your maternal relatives can consider undergoing testing for the familial STK11 mutation. Any relatives who are interested in undergoing familial mutation testing should bring a copy of your genetic test report to their appointment with their provider.     Your relatives could have a genetic mutation you did not inherit and may be at increased risk for cancer based on their " own personal and family history. Therefore, they should meet with a genetics provider to determine if they should have testing for genes other than STK11.      Anyone interested in pursuing genetic counseling/testing can contact the [facility] for an in-personal or virtual appointment in [place] [phone] or [place] [phone]. Virtual patients must be located in [place] and able to access the virtual visit through the [employer] ([facility]) portal. Anyone who is located outside of [place] or prefers to see someone in-person closer to home can visit [email or web address] to locate a local genetic counselor.     Support    [employer] ([email or web address])    Follow-up    Cancer genetics is a rapidly evolving field, and there may be additional information available in the future that could be beneficial to you or your family. Please follow-up with the Hereditary and High Risk Clinic annually to discuss updates to management recommendations. I will make sure that you become an established patient once the clinic officially opens. In the meantime, please update me through [facility] with any changes to your personal or family history and with any relative's genetic testing results. I'm happy to review their results to determine how they might affect you and other family members.     Sincerely,      Nena Kirkland, [facility], [facility]  Senior Genetic Counselor, Hereditary/High-Risk Clinic  [facility]    Phone:  [phone]

## 2025-06-16 NOTE — PROGRESS NOTES
"Cancer Genetics  Hereditary and High-Risk Clinic  Department of Hematology and Oncology  [facility]    [facility]    Date of Service:  25  Visit Provider:  Nena Kirkland, [facility]    Patient ID  Name: Staci Hodge    : 1980    MRN: [id number]      Referring Provider  Efren, Jing MARY, NP  [address] [place] [address]    IMPRESSION      Staci is a pleasant 45yo female with a known clinical diagnosis of Peutz-Jeghers syndrome (PJS), with features including PJS polyps in the small bowel, mucocutaneous hyperpigmentation on the lips and inside the mouth, breast cancer and SCTAT of the ovaries. Confirmatory genetic testing identified a pathogenic mutation in STK11 (c.993dup). She was accompanied by her boyfriend, and we reviewed additional cancer risks, management recommendations, inheritance, and support resources ([employer]). We also discussed the importance of smoking cessation at length, especially in the setting of a hereditary cancer predisposition syndrome. Staci will call the smoking cessation clinic in [place], but I am happy to refer her to services here at [facility] in the future if she changes her mind. Staci will become an established patient of the Hereditary / High-Risk Clinic for centralized follow-up, but in the meantime her painful breast lump will be evaluated by her OB/GYN.    FOCUSED PERSONAL HISTORY     Chief Complaint: Genetic Evaluation (Peutz-Jeghers syndrome)    History of Present Illness (HPI):  Staci Hodge ("Staci"), 44 y.o., assigned female sex at birth, is new to the [facility] and to me.  She was referred by Jing Schwartz with Gastroenterology for genetic counseling given her diagnosis of PJS. 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 No adjuvant RT or endocrine therapy was recommended.  Recently, she has felt a painful lump under her right breast and has an appointment with her OB/GYN on 6/23/25 for evaluation.  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 No adjuvant treatment was recommended.    Recent Endoscopies and Imaging:  -Upper GI Endoscopy on 4/14/25:  Normal esophagus  Multiple gastric polyps (no biopsies)  Duodenal polyposis in the setting of known Peutz-Jeghers syndrome. More than 20 sessile and semi-sessile polyps were found in the duodenal bulb, in the first portion of the duodenum and in the second portion of the duodenum. The polyps were 3 to 9mm in size. 2 larger polyps were targeted for resection today.  A single duodenal PJS-polyp about 12mm.  A single duodenal PJS-polyp about 18-20mm in size.  Recommendation: Repeat upper endoscopy in 6-9 months for re-treatment and for surveillance with probable EMR/resection of further medium-larger polyps if seen/located.     -MRI Abdomen 9/5/24:  No focal pancreatic lesion is seen.  No pancreatic ductal dilatation.  Focal region of signal abnormality with enhancement in the superior margin of the spleen measuring 2.9 x 2.6 cm in transverse dimension, likely a hemangioma.  This is similar in appearance to the most recent CT scan from 2023 however increased in size from 2017.   Punctate focus of enhancement in the posterior aspect of the right hepatic lobe measuring 0.7 cm with some questionable increased T1 signal on precontrast images and no associated signal abnormality on T2 images.  This is nonspecific and possibly related to a small flash filling hemangioma or a tiny FNH/adenoma.  This can be  followed on future examinations.  Small bilateral renal cysts.  Suspected small bowel intussusception in the left mid abdomen, likely transient given the lack of suspected obstruction.    -Colonoscopy on 7/8/24:  Congested mucosa in the entire examined colon.   The examination was otherwise normal on direct and retroflexion views.   Aphtha in the terminal ileum. Biopsied. (Mildly active enteritis)  Biopsies were obtained in the entire colon (negative for atypia or malignancy)  Recommendation: Repeat colonoscopy in 3 years for surveillance.     -Upper EUS on 4/26/24:  There was abnormal echogenicity in the visualized portion of the liver. This was hyperechoic and homogenous.   There was no sign of significant pathology in the entire pancreas.   No specimens collected.   Recommendation: Perform MRI with gadolinium in 1 year.    -Upper GI Endoscopy on 3/22/24:  Normal oropharynx.  Multiple gastric polyps. Biopsied. (Fundic gland polyp)  Duodenal polyposis. A single polyp resected and retrieved. (PJS-polyp)    Focused Social History  Tobacco Use: High Risk (6/16/2025)    Patient History     Smoking Tobacco Use: Every Day     Smokeless Tobacco Use: Never     Passive Exposure: Not on file     Current smoker  Total number of years smoked:  25  Average number of packs smoked:  0.5-1/day  = 18.4 pack-years    Review of Systems  Patient's Distress Score today was 10/10 (scale of 0-10 on which 10=worst).  Patient attributes this to her PJS diagnosis and overall anxiety.  Offered patient a referral to [facility], and patient declined, as she currently has a mental health provider in [place] ([facility]).  Smoking cessation referral at [facility] was also offered, as we discussed the importance of not smoking, especially in the setting of PJS. She will call smoking cessation clinic in [place],but can be referred to service at [facility] in the future if she changes her mind.     FAMILY HISTORY          Staci reported his family history of cancer as follows:  Mother: diagnosed with stomach cancer at 31yo and  at 34yo; reportedly had mucocutaneous hyperpigmentation, PJS polyps and known diagnosis of PJS    Paternal aunt: diagnosed with breast cancer ~1 year ago in her 60s  Paternal aunt: diagnosed with bone cancer and  inher 30s    Staci had originally reported that her sister had PJS as well, but this sister actually  in a car accident at 19yo, and it is unknown what endoscopic evaluations she had. There is no known history of PJS in any other maternal relatives, all of whom are still living in their 60s-80s.    A family history of birth defects, intellectual disability, SIDS, sudden early death, multiple miscarriages and consanguinity were denied. Please refer to above pedigree for further details. A larger copy is available for review into the Media tab.    DISCUSSION     GENETIC TEST RESULTS    Staci had a sample submitted to [facility] [employer] on 19 for [facility] Hereditary Cancer Panel testing. This panel included sequencing and/or deletion/duplication testing of the following 35 genes known to be associated with hereditary breast, gastrointestinal, gynecologic, pancreatic, prostate, skin and other cancers:     APC, DELFINO, AXIN2, BARD1, BMPR1A, BRCA1, BRCA2, BRIP1, CDH1, CDK4, CDKN2A, CHEK2, EPCAM, GALNT12, GREM1, HOXB13, MLH1, MSH2, MSH3, MSH6, MUTYH, NBN, NTHL1, PALB2, PMS2, POLD1, POLE, PTEN, RAD51C, RAD51D, RNF43, RPS20, SMAD4, STK11, TP53    The results of this testing revealed a heterozygous pathogenic mutation in STK11. This particular mutation is described as c.993dup, which is the duplication of a single nucleotide that results in a translational frameshift, premature stop codon and loss of function. This is considered a positive result and is diagnostic of a hereditary cancer syndrome known as Peutz-Jeghers syndrome (PJS). This variant was originally classified as a variant of  unknown significance by [employer] in 2019, but an amended report was sent on 5/14/2020 (below) upgrading the variant to pathogenic.        CANCER RISKS    Individuals with PJS have a significantly increased risk to develop Peutz-Jeghers type polyps in the GI tract. The lifetime risks for individuals with PJS are as follows:   Colon: up to 39%  Pancreas: up to 26%  Stomach: up to 29%  Small intestine: up to 13%  Lung: up to 17%    Women with PJS have these additional cancer risks:  Breast: up to 50%  Ovarian: up to 21%  Cervical: up to 10%  Uterine: up to 9%    Men with PJS have these additional cancer risks  Testicular: increased    MANAGEMENT RECOMMENDATIONS    The National Comprehensive Cancer Network recommends that men and women with PJS undergo the following cancer screening:   Colon: baseline colonoscopy at 8-10 years-old (if polyps then repeat every 2-3 years, if no polyps, then resume at 18 years-old)  Stomach: baseline upper endoscopy at 8-10 years-old (if polyps then repeat every 2-3 years, if no polyps, then resume at 18 years-old)  Small Bowel: baseline small bowel visualization (via video capsule endoscopy or CT/MRI enterography) at 8-10 years-old (repeat per findings)  Pancreas: MRCP and/or endoscopic ultrasound annually beginning at age 30-35  Lung: no standardized recommendations    The NCCN recommends that women with PJS undergo the following additional cancer screening:   Breast: clinical breast exam every 6 months, annual mammogram and breast MRI beginning at 30 years-old  Cervical: annual pelvic exam and Pap smear beginning at 18-20 years-old; consider prophylactic hysterectomy once childbearing is complete  Ovarian: annual pelvic exam with annual pelvic ultrasound beginning at 18-20 years-old  Uterine: annual pelvic exam with endometrial biopsy if abnormal bleeding beginning at 18-20 years-old    There are no standardized recommendations for risk reducing mastectomy and BSO in women with  PJS. Based on these increased lifetime risks of cancer, risk reducing mastectomy and risk reducing salpingo-oophorectomy may be something women with PJS may wish to consider.    The NCCN recommends that men with PJS undergo the following additional cancer screening:   Testes: Annual testicular exam and observation for feminizing changes beginning at 10-years-old     FAMILY MEMBERS AND INHERITANCE    We discussed the autosomal dominant inheritance of PJS. Staci's sister would have had a 50% chance to have inherited the same STK11 mutation identified in her.  We reviewed that STK11 mutations are typically passed down for generations, therefore Staci likely inherited this from one of her parents.      Based on her mother's history of PJS and stomach cancer, we know that the STK11 mutation was inherited from Staci's mother. However, there is no additional maternal family history of PJS polyps, cancers or mucocutaneous hyperpigmentation. Therefore, it is likely that Staci's mother's mutation was de maddie. However, this has not been confirmed, so Staci's maternal relatives consider undergoing predictive testing to determine if they inherited the familial STK11  mutation. Additionally, heterozygous STK11 mutations can be associated with pediatric polyps and/or malignancy and genetic testing is recommended for at risk children. If anyone is interested in having their children tested for the familial STK11 mutation, contact information will be provided to the [facility].      Staci received comprehensive counseling regarding her positive genetic test results. Benefits and limitations of genetic testing were discussed.  Staci was provided with another paper copy of her genetic test results, along with a genetic counseling letter. Staci will follow-up with the Hereditary / High Risk Clinic annually for management updates. In the meantime,  is encouraged to contact us with any changes to her  personal or family history, or if she has any questions or concerns.     ASSESSMENT / PLAN      Genetic counseling was provided for Staci given her longstanding diagnosis of Peutz-Jeghers syndrome (PJS), and confirmatory genetic testing in 2019/2020 that identified a heterozygous pathogenic mutation in STK11 (c.993dup). She was provided with support resources, including [employer] ([employer]), a copy of her genetic test report, and a letter summarizing our discussion. Given her known upper GI polyposis, history of DCIS, history of SCTAT and known risks for additional cancers, we discussed that the following is indicated for Staci:      Breast:  -s/p bilateral mastectomy, continue annual chest wall exam with high-risk provider  -Interim exam with Dr. Peterson due to painful breast lump    GYN:  -s/p CHRISS/BSO  -Continue annual pelvic exam with Dr. Peterson    Stomach/small bowel:  -Continue upper GI endoscopy at interval recommended by Dr. Carrillo (due by end of 2025)    Colon:  -Colonoscopy at interval recommended by Dr. Herbert (due in July 2027)    Pancreas:  -Continue annual MRI/MRCP and/or EUS with Dr. Carrillo (due 09/2025)    Lung:  -Smoking cessation (will call clinic in [place], but offered [facility] referral if needed)  -Consider low-dose CT scan starting at 52yo      Peutz-Jeghers syndrome  - Ambulatory referral/consult to Genetics (completed)  - Establish care with Hereditary / High Risk Clinic  - Continue surveillance for stomach, small bowel, pancreas and colon cancers    Peutz-Jeghers polyps of small bowel  - Due for upper GI endoscopy by end of 2025    Personal history of breast cancer  - Annual chest wall exam  - Interim exam with OB/GYN for painful lump under right breast  - Establish care with Hereditary / High Risk provider    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      Approximately 80 minutes were spent face-to-face with the patient.  Approximately 135 minutes in total were spent on this encounter, which includes face-to-face time and non-face-to-face time preparing to see the patient (e.g., review of tests), obtaining and/or reviewing separately obtained history, documenting clinical information in the electronic or other health record, independently interpreting results (not separately reported) and communicating results to the patient/family/caregiver, or care coordination (not separately reported).     This assessment is based on the history and reports provided, as well as the current scientific knowledge regarding cancer genetics.         Nena Kirkland, [facility], [facility]  Senior Genetic Counselor, Hereditary and High-Risk Clinic  Department of Hematology and Oncology  [facility]    [facility]        CC:  Jing Schwartz, Dr. Vincenzo Herbert, Dr. Henny Carrillo,

## 2025-06-19 ENCOUNTER — OFFICE VISIT (OUTPATIENT)
Dept: OBSTETRICS AND GYNECOLOGY | Facility: CLINIC | Age: 45
End: 2025-06-19
Payer: MEDICAID

## 2025-06-19 VITALS
HEIGHT: 62 IN | WEIGHT: 206.38 LBS | BODY MASS INDEX: 37.98 KG/M2 | SYSTOLIC BLOOD PRESSURE: 120 MMHG | DIASTOLIC BLOOD PRESSURE: 78 MMHG | HEART RATE: 111 BPM

## 2025-06-19 DIAGNOSIS — Z85.3 PERSONAL HISTORY OF BREAST CANCER: Primary | ICD-10-CM

## 2025-06-19 DIAGNOSIS — R22.31 MASS OF RIGHT AXILLA: ICD-10-CM

## 2025-06-19 PROCEDURE — 99999 PR PBB SHADOW E&M-EST. PATIENT-LVL IV: CPT | Mod: PBBFAC,,, | Performed by: OBSTETRICS & GYNECOLOGY

## 2025-06-19 PROCEDURE — 99214 OFFICE O/P EST MOD 30 MIN: CPT | Mod: PBBFAC | Performed by: OBSTETRICS & GYNECOLOGY

## 2025-06-19 NOTE — PROGRESS NOTES
Subjective:       Patient ID: Staci Hodge is a 44 y.o. female.    Chief Complaint:  Breast Pain (Pt here c/o questionable lump that does cause pain on her R breast, She states she had mastectomy 7 years ago./) and vaginal irritation (Pt c/o brown bumps in her vaginal area she is concerned about )      History of Present Illness  Patient presents stating that approximately 1 week ago she noticed a area in her right breast area which felt like a lump.  Patient has had a complete mastectomy secondary to breast cancer.  She states it has not changed and it is not tender.  She has also noticed what she calls bumps vaginally which are darker in appearance.    Menstrual History:  OB History          2    Para   0    Term   0       0    AB   2    Living   0         SAB   2    IAB   0    Ectopic   0    Multiple   0    Live Births   0                Menarche age:  Patient's last menstrual period was 2021.         Review of Systems  Review of Systems   Constitutional:  Positive for chills, diaphoresis and fatigue. Negative for activity change, appetite change, fever and unexpected weight change.   HENT:  Positive for congestion, ear pain, hearing loss, postnasal drip and tinnitus. Negative for dental problem, drooling, ear discharge, facial swelling, mouth sores, nosebleeds, rhinorrhea, sinus pressure, sneezing, sore throat, trouble swallowing and voice change.    Eyes:  Negative for photophobia, pain, discharge, redness, itching and visual disturbance.   Respiratory:  Negative for apnea, cough, choking, chest tightness, shortness of breath, wheezing and stridor.    Cardiovascular:  Negative for chest pain, palpitations and leg swelling.   Gastrointestinal:  Negative for abdominal distention, abdominal pain, anal bleeding, blood in stool, constipation, diarrhea, nausea, rectal pain and vomiting.   Endocrine: Negative for cold intolerance, heat intolerance, polydipsia, polyphagia and polyuria.    Genitourinary:  Negative for decreased urine volume, difficulty urinating, dyspareunia, dysuria, enuresis, flank pain, frequency, genital sores, hematuria, menstrual problem, pelvic pain, urgency, vaginal bleeding, vaginal discharge and vaginal pain.   Musculoskeletal:  Negative for arthralgias, back pain, gait problem, joint swelling, myalgias, neck pain and neck stiffness.   Skin:  Negative for color change, pallor, rash and wound.   Allergic/Immunologic: Negative for environmental allergies, food allergies and immunocompromised state.   Neurological:  Negative for dizziness, tremors, seizures, syncope, facial asymmetry, speech difficulty, weakness, light-headedness, numbness and headaches.   Hematological:  Negative for adenopathy. Does not bruise/bleed easily.   Psychiatric/Behavioral:  Negative for agitation, behavioral problems, confusion, decreased concentration, dysphoric mood, hallucinations, self-injury, sleep disturbance and suicidal ideas. The patient is not nervous/anxious and is not hyperactive.          Objective:      Physical Exam  Vitals and nursing note reviewed. Exam conducted with a chaperone present.   Chest:          Comments: Right and left mastectomy  Genitourinary:         Comments: Hyperpigmented areas        Assessment:        1. Personal history of breast cancer                Plan:         Staci was seen today for breast pain and vaginal irritation.    Diagnoses and all orders for this visit:    Personal history of breast cancer

## 2025-06-24 ENCOUNTER — PATIENT MESSAGE (OUTPATIENT)
Dept: INTERNAL MEDICINE | Facility: CLINIC | Age: 45
End: 2025-06-24
Payer: MEDICAID

## 2025-06-24 DIAGNOSIS — H91.90 HEARING LOSS, UNSPECIFIED HEARING LOSS TYPE, UNSPECIFIED LATERALITY: ICD-10-CM

## 2025-06-24 DIAGNOSIS — H92.09 OTALGIA, UNSPECIFIED LATERALITY: Primary | ICD-10-CM

## 2025-06-25 ENCOUNTER — HOSPITAL ENCOUNTER (OUTPATIENT)
Dept: RADIOLOGY | Facility: HOSPITAL | Age: 45
Discharge: HOME | End: 2025-06-25
Attending: OBSTETRICS & GYNECOLOGY
Payer: MEDICAID

## 2025-06-25 DIAGNOSIS — R22.31 MASS OF RIGHT AXILLA: ICD-10-CM

## 2025-06-25 PROCEDURE — 76882 US LMTD JT/FCL EVL NVASC XTR: CPT | Mod: 26,RT,, | Performed by: RADIOLOGY

## 2025-06-25 PROCEDURE — 76882 US LMTD JT/FCL EVL NVASC XTR: CPT | Mod: TC,RT

## 2025-06-26 ENCOUNTER — RESULTS FOLLOW-UP (OUTPATIENT)
Dept: OBSTETRICS AND GYNECOLOGY | Facility: CLINIC | Age: 45
End: 2025-06-26
Payer: MEDICAID

## 2025-06-26 DIAGNOSIS — R22.31 MASS OF RIGHT AXILLA: Primary | ICD-10-CM

## 2025-06-26 DIAGNOSIS — Q85.89 PEUTZ-JEGHERS SYNDROME: ICD-10-CM

## 2025-06-26 DIAGNOSIS — G43.E19 INTRACTABLE CHRONIC MIGRAINE WITH AURA AND WITHOUT STATUS MIGRAINOSUS: ICD-10-CM

## 2025-06-26 DIAGNOSIS — Z85.3 PERSONAL HISTORY OF BREAST CANCER: ICD-10-CM

## 2025-06-26 RX ORDER — GALCANEZUMAB 120 MG/ML
INJECTION, SOLUTION SUBCUTANEOUS
Qty: 1 ML | Refills: 11 | Status: SHIPPED | OUTPATIENT
Start: 2025-06-26

## 2025-06-26 NOTE — TELEPHONE ENCOUNTER
After speaking with patient she wishes to be sent to the breast clinic for further evaluation.  Referral has been placed

## 2025-06-27 ENCOUNTER — TELEPHONE (OUTPATIENT)
Dept: RADIOLOGY | Facility: HOSPITAL | Age: 45
End: 2025-06-27
Payer: MEDICAID

## 2025-06-27 NOTE — TELEPHONE ENCOUNTER
Spoke with patient. Reviewed breast biopsy procedure and reviewed instructions for breast biopsy. Patient expressed understanding and all questions were answered. Provided patient with my phone number to call for any further concerns or questions.   Patient scheduled breast biopsy at the [facility] on 7/3/2025.

## 2025-06-30 ENCOUNTER — OFFICE VISIT (OUTPATIENT)
Dept: NEUROLOGY | Facility: CLINIC | Age: 45
End: 2025-06-30
Payer: MEDICAID

## 2025-06-30 ENCOUNTER — PATIENT MESSAGE (OUTPATIENT)
Dept: INTERNAL MEDICINE | Facility: CLINIC | Age: 45
End: 2025-06-30
Payer: MEDICAID

## 2025-06-30 VITALS
RESPIRATION RATE: 18 BRPM | BODY MASS INDEX: 37.32 KG/M2 | HEIGHT: 62 IN | OXYGEN SATURATION: 100 % | SYSTOLIC BLOOD PRESSURE: 120 MMHG | WEIGHT: 202.81 LBS | HEART RATE: 118 BPM | DIASTOLIC BLOOD PRESSURE: 82 MMHG

## 2025-06-30 DIAGNOSIS — M48.02 CERVICAL SPINAL STENOSIS: ICD-10-CM

## 2025-06-30 DIAGNOSIS — H93.A9 PULSATILE TINNITUS, UNSPECIFIED EAR: ICD-10-CM

## 2025-06-30 DIAGNOSIS — F17.200 SMOKER: ICD-10-CM

## 2025-06-30 DIAGNOSIS — G43.019 INTRACTABLE MIGRAINE WITHOUT AURA AND WITHOUT STATUS MIGRAINOSUS: ICD-10-CM

## 2025-06-30 DIAGNOSIS — R42 DIZZINESS: ICD-10-CM

## 2025-06-30 DIAGNOSIS — R51.9 RIGHT-SIDED HEADACHE: Primary | ICD-10-CM

## 2025-06-30 PROCEDURE — 99999 PR PBB SHADOW E&M-EST. PATIENT-LVL V: CPT | Mod: PBBFAC,,, | Performed by: PSYCHIATRY & NEUROLOGY

## 2025-06-30 PROCEDURE — 99215 OFFICE O/P EST HI 40 MIN: CPT | Mod: PBBFAC | Performed by: PSYCHIATRY & NEUROLOGY

## 2025-06-30 RX ORDER — ATOGEPANT 60 MG/1
60 TABLET ORAL DAILY
Qty: 30 TABLET | Refills: 11 | Status: SHIPPED | OUTPATIENT
Start: 2025-06-30

## 2025-06-30 NOTE — PROGRESS NOTES
HPI: Staci Hodge is a 44 y.o. female with headaches and bilateral CTS, and prior complaints of dizziness. She has a history of PTSD, panic disorder with agoraphobia, insomnia, colon resection, due to small bowel obstruction, tobacco use, and breast cancer.      Here sooner than  planned follow up    Complaining of pain from the TEMPLE which started in November    Pain seems to start in the Taoism mildly in the beginning of the day but a few hours later is far more severe and then disabling. She has scalp sensitivity there. Pain shoots into neck. This causes light and sound sensitivity   Normally, her migraines are bilaterally and global. This never switches sides and is occurring nearly daily    Hearing a swoosh sound rarely in the ear quickly    Last visit 2-3 migraines a month    Emgality use is ongoing but she feels qulipta worked better prior.    Dizziness is not new and worse than usual. Feels movement    Prior neck pain is treated with pain management    Slurred speech with headaches  is not recurring      Fioriect PRN is not used often        Left CTS release done since the last visit     BP is normal    On propranolol with PCP for tachycardia    Up to date on eye exam.     +Smoker    She has bipolar disorder which is treated by psychiatry, [facility]            I have reviewed all of this patient's past medical and surgical histories as well as family and social histories and active allergies and medications as documented in the electronic medical record.       Review of Systems   Constitutional:  Negative for fever.   HENT:  Negative for nosebleeds.    Eyes:  Negative for double vision.   Respiratory:  Negative for hemoptysis.    Cardiovascular:  Negative for leg swelling.   Gastrointestinal:  Negative for blood in stool.   Genitourinary:  Negative for hematuria.   Musculoskeletal:  Negative for falls.   Skin:  Negative for rash.   Neurological:  Positive for headaches.          Exam:  Gen Appearance, well developed/nourished in no apparent distress  CV: 2+ distal pulses with no edema or swelling  Neuro:  MS: Awake, alert, Sustains attention. Recent/remote memory intact, Language is full to spontaneous speech/repetition/naming/comprehension. Fund of Knowledge is full  CN: Optic discs are flat with normal vasculature, PERRL, EOM full,  Normal facial sensation and strength, Hearing symmetric, Tongue and Palate are midline and strong. Shoulder Shrug symmetric and strong.  Motor: Normal bulk, tone, no abnormal movements at rest. 5/5 strength bilateral upper/lower extremities with 2+ reflexes   Sensory: symmetric to temp, and vibration  Romberg negative  Cerebellar: Finger-nose,Heal-shin, Rapid alternating movements intact  Gait: Normal stance, no ataxia    Imaging:   10/5/2021 MRI Brain:   No MRI evidence of an acute intracranial abnormality.     MRI C-spine 12/2017:  Mild degenerative changes of the cervical spine; neural foraminal narrowing at the C5-6 and C6-7 levels.  No central canal stenosis.     MRI Brain with and without 2/3/2017: normal     24 hour Holter monitor: not completed     EMG BUE 2/10/2017:   Impression: Abnormal Study secondary to the Presence of:   Mild Bilateral Carpal Tunnel Syndrome.      EMG BUE 1/18/2018:  1. Improved right CTS findings post right CTR.   2. Slight worsening of the CTS findings.   3. No EP evidence of cervical radiculopathy found.      48 hour holter 10/2021:   Sinus rhythm  Normal heart rate behavior  Very rare ectopy  Symptoms correlating with sinus rhythm/tachycardia     There was an episode of chest pain reported. The corresponding rhythm strips revealed the following: the rhythm was sinus rhythm at 81 bpm with without ectopy.     There was an episode of Heart racing reported. The corresponding rhythm strips revealed the following: the rhythm was sinus tachycardia at 108 bpm with without ectopy.     There was an episode of Heart racing  reported. The corresponding rhythm strips revealed the following: the rhythm was sinus tachycardia at 111 bpm with without ectopy.     EEG 2021:   Unremarkable    2021 MRI C spine: C6-7 degenerative disc changes resulting in mild spinal canal narrowing and mild bilateral neuroforaminal narrowing.  Additional mild bilateral neuroforaminal narrowing at the C5-6 level.    2021 MRA brain: Normal MRA exam.    8/31/2022 CT head : No acute intracranial abnormality.     Labs:   5/2021 CBC and CMP overall unremarkable  9/2021 CBC, CMP, TSH, T4, B12 reviewed-elevated alkaline phosphatase  Assessment:   Staci Hodge is a 44 y.o. female  with neck pain without radiculopathy, chronic headaches and bilateral CTS, and prior complaints of dizziness.   Plan:   I recommend:  1. No longer on Lyrica for for fibromyalgia complaints.   -pain historically worsens with stress.      2. Nothing surgical on her 2021 MRI C-spine-mild changes only. She failed to respond to two different types of injections with pain management  prior, and did not have cervical radiculopathy on EMG.  -has left CTS/ now s/p CTR in 2024 / CTS found on the left by 2018 EMG/NCS. She is s/p right CTR with good relief       3. No longer on Topamax  -Takes  prn Fioricet rarely  for migraines per PCP  Ubrelvy to be considered at any point  -Diclofenac, prescribed to abort headaches, was discontinued per Nephrology, given her proteinuria. Can't use NSAIDs due to Li use.        4. 2021 MRI Brain and EEG unremarkable.   -48 hour holter shows sinus tachycardia with episodes, but workup per cardiology unrevealing. Outside cardiology records noted in media   -Dizziness occured when neck is flexed.   2021 MRI C spine showed mild spinal stenosis. Referred to pain management / following  -Referred to PT but this was not needed  2021 MRA brain normal (for dizziness complaint)   -Dizziness is episodic /possibly migraine releated      5. Returned in 2022  with slurred  "speech in 3 episodes associated with headaches  -8/31/2022 CT head : No acute intracranial abnormality.  -She was seen by vascular neurology during the spell in the ER in 8/2022 who noted "slow and slurred speech and headache.  Speech not dysarthric (no apparent weakness of lingual, labial, or guttural muscle groups).  Consider migraine associated symptom"  -Has chronic migraine with possible speech aura and improved with better treatment of headaches/ suggesting complicated migraine symptoms  -Failed Topamax, is on Propranolol for tachycardia and can't have an TCA given BPD history  -Failed Aimovig prior  Emgality was greatly helping  to reduce headaches. She returns today with new right sided side locked severe headache associated with dizziness daily worsening over several months. Need MRI and MRA brain to be sure no underlying structural/vascular lesion. ESR needed per orders  -Consider Emgality failure: Stop Emgality switch back to qulipta which she felt helped better prior.       6.  Smoker urged to quit.     RTC  6months                                          "

## 2025-07-01 ENCOUNTER — TELEPHONE (OUTPATIENT)
Dept: ORTHOPEDICS | Facility: CLINIC | Age: 45
End: 2025-07-01
Payer: MEDICAID

## 2025-07-01 NOTE — TELEPHONE ENCOUNTER
----- Message from Toyin sent at 2025  1:16 PM CDT -----  Contact: PATIENT  Staci Hodge  MRN: [id number]  : 1980  PCP: Shilpi Clayton  Home Phone      Not on file.  Work Phone      Not on file.  Mobile          [phone]      MESSAGE: Patient has an appointment at 2:15 this afternoon but is sick and is needing to reschedule.        Phone: [phone]

## 2025-07-03 ENCOUNTER — HOSPITAL ENCOUNTER (OUTPATIENT)
Dept: RADIOLOGY | Facility: HOSPITAL | Age: 45
Discharge: HOME | End: 2025-07-03
Attending: OBSTETRICS & GYNECOLOGY
Payer: MEDICAID

## 2025-07-03 DIAGNOSIS — R92.8 ABNORMAL FINDING ON BREAST IMAGING: ICD-10-CM

## 2025-07-03 PROCEDURE — A4648 IMPLANTABLE TISSUE MARKER: HCPCS

## 2025-07-03 PROCEDURE — 63600175 PHARM REV CODE 636 W HCPCS: Performed by: OBSTETRICS & GYNECOLOGY

## 2025-07-03 PROCEDURE — 25000003 PHARM REV CODE 250: Performed by: OBSTETRICS & GYNECOLOGY

## 2025-07-03 PROCEDURE — 19083 BX BREAST 1ST LESION US IMAG: CPT | Mod: RT,,, | Performed by: RADIOLOGY

## 2025-07-03 RX ORDER — SODIUM BICARBONATE 42 MG/ML
10 INJECTION, SOLUTION INTRAVENOUS ONCE
Status: COMPLETED | OUTPATIENT
Start: 2025-07-03 | End: 2025-07-03

## 2025-07-03 RX ORDER — LIDOCAINE HYDROCHLORIDE AND EPINEPHRINE 10; 20 UG/ML; MG/ML
10 INJECTION, SOLUTION INFILTRATION; PERINEURAL ONCE
Status: COMPLETED | OUTPATIENT
Start: 2025-07-03 | End: 2025-07-03

## 2025-07-03 RX ORDER — LIDOCAINE HYDROCHLORIDE 10 MG/ML
5 INJECTION, SOLUTION INFILTRATION; PERINEURAL ONCE
Status: COMPLETED | OUTPATIENT
Start: 2025-07-03 | End: 2025-07-03

## 2025-07-03 RX ADMIN — SODIUM BICARBONATE 10 MEQ: 42 INJECTION, SOLUTION INTRAVENOUS at 03:07

## 2025-07-03 RX ADMIN — LIDOCAINE HYDROCHLORIDE AND EPINEPHRINE 10 ML: 20; 10 INJECTION, SOLUTION INFILTRATION; PERINEURAL at 01:07

## 2025-07-03 RX ADMIN — LIDOCAINE HYDROCHLORIDE 5 ML: 10 INJECTION, SOLUTION INFILTRATION; PERINEURAL at 01:07

## 2025-07-07 ENCOUNTER — TELEPHONE (OUTPATIENT)
Dept: NEUROLOGY | Facility: CLINIC | Age: 45
End: 2025-07-07
Payer: MEDICAID

## 2025-07-07 ENCOUNTER — LAB VISIT (OUTPATIENT)
Dept: LAB | Facility: HOSPITAL | Age: 45
End: 2025-07-07
Attending: PSYCHIATRY & NEUROLOGY
Payer: MEDICAID

## 2025-07-07 DIAGNOSIS — R51.9 RIGHT-SIDED HEADACHE: ICD-10-CM

## 2025-07-07 LAB — ERYTHROCYTE [SEDIMENTATION RATE] IN BLOOD BY PHOTOMETRIC METHOD: 18 MM/HR

## 2025-07-07 PROCEDURE — 36415 COLL VENOUS BLD VENIPUNCTURE: CPT

## 2025-07-07 PROCEDURE — 85652 RBC SED RATE AUTOMATED: CPT

## 2025-07-07 NOTE — TELEPHONE ENCOUNTER
----- Message from Toyin sent at 2025  8:38 AM CDT -----  Contact: PATIENT  Staci Hodge  MRN: [id number]  : 1980  PCP: Shilpi Clayton  Home Phone      Not on file.  Work Phone      Not on file.  Mobile          [phone]      MESSAGE: Patient states that her insurance is not covering the Qulipta that Dr. Orourke prescribed for her at her last office visit.  She would like to know what recommendations Dr. Orourke would have.        Phone: [phone]

## 2025-07-07 NOTE — TELEPHONE ENCOUNTER
Qulipta is showing preferred level 1 on my end. Can someone inquire why this is not being covered?

## 2025-07-08 ENCOUNTER — TELEPHONE (OUTPATIENT)
Dept: SURGERY | Facility: CLINIC | Age: 45
End: 2025-07-08
Payer: MEDICAID

## 2025-07-08 ENCOUNTER — OFFICE VISIT (OUTPATIENT)
Dept: ORTHOPEDICS | Facility: CLINIC | Age: 45
End: 2025-07-08
Payer: MEDICAID

## 2025-07-08 VITALS
HEIGHT: 62 IN | DIASTOLIC BLOOD PRESSURE: 76 MMHG | HEART RATE: 83 BPM | SYSTOLIC BLOOD PRESSURE: 120 MMHG | OXYGEN SATURATION: 99 % | RESPIRATION RATE: 18 BRPM | WEIGHT: 211.13 LBS | BODY MASS INDEX: 38.85 KG/M2

## 2025-07-08 DIAGNOSIS — Z98.890 S/P CARPAL TUNNEL RELEASE: Primary | ICD-10-CM

## 2025-07-08 PROCEDURE — 99214 OFFICE O/P EST MOD 30 MIN: CPT | Mod: PBBFAC | Performed by: PHYSICIAN ASSISTANT

## 2025-07-08 PROCEDURE — 99999 PR PBB SHADOW E&M-EST. PATIENT-LVL IV: CPT | Mod: PBBFAC,,, | Performed by: PHYSICIAN ASSISTANT

## 2025-07-08 NOTE — PROGRESS NOTES
Pt presents for post-op evaluation. She is 8 months s/p left carpal tunnel release with Dr. Bosch. Pt c/o continued pain and swelling to her hand. She has completed formal OT and has continued with home exercises. She has also seen pain management, has not had much relief of symptoms.     Left wrist/hand:  Incision well healed  Minimal swelling to palm of hand  No erythema, warmth, drainage, or any other signs of infection  Neurovascular status intact in extremity  FROM wrist and all fingers        A/P:  S/p left carpal tunnel release    We discussed treatment options. Pt would like follow up appointment with Dr. Bosch.     1. Advance activity as tolerated.  2. Continue follow up with pain management.  3. Return to clinic for follow up with Dr. Bosch, sooner if needed.      Patient voices understanding of and agreement with treatment plan. All of the patient's questions were answered, and the patient will contact us if she has any questions or concerns in the interim.

## 2025-07-08 NOTE — TELEPHONE ENCOUNTER
PA submitted to patients insurance was approved. I spoke with the patient to make her aware of this, she v/u. Advised her to call her pharmacy as well. She v/u and states she will call them tomorrow morning.

## 2025-07-08 NOTE — TELEPHONE ENCOUNTER
Patient called with results of breast biopsy from 7/3/2024,   no atypia/benign, all questions answered, pt advised to follow up in 6 months with repeat imaging per core biopsy protocol.  reviewed biopsy results and results are benign and concordant. Patient verbalized understanding.     ----- Message from Tip Parker MD sent at 7/8/2025  3:04 PM CDT -----  Benign and concordant.   ----- Message -----  From: Lab, Background User  Sent: 7/8/2025   2:35 PM CDT  To: Tip Parker MD

## 2025-07-11 ENCOUNTER — HOSPITAL ENCOUNTER (OUTPATIENT)
Dept: RADIOLOGY | Facility: HOSPITAL | Age: 45
Discharge: HOME | End: 2025-07-11
Attending: PSYCHIATRY & NEUROLOGY
Payer: MEDICAID

## 2025-07-11 DIAGNOSIS — H93.A9 PULSATILE TINNITUS, UNSPECIFIED EAR: ICD-10-CM

## 2025-07-11 DIAGNOSIS — R42 DIZZINESS: ICD-10-CM

## 2025-07-11 DIAGNOSIS — R51.9 RIGHT-SIDED HEADACHE: ICD-10-CM

## 2025-07-11 PROCEDURE — 70553 MRI BRAIN STEM W/O & W/DYE: CPT | Mod: TC

## 2025-07-11 PROCEDURE — 70544 MR ANGIOGRAPHY HEAD W/O DYE: CPT | Mod: TC

## 2025-07-11 PROCEDURE — 25500020 PHARM REV CODE 255: Performed by: PSYCHIATRY & NEUROLOGY

## 2025-07-11 PROCEDURE — A9585 GADOBUTROL INJECTION: HCPCS | Performed by: PSYCHIATRY & NEUROLOGY

## 2025-07-11 RX ORDER — GADOBUTROL 604.72 MG/ML
9 INJECTION INTRAVENOUS
Status: COMPLETED | OUTPATIENT
Start: 2025-07-11 | End: 2025-07-11

## 2025-07-11 RX ADMIN — GADOBUTROL 10 ML: 604.72 INJECTION INTRAVENOUS at 10:07

## 2025-07-18 RX ORDER — GABAPENTIN 300 MG/1
300 CAPSULE ORAL 3 TIMES DAILY
Qty: 90 CAPSULE | Refills: 1 | Status: SHIPPED | OUTPATIENT
Start: 2025-07-18 | End: 2025-09-16

## 2025-07-18 NOTE — TELEPHONE ENCOUNTER
gabapentin (NEURONTIN) 300 MG capsule 300 mg, 3 times daily             Summary: Take 1 capsule (300 mg total) by mouth 3 (three) times daily., Starting Thu 5/29/2025, Until Mon 7/28/2025, Normal  Dose, Route, Frequency: 300 mg, Oral, 3 times dailyStart: 05/29/2025End: 07/28/2025Ordered On: 05/29/2025Pharmacy: [facility], TANI Ramirez - [place], [place] - [address] [place] [address] [address] [facility] Associated: Long-term: Med Note:              Directions: Take 1 capsule (300 mg total) by mouth 3 (three) times daily.  Ordering Department: [facility]  Authorized By: Shahid Almonte MD  Dispense: 90 capsule         Last OV: 05/29/2025    Anti-convulsants:  she would like to restart gabapentin, 300 mg 3 times a day.

## 2025-07-21 DIAGNOSIS — E53.8 FOLIC ACID DEFICIENCY: ICD-10-CM

## 2025-07-21 RX ORDER — FOLIC ACID 1 MG/1
1000 TABLET ORAL
Qty: 30 TABLET | Refills: 5 | Status: SHIPPED | OUTPATIENT
Start: 2025-07-21

## 2025-08-05 ENCOUNTER — OFFICE VISIT (OUTPATIENT)
Dept: INTERNAL MEDICINE | Facility: CLINIC | Age: 45
End: 2025-08-05
Payer: MEDICAID

## 2025-08-05 VITALS
BODY MASS INDEX: 37.85 KG/M2 | DIASTOLIC BLOOD PRESSURE: 84 MMHG | RESPIRATION RATE: 20 BRPM | HEIGHT: 62 IN | WEIGHT: 205.69 LBS | OXYGEN SATURATION: 97 % | HEART RATE: 80 BPM | SYSTOLIC BLOOD PRESSURE: 124 MMHG

## 2025-08-05 DIAGNOSIS — K08.9 DENTAL DISORDER: ICD-10-CM

## 2025-08-05 DIAGNOSIS — Z01.818 PRE-OP EXAM: Primary | ICD-10-CM

## 2025-08-05 PROCEDURE — 99214 OFFICE O/P EST MOD 30 MIN: CPT | Mod: PBBFAC,PN | Performed by: NURSE PRACTITIONER

## 2025-08-05 PROCEDURE — 3079F DIAST BP 80-89 MM HG: CPT | Mod: CPTII,,, | Performed by: NURSE PRACTITIONER

## 2025-08-05 PROCEDURE — 3074F SYST BP LT 130 MM HG: CPT | Mod: CPTII,,, | Performed by: NURSE PRACTITIONER

## 2025-08-05 PROCEDURE — 1159F MED LIST DOCD IN RCRD: CPT | Mod: CPTII,,, | Performed by: NURSE PRACTITIONER

## 2025-08-05 PROCEDURE — 99214 OFFICE O/P EST MOD 30 MIN: CPT | Mod: S$PBB,,, | Performed by: NURSE PRACTITIONER

## 2025-08-05 PROCEDURE — 3008F BODY MASS INDEX DOCD: CPT | Mod: CPTII,,, | Performed by: NURSE PRACTITIONER

## 2025-08-05 PROCEDURE — 99999 PR PBB SHADOW E&M-EST. PATIENT-LVL IV: CPT | Mod: PBBFAC,,, | Performed by: NURSE PRACTITIONER

## 2025-08-05 PROCEDURE — 3044F HG A1C LEVEL LT 7.0%: CPT | Mod: CPTII,,, | Performed by: NURSE PRACTITIONER

## 2025-08-05 RX ORDER — IBUPROFEN 800 MG/1
800 TABLET, FILM COATED ORAL 3 TIMES DAILY PRN
Qty: 90 TABLET | Refills: 0 | Status: SHIPPED | OUTPATIENT
Start: 2025-08-05

## 2025-08-05 NOTE — PROGRESS NOTES
History of Present Illness    CHIEF COMPLAINT:  Patient presents today for dental clearance prior to tooth extraction.    DENTAL HISTORY:  She has significant dental issues involving back teeth requiring extraction of four teeth with future implants planned. A root canal was recommended but extraction was chosen as a more cost-effective alternative. She reports dental pain for which she was prescribed Ibuprofen three weeks ago following a dental procedure at [facility]      DENTAL ANXIETY:  She reports significant anxiety regarding dental procedures, particularly concerning the upcoming extraction. Her dental provider has recommended sedation due to her high anxiety level.    MEDICAL HISTORY:  She had a previous cervical spinal injection with Versed, during which she maintained full recall of the procedure despite the medication's typical amnestic effects.      ROS:  General: -fever, -chills, -fatigue, -weight gain, -weight loss  Eyes: -vision changes, -redness, -discharge  ENT: -ear pain, -nasal congestion, -sore throat  Cardiovascular: -chest pain, -palpitations, -lower extremity edema  Respiratory: -cough, -shortness of breath  Gastrointestinal: -abdominal pain, -nausea, -vomiting, -diarrhea, -constipation, -blood in stool  Genitourinary: -dysuria, -hematuria, -frequency  Musculoskeletal: -joint pain, -muscle pain  Skin: -rash, -lesion  Neurological: -headache, -dizziness, -numbness, -tingling  Psychiatric: +anxiety, -depression, -sleep difficulty, +excessive fear  Head: +tooth pain          Physical Exam    General: No acute distress. Well-developed. Well-nourished.  Eyes: EOMI. Sclerae anicteric.  HENT: Normocephalic. Atraumatic. Nares patent. Moist oral mucosa. Dental caries  Ears: Bilateral TMs clear. Bilateral EACs clear.  Cardiovascular: Regular rate. Regular rhythm. No murmurs. No rubs. No gallops. Normal S1, S2.  Respiratory: Normal respiratory effort. Clear to auscultation bilaterally. No rales. No rhonchi. No  "wheezing.  Abdomen: Soft. Non-tender. Non-distended. Normoactive bowel sounds.  Musculoskeletal: No  obvious deformity.  Extremities: No lower extremity edema.  Neurological: Alert & oriented x3. No slurred speech. Normal gait.  Psychiatric: Normal mood. Normal affect. Good insight. Good judgment.  Skin: Warm. Dry. No rash.          Assessment & Plan    1. Pre-op exam        2. Dental disorder           PARTIAL LOSS OF TEETH:  - Monitored the patient's dental status, noting the need for 4 teeth extractions and previous history of implants.  - Evaluated the patient's financial situation, confirming inability to afford root canal treatment, leading to the decision for tooth extraction.  - Recommend the patient visit a clinic with more affordable pricing for dental work.    DENTAL DISORDERS AND PROCEDURES:  - Assessed the patient's dental anxiety and discussed sedation options, noting that sedation is not covered by Medicaid.  - Administered Versed (midazolam) for twilight sedation during dental procedures, explaining it may cause temporary amnesia.  - Monitored the patient who has had dental issues for 3 weeks.  - Prescribed Ibuprofen for dental pain management and offered to send a prescription for additional pain management if needed.    CARDIAC EVALUATION FOR DENTAL PROCEDURE:  - Assessed cardiac status for dental procedure clearance.  - Printed and faxed latest EKG results to dental clinic to confirm clearance for procedure.       "This note will not be shared with the patient."  Patient is cleared for sx  This note was generated with the assistance of ambient listening technology. Verbal consent was obtained by the patient and accompanying visitor(s) for the recording of patient appointment to facilitate this note. I attest to having reviewed and edited the generated note for accuracy, though some syntax or spelling errors may persist. Please contact the author of this note for any clarification.        "

## 2025-08-12 ENCOUNTER — PATIENT MESSAGE (OUTPATIENT)
Dept: NEUROLOGY | Facility: CLINIC | Age: 45
End: 2025-08-12
Payer: MEDICAID

## 2025-08-12 DIAGNOSIS — G43.009 MIGRAINE WITHOUT AURA AND WITHOUT STATUS MIGRAINOSUS, NOT INTRACTABLE: ICD-10-CM

## 2025-08-12 DIAGNOSIS — G43.019 INTRACTABLE MIGRAINE WITHOUT AURA AND WITHOUT STATUS MIGRAINOSUS: Primary | ICD-10-CM

## 2025-08-12 RX ORDER — BUTALBITAL, ACETAMINOPHEN AND CAFFEINE 50; 325; 40 MG/1; MG/1; MG/1
1 TABLET ORAL EVERY 6 HOURS PRN
Qty: 30 TABLET | Refills: 5 | Status: SHIPPED | OUTPATIENT
Start: 2025-08-12

## 2025-08-18 ENCOUNTER — PATIENT MESSAGE (OUTPATIENT)
Dept: INTERNAL MEDICINE | Facility: CLINIC | Age: 45
End: 2025-08-18
Payer: MEDICAID

## 2025-08-18 RX ORDER — FREMANEZUMAB-VFRM 225 MG/1.5ML
225 INJECTION SUBCUTANEOUS
Qty: 1 EACH | Refills: 11 | Status: SHIPPED | OUTPATIENT
Start: 2025-08-18

## 2025-08-19 RX ORDER — IBUPROFEN 200 MG
1 TABLET ORAL DAILY
Qty: 30 PATCH | Refills: 0 | Status: SHIPPED | OUTPATIENT
Start: 2025-08-19

## (undated) DEVICE — SUT 2-0 VICRYL / CT-1

## (undated) DEVICE — GOWN AERO CHROME W/ TOWEL XL

## (undated) DEVICE — SUT VICRYL PLUS 4-0 P3 18IN

## (undated) DEVICE — TRAY MINOR GEN SURG

## (undated) DEVICE — PADDING CAST 4IN(OR)

## (undated) DEVICE — NDL 18GA X1 1/2 REG BEVEL

## (undated) DEVICE — SIZER BREAST NATRELLE 495ML
Type: IMPLANTABLE DEVICE | Site: BREAST | Status: NON-FUNCTIONAL
Removed: 2020-03-10

## (undated) DEVICE — GLOVE BIOGEL SKINSENSE PI 7.5

## (undated) DEVICE — DRAPE STERI INSTRUMENT 1018

## (undated) DEVICE — BANDAGE ACE NON LATEX 4IN

## (undated) DEVICE — TUBING 8FT HI VACLIPOSUCTION

## (undated) DEVICE — SEE MEDLINE ITEM 146417

## (undated) DEVICE — ELECTRODE BLADE INSULATED 1 IN

## (undated) DEVICE — SYS PRINEO SKIN CLOSURE

## (undated) DEVICE — SPONGE LAP 18X18 PREWASHED

## (undated) DEVICE — SKINMARKER & RULER REGULAR X-F

## (undated) DEVICE — BANDAGE ACE ELASTIC 6"

## (undated) DEVICE — APPLICATOR CHLORAPREP ORN 26ML

## (undated) DEVICE — SUT VICRYL 3-0 27 SH

## (undated) DEVICE — CLOSURE SKIN STERI STRIP 1/2X4

## (undated) DEVICE — BOVIE SUCTION

## (undated) DEVICE — RETRACTOR RADIALUX LIGHTED

## (undated) DEVICE — EVACUATOR WOUND BULB 100CC

## (undated) DEVICE — SEE MEDLINE ITEM 152512

## (undated) DEVICE — SUT SILK 3-0 SH 18IN BLACK

## (undated) DEVICE — GAUZE DERMACEA LOW PLY 2X4YRDS

## (undated) DEVICE — GOWN SURGICAL X-LARGE

## (undated) DEVICE — HOOK STAY ELAS 5MM 8EA/PK

## (undated) DEVICE — NDL HYPO REG 25G X 1 1/2

## (undated) DEVICE — PAD ABD 8X10 STERILE

## (undated) DEVICE — SHEET DRAPE FAN-FOLDED 3/4

## (undated) DEVICE — PACK SET UP CONVERTORS

## (undated) DEVICE — SOL WATER STRL IRR 1000ML

## (undated) DEVICE — COVER PROBE NL STRL 3.6X96IN

## (undated) DEVICE — SUT 4.0 ETHILON

## (undated) DEVICE — PACK GENERAL SURGERY

## (undated) DEVICE — STOCKINETTE 3INX36

## (undated) DEVICE — LABEL FOR UTILITY MARKER

## (undated) DEVICE — NDL SPINAL SPINOCAN 22GX3.5

## (undated) DEVICE — SUT MCRYL PLUS 4-0 PS2 27IN

## (undated) DEVICE — SYS CLSR DERMABOND PRINEO 22CM

## (undated) DEVICE — SEE MEDLINE ITEM 152622

## (undated) DEVICE — SEE MEDLINE ITEM 157128

## (undated) DEVICE — SEE MEDLINE ITEM 152523

## (undated) DEVICE — SPONGE DERMACEA GAUZE 4X4

## (undated) DEVICE — ELECTRODE REM PLYHSV RETURN 9

## (undated) DEVICE — DRESSING XEROFORM FOIL PK 1X8

## (undated) DEVICE — SYS LABEL CORRECT MED

## (undated) DEVICE — HOLDER TUBE

## (undated) DEVICE — STAPLER SKIN ROTATING HEAD

## (undated) DEVICE — SYR 10CC LUER LOCK

## (undated) DEVICE — COVER LIGHT HANDLE 80/CA

## (undated) DEVICE — SUT ETHILON 2-0 PSLX 30IN

## (undated) DEVICE — SUT CTD VICRYL 4-0 UND PS-1

## (undated) DEVICE — NDL ECLIPSE SAFETY 18GX1-1/2IN

## (undated) DEVICE — SUT PDS II 2-0 CT1

## (undated) DEVICE — SUT MONOCRYL 3-0 PS-2 UND

## (undated) DEVICE — ELECTRODE EXTENDED BLADE

## (undated) DEVICE — KIT IRR SUCTION HND PIECE

## (undated) DEVICE — BRA STYLE 7 SIZE 42

## (undated) DEVICE — KIT PREVENA PLUS

## (undated) DEVICE — SUT 0 VICRYL / UR6 (J603)

## (undated) DEVICE — PACK UNIVERSAL SPLIT II

## (undated) DEVICE — DRAIN CHANNEL ROUND 15FR

## (undated) DEVICE — GAUZE SPONGE 4X4 12PLY

## (undated) DEVICE — SUT SILK 2-0 PS 18IN BLACK

## (undated) DEVICE — COMPRESSION MID THIGH LG

## (undated) DEVICE — DRAPE C ARM 42 X 120 10/BX

## (undated) DEVICE — BLADE SCALP OPHTL BEVEL STR

## (undated) DEVICE — KIT PREP E-Z WET W/2-6

## (undated) DEVICE — PAD SANITARY OB STERILE

## (undated) DEVICE — SEE MEDLINE ITEM 152572

## (undated) DEVICE — DRAPE IOBAN 2 STERI

## (undated) DEVICE — COVERS PROBE NR-48 STERILE

## (undated) DEVICE — CLOSURE SKIN STERI STRIP 1/4X4

## (undated) DEVICE — SUT CTD VICRYL 3-0 CR/SH

## (undated) DEVICE — DRAPE STERI U-SHAPED 47X51IN

## (undated) DEVICE — LEGGINGS 48X31 INCH

## (undated) DEVICE — BLADE 4IN EDGE INSULATED

## (undated) DEVICE — DRAPE THYROID WITH ARMBOARD

## (undated) DEVICE — SPLINT WRIST FOAM 8.8IN LG/LFT

## (undated) DEVICE — SEE L#133928

## (undated) DEVICE — SUT 3/0 27IN PDS II VIO MO

## (undated) DEVICE — DRESSING TELFA N ADH 3X8

## (undated) DEVICE — BRA STYLE 7 SIZE 44

## (undated) DEVICE — APPLIER CLIP LIAGCLIP 9.375IN

## (undated) DEVICE — SUT 2-0 VICRYL / SH (J417)

## (undated) DEVICE — ADHESIVE DERMABOND ADVANCED

## (undated) DEVICE — GLOVE EXAM VINYL PF X-LG

## (undated) DEVICE — MARKER SKIN RULER AND LABEL

## (undated) DEVICE — SYR DISP LL 5CC

## (undated) DEVICE — SYR 50ML CATH TIP

## (undated) DEVICE — DRAPE UNDER BUTTOCKS WITH POUCH

## (undated) DEVICE — SOL NS 1000CC

## (undated) DEVICE — SET FLUID TRANSFER ASEPTIC

## (undated) DEVICE — SKIN MARKER DEVON 160

## (undated) DEVICE — CATH IV INTROCAN 22G X 1

## (undated) DEVICE — SEE MEDLINE ITEM 157216

## (undated) DEVICE — STAPLER SKIN REGULAR

## (undated) DEVICE — KIT NERVE BLOCK PREP BAPTIST

## (undated) DEVICE — CONTAINER SPECIMEN STRL 4OZ

## (undated) DEVICE — GAUZE FLUFF XXLG 36X36 2 PLY

## (undated) DEVICE — DRESSING TELFA PAD N ADH 2X3

## (undated) DEVICE — GOWN SMART IMP BREATHABLE XXLG

## (undated) DEVICE — CHLORAPREP 10.5 ML APPLICATOR

## (undated) DEVICE — TRAY NERVE BLOCK

## (undated) DEVICE — STOCKINET 4INX48

## (undated) DEVICE — NDL ECLIPSE SAF REG 25GX1.5IN

## (undated) DEVICE — SKIN MARKER SHARPIE

## (undated) DEVICE — CUP MEDICINE STERILE 2OZ

## (undated) DEVICE — SEE MEDLINE ITEM 157166

## (undated) DEVICE — SEE MEDLINE ITEM 157117

## (undated) DEVICE — BLADE SURG CARBON STEEL SZ11

## (undated) DEVICE — SUCTION COAGULATOR 10FR 6IN

## (undated) DEVICE — SUT CTD VICRYL 3-0 PS-1

## (undated) DEVICE — BRA STYLE 7 SIZE 34

## (undated) DEVICE — SUT MONOCYRL 4-0 PS2 UND

## (undated) DEVICE — GLOVE BIOGEL ECLIPSE SZ 7.5

## (undated) DEVICE — SET IV 20 DRIP VENT/NON-VENT

## (undated) DEVICE — SYR 50CC LL

## (undated) DEVICE — ADHESIVE MASTISOL VIAL 48/BX

## (undated) DEVICE — SYR BULB 60CC IRRIGATION

## (undated) DEVICE — SPONGE COTTON TRAY 4X4IN

## (undated) DEVICE — SOL 9P NACL IRR PIC IL

## (undated) DEVICE — SUT ETHILON 4-0 PC5 18IN

## (undated) DEVICE — TOWELS STERILE 18 X 25.5

## (undated) DEVICE — SPONGE GAUZE 16PLY 4X4

## (undated) DEVICE — DRAPE EXTREMITY STD 89X128IN

## (undated) DEVICE — TRAY EPIDURAL CONT TUOHY NDL

## (undated) DEVICE — BLADE SURG #15 CARBON STEEL

## (undated) DEVICE — NEOGUARD COVER 4X30CM STERILE